# Patient Record
Sex: FEMALE | Race: WHITE | HISPANIC OR LATINO
[De-identification: names, ages, dates, MRNs, and addresses within clinical notes are randomized per-mention and may not be internally consistent; named-entity substitution may affect disease eponyms.]

---

## 2017-01-04 ENCOUNTER — APPOINTMENT (OUTPATIENT)
Dept: INTERNAL MEDICINE | Facility: CLINIC | Age: 52
End: 2017-01-04

## 2017-01-04 VITALS
TEMPERATURE: 99.2 F | WEIGHT: 289 LBS | RESPIRATION RATE: 18 BRPM | BODY MASS INDEX: 53.18 KG/M2 | SYSTOLIC BLOOD PRESSURE: 138 MMHG | HEART RATE: 100 BPM | DIASTOLIC BLOOD PRESSURE: 98 MMHG | HEIGHT: 62 IN

## 2017-01-04 DIAGNOSIS — K76.0 FATTY (CHANGE OF) LIVER, NOT ELSEWHERE CLASSIFIED: ICD-10-CM

## 2017-01-05 LAB
25(OH)D3 SERPL-MCNC: 18.4 NG/ML
ALBUMIN SERPL ELPH-MCNC: 4.1 G/DL
ALP BLD-CCNC: 104 U/L
ALT SERPL-CCNC: 61 U/L
ANION GAP SERPL CALC-SCNC: 15 MMOL/L
APPEARANCE: CLEAR
AST SERPL-CCNC: 59 U/L
BASOPHILS # BLD AUTO: 0.06 K/UL
BASOPHILS NFR BLD AUTO: 0.7 %
BILIRUB SERPL-MCNC: 0.6 MG/DL
BILIRUBIN URINE: NEGATIVE
BLOOD URINE: NEGATIVE
BUN SERPL-MCNC: 11 MG/DL
CALCIUM SERPL-MCNC: 9.7 MG/DL
CHLORIDE SERPL-SCNC: 97 MMOL/L
CHOLEST SERPL-MCNC: 192 MG/DL
CHOLEST/HDLC SERPL: 4.9 RATIO
CO2 SERPL-SCNC: 28 MMOL/L
COLOR: YELLOW
CREAT SERPL-MCNC: 0.76 MG/DL
EOSINOPHIL # BLD AUTO: 0.31 K/UL
EOSINOPHIL NFR BLD AUTO: 3.6 %
GGT SERPL-CCNC: 68 U/L
GLUCOSE QUALITATIVE U: NORMAL MG/DL
GLUCOSE SERPL-MCNC: 169 MG/DL
HBA1C MFR BLD HPLC: 7.7 %
HCT VFR BLD CALC: 42.3 %
HDLC SERPL-MCNC: 39 MG/DL
HGB BLD-MCNC: 12.7 G/DL
IMM GRANULOCYTES NFR BLD AUTO: 1.3 %
KETONES URINE: NEGATIVE
LDLC SERPL CALC-MCNC: 130 MG/DL
LEUKOCYTE ESTERASE URINE: NEGATIVE
LYMPHOCYTES # BLD AUTO: 2.14 K/UL
LYMPHOCYTES NFR BLD AUTO: 24.9 %
MAN DIFF?: NORMAL
MCHC RBC-ENTMCNC: 25.1 PG
MCHC RBC-ENTMCNC: 30 GM/DL
MCV RBC AUTO: 83.8 FL
MONOCYTES # BLD AUTO: 0.63 K/UL
MONOCYTES NFR BLD AUTO: 7.3 %
NEUTROPHILS # BLD AUTO: 5.36 K/UL
NEUTROPHILS NFR BLD AUTO: 62.2 %
NITRITE URINE: NEGATIVE
PH URINE: 6
PLATELET # BLD AUTO: 344 K/UL
POTASSIUM SERPL-SCNC: 4.7 MMOL/L
PROT SERPL-MCNC: 7.6 G/DL
PROTEIN URINE: NEGATIVE MG/DL
RBC # BLD: 5.05 M/UL
RBC # FLD: 15.8 %
SODIUM SERPL-SCNC: 140 MMOL/L
SPECIFIC GRAVITY URINE: 1.02
TRIGL SERPL-MCNC: 113 MG/DL
UROBILINOGEN URINE: NORMAL MG/DL
WBC # FLD AUTO: 8.61 K/UL

## 2017-01-10 LAB — TSH SERPL-ACNC: 2.14 UU/ML

## 2017-01-24 ENCOUNTER — INPATIENT (INPATIENT)
Facility: HOSPITAL | Age: 52
LOS: 0 days | Discharge: HOME | DRG: 313 | End: 2017-01-25
Attending: INTERNAL MEDICINE | Admitting: INTERNAL MEDICINE
Payer: COMMERCIAL

## 2017-01-24 VITALS
HEIGHT: 61 IN | SYSTOLIC BLOOD PRESSURE: 125 MMHG | DIASTOLIC BLOOD PRESSURE: 105 MMHG | WEIGHT: 289.03 LBS | TEMPERATURE: 98 F | OXYGEN SATURATION: 98 % | RESPIRATION RATE: 24 BRPM | HEART RATE: 98 BPM

## 2017-01-24 DIAGNOSIS — E11.9 TYPE 2 DIABETES MELLITUS WITHOUT COMPLICATIONS: ICD-10-CM

## 2017-01-24 DIAGNOSIS — I10 ESSENTIAL (PRIMARY) HYPERTENSION: ICD-10-CM

## 2017-01-24 DIAGNOSIS — R07.9 CHEST PAIN, UNSPECIFIED: ICD-10-CM

## 2017-01-24 DIAGNOSIS — E03.9 HYPOTHYROIDISM, UNSPECIFIED: ICD-10-CM

## 2017-01-24 DIAGNOSIS — Z90.710 ACQUIRED ABSENCE OF BOTH CERVIX AND UTERUS: Chronic | ICD-10-CM

## 2017-01-24 DIAGNOSIS — Z98.89 OTHER SPECIFIED POSTPROCEDURAL STATES: Chronic | ICD-10-CM

## 2017-01-24 DIAGNOSIS — Z41.8 ENCOUNTER FOR OTHER PROCEDURES FOR PURPOSES OTHER THAN REMEDYING HEALTH STATE: ICD-10-CM

## 2017-01-24 LAB
ALBUMIN SERPL ELPH-MCNC: 3.3 G/DL — LOW (ref 3.5–5)
ALP SERPL-CCNC: 109 U/L — SIGNIFICANT CHANGE UP (ref 40–120)
ALT FLD-CCNC: 70 U/L DA — HIGH (ref 10–60)
ANION GAP SERPL CALC-SCNC: 9 MMOL/L — SIGNIFICANT CHANGE UP (ref 5–17)
APPEARANCE UR: CLEAR — SIGNIFICANT CHANGE UP
AST SERPL-CCNC: 57 U/L — HIGH (ref 10–40)
BASOPHILS # BLD AUTO: 0.1 K/UL — SIGNIFICANT CHANGE UP (ref 0–0.2)
BASOPHILS NFR BLD AUTO: 0.8 % — SIGNIFICANT CHANGE UP (ref 0–2)
BILIRUB SERPL-MCNC: 0.3 MG/DL — SIGNIFICANT CHANGE UP (ref 0.2–1.2)
BILIRUB UR-MCNC: NEGATIVE — SIGNIFICANT CHANGE UP
BUN SERPL-MCNC: 17 MG/DL — SIGNIFICANT CHANGE UP (ref 7–18)
CALCIUM SERPL-MCNC: 8.7 MG/DL — SIGNIFICANT CHANGE UP (ref 8.4–10.5)
CHLORIDE SERPL-SCNC: 102 MMOL/L — SIGNIFICANT CHANGE UP (ref 96–108)
CHOLEST SERPL-MCNC: 161 MG/DL — SIGNIFICANT CHANGE UP (ref 10–199)
CO2 SERPL-SCNC: 31 MMOL/L — SIGNIFICANT CHANGE UP (ref 22–31)
COLOR SPEC: YELLOW — SIGNIFICANT CHANGE UP
CREAT SERPL-MCNC: 0.83 MG/DL — SIGNIFICANT CHANGE UP (ref 0.5–1.3)
DIFF PNL FLD: NEGATIVE — SIGNIFICANT CHANGE UP
EOSINOPHIL # BLD AUTO: 0.3 K/UL — SIGNIFICANT CHANGE UP (ref 0–0.5)
EOSINOPHIL NFR BLD AUTO: 3.3 % — SIGNIFICANT CHANGE UP (ref 0–6)
GLUCOSE SERPL-MCNC: 131 MG/DL — HIGH (ref 70–99)
GLUCOSE UR QL: NEGATIVE — SIGNIFICANT CHANGE UP
HBA1C BLD-MCNC: 7.8 % — HIGH (ref 4–5.6)
HCG UR QL: NEGATIVE — SIGNIFICANT CHANGE UP
HCT VFR BLD CALC: 40.1 % — SIGNIFICANT CHANGE UP (ref 34.5–45)
HDLC SERPL-MCNC: 41 MG/DL — SIGNIFICANT CHANGE UP (ref 40–125)
HGB BLD-MCNC: 12.7 G/DL — SIGNIFICANT CHANGE UP (ref 11.5–15.5)
KETONES UR-MCNC: NEGATIVE — SIGNIFICANT CHANGE UP
LEUKOCYTE ESTERASE UR-ACNC: NEGATIVE — SIGNIFICANT CHANGE UP
LIDOCAIN IGE QN: 92 U/L — SIGNIFICANT CHANGE UP (ref 73–393)
LIPID PNL WITH DIRECT LDL SERPL: 91 MG/DL — SIGNIFICANT CHANGE UP
LYMPHOCYTES # BLD AUTO: 3.1 K/UL — SIGNIFICANT CHANGE UP (ref 1–3.3)
LYMPHOCYTES # BLD AUTO: 33.4 % — SIGNIFICANT CHANGE UP (ref 13–44)
MCHC RBC-ENTMCNC: 25.9 PG — LOW (ref 27–34)
MCHC RBC-ENTMCNC: 31.7 GM/DL — LOW (ref 32–36)
MCV RBC AUTO: 81.8 FL — SIGNIFICANT CHANGE UP (ref 80–100)
MONOCYTES # BLD AUTO: 0.4 K/UL — SIGNIFICANT CHANGE UP (ref 0–0.9)
MONOCYTES NFR BLD AUTO: 3.9 % — SIGNIFICANT CHANGE UP (ref 2–14)
NEUTROPHILS # BLD AUTO: 5.4 K/UL — SIGNIFICANT CHANGE UP (ref 1.8–7.4)
NEUTROPHILS NFR BLD AUTO: 58.4 % — SIGNIFICANT CHANGE UP (ref 43–77)
NITRITE UR-MCNC: NEGATIVE — SIGNIFICANT CHANGE UP
NT-PROBNP SERPL-SCNC: 146 PG/ML — HIGH (ref 0–125)
PH UR: 6.5 — SIGNIFICANT CHANGE UP (ref 4.8–8)
PLATELET # BLD AUTO: 329 K/UL — SIGNIFICANT CHANGE UP (ref 150–400)
POTASSIUM SERPL-MCNC: 3.8 MMOL/L — SIGNIFICANT CHANGE UP (ref 3.5–5.3)
POTASSIUM SERPL-SCNC: 3.8 MMOL/L — SIGNIFICANT CHANGE UP (ref 3.5–5.3)
PROT SERPL-MCNC: 7.6 G/DL — SIGNIFICANT CHANGE UP (ref 6–8.3)
PROT UR-MCNC: NEGATIVE — SIGNIFICANT CHANGE UP
RBC # BLD: 4.9 M/UL — SIGNIFICANT CHANGE UP (ref 3.8–5.2)
RBC # FLD: 14.8 % — HIGH (ref 10.3–14.5)
SODIUM SERPL-SCNC: 142 MMOL/L — SIGNIFICANT CHANGE UP (ref 135–145)
SP GR SPEC: 1.01 — SIGNIFICANT CHANGE UP (ref 1.01–1.02)
T3 SERPL-MCNC: 96 NG/DL — SIGNIFICANT CHANGE UP (ref 80–200)
T4 AB SER-ACNC: 10.5 UG/DL — SIGNIFICANT CHANGE UP (ref 4.6–12)
TOTAL CHOLESTEROL/HDL RATIO MEASUREMENT: 3.9 RATIO — SIGNIFICANT CHANGE UP (ref 3.3–7.1)
TRIGL SERPL-MCNC: 144 MG/DL — SIGNIFICANT CHANGE UP (ref 10–149)
TROPONIN I SERPL-MCNC: <0.015 NG/ML — SIGNIFICANT CHANGE UP (ref 0–0.04)
TSH SERPL-MCNC: 1.81 UU/ML — SIGNIFICANT CHANGE UP (ref 0.34–4.82)
UROBILINOGEN FLD QL: NEGATIVE — SIGNIFICANT CHANGE UP
WBC # BLD: 9.3 K/UL — SIGNIFICANT CHANGE UP (ref 3.8–10.5)
WBC # FLD AUTO: 9.3 K/UL — SIGNIFICANT CHANGE UP (ref 3.8–10.5)

## 2017-01-24 PROCEDURE — 99223 1ST HOSP IP/OBS HIGH 75: CPT | Mod: GC

## 2017-01-24 PROCEDURE — 71020: CPT | Mod: 26

## 2017-01-24 PROCEDURE — 99285 EMERGENCY DEPT VISIT HI MDM: CPT | Mod: 25

## 2017-01-24 RX ORDER — DEXTROSE 50 % IN WATER 50 %
25 SYRINGE (ML) INTRAVENOUS ONCE
Qty: 0 | Refills: 0 | Status: DISCONTINUED | OUTPATIENT
Start: 2017-01-24 | End: 2017-01-25

## 2017-01-24 RX ORDER — LOSARTAN POTASSIUM 100 MG/1
50 TABLET, FILM COATED ORAL DAILY
Qty: 0 | Refills: 0 | Status: DISCONTINUED | OUTPATIENT
Start: 2017-01-25 | End: 2017-01-25

## 2017-01-24 RX ORDER — GABAPENTIN 400 MG/1
300 CAPSULE ORAL
Qty: 0 | Refills: 0 | Status: DISCONTINUED | OUTPATIENT
Start: 2017-01-24 | End: 2017-01-25

## 2017-01-24 RX ORDER — LEVOTHYROXINE SODIUM 125 MCG
112 TABLET ORAL DAILY
Qty: 0 | Refills: 0 | Status: DISCONTINUED | OUTPATIENT
Start: 2017-01-24 | End: 2017-01-25

## 2017-01-24 RX ORDER — MONTELUKAST 4 MG/1
10 TABLET, CHEWABLE ORAL AT BEDTIME
Qty: 0 | Refills: 0 | Status: DISCONTINUED | OUTPATIENT
Start: 2017-01-24 | End: 2017-01-25

## 2017-01-24 RX ORDER — GLUCAGON INJECTION, SOLUTION 0.5 MG/.1ML
1 INJECTION, SOLUTION SUBCUTANEOUS ONCE
Qty: 0 | Refills: 0 | Status: DISCONTINUED | OUTPATIENT
Start: 2017-01-24 | End: 2017-01-25

## 2017-01-24 RX ORDER — ALBUTEROL 90 UG/1
2 AEROSOL, METERED ORAL EVERY 6 HOURS
Qty: 0 | Refills: 0 | Status: DISCONTINUED | OUTPATIENT
Start: 2017-01-24 | End: 2017-01-25

## 2017-01-24 RX ORDER — ATORVASTATIN CALCIUM 80 MG/1
40 TABLET, FILM COATED ORAL AT BEDTIME
Qty: 0 | Refills: 0 | Status: DISCONTINUED | OUTPATIENT
Start: 2017-01-24 | End: 2017-01-25

## 2017-01-24 RX ORDER — INSULIN LISPRO 100/ML
VIAL (ML) SUBCUTANEOUS
Qty: 0 | Refills: 0 | Status: DISCONTINUED | OUTPATIENT
Start: 2017-01-24 | End: 2017-01-25

## 2017-01-24 RX ORDER — DEXTROSE 50 % IN WATER 50 %
1 SYRINGE (ML) INTRAVENOUS ONCE
Qty: 0 | Refills: 0 | Status: DISCONTINUED | OUTPATIENT
Start: 2017-01-24 | End: 2017-01-25

## 2017-01-24 RX ORDER — PANTOPRAZOLE SODIUM 20 MG/1
40 TABLET, DELAYED RELEASE ORAL
Qty: 0 | Refills: 0 | Status: DISCONTINUED | OUTPATIENT
Start: 2017-01-24 | End: 2017-01-25

## 2017-01-24 RX ORDER — OXCARBAZEPINE 300 MG/1
600 TABLET, FILM COATED ORAL
Qty: 0 | Refills: 0 | Status: DISCONTINUED | OUTPATIENT
Start: 2017-01-24 | End: 2017-01-25

## 2017-01-24 RX ORDER — DEXTROSE 50 % IN WATER 50 %
12.5 SYRINGE (ML) INTRAVENOUS ONCE
Qty: 0 | Refills: 0 | Status: DISCONTINUED | OUTPATIENT
Start: 2017-01-24 | End: 2017-01-25

## 2017-01-24 RX ORDER — IBUPROFEN 200 MG
400 TABLET ORAL EVERY 6 HOURS
Qty: 0 | Refills: 0 | Status: DISCONTINUED | OUTPATIENT
Start: 2017-01-24 | End: 2017-01-24

## 2017-01-24 RX ORDER — LOSARTAN POTASSIUM 100 MG/1
100 TABLET, FILM COATED ORAL DAILY
Qty: 0 | Refills: 0 | Status: DISCONTINUED | OUTPATIENT
Start: 2017-01-24 | End: 2017-01-24

## 2017-01-24 RX ORDER — ASPIRIN/CALCIUM CARB/MAGNESIUM 324 MG
81 TABLET ORAL DAILY
Qty: 0 | Refills: 0 | Status: DISCONTINUED | OUTPATIENT
Start: 2017-01-24 | End: 2017-01-25

## 2017-01-24 RX ORDER — SODIUM CHLORIDE 9 MG/ML
1000 INJECTION, SOLUTION INTRAVENOUS
Qty: 0 | Refills: 0 | Status: DISCONTINUED | OUTPATIENT
Start: 2017-01-24 | End: 2017-01-25

## 2017-01-24 RX ORDER — ENOXAPARIN SODIUM 100 MG/ML
40 INJECTION SUBCUTANEOUS DAILY
Qty: 0 | Refills: 0 | Status: DISCONTINUED | OUTPATIENT
Start: 2017-01-24 | End: 2017-01-25

## 2017-01-24 RX ADMIN — MONTELUKAST 10 MILLIGRAM(S): 4 TABLET, CHEWABLE ORAL at 22:42

## 2017-01-24 RX ADMIN — ALBUTEROL 2 PUFF(S): 90 AEROSOL, METERED ORAL at 21:15

## 2017-01-24 RX ADMIN — ATORVASTATIN CALCIUM 40 MILLIGRAM(S): 80 TABLET, FILM COATED ORAL at 23:11

## 2017-01-24 RX ADMIN — ALBUTEROL 2 PUFF(S): 90 AEROSOL, METERED ORAL at 11:55

## 2017-01-24 RX ADMIN — Medication 81 MILLIGRAM(S): at 11:56

## 2017-01-24 RX ADMIN — ENOXAPARIN SODIUM 40 MILLIGRAM(S): 100 INJECTION SUBCUTANEOUS at 11:56

## 2017-01-24 RX ADMIN — OXCARBAZEPINE 600 MILLIGRAM(S): 300 TABLET, FILM COATED ORAL at 21:03

## 2017-01-24 RX ADMIN — GABAPENTIN 300 MILLIGRAM(S): 400 CAPSULE ORAL at 19:06

## 2017-01-24 RX ADMIN — Medication 112 MICROGRAM(S): at 16:05

## 2017-01-24 RX ADMIN — Medication: at 11:55

## 2017-01-24 RX ADMIN — ALBUTEROL 2 PUFF(S): 90 AEROSOL, METERED ORAL at 16:05

## 2017-01-24 NOTE — ED PROVIDER NOTE - NS ED MD SCRIBE ATTENDING SCRIBE SECTIONS
DISPOSITION/VITAL SIGNS( Pullset)/HIV/PHYSICAL EXAM/PAST MEDICAL/SURGICAL/SOCIAL HISTORY/HISTORY OF PRESENT ILLNESS/REVIEW OF SYSTEMS

## 2017-01-24 NOTE — ED PROVIDER NOTE - MEDICAL DECISION MAKING DETAILS
52 y/o F presenting with chest pain, palpitations. Will check labs, urine, chest x-ray and likely admit to r/o ACS.

## 2017-01-24 NOTE — H&P ADULT. - FAMILY HISTORY
Father  Still living? Unknown  Family history of diabetes mellitus, Age at diagnosis: Age Unknown  Family history of hypertension, Age at diagnosis: Age Unknown  Family history of obesity, Age at diagnosis: Age Unknown

## 2017-01-24 NOTE — ED PROVIDER NOTE - PMH
Asthma    Fibromyalgia    Hyperlipidemia    Hypertension    Hypothyroid    Obesity    Obstructive sleep apnea    Trigeminal neuralgia

## 2017-01-24 NOTE — H&P ADULT. - HISTORY OF PRESENT ILLNESS
51 year old obese female from home live alone with PMH of HTN, HLD, DM, obesity, hypothyroidism, fibromyalgia who had recent admission in september for chest pain pw non exertional chest pain and palpitation for 4 hour on and off which later accompanied by dizziness, left sided arm numbness. Pt stated chest pain is 9/10 and heavy throbbing sensation but no N/V and sweating. Pt had recent admission with similar chest pain and echo done with 55% EF but no stress test.   In Ed pt vital are stable.  On PE pt is lying comfortably in bed, no current pain. S1 S2 without murmur.

## 2017-01-24 NOTE — ED PROVIDER NOTE - OBJECTIVE STATEMENT
50 y/o F pt with PMHx of fibromyalgia, HLD, HTN, history of thyroid disease, obesity presents to ED c/o palpitations and chest pain x today and lightheadedness and dizziness x 2 days. Pt relates feeling generalized weakness and is unable to move. Denies edema, headache, or any other complaints. Pt is well appearing and in no acute distress at this point in time. NKDA.

## 2017-01-24 NOTE — H&P ADULT. - ATTENDING COMMENTS
patient initially admitted to Dr. Patino ([facility]) asked to take over this afternoon as not an ACP patient; (PCP Dr. Danielle king at [facility])    51 year old obese female from home live alone with PMH of HTN, HLD, DM, obesity, hypothyroidism, fibromyalgia Trigeminal Neuralgia who had recent admission in september for chest pain pw non exertional chest pain and palpitation for 4 hour on and off which later accompanied by dizziness, left sided arm numbness. Pt stated chest pain is 9/10 and heavy throbbing sensation but no N/V and sweating. Pt had recent admission with similar chest pain and echo done with 55% EF but no stress test.     SH: Non smoker; Lives alone; son close by; No drugs  FH: Diabetes/ HTN/ HLD in paternal side; No CAD;     Vitals: /74  HR 71  Temp 98.3F SaO2 93% RA  RR 15    P/E:  Gen: Obese female, comfortable at rest.  Neuro: AAO x3; No focal deficits  CVS: S1S2 present; regular  Resp: BLAE+, No wheeze or Rhonchi  GI: Obese, soft, Epigastric tenderness;   extr: No edema or calf tenderness B/L LE      labs: Reviewed; essentially WNL; Troponin x 2 negative  CXR: No active chest disease    D/D:  Chest pain concerning for possible ACS given risk factors: (Obesity/DM/HTN/HLD)   Possible GERD Hx of possibly H. pylori  Obesity  HTN controlled  Hx Fibromyalgia and Trigeminal Neuralgia/ Osteoarthritis    Plan:  Admit to Telemetry; Serial Cardiac enzymes  Reviewed old Echo; No significant valvular pathology; MIld conc. LVH; EF >55 %.   Asa, low dose beta blocker until ruled out; Statin;  Place Protonix 40 mg daily before breakfast for poosible GERD  Continue Losartan/HCTZ;   Cardiology Consult d/w Dr. Sheets. Given recurrent chest pain and risk factors will proceed with a Nuclear stress test in AM  Resume Metformin if Stress test negative tomorrow.  DVT ppx with Lovenox;     Discussed with patient findings, possible etiology and plan of care; Discussed with RN; patient initially admitted to Dr. Patino ([facility]) asked to take over this afternoon as not an ACP patient; (PCP Dr. Danielle king at [facility])    51 year old obese female from home live alone with PMH of HTN, HLD, DM, obesity, hypothyroidism, fibromyalgia Trigeminal Neuralgia who had recent admission in september for chest pain pw non exertional chest pain and palpitation for 4 hour on and off which later accompanied by dizziness, left sided arm numbness. Pt stated chest pain is 9/10 and heavy throbbing sensation but no N/V and sweating. Pt had recent admission with similar chest pain and echo done with 55% EF but no stress test.     SH: Non smoker; Lives alone; son close by; No drugs  FH: Diabetes/ HTN/ HLD in paternal side; No CAD;     Vitals: /74  HR 71  Temp 98.3F SaO2 93% RA  RR 15    P/E:  Gen: Obese female, comfortable at rest.  Neuro: AAO x3; No focal deficits  CVS: S1S2 present; regular  Resp: BLAE+, No wheeze or Rhonchi  GI: Obese, soft, Epigastric tenderness;   extr: No edema or calf tenderness B/L LE      labs: Reviewed; essentially WNL; Troponin x 2 negative  CXR: No active chest disease    D/D:  Chest pain concerning for possible ACS given risk factors: (Obesity/DM/HTN/HLD)   Possible GERD Hx of possibly H. pylori  Obesity  HTN controlled  Hypothyroidism adequately treated  Hx Fibromyalgia and Trigeminal Neuralgia/ Osteoarthritis    Plan:  Admit to Telemetry; Serial Cardiac enzymes  Reviewed old Echo; No significant valvular pathology; Mild conc. LVH; EF >55 %.   Asa, low dose beta blocker until ruled out; Statin;  Place Protonix 40 mg daily before breakfast for possible GERD  Continue Losartan/HCTZ; Given BP low normal; decrease Losartan to 50mg daily; Continue Synthroid.   Cardiology Consult d/w Dr. Sheets. Given recurrent chest pain and risk factors will proceed with a Nuclear stress test in AM  Resume Metformin if Stress test negative tomorrow.  DVT ppx with Lovenox;     Discussed with patient findings, possible etiology and plan of care; Discussed with RN;

## 2017-01-24 NOTE — H&P ADULT. - PROBLEM SELECTOR PLAN 1
-tele monitor.   -CEx1 negative follow up T2 and T3. CXR neagtive.   -recent echo wnl, Cardio Dr Sheets.   -ASA, statin and metoprolol.  -follow up Lipid profile, TSH, B12 and folate.

## 2017-01-25 VITALS
DIASTOLIC BLOOD PRESSURE: 66 MMHG | HEART RATE: 88 BPM | TEMPERATURE: 98 F | SYSTOLIC BLOOD PRESSURE: 103 MMHG | OXYGEN SATURATION: 94 % | RESPIRATION RATE: 18 BRPM

## 2017-01-25 LAB
ANION GAP SERPL CALC-SCNC: 7 MMOL/L — SIGNIFICANT CHANGE UP (ref 5–17)
BASOPHILS # BLD AUTO: 0.1 K/UL — SIGNIFICANT CHANGE UP (ref 0–0.2)
BASOPHILS NFR BLD AUTO: 1.2 % — SIGNIFICANT CHANGE UP (ref 0–2)
BUN SERPL-MCNC: 13 MG/DL — SIGNIFICANT CHANGE UP (ref 7–18)
CALCIUM SERPL-MCNC: 8.7 MG/DL — SIGNIFICANT CHANGE UP (ref 8.4–10.5)
CHLORIDE SERPL-SCNC: 101 MMOL/L — SIGNIFICANT CHANGE UP (ref 96–108)
CO2 SERPL-SCNC: 32 MMOL/L — HIGH (ref 22–31)
CREAT SERPL-MCNC: 0.78 MG/DL — SIGNIFICANT CHANGE UP (ref 0.5–1.3)
EOSINOPHIL # BLD AUTO: 0.3 K/UL — SIGNIFICANT CHANGE UP (ref 0–0.5)
EOSINOPHIL NFR BLD AUTO: 4.1 % — SIGNIFICANT CHANGE UP (ref 0–6)
GLUCOSE SERPL-MCNC: 165 MG/DL — HIGH (ref 70–99)
HCT VFR BLD CALC: 38 % — SIGNIFICANT CHANGE UP (ref 34.5–45)
HGB BLD-MCNC: 12.5 G/DL — SIGNIFICANT CHANGE UP (ref 11.5–15.5)
LYMPHOCYTES # BLD AUTO: 2.2 K/UL — SIGNIFICANT CHANGE UP (ref 1–3.3)
LYMPHOCYTES # BLD AUTO: 27.4 % — SIGNIFICANT CHANGE UP (ref 13–44)
MCHC RBC-ENTMCNC: 26.7 PG — LOW (ref 27–34)
MCHC RBC-ENTMCNC: 32.8 GM/DL — SIGNIFICANT CHANGE UP (ref 32–36)
MCV RBC AUTO: 81.4 FL — SIGNIFICANT CHANGE UP (ref 80–100)
MONOCYTES # BLD AUTO: 0.4 K/UL — SIGNIFICANT CHANGE UP (ref 0–0.9)
MONOCYTES NFR BLD AUTO: 5.1 % — SIGNIFICANT CHANGE UP (ref 2–14)
NEUTROPHILS # BLD AUTO: 5.1 K/UL — SIGNIFICANT CHANGE UP (ref 1.8–7.4)
NEUTROPHILS NFR BLD AUTO: 62.2 % — SIGNIFICANT CHANGE UP (ref 43–77)
PLATELET # BLD AUTO: 304 K/UL — SIGNIFICANT CHANGE UP (ref 150–400)
POTASSIUM SERPL-MCNC: 4.1 MMOL/L — SIGNIFICANT CHANGE UP (ref 3.5–5.3)
POTASSIUM SERPL-SCNC: 4.1 MMOL/L — SIGNIFICANT CHANGE UP (ref 3.5–5.3)
RBC # BLD: 4.66 M/UL — SIGNIFICANT CHANGE UP (ref 3.8–5.2)
RBC # FLD: 15 % — HIGH (ref 10.3–14.5)
SODIUM SERPL-SCNC: 140 MMOL/L — SIGNIFICANT CHANGE UP (ref 135–145)
WBC # BLD: 8.2 K/UL — SIGNIFICANT CHANGE UP (ref 3.8–10.5)
WBC # FLD AUTO: 8.2 K/UL — SIGNIFICANT CHANGE UP (ref 3.8–10.5)

## 2017-01-25 PROCEDURE — 84443 ASSAY THYROID STIM HORMONE: CPT

## 2017-01-25 PROCEDURE — 93005 ELECTROCARDIOGRAM TRACING: CPT

## 2017-01-25 PROCEDURE — 80048 BASIC METABOLIC PNL TOTAL CA: CPT

## 2017-01-25 PROCEDURE — 84484 ASSAY OF TROPONIN QUANT: CPT

## 2017-01-25 PROCEDURE — 80053 COMPREHEN METABOLIC PANEL: CPT

## 2017-01-25 PROCEDURE — 84480 ASSAY TRIIODOTHYRONINE (T3): CPT

## 2017-01-25 PROCEDURE — 80061 LIPID PANEL: CPT

## 2017-01-25 PROCEDURE — 81003 URINALYSIS AUTO W/O SCOPE: CPT

## 2017-01-25 PROCEDURE — G0378: CPT

## 2017-01-25 PROCEDURE — 83690 ASSAY OF LIPASE: CPT

## 2017-01-25 PROCEDURE — 71046 X-RAY EXAM CHEST 2 VIEWS: CPT

## 2017-01-25 PROCEDURE — 85027 COMPLETE CBC AUTOMATED: CPT

## 2017-01-25 PROCEDURE — 99239 HOSP IP/OBS DSCHRG MGMT >30: CPT

## 2017-01-25 PROCEDURE — 83880 ASSAY OF NATRIURETIC PEPTIDE: CPT

## 2017-01-25 PROCEDURE — 81025 URINE PREGNANCY TEST: CPT

## 2017-01-25 PROCEDURE — 94640 AIRWAY INHALATION TREATMENT: CPT

## 2017-01-25 PROCEDURE — 99285 EMERGENCY DEPT VISIT HI MDM: CPT | Mod: 25

## 2017-01-25 PROCEDURE — 84436 ASSAY OF TOTAL THYROXINE: CPT

## 2017-01-25 PROCEDURE — 83036 HEMOGLOBIN GLYCOSYLATED A1C: CPT

## 2017-01-25 RX ORDER — LACTULOSE 10 G/15ML
20 SOLUTION ORAL ONCE
Qty: 0 | Refills: 0 | Status: DISCONTINUED | OUTPATIENT
Start: 2017-01-25 | End: 2017-01-25

## 2017-01-25 RX ORDER — PANTOPRAZOLE SODIUM 20 MG/1
1 TABLET, DELAYED RELEASE ORAL
Qty: 30 | Refills: 0
Start: 2017-01-25 | End: 2017-02-24

## 2017-01-25 RX ORDER — ACETAMINOPHEN 500 MG
650 TABLET ORAL EVERY 6 HOURS
Qty: 0 | Refills: 0 | Status: DISCONTINUED | OUTPATIENT
Start: 2017-01-25 | End: 2017-01-25

## 2017-01-25 RX ORDER — LOSARTAN/HYDROCHLOROTHIAZIDE 100MG-25MG
1 TABLET ORAL
Qty: 30 | Refills: 0
Start: 2017-01-25 | End: 2017-02-24

## 2017-01-25 RX ORDER — MONTELUKAST 4 MG/1
1 TABLET, CHEWABLE ORAL
Qty: 0 | Refills: 0 | DISCHARGE
Start: 2017-01-25

## 2017-01-25 RX ORDER — PANTOPRAZOLE SODIUM 20 MG/1
1 TABLET, DELAYED RELEASE ORAL
Qty: 30 | Refills: 0 | OUTPATIENT
Start: 2017-01-25 | End: 2017-02-24

## 2017-01-25 RX ADMIN — GABAPENTIN 300 MILLIGRAM(S): 400 CAPSULE ORAL at 05:28

## 2017-01-25 RX ADMIN — PANTOPRAZOLE SODIUM 40 MILLIGRAM(S): 20 TABLET, DELAYED RELEASE ORAL at 09:24

## 2017-01-25 RX ADMIN — Medication 1: at 09:25

## 2017-01-25 RX ADMIN — ALBUTEROL 2 PUFF(S): 90 AEROSOL, METERED ORAL at 09:26

## 2017-01-25 RX ADMIN — ENOXAPARIN SODIUM 40 MILLIGRAM(S): 100 INJECTION SUBCUTANEOUS at 11:06

## 2017-01-25 RX ADMIN — ALBUTEROL 2 PUFF(S): 90 AEROSOL, METERED ORAL at 02:59

## 2017-01-25 RX ADMIN — OXCARBAZEPINE 600 MILLIGRAM(S): 300 TABLET, FILM COATED ORAL at 06:43

## 2017-01-25 RX ADMIN — Medication 112 MICROGRAM(S): at 06:58

## 2017-01-25 RX ADMIN — LOSARTAN POTASSIUM 50 MILLIGRAM(S): 100 TABLET, FILM COATED ORAL at 05:28

## 2017-01-25 RX ADMIN — Medication 81 MILLIGRAM(S): at 11:05

## 2017-01-25 NOTE — DISCHARGE NOTE ADULT - HOSPITAL COURSE
51 year old obese female from home live alone with PMH of HTN, HLD, DM, obesity, hypothyroidism, fibromyalgia Trigeminal Neuralgia who had recent admission in september for chest pain p/w non exertional chest pain and palpitation for 4 hour on and off which later accompanied by dizziness, left sided arm numbness. Pt stated chest pain is 9/10 and heavy throbbing sensation but no N/V and sweating. Pt had recent admission with similar chest pain and echo done with 55% EF but no stress test.   s/p tele monitoring, ACS protocol  s/p trops<0.04x3  s/p cardio eval (Dr. Sheets)  started on protonix  Patient requires nuclear stress test to complete the work- up, however did not fit the hospital nuclear camera.  Will be followed up by Dr. Sheets outpatient 51 year old obese female from home live alone with PMH of HTN, HLD, DM, obesity, hypothyroidism, fibromyalgia Trigeminal Neuralgia who had recent admission in september for chest pain p/w non exertional chest pain and palpitation for 4 hour on and off which later accompanied by dizziness, left sided arm numbness. Pt stated chest pain is 9/10 and heavy throbbing sensation but no N/V and sweating. Pt had recent admission with similar chest pain and echo done with 55% with no significant valvular pathology.  s/p tele monitoring, ACS protocol; No evidence of Arrhythmia on tele; s/p troponin <0.04 x 3;  s/p cardio eval (Dr. Sheets); Stress test attempted;   started on Protonix for possible GERD; She has Hx H. Pylori Rx many years ago.   Patient requires nuclear stress test to complete the work- up, however did not fit the hospital nuclear camera.  Will be followed up by Dr. Sheets outpatient    If stress test negative and trial of PPI fails, still gets chest pain consider EGD to rule out recurrent H. Pylori.  Please followup with Dr. Smith or possible evaluation for possible Bypass versus Banding

## 2017-01-25 NOTE — DISCHARGE NOTE ADULT - PLAN OF CARE
patient will remain symptoms free follow up with cardiologist as soon as possible for outpatient stress test continue inhalers may resume metformin, continue diet continue medications, diet Stable; Prevent exacerbation Target HgbA1c less than 7.0 Adequately treated; TSH therapeutic follow up with cardiologist Dr. Sheets as outpatient for stress test  stress test could not be done in the hospital as you could not fit into the machine at hospital.  Take Baby Aspirin daily. continue inhalers as before may resume metformin, continue diet; Your HgbA1c was 7.9;   Follow up as per PCP/ Endocrine Dr. Walsh Continue gabapentin 400 mg morning and 300 mg at night. Continue Synthroid at current dose; RepeaT tsh IN 3 MONTHS. Keep BP less than 130/80 mm Hg. Prevent reflux which might cause chestpain PPI trial for 6 weeks; If symptoms persist, consider EGD rule out recurrent H.Pylori infection (You received treatment many years ago as you mentioned) Please note that your BP was in the low normal range in hospital. We reduced Losartan/ HCTz to 50/12.5 mg daily. Prescription send. Continue Synthroid at current dose; Repeat TSH IN 3 MONTHS.

## 2017-01-25 NOTE — DISCHARGE NOTE ADULT - CARE PROVIDERS DIRECT ADDRESSES
,DirectAddress_Unknown,DirectAddress_Unknown ,DirectAddress_Unknown,[email or web address],DirectAddress_Unknown

## 2017-01-25 NOTE — DISCHARGE NOTE ADULT - CARE PROVIDER_API CALL
Dale Sheets), Cardiovascular Disease; Internal Medicine  [address]  [place] [address]  Phone: [phone]  Fax: [phone] Dale Sheets), Cardiovascular Disease; Internal Medicine  [address] [facility] [address]  [place] [address]  Phone: [phone]  Fax: [phone]    Ange Walsh), Internal Medicine  [address] [place] [address]  Phone: [phone]  Fax: [phone]

## 2017-01-25 NOTE — DISCHARGE NOTE ADULT - SECONDARY DIAGNOSIS.
Asthma Diabetes Fibromyalgia Hyperlipidemia Hypertension Hypothyroid GERD (gastroesophageal reflux disease)

## 2017-01-25 NOTE — PHYSICAL THERAPY INITIAL EVALUATION ADULT - GENERAL OBSERVATIONS, REHAB EVAL
Consult received, chart reviewed. Patient received supine in bed, NAD, states feels better. Patient agreed to EVALUATION from Physical Therapist.

## 2017-01-25 NOTE — DISCHARGE NOTE ADULT - CARE PLAN
Principal Discharge DX:	Chest pain  Goal:	patient will remain symptoms free  Instructions for follow-up, activity and diet:	follow up with cardiologist as soon as possible for outpatient stress test  Secondary Diagnosis:	Asthma  Instructions for follow-up, activity and diet:	continue inhalers  Secondary Diagnosis:	Diabetes  Instructions for follow-up, activity and diet:	may resume metformin, continue diet  Secondary Diagnosis:	Fibromyalgia  Instructions for follow-up, activity and diet:	continue medications, diet  Secondary Diagnosis:	Hyperlipidemia  Instructions for follow-up, activity and diet:	continue medications, diet  Secondary Diagnosis:	Hypertension  Instructions for follow-up, activity and diet:	continue medications, diet  Secondary Diagnosis:	Hypothyroid  Instructions for follow-up, activity and diet:	continue medications, diet Principal Discharge DX:	Chest pain  Goal:	patient will remain symptoms free  Instructions for follow-up, activity and diet:	follow up with cardiologist Dr. Sheets as outpatient for stress test  stress test could not be done in the hospital as you could not fit into the machine at hospital.  Take Baby Aspirin daily.  Secondary Diagnosis:	Asthma  Goal:	Stable; Prevent exacerbation  Instructions for follow-up, activity and diet:	continue inhalers as before  Secondary Diagnosis:	Diabetes  Goal:	Target HgbA1c less than 7.0  Instructions for follow-up, activity and diet:	may resume metformin, continue diet; Your HgbA1c was 7.9;   Follow up as per PCP/ Endocrine Dr. Walsh  Secondary Diagnosis:	Fibromyalgia  Instructions for follow-up, activity and diet:	Continue gabapentin 400 mg morning and 300 mg at night.  Secondary Diagnosis:	Hyperlipidemia  Instructions for follow-up, activity and diet:	continue medications, diet  Secondary Diagnosis:	Hypertension  Instructions for follow-up, activity and diet:	continue medications, diet  Secondary Diagnosis:	Hypothyroid  Goal:	Adequately treated; TSH therapeutic  Instructions for follow-up, activity and diet:	Continue Synthroid at current dose; RepeaT tsh IN 3 MONTHS. Principal Discharge DX:	Chest pain  Goal:	patient will remain symptoms free  Instructions for follow-up, activity and diet:	follow up with cardiologist Dr. Sheets as outpatient for stress test  stress test could not be done in the hospital as you could not fit into the machine at hospital.  Take Baby Aspirin daily.  Secondary Diagnosis:	Asthma  Goal:	Stable; Prevent exacerbation  Instructions for follow-up, activity and diet:	continue inhalers as before  Secondary Diagnosis:	Diabetes  Goal:	Target HgbA1c less than 7.0  Instructions for follow-up, activity and diet:	may resume metformin, continue diet; Your HgbA1c was 7.9;   Follow up as per PCP/ Endocrine Dr. Walsh  Secondary Diagnosis:	Fibromyalgia  Instructions for follow-up, activity and diet:	Continue gabapentin 400 mg morning and 300 mg at night.  Secondary Diagnosis:	GERD (gastroesophageal reflux disease)  Goal:	Prevent reflux which might cause chestpain  Instructions for follow-up, activity and diet:	PPI trial for 6 weeks; If symptoms persist, consider EGD rule out recurrent H.Pylori infection (You received treatment many years ago as you mentioned)  Secondary Diagnosis:	Hypertension  Goal:	Keep BP less than 130/80 mm Hg.  Instructions for follow-up, activity and diet:	Please note that your BP was in the low normal range in hospital. We reduced Losartan/ HCTz to 50/12.5 mg daily. Prescription send.  Secondary Diagnosis:	Hypothyroid  Goal:	Adequately treated; TSH therapeutic  Instructions for follow-up, activity and diet:	Continue Synthroid at current dose; Repeat TSH IN 3 MONTHS.

## 2017-01-25 NOTE — DISCHARGE NOTE ADULT - ADDITIONAL INSTRUCTIONS
Follow up with primary physician and cardiologist in 2-4 days Follow up with  (dr. Santos) primary physician and cardiologist in 2-4 days Follow up with  (Dr. Walsh) primary physician and cardiologist in 2-4 days

## 2017-01-25 NOTE — DISCHARGE NOTE ADULT - MEDICATION SUMMARY - MEDICATIONS TO TAKE
I will START or STAY ON the medications listed below when I get home from the hospital:    aspirin 81 mg oral delayed release tablet  -- 1 tab(s) by mouth once a day  -- Indication: For Need for prophylactic measure    ibuprofen 400 mg oral tablet  -- 1 tab(s) by mouth every 6 hours, As needed, pain  -- Indication: For Need for prophylactic measure    OXcarbazepine 600 mg oral tablet  -- 1 tab(s) by mouth 2 times a day  -- Indication: For Fibromyalgia    gabapentin 300 mg oral tablet  --  by mouth 2 times a day  -- Indication: For Fibromyalgia    metFORMIN 1000 mg oral tablet  -- 1 tab(s) by mouth 2 times a day  -- Indication: For Diabetes    loratadine 10 mg oral tablet  -- 1 tab(s) by mouth once a day  -- Indication: For Need for prophylactic measure    losartan-hydrochlorothiazide 100 mg-12.5 mg oral tablet  -- 1 tab(s) by mouth once a day  -- Indication: For Hypertension    ipratropium-albuterol 0.5 mg-2.5 mg/3 mLinhalation solution  -- 3 milliliter(s) inhaled 4 times a day  -- Indication: For Asthma    montelukast 10 mg oral tablet  -- 1 tab(s) by mouth once a day (at bedtime)  -- Indication: For Need for prophylactic measure    pantoprazole 40 mg oral delayed release tablet  -- 1 tab(s) by mouth once a day (before a meal)  -- Indication: For Chest pain    levothyroxine 112 mcg (0.112 mg) oral capsule  -- 1 cap(s) by mouth once a day  -- Indication: For Hypothyroid

## 2017-01-25 NOTE — DISCHARGE NOTE ADULT - PATIENT PORTAL LINK FT
“You can access the [facility] Patient Portal, offered by [facility], by registering with the following website: [email or web address]”

## 2017-02-01 DIAGNOSIS — J45.909 UNSPECIFIED ASTHMA, UNCOMPLICATED: ICD-10-CM

## 2017-02-01 DIAGNOSIS — M79.7 FIBROMYALGIA: ICD-10-CM

## 2017-02-01 DIAGNOSIS — Z79.82 LONG TERM (CURRENT) USE OF ASPIRIN: ICD-10-CM

## 2017-02-01 DIAGNOSIS — G47.33 OBSTRUCTIVE SLEEP APNEA (ADULT) (PEDIATRIC): ICD-10-CM

## 2017-02-01 DIAGNOSIS — Z91.018 ALLERGY TO OTHER FOODS: ICD-10-CM

## 2017-02-01 DIAGNOSIS — R07.89 OTHER CHEST PAIN: ICD-10-CM

## 2017-02-01 DIAGNOSIS — E66.01 MORBID (SEVERE) OBESITY DUE TO EXCESS CALORIES: ICD-10-CM

## 2017-02-01 DIAGNOSIS — E03.9 HYPOTHYROIDISM, UNSPECIFIED: ICD-10-CM

## 2017-02-01 DIAGNOSIS — Z90.710 ACQUIRED ABSENCE OF BOTH CERVIX AND UTERUS: ICD-10-CM

## 2017-02-01 DIAGNOSIS — G50.0 TRIGEMINAL NEURALGIA: ICD-10-CM

## 2017-02-01 DIAGNOSIS — K21.9 GASTRO-ESOPHAGEAL REFLUX DISEASE WITHOUT ESOPHAGITIS: ICD-10-CM

## 2017-02-01 DIAGNOSIS — I10 ESSENTIAL (PRIMARY) HYPERTENSION: ICD-10-CM

## 2017-02-01 DIAGNOSIS — E11.9 TYPE 2 DIABETES MELLITUS WITHOUT COMPLICATIONS: ICD-10-CM

## 2017-02-03 ENCOUNTER — APPOINTMENT (OUTPATIENT)
Dept: PULMONOLOGY | Facility: CLINIC | Age: 52
End: 2017-02-03

## 2017-02-03 ENCOUNTER — OUTPATIENT (OUTPATIENT)
Dept: OUTPATIENT SERVICES | Facility: HOSPITAL | Age: 52
LOS: 1 days | End: 2017-02-03
Payer: COMMERCIAL

## 2017-02-03 VITALS
RESPIRATION RATE: 18 BRPM | OXYGEN SATURATION: 93 % | WEIGHT: 285 LBS | TEMPERATURE: 98.7 F | DIASTOLIC BLOOD PRESSURE: 90 MMHG | HEIGHT: 62 IN | HEART RATE: 104 BPM | SYSTOLIC BLOOD PRESSURE: 155 MMHG | BODY MASS INDEX: 52.44 KG/M2

## 2017-02-03 DIAGNOSIS — Z90.710 ACQUIRED ABSENCE OF BOTH CERVIX AND UTERUS: Chronic | ICD-10-CM

## 2017-02-03 DIAGNOSIS — Z82.5 FAMILY HISTORY OF ASTHMA AND OTHER CHRONIC LOWER RESPIRATORY DISEASES: ICD-10-CM

## 2017-02-03 DIAGNOSIS — Z00.8 ENCOUNTER FOR OTHER GENERAL EXAMINATION: ICD-10-CM

## 2017-02-03 DIAGNOSIS — G47.30 SLEEP APNEA, UNSPECIFIED: ICD-10-CM

## 2017-02-03 DIAGNOSIS — M79.7 FIBROMYALGIA: ICD-10-CM

## 2017-02-03 DIAGNOSIS — G47.33 OBSTRUCTIVE SLEEP APNEA (ADULT) (PEDIATRIC): ICD-10-CM

## 2017-02-03 DIAGNOSIS — E11.9 TYPE 2 DIABETES MELLITUS WITHOUT COMPLICATIONS: ICD-10-CM

## 2017-02-03 DIAGNOSIS — J20.9 ACUTE BRONCHITIS, UNSPECIFIED: ICD-10-CM

## 2017-02-03 DIAGNOSIS — Z98.89 OTHER SPECIFIED POSTPROCEDURAL STATES: Chronic | ICD-10-CM

## 2017-02-08 ENCOUNTER — APPOINTMENT (OUTPATIENT)
Dept: CARDIOLOGY | Facility: CLINIC | Age: 52
End: 2017-02-08

## 2017-03-08 ENCOUNTER — APPOINTMENT (OUTPATIENT)
Dept: BARIATRICS | Facility: CLINIC | Age: 52
End: 2017-03-08

## 2017-03-30 ENCOUNTER — MESSAGE (OUTPATIENT)
Age: 52
End: 2017-03-30

## 2017-04-07 ENCOUNTER — APPOINTMENT (OUTPATIENT)
Dept: DERMATOLOGY | Facility: CLINIC | Age: 52
End: 2017-04-07

## 2017-04-07 VITALS
SYSTOLIC BLOOD PRESSURE: 136 MMHG | BODY MASS INDEX: 53.92 KG/M2 | DIASTOLIC BLOOD PRESSURE: 88 MMHG | HEART RATE: 104 BPM | WEIGHT: 293 LBS | TEMPERATURE: 98.6 F | HEIGHT: 62 IN

## 2017-04-13 PROCEDURE — G0463: CPT

## 2017-04-25 ENCOUNTER — APPOINTMENT (OUTPATIENT)
Dept: RHEUMATOLOGY | Facility: CLINIC | Age: 52
End: 2017-04-25

## 2017-04-25 VITALS
DIASTOLIC BLOOD PRESSURE: 86 MMHG | HEART RATE: 104 BPM | SYSTOLIC BLOOD PRESSURE: 116 MMHG | RESPIRATION RATE: 16 BRPM | HEIGHT: 62 IN | WEIGHT: 287 LBS | BODY MASS INDEX: 52.81 KG/M2

## 2017-04-28 ENCOUNTER — APPOINTMENT (OUTPATIENT)
Dept: DERMATOLOGY | Facility: CLINIC | Age: 52
End: 2017-04-28

## 2017-05-10 ENCOUNTER — TRANSCRIPTION ENCOUNTER (OUTPATIENT)
Age: 52
End: 2017-05-10

## 2017-05-30 ENCOUNTER — TRANSCRIPTION ENCOUNTER (OUTPATIENT)
Age: 52
End: 2017-05-30

## 2017-07-06 ENCOUNTER — APPOINTMENT (OUTPATIENT)
Dept: ENDOCRINOLOGY | Facility: CLINIC | Age: 52
End: 2017-07-06

## 2017-07-06 ENCOUNTER — RESULT CHARGE (OUTPATIENT)
Age: 52
End: 2017-07-06

## 2017-07-06 VITALS
HEIGHT: 62 IN | OXYGEN SATURATION: 97 % | DIASTOLIC BLOOD PRESSURE: 100 MMHG | WEIGHT: 290 LBS | BODY MASS INDEX: 53.37 KG/M2 | SYSTOLIC BLOOD PRESSURE: 130 MMHG | TEMPERATURE: 98.8 F | HEART RATE: 89 BPM | RESPIRATION RATE: 17 BRPM

## 2017-07-06 LAB — GLUCOSE BLDC GLUCOMTR-MCNC: 119

## 2017-07-11 ENCOUNTER — APPOINTMENT (OUTPATIENT)
Dept: RHEUMATOLOGY | Facility: CLINIC | Age: 52
End: 2017-07-11

## 2017-07-11 VITALS
BODY MASS INDEX: 52.81 KG/M2 | SYSTOLIC BLOOD PRESSURE: 140 MMHG | HEIGHT: 62 IN | WEIGHT: 287 LBS | DIASTOLIC BLOOD PRESSURE: 100 MMHG | RESPIRATION RATE: 17 BRPM | OXYGEN SATURATION: 96 % | TEMPERATURE: 99.3 F | HEART RATE: 107 BPM

## 2017-07-11 RX ORDER — LIDOCAINE HYDROCHLORIDE 10 MG/ML
1 INJECTION, SOLUTION INFILTRATION; PERINEURAL
Qty: 0 | Refills: 0 | Status: COMPLETED | OUTPATIENT
Start: 2017-07-11

## 2017-07-11 RX ADMIN — Medication 0 %: at 00:00

## 2017-07-15 ENCOUNTER — EMERGENCY (EMERGENCY)
Facility: HOSPITAL | Age: 52
LOS: 1 days | Discharge: HOME | End: 2017-07-15
Attending: EMERGENCY MEDICINE
Payer: COMMERCIAL

## 2017-07-15 VITALS
RESPIRATION RATE: 20 BRPM | WEIGHT: 289.91 LBS | HEART RATE: 95 BPM | DIASTOLIC BLOOD PRESSURE: 90 MMHG | TEMPERATURE: 99 F | SYSTOLIC BLOOD PRESSURE: 141 MMHG | OXYGEN SATURATION: 94 % | HEIGHT: 61.75 IN

## 2017-07-15 DIAGNOSIS — I10 ESSENTIAL (PRIMARY) HYPERTENSION: ICD-10-CM

## 2017-07-15 DIAGNOSIS — E66.9 OBESITY, UNSPECIFIED: ICD-10-CM

## 2017-07-15 DIAGNOSIS — R00.2 PALPITATIONS: ICD-10-CM

## 2017-07-15 DIAGNOSIS — M79.7 FIBROMYALGIA: ICD-10-CM

## 2017-07-15 DIAGNOSIS — R07.89 OTHER CHEST PAIN: ICD-10-CM

## 2017-07-15 DIAGNOSIS — G47.33 OBSTRUCTIVE SLEEP APNEA (ADULT) (PEDIATRIC): ICD-10-CM

## 2017-07-15 DIAGNOSIS — E03.9 HYPOTHYROIDISM, UNSPECIFIED: ICD-10-CM

## 2017-07-15 DIAGNOSIS — Z90.710 ACQUIRED ABSENCE OF BOTH CERVIX AND UTERUS: Chronic | ICD-10-CM

## 2017-07-15 DIAGNOSIS — J45.909 UNSPECIFIED ASTHMA, UNCOMPLICATED: ICD-10-CM

## 2017-07-15 DIAGNOSIS — E78.5 HYPERLIPIDEMIA, UNSPECIFIED: ICD-10-CM

## 2017-07-15 DIAGNOSIS — Z98.89 OTHER SPECIFIED POSTPROCEDURAL STATES: Chronic | ICD-10-CM

## 2017-07-15 DIAGNOSIS — E11.9 TYPE 2 DIABETES MELLITUS WITHOUT COMPLICATIONS: ICD-10-CM

## 2017-07-15 DIAGNOSIS — G50.0 TRIGEMINAL NEURALGIA: ICD-10-CM

## 2017-07-15 LAB
ALBUMIN SERPL ELPH-MCNC: 3 G/DL — LOW (ref 3.5–5)
ALP SERPL-CCNC: 115 U/L — SIGNIFICANT CHANGE UP (ref 40–120)
ALT FLD-CCNC: 77 U/L DA — HIGH (ref 10–60)
ANION GAP SERPL CALC-SCNC: 9 MMOL/L — SIGNIFICANT CHANGE UP (ref 5–17)
AST SERPL-CCNC: 53 U/L — HIGH (ref 10–40)
BASOPHILS # BLD AUTO: 0.1 K/UL — SIGNIFICANT CHANGE UP (ref 0–0.2)
BASOPHILS NFR BLD AUTO: 1 % — SIGNIFICANT CHANGE UP (ref 0–2)
BILIRUB SERPL-MCNC: 0.4 MG/DL — SIGNIFICANT CHANGE UP (ref 0.2–1.2)
BUN SERPL-MCNC: 14 MG/DL — SIGNIFICANT CHANGE UP (ref 7–18)
CALCIUM SERPL-MCNC: 8.7 MG/DL — SIGNIFICANT CHANGE UP (ref 8.4–10.5)
CHLORIDE SERPL-SCNC: 104 MMOL/L — SIGNIFICANT CHANGE UP (ref 96–108)
CO2 SERPL-SCNC: 29 MMOL/L — SIGNIFICANT CHANGE UP (ref 22–31)
CREAT SERPL-MCNC: 0.81 MG/DL — SIGNIFICANT CHANGE UP (ref 0.5–1.3)
EOSINOPHIL # BLD AUTO: 0.3 K/UL — SIGNIFICANT CHANGE UP (ref 0–0.5)
EOSINOPHIL NFR BLD AUTO: 3.3 % — SIGNIFICANT CHANGE UP (ref 0–6)
GLUCOSE SERPL-MCNC: 177 MG/DL — HIGH (ref 70–99)
HCT VFR BLD CALC: 39.6 % — SIGNIFICANT CHANGE UP (ref 34.5–45)
HGB BLD-MCNC: 12.7 G/DL — SIGNIFICANT CHANGE UP (ref 11.5–15.5)
LYMPHOCYTES # BLD AUTO: 2.6 K/UL — SIGNIFICANT CHANGE UP (ref 1–3.3)
LYMPHOCYTES # BLD AUTO: 32.1 % — SIGNIFICANT CHANGE UP (ref 13–44)
MCHC RBC-ENTMCNC: 26 PG — LOW (ref 27–34)
MCHC RBC-ENTMCNC: 32.1 GM/DL — SIGNIFICANT CHANGE UP (ref 32–36)
MCV RBC AUTO: 81 FL — SIGNIFICANT CHANGE UP (ref 80–100)
MONOCYTES # BLD AUTO: 0.3 K/UL — SIGNIFICANT CHANGE UP (ref 0–0.9)
MONOCYTES NFR BLD AUTO: 4 % — SIGNIFICANT CHANGE UP (ref 2–14)
NEUTROPHILS # BLD AUTO: 4.8 K/UL — SIGNIFICANT CHANGE UP (ref 1.8–7.4)
NEUTROPHILS NFR BLD AUTO: 59.6 % — SIGNIFICANT CHANGE UP (ref 43–77)
PLATELET # BLD AUTO: 313 K/UL — SIGNIFICANT CHANGE UP (ref 150–400)
POTASSIUM SERPL-MCNC: 4 MMOL/L — SIGNIFICANT CHANGE UP (ref 3.5–5.3)
POTASSIUM SERPL-SCNC: 4 MMOL/L — SIGNIFICANT CHANGE UP (ref 3.5–5.3)
PROT SERPL-MCNC: 7.8 G/DL — SIGNIFICANT CHANGE UP (ref 6–8.3)
RBC # BLD: 4.89 M/UL — SIGNIFICANT CHANGE UP (ref 3.8–5.2)
RBC # FLD: 14.3 % — SIGNIFICANT CHANGE UP (ref 10.3–14.5)
SODIUM SERPL-SCNC: 142 MMOL/L — SIGNIFICANT CHANGE UP (ref 135–145)
T3 SERPL-MCNC: 117 NG/DL — SIGNIFICANT CHANGE UP (ref 80–200)
T4 AB SER-ACNC: 11.5 UG/DL — SIGNIFICANT CHANGE UP (ref 4.6–12)
TROPONIN I SERPL-MCNC: <0.015 NG/ML — SIGNIFICANT CHANGE UP (ref 0–0.04)
TSH SERPL-MCNC: 7.39 UU/ML — HIGH (ref 0.34–4.82)
WBC # BLD: 8.1 K/UL — SIGNIFICANT CHANGE UP (ref 3.8–10.5)
WBC # FLD AUTO: 8.1 K/UL — SIGNIFICANT CHANGE UP (ref 3.8–10.5)

## 2017-07-15 PROCEDURE — 71046 X-RAY EXAM CHEST 2 VIEWS: CPT

## 2017-07-15 PROCEDURE — 85027 COMPLETE CBC AUTOMATED: CPT

## 2017-07-15 PROCEDURE — 84480 ASSAY TRIIODOTHYRONINE (T3): CPT

## 2017-07-15 PROCEDURE — 99285 EMERGENCY DEPT VISIT HI MDM: CPT

## 2017-07-15 PROCEDURE — 99284 EMERGENCY DEPT VISIT MOD MDM: CPT | Mod: 25

## 2017-07-15 PROCEDURE — 71020: CPT | Mod: 26

## 2017-07-15 PROCEDURE — 84484 ASSAY OF TROPONIN QUANT: CPT

## 2017-07-15 PROCEDURE — 84443 ASSAY THYROID STIM HORMONE: CPT

## 2017-07-15 PROCEDURE — 80053 COMPREHEN METABOLIC PANEL: CPT

## 2017-07-15 PROCEDURE — 36415 COLL VENOUS BLD VENIPUNCTURE: CPT

## 2017-07-15 PROCEDURE — 84436 ASSAY OF TOTAL THYROXINE: CPT

## 2017-07-15 PROCEDURE — 93005 ELECTROCARDIOGRAM TRACING: CPT

## 2017-07-15 RX ORDER — SODIUM CHLORIDE 9 MG/ML
3 INJECTION INTRAMUSCULAR; INTRAVENOUS; SUBCUTANEOUS ONCE
Qty: 0 | Refills: 0 | Status: COMPLETED | OUTPATIENT
Start: 2017-07-15 | End: 2017-07-15

## 2017-07-15 RX ADMIN — SODIUM CHLORIDE 3 MILLILITER(S): 9 INJECTION INTRAMUSCULAR; INTRAVENOUS; SUBCUTANEOUS at 10:24

## 2017-07-15 NOTE — ED PROVIDER NOTE - CONDUCTED A DETAILED DISCUSSION WITH PATIENT AND/OR GUARDIAN REGARDING, MDM
lab results/return to ED if symptoms worsen, persist or questions arise/need for outpatient follow-up radiology results/lab results/return to ED if symptoms worsen, persist or questions arise/need for outpatient follow-up

## 2017-07-15 NOTE — ED PROVIDER NOTE - OBJECTIVE STATEMENT
51 y/o female with a PMHx of DM, fibromyalgia, HLD, hypothyroid, and TN1 presents to ED c/o palpitations x last night. Pt notes she has been under increase stress recently. She notes she has chest pressure. Pt at time of exam, does not have pain. She denies any shortness of breath, cough and any other symptoms at the moment. 51 y/o female with a PMHx of DM, fibromyalgia, HLD, hypothyroid, and TN1 presents to ED c/o palpitations x last night. Pt notes she has been under increase stress recently. Also in process of changing her thyroid meds. Saw endocrinologist 9 days ago. Pt at time of exam, does not have pain. She denies any shortness of breath, cough and any other symptoms at the moment.

## 2017-07-15 NOTE — ED PROVIDER NOTE - MEDICAL DECISION MAKING DETAILS
53 y/o female c/o chest pressure and palpitations x last night. Pt has no symptoms during examination. Will do EKG, labs, CXR, IV fluids and reassess. 53 y/o female c/o chest pressure and palpitations x last night. Pt has no symptoms during examination. Will do EKG, labs, CXR, IV fluids and reassess. labs, cxr and ecg reassuring. discussed anticipatory guidance. outpt follow up this week. hypothyroid, already on meds that are being titrated.

## 2017-07-15 NOTE — ED ADULT NURSE NOTE - OBJECTIVE STATEMENT
Pt c/o palpitations, chest pain and SOB since Tuesday, also c/o panic attacks, took Klonopin this morning

## 2017-07-15 NOTE — ED ADULT NURSE NOTE - CHPI ED SYMPTOMS NEG
no fever/no syncope/no diaphoresis/no shortness of breath/no vomiting/no nausea/no back pain/no chills/no cough

## 2017-07-21 LAB
ALBUMIN SERPL ELPH-MCNC: 3.9 G/DL
ALP BLD-CCNC: 114 U/L
ALT SERPL-CCNC: 73 U/L
ANION GAP SERPL CALC-SCNC: 18 MMOL/L
AST SERPL-CCNC: 79 U/L
BASOPHILS # BLD AUTO: 0.02 K/UL
BASOPHILS NFR BLD AUTO: 0.2 %
BILIRUB SERPL-MCNC: 0.4 MG/DL
BUN SERPL-MCNC: 13 MG/DL
CALCIUM SERPL-MCNC: 10.5 MG/DL
CHLORIDE SERPL-SCNC: 100 MMOL/L
CHOLEST SERPL-MCNC: 189 MG/DL
CHOLEST/HDLC SERPL: 4.7 RATIO
CO2 SERPL-SCNC: 26 MMOL/L
CREAT SERPL-MCNC: 0.78 MG/DL
EOSINOPHIL # BLD AUTO: 0.29 K/UL
EOSINOPHIL NFR BLD AUTO: 3.3 %
GLUCOSE SERPL-MCNC: 103 MG/DL
HBA1C MFR BLD HPLC: 7.6 %
HCT VFR BLD CALC: 42.4 %
HDLC SERPL-MCNC: 40 MG/DL
HGB BLD-MCNC: 12.8 G/DL
IMM GRANULOCYTES NFR BLD AUTO: 0.2 %
LDLC SERPL CALC-MCNC: 110 MG/DL
LYMPHOCYTES # BLD AUTO: 2.42 K/UL
LYMPHOCYTES NFR BLD AUTO: 27.6 %
MAN DIFF?: NORMAL
MCHC RBC-ENTMCNC: 25 PG
MCHC RBC-ENTMCNC: 30.2 GM/DL
MCV RBC AUTO: 82.7 FL
MONOCYTES # BLD AUTO: 0.74 K/UL
MONOCYTES NFR BLD AUTO: 8.4 %
NEUTROPHILS # BLD AUTO: 5.27 K/UL
NEUTROPHILS NFR BLD AUTO: 60.3 %
PLATELET # BLD AUTO: 382 K/UL
POTASSIUM SERPL-SCNC: 4.7 MMOL/L
PROT SERPL-MCNC: 7.7 G/DL
RBC # BLD: 5.13 M/UL
RBC # FLD: 15.5 %
SODIUM SERPL-SCNC: 144 MMOL/L
TRIGL SERPL-MCNC: 193 MG/DL
TSH SERPL-ACNC: 4.71 UIU/ML
VIT B12 SERPL-MCNC: 752 PG/ML
WBC # FLD AUTO: 8.76 K/UL

## 2017-08-10 ENCOUNTER — LABORATORY RESULT (OUTPATIENT)
Age: 52
End: 2017-08-10

## 2017-08-10 ENCOUNTER — APPOINTMENT (OUTPATIENT)
Dept: INTERNAL MEDICINE | Facility: CLINIC | Age: 52
End: 2017-08-10
Payer: COMMERCIAL

## 2017-08-10 VITALS
SYSTOLIC BLOOD PRESSURE: 130 MMHG | OXYGEN SATURATION: 97 % | BODY MASS INDEX: 52.44 KG/M2 | HEART RATE: 113 BPM | DIASTOLIC BLOOD PRESSURE: 80 MMHG | WEIGHT: 285 LBS | RESPIRATION RATE: 16 BRPM | HEIGHT: 62 IN | TEMPERATURE: 98.3 F

## 2017-08-10 PROCEDURE — 99214 OFFICE O/P EST MOD 30 MIN: CPT

## 2017-08-10 RX ORDER — FLUTICASONE PROPIONATE 50 UG/1
50 SPRAY, METERED NASAL DAILY
Qty: 1 | Refills: 5 | Status: DISCONTINUED | COMMUNITY
Start: 2017-02-03 | End: 2017-08-10

## 2017-08-10 RX ORDER — CYCLOBENZAPRINE HYDROCHLORIDE 10 MG/1
10 TABLET, FILM COATED ORAL
Qty: 15 | Refills: 0 | Status: DISCONTINUED | COMMUNITY
Start: 2017-05-10 | End: 2017-08-10

## 2017-08-10 RX ORDER — MELOXICAM 7.5 MG/1
7.5 TABLET ORAL
Qty: 14 | Refills: 0 | Status: DISCONTINUED | COMMUNITY
Start: 2017-05-10 | End: 2017-08-10

## 2017-08-10 RX ORDER — GABAPENTIN 300 MG/1
300 CAPSULE ORAL
Qty: 90 | Refills: 0 | Status: DISCONTINUED | COMMUNITY
Start: 2017-04-28 | End: 2017-08-10

## 2017-08-11 LAB
25(OH)D3 SERPL-MCNC: 28.9 NG/ML
ALBUMIN SERPL ELPH-MCNC: 3.8 G/DL
ALP BLD-CCNC: 110 U/L
ALT SERPL-CCNC: 59 U/L
ANION GAP SERPL CALC-SCNC: 18 MMOL/L
APPEARANCE: CLEAR
AST SERPL-CCNC: 44 U/L
BASOPHILS # BLD AUTO: 0.02 K/UL
BASOPHILS NFR BLD AUTO: 0.3 %
BILIRUB SERPL-MCNC: 0.4 MG/DL
BILIRUBIN URINE: NEGATIVE
BLOOD URINE: NEGATIVE
BUN SERPL-MCNC: 16 MG/DL
CALCIUM SERPL-MCNC: 9.9 MG/DL
CHLORIDE SERPL-SCNC: 99 MMOL/L
CHOLEST SERPL-MCNC: 165 MG/DL
CHOLEST/HDLC SERPL: 3.8 RATIO
CO2 SERPL-SCNC: 26 MMOL/L
COLOR: ABNORMAL
CREAT SERPL-MCNC: 0.77 MG/DL
CREAT SPEC-SCNC: 285 MG/DL
EOSINOPHIL # BLD AUTO: 0.24 K/UL
EOSINOPHIL NFR BLD AUTO: 3.3 %
ERYTHROCYTE [SEDIMENTATION RATE] IN BLOOD BY WESTERGREN METHOD: 50 MM/HR
FOLATE SERPL-MCNC: 6.8 NG/ML
GGT SERPL-CCNC: 55 U/L
GLUCOSE QUALITATIVE U: NORMAL MG/DL
GLUCOSE SERPL-MCNC: 138 MG/DL
HBA1C MFR BLD HPLC: 7.2 %
HCT VFR BLD CALC: 41.5 %
HDLC SERPL-MCNC: 43 MG/DL
HGB BLD-MCNC: 12.6 G/DL
IMM GRANULOCYTES NFR BLD AUTO: 0.3 %
IRON SATN MFR SERPL: 15 %
IRON SERPL-MCNC: 50 UG/DL
KETONES URINE: ABNORMAL
LDLC SERPL CALC-MCNC: 99 MG/DL
LEUKOCYTE ESTERASE URINE: NEGATIVE
LYMPHOCYTES # BLD AUTO: 2.4 K/UL
LYMPHOCYTES NFR BLD AUTO: 33 %
MAN DIFF?: NORMAL
MCHC RBC-ENTMCNC: 25 PG
MCHC RBC-ENTMCNC: 30.4 GM/DL
MCV RBC AUTO: 82.2 FL
MICROALBUMIN 24H UR DL<=1MG/L-MCNC: 3.9 MG/DL
MICROALBUMIN/CREAT 24H UR-RTO: 14 MG/G
MONOCYTES # BLD AUTO: 0.55 K/UL
MONOCYTES NFR BLD AUTO: 7.6 %
NEUTROPHILS # BLD AUTO: 4.05 K/UL
NEUTROPHILS NFR BLD AUTO: 55.5 %
NITRITE URINE: NEGATIVE
PH URINE: 6
PLATELET # BLD AUTO: 328 K/UL
POTASSIUM SERPL-SCNC: 4.4 MMOL/L
PROT SERPL-MCNC: 7.2 G/DL
PROTEIN URINE: ABNORMAL MG/DL
RBC # BLD: 5.05 M/UL
RBC # FLD: 14.8 %
SODIUM SERPL-SCNC: 143 MMOL/L
SPECIFIC GRAVITY URINE: 1.03
T3 SERPL-MCNC: 107 NG/DL
T4 FREE SERPL-MCNC: 1.1 NG/DL
TIBC SERPL-MCNC: 323 UG/DL
TRIGL SERPL-MCNC: 115 MG/DL
TSH SERPL-ACNC: 3.93 UIU/ML
UIBC SERPL-MCNC: 273 UG/DL
UROBILINOGEN URINE: 1 MG/DL
VIT B12 SERPL-MCNC: 639 PG/ML
WBC # FLD AUTO: 7.28 K/UL

## 2017-09-01 ENCOUNTER — APPOINTMENT (OUTPATIENT)
Dept: DERMATOLOGY | Facility: CLINIC | Age: 52
End: 2017-09-01

## 2017-09-03 LAB — HEMOCCULT STL QL IA: POSITIVE

## 2017-09-11 ENCOUNTER — APPOINTMENT (OUTPATIENT)
Dept: ENDOCRINOLOGY | Facility: CLINIC | Age: 52
End: 2017-09-11

## 2017-09-15 ENCOUNTER — CLINICAL ADVICE (OUTPATIENT)
Age: 52
End: 2017-09-15

## 2017-10-03 ENCOUNTER — RX RENEWAL (OUTPATIENT)
Age: 52
End: 2017-10-03

## 2017-10-06 ENCOUNTER — RX RENEWAL (OUTPATIENT)
Age: 52
End: 2017-10-06

## 2017-10-23 ENCOUNTER — APPOINTMENT (OUTPATIENT)
Dept: INTERNAL MEDICINE | Facility: CLINIC | Age: 52
End: 2017-10-23
Payer: COMMERCIAL

## 2017-10-23 VITALS
BODY MASS INDEX: 51.4 KG/M2 | SYSTOLIC BLOOD PRESSURE: 130 MMHG | RESPIRATION RATE: 18 BRPM | HEART RATE: 114 BPM | OXYGEN SATURATION: 96 % | DIASTOLIC BLOOD PRESSURE: 78 MMHG | WEIGHT: 281 LBS | TEMPERATURE: 99.1 F

## 2017-10-23 PROCEDURE — 99214 OFFICE O/P EST MOD 30 MIN: CPT

## 2017-10-31 ENCOUNTER — APPOINTMENT (OUTPATIENT)
Dept: RHEUMATOLOGY | Facility: CLINIC | Age: 52
End: 2017-10-31

## 2017-11-20 ENCOUNTER — APPOINTMENT (OUTPATIENT)
Dept: INTERNAL MEDICINE | Facility: CLINIC | Age: 52
End: 2017-11-20
Payer: COMMERCIAL

## 2017-11-20 PROCEDURE — G0008: CPT

## 2017-11-20 PROCEDURE — 90686 IIV4 VACC NO PRSV 0.5 ML IM: CPT

## 2017-12-04 ENCOUNTER — APPOINTMENT (OUTPATIENT)
Dept: NEUROLOGY | Facility: CLINIC | Age: 52
End: 2017-12-04

## 2017-12-17 RX ORDER — OXCARBAZEPINE 300 MG/1
300 TABLET ORAL
Qty: 120 | Refills: 0 | Status: DISCONTINUED | COMMUNITY
Start: 2017-11-09 | End: 2017-12-17

## 2017-12-19 ENCOUNTER — APPOINTMENT (OUTPATIENT)
Dept: ENDOCRINOLOGY | Facility: CLINIC | Age: 52
End: 2017-12-19

## 2017-12-19 ENCOUNTER — APPOINTMENT (OUTPATIENT)
Dept: INTERNAL MEDICINE | Facility: CLINIC | Age: 52
End: 2017-12-19

## 2017-12-28 ENCOUNTER — MEDICATION RENEWAL (OUTPATIENT)
Age: 52
End: 2017-12-28

## 2018-02-04 ENCOUNTER — RX RENEWAL (OUTPATIENT)
Age: 53
End: 2018-02-04

## 2018-02-05 ENCOUNTER — EMERGENCY (EMERGENCY)
Facility: HOSPITAL | Age: 53
LOS: 1 days | Discharge: HOME | End: 2018-02-05
Attending: EMERGENCY MEDICINE
Payer: COMMERCIAL

## 2018-02-05 VITALS
HEIGHT: 59 IN | WEIGHT: 229.94 LBS | DIASTOLIC BLOOD PRESSURE: 83 MMHG | SYSTOLIC BLOOD PRESSURE: 116 MMHG | TEMPERATURE: 98 F | OXYGEN SATURATION: 94 % | RESPIRATION RATE: 20 BRPM | HEART RATE: 93 BPM

## 2018-02-05 DIAGNOSIS — Z90.710 ACQUIRED ABSENCE OF BOTH CERVIX AND UTERUS: Chronic | ICD-10-CM

## 2018-02-05 DIAGNOSIS — Z98.89 OTHER SPECIFIED POSTPROCEDURAL STATES: Chronic | ICD-10-CM

## 2018-02-05 PROCEDURE — 71046 X-RAY EXAM CHEST 2 VIEWS: CPT | Mod: 26

## 2018-02-05 PROCEDURE — 99285 EMERGENCY DEPT VISIT HI MDM: CPT

## 2018-02-05 RX ORDER — SODIUM CHLORIDE 9 MG/ML
1000 INJECTION INTRAMUSCULAR; INTRAVENOUS; SUBCUTANEOUS ONCE
Qty: 0 | Refills: 0 | Status: COMPLETED | OUTPATIENT
Start: 2018-02-05 | End: 2018-02-05

## 2018-02-05 RX ORDER — METOCLOPRAMIDE HCL 10 MG
10 TABLET ORAL ONCE
Qty: 0 | Refills: 0 | Status: COMPLETED | OUTPATIENT
Start: 2018-02-05 | End: 2018-02-05

## 2018-02-05 RX ORDER — DIPHENHYDRAMINE HCL 50 MG
25 CAPSULE ORAL ONCE
Qty: 0 | Refills: 0 | Status: COMPLETED | OUTPATIENT
Start: 2018-02-05 | End: 2018-02-05

## 2018-02-05 NOTE — ED ADULT TRIAGE NOTE - CHIEF COMPLAINT QUOTE
headache since thursday with neck, jaw and right ear pain.  C/o photosensitivity and c/o room spinning with eyes closed.  No rashes

## 2018-02-05 NOTE — ED PROVIDER NOTE - MUSCULOSKELETAL, MLM
Spine appears normal, range of motion is not limited, scalp tenderness to palpation, neck tenderness to palpation

## 2018-02-05 NOTE — ED PROVIDER NOTE - OBJECTIVE STATEMENT
53 y/o F with PMHx of asthma, fibromyalgia, HLD, HTN, migraine, presents to ED c/o diffuse headache x 4 days associated with neck, and left ear pain. Pt returns she saw her PMD today who referred her to the ED for further evaluation. Pt notes that her headache feels different than her previous migraine headaches. Pt denies fever, chills, shortness of breath, cough, chest pain, palpitations, nausea, vomiting, diarrhea, abd pain, dysuria, urinary frequency, hematuria, numbness, tingling, weakness, or any other complaints. NKDA. 51 y/o F with PMHx of asthma, fibromyalgia, HLD, HTN, migraine, presents to ED c/o diffuse headache x 4 days associated with neck pain. Pt returns she saw her neurologist today who referred her to the ED for further evaluation. Pt notes that her headache feels different than her previous migraine headaches. Pt denies fever, chills, shortness of breath, cough, chest pain, palpitations, nausea, vomiting, diarrhea, abd pain, dysuria, urinary frequency, hematuria, numbness, tingling, weakness, or any other complaints. NKDA. denies dizziness

## 2018-02-05 NOTE — ED PROVIDER NOTE - PROGRESS NOTE DETAILS
reports recent flu like s/s over past week, incl HA, nasal congestion, cough/congestion, R ear pain. well appearing, feeling better, farom neck, supple, Right TM w erythema/ reports that she has been using AFRIN since september, discussed rhinitis medicamentosa, pt will f/u w PMD and neuro

## 2018-02-05 NOTE — ED PROVIDER NOTE - CHPI ED SYMPTOMS NEG
no fever, no chills, no shortness of breath, no cough, no chest pain, no palpitations, no nausea, no vomiting, no diarrhea, no abd pain, no dysuria, no urinary frequency, no hematuria, no numbness, no tingling, no weakness

## 2018-02-06 VITALS
RESPIRATION RATE: 18 BRPM | DIASTOLIC BLOOD PRESSURE: 85 MMHG | TEMPERATURE: 98 F | HEART RATE: 82 BPM | OXYGEN SATURATION: 98 % | SYSTOLIC BLOOD PRESSURE: 127 MMHG

## 2018-02-06 LAB
ALBUMIN SERPL ELPH-MCNC: 2.9 G/DL — LOW (ref 3.5–5)
ALP SERPL-CCNC: 106 U/L — SIGNIFICANT CHANGE UP (ref 40–120)
ALT FLD-CCNC: 50 U/L DA — SIGNIFICANT CHANGE UP (ref 10–60)
ANION GAP SERPL CALC-SCNC: 6 MMOL/L — SIGNIFICANT CHANGE UP (ref 5–17)
AST SERPL-CCNC: 40 U/L — SIGNIFICANT CHANGE UP (ref 10–40)
BASOPHILS # BLD AUTO: 0 K/UL — SIGNIFICANT CHANGE UP (ref 0–0.2)
BASOPHILS NFR BLD AUTO: 0.6 % — SIGNIFICANT CHANGE UP (ref 0–2)
BILIRUB SERPL-MCNC: 0.3 MG/DL — SIGNIFICANT CHANGE UP (ref 0.2–1.2)
BUN SERPL-MCNC: 15 MG/DL — SIGNIFICANT CHANGE UP (ref 7–18)
CALCIUM SERPL-MCNC: 8.5 MG/DL — SIGNIFICANT CHANGE UP (ref 8.4–10.5)
CHLORIDE SERPL-SCNC: 101 MMOL/L — SIGNIFICANT CHANGE UP (ref 96–108)
CK SERPL-CCNC: 138 U/L — SIGNIFICANT CHANGE UP (ref 21–215)
CO2 SERPL-SCNC: 31 MMOL/L — SIGNIFICANT CHANGE UP (ref 22–31)
CREAT SERPL-MCNC: 0.72 MG/DL — SIGNIFICANT CHANGE UP (ref 0.5–1.3)
EOSINOPHIL # BLD AUTO: 0.3 K/UL — SIGNIFICANT CHANGE UP (ref 0–0.5)
EOSINOPHIL NFR BLD AUTO: 3.6 % — SIGNIFICANT CHANGE UP (ref 0–6)
GLUCOSE SERPL-MCNC: 137 MG/DL — HIGH (ref 70–99)
HCG SERPL-ACNC: 1 MIU/ML — SIGNIFICANT CHANGE UP
HCT VFR BLD CALC: 39 % — SIGNIFICANT CHANGE UP (ref 34.5–45)
HGB BLD-MCNC: 11.6 G/DL — SIGNIFICANT CHANGE UP (ref 11.5–15.5)
LYMPHOCYTES # BLD AUTO: 2.4 K/UL — SIGNIFICANT CHANGE UP (ref 1–3.3)
LYMPHOCYTES # BLD AUTO: 30.4 % — SIGNIFICANT CHANGE UP (ref 13–44)
MAGNESIUM SERPL-MCNC: 1.6 MG/DL — SIGNIFICANT CHANGE UP (ref 1.6–2.6)
MCHC RBC-ENTMCNC: 24.8 PG — LOW (ref 27–34)
MCHC RBC-ENTMCNC: 29.8 GM/DL — LOW (ref 32–36)
MCV RBC AUTO: 83.1 FL — SIGNIFICANT CHANGE UP (ref 80–100)
MONOCYTES # BLD AUTO: 0.5 K/UL — SIGNIFICANT CHANGE UP (ref 0–0.9)
MONOCYTES NFR BLD AUTO: 6.8 % — SIGNIFICANT CHANGE UP (ref 2–14)
NEUTROPHILS # BLD AUTO: 4.5 K/UL — SIGNIFICANT CHANGE UP (ref 1.8–7.4)
NEUTROPHILS NFR BLD AUTO: 58.6 % — SIGNIFICANT CHANGE UP (ref 43–77)
PLATELET # BLD AUTO: 257 K/UL — SIGNIFICANT CHANGE UP (ref 150–400)
POTASSIUM SERPL-MCNC: 3.4 MMOL/L — LOW (ref 3.5–5.3)
POTASSIUM SERPL-SCNC: 3.4 MMOL/L — LOW (ref 3.5–5.3)
PROT SERPL-MCNC: 7.1 G/DL — SIGNIFICANT CHANGE UP (ref 6–8.3)
RBC # BLD: 4.7 M/UL — SIGNIFICANT CHANGE UP (ref 3.8–5.2)
RBC # FLD: 14.6 % — HIGH (ref 10.3–14.5)
SODIUM SERPL-SCNC: 138 MMOL/L — SIGNIFICANT CHANGE UP (ref 135–145)
TROPONIN I SERPL-MCNC: <0.015 NG/ML — SIGNIFICANT CHANGE UP (ref 0–0.04)
WBC # BLD: 7.8 K/UL — SIGNIFICANT CHANGE UP (ref 3.8–10.5)
WBC # FLD AUTO: 7.8 K/UL — SIGNIFICANT CHANGE UP (ref 3.8–10.5)

## 2018-02-06 PROCEDURE — 70450 CT HEAD/BRAIN W/O DYE: CPT

## 2018-02-06 PROCEDURE — 70450 CT HEAD/BRAIN W/O DYE: CPT | Mod: 26

## 2018-02-06 PROCEDURE — 72125 CT NECK SPINE W/O DYE: CPT

## 2018-02-06 PROCEDURE — 96375 TX/PRO/DX INJ NEW DRUG ADDON: CPT

## 2018-02-06 PROCEDURE — 72125 CT NECK SPINE W/O DYE: CPT | Mod: 26

## 2018-02-06 PROCEDURE — 71046 X-RAY EXAM CHEST 2 VIEWS: CPT

## 2018-02-06 PROCEDURE — 93005 ELECTROCARDIOGRAM TRACING: CPT

## 2018-02-06 PROCEDURE — 99284 EMERGENCY DEPT VISIT MOD MDM: CPT | Mod: 25

## 2018-02-06 PROCEDURE — 96374 THER/PROPH/DIAG INJ IV PUSH: CPT

## 2018-02-06 RX ORDER — FLUTICASONE PROPIONATE 50 MCG
1 SPRAY, SUSPENSION NASAL
Qty: 1 | Refills: 0
Start: 2018-02-06 | End: 2018-02-19

## 2018-02-06 RX ORDER — ACETAMINOPHEN 500 MG
1000 TABLET ORAL ONCE
Qty: 0 | Refills: 0 | Status: COMPLETED | OUTPATIENT
Start: 2018-02-06 | End: 2018-02-06

## 2018-02-06 RX ADMIN — Medication 104 MILLIGRAM(S): at 01:00

## 2018-02-06 RX ADMIN — Medication 25 MILLIGRAM(S): at 00:59

## 2018-02-06 RX ADMIN — Medication 400 MILLIGRAM(S): at 02:30

## 2018-02-06 RX ADMIN — SODIUM CHLORIDE 1000 MILLILITER(S): 9 INJECTION INTRAMUSCULAR; INTRAVENOUS; SUBCUTANEOUS at 01:00

## 2018-02-15 ENCOUNTER — APPOINTMENT (OUTPATIENT)
Dept: ENDOCRINOLOGY | Facility: CLINIC | Age: 53
End: 2018-02-15
Payer: COMMERCIAL

## 2018-02-15 ENCOUNTER — APPOINTMENT (OUTPATIENT)
Dept: RHEUMATOLOGY | Facility: CLINIC | Age: 53
End: 2018-02-15
Payer: MEDICARE

## 2018-02-15 VITALS
OXYGEN SATURATION: 94 % | SYSTOLIC BLOOD PRESSURE: 112 MMHG | RESPIRATION RATE: 20 BRPM | HEIGHT: 62 IN | DIASTOLIC BLOOD PRESSURE: 76 MMHG | BODY MASS INDEX: 51.34 KG/M2 | WEIGHT: 279 LBS | HEART RATE: 107 BPM | TEMPERATURE: 99.2 F

## 2018-02-15 DIAGNOSIS — M75.101 UNSPECIFIED ROTATOR CUFF TEAR OR RUPTURE OF RIGHT SHOULDER, NOT SPECIFIED AS TRAUMATIC: ICD-10-CM

## 2018-02-15 LAB — GLUCOSE BLDC GLUCOMTR-MCNC: 102

## 2018-02-15 PROCEDURE — 99215 OFFICE O/P EST HI 40 MIN: CPT

## 2018-02-15 PROCEDURE — 99214 OFFICE O/P EST MOD 30 MIN: CPT | Mod: 25

## 2018-02-15 PROCEDURE — 82962 GLUCOSE BLOOD TEST: CPT

## 2018-02-15 RX ORDER — LIDOCAINE 5 G/100G
5 OINTMENT TOPICAL
Qty: 1 | Refills: 0 | Status: DISCONTINUED | COMMUNITY
Start: 2018-02-15 | End: 2018-02-15

## 2018-02-16 LAB
ALBUMIN SERPL ELPH-MCNC: 4.3 G/DL
ALP BLD-CCNC: 118 U/L
ALT SERPL-CCNC: 55 U/L
ANION GAP SERPL CALC-SCNC: 19 MMOL/L
APPEARANCE: CLEAR
APPEARANCE: CLEAR
AST SERPL-CCNC: 49 U/L
BILIRUB SERPL-MCNC: 0.3 MG/DL
BILIRUBIN URINE: NEGATIVE
BILIRUBIN URINE: NEGATIVE
BLOOD URINE: NEGATIVE
BLOOD URINE: NEGATIVE
BUN SERPL-MCNC: 17 MG/DL
CALCIUM SERPL-MCNC: 10.2 MG/DL
CHLORIDE SERPL-SCNC: 98 MMOL/L
CO2 SERPL-SCNC: 24 MMOL/L
COLOR: YELLOW
COLOR: YELLOW
CREAT SERPL-MCNC: 0.76 MG/DL
GLUCOSE QUALITATIVE U: NEGATIVE MG/DL
GLUCOSE QUALITATIVE U: NEGATIVE MG/DL
GLUCOSE SERPL-MCNC: 112 MG/DL
HBA1C MFR BLD HPLC: 6.3 %
KETONES URINE: NEGATIVE
KETONES URINE: NEGATIVE
LEUKOCYTE ESTERASE URINE: NEGATIVE
LEUKOCYTE ESTERASE URINE: NEGATIVE
NITRITE URINE: NEGATIVE
NITRITE URINE: NEGATIVE
PH URINE: 6
PH URINE: 6
POTASSIUM SERPL-SCNC: 4.8 MMOL/L
PROT SERPL-MCNC: 7.9 G/DL
PROTEIN URINE: NEGATIVE MG/DL
PROTEIN URINE: NEGATIVE MG/DL
SODIUM SERPL-SCNC: 141 MMOL/L
SPECIFIC GRAVITY URINE: 1.02
SPECIFIC GRAVITY URINE: 1.02
TSH SERPL-ACNC: 2.16 UIU/ML
UROBILINOGEN URINE: NEGATIVE MG/DL
UROBILINOGEN URINE: NEGATIVE MG/DL
VIT B12 SERPL-MCNC: 620 PG/ML

## 2018-02-25 ENCOUNTER — EMERGENCY (EMERGENCY)
Facility: HOSPITAL | Age: 53
LOS: 1 days | Discharge: HOME | End: 2018-02-25
Attending: EMERGENCY MEDICINE
Payer: COMMERCIAL

## 2018-02-25 VITALS
OXYGEN SATURATION: 95 % | HEART RATE: 90 BPM | SYSTOLIC BLOOD PRESSURE: 104 MMHG | RESPIRATION RATE: 19 BRPM | DIASTOLIC BLOOD PRESSURE: 60 MMHG | TEMPERATURE: 98 F

## 2018-02-25 VITALS
HEART RATE: 90 BPM | DIASTOLIC BLOOD PRESSURE: 80 MMHG | SYSTOLIC BLOOD PRESSURE: 154 MMHG | RESPIRATION RATE: 19 BRPM | WEIGHT: 279.11 LBS | OXYGEN SATURATION: 95 % | TEMPERATURE: 98 F

## 2018-02-25 DIAGNOSIS — Z90.710 ACQUIRED ABSENCE OF BOTH CERVIX AND UTERUS: Chronic | ICD-10-CM

## 2018-02-25 DIAGNOSIS — Z98.89 OTHER SPECIFIED POSTPROCEDURAL STATES: Chronic | ICD-10-CM

## 2018-02-25 LAB
ANION GAP SERPL CALC-SCNC: 6 MMOL/L — SIGNIFICANT CHANGE UP (ref 5–17)
APPEARANCE UR: ABNORMAL
BASOPHILS # BLD AUTO: 0.1 K/UL — SIGNIFICANT CHANGE UP (ref 0–0.2)
BASOPHILS NFR BLD AUTO: 0.9 % — SIGNIFICANT CHANGE UP (ref 0–2)
BILIRUB UR-MCNC: NEGATIVE — SIGNIFICANT CHANGE UP
BUN SERPL-MCNC: 15 MG/DL — SIGNIFICANT CHANGE UP (ref 7–18)
CALCIUM SERPL-MCNC: 8.9 MG/DL — SIGNIFICANT CHANGE UP (ref 8.4–10.5)
CHLORIDE SERPL-SCNC: 102 MMOL/L — SIGNIFICANT CHANGE UP (ref 96–108)
CO2 SERPL-SCNC: 29 MMOL/L — SIGNIFICANT CHANGE UP (ref 22–31)
COLOR SPEC: YELLOW — SIGNIFICANT CHANGE UP
CREAT SERPL-MCNC: 0.82 MG/DL — SIGNIFICANT CHANGE UP (ref 0.5–1.3)
DIFF PNL FLD: ABNORMAL
EOSINOPHIL # BLD AUTO: 0.2 K/UL — SIGNIFICANT CHANGE UP (ref 0–0.5)
EOSINOPHIL NFR BLD AUTO: 2.9 % — SIGNIFICANT CHANGE UP (ref 0–6)
GLUCOSE SERPL-MCNC: 97 MG/DL — SIGNIFICANT CHANGE UP (ref 70–99)
GLUCOSE UR QL: NEGATIVE — SIGNIFICANT CHANGE UP
HCT VFR BLD CALC: 45.4 % — HIGH (ref 34.5–45)
HGB BLD-MCNC: 13.7 G/DL — SIGNIFICANT CHANGE UP (ref 11.5–15.5)
KETONES UR-MCNC: NEGATIVE — SIGNIFICANT CHANGE UP
LEUKOCYTE ESTERASE UR-ACNC: ABNORMAL
LYMPHOCYTES # BLD AUTO: 2.3 K/UL — SIGNIFICANT CHANGE UP (ref 1–3.3)
LYMPHOCYTES # BLD AUTO: 32.6 % — SIGNIFICANT CHANGE UP (ref 13–44)
MCHC RBC-ENTMCNC: 25.2 PG — LOW (ref 27–34)
MCHC RBC-ENTMCNC: 30.2 GM/DL — LOW (ref 32–36)
MCV RBC AUTO: 83.6 FL — SIGNIFICANT CHANGE UP (ref 80–100)
MONOCYTES # BLD AUTO: 0.3 K/UL — SIGNIFICANT CHANGE UP (ref 0–0.9)
MONOCYTES NFR BLD AUTO: 4.9 % — SIGNIFICANT CHANGE UP (ref 2–14)
NEUTROPHILS # BLD AUTO: 4.2 K/UL — SIGNIFICANT CHANGE UP (ref 1.8–7.4)
NEUTROPHILS NFR BLD AUTO: 58.6 % — SIGNIFICANT CHANGE UP (ref 43–77)
NITRITE UR-MCNC: NEGATIVE — SIGNIFICANT CHANGE UP
PH UR: 7 — SIGNIFICANT CHANGE UP (ref 5–8)
PLATELET # BLD AUTO: 379 K/UL — SIGNIFICANT CHANGE UP (ref 150–400)
POTASSIUM SERPL-MCNC: 4.6 MMOL/L — SIGNIFICANT CHANGE UP (ref 3.5–5.3)
POTASSIUM SERPL-SCNC: 4.6 MMOL/L — SIGNIFICANT CHANGE UP (ref 3.5–5.3)
PROT UR-MCNC: NEGATIVE — SIGNIFICANT CHANGE UP
RBC # BLD: 5.43 M/UL — HIGH (ref 3.8–5.2)
RBC # FLD: 14.3 % — SIGNIFICANT CHANGE UP (ref 10.3–14.5)
SODIUM SERPL-SCNC: 137 MMOL/L — SIGNIFICANT CHANGE UP (ref 135–145)
SP GR SPEC: 1.01 — SIGNIFICANT CHANGE UP (ref 1.01–1.02)
UROBILINOGEN FLD QL: NEGATIVE — SIGNIFICANT CHANGE UP
WBC # BLD: 7.1 K/UL — SIGNIFICANT CHANGE UP (ref 3.8–10.5)
WBC # FLD AUTO: 7.1 K/UL — SIGNIFICANT CHANGE UP (ref 3.8–10.5)

## 2018-02-25 PROCEDURE — 99284 EMERGENCY DEPT VISIT MOD MDM: CPT | Mod: 25

## 2018-02-25 PROCEDURE — 96375 TX/PRO/DX INJ NEW DRUG ADDON: CPT

## 2018-02-25 PROCEDURE — 96374 THER/PROPH/DIAG INJ IV PUSH: CPT

## 2018-02-25 PROCEDURE — 85027 COMPLETE CBC AUTOMATED: CPT

## 2018-02-25 PROCEDURE — 81001 URINALYSIS AUTO W/SCOPE: CPT

## 2018-02-25 PROCEDURE — 99285 EMERGENCY DEPT VISIT HI MDM: CPT | Mod: 25

## 2018-02-25 PROCEDURE — 74176 CT ABD & PELVIS W/O CONTRAST: CPT

## 2018-02-25 PROCEDURE — 80048 BASIC METABOLIC PNL TOTAL CA: CPT

## 2018-02-25 RX ORDER — SODIUM CHLORIDE 9 MG/ML
1000 INJECTION INTRAMUSCULAR; INTRAVENOUS; SUBCUTANEOUS ONCE
Qty: 0 | Refills: 0 | Status: COMPLETED | OUTPATIENT
Start: 2018-02-25 | End: 2018-02-25

## 2018-02-25 RX ORDER — KETOROLAC TROMETHAMINE 30 MG/ML
30 SYRINGE (ML) INJECTION ONCE
Qty: 0 | Refills: 0 | Status: DISCONTINUED | OUTPATIENT
Start: 2018-02-25 | End: 2018-02-25

## 2018-02-25 RX ORDER — MORPHINE SULFATE 50 MG/1
2 CAPSULE, EXTENDED RELEASE ORAL ONCE
Qty: 0 | Refills: 0 | Status: DISCONTINUED | OUTPATIENT
Start: 2018-02-25 | End: 2018-02-25

## 2018-02-25 RX ORDER — ONDANSETRON 8 MG/1
4 TABLET, FILM COATED ORAL ONCE
Qty: 0 | Refills: 0 | Status: COMPLETED | OUTPATIENT
Start: 2018-02-25 | End: 2018-02-25

## 2018-02-25 RX ADMIN — SODIUM CHLORIDE 1000 MILLILITER(S): 9 INJECTION INTRAMUSCULAR; INTRAVENOUS; SUBCUTANEOUS at 20:44

## 2018-02-25 RX ADMIN — MORPHINE SULFATE 2 MILLIGRAM(S): 50 CAPSULE, EXTENDED RELEASE ORAL at 23:17

## 2018-02-25 RX ADMIN — Medication 30 MILLIGRAM(S): at 20:44

## 2018-02-25 RX ADMIN — ONDANSETRON 4 MILLIGRAM(S): 8 TABLET, FILM COATED ORAL at 21:45

## 2018-02-25 RX ADMIN — Medication 30 MILLIGRAM(S): at 23:18

## 2018-02-25 NOTE — ED PROVIDER NOTE - OBJECTIVE STATEMENT
53 y/o F pt w/ PMHx of Asthma, HLD, HTN, Migraines, Fibromyalgia, and Herniated disc presents to ED c/o b/l flank pain, L greater than R x 1.5 weeks. Pt denies any fever, chills, urinary sx, or any other complaints. Pt states she first noticed sx after fall; however sx got better but worsened again. Pt is allergic to Fioricet (rash).

## 2018-02-26 PROCEDURE — 74176 CT ABD & PELVIS W/O CONTRAST: CPT | Mod: 26

## 2018-02-26 NOTE — ED ADULT NURSE NOTE - CHPI ED SYMPTOMS NEG
no weakness/no chills/no decreased eating/drinking/no tingling/no dizziness/no vomiting/no numbness/no fever

## 2018-03-26 ENCOUNTER — CLINICAL ADVICE (OUTPATIENT)
Age: 53
End: 2018-03-26

## 2018-03-27 ENCOUNTER — APPOINTMENT (OUTPATIENT)
Dept: DERMATOLOGY | Facility: CLINIC | Age: 53
End: 2018-03-27
Payer: MEDICARE

## 2018-03-27 VITALS
HEIGHT: 62 IN | OXYGEN SATURATION: 100 % | WEIGHT: 281 LBS | DIASTOLIC BLOOD PRESSURE: 86 MMHG | BODY MASS INDEX: 51.71 KG/M2 | HEART RATE: 76 BPM | TEMPERATURE: 98.6 F | SYSTOLIC BLOOD PRESSURE: 151 MMHG

## 2018-03-27 PROCEDURE — 99213 OFFICE O/P EST LOW 20 MIN: CPT

## 2018-04-17 ENCOUNTER — APPOINTMENT (OUTPATIENT)
Dept: INTERNAL MEDICINE | Facility: CLINIC | Age: 53
End: 2018-04-17
Payer: MEDICARE

## 2018-04-17 VITALS
SYSTOLIC BLOOD PRESSURE: 140 MMHG | HEIGHT: 62 IN | OXYGEN SATURATION: 97 % | TEMPERATURE: 98.5 F | WEIGHT: 286 LBS | DIASTOLIC BLOOD PRESSURE: 80 MMHG | BODY MASS INDEX: 52.63 KG/M2 | RESPIRATION RATE: 16 BRPM | HEART RATE: 94 BPM

## 2018-04-17 DIAGNOSIS — J30.9 ALLERGIC RHINITIS, UNSPECIFIED: ICD-10-CM

## 2018-04-17 PROCEDURE — 99214 OFFICE O/P EST MOD 30 MIN: CPT

## 2018-04-17 RX ORDER — LEVOFLOXACIN 500 MG/1
500 TABLET, FILM COATED ORAL DAILY
Qty: 7 | Refills: 0 | Status: DISCONTINUED | COMMUNITY
Start: 2017-12-18 | End: 2018-04-17

## 2018-04-17 RX ORDER — DOXYCYCLINE HYCLATE 100 MG/1
100 CAPSULE ORAL
Qty: 14 | Refills: 0 | Status: DISCONTINUED | COMMUNITY
Start: 2017-10-23 | End: 2018-04-17

## 2018-04-24 ENCOUNTER — TRANSCRIPTION ENCOUNTER (OUTPATIENT)
Age: 53
End: 2018-04-24

## 2018-04-24 ENCOUNTER — RX RENEWAL (OUTPATIENT)
Age: 53
End: 2018-04-24

## 2018-04-26 ENCOUNTER — MEDICATION RENEWAL (OUTPATIENT)
Age: 53
End: 2018-04-26

## 2018-04-26 ENCOUNTER — TRANSCRIPTION ENCOUNTER (OUTPATIENT)
Age: 53
End: 2018-04-26

## 2018-04-27 ENCOUNTER — CLINICAL ADVICE (OUTPATIENT)
Age: 53
End: 2018-04-27

## 2018-04-30 ENCOUNTER — RX RENEWAL (OUTPATIENT)
Age: 53
End: 2018-04-30

## 2018-05-10 ENCOUNTER — APPOINTMENT (OUTPATIENT)
Dept: RHEUMATOLOGY | Facility: CLINIC | Age: 53
End: 2018-05-10
Payer: MEDICARE

## 2018-05-10 VITALS
OXYGEN SATURATION: 95 % | WEIGHT: 286 LBS | DIASTOLIC BLOOD PRESSURE: 88 MMHG | BODY MASS INDEX: 52.63 KG/M2 | TEMPERATURE: 98.9 F | HEART RATE: 100 BPM | HEIGHT: 62 IN | SYSTOLIC BLOOD PRESSURE: 138 MMHG | RESPIRATION RATE: 16 BRPM

## 2018-05-10 PROCEDURE — 99215 OFFICE O/P EST HI 40 MIN: CPT | Mod: 25

## 2018-05-10 PROCEDURE — 20610 DRAIN/INJ JOINT/BURSA W/O US: CPT | Mod: RT

## 2018-05-10 RX ORDER — LIDOCAINE HYDROCHLORIDE 10 MG/ML
1 INJECTION, SOLUTION INFILTRATION; PERINEURAL
Qty: 0 | Refills: 0 | Status: COMPLETED | OUTPATIENT
Start: 2018-05-10

## 2018-05-10 RX ORDER — CYCLOBENZAPRINE HYDROCHLORIDE 5 MG/1
5 TABLET, FILM COATED ORAL
Qty: 14 | Refills: 0 | Status: DISCONTINUED | COMMUNITY
Start: 2017-07-11 | End: 2018-05-10

## 2018-05-10 RX ORDER — METHYLPRED ACET/NACL,ISO-OS/PF 40 MG/ML
40 VIAL (ML) INJECTION
Qty: 1 | Refills: 0 | Status: COMPLETED | OUTPATIENT
Start: 2018-05-10

## 2018-05-10 RX ADMIN — METHYLPREDNISOLONE ACETATE 0 MG/ML: 40 INJECTION, SUSPENSION INTRA-ARTICULAR; INTRALESIONAL; INTRAMUSCULAR; SOFT TISSUE at 00:00

## 2018-05-10 RX ADMIN — Medication 0 %: at 00:00

## 2018-06-25 ENCOUNTER — CLINICAL ADVICE (OUTPATIENT)
Age: 53
End: 2018-06-25

## 2018-07-17 ENCOUNTER — MEDICATION RENEWAL (OUTPATIENT)
Age: 53
End: 2018-07-17

## 2018-07-17 ENCOUNTER — APPOINTMENT (OUTPATIENT)
Dept: UROGYNECOLOGY | Facility: CLINIC | Age: 53
End: 2018-07-17

## 2018-07-20 ENCOUNTER — APPOINTMENT (OUTPATIENT)
Dept: ENDOCRINOLOGY | Facility: CLINIC | Age: 53
End: 2018-07-20
Payer: MEDICARE

## 2018-07-20 VITALS
SYSTOLIC BLOOD PRESSURE: 130 MMHG | HEART RATE: 104 BPM | DIASTOLIC BLOOD PRESSURE: 74 MMHG | RESPIRATION RATE: 17 BRPM | BODY MASS INDEX: 53.73 KG/M2 | HEIGHT: 62 IN | OXYGEN SATURATION: 95 % | TEMPERATURE: 98.2 F | WEIGHT: 292 LBS

## 2018-07-20 LAB — GLUCOSE BLDC GLUCOMTR-MCNC: 122

## 2018-07-20 PROCEDURE — 99214 OFFICE O/P EST MOD 30 MIN: CPT

## 2018-07-20 PROCEDURE — 82962 GLUCOSE BLOOD TEST: CPT

## 2018-07-23 ENCOUNTER — LABORATORY RESULT (OUTPATIENT)
Age: 53
End: 2018-07-23

## 2018-07-23 ENCOUNTER — APPOINTMENT (OUTPATIENT)
Dept: INTERNAL MEDICINE | Facility: CLINIC | Age: 53
End: 2018-07-23
Payer: MEDICARE

## 2018-07-23 VITALS
BODY MASS INDEX: 53.73 KG/M2 | SYSTOLIC BLOOD PRESSURE: 130 MMHG | TEMPERATURE: 98.4 F | DIASTOLIC BLOOD PRESSURE: 84 MMHG | OXYGEN SATURATION: 95 % | HEART RATE: 97 BPM | RESPIRATION RATE: 17 BRPM | WEIGHT: 292 LBS | HEIGHT: 62 IN

## 2018-07-23 PROCEDURE — 99214 OFFICE O/P EST MOD 30 MIN: CPT

## 2018-07-23 RX ORDER — FLUOXETINE HYDROCHLORIDE 10 MG/1
10 CAPSULE ORAL
Qty: 30 | Refills: 0 | Status: DISCONTINUED | COMMUNITY
Start: 2017-08-03 | End: 2018-07-23

## 2018-07-23 RX ORDER — PREDNISONE 10 MG/1
10 TABLET ORAL
Qty: 21 | Refills: 0 | Status: DISCONTINUED | COMMUNITY
Start: 2018-04-30 | End: 2018-07-23

## 2018-07-23 RX ORDER — CHROMIUM 200 MCG
1000 TABLET ORAL DAILY
Qty: 90 | Refills: 3 | Status: DISCONTINUED | COMMUNITY
Start: 2018-07-20 | End: 2018-07-23

## 2018-07-23 RX ORDER — AMOXICILLIN AND CLAVULANATE POTASSIUM 500; 125 MG/1; MG/1
500-125 TABLET, FILM COATED ORAL TWICE DAILY
Qty: 14 | Refills: 0 | Status: DISCONTINUED | COMMUNITY
Start: 2017-09-15 | End: 2018-07-23

## 2018-07-23 RX ORDER — FLUOXETINE HYDROCHLORIDE 20 MG/1
20 CAPSULE ORAL
Qty: 30 | Refills: 0 | Status: DISCONTINUED | COMMUNITY
Start: 2017-10-10 | End: 2018-07-23

## 2018-07-23 RX ORDER — AMITRIPTYLINE HYDROCHLORIDE 10 MG/1
10 TABLET, FILM COATED ORAL
Qty: 60 | Refills: 0 | Status: DISCONTINUED | COMMUNITY
Start: 2018-05-10 | End: 2018-07-23

## 2018-07-23 NOTE — HEALTH RISK ASSESSMENT
[No falls in past year] : Patient reported no falls in the past year [0] : 2) Feeling down, depressed, or hopeless: Not at all (0) [] : No [de-identified] : ENDO/RHEUM [FreeTextEntry1] : Non-suicidal at this time. [[id number]] : 0

## 2018-07-23 NOTE — ASSESSMENT
[FreeTextEntry1] : 53 year old female found to have stable Hypertension, Type 2 Diabetes Mellitus, Hypothyroidism, Reactive Airway Disease, Morbid Obesity,with the current regimen, diet and life style modifications, as counseled. Prior results reviewed and discussed with the patient during today's examination. Plan as ordered.\par

## 2018-07-24 LAB
25(OH)D3 SERPL-MCNC: 28.3 NG/ML
ALBUMIN SERPL ELPH-MCNC: 4 G/DL
ALP BLD-CCNC: 115 U/L
ALT SERPL-CCNC: 38 U/L
ANION GAP SERPL CALC-SCNC: 20 MMOL/L
APPEARANCE: CLEAR
AST SERPL-CCNC: 33 U/L
BASOPHILS # BLD AUTO: 0.04 K/UL
BASOPHILS NFR BLD AUTO: 0.5 %
BILIRUB SERPL-MCNC: 0.4 MG/DL
BILIRUBIN URINE: NEGATIVE
BLOOD URINE: NEGATIVE
BUN SERPL-MCNC: 13 MG/DL
CALCIUM SERPL-MCNC: 9.7 MG/DL
CHLORIDE SERPL-SCNC: 98 MMOL/L
CHOLEST SERPL-MCNC: 167 MG/DL
CHOLEST/HDLC SERPL: 3.9 RATIO
CO2 SERPL-SCNC: 26 MMOL/L
COLOR: ABNORMAL
CREAT SERPL-MCNC: 0.74 MG/DL
CREAT SPEC-SCNC: 251 MG/DL
EOSINOPHIL # BLD AUTO: 0.2 K/UL
EOSINOPHIL NFR BLD AUTO: 2.7 %
ERYTHROCYTE [SEDIMENTATION RATE] IN BLOOD BY WESTERGREN METHOD: 54 MM/HR
FOLATE SERPL-MCNC: 12.1 NG/ML
GGT SERPL-CCNC: 49 U/L
GLUCOSE QUALITATIVE U: NEGATIVE MG/DL
GLUCOSE SERPL-MCNC: 109 MG/DL
HBA1C MFR BLD HPLC: 6.8 %
HCT VFR BLD CALC: 41.3 %
HCV AB SER QL: NONREACTIVE
HCV S/CO RATIO: 0.19 S/CO
HDLC SERPL-MCNC: 43 MG/DL
HGB BLD-MCNC: 12.9 G/DL
IMM GRANULOCYTES NFR BLD AUTO: 0.4 %
IRON SATN MFR SERPL: 12 %
IRON SERPL-MCNC: 40 UG/DL
KETONES URINE: NEGATIVE
LDLC SERPL CALC-MCNC: 107 MG/DL
LEUKOCYTE ESTERASE URINE: NEGATIVE
LYMPHOCYTES # BLD AUTO: 1.94 K/UL
LYMPHOCYTES NFR BLD AUTO: 26.6 %
MAN DIFF?: NORMAL
MCHC RBC-ENTMCNC: 25.6 PG
MCHC RBC-ENTMCNC: 31.2 GM/DL
MCV RBC AUTO: 81.9 FL
MICROALBUMIN 24H UR DL<=1MG/L-MCNC: 3.3 MG/DL
MICROALBUMIN/CREAT 24H UR-RTO: 13 MG/G
MONOCYTES # BLD AUTO: 0.49 K/UL
MONOCYTES NFR BLD AUTO: 6.7 %
NEUTROPHILS # BLD AUTO: 4.59 K/UL
NEUTROPHILS NFR BLD AUTO: 63.1 %
NITRITE URINE: NEGATIVE
PH URINE: 7
PLATELET # BLD AUTO: 350 K/UL
POTASSIUM SERPL-SCNC: 4 MMOL/L
PROT SERPL-MCNC: 8 G/DL
PROTEIN URINE: ABNORMAL MG/DL
RBC # BLD: 5.04 M/UL
RBC # FLD: 15.3 %
SODIUM SERPL-SCNC: 144 MMOL/L
SPECIFIC GRAVITY URINE: 1.02
T3 SERPL-MCNC: 117 NG/DL
T4 FREE SERPL-MCNC: 1.2 NG/DL
TIBC SERPL-MCNC: 339 UG/DL
TRIGL SERPL-MCNC: 85 MG/DL
TSH SERPL-ACNC: 4.65 UIU/ML
UIBC SERPL-MCNC: 299 UG/DL
UROBILINOGEN URINE: NEGATIVE MG/DL
VIT B12 SERPL-MCNC: 595 PG/ML
WBC # FLD AUTO: 7.29 K/UL

## 2018-08-02 LAB — HEMOCCULT STL QL IA: NEGATIVE

## 2018-08-06 ENCOUNTER — RX RENEWAL (OUTPATIENT)
Age: 53
End: 2018-08-06

## 2018-08-09 ENCOUNTER — APPOINTMENT (OUTPATIENT)
Dept: RHEUMATOLOGY | Facility: CLINIC | Age: 53
End: 2018-08-09
Payer: MEDICARE

## 2018-08-09 VITALS
DIASTOLIC BLOOD PRESSURE: 84 MMHG | RESPIRATION RATE: 16 BRPM | BODY MASS INDEX: 53.73 KG/M2 | WEIGHT: 292 LBS | HEIGHT: 62 IN | HEART RATE: 110 BPM | OXYGEN SATURATION: 95 % | SYSTOLIC BLOOD PRESSURE: 127 MMHG

## 2018-08-09 DIAGNOSIS — K58.9 IRRITABLE BOWEL SYNDROME W/OUT DIARRHEA: ICD-10-CM

## 2018-08-09 PROCEDURE — 99215 OFFICE O/P EST HI 40 MIN: CPT

## 2018-08-18 ENCOUNTER — RX RENEWAL (OUTPATIENT)
Age: 53
End: 2018-08-18

## 2018-09-07 ENCOUNTER — APPOINTMENT (OUTPATIENT)
Dept: INTERNAL MEDICINE | Facility: CLINIC | Age: 53
End: 2018-09-07
Payer: MEDICARE

## 2018-09-07 VITALS
HEART RATE: 100 BPM | RESPIRATION RATE: 18 BRPM | BODY MASS INDEX: 53.73 KG/M2 | WEIGHT: 292 LBS | TEMPERATURE: 98.5 F | OXYGEN SATURATION: 95 % | HEIGHT: 62 IN | DIASTOLIC BLOOD PRESSURE: 80 MMHG | SYSTOLIC BLOOD PRESSURE: 120 MMHG

## 2018-09-07 PROCEDURE — 90732 PPSV23 VACC 2 YRS+ SUBQ/IM: CPT

## 2018-09-07 PROCEDURE — G0009: CPT

## 2018-09-07 PROCEDURE — 99396 PREV VISIT EST AGE 40-64: CPT | Mod: 25

## 2018-09-07 NOTE — HEALTH RISK ASSESSMENT
[Good] : ~his/her~  mood as  good [No falls in past year] : Patient reported no falls in the past year [0] : 2) Feeling down, depressed, or hopeless: Not at all (0) [Patient reported mammogram was normal] : Patient reported mammogram was normal [Patient reported bone density results were abnormal] : Patient reported bone density results were abnormal [HIV test declined] : HIV test declined [None] : None [Feels Safe at Home] : Feels safe at home [Fully functional (bathing, dressing, toileting, transferring, walking, feeding)] : Fully functional (bathing, dressing, toileting, transferring, walking, feeding) [Fully functional (using the telephone, shopping, preparing meals, housekeeping, doing laundry, using] : Fully functional and needs no help or supervision to perform IADLs (using the telephone, shopping, preparing meals, housekeeping, doing laundry, using transportation, managing medications and managing finances) [Smoke Detector] : smoke detector [Carbon Monoxide Detector] : carbon monoxide detector [Seat Belt] :  uses seat belt [Sunscreen] : uses sunscreen [Discussed at today's visit] : Advance Directives Discussed at today's visit [FreeTextEntry1] : Check up\par  [] : No [de-identified] : RHRUM/NEURO [[id number]] : 0 [Change in mental status noted] : No change in mental status noted [Reports changes in hearing] : Reports no changes in hearing [Reports changes in vision] : Reports no changes in vision [Reports changes in dental health] : Reports no changes in dental health [MammogramDate] : 08/17 [PapSmearComments] : As per GYN [BoneDensityDate] : 05/17 [BoneDensityComments] : Osteoporosis [ColonoscopyComments] : As ordered for today. [HepatitisCDate] : 07/18 [HepatitisCComments] : Negative

## 2018-09-07 NOTE — ASSESSMENT
[FreeTextEntry1] : 53 year old female found to have stable  Hypertension, Morbid Obesity, Type 2 Diabetes Mellitus, Reactive Airway Disease,with the current regimen, diet and life style modifications, as counseled. Prior results reviewed and discussed with the patient during today's examination. Plan as ordered.\par Patient was recommended to follow up with GYN for routine examination, PAP smear, reports will be provided, when ready.\par

## 2018-09-07 NOTE — HISTORY OF PRESENT ILLNESS
[de-identified] : 53 year old female patient with history of stable Hypertension, Morbid Obesity, Type 2 Diabetes Mellitus, Reactive Airway Disease, history as stated, presented for an annual preventative examination. Patient denies any associated symptoms of shortness of breath, chest pain, abdominal pain at this time.\par

## 2018-10-03 ENCOUNTER — APPOINTMENT (OUTPATIENT)
Dept: UROGYNECOLOGY | Facility: CLINIC | Age: 53
End: 2018-10-03

## 2018-10-03 ENCOUNTER — CHART COPY (OUTPATIENT)
Age: 53
End: 2018-10-03

## 2018-10-03 ENCOUNTER — APPOINTMENT (OUTPATIENT)
Dept: UROGYNECOLOGY | Facility: CLINIC | Age: 53
End: 2018-10-03
Payer: MEDICARE

## 2018-10-03 VITALS
HEART RATE: 76 BPM | HEIGHT: 62 IN | BODY MASS INDEX: 53.92 KG/M2 | DIASTOLIC BLOOD PRESSURE: 91 MMHG | SYSTOLIC BLOOD PRESSURE: 153 MMHG | WEIGHT: 293 LBS

## 2018-10-03 DIAGNOSIS — Z86.39 PERSONAL HISTORY OF OTHER ENDOCRINE, NUTRITIONAL AND METABOLIC DISEASE: ICD-10-CM

## 2018-10-03 DIAGNOSIS — Z87.39 PERSONAL HISTORY OF OTHER DISEASES OF THE MUSCULOSKELETAL SYSTEM AND CONNECTIVE TISSUE: ICD-10-CM

## 2018-10-03 PROCEDURE — 99204 OFFICE O/P NEW MOD 45 MIN: CPT | Mod: 25

## 2018-10-03 PROCEDURE — 51798 US URINE CAPACITY MEASURE: CPT

## 2018-10-04 LAB
APPEARANCE: CLEAR
BACTERIA: NEGATIVE
BILIRUBIN URINE: NEGATIVE
BLOOD URINE: NEGATIVE
COLOR: ABNORMAL
GLUCOSE QUALITATIVE U: NEGATIVE MG/DL
HYALINE CASTS: 3 /LPF
KETONES URINE: ABNORMAL
LEUKOCYTE ESTERASE URINE: NEGATIVE
MICROSCOPIC-UA: NORMAL
NITRITE URINE: NEGATIVE
PH URINE: 6
PROTEIN URINE: ABNORMAL MG/DL
RED BLOOD CELLS URINE: 2 /HPF
SPECIFIC GRAVITY URINE: 1.04
SQUAMOUS EPITHELIAL CELLS: 3 /HPF
UROBILINOGEN URINE: 1 MG/DL
WHITE BLOOD CELLS URINE: 2 /HPF

## 2018-10-05 LAB — BACTERIA UR CULT: NORMAL

## 2018-10-06 ENCOUNTER — TRANSCRIPTION ENCOUNTER (OUTPATIENT)
Age: 53
End: 2018-10-06

## 2018-10-29 ENCOUNTER — EMERGENCY (EMERGENCY)
Facility: HOSPITAL | Age: 53
LOS: 1 days | Discharge: HOME | End: 2018-10-29
Attending: EMERGENCY MEDICINE
Payer: COMMERCIAL

## 2018-10-29 VITALS
DIASTOLIC BLOOD PRESSURE: 103 MMHG | OXYGEN SATURATION: 94 % | RESPIRATION RATE: 15 BRPM | TEMPERATURE: 99 F | HEART RATE: 109 BPM | SYSTOLIC BLOOD PRESSURE: 178 MMHG

## 2018-10-29 VITALS
HEART RATE: 91 BPM | TEMPERATURE: 98 F | SYSTOLIC BLOOD PRESSURE: 148 MMHG | RESPIRATION RATE: 18 BRPM | DIASTOLIC BLOOD PRESSURE: 89 MMHG | OXYGEN SATURATION: 96 %

## 2018-10-29 DIAGNOSIS — Z98.89 OTHER SPECIFIED POSTPROCEDURAL STATES: Chronic | ICD-10-CM

## 2018-10-29 DIAGNOSIS — Z90.710 ACQUIRED ABSENCE OF BOTH CERVIX AND UTERUS: Chronic | ICD-10-CM

## 2018-10-29 LAB
ALBUMIN SERPL ELPH-MCNC: 3.3 G/DL — LOW (ref 3.5–5)
ALP SERPL-CCNC: 120 U/L — SIGNIFICANT CHANGE UP (ref 40–120)
ALT FLD-CCNC: 68 U/L DA — HIGH (ref 10–60)
ANION GAP SERPL CALC-SCNC: 6 MMOL/L — SIGNIFICANT CHANGE UP (ref 5–17)
AST SERPL-CCNC: 57 U/L — HIGH (ref 10–40)
BASOPHILS # BLD AUTO: 0.1 K/UL — SIGNIFICANT CHANGE UP (ref 0–0.2)
BASOPHILS NFR BLD AUTO: 0.7 % — SIGNIFICANT CHANGE UP (ref 0–2)
BILIRUB SERPL-MCNC: 0.4 MG/DL — SIGNIFICANT CHANGE UP (ref 0.2–1.2)
BUN SERPL-MCNC: 17 MG/DL — SIGNIFICANT CHANGE UP (ref 7–18)
CALCIUM SERPL-MCNC: 9.1 MG/DL — SIGNIFICANT CHANGE UP (ref 8.4–10.5)
CHLORIDE SERPL-SCNC: 105 MMOL/L — SIGNIFICANT CHANGE UP (ref 96–108)
CO2 SERPL-SCNC: 31 MMOL/L — SIGNIFICANT CHANGE UP (ref 22–31)
CREAT SERPL-MCNC: 0.8 MG/DL — SIGNIFICANT CHANGE UP (ref 0.5–1.3)
D DIMER BLD IA.RAPID-MCNC: 152 NG/ML DDU — SIGNIFICANT CHANGE UP
EOSINOPHIL # BLD AUTO: 0.3 K/UL — SIGNIFICANT CHANGE UP (ref 0–0.5)
EOSINOPHIL NFR BLD AUTO: 3.2 % — SIGNIFICANT CHANGE UP (ref 0–6)
GLUCOSE SERPL-MCNC: 123 MG/DL — HIGH (ref 70–99)
HCT VFR BLD CALC: 41 % — SIGNIFICANT CHANGE UP (ref 34.5–45)
HGB BLD-MCNC: 12.8 G/DL — SIGNIFICANT CHANGE UP (ref 11.5–15.5)
LYMPHOCYTES # BLD AUTO: 2.4 K/UL — SIGNIFICANT CHANGE UP (ref 1–3.3)
LYMPHOCYTES # BLD AUTO: 27.8 % — SIGNIFICANT CHANGE UP (ref 13–44)
MCHC RBC-ENTMCNC: 24.6 PG — LOW (ref 27–34)
MCHC RBC-ENTMCNC: 31.3 GM/DL — LOW (ref 32–36)
MCV RBC AUTO: 78.4 FL — LOW (ref 80–100)
MONOCYTES # BLD AUTO: 0.5 K/UL — SIGNIFICANT CHANGE UP (ref 0–0.9)
MONOCYTES NFR BLD AUTO: 6.1 % — SIGNIFICANT CHANGE UP (ref 2–14)
NEUTROPHILS # BLD AUTO: 5.4 K/UL — SIGNIFICANT CHANGE UP (ref 1.8–7.4)
NEUTROPHILS NFR BLD AUTO: 62.2 % — SIGNIFICANT CHANGE UP (ref 43–77)
PLATELET # BLD AUTO: 331 K/UL — SIGNIFICANT CHANGE UP (ref 150–400)
POTASSIUM SERPL-MCNC: 3.7 MMOL/L — SIGNIFICANT CHANGE UP (ref 3.5–5.3)
POTASSIUM SERPL-SCNC: 3.7 MMOL/L — SIGNIFICANT CHANGE UP (ref 3.5–5.3)
PROT SERPL-MCNC: 7.8 G/DL — SIGNIFICANT CHANGE UP (ref 6–8.3)
RAPID RVP RESULT: SIGNIFICANT CHANGE UP
RBC # BLD: 5.22 M/UL — HIGH (ref 3.8–5.2)
RBC # FLD: 15.1 % — HIGH (ref 10.3–14.5)
SODIUM SERPL-SCNC: 142 MMOL/L — SIGNIFICANT CHANGE UP (ref 135–145)
WBC # BLD: 8.7 K/UL — SIGNIFICANT CHANGE UP (ref 3.8–10.5)
WBC # FLD AUTO: 8.7 K/UL — SIGNIFICANT CHANGE UP (ref 3.8–10.5)

## 2018-10-29 PROCEDURE — 85027 COMPLETE CBC AUTOMATED: CPT

## 2018-10-29 PROCEDURE — 96361 HYDRATE IV INFUSION ADD-ON: CPT

## 2018-10-29 PROCEDURE — 80053 COMPREHEN METABOLIC PANEL: CPT

## 2018-10-29 PROCEDURE — 85379 FIBRIN DEGRADATION QUANT: CPT

## 2018-10-29 PROCEDURE — 71046 X-RAY EXAM CHEST 2 VIEWS: CPT

## 2018-10-29 PROCEDURE — 96360 HYDRATION IV INFUSION INIT: CPT

## 2018-10-29 PROCEDURE — 99284 EMERGENCY DEPT VISIT MOD MDM: CPT

## 2018-10-29 PROCEDURE — 87798 DETECT AGENT NOS DNA AMP: CPT

## 2018-10-29 PROCEDURE — 87581 M.PNEUMON DNA AMP PROBE: CPT

## 2018-10-29 PROCEDURE — 71046 X-RAY EXAM CHEST 2 VIEWS: CPT | Mod: 26

## 2018-10-29 PROCEDURE — 93005 ELECTROCARDIOGRAM TRACING: CPT

## 2018-10-29 PROCEDURE — 99284 EMERGENCY DEPT VISIT MOD MDM: CPT | Mod: 25

## 2018-10-29 PROCEDURE — 87486 CHLMYD PNEUM DNA AMP PROBE: CPT

## 2018-10-29 PROCEDURE — 87633 RESP VIRUS 12-25 TARGETS: CPT

## 2018-10-29 PROCEDURE — 94640 AIRWAY INHALATION TREATMENT: CPT

## 2018-10-29 RX ORDER — AZITHROMYCIN 500 MG/1
1 TABLET, FILM COATED ORAL
Qty: 4 | Refills: 0
Start: 2018-10-29 | End: 2018-11-01

## 2018-10-29 RX ORDER — IPRATROPIUM/ALBUTEROL SULFATE 18-103MCG
3 AEROSOL WITH ADAPTER (GRAM) INHALATION ONCE
Qty: 0 | Refills: 0 | Status: COMPLETED | OUTPATIENT
Start: 2018-10-29 | End: 2018-10-29

## 2018-10-29 RX ORDER — SODIUM CHLORIDE 9 MG/ML
1000 INJECTION INTRAMUSCULAR; INTRAVENOUS; SUBCUTANEOUS ONCE
Qty: 0 | Refills: 0 | Status: COMPLETED | OUTPATIENT
Start: 2018-10-29 | End: 2018-10-29

## 2018-10-29 RX ORDER — AZITHROMYCIN 500 MG/1
500 TABLET, FILM COATED ORAL ONCE
Qty: 0 | Refills: 0 | Status: COMPLETED | OUTPATIENT
Start: 2018-10-29 | End: 2018-10-29

## 2018-10-29 RX ADMIN — SODIUM CHLORIDE 1000 MILLILITER(S): 9 INJECTION INTRAMUSCULAR; INTRAVENOUS; SUBCUTANEOUS at 20:57

## 2018-10-29 RX ADMIN — SODIUM CHLORIDE 4000 MILLILITER(S): 9 INJECTION INTRAMUSCULAR; INTRAVENOUS; SUBCUTANEOUS at 19:02

## 2018-10-29 RX ADMIN — Medication 3 MILLILITER(S): at 21:14

## 2018-10-29 RX ADMIN — Medication 50 MILLIGRAM(S): at 21:16

## 2018-10-29 RX ADMIN — AZITHROMYCIN 500 MILLIGRAM(S): 500 TABLET, FILM COATED ORAL at 21:16

## 2018-10-29 NOTE — ED PROVIDER NOTE - PHYSICAL EXAMINATION
Gen: well appearing, of stated age, no acute distress; Head: NC, AT; ENT: MMM, no uvular deviation; Neck: supple with full ROM; Chest: CTAB, no retractions, rate normal, appears to breath comfortable, +r lat lower rib w point ttp; Heart: RRR S1S2 No JVD; Abd: Soft non-tender, no rebound or guarding, no masses, no rubio sign, no mcburney tenderness, no CVAT; Back: No spinal deformity; Ext: Moving all 4 limbs without obvious impairment to ROM, no obvious weakness; Neuro: fluid speech, no focal deficits, oriented to person, place, situation; Psych: No anxiety, depression or pressured speech noted; Skin: no utricaria, no diffuse rash. -ncohen

## 2018-10-29 NOTE — ED ADULT TRIAGE NOTE - CHIEF COMPLAINT QUOTE
c/o cough , pain to chest and  difficulty taking deep breaths today . Had Bronchitis few weeks ago completed course of antibiotics , prednisone not feeling better . sent by her doctor

## 2018-10-29 NOTE — ED PROVIDER NOTE - MEDICAL DECISION MAKING DETAILS
perhaps pna, perhaps pe, perhaps rib fx. do not suspect acs or dissection. xr chest, dimer, labs. Symptomatic treatment.

## 2018-10-29 NOTE — ED PROVIDER NOTE - CARE PLAN
Principal Discharge DX:	Cough  Secondary Diagnosis:	Exacerbation of asthma, unspecified asthma severity, unspecified whether persistent Principal Discharge DX:	Cough

## 2018-10-29 NOTE — ED ADULT NURSE NOTE - NSIMPLEMENTINTERV_GEN_ALL_ED
Implemented All Universal Safety Interventions:  [place] to call system. Call bell, personal items and telephone within reach. Instruct patient to call for assistance. Room bathroom lighting operational. Non-slip footwear when patient is off stretcher. Physically safe environment: no spills, clutter or unnecessary equipment. Stretcher in lowest position, wheels locked, appropriate side rails in place.

## 2018-10-29 NOTE — ED PROVIDER NOTE - OBJECTIVE STATEMENT
53 female hx below and dm presenting with cough for 4 wks, completed course of amox /prednisone at start of illness. subjective fevers. occ sob, R lat chest wall pain at the ribs when she coughs at the site she states she has fractured ribs before. no pain except when coughing or laying on that side. no cp or exertional symptoms. also notes that she has uri symptoms.

## 2018-11-05 ENCOUNTER — RX RENEWAL (OUTPATIENT)
Age: 53
End: 2018-11-05

## 2018-11-06 ENCOUNTER — APPOINTMENT (OUTPATIENT)
Dept: INTERNAL MEDICINE | Facility: CLINIC | Age: 53
End: 2018-11-06
Payer: MEDICARE

## 2018-11-06 VITALS
RESPIRATION RATE: 17 BRPM | HEIGHT: 62 IN | HEART RATE: 97 BPM | BODY MASS INDEX: 53.92 KG/M2 | WEIGHT: 293 LBS | OXYGEN SATURATION: 97 % | DIASTOLIC BLOOD PRESSURE: 84 MMHG | TEMPERATURE: 98.7 F | SYSTOLIC BLOOD PRESSURE: 130 MMHG

## 2018-11-06 PROCEDURE — 99214 OFFICE O/P EST MOD 30 MIN: CPT

## 2018-11-06 NOTE — HISTORY OF PRESENT ILLNESS
[de-identified] : 53 year old  female patient with history of stable Hypertension, Type 2 Diabetes Mellitus with hyperglycemia, Reactive Airway Disease, Allergic Rhinosinusitis, Hypothyroidism, Morbid Obesity, history as stated, presented for follow up examination of Elevated BP/Sugar checked . Patient is compliant with all medications. Denies shortness of breath, chest pain or abdominal pains at this time. ROS as stated.\par

## 2018-11-06 NOTE — ASSESSMENT
[FreeTextEntry1] : 53 year old female found to have stable Hypertension, Type 2 Diabetes Mellitus with hyperglycemia, improving Reactive Airway Disease, Allergic Rhinosinusitis, stable Hypothyroidism, Morbid Obesity,with the current regimen, diet and life style modifications, as counseled. Prior results reviewed and discussed with the patient during today's examination. Plan as ordered.\par Patient was recently evaluated by NEURO/GYN URO, findings and recommendations reviewed with the patient during today's examination.\par

## 2018-11-06 NOTE — HEALTH RISK ASSESSMENT
[No falls in past year] : Patient reported no falls in the past year [] : No [0] : 2) Feeling down, depressed, or hopeless: Not at all (0) [de-identified] : GYN URO/NEURO [[id number]] : 0

## 2018-11-09 RX ORDER — AMOXICILLIN AND CLAVULANATE POTASSIUM 875; 125 MG/1; MG/1
875-125 TABLET, COATED ORAL
Qty: 14 | Refills: 0 | Status: DISCONTINUED | COMMUNITY
Start: 2018-10-06

## 2018-11-09 RX ORDER — PREDNISONE 50 MG/1
50 TABLET ORAL
Qty: 5 | Refills: 0 | Status: DISCONTINUED | COMMUNITY
Start: 2018-10-06

## 2018-11-09 RX ORDER — PREDNISONE 20 MG/1
20 TABLET ORAL
Qty: 8 | Refills: 0 | Status: DISCONTINUED | COMMUNITY
Start: 2018-10-30

## 2018-11-15 ENCOUNTER — APPOINTMENT (OUTPATIENT)
Dept: RHEUMATOLOGY | Facility: CLINIC | Age: 53
End: 2018-11-15

## 2018-11-19 ENCOUNTER — TRANSCRIPTION ENCOUNTER (OUTPATIENT)
Age: 53
End: 2018-11-19

## 2018-11-21 ENCOUNTER — APPOINTMENT (OUTPATIENT)
Dept: UROGYNECOLOGY | Facility: CLINIC | Age: 53
End: 2018-11-21

## 2018-11-30 ENCOUNTER — LABORATORY RESULT (OUTPATIENT)
Age: 53
End: 2018-11-30

## 2018-11-30 ENCOUNTER — APPOINTMENT (OUTPATIENT)
Dept: INTERNAL MEDICINE | Facility: CLINIC | Age: 53
End: 2018-11-30
Payer: MEDICARE

## 2018-11-30 VITALS
WEIGHT: 293 LBS | OXYGEN SATURATION: 93 % | HEART RATE: 90 BPM | TEMPERATURE: 98.3 F | RESPIRATION RATE: 18 BRPM | BODY MASS INDEX: 53.92 KG/M2 | HEIGHT: 62 IN | SYSTOLIC BLOOD PRESSURE: 140 MMHG | DIASTOLIC BLOOD PRESSURE: 80 MMHG

## 2018-11-30 PROCEDURE — 99214 OFFICE O/P EST MOD 30 MIN: CPT

## 2018-11-30 RX ORDER — BETAMETHASONE DIPROPIONATE 0.5 MG/G
0.05 OINTMENT TOPICAL
Qty: 1 | Refills: 1 | Status: DISCONTINUED | COMMUNITY
Start: 2018-03-27 | End: 2018-11-30

## 2018-11-30 RX ORDER — FLUOCINONIDE 0.5 MG/ML
0.05 SOLUTION TOPICAL
Qty: 60 | Refills: 3 | Status: DISCONTINUED | COMMUNITY
Start: 2018-03-27 | End: 2018-11-30

## 2018-11-30 NOTE — ASSESSMENT
[FreeTextEntry1] : 53 year old female found to have stable Hypertension, Hypothyroidism, Type 2 Diabetes Mellitus, improving Reactive Airway Disease, stable Morbid Obesity,with the current regimen, diet and life style modifications, as counseled. Prior results reviewed and discussed with the patient during today's examination. Plan as ordered.\par Patient was recently evaluated in ER, findings and recommendations reviewed with the patient during today's examination.\par

## 2018-11-30 NOTE — HISTORY OF PRESENT ILLNESS
[de-identified] : 53 year old  female patient with history of stable Hypertension, Hypothyroidism, Type 2 Diabetes Mellitus, Reactive Airway Disease, Morbid Obesity , history as stated, presented for follow up examination of Elevated BP/Sugar checked. Patient is compliant with all medications. Denies shortness of breath, chest pain or abdominal pains at this time. ROS as stated.\par

## 2018-11-30 NOTE — HISTORY OF PRESENT ILLNESS
[de-identified] : 53 year old  female patient with history of stable Hypertension, Hypothyroidism, Type 2 Diabetes Mellitus, Reactive Airway Disease, Morbid Obesity , history as stated, presented for follow up examination of Elevated BP/Sugar checked. Patient is compliant with all medications. Denies shortness of breath, chest pain or abdominal pains at this time. ROS as stated.\par

## 2018-11-30 NOTE — HEALTH RISK ASSESSMENT
[Intercurrent ED visits] : went to ED [No falls in past year] : Patient reported no falls in the past year [0] : 2) Feeling down, depressed, or hopeless: Not at all (0) [] : No [de-identified] : 11/18 [de-identified] : None [[id number]] : 0

## 2018-11-30 NOTE — HEALTH RISK ASSESSMENT
[Intercurrent ED visits] : went to ED [No falls in past year] : Patient reported no falls in the past year [0] : 2) Feeling down, depressed, or hopeless: Not at all (0) [] : No [de-identified] : 11/18 [de-identified] : None [[id number]] : 0

## 2018-12-01 ENCOUNTER — LABORATORY RESULT (OUTPATIENT)
Age: 53
End: 2018-12-01

## 2018-12-01 DIAGNOSIS — R94.5 ABNORMAL RESULTS OF LIVER FUNCTION STUDIES: ICD-10-CM

## 2018-12-03 LAB
25(OH)D3 SERPL-MCNC: 14 NG/ML
ALBUMIN SERPL ELPH-MCNC: 4.4 G/DL
ALP BLD-CCNC: 134 U/L
ALT SERPL-CCNC: 64 U/L
ANION GAP SERPL CALC-SCNC: 16 MMOL/L
AST SERPL-CCNC: 48 U/L
BASOPHILS # BLD AUTO: 0.02 K/UL
BASOPHILS NFR BLD AUTO: 0.2 %
BILIRUB SERPL-MCNC: 0.3 MG/DL
BUN SERPL-MCNC: 19 MG/DL
CALCIUM SERPL-MCNC: 10 MG/DL
CHLORIDE SERPL-SCNC: 97 MMOL/L
CHOLEST SERPL-MCNC: 219 MG/DL
CHOLEST/HDLC SERPL: 5.5 RATIO
CO2 SERPL-SCNC: 27 MMOL/L
CREAT SERPL-MCNC: 0.81 MG/DL
EOSINOPHIL # BLD AUTO: 0.25 K/UL
EOSINOPHIL NFR BLD AUTO: 2.5 %
ERYTHROCYTE [SEDIMENTATION RATE] IN BLOOD BY WESTERGREN METHOD: 59 MM/HR
FOLATE SERPL-MCNC: 9.3 NG/ML
GGT SERPL-CCNC: 93 U/L
GLUCOSE SERPL-MCNC: 248 MG/DL
HBA1C MFR BLD HPLC: 9.3 %
HBV CORE IGG+IGM SER QL: NONREACTIVE
HBV SURFACE AB SER QL: NONREACTIVE
HBV SURFACE AG SER QL: NONREACTIVE
HCT VFR BLD CALC: 44.1 %
HCV AB SER QL: NONREACTIVE
HCV S/CO RATIO: 0.15 S/CO
HDLC SERPL-MCNC: 40 MG/DL
HEPATITIS A IGG ANTIBODY: REACTIVE
HGB BLD-MCNC: 13.4 G/DL
IMM GRANULOCYTES NFR BLD AUTO: 0.3 %
IRON SATN MFR SERPL: 12 %
IRON SERPL-MCNC: 45 UG/DL
LDLC SERPL CALC-MCNC: 127 MG/DL
LYMPHOCYTES # BLD AUTO: 2.41 K/UL
LYMPHOCYTES NFR BLD AUTO: 24.2 %
MAN DIFF?: NORMAL
MCHC RBC-ENTMCNC: 25.1 PG
MCHC RBC-ENTMCNC: 30.4 GM/DL
MCV RBC AUTO: 82.7 FL
MONOCYTES # BLD AUTO: 0.64 K/UL
MONOCYTES NFR BLD AUTO: 6.4 %
NEUTROPHILS # BLD AUTO: 6.59 K/UL
NEUTROPHILS NFR BLD AUTO: 66.4 %
PLATELET # BLD AUTO: 393 K/UL
POTASSIUM SERPL-SCNC: 4.4 MMOL/L
PROT SERPL-MCNC: 7.4 G/DL
RBC # BLD: 5.33 M/UL
RBC # FLD: 16 %
SODIUM SERPL-SCNC: 140 MMOL/L
T3 SERPL-MCNC: 126 NG/DL
T4 FREE SERPL-MCNC: 1.3 NG/DL
TIBC SERPL-MCNC: 380 UG/DL
TRIGL SERPL-MCNC: 258 MG/DL
TSH SERPL-ACNC: 3.96 UIU/ML
UIBC SERPL-MCNC: 335 UG/DL
VIT B12 SERPL-MCNC: 627 PG/ML
WBC # FLD AUTO: 9.94 K/UL

## 2018-12-11 ENCOUNTER — APPOINTMENT (OUTPATIENT)
Dept: UROGYNECOLOGY | Facility: CLINIC | Age: 53
End: 2018-12-11
Payer: MEDICARE

## 2018-12-11 PROCEDURE — 51784 ANAL/URINARY MUSCLE STUDY: CPT

## 2018-12-11 PROCEDURE — 51729 CYSTOMETROGRAM W/VP&UP: CPT

## 2018-12-11 PROCEDURE — 51797 INTRAABDOMINAL PRESSURE TEST: CPT

## 2018-12-11 PROCEDURE — 51741 ELECTRO-UROFLOWMETRY FIRST: CPT

## 2018-12-11 PROCEDURE — 51798 US URINE CAPACITY MEASURE: CPT

## 2019-01-14 ENCOUNTER — CHART COPY (OUTPATIENT)
Age: 54
End: 2019-01-14

## 2019-01-16 ENCOUNTER — APPOINTMENT (OUTPATIENT)
Dept: UROGYNECOLOGY | Facility: CLINIC | Age: 54
End: 2019-01-16
Payer: MEDICARE

## 2019-01-16 PROCEDURE — 99213 OFFICE O/P EST LOW 20 MIN: CPT

## 2019-01-16 NOTE — ASSESSMENT
[FreeTextEntry1] : UDS findings were discussed with the pt and written pamphlets were provided for treatment of mixed urinary incontinence.  After discussing various options of treatments, she was interested in trying medication for bothersome OAB.  Potential side effects reviewed.  Detrol LA 4 mg prescribed for 2 months.  Since OAB is more bothersome, we will delay tx for SHANICE at this time.\par She was advised to f/u in 1 month.\par

## 2019-01-16 NOTE — HISTORY OF PRESENT ILLNESS
[FreeTextEntry1] : The pt is here for a f/u visit after UUD\par +UDSUI, +DO, +complete emptying\par No change in her bladder symptoms

## 2019-01-28 ENCOUNTER — MEDICATION RENEWAL (OUTPATIENT)
Age: 54
End: 2019-01-28

## 2019-01-29 ENCOUNTER — EMERGENCY (EMERGENCY)
Facility: HOSPITAL | Age: 54
LOS: 1 days | Discharge: HOME | End: 2019-01-29
Attending: STUDENT IN AN ORGANIZED HEALTH CARE EDUCATION/TRAINING PROGRAM
Payer: COMMERCIAL

## 2019-01-29 VITALS
SYSTOLIC BLOOD PRESSURE: 135 MMHG | DIASTOLIC BLOOD PRESSURE: 78 MMHG | OXYGEN SATURATION: 98 % | RESPIRATION RATE: 18 BRPM | HEART RATE: 95 BPM | TEMPERATURE: 98 F

## 2019-01-29 VITALS
OXYGEN SATURATION: 91 % | DIASTOLIC BLOOD PRESSURE: 75 MMHG | TEMPERATURE: 99 F | HEART RATE: 96 BPM | SYSTOLIC BLOOD PRESSURE: 110 MMHG | RESPIRATION RATE: 20 BRPM

## 2019-01-29 DIAGNOSIS — Z90.710 ACQUIRED ABSENCE OF BOTH CERVIX AND UTERUS: Chronic | ICD-10-CM

## 2019-01-29 DIAGNOSIS — Z98.89 OTHER SPECIFIED POSTPROCEDURAL STATES: Chronic | ICD-10-CM

## 2019-01-29 LAB
ALBUMIN SERPL ELPH-MCNC: 3.4 G/DL — LOW (ref 3.5–5)
ALP SERPL-CCNC: 125 U/L — HIGH (ref 40–120)
ALT FLD-CCNC: 103 U/L DA — HIGH (ref 10–60)
ANION GAP SERPL CALC-SCNC: 7 MMOL/L — SIGNIFICANT CHANGE UP (ref 5–17)
APTT BLD: 28.3 SEC — SIGNIFICANT CHANGE UP (ref 27.5–36.3)
AST SERPL-CCNC: 113 U/L — HIGH (ref 10–40)
BILIRUB SERPL-MCNC: 0.3 MG/DL — SIGNIFICANT CHANGE UP (ref 0.2–1.2)
BUN SERPL-MCNC: 13 MG/DL — SIGNIFICANT CHANGE UP (ref 7–18)
CALCIUM SERPL-MCNC: 9.2 MG/DL — SIGNIFICANT CHANGE UP (ref 8.4–10.5)
CHLORIDE SERPL-SCNC: 104 MMOL/L — SIGNIFICANT CHANGE UP (ref 96–108)
CO2 SERPL-SCNC: 29 MMOL/L — SIGNIFICANT CHANGE UP (ref 22–31)
CREAT SERPL-MCNC: 0.81 MG/DL — SIGNIFICANT CHANGE UP (ref 0.5–1.3)
EOSINOPHIL NFR BLD AUTO: 3 % — SIGNIFICANT CHANGE UP (ref 0–6)
GLUCOSE SERPL-MCNC: 170 MG/DL — HIGH (ref 70–99)
HCG SERPL-ACNC: <1 MIU/ML — SIGNIFICANT CHANGE UP
HCT VFR BLD CALC: 40.7 % — SIGNIFICANT CHANGE UP (ref 34.5–45)
HGB BLD-MCNC: 12.4 G/DL — SIGNIFICANT CHANGE UP (ref 11.5–15.5)
INR BLD: 1.13 RATIO — SIGNIFICANT CHANGE UP (ref 0.88–1.16)
LYMPHOCYTES # BLD AUTO: 28 % — SIGNIFICANT CHANGE UP (ref 13–44)
MCHC RBC-ENTMCNC: 24.4 PG — LOW (ref 27–34)
MCHC RBC-ENTMCNC: 30.5 GM/DL — LOW (ref 32–36)
MCV RBC AUTO: 79.8 FL — LOW (ref 80–100)
MONOCYTES NFR BLD AUTO: 9 % — SIGNIFICANT CHANGE UP (ref 2–14)
NEUTROPHILS NFR BLD AUTO: 59 % — SIGNIFICANT CHANGE UP (ref 43–77)
NT-PROBNP SERPL-SCNC: 50 PG/ML — SIGNIFICANT CHANGE UP (ref 0–125)
PLATELET # BLD AUTO: 322 K/UL — SIGNIFICANT CHANGE UP (ref 150–400)
POTASSIUM SERPL-MCNC: 4.4 MMOL/L — SIGNIFICANT CHANGE UP (ref 3.5–5.3)
POTASSIUM SERPL-SCNC: 4.4 MMOL/L — SIGNIFICANT CHANGE UP (ref 3.5–5.3)
PROT SERPL-MCNC: 7.9 G/DL — SIGNIFICANT CHANGE UP (ref 6–8.3)
PROTHROM AB SERPL-ACNC: 12.6 SEC — SIGNIFICANT CHANGE UP (ref 10–12.9)
RBC # BLD: 5.1 M/UL — SIGNIFICANT CHANGE UP (ref 3.8–5.2)
RBC # FLD: 14.6 % — HIGH (ref 10.3–14.5)
SODIUM SERPL-SCNC: 140 MMOL/L — SIGNIFICANT CHANGE UP (ref 135–145)
TROPONIN I SERPL-MCNC: <0.015 NG/ML — SIGNIFICANT CHANGE UP (ref 0–0.04)
WBC # BLD: 7.3 K/UL — SIGNIFICANT CHANGE UP (ref 3.8–10.5)
WBC # FLD AUTO: 7.3 K/UL — SIGNIFICANT CHANGE UP (ref 3.8–10.5)

## 2019-01-29 PROCEDURE — 36415 COLL VENOUS BLD VENIPUNCTURE: CPT

## 2019-01-29 PROCEDURE — 85027 COMPLETE CBC AUTOMATED: CPT

## 2019-01-29 PROCEDURE — 71046 X-RAY EXAM CHEST 2 VIEWS: CPT

## 2019-01-29 PROCEDURE — 84702 CHORIONIC GONADOTROPIN TEST: CPT

## 2019-01-29 PROCEDURE — 85610 PROTHROMBIN TIME: CPT

## 2019-01-29 PROCEDURE — 83880 ASSAY OF NATRIURETIC PEPTIDE: CPT

## 2019-01-29 PROCEDURE — 93005 ELECTROCARDIOGRAM TRACING: CPT

## 2019-01-29 PROCEDURE — 99284 EMERGENCY DEPT VISIT MOD MDM: CPT | Mod: 25

## 2019-01-29 PROCEDURE — 85730 THROMBOPLASTIN TIME PARTIAL: CPT

## 2019-01-29 PROCEDURE — 93010 ELECTROCARDIOGRAM REPORT: CPT

## 2019-01-29 PROCEDURE — 80053 COMPREHEN METABOLIC PANEL: CPT

## 2019-01-29 PROCEDURE — 99284 EMERGENCY DEPT VISIT MOD MDM: CPT

## 2019-01-29 PROCEDURE — 82962 GLUCOSE BLOOD TEST: CPT

## 2019-01-29 PROCEDURE — 84484 ASSAY OF TROPONIN QUANT: CPT

## 2019-01-29 PROCEDURE — 71046 X-RAY EXAM CHEST 2 VIEWS: CPT | Mod: 26

## 2019-01-29 RX ORDER — AZITHROMYCIN 500 MG/1
1 TABLET, FILM COATED ORAL
Qty: 1 | Refills: 0
Start: 2019-01-29 | End: 2019-02-02

## 2019-01-29 RX ORDER — ASPIRIN/CALCIUM CARB/MAGNESIUM 324 MG
325 TABLET ORAL ONCE
Qty: 0 | Refills: 0 | Status: COMPLETED | OUTPATIENT
Start: 2019-01-29 | End: 2019-01-29

## 2019-01-29 RX ADMIN — Medication 325 MILLIGRAM(S): at 16:39

## 2019-01-29 NOTE — ED PROVIDER NOTE - OBJECTIVE STATEMENT
53 y.o pmh of obesity, fibromyalgia, asthma presenting with sob and cp. endorses left sided rib pain, endorses that was present prior to sob. endorses nausea. denies diarrhea, dysuria, recent travel, leg pain, swelling.

## 2019-01-29 NOTE — ED PROVIDER NOTE - MEDICAL DECISION MAKING DETAILS
patient well apearing. no signs of distress. ekg ns. will obtain cxr, lab, trop, bnp, r. acs, chf, pna, ptx. saturation and hr normal. no sign of pe.

## 2019-01-29 NOTE — ED PROVIDER NOTE - PROGRESS NOTE DETAILS
Lidocaine 4% cream that was ordered is not covered by insurance, I ran test claims and found Lidocaine 5% ointment is covered. Deacon Berger's parents are hoping you will change the prescription to the Lidocaine 5% ointment so insurance will pay for it.     Thanks  [facility]   [phone]       patient remains well appearing. lab wnl. cxr clear, patient endorses cough x 3 weeks. likely costochondritis. given abx given duration of coguh. patient instructed to return for new or worsening symptoms.

## 2019-01-29 NOTE — ED ADULT NURSE NOTE - OBJECTIVE STATEMENT
Pt AOx4, ambulatory, c/o SOB and left rib cage pain worsening with deep breathing. Pt endorses cough and nausea. Pt denies CP, hematuria or problems with BM

## 2019-01-31 ENCOUNTER — RX RENEWAL (OUTPATIENT)
Age: 54
End: 2019-01-31

## 2019-02-07 ENCOUNTER — APPOINTMENT (OUTPATIENT)
Dept: INTERNAL MEDICINE | Facility: CLINIC | Age: 54
End: 2019-02-07
Payer: MEDICARE

## 2019-02-07 VITALS
TEMPERATURE: 98.3 F | OXYGEN SATURATION: 95 % | HEART RATE: 90 BPM | HEIGHT: 62 IN | SYSTOLIC BLOOD PRESSURE: 130 MMHG | DIASTOLIC BLOOD PRESSURE: 80 MMHG | WEIGHT: 293 LBS | BODY MASS INDEX: 53.92 KG/M2 | RESPIRATION RATE: 17 BRPM

## 2019-02-07 PROCEDURE — 99214 OFFICE O/P EST MOD 30 MIN: CPT

## 2019-02-07 RX ORDER — BLOOD SUGAR DIAGNOSTIC
STRIP MISCELLANEOUS 3 TIMES DAILY
Qty: 270 | Refills: 0 | Status: DISCONTINUED | COMMUNITY
Start: 2017-07-06 | End: 2019-02-07

## 2019-02-07 RX ORDER — TOLTERODINE TARTRATE 4 MG/1
4 CAPSULE, EXTENDED RELEASE ORAL
Qty: 30 | Refills: 2 | Status: DISCONTINUED | COMMUNITY
Start: 2019-01-16 | End: 2019-02-07

## 2019-02-07 RX ORDER — PREDNISONE 10 MG/1
10 TABLET ORAL
Qty: 21 | Refills: 0 | Status: DISCONTINUED | COMMUNITY
Start: 2018-11-09 | End: 2019-02-07

## 2019-02-07 NOTE — ASSESSMENT
[FreeTextEntry1] : 53 year old female found to have stable Hypertension, Hypothyroidism, Type 2 Diabetes Mellitus, Reactive Airway Disease, Morbid Obesity,with the current regimen, diet and life style modifications, as counseled. Prior results reviewed and discussed with the patient during today's examination. Plan as ordered.\par Patient was recently evaluated in ER, findings and recommendations reviewed with the patient during today's examination.\par

## 2019-02-07 NOTE — HEALTH RISK ASSESSMENT
[Intercurrent ED visits] : went to ED [No falls in past year] : Patient reported no falls in the past year [0] : 2) Feeling down, depressed, or hopeless: Not at all (0) [] : No [de-identified] : 11/18 [de-identified] : ENDO [[id number]] : 0

## 2019-02-07 NOTE — HISTORY OF PRESENT ILLNESS
[de-identified] : 53 year old  female patient with history of stable Hypertension, Hypothyroidism, Type 2 Diabetes Mellitus, Reactive Airway Disease, Morbid Obesity, history as stated, presented for follow up examination. Patient is compliant with all medications. Denies shortness of breath, chest pain or abdominal pains at this time. ROS as stated.\par

## 2019-02-10 ENCOUNTER — RX RENEWAL (OUTPATIENT)
Age: 54
End: 2019-02-10

## 2019-02-13 ENCOUNTER — APPOINTMENT (OUTPATIENT)
Dept: UROGYNECOLOGY | Facility: CLINIC | Age: 54
End: 2019-02-13

## 2019-02-28 ENCOUNTER — APPOINTMENT (OUTPATIENT)
Dept: RHEUMATOLOGY | Facility: CLINIC | Age: 54
End: 2019-02-28
Payer: MEDICARE

## 2019-02-28 ENCOUNTER — MESSAGE (OUTPATIENT)
Age: 54
End: 2019-02-28

## 2019-02-28 VITALS
HEART RATE: 100 BPM | OXYGEN SATURATION: 94 % | SYSTOLIC BLOOD PRESSURE: 145 MMHG | RESPIRATION RATE: 17 BRPM | HEIGHT: 62 IN | TEMPERATURE: 98.3 F | DIASTOLIC BLOOD PRESSURE: 92 MMHG | WEIGHT: 293 LBS | BODY MASS INDEX: 53.92 KG/M2

## 2019-02-28 PROCEDURE — 99213 OFFICE O/P EST LOW 20 MIN: CPT | Mod: 25

## 2019-02-28 PROCEDURE — 20610 DRAIN/INJ JOINT/BURSA W/O US: CPT | Mod: RT

## 2019-02-28 RX ORDER — METHYLPRED ACET/NACL,ISO-OS/PF 40 MG/ML
40 VIAL (ML) INJECTION
Qty: 1 | Refills: 0 | Status: COMPLETED | OUTPATIENT
Start: 2019-02-28

## 2019-02-28 RX ORDER — LIDOCAINE HYDROCHLORIDE 10 MG/ML
1 INJECTION, SOLUTION INFILTRATION; PERINEURAL
Qty: 0 | Refills: 0 | Status: COMPLETED | OUTPATIENT
Start: 2019-02-28

## 2019-02-28 RX ADMIN — LIDOCAINE HYDROCHLORIDE 0 %: 10 INJECTION, SOLUTION EPIDURAL; INFILTRATION; INTRACAUDAL; PERINEURAL at 00:00

## 2019-02-28 RX ADMIN — METHYLPREDNISOLONE ACETATE 0 MG/ML: 40 INJECTION, SUSPENSION INTRA-ARTICULAR; INTRALESIONAL; INTRAMUSCULAR; SOFT TISSUE at 00:00

## 2019-02-28 NOTE — PROCEDURE
[Today's Date:] : Date: [unfilled] [Patient] : the patient [Risks] : risks [Benefits] : benefits [Therapeutic] : therapeutic [#1 Site: ______] : #1 site identified in the [unfilled] [Betadine] : betadine solution [25 gauge 1.5 inch] : A 25 gauge 1.5 inch needle was used [___ml 1% Lidocaine] : [unfilled] ml of 1% lidocaine [Depomedrol ___ mg] : Depomedrol [unfilled] mg [Tolerated Well] : the patient tolerated the procedure well [No Complications] : there were no complications

## 2019-03-03 ENCOUNTER — TRANSCRIPTION ENCOUNTER (OUTPATIENT)
Age: 54
End: 2019-03-03

## 2019-03-03 NOTE — HISTORY OF PRESENT ILLNESS
[FreeTextEntry1] : Patient reports difficulty in standing in stand up MR secondary to body habitus; Patient reports worsening pain of the right shoulder despite physical therapy exercises performed at home. She reports continued difficulty of washing hair and is using contralateral shoulder to help raise the right arm to perform daily activities.  She finds stiffness continues in L knee with residual improvement by 30% after IAC  in early 2018.  She continues to use the assistive device of a cane.  She denies accompanying joint swelling.  \par \par She continues with paresthesias of the lower extremity with accompanied lower back pain, she c/w gabapentin at 700mg daily.   She finds Gabapentin helps alleviate the nerve pain to a certain extent, though c/w burning LE discomfort. She continues to find it difficult to achieve restorative sleep.\par She otherwise denies inflammatory or systemic symptoms.

## 2019-03-03 NOTE — ASSESSMENT
[FreeTextEntry1] : Patient with Fibromyalgia with persistent right subacromial bursitis:\par Patient to have US evaluation to assess for RTC pathology as MR at this time is not an option. IAC given to the bursa for pain relief. Physical therapy to continue with mobility and muscle strengthening of the rotator cuff.  Core strengthening exercises demonstrated for the lower back and Amitriptyline given for continued nerve pain control as well as for sleep. Gabapentin renewed.  \par Quadriceps strengthening exercises encouraged in the office.  Weight loss has been encouraged to reduce load over the medial joint line.  Viscosupplementation has been encouraged to provide additional lubrication and joint support.  In the interim, Diclofenac gel has been encouraged.\par The importance of dietary and lifestyle modifications was reiterated for the treatment of Fibromyalgia along with discussions on relaxation, stress-reducing techniques and importance of good sleep hygiene. She is in agreement with the above plan and will return in three months' time.\par \par \par

## 2019-03-03 NOTE — PHYSICAL EXAM
[General Appearance - Alert] : alert [General Appearance - In No Acute Distress] : in no acute distress [Sclera] : the sclera and conjunctiva were normal [Examination Of The Oral Cavity] : the lips and gums were normal [Oropharynx] : the oropharynx was normal [Neck Appearance] : the appearance of the neck was normal [Exaggerated Use Of Accessory Muscles For Inspiration] : no accessory muscle use [Heart Rate And Rhythm] : heart rate was normal and rhythm regular [Edema] : there was no peripheral edema [Abdomen Soft] : soft [No Spinal Tenderness] : no spinal tenderness [] : no rash [Motor Exam] : the motor exam was normal [Oriented To Time, Place, And Person] : oriented to person, place, and time [Impaired Insight] : insight and judgment were intact [Skin Lesions] : no skin lesions [FreeTextEntry1] : No active synovitis with restriction of shoulder abduction to 90', passive 90' with pain upon internal rotation R>L,  tender upon neck rotation b/l with point tenderness over the right trapezius.  Moderate tenderness over the left medial joint line with minimal swelling.  Right knee without tenderness, patellofemoral squeeze negative b/l with mild pes anserine bursa tenderness, appropriate external rotation of the hips.

## 2019-03-03 NOTE — REVIEW OF SYSTEMS
[Abdominal Pain] : abdominal pain [Arthralgias] : arthralgias [Joint Stiffness] : joint stiffness [Skin Lesions] : skin lesion [Difficulty Walking] : difficulty walking [Muscle Weakness] : muscle weakness [Fever] : no fever [Chills] : no chills [Eye Pain] : no eye pain [Nasal Discharge] : no nasal discharge [Sore Throat] : no sore throat [Hoarseness] : no hoarseness [Chest Pain] : no chest pain [Palpitations] : no palpitations [Shortness Of Breath] : no shortness of breath [SOB on Exertion] : no shortness of breath during exertion [Vomiting] : no vomiting [Diarrhea] : no diarrhea [Melena] : no melena [de-identified] : Intermittent low mood

## 2019-03-13 LAB
ALBUMIN SERPL ELPH-MCNC: 4 G/DL
ALP BLD-CCNC: 124 U/L
ALT SERPL-CCNC: 61 U/L
ANION GAP SERPL CALC-SCNC: 14 MMOL/L
AST SERPL-CCNC: 51 U/L
BASOPHILS # BLD AUTO: 0.04 K/UL
BASOPHILS NFR BLD AUTO: 0.4 %
BILIRUB SERPL-MCNC: 0.2 MG/DL
BUN SERPL-MCNC: 15 MG/DL
CALCIUM SERPL-MCNC: 10.1 MG/DL
CHLORIDE SERPL-SCNC: 98 MMOL/L
CHOLEST SERPL-MCNC: 174 MG/DL
CHOLEST/HDLC SERPL: 3.4 RATIO
CO2 SERPL-SCNC: 29 MMOL/L
CREAT SERPL-MCNC: 0.71 MG/DL
CRP SERPL-MCNC: 1.94 MG/DL
EOSINOPHIL # BLD AUTO: 0.22 K/UL
EOSINOPHIL NFR BLD AUTO: 2.4 %
ERYTHROCYTE [SEDIMENTATION RATE] IN BLOOD BY WESTERGREN METHOD: 93 MM/HR
GGT SERPL-CCNC: 70 U/L
GLUCOSE SERPL-MCNC: 225 MG/DL
HBA1C MFR BLD HPLC: 8.6 %
HCT VFR BLD CALC: 43.7 %
HDLC SERPL-MCNC: 51 MG/DL
HGB BLD-MCNC: 12.6 G/DL
IMM GRANULOCYTES NFR BLD AUTO: 0.5 %
LDLC SERPL CALC-MCNC: 97 MG/DL
LYMPHOCYTES # BLD AUTO: 2.36 K/UL
LYMPHOCYTES NFR BLD AUTO: 25.7 %
MAN DIFF?: NORMAL
MCHC RBC-ENTMCNC: 24 PG
MCHC RBC-ENTMCNC: 28.8 GM/DL
MCV RBC AUTO: 83.1 FL
MONOCYTES # BLD AUTO: 0.57 K/UL
MONOCYTES NFR BLD AUTO: 6.2 %
NEUTROPHILS # BLD AUTO: 5.93 K/UL
NEUTROPHILS NFR BLD AUTO: 64.8 %
PLATELET # BLD AUTO: 353 K/UL
POTASSIUM SERPL-SCNC: 4.5 MMOL/L
PROT SERPL-MCNC: 7.2 G/DL
RBC # BLD: 5.26 M/UL
RBC # FLD: 14.7 %
SODIUM SERPL-SCNC: 141 MMOL/L
TRIGL SERPL-MCNC: 129 MG/DL
WBC # FLD AUTO: 9.17 K/UL

## 2019-03-20 ENCOUNTER — APPOINTMENT (OUTPATIENT)
Dept: UROGYNECOLOGY | Facility: CLINIC | Age: 54
End: 2019-03-20

## 2019-03-25 ENCOUNTER — LABORATORY RESULT (OUTPATIENT)
Age: 54
End: 2019-03-25

## 2019-03-26 ENCOUNTER — APPOINTMENT (OUTPATIENT)
Dept: DERMATOLOGY | Facility: CLINIC | Age: 54
End: 2019-03-26
Payer: MEDICARE

## 2019-03-26 ENCOUNTER — LABORATORY RESULT (OUTPATIENT)
Age: 54
End: 2019-03-26

## 2019-03-26 VITALS
HEART RATE: 105 BPM | BODY MASS INDEX: 53.92 KG/M2 | HEIGHT: 62 IN | DIASTOLIC BLOOD PRESSURE: 75 MMHG | WEIGHT: 293 LBS | SYSTOLIC BLOOD PRESSURE: 116 MMHG

## 2019-03-26 DIAGNOSIS — L30.9 DERMATITIS, UNSPECIFIED: ICD-10-CM

## 2019-03-26 PROCEDURE — 99214 OFFICE O/P EST MOD 30 MIN: CPT | Mod: 25

## 2019-03-26 PROCEDURE — 11104 PUNCH BX SKIN SINGLE LESION: CPT

## 2019-03-26 PROCEDURE — 11105 PUNCH BX SKIN EA SEP/ADDL: CPT | Mod: 59

## 2019-03-27 ENCOUNTER — RX CHANGE (OUTPATIENT)
Age: 54
End: 2019-03-27

## 2019-03-27 LAB
ANA PAT FLD IF-IMP: NORMAL
ANA SER IF-ACNC: ABNORMAL
APPEARANCE: CLEAR
ASO AB SER LA-ACNC: 41 IU/ML
BACTERIA: NEGATIVE
BILIRUBIN URINE: NEGATIVE
BLOOD URINE: NEGATIVE
COLOR: YELLOW
GLUCOSE QUALITATIVE U: NEGATIVE
HIV1+2 AB SPEC QL IA.RAPID: NONREACTIVE
HYALINE CASTS: 0 /LPF
KETONES URINE: NEGATIVE
LEUKOCYTE ESTERASE URINE: NEGATIVE
MICROSCOPIC-UA: NORMAL
MPO AB + PR3 PNL SER: NORMAL
NITRITE URINE: NEGATIVE
PH URINE: 6.5
PROTEIN URINE: ABNORMAL
RED BLOOD CELLS URINE: 6 /HPF
RHEUMATOID FACT SER QL: <10 IU/ML
SPECIFIC GRAVITY URINE: 1.04
SQUAMOUS EPITHELIAL CELLS: 8 /HPF
UROBILINOGEN URINE: NORMAL
WHITE BLOOD CELLS URINE: 2 /HPF

## 2019-03-28 LAB — STREP DNASE B TITR SER: < 95 U/ML

## 2019-04-01 LAB
M TB IFN-G BLD-IMP: NEGATIVE
QUANTIFERON TB PLUS MITOGEN MINUS NIL: 7.18 IU/ML
QUANTIFERON TB PLUS NIL: 0.02 IU/ML
QUANTIFERON TB PLUS TB1 MINUS NIL: 0.01 IU/ML
QUANTIFERON TB PLUS TB2 MINUS NIL: 0 IU/ML

## 2019-04-09 ENCOUNTER — APPOINTMENT (OUTPATIENT)
Dept: DERMATOLOGY | Facility: CLINIC | Age: 54
End: 2019-04-09
Payer: MEDICARE

## 2019-04-09 VITALS
WEIGHT: 293 LBS | DIASTOLIC BLOOD PRESSURE: 70 MMHG | HEIGHT: 62 IN | TEMPERATURE: 98.8 F | SYSTOLIC BLOOD PRESSURE: 104 MMHG | BODY MASS INDEX: 53.92 KG/M2 | HEART RATE: 111 BPM

## 2019-04-09 PROCEDURE — 99214 OFFICE O/P EST MOD 30 MIN: CPT

## 2019-04-11 LAB
ALBUMIN SERPL ELPH-MCNC: 4.2 G/DL
ALP BLD-CCNC: 109 U/L
ALT SERPL-CCNC: 61 U/L
ANCA AB SER-IMP: ABNORMAL
ANION GAP SERPL CALC-SCNC: 13 MMOL/L
APPEARANCE: CLEAR
AST SERPL-CCNC: 58 U/L
BACTERIA: NEGATIVE
BASOPHILS # BLD AUTO: 0.04 K/UL
BASOPHILS NFR BLD AUTO: 0.5 %
BILIRUB SERPL-MCNC: 0.4 MG/DL
BILIRUBIN URINE: NEGATIVE
BLOOD URINE: NEGATIVE
BUN SERPL-MCNC: 16 MG/DL
C-ANCA SER-ACNC: NEGATIVE
CALCIUM SERPL-MCNC: 9.8 MG/DL
CHLORIDE SERPL-SCNC: 99 MMOL/L
CO2 SERPL-SCNC: 29 MMOL/L
COLOR: YELLOW
CREAT SERPL-MCNC: 0.65 MG/DL
EOSINOPHIL # BLD AUTO: 0.23 K/UL
EOSINOPHIL NFR BLD AUTO: 2.6 %
GLUCOSE QUALITATIVE U: NEGATIVE
GLUCOSE SERPL-MCNC: 142 MG/DL
HCT VFR BLD CALC: 44.9 %
HGB BLD-MCNC: 13 G/DL
HYALINE CASTS: 3 /LPF
IMM GRANULOCYTES NFR BLD AUTO: 0.6 %
KETONES URINE: NEGATIVE
LEUKOCYTE ESTERASE URINE: NEGATIVE
LYMPHOCYTES # BLD AUTO: 2.1 K/UL
LYMPHOCYTES NFR BLD AUTO: 23.8 %
MAN DIFF?: NORMAL
MCHC RBC-ENTMCNC: 24.4 PG
MCHC RBC-ENTMCNC: 29 GM/DL
MCV RBC AUTO: 84.4 FL
MICROSCOPIC-UA: NORMAL
MONOCYTES # BLD AUTO: 0.51 K/UL
MONOCYTES NFR BLD AUTO: 5.8 %
NEUTROPHILS # BLD AUTO: 5.91 K/UL
NEUTROPHILS NFR BLD AUTO: 66.7 %
NITRITE URINE: NEGATIVE
P-ANCA TITR SER IF: NEGATIVE
PH URINE: 6
PLATELET # BLD AUTO: 382 K/UL
POTASSIUM SERPL-SCNC: 4.4 MMOL/L
PROT SERPL-MCNC: 7.5 G/DL
PROTEIN URINE: ABNORMAL
RBC # BLD: 5.32 M/UL
RBC # FLD: 16.2 %
RED BLOOD CELLS URINE: 6 /HPF
SODIUM SERPL-SCNC: 141 MMOL/L
SPECIFIC GRAVITY URINE: 1.03
SQUAMOUS EPITHELIAL CELLS: 9 /HPF
UROBILINOGEN URINE: NORMAL
WBC # FLD AUTO: 8.84 K/UL
WHITE BLOOD CELLS URINE: 2 /HPF

## 2019-04-22 ENCOUNTER — TRANSCRIPTION ENCOUNTER (OUTPATIENT)
Age: 54
End: 2019-04-22

## 2019-04-24 ENCOUNTER — MEDICATION RENEWAL (OUTPATIENT)
Age: 54
End: 2019-04-24

## 2019-05-09 ENCOUNTER — APPOINTMENT (OUTPATIENT)
Dept: NEPHROLOGY | Facility: CLINIC | Age: 54
End: 2019-05-09
Payer: MEDICARE

## 2019-05-09 VITALS
WEIGHT: 291.01 LBS | SYSTOLIC BLOOD PRESSURE: 122 MMHG | OXYGEN SATURATION: 95 % | DIASTOLIC BLOOD PRESSURE: 76 MMHG | BODY MASS INDEX: 53.55 KG/M2 | HEIGHT: 62 IN | HEART RATE: 102 BPM

## 2019-05-09 PROCEDURE — 99204 OFFICE O/P NEW MOD 45 MIN: CPT

## 2019-05-10 NOTE — ASSESSMENT
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 less likely to be crescentic/ proliferative. she also has other reasons to have proteinuria including obesity and DM Hematuria work up is negative as above. At this point, she is on an ACEI, her bp is well controlled. there is no pressing need for a kidney biopsy as it will not . I discussed this with her and we agreed to monitor her urinalysis/ bmp every 3-4 months and we could re-evaluate the need for a kidney biopsy if something were to change. \par \par htn- well controlled.

## 2019-05-10 NOTE — PHYSICAL EXAM
[General Appearance - In No Acute Distress] : in no acute distress [FreeTextEntry1] : obese  [General Appearance - Alert] : alert [Outer Ear] : the ears and nose were normal in appearance [Hearing Threshold Finger Rub Not Beadle] : hearing was normal [Sclera] : the sclera and conjunctiva were normal [Oropharynx] : the oropharynx was normal [Examination Of The Oral Cavity] : the lips and gums were normal [Neck Appearance] : the appearance of the neck was normal [Neck Cervical Mass (___cm)] : no neck mass was observed [Jugular Venous Distention Increased] : there was no jugular-venous distention [Thyroid Diffuse Enlargement] : the thyroid was not enlarged [Thyroid Nodule] : there were no palpable thyroid nodules [Exaggerated Use Of Accessory Muscles For Inspiration] : no accessory muscle use [Auscultation Breath Sounds / Voice Sounds] : lungs were clear to auscultation bilaterally [Respiration, Rhythm And Depth] : normal respiratory rhythm and effort [Heart Rate And Rhythm] : heart rate was normal and rhythm regular [Heart Sounds] : normal S1 and S2 [Arterial Pulses Carotid] : carotid pulses were normal with no bruits [Veins - Varicosity Changes] : there were no varicosital changes [Edema] : there was no peripheral edema [Bowel Sounds] : normal bowel sounds [Abdomen Soft] : soft [Abdomen Tenderness] : non-tender [Abnormal Walk] : normal gait [No CVA Tenderness] : no ~M costovertebral angle tenderness [No Spinal Tenderness] : no spinal tenderness [Motor Tone] : muscle strength and tone were normal [Musculoskeletal - Swelling] : no joint swelling seen [Nail Clubbing] : no clubbing  or cyanosis of the fingernails [Skin Color & Pigmentation] : normal skin color and pigmentation [] : no rash [Skin Turgor] : normal skin turgor [Motor Exam] : the motor exam was normal [Sensation] : the sensory exam was normal to light touch and pinprick [Cranial Nerves] : cranial nerves 2-12 were intact [Oriented To Time, Place, And Person] : oriented to person, place, and time [Impaired Insight] : insight and judgment were intact [Affect] : the affect was normal [Mood] : the mood was normal

## 2019-05-10 NOTE — HISTORY OF PRESENT ILLNESS
[FreeTextEntry1] : referred for hematuria and proteinuria \par \par 52 yo lady with DM, Obesity, HTN who initially presented to Dermatology for a skin rash in march 2019, that was diagnosed to be leucocytoclastic vasculitis. Ig A was positive on skin biopsy. a urinalysis showed hematuria and proteinuria and hence she was referred to me. \par \par she states she has never had blood in urine or bubbly urine. no roberto blood in urine/ coca colored urine. \par \par looking back at her previous urinalyses - she has had trace proteinuria since 2016. more recently in feb-march 2019 > the urine dipstick showed protein- 30 and then 100. she has had intermittent microscopic hematuria ( last one prior to this one was in july 2018). \par \par last labs on 4/9- urinalysis - 6 RBCs/hpf, protein- 100, urine protein/creat ratio - 0.4 gm/gm of creat\par creat 0.65 mg/dl, ANCA negative. ASLO negative, RF negative, DIONISIO - 1:320, Anti DNAse b negative. \par \par ros \par gu - no blood in urine, no foamy urine \par cvs- no chest pain, no shortness of breath \par gi- no abdominal pain, no diarrhea \par all other systems reviewed in detail and were negative except as above \par \par family H - none of kidney disease \par \par social H - non smoker, no alcohol, no drug use \par \par \par

## 2019-05-14 ENCOUNTER — APPOINTMENT (OUTPATIENT)
Dept: ENDOCRINOLOGY | Facility: CLINIC | Age: 54
End: 2019-05-14
Payer: MEDICARE

## 2019-05-14 VITALS
RESPIRATION RATE: 17 BRPM | HEIGHT: 62 IN | HEART RATE: 104 BPM | SYSTOLIC BLOOD PRESSURE: 147 MMHG | TEMPERATURE: 98.1 F | BODY MASS INDEX: 53.92 KG/M2 | DIASTOLIC BLOOD PRESSURE: 89 MMHG | WEIGHT: 293 LBS | OXYGEN SATURATION: 92 %

## 2019-05-14 LAB — GLUCOSE BLDC GLUCOMTR-MCNC: 226

## 2019-05-14 PROCEDURE — 99204 OFFICE O/P NEW MOD 45 MIN: CPT | Mod: 25

## 2019-05-14 PROCEDURE — 82962 GLUCOSE BLOOD TEST: CPT

## 2019-05-14 NOTE — HISTORY OF PRESENT ILLNESS
[FreeTextEntry1] : The patient comes to the office with a history of type 2 diabetes for the past 2014 years. Recently her blood sugars have been elevated usually over 120 mg/dl to 140 mg/dl. They are elevated all day long. She has not been following the diet properly or exercising. She has gained weight. Denies low blood glucose during the night. The blood glucose improves when she follows the diet and exercises. She is taking the medications regularly. Patient denies polydipsia, polyuria, chest pain, SOB or leg edema. She denies history of retinopathy, kidney problems. She has history of numbness, tingling sensation on her extremities. She has hyperlipidemia, has history of hypertension, denies history of CHD,  denies history of CVA.  She has not seen the Ophthalmologist recently, she has not seen the Podiatrist recently. She has not seen the Cardiologist recently. She says she has abdominal pain in the left side for several months.\par

## 2019-05-14 NOTE — PHYSICAL EXAM
[Alert] : alert [Normal Sclera/Conjunctiva] : normal sclera/conjunctiva [No Acute Distress] : no acute distress [Normal Hearing] : hearing was normal [PERRL] : pupils equal, round and reactive to light [Normal Outer Ear/Nose] : the ears and nose were normal in appearance [No Neck Mass] : no neck mass was observed [Thyroid Not Enlarged] : the thyroid was not enlarged [No Respiratory Distress] : no respiratory distress [Normal Rate] : heart rate was normal  [Normal PMI] : the apical impulse was normal [Normal Rate and Effort] : normal respiratory rhythm and effort [Carotids Normal] : carotid pulses were normal with no bruits [Pedal Pulses Normal] : the pedal pulses are present [No Rash] : no rash [No Skin Lesions] : no skin lesions [No Sensory Deficits] : the sensory exam was normal to light touch and pinprick [No Motor Deficits] : the motor exam was normal [Normal Sensation on Monofilament Testing] : normal sensation on monofilament testing of lower extremities [de-identified] : Obese with central obesity

## 2019-05-14 NOTE — REASON FOR VISIT
[Initial Evaluation] : an initial evaluation [FreeTextEntry1] : diabetes, hypothyroidism, s/p adrenalectomy

## 2019-05-14 NOTE — ASSESSMENT
[FreeTextEntry1] : Patient has poorly controlled diabetes\par She is obese\par She has history of Cushing's syndrome\par s/p Adrenalectomy\par Will do a work up for diabetes

## 2019-05-16 ENCOUNTER — NON-APPOINTMENT (OUTPATIENT)
Age: 54
End: 2019-05-16

## 2019-05-16 ENCOUNTER — APPOINTMENT (OUTPATIENT)
Dept: INTERNAL MEDICINE | Facility: CLINIC | Age: 54
End: 2019-05-16
Payer: MEDICARE

## 2019-05-16 ENCOUNTER — APPOINTMENT (OUTPATIENT)
Dept: RHEUMATOLOGY | Facility: CLINIC | Age: 54
End: 2019-05-16
Payer: MEDICARE

## 2019-05-16 VITALS
HEART RATE: 123 BPM | SYSTOLIC BLOOD PRESSURE: 147 MMHG | BODY MASS INDEX: 53.92 KG/M2 | OXYGEN SATURATION: 96 % | HEIGHT: 62 IN | WEIGHT: 293 LBS | TEMPERATURE: 98 F | RESPIRATION RATE: 17 BRPM | DIASTOLIC BLOOD PRESSURE: 89 MMHG

## 2019-05-16 VITALS
HEART RATE: 123 BPM | WEIGHT: 293 LBS | SYSTOLIC BLOOD PRESSURE: 140 MMHG | TEMPERATURE: 98 F | OXYGEN SATURATION: 96 % | BODY MASS INDEX: 53.92 KG/M2 | RESPIRATION RATE: 17 BRPM | HEIGHT: 62 IN | DIASTOLIC BLOOD PRESSURE: 80 MMHG

## 2019-05-16 DIAGNOSIS — M51.26 OTHER INTERVERTEBRAL DISC DISPLACEMENT, LUMBAR REGION: ICD-10-CM

## 2019-05-16 PROCEDURE — 93000 ELECTROCARDIOGRAM COMPLETE: CPT

## 2019-05-16 PROCEDURE — 99214 OFFICE O/P EST MOD 30 MIN: CPT

## 2019-05-16 PROCEDURE — 99214 OFFICE O/P EST MOD 30 MIN: CPT | Mod: 25

## 2019-05-16 RX ORDER — LANCETS
EACH MISCELLANEOUS
Qty: 3 | Refills: 3 | Status: DISCONTINUED | COMMUNITY
Start: 2019-04-26 | End: 2019-05-16

## 2019-05-16 NOTE — HISTORY OF PRESENT ILLNESS
[de-identified] : 53 year old  female patient with history of stable Hypertension, Hypothyroidism, Type 2 Diabetes Mellitus, Reactive Airway Disease, Morbid Obesity, history as stated, presented for follow up examination. Patient is compliant with all medications. Denies shortness of breath, chest pain or abdominal pains at this time. ROS as stated.\par

## 2019-05-16 NOTE — ASSESSMENT
[FreeTextEntry1] : 53 year old female found to have stable Hypertension, Hypothyroidism, Type 2 Diabetes Mellitus, Reactive Airway Disease, Morbid Obesity, with the current regimen, diet and life style modifications, as counseled. Prior results reviewed and discussed with the patient during today's examination. Plan as ordered.\par EKG showed Sinus Tachycardia at the rate of 115 BPM, non-sp ST-T changes noted.\par

## 2019-05-16 NOTE — HEALTH RISK ASSESSMENT
[Intercurrent ED visits] : went to ED [No falls in past year] : Patient reported no falls in the past year [0] : 2) Feeling down, depressed, or hopeless: Not at all (0) [] : No [[id number]] : 0 [de-identified] : ENDO/NEPH/DERM [de-identified] : 11/18

## 2019-05-18 LAB
ALBUMIN SERPL ELPH-MCNC: 4.3 G/DL
ALP BLD-CCNC: 116 U/L
ALT SERPL-CCNC: 56 U/L
ANION GAP SERPL CALC-SCNC: 23 MMOL/L
AST SERPL-CCNC: 47 U/L
BILIRUB DIRECT SERPL-MCNC: 0.1 MG/DL
BILIRUB INDIRECT SERPL-MCNC: 0.2 MG/DL
BILIRUB SERPL-MCNC: 0.4 MG/DL
BUN SERPL-MCNC: 13 MG/DL
C PEPTIDE SERPL-MCNC: 3.1 NG/ML
CALCIUM SERPL-MCNC: 10.1 MG/DL
CHLORIDE SERPL-SCNC: 95 MMOL/L
CHOLEST SERPL-MCNC: 212 MG/DL
CHOLEST/HDLC SERPL: 5.2 RATIO
CK SERPL-CCNC: 230 U/L
CO2 SERPL-SCNC: 27 MMOL/L
CORTIS SERPL-MCNC: 18.3 UG/DL
CREAT SERPL-MCNC: 0.64 MG/DL
CREAT SPEC-SCNC: 53 MG/DL
ESTIMATED AVERAGE GLUCOSE: 180 MG/DL
FRUCTOSAMINE SERPL-MCNC: 225 UMOL/L
GLUCOSE BS SERPL-MCNC: 176 MG/DL
GLUCOSE SERPL-MCNC: 121 MG/DL
HBA1C MFR BLD HPLC: 7.9 %
HDLC SERPL-MCNC: 41 MG/DL
IGF-1 INTERP: NORMAL
IGF-I BLD-MCNC: 71 NG/ML
LDLC SERPL CALC-MCNC: 142 MG/DL
MICROALBUMIN 24H UR DL<=1MG/L-MCNC: 4.6 MG/DL
MICROALBUMIN/CREAT 24H UR-RTO: 86 MG/G
POTASSIUM SERPL-SCNC: 4.6 MMOL/L
PROLACTIN SERPL-MCNC: 15.9 NG/ML
PROT SERPL-MCNC: 7.5 G/DL
SODIUM SERPL-SCNC: 145 MMOL/L
TRIGL SERPL-MCNC: 143 MG/DL

## 2019-05-19 NOTE — REVIEW OF SYSTEMS
[Abdominal Pain] : abdominal pain [Joint Stiffness] : joint stiffness [Arthralgias] : arthralgias [Skin Lesions] : skin lesion [Muscle Weakness] : muscle weakness [Difficulty Walking] : difficulty walking [Fever] : no fever [Chills] : no chills [Eye Pain] : no eye pain [Nasal Discharge] : no nasal discharge [Sore Throat] : no sore throat [Hoarseness] : no hoarseness [Chest Pain] : no chest pain [Palpitations] : no palpitations [Shortness Of Breath] : no shortness of breath [SOB on Exertion] : no shortness of breath during exertion [Vomiting] : no vomiting [Diarrhea] : no diarrhea [Melena] : no melena [de-identified] : Intermittent low mood

## 2019-05-19 NOTE — HISTORY OF PRESENT ILLNESS
[FreeTextEntry1] : Patient explains recent IgA vasculitis diagnosis after developing diffuse skin rash in the setting of fever, congestion and diarrhea in April.   Patient with accompanying proteinuria as well.  She reports since applying cortisone Rx, rash has cleared from the UE and other than mild pruritus over LE rash, she denies continued systemic symptoms or new disturbances.  \par Patient reports R shoulder injection providing 60-70% relief in mobility and stiffness during last encounter in February .   Patient finds stiffness continues in L knee with residual improvement by 40% after IAC  in early 2018.  She continues to use the assistive device of a cane.  She denies accompanying joint swelling.  \par She reports paresthesias of the lower extremity with accompanied lower back pain, she c/w gabapentin at 700mg daily.   She finds Gabapentin helps alleviate the nerve pain to a certain extent, though c/w burning LE discomfort. \par She otherwise denies inflammatory or systemic symptoms.

## 2019-05-19 NOTE — PHYSICAL EXAM
[General Appearance - In No Acute Distress] : in no acute distress [General Appearance - Alert] : alert [Examination Of The Oral Cavity] : the lips and gums were normal [Sclera] : the sclera and conjunctiva were normal [Neck Appearance] : the appearance of the neck was normal [Oropharynx] : the oropharynx was normal [Heart Rate And Rhythm] : heart rate was normal and rhythm regular [Exaggerated Use Of Accessory Muscles For Inspiration] : no accessory muscle use [] : no respiratory distress [Edema] : there was no peripheral edema [Abdomen Soft] : soft [No Spinal Tenderness] : no spinal tenderness [Motor Exam] : the motor exam was normal [Oriented To Time, Place, And Person] : oriented to person, place, and time [Impaired Insight] : insight and judgment were intact [FreeTextEntry1] : erythematous papular rash over LE

## 2019-05-19 NOTE — ASSESSMENT
[FreeTextEntry1] : Patient with Fibromyalgia with improved right subacromial bursitis; recent eruption of skin after viral infection: IgA vasculitis:\par Would follow proteinuria in the setting of recent vasculitis insult in patient with metabolic syndrome. Urine and labs ordered today.  If with continued rise, would initiate PO steroid therapy while controlling sugar level judiciously with insulin.  Rash appears to be improving as UE lesions have resolved.  \par Physical therapy to continue with mobility and muscle strengthening of the rotator cuff.  Core strengthening exercises demonstrated for the lower back and Amitriptyline given for continued nerve pain control as well as for sleep. Gabapentin renewed.  \par Quadriceps strengthening exercises encouraged in the office.  Weight loss has been encouraged to reduce load over the medial joint line.  Viscosupplementation has been encouraged to provide additional lubrication and joint support.  In the interim, Diclofenac gel has been encouraged.\par The importance of dietary and lifestyle modifications was reiterated for the treatment of Fibromyalgia along with discussions on relaxation, stress-reducing techniques and importance of good sleep hygiene.\par \par She is in agreement with the above plan and will return in one months' time.\par \par \par

## 2019-06-03 ENCOUNTER — LABORATORY RESULT (OUTPATIENT)
Age: 54
End: 2019-06-03

## 2019-06-03 LAB
BASOPHILS # BLD AUTO: 0.05 K/UL
BASOPHILS NFR BLD AUTO: 0.5 %
EOSINOPHIL # BLD AUTO: 0.26 K/UL
EOSINOPHIL NFR BLD AUTO: 2.7 %
HCT VFR BLD CALC: 41.6 %
HGB BLD-MCNC: 12.3 G/DL
IMM GRANULOCYTES NFR BLD AUTO: 0.6 %
LYMPHOCYTES # BLD AUTO: 1.91 K/UL
LYMPHOCYTES NFR BLD AUTO: 20.1 %
MAN DIFF?: NORMAL
MCHC RBC-ENTMCNC: 24.2 PG
MCHC RBC-ENTMCNC: 29.6 GM/DL
MCV RBC AUTO: 81.9 FL
MONOCYTES # BLD AUTO: 0.59 K/UL
MONOCYTES NFR BLD AUTO: 6.2 %
NEUTROPHILS # BLD AUTO: 6.64 K/UL
NEUTROPHILS NFR BLD AUTO: 69.9 %
PLATELET # BLD AUTO: 345 K/UL
RBC # BLD: 5.08 M/UL
RBC # FLD: 16 %
WBC # FLD AUTO: 9.51 K/UL

## 2019-06-04 LAB
ALBUMIN SERPL ELPH-MCNC: 4 G/DL
ALP BLD-CCNC: 114 U/L
ALT SERPL-CCNC: 47 U/L
ANION GAP SERPL CALC-SCNC: 15 MMOL/L
APPEARANCE: CLEAR
AST SERPL-CCNC: 43 U/L
BILIRUB SERPL-MCNC: 0.3 MG/DL
BILIRUBIN URINE: ABNORMAL
BLOOD URINE: ABNORMAL
BUN SERPL-MCNC: 15 MG/DL
CALCIUM SERPL-MCNC: 9.8 MG/DL
CHLORIDE SERPL-SCNC: 97 MMOL/L
CO2 SERPL-SCNC: 29 MMOL/L
COLOR: YELLOW
CREAT SERPL-MCNC: 0.72 MG/DL
CREAT SPEC-SCNC: 304 MG/DL
CREAT/PROT UR: 0.4 RATIO
CRP SERPL-MCNC: 2.81 MG/DL
ERYTHROCYTE [SEDIMENTATION RATE] IN BLOOD BY WESTERGREN METHOD: 94 MM/HR
GLUCOSE QUALITATIVE U: NEGATIVE
GLUCOSE SERPL-MCNC: 196 MG/DL
KETONES URINE: NEGATIVE
LEUKOCYTE ESTERASE URINE: NEGATIVE
NITRITE URINE: NEGATIVE
PH URINE: 6
POTASSIUM SERPL-SCNC: 4.9 MMOL/L
PROT SERPL-MCNC: 7.1 G/DL
PROT UR-MCNC: 128 MG/DL
PROTEIN URINE: ABNORMAL
SODIUM SERPL-SCNC: 141 MMOL/L
SPECIFIC GRAVITY URINE: 1.03
UROBILINOGEN URINE: ABNORMAL

## 2019-06-11 ENCOUNTER — APPOINTMENT (OUTPATIENT)
Dept: RHEUMATOLOGY | Facility: CLINIC | Age: 54
End: 2019-06-11
Payer: MEDICARE

## 2019-06-11 VITALS
TEMPERATURE: 98.6 F | DIASTOLIC BLOOD PRESSURE: 91 MMHG | OXYGEN SATURATION: 95 % | RESPIRATION RATE: 17 BRPM | SYSTOLIC BLOOD PRESSURE: 134 MMHG | HEIGHT: 62 IN | WEIGHT: 293 LBS | HEART RATE: 94 BPM | BODY MASS INDEX: 53.92 KG/M2

## 2019-06-11 DIAGNOSIS — R80.9 PROTEINURIA, UNSPECIFIED: ICD-10-CM

## 2019-06-11 PROCEDURE — 99214 OFFICE O/P EST MOD 30 MIN: CPT

## 2019-06-11 NOTE — HISTORY OF PRESENT ILLNESS
[FreeTextEntry1] : Patient with recent hematuria and elevated inflammatory markers , recommended to initiate 20mg Pred therapy to address systemic symptoms.  She finds rash improving over lower extremities, however notes sinus heaviness with accompanied discharge and lethargy.  She reports diffuse arthralgias.  She reports subjective fevers.  She notes chest tightness ongoing and takes Albuterol for mild relief. \par Patient with IgA vasculitis diagnosis after developing diffuse skin rash in the setting of fever, congestion and diarrhea in April.   Patient with accompanying proteinuria as well.   She continues to use the assistive device of a cane.  She denies accompanying joint swelling.  \par She reports paresthesias of the lower extremity with accompanied lower back pain.  She c/w gabapentin at 700mg daily and awaits epidural injection. She finds Gabapentin helps alleviate the nerve pain moderately.\par She otherwise denies abd pain, joint swelling or systemic symptoms.

## 2019-06-11 NOTE — PHYSICAL EXAM
[General Appearance - Alert] : alert [General Appearance - In No Acute Distress] : in no acute distress [Sclera] : the sclera and conjunctiva were normal [Oropharynx] : the oropharynx was normal [Examination Of The Oral Cavity] : the lips and gums were normal [Neck Appearance] : the appearance of the neck was normal [] : the neck was supple [Exaggerated Use Of Accessory Muscles For Inspiration] : no accessory muscle use [Heart Rate And Rhythm] : heart rate was normal and rhythm regular [Edema] : there was no peripheral edema [Abdomen Soft] : soft [No Spinal Tenderness] : no spinal tenderness [Motor Exam] : the motor exam was normal [Oriented To Time, Place, And Person] : oriented to person, place, and time [Impaired Insight] : insight and judgment were intact [FreeTextEntry1] : erythematous papular rash over LE

## 2019-06-11 NOTE — REVIEW OF SYSTEMS
[Abdominal Pain] : abdominal pain [Arthralgias] : arthralgias [Joint Stiffness] : joint stiffness [Skin Lesions] : skin lesion [Difficulty Walking] : difficulty walking [Muscle Weakness] : muscle weakness [Fever] : no fever [Chills] : no chills [Eye Pain] : no eye pain [Nasal Discharge] : no nasal discharge [Sore Throat] : no sore throat [Hoarseness] : no hoarseness [Chest Pain] : no chest pain [Palpitations] : no palpitations [Shortness Of Breath] : no shortness of breath [SOB on Exertion] : no shortness of breath during exertion [Vomiting] : no vomiting [Diarrhea] : no diarrhea [Melena] : no melena [de-identified] : Intermittent low mood

## 2019-06-12 ENCOUNTER — RX RENEWAL (OUTPATIENT)
Age: 54
End: 2019-06-12

## 2019-06-29 LAB
APPEARANCE: CLEAR
BACTERIA: NEGATIVE
BILIRUBIN URINE: NEGATIVE
BLOOD URINE: ABNORMAL
COLOR: YELLOW
CRP SERPL-MCNC: 2.04 MG/DL
ERYTHROCYTE [SEDIMENTATION RATE] IN BLOOD BY WESTERGREN METHOD: 41 MM/HR
GLUCOSE QUALITATIVE U: NEGATIVE
HYALINE CASTS: 8 /LPF
KETONES URINE: NEGATIVE
LEUKOCYTE ESTERASE URINE: NEGATIVE
MICROSCOPIC-UA: NORMAL
NITRITE URINE: NEGATIVE
PH URINE: 7
PROTEIN URINE: ABNORMAL
RED BLOOD CELLS URINE: 15 /HPF
SPECIFIC GRAVITY URINE: 1.02
SQUAMOUS EPITHELIAL CELLS: 3 /HPF
UROBILINOGEN URINE: NORMAL
WHITE BLOOD CELLS URINE: 2 /HPF

## 2019-07-10 ENCOUNTER — APPOINTMENT (OUTPATIENT)
Dept: RHEUMATOLOGY | Facility: CLINIC | Age: 54
End: 2019-07-10
Payer: MEDICARE

## 2019-07-10 VITALS
BODY MASS INDEX: 53.92 KG/M2 | OXYGEN SATURATION: 95 % | SYSTOLIC BLOOD PRESSURE: 118 MMHG | HEIGHT: 62 IN | WEIGHT: 293 LBS | TEMPERATURE: 99.3 F | RESPIRATION RATE: 16 BRPM | DIASTOLIC BLOOD PRESSURE: 82 MMHG | HEART RATE: 118 BPM

## 2019-07-10 PROCEDURE — 99214 OFFICE O/P EST MOD 30 MIN: CPT

## 2019-07-12 NOTE — REVIEW OF SYSTEMS
[Abdominal Pain] : abdominal pain [Skin Lesions] : skin lesion [Arthralgias] : arthralgias [Joint Stiffness] : joint stiffness [Difficulty Walking] : difficulty walking [Muscle Weakness] : muscle weakness [Chills] : no chills [Fever] : no fever [Nasal Discharge] : no nasal discharge [Eye Pain] : no eye pain [Hoarseness] : no hoarseness [Sore Throat] : no sore throat [Shortness Of Breath] : no shortness of breath [Chest Pain] : no chest pain [Palpitations] : no palpitations [SOB on Exertion] : no shortness of breath during exertion [Vomiting] : no vomiting [Diarrhea] : no diarrhea [Melena] : no melena [de-identified] : Intermittent low mood

## 2019-07-12 NOTE — PHYSICAL EXAM
[General Appearance - Alert] : alert [General Appearance - In No Acute Distress] : in no acute distress [Sclera] : the sclera and conjunctiva were normal [Examination Of The Oral Cavity] : the lips and gums were normal [Oropharynx] : the oropharynx was normal [Neck Appearance] : the appearance of the neck was normal [Heart Rate And Rhythm] : heart rate was normal and rhythm regular [Exaggerated Use Of Accessory Muscles For Inspiration] : no accessory muscle use [] : no respiratory distress [Edema] : there was no peripheral edema [Abdomen Soft] : soft [No Spinal Tenderness] : no spinal tenderness [Motor Exam] : the motor exam was normal [Oriented To Time, Place, And Person] : oriented to person, place, and time [Impaired Insight] : insight and judgment were intact [FreeTextEntry1] : erythematous papular rash over LE

## 2019-07-12 NOTE — ASSESSMENT
[FreeTextEntry1] : Patient with Fibromyalgia with recent eruption of skin after viral infection: IgA vasculitis, hematuria:\par Will maintain Pred therapy to 10mg daily as inflammatory markers improving and rec Urology to assess for stones and extrarenal evaluation for hematuria; follow UAs. Goal to taper to 7.5 mg in the setting of DM over the next twoweeks.   If with worsening functioning, rec renal bx to assess IgA nephropathy/ HSP.   \par Patient requires optimization of nebs/anti-histamine regimen.  Repeat PFTs rec as well as f/u for NAKUL.  Pulm referral given.  \par Physical therapy to continue with mobility and muscle strengthening of the rotator cuff.  Core strengthening exercises demonstrated for the lower back and Amitriptyline given for continued nerve pain control as well as for sleep. Gabapentin continued.\par Quadriceps strengthening exercises encouraged in the office.  Weight loss has been encouraged to reduce load over the medial joint line.  Viscosupplementation has been encouraged to provide additional lubrication and joint support.  In the interim, Diclofenac gel has been encouraged.\par The importance of dietary and lifestyle modifications was reiterated for the treatment of Fibromyalgia along with discussions on relaxation, stress-reducing techniques and importance of good sleep hygiene.\par \par She is in agreement with the above plan and will return in one months' time.\par \par \par

## 2019-07-12 NOTE — HISTORY OF PRESENT ILLNESS
[FreeTextEntry1] : Patient with recent hematuria and elevated inflammatory markers , recommended to initiate 20mg Pred therapy to address systemic symptoms.  She finds rash had improved however with the tapering to 10 mg, rash has remained steady.   She reports continued arthralgias; namely centered around the back. She continues with chronic cough since initial sickness in April.    Urine with moderate blood; inflammatory markers reduced. \par Patient with IgA vasculitis diagnosis after developing diffuse skin rash in the setting of fever, congestion and diarrhea in April.   Patient with accompanying proteinuria as well.   She continues to use the assistive device of a cane.  She denies accompanying joint swelling.  \par \par She otherwise denies abd pain, joint swelling or systemic symptoms.

## 2019-07-31 ENCOUNTER — APPOINTMENT (OUTPATIENT)
Dept: UROGYNECOLOGY | Facility: CLINIC | Age: 54
End: 2019-07-31
Payer: MEDICARE

## 2019-07-31 DIAGNOSIS — R31.29 OTHER MICROSCOPIC HEMATURIA: ICD-10-CM

## 2019-07-31 DIAGNOSIS — N39.3 STRESS INCONTINENCE (FEMALE) (MALE): ICD-10-CM

## 2019-07-31 DIAGNOSIS — N32.81 OVERACTIVE BLADDER: ICD-10-CM

## 2019-07-31 LAB
APPEARANCE: CLEAR
BACTERIA: NEGATIVE
BILIRUBIN URINE: NEGATIVE
BLOOD URINE: ABNORMAL
COLOR: YELLOW
CREAT SPEC-SCNC: 250 MG/DL
CREAT/PROT UR: 0.4 RATIO
GLUCOSE QUALITATIVE U: NEGATIVE
HYALINE CASTS: 6 /LPF
KETONES URINE: NEGATIVE
LEUKOCYTE ESTERASE URINE: NEGATIVE
MICROSCOPIC-UA: NORMAL
NITRITE URINE: NEGATIVE
PH URINE: 6.5
PROT UR-MCNC: 94 MG/DL
PROTEIN URINE: ABNORMAL
RED BLOOD CELLS URINE: 8 /HPF
SPECIFIC GRAVITY URINE: 1.03
SQUAMOUS EPITHELIAL CELLS: 5 /HPF
UROBILINOGEN URINE: NORMAL
WHITE BLOOD CELLS URINE: 3 /HPF

## 2019-07-31 PROCEDURE — 99214 OFFICE O/P EST MOD 30 MIN: CPT

## 2019-07-31 NOTE — HISTORY OF PRESENT ILLNESS
[FreeTextEntry1] : The pt is here for a f/u of OAB. She took Detrol with some relief but had anaphylactic attack.  She is currently not on tx.  \par She is seeing a rheumatologist for multilple issues and one is microscopic hematuria and proteinuria.\par Of note, she weighs 300 lbs at 5'1"

## 2019-07-31 NOTE — ASSESSMENT
[FreeTextEntry1] : Given her extensive medical probs, I will refer her to urology for hematuria.\par We discussed the critical nature of her morbid obesity and need for her to consider bariatric surgery.\par She will f/u on PRN bases,

## 2019-08-14 ENCOUNTER — APPOINTMENT (OUTPATIENT)
Dept: UROLOGY | Facility: CLINIC | Age: 54
End: 2019-08-14

## 2019-08-15 ENCOUNTER — APPOINTMENT (OUTPATIENT)
Age: 54
End: 2019-08-15
Payer: MEDICARE

## 2019-08-15 VITALS
TEMPERATURE: 97.8 F | HEIGHT: 62 IN | BODY MASS INDEX: 53.92 KG/M2 | DIASTOLIC BLOOD PRESSURE: 91 MMHG | SYSTOLIC BLOOD PRESSURE: 143 MMHG | HEART RATE: 104 BPM | WEIGHT: 293 LBS

## 2019-08-15 PROCEDURE — 99204 OFFICE O/P NEW MOD 45 MIN: CPT

## 2019-08-15 NOTE — HISTORY OF PRESENT ILLNESS
[FreeTextEntry1] : 55 yo F with microhematuria\par Denies any dysuria or gross hematuria\par Does see Dr. Pena for mixed incontinence\par 1 yr of right flank pain, chronic, dull\par CT last Feb showed no stones\par Of note, had bad reaction to gadolinium in the past but has had CT angio with contrast before with no issues\par

## 2019-08-15 NOTE — ASSESSMENT
[FreeTextEntry1] : 55 yo F with microhematuria, history of mixed incontinence\par \par - Reviewed labwork from PCP\par - Discussed possible etiologies for microhematuria including benign (UTI, nephrolithiasis, cyst, BPH) vs malignancy (renal, ureteral and bladder). Discussed hematuria workup which includes CT urogram and cystoscopy. Discussed risk and benefits of cystoscopy.\par - CT urogram ordered. Discussed pros and cons of IV contrast and pt would like to proceed. Will plan for allergy prophylaxis

## 2019-08-15 NOTE — REVIEW OF SYSTEMS
[Fever] : fever [Chills] : chills [Feeling Tired] : feeling tired [Recent Weight Loss (___ Lbs)] : recent [unfilled] ~Ulb weight loss [Eyesight Problems] : eyesight problems [Dry Eyes] : dryness of the eyes [Earache] : earache [Nasal Discharge] : nasal discharge [Sore Throat] : sore throat [Heart Rate Is Fast] : fast heart rate [Palpitations] : palpitations [Chest Pain] : chest pain [Shortness Of Breath] : shortness of breath [Wheezing] : wheezing [Abdominal Pain] : abdominal pain [Cough] : cough [Vomiting] : vomiting [Diarrhea] : diarrhea [Heartburn] : heartburn [see HPI] : see HPI [Seen by urologist before (Name)  ___] : Previously seen by a urologist: [unfilled] [Told you have blood in urine on a urine test] : told blood was present in a urine test [Wake up at night to urinate  How many times?  ___] : wakes up to urinate [unfilled] times during the night [Bladder pressure] : experiences bladder pressure [Strain or push to urinate] : strain or push to urinate [Bladder fullness after urinating] : bladder fullness after urinating [Leakage of urine with urgency] : leakage of urine with urgency [Leakage of urine with straining, coughing, laughing] : leakage of urine with straining, coughing, laughing [Unaware of when urine is leaking] : unaware of when urine is leaking [Joint Pain] : joint pain [Joint Swelling] : joint swelling [Skin Lesions] : skin lesion [Skin Wound] : skin wound [Itching] : itching [Dizziness] : dizziness [Limb Weakness] : limb weakness [Difficulty Walking] : difficulty walking [Anxiety] : anxiety [Muscle Weakness] : muscle weakness [Depression] : depression [Easy Bleeding] : a tendency for easy bleeding [Feelings Of Weakness] : feelings of weakness [Swollen Glands] : swollen glands [FreeTextEntry3] : frequent urination approx 8 times

## 2019-08-15 NOTE — PHYSICAL EXAM
[Normal Appearance] : normal appearance [General Appearance - Well Nourished] : well nourished [General Appearance - Well Developed] : well developed [Well Groomed] : well groomed [General Appearance - In No Acute Distress] : no acute distress [Edema] : no peripheral edema [Respiration, Rhythm And Depth] : normal respiratory rhythm and effort [Exaggerated Use Of Accessory Muscles For Inspiration] : no accessory muscle use [Abdomen Soft] : soft [Costovertebral Angle Tenderness] : no ~M costovertebral angle tenderness [Abdomen Tenderness] : non-tender [Normal Station and Gait] : the gait and station were normal for the patient's age [FreeTextEntry1] : obese [Urinary Bladder Findings] : the bladder was normal on palpation [] : no rash [No Focal Deficits] : no focal deficits [Affect] : the affect was normal [Oriented To Time, Place, And Person] : oriented to person, place, and time [Mood] : the mood was normal [Not Anxious] : not anxious [No Palpable Adenopathy] : no palpable adenopathy

## 2019-08-16 ENCOUNTER — APPOINTMENT (OUTPATIENT)
Dept: NEPHROLOGY | Facility: CLINIC | Age: 54
End: 2019-08-16

## 2019-08-22 ENCOUNTER — FORM ENCOUNTER (OUTPATIENT)
Age: 54
End: 2019-08-22

## 2019-08-23 ENCOUNTER — APPOINTMENT (OUTPATIENT)
Dept: MAMMOGRAPHY | Facility: IMAGING CENTER | Age: 54
End: 2019-08-23
Payer: MEDICARE

## 2019-08-23 ENCOUNTER — APPOINTMENT (OUTPATIENT)
Dept: RADIOLOGY | Facility: IMAGING CENTER | Age: 54
End: 2019-08-23
Payer: MEDICARE

## 2019-08-23 ENCOUNTER — OUTPATIENT (OUTPATIENT)
Dept: OUTPATIENT SERVICES | Facility: HOSPITAL | Age: 54
LOS: 1 days | End: 2019-08-23
Payer: COMMERCIAL

## 2019-08-23 DIAGNOSIS — Z00.8 ENCOUNTER FOR OTHER GENERAL EXAMINATION: ICD-10-CM

## 2019-08-23 DIAGNOSIS — Z90.710 ACQUIRED ABSENCE OF BOTH CERVIX AND UTERUS: Chronic | ICD-10-CM

## 2019-08-23 DIAGNOSIS — Z98.89 OTHER SPECIFIED POSTPROCEDURAL STATES: Chronic | ICD-10-CM

## 2019-08-23 LAB
BUN SERPL-MCNC: 15 MG/DL
CREAT SERPL-MCNC: 0.73 MG/DL

## 2019-08-23 PROCEDURE — 77080 DXA BONE DENSITY AXIAL: CPT | Mod: 26

## 2019-08-23 PROCEDURE — 77063 BREAST TOMOSYNTHESIS BI: CPT | Mod: 26

## 2019-08-23 PROCEDURE — 77067 SCR MAMMO BI INCL CAD: CPT

## 2019-08-23 PROCEDURE — 77067 SCR MAMMO BI INCL CAD: CPT | Mod: 26

## 2019-08-23 PROCEDURE — 77080 DXA BONE DENSITY AXIAL: CPT

## 2019-08-23 PROCEDURE — 77063 BREAST TOMOSYNTHESIS BI: CPT

## 2019-08-26 ENCOUNTER — APPOINTMENT (OUTPATIENT)
Dept: CARDIOLOGY | Facility: CLINIC | Age: 54
End: 2019-08-26
Payer: MEDICARE

## 2019-08-26 VITALS — WEIGHT: 293 LBS | OXYGEN SATURATION: 98 % | TEMPERATURE: 98 F | HEIGHT: 62 IN | BODY MASS INDEX: 53.92 KG/M2

## 2019-08-26 VITALS — DIASTOLIC BLOOD PRESSURE: 66 MMHG | HEART RATE: 102 BPM | SYSTOLIC BLOOD PRESSURE: 92 MMHG

## 2019-08-26 PROCEDURE — 93000 ELECTROCARDIOGRAM COMPLETE: CPT

## 2019-08-26 PROCEDURE — 99215 OFFICE O/P EST HI 40 MIN: CPT

## 2019-08-28 NOTE — REVIEW OF SYSTEMS
[Joint Pain] : joint pain [Muscle Pain] : muscle pain [Back Pain] : back pain [Negative] : Psychiatric

## 2019-08-29 ENCOUNTER — APPOINTMENT (OUTPATIENT)
Dept: INTERNAL MEDICINE | Facility: CLINIC | Age: 54
End: 2019-08-29
Payer: MEDICARE

## 2019-08-29 VITALS
HEIGHT: 62 IN | WEIGHT: 293 LBS | TEMPERATURE: 98.7 F | SYSTOLIC BLOOD PRESSURE: 130 MMHG | BODY MASS INDEX: 53.92 KG/M2 | DIASTOLIC BLOOD PRESSURE: 80 MMHG | OXYGEN SATURATION: 93 % | RESPIRATION RATE: 16 BRPM | HEART RATE: 106 BPM

## 2019-08-29 PROCEDURE — G0008: CPT

## 2019-08-29 PROCEDURE — G0439: CPT

## 2019-08-29 PROCEDURE — 90674 CCIIV4 VAC NO PRSV 0.5 ML IM: CPT

## 2019-08-29 RX ORDER — HYDROCORTISONE 25 MG/G
2.5 CREAM TOPICAL 3 TIMES DAILY
Qty: 1 | Refills: 5 | Status: DISCONTINUED | COMMUNITY
Start: 2018-03-27 | End: 2019-08-29

## 2019-08-29 NOTE — ASSESSMENT
[FreeTextEntry1] : 54 year old female found to have stable Hypertension, Hypothyroidism, Type 2 Diabetes Mellitus, Reactive Airway Disease, Morbid Obesity, with the current regimen, diet and life style modifications, as counseled. Prior results reviewed and discussed with the patient during today's examination. Plan as ordered.\par Patient was recently evaluated by CARD/URO/GYN/ENDO , findings and recommendations reviewed with the patient during today's examination.\par

## 2019-08-29 NOTE — HISTORY OF PRESENT ILLNESS
[de-identified] : 54 year old female patient with history of stable Hypertension, Hypothyroidism, Type 2 Diabetes Mellitus, Reactive Airway Disease, Morbid Obesity, history as stated, presented for an annual preventative examination.\par

## 2019-08-29 NOTE — HEALTH RISK ASSESSMENT
[Fair] :  ~his/her~ mood as fair [No] : In the past 12 months have you used drugs other than those required for medical reasons? No [No falls in past year] : Patient reported no falls in the past year [0] : 2) Feeling down, depressed, or hopeless: Not at all (0) [Patient reported mammogram was normal] : Patient reported mammogram was normal [Patient reported bone density results were normal] : Patient reported bone density results were normal [None] : None [Feels Safe at Home] : Feels safe at home [Independent] : managing medications [Some assistance needed] : using transportation [Smoke Detector] : smoke detector [Seat Belt] :  uses seat belt [Carbon Monoxide Detector] : carbon monoxide detector [Sunscreen] : uses sunscreen [With Patient/Caregiver] : With Patient/Caregiver [FreeTextEntry1] : Check up\par  [] : No [[id number]] : 0 [de-identified] : URO/CARD/GYNURO/RHEUM [Change in mental status noted] : No change in mental status noted [Reports changes in hearing] : Reports no changes in hearing [Reports changes in vision] : Reports no changes in vision [Reports changes in dental health] : Reports no changes in dental health [MammogramDate] : 08/19 [PapSmearComments] : As ordered for today. [BoneDensityDate] : 08/19 [ColonoscopyComments] : As ordered for today. [HIVDate] : 03/19 [HepatitisCDate] : 12/18 [HIVComments] : Negative [HepatitisCComments] : Negative [AdvancecareDate] : 08/19

## 2019-08-30 ENCOUNTER — RX RENEWAL (OUTPATIENT)
Age: 54
End: 2019-08-30

## 2019-08-30 LAB
25(OH)D3 SERPL-MCNC: 17.5 NG/ML
ALBUMIN SERPL ELPH-MCNC: 4.1 G/DL
ALP BLD-CCNC: 105 U/L
ALT SERPL-CCNC: 47 U/L
ANION GAP SERPL CALC-SCNC: 16 MMOL/L
AST SERPL-CCNC: 29 U/L
BASOPHILS # BLD AUTO: 0.05 K/UL
BASOPHILS NFR BLD AUTO: 0.6 %
BILIRUB SERPL-MCNC: 0.4 MG/DL
BUN SERPL-MCNC: 14 MG/DL
CALCIUM SERPL-MCNC: 9.3 MG/DL
CHLORIDE SERPL-SCNC: 97 MMOL/L
CHOLEST SERPL-MCNC: 192 MG/DL
CHOLEST/HDLC SERPL: 4.6 RATIO
CO2 SERPL-SCNC: 30 MMOL/L
CREAT SERPL-MCNC: 0.7 MG/DL
EOSINOPHIL # BLD AUTO: 0.24 K/UL
EOSINOPHIL NFR BLD AUTO: 2.8 %
ERYTHROCYTE [SEDIMENTATION RATE] IN BLOOD BY WESTERGREN METHOD: 50 MM/HR
ESTIMATED AVERAGE GLUCOSE: 200 MG/DL
FOLATE SERPL-MCNC: 14.9 NG/ML
GGT SERPL-CCNC: 50 U/L
GLUCOSE SERPL-MCNC: 137 MG/DL
HBA1C MFR BLD HPLC: 8.6 %
HCT VFR BLD CALC: 43.7 %
HDLC SERPL-MCNC: 42 MG/DL
HGB BLD-MCNC: 12.6 G/DL
HIV1+2 AB SPEC QL IA.RAPID: NONREACTIVE
IMM GRANULOCYTES NFR BLD AUTO: 0.6 %
IRON SATN MFR SERPL: 11 %
IRON SERPL-MCNC: 37 UG/DL
LDLC SERPL CALC-MCNC: 129 MG/DL
LYMPHOCYTES # BLD AUTO: 2.37 K/UL
LYMPHOCYTES NFR BLD AUTO: 27.2 %
MAN DIFF?: NORMAL
MCHC RBC-ENTMCNC: 23.9 PG
MCHC RBC-ENTMCNC: 28.8 GM/DL
MCV RBC AUTO: 82.8 FL
MONOCYTES # BLD AUTO: 0.53 K/UL
MONOCYTES NFR BLD AUTO: 6.1 %
NEUTROPHILS # BLD AUTO: 5.46 K/UL
NEUTROPHILS NFR BLD AUTO: 62.7 %
PLATELET # BLD AUTO: 362 K/UL
POTASSIUM SERPL-SCNC: 4.7 MMOL/L
PROT SERPL-MCNC: 7 G/DL
RBC # BLD: 5.28 M/UL
RBC # FLD: 16.4 %
SODIUM SERPL-SCNC: 142 MMOL/L
T3 SERPL-MCNC: 117 NG/DL
T4 FREE SERPL-MCNC: 1.5 NG/DL
TIBC SERPL-MCNC: 338 UG/DL
TRIGL SERPL-MCNC: 106 MG/DL
TSH SERPL-ACNC: 1.47 UIU/ML
UIBC SERPL-MCNC: 301 UG/DL
VIT B12 SERPL-MCNC: 610 PG/ML
WBC # FLD AUTO: 8.7 K/UL

## 2019-09-11 ENCOUNTER — APPOINTMENT (OUTPATIENT)
Dept: CARDIOLOGY | Facility: CLINIC | Age: 54
End: 2019-09-11

## 2019-09-12 ENCOUNTER — OUTPATIENT (OUTPATIENT)
Dept: OUTPATIENT SERVICES | Facility: HOSPITAL | Age: 54
LOS: 1 days | End: 2019-09-12
Payer: COMMERCIAL

## 2019-09-12 DIAGNOSIS — Z98.89 OTHER SPECIFIED POSTPROCEDURAL STATES: Chronic | ICD-10-CM

## 2019-09-12 DIAGNOSIS — I10 ESSENTIAL (PRIMARY) HYPERTENSION: ICD-10-CM

## 2019-09-12 DIAGNOSIS — Z90.710 ACQUIRED ABSENCE OF BOTH CERVIX AND UTERUS: Chronic | ICD-10-CM

## 2019-09-12 PROCEDURE — 93306 TTE W/DOPPLER COMPLETE: CPT | Mod: 26

## 2019-09-15 PROCEDURE — 93306 TTE W/DOPPLER COMPLETE: CPT

## 2019-09-16 ENCOUNTER — APPOINTMENT (OUTPATIENT)
Dept: CARDIOLOGY | Facility: CLINIC | Age: 54
End: 2019-09-16
Payer: MEDICARE

## 2019-09-16 ENCOUNTER — LABORATORY RESULT (OUTPATIENT)
Age: 54
End: 2019-09-16

## 2019-09-16 VITALS
HEIGHT: 62 IN | OXYGEN SATURATION: 91 % | SYSTOLIC BLOOD PRESSURE: 109 MMHG | DIASTOLIC BLOOD PRESSURE: 62 MMHG | HEART RATE: 105 BPM

## 2019-09-16 PROCEDURE — 93224 XTRNL ECG REC UP TO 48 HRS: CPT

## 2019-09-18 LAB
APPEARANCE: CLEAR
BILIRUBIN URINE: NEGATIVE
BLOOD URINE: ABNORMAL
COLOR: YELLOW
CREAT SPEC-SCNC: 162 MG/DL
GLUCOSE QUALITATIVE U: NEGATIVE
HEMOCCULT STL QL IA: POSITIVE
KETONES URINE: NEGATIVE
LEUKOCYTE ESTERASE URINE: NEGATIVE
MICROALBUMIN 24H UR DL<=1MG/L-MCNC: 1.4 MG/DL
MICROALBUMIN/CREAT 24H UR-RTO: 9 MG/G
NITRITE URINE: NEGATIVE
PH URINE: 6
PROTEIN URINE: NORMAL
SPECIFIC GRAVITY URINE: 1.03
UROBILINOGEN URINE: NORMAL

## 2019-10-09 ENCOUNTER — TRANSCRIPTION ENCOUNTER (OUTPATIENT)
Age: 54
End: 2019-10-09

## 2019-10-09 ENCOUNTER — INPATIENT (INPATIENT)
Facility: HOSPITAL | Age: 54
LOS: 4 days | Discharge: HOME | DRG: 202 | End: 2019-10-14
Attending: HOSPITALIST | Admitting: HOSPITALIST
Payer: COMMERCIAL

## 2019-10-09 VITALS
RESPIRATION RATE: 20 BRPM | OXYGEN SATURATION: 98 % | WEIGHT: 293 LBS | DIASTOLIC BLOOD PRESSURE: 92 MMHG | SYSTOLIC BLOOD PRESSURE: 139 MMHG | HEIGHT: 61 IN | HEART RATE: 96 BPM

## 2019-10-09 DIAGNOSIS — Z29.9 ENCOUNTER FOR PROPHYLACTIC MEASURES, UNSPECIFIED: ICD-10-CM

## 2019-10-09 DIAGNOSIS — I10 ESSENTIAL (PRIMARY) HYPERTENSION: ICD-10-CM

## 2019-10-09 DIAGNOSIS — Z98.89 OTHER SPECIFIED POSTPROCEDURAL STATES: Chronic | ICD-10-CM

## 2019-10-09 DIAGNOSIS — E03.9 HYPOTHYROIDISM, UNSPECIFIED: ICD-10-CM

## 2019-10-09 DIAGNOSIS — J45.901 UNSPECIFIED ASTHMA WITH (ACUTE) EXACERBATION: ICD-10-CM

## 2019-10-09 DIAGNOSIS — Z90.710 ACQUIRED ABSENCE OF BOTH CERVIX AND UTERUS: Chronic | ICD-10-CM

## 2019-10-09 DIAGNOSIS — G47.33 OBSTRUCTIVE SLEEP APNEA (ADULT) (PEDIATRIC): ICD-10-CM

## 2019-10-09 DIAGNOSIS — R74.0 NONSPECIFIC ELEVATION OF LEVELS OF TRANSAMINASE AND LACTIC ACID DEHYDROGENASE [LDH]: ICD-10-CM

## 2019-10-09 DIAGNOSIS — E11.9 TYPE 2 DIABETES MELLITUS WITHOUT COMPLICATIONS: ICD-10-CM

## 2019-10-09 DIAGNOSIS — E78.5 HYPERLIPIDEMIA, UNSPECIFIED: ICD-10-CM

## 2019-10-09 LAB
ALBUMIN SERPL ELPH-MCNC: 3.3 G/DL — LOW (ref 3.5–5)
ALP SERPL-CCNC: 125 U/L — HIGH (ref 40–120)
ALT FLD-CCNC: 61 U/L DA — HIGH (ref 10–60)
ANION GAP SERPL CALC-SCNC: 7 MMOL/L — SIGNIFICANT CHANGE UP (ref 5–17)
APTT BLD: 32.2 SEC — SIGNIFICANT CHANGE UP (ref 27.5–36.3)
AST SERPL-CCNC: 73 U/L — HIGH (ref 10–40)
BASE EXCESS BLDV CALC-SCNC: 4.2 MMOL/L — HIGH (ref -2–2)
BASOPHILS # BLD AUTO: 0.04 K/UL — SIGNIFICANT CHANGE UP (ref 0–0.2)
BASOPHILS NFR BLD AUTO: 0.5 % — SIGNIFICANT CHANGE UP (ref 0–2)
BILIRUB SERPL-MCNC: 0.5 MG/DL — SIGNIFICANT CHANGE UP (ref 0.2–1.2)
BLOOD GAS COMMENTS, VENOUS: SIGNIFICANT CHANGE UP
BUN SERPL-MCNC: 16 MG/DL — SIGNIFICANT CHANGE UP (ref 7–18)
CALCIUM SERPL-MCNC: 9.2 MG/DL — SIGNIFICANT CHANGE UP (ref 8.4–10.5)
CHLORIDE SERPL-SCNC: 103 MMOL/L — SIGNIFICANT CHANGE UP (ref 96–108)
CO2 SERPL-SCNC: 29 MMOL/L — SIGNIFICANT CHANGE UP (ref 22–31)
CREAT SERPL-MCNC: 0.83 MG/DL — SIGNIFICANT CHANGE UP (ref 0.5–1.3)
EOSINOPHIL # BLD AUTO: 0.26 K/UL — SIGNIFICANT CHANGE UP (ref 0–0.5)
EOSINOPHIL NFR BLD AUTO: 3.1 % — SIGNIFICANT CHANGE UP (ref 0–6)
FLU A RESULT: SIGNIFICANT CHANGE UP
FLU A RESULT: SIGNIFICANT CHANGE UP
FLUAV AG NPH QL: SIGNIFICANT CHANGE UP
FLUBV AG NPH QL: SIGNIFICANT CHANGE UP
GLUCOSE SERPL-MCNC: 163 MG/DL — HIGH (ref 70–99)
HCG SERPL-ACNC: <1 MIU/ML — SIGNIFICANT CHANGE UP
HCO3 BLDV-SCNC: 29 MMOL/L — SIGNIFICANT CHANGE UP (ref 21–29)
HCT VFR BLD CALC: 40.4 % — SIGNIFICANT CHANGE UP (ref 34.5–45)
HGB BLD-MCNC: 12.1 G/DL — SIGNIFICANT CHANGE UP (ref 11.5–15.5)
HOROWITZ INDEX BLDV+IHG-RTO: 21 — SIGNIFICANT CHANGE UP
IMM GRANULOCYTES NFR BLD AUTO: 0.7 % — SIGNIFICANT CHANGE UP (ref 0–1.5)
INR BLD: 1.15 RATIO — SIGNIFICANT CHANGE UP (ref 0.88–1.16)
LIDOCAIN IGE QN: 102 U/L — SIGNIFICANT CHANGE UP (ref 73–393)
LYMPHOCYTES # BLD AUTO: 1.78 K/UL — SIGNIFICANT CHANGE UP (ref 1–3.3)
LYMPHOCYTES # BLD AUTO: 21.1 % — SIGNIFICANT CHANGE UP (ref 13–44)
MAGNESIUM SERPL-MCNC: 1.2 MG/DL — LOW (ref 1.6–2.6)
MCHC RBC-ENTMCNC: 24.3 PG — LOW (ref 27–34)
MCHC RBC-ENTMCNC: 30 GM/DL — LOW (ref 32–36)
MCV RBC AUTO: 81.3 FL — SIGNIFICANT CHANGE UP (ref 80–100)
MONOCYTES # BLD AUTO: 0.53 K/UL — SIGNIFICANT CHANGE UP (ref 0–0.9)
MONOCYTES NFR BLD AUTO: 6.3 % — SIGNIFICANT CHANGE UP (ref 2–14)
NEUTROPHILS # BLD AUTO: 5.75 K/UL — SIGNIFICANT CHANGE UP (ref 1.8–7.4)
NEUTROPHILS NFR BLD AUTO: 68.3 % — SIGNIFICANT CHANGE UP (ref 43–77)
NRBC # BLD: 0 /100 WBCS — SIGNIFICANT CHANGE UP (ref 0–0)
NT-PROBNP SERPL-SCNC: 99 PG/ML — SIGNIFICANT CHANGE UP (ref 0–125)
PCO2 BLDV: 48 MMHG — SIGNIFICANT CHANGE UP (ref 35–50)
PH BLDV: 7.4 — SIGNIFICANT CHANGE UP (ref 7.35–7.45)
PLATELET # BLD AUTO: 351 K/UL — SIGNIFICANT CHANGE UP (ref 150–400)
PO2 BLDV: 50 MMHG — HIGH (ref 25–45)
POTASSIUM SERPL-MCNC: 3.8 MMOL/L — SIGNIFICANT CHANGE UP (ref 3.5–5.3)
POTASSIUM SERPL-SCNC: 3.8 MMOL/L — SIGNIFICANT CHANGE UP (ref 3.5–5.3)
PROT SERPL-MCNC: 8.4 G/DL — HIGH (ref 6–8.3)
PROTHROM AB SERPL-ACNC: 12.8 SEC — SIGNIFICANT CHANGE UP (ref 10–12.9)
RBC # BLD: 4.97 M/UL — SIGNIFICANT CHANGE UP (ref 3.8–5.2)
RBC # FLD: 15.9 % — HIGH (ref 10.3–14.5)
RSV RESULT: SIGNIFICANT CHANGE UP
RSV RNA RESP QL NAA+PROBE: SIGNIFICANT CHANGE UP
SAO2 % BLDV: 82 % — SIGNIFICANT CHANGE UP (ref 67–88)
SODIUM SERPL-SCNC: 139 MMOL/L — SIGNIFICANT CHANGE UP (ref 135–145)
TROPONIN I SERPL-MCNC: <0.015 NG/ML — SIGNIFICANT CHANGE UP (ref 0–0.04)
WBC # BLD: 8.42 K/UL — SIGNIFICANT CHANGE UP (ref 3.8–10.5)
WBC # FLD AUTO: 8.42 K/UL — SIGNIFICANT CHANGE UP (ref 3.8–10.5)

## 2019-10-09 PROCEDURE — 73070 X-RAY EXAM OF ELBOW: CPT | Mod: 26,LT

## 2019-10-09 PROCEDURE — 73562 X-RAY EXAM OF KNEE 3: CPT | Mod: 26,LT

## 2019-10-09 PROCEDURE — 99223 1ST HOSP IP/OBS HIGH 75: CPT

## 2019-10-09 PROCEDURE — 73090 X-RAY EXAM OF FOREARM: CPT | Mod: 26,LT

## 2019-10-09 PROCEDURE — 99285 EMERGENCY DEPT VISIT HI MDM: CPT

## 2019-10-09 PROCEDURE — 71046 X-RAY EXAM CHEST 2 VIEWS: CPT | Mod: 26

## 2019-10-09 PROCEDURE — 73030 X-RAY EXAM OF SHOULDER: CPT | Mod: 26,LT

## 2019-10-09 PROCEDURE — 73120 X-RAY EXAM OF HAND: CPT | Mod: 26,LT

## 2019-10-09 PROCEDURE — 73110 X-RAY EXAM OF WRIST: CPT | Mod: 26,LT

## 2019-10-09 PROCEDURE — 73060 X-RAY EXAM OF HUMERUS: CPT | Mod: 26,LT

## 2019-10-09 RX ORDER — IPRATROPIUM/ALBUTEROL SULFATE 18-103MCG
3 AEROSOL WITH ADAPTER (GRAM) INHALATION
Refills: 0 | Status: COMPLETED | OUTPATIENT
Start: 2019-10-09 | End: 2019-10-09

## 2019-10-09 RX ORDER — MONTELUKAST 4 MG/1
10 TABLET, CHEWABLE ORAL AT BEDTIME
Refills: 0 | Status: DISCONTINUED | OUTPATIENT
Start: 2019-10-09 | End: 2019-10-14

## 2019-10-09 RX ORDER — GABAPENTIN 400 MG/1
400 CAPSULE ORAL DAILY
Refills: 0 | Status: DISCONTINUED | OUTPATIENT
Start: 2019-10-09 | End: 2019-10-14

## 2019-10-09 RX ORDER — HEPARIN SODIUM 5000 [USP'U]/ML
5000 INJECTION INTRAVENOUS; SUBCUTANEOUS EVERY 8 HOURS
Refills: 0 | Status: DISCONTINUED | OUTPATIENT
Start: 2019-10-09 | End: 2019-10-14

## 2019-10-09 RX ORDER — ALBUTEROL 90 UG/1
1 AEROSOL, METERED ORAL EVERY 4 HOURS
Refills: 0 | Status: DISCONTINUED | OUTPATIENT
Start: 2019-10-09 | End: 2019-10-14

## 2019-10-09 RX ORDER — OXCARBAZEPINE 300 MG/1
600 TABLET, FILM COATED ORAL
Refills: 0 | Status: DISCONTINUED | OUTPATIENT
Start: 2019-10-09 | End: 2019-10-14

## 2019-10-09 RX ORDER — FLUOXETINE HCL 10 MG
20 CAPSULE ORAL DAILY
Refills: 0 | Status: DISCONTINUED | OUTPATIENT
Start: 2019-10-09 | End: 2019-10-14

## 2019-10-09 RX ORDER — SODIUM CHLORIDE 9 MG/ML
1000 INJECTION INTRAMUSCULAR; INTRAVENOUS; SUBCUTANEOUS ONCE
Refills: 0 | Status: COMPLETED | OUTPATIENT
Start: 2019-10-09 | End: 2019-10-09

## 2019-10-09 RX ORDER — AZITHROMYCIN 500 MG/1
500 TABLET, FILM COATED ORAL EVERY 24 HOURS
Refills: 0 | Status: DISCONTINUED | OUTPATIENT
Start: 2019-10-10 | End: 2019-10-14

## 2019-10-09 RX ORDER — ASPIRIN/CALCIUM CARB/MAGNESIUM 324 MG
81 TABLET ORAL DAILY
Refills: 0 | Status: DISCONTINUED | OUTPATIENT
Start: 2019-10-09 | End: 2019-10-14

## 2019-10-09 RX ORDER — LEVOTHYROXINE SODIUM 125 MCG
112 TABLET ORAL DAILY
Refills: 0 | Status: DISCONTINUED | OUTPATIENT
Start: 2019-10-09 | End: 2019-10-14

## 2019-10-09 RX ORDER — GABAPENTIN 400 MG/1
300 CAPSULE ORAL AT BEDTIME
Refills: 0 | Status: DISCONTINUED | OUTPATIENT
Start: 2019-10-09 | End: 2019-10-14

## 2019-10-09 RX ORDER — MAGNESIUM SULFATE 500 MG/ML
2 VIAL (ML) INJECTION ONCE
Refills: 0 | Status: COMPLETED | OUTPATIENT
Start: 2019-10-09 | End: 2019-10-09

## 2019-10-09 RX ORDER — AMITRIPTYLINE HCL 25 MG
10 TABLET ORAL AT BEDTIME
Refills: 0 | Status: DISCONTINUED | OUTPATIENT
Start: 2019-10-09 | End: 2019-10-14

## 2019-10-09 RX ORDER — LISINOPRIL 2.5 MG/1
40 TABLET ORAL DAILY
Refills: 0 | Status: DISCONTINUED | OUTPATIENT
Start: 2019-10-09 | End: 2019-10-14

## 2019-10-09 RX ORDER — TIOTROPIUM BROMIDE 18 UG/1
1 CAPSULE ORAL; RESPIRATORY (INHALATION) DAILY
Refills: 0 | Status: DISCONTINUED | OUTPATIENT
Start: 2019-10-09 | End: 2019-10-14

## 2019-10-09 RX ORDER — HEPARIN SODIUM 5000 [USP'U]/ML
5000 INJECTION INTRAVENOUS; SUBCUTANEOUS EVERY 12 HOURS
Refills: 0 | Status: DISCONTINUED | OUTPATIENT
Start: 2019-10-09 | End: 2019-10-09

## 2019-10-09 RX ORDER — ATORVASTATIN CALCIUM 80 MG/1
10 TABLET, FILM COATED ORAL AT BEDTIME
Refills: 0 | Status: DISCONTINUED | OUTPATIENT
Start: 2019-10-09 | End: 2019-10-14

## 2019-10-09 RX ORDER — IPRATROPIUM/ALBUTEROL SULFATE 18-103MCG
3 AEROSOL WITH ADAPTER (GRAM) INHALATION EVERY 6 HOURS
Refills: 0 | Status: DISCONTINUED | OUTPATIENT
Start: 2019-10-09 | End: 2019-10-14

## 2019-10-09 RX ORDER — PANTOPRAZOLE SODIUM 20 MG/1
40 TABLET, DELAYED RELEASE ORAL
Refills: 0 | Status: DISCONTINUED | OUTPATIENT
Start: 2019-10-09 | End: 2019-10-14

## 2019-10-09 RX ORDER — AZITHROMYCIN 500 MG/1
500 TABLET, FILM COATED ORAL ONCE
Refills: 0 | Status: COMPLETED | OUTPATIENT
Start: 2019-10-09 | End: 2019-10-09

## 2019-10-09 RX ORDER — HYDROCHLOROTHIAZIDE 25 MG
12.5 TABLET ORAL DAILY
Refills: 0 | Status: DISCONTINUED | OUTPATIENT
Start: 2019-10-09 | End: 2019-10-14

## 2019-10-09 RX ADMIN — Medication 3 MILLILITER(S): at 14:48

## 2019-10-09 RX ADMIN — Medication 3 MILLILITER(S): at 15:11

## 2019-10-09 RX ADMIN — MONTELUKAST 10 MILLIGRAM(S): 4 TABLET, CHEWABLE ORAL at 23:09

## 2019-10-09 RX ADMIN — Medication 3 MILLILITER(S): at 15:00

## 2019-10-09 RX ADMIN — HEPARIN SODIUM 5000 UNIT(S): 5000 INJECTION INTRAVENOUS; SUBCUTANEOUS at 23:09

## 2019-10-09 RX ADMIN — ATORVASTATIN CALCIUM 10 MILLIGRAM(S): 80 TABLET, FILM COATED ORAL at 23:09

## 2019-10-09 RX ADMIN — GABAPENTIN 300 MILLIGRAM(S): 400 CAPSULE ORAL at 23:09

## 2019-10-09 RX ADMIN — Medication 50 GRAM(S): at 14:49

## 2019-10-09 RX ADMIN — SODIUM CHLORIDE 1000 MILLILITER(S): 9 INJECTION INTRAMUSCULAR; INTRAVENOUS; SUBCUTANEOUS at 14:49

## 2019-10-09 RX ADMIN — AZITHROMYCIN 255 MILLIGRAM(S): 500 TABLET, FILM COATED ORAL at 15:35

## 2019-10-09 RX ADMIN — Medication 20 MILLIGRAM(S): at 23:09

## 2019-10-09 RX ADMIN — Medication 60 MILLIGRAM(S): at 14:48

## 2019-10-09 NOTE — ED PROVIDER NOTE - NS ED ROS FT
Review of Systems:  	•	CONSTITUTIONAL: no fever  	•	SKIN: no rash  	•	RESPIRATORY: shortness of breath, cough, dyspnea on exertion  	•	CARDIAC: chest pain, no palpitations  	•	GI:  no abd pain, no nausea, no vomiting, no diarrhea  	•	GENITO-URINARY:  no dysuria; no hematuria    	•	MUSCULOSKELETAL:  pain to left side of body  	•	NEUROLOGIC: no weakness  	•	ALLERGY: no rhinitis  	•	PSYSCHIATRIC: no anxiety

## 2019-10-09 NOTE — ED PROVIDER NOTE - CLINICAL SUMMARY MEDICAL DECISION MAKING FREE TEXT BOX
Dyspnea on exertion with cough due to moderate asthma bronchitis exasperation. Will XR left upper extremity/left knee, given fall but pt is with only soft tissue tenderness to left arm.

## 2019-10-09 NOTE — H&P ADULT - ASSESSMENT
54F from home, PMHx of asthma, chronic bronchitis, NAKUL, HTN, DM, HLD, fibromyalgia, anxiety, trigeminal neuralgia, degenerative joint disease, recently diagnosed IgA vasculitis presented to ED c/o cough and shortness of breath. Admitted for asthma exacerbation.      ED course: Vitals: BP:  HR:  RR:  T:  SaO2:  Labs significant for  Radiological findings  Antimicrobial [] IVF[] 54F from home, PMHx of asthma, chronic bronchitis, NAKUL, HTN, DM, HLD, fibromyalgia, anxiety, trigeminal neuralgia, degenerative joint disease, recently diagnosed IgA vasculitis presented to ED c/o cough and shortness of breath. Admitted for asthma exacerbation.

## 2019-10-09 NOTE — ED PROVIDER NOTE - PMH
Asthma    DJD (degenerative joint disease)  Cervical/Thoracic/Lumbar  DM (diabetes mellitus)    Fibromyalgia    Hyperlipidemia    Hypertension    Hypothyroid    Obesity    Obstructive sleep apnea    Trigeminal neuralgia

## 2019-10-09 NOTE — ED PROVIDER NOTE - PHYSICAL EXAMINATION
*GEN: NAD; well appearing; A+O x3   *HEAD: NC/AT   *EYES/NOSE: PERRL & EOMI b/l  *THROAT: airway patent, moist mucous membranes  *NECK: Neck supple, no masses  *PULMONARY: CTA b/l, Diminished breath sounds with no wheezing.   *CARDIAC: s1s2, regular rhythm, no Murmur  *ABDOMEN:  ND, NT, soft, no guarding, no rebound, no masses   *BACK: no CVA tenderness, Normal  spine   *EXTREMITIES: symmetric pulses, 2+ dp & radial pulses, capillary refill < 2 seconds, no cyanosis, no edema. Soft tissue tenderness to left arm. No snuffbox, no point bony tenderness. Full active/passive ROM. Neurovascularly intact.   *SKIN: no rash or bruising   *NEUROLOGIC: alert, CN 2-12 intact, moves all 4 extremities, full active & passive ROM in all extremities, normal baseline gait  *PSYCH: insight and judgment nl, memory nl, affect nl, thought nl

## 2019-10-09 NOTE — H&P ADULT - NSICDXPASTMEDICALHX_GEN_ALL_CORE_FT
PAST MEDICAL HISTORY:  Asthma     DJD (degenerative joint disease) Cervical/Thoracic/Lumbar    DM (diabetes mellitus)     Fibromyalgia     Hyperlipidemia     Hypertension     Hypothyroid     Obesity     Obstructive sleep apnea     Trigeminal neuralgia

## 2019-10-09 NOTE — H&P ADULT - HISTORY OF PRESENT ILLNESS
54F from home, PMHx of asthma, chronic bronchitis, NAKUL, HTN, DM, HLD, fibromyalgia, anxiety, trigeminal neuralgia, degenerative joint disease, recently diagnosed IgA vasculitis presented to ED c/o cough and shortness of breath. She states she took flu vaccine on aug 29th and since then she has been feeling sick but it got worse these past 10 days. She has productive cough non bloody, no foul smelling, yellowish sputum, ass. with chest tightness, watering of eyes, runny nose and shortness of breath. She denies any fever, chest pain, abdominal pain, urinary or bowel complaint.

## 2019-10-09 NOTE — ED ADULT TRIAGE NOTE - CHIEF COMPLAINT QUOTE
Non productive cough x 2 month, productive cough with yellow sputum x 10 days ,diff breathing since Saturday ,l arm pain s/p fall while on stretcher trying to get into ambulance ,denied hitting head/loc

## 2019-10-09 NOTE — H&P ADULT - PROBLEM SELECTOR PLAN 3
home meds: glipizide 5mg OD, metformin 1000 BID  Hold oral hypoglycemics  c/w HSS   FU A1C  Monitor fingerstick and adjust meds

## 2019-10-09 NOTE — H&P ADULT - PROBLEM SELECTOR PLAN 8
IMPROVE VTE Individual Risk Assessment  RISK                                                                Points  [  ] Previous VTE                                                  3  [  ] Thrombophilia                                               2  [  ] Lower limb paralysis                                      2        (unable to hold up >15 seconds)    [  ] Current Cancer                                              2         (within 6 months)  [x  ] Immobilization > 24 hrs                                1  [  ] ICU/CCU stay > 24 hours                              1  [x  ] Age > 60                                                      1  IMPROVE VTE Score _________2,  heparin for DVT proph

## 2019-10-09 NOTE — H&P ADULT - NSICDXFAMILYHX_GEN_ALL_CORE_FT
FAMILY HISTORY:  Family history of diabetes mellitus  Family history of hypertension  Family history of obesity

## 2019-10-09 NOTE — ED PROVIDER NOTE - PROGRESS NOTE DETAILS
klever: Discussed need to admit with patient & discussed risk and benefits.  Patient agreed to admission.  Discussed case w/ admitting medicine doctor Dr Mckeon & MAR

## 2019-10-09 NOTE — H&P ADULT - ATTENDING COMMENTS
Vital Signs Last 24 Hrs  T(C): 37.2 (09 Oct 2019 21:39), Max: 37.4 (09 Oct 2019 13:55)  T(F): 98.9 (09 Oct 2019 21:39), Max: 99.3 (09 Oct 2019 13:55)  HR: 96 (09 Oct 2019 21:39) (78 - 96)  BP: 152/91 (09 Oct 2019 21:39) (139/92 - 152/91)  BP(mean): --  RR: 20 (09 Oct 2019 21:39) (18 - 20)  SpO2: 96% (09 Oct 2019 21:39) (96% - 98%) Patient seen and examined ; case was discussed with the admitting resident    ROS: as in the HPI; all other ROS negative    SH and family history as above    Vital Signs Last 24 Hrs  T(C): 36.9 (09 Oct 2019 22:53), Max: 37.4 (09 Oct 2019 13:55)  T(F): 98.5 (09 Oct 2019 22:53), Max: 99.3 (09 Oct 2019 13:55)  HR: 101 (09 Oct 2019 22:53) (78 - 101)  BP: 157/86 (09 Oct 2019 22:53) (139/92 - 157/86)  BP(mean): --  RR: 18 (09 Oct 2019 22:53) (18 - 20)  SpO2: 94% (09 Oct 2019 22:53) (94% - 98%)    GEN: NAD, cushingoid appearance   HEENT- normocephalic; mouth moist  CVS- S1S2+  LUNGS- clear to auscultation; +wheezing in lower lobes   ABD: Soft , nontender, obese, nondistended, Bowel sounds are present  EXTREMITY: no calf tenderness, no cyanosis, no edema  NEURO: AAOx3; non focal neurologic exam; cranial nerves grossly intact  PSYCH: normal affect and behavior  BACK: no swelling or mass; scattered skin tags   VASCULAR: + distal peripheral pulses  SKIN: warm and dry, psoriasiform plaque on L shin       Labs Reviewed:                         12.1   8.42  )-----------( 351      ( 09 Oct 2019 14:42 )             40.4     10-09    139  |  103  |  16  ----------------------------<  163<H>  3.8   |  29  |  0.83    Ca    9.2      09 Oct 2019 14:42  Mg     1.2     10-09    TPro  8.4<H>  /  Alb  3.3<L>  /  TBili  0.5  /  DBili  x   /  AST  73<H>  /  ALT  61<H>  /  AlkPhos  125<H>  10-09    CARDIAC MARKERS ( 09 Oct 2019 14:42 )  <0.015 ng/mL / x     / x     / x     / x            PT/INR - ( 09 Oct 2019 14:42 )   PT: 12.8 sec;   INR: 1.15 ratio         PTT - ( 09 Oct 2019 14:42 )  PTT:32.2 sec  BNP: Serum Pro-Brain Natriuretic Peptide: 99 pg/mL (10-09 @ 14:42)    MEDICATIONS  (STANDING):  ALBUTerol    90 MICROgram(s) HFA Inhaler 1 Puff(s) Inhalation every 4 hours  ALBUTerol/ipratropium for Nebulization 3 milliLiter(s) Nebulizer every 6 hours  amitriptyline 10 milliGRAM(s) Oral at bedtime  aspirin enteric coated 81 milliGRAM(s) Oral daily  atorvastatin 10 milliGRAM(s) Oral at bedtime  azithromycin  IVPB 500 milliGRAM(s) IV Intermittent every 24 hours  FLUoxetine 20 milliGRAM(s) Oral daily  gabapentin 300 milliGRAM(s) Oral at bedtime  gabapentin 400 milliGRAM(s) Oral daily  heparin  Injectable 5000 Unit(s) SubCutaneous every 8 hours  hydrochlorothiazide 12.5 milliGRAM(s) Oral daily  levothyroxine 112 MICROGram(s) Oral daily  lisinopril 40 milliGRAM(s) Oral daily  montelukast 10 milliGRAM(s) Oral at bedtime  OXcarbazepine 600 milliGRAM(s) Oral two times a day  pantoprazole    Tablet 40 milliGRAM(s) Oral before breakfast  predniSONE   Tablet 40 milliGRAM(s) Oral daily  tiotropium 18 MICROgram(s) Capsule 1 Capsule(s) Inhalation daily    MEDICATIONS  (PRN):  guaiFENesin   Syrup  (Sugar-Free) 100 milliGRAM(s) Oral every 6 hours PRN Cough  CXR reviewed  EKG Reviewed    55 y/o F with h/o IgA vasc, chronic bronchitis/reactive airway, NAKUL, obesity, NAFLD, admitted with acute on chronic bronchitis. Patient was in transport here from urgent care and fell off stretcher, caught by ems, has some residual soreness over L side.     1. Acute on chronic bronchitis- possible early developing cap, will cover with azithro, duoneb, prednisone. Appreciate pulmonary recommendations, patient has upcoming appt but has not had PFTs. F/u CT chest angio to eval parenchyma and r/o PE.   2. s/p fall- prior to admission- f/u final reads of XRays  3. h/o IgA vasculitis- renal fn stable, received IVF prior to pending CT angio, prev disease manifestations were palpable purpura, hematuria, and arthralgias.   4. Elevated hepatic enzymes- check hepatitis panel, RUQ US   5. Fibromyalgia- continue home medications   6. HTN  7. DM  8. NAKUL- did not tolerate cpap due to calustrophobia, will hold in house cpap       Plan of care discussed with patient ;  all questions and concerns were addressed. Patient seen and examined ; case was discussed with the admitting resident    ROS: as in the HPI; all other ROS negative    SH and family history as above    Vital Signs Last 24 Hrs  T(C): 36.9 (09 Oct 2019 22:53), Max: 37.4 (09 Oct 2019 13:55)  T(F): 98.5 (09 Oct 2019 22:53), Max: 99.3 (09 Oct 2019 13:55)  HR: 101 (09 Oct 2019 22:53) (78 - 101)  BP: 157/86 (09 Oct 2019 22:53) (139/92 - 157/86)  BP(mean): --  RR: 18 (09 Oct 2019 22:53) (18 - 20)  SpO2: 94% (09 Oct 2019 22:53) (94% - 98%)    GEN: NAD, cushingoid appearance   HEENT- normocephalic; mouth moist  CVS- S1S2+  LUNGS- clear to auscultation; +wheezing in lower lobes   ABD: Soft , nontender, obese, nondistended, Bowel sounds are present  EXTREMITY: no calf tenderness, no cyanosis, no edema  NEURO: AAOx3; non focal neurologic exam; cranial nerves grossly intact  PSYCH: normal affect and behavior  BACK: no swelling or mass; scattered skin tags   VASCULAR: + distal peripheral pulses  SKIN: warm and dry, psoriasiform plaque on L shin       Labs Reviewed:                         12.1   8.42  )-----------( 351      ( 09 Oct 2019 14:42 )             40.4     10-09    139  |  103  |  16  ----------------------------<  163<H>  3.8   |  29  |  0.83    Ca    9.2      09 Oct 2019 14:42  Mg     1.2     10-09    TPro  8.4<H>  /  Alb  3.3<L>  /  TBili  0.5  /  DBili  x   /  AST  73<H>  /  ALT  61<H>  /  AlkPhos  125<H>  10-09    CARDIAC MARKERS ( 09 Oct 2019 14:42 )  <0.015 ng/mL / x     / x     / x     / x            PT/INR - ( 09 Oct 2019 14:42 )   PT: 12.8 sec;   INR: 1.15 ratio         PTT - ( 09 Oct 2019 14:42 )  PTT:32.2 sec  BNP: Serum Pro-Brain Natriuretic Peptide: 99 pg/mL (10-09 @ 14:42)    MEDICATIONS  (STANDING):  ALBUTerol    90 MICROgram(s) HFA Inhaler 1 Puff(s) Inhalation every 4 hours  ALBUTerol/ipratropium for Nebulization 3 milliLiter(s) Nebulizer every 6 hours  amitriptyline 10 milliGRAM(s) Oral at bedtime  aspirin enteric coated 81 milliGRAM(s) Oral daily  atorvastatin 10 milliGRAM(s) Oral at bedtime  azithromycin  IVPB 500 milliGRAM(s) IV Intermittent every 24 hours  FLUoxetine 20 milliGRAM(s) Oral daily  gabapentin 300 milliGRAM(s) Oral at bedtime  gabapentin 400 milliGRAM(s) Oral daily  heparin  Injectable 5000 Unit(s) SubCutaneous every 8 hours  hydrochlorothiazide 12.5 milliGRAM(s) Oral daily  levothyroxine 112 MICROGram(s) Oral daily  lisinopril 40 milliGRAM(s) Oral daily  montelukast 10 milliGRAM(s) Oral at bedtime  OXcarbazepine 600 milliGRAM(s) Oral two times a day  pantoprazole    Tablet 40 milliGRAM(s) Oral before breakfast  predniSONE   Tablet 40 milliGRAM(s) Oral daily  tiotropium 18 MICROgram(s) Capsule 1 Capsule(s) Inhalation daily    CXR reviewed  EKG Reviewed    55 y/o F with h/o IgA vasc, chronic bronchitis/reactive airway, NAKUL, obesity, NAFLD, admitted with acute on chronic bronchitis. Patient was in transport here from urgent care and fell off stretcher, caught by ems, has some residual soreness over L side.     1. Acute on chronic bronchitis- possible early developing cap, will cover with azithro, duoneb, prednisone. Appreciate pulmonary recommendations, patient has upcoming appt but has not had PFTs. F/u CT chest angio to eval parenchyma and r/o PE.   2. s/p fall- prior to admission- f/u final reads of XRays  3. IgA vasculitis- renal fn stable, received IVF prior to pending CT angio, prev disease manifestations were palpable purpura, hematuria, and arthralgias.   4. Elevated hepatic enzymes- check hepatitis panel, RUQ US   5. Fibromyalgia- continue home medications   6. HTN  7. DM  8. NAKUL- did not tolerate cpap due to calustrophobia, will hold in house cpap   9. Morbid obesity BMI 56      Plan of care discussed with patient ;  all questions and concerns were addressed.

## 2019-10-09 NOTE — ED ADULT NURSE NOTE - OBJECTIVE STATEMENT
Patient came to the ED a/o x 3 BIBA for shortness of breath sent from urgent care for eval. + productive cough, - respiratory distress

## 2019-10-09 NOTE — ED PROVIDER NOTE - OBJECTIVE STATEMENT
Pertinent HPI/PMH/PSH/FHx/SHx and Review of Systems contained within  HPI: 53 yo F p/w CC . sob x 4d associated with productive cough. Also reports chest pain when she coughs with dyspnea on exertion. Sxs     feel similar to past Bronchitis and Asthma. Pt presented with these complaints to urgent care was given 1 Duoneb*, EMS was called to transfer pt to ED. Pt was belted on a stretcher, was being wheeled to ambulance but sidewalk had crack and was uneven, causing structure to tilt and pt fell on left side of body. EMS tried to help to prevent fall. Pt endorses feeling "banged up on left side". No head trauma, no LOC. Additionally, having pain to left arm region and left knee.   PMH/PSH relevant for: Asthma, Bronchitis, DJD (C/T/L), DM, Fibromyalgia, Migraines, HLD, HTN, Trigeminal Neuroglia  ROS negative for: fever, Nausea, vomiting, diarrhea, abdominal pain, dysuria    FamilyHx and SocialHx not otherwise contributory

## 2019-10-09 NOTE — H&P ADULT - PROBLEM SELECTOR PLAN 1
p/w shortness of breath with cough and chest tightness  CXR normal, lungs clear, afebrile, no WBC  will start with PO prednisone 40mg  c/w duoneb and azithromycin  unable to obtain peak flow as pt. started coughing  Dr. Green consulted

## 2019-10-09 NOTE — H&P ADULT - NSHPPHYSICALEXAM_GEN_ALL_CORE
GENERAL: NAD, morbidly obese  HEENT: Normocephalic;  conjunctivae and sclerae clear; moist mucous membranes;   NECK : supple  CHEST/LUNG: Clear to auscultation bilaterally with good air entry   HEART: S1 S2  regular; no murmurs, gallops or rubs  ABDOMEN: Soft, Nontender, Nondistended; Bowel sounds present  EXTREMITIES: no cyanosis; no edema; no calf tenderness  SKIN: warm and dry; no rash  NERVOUS SYSTEM:  Awake and alert; Oriented  to place, person and time ; no new deficits

## 2019-10-10 LAB
24R-OH-CALCIDIOL SERPL-MCNC: 14.6 NG/ML — LOW (ref 30–80)
ALBUMIN SERPL ELPH-MCNC: 3 G/DL — LOW (ref 3.5–5)
ALP SERPL-CCNC: 103 U/L — SIGNIFICANT CHANGE UP (ref 40–120)
ALT FLD-CCNC: 57 U/L DA — SIGNIFICANT CHANGE UP (ref 10–60)
ANION GAP SERPL CALC-SCNC: 6 MMOL/L — SIGNIFICANT CHANGE UP (ref 5–17)
AST SERPL-CCNC: 55 U/L — HIGH (ref 10–40)
BASOPHILS # BLD AUTO: 0.03 K/UL — SIGNIFICANT CHANGE UP (ref 0–0.2)
BASOPHILS NFR BLD AUTO: 0.3 % — SIGNIFICANT CHANGE UP (ref 0–2)
BILIRUB SERPL-MCNC: 0.6 MG/DL — SIGNIFICANT CHANGE UP (ref 0.2–1.2)
BUN SERPL-MCNC: 12 MG/DL — SIGNIFICANT CHANGE UP (ref 7–18)
CALCIUM SERPL-MCNC: 9 MG/DL — SIGNIFICANT CHANGE UP (ref 8.4–10.5)
CHLORIDE SERPL-SCNC: 101 MMOL/L — SIGNIFICANT CHANGE UP (ref 96–108)
CHOLEST SERPL-MCNC: 190 MG/DL — SIGNIFICANT CHANGE UP (ref 10–199)
CO2 SERPL-SCNC: 30 MMOL/L — SIGNIFICANT CHANGE UP (ref 22–31)
CREAT SERPL-MCNC: 0.72 MG/DL — SIGNIFICANT CHANGE UP (ref 0.5–1.3)
EOSINOPHIL # BLD AUTO: 0.06 K/UL — SIGNIFICANT CHANGE UP (ref 0–0.5)
EOSINOPHIL NFR BLD AUTO: 0.5 % — SIGNIFICANT CHANGE UP (ref 0–6)
GLUCOSE BLDC GLUCOMTR-MCNC: 304 MG/DL — HIGH (ref 70–99)
GLUCOSE BLDC GLUCOMTR-MCNC: 309 MG/DL — HIGH (ref 70–99)
GLUCOSE SERPL-MCNC: 165 MG/DL — HIGH (ref 70–99)
HAV IGM SER-ACNC: SIGNIFICANT CHANGE UP
HBA1C BLD-MCNC: 7.8 % — HIGH (ref 4–5.6)
HBV CORE IGM SER-ACNC: SIGNIFICANT CHANGE UP
HBV SURFACE AG SER-ACNC: SIGNIFICANT CHANGE UP
HCT VFR BLD CALC: 37.3 % — SIGNIFICANT CHANGE UP (ref 34.5–45)
HCV AB S/CO SERPL IA: 0.18 S/CO — SIGNIFICANT CHANGE UP (ref 0–0.99)
HCV AB SERPL-IMP: SIGNIFICANT CHANGE UP
HDLC SERPL-MCNC: 41 MG/DL — LOW
HGB BLD-MCNC: 11.1 G/DL — LOW (ref 11.5–15.5)
IMM GRANULOCYTES NFR BLD AUTO: 0.9 % — SIGNIFICANT CHANGE UP (ref 0–1.5)
LIPID PNL WITH DIRECT LDL SERPL: 125 MG/DL — SIGNIFICANT CHANGE UP
LYMPHOCYTES # BLD AUTO: 1.68 K/UL — SIGNIFICANT CHANGE UP (ref 1–3.3)
LYMPHOCYTES # BLD AUTO: 14.9 % — SIGNIFICANT CHANGE UP (ref 13–44)
MAGNESIUM SERPL-MCNC: 1.8 MG/DL — SIGNIFICANT CHANGE UP (ref 1.6–2.6)
MCHC RBC-ENTMCNC: 24.3 PG — LOW (ref 27–34)
MCHC RBC-ENTMCNC: 29.8 GM/DL — LOW (ref 32–36)
MCV RBC AUTO: 81.8 FL — SIGNIFICANT CHANGE UP (ref 80–100)
MONOCYTES # BLD AUTO: 0.85 K/UL — SIGNIFICANT CHANGE UP (ref 0–0.9)
MONOCYTES NFR BLD AUTO: 7.5 % — SIGNIFICANT CHANGE UP (ref 2–14)
NEUTROPHILS # BLD AUTO: 8.54 K/UL — HIGH (ref 1.8–7.4)
NEUTROPHILS NFR BLD AUTO: 75.9 % — SIGNIFICANT CHANGE UP (ref 43–77)
NRBC # BLD: 0 /100 WBCS — SIGNIFICANT CHANGE UP (ref 0–0)
PHOSPHATE SERPL-MCNC: 3 MG/DL — SIGNIFICANT CHANGE UP (ref 2.5–4.5)
PLATELET # BLD AUTO: 347 K/UL — SIGNIFICANT CHANGE UP (ref 150–400)
POTASSIUM SERPL-MCNC: 3.6 MMOL/L — SIGNIFICANT CHANGE UP (ref 3.5–5.3)
POTASSIUM SERPL-SCNC: 3.6 MMOL/L — SIGNIFICANT CHANGE UP (ref 3.5–5.3)
PROT SERPL-MCNC: 7.6 G/DL — SIGNIFICANT CHANGE UP (ref 6–8.3)
RBC # BLD: 4.56 M/UL — SIGNIFICANT CHANGE UP (ref 3.8–5.2)
RBC # FLD: 16 % — HIGH (ref 10.3–14.5)
SODIUM SERPL-SCNC: 137 MMOL/L — SIGNIFICANT CHANGE UP (ref 135–145)
TOTAL CHOLESTEROL/HDL RATIO MEASUREMENT: 4.6 RATIO — SIGNIFICANT CHANGE UP (ref 3.3–7.1)
TRIGL SERPL-MCNC: 119 MG/DL — SIGNIFICANT CHANGE UP (ref 10–149)
TSH SERPL-MCNC: 1.29 UU/ML — SIGNIFICANT CHANGE UP (ref 0.34–4.82)
VIT B12 SERPL-MCNC: 583 PG/ML — SIGNIFICANT CHANGE UP (ref 232–1245)
WBC # BLD: 11.26 K/UL — HIGH (ref 3.8–10.5)
WBC # FLD AUTO: 11.26 K/UL — HIGH (ref 3.8–10.5)

## 2019-10-10 PROCEDURE — 71275 CT ANGIOGRAPHY CHEST: CPT | Mod: 26

## 2019-10-10 PROCEDURE — 76705 ECHO EXAM OF ABDOMEN: CPT | Mod: 26

## 2019-10-10 PROCEDURE — 99222 1ST HOSP IP/OBS MODERATE 55: CPT

## 2019-10-10 PROCEDURE — 99232 SBSQ HOSP IP/OBS MODERATE 35: CPT | Mod: GC

## 2019-10-10 RX ORDER — SODIUM CHLORIDE 9 MG/ML
1000 INJECTION, SOLUTION INTRAVENOUS
Refills: 0 | Status: DISCONTINUED | OUTPATIENT
Start: 2019-10-10 | End: 2019-10-14

## 2019-10-10 RX ORDER — INSULIN LISPRO 100/ML
VIAL (ML) SUBCUTANEOUS
Refills: 0 | Status: DISCONTINUED | OUTPATIENT
Start: 2019-10-10 | End: 2019-10-11

## 2019-10-10 RX ORDER — INSULIN LISPRO 100/ML
VIAL (ML) SUBCUTANEOUS AT BEDTIME
Refills: 0 | Status: DISCONTINUED | OUTPATIENT
Start: 2019-10-10 | End: 2019-10-14

## 2019-10-10 RX ORDER — DEXTROSE 50 % IN WATER 50 %
25 SYRINGE (ML) INTRAVENOUS ONCE
Refills: 0 | Status: DISCONTINUED | OUTPATIENT
Start: 2019-10-10 | End: 2019-10-14

## 2019-10-10 RX ORDER — DEXTROSE 50 % IN WATER 50 %
12.5 SYRINGE (ML) INTRAVENOUS ONCE
Refills: 0 | Status: DISCONTINUED | OUTPATIENT
Start: 2019-10-10 | End: 2019-10-14

## 2019-10-10 RX ORDER — DEXTROSE 50 % IN WATER 50 %
15 SYRINGE (ML) INTRAVENOUS ONCE
Refills: 0 | Status: DISCONTINUED | OUTPATIENT
Start: 2019-10-10 | End: 2019-10-14

## 2019-10-10 RX ORDER — GLUCAGON INJECTION, SOLUTION 0.5 MG/.1ML
1 INJECTION, SOLUTION SUBCUTANEOUS ONCE
Refills: 0 | Status: DISCONTINUED | OUTPATIENT
Start: 2019-10-10 | End: 2019-10-14

## 2019-10-10 RX ORDER — ERGOCALCIFEROL 1.25 MG/1
50000 CAPSULE ORAL
Refills: 0 | Status: DISCONTINUED | OUTPATIENT
Start: 2019-10-10 | End: 2019-10-14

## 2019-10-10 RX ORDER — INSULIN LISPRO 100/ML
2 VIAL (ML) SUBCUTANEOUS ONCE
Refills: 0 | Status: COMPLETED | OUTPATIENT
Start: 2019-10-10 | End: 2019-10-10

## 2019-10-10 RX ADMIN — HEPARIN SODIUM 5000 UNIT(S): 5000 INJECTION INTRAVENOUS; SUBCUTANEOUS at 21:58

## 2019-10-10 RX ADMIN — Medication 100 MILLIGRAM(S): at 22:05

## 2019-10-10 RX ADMIN — Medication 3 MILLILITER(S): at 08:24

## 2019-10-10 RX ADMIN — Medication 12.5 MILLIGRAM(S): at 06:10

## 2019-10-10 RX ADMIN — Medication 3 MILLILITER(S): at 14:04

## 2019-10-10 RX ADMIN — AZITHROMYCIN 255 MILLIGRAM(S): 500 TABLET, FILM COATED ORAL at 06:10

## 2019-10-10 RX ADMIN — PANTOPRAZOLE SODIUM 40 MILLIGRAM(S): 20 TABLET, DELAYED RELEASE ORAL at 06:10

## 2019-10-10 RX ADMIN — Medication 40 MILLIGRAM(S): at 06:10

## 2019-10-10 RX ADMIN — HEPARIN SODIUM 5000 UNIT(S): 5000 INJECTION INTRAVENOUS; SUBCUTANEOUS at 14:13

## 2019-10-10 RX ADMIN — Medication 112 MICROGRAM(S): at 06:10

## 2019-10-10 RX ADMIN — OXCARBAZEPINE 600 MILLIGRAM(S): 300 TABLET, FILM COATED ORAL at 06:10

## 2019-10-10 RX ADMIN — OXCARBAZEPINE 600 MILLIGRAM(S): 300 TABLET, FILM COATED ORAL at 17:10

## 2019-10-10 RX ADMIN — GABAPENTIN 300 MILLIGRAM(S): 400 CAPSULE ORAL at 21:58

## 2019-10-10 RX ADMIN — Medication 20 MILLIGRAM(S): at 14:12

## 2019-10-10 RX ADMIN — GABAPENTIN 400 MILLIGRAM(S): 400 CAPSULE ORAL at 14:13

## 2019-10-10 RX ADMIN — Medication 3 MILLILITER(S): at 20:41

## 2019-10-10 RX ADMIN — Medication 2: at 21:57

## 2019-10-10 RX ADMIN — HEPARIN SODIUM 5000 UNIT(S): 5000 INJECTION INTRAVENOUS; SUBCUTANEOUS at 06:10

## 2019-10-10 RX ADMIN — ATORVASTATIN CALCIUM 10 MILLIGRAM(S): 80 TABLET, FILM COATED ORAL at 21:58

## 2019-10-10 RX ADMIN — Medication 81 MILLIGRAM(S): at 14:12

## 2019-10-10 RX ADMIN — LISINOPRIL 40 MILLIGRAM(S): 2.5 TABLET ORAL at 06:10

## 2019-10-10 RX ADMIN — MONTELUKAST 10 MILLIGRAM(S): 4 TABLET, CHEWABLE ORAL at 21:58

## 2019-10-10 NOTE — PROGRESS NOTE ADULT - SUBJECTIVE AND OBJECTIVE BOX
PGY1 Note discussed with supervising resident and primary attending.    Patient is a 54y old  Female who presents with a chief complaint of Cough with shortness of breath (10 Oct 2019 08:06)      INTERVAL HPI/OVERNIGHT EVENTS:    MEDICATIONS  (STANDING):  ALBUTerol    90 MICROgram(s) HFA Inhaler 1 Puff(s) Inhalation every 4 hours  ALBUTerol/ipratropium for Nebulization 3 milliLiter(s) Nebulizer every 6 hours  amitriptyline 10 milliGRAM(s) Oral at bedtime  aspirin enteric coated 81 milliGRAM(s) Oral daily  atorvastatin 10 milliGRAM(s) Oral at bedtime  azithromycin  IVPB 500 milliGRAM(s) IV Intermittent every 24 hours  FLUoxetine 20 milliGRAM(s) Oral daily  gabapentin 300 milliGRAM(s) Oral at bedtime  gabapentin 400 milliGRAM(s) Oral daily  heparin  Injectable 5000 Unit(s) SubCutaneous every 8 hours  hydrochlorothiazide 12.5 milliGRAM(s) Oral daily  insulin lispro (HumaLOG) corrective regimen sliding scale   SubCutaneous three times a day before meals  levothyroxine 112 MICROGram(s) Oral daily  lisinopril 40 milliGRAM(s) Oral daily  montelukast 10 milliGRAM(s) Oral at bedtime  OXcarbazepine 600 milliGRAM(s) Oral two times a day  pantoprazole    Tablet 40 milliGRAM(s) Oral before breakfast  predniSONE   Tablet 40 milliGRAM(s) Oral daily  tiotropium 18 MICROgram(s) Capsule 1 Capsule(s) Inhalation daily    MEDICATIONS  (PRN):  guaiFENesin   Syrup  (Sugar-Free) 100 milliGRAM(s) Oral every 6 hours PRN Cough      Allergies    Fioricet (Rash)  mushroom (Unknown)    Intolerances        REVIEW OF SYSTEMS:  CONSTITUTIONAL: No fever, weight loss, or fatigue  RESPIRATORY: No cough, wheezing, chills or hemoptysis; No shortness of breath  CARDIOVASCULAR: No chest pain, palpitations, dizziness, or leg swelling  GASTROINTESTINAL: No abdominal or epigastric pain. No nausea, vomiting, or hematemesis; No diarrhea or constipation. No melena or hematochezia.  NEUROLOGICAL: No headaches, memory loss, loss of strength, numbness, or tremors  SKIN: No itching, burning, rashes, or lesions     Vital Signs Last 24 Hrs  T(C): 36.8 (10 Oct 2019 04:48), Max: 37.2 (09 Oct 2019 21:39)  T(F): 98.2 (10 Oct 2019 04:48), Max: 98.9 (09 Oct 2019 21:39)  HR: 89 (10 Oct 2019 08:56) (78 - 101)  BP: 123/79 (10 Oct 2019 04:48) (123/79 - 157/86)  BP(mean): --  RR: 18 (10 Oct 2019 04:48) (18 - 20)  SpO2: 94% (10 Oct 2019 08:56) (94% - 96%)    PHYSICAL EXAM:  GENERAL: NAD, well-groomed, well-developed  HEAD:  Atraumatic, Normocephalic  EYES: EOMI, PERRLA, conjunctiva and sclera clear  NECK: Supple, No JVD, Normal thyroid  CHEST/LUNG: Clear to percussion bilaterally; No rales, rhonchi, wheezing, or rubs  HEART: Regular rate and rhythm; No murmurs, rubs, or gallops  ABDOMEN: Soft, Nontender, Nondistended; Bowel sounds present  NERVOUS SYSTEM:  Alert & Oriented X3, Good concentration; Motor Strength 5/5 B/L   EXTREMITIES:  2+ Peripheral Pulses, No clubbing, cyanosis, or edema  SKIN;    LABS:                        11.1   11.26 )-----------( 347      ( 10 Oct 2019 07:44 )             37.3     10-10    137  |  101  |  12  ----------------------------<  165<H>  3.6   |  30  |  0.72    Ca    9.0      10 Oct 2019 07:44  Phos  3.0     10-10  Mg     1.8     10-10    TPro  7.6  /  Alb  3.0<L>  /  TBili  0.6  /  DBili  x   /  AST  55<H>  /  ALT  57  /  AlkPhos  103  10-10    PT/INR - ( 09 Oct 2019 14:42 )   PT: 12.8 sec;   INR: 1.15 ratio         PTT - ( 09 Oct 2019 14:42 )  PTT:32.2 sec    CAPILLARY BLOOD GLUCOSE          RADIOLOGY & ADDITIONAL TESTS:    Imaging Personally Reviewed:  [ ] YES  [ ] NO    Consultant(s) Notes Reviewed:  [ ] YES  [ ] NO PGY1 Note discussed with supervising resident and primary attending.    Patient is a 54y old  Female who presents with a chief complaint of Cough with shortness of breath (10 Oct 2019 08:06)      INTERVAL HPI/OVERNIGHT EVENTS:   patient seen and examined at bedside. Sitting on chair.   Shortness of breath has improved per patient.  saturating 94 % room air.  On short course of oral prednisone, Duoneb and azithromycin  Mild leukocytosis likely due to steroids, afebrile.  Vitamin D is low, replaced.  Likely discharge in am if she continues to improve.  There was a concern for PE but CT angio is negative for PE, showed stable pulmonary nodule.    MEDICATIONS  (STANDING):  ALBUTerol    90 MICROgram(s) HFA Inhaler 1 Puff(s) Inhalation every 4 hours  ALBUTerol/ipratropium for Nebulization 3 milliLiter(s) Nebulizer every 6 hours  amitriptyline 10 milliGRAM(s) Oral at bedtime  aspirin enteric coated 81 milliGRAM(s) Oral daily  atorvastatin 10 milliGRAM(s) Oral at bedtime  azithromycin  IVPB 500 milliGRAM(s) IV Intermittent every 24 hours  FLUoxetine 20 milliGRAM(s) Oral daily  gabapentin 300 milliGRAM(s) Oral at bedtime  gabapentin 400 milliGRAM(s) Oral daily  heparin  Injectable 5000 Unit(s) SubCutaneous every 8 hours  hydrochlorothiazide 12.5 milliGRAM(s) Oral daily  insulin lispro (HumaLOG) corrective regimen sliding scale   SubCutaneous three times a day before meals  levothyroxine 112 MICROGram(s) Oral daily  lisinopril 40 milliGRAM(s) Oral daily  montelukast 10 milliGRAM(s) Oral at bedtime  OXcarbazepine 600 milliGRAM(s) Oral two times a day  pantoprazole    Tablet 40 milliGRAM(s) Oral before breakfast  predniSONE   Tablet 40 milliGRAM(s) Oral daily  tiotropium 18 MICROgram(s) Capsule 1 Capsule(s) Inhalation daily    MEDICATIONS  (PRN):  guaiFENesin   Syrup  (Sugar-Free) 100 milliGRAM(s) Oral every 6 hours PRN Cough      Allergies    Fioricet (Rash)  mushroom (Unknown)    Intolerances      Vital Signs Last 24 Hrs  T(C): 36.8 (10 Oct 2019 04:48), Max: 37.2 (09 Oct 2019 21:39)  T(F): 98.2 (10 Oct 2019 04:48), Max: 98.9 (09 Oct 2019 21:39)  HR: 89 (10 Oct 2019 08:56) (78 - 101)  BP: 123/79 (10 Oct 2019 04:48) (123/79 - 157/86)  BP(mean): --  RR: 18 (10 Oct 2019 04:48) (18 - 20)  SpO2: 94% (10 Oct 2019 08:56) (94% - 96%)    PHYSICAL EXAM:  GENERAL: NAD, sitting on chair  HEAD:  Atraumatic, Normocephalic  EYES: EOMI, PERRLA, conjunctiva and sclera clear  NECK: Supple, No JVD, Normal thyroid  CHEST/LUNG: Clear to percussion bilaterally; No rales, rhonchi, wheezing, or rubs  HEART: Regular rate and rhythm; No murmurs, rubs, or gallops  ABDOMEN: Soft, Nontender, Nondistended; Bowel sounds present  NERVOUS SYSTEM:  Alert & Oriented X3, Good concentration; Motor Strength 5/5 B/L   EXTREMITIES:  2+ Peripheral Pulses, No clubbing, cyanosis, or edema      LABS:                        11.1   11.26 )-----------( 347      ( 10 Oct 2019 07:44 )             37.3     10-10    137  |  101  |  12  ----------------------------<  165<H>  3.6   |  30  |  0.72    Ca    9.0      10 Oct 2019 07:44  Phos  3.0     10-10  Mg     1.8     10-10    TPro  7.6  /  Alb  3.0<L>  /  TBili  0.6  /  DBili  x   /  AST  55<H>  /  ALT  57  /  AlkPhos  103  10-10    PT/INR - ( 09 Oct 2019 14:42 )   PT: 12.8 sec;   INR: 1.15 ratio         PTT - ( 09 Oct 2019 14:42 )  PTT:32.2 sec    CAPILLARY BLOOD GLUCOSE          RADIOLOGY & ADDITIONAL TESTS:< from: CT Angio Chest w/ IV Cont (10.10.19 @ 11:03) >    EXAM:  CT ANGIO CHEST (W)AW IC                            PROCEDURE DATE:  10/10/2019          INTERPRETATION:  CLINICAL INDICATION: 54 years  Female with concern for   pe. History of IgA vasculitis, chronic bronchitis, exertional dyspnea.    COMPARISON: 12/23/2013    PROCEDURE:   CT Angiography of the Chest.  60 ml of Omnipaque 350 was injected intravenously. 40 ml were discarded.  Sagittal and coronal reformats were performed as well as 3D (MIP)   reconstructions.      FINDINGS:    LUNGS AND AIRWAYS: Patent central airways.  4 mm right lower lobe lung   nodule (C7: 87), unchanged. The lung fields are otherwise clear.    PLEURA: No pleural effusion.    MEDIASTINUM AND KATARINA: No lymphadenopathy.    VESSELS: No filling defects in the central pulmonary arteries or   segmental branches. Limited evaluation of the subsegmental branches due   to patient motion. The aorta is normal in caliber without evidence of   dissection.    HEART: Heart size is normal. No pericardial effusion.    CHEST WALL AND LOWER NECK: 2.2 cm right posterior subcutaneous nodule to   the right of midline at the level of the T12-L1 disc, new since the prior   study..    VISUALIZED UPPER ABDOMEN: Normal right adrenal gland. Status post left   adrenalectomy.    BONES: Mild thoracic degenerative changes.    IMPRESSION:     No large central pulmonary embolism. Limited evaluation of the   subsegmental branches due to patient motion.    Status post left adrenalectomy.    New 2.2 cm right posterior skin nodule, likely sebaceous cyst, new since   the prior study. Recommend dermatology evaluation.    4 mm right lower lobe lung nodule unchanged since 2013 and therefore   presumably benign.    < end of copied text >      Imaging Personally Reviewed:  [x] YES  [ ] NO    Consultant(s) Notes Reviewed:  [x] YES  [ ] NO

## 2019-10-10 NOTE — PROGRESS NOTE ADULT - PROBLEM SELECTOR PLAN 2
LFTs mildly elevated   likely fatty liver  FU hepatitis panel  FU US liver LFTs mildly elevated   likely fatty liver  hepatitis panel is negative   US liver shows fatty liver.  will trend LFTs in am

## 2019-10-10 NOTE — CONSULT NOTE ADULT - ASSESSMENT
Mild intermittent asthma with acute exacerbation likely secondary to viral bronchitis  Morbid obesity  NAKUL not compliant with CPAP  IGA vasculitis    Clinically stable, no increased work of breathing or accessory muscle use  Continue bronchodilators  Continue IV steroids  CTA of the chest is unremarkable  Minimal role of abx if asthma exacerbations, likely viral  Stable from a pulmonary standpoint  No further workup needed for nodule, low risk

## 2019-10-10 NOTE — PROGRESS NOTE ADULT - PROBLEM SELECTOR PLAN 1
p/w shortness of breath with cough and chest tightness  CXR normal, lungs clear, afebrile, no WBC  admitted for acute asthma exacerbation secondary to acute bronchitis.  on short steroid course  PO prednisone 40mg  c/w duoneb and azithromycin  Dr. Green consulted p/w shortness of breath with cough and chest tightness  CXR normal, lungs clear, afebrile, no WBC  admitted for acute asthma exacerbation secondary to acute bronchitis.  on short steroid course  PO prednisone 40mg  c/w duoneb and azithromycin  Dr. Green consulted. will f/u recommendations

## 2019-10-10 NOTE — CONSULT NOTE ADULT - SUBJECTIVE AND OBJECTIVE BOX
Source: Patient and chart    Reason for Consultation: SOB x 3 days    Chief Complaint: SOB x 3 days    HPI:  54F from home, PMHx of asthma, chronic bronchitis, NAKUL, HTN, DM, HLD, fibromyalgia, anxiety, trigeminal neuralgia, degenerative joint disease, recently diagnosed IgA vasculitis presented to ED c/o cough and shortness of breath. She states she took flu vaccine on aug 29th and since then she has been feeling sick but it got worse these past 10 days. She has productive cough non bloody, no foul smelling, yellowish sputum, ass. with chest tightness, watering of eyes, runny nose and shortness of breath. She denies any fever, chest pain, abdominal pain, urinary or bowel complaint. (09 Oct 2019 22:04)    The patient previously saw Dr Portillo and has an appointment with Dr Monterroso on Monday. She has no prior intubations but has occasional bronchitis infections 3 times per year. At her baseline her asthma is stable and she uses only albuterol nebulizer a few times per month. Lately her son was sick then she got sick after a flu shot and had persistent dyspnea.    MEDICATIONS  (STANDING):  ALBUTerol    90 MICROgram(s) HFA Inhaler 1 Puff(s) Inhalation every 4 hours  ALBUTerol/ipratropium for Nebulization 3 milliLiter(s) Nebulizer every 6 hours  amitriptyline 10 milliGRAM(s) Oral at bedtime  aspirin enteric coated 81 milliGRAM(s) Oral daily  atorvastatin 10 milliGRAM(s) Oral at bedtime  azithromycin  IVPB 500 milliGRAM(s) IV Intermittent every 24 hours  FLUoxetine 20 milliGRAM(s) Oral daily  gabapentin 300 milliGRAM(s) Oral at bedtime  gabapentin 400 milliGRAM(s) Oral daily  heparin  Injectable 5000 Unit(s) SubCutaneous every 8 hours  hydrochlorothiazide 12.5 milliGRAM(s) Oral daily  insulin lispro (HumaLOG) corrective regimen sliding scale   SubCutaneous three times a day before meals  levothyroxine 112 MICROGram(s) Oral daily  lisinopril 40 milliGRAM(s) Oral daily  montelukast 10 milliGRAM(s) Oral at bedtime  OXcarbazepine 600 milliGRAM(s) Oral two times a day  pantoprazole    Tablet 40 milliGRAM(s) Oral before breakfast  predniSONE   Tablet 40 milliGRAM(s) Oral daily  tiotropium 18 MICROgram(s) Capsule 1 Capsule(s) Inhalation daily    MEDICATIONS  (PRN):  guaiFENesin   Syrup  (Sugar-Free) 100 milliGRAM(s) Oral every 6 hours PRN Cough      Allergies    Fioricet (Rash)  mushroom (Unknown)    Intolerances    PAST MEDICAL & SURGICAL HISTORY:  DJD (degenerative joint disease): Cervical/Thoracic/Lumbar  DM (diabetes mellitus)  Fibromyalgia  Trigeminal neuralgia  Obesity  Obstructive sleep apnea  Hypothyroid  Asthma  Hyperlipidemia  Hypertension  S/P  section  S/P abdominal hysterectomy    FAMILY HISTORY:  Family history of obesity  Family history of hypertension  Family history of diabetes mellitus    SOCIAL HISTORY  Smoking History: Nonsmoker  Alcohol: No ETOH  Drugs: No Drugs      T(C): 36.8 (10-10-19 @ 04:48), Max: 37.2 (10-09-19 @ 21:39)  HR: 89 (10-10-19 @ 08:56) (78 - 101)  BP: 123/79 (10-10-19 @ 04:48) (123/79 - 157/86)  RR: 18 (10-10-19 @ 04:48) (18 - 20)  SpO2: 94% (10-10-19 @ 08:56) (94% - 96%)    LABS:                        11.1   11.26 )-----------( 347      ( 10 Oct 2019 07:44 )             37.3     10-10    137  |  101  |  12  ----------------------------<  165<H>  3.6   |  30  |  0.72    Ca    9.0      10 Oct 2019 07:44  Phos  3.0     10-10  Mg     1.8     10-10    TPro  7.6  /  Alb  3.0<L>  /  TBili  0.6  /  DBili  x   /  AST  55<H>  /  ALT  57  /  AlkPhos  103  10-10    PT/INR - ( 09 Oct 2019 14:42 )   PT: 12.8 sec;   INR: 1.15 ratio         PTT - ( 09 Oct 2019 14:42 )  PTT:32.2 sec      CARDIAC MARKERS ( 09 Oct 2019 14:42 )  <0.015 ng/mL / x     / x     / x     / x        Serum Pro-Brain Natriuretic Peptide: 99 pg/mL (10-09-19 @ 14:42)    Microbiology      RADIOLOGY & ADDITIONAL STUDIES: (My Reading)  CXR- No acute infiltrates  CTA chest- No PE, stable 4mm RLL nodule

## 2019-10-10 NOTE — PROGRESS NOTE ADULT - ATTENDING COMMENTS
Patient was seen and examined by myself. Case was discussed with house staff in details. I have reviewed and agree with the plan as outlined above with edits where appropriate.    Vital Signs Last 24 Hrs  T(C): 37.3 (10 Oct 2019 14:22), Max: 37.3 (10 Oct 2019 14:22)  T(F): 99.2 (10 Oct 2019 14:22), Max: 99.2 (10 Oct 2019 14:22)  HR: 93 (10 Oct 2019 15:00) (82 - 101)  BP: 112/73 (10 Oct 2019 14:22) (112/73 - 157/86)  BP(mean): --  RR: 18 (10 Oct 2019 14:22) (18 - 20)  SpO2: 95% (10 Oct 2019 15:00) (94% - 96%)                          11.1   11.26 )-----------( 347      ( 10 Oct 2019 07:44 )             37.3     10-10    137  |  101  |  12  ----------------------------<  165<H>  3.6   |  30  |  0.72    Ca    9.0      10 Oct 2019 07:44  Phos  3.0     10-10  Mg     1.8     10-10    TPro  7.6  /  Alb  3.0<L>  /  TBili  0.6  /  DBili  x   /  AST  55<H>  /  ALT  57  /  AlkPhos  103  10-10    A/P: 55 y/o F with   1. Acute asthma exacerbation  2. Morbid obesity   3. Mild hypoalbuminemia  4. Sleep apnea  5. fatty liver  6. Essential HTN      Plan as above  continue duonebs  monitor  Supportive measures  Plan discussed with patient and family in details at bedside and all their questions / concerns were addressed

## 2019-10-10 NOTE — PROGRESS NOTE ADULT - ASSESSMENT
54F from home, PMHx of asthma, chronic bronchitis, NAKUL, HTN, DM, HLD, fibromyalgia, anxiety, trigeminal neuralgia, degenerative joint disease, recently diagnosed IgA vasculitis presented to ED c/o cough and shortness of breath. Admitted for asthma exacerbation.

## 2019-10-11 LAB
ALBUMIN SERPL ELPH-MCNC: 3 G/DL — LOW (ref 3.5–5)
ALP SERPL-CCNC: 106 U/L — SIGNIFICANT CHANGE UP (ref 40–120)
ALT FLD-CCNC: 58 U/L DA — SIGNIFICANT CHANGE UP (ref 10–60)
ANION GAP SERPL CALC-SCNC: 6 MMOL/L — SIGNIFICANT CHANGE UP (ref 5–17)
AST SERPL-CCNC: 58 U/L — HIGH (ref 10–40)
BILIRUB DIRECT SERPL-MCNC: 0.1 MG/DL — SIGNIFICANT CHANGE UP (ref 0–0.2)
BILIRUB INDIRECT FLD-MCNC: 0.3 MG/DL — SIGNIFICANT CHANGE UP (ref 0.2–1)
BILIRUB SERPL-MCNC: 0.4 MG/DL — SIGNIFICANT CHANGE UP (ref 0.2–1.2)
BUN SERPL-MCNC: 17 MG/DL — SIGNIFICANT CHANGE UP (ref 7–18)
CALCIUM SERPL-MCNC: 8.8 MG/DL — SIGNIFICANT CHANGE UP (ref 8.4–10.5)
CHLORIDE SERPL-SCNC: 101 MMOL/L — SIGNIFICANT CHANGE UP (ref 96–108)
CO2 SERPL-SCNC: 29 MMOL/L — SIGNIFICANT CHANGE UP (ref 22–31)
CREAT SERPL-MCNC: 0.85 MG/DL — SIGNIFICANT CHANGE UP (ref 0.5–1.3)
GLUCOSE BLDC GLUCOMTR-MCNC: 234 MG/DL — HIGH (ref 70–99)
GLUCOSE BLDC GLUCOMTR-MCNC: 308 MG/DL — HIGH (ref 70–99)
GLUCOSE BLDC GLUCOMTR-MCNC: 339 MG/DL — HIGH (ref 70–99)
GLUCOSE BLDC GLUCOMTR-MCNC: 372 MG/DL — HIGH (ref 70–99)
GLUCOSE SERPL-MCNC: 215 MG/DL — HIGH (ref 70–99)
HCT VFR BLD CALC: 38.7 % — SIGNIFICANT CHANGE UP (ref 34.5–45)
HCV AB S/CO SERPL IA: 0.18 S/CO — SIGNIFICANT CHANGE UP (ref 0–0.99)
HCV AB SERPL-IMP: SIGNIFICANT CHANGE UP
HGB BLD-MCNC: 11.3 G/DL — LOW (ref 11.5–15.5)
MCHC RBC-ENTMCNC: 24.2 PG — LOW (ref 27–34)
MCHC RBC-ENTMCNC: 29.2 GM/DL — LOW (ref 32–36)
MCV RBC AUTO: 82.9 FL — SIGNIFICANT CHANGE UP (ref 80–100)
NRBC # BLD: 0 /100 WBCS — SIGNIFICANT CHANGE UP (ref 0–0)
PLATELET # BLD AUTO: 348 K/UL — SIGNIFICANT CHANGE UP (ref 150–400)
POTASSIUM SERPL-MCNC: 4.1 MMOL/L — SIGNIFICANT CHANGE UP (ref 3.5–5.3)
POTASSIUM SERPL-SCNC: 4.1 MMOL/L — SIGNIFICANT CHANGE UP (ref 3.5–5.3)
PROT SERPL-MCNC: 7.7 G/DL — SIGNIFICANT CHANGE UP (ref 6–8.3)
RBC # BLD: 4.67 M/UL — SIGNIFICANT CHANGE UP (ref 3.8–5.2)
RBC # FLD: 16.5 % — HIGH (ref 10.3–14.5)
SODIUM SERPL-SCNC: 136 MMOL/L — SIGNIFICANT CHANGE UP (ref 135–145)
WBC # BLD: 12.97 K/UL — HIGH (ref 3.8–10.5)
WBC # FLD AUTO: 12.97 K/UL — HIGH (ref 3.8–10.5)

## 2019-10-11 PROCEDURE — 99232 SBSQ HOSP IP/OBS MODERATE 35: CPT | Mod: GC

## 2019-10-11 RX ORDER — ONDANSETRON 8 MG/1
4 TABLET, FILM COATED ORAL ONCE
Refills: 0 | Status: DISCONTINUED | OUTPATIENT
Start: 2019-10-11 | End: 2019-10-14

## 2019-10-11 RX ORDER — METFORMIN HYDROCHLORIDE 850 MG/1
1000 TABLET ORAL
Refills: 0 | Status: DISCONTINUED | OUTPATIENT
Start: 2019-10-11 | End: 2019-10-14

## 2019-10-11 RX ORDER — IBUPROFEN 200 MG
600 TABLET ORAL EVERY 8 HOURS
Refills: 0 | Status: DISCONTINUED | OUTPATIENT
Start: 2019-10-11 | End: 2019-10-14

## 2019-10-11 RX ORDER — INSULIN LISPRO 100/ML
VIAL (ML) SUBCUTANEOUS
Refills: 0 | Status: DISCONTINUED | OUTPATIENT
Start: 2019-10-11 | End: 2019-10-14

## 2019-10-11 RX ORDER — INSULIN GLARGINE 100 [IU]/ML
10 INJECTION, SOLUTION SUBCUTANEOUS AT BEDTIME
Refills: 0 | Status: DISCONTINUED | OUTPATIENT
Start: 2019-10-11 | End: 2019-10-12

## 2019-10-11 RX ADMIN — Medication 81 MILLIGRAM(S): at 12:42

## 2019-10-11 RX ADMIN — HEPARIN SODIUM 5000 UNIT(S): 5000 INJECTION INTRAVENOUS; SUBCUTANEOUS at 21:18

## 2019-10-11 RX ADMIN — OXCARBAZEPINE 600 MILLIGRAM(S): 300 TABLET, FILM COATED ORAL at 17:09

## 2019-10-11 RX ADMIN — Medication 3 MILLILITER(S): at 09:24

## 2019-10-11 RX ADMIN — Medication 600 MILLIGRAM(S): at 11:16

## 2019-10-11 RX ADMIN — MONTELUKAST 10 MILLIGRAM(S): 4 TABLET, CHEWABLE ORAL at 21:16

## 2019-10-11 RX ADMIN — INSULIN GLARGINE 10 UNIT(S): 100 INJECTION, SOLUTION SUBCUTANEOUS at 21:19

## 2019-10-11 RX ADMIN — METFORMIN HYDROCHLORIDE 1000 MILLIGRAM(S): 850 TABLET ORAL at 11:16

## 2019-10-11 RX ADMIN — Medication 20 MILLIGRAM(S): at 12:42

## 2019-10-11 RX ADMIN — Medication 100 MILLIGRAM(S): at 05:40

## 2019-10-11 RX ADMIN — Medication 8: at 17:08

## 2019-10-11 RX ADMIN — METFORMIN HYDROCHLORIDE 1000 MILLIGRAM(S): 850 TABLET ORAL at 17:09

## 2019-10-11 RX ADMIN — AZITHROMYCIN 255 MILLIGRAM(S): 500 TABLET, FILM COATED ORAL at 05:40

## 2019-10-11 RX ADMIN — OXCARBAZEPINE 600 MILLIGRAM(S): 300 TABLET, FILM COATED ORAL at 05:40

## 2019-10-11 RX ADMIN — Medication 600 MILLIGRAM(S): at 11:45

## 2019-10-11 RX ADMIN — PANTOPRAZOLE SODIUM 40 MILLIGRAM(S): 20 TABLET, DELAYED RELEASE ORAL at 05:40

## 2019-10-11 RX ADMIN — Medication 40 MILLIGRAM(S): at 05:40

## 2019-10-11 RX ADMIN — HEPARIN SODIUM 5000 UNIT(S): 5000 INJECTION INTRAVENOUS; SUBCUTANEOUS at 17:09

## 2019-10-11 RX ADMIN — Medication 40 MILLIGRAM(S): at 12:43

## 2019-10-11 RX ADMIN — ERGOCALCIFEROL 50000 UNIT(S): 1.25 CAPSULE ORAL at 12:42

## 2019-10-11 RX ADMIN — Medication 10 MILLIGRAM(S): at 21:18

## 2019-10-11 RX ADMIN — GABAPENTIN 300 MILLIGRAM(S): 400 CAPSULE ORAL at 21:18

## 2019-10-11 RX ADMIN — GABAPENTIN 400 MILLIGRAM(S): 400 CAPSULE ORAL at 12:42

## 2019-10-11 RX ADMIN — Medication 3 MILLILITER(S): at 16:12

## 2019-10-11 RX ADMIN — Medication 3 MILLILITER(S): at 20:19

## 2019-10-11 RX ADMIN — Medication 2: at 21:27

## 2019-10-11 RX ADMIN — LISINOPRIL 40 MILLIGRAM(S): 2.5 TABLET ORAL at 05:41

## 2019-10-11 RX ADMIN — ATORVASTATIN CALCIUM 10 MILLIGRAM(S): 80 TABLET, FILM COATED ORAL at 21:18

## 2019-10-11 RX ADMIN — HEPARIN SODIUM 5000 UNIT(S): 5000 INJECTION INTRAVENOUS; SUBCUTANEOUS at 05:40

## 2019-10-11 RX ADMIN — Medication 12.5 MILLIGRAM(S): at 05:40

## 2019-10-11 RX ADMIN — Medication 40 MILLIGRAM(S): at 21:16

## 2019-10-11 RX ADMIN — Medication 112 MICROGRAM(S): at 05:40

## 2019-10-11 NOTE — PROGRESS NOTE ADULT - PROBLEM SELECTOR PLAN 3
home meds: glipizide 5mg OD, metformin 1000 BID  restarted metformin on patient request  c/w HSS   FU A1C  Monitor fingerstick and adjust meds

## 2019-10-11 NOTE — PROGRESS NOTE ADULT - SUBJECTIVE AND OBJECTIVE BOX
PGY1 Note discussed with supervising resident and primary attending.    Patient is a 54y old  Female who presents with a chief complaint of Cough with shortness of breath (10 Oct 2019 14:01)      INTERVAL HPI/OVERNIGHT EVENTS: patient was seen and examined at bedside.  Patient is still not able to complete sentences without shortness of breath  Trial of IV steroids. mild wheeze on exam.  If improved, will dc in am    MEDICATIONS  (STANDING):  ALBUTerol    90 MICROgram(s) HFA Inhaler 1 Puff(s) Inhalation every 4 hours  ALBUTerol/ipratropium for Nebulization 3 milliLiter(s) Nebulizer every 6 hours  amitriptyline 10 milliGRAM(s) Oral at bedtime  aspirin enteric coated 81 milliGRAM(s) Oral daily  atorvastatin 10 milliGRAM(s) Oral at bedtime  azithromycin  IVPB 500 milliGRAM(s) IV Intermittent every 24 hours  dextrose 5%. 1000 milliLiter(s) (50 mL/Hr) IV Continuous <Continuous>  dextrose 50% Injectable 12.5 Gram(s) IV Push once  dextrose 50% Injectable 25 Gram(s) IV Push once  dextrose 50% Injectable 25 Gram(s) IV Push once  ergocalciferol 53188 Unit(s) Oral <User Schedule>  FLUoxetine 20 milliGRAM(s) Oral daily  gabapentin 300 milliGRAM(s) Oral at bedtime  gabapentin 400 milliGRAM(s) Oral daily  heparin  Injectable 5000 Unit(s) SubCutaneous every 8 hours  hydrochlorothiazide 12.5 milliGRAM(s) Oral daily  insulin glargine Injectable (LANTUS) 10 Unit(s) SubCutaneous at bedtime  insulin lispro (HumaLOG) corrective regimen sliding scale   SubCutaneous three times a day before meals  insulin lispro (HumaLOG) corrective regimen sliding scale   SubCutaneous at bedtime  levothyroxine 112 MICROGram(s) Oral daily  lisinopril 40 milliGRAM(s) Oral daily  metFORMIN 1000 milliGRAM(s) Oral two times a day  methylPREDNISolone sodium succinate Injectable 40 milliGRAM(s) IV Push every 8 hours  montelukast 10 milliGRAM(s) Oral at bedtime  OXcarbazepine 600 milliGRAM(s) Oral two times a day  pantoprazole    Tablet 40 milliGRAM(s) Oral before breakfast  tiotropium 18 MICROgram(s) Capsule 1 Capsule(s) Inhalation daily    MEDICATIONS  (PRN):  dextrose 40% Gel 15 Gram(s) Oral once PRN Blood Glucose LESS THAN 70 milliGRAM(s)/deciliter  glucagon  Injectable 1 milliGRAM(s) IntraMuscular once PRN Glucose LESS THAN 70 milligrams/deciliter  guaiFENesin   Syrup  (Sugar-Free) 100 milliGRAM(s) Oral every 6 hours PRN Cough  ibuprofen  Tablet. 600 milliGRAM(s) Oral every 8 hours PRN Temp greater or equal to 38C (100.4F), Mild Pain (1 - 3), Moderate Pain (4 - 6)      Allergies    Fioricet (Rash)  mushroom (Unknown)    Intolerances        REVIEW OF SYSTEMS:  CONSTITUTIONAL: No fever, weight loss, or fatigue  RESPIRATORY: No cough, wheezing, chills or hemoptysis; No shortness of breath  CARDIOVASCULAR: No chest pain, palpitations, dizziness, or leg swelling  GASTROINTESTINAL: No abdominal or epigastric pain. No nausea, vomiting, or hematemesis; No diarrhea or constipation. No melena or hematochezia.  NEUROLOGICAL: No headaches, memory loss, loss of strength, numbness, or tremors  SKIN: No itching, burning, rashes, or lesions     Vital Signs Last 24 Hrs  T(C): 37.3 (11 Oct 2019 13:32), Max: 37.3 (10 Oct 2019 14:22)  T(F): 99.1 (11 Oct 2019 13:32), Max: 99.2 (10 Oct 2019 14:22)  HR: 93 (11 Oct 2019 13:32) (82 - 95)  BP: 113/67 (11 Oct 2019 13:32) (103/65 - 125/72)  BP(mean): --  RR: 18 (11 Oct 2019 13:32) (18 - 18)  SpO2: 97% (11 Oct 2019 13:32) (94% - 97%)    PHYSICAL EXAM:  GENERAL: NAD, morbidly obese  HEAD:  Atraumatic, Normocephalic  EYES: EOMI, PERRLA, conjunctiva and sclera clear  NECK: Supple, No JVD, Normal thyroid  CHEST/LUNG: mild wheez b/l  HEART: Regular rate and rhythm; No murmurs, rubs, or gallops  ABDOMEN: Soft, Nontender, Nondistended; Bowel sounds present  NERVOUS SYSTEM:  Alert & Oriented X3, Good concentration; Motor Strength 5/5 B/L   EXTREMITIES:  2+ Peripheral Pulses, No clubbing, cyanosis, or edema  SKIN;    LABS:                        11.3   12.97 )-----------( 348      ( 11 Oct 2019 07:46 )             38.7     10-11    136  |  101  |  17  ----------------------------<  215<H>  4.1   |  29  |  0.85    Ca    8.8      11 Oct 2019 07:46  Phos  3.0     10-10  Mg     1.8     10-10    TPro  7.7  /  Alb  3.0<L>  /  TBili  0.4  /  DBili  0.1  /  AST  58<H>  /  ALT  58  /  AlkPhos  106  10-11    PT/INR - ( 09 Oct 2019 14:42 )   PT: 12.8 sec;   INR: 1.15 ratio         PTT - ( 09 Oct 2019 14:42 )  PTT:32.2 sec    CAPILLARY BLOOD GLUCOSE      POCT Blood Glucose.: 372 mg/dL (11 Oct 2019 11:45)  POCT Blood Glucose.: 234 mg/dL (11 Oct 2019 08:02)  POCT Blood Glucose.: 304 mg/dL (10 Oct 2019 21:04)  POCT Blood Glucose.: 309 mg/dL (10 Oct 2019 16:46)      Consultant(s) Notes Reviewed:  [x] YES  [ ] NO

## 2019-10-11 NOTE — PROGRESS NOTE ADULT - PROBLEM SELECTOR PLAN 1
p/w shortness of breath with cough and chest tightness  CXR normal, lungs clear, afebrile, no WBC  admitted for acute asthma exacerbation secondary to acute bronchitis.  trial of IV steroids  c/w duoneb and azithromycin p/w shortness of breath with cough and chest tightness  admitted for acute asthma exacerbation secondary to acute bronchitis.  cnitnue steroids- switch to oral today or in am if stable  c/w duoneb and azithromycin

## 2019-10-11 NOTE — PROGRESS NOTE ADULT - ATTENDING COMMENTS
Patient was seen and examined by myself. Case was discussed with house staff in details. I have reviewed and agree with the plan as outlined above with edits where appropriate.    Vital Signs Last 24 Hrs  T(C): 37.3 (11 Oct 2019 13:32), Max: 37.3 (11 Oct 2019 13:32)  T(F): 99.1 (11 Oct 2019 13:32), Max: 99.1 (11 Oct 2019 13:32)  HR: 93 (11 Oct 2019 13:32) (89 - 94)  BP: 113/67 (11 Oct 2019 13:32) (103/65 - 125/72)  BP(mean): --  RR: 18 (11 Oct 2019 13:32) (18 - 18)  SpO2: 97% (11 Oct 2019 13:32) (94% - 97%)                          11.3   12.97 )-----------( 348      ( 11 Oct 2019 07:46 )             38.7     10-11    136  |  101  |  17  ----------------------------<  215<H>  4.1   |  29  |  0.85    Ca    8.8      11 Oct 2019 07:46  Phos  3.0     10-10  Mg     1.8     10-10    TPro  7.7  /  Alb  3.0<L>  /  TBili  0.4  /  DBili  0.1  /  AST  58<H>  /  ALT  58  /  AlkPhos  106  10-11    A/P: Acute asthma exacerbation  - continue steroids and bronchodilators  - OOB to chair  discharge planning- anticipate in am if clinically improves Patient was seen and examined by myself. Case was discussed with house staff in details. I have reviewed and agree with the plan as outlined above with edits where appropriate.    Vital Signs Last 24 Hrs  T(C): 37.3 (11 Oct 2019 13:32), Max: 37.3 (11 Oct 2019 13:32)  T(F): 99.1 (11 Oct 2019 13:32), Max: 99.1 (11 Oct 2019 13:32)  HR: 93 (11 Oct 2019 13:32) (89 - 94)  BP: 113/67 (11 Oct 2019 13:32) (103/65 - 125/72)  BP(mean): --  RR: 18 (11 Oct 2019 13:32) (18 - 18)  SpO2: 97% (11 Oct 2019 13:32) (94% - 97%)                          11.3   12.97 )-----------( 348      ( 11 Oct 2019 07:46 )             38.7     10-11    136  |  101  |  17  ----------------------------<  215<H>  4.1   |  29  |  0.85    Ca    8.8      11 Oct 2019 07:46  Phos  3.0     10-10  Mg     1.8     10-10    TPro  7.7  /  Alb  3.0<L>  /  TBili  0.4  /  DBili  0.1  /  AST  58<H>  /  ALT  58  /  AlkPhos  106  10-11    A/P: Acute asthma exacerbation; morbid obesity, multiple comorbid conditions  - continue steroids and bronchodilators  - OOB to chair  discharge planning- anticipate in am if clinically improves

## 2019-10-11 NOTE — PROGRESS NOTE ADULT - ASSESSMENT
54F from home, PMHx of asthma, chronic bronchitis, NAKUL, HTN, DM, HLD, fibromyalgia, anxiety, trigeminal neuralgia, degenerative joint disease, recently diagnosed IgA vasculitis presented to ED c/o cough and shortness of breath. Admitted for asthma exacerbation. 54F from home, PMHx of asthma, chronic bronchitis, NAKUL, HTN, DM, HLD, fibromyalgia, anxiety, trigeminal neuralgia, degenerative joint disease, recently diagnosed IgA vasculitis presented to ED c/o cough and shortness of breath.   Admitted for acute asthma exacerbation.

## 2019-10-12 LAB
ANION GAP SERPL CALC-SCNC: 4 MMOL/L — LOW (ref 5–17)
BUN SERPL-MCNC: 19 MG/DL — HIGH (ref 7–18)
CALCIUM SERPL-MCNC: 9.5 MG/DL — SIGNIFICANT CHANGE UP (ref 8.4–10.5)
CHLORIDE SERPL-SCNC: 101 MMOL/L — SIGNIFICANT CHANGE UP (ref 96–108)
CO2 SERPL-SCNC: 30 MMOL/L — SIGNIFICANT CHANGE UP (ref 22–31)
CREAT SERPL-MCNC: 0.94 MG/DL — SIGNIFICANT CHANGE UP (ref 0.5–1.3)
GLUCOSE BLDC GLUCOMTR-MCNC: 223 MG/DL — HIGH (ref 70–99)
GLUCOSE BLDC GLUCOMTR-MCNC: 284 MG/DL — HIGH (ref 70–99)
GLUCOSE BLDC GLUCOMTR-MCNC: 293 MG/DL — HIGH (ref 70–99)
GLUCOSE BLDC GLUCOMTR-MCNC: 330 MG/DL — HIGH (ref 70–99)
GLUCOSE SERPL-MCNC: 345 MG/DL — HIGH (ref 70–99)
HCT VFR BLD CALC: 39.9 % — SIGNIFICANT CHANGE UP (ref 34.5–45)
HGB BLD-MCNC: 11.9 G/DL — SIGNIFICANT CHANGE UP (ref 11.5–15.5)
MCHC RBC-ENTMCNC: 24.4 PG — LOW (ref 27–34)
MCHC RBC-ENTMCNC: 29.8 GM/DL — LOW (ref 32–36)
MCV RBC AUTO: 81.8 FL — SIGNIFICANT CHANGE UP (ref 80–100)
NRBC # BLD: 0 /100 WBCS — SIGNIFICANT CHANGE UP (ref 0–0)
PLATELET # BLD AUTO: 430 K/UL — HIGH (ref 150–400)
POTASSIUM SERPL-MCNC: 4.8 MMOL/L — SIGNIFICANT CHANGE UP (ref 3.5–5.3)
POTASSIUM SERPL-SCNC: 4.8 MMOL/L — SIGNIFICANT CHANGE UP (ref 3.5–5.3)
RBC # BLD: 4.88 M/UL — SIGNIFICANT CHANGE UP (ref 3.8–5.2)
RBC # FLD: 16.3 % — HIGH (ref 10.3–14.5)
S PNEUM AG SER QL: SIGNIFICANT CHANGE UP
SODIUM SERPL-SCNC: 135 MMOL/L — SIGNIFICANT CHANGE UP (ref 135–145)
WBC # BLD: 16.88 K/UL — HIGH (ref 3.8–10.5)
WBC # FLD AUTO: 16.88 K/UL — HIGH (ref 3.8–10.5)

## 2019-10-12 PROCEDURE — 99232 SBSQ HOSP IP/OBS MODERATE 35: CPT

## 2019-10-12 PROCEDURE — 99232 SBSQ HOSP IP/OBS MODERATE 35: CPT | Mod: GC

## 2019-10-12 RX ORDER — INSULIN GLARGINE 100 [IU]/ML
20 INJECTION, SOLUTION SUBCUTANEOUS AT BEDTIME
Refills: 0 | Status: DISCONTINUED | OUTPATIENT
Start: 2019-10-12 | End: 2019-10-14

## 2019-10-12 RX ADMIN — Medication 20 MILLIGRAM(S): at 12:42

## 2019-10-12 RX ADMIN — HEPARIN SODIUM 5000 UNIT(S): 5000 INJECTION INTRAVENOUS; SUBCUTANEOUS at 05:51

## 2019-10-12 RX ADMIN — HEPARIN SODIUM 5000 UNIT(S): 5000 INJECTION INTRAVENOUS; SUBCUTANEOUS at 22:02

## 2019-10-12 RX ADMIN — Medication 81 MILLIGRAM(S): at 12:42

## 2019-10-12 RX ADMIN — Medication 112 MICROGRAM(S): at 05:51

## 2019-10-12 RX ADMIN — Medication 3 MILLILITER(S): at 14:05

## 2019-10-12 RX ADMIN — METFORMIN HYDROCHLORIDE 1000 MILLIGRAM(S): 850 TABLET ORAL at 06:37

## 2019-10-12 RX ADMIN — PANTOPRAZOLE SODIUM 40 MILLIGRAM(S): 20 TABLET, DELAYED RELEASE ORAL at 05:58

## 2019-10-12 RX ADMIN — GABAPENTIN 400 MILLIGRAM(S): 400 CAPSULE ORAL at 12:42

## 2019-10-12 RX ADMIN — Medication 8: at 12:42

## 2019-10-12 RX ADMIN — AZITHROMYCIN 255 MILLIGRAM(S): 500 TABLET, FILM COATED ORAL at 05:58

## 2019-10-12 RX ADMIN — Medication 4: at 16:48

## 2019-10-12 RX ADMIN — LISINOPRIL 40 MILLIGRAM(S): 2.5 TABLET ORAL at 05:51

## 2019-10-12 RX ADMIN — OXCARBAZEPINE 600 MILLIGRAM(S): 300 TABLET, FILM COATED ORAL at 18:07

## 2019-10-12 RX ADMIN — Medication 1: at 22:00

## 2019-10-12 RX ADMIN — Medication 40 MILLIGRAM(S): at 18:07

## 2019-10-12 RX ADMIN — Medication 3 MILLILITER(S): at 09:22

## 2019-10-12 RX ADMIN — INSULIN GLARGINE 20 UNIT(S): 100 INJECTION, SOLUTION SUBCUTANEOUS at 22:01

## 2019-10-12 RX ADMIN — ATORVASTATIN CALCIUM 10 MILLIGRAM(S): 80 TABLET, FILM COATED ORAL at 22:02

## 2019-10-12 RX ADMIN — HEPARIN SODIUM 5000 UNIT(S): 5000 INJECTION INTRAVENOUS; SUBCUTANEOUS at 13:50

## 2019-10-12 RX ADMIN — Medication 10 MILLIGRAM(S): at 22:03

## 2019-10-12 RX ADMIN — Medication 3 MILLILITER(S): at 03:36

## 2019-10-12 RX ADMIN — Medication 6: at 08:25

## 2019-10-12 RX ADMIN — MONTELUKAST 10 MILLIGRAM(S): 4 TABLET, CHEWABLE ORAL at 22:02

## 2019-10-12 RX ADMIN — GABAPENTIN 300 MILLIGRAM(S): 400 CAPSULE ORAL at 22:02

## 2019-10-12 RX ADMIN — OXCARBAZEPINE 600 MILLIGRAM(S): 300 TABLET, FILM COATED ORAL at 05:51

## 2019-10-12 RX ADMIN — Medication 40 MILLIGRAM(S): at 05:51

## 2019-10-12 RX ADMIN — Medication 3 MILLILITER(S): at 20:23

## 2019-10-12 RX ADMIN — Medication 12.5 MILLIGRAM(S): at 05:51

## 2019-10-12 RX ADMIN — METFORMIN HYDROCHLORIDE 1000 MILLIGRAM(S): 850 TABLET ORAL at 18:08

## 2019-10-12 NOTE — PROGRESS NOTE ADULT - PROBLEM SELECTOR PLAN 1
p/w shortness of breath with cough and chest tightness  admitted for acute asthma exacerbation secondary to acute bronchitis.  on IV steroids. tapering down.  c/w Duoneb and azithromycin  patient is still having difficulty breathing, unable to complete full sentences.

## 2019-10-12 NOTE — PROGRESS NOTE ADULT - SUBJECTIVE AND OBJECTIVE BOX
Interval Events: Pt feeling better but still sob    OBJECTIVE:  Vital Signs Last 24 Hrs  T(C): 36.6 (12 Oct 2019 05:40), Max: 37.3 (11 Oct 2019 13:32)  T(F): 97.9 (12 Oct 2019 05:40), Max: 99.1 (11 Oct 2019 13:32)  HR: 91 (12 Oct 2019 09:36) (91 - 100)  BP: 124/75 (12 Oct 2019 05:40) (105/78 - 124/75)  RR: 18 (12 Oct 2019 05:40) (18 - 18)  SpO2: 95% (12 Oct 2019 09:36) (94% - 99%)    CAPILLARY BLOOD GLUCOSE    POCT Blood Glucose.: 330 mg/dL (12 Oct 2019 11:53)    PHYSICAL EXAM:  General: Awake, alert, oriented X 3.   HEENT: Atraumatic, PERRL, Anicteric.   Lymph Nodes: No palpable lymphadenopathy  Respiratory: Clear to auscultation bilaterally, no wheezes, rhonchi, or rales  Cardiovascular: S1 S2 normal. No murmurs, rubs or gallops.   Abdomen: Soft, non-tender, non-distended. No organomegaly.  Extremities: Warm to touch. Peripheral pulse palpable. +edema hands and legs  Skin: No rashes or skin lesions  Neurological: No focal deficits.  Psychiatry: Appropriate mood and affect.    HOSPITAL MEDICATIONS:  MEDICATIONS  (STANDING):  ALBUTerol    90 MICROgram(s) HFA Inhaler 1 Puff(s) Inhalation every 4 hours  ALBUTerol/ipratropium for Nebulization 3 milliLiter(s) Nebulizer every 6 hours  amitriptyline 10 milliGRAM(s) Oral at bedtime  aspirin enteric coated 81 milliGRAM(s) Oral daily  atorvastatin 10 milliGRAM(s) Oral at bedtime  azithromycin  IVPB 500 milliGRAM(s) IV Intermittent every 24 hours  dextrose 5%. 1000 milliLiter(s) (50 mL/Hr) IV Continuous <Continuous>  dextrose 50% Injectable 12.5 Gram(s) IV Push once  dextrose 50% Injectable 25 Gram(s) IV Push once  dextrose 50% Injectable 25 Gram(s) IV Push once  ergocalciferol 78003 Unit(s) Oral <User Schedule>  FLUoxetine 20 milliGRAM(s) Oral daily  gabapentin 300 milliGRAM(s) Oral at bedtime  gabapentin 400 milliGRAM(s) Oral daily  heparin  Injectable 5000 Unit(s) SubCutaneous every 8 hours  hydrochlorothiazide 12.5 milliGRAM(s) Oral daily  insulin glargine Injectable (LANTUS) 20 Unit(s) SubCutaneous at bedtime  insulin lispro (HumaLOG) corrective regimen sliding scale   SubCutaneous three times a day before meals  insulin lispro (HumaLOG) corrective regimen sliding scale   SubCutaneous at bedtime  levothyroxine 112 MICROGram(s) Oral daily  lisinopril 40 milliGRAM(s) Oral daily  metFORMIN 1000 milliGRAM(s) Oral two times a day  methylPREDNISolone sodium succinate Injectable 40 milliGRAM(s) IV Push every 12 hours  montelukast 10 milliGRAM(s) Oral at bedtime  ondansetron Injectable 4 milliGRAM(s) IV Push once  OXcarbazepine 600 milliGRAM(s) Oral two times a day  pantoprazole    Tablet 40 milliGRAM(s) Oral before breakfast  tiotropium 18 MICROgram(s) Capsule 1 Capsule(s) Inhalation daily    MEDICATIONS  (PRN):  dextrose 40% Gel 15 Gram(s) Oral once PRN Blood Glucose LESS THAN 70 milliGRAM(s)/deciliter  glucagon  Injectable 1 milliGRAM(s) IntraMuscular once PRN Glucose LESS THAN 70 milligrams/deciliter  guaiFENesin   Syrup  (Sugar-Free) 100 milliGRAM(s) Oral every 6 hours PRN Cough  ibuprofen  Tablet. 600 milliGRAM(s) Oral every 8 hours PRN Temp greater or equal to 38C (100.4F), Mild Pain (1 - 3), Moderate Pain (4 - 6)      LABS:                        11.9   16.88 )-----------( 430      ( 12 Oct 2019 05:55 )             39.9     10-12    135  |  101  |  19<H>  ----------------------------<  345<H>  4.8   |  30  |  0.94    Ca    9.5      12 Oct 2019 05:55    TPro  7.7  /  Alb  3.0<L>  /  TBili  0.4  /  DBili  0.1  /  AST  58<H>  /  ALT  58  /  AlkPhos  106  10-11    MICROBIOLOGY:       RADIOLOGY:  [X] Reviewed and interpreted by me

## 2019-10-12 NOTE — PROGRESS NOTE ADULT - ATTENDING COMMENTS
54F from home, PMHx of asthma, chronic bronchitis, NAKUL, HTN, DM, HLD, fibromyalgia, anxiety, trigeminal neuralgia, degenerative joint disease, recently diagnosed IgA vasculitis presented to ED c/o cough and shortness of breath. Admitted for acute asthma exacerbation. Pt still with difficulty speaking due to shortness of breath. Pt is using o2 via nasal canula. Will cw current dose of iv solumedrol and monitor in am.

## 2019-10-12 NOTE — PROGRESS NOTE ADULT - ASSESSMENT
54F from home, PMHx of asthma, chronic bronchitis, NAKUL, HTN, DM, HLD, fibromyalgia, anxiety, trigeminal neuralgia, degenerative joint disease, recently diagnosed IgA vasculitis presented to ED c/o cough and shortness of breath.   Admitted for acute asthma exacerbation.

## 2019-10-12 NOTE — PROGRESS NOTE ADULT - SUBJECTIVE AND OBJECTIVE BOX
PGY1 Note discussed with supervising resident and primary attending.    Patient is a 54y old  Female who presents with a chief complaint of Cough with shortness of breath (11 Oct 2019 13:54)      INTERVAL HPI/OVERNIGHT EVENTS: patient seen and examined at bedside.  Still feeling shortness of breath, using oxygen intermittently  on IV steroids, tapering down.  minimal wheezing on exam.  Will monitor improvement on daily bases.  continuing duoneb and azithromycin  blood sugar level are running high because of IV steroids use.  increased lantus to 20 units and increased coverage of sliding scale to moderate.          MEDICATIONS  (STANDING):  ALBUTerol    90 MICROgram(s) HFA Inhaler 1 Puff(s) Inhalation every 4 hours  ALBUTerol/ipratropium for Nebulization 3 milliLiter(s) Nebulizer every 6 hours  amitriptyline 10 milliGRAM(s) Oral at bedtime  aspirin enteric coated 81 milliGRAM(s) Oral daily  atorvastatin 10 milliGRAM(s) Oral at bedtime  azithromycin  IVPB 500 milliGRAM(s) IV Intermittent every 24 hours  dextrose 5%. 1000 milliLiter(s) (50 mL/Hr) IV Continuous <Continuous>  dextrose 50% Injectable 12.5 Gram(s) IV Push once  dextrose 50% Injectable 25 Gram(s) IV Push once  dextrose 50% Injectable 25 Gram(s) IV Push once  ergocalciferol 77481 Unit(s) Oral <User Schedule>  FLUoxetine 20 milliGRAM(s) Oral daily  gabapentin 300 milliGRAM(s) Oral at bedtime  gabapentin 400 milliGRAM(s) Oral daily  heparin  Injectable 5000 Unit(s) SubCutaneous every 8 hours  hydrochlorothiazide 12.5 milliGRAM(s) Oral daily  insulin glargine Injectable (LANTUS) 20 Unit(s) SubCutaneous at bedtime  insulin lispro (HumaLOG) corrective regimen sliding scale   SubCutaneous three times a day before meals  insulin lispro (HumaLOG) corrective regimen sliding scale   SubCutaneous at bedtime  levothyroxine 112 MICROGram(s) Oral daily  lisinopril 40 milliGRAM(s) Oral daily  metFORMIN 1000 milliGRAM(s) Oral two times a day  methylPREDNISolone sodium succinate Injectable 40 milliGRAM(s) IV Push every 12 hours  montelukast 10 milliGRAM(s) Oral at bedtime  ondansetron Injectable 4 milliGRAM(s) IV Push once  OXcarbazepine 600 milliGRAM(s) Oral two times a day  pantoprazole    Tablet 40 milliGRAM(s) Oral before breakfast  tiotropium 18 MICROgram(s) Capsule 1 Capsule(s) Inhalation daily    MEDICATIONS  (PRN):  dextrose 40% Gel 15 Gram(s) Oral once PRN Blood Glucose LESS THAN 70 milliGRAM(s)/deciliter  glucagon  Injectable 1 milliGRAM(s) IntraMuscular once PRN Glucose LESS THAN 70 milligrams/deciliter  guaiFENesin   Syrup  (Sugar-Free) 100 milliGRAM(s) Oral every 6 hours PRN Cough  ibuprofen  Tablet. 600 milliGRAM(s) Oral every 8 hours PRN Temp greater or equal to 38C (100.4F), Mild Pain (1 - 3), Moderate Pain (4 - 6)      Allergies    Fioricet (Rash)  mushroom (Unknown)    Intolerances        Vital Signs Last 24 Hrs  T(C): 36.6 (12 Oct 2019 05:40), Max: 37.3 (11 Oct 2019 13:32)  T(F): 97.9 (12 Oct 2019 05:40), Max: 99.1 (11 Oct 2019 13:32)  HR: 91 (12 Oct 2019 09:36) (91 - 100)  BP: 124/75 (12 Oct 2019 05:40) (105/78 - 124/75)  BP(mean): --  RR: 18 (12 Oct 2019 05:40) (18 - 18)  SpO2: 95% (12 Oct 2019 09:36) (94% - 99%)    PHYSICAL EXAM:  GENERAL: NAD, well-groomed, well-developed  HEAD:  Atraumatic, Normocephalic  EYES: EOMI, PERRLA, conjunctiva and sclera clear  NECK: Supple, No JVD, Normal thyroid  CHEST/LUNG: Clear to percussion bilaterally; No rales, rhonchi, wheezing, or rubs  HEART: Regular rate and rhythm; No murmurs, rubs, or gallops  ABDOMEN: Soft, Nontender, Nondistended; Bowel sounds present  NERVOUS SYSTEM:  Alert & Oriented X3, Good concentration; Motor Strength 5/5 B/L   EXTREMITIES:  2+ Peripheral Pulses, No clubbing, cyanosis, or edema  SKIN;    LABS:                        11.9   16.88 )-----------( 430      ( 12 Oct 2019 05:55 )             39.9     10-12    135  |  101  |  19<H>  ----------------------------<  345<H>  4.8   |  30  |  0.94    Ca    9.5      12 Oct 2019 05:55    TPro  7.7  /  Alb  3.0<L>  /  TBili  0.4  /  DBili  0.1  /  AST  58<H>  /  ALT  58  /  AlkPhos  106  10-11        CAPILLARY BLOOD GLUCOSE      POCT Blood Glucose.: 293 mg/dL (12 Oct 2019 08:04)  POCT Blood Glucose.: 339 mg/dL (11 Oct 2019 21:18)  POCT Blood Glucose.: 308 mg/dL (11 Oct 2019 16:23)  POCT Blood Glucose.: 372 mg/dL (11 Oct 2019 11:45)      Consultant(s) Notes Reviewed:  [x] YES  [ ] NO

## 2019-10-12 NOTE — PROGRESS NOTE ADULT - PROBLEM SELECTOR PLAN 3
home meds: glipizide 5mg OD, metformin 1000 BID  restarted metformin on patient request  blood sugars are running high.   added lantus 20 units and started on insulin sliding scale.  Monitor fingerstick and adjust meds

## 2019-10-12 NOTE — PROGRESS NOTE ADULT - ASSESSMENT
Acute bronchitis with asthma exacerbation  NAKUL noncompliant with CPAP    Clinically improved  No current wheezing  Some of her dyspnea may be from her swelling and fluid retention  Can consider 1 or 2 doses of diuresis to help mobilize her fluid  Taper steroids  Stable from a pulmonary standpoint  The patient has an appoitment with Dr Monterroso on Monday at [id number]  Likely will not require home O2

## 2019-10-13 LAB
ANION GAP SERPL CALC-SCNC: 5 MMOL/L — SIGNIFICANT CHANGE UP (ref 5–17)
BUN SERPL-MCNC: 21 MG/DL — HIGH (ref 7–18)
CALCIUM SERPL-MCNC: 9.2 MG/DL — SIGNIFICANT CHANGE UP (ref 8.4–10.5)
CHLORIDE SERPL-SCNC: 97 MMOL/L — SIGNIFICANT CHANGE UP (ref 96–108)
CO2 SERPL-SCNC: 33 MMOL/L — HIGH (ref 22–31)
CREAT SERPL-MCNC: 0.91 MG/DL — SIGNIFICANT CHANGE UP (ref 0.5–1.3)
GLUCOSE BLDC GLUCOMTR-MCNC: 113 MG/DL — HIGH (ref 70–99)
GLUCOSE BLDC GLUCOMTR-MCNC: 204 MG/DL — HIGH (ref 70–99)
GLUCOSE BLDC GLUCOMTR-MCNC: 211 MG/DL — HIGH (ref 70–99)
GLUCOSE BLDC GLUCOMTR-MCNC: 290 MG/DL — HIGH (ref 70–99)
GLUCOSE SERPL-MCNC: 224 MG/DL — HIGH (ref 70–99)
HCT VFR BLD CALC: 40.6 % — SIGNIFICANT CHANGE UP (ref 34.5–45)
HGB BLD-MCNC: 11.8 G/DL — SIGNIFICANT CHANGE UP (ref 11.5–15.5)
MCHC RBC-ENTMCNC: 23.8 PG — LOW (ref 27–34)
MCHC RBC-ENTMCNC: 29.1 GM/DL — LOW (ref 32–36)
MCV RBC AUTO: 81.9 FL — SIGNIFICANT CHANGE UP (ref 80–100)
NRBC # BLD: 0 /100 WBCS — SIGNIFICANT CHANGE UP (ref 0–0)
PLATELET # BLD AUTO: 395 K/UL — SIGNIFICANT CHANGE UP (ref 150–400)
POTASSIUM SERPL-MCNC: 4.4 MMOL/L — SIGNIFICANT CHANGE UP (ref 3.5–5.3)
POTASSIUM SERPL-SCNC: 4.4 MMOL/L — SIGNIFICANT CHANGE UP (ref 3.5–5.3)
RBC # BLD: 4.96 M/UL — SIGNIFICANT CHANGE UP (ref 3.8–5.2)
RBC # FLD: 16.3 % — HIGH (ref 10.3–14.5)
SODIUM SERPL-SCNC: 135 MMOL/L — SIGNIFICANT CHANGE UP (ref 135–145)
WBC # BLD: 14.79 K/UL — HIGH (ref 3.8–10.5)
WBC # FLD AUTO: 14.79 K/UL — HIGH (ref 3.8–10.5)

## 2019-10-13 PROCEDURE — 99232 SBSQ HOSP IP/OBS MODERATE 35: CPT

## 2019-10-13 RX ORDER — FUROSEMIDE 40 MG
40 TABLET ORAL ONCE
Refills: 0 | Status: COMPLETED | OUTPATIENT
Start: 2019-10-13 | End: 2019-10-13

## 2019-10-13 RX ADMIN — Medication 100 MILLIGRAM(S): at 14:59

## 2019-10-13 RX ADMIN — Medication 40 MILLIGRAM(S): at 12:22

## 2019-10-13 RX ADMIN — Medication 600 MILLIGRAM(S): at 15:04

## 2019-10-13 RX ADMIN — OXCARBAZEPINE 600 MILLIGRAM(S): 300 TABLET, FILM COATED ORAL at 05:50

## 2019-10-13 RX ADMIN — HEPARIN SODIUM 5000 UNIT(S): 5000 INJECTION INTRAVENOUS; SUBCUTANEOUS at 14:59

## 2019-10-13 RX ADMIN — Medication 100 MILLIGRAM(S): at 22:19

## 2019-10-13 RX ADMIN — Medication 3 MILLILITER(S): at 14:56

## 2019-10-13 RX ADMIN — Medication 40 MILLIGRAM(S): at 05:51

## 2019-10-13 RX ADMIN — METFORMIN HYDROCHLORIDE 1000 MILLIGRAM(S): 850 TABLET ORAL at 05:51

## 2019-10-13 RX ADMIN — HEPARIN SODIUM 5000 UNIT(S): 5000 INJECTION INTRAVENOUS; SUBCUTANEOUS at 22:20

## 2019-10-13 RX ADMIN — MONTELUKAST 10 MILLIGRAM(S): 4 TABLET, CHEWABLE ORAL at 22:19

## 2019-10-13 RX ADMIN — Medication 40 MILLIGRAM(S): at 12:21

## 2019-10-13 RX ADMIN — PANTOPRAZOLE SODIUM 40 MILLIGRAM(S): 20 TABLET, DELAYED RELEASE ORAL at 06:02

## 2019-10-13 RX ADMIN — Medication 100 MILLIGRAM(S): at 00:45

## 2019-10-13 RX ADMIN — LISINOPRIL 40 MILLIGRAM(S): 2.5 TABLET ORAL at 05:51

## 2019-10-13 RX ADMIN — Medication 4: at 17:05

## 2019-10-13 RX ADMIN — Medication 12.5 MILLIGRAM(S): at 05:51

## 2019-10-13 RX ADMIN — METFORMIN HYDROCHLORIDE 1000 MILLIGRAM(S): 850 TABLET ORAL at 17:05

## 2019-10-13 RX ADMIN — Medication 100 MILLIGRAM(S): at 12:34

## 2019-10-13 RX ADMIN — Medication 4: at 08:13

## 2019-10-13 RX ADMIN — GABAPENTIN 400 MILLIGRAM(S): 400 CAPSULE ORAL at 12:22

## 2019-10-13 RX ADMIN — Medication 3 MILLILITER(S): at 02:18

## 2019-10-13 RX ADMIN — Medication 600 MILLIGRAM(S): at 16:05

## 2019-10-13 RX ADMIN — AZITHROMYCIN 255 MILLIGRAM(S): 500 TABLET, FILM COATED ORAL at 05:50

## 2019-10-13 RX ADMIN — Medication 112 MICROGRAM(S): at 05:51

## 2019-10-13 RX ADMIN — ATORVASTATIN CALCIUM 10 MILLIGRAM(S): 80 TABLET, FILM COATED ORAL at 22:20

## 2019-10-13 RX ADMIN — Medication 81 MILLIGRAM(S): at 12:21

## 2019-10-13 RX ADMIN — HEPARIN SODIUM 5000 UNIT(S): 5000 INJECTION INTRAVENOUS; SUBCUTANEOUS at 05:51

## 2019-10-13 RX ADMIN — GABAPENTIN 300 MILLIGRAM(S): 400 CAPSULE ORAL at 22:19

## 2019-10-13 RX ADMIN — Medication 3 MILLILITER(S): at 20:25

## 2019-10-13 RX ADMIN — INSULIN GLARGINE 20 UNIT(S): 100 INJECTION, SOLUTION SUBCUTANEOUS at 22:19

## 2019-10-13 RX ADMIN — Medication 3 MILLILITER(S): at 09:07

## 2019-10-13 RX ADMIN — Medication 20 MILLIGRAM(S): at 12:21

## 2019-10-13 RX ADMIN — OXCARBAZEPINE 600 MILLIGRAM(S): 300 TABLET, FILM COATED ORAL at 17:05

## 2019-10-13 RX ADMIN — Medication 6: at 12:21

## 2019-10-13 NOTE — PROGRESS NOTE ADULT - REASON FOR ADMISSION
Cough with shortness of breath

## 2019-10-13 NOTE — PROGRESS NOTE ADULT - SUBJECTIVE AND OBJECTIVE BOX
Patient is a 54y old  Female who presents with a chief complaint of Cough with shortness of breath (12 Oct 2019 12:22)      HPI:  54F from home, PMHx of asthma, chronic bronchitis, NAKUL, HTN, DM, HLD, fibromyalgia, anxiety, trigeminal neuralgia, degenerative joint disease, recently diagnosed IgA vasculitis presented to ED c/o cough and shortness of breath. She states she took flu vaccine on aug 29th and since then she has been feeling sick but it got worse these past 10 days. She has productive cough non bloody, no foul smelling, yellowish sputum, ass. with chest tightness, watering of eyes, runny nose and shortness of breath. She denies any fever, chest pain, abdominal pain, urinary or bowel complaint. (09 Oct 2019 22:04)    OPPQQRST    ROS:    PAST MEDICAL & SURGICAL HISTORY:  DJD (degenerative joint disease): Cervical/Thoracic/Lumbar  DM (diabetes mellitus)  Fibromyalgia  Trigeminal neuralgia  Obesity  Obstructive sleep apnea  Hypothyroid  Asthma  Hyperlipidemia  Hypertension  S/P  section  S/P abdominal hysterectomy    MEDICATIONS  (STANDING):  ALBUTerol    90 MICROgram(s) HFA Inhaler 1 Puff(s) Inhalation every 4 hours  ALBUTerol/ipratropium for Nebulization 3 milliLiter(s) Nebulizer every 6 hours  amitriptyline 10 milliGRAM(s) Oral at bedtime  aspirin enteric coated 81 milliGRAM(s) Oral daily  atorvastatin 10 milliGRAM(s) Oral at bedtime  azithromycin  IVPB 500 milliGRAM(s) IV Intermittent every 24 hours  benzonatate 100 milliGRAM(s) Oral three times a day  dextrose 5%. 1000 milliLiter(s) (50 mL/Hr) IV Continuous <Continuous>  dextrose 50% Injectable 12.5 Gram(s) IV Push once  dextrose 50% Injectable 25 Gram(s) IV Push once  dextrose 50% Injectable 25 Gram(s) IV Push once  ergocalciferol 82406 Unit(s) Oral <User Schedule>  FLUoxetine 20 milliGRAM(s) Oral daily  gabapentin 300 milliGRAM(s) Oral at bedtime  gabapentin 400 milliGRAM(s) Oral daily  heparin  Injectable 5000 Unit(s) SubCutaneous every 8 hours  hydrochlorothiazide 12.5 milliGRAM(s) Oral daily  insulin glargine Injectable (LANTUS) 20 Unit(s) SubCutaneous at bedtime  insulin lispro (HumaLOG) corrective regimen sliding scale   SubCutaneous three times a day before meals  insulin lispro (HumaLOG) corrective regimen sliding scale   SubCutaneous at bedtime  levothyroxine 112 MICROGram(s) Oral daily  lisinopril 40 milliGRAM(s) Oral daily  metFORMIN 1000 milliGRAM(s) Oral two times a day  methylPREDNISolone sodium succinate Injectable 40 milliGRAM(s) IV Push daily  montelukast 10 milliGRAM(s) Oral at bedtime  ondansetron Injectable 4 milliGRAM(s) IV Push once  OXcarbazepine 600 milliGRAM(s) Oral two times a day  pantoprazole    Tablet 40 milliGRAM(s) Oral before breakfast  tiotropium 18 MICROgram(s) Capsule 1 Capsule(s) Inhalation daily    MEDICATIONS  (PRN):  dextrose 40% Gel 15 Gram(s) Oral once PRN Blood Glucose LESS THAN 70 milliGRAM(s)/deciliter  glucagon  Injectable 1 milliGRAM(s) IntraMuscular once PRN Glucose LESS THAN 70 milligrams/deciliter  guaiFENesin   Syrup  (Sugar-Free) 100 milliGRAM(s) Oral every 6 hours PRN Cough  ibuprofen  Tablet. 600 milliGRAM(s) Oral every 8 hours PRN Temp greater or equal to 38C (100.4F), Mild Pain (1 - 3), Moderate Pain (4 - 6)      EXAM:  Vital Signs Last 24 Hrs  T(C): 36.6 (13 Oct 2019 20:17), Max: 36.9 (13 Oct 2019 13:54)  T(F): 97.8 (13 Oct 2019 20:17), Max: 98.4 (13 Oct 2019 13:54)  HR: 83 (13 Oct 2019 20:17) (79 - 98)  BP: 104/71 (13 Oct 2019 20:17) (104/71 - 136/85)  BP(mean): --  RR: 18 (13 Oct 2019 20:17) (17 - 18)  SpO2: 98% (13 Oct 2019 20:17) (91% - 98%)      PHYSICAL EXAM:  Constitutional: awake  Neck: supple  Respiratory: bilaterally clear to auscultation, no wheezing, no rhonchi, no crackles, no decreased air entry  Cardiovascular: s1s2, rrr, no murmurs.   Gastrointestinal: soft, non tender, +bowel sounds, no rebound, no guarding.   Extremities: no edema.   Neurological: alert and oriented to person, date and place.   Skin: intact no rash, warm to touch.   Musculoskeletal: moves all 4 extremities  Psychiatric: appropriate affect.     LABS:                              11.8   14.79 )-----------( 395      ( 13 Oct 2019 08:19 )             40.6     10-13    135  |  97  |  21<H>  ----------------------------<  224<H>  4.4   |  33<H>  |  0.91    Ca    9.2      13 Oct 2019 08:19 Patient is a 54y old  Female who presents with a chief complaint of Cough with shortness of breath (12 Oct 2019 12:22)    HPI:  54F from home, PMHx of asthma, chronic bronchitis, NAKUL, HTN, DM, HLD, fibromyalgia, anxiety, trigeminal neuralgia, degenerative joint disease, recently diagnosed IgA vasculitis presented to ED c/o cough and shortness of breath. She states she took flu vaccine on aug 29th and since then she has been feeling sick but it got worse these past 10 days. She has productive cough non bloody, no foul smelling, yellowish sputum, ass. with chest tightness, watering of eyes, runny nose and shortness of breath. She denies any fever, chest pain, abdominal pain, urinary or bowel complaint. (09 Oct 2019 22:04)    Today: Pt states that her pulse ox drops to 88% on room air without the nasal canula. No documentation in chart. Nurse states that this must have occurred earlier.   Pt states that she is swollen in her legs.   No fevers. No chest pain. +sob.    PAST MEDICAL & SURGICAL HISTORY:  DJD (degenerative joint disease): Cervical/Thoracic/Lumbar  DM (diabetes mellitus)  Fibromyalgia  Trigeminal neuralgia  Obesity  Obstructive sleep apnea  Hypothyroid  Asthma  Hyperlipidemia  Hypertension  S/P  section  S/P abdominal hysterectomy    MEDICATIONS  (STANDING):  ALBUTerol    90 MICROgram(s) HFA Inhaler 1 Puff(s) Inhalation every 4 hours  ALBUTerol/ipratropium for Nebulization 3 milliLiter(s) Nebulizer every 6 hours  amitriptyline 10 milliGRAM(s) Oral at bedtime  aspirin enteric coated 81 milliGRAM(s) Oral daily  atorvastatin 10 milliGRAM(s) Oral at bedtime  azithromycin  IVPB 500 milliGRAM(s) IV Intermittent every 24 hours  benzonatate 100 milliGRAM(s) Oral three times a day  dextrose 5%. 1000 milliLiter(s) (50 mL/Hr) IV Continuous <Continuous>  dextrose 50% Injectable 12.5 Gram(s) IV Push once  dextrose 50% Injectable 25 Gram(s) IV Push once  dextrose 50% Injectable 25 Gram(s) IV Push once  ergocalciferol 67907 Unit(s) Oral <User Schedule>  FLUoxetine 20 milliGRAM(s) Oral daily  gabapentin 300 milliGRAM(s) Oral at bedtime  gabapentin 400 milliGRAM(s) Oral daily  heparin  Injectable 5000 Unit(s) SubCutaneous every 8 hours  hydrochlorothiazide 12.5 milliGRAM(s) Oral daily  insulin glargine Injectable (LANTUS) 20 Unit(s) SubCutaneous at bedtime  insulin lispro (HumaLOG) corrective regimen sliding scale   SubCutaneous three times a day before meals  insulin lispro (HumaLOG) corrective regimen sliding scale   SubCutaneous at bedtime  levothyroxine 112 MICROGram(s) Oral daily  lisinopril 40 milliGRAM(s) Oral daily  metFORMIN 1000 milliGRAM(s) Oral two times a day  methylPREDNISolone sodium succinate Injectable 40 milliGRAM(s) IV Push daily  montelukast 10 milliGRAM(s) Oral at bedtime  ondansetron Injectable 4 milliGRAM(s) IV Push once  OXcarbazepine 600 milliGRAM(s) Oral two times a day  pantoprazole    Tablet 40 milliGRAM(s) Oral before breakfast  tiotropium 18 MICROgram(s) Capsule 1 Capsule(s) Inhalation daily    MEDICATIONS  (PRN):  dextrose 40% Gel 15 Gram(s) Oral once PRN Blood Glucose LESS THAN 70 milliGRAM(s)/deciliter  glucagon  Injectable 1 milliGRAM(s) IntraMuscular once PRN Glucose LESS THAN 70 milligrams/deciliter  guaiFENesin   Syrup  (Sugar-Free) 100 milliGRAM(s) Oral every 6 hours PRN Cough  ibuprofen  Tablet. 600 milliGRAM(s) Oral every 8 hours PRN Temp greater or equal to 38C (100.4F), Mild Pain (1 - 3), Moderate Pain (4 - 6)    EXAM:  Vital Signs Last 24 Hrs  T(C): 36.6 (13 Oct 2019 20:17), Max: 36.9 (13 Oct 2019 13:54)  T(F): 97.8 (13 Oct 2019 20:17), Max: 98.4 (13 Oct 2019 13:54)  HR: 83 (13 Oct 2019 20:17) (79 - 98)  BP: 104/71 (13 Oct 2019 20:17) (104/71 - 136/85)  BP(mean): --  RR: 18 (13 Oct 2019 20:17) (17 - 18)  SpO2: 98% (13 Oct 2019 20:17) (91% - 98%)    PHYSICAL EXAM:  Constitutional: awake on nasal canula.   Neck: supple  Respiratory: bilaterally decreased air entry. no audible wheezing.   Extremities: + LE edema.   Neurological: alert and oriented to person, date and place.   Skin: intact no rash, warm to touch.   Musculoskeletal: moves all 4 extremities  Psychiatric: appropriate affect.     LABS:                        11.8   14.79 )-----------( 395      ( 13 Oct 2019 08:19 )             40.6   10-  135  |  97  |  21<H>  ----------------------------<  224<H>  4.4   |  33<H>  |  0.91  Ca    9.2      13 Oct 2019 08:19

## 2019-10-13 NOTE — PROGRESS NOTE ADULT - ASSESSMENT
54F from home, PMHx of asthma, chronic bronchitis, NAKUL, HTN, DM, HLD, fibromyalgia, anxiety, trigeminal neuralgia, degenerative joint disease, recently diagnosed IgA vasculitis presented to ED c/o cough and shortness of breath.   Admitted for acute asthma exacerbation.     Problem/Plan - 1:  ·  Problem: Asthma exacerbation.  Plan: p/w shortness of breath with cough and chest tightness  admitted for acute asthma exacerbation secondary to acute bronchitis.  on IV steroids  c/w Duoneb and azithromycin  To check ambulatory pulse ox.      Problem/Plan - 2:  ·  Problem: Transaminitis.  Plan: resolved.  US hepatic showed fatty liver.      Problem/Plan - 3:  ·  Problem: DM (diabetes mellitus).  Plan: home meds: glipizide 5mg OD, metformin 1000 BID  restarted metformin on patient request  blood sugars are running high.   added lantus 20 units and started on insulin sliding scale.  Monitor fingerstick and adjust meds.      Problem/Plan - 4:  ·  Problem: Hypertension.  Plan: Home meds: HCTZ 12.5mg, ramipril 10mg  c/w home medications     Problem/Plan - 5:  ·  Problem: Obstructive sleep apnea. Outpatient follow up.      Problem/Plan - 6:  Problem: Hypothyroid. Plan: c/w levothyroxine 112mcg  TSH is wnl.     Problem/Plan - 7:  ·  Problem: Hyperlipidemia.  Plan: C/W with Stain therapy     Problem/Plan - 8:  ·  Problem: Prophylactic measure.  Plan: IMPROVE VTE Individual Risk Assessment  RISK                                                                Points  [  ] Previous VTE                                                  3  [  ] Thrombophilia                                               2  [  ] Lower limb paralysis                                      2        (unable to hold up >15 seconds)    [  ] Current Cancer                                              2         (within 6 months)  [x  ] Immobilization > 24 hrs                                1  [  ] ICU/CCU stay > 24 hours                              1  [x  ] Age > 60                                                      1  IMPROVE VTE Score _________2,  heparin for DVT proph.     Dispo: if improved in am, will dc home on medrol dose pack.

## 2019-10-14 ENCOUNTER — TRANSCRIPTION ENCOUNTER (OUTPATIENT)
Age: 54
End: 2019-10-14

## 2019-10-14 VITALS
HEART RATE: 98 BPM | OXYGEN SATURATION: 92 % | TEMPERATURE: 98 F | DIASTOLIC BLOOD PRESSURE: 80 MMHG | SYSTOLIC BLOOD PRESSURE: 145 MMHG | RESPIRATION RATE: 19 BRPM

## 2019-10-14 LAB
GLUCOSE BLDC GLUCOMTR-MCNC: 191 MG/DL — HIGH (ref 70–99)
GLUCOSE BLDC GLUCOMTR-MCNC: 301 MG/DL — HIGH (ref 70–99)

## 2019-10-14 PROCEDURE — 99239 HOSP IP/OBS DSCHRG MGMT >30: CPT | Mod: GC

## 2019-10-14 RX ORDER — IPRATROPIUM/ALBUTEROL SULFATE 18-103MCG
3 AEROSOL WITH ADAPTER (GRAM) INHALATION
Qty: 100 | Refills: 0
Start: 2019-10-14 | End: 2019-10-20

## 2019-10-14 RX ORDER — FUROSEMIDE 40 MG
20 TABLET ORAL DAILY
Refills: 0 | Status: DISCONTINUED | OUTPATIENT
Start: 2019-10-14 | End: 2019-10-14

## 2019-10-14 RX ADMIN — HEPARIN SODIUM 5000 UNIT(S): 5000 INJECTION INTRAVENOUS; SUBCUTANEOUS at 13:11

## 2019-10-14 RX ADMIN — Medication 20 MILLIGRAM(S): at 09:08

## 2019-10-14 RX ADMIN — Medication 100 MILLIGRAM(S): at 13:11

## 2019-10-14 RX ADMIN — GABAPENTIN 400 MILLIGRAM(S): 400 CAPSULE ORAL at 12:12

## 2019-10-14 RX ADMIN — Medication 600 MILLIGRAM(S): at 09:08

## 2019-10-14 RX ADMIN — Medication 40 MILLIGRAM(S): at 06:55

## 2019-10-14 RX ADMIN — METFORMIN HYDROCHLORIDE 1000 MILLIGRAM(S): 850 TABLET ORAL at 06:55

## 2019-10-14 RX ADMIN — Medication 3 MILLILITER(S): at 15:17

## 2019-10-14 RX ADMIN — AZITHROMYCIN 255 MILLIGRAM(S): 500 TABLET, FILM COATED ORAL at 06:54

## 2019-10-14 RX ADMIN — Medication 600 MILLIGRAM(S): at 09:38

## 2019-10-14 RX ADMIN — Medication 2: at 08:06

## 2019-10-14 RX ADMIN — Medication 3 MILLILITER(S): at 02:34

## 2019-10-14 RX ADMIN — PANTOPRAZOLE SODIUM 40 MILLIGRAM(S): 20 TABLET, DELAYED RELEASE ORAL at 06:55

## 2019-10-14 RX ADMIN — HEPARIN SODIUM 5000 UNIT(S): 5000 INJECTION INTRAVENOUS; SUBCUTANEOUS at 06:55

## 2019-10-14 RX ADMIN — Medication 112 MICROGRAM(S): at 06:55

## 2019-10-14 RX ADMIN — Medication 20 MILLIGRAM(S): at 12:12

## 2019-10-14 RX ADMIN — OXCARBAZEPINE 600 MILLIGRAM(S): 300 TABLET, FILM COATED ORAL at 06:55

## 2019-10-14 RX ADMIN — Medication 81 MILLIGRAM(S): at 12:12

## 2019-10-14 RX ADMIN — Medication 8: at 12:13

## 2019-10-14 RX ADMIN — Medication 600 MILLIGRAM(S): at 01:40

## 2019-10-14 RX ADMIN — Medication 100 MILLIGRAM(S): at 06:55

## 2019-10-14 RX ADMIN — Medication 3 MILLILITER(S): at 08:14

## 2019-10-14 RX ADMIN — Medication 600 MILLIGRAM(S): at 02:10

## 2019-10-14 NOTE — DISCHARGE NOTE PROVIDER - NSDCPNSUBOBJ_GEN_ALL_CORE
MEDICAL ATTENDING DISCHARGE NOTE :        Patient is a 54y old  Female who presents with a chief complaint of Cough with shortness of breath (14 Oct 2019 13:43)            INTERVAL HPI / OVERNIGHT EVENTS: patient with uneventful night and offers no new complaints        ----------------------------------------------------------------------------------    REVIEW OF SYSTEMS: no fever; no SOB            Vital Signs Last 24 Hrs    T(C): 36.8 (14 Oct 2019 16:07), Max: 37 (14 Oct 2019 13:50)    T(F): 98.3 (14 Oct 2019 16:07), Max: 98.6 (14 Oct 2019 13:50)    HR: 98 (14 Oct 2019 16:07) (87 - 98)    BP: 145/80 (14 Oct 2019 16:07) (107/68 - 145/80)    BP(mean): --    RR: 19 (14 Oct 2019 16:07) (19 - 20)    SpO2: 92% (14 Oct 2019 16:07) (92% - 98%)        _________________    PHYSICAL EXAM:    ---------------------------     NAD; Normocephalic    LUNGS - no wheezing    HEART: S1 S2+     ABDOMEN: Soft, Nontender, non distended    EXTREMITIES: no cyanosis; no edema            _________________________________________________    LABS:                            11.8     14.79 )-----------( 395      ( 13 Oct 2019 08:19 )               40.6         10-13        135  |  97  |  21<H>    ----------------------------<  224<H>    4.4   |  33<H>  |  0.91        Ca    9.2      13 Oct 2019 08:19                a/P: Acute asthma exacerbation in patient with morbid obesity and NAKUL; h/o fibromyalgia- clinically improved.    Discharge on steroid taper    Outpatient Pulmonary follow up    Continue bronchodilators    Weight loss advised        Plan of care, test results and findings were  discussed with patient ( and HCP &/or primary care giver) ; all questions and concerns were addressed and care was aligned with patient's wishes.    PMD follow up after discharge from the hospital for continued care and outpatient monitoring was advised.    Discharge plans has been  discussed with care team including the housestaff, nursing staff  and discharge planners- ( /  .)

## 2019-10-14 NOTE — DISCHARGE NOTE PROVIDER - NSDCCPCAREPLAN_GEN_ALL_CORE_FT
PRINCIPAL DISCHARGE DIAGNOSIS  Diagnosis: Asthma exacerbation  Assessment and Plan of Treatment: You were admitted with acute asthma exacerbation secondary to acute bronchitis. You were treated with Duoneb nebulizations, IV steroids and azithromycin. You have improved symptomatically and your lungs are clear on auscultation. Pulmonary consult was obtained , agreed with current recommendations and you are clear for discharge from pulmonary stand point. You are saturating 93 percent at room air with any shortness of breath. You are stable for discharge per primary attending.  you will continue duoneb nebulizations and oral steroid taper for 4 days.  you have pulmonologist appointment scheduled on 10/18/2019 at [address] [place] [address].        SECONDARY DISCHARGE DIAGNOSES  Diagnosis: Transaminitis  Assessment and Plan of Treatment: you presented with transaminitis but it resolved and US hepatic showed fatty liver.  please continue to follow up with your PCP    Diagnosis: Hyperlipidemia  Assessment and Plan of Treatment: you will continue taking atorvastatin 10 mg. Your LDl is 120 mg/dl. You will continue same dose of atorvastatin.  please follow up with pcp and have your lipid panel drawn every 3-6 months and mantain dietry precautions.    Diagnosis: DM (diabetes mellitus)  Assessment and Plan of Treatment: you were on oral metformin and glimiperide, you will continue that.  please continue to monitor your blood glucose level and have HBA1C drawn every 3 months with outpatient follow up with pcp    Diagnosis: Hypothyroid  Assessment and Plan of Treatment: you have history of hypothyroidism. your TSH was within normal limits. you will continue home dose.    Diagnosis: Obstructive sleep apnea  Assessment and Plan of Treatment: outpatient sleep study and follow up with pulmonolgist. PRINCIPAL DISCHARGE DIAGNOSIS  Diagnosis: Asthma exacerbation  Assessment and Plan of Treatment: You were admitted with acute asthma exacerbation secondary to acute bronchitis. You were treated with Duoneb nebulizations, IV steroids and azithromycin. You have improved symptomatically and your lungs are clear on auscultation. Pulmonary consult was obtained , agreed with current recommendations and you are clear for discharge from pulmonary stand point. You are saturating 93 percent at room air with any shortness of breath. You are stable for discharge per primary attending.  you will continue duoneb nebulizations and oral steroid taper for 6 days.  you have pulmonologist appointment scheduled on 10/18/2019 at [address] [place] [address].        SECONDARY DISCHARGE DIAGNOSES  Diagnosis: Transaminitis  Assessment and Plan of Treatment: you presented with transaminitis but it resolved and US hepatic showed fatty liver.  please continue to follow up with your PCP    Diagnosis: Hyperlipidemia  Assessment and Plan of Treatment: you will continue taking atorvastatin 10 mg. Your LDl is 120 mg/dl. You will continue same dose of atorvastatin.  please follow up with pcp and have your lipid panel drawn every 3-6 months and mantain dietry precautions.    Diagnosis: DM (diabetes mellitus)  Assessment and Plan of Treatment: you were on oral metformin and glimiperide, you will continue that.  please continue to monitor your blood glucose level and have HBA1C drawn every 3 months with outpatient follow up with pcp    Diagnosis: Hypothyroid  Assessment and Plan of Treatment: you have history of hypothyroidism. your TSH was within normal limits. you will continue home dose.    Diagnosis: Obstructive sleep apnea  Assessment and Plan of Treatment: outpatient sleep study and follow up with pulmonolgist.

## 2019-10-14 NOTE — DISCHARGE NOTE PROVIDER - NSDCFUSCHEDAPPT_GEN_ALL_CORE_FT
NOEMY CONTRERAS ; 10/18/2019 ; NPP PulmMed [address] [facility]  NOEMY CONTRERAS ; 10/25/2019 ; NP Med Nephro [address] NOEMY Poe ; 11/14/2019 ; NPP Med [address]  NOEMY CONTRERAS ; 11/15/2019 ; [facility]  NOEMY CONTRERAS ; 12/12/2019 ; NPP Rheum [address] [facility] NOEMY CONTRERAS ; 10/18/2019 ; NPP PulmMed [address] [facility]  NOEMY CONTRERAS ; 10/25/2019 ; NP Med Nephro [address] NOEMY Poe ; 11/14/2019 ; NPP Med [address]  NOEMY CONTRERAS ; 11/15/2019 ; [facility]  [facility] ; 12/12/2019 ; NPP Rheum [address] [facility] NOEMY CONTRERAS ; 10/18/2019 ; NPP PulmMed [address] [facility]  NOEMY CONTRERAS ; 10/25/2019 ; NP Med Nephro [address] NOEMY Poe ; 11/14/2019 ; NPP Med [address]  NOEMY CONTRERAS ; 11/15/2019 ; [facility]  [facility] ; 12/12/2019 ; NPP Rheum [address] [facility] NOEMY CONTRERAS ; 10/18/2019 ; NPP PulmMed [address] [facility]  NOEMY CONTRERAS ; 10/25/2019 ; NP Med Nephro [address] NOEMY Poe ; 11/14/2019 ; NPP Med [address]  NOEMY CONTRERAS ; 11/15/2019 ; [facility]  NO[facility] ; 12/12/2019 ; NPP Rheum [address] [facility]

## 2019-10-14 NOTE — DISCHARGE NOTE NURSING/CASE MANAGEMENT/SOCIAL WORK - PATIENT PORTAL LINK FT
You can access the FollowMyHealth Patient Portal offered by [facility] by registering at the following website: [email or web address]. By joining [employer]’s FollowMyHealth portal, you will also be able to view your health information using other applications (apps) compatible with our system.

## 2019-10-14 NOTE — DISCHARGE NOTE PROVIDER - HOSPITAL COURSE
54F from home, PMHx of asthma, chronic bronchitis, NAKUL, HTN, DM, HLD, fibromyalgia, anxiety, trigeminal neuralgia, degenerative joint disease, recently diagnosed IgA vasculitis presented to ED c/o cough and shortness of breath. She states she took flu vaccine on aug 29th and since then she has been feeling sick but it got worse these past 10 days. She has productive cough non bloody, no foul smelling, yellowish sputum, ass. with chest tightness, watering of eyes, runny nose and shortness of breath. She denies any fever, chest pain, abdominal pain, urinary or bowel complaint.     You were admitted with acute asthma exacerbation secondary to acute bronchitis. You were treated with Duoneb nebulizations, IV steroids and azithromycin. You have improved symptomatically and your lungs are clear on auscultation. Pulmonary consult was obtained , agreed with current recommendations and you are clear for discharge from pulmonary stand point. You are saturating 93 percent at room air with any shortness of breath. You are stable for discharge per primary attendings.    you have pulmonologist appointment scheduled on 10/18/2019 at [address] [place] [address].

## 2019-10-14 NOTE — DISCHARGE NOTE PROVIDER - NSFOLLOWUPCLINICS_GEN_ALL_ED_FT
[facility]  Pulmonary Medicine  [address] [place] [address]  Phone: [phone]  Fax: [phone]  Follow Up Time: 4-6 Days

## 2019-10-14 NOTE — DISCHARGE NOTE PROVIDER - CARE PROVIDER_API CALL
Guido Monterroso)  Pulmonary Disease  [address] [facility], [address] [place] [id number]  Phone: [phone]  Fax: [phone]  Follow Up Time:

## 2019-10-15 ENCOUNTER — OTHER (OUTPATIENT)
Age: 54
End: 2019-10-15

## 2019-10-16 ENCOUNTER — OTHER (OUTPATIENT)
Age: 54
End: 2019-10-16

## 2019-10-16 PROBLEM — M19.90 UNSPECIFIED OSTEOARTHRITIS, UNSPECIFIED SITE: Chronic | Status: ACTIVE | Noted: 2019-10-09

## 2019-10-18 ENCOUNTER — APPOINTMENT (OUTPATIENT)
Dept: PULMONOLOGY | Facility: CLINIC | Age: 54
End: 2019-10-18
Payer: MEDICARE

## 2019-10-18 VITALS
DIASTOLIC BLOOD PRESSURE: 93 MMHG | OXYGEN SATURATION: 91 % | BODY MASS INDEX: 55.32 KG/M2 | SYSTOLIC BLOOD PRESSURE: 134 MMHG | HEART RATE: 90 BPM | RESPIRATION RATE: 16 BRPM | HEIGHT: 61 IN | TEMPERATURE: 98.2 F | WEIGHT: 293 LBS

## 2019-10-18 PROCEDURE — 94060 EVALUATION OF WHEEZING: CPT

## 2019-10-18 PROCEDURE — 99214 OFFICE O/P EST MOD 30 MIN: CPT | Mod: 25

## 2019-10-18 PROCEDURE — 94729 DIFFUSING CAPACITY: CPT

## 2019-10-18 PROCEDURE — 94727 GAS DIL/WSHOT DETER LNG VOL: CPT

## 2019-10-20 ENCOUNTER — RX RENEWAL (OUTPATIENT)
Age: 54
End: 2019-10-20

## 2019-10-20 RX ORDER — METHYLPREDNISOLONE 32 MG/1
32 TABLET ORAL
Qty: 2 | Refills: 0 | Status: DISCONTINUED | COMMUNITY
Start: 2019-08-15 | End: 2019-10-20

## 2019-10-20 NOTE — HISTORY OF PRESENT ILLNESS
[FreeTextEntry1] : 54 yr old woman recently hospitalized and treated for an asthma exacerbation, with cough and postnasal drip.   She was discharged on a steroid taper and nebulized bronchodilator.  She had CT chest that was negative  for PE. There was a stable chronic 4 mm small nodule.\par \par She has had asthma since age [age].  She has few allergies (dust dust mites, flowers) not severe.\par Asthma is worse when  allergies are worse.  She uses only albuterol.  She has used Advair in the past, with some relief.  She has occasional cough.  She uses fluticasone nasal spray.  She uses Allegra D as needed.  \par She has nasal congestion during allergies\par \par She has sleep apnea diagnosed several years ago, by  Dr Jorge Hikcs  ([facility] in  [phone] [place] [address]).  \par \par She developed URI followed by \par \par Currently, she is improved.  she still reports mild residual symptoms. She is completing steroid taper.\par \par PMH:\par She has hyperlipidemia, elevated liver enzymes,  DM, hypothyroid depression hypertension   sleep apnea not using CPAP.  fibromyalgia and \par \par PSH\par c sect\par right ovary\par \par lysis of adhesion\par ERMELINDA/BSO\par removal of left adrenal gland due to nodule, benign\par \par Temporal artery biopsy\par \par rhizotomy for trig neuralgia\par \par PMH\par \par migraines\par \par

## 2019-10-20 NOTE — PHYSICAL EXAM
[Enlarged Base of the Tongue] : enlargement of the base of the tongue [Heart Rate And Rhythm] : heart rate and rhythm were normal [Arterial Pulses Normal] : the arterial pulses were normal [Murmurs] : no murmurs present [Heart Sounds] : normal S1 and S2 [Auscultation Breath Sounds / Voice Sounds] : lungs were clear to auscultation bilaterally [Bowel Sounds] : normal bowel sounds [Abdomen Tenderness] : non-tender [Abdomen Soft] : soft [Mood] : the mood was normal [General Appearance - Well Developed] : well developed [General Appearance - Well Nourished] : well nourished [Neck Appearance] : the appearance of the neck was normal [] : no respiratory distress [Thyroid Diffuse Enlargement] : the thyroid was not enlarged [Respiration, Rhythm And Depth] : normal respiratory rhythm and effort [Motor Exam] : the motor exam was normal [Affect] : the affect was normal [Low Lying Soft Palate] : no low lying soft palate [FreeTextEntry1] : elevated BMI [Normal Oropharynx] : abnormal oropharynx [Elongated Uvula] : no elongated uvula [FreeTextEntry2] : edema R>L

## 2019-10-20 NOTE — DISCUSSION/SUMMARY
[FreeTextEntry1] : Allergic asthma\par \par Treat with Symbicort 160/4.5 twice per day, montelukast 10 mg each day\par Complete prednisone taper\par \par NAKUL: 'obtain sleep study results if able.  Consider mask change\par \par Allergy testing in future, once off prednisone\par \par \par Guido Monterroso MD [facility] \par

## 2019-10-20 NOTE — REVIEW OF SYSTEMS
[Postnasal Drip] : postnasal drip [Cough] : cough [Nasal Congestion] : nasal congestion [Hypertension] : ~T hypertension [Wheezing] : wheezing [Dyspnea] : dyspnea [Hay Fever] : hay fever [Heartburn] : heartburn [Poor Appetite] : normal appetite  [Palpitations] : no palpitations

## 2019-10-21 ENCOUNTER — APPOINTMENT (OUTPATIENT)
Dept: INTERNAL MEDICINE | Facility: CLINIC | Age: 54
End: 2019-10-21

## 2019-10-22 ENCOUNTER — TRANSCRIPTION ENCOUNTER (OUTPATIENT)
Age: 54
End: 2019-10-22

## 2019-10-25 ENCOUNTER — APPOINTMENT (OUTPATIENT)
Dept: NEPHROLOGY | Facility: CLINIC | Age: 54
End: 2019-10-25

## 2019-10-28 ENCOUNTER — APPOINTMENT (OUTPATIENT)
Dept: INTERNAL MEDICINE | Facility: CLINIC | Age: 54
End: 2019-10-28
Payer: MEDICARE

## 2019-10-28 VITALS
RESPIRATION RATE: 18 BRPM | SYSTOLIC BLOOD PRESSURE: 130 MMHG | DIASTOLIC BLOOD PRESSURE: 80 MMHG | BODY MASS INDEX: 55.32 KG/M2 | OXYGEN SATURATION: 94 % | WEIGHT: 293 LBS | HEART RATE: 102 BPM | TEMPERATURE: 98.5 F | HEIGHT: 61 IN

## 2019-10-28 PROCEDURE — 99214 OFFICE O/P EST MOD 30 MIN: CPT

## 2019-10-28 NOTE — ASSESSMENT
[FreeTextEntry1] : 54 year old female found to have stable Hypertension, Hypothyroidism, Type 2 Diabetes Mellitus, Reactive Airway Disease, Morbid Obesity, with the current regimen, diet and life style modifications, as counseled. Prior results reviewed and discussed with the patient during today's examination. Plan as ordered.\par Patient was recently hospitalized, findings and recommendations reviewed and discussed with the patient during today's examination.\par

## 2019-10-28 NOTE — HISTORY OF PRESENT ILLNESS
[FreeTextEntry2] : As documented in a message section.\par \par \par 54 year old  female patient with history of stable Hypertension, Hypothyroidism, Type 2 Diabetes Mellitus, Reactive Airway Disease, Morbid Obesity, history as stated, presented for follow up examination after hospital discharge. Patient is compliant with all medications. Denies shortness of breath, chest pain or abdominal pains at this time. ROS as stated.\par

## 2019-10-28 NOTE — HEALTH RISK ASSESSMENT
[] : No [de-identified] : 11/18 [de-identified] : ENDO/NEPH/DERM [FreeTextEntry1] : Non-suicidal at this time. [[id number]] : 0

## 2019-11-12 PROCEDURE — 73070 X-RAY EXAM OF ELBOW: CPT

## 2019-11-12 PROCEDURE — 80076 HEPATIC FUNCTION PANEL: CPT

## 2019-11-12 PROCEDURE — 73090 X-RAY EXAM OF FOREARM: CPT

## 2019-11-12 PROCEDURE — 83735 ASSAY OF MAGNESIUM: CPT

## 2019-11-12 PROCEDURE — 82962 GLUCOSE BLOOD TEST: CPT

## 2019-11-12 PROCEDURE — 83880 ASSAY OF NATRIURETIC PEPTIDE: CPT

## 2019-11-12 PROCEDURE — 99285 EMERGENCY DEPT VISIT HI MDM: CPT | Mod: 25

## 2019-11-12 PROCEDURE — 80053 COMPREHEN METABOLIC PANEL: CPT

## 2019-11-12 PROCEDURE — 36415 COLL VENOUS BLD VENIPUNCTURE: CPT

## 2019-11-12 PROCEDURE — 96374 THER/PROPH/DIAG INJ IV PUSH: CPT | Mod: XU

## 2019-11-12 PROCEDURE — 85730 THROMBOPLASTIN TIME PARTIAL: CPT

## 2019-11-12 PROCEDURE — 85027 COMPLETE CBC AUTOMATED: CPT

## 2019-11-12 PROCEDURE — 80048 BASIC METABOLIC PNL TOTAL CA: CPT

## 2019-11-12 PROCEDURE — 73562 X-RAY EXAM OF KNEE 3: CPT

## 2019-11-12 PROCEDURE — 84702 CHORIONIC GONADOTROPIN TEST: CPT

## 2019-11-12 PROCEDURE — 73030 X-RAY EXAM OF SHOULDER: CPT

## 2019-11-12 PROCEDURE — 85610 PROTHROMBIN TIME: CPT

## 2019-11-12 PROCEDURE — 73110 X-RAY EXAM OF WRIST: CPT

## 2019-11-12 PROCEDURE — 87899 AGENT NOS ASSAY W/OPTIC: CPT

## 2019-11-12 PROCEDURE — 80074 ACUTE HEPATITIS PANEL: CPT

## 2019-11-12 PROCEDURE — 73120 X-RAY EXAM OF HAND: CPT

## 2019-11-12 PROCEDURE — 82803 BLOOD GASES ANY COMBINATION: CPT

## 2019-11-12 PROCEDURE — 83036 HEMOGLOBIN GLYCOSYLATED A1C: CPT

## 2019-11-12 PROCEDURE — 80061 LIPID PANEL: CPT

## 2019-11-12 PROCEDURE — 71046 X-RAY EXAM CHEST 2 VIEWS: CPT

## 2019-11-12 PROCEDURE — 76705 ECHO EXAM OF ABDOMEN: CPT

## 2019-11-12 PROCEDURE — 84484 ASSAY OF TROPONIN QUANT: CPT

## 2019-11-12 PROCEDURE — 94640 AIRWAY INHALATION TREATMENT: CPT

## 2019-11-12 PROCEDURE — 84100 ASSAY OF PHOSPHORUS: CPT

## 2019-11-12 PROCEDURE — 73060 X-RAY EXAM OF HUMERUS: CPT

## 2019-11-12 PROCEDURE — 86803 HEPATITIS C AB TEST: CPT

## 2019-11-12 PROCEDURE — 82306 VITAMIN D 25 HYDROXY: CPT

## 2019-11-12 PROCEDURE — 71275 CT ANGIOGRAPHY CHEST: CPT

## 2019-11-12 PROCEDURE — 87631 RESP VIRUS 3-5 TARGETS: CPT

## 2019-11-12 PROCEDURE — 93005 ELECTROCARDIOGRAM TRACING: CPT

## 2019-11-12 PROCEDURE — 82607 VITAMIN B-12: CPT

## 2019-11-12 PROCEDURE — 84443 ASSAY THYROID STIM HORMONE: CPT

## 2019-11-12 PROCEDURE — 83690 ASSAY OF LIPASE: CPT

## 2019-11-13 ENCOUNTER — APPOINTMENT (OUTPATIENT)
Dept: ENDOCRINOLOGY | Facility: CLINIC | Age: 54
End: 2019-11-13
Payer: MEDICARE

## 2019-11-13 VITALS
RESPIRATION RATE: 18 BRPM | SYSTOLIC BLOOD PRESSURE: 143 MMHG | OXYGEN SATURATION: 90 % | BODY MASS INDEX: 55.32 KG/M2 | HEIGHT: 61 IN | WEIGHT: 293 LBS | TEMPERATURE: 98.7 F | DIASTOLIC BLOOD PRESSURE: 93 MMHG | HEART RATE: 105 BPM

## 2019-11-13 LAB — GLUCOSE BLDC GLUCOMTR-MCNC: 201

## 2019-11-13 PROCEDURE — 82962 GLUCOSE BLOOD TEST: CPT

## 2019-11-13 PROCEDURE — 99214 OFFICE O/P EST MOD 30 MIN: CPT | Mod: 25

## 2019-11-13 NOTE — ASSESSMENT
[FreeTextEntry1] : Poor diabetic control\par Will do the blood tests today\par She will call me next week for results\par She recently had a CT scan Urogram of the kidneys (it should include the adrenal area)\par She will bring reports\par Will hold the order for CT scan of adrenals.\par She is not taking Victoza too expensive\par The US thyroid was fine

## 2019-11-13 NOTE — PHYSICAL EXAM
[Alert] : alert [No Acute Distress] : no acute distress [Normal Sclera/Conjunctiva] : normal sclera/conjunctiva [Normal Outer Ear/Nose] : the ears and nose were normal in appearance [PERRL] : pupils equal, round and reactive to light [No Neck Mass] : no neck mass was observed [Normal Hearing] : hearing was normal [No Respiratory Distress] : no respiratory distress [Thyroid Not Enlarged] : the thyroid was not enlarged [Normal Rate and Effort] : normal respiratory rhythm and effort [Normal PMI] : the apical impulse was normal [Carotids Normal] : carotid pulses were normal with no bruits [Normal Rate] : heart rate was normal  [Pedal Pulses Normal] : the pedal pulses are present [No Rash] : no rash [No Skin Lesions] : no skin lesions [No Motor Deficits] : the motor exam was normal [Normal Sensation on Monofilament Testing] : normal sensation on monofilament testing of lower extremities [No Sensory Deficits] : the sensory exam was normal to light touch and pinprick [de-identified] : Obese with central obesity

## 2019-11-13 NOTE — HISTORY OF PRESENT ILLNESS
[FreeTextEntry1] : Patient is not feeling well, feels tires, sleepy. She has sleep apnea.Her weight has not changed. She is not exercising regularly or following the diet. Her blood glucose at home are not well controlled, they are usually around 200 mg/dl.  She denies low blood glucose during the night. She denies chest pain, SOB, she has numbness and tingling sensation and burning sensation on extremities. Taking her medications regularly. She has seen the Ophthalmologist recently. She has not seen Podiatrist recently. She has seen the Cardiologist recently. About 15 years ago she had surgery for an adrenal lesion producing Cushing's Syndrome.  Apparently she has never had a MRI of the abdomen after the surgery.\par

## 2019-11-17 LAB
ALBUMIN SERPL ELPH-MCNC: 4.2 G/DL
ALP BLD-CCNC: 116 U/L
ALT SERPL-CCNC: 43 U/L
ANION GAP SERPL CALC-SCNC: 16 MMOL/L
AST SERPL-CCNC: 34 U/L
BILIRUB DIRECT SERPL-MCNC: 0.1 MG/DL
BILIRUB INDIRECT SERPL-MCNC: 0.3 MG/DL
BILIRUB SERPL-MCNC: 0.4 MG/DL
BUN SERPL-MCNC: 13 MG/DL
CALCIUM SERPL-MCNC: 9.6 MG/DL
CHLORIDE SERPL-SCNC: 97 MMOL/L
CHOLEST SERPL-MCNC: 229 MG/DL
CHOLEST/HDLC SERPL: 5.7 RATIO
CK SERPL-CCNC: 184 U/L
CO2 SERPL-SCNC: 28 MMOL/L
CORTIS SERPL-MCNC: 8.1 UG/DL
CREAT SERPL-MCNC: 0.73 MG/DL
CREAT SPEC-SCNC: 407 MG/DL
ESTIMATED AVERAGE GLUCOSE: 209 MG/DL
FRUCTOSAMINE SERPL-MCNC: 243 UMOL/L
GLUCOSE BS SERPL-MCNC: 184 MG/DL
GLUCOSE SERPL-MCNC: 192 MG/DL
HBA1C MFR BLD HPLC: 8.9 %
HDLC SERPL-MCNC: 40 MG/DL
LDLC SERPL CALC-MCNC: 159 MG/DL
MICROALBUMIN 24H UR DL<=1MG/L-MCNC: 12.6 MG/DL
MICROALBUMIN/CREAT 24H UR-RTO: 31 MG/G
POTASSIUM SERPL-SCNC: 4.5 MMOL/L
PROLACTIN SERPL-MCNC: 15.2 NG/ML
PROT SERPL-MCNC: 7 G/DL
SODIUM SERPL-SCNC: 140 MMOL/L
T4 FREE SERPL-MCNC: 1 NG/DL
THYROPEROXIDASE AB SERPL IA-ACNC: 23.6 IU/ML
TRIGL SERPL-MCNC: 150 MG/DL
TSH SERPL-ACNC: 1.68 UIU/ML

## 2019-11-18 ENCOUNTER — RX RENEWAL (OUTPATIENT)
Age: 54
End: 2019-11-18

## 2019-11-21 ENCOUNTER — APPOINTMENT (OUTPATIENT)
Age: 54
End: 2019-11-21
Payer: MEDICARE

## 2019-11-21 VITALS
DIASTOLIC BLOOD PRESSURE: 88 MMHG | BODY MASS INDEX: 55.32 KG/M2 | HEIGHT: 61 IN | HEART RATE: 95 BPM | WEIGHT: 293 LBS | SYSTOLIC BLOOD PRESSURE: 134 MMHG

## 2019-11-21 PROCEDURE — 99214 OFFICE O/P EST MOD 30 MIN: CPT

## 2019-11-22 ENCOUNTER — APPOINTMENT (OUTPATIENT)
Dept: NEPHROLOGY | Facility: CLINIC | Age: 54
End: 2019-11-22
Payer: MEDICARE

## 2019-11-22 VITALS
OXYGEN SATURATION: 93 % | WEIGHT: 293 LBS | HEIGHT: 61 IN | DIASTOLIC BLOOD PRESSURE: 83 MMHG | SYSTOLIC BLOOD PRESSURE: 128 MMHG | BODY MASS INDEX: 55.32 KG/M2 | HEART RATE: 88 BPM

## 2019-11-22 LAB
APPEARANCE: CLEAR
BACTERIA: NEGATIVE
BILIRUBIN URINE: NEGATIVE
BLOOD URINE: ABNORMAL
COLOR: YELLOW
GLUCOSE QUALITATIVE U: NEGATIVE
HYALINE CASTS: 3 /LPF
KETONES URINE: NEGATIVE
LEUKOCYTE ESTERASE URINE: NEGATIVE
MICROSCOPIC-UA: NORMAL
NITRITE URINE: NEGATIVE
PH URINE: 6
PROTEIN URINE: ABNORMAL
RED BLOOD CELLS URINE: 6 /HPF
SPECIFIC GRAVITY URINE: 1.03
SQUAMOUS EPITHELIAL CELLS: 4 /HPF
UROBILINOGEN URINE: NORMAL
WHITE BLOOD CELLS URINE: 2 /HPF

## 2019-11-22 PROCEDURE — 99215 OFFICE O/P EST HI 40 MIN: CPT

## 2019-11-22 NOTE — PHYSICAL EXAM
[FreeTextEntry1] : obese lady in no apparent distress  [Sclera] : the sclera and conjunctiva were normal [PERRL With Normal Accommodation] : pupils were equal in size, round, and reactive to light [Outer Ear] : the ears and nose were normal in appearance [Examination Of The Oral Cavity] : the lips and gums were normal [Oropharynx] : the oropharynx was normal [Neck Appearance] : the appearance of the neck was normal [Neck Cervical Mass (___cm)] : no neck mass was observed [Jugular Venous Distention Increased] : there was no jugular-venous distention [Respiration, Rhythm And Depth] : normal respiratory rhythm and effort [Exaggerated Use Of Accessory Muscles For Inspiration] : no accessory muscle use [Auscultation Breath Sounds / Voice Sounds] : lungs were clear to auscultation bilaterally [Heart Sounds] : normal S1 and S2 [Heart Sounds Gallop] : no gallops [Heart Sounds Pericardial Friction Rub] : no pericardial rub [Arterial Pulses Carotid] : carotid pulses were normal with no bruits [Edema] : there was no peripheral edema [Veins - Varicosity Changes] : there were no varicosital changes [Bowel Sounds] : normal bowel sounds [Abdomen Soft] : soft [Abdomen Tenderness] : non-tender [No CVA Tenderness] : no ~M costovertebral angle tenderness [No Spinal Tenderness] : no spinal tenderness [Abnormal Walk] : normal gait [Nail Clubbing] : no clubbing  or cyanosis of the fingernails [Musculoskeletal - Swelling] : no joint swelling seen [Skin Color & Pigmentation] : normal skin color and pigmentation [Skin Turgor] : normal skin turgor [] : no rash [Skin Lesions] : no skin lesions [Cranial Nerves] : cranial nerves 2-12 were intact [Deep Tendon Reflexes (DTR)] : deep tendon reflexes were 2+ and symmetric [Sensation] : the sensory exam was normal to light touch and pinprick [Motor Exam] : the motor exam was normal [Oriented To Time, Place, And Person] : oriented to person, place, and time [Impaired Insight] : insight and judgment were intact [Affect] : the affect was normal [Mood] : the mood was normal

## 2019-11-22 NOTE — ASSESSMENT
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 less likely to be crescentic/ proliferative. she also has other reasons to have proteinuria including obesity and DM. In addition, now she has a mass in her left kidney that is likely RCC.  At this point, she is on an ACEI, her bp is well controlled. there is no pressing need for a kidney biopsy as it will not . I discussed this with her and we agreed to monitor her urinalysis/ bmp every 3-4 months \par \par IF she were to have a nephrectomy, we can send the sample to pathology \par \par HTN- well controlled. \par

## 2019-11-22 NOTE — HISTORY OF PRESENT ILLNESS
[FreeTextEntry1] : follow up hematuria \par \par 53 yo lady with DM, Obesity, HTN who initially presented to Dermatology for a skin rash in march 2019, that was diagnosed to be leucocytoclastic vasculitis. Ig A was positive on skin biopsy. a urinalysis showed hematuria and proteinuria and hence she was referred to me.\par \par looking back at her previous urinalyses - she has had trace proteinuria since 2016. more recently in feb-march 2019 > the urine dipstick showed protein- 30 and then 100. she has had intermittent microscopic hematuria ( last one prior to this one was in july 2018). \par \par last labs on 4/9- urinalysis - 6 RBCs/hpf, protein- 100, urine protein/creat ratio - 0.4 gm/gm of creat\par creat 0.65 mg/dl, ANCA negative. ASLO negative, RF negative, DIONISIO - 1:320, Anti DNAse b negative. \par \par since she had uncontrolled DM, Obesity and very negligible proteinuria, it was decided to hold off on the kidney biopsy. \par \par Since the last visit, she saw her urologist Dr. Smith and had a CT Urogram done that showed a 3.6 by 3.5 by 4.1 cm hypodense lesion in the medial cortex of the mid left kidney suggestive of a solid lesion, likely RCC. the treatment options are still being decided. \par \par ROS\par  - No blood in urine, no foamy urine \par CVS- No chest pain, no shortness of breath \par all other systems reviewed in detail and were negative except as above

## 2019-11-23 LAB
CREAT SPEC-SCNC: 245 MG/DL
CREAT/PROT UR: 0.1 RATIO
PROT UR-MCNC: 34 MG/DL

## 2019-12-04 ENCOUNTER — APPOINTMENT (OUTPATIENT)
Dept: DERMATOLOGY | Facility: CLINIC | Age: 54
End: 2019-12-04
Payer: MEDICARE

## 2019-12-04 PROCEDURE — 11901 INJECT SKIN LESIONS >7: CPT

## 2019-12-04 PROCEDURE — 99213 OFFICE O/P EST LOW 20 MIN: CPT | Mod: 25

## 2019-12-07 NOTE — HISTORY OF PRESENT ILLNESS
[FreeTextEntry1] : 53 yo F with microhematuria\par Denies any dysuria or gross hematuria\par Does see Dr. Pena for mixed incontinence\par 1 yr of right flank pain, chronic, dull\par CT last Feb showed no stones\par Of note, had bad reaction to gadolinium in the past but has had CT angio with contrast before with no issues\par \par 11/21/19 Interval history: No issues since last visit\par Here to review CT urogram

## 2019-12-07 NOTE — ASSESSMENT
[FreeTextEntry1] : 55 yo F with microhematuria. CT urogram showed a 4cm left renal mass\par \par - Reviewed CT imaging brought in by pt on CD\par - Spent extensive period of time reviewing options with pt including the pros and cons of active surveillance vs percutnaeous ablation vs surgical extirpation. Pt with history of prior open adrenalectomy and surgical extirpation would most likely need to be done instead of laparoscopically. Indications for partial vs. radical nephrectomy were reviewed. IN addition, discussed the role of percutaneous renal biopsy to help guide the treatment plan.\par - Decision made to obtain a percutaneous biopsy and in addition, consult IR to see if ablation is a viable option

## 2019-12-07 NOTE — PHYSICAL EXAM
[General Appearance - Well Nourished] : well nourished [General Appearance - Well Developed] : well developed [Normal Appearance] : normal appearance [Well Groomed] : well groomed [General Appearance - In No Acute Distress] : no acute distress [Abdomen Soft] : soft [Abdomen Tenderness] : non-tender [Costovertebral Angle Tenderness] : no ~M costovertebral angle tenderness [FreeTextEntry1] : obese, long left flank incision [Urinary Bladder Findings] : the bladder was normal on palpation [Edema] : no peripheral edema [] : no respiratory distress [Exaggerated Use Of Accessory Muscles For Inspiration] : no accessory muscle use [Respiration, Rhythm And Depth] : normal respiratory rhythm and effort [Oriented To Time, Place, And Person] : oriented to person, place, and time [Mood] : the mood was normal [Affect] : the affect was normal [Not Anxious] : not anxious [No Focal Deficits] : no focal deficits [No Palpable Adenopathy] : no palpable adenopathy [Normal Station and Gait] : the gait and station were normal for the patient's age

## 2019-12-17 ENCOUNTER — APPOINTMENT (OUTPATIENT)
Dept: INTERNAL MEDICINE | Facility: CLINIC | Age: 54
End: 2019-12-17
Payer: MEDICARE

## 2019-12-20 ENCOUNTER — OUTPATIENT (OUTPATIENT)
Dept: OUTPATIENT SERVICES | Facility: HOSPITAL | Age: 54
LOS: 1 days | End: 2019-12-20
Payer: COMMERCIAL

## 2019-12-20 ENCOUNTER — APPOINTMENT (OUTPATIENT)
Dept: ULTRASOUND IMAGING | Facility: HOSPITAL | Age: 54
End: 2019-12-20
Payer: MEDICARE

## 2019-12-20 DIAGNOSIS — R60.0 LOCALIZED EDEMA: ICD-10-CM

## 2019-12-20 DIAGNOSIS — Z98.89 OTHER SPECIFIED POSTPROCEDURAL STATES: Chronic | ICD-10-CM

## 2019-12-20 DIAGNOSIS — Z90.710 ACQUIRED ABSENCE OF BOTH CERVIX AND UTERUS: Chronic | ICD-10-CM

## 2019-12-20 PROCEDURE — 93970 EXTREMITY STUDY: CPT | Mod: 26

## 2019-12-20 PROCEDURE — 93970 EXTREMITY STUDY: CPT

## 2020-01-10 ENCOUNTER — APPOINTMENT (OUTPATIENT)
Dept: PULMONOLOGY | Facility: CLINIC | Age: 55
End: 2020-01-10
Payer: MEDICARE

## 2020-01-10 VITALS
BODY MASS INDEX: 55.32 KG/M2 | OXYGEN SATURATION: 92 % | RESPIRATION RATE: 18 BRPM | HEIGHT: 61 IN | HEART RATE: 112 BPM | TEMPERATURE: 98.4 F | WEIGHT: 293 LBS | DIASTOLIC BLOOD PRESSURE: 96 MMHG | SYSTOLIC BLOOD PRESSURE: 150 MMHG

## 2020-01-10 DIAGNOSIS — G47.30 SLEEP APNEA, UNSPECIFIED: ICD-10-CM

## 2020-01-10 PROCEDURE — 99214 OFFICE O/P EST MOD 30 MIN: CPT

## 2020-01-12 NOTE — REVIEW OF SYSTEMS
[Nasal Congestion] : nasal congestion [Postnasal Drip] : postnasal drip [Cough] : cough [Dyspnea] : dyspnea [Wheezing] : wheezing [Hypertension] : ~T hypertension [Hay Fever] : hay fever [Heartburn] : heartburn [Poor Appetite] : normal appetite  [Palpitations] : no palpitations

## 2020-01-12 NOTE — HISTORY OF PRESENT ILLNESS
[FreeTextEntry1] : 54 yr old woman treated for an asthma exacerbation, with cough and postnasal drip.   She was discharged on a steroid taper and nebulized bronchodilator.  She had CT chest that was negative  for PE. There was a stable chronic 4 mm small nodule.\par \par She has had asthma since age [age].  She has few allergies (dust dust mites, flowers) not severe.\par Asthma is worse when  allergies are worse.  She uses only albuterol.  She has used Advair in the past, with some relief.  She has occasional cough.  She uses fluticasone nasal spray.  She uses Allegra D as needed.  \par She has nasal congestion during allergies\par \par She has sleep apnea diagnosed several years ago, by  Dr Jorge Hicks  ([facility] in  [id number] [place] [address]).  \par \par She developed URI, now improved. \par \par Currently, she is improved.  She has used Symbicort and montelukast and feels improved.\par She completed steroid taper.  \par \par She is not using CPAP.  She did not undergo sleep study\par \par PMH:\par She has hyperlipidemia, elevated liver enzymes,  DM, hypothyroid depression hypertension   sleep apnea not using CPAP.  fibromyalgia and \par \par PSH\par c sect\par right ovary\par \par lysis of adhesion\par ERMELINDA/BSO\par removal of left adrenal gland due to nodule, benign\par \par Temporal artery biopsy\par \par rhizotomy for trig neuralgia\par \par PMH\par \par migraines\par \par

## 2020-01-12 NOTE — PHYSICAL EXAM
[General Appearance - Well Developed] : well developed [General Appearance - Well Nourished] : well nourished [Enlarged Base of the Tongue] : enlargement of the base of the tongue [Neck Appearance] : the appearance of the neck was normal [Heart Rate And Rhythm] : heart rate and rhythm were normal [Thyroid Diffuse Enlargement] : the thyroid was not enlarged [Heart Sounds] : normal S1 and S2 [Murmurs] : no murmurs present [Arterial Pulses Normal] : the arterial pulses were normal [Respiration, Rhythm And Depth] : normal respiratory rhythm and effort [Auscultation Breath Sounds / Voice Sounds] : lungs were clear to auscultation bilaterally [Abdomen Soft] : soft [Abdomen Tenderness] : non-tender [Bowel Sounds] : normal bowel sounds [] : no hepato-splenomegaly [Motor Exam] : the motor exam was normal [Affect] : the affect was normal [Mood] : the mood was normal [Normal Oropharynx] : abnormal oropharynx [FreeTextEntry1] : elevated BMI [Elongated Uvula] : no elongated uvula [Low Lying Soft Palate] : no low lying soft palate [FreeTextEntry2] : edema R>L

## 2020-01-12 NOTE — DISCUSSION/SUMMARY
[FreeTextEntry1] : Asthma exacerbation, resolved.  She will continue cont Symbicort\par \par NAKUL: not using CPAP:  reports anxiety when trying to use CPAP\par \par order sleep study and sleep center referral as patient has not been able to use CPAP.  she states she has anxiety\par \par She will have surgery for tumor on left kidney\par \par Guido Monterroso MD [facility]

## 2020-01-13 ENCOUNTER — APPOINTMENT (OUTPATIENT)
Dept: INTERVENTIONAL RADIOLOGY/VASCULAR | Facility: CLINIC | Age: 55
End: 2020-01-13
Payer: MEDICARE

## 2020-01-13 VITALS
SYSTOLIC BLOOD PRESSURE: 109 MMHG | HEART RATE: 89 BPM | DIASTOLIC BLOOD PRESSURE: 75 MMHG | HEIGHT: 61 IN | WEIGHT: 293 LBS | BODY MASS INDEX: 55.32 KG/M2 | RESPIRATION RATE: 16 BRPM | OXYGEN SATURATION: 93 %

## 2020-01-13 PROCEDURE — 99204 OFFICE O/P NEW MOD 45 MIN: CPT

## 2020-01-13 RX ORDER — PREDNISONE 2.5 MG/1
2.5 TABLET ORAL DAILY
Qty: 30 | Refills: 1 | Status: COMPLETED | COMMUNITY
Start: 2019-07-10 | End: 2020-01-13

## 2020-01-13 RX ORDER — PREDNISONE 5 MG/1
5 TABLET ORAL DAILY
Qty: 30 | Refills: 1 | Status: COMPLETED | COMMUNITY
Start: 2019-07-10 | End: 2020-01-13

## 2020-01-13 RX ORDER — PREDNISONE 20 MG/1
20 TABLET ORAL
Qty: 45 | Refills: 0 | Status: COMPLETED | COMMUNITY
Start: 2019-06-04 | End: 2020-01-13

## 2020-01-13 RX ORDER — DOXYCYCLINE 100 MG/1
100 TABLET, FILM COATED ORAL
Qty: 14 | Refills: 0 | Status: COMPLETED | COMMUNITY
Start: 2019-12-04 | End: 2020-01-13

## 2020-01-13 RX ORDER — AMOXICILLIN AND CLAVULANATE POTASSIUM 500; 125 MG/1; MG/1
500-125 TABLET, FILM COATED ORAL
Qty: 20 | Refills: 10 | Status: COMPLETED | COMMUNITY
Start: 2019-06-11 | End: 2020-01-13

## 2020-01-26 NOTE — PHYSICAL EXAM
[Alert] : alert [Normal Hearing] : hearing was normal [No Respiratory Distress] : no respiratory distress [Normal Rate] : heart rate was normal  [Soft] : abdomen soft [Not Distended] : not distended [No CVA Tenderness] : no ~M costovertebral angle tenderness [Normal Gait] : normal gait [Oriented x3] : oriented to person, place, and time [No Tremors] : no tremors [Clear to Auscultation] : lungs were clear to auscultation bilaterally [de-identified] : tender to palpate b/l lower quadrant.

## 2020-01-26 NOTE — REVIEW OF SYSTEMS
[Shortness Of Breath] : shortness of breath [SOB on Exertion] : shortness of breath during exertion [Fever] : no fever [Loss Of Hearing] : no hearing loss [Chills] : no chills [Palpitations] : no palpitations [Chest Pain] : no chest pain [Easy Bleeding] : no tendency for easy bleeding [Diarrhea] : no diarrhea [Easy Bruising] : no tendency for easy bruising

## 2020-01-26 NOTE — ASSESSMENT
[FreeTextEntry1] : CT abd/pelvis reviewed with the patient.  There is a 3.6 x 3.5 x 4.1 cm soft tissue mass in the left kidney.  The mass is technically feasible for CT guided percutaneous bx and possible ablation.  However, explained to the patient that due to the larger size of the mass that partial nephrectomy may provide better long term than percutaneous ablation. Risks of percutaneous biopsy.ablation, including but not limited to bleeding, infection and injury to surrounding structure, discussed with the patient.  All questions were answered.  The patient is also s/p left adenectomy, which will make her surgery more complex.  Patient leaning towards partial nephrectomy over percutaneous ablation but understands that her surgery will be more complicated.  Discussed case with the referring urologist and the plan is to obtain percutaneous biopsy first to establish diagnosis and re-evaluate for ablation vs partial nephrectomy if the mass is malignant.

## 2020-01-26 NOTE — DATA REVIEWED
[FreeTextEntry1] : CT urogram images reviewed and results discussed at length with the patient. \par

## 2020-01-26 NOTE — HISTORY OF PRESENT ILLNESS
[FreeTextEntry1] : 54 years old female with hx of Iga Vasculitis, hypothyroidism, cushing syndrome, fibromyalgia, HTN, HLD, DM2, microscopic hematuria, flank pain. Patient states she has been having flank/abd pain for about two years and was noted to have microscopic hematuria in 2018. She had work up done for microscopic hematuria and was found to have left renal mass. CT urogram done on 11/07/19 demonstrated 4.1 cm left renal mass. \par \par Patient states she still continues to have 7/10 back /abdominal pain. She takes Gabapentin, Amitriptyline for her fibromyalgia and it helps with her abd/back pain as well. \par \par Patient was seen by Dr. Smith ans is being referred for renal mass bx and ablation. \par \par Patient sates she has been feeling well overall. Appetite has been good no unintentional weight loss. \par \par Denies any recent SOB, CP, fever, chills, n/v/d, gross hematuria. \par \par \par She takes Aspirin as a preventative and is aware to hold it five days prior to procedure. \par \par Of note hx of left adrenalectomy in 2006. \par Patient states she had one time reaction to Gadolinium when she had brain MRI done over a year ago. She broke out in hives. \par Of note patient has hx of NAKUL and she stated she does not use the CPAP even  though she has it at home.

## 2020-02-13 ENCOUNTER — APPOINTMENT (OUTPATIENT)
Dept: INTERNAL MEDICINE | Facility: CLINIC | Age: 55
End: 2020-02-13
Payer: MEDICARE

## 2020-02-13 ENCOUNTER — APPOINTMENT (OUTPATIENT)
Dept: UROLOGY | Facility: CLINIC | Age: 55
End: 2020-02-13
Payer: MEDICARE

## 2020-02-13 VITALS
TEMPERATURE: 99.3 F | RESPIRATION RATE: 16 BRPM | SYSTOLIC BLOOD PRESSURE: 146 MMHG | DIASTOLIC BLOOD PRESSURE: 83 MMHG | BODY MASS INDEX: 55.32 KG/M2 | HEART RATE: 102 BPM | HEIGHT: 61 IN | WEIGHT: 293 LBS | OXYGEN SATURATION: 91 %

## 2020-02-13 VITALS
HEIGHT: 61 IN | WEIGHT: 293 LBS | BODY MASS INDEX: 55.32 KG/M2 | HEART RATE: 108 BPM | TEMPERATURE: 99.9 F | DIASTOLIC BLOOD PRESSURE: 83 MMHG | SYSTOLIC BLOOD PRESSURE: 132 MMHG

## 2020-02-13 PROCEDURE — 99214 OFFICE O/P EST MOD 30 MIN: CPT

## 2020-02-13 RX ORDER — IPRATROPIUM BROMIDE AND ALBUTEROL SULFATE 2.5; .5 MG/3ML; MG/3ML
0.5-2.5 (3) SOLUTION RESPIRATORY (INHALATION)
Qty: 180 | Refills: 0 | Status: DISCONTINUED | COMMUNITY
Start: 2019-10-14 | End: 2020-02-13

## 2020-02-13 NOTE — HEALTH RISK ASSESSMENT
[] : No [de-identified] : 11/18 [de-identified] : URO/ENDO/PULM/IR/DERM/NEPH [FreeTextEntry1] : Non-suicidal at this time. [[id number]] : 0

## 2020-02-13 NOTE — ASSESSMENT
[FreeTextEntry1] : 55 yo F with left renal mass\par \par - Discussed IR consult with pt. Given size of tumor, oncologic efficacy is not as good for percutaneous ablation. Pt was pending perc biopsy but today, pt states that she just wants the tumor out. Discussed with patient risks and benefits of partial vs radical nephrectomy as well as open vs. minimally invasive with laparoscopcy or roboti assisted. Given her body habitus and prior adrenalectomy, surgery would be more complicated\par - Will review imaging with Dr. Meredith to see if laparoscopy is feasible. Otherwise, will plan for open partial, possible radical nephrectomy.

## 2020-02-13 NOTE — HISTORY OF PRESENT ILLNESS
[de-identified] : 54 year old  female patient with history of stable  Hypertension, Hypothyroidism, Type 2 Diabetes Mellitus, Reactive Airway Disease, Morbid Obesity, Asthma, history as stated, presented for follow up examination. Patient is compliant with all medications. Denies shortness of breath, chest pain or abdominal pains at this time. ROS as stated.\par

## 2020-02-13 NOTE — PHYSICAL EXAM

## 2020-02-13 NOTE — HISTORY OF PRESENT ILLNESS
[FreeTextEntry1] : 55 yo F with microhematuria\par Denies any dysuria or gross hematuria\par Does see Dr. Pena for mixed incontinence\par 1 yr of right flank pain, chronic, dull\par CT last Feb showed no stones\par Of note, had bad reaction to gadolinium in the past but has had CT angio with contrast before with no issues\par \par 11/21/19 Interval history: No issues since last visit\par Here to review CT urogram\par \par 2/13/20 Interval history: Since last visit, pt saw IR for possible perc ablation for her renal mass\par No flank pain, no urinary issues

## 2020-02-13 NOTE — ASSESSMENT
[FreeTextEntry1] : 54 year old female found to have stable Hypertension, Hypothyroidism, Type 2 Diabetes Mellitus, Reactive Airway Disease, Morbid Obesity, Asthma, with the current regimen, diet and life style modifications, as counseled. Prior results reviewed and discussed with the patient during today's examination. Plan as ordered.\par Patient was recently evaluated by URO/ENDO/PULM/IR/DERM/NEPH , findings and recommendations reviewed with the patient during today's examination.\par

## 2020-02-15 LAB
ALBUMIN SERPL ELPH-MCNC: 4.1 G/DL
ALP BLD-CCNC: 132 U/L
ALT SERPL-CCNC: 39 U/L
ANION GAP SERPL CALC-SCNC: 17 MMOL/L
AST SERPL-CCNC: 36 U/L
BASOPHILS # BLD AUTO: 0.05 K/UL
BASOPHILS NFR BLD AUTO: 0.5 %
BILIRUB SERPL-MCNC: 0.3 MG/DL
BUN SERPL-MCNC: 17 MG/DL
CALCIUM SERPL-MCNC: 9.4 MG/DL
CHLORIDE SERPL-SCNC: 97 MMOL/L
CHOLEST SERPL-MCNC: 206 MG/DL
CHOLEST/HDLC SERPL: 5.5 RATIO
CO2 SERPL-SCNC: 26 MMOL/L
CREAT SERPL-MCNC: 0.65 MG/DL
EOSINOPHIL # BLD AUTO: 0.24 K/UL
EOSINOPHIL NFR BLD AUTO: 2.5 %
ESTIMATED AVERAGE GLUCOSE: 192 MG/DL
GGT SERPL-CCNC: 50 U/L
GLUCOSE SERPL-MCNC: 296 MG/DL
HBA1C MFR BLD HPLC: 8.3 %
HCT VFR BLD CALC: 41.5 %
HDLC SERPL-MCNC: 38 MG/DL
HGB BLD-MCNC: 12 G/DL
IMM GRANULOCYTES NFR BLD AUTO: 0.6 %
LDLC SERPL CALC-MCNC: 125 MG/DL
LYMPHOCYTES # BLD AUTO: 2.19 K/UL
LYMPHOCYTES NFR BLD AUTO: 23.1 %
MAN DIFF?: NORMAL
MCHC RBC-ENTMCNC: 23.4 PG
MCHC RBC-ENTMCNC: 28.9 GM/DL
MCV RBC AUTO: 81.1 FL
MONOCYTES # BLD AUTO: 0.63 K/UL
MONOCYTES NFR BLD AUTO: 6.6 %
NEUTROPHILS # BLD AUTO: 6.32 K/UL
NEUTROPHILS NFR BLD AUTO: 66.7 %
PLATELET # BLD AUTO: 354 K/UL
POTASSIUM SERPL-SCNC: 4.9 MMOL/L
PROT SERPL-MCNC: 7.3 G/DL
RBC # BLD: 5.12 M/UL
RBC # FLD: 16.3 %
SODIUM SERPL-SCNC: 140 MMOL/L
T3 SERPL-MCNC: 98 NG/DL
T4 FREE SERPL-MCNC: 1.1 NG/DL
TRIGL SERPL-MCNC: 218 MG/DL
TSH SERPL-ACNC: 3.87 UIU/ML
WBC # FLD AUTO: 9.49 K/UL

## 2020-03-03 ENCOUNTER — APPOINTMENT (OUTPATIENT)
Dept: UROLOGY | Facility: CLINIC | Age: 55
End: 2020-03-03

## 2020-03-03 ENCOUNTER — APPOINTMENT (OUTPATIENT)
Dept: UROLOGY | Facility: CLINIC | Age: 55
End: 2020-03-03
Payer: MEDICARE

## 2020-03-03 PROCEDURE — 99215 OFFICE O/P EST HI 40 MIN: CPT

## 2020-03-09 NOTE — PHYSICAL EXAM
[Heart Rate And Rhythm] : Heart rate and rhythm were normal [Urinary Bladder Findings] : the bladder was normal on palpation [FreeTextEntry1] : uses cane [] : no rash

## 2020-03-09 NOTE — HISTORY OF PRESENT ILLNESS
[FreeTextEntry1] : 54F morbidly obese F for second opinion of L renal mass.  Found incidentally for microhematuria workup on CTU 19 ([facility]. Report scanned, no images in system).  Saw Dr. Smith who had referred to Dr. James for possible percutaneous ablation. 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 States her cortisol levels have normalized.\par \par Denies weight changes, hematuria, bone pain.\par \par CT scan showing 4cm L midpole renal mass. Imaging in Big PACS under separate chart (same )  Cr 0.73\par \par PMH: NAKUL (poor compliant, not on CPAP), HTN, DM, morbid obesity, HLD, hypothyroidism, fibromyaglia, IgA vasculitis\par \par PSH: L adrenalectomy, , R oophorectomy, ? open lysis of adhesions, uterine fibroidectomy

## 2020-03-09 NOTE — ASSESSMENT
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 \par \par Imaging reviewed. Discussed with Dr. Peck.\par \par Plan for MRI with contrast to better assess. She has history of reaction to gadolinium (hives).\par \par Dr. Peck or Luis will call with results and to plan for surgery.\par \par Plan for open L flank approach given previous surgeries.  \par \par

## 2020-04-27 ENCOUNTER — EMERGENCY (EMERGENCY)
Facility: HOSPITAL | Age: 55
LOS: 1 days | Discharge: HOME | End: 2020-04-27
Attending: EMERGENCY MEDICINE | Admitting: EMERGENCY MEDICINE
Payer: MEDICARE

## 2020-04-27 VITALS
SYSTOLIC BLOOD PRESSURE: 142 MMHG | RESPIRATION RATE: 16 BRPM | OXYGEN SATURATION: 96 % | TEMPERATURE: 98 F | HEART RATE: 89 BPM | DIASTOLIC BLOOD PRESSURE: 72 MMHG

## 2020-04-27 VITALS
HEART RATE: 104 BPM | TEMPERATURE: 99 F | OXYGEN SATURATION: 94 % | HEIGHT: 61 IN | WEIGHT: 293 LBS | DIASTOLIC BLOOD PRESSURE: 82 MMHG | SYSTOLIC BLOOD PRESSURE: 162 MMHG | RESPIRATION RATE: 18 BRPM

## 2020-04-27 DIAGNOSIS — J45.909 UNSPECIFIED ASTHMA, UNCOMPLICATED: ICD-10-CM

## 2020-04-27 DIAGNOSIS — Z91.018 ALLERGY TO OTHER FOODS: ICD-10-CM

## 2020-04-27 DIAGNOSIS — E03.9 HYPOTHYROIDISM, UNSPECIFIED: ICD-10-CM

## 2020-04-27 DIAGNOSIS — E78.5 HYPERLIPIDEMIA, UNSPECIFIED: ICD-10-CM

## 2020-04-27 DIAGNOSIS — E11.9 TYPE 2 DIABETES MELLITUS WITHOUT COMPLICATIONS: ICD-10-CM

## 2020-04-27 DIAGNOSIS — Z90.710 ACQUIRED ABSENCE OF BOTH CERVIX AND UTERUS: Chronic | ICD-10-CM

## 2020-04-27 DIAGNOSIS — R51 HEADACHE: ICD-10-CM

## 2020-04-27 DIAGNOSIS — I10 ESSENTIAL (PRIMARY) HYPERTENSION: ICD-10-CM

## 2020-04-27 DIAGNOSIS — Z88.8 ALLERGY STATUS TO OTHER DRUGS, MEDICAMENTS AND BIOLOGICAL SUBSTANCES: ICD-10-CM

## 2020-04-27 DIAGNOSIS — G47.33 OBSTRUCTIVE SLEEP APNEA (ADULT) (PEDIATRIC): ICD-10-CM

## 2020-04-27 DIAGNOSIS — Z98.89 OTHER SPECIFIED POSTPROCEDURAL STATES: Chronic | ICD-10-CM

## 2020-04-27 LAB
ALBUMIN SERPL ELPH-MCNC: 3.1 G/DL — LOW (ref 3.5–5)
ALP SERPL-CCNC: 136 U/L — HIGH (ref 40–120)
ALT FLD-CCNC: 52 U/L DA — SIGNIFICANT CHANGE UP (ref 10–60)
ANION GAP SERPL CALC-SCNC: 9 MMOL/L — SIGNIFICANT CHANGE UP (ref 5–17)
APTT BLD: 31.8 SEC — SIGNIFICANT CHANGE UP (ref 27.5–36.3)
AST SERPL-CCNC: 42 U/L — HIGH (ref 10–40)
BILIRUB SERPL-MCNC: 0.3 MG/DL — SIGNIFICANT CHANGE UP (ref 0.2–1.2)
BUN SERPL-MCNC: 13 MG/DL — SIGNIFICANT CHANGE UP (ref 7–18)
CALCIUM SERPL-MCNC: 9.3 MG/DL — SIGNIFICANT CHANGE UP (ref 8.4–10.5)
CHLORIDE SERPL-SCNC: 97 MMOL/L — SIGNIFICANT CHANGE UP (ref 96–108)
CO2 SERPL-SCNC: 35 MMOL/L — HIGH (ref 22–31)
CREAT SERPL-MCNC: 0.86 MG/DL — SIGNIFICANT CHANGE UP (ref 0.5–1.3)
ERYTHROCYTE [SEDIMENTATION RATE] IN BLOOD: 48 MM/HR — HIGH (ref 0–20)
GLUCOSE SERPL-MCNC: 169 MG/DL — HIGH (ref 70–99)
HCT VFR BLD CALC: 42.3 % — SIGNIFICANT CHANGE UP (ref 34.5–45)
HGB BLD-MCNC: 12.2 G/DL — SIGNIFICANT CHANGE UP (ref 11.5–15.5)
INR BLD: 1.1 RATIO — SIGNIFICANT CHANGE UP (ref 0.88–1.16)
MCHC RBC-ENTMCNC: 23.1 PG — LOW (ref 27–34)
MCHC RBC-ENTMCNC: 28.8 GM/DL — LOW (ref 32–36)
MCV RBC AUTO: 80 FL — SIGNIFICANT CHANGE UP (ref 80–100)
NRBC # BLD: 0 /100 WBCS — SIGNIFICANT CHANGE UP (ref 0–0)
PLATELET # BLD AUTO: 361 K/UL — SIGNIFICANT CHANGE UP (ref 150–400)
POTASSIUM SERPL-MCNC: 4.1 MMOL/L — SIGNIFICANT CHANGE UP (ref 3.5–5.3)
POTASSIUM SERPL-SCNC: 4.1 MMOL/L — SIGNIFICANT CHANGE UP (ref 3.5–5.3)
PROT SERPL-MCNC: 8.3 G/DL — SIGNIFICANT CHANGE UP (ref 6–8.3)
PROTHROM AB SERPL-ACNC: 12.5 SEC — SIGNIFICANT CHANGE UP (ref 10–12.9)
RBC # BLD: 5.29 M/UL — HIGH (ref 3.8–5.2)
RBC # FLD: 16.9 % — HIGH (ref 10.3–14.5)
SODIUM SERPL-SCNC: 141 MMOL/L — SIGNIFICANT CHANGE UP (ref 135–145)
TROPONIN I SERPL-MCNC: <0.015 NG/ML — SIGNIFICANT CHANGE UP (ref 0–0.04)
WBC # BLD: 10.24 K/UL — SIGNIFICANT CHANGE UP (ref 3.8–10.5)
WBC # FLD AUTO: 10.24 K/UL — SIGNIFICANT CHANGE UP (ref 3.8–10.5)

## 2020-04-27 PROCEDURE — 96374 THER/PROPH/DIAG INJ IV PUSH: CPT

## 2020-04-27 PROCEDURE — 70450 CT HEAD/BRAIN W/O DYE: CPT | Mod: 26

## 2020-04-27 PROCEDURE — 99284 EMERGENCY DEPT VISIT MOD MDM: CPT

## 2020-04-27 PROCEDURE — 85730 THROMBOPLASTIN TIME PARTIAL: CPT

## 2020-04-27 PROCEDURE — 99284 EMERGENCY DEPT VISIT MOD MDM: CPT | Mod: 25

## 2020-04-27 PROCEDURE — 71045 X-RAY EXAM CHEST 1 VIEW: CPT | Mod: 26

## 2020-04-27 PROCEDURE — 84484 ASSAY OF TROPONIN QUANT: CPT

## 2020-04-27 PROCEDURE — 85610 PROTHROMBIN TIME: CPT

## 2020-04-27 PROCEDURE — 93005 ELECTROCARDIOGRAM TRACING: CPT

## 2020-04-27 PROCEDURE — 36415 COLL VENOUS BLD VENIPUNCTURE: CPT

## 2020-04-27 PROCEDURE — 85027 COMPLETE CBC AUTOMATED: CPT

## 2020-04-27 PROCEDURE — 85652 RBC SED RATE AUTOMATED: CPT

## 2020-04-27 PROCEDURE — 80053 COMPREHEN METABOLIC PANEL: CPT

## 2020-04-27 PROCEDURE — 71045 X-RAY EXAM CHEST 1 VIEW: CPT

## 2020-04-27 PROCEDURE — 70450 CT HEAD/BRAIN W/O DYE: CPT

## 2020-04-27 RX ORDER — SODIUM CHLORIDE 9 MG/ML
1000 INJECTION INTRAMUSCULAR; INTRAVENOUS; SUBCUTANEOUS ONCE
Refills: 0 | Status: COMPLETED | OUTPATIENT
Start: 2020-04-27 | End: 2020-04-27

## 2020-04-27 RX ORDER — METOCLOPRAMIDE HCL 10 MG
10 TABLET ORAL ONCE
Refills: 0 | Status: COMPLETED | OUTPATIENT
Start: 2020-04-27 | End: 2020-04-27

## 2020-04-27 RX ADMIN — SODIUM CHLORIDE 1000 MILLILITER(S): 9 INJECTION INTRAMUSCULAR; INTRAVENOUS; SUBCUTANEOUS at 14:51

## 2020-04-27 RX ADMIN — Medication 10 MILLIGRAM(S): at 15:33

## 2020-04-27 NOTE — ED ADULT NURSE NOTE - PRO INTERPRETER NEED 2
Pt d/c home. IV removed. After care and f/u discussed with pt. Pt verbalizes understandings. Pt has all belongings with her. Pt ambulated out of ED independently   
English

## 2020-04-27 NOTE — ED PROVIDER NOTE - OBJECTIVE STATEMENT
Patient reports she has been having a severe, right-sided headache for 3 weeks. Gradual onset, stabbing. Associated with nausea and constant vertigo, feels like "swaying." 2 1/2 weeks ago, patient had slurred speech that lasted 1 day. Also drooling out of left side of mouth. Spoke with her neurologist Dr. Escobar on 4/25, who told her to go to ED. Waited because she is afraid of COVID. Also reports daily cough for 3-4 months, gradually improving. No fever, cp, sob, ap, v/d, focal weakness, new paresthesias. Has tingling of b/l feet that is chronic and unchanged from baseline.

## 2020-04-27 NOTE — ED PROVIDER NOTE - PATIENT PORTAL LINK FT
You can access the FollowMyHealth Patient Portal offered by [facility] by registering at the following website: [email or web address]. By joining [employer]’s FollowMyHealth portal, you will also be able to view your health information using other applications (apps) compatible with our system.

## 2020-04-27 NOTE — ED PROVIDER NOTE - PROGRESS NOTE DETAILS
Patient is resting comfortably, NAD. Spoke with Dr. Escobar. He requested ESR. If normal, may d/c to f/u with his office. My suspicion for temporal arteritis is low. He also requested patient receive aspirin for possible TIA. Signed out to Dr. Bah pending ESR and reeval after Reglan. Abh:  Pt received in signout.  Resting comfortably and eating dinner on my re-eval.  Followed up on ESR and it is 47--discussed with Dr. Escobar and he agrees with D/C home, advised we start her on prednisone 40 mg qd x 1 week, then 20 mg qd x 2nd week.

## 2020-05-01 ENCOUNTER — APPOINTMENT (OUTPATIENT)
Dept: PULMONOLOGY | Facility: CLINIC | Age: 55
End: 2020-05-01
Payer: MEDICARE

## 2020-05-01 PROCEDURE — 99214 OFFICE O/P EST MOD 30 MIN: CPT

## 2020-05-01 RX ORDER — ALBUTEROL SULFATE 2.5 MG/3ML
(2.5 MG/3ML) SOLUTION RESPIRATORY (INHALATION) EVERY 6 HOURS
Qty: 2 | Refills: 2 | Status: ACTIVE | COMMUNITY
Start: 2017-10-23 | End: 1900-01-01

## 2020-05-01 NOTE — HISTORY OF PRESENT ILLNESS
[TextBox_4] : 54 yr old woman treated for an asthma exacerbation.\par \par She had CT chest that was negative for PE. There was a stable chronic 4 mm small nodule.\par \par She has had asthma since age [age]. She has few allergies (dust dust mites, flowers) not severe.\par Asthma is worse when allergies are worse. She uses only albuterol. She has used Advair in the past, with some relief. She has occasional cough. She uses fluticasone nasal spray. She uses Allegra D as needed. \par \par \par She has sleep apnea diagnosed several years ago, by Dr Jorge Hicks ([facility] in [phone] [place] [address]). \par \par \par She returns for follow up.  She had cough and chest congestion March 2020.  She was treated with antibiotic and steroid with improvement.\par She went to ER at  April 27, 2020 with headache and also reported slurring of speech.  She is on a prednisone taper for 2 weeks, prescribed by ER.\par \par She states she was tested for COVID but results not available. \par She notes some heavy breathing and dyspnea.\par \par She is using inhaled medications\par \par \par She is not using CPAP. She did not undergo sleep study.\par \par She has renal cell carcinoma and has not had removal due to COVID crisis.  She requires follow up. \par \par \par PMH:\par She has hyperlipidemia, elevated liver enzymes, DM, hypothyroid depression hypertension sleep apnea not using CPAP. fibromyalgia and \par \par PSH\par c sect\par right ovary\par \par lysis of adhesion\par ERMELINDA/BSO\par removal of left adrenal gland due to nodule, benign\par \par Temporal artery biopsy\par \par rhizotomy for trig neuralgia\par \par PMH\par \par migraines\par \par \par  [FreeTextEntry1] : 54 yr old woman treated for an asthma exacerbation, with cough and postnasal drip.   She was discharged on a steroid taper and nebulized bronchodilator.  She had CT chest that was negative  for PE. There was a stable chronic 4 mm small nodule.\par \par She has had asthma since age [age].  She has few allergies (dust dust mites, flowers) not severe.\par Asthma is worse when  allergies are worse.  She uses only albuterol.  She has used Advair in the past, with some relief.  She has occasional cough.  She uses fluticasone nasal spray.  She uses Allegra D as needed.  \par She has nasal congestion during allergies\par \par She has sleep apnea diagnosed several years ago, by  Dr Jorge Hicks  ([facility] in  [phone] [place] [address]).  \par \par She developed URI, now improved. \par \par Currently, she is improved.  She has used Symbicort and montelukast and feels improved.\par She completed steroid taper.  \par \par She is not using CPAP.  She did not undergo sleep study\par \par PMH:\par She has hyperlipidemia, elevated liver enzymes,  DM, hypothyroid depression hypertension   sleep apnea not using CPAP.  fibromyalgia and \par \par PSH\par c sect\par right ovary\par \par lysis of adhesion\par ERMELINDA/BSO\par removal of left adrenal gland due to nodule, benign\par \par Temporal artery biopsy\par \par rhizotomy for trig neuralgia\par \par PMH\par \par migraines\par \par

## 2020-05-01 NOTE — REVIEW OF SYSTEMS
[Nasal Congestion] : nasal congestion [Cough] : cough [Dyspnea] : dyspnea [Postnasal Drip] : postnasal drip [Wheezing] : wheezing [Hypertension] : ~T hypertension [Heartburn] : heartburn [Hay Fever] : hay fever [Poor Appetite] : normal appetite  [Palpitations] : no palpitations

## 2020-05-01 NOTE — DISCUSSION/SUMMARY
[FreeTextEntry1] : Asthma exacerbation, on oral steroid. She will continue cont Symbicort and albuterol\par \par She has no fever, chills, cough, chest congestion\par \par She needs to follow with Urology for renal lesion\par \par NAKUL: not using CPAP: reports anxiety when trying to use CPAP\par \par She requires sleep study and sleep center referral as patient has not been able to use CPAP.  This is delayed due to COVID crisis\par \par \par [facility] \par

## 2020-05-01 NOTE — PHYSICAL EXAM
[General Appearance - Well Nourished] : well nourished [General Appearance - Well Developed] : well developed [Enlarged Base of the Tongue] : enlargement of the base of the tongue [Neck Appearance] : the appearance of the neck was normal [Heart Rate And Rhythm] : heart rate and rhythm were normal [Thyroid Diffuse Enlargement] : the thyroid was not enlarged [Murmurs] : no murmurs present [Heart Sounds] : normal S1 and S2 [Arterial Pulses Normal] : the arterial pulses were normal [Respiration, Rhythm And Depth] : normal respiratory rhythm and effort [Bowel Sounds] : normal bowel sounds [Abdomen Soft] : soft [Auscultation Breath Sounds / Voice Sounds] : lungs were clear to auscultation bilaterally [Abdomen Tenderness] : non-tender [] : no hepato-splenomegaly [Motor Exam] : the motor exam was normal [Affect] : the affect was normal [Mood] : the mood was normal [FreeTextEntry1] : elevated BMI [Normal Oropharynx] : abnormal oropharynx [Low Lying Soft Palate] : no low lying soft palate [Elongated Uvula] : no elongated uvula [FreeTextEntry2] : edema R>L

## 2020-05-14 ENCOUNTER — APPOINTMENT (OUTPATIENT)
Dept: INTERNAL MEDICINE | Facility: CLINIC | Age: 55
End: 2020-05-14

## 2020-05-19 ENCOUNTER — APPOINTMENT (OUTPATIENT)
Dept: INTERNAL MEDICINE | Facility: CLINIC | Age: 55
End: 2020-05-19
Payer: MEDICARE

## 2020-05-19 VITALS
HEART RATE: 113 BPM | HEIGHT: 61 IN | DIASTOLIC BLOOD PRESSURE: 94 MMHG | WEIGHT: 293 LBS | BODY MASS INDEX: 55.32 KG/M2 | OXYGEN SATURATION: 92 % | RESPIRATION RATE: 16 BRPM | TEMPERATURE: 99 F | SYSTOLIC BLOOD PRESSURE: 138 MMHG

## 2020-05-19 PROCEDURE — 99214 OFFICE O/P EST MOD 30 MIN: CPT

## 2020-05-19 RX ORDER — DICLOFENAC EPOLAMINE 0.01 G/1
1.3 SYSTEM TOPICAL
Qty: 60 | Refills: 0 | Status: DISCONTINUED | COMMUNITY
Start: 2017-07-10 | End: 2020-05-19

## 2020-05-19 RX ORDER — PREDNISONE 10 MG/1
10 TABLET ORAL
Qty: 21 | Refills: 0 | Status: DISCONTINUED | COMMUNITY
Start: 2020-03-23 | End: 2020-05-19

## 2020-05-19 RX ORDER — FLUTICASONE FUROATE AND VILANTEROL TRIFENATATE 100; 25 UG/1; UG/1
100-25 POWDER RESPIRATORY (INHALATION) DAILY
Qty: 1 | Refills: 3 | Status: DISCONTINUED | COMMUNITY
Start: 2020-01-14 | End: 2020-05-19

## 2020-05-19 RX ORDER — PREDNISONE 50 MG/1
50 TABLET ORAL EVERY 6 HOURS
Qty: 3 | Refills: 0 | Status: DISCONTINUED | COMMUNITY
Start: 2020-03-03 | End: 2020-05-19

## 2020-05-19 RX ORDER — CAMPHOR 0.45 %
25 GEL (GRAM) TOPICAL
Qty: 2 | Refills: 0 | Status: DISCONTINUED | COMMUNITY
Start: 2020-03-03 | End: 2020-05-19

## 2020-05-19 NOTE — HEALTH RISK ASSESSMENT
[Intercurrent ED visits] : went to ED [No] : In the past 12 months have you used drugs other than those required for medical reasons? No [No falls in past year] : Patient reported no falls in the past year [0] : 2) Feeling down, depressed, or hopeless: Not at all (0) [de-identified] : 11/18 [] : No [de-identified] : URO/PULM [[id number]] : 0 [FreeTextEntry1] : Non-suicidal at this time.

## 2020-05-19 NOTE — ASSESSMENT
[FreeTextEntry1] : 54 year old female found to have stable Hypertension, Hypothyroidism, Type 2 Diabetes Mellitus, Reactive Airway Disease, Morbid Obesity, Asthma, with the current regimen, diet and life style modifications, as counseled. Prior results reviewed and discussed with the patient during today's examination. Plan as ordered.\par

## 2020-05-19 NOTE — HISTORY OF PRESENT ILLNESS
[de-identified] : 54 year old  female patient with history of stable Hypertension, Hypothyroidism, Type 2 Diabetes Mellitus, Reactive Airway Disease, Morbid Obesity, Asthma, history as stated, presented for follow up examination. Patient is compliant with all medications. Denies shortness of breath, chest pain or abdominal pains at this time. ROS as stated.\par

## 2020-05-20 LAB
ALBUMIN SERPL ELPH-MCNC: 4.3 G/DL
ALP BLD-CCNC: 130 U/L
ALT SERPL-CCNC: 50 U/L
ANION GAP SERPL CALC-SCNC: 14 MMOL/L
AST SERPL-CCNC: 29 U/L
BASOPHILS # BLD AUTO: 0.04 K/UL
BASOPHILS NFR BLD AUTO: 0.4 %
BILIRUB SERPL-MCNC: 0.4 MG/DL
BUN SERPL-MCNC: 16 MG/DL
CALCIUM SERPL-MCNC: 9.7 MG/DL
CHLORIDE SERPL-SCNC: 94 MMOL/L
CHOLEST SERPL-MCNC: 229 MG/DL
CHOLEST/HDLC SERPL: 4.7 RATIO
CO2 SERPL-SCNC: 29 MMOL/L
CREAT SERPL-MCNC: 0.7 MG/DL
EOSINOPHIL # BLD AUTO: 0.25 K/UL
EOSINOPHIL NFR BLD AUTO: 2.6 %
ESTIMATED AVERAGE GLUCOSE: 275 MG/DL
GGT SERPL-CCNC: 89 U/L
GLUCOSE SERPL-MCNC: 376 MG/DL
HBA1C MFR BLD HPLC: 11.2 %
HCT VFR BLD CALC: 44.5 %
HDLC SERPL-MCNC: 48 MG/DL
HGB BLD-MCNC: 12.7 G/DL
IMM GRANULOCYTES NFR BLD AUTO: 0.8 %
LDLC SERPL CALC-MCNC: 155 MG/DL
LYMPHOCYTES # BLD AUTO: 2.15 K/UL
LYMPHOCYTES NFR BLD AUTO: 22 %
MAN DIFF?: NORMAL
MCHC RBC-ENTMCNC: 23 PG
MCHC RBC-ENTMCNC: 28.5 GM/DL
MCV RBC AUTO: 80.8 FL
MONOCYTES # BLD AUTO: 0.6 K/UL
MONOCYTES NFR BLD AUTO: 6.1 %
NEUTROPHILS # BLD AUTO: 6.67 K/UL
NEUTROPHILS NFR BLD AUTO: 68.1 %
PLATELET # BLD AUTO: 339 K/UL
POTASSIUM SERPL-SCNC: 5 MMOL/L
PROT SERPL-MCNC: 7.1 G/DL
RBC # BLD: 5.51 M/UL
RBC # FLD: 17.2 %
SARS-COV-2 IGG SERPL IA-ACNC: 0.1 INDEX
SARS-COV-2 IGG SERPL QL IA: NEGATIVE
SODIUM SERPL-SCNC: 138 MMOL/L
T3 SERPL-MCNC: 100 NG/DL
T4 FREE SERPL-MCNC: 1.2 NG/DL
TRIGL SERPL-MCNC: 125 MG/DL
TSH SERPL-ACNC: 1.74 UIU/ML
WBC # FLD AUTO: 9.79 K/UL

## 2020-05-28 ENCOUNTER — APPOINTMENT (OUTPATIENT)
Dept: NEUROSURGERY | Facility: CLINIC | Age: 55
End: 2020-05-28
Payer: MEDICARE

## 2020-05-28 VITALS — HEIGHT: 61 IN | WEIGHT: 293 LBS | BODY MASS INDEX: 55.32 KG/M2

## 2020-05-28 PROCEDURE — 99213 OFFICE O/P EST LOW 20 MIN: CPT | Mod: 95

## 2020-05-28 RX ORDER — LINAGLIPTIN 5 MG/1
5 TABLET, FILM COATED ORAL
Qty: 90 | Refills: 3 | Status: DISCONTINUED | COMMUNITY
Start: 2019-11-18 | End: 2020-05-28

## 2020-05-28 NOTE — PHYSICAL EXAM
[General Appearance - In No Acute Distress] : in no acute distress [General Appearance - Alert] : alert [General Appearance - Well Nourished] : well nourished [General Appearance - Well Developed] : well developed [General Appearance - Well-Appearing] : healthy appearing [] : normal voice and communication [Oriented To Time, Place, And Person] : oriented to person, place, and time [Impaired Insight] : insight and judgment were intact [Affect] : the affect was normal [Memory Recent] : recent memory was not impaired [Person] : oriented to person [Place] : oriented to place [Time] : oriented to time

## 2020-05-28 NOTE — REASON FOR VISIT
[New Patient Visit] : a new patient visit [Referred By: _________] : Patient was referred by ARMEN [Other: _____] : [unfilled]

## 2020-06-03 NOTE — DATA REVIEWED
[de-identified] : EXAM: CT BRAIN    PROCEDURE DATE: 04/27/2020     INTERPRETATION: INDICATION: Slurred speech. Headache with vertigo.  TECHNIQUE: A non contrast 2.5mm axial CT study of the brain was performed  from skull base to vertex. Coronal and sagittal reformations were generated  from the axial data.  COMPARISON EXAMINATION: CT 2/6/2018   FINDINGS:   HEMISPHERES: No mass or space occupying lesion. No acute ischemic changes  or hemorrhagic foci are suggested.  VENTRICLES: Midline and normal in size.  POSTERIOR FOSSA: The brain stem and cerebellum are unremarkable. No CP  angle lesion noted.  EXTRACEREBRAL SPACES: No subdural or epidural collections are noted.  SKULL BASE AND CALVARIUM: Appears intact. No fracture or destructive  lesion is identified.  SINUSES AND MASTOIDS: Clear.  MISCELLANEOUS: No orbital or suprasellar abnormality noted.   IMPRESSION:  1) unremarkable CT study of the brain  2) clear sinuses and mastoids..          CARLIE KYLE M.D., ATTENDING RADIOLOGIST  This document has been electronically signed. Apr 27 2020 2:30PM

## 2020-06-03 NOTE — ASSESSMENT
[FreeTextEntry1] : IMPRESSION:\par 1. Type 1 Burchiel TN not relieved with medications.\par \par PLAN:\par 1. Discussed treatment options to include medication management, radiosurgery, percutaneous rhizotomy and surgical option of MVD.\par 2. Pt expressed that she has no interest in percutaneous rhizotomy.\par 3. MRI brain w/wo contrast - fiesta and navigation protocol.\par

## 2020-06-23 ENCOUNTER — LABORATORY RESULT (OUTPATIENT)
Age: 55
End: 2020-06-23

## 2020-06-23 LAB
APPEARANCE: ABNORMAL
BILIRUBIN URINE: NEGATIVE
BLOOD URINE: ABNORMAL
COLOR: YELLOW
CREAT SPEC-SCNC: 361 MG/DL
GLUCOSE QUALITATIVE U: NORMAL
KETONES URINE: NORMAL
LEUKOCYTE ESTERASE URINE: NEGATIVE
MICROALBUMIN 24H UR DL<=1MG/L-MCNC: 7.6 MG/DL
MICROALBUMIN/CREAT 24H UR-RTO: 21 MG/G
NITRITE URINE: NEGATIVE
PH URINE: 6.5
PROTEIN URINE: ABNORMAL
SPECIFIC GRAVITY URINE: 1.03
UROBILINOGEN URINE: NORMAL

## 2020-06-24 ENCOUNTER — OUTPATIENT (OUTPATIENT)
Dept: OUTPATIENT SERVICES | Facility: HOSPITAL | Age: 55
LOS: 1 days | End: 2020-06-24
Payer: MEDICARE

## 2020-06-24 ENCOUNTER — APPOINTMENT (OUTPATIENT)
Dept: MRI IMAGING | Facility: HOSPITAL | Age: 55
End: 2020-06-24
Payer: MEDICARE

## 2020-06-24 DIAGNOSIS — Z20.828 CONTACT WITH AND (SUSPECTED) EXPOSURE TO OTHER VIRAL COMMUNICABLE DISEASES: ICD-10-CM

## 2020-06-24 DIAGNOSIS — G50.0 TRIGEMINAL NEURALGIA: ICD-10-CM

## 2020-06-24 DIAGNOSIS — Z90.710 ACQUIRED ABSENCE OF BOTH CERVIX AND UTERUS: Chronic | ICD-10-CM

## 2020-06-24 DIAGNOSIS — Z98.89 OTHER SPECIFIED POSTPROCEDURAL STATES: Chronic | ICD-10-CM

## 2020-06-24 PROCEDURE — 70553 MRI BRAIN STEM W/O & W/DYE: CPT

## 2020-06-24 PROCEDURE — 70553 MRI BRAIN STEM W/O & W/DYE: CPT | Mod: 26

## 2020-06-25 ENCOUNTER — APPOINTMENT (OUTPATIENT)
Dept: RHEUMATOLOGY | Facility: CLINIC | Age: 55
End: 2020-06-25
Payer: MEDICARE

## 2020-06-25 DIAGNOSIS — M54.5 LOW BACK PAIN: ICD-10-CM

## 2020-06-25 PROCEDURE — 99213 OFFICE O/P EST LOW 20 MIN: CPT | Mod: 95

## 2020-06-25 NOTE — PHYSICAL EXAM
[General Appearance - Alert] : alert [] : no respiratory distress [Oriented To Time, Place, And Person] : oriented to person, place, and time [Impaired Insight] : insight and judgment were intact [FreeTextEntry1] : via telehealth platform ;large, confluent silver plaques over L shin

## 2020-06-25 NOTE — ASSESSMENT
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  \par Core strengthening exercises encouraged for the lower back; Gabapentin continued for radiculopathy, trigeminal neuralgia and DM neuropathy.   Trial of muscle relaxant given. Lidocaine topical given. \par Quadriceps strengthening exercises encouraged. Weight loss has been encouraged to reduce load over the medial joint line.  Viscosupplementation has been encouraged to provide additional lubrication and joint support.  In the interim, Diclofenac gel has been encouraged.\par The importance of dietary and lifestyle modifications was reiterated for the treatment of Fibromyalgia along with discussions on relaxation, stress-reducing techniques and importance of good sleep hygiene.\par \par She is in agreement with the above plan and will return in one months' time.\par \par \par

## 2020-06-25 NOTE — HISTORY OF PRESENT ILLNESS
[Home] : at home, [unfilled] , at the time of the visit. 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  Patient to have MR imaging and subsequent surgical intervention in August.  She reports LE rash as flaring a few times over the last year.  She explains use of vaseline over the site has caused a silver, plaque like appearance over lesion.  \par She explains worsening lower back pain with accompanied spasms that lend to non restorative sleep.  She reports arthralgias worsening and trigeminal neuralgia symptoms despite oxcarbazepine use. \par She otherwise denies motor/sensory disturbances or systemic symptoms.  \par \par \par \par

## 2020-06-25 NOTE — REVIEW OF SYSTEMS
[Abdominal Pain] : abdominal pain [Arthralgias] : arthralgias [Skin Lesions] : skin lesion [Joint Stiffness] : joint stiffness [Muscle Weakness] : muscle weakness [Difficulty Walking] : difficulty walking [Fever] : no fever [Chills] : no chills [Eye Pain] : no eye pain [Nasal Discharge] : no nasal discharge [Sore Throat] : no sore throat [Hoarseness] : no hoarseness [Palpitations] : no palpitations [Chest Pain] : no chest pain [Shortness Of Breath] : no shortness of breath [SOB on Exertion] : no shortness of breath during exertion [Diarrhea] : no diarrhea [Vomiting] : no vomiting [Melena] : no melena [de-identified] : Intermittent low mood

## 2020-07-02 ENCOUNTER — APPOINTMENT (OUTPATIENT)
Dept: NEUROSURGERY | Facility: CLINIC | Age: 55
End: 2020-07-02
Payer: MEDICARE

## 2020-07-02 PROCEDURE — 99214 OFFICE O/P EST MOD 30 MIN: CPT | Mod: 95

## 2020-07-06 ENCOUNTER — APPOINTMENT (OUTPATIENT)
Dept: ENDOCRINOLOGY | Facility: CLINIC | Age: 55
End: 2020-07-06
Payer: MEDICARE

## 2020-07-06 PROCEDURE — 99443: CPT

## 2020-07-06 NOTE — PHYSICAL EXAM
[General Appearance - Alert] : alert [General Appearance - In No Acute Distress] : in no acute distress [General Appearance - Well Developed] : well developed [General Appearance - Well Nourished] : well nourished [General Appearance - Well-Appearing] : healthy appearing [Oriented To Time, Place, And Person] : oriented to person, place, and time [Impaired Insight] : insight and judgment were intact [Affect] : the affect was normal [Person] : oriented to person [Memory Recent] : recent memory was not impaired [Time] : oriented to time [Place] : oriented to place [] : no respiratory distress [Respiration, Rhythm And Depth] : normal respiratory rhythm and effort [Exaggerated Use Of Accessory Muscles For Inspiration] : no accessory muscle use

## 2020-07-06 NOTE — ASSESSMENT
[FreeTextEntry1] : The diabetes is poorly controlled\par She has been using steroid for respiratory problems\par The FBS are high at home\par She has been schedule for a facial nerve surgery\par Will add Tradjenta 5 mg po QD\par No history of pancreatitis

## 2020-07-06 NOTE — DATA REVIEWED
[FreeTextEntry1] : The diabetic control is bad, the TSH and Free T4 were normal. The US thyroid last nyear was fine.

## 2020-07-06 NOTE — HISTORY OF PRESENT ILLNESS
[Home] : at home, [unfilled] , at the time of the visit. [Medical Office: ([facility])___] : at the medical office located in  [FreeTextEntry1] : Patient feels well , she is asymptomatic. Her weight has not changed. She is exercising regularly and following the diet. Her blood glucose at home are not well controlled, they are usually around 200 mg/dl.  She denies low blood glucose during the night. She denies chest pain, or SOB. Denies numbness, tingling or burning sensation on her extremities. Taking her medications regularly

## 2020-07-08 ENCOUNTER — APPOINTMENT (OUTPATIENT)
Dept: MRI IMAGING | Facility: HOSPITAL | Age: 55
End: 2020-07-08
Payer: MEDICARE

## 2020-07-08 ENCOUNTER — OUTPATIENT (OUTPATIENT)
Dept: OUTPATIENT SERVICES | Facility: HOSPITAL | Age: 55
LOS: 1 days | End: 2020-07-08
Payer: MEDICARE

## 2020-07-08 ENCOUNTER — EMERGENCY (EMERGENCY)
Facility: HOSPITAL | Age: 55
LOS: 1 days | Discharge: HOME | End: 2020-07-08
Attending: EMERGENCY MEDICINE
Payer: MEDICARE

## 2020-07-08 VITALS
SYSTOLIC BLOOD PRESSURE: 132 MMHG | DIASTOLIC BLOOD PRESSURE: 82 MMHG | HEART RATE: 96 BPM | WEIGHT: 293 LBS | RESPIRATION RATE: 19 BRPM | TEMPERATURE: 98 F | OXYGEN SATURATION: 96 %

## 2020-07-08 DIAGNOSIS — M75.51 BURSITIS OF RIGHT SHOULDER: ICD-10-CM

## 2020-07-08 DIAGNOSIS — Z98.89 OTHER SPECIFIED POSTPROCEDURAL STATES: Chronic | ICD-10-CM

## 2020-07-08 DIAGNOSIS — G50.0 TRIGEMINAL NEURALGIA: ICD-10-CM

## 2020-07-08 DIAGNOSIS — Z90.710 ACQUIRED ABSENCE OF BOTH CERVIX AND UTERUS: Chronic | ICD-10-CM

## 2020-07-08 PROCEDURE — 99284 EMERGENCY DEPT VISIT MOD MDM: CPT

## 2020-07-08 PROCEDURE — 99284 EMERGENCY DEPT VISIT MOD MDM: CPT | Mod: 25

## 2020-07-08 PROCEDURE — 96375 TX/PRO/DX INJ NEW DRUG ADDON: CPT

## 2020-07-08 PROCEDURE — 96374 THER/PROPH/DIAG INJ IV PUSH: CPT

## 2020-07-08 PROCEDURE — 70553 MRI BRAIN STEM W/O & W/DYE: CPT | Mod: 26

## 2020-07-08 PROCEDURE — 70553 MRI BRAIN STEM W/O & W/DYE: CPT

## 2020-07-08 RX ORDER — DIPHENHYDRAMINE HCL 50 MG
1 CAPSULE ORAL
Qty: 20 | Refills: 0
Start: 2020-07-08

## 2020-07-08 RX ORDER — DIPHENHYDRAMINE HCL 50 MG
25 CAPSULE ORAL ONCE
Refills: 0 | Status: COMPLETED | OUTPATIENT
Start: 2020-07-08 | End: 2020-07-08

## 2020-07-08 RX ORDER — FAMOTIDINE 10 MG/ML
20 INJECTION INTRAVENOUS ONCE
Refills: 0 | Status: COMPLETED | OUTPATIENT
Start: 2020-07-08 | End: 2020-07-08

## 2020-07-08 RX ORDER — FAMOTIDINE 10 MG/ML
1 INJECTION INTRAVENOUS
Qty: 8 | Refills: 0
Start: 2020-07-08 | End: 2020-07-11

## 2020-07-08 RX ADMIN — FAMOTIDINE 20 MILLIGRAM(S): 10 INJECTION INTRAVENOUS at 16:30

## 2020-07-08 RX ADMIN — Medication 125 MILLIGRAM(S): at 16:46

## 2020-07-08 RX ADMIN — Medication 25 MILLIGRAM(S): at 16:35

## 2020-07-08 NOTE — ED ADULT NURSE NOTE - NSIMPLEMENTINTERV_GEN_ALL_ED
Implemented All Universal Safety Interventions:  Louann to call system. Call bell, personal items and telephone within reach. Instruct patient to call for assistance. Room bathroom lighting operational. Non-slip footwear when patient is off stretcher. Physically safe environment: no spills, clutter or unnecessary equipment. Stretcher in lowest position, wheels locked, appropriate side rails in place.

## 2020-07-08 NOTE — ED PROVIDER NOTE - PATIENT PORTAL LINK FT
You can access the FollowMyHealth Patient Portal offered by [facility] by registering at the following website: [email or web address]. By joining [employer]’s FollowMyHealth portal, you will also be able to view your health information using other applications (apps) compatible with our system.

## 2020-07-08 NOTE — ED PROVIDER NOTE - EYES, MLM
Clear bilaterally, pupils equal, round and reactive to light. mild swelling to b/l lower and upper eyelids

## 2020-07-08 NOTE — ED PROVIDER NOTE - NSFOLLOWUPINSTRUCTIONS_ED_ALL_ED_FT
Anaphylaxis    WHAT YOU NEED TO KNOW:    Anaphylaxis is a life-threatening allergic reaction that must be treated immediately. Your risk for anaphylaxis increases if you have asthma that is severe or not controlled. Medical conditions such as heart disease can also increase your risk. It is important to be prepared if you are at risk for anaphylaxis. Your symptoms can be worse each time you are exposed to the trigger.     DISCHARGE INSTRUCTIONS:    Steps to take for signs or symptoms of anaphylaxis:     Immediately give 1 shot of epinephrine only into the outer thigh muscle. Even if your allergic reaction seems mild, it can quickly become anaphylaxis. This may happen even if you had a mild reaction to the allergen in the past. Each exposure can cause a different reaction. Watch for signs and symptoms of anaphylaxis every time you are exposed to a trigger. Be ready to give a shot of epinephrine. It is okay to inject epinephrine through clothing. Just be careful to avoid seams, zippers, or other parts that can prevent the needle from entering your skin.How to Give An Epinephrine Shot Adult           Leave the shot in place as directed. Your healthcare provider may recommend you leave it in place for up to 10 seconds before you remove it. This helps make sure all of the epinephrine is delivered.       Call 911 and go to the emergency department, even if the shot improved symptoms. Do not drive yourself. Bring the used epinephrine shot with you.     Call 911 for any of the following:     You have a skin rash, hives, swelling, or itching.       You have trouble breathing, shortness of breath, wheezing, or coughing.      Your throat tightens or your lips or tongue swell.      You have difficulty swallowing or speaking.      You are dizzy, lightheaded, confused, or feel like you are going to faint.      You have nausea, diarrhea, or abdominal cramps, or you are vomiting.    Return to the emergency department if:     Signs or symptoms of anaphylaxis return.         Contact your healthcare provider if:     You have questions or concerns about your condition or care.        Medicines:     Epinephrine is medicine used to treat severe allergic reactions such as anaphylaxis. It is given as a shot into the outer thigh muscle.      Medicines such as antihistamines, steroids, and bronchodilators decrease inflammation, open airways, and make breathing easier.      Take your medicine as directed. Contact your healthcare provider if you think your medicine is not helping or if you have side effects. Tell him or her if you are allergic to any medicine. Keep a list of the medicines, vitamins, and herbs you take. Include the amounts, and when and why you take them. Bring the list or the pill bottles to follow-up visits. Carry your medicine list with you in case of an emergency.    Follow up with your healthcare provider as directed: Allergy testing may reveal allergies that can trigger anaphylaxis. Write down your questions so you remember to ask them during your visits.     Safety precautions:     Keep 2 shots of epinephrine with you at all times. You may need a second shot, because epinephrine only works for about 20 minutes and symptoms may return. Your healthcare provider can show you and family members how to give the shot. Check the expiration date every month and replace it before it expires.      Create an action plan. Your healthcare provider can help you create a written plan that explains the allergy and an emergency plan to treat a reaction. The plan explains when to give a second epinephrine shot if symptoms return or do not improve after the first. Give copies of the action plan and emergency instructions to family members, and work or school staff. Show them how to give a shot of epinephrine in case you are not able to give it to yourself.      Be careful when you exercise. If you have had exercise-induced anaphylaxis, do not exercise right after you eat. Stop exercising right away if you start to develop any signs or symptoms of anaphylaxis. You may first feel tired, warm, or have itchy skin. Hives, swelling, and severe breathing problems may develop if you continue to exercise.      Carry medical alert identification. Wear medical alert jewelry or carry a card that explains the allergy. Ask your healthcare provider where to get these items. Medical Alert Jewelry           Identify and avoid known triggers. Read food labels for ingredients. Look for triggers in your environment.      Ask about treatments to prevent anaphylaxis. You may need allergy shots or other medicines to treat allergies.          © Copyright [employer] 2020       back to top                      © Copyright [employer] 2020

## 2020-07-08 NOTE — ED ADULT NURSE NOTE - OBJECTIVE STATEMENT
pt presents to ED c/o mild sob and itchiness after receiving iv contrast at outpatient scan. pt states this has happened before. pt denies any airway compromise. pt stable.

## 2020-07-08 NOTE — ED PROVIDER NOTE - PROGRESS NOTE DETAILS
Patient fell asleep, desatted to 76%RA. I woke patient up, she rapidly returned to 100%RA. Patient told me she has NAKUL and has a CPAP at home thought doesn't use it. I told patient about her desatting and strongly urged her to use the CPAP to avoid hypoxia, MI, death. Patient stated understanding. Patient is resting comfortably, NAD. Eating meal tray. Feels moderately improved. Patient resting comfortably, feels markedly improved.

## 2020-07-08 NOTE — ED ADULT NURSE NOTE - NS ED NURSE DC INFO COMPLEXITY
INPATIENT NEPHROLOGY CONSULT   [facility] NEPHROLOGY    Marck JERICHO Richard  09/10/2018    Reason for consultation:    hyponatremia    Chief Complaint: No chief complaint on file.         History of Present Illness:     89-year-old gentleman fell in his  yard trying to hold his dog lay down there for  few hours before he was found by his neighbors, called the ambulance and presented to the emergency room.  Patient had a workup done consistent with a left hip intertrochanteric fracture needing a left hip ORIF done on 09/29/2018 by Dr. Kuhn.  Patient's postop course complicated with acute blood loss anemia needing blood transfusion, urinary retention needing cystoscopy with catheter placement, hematuria, encephalopathy, pain control issues,dependency with  ADLs, transfers, mobility is transferred down to us for further rehabilitation.    9/9  Pt feels very weak.  Not eating well.  No fever, chills, nausea, vomiting, chest pain, sob, urinary or bowel complaints, rash, syncope    9/10  Not in his room          Plan of Care:       Assessment:    Hyponatremia   Hypertension  Anorexia  S/p femur fraction    Plan:    Stop iv fluids  Continue lasix  Fluid restrict   No thiazide diurtics  No ssri antidepressants  Push nutrition    Thank you for allowing us to participate in this patient's care. We will continue to follow.    Vital Signs:  Temp Readings from Last 3 Encounters:   09/10/18 97.9 °F (36.6 °C) (Oral)   07/30/18 97.5 °F (36.4 °C)   01/16/18 97.6 °F (36.4 °C)       Pulse Readings from Last 3 Encounters:   09/10/18 66   07/30/18 86   01/16/18 76       BP Readings from Last 3 Encounters:   09/10/18 (!) 162/74   07/30/18 (!) 176/91   01/16/18 (!) 175/86       Weight:  Wt Readings from Last 3 Encounters:   09/10/18 63.8 kg (140 lb 10.5 oz)   07/30/18 63.4 kg (139 lb 11.2 oz)   01/16/18 65.6 kg (144 lb 9.6 oz)       Past Medical & Surgical History:  Past Medical History:   Diagnosis Date    Anemia in stage 2 chronic  kidney disease 7/18/2017    Anemia of other chronic disease 7/18/2017    Anticoagulant long-term use     CHF (congestive heart failure)     Hemorrhage of gastrointestinal tract, unspecified 7/18/2017    Hypertension     Iron deficiency anemia secondary to blood loss (chronic) 7/18/2017    Iron deficiency anemia, unspecified 7/18/2017       Past Surgical History:   Procedure Laterality Date    FRACTURE SURGERY         Past Social History:  Social History     Socioeconomic History    Marital status:      Spouse name: None    Number of children: None    Years of education: None    Highest education level: None   Social Needs    Financial resource strain: None    Food insecurity - worry: None    Food insecurity - inability: None    Transportation needs - medical: None    Transportation needs - non-medical: None   Occupational History    None   Tobacco Use    Smoking status: Former Smoker     Types: Cigarettes    Smokeless tobacco: Never Used   Substance and Sexual Activity    Alcohol use: No    Drug use: No    Sexual activity: No   Other Topics Concern    None   Social History Narrative    None       Medications:  No current facility-administered medications on file prior to encounter.      Current Outpatient Medications on File Prior to Encounter   Medication Sig Dispense Refill    amlodipine (NORVASC) 5 MG tablet Take 5 mg by mouth once daily.      aspirin (ECOTRIN) 81 MG EC tablet Take 81 mg by mouth once daily.        clopidogrel (PLAVIX) 75 mg tablet Take 75 mg by mouth once daily.      ferrous sulfate 325 mg (65 mg iron) Tab tablet Take 325 mg by mouth daily with breakfast.      furosemide (LASIX) 20 MG tablet Take 20 mg by mouth once daily.      isosorbide mononitrate (IMDUR) 60 MG 24 hr tablet       KLOR-CON M20 20 mEq tablet Take 20 mEq by mouth once daily.       losartan (COZAAR) 50 MG tablet Take 50 mg by mouth 2 (two) times daily.      magnesium 250 mg Tab Take 1  "tablet by mouth once daily.      pantoprazole (PROTONIX) 40 MG tablet Take 40 mg by mouth once daily.      simvastatin (ZOCOR) 20 MG tablet Take 20 mg by mouth every evening.       sotalol (BETAPACE) 80 MG tablet Take 80 mg by mouth 2 (two) times daily.       tamsulosin (FLOMAX) 0.4 mg Cp24 Take 0.4 mg by mouth once daily.      isosorbide dinitrate (ISORDIL) 20 MG tablet Take 30 mg by mouth once daily.        Scheduled Meds:   amLODIPine  5 mg Oral Daily    aspirin  81 mg Oral Daily    clopidogrel  75 mg Oral Daily    docusate sodium  100 mg Oral BID    ferrous sulfate  325 mg Oral BID    furosemide  40 mg Oral Daily    isosorbide mononitrate  60 mg Oral Daily    losartan  50 mg Oral BID    magnesium oxide  400 mg Oral Daily    pantoprazole  40 mg Oral Daily    polyethylene glycol  17 g Oral Daily    potassium chloride  20 mEq Oral Daily    simvastatin  20 mg Oral QHS    sotalol  80 mg Oral BID    tamsulosin  0.4 mg Oral Daily     Continuous Infusions:   sodium chloride 0.9% 50 mL/hr at 09/09/18 1508     PRN Meds:.albuterol sulfate, lactulose, ondansetron, traMADol    Allergies:  Sulfa (sulfonamide antibiotics)    Past Family History:  Reviewed; refer to Hospitalist Admission Note    Review of Systems:  Review of Systems - All 14 systems reviewed and negative, except as noted in HPI    Physical Exam:    BP (!) 162/74 (BP Location: Right arm, Patient Position: Lying)   Pulse 66   Temp 97.9 °F (36.6 °C) (Oral)   Resp 18   Ht 5' 6" (1.676 m)   Wt 63.8 kg (140 lb 10.5 oz)   SpO2 98%   BMI 22.70 kg/m²       (from 9/9)  General Appearance:    Alert, cooperative, no distress, appears stated age   Head:    Normocephalic, without obvious abnormality, atraumatic   Eyes:    PER, conjunctiva/corneas clear, EOM's intact in both eyes        Throat:   Lips, mucosa, and tongue normal; teeth and gums normal   Back:     Symmetric, no curvature, ROM normal, no CVA tenderness   Lungs:     Clear to " auscultation bilaterally, respirations unlabored   Chest wall:    No tenderness or deformity   Heart:    Regular rate and rhythm, S1 and S2 normal, no murmur, rub   or gallop   Abdomen:     Soft, non-tender, bowel sounds active all four quadrants,     no masses, no organomegaly   Extremities:   Extremities normal, atraumatic, no cyanosis or edema   Pulses:   2+ and symmetric all extremities   MSK:   No joint or muscle swelling, tenderness or deformity   Skin:   Skin color, texture, turgor normal, no rashes or lesions   Neurologic:   CNII-XII intact, normal strength and sensation       Throughout.  No flap     Results:  Lab Results   Component Value Date     (L) 09/10/2018    K 4.3 09/10/2018    CL 97 09/10/2018    CO2 23 09/10/2018    BUN 16 09/10/2018    CREATININE 0.7 09/10/2018    CALCIUM 8.0 (L) 09/10/2018    ANIONGAP 7 (L) 09/10/2018    ESTGFRAFRICA >60 09/10/2018    EGFRNONAA >60 09/10/2018       Lab Results   Component Value Date    CALCIUM 8.0 (L) 09/10/2018    PHOS 2.8 09/10/2018       No results for input(s): WBC, RBC, HGB, HCT, PLT, MCV, MCH, MCHC in the last 24 hours.    I have personally reviewed pertinent radiological imaging and reports.        Simple: Patient demonstrates quick and easy understanding/Verbalized Understanding

## 2020-07-08 NOTE — ED PROVIDER NOTE - OBJECTIVE STATEMENT
Patient reports she was having an outpatient MRI, injected with gadolinum, shortly afterwards began to have swelling of face, diffuse itchy rash, itching to throat, and mild SOB. No cough, n/v, ap, cp. Patient had same reaction to gadolinum several years ago, but has had it multiple times since, with no reaction, most recently in June 2020.

## 2020-07-08 NOTE — ED PROVIDER NOTE - ENMT, MLM
Airway patent, Nasal mucosa clear. No pharyngeal erythema or edema. Mouth with normal mucosa. Throat has no vesicles, no oropharyngeal exudates and uvula is midline.

## 2020-07-09 ENCOUNTER — APPOINTMENT (OUTPATIENT)
Dept: INTERNAL MEDICINE | Facility: CLINIC | Age: 55
End: 2020-07-09
Payer: MEDICARE

## 2020-07-09 NOTE — ASSESSMENT
[FreeTextEntry1] : IMPRESSION:\par 1. Trigeminal neuralgia - Burchiel type 1 - pain extends along 3 branches of Trigeminal nerve.\par \par \par PLAN:\par 1. MVD week of July 16th.\par 2. Pt was notified that she requires medical clearance prior to surgery.\par 3. PST.\par 4. Repeat MRI brain with fiesta and navigation protocols.\par 5. Risks related to surgery discussed to include but are not limited to infection, hemorrhage, CSF leak, among others.\par \par \par \par

## 2020-07-09 NOTE — HISTORY OF PRESENT ILLNESS
[Home] : at home, [unfilled] , at the time of the visit. [Other:____] : [unfilled] [Medical Office: ([facility])___] : at the medical office located in  [Verbal consent obtained from patient] : the patient, [unfilled] [FreeTextEntry1] : Susie Leger is a pleasant 55 year old lady who presented with TN (right side)for about 8-9 years - extending right forehead, right ear and right cheek. Pain is present once per month for about 5 - 10 days interval. She had a recent ER visit 4/27 with pain 10/10 that was present for about 6 weeks. Pain medications was increased by her neurologist however the pain did not subside. Pain is increased with washing her face, eating, touching. Pain is not relieved with Tegretol 600 mg 3 x day and Tegretol 300 mg 2 x day as well as Gabapentin 400 mg in the morning 300 mg in the evening. Pain has been constant at 10/10 without any relief. Burchiel Type 1 TN described.\par \par Her Neurologist is Dr. Anastasia Escobar. her Endocrinologist Dr. Nehemiah Maxwell. PCP is Dr. Ken John.\par \par She has a medical history of migraines, arthritis, fibromyalgia, NAKUL (she does not use machines because she is claustrophobic), and HLD, DM. Most recent HgbA1C was 11.2 last week after 1 month treatment for bronchitis with Prednisone.\par \par Telehealth visit started at 4:17 pm - 4:38 pm.\par \par BMI is 56.\par

## 2020-07-10 ENCOUNTER — OUTPATIENT (OUTPATIENT)
Dept: OUTPATIENT SERVICES | Facility: HOSPITAL | Age: 55
LOS: 1 days | End: 2020-07-10
Payer: MEDICARE

## 2020-07-10 ENCOUNTER — APPOINTMENT (OUTPATIENT)
Dept: PULMONOLOGY | Facility: CLINIC | Age: 55
End: 2020-07-10
Payer: MEDICARE

## 2020-07-10 VITALS
HEIGHT: 61 IN | DIASTOLIC BLOOD PRESSURE: 87 MMHG | HEART RATE: 92 BPM | TEMPERATURE: 98 F | OXYGEN SATURATION: 97 % | WEIGHT: 293 LBS | RESPIRATION RATE: 16 BRPM | SYSTOLIC BLOOD PRESSURE: 145 MMHG

## 2020-07-10 VITALS
BODY MASS INDEX: 55.32 KG/M2 | HEART RATE: 111 BPM | TEMPERATURE: 98.1 F | OXYGEN SATURATION: 87 % | SYSTOLIC BLOOD PRESSURE: 127 MMHG | DIASTOLIC BLOOD PRESSURE: 78 MMHG | HEIGHT: 61 IN | WEIGHT: 293 LBS | RESPIRATION RATE: 21 BRPM

## 2020-07-10 DIAGNOSIS — Z01.818 ENCOUNTER FOR OTHER PREPROCEDURAL EXAMINATION: ICD-10-CM

## 2020-07-10 DIAGNOSIS — E11.9 TYPE 2 DIABETES MELLITUS WITHOUT COMPLICATIONS: ICD-10-CM

## 2020-07-10 DIAGNOSIS — Z98.89 OTHER SPECIFIED POSTPROCEDURAL STATES: Chronic | ICD-10-CM

## 2020-07-10 DIAGNOSIS — N89.8 OTHER SPECIFIED NONINFLAMMATORY DISORDERS OF VAGINA: Chronic | ICD-10-CM

## 2020-07-10 DIAGNOSIS — G50.0 TRIGEMINAL NEURALGIA: ICD-10-CM

## 2020-07-10 DIAGNOSIS — Z90.710 ACQUIRED ABSENCE OF BOTH CERVIX AND UTERUS: Chronic | ICD-10-CM

## 2020-07-10 DIAGNOSIS — I77.6 ARTERITIS, UNSPECIFIED: Chronic | ICD-10-CM

## 2020-07-10 DIAGNOSIS — G47.33 OBSTRUCTIVE SLEEP APNEA (ADULT) (PEDIATRIC): ICD-10-CM

## 2020-07-10 DIAGNOSIS — Z29.9 ENCOUNTER FOR PROPHYLACTIC MEASURES, UNSPECIFIED: ICD-10-CM

## 2020-07-10 DIAGNOSIS — E27.8 OTHER SPECIFIED DISORDERS OF ADRENAL GLAND: Chronic | ICD-10-CM

## 2020-07-10 LAB
A1C WITH ESTIMATED AVERAGE GLUCOSE RESULT: 10.8 % — HIGH (ref 4–5.6)
ALBUMIN SERPL ELPH-MCNC: 4.2 G/DL — SIGNIFICANT CHANGE UP (ref 3.3–5)
ALP SERPL-CCNC: 117 U/L — SIGNIFICANT CHANGE UP (ref 40–120)
ALT FLD-CCNC: 49 U/L — HIGH (ref 10–45)
ANION GAP SERPL CALC-SCNC: 12 MMOL/L — SIGNIFICANT CHANGE UP (ref 5–17)
AST SERPL-CCNC: 37 U/L — SIGNIFICANT CHANGE UP (ref 10–40)
BILIRUB SERPL-MCNC: 0.3 MG/DL — SIGNIFICANT CHANGE UP (ref 0.2–1.2)
BLD GP AB SCN SERPL QL: NEGATIVE — SIGNIFICANT CHANGE UP
BUN SERPL-MCNC: 20 MG/DL — SIGNIFICANT CHANGE UP (ref 7–23)
CALCIUM SERPL-MCNC: 10 MG/DL — SIGNIFICANT CHANGE UP (ref 8.4–10.5)
CHLORIDE SERPL-SCNC: 95 MMOL/L — LOW (ref 96–108)
CO2 SERPL-SCNC: 30 MMOL/L — SIGNIFICANT CHANGE UP (ref 22–31)
CREAT SERPL-MCNC: 0.66 MG/DL — SIGNIFICANT CHANGE UP (ref 0.5–1.3)
ESTIMATED AVERAGE GLUCOSE: 263 MG/DL — HIGH (ref 68–114)
GLUCOSE SERPL-MCNC: 243 MG/DL — HIGH (ref 70–99)
HCT VFR BLD CALC: 43 % — SIGNIFICANT CHANGE UP (ref 34.5–45)
HGB BLD-MCNC: 12.6 G/DL — SIGNIFICANT CHANGE UP (ref 11.5–15.5)
MCHC RBC-ENTMCNC: 23.2 PG — LOW (ref 27–34)
MCHC RBC-ENTMCNC: 29.3 GM/DL — LOW (ref 32–36)
MCV RBC AUTO: 79.2 FL — LOW (ref 80–100)
MRSA PCR RESULT.: SIGNIFICANT CHANGE UP
NRBC # BLD: 0 /100 WBCS — SIGNIFICANT CHANGE UP (ref 0–0)
PLATELET # BLD AUTO: 383 K/UL — SIGNIFICANT CHANGE UP (ref 150–400)
POTASSIUM SERPL-MCNC: 3.9 MMOL/L — SIGNIFICANT CHANGE UP (ref 3.5–5.3)
POTASSIUM SERPL-SCNC: 3.9 MMOL/L — SIGNIFICANT CHANGE UP (ref 3.5–5.3)
PROT SERPL-MCNC: 7.8 G/DL — SIGNIFICANT CHANGE UP (ref 6–8.3)
RBC # BLD: 5.43 M/UL — HIGH (ref 3.8–5.2)
RBC # FLD: 16.9 % — HIGH (ref 10.3–14.5)
RH IG SCN BLD-IMP: POSITIVE — SIGNIFICANT CHANGE UP
S AUREUS DNA NOSE QL NAA+PROBE: SIGNIFICANT CHANGE UP
SODIUM SERPL-SCNC: 137 MMOL/L — SIGNIFICANT CHANGE UP (ref 135–145)
WBC # BLD: 12.54 K/UL — HIGH (ref 3.8–10.5)
WBC # FLD AUTO: 12.54 K/UL — HIGH (ref 3.8–10.5)

## 2020-07-10 PROCEDURE — 86900 BLOOD TYPING SEROLOGIC ABO: CPT

## 2020-07-10 PROCEDURE — 80053 COMPREHEN METABOLIC PANEL: CPT

## 2020-07-10 PROCEDURE — 83036 HEMOGLOBIN GLYCOSYLATED A1C: CPT

## 2020-07-10 PROCEDURE — 99214 OFFICE O/P EST MOD 30 MIN: CPT

## 2020-07-10 PROCEDURE — 86850 RBC ANTIBODY SCREEN: CPT

## 2020-07-10 PROCEDURE — 87640 STAPH A DNA AMP PROBE: CPT

## 2020-07-10 PROCEDURE — 85027 COMPLETE CBC AUTOMATED: CPT

## 2020-07-10 PROCEDURE — 86901 BLOOD TYPING SEROLOGIC RH(D): CPT

## 2020-07-10 PROCEDURE — G0463: CPT

## 2020-07-10 PROCEDURE — 87641 MR-STAPH DNA AMP PROBE: CPT

## 2020-07-10 RX ORDER — LIDOCAINE HCL 20 MG/ML
0.2 VIAL (ML) INJECTION ONCE
Refills: 0 | Status: DISCONTINUED | OUTPATIENT
Start: 2020-07-23 | End: 2020-07-23

## 2020-07-10 RX ORDER — SODIUM CHLORIDE 9 MG/ML
3 INJECTION INTRAMUSCULAR; INTRAVENOUS; SUBCUTANEOUS EVERY 8 HOURS
Refills: 0 | Status: DISCONTINUED | OUTPATIENT
Start: 2020-07-23 | End: 2020-07-23

## 2020-07-10 RX ORDER — CEFAZOLIN SODIUM 1 G
3000 VIAL (EA) INJECTION ONCE
Refills: 0 | Status: DISCONTINUED | OUTPATIENT
Start: 2020-07-23 | End: 2020-07-26

## 2020-07-10 NOTE — H&P PST ADULT - NSICDXPROBLEM_GEN_ALL_CORE_FT
PROBLEM DIAGNOSES  Problem: Trigeminal neuralgia  Assessment and Plan: Right microvascular decompression  PREOP Mrsa/Mssa nasal swab sent    Problem: NAKUL and COPD overlap syndrome  Assessment and Plan: Informed OR booking for precautions.   Instructed to continue meds   Preop pulm eval on 07/10/20    Problem: DM (diabetes mellitus)  Assessment and Plan: Will follow up with labs/ fingerstick. No insulin in AM of surgery.   HgA1c ordered   Instructed to hold metformin & Glipezide the morning of surgery  STAT FS  on the day of surgery ordered PROBLEM DIAGNOSES  Problem: Trigeminal neuralgia  Assessment and Plan: Right microvascular decompression  PREOP Mrsa/Mssa nasal swab sent    Problem: Prophylactic measure  Assessment and Plan: The Caprini score indicates that this patient is at high risk for a VTE event (score 6 or greater). Surgical patients in this group will benefit from both pharmacologic prophylaxis and intermittent compression devices.  The surgical team will determine the balance between VTE risk and bleeding risk, and other clinical considerations     Problem: NAKUL and COPD overlap syndrome  Assessment and Plan: Informed OR booking for precautions.   Instructed to continue meds   Preop pulm eval on 07/10/20    Problem: DM (diabetes mellitus)  Assessment and Plan: Will follow up with labs/ fingerstick. No insulin in AM of surgery.   HgA1c ordered   Instructed to hold metformin & Glipezide the morning of surgery  STAT FS  on the day of surgery ordered

## 2020-07-10 NOTE — H&P PST ADULT - NSICDXPASTSURGICALHX_GEN_ALL_CORE_FT
PAST SURGICAL HISTORY:  Left adrenal mass excision, benign     S/P abdominal hysterectomy 2003      S/P  section 1    Vaginal cyst removed     Vasculitis on nerve biopsy , had another surgery called microvascular decompression

## 2020-07-10 NOTE — H&P PST ADULT - BREASTS
Pt is a 63 yo woman with hx liver cirrhosis, variceal esophageal bvanding, RA on prednisone, who was admitted recently 2-29 to 3-3 for cirrhosis and vomiting admitted with     1) NSTEMI: s/p PCI and LAD stent; EF 35-40 %  cont asa/plavix as per cardio. appreciate cardio consult    2) Cirrhosis + edema: Alcoholic liver cirrhosis; appreciate GI consult; cont lasix.     3) Hypokalemia of 3.4: replace and recheck    4) Hypomagnesemia of 1.7: replace and recheck    poc discussed with pt, team detailed exam

## 2020-07-10 NOTE — H&P PST ADULT - NSICDXPASTMEDICALHX_GEN_ALL_CORE_FT
PAST MEDICAL HISTORY:  Asthma last inhaler use with in 10 days, on Pulm follow up [id number]    Back pain thoracic & lumbar    Cervical neuralgia difficulty moving my neck    DJD (degenerative joint disease) Cervical/Thoracic/Lumbar    DM (diabetes mellitus) 2    Falls frequently     Fibromyalgia     H/O bursitis right shoulder    Hyperlipidemia     Hypertension vaginal cyst    Hypothyroid     Obesity morbid    Obstructive sleep apnea refuses Cpap    Radicular pain arms, legs    Renal cell carcinoma, left on follow up Dr schulte, HOD renal [facility], waiting for suregry in 09/2020    Trigeminal neuralgia R    Vasculitis Legs, forerhead, arms & hands, flare ups in R leg  Dr susie Ribeiro is my Rheumatologist

## 2020-07-10 NOTE — H&P PST ADULT - ASSESSMENT
trigeminal neuralgia trigeminal neuralgia  CAPRINI VTE 2.0 SCORE [CLOT updated 2019]    AGE RELATED RISK FACTORS                                                       MOBILITY RELATED FACTORS  [x ] Age 41-60 years                                            (1 Point)                    [ ] Bed rest                                                        (1 Point)  [ ] Age: 61-74 years                                           (2 Points)                  [ ] Plaster cast                                                   (2 Points)  [ ] Age= 75 years                                              (3 Points)                    [ ] Bed bound for more than 72 hours                 (2 Points)    DISEASE RELATED RISK FACTORS                                               GENDER SPECIFIC FACTORS  [x ] Edema in the lower extremities                       (1 Point)              [ ] Pregnancy                                                     (1 Point)  [ ] Varicose veins                                               (1 Point)                     [ ] Post-partum < 6 weeks                                   (1 Point)             [x ] BMI > 25 Kg/m2                                            (1 Point)                     [ ] Hormonal therapy  or oral contraception          (1 Point)                 [ ] Sepsis (in the previous month)                        (1 Point)               [ ] History of pregnancy complications                 (1 point)  [ ] Pneumonia or serious lung disease                                               [ ] Unexplained or recurrent                     (1 Point)           (in the previous month)                               (1 Point)  [x ] Abnormal pulmonary function test                     (1 Point)                 SURGERY RELATED RISK FACTORS  [ ] Acute myocardial infarction                              (1 Point)               [ ]  Section                                             (1 Point)  [ ] Congestive heart failure (in the previous month)  (1 Point)      [ ] Minor surgery                                                  (1 Point)   [ ] Inflammatory bowel disease                             (1 Point)               [ ] Arthroscopic surgery                                        (2 Points)  [ ] Central venous access                                      (2 Points)                [2 ] General surgery lasting more than 45 minutes (2 points)  [x ] Malignancy- Present or previous                   (2 Points)                [ ] Elective arthroplasty                                         (5 points)    [ ] Stroke (in the previous month)                          (5 Points)                                                                                                                                                           HEMATOLOGY RELATED FACTORS                                                 TRAUMA RELATED RISK FACTORS  [ ] Prior episodes of VTE                                     (3 Points)                [ ] Fracture of the hip, pelvis, or leg                       (5 Points)  [ ] Positive family history for VTE                         (3 Points)             [ ] Acute spinal cord injury (in the previous month)  (5 Points)  [ ] Prothrombin 17261 A                                     (3 Points)               [ ] Paralysis  (less than 1 month)                             (5 Points)  [ ] Factor V Leiden                                             (3 Points)                  [ ] Multiple Trauma within 1 month                        (5 Points)  [ ] Lupus anticoagulants                                     (3 Points)                                                           [ ] Anticardiolipin antibodies                               (3 Points)                                                       [ ] High homocysteine in the blood                      (3 Points)                                             [ ] Other congenital or acquired thrombophilia      (3 Points)                                                [ ] Heparin induced thrombocytopenia                  (3 Points)                                     Total Score [    8      ]

## 2020-07-10 NOTE — H&P PST ADULT - MUSCULOSKELETAL
Hypertension details… detailed exam no joint erythema/normal strength/no calf tenderness/no joint warmth/no joint swelling

## 2020-07-10 NOTE — H&P PST ADULT - HISTORY OF PRESENT ILLNESS
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       PMHx of asthma( last inhaler used 2 weeks ago, last ER visit 09/2019), R trigeminal neuralgia since 2008, chronic bronchitis, NAKUL( refuses C- pap due to claustrophobia,  HTN, DM2, HLD, fibromyalgia, anxiety, trigeminal neuralgia, degenerative joint disease,  IgA vasculitis since 2018(ast flare up couple of months ago),   She denies any fever, chest pain, abdominal pain, urinary or bowel complaint.     *** patient is s/p ERVisit due MRI contrast gadolinium & currently on oral steroids.      *** Covid testing on 07/14/20 at [facility].

## 2020-07-12 NOTE — DISCUSSION/SUMMARY
[FreeTextEntry1] : Asthma  \par \par NAKUL\par \par obesity\par \par kyphosis and limited extension of neck\par \par difficult intubation in past\par \par possible pulmonary hypertension\par \par Recommend\par \par She is not optimized for planned surgery.  She is advised to return for PFT.  She is also advised to start using nebulized albuterol twice per day at this time until the time of surgery.  She will continue Symbicort twice per day.\par \par If PFT is acceptable then she would be optimized for the planned surgery.  \par \par I expect that the perioperative course will be potentially complicated.  I have informed her of this.  If the surgery is considered necessary, then she may proceed with this understanding that appropriate precautions in the perioperative period are needed. \par \par She will continue to be at significant increased risk for perioperative pulmonary compilations due to presence of sleep apnea and elevated BMI. In addition, I am concerned about difficulty intubating and extubating her.  \par \par She should be evaluated by anesthesiologist prior to surgery to ensure she can be intubated.  She may need fiberoptic or other technique to enable intubation.   In addition, she may have pulmonary hypertension that would affect hemodynamics during and after anesthesia.\par \par She will need non invasive ventilation after extubation.\par \par She will need  cautious monitoring for oxygen desaturation or respiratory depression in the postoperative period. BIPAP should be available if respiratory depression develops. Usual DVT prophylaxis  is necessary.\par \par  Guido Monterroso MD [facility] \par

## 2020-07-12 NOTE — PHYSICAL EXAM
[Not Tender] : not tender [TextBox_44] : kyphosis, short neck limited mobility [TextBox_68] : no wheeze, diminished aeration [TextBox_89] : obese, protuberant [TextBox_105] : 1+ LE edema

## 2020-07-12 NOTE — HISTORY OF PRESENT ILLNESS
[TextBox_4] : 55 yr old woman with asthma and NAKUL.  She is scheduled for surgery in 1 week. \par \par She has had asthma since age [age]. She has few allergies (dust dust mites, flowers) not severe.\par Asthma is worse when allergies are worse. \par She uses Symbicort, albuterol and nebulized albuterol.  She has dyspnea on exertion.\par \par \par She has hypoxia with exertion.\par \par She has had no recent exacerbations.\par \par \par \par She also has a 4 mm lung nodule seen on prior CT chest. \par \par \par She has sleep apnea diagnosed several years ago, by Dr Jorge Hicks ([facility] in [phone] [address] [place] [address]). \par \par She has not been able to use CPAP and gave away the equipment.\par \par She states she was tested for COVID that was negative.  \par \par \par She has renal cell carcinoma and has not had removal due to COVID crisis. She requires follow up. \par \par \par PMH:\par She has hyperlipidemia, elevated liver enzymes, DM, hypothyroid depression hypertension sleep apnea not using CPAP. fibromyalgia and \par \par PSH\par c sect\par right ovary\par \par lysis of adhesion\par ERMELINDA/BSO\par removal of left adrenal gland due to nodule, benign\par \par Temporal artery biopsy\par \par rhizotomy for trig neuralgia\par \par ROS\par \par migraines\par \par \par

## 2020-07-13 ENCOUNTER — APPOINTMENT (OUTPATIENT)
Dept: INTERNAL MEDICINE | Facility: CLINIC | Age: 55
End: 2020-07-13
Payer: MEDICARE

## 2020-07-13 ENCOUNTER — NON-APPOINTMENT (OUTPATIENT)
Age: 55
End: 2020-07-13

## 2020-07-13 VITALS
TEMPERATURE: 98.6 F | HEART RATE: 110 BPM | OXYGEN SATURATION: 94 % | RESPIRATION RATE: 18 BRPM | SYSTOLIC BLOOD PRESSURE: 130 MMHG | WEIGHT: 293 LBS | DIASTOLIC BLOOD PRESSURE: 80 MMHG | HEIGHT: 61 IN | BODY MASS INDEX: 55.32 KG/M2

## 2020-07-13 PROCEDURE — 99214 OFFICE O/P EST MOD 30 MIN: CPT | Mod: 25

## 2020-07-13 PROCEDURE — 93000 ELECTROCARDIOGRAM COMPLETE: CPT

## 2020-07-14 ENCOUNTER — APPOINTMENT (OUTPATIENT)
Dept: DISASTER EMERGENCY | Facility: CLINIC | Age: 55
End: 2020-07-14

## 2020-07-14 NOTE — HISTORY OF PRESENT ILLNESS
[COPD] : no COPD [Smoker] : not a smoker [Chronic Anticoagulation] : no chronic anticoagulation [Chronic Kidney Disease] : no chronic kidney disease [FreeTextEntry1] : Neurosurgery as per surgeon [FreeTextEntry2] : 7/23/2020 [FreeTextEntry3] : Dr. Otoole [FreeTextEntry4] : 55 year old female with history of stable Hypertension, Hypothyroidism, Type 2 Diabetes Mellitus, Reactive Airway Disease, Morbid Obesity, Asthma, Hypothyroidism, history as stated, presented for clearance evaluation prior to possible Neurosurgery as per surgeon, for Trigeminal Neuralgia.

## 2020-07-14 NOTE — ADDENDUM
[FreeTextEntry1] : Addendum:\par Labs from 7/10/2020 and 7/13/2020 noted and discussed with the patient.\par \par PULM Clearance will be provided by Dr. Monterroso, as counseled.\par \par Patient is medically optimized to the best at this time for the stated intervention.\par Patient is medically cleared.\par

## 2020-07-15 LAB — SARS-COV-2 N GENE NPH QL NAA+PROBE: NOT DETECTED

## 2020-07-17 ENCOUNTER — APPOINTMENT (OUTPATIENT)
Dept: PULMONOLOGY | Facility: CLINIC | Age: 55
End: 2020-07-17

## 2020-07-17 ENCOUNTER — APPOINTMENT (OUTPATIENT)
Dept: PULMONOLOGY | Facility: CLINIC | Age: 55
End: 2020-07-17
Payer: MEDICARE

## 2020-07-17 PROCEDURE — 94060 EVALUATION OF WHEEZING: CPT

## 2020-07-17 PROCEDURE — 94729 DIFFUSING CAPACITY: CPT

## 2020-07-17 PROCEDURE — 94727 GAS DIL/WSHOT DETER LNG VOL: CPT

## 2020-07-18 LAB
ALBUMIN SERPL ELPH-MCNC: 4.4 G/DL
ALP BLD-CCNC: 122 U/L
ALT SERPL-CCNC: 63 U/L
ANION GAP SERPL CALC-SCNC: 16 MMOL/L
AST SERPL-CCNC: 45 U/L
BILIRUB DIRECT SERPL-MCNC: 0.1 MG/DL
BILIRUB INDIRECT SERPL-MCNC: 0.2 MG/DL
BILIRUB SERPL-MCNC: 0.3 MG/DL
BUN SERPL-MCNC: 18 MG/DL
CALCIUM SERPL-MCNC: 9.9 MG/DL
CHLORIDE SERPL-SCNC: 95 MMOL/L
CHOLEST SERPL-MCNC: 226 MG/DL
CHOLEST/HDLC SERPL: 4.6 RATIO
CO2 SERPL-SCNC: 28 MMOL/L
CREAT SERPL-MCNC: 0.89 MG/DL
CREAT SPEC-SCNC: 139 MG/DL
ESTIMATED AVERAGE GLUCOSE: 266 MG/DL
FRUCTOSAMINE SERPL-MCNC: 311 UMOL/L
GLUCOSE BS SERPL-MCNC: 352 MG/DL
GLUCOSE SERPL-MCNC: 351 MG/DL
HBA1C MFR BLD HPLC: 10.9 %
HDLC SERPL-MCNC: 49 MG/DL
LDLC SERPL CALC-MCNC: 153 MG/DL
MICROALBUMIN 24H UR DL<=1MG/L-MCNC: 5.1 MG/DL
MICROALBUMIN/CREAT 24H UR-RTO: 37 MG/G
POTASSIUM SERPL-SCNC: 4.6 MMOL/L
PROT SERPL-MCNC: 7.3 G/DL
SODIUM SERPL-SCNC: 139 MMOL/L
T4 FREE SERPL-MCNC: 1.3 NG/DL
TRIGL SERPL-MCNC: 116 MG/DL
TSH SERPL-ACNC: 1.26 UIU/ML

## 2020-07-20 ENCOUNTER — APPOINTMENT (OUTPATIENT)
Dept: DISASTER EMERGENCY | Facility: CLINIC | Age: 55
End: 2020-07-20

## 2020-07-21 LAB — SARS-COV-2 N GENE NPH QL NAA+PROBE: NOT DETECTED

## 2020-07-22 ENCOUNTER — TRANSCRIPTION ENCOUNTER (OUTPATIENT)
Age: 55
End: 2020-07-22

## 2020-07-23 ENCOUNTER — APPOINTMENT (OUTPATIENT)
Dept: NEUROSURGERY | Facility: CLINIC | Age: 55
End: 2020-07-23

## 2020-07-23 ENCOUNTER — INPATIENT (INPATIENT)
Facility: HOSPITAL | Age: 55
LOS: 6 days | Discharge: HOME | DRG: 25 | End: 2020-07-30
Attending: NEUROLOGICAL SURGERY | Admitting: NEUROLOGICAL SURGERY
Payer: MEDICARE

## 2020-07-23 VITALS
HEIGHT: 61 IN | TEMPERATURE: 98 F | DIASTOLIC BLOOD PRESSURE: 75 MMHG | HEART RATE: 102 BPM | SYSTOLIC BLOOD PRESSURE: 141 MMHG | RESPIRATION RATE: 18 BRPM | OXYGEN SATURATION: 96 % | WEIGHT: 293 LBS

## 2020-07-23 DIAGNOSIS — I77.6 ARTERITIS, UNSPECIFIED: Chronic | ICD-10-CM

## 2020-07-23 DIAGNOSIS — Z90.710 ACQUIRED ABSENCE OF BOTH CERVIX AND UTERUS: Chronic | ICD-10-CM

## 2020-07-23 DIAGNOSIS — E27.8 OTHER SPECIFIED DISORDERS OF ADRENAL GLAND: Chronic | ICD-10-CM

## 2020-07-23 DIAGNOSIS — Z98.89 OTHER SPECIFIED POSTPROCEDURAL STATES: Chronic | ICD-10-CM

## 2020-07-23 DIAGNOSIS — N89.8 OTHER SPECIFIED NONINFLAMMATORY DISORDERS OF VAGINA: Chronic | ICD-10-CM

## 2020-07-23 DIAGNOSIS — G50.0 TRIGEMINAL NEURALGIA: ICD-10-CM

## 2020-07-23 LAB
GAS PNL BLDA: SIGNIFICANT CHANGE UP
GAS PNL BLDA: SIGNIFICANT CHANGE UP
GLUCOSE BLDC GLUCOMTR-MCNC: 244 MG/DL — HIGH (ref 70–99)
GLUCOSE BLDC GLUCOMTR-MCNC: 259 MG/DL — HIGH (ref 70–99)
INR BLD: 1.13 RATIO — SIGNIFICANT CHANGE UP (ref 0.88–1.16)
PROTHROM AB SERPL-ACNC: 13.4 SEC — SIGNIFICANT CHANGE UP (ref 10.6–13.6)
RH IG SCN BLD-IMP: POSITIVE — SIGNIFICANT CHANGE UP

## 2020-07-23 PROCEDURE — 61781 SCAN PROC CRANIAL INTRA: CPT

## 2020-07-23 PROCEDURE — 61458 CRNEC SOPL XPL/DCMPR CRL NRV: CPT | Mod: 22

## 2020-07-23 PROCEDURE — 69990 MICROSURGERY ADD-ON: CPT | Mod: 59

## 2020-07-23 PROCEDURE — 99291 CRITICAL CARE FIRST HOUR: CPT

## 2020-07-23 RX ORDER — LISINOPRIL 2.5 MG/1
10 TABLET ORAL DAILY
Refills: 0 | Status: DISCONTINUED | OUTPATIENT
Start: 2020-07-23 | End: 2020-07-29

## 2020-07-23 RX ORDER — HYDROMORPHONE HYDROCHLORIDE 2 MG/ML
1 INJECTION INTRAMUSCULAR; INTRAVENOUS; SUBCUTANEOUS EVERY 6 HOURS
Refills: 0 | Status: DISCONTINUED | OUTPATIENT
Start: 2020-07-23 | End: 2020-07-25

## 2020-07-23 RX ORDER — NICARDIPINE HYDROCHLORIDE 30 MG/1
5 CAPSULE, EXTENDED RELEASE ORAL
Qty: 40 | Refills: 0 | Status: DISCONTINUED | OUTPATIENT
Start: 2020-07-23 | End: 2020-07-24

## 2020-07-23 RX ORDER — LEVOTHYROXINE SODIUM 125 MCG
112 TABLET ORAL DAILY
Refills: 0 | Status: DISCONTINUED | OUTPATIENT
Start: 2020-07-23 | End: 2020-07-30

## 2020-07-23 RX ORDER — CEFAZOLIN SODIUM 1 G
VIAL (EA) INJECTION
Refills: 0 | Status: COMPLETED | OUTPATIENT
Start: 2020-07-24 | End: 2020-07-24

## 2020-07-23 RX ORDER — GABAPENTIN 400 MG/1
300 CAPSULE ORAL AT BEDTIME
Refills: 0 | Status: DISCONTINUED | OUTPATIENT
Start: 2020-07-23 | End: 2020-07-30

## 2020-07-23 RX ORDER — CEFAZOLIN SODIUM 1 G
1000 VIAL (EA) INJECTION EVERY 8 HOURS
Refills: 0 | Status: COMPLETED | OUTPATIENT
Start: 2020-07-24 | End: 2020-07-24

## 2020-07-23 RX ORDER — SENNA PLUS 8.6 MG/1
2 TABLET ORAL AT BEDTIME
Refills: 0 | Status: DISCONTINUED | OUTPATIENT
Start: 2020-07-23 | End: 2020-07-30

## 2020-07-23 RX ORDER — ATORVASTATIN CALCIUM 80 MG/1
10 TABLET, FILM COATED ORAL AT BEDTIME
Refills: 0 | Status: DISCONTINUED | OUTPATIENT
Start: 2020-07-23 | End: 2020-07-30

## 2020-07-23 RX ORDER — FLUOXETINE HCL 10 MG
20 CAPSULE ORAL DAILY
Refills: 0 | Status: DISCONTINUED | OUTPATIENT
Start: 2020-07-23 | End: 2020-07-30

## 2020-07-23 RX ORDER — OXCARBAZEPINE 300 MG/1
300 TABLET, FILM COATED ORAL
Refills: 0 | Status: DISCONTINUED | OUTPATIENT
Start: 2020-07-23 | End: 2020-07-30

## 2020-07-23 RX ORDER — LABETALOL HCL 100 MG
20 TABLET ORAL
Refills: 0 | Status: DISCONTINUED | OUTPATIENT
Start: 2020-07-23 | End: 2020-07-24

## 2020-07-23 RX ORDER — HYDROCHLOROTHIAZIDE 25 MG
12.5 TABLET ORAL DAILY
Refills: 0 | Status: DISCONTINUED | OUTPATIENT
Start: 2020-07-23 | End: 2020-07-29

## 2020-07-23 RX ORDER — DEXTROSE MONOHYDRATE, SODIUM CHLORIDE, AND POTASSIUM CHLORIDE 50; .745; 4.5 G/1000ML; G/1000ML; G/1000ML
1000 INJECTION, SOLUTION INTRAVENOUS
Refills: 0 | Status: DISCONTINUED | OUTPATIENT
Start: 2020-07-23 | End: 2020-07-24

## 2020-07-23 RX ORDER — ONDANSETRON 8 MG/1
4 TABLET, FILM COATED ORAL EVERY 6 HOURS
Refills: 0 | Status: DISCONTINUED | OUTPATIENT
Start: 2020-07-23 | End: 2020-07-30

## 2020-07-23 RX ORDER — PANTOPRAZOLE SODIUM 20 MG/1
40 TABLET, DELAYED RELEASE ORAL
Refills: 0 | Status: DISCONTINUED | OUTPATIENT
Start: 2020-07-23 | End: 2020-07-30

## 2020-07-23 RX ORDER — CEFAZOLIN SODIUM 1 G
1000 VIAL (EA) INJECTION ONCE
Refills: 0 | Status: COMPLETED | OUTPATIENT
Start: 2020-07-23 | End: 2020-07-24

## 2020-07-23 RX ORDER — AMITRIPTYLINE HCL 25 MG
10 TABLET ORAL AT BEDTIME
Refills: 0 | Status: DISCONTINUED | OUTPATIENT
Start: 2020-07-23 | End: 2020-07-30

## 2020-07-23 RX ORDER — MONTELUKAST 4 MG/1
10 TABLET, CHEWABLE ORAL AT BEDTIME
Refills: 0 | Status: DISCONTINUED | OUTPATIENT
Start: 2020-07-23 | End: 2020-07-30

## 2020-07-23 RX ORDER — TIOTROPIUM BROMIDE 18 UG/1
1 CAPSULE ORAL; RESPIRATORY (INHALATION) DAILY
Refills: 0 | Status: DISCONTINUED | OUTPATIENT
Start: 2020-07-23 | End: 2020-07-30

## 2020-07-23 RX ORDER — CHLORHEXIDINE GLUCONATE 213 G/1000ML
1 SOLUTION TOPICAL
Refills: 0 | Status: DISCONTINUED | OUTPATIENT
Start: 2020-07-23 | End: 2020-07-26

## 2020-07-23 RX ORDER — POLYETHYLENE GLYCOL 3350 17 G/17G
17 POWDER, FOR SOLUTION ORAL
Refills: 0 | Status: DISCONTINUED | OUTPATIENT
Start: 2020-07-23 | End: 2020-07-30

## 2020-07-23 RX ORDER — ALBUTEROL 90 UG/1
2 AEROSOL, METERED ORAL EVERY 6 HOURS
Refills: 0 | Status: DISCONTINUED | OUTPATIENT
Start: 2020-07-23 | End: 2020-07-30

## 2020-07-23 RX ORDER — OXYCODONE AND ACETAMINOPHEN 5; 325 MG/1; MG/1
1 TABLET ORAL EVERY 4 HOURS
Refills: 0 | Status: DISCONTINUED | OUTPATIENT
Start: 2020-07-23 | End: 2020-07-26

## 2020-07-23 NOTE — PROVIDER CONTACT NOTE (OTHER) - BACKGROUND
pt hx of DM2, obesity, HTN, HLD, trigeminal neuralgia scheduled for surgery today. npo since 11pm last night 7/22

## 2020-07-23 NOTE — PROGRESS NOTE ADULT - ASSESSMENT
56yo F with:    1. R Trigeminal Neuralgia    s/p R MVD    Plan:  nscu  ct in am  sbp control  pt/ot  dvt ppx  pain control  neuro checks q1

## 2020-07-23 NOTE — PROGRESS NOTE ADULT - SUBJECTIVE AND OBJECTIVE BOX
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       PMHx of asthma( last inhaler used 2 weeks ago, last ER visit 09/2019), R trigeminal neuralgia since 2008, chronic bronchitis, NAKUL( refuses C- pap due to claustrophobia,  HTN, DM2, HLD, fibromyalgia, anxiety, trigeminal neuralgia, degenerative joint disease,  IgA vasculitis since 2018(ast flare up couple of months ago),   She denies any fever, chest pain, abdominal pain, urinary or bowel complaint.     *** patient is s/p ERVisit due MRI contrast gadolinium & currently on oral steroids.      *** Covid testing on 07/14/20 at [facility].      OVERNIGHT EVENTS: s/p MVD, adm NSCU    ADMISSION SCORES:   GCS: 15 HH: MF: NIHSS: ICH Score:    REVIEW OF SYSTEMS: post op pain, positioning     VITALS: [x] Reviewed    IMAGING/DATA: [x] Reviewed    IVF FLUIDS/MEDICATIONS: [x] Reviewed    ALLERGIES: Allergies    Fioricet (Rash)  gadobutrol (Rash; Urticaria; Hives)  IV Contrast (Short breath)  mushroom (Unknown)  venlafaxine (Hives)    Intolerances        DEVICES:   [] Restraints [x] PIVs [] ET tube [] central line [] PICC [x] arterial line [x] castaneda [] NGT/OGT [] EVD [] LD [] BHAVIK/HMV [] LiCOX [] ICP monitor [] Trach [] PEG [] Chest Tube [] other:    EXAMINATION:  General: No acute distress  HEENT: Anicteric sclerae  Cardiac: [id number]  Lungs: Clear  Abdomen: Soft, non-tender, +BS  Extremities: No c/c/e  Skin/Incision Site: Clean, dry and intact  Neurologic: Awake, alert, fully oriented, follows commands, PERRL, EOMI, face symmetric, tongue midline, no drift, full strength

## 2020-07-23 NOTE — PROGRESS NOTE ADULT - ASSESSMENT
s/p MVD    NEURO:  CT Head in AM, Pain control  cont home psych meds  Activity: [] OOB as tolerated [x] Bedrest [] PT [] OT [] PMNR    PULM:  Incentive spirometry, mobilize as tolerated  cont montelukast   per patient, intermittently takes prednisone, will continue for now, consider d/c     CV:  -150mmHg, d/c a-line in AM  cont HCTZ    RENAL:  IVF until good PO intake, d/c castaneda in AM    GI:  Diet: Dysphagia screen and then advance diet as tolerated  GI prophylaxis [x] not indicated [] PPI [] other:  Bowel regimen [] colace [x] senna [] other:    ENDO:   Goal euglycemia (-180)    HEME/ONC:  VTE prophylaxis: [x] SCDs [] chemoprophylaxis [x] hold chemoprophylaxis due to: fresh post op [] high risk of DVT/PE on admission due to:    ID:  Ely-op antibiotics    MISC:    SOCIAL/FAMILY:  [x] awaiting [] updated at bedside [] family meeting    CODE STATUS:  [x] Full Code [] DNR [] DNI [] Palliative/Comfort Care    DISPOSITION:  [x] ICU [] Stroke Unit [] Floor [] EMU [] RCU [] PCU    [x] Patient is at high risk of neurologic deterioration/death due to: post op hemorrhage, cerebral edema

## 2020-07-23 NOTE — PROGRESS NOTE ADULT - SUBJECTIVE AND OBJECTIVE BOX
S:tolerated surgery well, no issues    O:  awake  alert  PERRL  face symmetric  Mckenna equally  incision c/d/i

## 2020-07-24 LAB
ANION GAP SERPL CALC-SCNC: 11 MMOL/L — SIGNIFICANT CHANGE UP (ref 5–17)
ANION GAP SERPL CALC-SCNC: 16 MMOL/L — SIGNIFICANT CHANGE UP (ref 5–17)
BUN SERPL-MCNC: 12 MG/DL — SIGNIFICANT CHANGE UP (ref 7–23)
BUN SERPL-MCNC: 5 MG/DL — LOW (ref 7–23)
CALCIUM SERPL-MCNC: 8.4 MG/DL — SIGNIFICANT CHANGE UP (ref 8.4–10.5)
CALCIUM SERPL-MCNC: 8.5 MG/DL — SIGNIFICANT CHANGE UP (ref 8.4–10.5)
CHLORIDE SERPL-SCNC: 95 MMOL/L — LOW (ref 96–108)
CHLORIDE SERPL-SCNC: 96 MMOL/L — SIGNIFICANT CHANGE UP (ref 96–108)
CO2 SERPL-SCNC: 23 MMOL/L — SIGNIFICANT CHANGE UP (ref 22–31)
CO2 SERPL-SCNC: 28 MMOL/L — SIGNIFICANT CHANGE UP (ref 22–31)
CREAT SERPL-MCNC: 0.48 MG/DL — LOW (ref 0.5–1.3)
CREAT SERPL-MCNC: 0.64 MG/DL — SIGNIFICANT CHANGE UP (ref 0.5–1.3)
GAS PNL BLDA: SIGNIFICANT CHANGE UP
GAS PNL BLDA: SIGNIFICANT CHANGE UP
GLUCOSE BLDC GLUCOMTR-MCNC: 139 MG/DL — HIGH (ref 70–99)
GLUCOSE BLDC GLUCOMTR-MCNC: 189 MG/DL — HIGH (ref 70–99)
GLUCOSE BLDC GLUCOMTR-MCNC: 202 MG/DL — HIGH (ref 70–99)
GLUCOSE BLDC GLUCOMTR-MCNC: 207 MG/DL — HIGH (ref 70–99)
GLUCOSE BLDC GLUCOMTR-MCNC: 221 MG/DL — HIGH (ref 70–99)
GLUCOSE BLDC GLUCOMTR-MCNC: 249 MG/DL — HIGH (ref 70–99)
GLUCOSE BLDC GLUCOMTR-MCNC: 262 MG/DL — HIGH (ref 70–99)
GLUCOSE BLDC GLUCOMTR-MCNC: 281 MG/DL — HIGH (ref 70–99)
GLUCOSE BLDC GLUCOMTR-MCNC: 284 MG/DL — HIGH (ref 70–99)
GLUCOSE BLDC GLUCOMTR-MCNC: 303 MG/DL — HIGH (ref 70–99)
GLUCOSE BLDC GLUCOMTR-MCNC: 311 MG/DL — HIGH (ref 70–99)
GLUCOSE BLDC GLUCOMTR-MCNC: 316 MG/DL — HIGH (ref 70–99)
GLUCOSE BLDC GLUCOMTR-MCNC: 316 MG/DL — HIGH (ref 70–99)
GLUCOSE BLDC GLUCOMTR-MCNC: 329 MG/DL — HIGH (ref 70–99)
GLUCOSE SERPL-MCNC: 231 MG/DL — HIGH (ref 70–99)
GLUCOSE SERPL-MCNC: 349 MG/DL — HIGH (ref 70–99)
HCT VFR BLD CALC: 38.8 % — SIGNIFICANT CHANGE UP (ref 34.5–45)
HCT VFR BLD CALC: 41.4 % — SIGNIFICANT CHANGE UP (ref 34.5–45)
HGB BLD-MCNC: 11.2 G/DL — LOW (ref 11.5–15.5)
HGB BLD-MCNC: 12.1 G/DL — SIGNIFICANT CHANGE UP (ref 11.5–15.5)
MAGNESIUM SERPL-MCNC: 1.4 MG/DL — LOW (ref 1.6–2.6)
MAGNESIUM SERPL-MCNC: 1.7 MG/DL — SIGNIFICANT CHANGE UP (ref 1.6–2.6)
MCHC RBC-ENTMCNC: 23.4 PG — LOW (ref 27–34)
MCHC RBC-ENTMCNC: 23.4 PG — LOW (ref 27–34)
MCHC RBC-ENTMCNC: 28.9 GM/DL — LOW (ref 32–36)
MCHC RBC-ENTMCNC: 29.2 GM/DL — LOW (ref 32–36)
MCV RBC AUTO: 80.2 FL — SIGNIFICANT CHANGE UP (ref 80–100)
MCV RBC AUTO: 81.2 FL — SIGNIFICANT CHANGE UP (ref 80–100)
NRBC # BLD: 0 /100 WBCS — SIGNIFICANT CHANGE UP (ref 0–0)
NRBC # BLD: 0 /100 WBCS — SIGNIFICANT CHANGE UP (ref 0–0)
NT-PROBNP SERPL-SCNC: 451 PG/ML — HIGH (ref 0–300)
PHOSPHATE SERPL-MCNC: 3 MG/DL — SIGNIFICANT CHANGE UP (ref 2.5–4.5)
PHOSPHATE SERPL-MCNC: 4.8 MG/DL — HIGH (ref 2.5–4.5)
PLATELET # BLD AUTO: 327 K/UL — SIGNIFICANT CHANGE UP (ref 150–400)
PLATELET # BLD AUTO: 328 K/UL — SIGNIFICANT CHANGE UP (ref 150–400)
POTASSIUM SERPL-MCNC: 4.5 MMOL/L — SIGNIFICANT CHANGE UP (ref 3.5–5.3)
POTASSIUM SERPL-MCNC: 4.5 MMOL/L — SIGNIFICANT CHANGE UP (ref 3.5–5.3)
POTASSIUM SERPL-SCNC: 4.5 MMOL/L — SIGNIFICANT CHANGE UP (ref 3.5–5.3)
POTASSIUM SERPL-SCNC: 4.5 MMOL/L — SIGNIFICANT CHANGE UP (ref 3.5–5.3)
RBC # BLD: 4.78 M/UL — SIGNIFICANT CHANGE UP (ref 3.8–5.2)
RBC # BLD: 5.16 M/UL — SIGNIFICANT CHANGE UP (ref 3.8–5.2)
RBC # FLD: 16.6 % — HIGH (ref 10.3–14.5)
RBC # FLD: 16.7 % — HIGH (ref 10.3–14.5)
SODIUM SERPL-SCNC: 134 MMOL/L — LOW (ref 135–145)
SODIUM SERPL-SCNC: 135 MMOL/L — SIGNIFICANT CHANGE UP (ref 135–145)
WBC # BLD: 10.46 K/UL — SIGNIFICANT CHANGE UP (ref 3.8–10.5)
WBC # BLD: 14.63 K/UL — HIGH (ref 3.8–10.5)
WBC # FLD AUTO: 10.46 K/UL — SIGNIFICANT CHANGE UP (ref 3.8–10.5)
WBC # FLD AUTO: 14.63 K/UL — HIGH (ref 3.8–10.5)

## 2020-07-24 PROCEDURE — 99233 SBSQ HOSP IP/OBS HIGH 50: CPT

## 2020-07-24 PROCEDURE — 71045 X-RAY EXAM CHEST 1 VIEW: CPT | Mod: 26

## 2020-07-24 PROCEDURE — 93970 EXTREMITY STUDY: CPT | Mod: 26

## 2020-07-24 PROCEDURE — 70450 CT HEAD/BRAIN W/O DYE: CPT | Mod: 26

## 2020-07-24 RX ORDER — MAGNESIUM SULFATE 500 MG/ML
1 VIAL (ML) INJECTION ONCE
Refills: 0 | Status: COMPLETED | OUTPATIENT
Start: 2020-07-24 | End: 2020-07-24

## 2020-07-24 RX ORDER — INSULIN HUMAN 100 [IU]/ML
3 INJECTION, SOLUTION SUBCUTANEOUS
Qty: 100 | Refills: 0 | Status: DISCONTINUED | OUTPATIENT
Start: 2020-07-24 | End: 2020-07-24

## 2020-07-24 RX ORDER — INSULIN LISPRO 100/ML
VIAL (ML) SUBCUTANEOUS EVERY 4 HOURS
Refills: 0 | Status: DISCONTINUED | OUTPATIENT
Start: 2020-07-24 | End: 2020-07-24

## 2020-07-24 RX ORDER — ENOXAPARIN SODIUM 100 MG/ML
30 INJECTION SUBCUTANEOUS EVERY 12 HOURS
Refills: 0 | Status: DISCONTINUED | OUTPATIENT
Start: 2020-07-24 | End: 2020-07-30

## 2020-07-24 RX ORDER — HUMAN INSULIN 100 [IU]/ML
20 INJECTION, SUSPENSION SUBCUTANEOUS EVERY 6 HOURS
Refills: 0 | Status: DISCONTINUED | OUTPATIENT
Start: 2020-07-24 | End: 2020-07-24

## 2020-07-24 RX ORDER — INSULIN LISPRO 100/ML
VIAL (ML) SUBCUTANEOUS EVERY 6 HOURS
Refills: 0 | Status: DISCONTINUED | OUTPATIENT
Start: 2020-07-24 | End: 2020-07-24

## 2020-07-24 RX ORDER — HYDROMORPHONE HYDROCHLORIDE 2 MG/ML
0.5 INJECTION INTRAMUSCULAR; INTRAVENOUS; SUBCUTANEOUS ONCE
Refills: 0 | Status: DISCONTINUED | OUTPATIENT
Start: 2020-07-23 | End: 2020-07-24

## 2020-07-24 RX ORDER — INSULIN LISPRO 100/ML
VIAL (ML) SUBCUTANEOUS EVERY 6 HOURS
Refills: 0 | Status: DISCONTINUED | OUTPATIENT
Start: 2020-07-24 | End: 2020-07-25

## 2020-07-24 RX ORDER — SODIUM CHLORIDE 9 MG/ML
500 INJECTION INTRAMUSCULAR; INTRAVENOUS; SUBCUTANEOUS ONCE
Refills: 0 | Status: COMPLETED | OUTPATIENT
Start: 2020-07-24 | End: 2020-07-24

## 2020-07-24 RX ORDER — HUMAN INSULIN 100 [IU]/ML
24 INJECTION, SUSPENSION SUBCUTANEOUS EVERY 6 HOURS
Refills: 0 | Status: DISCONTINUED | OUTPATIENT
Start: 2020-07-24 | End: 2020-07-25

## 2020-07-24 RX ORDER — FLUTICASONE PROPIONATE 50 MCG
1 SPRAY, SUSPENSION NASAL
Refills: 0 | Status: DISCONTINUED | OUTPATIENT
Start: 2020-07-24 | End: 2020-07-30

## 2020-07-24 RX ORDER — CALCIUM GLUCONATE 100 MG/ML
1 VIAL (ML) INTRAVENOUS ONCE
Refills: 0 | Status: COMPLETED | OUTPATIENT
Start: 2020-07-24 | End: 2020-07-24

## 2020-07-24 RX ORDER — MAGNESIUM SULFATE 500 MG/ML
2 VIAL (ML) INJECTION ONCE
Refills: 0 | Status: COMPLETED | OUTPATIENT
Start: 2020-07-24 | End: 2020-07-24

## 2020-07-24 RX ADMIN — Medication 100 MILLIGRAM(S): at 00:45

## 2020-07-24 RX ADMIN — Medication 100 MILLIGRAM(S): at 14:45

## 2020-07-24 RX ADMIN — Medication 1 SPRAY(S): at 22:01

## 2020-07-24 RX ADMIN — ALBUTEROL 2 PUFF(S): 90 AEROSOL, METERED ORAL at 23:01

## 2020-07-24 RX ADMIN — NICARDIPINE HYDROCHLORIDE 25 MG/HR: 30 CAPSULE, EXTENDED RELEASE ORAL at 00:45

## 2020-07-24 RX ADMIN — ALBUTEROL 2 PUFF(S): 90 AEROSOL, METERED ORAL at 17:01

## 2020-07-24 RX ADMIN — DEXTROSE MONOHYDRATE, SODIUM CHLORIDE, AND POTASSIUM CHLORIDE 75 MILLILITER(S): 50; .745; 4.5 INJECTION, SOLUTION INTRAVENOUS at 00:45

## 2020-07-24 RX ADMIN — HUMAN INSULIN 20 UNIT(S): 100 INJECTION, SUSPENSION SUBCUTANEOUS at 12:26

## 2020-07-24 RX ADMIN — HYDROMORPHONE HYDROCHLORIDE 0.5 MILLIGRAM(S): 2 INJECTION INTRAMUSCULAR; INTRAVENOUS; SUBCUTANEOUS at 00:15

## 2020-07-24 RX ADMIN — HUMAN INSULIN 20 UNIT(S): 100 INJECTION, SUSPENSION SUBCUTANEOUS at 18:05

## 2020-07-24 RX ADMIN — ENOXAPARIN SODIUM 30 MILLIGRAM(S): 100 INJECTION SUBCUTANEOUS at 22:01

## 2020-07-24 RX ADMIN — Medication 100 MILLIGRAM(S): at 22:00

## 2020-07-24 RX ADMIN — ALBUTEROL 2 PUFF(S): 90 AEROSOL, METERED ORAL at 11:48

## 2020-07-24 RX ADMIN — Medication 100 GRAM(S): at 23:55

## 2020-07-24 RX ADMIN — HYDROMORPHONE HYDROCHLORIDE 0.5 MILLIGRAM(S): 2 INJECTION INTRAMUSCULAR; INTRAVENOUS; SUBCUTANEOUS at 00:00

## 2020-07-24 RX ADMIN — Medication 8: at 00:41

## 2020-07-24 RX ADMIN — SODIUM CHLORIDE 2000 MILLILITER(S): 9 INJECTION INTRAMUSCULAR; INTRAVENOUS; SUBCUTANEOUS at 05:34

## 2020-07-24 RX ADMIN — TIOTROPIUM BROMIDE 1 CAPSULE(S): 18 CAPSULE ORAL; RESPIRATORY (INHALATION) at 11:48

## 2020-07-24 RX ADMIN — HUMAN INSULIN 24 UNIT(S): 100 INJECTION, SUSPENSION SUBCUTANEOUS at 23:54

## 2020-07-24 RX ADMIN — Medication 4: at 18:05

## 2020-07-24 RX ADMIN — CHLORHEXIDINE GLUCONATE 1 APPLICATION(S): 213 SOLUTION TOPICAL at 22:01

## 2020-07-24 RX ADMIN — ALBUTEROL 2 PUFF(S): 90 AEROSOL, METERED ORAL at 05:57

## 2020-07-24 RX ADMIN — Medication 100 MILLIGRAM(S): at 05:33

## 2020-07-24 RX ADMIN — Medication 50 GRAM(S): at 03:25

## 2020-07-24 RX ADMIN — Medication 4: at 23:54

## 2020-07-24 NOTE — CHART NOTE - NSCHARTNOTEFT_GEN_A_CORE
CAPRINI SCORE [CLOT] Score on Admission for     AGE RELATED RISK FACTORS                                                       MOBILITY RELATED FACTORS  [ x] Age 41-60 years                                            (1 Point)                  [ ] Bed rest                                                        (1 Point)  [ ] Age: 61-74 years                                           (2 Points)                 [ ] Plaster cast                                                   (2 Points)  [ ] Age= 75 years                                              (3 Points)                 [ ] Bed bound for more than 72 hours                 (2 Points)    DISEASE RELATED RISK FACTORS                                               GENDER SPECIFIC FACTORS  [ ] Edema in the lower extremities                       (1 Point)                  [ ] Pregnancy                                                     (1 Point)  [ ] Varicose veins                                               (1 Point)                  [ ] Post-partum < 6 weeks                                   (1 Point)             [x ] BMI > 25 Kg/m2                                            (1 Point)                  [ ] Hormonal therapy  or oral contraception          (1 Point)                 [ ] Sepsis (in the previous month)                        (1 Point)                  [ ] History of pregnancy complications                 (1 point)  [ x] Pneumonia or serious lung disease                                               [ ] Unexplained or recurrent                     (1 Point)           (in the previous month)                               (1 Point)  [ ] Abnormal pulmonary function test                     (1 Point)                 SURGERY RELATED RISK FACTORS (include planned surgeries)  [ ] Acute myocardial infarction                              (1 Point)                 [ ]  Section                                             (1 Point)  [ ] Congestive heart failure (in the previous month)  (1 Point)         [ ] Minor surgery                                                  (1 Point)   [ ] Inflammatory bowel disease                             (1 Point)                 [ ] Arthroscopic surgery                                        (2 Points)  [ ] Central venous access                                      (2 Points)                [ x] General surgery lasting more than 45 minutes   (2 Points)       [ ] Stroke (in the previous month)                          (5 Points)               [ ] Elective arthroplasty                                         (5 Points)            [x] current or past malignancy                              (2 Points)                                                                                                       HEMATOLOGY RELATED FACTORS                                                 TRAUMA RELATED RISK FACTORS  [ ] Prior episodes of VTE                                     (3 Points)                [ ] Fracture of the hip, pelvis, or leg                       (5 Points)  [ ] Positive family history for VTE                         (3 Points)                 [ ] Acute spinal cord injury (in the previous month)  (5 Points)  [ ] Prothrombin 95094 A                                     (3 Points)                 [ ] Paralysis  (less than 1 month)                             (5 Points)  [ ] Factor V Leiden                                             (3 Points)                  [ ] Multiple Trauma within 1 month                        (5 Points)  [ ] Lupus anticoagulants                                     (3 Points)                                                           [ ] Anticardiolipin antibodies                               (3 Points)                                                       [ ] High homocysteine in the blood                      (3 Points)                                             [ ] Other congenital or acquired thrombophilia      (3 Points)                                                [ ] Heparin induced thrombocytopenia                  (3 Points)                                          Total Score [       7   ]    Risk:  Very low 0   Low 1 to 2   Moderate 3 to 4   High =5       VTE Prophylasix Recommednations:  [ x] mechanical pneumatic compression devices                                      [ ] contraindicated: _____________________  [ ] chemo prophylasix                                                                                   [x ] contraindicated _s/p MVD____________________    **** HIGH LIKELIHOOD DVT PRESENT ON ADMISSION  [ x] (please order LE dopplers within 24 hours of admission)

## 2020-07-24 NOTE — OCCUPATIONAL THERAPY INITIAL EVALUATION ADULT - ANTICIPATED DISCHARGE DISPOSITION, OT EVAL
Home with OT services, assist for ADLs and functional mobility from son as needed. Recommended rolling walker, shower chair, grab bars./home w/ OT

## 2020-07-24 NOTE — OCCUPATIONAL THERAPY INITIAL EVALUATION ADULT - GENERAL OBSERVATIONS, REHAB EVAL
Pt received semisupine in bed NAD, +a-line, +castaneda, +tele, +IV, + b/l venodynes, appeared lethargic while supine in bed, increased arousal once sitting edge of bed.

## 2020-07-24 NOTE — SWALLOW BEDSIDE ASSESSMENT ADULT - SWALLOW EVAL: DIAGNOSIS
Pt being seen for bedside swallow evaluation s/p microvascular decompression for trigeminal neuralgia. Upon arrival, pt noted with desat to 79% O2 and persistent somnolence. Pt with complaints of nasal congestion requesting flonase. Nurse was alerted regarding patient status. This service will re-attempt pending improvement.

## 2020-07-24 NOTE — PHYSICAL THERAPY INITIAL EVALUATION ADULT - DIAGNOSIS, PT EVAL
Pt demonstrated impairments in strength, balance, endurance and activity tolerance affecting ADLs and functional mobility.

## 2020-07-24 NOTE — OCCUPATIONAL THERAPY INITIAL EVALUATION ADULT - PERTINENT HX OF CURRENT PROBLEM, REHAB EVAL
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

## 2020-07-24 NOTE — PHYSICAL THERAPY INITIAL EVALUATION ADULT - PRECAUTIONS/LIMITATIONS, REHAB EVAL
CONTINUED: PMHx of asthma( last inhaler used 2 weeks ago, last ER visit 09/2019), R trigeminal neuralgia since 2008, chronic bronchitis, NAKUL( refuses C- pap due to claustrophobia,  HTN, DM2, HLD, fibromyalgia, anxiety, trigeminal neuralgia, degenerative joint disease,  IgA vasculitis since 2018(ast flare up couple of months ago),   She denies any fever, chest pain, abdominal pain, urinary or bowel complaint. Pt now presents POD#1 s/p R MVD for trigeminal neuralgia on 7/23/20

## 2020-07-24 NOTE — PHYSICAL THERAPY INITIAL EVALUATION ADULT - LIVES WITH, PROFILE
Pt lives with son in private home with 3 steps to enter, (+) ramp access to enter, no stairs to negotiate inside the home. Pt at baseline is independent for ADLs and used straight cane for ambulation. Pt reports having [employer] to transport to grocery store and appointments. Pt also states son works during the day however, she is friendly with her neighbor who can assist as needed./children

## 2020-07-24 NOTE — SWALLOW BEDSIDE ASSESSMENT ADULT - COMMENTS
Per progress note NSICU, "Patient is at high risk of neurologic deterioration/death due to: post op hemorrhage, cerebral edema."    CT Head 7/24 showed "Postop changes compatible with a right suboccipital craniectomy and mesh like cranioplasty is now identified. Grossly, there is no evidence of acute hemorrhage mass or mass effect seen. Small foci of air is seen involving the quadrigeminal cistern region. This is likely related to prior procedure. The visualized paranasal sinuses mastoid and middle ear regions appear clear."    Per GI 7/24, "advance diet at tolerated; GI prophylaxis protonix for GI ppx" Per NSCU 7/24, pt with "PMHx of asthma (last inhaler used 2 weeks ago, last ER visit 09/2019), R trigeminal neuralgia since 2008, chronic bronchitis, NAKUL( refuses C- pap due to claustrophobia,  HTN, DM2, HLD, fibromyalgia, anxiety, trigeminal neuralgia, degenerative joint disease,  IgA vasculitis since 2018(ast flare up couple of months ago),   She denies any fever, chest pain, abdominal pain, urinary or bowel complaint. OVERNIGHT EVENTS: s/p Right SOC for MVD; started on cardene and insulin drips overnight."     CT Head 7/24 showed "Postop changes compatible with a right suboccipital craniectomy and mesh like cranioplasty is now identified. Grossly, there is no evidence of acute hemorrhage mass or mass effect seen. Small foci of air is seen involving the quadrigeminal cistern region. This is likely related to prior procedure. The visualized paranasal sinuses mastoid and middle ear regions appear clear."    Per GI 7/24, "advance diet at tolerated; GI prophylaxis protonix for GI ppx" Per NSCU 7/24, pt with "PMHx of asthma (last inhaler used 2 weeks ago, last ER visit 09/2019), R trigeminal neuralgia since 2008, chronic bronchitis, NAKUL( refuses C- pap due to claustrophobia,  HTN, DM2, HLD, fibromyalgia, anxiety, trigeminal neuralgia, degenerative joint disease,  IgA vasculitis since 2018(ast flare up couple of months ago),   She denies any fever, chest pain, abdominal pain, urinary or bowel complaint. OVERNIGHT EVENTS: s/p Right SOC for MVD; started on cardene and insulin drips overnight."     CT Head 7/24 showed "Postop changes compatible with a right suboccipital craniectomy and mesh like cranioplasty is now identified. Grossly, there is no evidence of acute hemorrhage mass or mass effect seen. Small foci of air is seen involving the quadrigeminal cistern region. This is likely related to prior procedure. The visualized paranasal sinuses mastoid and middle ear regions appear clear."    Per GI 7/24, "advance diet at tolerated; GI prophylaxis protonix for GI ppx"    Discussed with team re: clear liquid diet. Permission to proceed with PO trials was granted.

## 2020-07-24 NOTE — DIETITIAN INITIAL EVALUATION ADULT. - OTHER INFO
Pt is a 56 yo F with PMH: asthma, R trigeminal neuralgia (since 2008), chronic bronchitis, NAKUL (refuses C-pap), HTN, DM2, hyperlipidemia, fibromyalgia, anxiety, degenerative joint disease, IgA vasculitis since 2018. Noted with renal cell carcinoma, waiting for surgery in 9/2020; h/o microvascular decompression for trigeminal neuralgia in 2013. Presented with trigeminal neuralgia with migraines causing pain and difficulty moving her neck. Pt s/p right SOC for MVD; started on Cardene and insulin drips overnight.    Pt observed resting in bed at time of RD visit. Per discussion with pt, reports __________    Pt currently NPO. Noted with allergy to mushroom. Per H&P, home medication list significant for: Metformin,     Dosing wt (7/23): 301.9 lbs. Wt trends (per [facility]): (7/23): 301.9 lbs; (7/10): 301.9 lbs; (5/28): 299 lbs; (4/27): 309 lbs; (2/13): 302 lbs; (1/13): 302 lbs; (11/21/19): 298 lbs; (10/28/19): 300 lbs; (8/29/19): 300 lbs; (7/10/190: 300 lbs. Wt appears to fluctuate ~300 +/- 10 lbs. BMI 57.0. Pt is a 56 yo F with PMH: asthma, R trigeminal neuralgia (since 2008), chronic bronchitis, NAKUL (refuses C-pap), HTN, DM2, hyperlipidemia, fibromyalgia, anxiety, degenerative joint disease, IgA vasculitis since 2018. Noted with renal cell carcinoma, waiting for surgery in 9/2020; h/o microvascular decompression for trigeminal neuralgia in 2013. Presented with trigeminal neuralgia with migraines causing pain and difficulty moving her neck. Pt s/p right SOC for MVD; started on Cardene and insulin drips overnight.    Pt observed resting in bed at time of RD visit. Pt minimally responsive to questions asked by RD, appeared drowsy (RN aware). Able to confirm allergy to mushrooms; endorsed good appetite PTA (however unable to elaborate further). Pt denies intolerance to chewing/swallowing PTA, however inconclusive if accurate. Denies N/V/C/diarrhea.     Pt currently NPO. Per H&P, home medication list significant for: Metformin. Pt able to endorse hx of DM, however unclear if pt adhered to therapeutic diet/lifestyle secondary to same. A1c 10.8%, indicating poor glycemic control PTA. Will monitor. Diet education deferred at this time in context of current status of pt. RD to remain available.     Dosing wt (7/23): 301.9 lbs. Wt trends ([facility]): (7/23): 301.9 lbs; (7/10): 301.9 lbs; (5/28): 299 lbs; (4/27): 309 lbs; (2/13): 302 lbs; (1/13): 302 lbs; (11/21/19): 298 lbs; (10/28/19): 300 lbs; (8/29/19): 300 lbs; (7/10/190: 300 lbs. Wt appears to fluctuate ~300 +/- 10 lbs. BMI 57.0.

## 2020-07-24 NOTE — PROGRESS NOTE ADULT - ASSESSMENT
POD#1 MVD    now off insulin and nicardipine gtt    cont NPH 20 Q6 and CORNELL, consider endo consult in am  continue oral antihypertensives  wean NC to 5L, O2sat >88%, would continue to discuss CPAP nightly, offer smaller nasal mask  lovenox 30 bid for ppx  stable for transfer to floor

## 2020-07-24 NOTE — SWALLOW BEDSIDE ASSESSMENT ADULT - SLP GENERAL OBSERVATIONS
Pt encountered lying in bed, on 4L nasal cannula, oriented x4 and difficult to maintain alertness despite max verbal cues with increased loudness and tactile stimulation. Pt endorses difficulty breathing when eating, especially if congested. She further states history of acid reflux and that she takes medication. PO trials were deferred given momentary desat to 79% with subsequent persistent fluctuations to 80-90%. Nurse was alerted and patient was left in no visible respiratory distress.

## 2020-07-24 NOTE — DIETITIAN INITIAL EVALUATION ADULT. - ETIOLOGY
predicted excessive energy intake PTA predicted limited knowledge on T2DM vs. predicted limited adherence to therapeutic diet/lifestyle

## 2020-07-24 NOTE — DIETITIAN INITIAL EVALUATION ADULT. - PERTINENT MEDS FT
Ancef, Hibiclens, NaCl 0.9% with KCl, Insulin regular infusion, Proventil, Elavil, Lipitor, Prozac, Neurontin, HCTZ, Synthroid, Singulair, Nicardipine, Zofran, Trileptal, Percocet, Protonix, Miralax, Prednisone, Senna, Spiriva,

## 2020-07-24 NOTE — DIETITIAN INITIAL EVALUATION ADULT. - ENTERAL
1) If EN initiated, consider Glucerna 1.2 at 75ml/hr x 24 hrs. Provides (based on dosing wt 136.8kg): 2160kcal (15.8kcal/kg) and (based on upper IBW 52.3kg) 108g protein (2.0g protein/kg).

## 2020-07-24 NOTE — SWALLOW BEDSIDE ASSESSMENT ADULT - SLP PERTINENT HISTORY OF CURRENT PROBLEM
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

## 2020-07-24 NOTE — OCCUPATIONAL THERAPY INITIAL EVALUATION ADULT - LIVES WITH, PROFILE
Pt lives with son in single family home with 3 steps to enter, no stairs to bed/bath. Pt at baseline is ind for ADLs and used straight cane for ambulation. Pt reported she does not leave her house often and uses access-[employer]-ride for appointments./children

## 2020-07-24 NOTE — DIETITIAN INITIAL EVALUATION ADULT. - ADD RECOMMEND
1) Defer initiation of PO diet to medical team, SLP (consistency/texture). IF initiated, consider consistent carbohydrate diet. 2) If EN feeds initiated, monitor GI tolerance. 3) Obtain further subjective wt/diet history from pt/family PRN. 4) RD to remain available for T2DM/wt management nutrition therapy PRN. 5) Monitor wt trends, nutrition related labs, skin integrity, hydration status and bowel regularity.

## 2020-07-24 NOTE — OCCUPATIONAL THERAPY INITIAL EVALUATION ADULT - DIAGNOSIS, OT EVAL
Pt demonstrated decrease in strength, balance, coordination, and activity tolerance affecting ADLs and functional mobility.

## 2020-07-24 NOTE — PHYSICAL THERAPY INITIAL EVALUATION ADULT - MANUAL MUSCLE TESTING RESULTS, REHAB EVAL
B UE shoulder 3-/5, elbow, hand 3/5, B LEs 3+/5 throughout except B hip flexion 3/5/grossly assessed due to

## 2020-07-24 NOTE — OCCUPATIONAL THERAPY INITIAL EVALUATION ADULT - STRENGTHENING, PT EVAL
GOAL: Pt will improve bilateral UE/LE strength to 4/5 to increase independence in ADLs within 2 weeks

## 2020-07-24 NOTE — PHYSICAL THERAPY INITIAL EVALUATION ADULT - PERTINENT HX OF CURRENT PROBLEM, REHAB EVAL
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

## 2020-07-24 NOTE — PHYSICAL THERAPY INITIAL EVALUATION ADULT - GENERAL OBSERVATIONS, REHAB EVAL
Pt received sitting up in chair position in bed in NAD, +IVF, +A-line, +tele, +cont pulse ox, +castaneda, +5L supplemental O2 via NC, VSS, agreeable to participate in therapy at this time

## 2020-07-24 NOTE — OCCUPATIONAL THERAPY INITIAL EVALUATION ADULT - ADDITIONAL COMMENTS
MR Head (7/24): unremarkable pre and postcontrast MRI study of the brain. clear sinuses and mastoids.  MR Head (7/24): Limited exam due to artifacts.  No gross abnormal enhancement within the origin and cisternal portion of the trigeminal nerves.

## 2020-07-24 NOTE — PROGRESS NOTE ADULT - SUBJECTIVE AND OBJECTIVE BOX
off insulin drip and nicardipine drip    refuses to wear BiPAP/CPAP mask    vitals/labs/meds reviewed  unchanged intact exam

## 2020-07-24 NOTE — CHART NOTE - NSCHARTNOTEFT_GEN_A_CORE
Upon Nutritional Assessment by the Registered Dietitian your patient was determined to meet criteria / has evidence of the following diagnosis/diagnoses:          [ ]  Mild Protein Calorie Malnutrition        [ ]  Moderate Protein Calorie Malnutrition        [ ] Severe Protein Calorie Malnutrition        [ ] Unspecified Protein Calorie Malnutrition        [ ] Underweight / BMI <19        [x] Morbid Obesity / BMI > 40      Findings as based on:  [ ] Comprehensive nutrition assessment   [ ] Nutrition Focused Physical Exam  [x] Other: BMI 57.0      Nutrition Plan/Recommendations:    See Dietitian Initial Evaluation      PROVIDER Section:     By signing this assessment you are acknowledging and agree with the diagnosis/diagnoses assigned by the Registered Dietitian    Comments:

## 2020-07-24 NOTE — PROGRESS NOTE ADULT - SUBJECTIVE AND OBJECTIVE BOX
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       PMHx of asthma( last inhaler used 2 weeks ago, last ER visit 09/2019), R trigeminal neuralgia since 2008, chronic bronchitis, NAKUL( refuses C- pap due to claustrophobia,  HTN, DM2, HLD, fibromyalgia, anxiety, trigeminal neuralgia, degenerative joint disease,  IgA vasculitis since 2018(ast flare up couple of months ago),   She denies any fever, chest pain, abdominal pain, urinary or bowel complaint.    OVERNIGHT EVENTS: s/p Right SOC for MVD; started on cardene and insulin drips overnight     Vital Signs Last 24 Hrs  T(C): 37.3 (24 Jul 2020 03:00), Max: 37.3 (24 Jul 2020 03:00)  T(F): 99.1 (24 Jul 2020 03:00), Max: 99.1 (24 Jul 2020 03:00)  HR: 103 (24 Jul 2020 06:30) (95 - 124)  BP: 141/75 (23 Jul 2020 13:17) (141/75 - 141/75)  BP(mean): --  RR: 29 (24 Jul 2020 06:30) (12 - 29)  SpO2: 100% (24 Jul 2020 06:30) (87% - 100%)    PHYSICAL EXAM:    General: No Acute Distress     Neurologic: Awake, alert, fully oriented, follows commands, PERRL, EOMI, face symmetric, tongue midline, no drift, full strength      Pulmonary: Clear to Auscultation, No Rales, No Rhonchi, No Wheezes     Cardiovascular: S1, S2, Regular Rate and Rhythm     Gastrointestinal: Soft, Nontender, Nondistended     LABS:                        12.1   10.46 )-----------( 328      ( 24 Jul 2020 01:31 )             41.4    07-24    135  |  96  |  12  ----------------------------<  349<H>  4.5   |  23  |  0.64    Ca    8.5      24 Jul 2020 01:31  Phos  4.8     07-24  Mg     1.4     07-24    PT/INR - ( 23 Jul 2020 13:15 )   PT: 13.4 sec;   INR: 1.13 ratio               07-23 @ 07:01  -  07-24 @ 07:00  --------------------------------------------------------  IN: 1391 mL / OUT: 825 mL / NET: 566 mL      MEDICATIONS  (STANDING):  ALBUTerol    90 MICROgram(s) HFA Inhaler 2 Puff(s) Inhalation every 6 hours  amitriptyline 10 milliGRAM(s) Oral at bedtime  atorvastatin 10 milliGRAM(s) Oral at bedtime  ceFAZolin   IVPB 1000 milliGRAM(s) IV Intermittent every 8 hours  ceFAZolin   IVPB 3000 milliGRAM(s) IV Intermittent once  ceFAZolin   IVPB      chlorhexidine 4% Liquid 1 Application(s) Topical <User Schedule>  FLUoxetine 20 milliGRAM(s) Oral daily  gabapentin 300 milliGRAM(s) Oral at bedtime  hydrochlorothiazide 12.5 milliGRAM(s) Oral daily  insulin regular Infusion 3 Unit(s)/Hr (3 mL/Hr) IV Continuous <Continuous>  levothyroxine 112 MICROGram(s) Oral daily  lisinopril 10 milliGRAM(s) Oral daily  montelukast 10 milliGRAM(s) Oral at bedtime  niCARdipine Infusion 5 mG/Hr (25 mL/Hr) IV Continuous <Continuous>  OXcarbazepine 300 milliGRAM(s) Oral two times a day  pantoprazole    Tablet 40 milliGRAM(s) Oral before breakfast  polyethylene glycol 3350 17 Gram(s) Oral two times a day  predniSONE   Tablet 20 milliGRAM(s) Oral two times a day  senna 2 Tablet(s) Oral at bedtime  sodium chloride 0.9% with potassium chloride 20 mEq/L 1000 milliLiter(s) (75 mL/Hr) IV Continuous <Continuous>  tiotropium 18 MICROgram(s) Capsule 1 Capsule(s) Inhalation daily    MEDICATIONS  (PRN):  HYDROmorphone  Injectable 1 milliGRAM(s) SubCutaneous every 6 hours PRN Severe Pain (7 - 10)  ondansetron Injectable 4 milliGRAM(s) IV Push every 6 hours PRN Nausea and/or Vomiting  oxycodone    5 mG/acetaminophen 325 mG 1 Tablet(s) Oral every 4 hours PRN Moderate Pain (4 - 6)      DIET: CCD diet     IMAGING: CT head pending 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       PMHx of asthma( last inhaler used 2 weeks ago, last ER visit 09/2019), R trigeminal neuralgia since 2008, chronic bronchitis, NAKUL( refuses C- pap due to claustrophobia,  HTN, DM2, HLD, fibromyalgia, anxiety, trigeminal neuralgia, degenerative joint disease,  IgA vasculitis since 2018(ast flare up couple of months ago),   She denies any fever, chest pain, abdominal pain, urinary or bowel complaint.    OVERNIGHT EVENTS: s/p Right SOC for MVD; started on cardene and insulin drips overnight     Vital Signs Last 24 Hrs  T(C): 37.3 (24 Jul 2020 03:00), Max: 37.3 (24 Jul 2020 03:00)  T(F): 99.1 (24 Jul 2020 03:00), Max: 99.1 (24 Jul 2020 03:00)  HR: 103 (24 Jul 2020 06:30) (95 - 124)  BP: 141/75 (23 Jul 2020 13:17) (141/75 - 141/75)  BP(mean): --  RR: 29 (24 Jul 2020 06:30) (12 - 29)  SpO2: 100% (24 Jul 2020 06:30) (87% - 100%)    PHYSICAL EXAM:    General: No Acute Distress     Neurologic: Awakens to voice, fully oriented, follows commands, PERRL, EOMI, face symmetric, facial sensation equal, tongue midline, no drift, full strength      Pulmonary: Clear to Auscultation, No Rales, No Rhonchi, No Wheezes     Cardiovascular: S1, S2, Regular Rate and Rhythm     Gastrointestinal: Soft, Nontender, Nondistended     LABS:                        12.1   10.46 )-----------( 328      ( 24 Jul 2020 01:31 )             41.4    07-24    135  |  96  |  12  ----------------------------<  349<H>  4.5   |  23  |  0.64    Ca    8.5      24 Jul 2020 01:31  Phos  4.8     07-24  Mg     1.4     07-24    PT/INR - ( 23 Jul 2020 13:15 )   PT: 13.4 sec;   INR: 1.13 ratio               07-23 @ 07:01  -  07-24 @ 07:00  --------------------------------------------------------  IN: 1391 mL / OUT: 825 mL / NET: 566 mL      MEDICATIONS  (STANDING):  ALBUTerol    90 MICROgram(s) HFA Inhaler 2 Puff(s) Inhalation every 6 hours  amitriptyline 10 milliGRAM(s) Oral at bedtime  atorvastatin 10 milliGRAM(s) Oral at bedtime  ceFAZolin   IVPB 1000 milliGRAM(s) IV Intermittent every 8 hours  ceFAZolin   IVPB 3000 milliGRAM(s) IV Intermittent once  ceFAZolin   IVPB      chlorhexidine 4% Liquid 1 Application(s) Topical <User Schedule>  FLUoxetine 20 milliGRAM(s) Oral daily  gabapentin 300 milliGRAM(s) Oral at bedtime  hydrochlorothiazide 12.5 milliGRAM(s) Oral daily  insulin regular Infusion 3 Unit(s)/Hr (3 mL/Hr) IV Continuous <Continuous>  levothyroxine 112 MICROGram(s) Oral daily  lisinopril 10 milliGRAM(s) Oral daily  montelukast 10 milliGRAM(s) Oral at bedtime  niCARdipine Infusion 5 mG/Hr (25 mL/Hr) IV Continuous <Continuous>  OXcarbazepine 300 milliGRAM(s) Oral two times a day  pantoprazole    Tablet 40 milliGRAM(s) Oral before breakfast  polyethylene glycol 3350 17 Gram(s) Oral two times a day  predniSONE   Tablet 20 milliGRAM(s) Oral two times a day  senna 2 Tablet(s) Oral at bedtime  sodium chloride 0.9% with potassium chloride 20 mEq/L 1000 milliLiter(s) (75 mL/Hr) IV Continuous <Continuous>  tiotropium 18 MICROgram(s) Capsule 1 Capsule(s) Inhalation daily    MEDICATIONS  (PRN):  HYDROmorphone  Injectable 1 milliGRAM(s) SubCutaneous every 6 hours PRN Severe Pain (7 - 10)  ondansetron Injectable 4 milliGRAM(s) IV Push every 6 hours PRN Nausea and/or Vomiting  oxycodone    5 mG/acetaminophen 325 mG 1 Tablet(s) Oral every 4 hours PRN Moderate Pain (4 - 6)      DIET: CCD diet     IMAGING: CT head pending

## 2020-07-24 NOTE — DIETITIAN INITIAL EVALUATION ADULT. - ENERGY NEEDS
Ht: 61"  Wt: 301.9 lbs  BMI: 57  kg/m2   IBW: 105 lbs  (+/-10%)    288 % IBW  Edema: 2+ generalized, 3+ right foot;  left foot          Skin: surgical incision, Right occipital region

## 2020-07-24 NOTE — OCCUPATIONAL THERAPY INITIAL EVALUATION ADULT - PRECAUTIONS/LIMITATIONS, REHAB EVAL
CONTINUE: PMHx of asthma( last inhaler used 2 weeks ago, last ER visit 09/2019), R trigeminal neuralgia since 2008, chronic bronchitis, NAKUL( refuses C- pap due to claustrophobia,  HTN, DM2, HLD, fibromyalgia, anxiety, trigeminal neuralgia, degenerative joint disease,  IgA vasculitis since 2018(ast flare up couple of months ago),   She denies any fever, chest pain, abdominal pain, urinary or bowel complaint. CONTINUE: PMHx of asthma( last inhaler used 2 weeks ago, last ER visit 09/2019), R trigeminal neuralgia since 2008, chronic bronchitis, NAKUL( refuses C- pap due to claustrophobia,  HTN, DM2, HLD, fibromyalgia, anxiety, trigeminal neuralgia, degenerative joint disease,  IgA vasculitis since 2018(ast flare up couple of months ago),   She denies any fever, chest pain, abdominal pain, urinary or bowel complaint./fall precautions

## 2020-07-24 NOTE — PROGRESS NOTE ADULT - ASSESSMENT
ASSESSMENT/PLAN: 7/23 Right SOC for MVD     NEURO:  -CT Head in AM, Pain control  -cont home psych meds  -Activity: [] OOB as tolerated [x] Bedrest [] PT [] OT [] PMNR      PULM:  -Maintain O2 stats > 92%  -Titrate off supplemental oxygen  -NAKUL( refuses C- pap due to claustrophobia)  -Continue albuterol and proventil    CV:  SBP goal 100-150  Titrate off cardene     RENAL:  -IVF until PO intake improved; dc castaneda     GI:  Diet: advance diet at tolerated   GI prophylaxis protonix for GI ppx   Bowel regimen [] colace [x] senna [] other:    ENDO:   Goal euglycemia (-180)  -Titrate off insulin drip    HEME/ONC:  VTE prophylaxis: [x] SCDs [x] chemoprophylaxis start sql if ok with neurosurgery (weight based)    ID:  -Afebrile     MISC:    SOCIAL/FAMILY:  [] updated at bedside [] family meeting    CODE STATUS:  [] Full Code [] DNR [] DNI [] Palliative/Comfort Care    DISPOSITION:  [x] ICU [] Stroke Unit [] Floor [] EMU [] RCU [] PCU    [] Patient is at high risk of neurologic deterioration/death due to:     Time seen:  Time spent: ___ [] critical care minutes ASSESSMENT/PLAN: 7/23 Right SOC for MVD     NEURO:  -CT Head in AM, Pain control  -cont home psych meds  -Activity: [] OOB as tolerated [] Bedrest [x] PT [x] OT [] PMNR      PULM:  -Maintain O2 stats > 88-90%  -Titrate off supplemental oxygen  -NAKUL( refuses C- pap due to claustrophobia)  -Continue albuterol and proventil    CV:  SBP goal 100-150  Titrate off cardene     RENAL:  -IVF until PO intake improved; dc castaneda     GI:  Diet: advance diet at tolerated   GI prophylaxis protonix for GI ppx   Bowel regimen [] colace [x] senna [] other:    ENDO:   Goal euglycemia (-180)  -Titrate off insulin drip    HEME/ONC:  VTE prophylaxis: [x] SCDs [x] chemoprophylaxis start sql if ok with neurosurgery (weight based)    ID:  -Afebrile     MISC:    SOCIAL/FAMILY:  [] updated at bedside [] family meeting    CODE STATUS:  [] Full Code [] DNR [] DNI [] Palliative/Comfort Care    DISPOSITION:  [x] ICU [] Stroke Unit [] Floor [] EMU [] RCU [] PCU    [] Patient is at high risk of neurologic deterioration/death due to:     Time seen:  Time spent: ___ [] critical care minutes ASSESSMENT/PLAN: 7/23 Right SOC for MVD     NEURO:  -CT Head in AM, Pain control  -cont home psych meds  -Activity: [] OOB as tolerated [] Bedrest [x] PT [x] OT [] PMNR      PULM:  -Maintain O2 stats > 88-90%  -Titrate off supplemental oxygen  -NAKUL( refuses C- pap due to claustrophobia)  -Continue albuterol and proventil; dc prednisone since not taken it recently     CV:  SBP goal 100-150  Titrate off cardene     RENAL:  -IVF until PO intake improved; dc castaneda     GI:  Diet: advance diet at tolerated   GI prophylaxis protonix for GI ppx   Bowel regimen [] colace [x] senna [] other:    ENDO:   Goal euglycemia (-180)  -Titrate off insulin drip    HEME/ONC:  VTE prophylaxis: [x] SCDs [x] chemoprophylaxis start sql if ok with neurosurgery (weight based)    ID:  -Afebrile     MISC:    SOCIAL/FAMILY:  [] updated at bedside [] family meeting    CODE STATUS:  [] Full Code [] DNR [] DNI [] Palliative/Comfort Care    DISPOSITION:  [x] ICU [] Stroke Unit [] Floor [] EMU [] RCU [] PCU    [] Patient is at high risk of neurologic deterioration/death due to:     Time seen:  Time spent: ___ [] critical care minutes ASSESSMENT/PLAN: 7/23 Right SOC for MVD     NEURO:  -CT Head in AM, Pain control  -cont home psych meds  -Activity: [] OOB as tolerated [] Bedrest [x] PT [x] OT [] PMNR      PULM:  -Maintain O2 stats > 88-90%  -Titrate off supplemental oxygen  -NAKUL( refuses C- pap due to claustrophobia)  -Continue albuterol and proventil; dc prednisone since not taken it recently     CV:  SBP goal 100-150  Titrate off cardene     RENAL:  -IVF until PO intake improved; dc castaneda     GI:  Diet: advance diet at tolerated   GI prophylaxis protonix for GI ppx   Bowel regimen [] colace [x] senna [] other:    ENDO:   Goal euglycemia (-180)  -Titrate off insulin drip, start NPH    HEME/ONC:  VTE prophylaxis: [x] SCDs [x] chemoprophylaxis start sql if ok with neurosurgery (weight based)    ID:  -Afebrile     MISC:    SOCIAL/FAMILY:  [] updated at bedside [] family meeting    CODE STATUS:  [] Full Code [] DNR [] DNI [] Palliative/Comfort Care    DISPOSITION:  [x] ICU until off drips [] Stroke Unit [] Floor [] EMU [] RCU [] PCU

## 2020-07-25 DIAGNOSIS — F19.20 OTHER PSYCHOACTIVE SUBSTANCE DEPENDENCE, UNCOMPLICATED: ICD-10-CM

## 2020-07-25 DIAGNOSIS — E78.2 MIXED HYPERLIPIDEMIA: ICD-10-CM

## 2020-07-25 DIAGNOSIS — I10 ESSENTIAL (PRIMARY) HYPERTENSION: ICD-10-CM

## 2020-07-25 DIAGNOSIS — E03.9 HYPOTHYROIDISM, UNSPECIFIED: ICD-10-CM

## 2020-07-25 DIAGNOSIS — E11.40 TYPE 2 DIABETES MELLITUS WITH DIABETIC NEUROPATHY, UNSPECIFIED: ICD-10-CM

## 2020-07-25 LAB
ANION GAP SERPL CALC-SCNC: 10 MMOL/L — SIGNIFICANT CHANGE UP (ref 5–17)
BUN SERPL-MCNC: 6 MG/DL — LOW (ref 7–23)
CALCIUM SERPL-MCNC: 9.3 MG/DL — SIGNIFICANT CHANGE UP (ref 8.4–10.5)
CHLORIDE SERPL-SCNC: 91 MMOL/L — LOW (ref 96–108)
CO2 SERPL-SCNC: 32 MMOL/L — HIGH (ref 22–31)
CREAT SERPL-MCNC: 0.47 MG/DL — LOW (ref 0.5–1.3)
GAS PNL BLDA: SIGNIFICANT CHANGE UP
GLUCOSE BLDC GLUCOMTR-MCNC: 197 MG/DL — HIGH (ref 70–99)
GLUCOSE BLDC GLUCOMTR-MCNC: 223 MG/DL — HIGH (ref 70–99)
GLUCOSE BLDC GLUCOMTR-MCNC: 235 MG/DL — HIGH (ref 70–99)
GLUCOSE BLDC GLUCOMTR-MCNC: 255 MG/DL — HIGH (ref 70–99)
GLUCOSE SERPL-MCNC: 197 MG/DL — HIGH (ref 70–99)
MAGNESIUM SERPL-MCNC: 1.6 MG/DL — SIGNIFICANT CHANGE UP (ref 1.6–2.6)
PHOSPHATE SERPL-MCNC: 2 MG/DL — LOW (ref 2.5–4.5)
POTASSIUM SERPL-MCNC: 4.1 MMOL/L — SIGNIFICANT CHANGE UP (ref 3.5–5.3)
POTASSIUM SERPL-SCNC: 4.1 MMOL/L — SIGNIFICANT CHANGE UP (ref 3.5–5.3)
SODIUM SERPL-SCNC: 133 MMOL/L — LOW (ref 135–145)

## 2020-07-25 PROCEDURE — 99223 1ST HOSP IP/OBS HIGH 75: CPT

## 2020-07-25 PROCEDURE — 99233 SBSQ HOSP IP/OBS HIGH 50: CPT

## 2020-07-25 RX ORDER — HUMAN INSULIN 100 [IU]/ML
27 INJECTION, SUSPENSION SUBCUTANEOUS EVERY 6 HOURS
Refills: 0 | Status: DISCONTINUED | OUTPATIENT
Start: 2020-07-25 | End: 2020-07-25

## 2020-07-25 RX ORDER — INSULIN LISPRO 100/ML
10 VIAL (ML) SUBCUTANEOUS
Refills: 0 | Status: DISCONTINUED | OUTPATIENT
Start: 2020-07-25 | End: 2020-07-30

## 2020-07-25 RX ORDER — DEXTROSE 50 % IN WATER 50 %
25 SYRINGE (ML) INTRAVENOUS ONCE
Refills: 0 | Status: DISCONTINUED | OUTPATIENT
Start: 2020-07-25 | End: 2020-07-30

## 2020-07-25 RX ORDER — GLUCAGON INJECTION, SOLUTION 0.5 MG/.1ML
1 INJECTION, SOLUTION SUBCUTANEOUS ONCE
Refills: 0 | Status: DISCONTINUED | OUTPATIENT
Start: 2020-07-25 | End: 2020-07-30

## 2020-07-25 RX ORDER — ACETAMINOPHEN 500 MG
1000 TABLET ORAL ONCE
Refills: 0 | Status: DISCONTINUED | OUTPATIENT
Start: 2020-07-25 | End: 2020-07-30

## 2020-07-25 RX ORDER — INSULIN GLARGINE 100 [IU]/ML
35 INJECTION, SOLUTION SUBCUTANEOUS AT BEDTIME
Refills: 0 | Status: DISCONTINUED | OUTPATIENT
Start: 2020-07-25 | End: 2020-07-26

## 2020-07-25 RX ORDER — SODIUM CHLORIDE 9 MG/ML
1000 INJECTION, SOLUTION INTRAVENOUS
Refills: 0 | Status: DISCONTINUED | OUTPATIENT
Start: 2020-07-25 | End: 2020-07-30

## 2020-07-25 RX ORDER — INSULIN LISPRO 100/ML
VIAL (ML) SUBCUTANEOUS
Refills: 0 | Status: DISCONTINUED | OUTPATIENT
Start: 2020-07-25 | End: 2020-07-30

## 2020-07-25 RX ORDER — CALCIUM GLUCONATE 100 MG/ML
1 VIAL (ML) INTRAVENOUS ONCE
Refills: 0 | Status: COMPLETED | OUTPATIENT
Start: 2020-07-25 | End: 2020-07-25

## 2020-07-25 RX ORDER — MAGNESIUM SULFATE 500 MG/ML
1 VIAL (ML) INJECTION ONCE
Refills: 0 | Status: COMPLETED | OUTPATIENT
Start: 2020-07-25 | End: 2020-07-26

## 2020-07-25 RX ORDER — DEXTROSE 50 % IN WATER 50 %
15 SYRINGE (ML) INTRAVENOUS ONCE
Refills: 0 | Status: DISCONTINUED | OUTPATIENT
Start: 2020-07-25 | End: 2020-07-30

## 2020-07-25 RX ORDER — DEXTROSE 50 % IN WATER 50 %
12.5 SYRINGE (ML) INTRAVENOUS ONCE
Refills: 0 | Status: DISCONTINUED | OUTPATIENT
Start: 2020-07-25 | End: 2020-07-30

## 2020-07-25 RX ADMIN — SENNA PLUS 2 TABLET(S): 8.6 TABLET ORAL at 21:30

## 2020-07-25 RX ADMIN — Medication 10 UNIT(S): at 17:16

## 2020-07-25 RX ADMIN — HYDROMORPHONE HYDROCHLORIDE 1 MILLIGRAM(S): 2 INJECTION INTRAMUSCULAR; INTRAVENOUS; SUBCUTANEOUS at 10:55

## 2020-07-25 RX ADMIN — CHLORHEXIDINE GLUCONATE 1 APPLICATION(S): 213 SOLUTION TOPICAL at 21:30

## 2020-07-25 RX ADMIN — Medication 20 MILLIGRAM(S): at 21:30

## 2020-07-25 RX ADMIN — TIOTROPIUM BROMIDE 1 CAPSULE(S): 18 CAPSULE ORAL; RESPIRATORY (INHALATION) at 12:26

## 2020-07-25 RX ADMIN — ENOXAPARIN SODIUM 30 MILLIGRAM(S): 100 INJECTION SUBCUTANEOUS at 05:39

## 2020-07-25 RX ADMIN — HYDROMORPHONE HYDROCHLORIDE 1 MILLIGRAM(S): 2 INJECTION INTRAMUSCULAR; INTRAVENOUS; SUBCUTANEOUS at 10:36

## 2020-07-25 RX ADMIN — Medication 1 SPRAY(S): at 05:39

## 2020-07-25 RX ADMIN — Medication 4: at 17:16

## 2020-07-25 RX ADMIN — POLYETHYLENE GLYCOL 3350 17 GRAM(S): 17 POWDER, FOR SOLUTION ORAL at 17:18

## 2020-07-25 RX ADMIN — MONTELUKAST 10 MILLIGRAM(S): 4 TABLET, CHEWABLE ORAL at 21:30

## 2020-07-25 RX ADMIN — Medication 6: at 12:46

## 2020-07-25 RX ADMIN — Medication 2: at 23:29

## 2020-07-25 RX ADMIN — ALBUTEROL 2 PUFF(S): 90 AEROSOL, METERED ORAL at 05:21

## 2020-07-25 RX ADMIN — HUMAN INSULIN 27 UNIT(S): 100 INJECTION, SUSPENSION SUBCUTANEOUS at 05:39

## 2020-07-25 RX ADMIN — Medication 4: at 05:39

## 2020-07-25 RX ADMIN — ALBUTEROL 2 PUFF(S): 90 AEROSOL, METERED ORAL at 12:25

## 2020-07-25 RX ADMIN — Medication 100 GRAM(S): at 01:11

## 2020-07-25 RX ADMIN — ATORVASTATIN CALCIUM 10 MILLIGRAM(S): 80 TABLET, FILM COATED ORAL at 21:30

## 2020-07-25 RX ADMIN — INSULIN GLARGINE 35 UNIT(S): 100 INJECTION, SOLUTION SUBCUTANEOUS at 23:28

## 2020-07-25 RX ADMIN — OXCARBAZEPINE 300 MILLIGRAM(S): 300 TABLET, FILM COATED ORAL at 17:18

## 2020-07-25 RX ADMIN — Medication 100 GRAM(S): at 02:04

## 2020-07-25 RX ADMIN — ALBUTEROL 2 PUFF(S): 90 AEROSOL, METERED ORAL at 17:35

## 2020-07-25 RX ADMIN — Medication 10 MILLIGRAM(S): at 21:30

## 2020-07-25 RX ADMIN — GABAPENTIN 300 MILLIGRAM(S): 400 CAPSULE ORAL at 21:30

## 2020-07-25 RX ADMIN — Medication 1 SPRAY(S): at 17:17

## 2020-07-25 RX ADMIN — ENOXAPARIN SODIUM 30 MILLIGRAM(S): 100 INJECTION SUBCUTANEOUS at 17:17

## 2020-07-25 RX ADMIN — HUMAN INSULIN 27 UNIT(S): 100 INJECTION, SUSPENSION SUBCUTANEOUS at 12:45

## 2020-07-25 NOTE — SWALLOW BEDSIDE ASSESSMENT ADULT - SWALLOW EVAL: RECOMMENDED FEEDING/EATING TECHNIQUES
no straws/position upright (90 degrees)/maintain upright posture during/after eating for 30 mins/oral hygiene/small sips/bites/Monitor for s/s aspiration/laryngeal penetration. If noted:  D/C p.o. intake, provide non-oral nutrition/hydration/meds, and contact this service @ [phone]

## 2020-07-25 NOTE — SWALLOW BEDSIDE ASSESSMENT ADULT - COMMENTS
Per [facility] 7/24, pt with "PMHx of asthma (last inhaler used 2 weeks ago, last ER visit 09/2019), R trigeminal neuralgia since 2008, chronic bronchitis, NAKUL( refuses C- pap due to claustrophobia,  HTN, DM2, HLD, fibromyalgia, anxiety, trigeminal neuralgia, degenerative joint disease,  IgA vasculitis since 2018(ast flare up couple of months ago),   She denies any fever, chest pain, abdominal pain, urinary or bowel complaint. OVERNIGHT EVENTS: s/p Right SOC for MVD; started on cardene and insulin drips overnight."     CT Head 7/24 showed "Postop changes compatible with a right suboccipital craniectomy and mesh like cranioplasty is now identified. Grossly, there is no evidence of acute hemorrhage mass or mass effect seen. Small foci of air is seen involving the quadrigeminal cistern region. This is likely related to prior procedure. The visualized paranasal sinuses mastoid and middle ear regions appear clear."    Per GI 7/24, "advance diet at tolerated; GI prophylaxis protonix for GI ppx"    Discussed with team re: clear liquid diet. Permission to proceed with PO trials was granted.

## 2020-07-25 NOTE — PROGRESS NOTE ADULT - ASSESSMENT
POD#2 MVD    now off insulin and nicardipine gtt    endo consulted for DM mngt  continue oral antihypertensives  CPAP overnight  lovenox 30 bid for ppx  stable for transfer to floor

## 2020-07-25 NOTE — SWALLOW BEDSIDE ASSESSMENT ADULT - SLP GENERAL OBSERVATIONS
Pt encountered lying in bed, on 10L nasal cannula, awake and alert, oriented x4. Pt reports improved breathing compared to yesterday's evaluation, with somewhat improved congestion post Flonase. She endorses history of acid reflux for which she takes medication. Pt encountered lying in bed, on 10L nasal cannula, awake and alert, oriented x4, verbally responsive, following directions for the purposes of the exam. Pt reports improved breathing compared to yesterday's evaluation, with somewhat improved congestion post Flonase. She endorses history of acid reflux for which she takes medication.

## 2020-07-25 NOTE — CONSULT NOTE ADULT - SUBJECTIVE AND OBJECTIVE BOX
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       PMHx of asthma( last inhaler used 2 weeks ago, last ER visit 2019), R trigeminal neuralgia since , chronic bronchitis, NAKUL( refuses C- pap due to claustrophobia,  HTN, DM2, HLD, fibromyalgia, anxiety, trigeminal neuralgia, degenerative joint disease,  IgA vasculitis since 2018(ast flare up couple of months ago),   She denies any fever, chest pain, abdominal pain, urinary or bowel complaint.     *** patient is s/p ERVisit due MRI contrast gadolinium & currently on oral steroids.      *** Covid testing on 20 at [facility]. (10 Jul 2020 09:51)      PAST MEDICAL & SURGICAL HISTORY:  Falls frequently  Renal cell carcinoma, left: on follow up Dr schulte, H[facility] renal [facility], waiting for suregry in 2020  Vasculitis: Legs, forerhead, arms &amp; hands, flare ups in R leg  Dr susie Ribeiro is my Rheumatologist  Radicular pain: arms, legs  H/O bursitis: right shoulder  Back pain: thoracic &amp; lumbar  Cervical neuralgia: difficulty moving my neck  DJD (degenerative joint disease): Cervical/Thoracic/Lumbar  DM (diabetes mellitus): 2  Fibromyalgia  Trigeminal neuralgia: R  Obesity: morbid  Obstructive sleep apnea: refuses Cpap  Hypothyroid  Asthma: last inhaler use with in 10 days, on Pulm follow up [id number]  Hyperlipidemia  Hypertension: vaginal cyst  Vasculitis on nerve biopsy: , had another surgery called microvascular decompression   Vaginal cyst: removed   Left adrenal mass: excision, benign   S/P  section: 1  S/P abdominal hysterectomy:         FAMILY HISTORY:  Family history of obesity  Family history of hypertension  Family history of diabetes mellitus      Social History:  Tobacco  ETOH  Illicits  Occupation    Outpatient Medications:    MEDICATIONS  (STANDING):  acetaminophen  IVPB .. 1000 milliGRAM(s) IV Intermittent once  ALBUTerol    90 MICROgram(s) HFA Inhaler 2 Puff(s) Inhalation every 6 hours  amitriptyline 10 milliGRAM(s) Oral at bedtime  atorvastatin 10 milliGRAM(s) Oral at bedtime  ceFAZolin   IVPB 3000 milliGRAM(s) IV Intermittent once  chlorhexidine 4% Liquid 1 Application(s) Topical <User Schedule>  dextrose 5%. 1000 milliLiter(s) (50 mL/Hr) IV Continuous <Continuous>  dextrose 50% Injectable 12.5 Gram(s) IV Push once  dextrose 50% Injectable 25 Gram(s) IV Push once  dextrose 50% Injectable 25 Gram(s) IV Push once  enoxaparin Injectable 30 milliGRAM(s) SubCutaneous every 12 hours  FLUoxetine 20 milliGRAM(s) Oral daily  fluticasone propionate 50 MICROgram(s)/spray Nasal Spray 1 Spray(s) Both Nostrils two times a day  gabapentin 300 milliGRAM(s) Oral at bedtime  hydrochlorothiazide 12.5 milliGRAM(s) Oral daily  insulin glargine Injectable (LANTUS) 35 Unit(s) SubCutaneous at bedtime  insulin lispro (HumaLOG) corrective regimen sliding scale   SubCutaneous every 6 hours  insulin lispro Injectable (HumaLOG) 10 Unit(s) SubCutaneous three times a day before meals  levothyroxine 112 MICROGram(s) Oral daily  lisinopril 10 milliGRAM(s) Oral daily  montelukast 10 milliGRAM(s) Oral at bedtime  OXcarbazepine 300 milliGRAM(s) Oral two times a day  pantoprazole    Tablet 40 milliGRAM(s) Oral before breakfast  polyethylene glycol 3350 17 Gram(s) Oral two times a day  senna 2 Tablet(s) Oral at bedtime  tiotropium 18 MICROgram(s) Capsule 1 Capsule(s) Inhalation daily    MEDICATIONS  (PRN):  dextrose 40% Gel 15 Gram(s) Oral once PRN Blood Glucose LESS THAN 70 milliGRAM(s)/deciliter  glucagon  Injectable 1 milliGRAM(s) IntraMuscular once PRN Glucose LESS THAN 70 milligrams/deciliter  ondansetron Injectable 4 milliGRAM(s) IV Push every 6 hours PRN Nausea and/or Vomiting  oxycodone    5 mG/acetaminophen 325 mG 1 Tablet(s) Oral every 4 hours PRN Moderate Pain (4 - 6)      Allergies    Fioricet (Rash)  gadobutrol (Rash; Urticaria; Hives)  IV Contrast (Short breath)  mushroom (Unknown)  venlafaxine (Hives)    Intolerances      Review of Systems:  Constitutional: No fever, good appetite/po intake  Eyes: No blurry vision, diplopia  Neuro: No tremors  HEENT: No pain  Cardiovascular: No chest pain, palpitations  Respiratory: No SOB, no cough  GI: No nausea, vomiting,   : No dysuria, hematuria  Skin: no rash  Psych: no depression  Endocrine: no polyuria, polydipsia  Hem/lymph: no swelling  Osteoporosis: no fractures    ALL OTHER SYSTEMS REVIEWED AND NEGATIVE    UNABLE TO OBTAIN    PHYSICAL EXAM:  VITALS: T(C): 36.8 (20 @ 15:00)  T(F): 98.2 (20 @ 15:00), Max: 100 (20 @ 23:00)  HR: 82 (20 @ 16:00) (76 - 102)  BP: --  RR:  (13 - 27)  SpO2:  (87% - 100%)  Wt(kg): --  GENERAL: NAD, well-groomed, well-developed  EYES: No proptosis, no lid lag, anicteric  HEENT:  Atraumatic, Normocephalic, moist mucous membranes  THYROID: Normal size, no palpable nodules  RESPIRATORY: Clear to auscultation bilaterally; No rales, rhonchi, wheezing, or rubs  CARDIOVASCULAR: Regular rate and rhythm; No murmurs; no peripheral edema  GI: Soft, nontender, non distended, normal bowel sounds  SKIN: Dry, intact, No rashes or lesions  NEURO: sensation intact, extraocular movements intact, no tremor, normal reflexes  PSYCH: reactive affect, euthymic mood  CUSHING'S SIGNS: no striae    POCT Blood Glucose.: 255 mg/dL (20 @ 12:24)  POCT Blood Glucose.: 223 mg/dL (20 @ 05:07)  POCT Blood Glucose.: 221 mg/dL (20 @ 23:13)  POCT Blood Glucose.: 202 mg/dL (20 @ 17:30)  POCT Blood Glucose.: 139 mg/dL (20 @ 14:51)  POCT Blood Glucose.: 189 mg/dL (20 @ 11:07)  POCT Blood Glucose.: 207 mg/dL (20 @ 10:09)  POCT Blood Glucose.: 249 mg/dL (20 @ 08:57)  POCT Blood Glucose.: 262 mg/dL (20 @ 08:01)  POCT Blood Glucose.: 281 mg/dL (20 @ 06:56)  POCT Blood Glucose.: 284 mg/dL (20 @ 06:04)  POCT Blood Glucose.: 311 mg/dL (20 @ 05:18)  POCT Blood Glucose.: 303 mg/dL (20 @ 04:06)  POCT Blood Glucose.: 316 mg/dL (20 @ 03:16)  POCT Blood Glucose.: 329 mg/dL (20 @ 02:02)  POCT Blood Glucose.: 316 mg/dL (20 @ 00:29)  POCT Blood Glucose.: 244 mg/dL (20 @ 16:56)  POCT Blood Glucose.: 259 mg/dL (20 @ 12:46)                            11.2   14.63 )-----------( 327      ( 2020 22:44 )             38.8       07-24    134<L>  |  95<L>  |  5<L>  ----------------------------<  231<H>  4.5   |  28  |  0.48<L>    EGFR if : 128  EGFR if non : 110    Ca    8.4      07-  Mg     1.7     -  Phos  3.0     -24        Thyroid Function Tests:              Radiology:     A/P: yo male/female with hx of ................................... here with .......... HPI: 56 yo female with T2DM uncontrolled exacerbated by 3 months of steroids (HbA1c 10.8%) x 2 years with neuropathy, HTN, untreated NAKUL, HLD, fibromyalgia, anxiety, R trigeminal neuralgia, degenerative joint disease,  IgA vasculitis here for R microvascular decompression for her trigeminal neuralgia. She follows with Dr. Nehemiah Ambrose and last saw him a couple of weeks ago. Her HbA1c was 11% so he added tradjenta to her regimen of metformin 1000mg po bid. She did not take it as her insurance did not cover it. Her blood sugars have been elevated to the 200s for the past several months as she has been treated for bronchitis with steroids for the past 4 months.   She last saw optho a year ago. No retinopathy, cataracts or glaucoma.           PAST MEDICAL & SURGICAL HISTORY:  Falls frequently  Renal cell carcinoma, left: on follow up Dr schulte, [facility] [place] [facility], waiting for surgery in 2020  Vasculitis: Legs, forerhead, arms &amp; hands, flare ups in R leg  Radicular pain: arms, legs  H/O bursitis: right shoulder  Back pain: thoracic &amp; lumbar  Cervical neuralgia: difficulty moving my neck  DJD (degenerative joint disease): Cervical/Thoracic/Lumbar  DM (diabetes mellitus): 2  Fibromyalgia  Trigeminal neuralgia: R  Obesity: morbid  Obstructive sleep apnea: refuses Cpap  Hypothyroid  Asthma: last inhaler use with in 10 days, on Pulm follow up [id number]  Hyperlipidemia  Hypertension: vaginal cyst  Vasculitis on nerve biopsy: , had another surgery called microvascular decompression   Vaginal cyst: removed   Left adrenal mass: excision, benign   S/P  section: 1  S/P abdominal hysterectomy:     FAMILY HISTORY:  Diabetes: mother, sisters    Social History:  Tobacco: never  Lives in [place]  Occupation: on disability    Outpatient Medications: metformin 1000mg po bid    MEDICATIONS  (STANDING):  acetaminophen  IVPB .. 1000 milliGRAM(s) IV Intermittent once  ALBUTerol    90 MICROgram(s) HFA Inhaler 2 Puff(s) Inhalation every 6 hours  amitriptyline 10 milliGRAM(s) Oral at bedtime  atorvastatin 10 milliGRAM(s) Oral at bedtime  ceFAZolin   IVPB 3000 milliGRAM(s) IV Intermittent once  chlorhexidine 4% Liquid 1 Application(s) Topical <User Schedule>  dextrose 5%. 1000 milliLiter(s) (50 mL/Hr) IV Continuous <Continuous>  dextrose 50% Injectable 12.5 Gram(s) IV Push once  dextrose 50% Injectable 25 Gram(s) IV Push once  dextrose 50% Injectable 25 Gram(s) IV Push once  enoxaparin Injectable 30 milliGRAM(s) SubCutaneous every 12 hours  FLUoxetine 20 milliGRAM(s) Oral daily  fluticasone propionate 50 MICROgram(s)/spray Nasal Spray 1 Spray(s) Both Nostrils two times a day  gabapentin 300 milliGRAM(s) Oral at bedtime  hydrochlorothiazide 12.5 milliGRAM(s) Oral daily  insulin glargine Injectable (LANTUS) 35 Unit(s) SubCutaneous at bedtime  insulin lispro (HumaLOG) corrective regimen sliding scale   SubCutaneous every 6 hours  insulin lispro Injectable (HumaLOG) 10 Unit(s) SubCutaneous three times a day before meals  levothyroxine 112 MICROGram(s) Oral daily  lisinopril 10 milliGRAM(s) Oral daily  montelukast 10 milliGRAM(s) Oral at bedtime  OXcarbazepine 300 milliGRAM(s) Oral two times a day  pantoprazole    Tablet 40 milliGRAM(s) Oral before breakfast  polyethylene glycol 3350 17 Gram(s) Oral two times a day  senna 2 Tablet(s) Oral at bedtime  tiotropium 18 MICROgram(s) Capsule 1 Capsule(s) Inhalation daily    MEDICATIONS  (PRN):  dextrose 40% Gel 15 Gram(s) Oral once PRN Blood Glucose LESS THAN 70 milliGRAM(s)/deciliter  glucagon  Injectable 1 milliGRAM(s) IntraMuscular once PRN Glucose LESS THAN 70 milligrams/deciliter  ondansetron Injectable 4 milliGRAM(s) IV Push every 6 hours PRN Nausea and/or Vomiting  oxycodone    5 mG/acetaminophen 325 mG 1 Tablet(s) Oral every 4 hours PRN Moderate Pain (4 - 6)      Allergies  Fioricet (Rash)  gadobutrol (Rash; Urticaria; Hives)  IV Contrast (Short breath)  mushroom (Unknown)  venlafaxine (Hives)      Review of Systems:  Constitutional: No fever, chills  Eyes: +blurry vision, diplopia  Neuro: +HA, neuropathy  Cardiovascular: No chest pain, palpitations  Respiratory: +SOB, no cough  GI: No nausea, vomiting,   : +frequency from water pill, hematuria from R renal mass  Skin: +psoriatic plaques on L anterior shin, pruritis  ALL OTHER SYSTEMS REVIEWED AND NEGATIVE        PHYSICAL EXAM:  VITALS: T(C): 36.8 (20 @ 15:00)  T(F): 98.2 (20 @ 15:00), Max: 100 (20 @ 23:00)  HR: 82 (20 @ 16:00) (76 - 102)  BP: --  RR:  (13 - 27)  SpO2:  (87% - 100%)  Wt(kg): --  GENERAL: NAD, well-groomed, well-developed, +drowsy  EYES: No proptosis, anicteric  HEENT:  Atraumatic, Normocephalic, moist mucous membranes  RESPIRATORY: Clear to auscultation bilaterally; No rales, rhonchi, wheezing, or rubs  CARDIOVASCULAR: Regular rate and rhythm; No murmurs  GI: Soft, nontender, non distended, normal bowel sounds  SKIN: Dry, intact, No rashes or lesions on feet b/l, +numerous psoriatic plaques on L anterior shin  PSYCH: +restricted affect, psychomotor retardation    POCT Blood Glucose.: 255 mg/dL (20 @ 12:24)  POCT Blood Glucose.: 223 mg/dL (20 @ 05:07)  POCT Blood Glucose.: 221 mg/dL (20 @ 23:13)  POCT Blood Glucose.: 202 mg/dL (20 @ 17:30)  POCT Blood Glucose.: 139 mg/dL (20 @ 14:51)  POCT Blood Glucose.: 189 mg/dL (20 @ 11:07)  POCT Blood Glucose.: 207 mg/dL (20 @ 10:09)  POCT Blood Glucose.: 249 mg/dL (20 @ 08:57)  POCT Blood Glucose.: 262 mg/dL (20 @ 08:01)  POCT Blood Glucose.: 281 mg/dL (20 @ 06:56)  POCT Blood Glucose.: 284 mg/dL (20 @ 06:04)  POCT Blood Glucose.: 311 mg/dL (20 @ 05:18)  POCT Blood Glucose.: 303 mg/dL (20 @ 04:06)  POCT Blood Glucose.: 316 mg/dL (20 @ 03:16)  POCT Blood Glucose.: 329 mg/dL (20 @ 02:02)  POCT Blood Glucose.: 316 mg/dL (20 @ 00:29)  POCT Blood Glucose.: 244 mg/dL (20 @ 16:56)  POCT Blood Glucose.: 259 mg/dL (20 @ 12:46)                            11.2   14.63 )-----------( 327      ( 2020 22:44 )             38.8           134<L>  |  95<L>  |  5<L>  ----------------------------<  231<H>  4.5   |  28  |  0.48<L>    EGFR if : 128  EGFR if non : 110    Ca    8.4        Mg     1.7       Phos  3.0             Thyroid Function Tests:              Radiology:     A/P: yo male/female with hx of ................................... here with ..........

## 2020-07-25 NOTE — PROGRESS NOTE ADULT - ASSESSMENT
ASSESSMENT/PLAN: 7/23 Right SOC for MVD POD#2    NEURO:  -Percocet for pain control  -cont home psych meds  -Activity: [] OOB as tolerated [] Bedrest [x] PT [x] OT [] PMNR      PULM:  -Maintain O2 stats > 88-90%  -Titrate off supplemental oxygen  -NAKUL( refuses C- pap due to claustrophobia)  -Continue albuterol and proventil; dc prednisone since not taken it recently     CV:  SBP goal 100-150      RENAL:  -IVL    GI:  Diet: advance diet at tolerated   GI prophylaxis protonix for GI ppx   Bowel regimen [] colace [x] senna [] other:    ENDO:   Goal euglycemia (-180)  -NPH dose increased monitor finger sticks     HEME/ONC:  VTE prophylaxis: [x] SCDs [x] chemoprophylaxis start sql if ok with neurosurgery (weight based)    ID:  -Afebrile     MISC:    SOCIAL/FAMILY:  [] updated at bedside [] family meeting    CODE STATUS:  [] Full Code [] DNR [] DNI [] Palliative/Comfort Care    DISPOSITION:  [x] ICU until off drips [] Stroke Unit [] Floor [] EMU [] RCU [] PCU ASSESSMENT/PLAN: 7/23 Right SOC for MVD POD#2    NEURO:  -Percocet for pain control  -cont home psych meds  -Activity: [] OOB as tolerated [] Bedrest [x] PT [x] OT [] PMNR      PULM:  -Maintain O2 stats > 90-92%  -Titrate off supplemental oxygen  -NAKUL( refuses C- pap due to claustrophobia)  -Continue albuterol and proventil; dc prednisone since not taken it recently     CV:  SBP goal 100-150      RENAL:  -IVL    GI:  Diet: advance diet at tolerated   GI prophylaxis protonix for GI ppx   Bowel regimen [] colace [x] senna [] other:    ENDO:   Goal euglycemia (-180)  -NPH dose increased monitor finger sticks     HEME/ONC:  VTE prophylaxis: [x] SCDs [x] chemoprophylaxis sql bid  (weight based)    ID:  -Afebrile     MISC:    SOCIAL/FAMILY:  [] updated at bedside [] family meeting    CODE STATUS:  [] Full Code [] DNR [] DNI [] Palliative/Comfort Care    DISPOSITION:  [] ICU  [] Stroke Unit [x] Floor [] EMU [] RCU [] PCU ASSESSMENT/PLAN: 7/23 Right SOC for MVD POD#2    NEURO:  -Percocet for pain control  -cont home psych meds  -Activity: [] OOB as tolerated [] Bedrest [x] PT [x] OT [] PMNR      PULM:  -Maintain O2 stats > 90-92%  -Titrate off supplemental oxygen  -NAKUL( refuses C- pap due to claustrophobia)  -Continue albuterol and proventil; dc prednisone since not taken it recently     CV:  SBP goal 100-150      RENAL:  -IVL    GI:  Diet: regular CCD diet    GI prophylaxis protonix for GI ppx   Bowel regimen [] colace [x] senna [] other:    ENDO:   Goal euglycemia (-180)  -NPH dose increased monitor finger sticks     HEME/ONC:  VTE prophylaxis: [x] SCDs [x] chemoprophylaxis sql bid  (weight based)    ID:  -Afebrile     MISC:    SOCIAL/FAMILY:  [] updated at bedside [] family meeting    CODE STATUS:  [] Full Code [] DNR [] DNI [] Palliative/Comfort Care    DISPOSITION:  [] ICU  [] Stroke Unit [x] Floor [] EMU [] RCU [] PCU ASSESSMENT/PLAN: 7/23 Right SOC for MVD POD#2    NEURO:  -Percocet for pain control  -cont home psych meds  -Activity: [] OOB as tolerated [] Bedrest [x] PT [x] OT [] PMNR      PULM:  -Maintain O2 stats > 90-92%  -Titrate off supplemental oxygen  -NAKUL( refuses C- pap due to claustrophobia), then relented  -Continue albuterol and proventil; dc prednisone since not taken it recently     CV:  SBP goal 100-150      RENAL:  -IVL    GI:  Diet: regular CCD diet    GI prophylaxis protonix for GI ppx   Bowel regimen [] colace [x] senna [] other:    ENDO:   Goal euglycemia (-180)  -NPH dose increased monitor finger sticks   -endocrine consult (follows with endo as outpatient)    HEME/ONC:  VTE prophylaxis: [x] SCDs [x] chemoprophylaxis sql bid  (weight based)    ID:  -Afebrile     MISC:    SOCIAL/FAMILY:  [] updated at bedside [] family meeting    CODE STATUS:  [] Full Code [] DNR [] DNI [] Palliative/Comfort Care    DISPOSITION:  [] ICU  [] Stroke Unit [x] Floor [] EMU [] RCU [] PCU    Patient was not critically ill, but was medically complex.  30 minutes spent.

## 2020-07-25 NOTE — PROGRESS NOTE ADULT - SUBJECTIVE AND OBJECTIVE BOX
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       PMHx of asthma( last inhaler used 2 weeks ago, last ER visit 09/2019), R trigeminal neuralgia since 2008, chronic bronchitis, NAKUL( refuses C- pap due to claustrophobia,  HTN, DM2, HLD, fibromyalgia, anxiety, trigeminal neuralgia, degenerative joint disease,  IgA vasculitis since 2018(ast flare up couple of months ago),   She denies any fever, chest pain, abdominal pain, urinary or bowel complaint.    OVERNIGHT EVENTS: nph dose increased    Vital Signs Last 24 Hrs    T(C): 37.3 (25 Jul 2020 03:00), Max: 37.8 (24 Jul 2020 23:00)  T(F): 99.1 (25 Jul 2020 03:00), Max: 100 (24 Jul 2020 23:00)  HR: 83 (25 Jul 2020 06:00) (80 - 115)  BP: --  BP(mean): --  RR: 19 (25 Jul 2020 06:00) (13 - 31)  SpO2: 97% (25 Jul 2020 06:00) (86% - 100%)    PHYSICAL EXAM:    General: No Acute Distress     Neurologic: Awakens to voice, fully oriented, follows commands, PERRL, EOMI, face symmetric, facial sensation equal, tongue midline, no drift, full strength      Pulmonary: Clear to Auscultation, No Rales, No Rhonchi, No Wheezes     Cardiovascular: S1, S2, Regular Rate and Rhythm     Gastrointestinal: Soft, Nontender, Nondistended     LABS:                                    11.2   14.63 )-----------( 327      ( 24 Jul 2020 22:44 )             38.8   07-24    134<L>  |  95<L>  |  5<L>  ----------------------------<  231<H>  4.5   |  28  |  0.48<L>    Ca    8.4      24 Jul 2020 22:44  Phos  3.0     07-24  Mg     1.7     07-24          MEDICATIONS  (STANDING):  ALBUTerol    90 MICROgram(s) HFA Inhaler 2 Puff(s) Inhalation every 6 hours  amitriptyline 10 milliGRAM(s) Oral at bedtime  atorvastatin 10 milliGRAM(s) Oral at bedtime  ceFAZolin   IVPB 3000 milliGRAM(s) IV Intermittent once  chlorhexidine 4% Liquid 1 Application(s) Topical <User Schedule>  enoxaparin Injectable 30 milliGRAM(s) SubCutaneous every 12 hours  FLUoxetine 20 milliGRAM(s) Oral daily  fluticasone propionate 50 MICROgram(s)/spray Nasal Spray 1 Spray(s) Both Nostrils two times a day  gabapentin 300 milliGRAM(s) Oral at bedtime  hydrochlorothiazide 12.5 milliGRAM(s) Oral daily  insulin lispro (HumaLOG) corrective regimen sliding scale   SubCutaneous every 6 hours  insulin NPH human recombinant 27 Unit(s) SubCutaneous every 6 hours  levothyroxine 112 MICROGram(s) Oral daily  lisinopril 10 milliGRAM(s) Oral daily  montelukast 10 milliGRAM(s) Oral at bedtime  OXcarbazepine 300 milliGRAM(s) Oral two times a day  pantoprazole    Tablet 40 milliGRAM(s) Oral before breakfast  polyethylene glycol 3350 17 Gram(s) Oral two times a day  senna 2 Tablet(s) Oral at bedtime  tiotropium 18 MICROgram(s) Capsule 1 Capsule(s) Inhalation daily    MEDICATIONS  (PRN):  HYDROmorphone  Injectable 1 milliGRAM(s) SubCutaneous every 6 hours PRN Severe Pain (7 - 10)  ondansetron Injectable 4 milliGRAM(s) IV Push every 6 hours PRN Nausea and/or Vomiting  oxycodone    5 mG/acetaminophen 325 mG 1 Tablet(s) Oral every 4 hours PRN Moderate Pain (4 - 6)        DIET: CCD diet     IMAGING: < from: CT Head No Cont (07.24.20 @ 08:42) >  INTERPRETATION:  Clinical indication: Status post MVD.    Multiple axial sections were performed from base of skull to vertex without contrast enhancement.    This exam is compared with prior noncontrast head CT performed on April 27, 2020.    This exam is somewhat limited by motion.    Postop changes compatible with a right suboccipital craniectomy and mesh like cranioplasty is now identified. Grossly, there is no evidence of acute hemorrhage mass or mass effect seen.    Small foci of air is seen involving the quadrigeminal cistern region. This is likely related to prior procedure.    The visualized paranasal sinuses mastoid and middle ear regions appear clear.    IMPRESSION: Postop changes as described above.    < end of copied text >

## 2020-07-25 NOTE — PROGRESS NOTE ADULT - SUBJECTIVE AND OBJECTIVE BOX
endo consulted for DM mngt    tolerates CPAP nasal mask overnight    vitals/labs/meds reviewed  unchanged intact exam

## 2020-07-25 NOTE — SWALLOW BEDSIDE ASSESSMENT ADULT - SWALLOW EVAL: DIAGNOSIS
56 yo female s/p microvascular decompression for trigeminal neuralgia seen for swallow evaluation of swallow function post failed swallow screen post extubation. Pt with significant improvement in O2 sat and reported nasal congestion post flonase, since yesterday's attempted evaluation. Pt currently presents with an oropharyngeal swallow sequence that appears WFL to tolerate a regular texture diet with no overt s/s of laryngeal penetration or aspiration.

## 2020-07-25 NOTE — SWALLOW BEDSIDE ASSESSMENT ADULT - ASPIRATION PRECAUTIONS
yes/Strict reflux/aspiration precautions, and if enteral feeds are initiated.
yes/Strict reflux/aspiration precautions, and if enteral feeds are initiated.
no

## 2020-07-25 NOTE — SWALLOW BEDSIDE ASSESSMENT ADULT - ASR SWALLOW ASPIRATION MONITOR
throat clearing/fever/pneumonia/upper respiratory infection/change of breathing pattern/cough/gurgly voice
Monitor for s/s aspiration/laryngeal penetration. If noted:  D/C p.o. intake, provide non-oral nutrition/hydration/meds, and contact this service @ [phone]/cough/change of breathing pattern/pneumonia/upper respiratory infection/fever/throat clearing/gurgly voice

## 2020-07-25 NOTE — PROGRESS NOTE ADULT - ATTENDING COMMENTS
Weaned off nicardipine and insulin gtt. O2 Sats >88.
Weaned off nicardipine and insulin gtt. O2 Sats >88.

## 2020-07-26 DIAGNOSIS — Z02.9 ENCOUNTER FOR ADMINISTRATIVE EXAMINATIONS, UNSPECIFIED: ICD-10-CM

## 2020-07-26 DIAGNOSIS — E66.9 OBESITY, UNSPECIFIED: ICD-10-CM

## 2020-07-26 DIAGNOSIS — C64.2 MALIGNANT NEOPLASM OF LEFT KIDNEY, EXCEPT RENAL PELVIS: ICD-10-CM

## 2020-07-26 DIAGNOSIS — G47.33 OBSTRUCTIVE SLEEP APNEA (ADULT) (PEDIATRIC): ICD-10-CM

## 2020-07-26 LAB
ANION GAP SERPL CALC-SCNC: 12 MMOL/L — SIGNIFICANT CHANGE UP (ref 5–17)
BUN SERPL-MCNC: 6 MG/DL — LOW (ref 7–23)
CALCIUM SERPL-MCNC: 9.2 MG/DL — SIGNIFICANT CHANGE UP (ref 8.4–10.5)
CHLORIDE SERPL-SCNC: 90 MMOL/L — LOW (ref 96–108)
CO2 SERPL-SCNC: 31 MMOL/L — SIGNIFICANT CHANGE UP (ref 22–31)
CREAT SERPL-MCNC: 0.41 MG/DL — LOW (ref 0.5–1.3)
GLUCOSE BLDC GLUCOMTR-MCNC: 129 MG/DL — HIGH (ref 70–99)
GLUCOSE BLDC GLUCOMTR-MCNC: 164 MG/DL — HIGH (ref 70–99)
GLUCOSE BLDC GLUCOMTR-MCNC: 177 MG/DL — HIGH (ref 70–99)
GLUCOSE BLDC GLUCOMTR-MCNC: 209 MG/DL — HIGH (ref 70–99)
GLUCOSE SERPL-MCNC: 199 MG/DL — HIGH (ref 70–99)
MAGNESIUM SERPL-MCNC: 1.6 MG/DL — SIGNIFICANT CHANGE UP (ref 1.6–2.6)
PHOSPHATE SERPL-MCNC: 2.8 MG/DL — SIGNIFICANT CHANGE UP (ref 2.5–4.5)
POTASSIUM SERPL-MCNC: 4.2 MMOL/L — SIGNIFICANT CHANGE UP (ref 3.5–5.3)
POTASSIUM SERPL-SCNC: 4.2 MMOL/L — SIGNIFICANT CHANGE UP (ref 3.5–5.3)
SODIUM SERPL-SCNC: 133 MMOL/L — LOW (ref 135–145)

## 2020-07-26 PROCEDURE — 99233 SBSQ HOSP IP/OBS HIGH 50: CPT

## 2020-07-26 PROCEDURE — 99232 SBSQ HOSP IP/OBS MODERATE 35: CPT

## 2020-07-26 RX ORDER — INSULIN GLARGINE 100 [IU]/ML
40 INJECTION, SOLUTION SUBCUTANEOUS AT BEDTIME
Refills: 0 | Status: DISCONTINUED | OUTPATIENT
Start: 2020-07-26 | End: 2020-07-30

## 2020-07-26 RX ORDER — MAGNESIUM SULFATE 500 MG/ML
2 VIAL (ML) INJECTION ONCE
Refills: 0 | Status: COMPLETED | OUTPATIENT
Start: 2020-07-26 | End: 2020-07-26

## 2020-07-26 RX ORDER — ACETAMINOPHEN 500 MG
650 TABLET ORAL EVERY 6 HOURS
Refills: 0 | Status: DISCONTINUED | OUTPATIENT
Start: 2020-07-26 | End: 2020-07-28

## 2020-07-26 RX ORDER — MULTIVIT WITH MIN/MFOLATE/K2 340-15/3 G
1 POWDER (GRAM) ORAL ONCE
Refills: 0 | Status: DISCONTINUED | OUTPATIENT
Start: 2020-07-26 | End: 2020-07-26

## 2020-07-26 RX ADMIN — POLYETHYLENE GLYCOL 3350 17 GRAM(S): 17 POWDER, FOR SOLUTION ORAL at 05:39

## 2020-07-26 RX ADMIN — Medication 2: at 22:05

## 2020-07-26 RX ADMIN — Medication 4: at 08:39

## 2020-07-26 RX ADMIN — OXCARBAZEPINE 300 MILLIGRAM(S): 300 TABLET, FILM COATED ORAL at 05:39

## 2020-07-26 RX ADMIN — Medication 20 MILLIGRAM(S): at 22:05

## 2020-07-26 RX ADMIN — LISINOPRIL 10 MILLIGRAM(S): 2.5 TABLET ORAL at 05:39

## 2020-07-26 RX ADMIN — ENOXAPARIN SODIUM 30 MILLIGRAM(S): 100 INJECTION SUBCUTANEOUS at 17:43

## 2020-07-26 RX ADMIN — ALBUTEROL 2 PUFF(S): 90 AEROSOL, METERED ORAL at 05:47

## 2020-07-26 RX ADMIN — Medication 102 GRAM(S): at 00:15

## 2020-07-26 RX ADMIN — Medication 112 MICROGRAM(S): at 05:41

## 2020-07-26 RX ADMIN — PANTOPRAZOLE SODIUM 40 MILLIGRAM(S): 20 TABLET, DELAYED RELEASE ORAL at 05:39

## 2020-07-26 RX ADMIN — Medication 10 UNIT(S): at 17:42

## 2020-07-26 RX ADMIN — ATORVASTATIN CALCIUM 10 MILLIGRAM(S): 80 TABLET, FILM COATED ORAL at 22:05

## 2020-07-26 RX ADMIN — Medication 2: at 12:42

## 2020-07-26 RX ADMIN — ALBUTEROL 2 PUFF(S): 90 AEROSOL, METERED ORAL at 12:32

## 2020-07-26 RX ADMIN — Medication 63.75 MILLIMOLE(S): at 00:15

## 2020-07-26 RX ADMIN — Medication 10 UNIT(S): at 12:41

## 2020-07-26 RX ADMIN — OXCARBAZEPINE 300 MILLIGRAM(S): 300 TABLET, FILM COATED ORAL at 17:42

## 2020-07-26 RX ADMIN — Medication 10 MILLIGRAM(S): at 22:05

## 2020-07-26 RX ADMIN — TIOTROPIUM BROMIDE 1 CAPSULE(S): 18 CAPSULE ORAL; RESPIRATORY (INHALATION) at 12:33

## 2020-07-26 RX ADMIN — Medication 650 MILLIGRAM(S): at 14:00

## 2020-07-26 RX ADMIN — Medication 1 SPRAY(S): at 19:18

## 2020-07-26 RX ADMIN — GABAPENTIN 300 MILLIGRAM(S): 400 CAPSULE ORAL at 22:05

## 2020-07-26 RX ADMIN — SENNA PLUS 2 TABLET(S): 8.6 TABLET ORAL at 22:08

## 2020-07-26 RX ADMIN — POLYETHYLENE GLYCOL 3350 17 GRAM(S): 17 POWDER, FOR SOLUTION ORAL at 17:42

## 2020-07-26 RX ADMIN — MONTELUKAST 10 MILLIGRAM(S): 4 TABLET, CHEWABLE ORAL at 22:06

## 2020-07-26 RX ADMIN — ENOXAPARIN SODIUM 30 MILLIGRAM(S): 100 INJECTION SUBCUTANEOUS at 05:40

## 2020-07-26 RX ADMIN — Medication 1 SPRAY(S): at 06:34

## 2020-07-26 RX ADMIN — Medication 10 UNIT(S): at 08:38

## 2020-07-26 RX ADMIN — Medication 50 GRAM(S): at 08:38

## 2020-07-26 RX ADMIN — INSULIN GLARGINE 40 UNIT(S): 100 INJECTION, SOLUTION SUBCUTANEOUS at 22:05

## 2020-07-26 RX ADMIN — Medication 12.5 MILLIGRAM(S): at 05:39

## 2020-07-26 NOTE — CONSULT NOTE ADULT - PROBLEM SELECTOR RECOMMENDATION 5
-patient was drowsy so will need to determine her steroid taper as she was on prednisone 20mg po qdaily 3 weeks ago  -Discussed with NSICU VEL Beltre and NSICU provider team  Laura Rivera MD  Pager: [phone], between 9am-6pm  Nights and Weekends: [phone]
Weight loss once possible

## 2020-07-26 NOTE — CONSULT NOTE ADULT - PROBLEM SELECTOR RECOMMENDATION 9
-patient can take a diet so would discontinue her NPH 27 units sq qhs. Change her to Lantus 35 units sq qhs and humalog 10 units tid-ac with moderate huamlog scale tid-ac/qhs  -RD consult  DISPO: patient would benefit from basal insulin with her metformin. Will need to send a script to the pharmacy to see if its covered.  F/u with Dr. Nehemiah Ambrose, endocrine 8/17 at 1030am
S/P  Right trigeminal nerve compression on 7/23   Cont monitor as per Neurosurgery team

## 2020-07-26 NOTE — CONSULT NOTE ADULT - PROBLEM SELECTOR RECOMMENDATION 6
Pt is on CPAP 10cm H2O at night with FIO2 of 40 % and it is reported that she refuses to uses the CPAP.    As patient is sleeping her saturation drops to the 70's on 3 L NC and nurse helped repositioned the patient and she was able to keep SPO2 >90   Nurse is aware that patient has to use the CPAP at night while sleeping .

## 2020-07-26 NOTE — PROGRESS NOTE ADULT - SUBJECTIVE AND OBJECTIVE BOX
Diabetes Follow up note:    Chief complaint: T2DM     Interval Hx: BG 160s-low 200s over past 24 hours. Pt seen oob to chair. Reports not eating much because "food is nasty". Reports was on Prednisone for 3 months and then was off for over a month followed by a short course of lower dose Prednisone that ended 3 weeks ago. Has never been on insulin and reports difficult controlling glucose while on steroids.     Review of Systems:  General: + facial pain  GI: Tolerating POs. Denies N/V/D/Abd pain  CV: Denies CP/SOB  ENDO: No S&Sx of hypoglycemia  MEDS:  atorvastatin 10 milliGRAM(s) Oral at bedtime    insulin glargine Injectable (LANTUS) 35 Unit(s) SubCutaneous at bedtime  insulin lispro (HumaLOG) corrective regimen sliding scale   SubCutaneous Before meals and at bedtime  insulin lispro Injectable (HumaLOG) 10 Unit(s) SubCutaneous three times a day before meals  levothyroxine 112 MICROGram(s) Oral daily      Allergies    Fioricet (Rash)  gadobutrol (Rash; Urticaria; Hives)  IV Contrast (Short breath)  mushroom (Unknown)  venlafaxine (Hives)        PE:  General: Female sitting in chair. NAD.   Vital Signs Last 24 Hrs  T(C): 37.1 (26 Jul 2020 11:00), Max: 37.9 (25 Jul 2020 19:00)  T(F): 98.8 (26 Jul 2020 11:00), Max: 100.2 (25 Jul 2020 19:00)  HR: 91 (26 Jul 2020 13:00) (59 - 98)  BP: 141/82 (26 Jul 2020 12:00) (137/72 - 158/79)  BP(mean): 100 (26 Jul 2020 12:00) (88 - 113)  RR: 13 (26 Jul 2020 12:00) (11 - 25)  SpO2: 93% (26 Jul 2020 13:00) (90% - 99%)  Abd: Soft, NT,ND, Obese.   Extremities: Warm  Neuro: A&O X3    LABS:  POCT Blood Glucose.: 164 mg/dL (07-26-20 @ 12:16)  POCT Blood Glucose.: 209 mg/dL (07-26-20 @ 08:04)  POCT Blood Glucose.: 197 mg/dL (07-25-20 @ 21:19)  POCT Blood Glucose.: 235 mg/dL (07-25-20 @ 17:09)  POCT Blood Glucose.: 255 mg/dL (07-25-20 @ 12:24)  POCT Blood Glucose.: 223 mg/dL (07-25-20 @ 05:07)  POCT Blood Glucose.: 221 mg/dL (07-24-20 @ 23:13)  POCT Blood Glucose.: 202 mg/dL (07-24-20 @ 17:30)  POCT Blood Glucose.: 139 mg/dL (07-24-20 @ 14:51)  POCT Blood Glucose.: 189 mg/dL (07-24-20 @ 11:07)  POCT Blood Glucose.: 207 mg/dL (07-24-20 @ 10:09)  POCT Blood Glucose.: 249 mg/dL (07-24-20 @ 08:57)  POCT Blood Glucose.: 262 mg/dL (07-24-20 @ 08:01)  POCT Blood Glucose.: 281 mg/dL (07-24-20 @ 06:56)  POCT Blood Glucose.: 284 mg/dL (07-24-20 @ 06:04)  POCT Blood Glucose.: 311 mg/dL (07-24-20 @ 05:18)  POCT Blood Glucose.: 303 mg/dL (07-24-20 @ 04:06)  POCT Blood Glucose.: 316 mg/dL (07-24-20 @ 03:16)  POCT Blood Glucose.: 329 mg/dL (07-24-20 @ 02:02)  POCT Blood Glucose.: 316 mg/dL (07-24-20 @ 00:29)  POCT Blood Glucose.: 244 mg/dL (07-23-20 @ 16:56)                            11.2   14.63 )-----------( 327      ( 24 Jul 2020 22:44 )             38.8       07-26    133<L>  |  90<L>  |  6<L>  ----------------------------<  199<H>  4.2   |  31  |  0.41<L>    Ca    9.2      26 Jul 2020 07:39  Phos  2.8     07-26  Mg     1.6     07-26      A1C with Estimated Average Glucose Result: 10.8 % (07-10-20 @ 16:59)  Hemoglobin A1C, Whole Blood: 7.8 % (10-10-19 @ 10:13)          Contact number: sherly [phone] or [phone]

## 2020-07-26 NOTE — CONSULT NOTE ADULT - SUBJECTIVE AND OBJECTIVE BOX
Patient is a 55y old  Female who presents with a chief complaint of admitted after MVD for trigeminal neuralgia (26 Jul 2020 06:44)      SUBJECTIVE / OVERNIGHT EVENTS:    Tele reviewed:       ADDITIONAL REVIEW OF SYSTEMS: Negative except for above    MEDICATIONS  (STANDING):  acetaminophen  IVPB .. 1000 milliGRAM(s) IV Intermittent once  ALBUTerol    90 MICROgram(s) HFA Inhaler 2 Puff(s) Inhalation every 6 hours  amitriptyline 10 milliGRAM(s) Oral at bedtime  atorvastatin 10 milliGRAM(s) Oral at bedtime  dextrose 5%. 1000 milliLiter(s) (50 mL/Hr) IV Continuous <Continuous>  dextrose 50% Injectable 12.5 Gram(s) IV Push once  dextrose 50% Injectable 25 Gram(s) IV Push once  dextrose 50% Injectable 25 Gram(s) IV Push once  enoxaparin Injectable 30 milliGRAM(s) SubCutaneous every 12 hours  FLUoxetine 20 milliGRAM(s) Oral daily  fluticasone propionate 50 MICROgram(s)/spray Nasal Spray 1 Spray(s) Both Nostrils two times a day  gabapentin 300 milliGRAM(s) Oral at bedtime  hydrochlorothiazide 12.5 milliGRAM(s) Oral daily  insulin glargine Injectable (LANTUS) 40 Unit(s) SubCutaneous at bedtime  insulin lispro (HumaLOG) corrective regimen sliding scale   SubCutaneous Before meals and at bedtime  insulin lispro Injectable (HumaLOG) 10 Unit(s) SubCutaneous three times a day before meals  levothyroxine 112 MICROGram(s) Oral daily  lisinopril 10 milliGRAM(s) Oral daily  montelukast 10 milliGRAM(s) Oral at bedtime  OXcarbazepine 300 milliGRAM(s) Oral two times a day  pantoprazole    Tablet 40 milliGRAM(s) Oral before breakfast  polyethylene glycol 3350 17 Gram(s) Oral two times a day  senna 2 Tablet(s) Oral at bedtime  tiotropium 18 MICROgram(s) Capsule 1 Capsule(s) Inhalation daily    MEDICATIONS  (PRN):  acetaminophen   Tablet .. 650 milliGRAM(s) Oral every 6 hours PRN Moderate Pain (4 - 6)  dextrose 40% Gel 15 Gram(s) Oral once PRN Blood Glucose LESS THAN 70 milliGRAM(s)/deciliter  glucagon  Injectable 1 milliGRAM(s) IntraMuscular once PRN Glucose LESS THAN 70 milligrams/deciliter  ondansetron Injectable 4 milliGRAM(s) IV Push every 6 hours PRN Nausea and/or Vomiting      CAPILLARY BLOOD GLUCOSE      POCT Blood Glucose.: 164 mg/dL (26 Jul 2020 12:16)  POCT Blood Glucose.: 209 mg/dL (26 Jul 2020 08:04)  POCT Blood Glucose.: 197 mg/dL (25 Jul 2020 21:19)  POCT Blood Glucose.: 235 mg/dL (25 Jul 2020 17:09)    I&O's Summary    25 Jul 2020 07:01  -  26 Jul 2020 07:00  --------------------------------------------------------  IN: 370 mL / OUT: 300 mL / NET: 70 mL    26 Jul 2020 07:01  -  26 Jul 2020 15:51  --------------------------------------------------------  IN: 240 mL / OUT: 600 mL / NET: -360 mL        PHYSICAL EXAM:  Vital Signs Last 24 Hrs  T(C): 36.9 (26 Jul 2020 15:45), Max: 37.9 (25 Jul 2020 19:00)  T(F): 98.5 (26 Jul 2020 15:45), Max: 100.2 (25 Jul 2020 19:00)  HR: 62 (26 Jul 2020 15:45) (59 - 98)  BP: 152/87 (26 Jul 2020 15:45) (134/73 - 158/79)  BP(mean): 93 (26 Jul 2020 15:04) (88 - 113)  RR: 18 (26 Jul 2020 15:45) (11 - 25)  SpO2: 95% (26 Jul 2020 15:45) (90% - 99%)    PHYSICAL EXAM:  GENERAL: NAD, well-developed  HEAD:  Atraumatic, Normocephalic  EYES:  conjunctiva and sclera clear  NECK: Supple, No JVD  CHEST/LUNG: Clear to auscultation bilaterally; No wheeze  HEART: Regular rate and rhythm; No murmurs, rubs, or gallops  ABDOMEN: Soft, Nontender, Nondistended; Bowel sounds present  EXTREMITIES:  2+ Peripheral Pulses, No clubbing, cyanosis, or edema  PSYCH: AAOx3  NEUROLOGY: non-focal  SKIN: No rashes or lesions      LABS:                        11.2   14.63 )-----------( 327      ( 24 Jul 2020 22:44 )             38.8     07-26    133<L>  |  90<L>  |  6<L>  ----------------------------<  199<H>  4.2   |  31  |  0.41<L>    Ca    9.2      26 Jul 2020 07:39  Phos  2.8     07-26  Mg     1.6     07-26                  RADIOLOGY & ADDITIONAL TESTS:    Imaging Personally Reviewed:    Electrocardiogram Personally Reviewed:    COORDINATION OF CARE:  Care Discussed with Consultants/Other Providers [Y/N]:  Prior or Outpatient Records Reviewed [Y/N]: Patient is a 55y old  Female who presents with a chief complaint of admitted after MVD for trigeminal neuralgia (26 Jul 2020 06:44)    Dr Ken gonsalez   [facility] [address] [facility]     Dr Anastasia Tee neurologist    SUBJECTIVE / OVERNIGHT EVENTS:      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   On 7/23, she underwent Microvascular decompression, nerve.  While in the intensive care unit , she was started on cardene and insulin drips which was later DC.  Patient was placed on O2 supplement due to desaturation .  Patient was then transferred to Neurosurgery floor.          ADDITIONAL REVIEW OF SYSTEMS: Negative except for above    MEDICATIONS  (STANDING):  acetaminophen  IVPB .. 1000 milliGRAM(s) IV Intermittent once  ALBUTerol    90 MICROgram(s) HFA Inhaler 2 Puff(s) Inhalation every 6 hours  amitriptyline 10 milliGRAM(s) Oral at bedtime  atorvastatin 10 milliGRAM(s) Oral at bedtime  dextrose 5%. 1000 milliLiter(s) (50 mL/Hr) IV Continuous <Continuous>  dextrose 50% Injectable 12.5 Gram(s) IV Push once  dextrose 50% Injectable 25 Gram(s) IV Push once  dextrose 50% Injectable 25 Gram(s) IV Push once  enoxaparin Injectable 30 milliGRAM(s) SubCutaneous every 12 hours  FLUoxetine 20 milliGRAM(s) Oral daily  fluticasone propionate 50 MICROgram(s)/spray Nasal Spray 1 Spray(s) Both Nostrils two times a day  gabapentin 300 milliGRAM(s) Oral at bedtime  hydrochlorothiazide 12.5 milliGRAM(s) Oral daily  insulin glargine Injectable (LANTUS) 40 Unit(s) SubCutaneous at bedtime  insulin lispro (HumaLOG) corrective regimen sliding scale   SubCutaneous Before meals and at bedtime  insulin lispro Injectable (HumaLOG) 10 Unit(s) SubCutaneous three times a day before meals  levothyroxine 112 MICROGram(s) Oral daily  lisinopril 10 milliGRAM(s) Oral daily  montelukast 10 milliGRAM(s) Oral at bedtime  OXcarbazepine 300 milliGRAM(s) Oral two times a day  pantoprazole    Tablet 40 milliGRAM(s) Oral before breakfast  polyethylene glycol 3350 17 Gram(s) Oral two times a day  senna 2 Tablet(s) Oral at bedtime  tiotropium 18 MICROgram(s) Capsule 1 Capsule(s) Inhalation daily    MEDICATIONS  (PRN):  acetaminophen   Tablet .. 650 milliGRAM(s) Oral every 6 hours PRN Moderate Pain (4 - 6)  dextrose 40% Gel 15 Gram(s) Oral once PRN Blood Glucose LESS THAN 70 milliGRAM(s)/deciliter  glucagon  Injectable 1 milliGRAM(s) IntraMuscular once PRN Glucose LESS THAN 70 milligrams/deciliter  ondansetron Injectable 4 milliGRAM(s) IV Push every 6 hours PRN Nausea and/or Vomiting      CAPILLARY BLOOD GLUCOSE      POCT Blood Glucose.: 164 mg/dL (26 Jul 2020 12:16)  POCT Blood Glucose.: 209 mg/dL (26 Jul 2020 08:04)  POCT Blood Glucose.: 197 mg/dL (25 Jul 2020 21:19)  POCT Blood Glucose.: 235 mg/dL (25 Jul 2020 17:09)    I&O's Summary    25 Jul 2020 07:01  -  26 Jul 2020 07:00  --------------------------------------------------------  IN: 370 mL / OUT: 300 mL / NET: 70 mL    26 Jul 2020 07:01  -  26 Jul 2020 15:51  --------------------------------------------------------  IN: 240 mL / OUT: 600 mL / NET: -360 mL        PHYSICAL EXAM:  Vital Signs Last 24 Hrs  T(C): 36.9 (26 Jul 2020 15:45), Max: 37.9 (25 Jul 2020 19:00)  T(F): 98.5 (26 Jul 2020 15:45), Max: 100.2 (25 Jul 2020 19:00)  HR: 62 (26 Jul 2020 15:45) (59 - 98)  BP: 152/87 (26 Jul 2020 15:45) (134/73 - 158/79)  BP(mean): 93 (26 Jul 2020 15:04) (88 - 113)  RR: 18 (26 Jul 2020 15:45) (11 - 25)  SpO2: 95% (26 Jul 2020 15:45) (90% - 99%)    PHYSICAL EXAM:  GENERAL: NAD, well-developed, obese femaly   HEAD:  Atraumatic, Normocephalic  EYES:  conjunctiva and sclera clear  NECK: Supple, No JVD  CHEST/LUNG: Clear to auscultation bilaterally; No wheeze  HEART: Regular rate and rhythm; No murmurs, rubs, or gallops  ABDOMEN: Soft, Nontender, Nondistended; Bowel sounds present  EXTREMITIES:  2+ Peripheral Pulses, No clubbing, cyanosis, or edema  PSYCH:   NEUROLOGY: non-focal, AAOx3  SKIN: No rashes or lesions      LABS:                        11.2   14.63 )-----------( 327      ( 24 Jul 2020 22:44 )             38.8     07-26    133<L>  |  90<L>  |  6<L>  ----------------------------<  199<H>  4.2   |  31  |  0.41<L>    Ca    9.2      26 Jul 2020 07:39  Phos  2.8     07-26  Mg     1.6     07-26                  RADIOLOGY & ADDITIONAL TESTS:    Imaging Personally Reviewed:    Electrocardiogram Personally Reviewed:    COORDINATION OF CARE:  Care Discussed with Consultants/Other Providers [Y/N]:  Prior or Outpatient Records Reviewed [Y/N]: Patient is a 55y old  Female who presents with a chief complaint of admitted after MVD for trigeminal neuralgia (26 Jul 2020 06:44)    Dr Ken gonsalez   [facility] [place]     Dr Anastasia Tee neurologist    SUBJECTIVE / OVERNIGHT EVENTS:      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   On 7/23, she underwent Microvascular nerve decompression.  While in the intensive care unit , she was started on cardene and insulin drips which was later DC.  Patient was placed on O2 supplement due to desaturation .  Patient was then transferred to Neurosurgery floor.          ADDITIONAL REVIEW OF SYSTEMS: Negative except for above    MEDICATIONS  (STANDING):  acetaminophen  IVPB .. 1000 milliGRAM(s) IV Intermittent once  ALBUTerol    90 MICROgram(s) HFA Inhaler 2 Puff(s) Inhalation every 6 hours  amitriptyline 10 milliGRAM(s) Oral at bedtime  atorvastatin 10 milliGRAM(s) Oral at bedtime  dextrose 5%. 1000 milliLiter(s) (50 mL/Hr) IV Continuous <Continuous>  dextrose 50% Injectable 12.5 Gram(s) IV Push once  dextrose 50% Injectable 25 Gram(s) IV Push once  dextrose 50% Injectable 25 Gram(s) IV Push once  enoxaparin Injectable 30 milliGRAM(s) SubCutaneous every 12 hours  FLUoxetine 20 milliGRAM(s) Oral daily  fluticasone propionate 50 MICROgram(s)/spray Nasal Spray 1 Spray(s) Both Nostrils two times a day  gabapentin 300 milliGRAM(s) Oral at bedtime  hydrochlorothiazide 12.5 milliGRAM(s) Oral daily  insulin glargine Injectable (LANTUS) 40 Unit(s) SubCutaneous at bedtime  insulin lispro (HumaLOG) corrective regimen sliding scale   SubCutaneous Before meals and at bedtime  insulin lispro Injectable (HumaLOG) 10 Unit(s) SubCutaneous three times a day before meals  levothyroxine 112 MICROGram(s) Oral daily  lisinopril 10 milliGRAM(s) Oral daily  montelukast 10 milliGRAM(s) Oral at bedtime  OXcarbazepine 300 milliGRAM(s) Oral two times a day  pantoprazole    Tablet 40 milliGRAM(s) Oral before breakfast  polyethylene glycol 3350 17 Gram(s) Oral two times a day  senna 2 Tablet(s) Oral at bedtime  tiotropium 18 MICROgram(s) Capsule 1 Capsule(s) Inhalation daily    MEDICATIONS  (PRN):  acetaminophen   Tablet .. 650 milliGRAM(s) Oral every 6 hours PRN Moderate Pain (4 - 6)  dextrose 40% Gel 15 Gram(s) Oral once PRN Blood Glucose LESS THAN 70 milliGRAM(s)/deciliter  glucagon  Injectable 1 milliGRAM(s) IntraMuscular once PRN Glucose LESS THAN 70 milligrams/deciliter  ondansetron Injectable 4 milliGRAM(s) IV Push every 6 hours PRN Nausea and/or Vomiting      CAPILLARY BLOOD GLUCOSE      POCT Blood Glucose.: 164 mg/dL (26 Jul 2020 12:16)  POCT Blood Glucose.: 209 mg/dL (26 Jul 2020 08:04)  POCT Blood Glucose.: 197 mg/dL (25 Jul 2020 21:19)  POCT Blood Glucose.: 235 mg/dL (25 Jul 2020 17:09)    I&O's Summary    25 Jul 2020 07:01  -  26 Jul 2020 07:00  --------------------------------------------------------  IN: 370 mL / OUT: 300 mL / NET: 70 mL    26 Jul 2020 07:01  -  26 Jul 2020 15:51  --------------------------------------------------------  IN: 240 mL / OUT: 600 mL / NET: -360 mL        PHYSICAL EXAM:  Vital Signs Last 24 Hrs  T(C): 36.9 (26 Jul 2020 15:45), Max: 37.9 (25 Jul 2020 19:00)  T(F): 98.5 (26 Jul 2020 15:45), Max: 100.2 (25 Jul 2020 19:00)  HR: 62 (26 Jul 2020 15:45) (59 - 98)  BP: 152/87 (26 Jul 2020 15:45) (134/73 - 158/79)  BP(mean): 93 (26 Jul 2020 15:04) (88 - 113)  RR: 18 (26 Jul 2020 15:45) (11 - 25)  SpO2: 95% (26 Jul 2020 15:45) (90% - 99%)    PHYSICAL EXAM:  GENERAL: NAD, well-developed, obese female with large neck  HEAD:  Atraumatic, Normocephalic  EYES:  conjunctiva and sclera clear  NECK: Supple, No JVD  CHEST/LUNG: Clear to auscultation bilaterally, poor air entry on the lower lung fields  HEART: Regular rate and rhythm; No murmurs, rubs, or gallops  ABDOMEN: Soft, Nontender, Nondistended/ but very protruberant; Bowel sounds present  EXTREMITIES:  left lateral leg with rash and excoriation  PSYCH: sleepy but responsive  NEUROLOGY: non-focal, Awake and alert , but falls asleep mid sentence       LABS:                        11.2   14.63 )-----------( 327      ( 24 Jul 2020 22:44 )             38.8     07-26    133<L>  |  90<L>  |  6<L>  ----------------------------<  199<H>  4.2   |  31  |  0.41<L>    Ca    9.2      26 Jul 2020 07:39  Phos  2.8     07-26  Mg     1.6     07-26                  RADIOLOGY & ADDITIONAL TESTS:    Imaging Personally Reviewed:    Electrocardiogram Personally Reviewed:    COORDINATION OF CARE:  Care Discussed with Consultants/Other Providers [Y/N]:  Prior or Outpatient Records Reviewed [Y/N]:

## 2020-07-26 NOTE — CONSULT NOTE ADULT - PROBLEM SELECTOR RECOMMENDATION 4
-atorvastatin 100mg po qdaily
Need Onc and Nephrology outpatient follow up   As reported , she is scheduled for surgery

## 2020-07-26 NOTE — CONSULT NOTE ADULT - PROBLEM SELECTOR RECOMMENDATION 2
-continue Synthroid 112mcg po qdaily, give an hour before other meds and food
With poor control   A1C = 10.8 ( 7/2020)   CW Lantus 40 units at night and Humalog 10 units three times a day   Endocrine is on the case   cont Glucose monitoring

## 2020-07-26 NOTE — PROGRESS NOTE ADULT - ASSESSMENT
ASSESSMENT/PLAN: 7/23 Right SOC for MVD POD#3    NEURO:  - neuro check Q4h  - CT head post op on 7/24 stable  - Percocet for pain control  - cont home psych meds  - Activity: [x] OOB as tolerated [] Bedrest [x] PT [x] OT [] PMNR  - PT/OT- home PT/OT with RW    PULM:  - Maintain O2 stats > 90-92%  - currently on room air  - NAKUL( refuses C- pap due to claustrophobia), then relented  - Continue albuterol and proventil; dc prednisone since not taken it recently     CV:  - SBP goal 100-150  - continue lisinopril and HCTZ    RENAL:  - mild hyponatremia, Na 133 stable, f/u repeat BMP this am  - electrolyte supplement as needed    GI:  - Diet: regular CCD diet    - GI prophylaxis protonix for GI ppx   - Bowel regimen [x] miralax [x] senna [x] other: mag citrate today    ENDO:   - Goal euglycemia (-180)  - endocrine consult appreciated yesterday, currently on Lantus 35u QHS, premeal humalog 10 u, moderate HISS, f/u endo rec based on FS    HEME/ONC:  VTE prophylaxis: [x] SCDs [x] chemoprophylaxis sql bid  (weight based)    ID:  -Afebrile     MISC:    CODE STATUS:  [x] Full Code [] DNR [] DNI [] Palliative/Comfort Care    DISPOSITION:  [] ICU  [] Stroke Unit [x] Floor [] EMU [] RCU [] PCU ASSESSMENT/PLAN: 7/23 Right SOC for MVD POD#3    NEURO:  - neuro check Q4h  - CT head post op on 7/24 stable  - Percocet for pain control  - cont home psych meds  - Activity: [x] OOB as tolerated [] Bedrest [x] PT [x] OT [] PMNR  - PT/OT- home PT/OT with RW    PULM:  - Maintain O2 stats > 90-92%  - on nasal cannula 3 lit   - NAKUL( refuses C- pap due to claustrophobia), then relented  - Continue albuterol and proventil; dc prednisone since not taken it recently     CV:  - SBP goal 100-150  - continue lisinopril and HCTZ    RENAL:  - mild hyponatremia, Na 133 stable, f/u repeat BMP this am  - electrolyte supplement as needed    GI:  - Diet: regular CCD diet    - GI prophylaxis protonix for GI ppx   - Bowel regimen [x] miralax [x] senna [x] other: mag citrate today    ENDO:   - Goal euglycemia (-180)  - endocrine consult appreciated yesterday, currently on Lantus 35u QHS, premeal humalog 10 u, moderate HISS, f/u endo rec based on FS    HEME/ONC:  VTE prophylaxis: [x] SCDs [x] chemoprophylaxis sql bid  (weight based)    ID:  -Afebrile     MISC:    CODE STATUS:  [x] Full Code [] DNR [] DNI [] Palliative/Comfort Care    DISPOSITION:  [] ICU  [] Stroke Unit [x] Floor [] EMU [] RCU [] PCU ASSESSMENT/PLAN: 7/23 Right SOC for MVD POD#3    NEURO:  - neuro check Q4h  - CT head post op on 7/24 stable  - Percocet for pain control  - cont home psych meds  - Activity: [x] OOB as tolerated [] Bedrest [x] PT [x] OT [] PMNR  - PT/OT- home PT/OT with RW    PULM:  - Maintain O2 stats > 90-92%  - on nasal cannula 3 lit   - NAKUL( refuses C- pap due to claustrophobia), then relented  - Continue albuterol and proventil; dc prednisone since not taken it recently     CV:  - SBP goal 100-150  - continue lisinopril and HCTZ    RENAL:  - mild hyponatremia, Na 133 stable, f/u repeat BMP this am  - electrolyte supplement as needed    GI:  - Diet: regular CCD diet    - GI prophylaxis protonix for GI ppx   - Bowel regimen [x] miralax [x] senna [x] other: mag citrate today    ENDO:   - Goal euglycemia (-180)  - endocrine consult appreciated yesterday, currently on Lantus 35u QHS, premeal humalog 10 u, moderate HISS, f/u endo rec based on FS    HEME/ONC:  VTE prophylaxis: [x] SCDs [x] chemoprophylaxis sql bid  (weight based)    ID:  -Afebrile     MISC:    CODE STATUS:  [x] Full Code [] DNR [] DNI [] Palliative/Comfort Care    DISPOSITION:  [] ICU  [] Stroke Unit [x] Floor [] EMU [] RCU [] PCU    Patient was not critically ill, but was medically complex.  30 minutes spent.

## 2020-07-26 NOTE — PROGRESS NOTE ADULT - ASSESSMENT
PLAN  endo consulted for DM mngt  continue oral antihypertensives  CPAP overnight  lovenox 30 bid for ppx  stable for transfer to floor

## 2020-07-26 NOTE — PROGRESS NOTE ADULT - SUBJECTIVE AND OBJECTIVE BOX
Vital Signs Last 24 Hrs  T(C): 37.8 (25 Jul 2020 22:00), Max: 37.9 (25 Jul 2020 19:00)  T(F): 100 (25 Jul 2020 22:00), Max: 100.2 (25 Jul 2020 19:00)  HR: 66 (26 Jul 2020 00:30) (66 - 95)  BP: --  BP(mean): --  RR: 22 (26 Jul 2020 00:00) (11 - 25)  SpO2: 97% (26 Jul 2020 00:30) (90% - 100%)    Awakens to voice, fully oriented, follows commands, PERRL, EOMI, face symmetric, facial sensation equal, tongue midline, no drift, full strength

## 2020-07-26 NOTE — PROGRESS NOTE ADULT - PROBLEM SELECTOR PLAN 1
-test BG AC/HS  -Increase Lantus 40 units QHS  -c/w Humalog 10 units AC meals for now  -c/w Humalog moderate correction scale AC and Mod HS scale  DISPO: patient would benefit from basal insulin with her metformin. Will need to send a script to the pharmacy to see if its covered. Likely will require basal/bolus during times on steroid therapy  F/u with Dr. Nehemiah Ambrose, endocrine 8/17 at 1030am. -test BG AC/HS  -Increase Lantus 40 units QHS  -c/w Humalog 10 units AC meals for now  -c/w Humalog moderate correction scale AC and Mod HS scale  -Needs insulin administration education  DISPO: patient would benefit from basal insulin with her metformin. Will need to send a script to the pharmacy to see if its covered. Likely will require basal/bolus during times on steroid therapy  F/u with Dr. Nehemiah Ambrose, endocrine 8/17 at 1030am.

## 2020-07-26 NOTE — PROGRESS NOTE ADULT - SUBJECTIVE AND OBJECTIVE BOX
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       PMHx of asthma( last inhaler used 2 weeks ago, last ER visit 09/2019), R trigeminal neuralgia since 2008, chronic bronchitis, NAKUL( refuses C- pap due to claustrophobia,  HTN, DM2, HLD, fibromyalgia, anxiety, trigeminal neuralgia, degenerative joint disease,  IgA vasculitis since 2018(ast flare up couple of months ago),   She denies any fever, chest pain, abdominal pain, urinary or bowel complaint.     *** patient is s/p ERVisit due MRI contrast gadolinium & currently on oral steroids.      *** Covid testing on 07/14/20 at [facility]. (10 Jul 2020 09:51)    OVERNIGHT EVENTS: No acute event overnight    Vital Signs Last 24 Hrs  T(C): 37.4 (26 Jul 2020 05:00), Max: 37.9 (25 Jul 2020 19:00)  T(F): 99.4 (26 Jul 2020 05:00), Max: 100.2 (25 Jul 2020 19:00)  HR: 70 (26 Jul 2020 06:00) (64 - 95)  BP: 158/79 (26 Jul 2020 06:00) (158/79 - 158/79)  BP(mean): 104 (26 Jul 2020 06:00) (104 - 104)  RR: 20 (26 Jul 2020 06:00) (11 - 25)  SpO2: 94% (26 Jul 2020 06:00) (90% - 99%)    I&O's Detail    24 Jul 2020 07:01  -  25 Jul 2020 07:00  --------------------------------------------------------  IN:    insulin regular Infusion: 98 mL    IV PiggyBack: 200 mL  Total IN: 298 mL    OUT:    Indwelling Catheter - Urethral: 1405 mL  Total OUT: 1405 mL    Total NET: -1107 mL      25 Jul 2020 07:01  -  26 Jul 2020 06:45  --------------------------------------------------------  IN:    Oral Fluid: 370 mL  Total IN: 370 mL    OUT:    Voided: 300 mL  Total OUT: 300 mL    Total NET: 70 mL        I&O's Summary    24 Jul 2020 07:01  -  25 Jul 2020 07:00  --------------------------------------------------------  IN: 298 mL / OUT: 1405 mL / NET: -1107 mL    25 Jul 2020 07:01  -  26 Jul 2020 06:45  --------------------------------------------------------  IN: 370 mL / OUT: 300 mL / NET: 70 mL        PHYSICAL EXAM:  Neurological:  awake, alert, oriented to person, place, and date, PERRL, no facial asymmetry, following commands, no drift, moving all extremities 5/5, sensation intact to light touch  Cardiovascular: +s1, s2  Respiratory: clear to auscultation b/l  Gastrointestinal: soft, non-distended, non-tender  Extremities: warm, dry  Incision/Wound: incision site C/D/I        LABS:                        11.2   14.63 )-----------( 327      ( 24 Jul 2020 22:44 )             38.8     07-25    133<L>  |  91<L>  |  6<L>  ----------------------------<  197<H>  4.1   |  32<H>  |  0.47<L>    Ca    9.3      25 Jul 2020 21:36  Phos  2.0     07-25  Mg     1.6     07-25              CAPILLARY BLOOD GLUCOSE      POCT Blood Glucose.: 197 mg/dL (25 Jul 2020 21:19)  POCT Blood Glucose.: 235 mg/dL (25 Jul 2020 17:09)  POCT Blood Glucose.: 255 mg/dL (25 Jul 2020 12:24)      Drug Levels: [] N/A    CSF Analysis: [] N/A      Allergies    Fioricet (Rash)  gadobutrol (Rash; Urticaria; Hives)  IV Contrast (Short breath)  mushroom (Unknown)  venlafaxine (Hives)    Intolerances      MEDICATIONS:  Antibiotics:  ceFAZolin   IVPB 3000 milliGRAM(s) IV Intermittent once    Neuro:  acetaminophen  IVPB .. 1000 milliGRAM(s) IV Intermittent once  amitriptyline 10 milliGRAM(s) Oral at bedtime  FLUoxetine 20 milliGRAM(s) Oral daily  gabapentin 300 milliGRAM(s) Oral at bedtime  ondansetron Injectable 4 milliGRAM(s) IV Push every 6 hours PRN  OXcarbazepine 300 milliGRAM(s) Oral two times a day  oxycodone    5 mG/acetaminophen 325 mG 1 Tablet(s) Oral every 4 hours PRN    Anticoagulation:  enoxaparin Injectable 30 milliGRAM(s) SubCutaneous every 12 hours    OTHER:  ALBUTerol    90 MICROgram(s) HFA Inhaler 2 Puff(s) Inhalation every 6 hours  atorvastatin 10 milliGRAM(s) Oral at bedtime  chlorhexidine 4% Liquid 1 Application(s) Topical <User Schedule>  dextrose 40% Gel 15 Gram(s) Oral once PRN  dextrose 50% Injectable 12.5 Gram(s) IV Push once  dextrose 50% Injectable 25 Gram(s) IV Push once  dextrose 50% Injectable 25 Gram(s) IV Push once  fluticasone propionate 50 MICROgram(s)/spray Nasal Spray 1 Spray(s) Both Nostrils two times a day  glucagon  Injectable 1 milliGRAM(s) IntraMuscular once PRN  hydrochlorothiazide 12.5 milliGRAM(s) Oral daily  insulin glargine Injectable (LANTUS) 35 Unit(s) SubCutaneous at bedtime  insulin lispro (HumaLOG) corrective regimen sliding scale   SubCutaneous Before meals and at bedtime  insulin lispro Injectable (HumaLOG) 10 Unit(s) SubCutaneous three times a day before meals  levothyroxine 112 MICROGram(s) Oral daily  lisinopril 10 milliGRAM(s) Oral daily  magnesium citrate Oral Solution 1 Bottle Oral once  montelukast 10 milliGRAM(s) Oral at bedtime  pantoprazole    Tablet 40 milliGRAM(s) Oral before breakfast  polyethylene glycol 3350 17 Gram(s) Oral two times a day  senna 2 Tablet(s) Oral at bedtime  tiotropium 18 MICROgram(s) Capsule 1 Capsule(s) Inhalation daily    IVF:  dextrose 5%. 1000 milliLiter(s) IV Continuous <Continuous>    CULTURES:    RADIOLOGY & ADDITIONAL TESTS:

## 2020-07-26 NOTE — PROGRESS NOTE ADULT - ASSESSMENT
54 yo female with T2DM uncontrolled exacerbated by 3 months of steroids (HbA1c 10.8%) x 2 years with neuropathy, HTN, untreated NAKUL, HLD, fibromyalgia, anxiety, R trigeminal neuralgia, degenerative joint disease, IgA vasculitis here for R microvascular decompression for her trigeminal neuralgia. Started on basal/bolus w/improvement in BG values w/fasting above goal. Discussed insulin therapy w/pt to improve glycemic control, especially when on steroids. Pt w/financial limitations so will need to see coverage for insulin pens. BG goal (100-180mg/dl).

## 2020-07-26 NOTE — CONSULT NOTE ADULT - PROBLEM SELECTOR PROBLEM 5
Obesity, unspecified classification, unspecified obesity type, unspecified whether serious comorbidity present
Steroid dependent

## 2020-07-26 NOTE — CONSULT NOTE ADULT - ASSESSMENT
56 yo female with T2DM uncontrolled exacerbated by 3 months of steroids (HbA1c 10.8%) x 2 years with neuropathy, HTN, untreated NAKUL, HLD, fibromyalgia, anxiety, R trigeminal neuralgia, degenerative joint disease, IgA vasculitis here for R microvascular decompression for her trigeminal neuralgia.
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   On 7/23, she underwent Microvascular decompression, nerve.

## 2020-07-27 ENCOUNTER — TRANSCRIPTION ENCOUNTER (OUTPATIENT)
Age: 55
End: 2020-07-27

## 2020-07-27 DIAGNOSIS — R09.02 HYPOXEMIA: ICD-10-CM

## 2020-07-27 LAB
ANION GAP SERPL CALC-SCNC: 14 MMOL/L — SIGNIFICANT CHANGE UP (ref 5–17)
BUN SERPL-MCNC: 8 MG/DL — SIGNIFICANT CHANGE UP (ref 7–23)
CALCIUM SERPL-MCNC: 9.8 MG/DL — SIGNIFICANT CHANGE UP (ref 8.4–10.5)
CHLORIDE SERPL-SCNC: 91 MMOL/L — LOW (ref 96–108)
CO2 SERPL-SCNC: 34 MMOL/L — HIGH (ref 22–31)
CREAT SERPL-MCNC: 0.51 MG/DL — SIGNIFICANT CHANGE UP (ref 0.5–1.3)
GLUCOSE BLDC GLUCOMTR-MCNC: 116 MG/DL — HIGH (ref 70–99)
GLUCOSE BLDC GLUCOMTR-MCNC: 127 MG/DL — HIGH (ref 70–99)
GLUCOSE BLDC GLUCOMTR-MCNC: 152 MG/DL — HIGH (ref 70–99)
GLUCOSE BLDC GLUCOMTR-MCNC: 164 MG/DL — HIGH (ref 70–99)
GLUCOSE SERPL-MCNC: 116 MG/DL — HIGH (ref 70–99)
POTASSIUM SERPL-MCNC: 3.5 MMOL/L — SIGNIFICANT CHANGE UP (ref 3.5–5.3)
POTASSIUM SERPL-SCNC: 3.5 MMOL/L — SIGNIFICANT CHANGE UP (ref 3.5–5.3)
SODIUM SERPL-SCNC: 139 MMOL/L — SIGNIFICANT CHANGE UP (ref 135–145)

## 2020-07-27 PROCEDURE — 99233 SBSQ HOSP IP/OBS HIGH 50: CPT

## 2020-07-27 PROCEDURE — 99223 1ST HOSP IP/OBS HIGH 75: CPT

## 2020-07-27 PROCEDURE — 99232 SBSQ HOSP IP/OBS MODERATE 35: CPT

## 2020-07-27 RX ORDER — OXCARBAZEPINE 300 MG/1
1 TABLET, FILM COATED ORAL
Qty: 0 | Refills: 0 | DISCHARGE
Start: 2020-07-27

## 2020-07-27 RX ORDER — INSULIN DETEMIR 100/ML (3)
35 INSULIN PEN (ML) SUBCUTANEOUS
Qty: 10 | Refills: 0
Start: 2020-07-27 | End: 2020-08-25

## 2020-07-27 RX ORDER — POLYETHYLENE GLYCOL 3350 17 G/17G
17 POWDER, FOR SOLUTION ORAL
Qty: 0 | Refills: 0 | DISCHARGE
Start: 2020-07-27

## 2020-07-27 RX ORDER — POTASSIUM CHLORIDE 20 MEQ
40 PACKET (EA) ORAL ONCE
Refills: 0 | Status: COMPLETED | OUTPATIENT
Start: 2020-07-27 | End: 2020-07-27

## 2020-07-27 RX ORDER — ACETAMINOPHEN 500 MG
2 TABLET ORAL
Qty: 0 | Refills: 0 | DISCHARGE
Start: 2020-07-27

## 2020-07-27 RX ORDER — ISOPROPYL ALCOHOL, BENZOCAINE .7; .06 ML/ML; ML/ML
1 SWAB TOPICAL
Qty: 100 | Refills: 1
Start: 2020-07-27 | End: 2020-09-14

## 2020-07-27 RX ORDER — OXCARBAZEPINE 300 MG/1
1 TABLET, FILM COATED ORAL
Qty: 0 | Refills: 0 | DISCHARGE

## 2020-07-27 RX ORDER — SENNA PLUS 8.6 MG/1
2 TABLET ORAL
Qty: 0 | Refills: 0 | DISCHARGE
Start: 2020-07-27

## 2020-07-27 RX ORDER — INSULIN DETEMIR 100/ML (3)
40 INSULIN PEN (ML) SUBCUTANEOUS
Qty: 0 | Refills: 0 | DISCHARGE
Start: 2020-07-27 | End: 2020-08-25

## 2020-07-27 RX ORDER — GABAPENTIN 400 MG/1
1 CAPSULE ORAL
Qty: 0 | Refills: 0 | DISCHARGE

## 2020-07-27 RX ADMIN — Medication 10 MILLIGRAM(S): at 21:26

## 2020-07-27 RX ADMIN — Medication 10 UNIT(S): at 13:00

## 2020-07-27 RX ADMIN — Medication 650 MILLIGRAM(S): at 18:20

## 2020-07-27 RX ADMIN — ATORVASTATIN CALCIUM 10 MILLIGRAM(S): 80 TABLET, FILM COATED ORAL at 21:26

## 2020-07-27 RX ADMIN — TIOTROPIUM BROMIDE 1 CAPSULE(S): 18 CAPSULE ORAL; RESPIRATORY (INHALATION) at 14:16

## 2020-07-27 RX ADMIN — Medication 40 MILLIEQUIVALENT(S): at 12:59

## 2020-07-27 RX ADMIN — Medication 20 MILLIGRAM(S): at 13:00

## 2020-07-27 RX ADMIN — ALBUTEROL 2 PUFF(S): 90 AEROSOL, METERED ORAL at 00:23

## 2020-07-27 RX ADMIN — ALBUTEROL 2 PUFF(S): 90 AEROSOL, METERED ORAL at 14:16

## 2020-07-27 RX ADMIN — Medication 1 SPRAY(S): at 05:36

## 2020-07-27 RX ADMIN — INSULIN GLARGINE 40 UNIT(S): 100 INJECTION, SOLUTION SUBCUTANEOUS at 21:25

## 2020-07-27 RX ADMIN — Medication 12.5 MILLIGRAM(S): at 05:35

## 2020-07-27 RX ADMIN — Medication 1 SPRAY(S): at 17:51

## 2020-07-27 RX ADMIN — ALBUTEROL 2 PUFF(S): 90 AEROSOL, METERED ORAL at 17:50

## 2020-07-27 RX ADMIN — GABAPENTIN 300 MILLIGRAM(S): 400 CAPSULE ORAL at 21:26

## 2020-07-27 RX ADMIN — ENOXAPARIN SODIUM 30 MILLIGRAM(S): 100 INJECTION SUBCUTANEOUS at 17:51

## 2020-07-27 RX ADMIN — MONTELUKAST 10 MILLIGRAM(S): 4 TABLET, CHEWABLE ORAL at 21:26

## 2020-07-27 RX ADMIN — OXCARBAZEPINE 300 MILLIGRAM(S): 300 TABLET, FILM COATED ORAL at 05:35

## 2020-07-27 RX ADMIN — ENOXAPARIN SODIUM 30 MILLIGRAM(S): 100 INJECTION SUBCUTANEOUS at 05:36

## 2020-07-27 RX ADMIN — Medication 112 MICROGRAM(S): at 05:35

## 2020-07-27 RX ADMIN — Medication 10 UNIT(S): at 17:50

## 2020-07-27 RX ADMIN — Medication 10 UNIT(S): at 08:43

## 2020-07-27 RX ADMIN — OXCARBAZEPINE 300 MILLIGRAM(S): 300 TABLET, FILM COATED ORAL at 17:50

## 2020-07-27 RX ADMIN — ALBUTEROL 2 PUFF(S): 90 AEROSOL, METERED ORAL at 05:36

## 2020-07-27 RX ADMIN — POLYETHYLENE GLYCOL 3350 17 GRAM(S): 17 POWDER, FOR SOLUTION ORAL at 05:36

## 2020-07-27 RX ADMIN — Medication 650 MILLIGRAM(S): at 17:53

## 2020-07-27 RX ADMIN — Medication 2: at 13:00

## 2020-07-27 RX ADMIN — LISINOPRIL 10 MILLIGRAM(S): 2.5 TABLET ORAL at 05:35

## 2020-07-27 RX ADMIN — Medication 2: at 21:25

## 2020-07-27 NOTE — PROGRESS NOTE ADULT - SUBJECTIVE AND OBJECTIVE BOX
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, disabled since 2015 with renal cell carcinoma waiting for surgery in 09/2020, h/o microvascular decompression for trigeminal neuralgia in 2013, presents in PST with trigeminal neuralgia with migraines causing pain & difficulty moving her neck, noticed its getting worse, scheduled for right microvascular decompression on 07/17/20      Overnight event: no acute event       Vital Signs Last 24 Hrs  T(C): 36.9 (27 Jul 2020 07:57), Max: 37.3 (27 Jul 2020 04:56)  T(F): 98.4 (27 Jul 2020 07:57), Max: 99.1 (27 Jul 2020 04:56)  HR: 78 (27 Jul 2020 07:57) (62 - 98)  BP: 124/84 (27 Jul 2020 07:57) (120/80 - 152/87)  BP(mean): 93 (26 Jul 2020 15:04) (93 - 113)  RR: 18 (27 Jul 2020 07:57) (12 - 18)  SpO2: 95% (27 Jul 2020 07:57) (92% - 97%)     07-27    139  |  91<L>  |  8   ----------------------------<  116<H>  3.5   |  34<H>  |  0.51    Ca    9.8      27 Jul 2020 06:30  Phos  2.8     07-26  Mg     1.6     07-26       Stroke Core Measures      DRAIN OUTPUT:     NEUROIMAGING:     PHYSICAL EXAM:    General: No Acute Distress     Neurological: Awake, alert oriented to person, place and time, Following Commands, PERRL, EOMI, Face Symmetrical, Speech Fluent, Moving all extremities, Muscle Strength normal in all four extremities, No Drift, Sensation to Light Touch Intact    Pulmonary: Clear to Auscultation, No Rales, No Rhonchi, No Wheezes     Cardiovascular: S1, S2, Regular Rate and Rhythm     Gastrointestinal: Soft, Nontender, Nondistended     Incision:     MEDICATIONS:   Antibiotics:    Neuro:  acetaminophen   Tablet .. 650 milliGRAM(s) Oral every 6 hours PRN Moderate Pain (4 - 6)  acetaminophen  IVPB .. 1000 milliGRAM(s) IV Intermittent once  amitriptyline 10 milliGRAM(s) Oral at bedtime  FLUoxetine 20 milliGRAM(s) Oral daily  gabapentin 300 milliGRAM(s) Oral at bedtime  ondansetron Injectable 4 milliGRAM(s) IV Push every 6 hours PRN Nausea and/or Vomiting  OXcarbazepine 300 milliGRAM(s) Oral two times a day    Anticoagulation:  enoxaparin Injectable 30 milliGRAM(s) SubCutaneous every 12 hours    Cardiology:  hydrochlorothiazide 12.5 milliGRAM(s) Oral daily  lisinopril 10 milliGRAM(s) Oral daily    Endo:   atorvastatin 10 milliGRAM(s) Oral at bedtime  dextrose 40% Gel 15 Gram(s) Oral once PRN  dextrose 50% Injectable 12.5 Gram(s) IV Push once  dextrose 50% Injectable 25 Gram(s) IV Push once  dextrose 50% Injectable 25 Gram(s) IV Push once  glucagon  Injectable 1 milliGRAM(s) IntraMuscular once PRN  insulin glargine Injectable (LANTUS) 40 Unit(s) SubCutaneous at bedtime  insulin lispro (HumaLOG) corrective regimen sliding scale   SubCutaneous Before meals and at bedtime  insulin lispro Injectable (HumaLOG) 10 Unit(s) SubCutaneous three times a day before meals  levothyroxine 112 MICROGram(s) Oral daily    Pulm:  ALBUTerol    90 MICROgram(s) HFA Inhaler 2 Puff(s) Inhalation every 6 hours  montelukast 10 milliGRAM(s) Oral at bedtime  tiotropium 18 MICROgram(s) Capsule 1 Capsule(s) Inhalation daily    GI/:  pantoprazole    Tablet 40 milliGRAM(s) Oral before breakfast  polyethylene glycol 3350 17 Gram(s) Oral two times a day  senna 2 Tablet(s) Oral at bedtime    Other:  dextrose 5%. 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, disabled since 2015 with renal cell carcinoma waiting for surgery in 09/2020, h/o microvascular decompression for trigeminal neuralgia in 2013, presents in PST with trigeminal neuralgia with migraines causing pain & difficulty moving her neck, noticed its getting worse, scheduled for right microvascular decompression on 07/17/20      Overnight event: no acute event       Vital Signs Last 24 Hrs  T(C): 36.9 (27 Jul 2020 07:57), Max: 37.3 (27 Jul 2020 04:56)  T(F): 98.4 (27 Jul 2020 07:57), Max: 99.1 (27 Jul 2020 04:56)  HR: 78 (27 Jul 2020 07:57) (62 - 98)  BP: 124/84 (27 Jul 2020 07:57) (120/80 - 152/87)  BP(mean): 93 (26 Jul 2020 15:04) (93 - 113)  RR: 18 (27 Jul 2020 07:57) (12 - 18)  SpO2: 95% (27 Jul 2020 07:57) (92% - 97%)     07-27    139  |  91<L>  |  8   ----------------------------<  116<H>  3.5   |  34<H>  |  0.51    Ca    9.8      27 Jul 2020 06:30  Phos  2.8     07-26  Mg     1.6     07-26       Stroke Core Measures      DRAIN OUTPUT:     NEUROIMAGING:     PHYSICAL EXAM:    General: No Acute Distress     Neurological: Awake, alert oriented to person, place and time, Following Commands, PERRL, EOMI, Face Symmetrical, Speech Fluent, Moving all extremities, Muscle Strength normal in all four extremities, No Drift, Sensation to Light Touch Intact    Pulmonary: Clear to Auscultation, No Rales, No Rhonchi, No Wheezes     Cardiovascular: S1, S2, Regular Rate and Rhythm     Gastrointestinal: Soft, Nontender, Nondistended     Incision: Clean and intact    MEDICATIONS:   Antibiotics:    Neuro:  acetaminophen   Tablet .. 650 milliGRAM(s) Oral every 6 hours PRN Moderate Pain (4 - 6)  acetaminophen  IVPB .. 1000 milliGRAM(s) IV Intermittent once  amitriptyline 10 milliGRAM(s) Oral at bedtime  FLUoxetine 20 milliGRAM(s) Oral daily  gabapentin 300 milliGRAM(s) Oral at bedtime  ondansetron Injectable 4 milliGRAM(s) IV Push every 6 hours PRN Nausea and/or Vomiting  OXcarbazepine 300 milliGRAM(s) Oral two times a day    Anticoagulation:  enoxaparin Injectable 30 milliGRAM(s) SubCutaneous every 12 hours    Cardiology:  hydrochlorothiazide 12.5 milliGRAM(s) Oral daily  lisinopril 10 milliGRAM(s) Oral daily    Endo:   atorvastatin 10 milliGRAM(s) Oral at bedtime  dextrose 40% Gel 15 Gram(s) Oral once PRN  dextrose 50% Injectable 12.5 Gram(s) IV Push once  dextrose 50% Injectable 25 Gram(s) IV Push once  dextrose 50% Injectable 25 Gram(s) IV Push once  glucagon  Injectable 1 milliGRAM(s) IntraMuscular once PRN  insulin glargine Injectable (LANTUS) 40 Unit(s) SubCutaneous at bedtime  insulin lispro (HumaLOG) corrective regimen sliding scale   SubCutaneous Before meals and at bedtime  insulin lispro Injectable (HumaLOG) 10 Unit(s) SubCutaneous three times a day before meals  levothyroxine 112 MICROGram(s) Oral daily    Pulm:  ALBUTerol    90 MICROgram(s) HFA Inhaler 2 Puff(s) Inhalation every 6 hours  montelukast 10 milliGRAM(s) Oral at bedtime  tiotropium 18 MICROgram(s) Capsule 1 Capsule(s) Inhalation daily    GI/:  pantoprazole    Tablet 40 milliGRAM(s) Oral before breakfast  polyethylene glycol 3350 17 Gram(s) Oral two times a day  senna 2 Tablet(s) Oral at bedtime    Other:  dextrose 5%. 1000 milliLiter(s) IV Continuous <Continuous>  fluticasone propionate 50 MICROgram(s)/spray Nasal Spray 1 Spray(s) Both Nostrils two times a day

## 2020-07-27 NOTE — PROGRESS NOTE ADULT - SUBJECTIVE AND OBJECTIVE BOX
Chief Complaint/Follow-up on:     Subjective:    MEDICATIONS  (STANDING):  acetaminophen  IVPB .. 1000 milliGRAM(s) IV Intermittent once  ALBUTerol    90 MICROgram(s) HFA Inhaler 2 Puff(s) Inhalation every 6 hours  amitriptyline 10 milliGRAM(s) Oral at bedtime  atorvastatin 10 milliGRAM(s) Oral at bedtime  dextrose 5%. 1000 milliLiter(s) (50 mL/Hr) IV Continuous <Continuous>  dextrose 50% Injectable 12.5 Gram(s) IV Push once  dextrose 50% Injectable 25 Gram(s) IV Push once  dextrose 50% Injectable 25 Gram(s) IV Push once  enoxaparin Injectable 30 milliGRAM(s) SubCutaneous every 12 hours  FLUoxetine 20 milliGRAM(s) Oral daily  fluticasone propionate 50 MICROgram(s)/spray Nasal Spray 1 Spray(s) Both Nostrils two times a day  gabapentin 300 milliGRAM(s) Oral at bedtime  hydrochlorothiazide 12.5 milliGRAM(s) Oral daily  insulin glargine Injectable (LANTUS) 40 Unit(s) SubCutaneous at bedtime  insulin lispro (HumaLOG) corrective regimen sliding scale   SubCutaneous Before meals and at bedtime  insulin lispro Injectable (HumaLOG) 10 Unit(s) SubCutaneous three times a day before meals  levothyroxine 112 MICROGram(s) Oral daily  lisinopril 10 milliGRAM(s) Oral daily  montelukast 10 milliGRAM(s) Oral at bedtime  OXcarbazepine 300 milliGRAM(s) Oral two times a day  pantoprazole    Tablet 40 milliGRAM(s) Oral before breakfast  polyethylene glycol 3350 17 Gram(s) Oral two times a day  senna 2 Tablet(s) Oral at bedtime  tiotropium 18 MICROgram(s) Capsule 1 Capsule(s) Inhalation daily    MEDICATIONS  (PRN):  acetaminophen   Tablet .. 650 milliGRAM(s) Oral every 6 hours PRN Moderate Pain (4 - 6)  dextrose 40% Gel 15 Gram(s) Oral once PRN Blood Glucose LESS THAN 70 milliGRAM(s)/deciliter  glucagon  Injectable 1 milliGRAM(s) IntraMuscular once PRN Glucose LESS THAN 70 milligrams/deciliter  ondansetron Injectable 4 milliGRAM(s) IV Push every 6 hours PRN Nausea and/or Vomiting      PHYSICAL EXAM:  VITALS: T(C): 37.3 (07-27-20 @ 12:12)  T(F): 99.1 (07-27-20 @ 12:12), Max: 99.1 (07-27-20 @ 04:56)  HR: 101 (07-27-20 @ 12:45) (70 - 101)  BP: 110/76 (07-27-20 @ 12:44) (104/62 - 137/79)  RR:  (18 - 20)  SpO2:  (88% - 97%)  Wt(kg): --  GENERAL: NAD, well-groomed, well-developed  EYES: No proptosis, no injection  HEENT:  Atraumatic, Normocephalic, moist mucous membranes  THYROID: Normal size, no palpable nodules  RESPIRATORY: Clear to auscultation bilaterally; No rales, rhonchi, wheezing, or rubs  CARDIOVASCULAR: Regular rate and rhythm; No murmurs; no peripheral edema  GI: Soft, nontender, non distended, normal bowel sounds  CUSHING'S SIGNS: no striae    POCT Blood Glucose.: 152 mg/dL (07-27-20 @ 12:21)  POCT Blood Glucose.: 127 mg/dL (07-27-20 @ 08:42)  POCT Blood Glucose.: 177 mg/dL (07-26-20 @ 21:20)  POCT Blood Glucose.: 129 mg/dL (07-26-20 @ 17:13)  POCT Blood Glucose.: 164 mg/dL (07-26-20 @ 12:16)  POCT Blood Glucose.: 209 mg/dL (07-26-20 @ 08:04)  POCT Blood Glucose.: 197 mg/dL (07-25-20 @ 21:19)  POCT Blood Glucose.: 235 mg/dL (07-25-20 @ 17:09)  POCT Blood Glucose.: 255 mg/dL (07-25-20 @ 12:24)  POCT Blood Glucose.: 223 mg/dL (07-25-20 @ 05:07)  POCT Blood Glucose.: 221 mg/dL (07-24-20 @ 23:13)  POCT Blood Glucose.: 202 mg/dL (07-24-20 @ 17:30)    07-27    139  |  91<L>  |  8   ----------------------------<  116<H>  3.5   |  34<H>  |  0.51    EGFR if : 125  EGFR if non : 108    Ca    9.8      07-27  Mg     1.6     07-26  Phos  2.8     07-26            Thyroid Function Tests: Chief Complaint/Follow-up on: T2D    Subjective: Improved glycemic control today. Pt is eating well. Pain in her face is 8-9/10 and throbbing.     MEDICATIONS  (STANDING):  acetaminophen  IVPB .. 1000 milliGRAM(s) IV Intermittent once  ALBUTerol    90 MICROgram(s) HFA Inhaler 2 Puff(s) Inhalation every 6 hours  amitriptyline 10 milliGRAM(s) Oral at bedtime  atorvastatin 10 milliGRAM(s) Oral at bedtime  dextrose 5%. 1000 milliLiter(s) (50 mL/Hr) IV Continuous <Continuous>  dextrose 50% Injectable 12.5 Gram(s) IV Push once  dextrose 50% Injectable 25 Gram(s) IV Push once  dextrose 50% Injectable 25 Gram(s) IV Push once  enoxaparin Injectable 30 milliGRAM(s) SubCutaneous every 12 hours  FLUoxetine 20 milliGRAM(s) Oral daily  fluticasone propionate 50 MICROgram(s)/spray Nasal Spray 1 Spray(s) Both Nostrils two times a day  gabapentin 300 milliGRAM(s) Oral at bedtime  hydrochlorothiazide 12.5 milliGRAM(s) Oral daily  insulin glargine Injectable (LANTUS) 40 Unit(s) SubCutaneous at bedtime  insulin lispro (HumaLOG) corrective regimen sliding scale   SubCutaneous Before meals and at bedtime  insulin lispro Injectable (HumaLOG) 10 Unit(s) SubCutaneous three times a day before meals  levothyroxine 112 MICROGram(s) Oral daily  lisinopril 10 milliGRAM(s) Oral daily  montelukast 10 milliGRAM(s) Oral at bedtime  OXcarbazepine 300 milliGRAM(s) Oral two times a day  pantoprazole    Tablet 40 milliGRAM(s) Oral before breakfast  polyethylene glycol 3350 17 Gram(s) Oral two times a day  senna 2 Tablet(s) Oral at bedtime  tiotropium 18 MICROgram(s) Capsule 1 Capsule(s) Inhalation daily    MEDICATIONS  (PRN):  acetaminophen   Tablet .. 650 milliGRAM(s) Oral every 6 hours PRN Moderate Pain (4 - 6)  dextrose 40% Gel 15 Gram(s) Oral once PRN Blood Glucose LESS THAN 70 milliGRAM(s)/deciliter  glucagon  Injectable 1 milliGRAM(s) IntraMuscular once PRN Glucose LESS THAN 70 milligrams/deciliter  ondansetron Injectable 4 milliGRAM(s) IV Push every 6 hours PRN Nausea and/or Vomiting      PHYSICAL EXAM:  VITALS: T(C): 37.3 (07-27-20 @ 12:12)  T(F): 99.1 (07-27-20 @ 12:12), Max: 99.1 (07-27-20 @ 04:56)  HR: 101 (07-27-20 @ 12:45) (70 - 101)  BP: 110/76 (07-27-20 @ 12:44) (104/62 - 137/79)  RR:  (18 - 20)  SpO2:  (88% - 97%)  Wt(kg): --  GENERAL: NAD, well-groomed, well-developed  EYES: No proptosis, no injection  RESPIRATORY: Clear to auscultation bilaterally; No rales, rhonchi, wheezing, or rubs  CARDIOVASCULAR: Regular rate and rhythm; No murmurs  GI: Soft, nontender, non distended, normal bowel sounds      POCT Blood Glucose.: 152 mg/dL (07-27-20 @ 12:21)  POCT Blood Glucose.: 127 mg/dL (07-27-20 @ 08:42)  POCT Blood Glucose.: 177 mg/dL (07-26-20 @ 21:20)  POCT Blood Glucose.: 129 mg/dL (07-26-20 @ 17:13)  POCT Blood Glucose.: 164 mg/dL (07-26-20 @ 12:16)  POCT Blood Glucose.: 209 mg/dL (07-26-20 @ 08:04)  POCT Blood Glucose.: 197 mg/dL (07-25-20 @ 21:19)  POCT Blood Glucose.: 235 mg/dL (07-25-20 @ 17:09)  POCT Blood Glucose.: 255 mg/dL (07-25-20 @ 12:24)  POCT Blood Glucose.: 223 mg/dL (07-25-20 @ 05:07)  POCT Blood Glucose.: 221 mg/dL (07-24-20 @ 23:13)  POCT Blood Glucose.: 202 mg/dL (07-24-20 @ 17:30)    07-27    139  |  91<L>  |  8   ----------------------------<  116<H>  3.5   |  34<H>  |  0.51    EGFR if : 125  EGFR if non : 108    Ca    9.8      07-27  Mg     1.6     07-26  Phos  2.8     07-26

## 2020-07-27 NOTE — PROGRESS NOTE ADULT - PROBLEM SELECTOR PLAN 1
-test BG AC/HS  -Continue Lantus 40 units QHS and Humalog 10 units AC meals   -continue Humalog moderate correction scale AC and Mod HS scale  -Needs insulin administration education  DISPO: patient would benefit from basal insulin with her metformin. BRYSON De Leon to send a script to the pharmacy today to see if its covered.   F/u with Dr. Nehemiah Ambrose, endocrine 8/17 at 1030am.

## 2020-07-27 NOTE — DISCHARGE NOTE PROVIDER - NSDCFUADDINST_GEN_ALL_CORE_FT
Please keep incision clean and dry, do not submerge wound in water for prolonged periods of time, pat dry after showering, and do not use any creams or ointments to incision.  please do not engage in strenuous activity, heavy lifting, drive, or return to work or school until cleared by surgeon.  please notify physician if fevers, bleeding, swelling, pain not relieved by medication, increased sluggishness or irritability, increased nausea or vomiting, inability to tolerate foods or liquids.

## 2020-07-27 NOTE — PROGRESS NOTE ADULT - ASSESSMENT
56 yo female with T2DM uncontrolled exacerbated by 3 months of steroids (HbA1c 10.8%) x 2 years with neuropathy, HTN, untreated NAKUL, HLD, fibromyalgia, anxiety, R trigeminal neuralgia, degenerative joint disease, IgA vasculitis here for R microvascular decompression for her trigeminal neuralgia.

## 2020-07-27 NOTE — CONSULT NOTE ADULT - SUBJECTIVE AND OBJECTIVE BOX
Pulmonary Consult Note     55 year old woman  who underwent right microvascular decompression on 07/17/20 for trigeminal neuralgia.    She has elevated BMI, kyphosis of neck, NAKUL, asthma, HTN, HLD, DM, depression.  She also has renal lesion suspicous for malignancy, which will require treatment.    PFT preop  demonstrated restrictive defect.  she was optimized preop.    She tolerated surgery well but has hypoxia, with o2 saturation noted at 88%.  she is on5 lNC.  she is using CPAP at night.    PSH    prior rhizotomy for TN    SH    non smoker    ROS    no CAD, no MI,   renal cell cancer      T(C): 36.8 (07-27-20 @ 15:51), Max: 37.3 (07-27-20 @ 04:56)  HR: 100 (07-27-20 @ 15:51) (70 - 101)  BP: 122/85 (07-27-20 @ 15:51) (104/62 - 137/79)  RR: 18 (07-27-20 @ 15:51) (18 - 20)  SpO2: 95% (07-27-20 @ 15:51) (88% - 97%)    elevated BMI    respirations  unlabored, no dyspnea    heart RRR  abdomen soft NT  extremities no edema  pulses regular  alert conversant     07-27    139  |  91<L>  |  8   ----------------------------<  116<H>  3.5   |  34<H>  |  0.51  < from: Xray Chest 1 View- PORTABLE-Urgent (07.24.20 @ 11:37) >    PROCEDURE DATE:  07/24/2020            INTERPRETATION:  CLINICAL INFORMATION: NAKUL. Cough.    A frontal view of the chest was obtained.     Comparison: 4/27/2020.     IMPRESSION:    The mediastinal cardiac silhouette is unremarkable.    The lungs are clear.     No acute osseous finding.     < end of copied text >        Impression    NAKUL    elevated BMI    hypoxia due to obesity and NAKUL    RECOMMEND     Continue CPAP at night    nasal O2 should be titrated to level to keep saturation 90-92%    home oxygen will be needed    continue bronchodilators      Labs, meds radiographic studies reviewed    Guido Monterroso MD    PULMONARY    MD Hong Gutierrez MD George Haralambou, MD    [phone]   Ca    9.8      27 Jul 2020 06:30  Phos  2.8     07-26  Mg     1.6     07-26    CXR

## 2020-07-27 NOTE — DISCHARGE NOTE PROVIDER - NSDCMRMEDTOKEN_GEN_ALL_CORE_FT
acetaminophen 325 mg oral tablet: 2 tab(s) orally every 6 hours, As needed, Moderate Pain (4 - 6)  amitriptyline 10 mg oral tablet: 1 tab(s) orally once a day (at bedtime)  atorvastatin 10 mg oral tablet: 1 tab(s) orally once a day  Flonase 50 mcg/inh nasal spray: 1 spray(s) intranasally every 12 hours   FLUoxetine 20 mg oral tablet: 1 tab(s) orally once a day  gabapentin 300 mg oral tablet: orally once a day (at bedtime)  glipiZIDE 5 mg oral tablet: 1 tab(s) orally once a day  hydroCHLOROthiazide 12.5 mg oral tablet: 1 tab(s) orally once a day  ipratropium-albuterol 0.5 mg-2.5 mg/3 mLinhalation solution: 3 milliliter(s) inhaled 4 times a day, As Needed - for shortness of breath and/or wheezing  levothyroxine 112 mcg (0.112 mg) oral capsule: 1 cap(s) orally once a day  metFORMIN 1000 mg oral tablet: 1 tab(s) orally 2 times a day, last dose 07/16 am  montelukast 10 mg oral tablet: 1 tab(s) orally once a day (at bedtime)  nebulizer: 1 application nasal 4 times a day, As Needed   OXcarbazepine 300 mg oral tablet: 1 tab(s) orally 2 times a day  pantoprazole 40 mg oral delayed release tablet: 1 tab(s) orally once a day (before a meal)  polyethylene glycol 3350 oral powder for reconstitution: 17 gram(s) orally 2 times a day  predniSONE 20 mg oral tablet: 2 tab(s) orally once a day   ramipril 10 mg oral tablet: 10 milligram(s) orally once a day  senna oral tablet: 2 tab(s) orally once a day (at bedtime)  Vitamin C 100 mg oral tablet:   Vitamin D2 2000 intl units (50 mcg) oral capsule: acetaminophen 325 mg oral tablet: 2 tab(s) orally every 6 hours, As needed, Moderate Pain (4 - 6)  amitriptyline 10 mg oral tablet: 1 tab(s) orally once a day (at bedtime)  atorvastatin 10 mg oral tablet: 1 tab(s) orally once a day  Flonase 50 mcg/inh nasal spray: 1 spray(s) intranasally every 12 hours   FLUoxetine 20 mg oral tablet: 1 tab(s) orally once a day  gabapentin 300 mg oral tablet: orally once a day (at bedtime)  glipiZIDE 5 mg oral tablet: 1 tab(s) orally once a day  hydroCHLOROthiazide 12.5 mg oral tablet: 1 tab(s) orally once a day  ipratropium-albuterol 0.5 mg-2.5 mg/3 mLinhalation solution: 3 milliliter(s) inhaled 4 times a day, As Needed - for shortness of breath and/or wheezing  levothyroxine 112 mcg (0.112 mg) oral capsule: 1 cap(s) orally once a day  metFORMIN 1000 mg oral tablet: 1 tab(s) orally 2 times a day, last dose 07/16 am  montelukast 10 mg oral tablet: 1 tab(s) orally once a day (at bedtime)  nebulizer: 1 application nasal 4 times a day, As Needed   OXcarbazepine 300 mg oral tablet: 1 tab(s) orally 2 times a day  oxycodone-acetaminophen 5 mg-300 mg oral tablet: 1 tab(s) orally every 6 hours MDD:4  pantoprazole 40 mg oral delayed release tablet: 1 tab(s) orally once a day (before a meal)  polyethylene glycol 3350 oral powder for reconstitution: 17 gram(s) orally 2 times a day  ramipril 10 mg oral tablet: 10 milligram(s) orally once a day  senna oral tablet: 2 tab(s) orally once a day (at bedtime)  Vitamin C 100 mg oral tablet:   Vitamin D2 2000 intl units (50 mcg) oral capsule: acetaminophen 325 mg oral tablet: 2 tab(s) orally every 6 hours, As needed, Moderate Pain (4 - 6)  alcohol swabs : Apply topically to affected area 4 times a day   amitriptyline 10 mg oral tablet: 1 tab(s) orally once a day (at bedtime)  atorvastatin 10 mg oral tablet: 1 tab(s) orally once a day  Flonase 50 mcg/inh nasal spray: 1 spray(s) intranasally every 12 hours   FLUoxetine 20 mg oral tablet: 1 tab(s) orally once a day  gabapentin 300 mg oral tablet: orally once a day (at bedtime)  glucometer (per patient&#x27;s insurance): Test blood sugars four times a day. Dispense #1 glucometer.  hydroCHLOROthiazide 12.5 mg oral tablet: 1 tab(s) orally once a day  ipratropium-albuterol 0.5 mg-2.5 mg/3 mLinhalation solution: 3 milliliter(s) inhaled 4 times a day, As Needed - for shortness of breath and/or wheezing  lancets: 1 application subcutaneously 4 times a day   Levemir FlexTouch 100 units/mL subcutaneous solution: 35 unit(s) subcutaneous once a day   levothyroxine 112 mcg (0.112 mg) oral capsule: 1 cap(s) orally once a day  metFORMIN 1000 mg oral tablet: 1 tab(s) orally 2 times a day, last dose 07/16 am  montelukast 10 mg oral tablet: 1 tab(s) orally once a day (at bedtime)  nebulizer: 1 application nasal 4 times a day, As Needed   OXcarbazepine 300 mg oral tablet: 1 tab(s) orally 2 times a day  oxycodone-acetaminophen 5 mg-300 mg oral tablet: 1 tab(s) orally every 6 hours MDD:4  pantoprazole 40 mg oral delayed release tablet: 1 tab(s) orally once a day (before a meal)  Patient is s/p microvascular decompression for trigeminal neuralgia please provide a bariatric rolling walker:   polyethylene glycol 3350 oral powder for reconstitution: 17 gram(s) orally 2 times a day  Pt is s/p Microvascular decompression for trigeminal neuralgia, please provide a shower chair for safety:   ramipril 10 mg oral tablet: 10 milligram(s) orally once a day  senna oral tablet: 2 tab(s) orally once a day (at bedtime)  test strips (per patient&#x27;s insurance): 1 application subcutaneously 4 times a day. ** Compatible with patient&#x27;s glucometer **  Vitamin C 100 mg oral tablet:   Vitamin D2 2000 intl units (50 mcg) oral capsule: acetaminophen 325 mg oral tablet: 2 tab(s) orally every 6 hours, As needed, Moderate Pain (4 - 6)  alcohol swabs : Apply topically to affected area 4 times a day   amitriptyline 10 mg oral tablet: 1 tab(s) orally once a day (at bedtime)  atorvastatin 10 mg oral tablet: 1 tab(s) orally once a day  Flonase 50 mcg/inh nasal spray: 1 spray(s) intranasally every 12 hours   FLUoxetine 20 mg oral tablet: 1 tab(s) orally once a day  gabapentin 300 mg oral tablet: orally once a day (at bedtime)  glucometer (per patient&#x27;s insurance): Test blood sugars four times a day. Dispense #1 glucometer.  hydroCHLOROthiazide 12.5 mg oral tablet: 1 tab(s) orally once a day  ipratropium-albuterol 0.5 mg-2.5 mg/3 mLinhalation solution: 3 milliliter(s) inhaled 4 times a day, As Needed - for shortness of breath and/or wheezing  lancets: 1 application subcutaneously 4 times a day   Levemir FlexTouch 100 units/mL subcutaneous solution: 35 unit(s) subcutaneous once a day   levothyroxine 112 mcg (0.112 mg) oral capsule: 1 cap(s) orally once a day  metFORMIN 1000 mg oral tablet: 1 tab(s) orally 2 times a day, last dose 07/16 am  montelukast 10 mg oral tablet: 1 tab(s) orally once a day (at bedtime)  nebulizer: 1 application nasal 4 times a day, As Needed   O2 4LPM via N/C continuous with homefill system and portable conserviing device after brain surgery for trigeminal neuralgia:   OXcarbazepine 300 mg oral tablet: 1 tab(s) orally 2 times a day  oxycodone-acetaminophen 5 mg-300 mg oral tablet: 1 tab(s) orally every 6 hours MDD:4  pantoprazole 40 mg oral delayed release tablet: 1 tab(s) orally once a day (before a meal)  Patient is s/p microvascular decompression for trigeminal neuralgia please provide a bariatric rolling walker:   polyethylene glycol 3350 oral powder for reconstitution: 17 gram(s) orally 2 times a day  Pt is s/p Microvascular decompression for trigeminal neuralgia, please provide a shower chair for safety:   ramipril 10 mg oral tablet: 10 milligram(s) orally once a day  senna oral tablet: 2 tab(s) orally once a day (at bedtime)  test strips (per patient&#x27;s insurance): 1 application subcutaneously 4 times a day. ** Compatible with patient&#x27;s glucometer **  Vitamin C 100 mg oral tablet:   Vitamin D2 2000 intl units (50 mcg) oral capsule: acetaminophen 325 mg oral tablet: 2 tab(s) orally every 6 hours, As needed, Moderate Pain (4 - 6)  acetaminophen 325 mg oral tablet: 2 tab(s) orally every 6 hours, As needed, Temp greater or equal to 38C (100.4F), Mild Pain (1 - 3)  acetaminophen 325 mg oral tablet: 2 tab(s) orally every 6 hours, As needed, Temp greater or equal to 38C (100.4F), Mild Pain (1 - 3)  alcohol swabs : Apply topically to affected area 4 times a day   amitriptyline 10 mg oral tablet: 1 tab(s) orally once a day (at bedtime)  atorvastatin 10 mg oral tablet: 1 tab(s) orally once a day  Continuous Positive Airway Pressure: use at night time for sleep    dx: chronic respiratory failure  Flonase 50 mcg/inh nasal spray: 1 spray(s) intranasally every 12 hours   FLUoxetine 20 mg oral tablet: 1 tab(s) orally once a day  gabapentin 300 mg oral tablet: orally once a day (at bedtime)  glucometer (per patient&#x27;s insurance): Test blood sugars four times a day. Dispense #1 glucometer.  hydroCHLOROthiazide 12.5 mg oral tablet: 1 tab(s) orally once a day  ipratropium-albuterol 0.5 mg-2.5 mg/3 mLinhalation solution: 3 milliliter(s) inhaled 4 times a day, As Needed - for shortness of breath and/or wheezing  lancets: 1 application subcutaneously 4 times a day   Levemir FlexTouch 100 units/mL subcutaneous solution: 35 unit(s) subcutaneous once a day   levothyroxine 112 mcg (0.112 mg) oral capsule: 1 cap(s) orally once a day  metFORMIN 1000 mg oral tablet: 1 tab(s) orally 2 times a day, last dose 07/16 am  montelukast 10 mg oral tablet: 1 tab(s) orally once a day (at bedtime)  nebulizer: 1 application nasal 4 times a day, As Needed   O2 4LPM via N/C continuous with homefill system and portable conserviing device after brain surgery for trigeminal neuralgia:   OXcarbazepine 300 mg oral tablet: 1 tab(s) orally 2 times a day  pantoprazole 40 mg oral delayed release tablet: 1 tab(s) orally once a day (before a meal)  Patient is s/p microvascular decompression for trigeminal neuralgia please provide a bariatric rolling walker:   polyethylene glycol 3350 oral powder for reconstitution: 17 gram(s) orally 2 times a day  Pt is s/p Microvascular decompression for trigeminal neuralgia, please provide a shower chair for safety:   ramipril 10 mg oral tablet: 10 milligram(s) orally once a day  senna oral tablet: 2 tab(s) orally once a day (at bedtime)  test strips (per patient&#x27;s insurance): 1 application subcutaneously 4 times a day. ** Compatible with patient&#x27;s glucometer **  Vitamin C 100 mg oral tablet:   Vitamin D2 2000 intl units (50 mcg) oral capsule:

## 2020-07-27 NOTE — DISCHARGE NOTE PROVIDER - NSDCFUSCHEDAPPT_GEN_ALL_CORE_FT
NOEMY CONTRERAS ; 08/17/2020 ; [facility] [address]  NOEMY CONTRERAS ; 08/18/2020 ; [facility] [address]

## 2020-07-27 NOTE — PROGRESS NOTE ADULT - ASSESSMENT
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   On 7/23, she underwent Microvascular decompression, nerve.

## 2020-07-27 NOTE — CHART NOTE - NSCHARTNOTEFT_GEN_A_CORE
Nutrition Follow Up Note  Patient seen for: routine follow up    Source: Comprehensive chart review and pt    Chart reviewed, events noted. Pertinent chart information: S/P  Right trigeminal nerve compression on 7/23. Pt now on PO diet    Diet : Diet, Consistent Carbohydrate Renal w/Evening Snack     Subjective: Pt reports having a good appetite. Pt able to tolerate Po diet well. Pt denies any chewing/swallowing difficulty, self-feeding difficulty, nausea/vomiting, constipation, or diarrhea. Last BM today (7/27).     Education: Provided extensive education on Carbohydrate Consistent diet including sources of carbohydrates, portion sizes, pairing protein with carbohydrates, limiting sugar sweetened beverages in diet and the importance of consistent eating pattern to help optimize glycemic control. Pt states that she used to see an outpatient RD, but never applied what she learned. Pt seems very motivated to making lifestyle changes. Education information and RD contact information provided. All questions answered to pt satisfaction.      PO intake : >50%     Source for PO intake: pt     Enteral /Parenteral Nutrition: n/a      Daily Weight in kg:   % Weight Change -  no new weights noted    Pertinent Medications: MEDICATIONS  (STANDING):  acetaminophen  IVPB .. 1000 milliGRAM(s) IV Intermittent once  ALBUTerol    90 MICROgram(s) HFA Inhaler 2 Puff(s) Inhalation every 6 hours  amitriptyline 10 milliGRAM(s) Oral at bedtime  atorvastatin 10 milliGRAM(s) Oral at bedtime  dextrose 5%. 1000 milliLiter(s) (50 mL/Hr) IV Continuous <Continuous>  dextrose 50% Injectable 12.5 Gram(s) IV Push once  dextrose 50% Injectable 25 Gram(s) IV Push once  dextrose 50% Injectable 25 Gram(s) IV Push once  enoxaparin Injectable 30 milliGRAM(s) SubCutaneous every 12 hours  FLUoxetine 20 milliGRAM(s) Oral daily  fluticasone propionate 50 MICROgram(s)/spray Nasal Spray 1 Spray(s) Both Nostrils two times a day  gabapentin 300 milliGRAM(s) Oral at bedtime  hydrochlorothiazide 12.5 milliGRAM(s) Oral daily  insulin glargine Injectable (LANTUS) 40 Unit(s) SubCutaneous at bedtime  insulin lispro (HumaLOG) corrective regimen sliding scale   SubCutaneous Before meals and at bedtime  insulin lispro Injectable (HumaLOG) 10 Unit(s) SubCutaneous three times a day before meals  levothyroxine 112 MICROGram(s) Oral daily  lisinopril 10 milliGRAM(s) Oral daily  montelukast 10 milliGRAM(s) Oral at bedtime  OXcarbazepine 300 milliGRAM(s) Oral two times a day  pantoprazole    Tablet 40 milliGRAM(s) Oral before breakfast  polyethylene glycol 3350 17 Gram(s) Oral two times a day  senna 2 Tablet(s) Oral at bedtime  tiotropium 18 MICROgram(s) Capsule 1 Capsule(s) Inhalation daily    MEDICATIONS  (PRN):  acetaminophen   Tablet .. 650 milliGRAM(s) Oral every 6 hours PRN Moderate Pain (4 - 6)  dextrose 40% Gel 15 Gram(s) Oral once PRN Blood Glucose LESS THAN 70 milliGRAM(s)/deciliter  glucagon  Injectable 1 milliGRAM(s) IntraMuscular once PRN Glucose LESS THAN 70 milligrams/deciliter  ondansetron Injectable 4 milliGRAM(s) IV Push every 6 hours PRN Nausea and/or Vomiting    Pertinent Labs: 07-27 @ 06:30: Na 139, BUN 8, Cr 0.51, <H>, K+ 3.5, Phos --, Mg --, Alk Phos --, ALT/SGPT --, AST/SGOT --, HbA1c --    Finger Sticks:  POCT Blood Glucose.: 152 mg/dL (07-27 @ 12:21)  POCT Blood Glucose.: 127 mg/dL (07-27 @ 08:42)  POCT Blood Glucose.: 177 mg/dL (07-26 @ 21:20)      Skin per nursing documentation: psoriasis; no pressure injury documented   Edema: 2+ generalized    Estimated Needs:   [X] no change since previous assessment  [ ] recalculated:     Previous Nutrition Diagnosis: obesity and nutrition related knowledge deficit  Nutrition Diagnosis is: ongoing; addressed with PO diet and diet reinforcement        Interventions:     Recommend  1) Continue current diet as tolerated.   2) Reinforce diet education as needed; RD remains available.       Monitoring and Evaluation:     Continue to monitor Nutritional intake, Tolerance to diet prescription, weights, labs, skin integrity    RD remains available upon request and will follow up per protocol    Julia Telles RD pager # [phone]

## 2020-07-27 NOTE — DISCHARGE NOTE PROVIDER - PROVIDER TOKENS
PROVIDER:[TOKEN:[[id number]]],PROVIDER:[TOKEN:[[id number]]] PROVIDER:[TOKEN:[[id number]],SCHEDULEDAPPT:[08/04/2020],SCHEDULEDAPPTTIME:[09:00 AM]],PROVIDER:[TOKEN:[[id number]]],PROVIDER:[TOKEN:[[id number]]]

## 2020-07-27 NOTE — DISCHARGE NOTE PROVIDER - CARE PROVIDER_API CALL
Vicki Otoole  NEUROSURGERY - GENERAL  [address] [place] [address], [address] [place] [address]  Phone: [phone]  Fax: [phone]  Follow Up Time:     Nehemiah Ambrose  ENDOCRINOLOGY/METAB/DIABETES  [address] [place] [address]  Phone: [phone]  Fax: [phone]  Follow Up Time: Vicki Otoole  NEUROSURGERY - GENERAL  [facility], [address] [place] [address]  Phone: [phone]  Fax: [phone]  Scheduled Appointment: 08/04/2020 09:00 AM    Nehemiah Ambrose  ENDOCRINOLOGY/METAB/DIABETES  [address] [place] [address]  Phone: [phone]  Fax: [phone]  Follow Up Time:     Guido Monterroso  PULMONARY DISEASE  [address] [place] [address]  Phone: [phone]  Fax: [phone]  Follow Up Time:

## 2020-07-27 NOTE — DISCHARGE NOTE PROVIDER - NSDCFUADDAPPT_GEN_ALL_CORE_FT
Nehemiah Sellers (endocrine) on 8/17 at 1030am. Please see Dr Otoole in about one week for sutures removal and follow up  You have an appointment with Dr Nehemiah Ambrose (endocrine) on 8/17 at 1030am for follow up You have an appointment with Dr Nehemiah Ambrose (endocrine) on 8/17 at 1030am for follow up You have an appointment with Dr Nehemiah Ambrose (endocrine) on 8/17 at 1030am for follow up  Please call to schedule an appointment with Pulmonologist upon discharge  Please call to schedule an appointment with your Primary care Physician upon discharge.

## 2020-07-27 NOTE — DISCHARGE NOTE PROVIDER - CARE PROVIDERS DIRECT ADDRESSES
,[email or web address]

## 2020-07-27 NOTE — DISCHARGE NOTE PROVIDER - HOSPITAL COURSE
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    Patient post-operatively had Interval Hx: BG 160s-low 200s was evaluated by endocrine. 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    Patient post-operatively had Interval Hx: BG 160s-low 200s was evaluated by endocrine. Patient is currently on Lantus/moderate sliding scale insulin coverage and pre-meal insulin. 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    Patient post-operatively had Interval Hx: BG 160s-low 200s was evaluated by endocrine. Patient is currently on Lantus/moderate sliding scale insulin coverage and pre-meal insulin. Patient has been off steroids and will be discharged home on Metformin and Lantus 35units daily . 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    Patient post-operatively had Interval Hx: BG 160s-low 200s was evaluated by endocrine. Patient is currently on Lantus/moderate sliding scale insulin coverage and pre-meal insulin. Patient has been off steroids and will be discharged home on Metformin and Lantus 35units daily . She has a follow up appointment with Dr Nehemiah Ambrose (endocrine) on 8/17.    Post operative course was complicated by hypoxia and patient required nasal cannula during the day and CPAP at night for history of sleep apnea. Pulmonary consult appreciated and recommended home oxygen.     Physical therapy and occupational therapy evaluation were appreciated and recommended home PT/OT with rolling walker. Patient was medically stable on the day of discharge. 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 Patient post-operatively had elevated glucose was evaluated by endocrine. Patient is currently on Lantus/moderate sliding scale insulin coverage and pre-meal insulin. Patient has been off steroids and will be discharged home on Metformin and Levemir 35units daily. Post-operative course was complicated by hypoxia and patient required nasal cannula during the day and CPAP at night for history of sleep apnea. Pulmonary consult appreciated and recommended home oxygen and clear for discharge.    Patient was seen and evaluated by speech pathology and recommended for regular diet.        Patient is currently on Lantus/moderate sliding scale insulin coverage and pre-meal insulin. Patient has been off steroids and will be discharged home on Metformin and Levemir 35units daily.         Physical therapy and occupational therapy evaluation were appreciated and recommended home PT/OT with rolling walker. Patient was medically stable on the day of discharge.

## 2020-07-27 NOTE — DISCHARGE NOTE PROVIDER - NSDCACTIVITY_GEN_ALL_CORE
Procedures   Obstetrical ultrasound completed today.  See report in imaging section of [facility].     Do not make important decisions/Stairs allowed/Bathing allowed/Walking - Outdoors allowed/Do not drive or operate machinery/Showering allowed/Walking - Indoors allowed/No heavy lifting/straining

## 2020-07-27 NOTE — DISCHARGE NOTE PROVIDER - NSDCCPCAREPLAN_GEN_ALL_CORE_FT
PRINCIPAL DISCHARGE DIAGNOSIS  Diagnosis: Trigeminal neuralgia  Assessment and Plan of Treatment: S/p Microvascular decompression  Will need Home PT and OT      SECONDARY DISCHARGE DIAGNOSES  Diagnosis: Asthma  Assessment and Plan of Treatment: Continue current medications and follow up with your primary care physician    Diagnosis: DM (diabetes mellitus)  Assessment and Plan of Treatment:     Diagnosis: HLD (hyperlipidemia)  Assessment and Plan of Treatment: Continue Lipitor and follow up with your primary care physician    Diagnosis: HTN (hypertension)  Assessment and Plan of Treatment: Continue Hydrochlothiazide and Ramipril  Follow up with your primary care phyian    Diagnosis: NAKUL (obstructive sleep apnea)  Assessment and Plan of Treatment: Condition currently stable PRINCIPAL DISCHARGE DIAGNOSIS  Diagnosis: Trigeminal neuralgia  Assessment and Plan of Treatment: S/p Microvascular decompression  Will need Home PT and OT      SECONDARY DISCHARGE DIAGNOSES  Diagnosis: Asthma  Assessment and Plan of Treatment: Continue current medications and follow up with your primary care physician    Diagnosis: DM (diabetes mellitus)  Assessment and Plan of Treatment: Will discharge on current home medications    Diagnosis: HLD (hyperlipidemia)  Assessment and Plan of Treatment: Continue Lipitor and follow up with your primary care physician    Diagnosis: HTN (hypertension)  Assessment and Plan of Treatment: Continue Hydrochlothiazide and Ramipril  Follow up with your primary care brittaniyian    Diagnosis: NAKUL (obstructive sleep apnea)  Assessment and Plan of Treatment: Condition currently stable  Will speak to primary care physician regarding CPAP machine PRINCIPAL DISCHARGE DIAGNOSIS  Diagnosis: Trigeminal neuralgia  Assessment and Plan of Treatment: You underwent microvascular decompression on 7/23.   You will need home physical therapy and occupational therapy after discharge.  Please keep incision site clean and dry. Avoid rubbing or shampoo. Please pat dry if it gets wet.   Please follow up with Dr. Otoole at 9am on 8/4.      SECONDARY DISCHARGE DIAGNOSES  Diagnosis: Hypoxia  Assessment and Plan of Treatment: You need home oxygen and please follow up with pulmnologist within 1 week after discharge.    Diagnosis: Asthma  Assessment and Plan of Treatment: Continue current medications and follow up with your primary care physician    Diagnosis: DM (diabetes mellitus)  Assessment and Plan of Treatment: You will be discharged with new medications for diabetes.  Please follow up with endocrinologist within 1-2 weeks after discharge.    Diagnosis: HLD (hyperlipidemia)  Assessment and Plan of Treatment: Continue Lipitor and follow up with your primary care physician    Diagnosis: HTN (hypertension)  Assessment and Plan of Treatment: Continue Hydrochlothiazide and Ramipril  Follow up with your primary care physician within 1-2 weeks after discharge.    Diagnosis: NAKUL (obstructive sleep apnea)  Assessment and Plan of Treatment: You received CPAP at night during hospitalization. Condition currently stable  Please follow up with pulmnologist within one week after discharge.

## 2020-07-27 NOTE — PROGRESS NOTE ADULT - SUBJECTIVE AND OBJECTIVE BOX
Maximino Escalante   Pager [phone]  Office [phone]      CC: Patient is a 55y old  Female who presents with a chief complaint of Trigeminal Neuralgia (27 Jul 2020 08:52)      SUBJECTIVE / OVERNIGHT EVENTS: Reports that right eye/cheek sharp preop pain has changed to a more dull pain and intensity has decreased from 6-7/10 to 5/10. No N/V. +BM. No CP/sob      MEDICATIONS  (STANDING):  acetaminophen  IVPB .. 1000 milliGRAM(s) IV Intermittent once  ALBUTerol    90 MICROgram(s) HFA Inhaler 2 Puff(s) Inhalation every 6 hours  amitriptyline 10 milliGRAM(s) Oral at bedtime  atorvastatin 10 milliGRAM(s) Oral at bedtime  dextrose 5%. 1000 milliLiter(s) (50 mL/Hr) IV Continuous <Continuous>  dextrose 50% Injectable 12.5 Gram(s) IV Push once  dextrose 50% Injectable 25 Gram(s) IV Push once  dextrose 50% Injectable 25 Gram(s) IV Push once  enoxaparin Injectable 30 milliGRAM(s) SubCutaneous every 12 hours  FLUoxetine 20 milliGRAM(s) Oral daily  fluticasone propionate 50 MICROgram(s)/spray Nasal Spray 1 Spray(s) Both Nostrils two times a day  gabapentin 300 milliGRAM(s) Oral at bedtime  hydrochlorothiazide 12.5 milliGRAM(s) Oral daily  insulin glargine Injectable (LANTUS) 40 Unit(s) SubCutaneous at bedtime  insulin lispro (HumaLOG) corrective regimen sliding scale   SubCutaneous Before meals and at bedtime  insulin lispro Injectable (HumaLOG) 10 Unit(s) SubCutaneous three times a day before meals  levothyroxine 112 MICROGram(s) Oral daily  lisinopril 10 milliGRAM(s) Oral daily  montelukast 10 milliGRAM(s) Oral at bedtime  OXcarbazepine 300 milliGRAM(s) Oral two times a day  pantoprazole    Tablet 40 milliGRAM(s) Oral before breakfast  polyethylene glycol 3350 17 Gram(s) Oral two times a day  senna 2 Tablet(s) Oral at bedtime  tiotropium 18 MICROgram(s) Capsule 1 Capsule(s) Inhalation daily    MEDICATIONS  (PRN):  acetaminophen   Tablet .. 650 milliGRAM(s) Oral every 6 hours PRN Moderate Pain (4 - 6)  dextrose 40% Gel 15 Gram(s) Oral once PRN Blood Glucose LESS THAN 70 milliGRAM(s)/deciliter  glucagon  Injectable 1 milliGRAM(s) IntraMuscular once PRN Glucose LESS THAN 70 milligrams/deciliter  ondansetron Injectable 4 milliGRAM(s) IV Push every 6 hours PRN Nausea and/or Vomiting      Vital Signs Last 24 Hrs  T(C): 37.3 (27 Jul 2020 12:12), Max: 37.3 (27 Jul 2020 04:56)  T(F): 99.1 (27 Jul 2020 12:12), Max: 99.1 (27 Jul 2020 04:56)  HR: 101 (27 Jul 2020 12:44) (62 - 101)  BP: 110/76 (27 Jul 2020 12:44) (104/62 - 152/87)  BP(mean): 93 (26 Jul 2020 15:04) (93 - 105)  RR: 20 (27 Jul 2020 12:12) (15 - 20)  SpO2: 91% (27 Jul 2020 12:44) (88% - 97%)  CAPILLARY BLOOD GLUCOSE      POCT Blood Glucose.: 152 mg/dL (27 Jul 2020 12:21)  POCT Blood Glucose.: 127 mg/dL (27 Jul 2020 08:42)  POCT Blood Glucose.: 177 mg/dL (26 Jul 2020 21:20)  POCT Blood Glucose.: 129 mg/dL (26 Jul 2020 17:13)    I&O's Summary    26 Jul 2020 07:01  -  27 Jul 2020 07:00  --------------------------------------------------------  IN: 480 mL / OUT: 1050 mL / NET: -570 mL    27 Jul 2020 07:01  -  27 Jul 2020 13:20  --------------------------------------------------------  IN: 500 mL / OUT: 800 mL / NET: -300 mL          PHYSICAL EXAM:    GENERAL: NAD   HEENT: EOMI, PERRL  PULM: Clear to auscultation bilaterally  CV: Regular rate and rhythm; nl S1, S2; No murmurs, rubs, or gallops  ABDOMEN: Soft, Nontender, Nondistended; Bowel sounds present  EXTREMITIES/MSK:  No edema, calf tenderness   PSYCH: AAOx3  NEUROLOGY: non-focal          LABS:    07-27    139  |  91<L>  |  8   ----------------------------<  116<H>  3.5   |  34<H>  |  0.51    Ca    9.8      27 Jul 2020 06:30  Phos  2.8     07-26  Mg     1.6     07-26                  RADIOLOGY & ADDITIONAL TESTS:    Imaging Personally Reviewed:    Consultant(s) Notes Reviewed:      Care Discussed with Consultants/Other Providers: neurosurg regard hypoxia

## 2020-07-27 NOTE — PROGRESS NOTE ADULT - ASSESSMENT
ASSESSMENT/PLAN: 7/23 Right SOC for MVD POD#3    NEURO:  - neuro check Q4h  - CT head post op on 7/24 stable  - Percocet for pain control  - cont home psych meds  - Activity: [x] OOB as tolerated [] Bedrest [x] PT [x] OT [] PMNR  - PT/OT- home PT/OT with RW    PULM:  - Maintain O2 stats > 90-92%  - on nasal cannula 3 lit   - NAKUL( refuses C- pap due to claustrophobia), then relented  - Continue albuterol and proventil; dc prednisone since not taken it recently     CV:  - SBP goal 100-150  - continue lisinopril and HCTZ    RENAL:  - mild hyponatremia, Na 133 stable, f/u repeat BMP this am  - electrolyte supplement as needed    GI:  - Diet: regular CCD diet    - GI prophylaxis protonix for GI ppx   - Bowel regimen [x] miralax [x] senna [x] other: mag citrate today    ENDO:   - Goal euglycemia (-180)  - endocrine consult appreciated yesterday, currently on Lantus 35u QHS, premeal humalog 10 u, moderate HISS, f/u endo rec based on FS    HEME/ONC:  VTE prophylaxis: [x] SCDs [x] chemoprophylaxis sql bid  (weight based)    ID:  -Afebrile     MISC:    CODE STATUS:  [x] Full Code [] DNR [] DNI [] Palliative/Comfort Care    DISPOSITION:  [] ICU  [] Stroke Unit [x] Floor [] EMU [] RCU [] PCU    Patient was not critically ill, but was medically complex.  30 minutes spent. 7/23 Right SOC for MVD POD#4    Please Check When Present   []  GCS  E   V  M     Heart Failure: []Acute, [] acute on chronic , []chronic  Heart Failure:  [] Diastolic (HFpEF), [] Systolic (HFrEF), []Combined (HFpEF and HFrEF), [] RHF, [] Pulm HTN, [] Other    [] FERNIE, [] ATN, [] AIN, [] other  [] CKD1, [] CKD2, [] CKD 3, [] CKD 4, [] CKD 5, []ESRD    Encephalopathy: [] Metabolic, [] Hepatic, [] toxic, [] Neurological, [] Other    Abnormal Nurtitional Status: [] malnurtition (see nutrition note), [ ]underweight: BMI < 19, [] morbid obesity: BMI >40, [] Cachexia    [] Sepsis  [] hypovolemic shock,[] cardiogenic shock, [] hemorrhagic shock, [] neuogenic shock  [] Acute Respiratory Failure  []Cerebral edema, [] Brain compression/ herniation,   [] Functional quadriplegia  [] Acute blood loss anemia      NEURO:  - neuro check Q4h  - CT head post op on 7/26 stable  - Percocet for pain control  - cont home psych meds  - Activity: [x] OOB as tolerated [] Bedrest [x] PT [x] OT [] PMNR  - PT/OT- home PT/OT with RW    PULM:  - Maintain O2 stats > 90-92%  - On R/A   - NAKUL on  C- pap here not ar Home  - Continue albuterol and Proventil off steroids    CV:  - SBP goal 100-150  - continue lisinopril and HCTZ    RENAL:  - mild hyponatremia, Na 133 stable, f/u repeat BMP this am  - electrolyte supplement as needed    GI:  - Diet: regular CCD diet    - GI prophylaxis protonix for GI ppx   - Bowel regimen [x] miralax [x] senna [x] other: mag citrate today    ENDO:   - Goal euglycemia (-180)  - endocrine consult appreciated yesterday, currently on Lantus 35u QHS, premeal humalog 10 u, moderate HISS, f/u endo rec based on FS  - Will d/c on home meds ( Metformin and Glipizide)    HEME/ONC:  VTE prophylaxis: [x] SCDs [x] chemoprophylaxis sq lOVENOX bid  (weight based)    ID:  -Afebrile     Will discuss with Dr Otoole    [id number] 7/23 Right SOC for MVD POD#4    Please Check When Present   []  GCS  E   V  M     Heart Failure: []Acute, [] acute on chronic , []chronic  Heart Failure:  [] Diastolic (HFpEF), [] Systolic (HFrEF), []Combined (HFpEF and HFrEF), [] RHF, [] Pulm HTN, [] Other    [] FERNIE, [] ATN, [] AIN, [] other  [] CKD1, [] CKD2, [] CKD 3, [] CKD 4, [] CKD 5, []ESRD    Encephalopathy: [] Metabolic, [] Hepatic, [] toxic, [] Neurological, [] Other    Abnormal Nurtitional Status: [] malnurtition (see nutrition note), [ ]underweight: BMI < 19, [x] morbid obesity: BMI >40, [] Cachexia    [] Sepsis  [] hypovolemic shock,[] cardiogenic shock, [] hemorrhagic shock, [] neuogenic shock  [] Acute Respiratory Failure  []Cerebral edema, [] Brain compression/ herniation,   [] Functional quadriplegia  [] Acute blood loss anemia      NEURO:  - neuro check Q4h  - CT head post op on 7/26 stable  - Percocet for pain control  - cont home psych meds  - Activity: [x] OOB as tolerated [] Bedrest [x] PT [x] OT [] PMNR  - PT/OT- home PT/OT with RW    PULM:  - Maintain O2 stats > 90-92%  - On 5L N/C will wean to R/A   - NAKUL on  C- pap here but not at home due to claustophobia   - Pulmonary consult called  - Continue albuterol and Proventil off steroids    CV:  - SBP goal 100-150  - continue lisinopril and HCTZ    RENAL:  - Na 139  - Hypokalemia, K 3.5 supplemented, will ck f/u in am    GI:  - Diet: regular CCD diet    - GI prophylaxis Protonix  for GI ppx   - Bowel regimen [x] Miralax  [x] senna [x]     ENDO:   - Goal euglycemia (-180)  - endocrine consult appreciated, currently on Lantus 35u QHS, premeal Humalog 10 u, moderate HISS, f/u endo rec based on FS  - Will d/c Home on Metformin and Lantus 35 units daily  HEME/ONC:  VTE prophylaxis: [x] SCDs [x] chemoprophylaxis sq lOVENOX bid  (weight based)    ID:  -Afebrile     Will discuss with Dr Otoole    [id number]

## 2020-07-28 LAB
ANION GAP SERPL CALC-SCNC: 12 MMOL/L — SIGNIFICANT CHANGE UP (ref 5–17)
BASE EXCESS BLDV CALC-SCNC: 10.6 MMOL/L — HIGH (ref -2–2)
BUN SERPL-MCNC: 10 MG/DL — SIGNIFICANT CHANGE UP (ref 7–23)
CALCIUM SERPL-MCNC: 9.5 MG/DL — SIGNIFICANT CHANGE UP (ref 8.4–10.5)
CHLORIDE SERPL-SCNC: 97 MMOL/L — SIGNIFICANT CHANGE UP (ref 96–108)
CO2 BLDV-SCNC: 38 MMOL/L — HIGH (ref 22–30)
CO2 SERPL-SCNC: 34 MMOL/L — HIGH (ref 22–31)
CREAT SERPL-MCNC: 0.58 MG/DL — SIGNIFICANT CHANGE UP (ref 0.5–1.3)
GAS PNL BLDV: SIGNIFICANT CHANGE UP
GLUCOSE BLDC GLUCOMTR-MCNC: 129 MG/DL — HIGH (ref 70–99)
GLUCOSE BLDC GLUCOMTR-MCNC: 161 MG/DL — HIGH (ref 70–99)
GLUCOSE BLDC GLUCOMTR-MCNC: 171 MG/DL — HIGH (ref 70–99)
GLUCOSE BLDC GLUCOMTR-MCNC: 207 MG/DL — HIGH (ref 70–99)
GLUCOSE SERPL-MCNC: 130 MG/DL — HIGH (ref 70–99)
HCO3 BLDV-SCNC: 36 MMOL/L — HIGH (ref 21–29)
PCO2 BLDV: 57 MMHG — HIGH (ref 35–50)
PH BLDV: 7.43 — SIGNIFICANT CHANGE UP (ref 7.35–7.45)
PO2 BLDV: 78 MMHG — HIGH (ref 25–45)
POTASSIUM SERPL-MCNC: 3.5 MMOL/L — SIGNIFICANT CHANGE UP (ref 3.5–5.3)
POTASSIUM SERPL-SCNC: 3.5 MMOL/L — SIGNIFICANT CHANGE UP (ref 3.5–5.3)
SAO2 % BLDV: 94 % — HIGH (ref 67–88)
SODIUM SERPL-SCNC: 143 MMOL/L — SIGNIFICANT CHANGE UP (ref 135–145)

## 2020-07-28 PROCEDURE — 99233 SBSQ HOSP IP/OBS HIGH 50: CPT

## 2020-07-28 RX ORDER — OXYCODONE HYDROCHLORIDE 5 MG/1
10 TABLET ORAL EVERY 6 HOURS
Refills: 0 | Status: DISCONTINUED | OUTPATIENT
Start: 2020-07-28 | End: 2020-07-30

## 2020-07-28 RX ORDER — OXYCODONE HYDROCHLORIDE 5 MG/1
5 TABLET ORAL EVERY 6 HOURS
Refills: 0 | Status: DISCONTINUED | OUTPATIENT
Start: 2020-07-28 | End: 2020-07-30

## 2020-07-28 RX ORDER — ACETAMINOPHEN 500 MG
650 TABLET ORAL EVERY 6 HOURS
Refills: 0 | Status: DISCONTINUED | OUTPATIENT
Start: 2020-07-28 | End: 2020-07-30

## 2020-07-28 RX ORDER — SODIUM CHLORIDE 0.65 %
2 AEROSOL, SPRAY (ML) NASAL EVERY 6 HOURS
Refills: 0 | Status: DISCONTINUED | OUTPATIENT
Start: 2020-07-28 | End: 2020-07-30

## 2020-07-28 RX ORDER — BENZOCAINE AND MENTHOL 5; 1 G/100ML; G/100ML
1 LIQUID ORAL
Refills: 0 | Status: DISCONTINUED | OUTPATIENT
Start: 2020-07-28 | End: 2020-07-30

## 2020-07-28 RX ORDER — POTASSIUM CHLORIDE 20 MEQ
20 PACKET (EA) ORAL
Refills: 0 | Status: COMPLETED | OUTPATIENT
Start: 2020-07-28 | End: 2020-07-28

## 2020-07-28 RX ADMIN — ALBUTEROL 2 PUFF(S): 90 AEROSOL, METERED ORAL at 05:15

## 2020-07-28 RX ADMIN — Medication 112 MICROGRAM(S): at 05:14

## 2020-07-28 RX ADMIN — Medication 12.5 MILLIGRAM(S): at 05:14

## 2020-07-28 RX ADMIN — INSULIN GLARGINE 40 UNIT(S): 100 INJECTION, SOLUTION SUBCUTANEOUS at 22:27

## 2020-07-28 RX ADMIN — Medication 100 MILLIGRAM(S): at 22:27

## 2020-07-28 RX ADMIN — Medication 650 MILLIGRAM(S): at 20:30

## 2020-07-28 RX ADMIN — ALBUTEROL 2 PUFF(S): 90 AEROSOL, METERED ORAL at 00:05

## 2020-07-28 RX ADMIN — Medication 2 SPRAY(S): at 23:39

## 2020-07-28 RX ADMIN — Medication 2: at 17:26

## 2020-07-28 RX ADMIN — Medication 4: at 12:44

## 2020-07-28 RX ADMIN — PANTOPRAZOLE SODIUM 40 MILLIGRAM(S): 20 TABLET, DELAYED RELEASE ORAL at 05:15

## 2020-07-28 RX ADMIN — Medication 650 MILLIGRAM(S): at 02:30

## 2020-07-28 RX ADMIN — ENOXAPARIN SODIUM 30 MILLIGRAM(S): 100 INJECTION SUBCUTANEOUS at 17:29

## 2020-07-28 RX ADMIN — Medication 650 MILLIGRAM(S): at 19:31

## 2020-07-28 RX ADMIN — Medication 100 MILLIGRAM(S): at 08:47

## 2020-07-28 RX ADMIN — LISINOPRIL 10 MILLIGRAM(S): 2.5 TABLET ORAL at 05:14

## 2020-07-28 RX ADMIN — Medication 650 MILLIGRAM(S): at 03:37

## 2020-07-28 RX ADMIN — Medication 2: at 22:27

## 2020-07-28 RX ADMIN — Medication 2 SPRAY(S): at 12:45

## 2020-07-28 RX ADMIN — TIOTROPIUM BROMIDE 1 CAPSULE(S): 18 CAPSULE ORAL; RESPIRATORY (INHALATION) at 11:14

## 2020-07-28 RX ADMIN — Medication 1 SPRAY(S): at 17:20

## 2020-07-28 RX ADMIN — Medication 100 MILLIGRAM(S): at 02:27

## 2020-07-28 RX ADMIN — Medication 20 MILLIGRAM(S): at 11:14

## 2020-07-28 RX ADMIN — ALBUTEROL 2 PUFF(S): 90 AEROSOL, METERED ORAL at 17:19

## 2020-07-28 RX ADMIN — GABAPENTIN 300 MILLIGRAM(S): 400 CAPSULE ORAL at 22:26

## 2020-07-28 RX ADMIN — Medication 650 MILLIGRAM(S): at 09:12

## 2020-07-28 RX ADMIN — ALBUTEROL 2 PUFF(S): 90 AEROSOL, METERED ORAL at 11:15

## 2020-07-28 RX ADMIN — Medication 650 MILLIGRAM(S): at 08:42

## 2020-07-28 RX ADMIN — MONTELUKAST 10 MILLIGRAM(S): 4 TABLET, CHEWABLE ORAL at 22:26

## 2020-07-28 RX ADMIN — ATORVASTATIN CALCIUM 10 MILLIGRAM(S): 80 TABLET, FILM COATED ORAL at 22:26

## 2020-07-28 RX ADMIN — Medication 20 MILLIEQUIVALENT(S): at 08:42

## 2020-07-28 RX ADMIN — Medication 2 SPRAY(S): at 17:21

## 2020-07-28 RX ADMIN — Medication 20 MILLIEQUIVALENT(S): at 14:23

## 2020-07-28 RX ADMIN — Medication 10 UNIT(S): at 08:36

## 2020-07-28 RX ADMIN — Medication 10 MILLIGRAM(S): at 22:26

## 2020-07-28 RX ADMIN — OXYCODONE HYDROCHLORIDE 10 MILLIGRAM(S): 5 TABLET ORAL at 14:22

## 2020-07-28 RX ADMIN — ALBUTEROL 2 PUFF(S): 90 AEROSOL, METERED ORAL at 23:38

## 2020-07-28 RX ADMIN — ENOXAPARIN SODIUM 30 MILLIGRAM(S): 100 INJECTION SUBCUTANEOUS at 05:14

## 2020-07-28 RX ADMIN — Medication 1 SPRAY(S): at 05:15

## 2020-07-28 RX ADMIN — Medication 10 UNIT(S): at 17:26

## 2020-07-28 RX ADMIN — Medication 20 MILLIEQUIVALENT(S): at 11:12

## 2020-07-28 RX ADMIN — OXYCODONE HYDROCHLORIDE 10 MILLIGRAM(S): 5 TABLET ORAL at 14:52

## 2020-07-28 RX ADMIN — OXCARBAZEPINE 300 MILLIGRAM(S): 300 TABLET, FILM COATED ORAL at 05:14

## 2020-07-28 RX ADMIN — Medication 10 UNIT(S): at 12:44

## 2020-07-28 RX ADMIN — OXCARBAZEPINE 300 MILLIGRAM(S): 300 TABLET, FILM COATED ORAL at 17:19

## 2020-07-28 NOTE — PROGRESS NOTE ADULT - ASSESSMENT
Impression :   56 y/o woman with NAKUL  - stable post op    Recommend :   Check pulse oximetry on room air   - may need home oxygen  Continue CPAP  - prefers nasal mask, had full face mask in the past      Hong Elias MD, [facility]  [facility]abrina Elias/[facility]  Pulmonary Medicine

## 2020-07-28 NOTE — PROGRESS NOTE ADULT - SUBJECTIVE AND OBJECTIVE BOX
Pulmonary Medicine     Alert. No complaints. Used CPAP last night with nasal mask.     Vital Signs Last 24 Hrs  T(C): 36.7 (28 Jul 2020 07:59), Max: 37.3 (27 Jul 2020 12:12)  T(F): 98 (28 Jul 2020 07:59), Max: 99.1 (27 Jul 2020 12:12)  HR: 83 (28 Jul 2020 07:59) (80 - 101)  BP: 117/74 (28 Jul 2020 07:59) (104/62 - 122/85)  BP(mean): --  RR: 20 (28 Jul 2020 07:59) (18 - 20)  SpO2: 99% (28 Jul 2020 07:59) (88% - 99%)    Physical examination   Alert and oriented X three   No evident distress   On nasal canula   Heart sounds were regular  Clear breath sounds   The abdomen was soft and not tender   No cyanosis     MEDICATIONS  (STANDING):  acetaminophen  IVPB .. 1000 milliGRAM(s) IV Intermittent once  ALBUTerol    90 MICROgram(s) HFA Inhaler 2 Puff(s) Inhalation every 6 hours  amitriptyline 10 milliGRAM(s) Oral at bedtime  atorvastatin 10 milliGRAM(s) Oral at bedtime  dextrose 5%. 1000 milliLiter(s) (50 mL/Hr) IV Continuous <Continuous>  dextrose 50% Injectable 12.5 Gram(s) IV Push once  dextrose 50% Injectable 25 Gram(s) IV Push once  dextrose 50% Injectable 25 Gram(s) IV Push once  enoxaparin Injectable 30 milliGRAM(s) SubCutaneous every 12 hours  FLUoxetine 20 milliGRAM(s) Oral daily  fluticasone propionate 50 MICROgram(s)/spray Nasal Spray 1 Spray(s) Both Nostrils two times a day  gabapentin 300 milliGRAM(s) Oral at bedtime  hydrochlorothiazide 12.5 milliGRAM(s) Oral daily  insulin glargine Injectable (LANTUS) 40 Unit(s) SubCutaneous at bedtime  insulin lispro (HumaLOG) corrective regimen sliding scale   SubCutaneous Before meals and at bedtime  insulin lispro Injectable (HumaLOG) 10 Unit(s) SubCutaneous three times a day before meals  levothyroxine 112 MICROGram(s) Oral daily  lisinopril 10 milliGRAM(s) Oral daily  montelukast 10 milliGRAM(s) Oral at bedtime  OXcarbazepine 300 milliGRAM(s) Oral two times a day  pantoprazole    Tablet 40 milliGRAM(s) Oral before breakfast  polyethylene glycol 3350 17 Gram(s) Oral two times a day  potassium chloride    Tablet ER 20 milliEquivalent(s) Oral every 2 hours  senna 2 Tablet(s) Oral at bedtime  tiotropium 18 MICROgram(s) Capsule 1 Capsule(s) Inhalation daily

## 2020-07-28 NOTE — PROGRESS NOTE ADULT - ASSESSMENT
7/23 Right SOC for MVD POD#5      NEURO:  - neuro check Q4h  - CT head post op on 7/26 stable  - pain control with tylenol prn and gabapentin  - cont home psych meds  - Activity: [x] OOB as tolerated [] Bedrest [x] PT [x] OT [] PMNR  - PT/OT- home PT/OT with RW    PULM:  - Maintain O2 stats > 90-92%  - NAKUL on  C- pap here but not at home due to claustophobia   - Pulmonary consult appreciated yesterday, will need home oxygen   - Continue albuterol and Proventil off steroids    CV:  - SBP goal 100-150  - continue lisinopril and HCTZ    RENAL:  - Na 143 this am,   - Hypokalemia, K 3.5 supplemented    GI:  - Diet: regular CCD diet    - GI prophylaxis Protonix  for GI ppx   - last BM on 7/27, Bowel regimen [x] Miralax  [x] senna     ENDO:   - Goal euglycemia (-180)  - endocrine consult appreciated, currently on Lantus 40u QHS, premeal Humalog 10 u, moderate HISS  - Will d/c Home on Metformin and Lantus 35 units daily    HEME/ONC:  VTE prophylaxis: [x] SCDs [x] chemoprophylaxis sq lOVENOX bid  (weight based)    ID:  -Afebrile     Dispo: will likely d/c home today     Will discuss with Dr Otoole 7/23 Right SOC for MVD POD#5      NEURO:  - neuro check Q4h  - CT head post op on 7/26 stable  - pain control with tylenol prn and gabapentin  - cont home psych meds  - Activity: [x] OOB as tolerated [] Bedrest [x] PT [x] OT [] PMNR  - PT/OT- home PT/OT with RW    PULM:  - Maintain O2 stats > 90-92%, currently on NC  - mild sob, NAKUL on  C- pap here but not at home due to claustophobia, patient wants CPAP with nasal mask at home, pulmonary consult appreciated yesterday, will need home oxygen, will re-evaluate patient tomorrow regarding discharge per pulm  - bicarb trending up on BMPs, f/u VBG, possible needs of BiPAP  - Continue albuterol and Proventil off steroids    CV:  - SBP goal 100-150  - continue lisinopril and HCTZ    RENAL:  - Na 143 this am,   - Hypokalemia, K 3.5 supplemented    GI:  - Diet: regular CCD diet    - GI prophylaxis Protonix  for GI ppx   - last BM on 7/27, Bowel regimen [x] Miralax  [x] senna     ENDO:   - Goal euglycemia (-180)  - endocrine consult appreciated, currently on Lantus 40u QHS, premeal Humalog 10 u, moderate HISS  - Will d/c Home on Metformin and Lantus    HEME/ONC:  VTE prophylaxis: [x] SCDs [x] chemoprophylaxis sq lOVENOX bid  (weight based)    ID:  - Afebrile   - new cough and running nose, f/u COVID test    Dispo: will likely d/c home tomorrow pending pulmonary clearance    [facility] [id number]23 Right SOC for MVD POD#5      NEURO:  - neuro check Q4h  - CT head post op on 7/26 stable  - pain control with tylenol prn and gabapentin  - cont home psych meds  - Activity: [x] OOB as tolerated [] Bedrest [x] PT [x] OT [] PMNR  - PT/OT- home PT/OT with RW    PULM:  - Maintain O2 stats > 90-92%, currently on NC  - mild sob, NAKUL on  C- pap here but not at home due to claustophobia, patient wants CPAP with nasal mask at home, pulmonary consult appreciated yesterday, will need home oxygen, will re-evaluate patient tomorrow regarding discharge per pulm  - bicarb trending up on BMPs, f/u VBG, possible needs of BiPAP  - Continue albuterol and Proventil off steroids    CV:  - SBP goal 100-150  - continue lisinopril and HCTZ    RENAL:  - Na 143 this am,   - Hypokalemia, K 3.5 supplemented    GI:  - Diet: regular CCD diet    - GI prophylaxis Protonix  for GI ppx   - last BM on 7/27, Bowel regimen [x] Miralax  [x] senna     ENDO:   - Goal euglycemia (-180)  - endocrine consult appreciated, currently on Lantus 40u QHS, premeal Humalog 10 u, moderate HISS  - Will d/c Home on Metformin 1g BID and Lantus 35u QHS per endo     HEME/ONC:  VTE prophylaxis: [x] SCDs [x] chemoprophylaxis sq lOVENOX bid  (weight based)    ID:  - Afebrile   - new cough and running nose, f/u COVID test    Dispo: will likely d/c home tomorrow pending pulmonary clearance    [facility] [id number] [facility] Right SOC for MVD POD#5      NEURO:  - neuro check Q4h  - CT head post op on 7/26 stable  - pain control with tylenol prn and gabapentin  - cont home psych meds  - Activity: [x] OOB as tolerated [] Bedrest [x] PT [x] OT [] PMNR  - PT/OT- home PT/OT with RW    PULM:  - Maintain O2 stats > 90-92%, currently on 4L NC, O2 sat droppd to 85% on room air at rest, patient will need home oxygen upon discharge  - mild sob, NAKUL on  C- pap here but not at home due to claustophobia, patient wants CPAP with nasal mask at home, pulmonary consult appreciated yesterday, will need home oxygen, will re-evaluate patient tomorrow regarding discharge per pulm  - bicarb trending up on BMPs, f/u VBG, possible needs of BiPAP  - Continue albuterol and Proventil off steroids    CV:  - SBP goal 100-150  - continue lisinopril and HCTZ    RENAL:  - Na 143 this am,   - Hypokalemia, K 3.5 supplemented    GI:  - Diet: regular CCD diet    - GI prophylaxis Protonix  for GI ppx   - last BM on 7/27, Bowel regimen [x] Miralax  [x] senna     ENDO:   - Goal euglycemia (-180)  - endocrine consult appreciated, currently on Lantus 40u QHS, premeal Humalog 10 u, moderate HISS  - Will d/c Home on Metformin 1g BID and Lantus 35u QHS per endo     HEME/ONC:  VTE prophylaxis: [x] SCDs [x] chemoprophylaxis sq lOVENOX bid  (weight based)    ID:  - Afebrile   - new cough and running nose, f/u COVID test    Dispo: will likely d/c home tomorrow pending pulmonary clearance    Will discuss with Dr Otoole    [facility] [id number]

## 2020-07-28 NOTE — PROGRESS NOTE ADULT - SUBJECTIVE AND OBJECTIVE BOX
Maximino Escalante   Pager [phone]  Office [phone]      CC: Patient is a 55y old  Female who presents with a chief complaint of admitted after MVD for trigeminal neuralgia (28 Jul 2020 07:46)      SUBJECTIVE / OVERNIGHT EVENTS:    MEDICATIONS  (STANDING):  acetaminophen  IVPB .. 1000 milliGRAM(s) IV Intermittent once  ALBUTerol    90 MICROgram(s) HFA Inhaler 2 Puff(s) Inhalation every 6 hours  amitriptyline 10 milliGRAM(s) Oral at bedtime  atorvastatin 10 milliGRAM(s) Oral at bedtime  dextrose 5%. 1000 milliLiter(s) (50 mL/Hr) IV Continuous <Continuous>  dextrose 50% Injectable 12.5 Gram(s) IV Push once  dextrose 50% Injectable 25 Gram(s) IV Push once  dextrose 50% Injectable 25 Gram(s) IV Push once  enoxaparin Injectable 30 milliGRAM(s) SubCutaneous every 12 hours  FLUoxetine 20 milliGRAM(s) Oral daily  fluticasone propionate 50 MICROgram(s)/spray Nasal Spray 1 Spray(s) Both Nostrils two times a day  gabapentin 300 milliGRAM(s) Oral at bedtime  hydrochlorothiazide 12.5 milliGRAM(s) Oral daily  insulin glargine Injectable (LANTUS) 40 Unit(s) SubCutaneous at bedtime  insulin lispro (HumaLOG) corrective regimen sliding scale   SubCutaneous Before meals and at bedtime  insulin lispro Injectable (HumaLOG) 10 Unit(s) SubCutaneous three times a day before meals  levothyroxine 112 MICROGram(s) Oral daily  lisinopril 10 milliGRAM(s) Oral daily  montelukast 10 milliGRAM(s) Oral at bedtime  OXcarbazepine 300 milliGRAM(s) Oral two times a day  pantoprazole    Tablet 40 milliGRAM(s) Oral before breakfast  polyethylene glycol 3350 17 Gram(s) Oral two times a day  potassium chloride    Tablet ER 20 milliEquivalent(s) Oral every 2 hours  senna 2 Tablet(s) Oral at bedtime  tiotropium 18 MICROgram(s) Capsule 1 Capsule(s) Inhalation daily    MEDICATIONS  (PRN):  acetaminophen   Tablet .. 650 milliGRAM(s) Oral every 6 hours PRN Moderate Pain (4 - 6)  benzocaine 15 mG/menthol 3.6 mG (Sugar-Free) Lozenge 1 Lozenge Oral every 2 hours PRN Sore Throat  dextrose 40% Gel 15 Gram(s) Oral once PRN Blood Glucose LESS THAN 70 milliGRAM(s)/deciliter  glucagon  Injectable 1 milliGRAM(s) IntraMuscular once PRN Glucose LESS THAN 70 milligrams/deciliter  guaiFENesin   Syrup  (Sugar-Free) 100 milliGRAM(s) Oral every 6 hours PRN Cough  ondansetron Injectable 4 milliGRAM(s) IV Push every 6 hours PRN Nausea and/or Vomiting      Vital Signs Last 24 Hrs  T(C): 36.7 (28 Jul 2020 07:59), Max: 37.3 (27 Jul 2020 12:12)  T(F): 98 (28 Jul 2020 07:59), Max: 99.1 (27 Jul 2020 12:12)  HR: 83 (28 Jul 2020 07:59) (80 - 101)  BP: 117/74 (28 Jul 2020 07:59) (104/62 - 122/85)  BP(mean): --  RR: 20 (28 Jul 2020 07:59) (18 - 20)  SpO2: 99% (28 Jul 2020 07:59) (88% - 99%)  CAPILLARY BLOOD GLUCOSE      POCT Blood Glucose.: 129 mg/dL (28 Jul 2020 08:18)  POCT Blood Glucose.: 164 mg/dL (27 Jul 2020 21:22)  POCT Blood Glucose.: 116 mg/dL (27 Jul 2020 17:25)  POCT Blood Glucose.: 152 mg/dL (27 Jul 2020 12:21)    I&O's Summary    27 Jul 2020 07:01  -  28 Jul 2020 07:00  --------------------------------------------------------  IN: 980 mL / OUT: 1350 mL / NET: -370 mL      tele:    PHYSICAL EXAM:    GENERAL: NAD   HEENT: EOMI, PERRL  PULM: Clear to auscultation bilaterally  CV: Regular rate and rhythm; nl S1, S2; No murmurs, rubs, or gallops  ABDOMEN: Soft, Nontender, Nondistended; Bowel sounds present  EXTREMITIES/MSK:  No edema, calf tenderness   PSYCH: AAOx3  NEUROLOGY: non-focal          LABS:    07-28    143  |  97  |  10  ----------------------------<  130<H>  3.5   |  34<H>  |  0.58    Ca    9.5      28 Jul 2020 06:30                  RADIOLOGY & ADDITIONAL TESTS:    Imaging Personally Reviewed:    Consultant(s) Notes Reviewed:      Care Discussed with Consultants/Other Providers: Maximino Escalante   Pager [phone]  Office [phone]      CC: Patient is a 55y old  Female who presents with a chief complaint of admitted after MVD for trigeminal neuralgia (28 Jul 2020 07:46)      SUBJECTIVE / OVERNIGHT EVENTS: Right facial pain mildly improved. Rhinorrhea since yest with post-nasal drip. Has to clear throat but no cough. Denies SOB. No f/c/r. No CP. No n/v/d/abd pain    MEDICATIONS  (STANDING):  acetaminophen  IVPB .. 1000 milliGRAM(s) IV Intermittent once  ALBUTerol    90 MICROgram(s) HFA Inhaler 2 Puff(s) Inhalation every 6 hours  amitriptyline 10 milliGRAM(s) Oral at bedtime  atorvastatin 10 milliGRAM(s) Oral at bedtime  dextrose 5%. 1000 milliLiter(s) (50 mL/Hr) IV Continuous <Continuous>  dextrose 50% Injectable 12.5 Gram(s) IV Push once  dextrose 50% Injectable 25 Gram(s) IV Push once  dextrose 50% Injectable 25 Gram(s) IV Push once  enoxaparin Injectable 30 milliGRAM(s) SubCutaneous every 12 hours  FLUoxetine 20 milliGRAM(s) Oral daily  fluticasone propionate 50 MICROgram(s)/spray Nasal Spray 1 Spray(s) Both Nostrils two times a day  gabapentin 300 milliGRAM(s) Oral at bedtime  hydrochlorothiazide 12.5 milliGRAM(s) Oral daily  insulin glargine Injectable (LANTUS) 40 Unit(s) SubCutaneous at bedtime  insulin lispro (HumaLOG) corrective regimen sliding scale   SubCutaneous Before meals and at bedtime  insulin lispro Injectable (HumaLOG) 10 Unit(s) SubCutaneous three times a day before meals  levothyroxine 112 MICROGram(s) Oral daily  lisinopril 10 milliGRAM(s) Oral daily  montelukast 10 milliGRAM(s) Oral at bedtime  OXcarbazepine 300 milliGRAM(s) Oral two times a day  pantoprazole    Tablet 40 milliGRAM(s) Oral before breakfast  polyethylene glycol 3350 17 Gram(s) Oral two times a day  potassium chloride    Tablet ER 20 milliEquivalent(s) Oral every 2 hours  senna 2 Tablet(s) Oral at bedtime  tiotropium 18 MICROgram(s) Capsule 1 Capsule(s) Inhalation daily    MEDICATIONS  (PRN):  acetaminophen   Tablet .. 650 milliGRAM(s) Oral every 6 hours PRN Moderate Pain (4 - 6)  benzocaine 15 mG/menthol 3.6 mG (Sugar-Free) Lozenge 1 Lozenge Oral every 2 hours PRN Sore Throat  dextrose 40% Gel 15 Gram(s) Oral once PRN Blood Glucose LESS THAN 70 milliGRAM(s)/deciliter  glucagon  Injectable 1 milliGRAM(s) IntraMuscular once PRN Glucose LESS THAN 70 milligrams/deciliter  guaiFENesin   Syrup  (Sugar-Free) 100 milliGRAM(s) Oral every 6 hours PRN Cough  ondansetron Injectable 4 milliGRAM(s) IV Push every 6 hours PRN Nausea and/or Vomiting      Vital Signs Last 24 Hrs  T(C): 36.7 (28 Jul 2020 07:59), Max: 37.3 (27 Jul 2020 12:12)  T(F): 98 (28 Jul 2020 07:59), Max: 99.1 (27 Jul 2020 12:12)  HR: 83 (28 Jul 2020 07:59) (80 - 101)  BP: 117/74 (28 Jul 2020 07:59) (104/62 - 122/85)  BP(mean): --  RR: 20 (28 Jul 2020 07:59) (18 - 20)  SpO2: 99% (28 Jul 2020 07:59) (88% - 99%)  CAPILLARY BLOOD GLUCOSE      POCT Blood Glucose.: 129 mg/dL (28 Jul 2020 08:18)  POCT Blood Glucose.: 164 mg/dL (27 Jul 2020 21:22)  POCT Blood Glucose.: 116 mg/dL (27 Jul 2020 17:25)  POCT Blood Glucose.: 152 mg/dL (27 Jul 2020 12:21)    I&O's Summary    27 Jul 2020 07:01  -  28 Jul 2020 07:00  --------------------------------------------------------  IN: 980 mL / OUT: 1350 mL / NET: -370 mL          PHYSICAL EXAM:    GENERAL: NAD   HEENT: EOMI, PERRL  PULM: Clear to auscultation bilaterally  CV: Regular rate and rhythm; nl S1, S2; No murmurs, rubs, or gallops  ABDOMEN: Soft, Nontender, Nondistended; Bowel sounds present  EXTREMITIES/MSK:  No edema, calf tenderness   PSYCH: AAOx3  NEUROLOGY: non-focal          LABS:    07-28    143  |  97  |  10  ----------------------------<  130<H>  3.5   |  34<H>  |  0.58    Ca    9.5      28 Jul 2020 06:30                  RADIOLOGY & ADDITIONAL TESTS:    Imaging Personally Reviewed:    Consultant(s) Notes Reviewed:      Care Discussed with Consultants/Other Providers: neurosurg regard VBG

## 2020-07-28 NOTE — PROGRESS NOTE ADULT - SUBJECTIVE AND OBJECTIVE BOX
Patient was seen at bedside this am. Patient was feeling well. Denied any headache, cp, sob, n/v or abd pain.    OVERNIGHT EVENTS: No acute event overnight    Vital Signs Last 24 Hrs  T(C): 36.8 (2020 04:50), Max: 37.3 (2020 12:12)  T(F): 98.2 (2020 04:50), Max: 99.1 (2020 12:12)  HR: 90 (2020 05:22) (78 - 101)  BP: 119/76 (2020 04:50) (104/62 - 124/84)  BP(mean): --  RR: 18 (2020 04:50) (18 - 20)  SpO2: 94% (2020 05:22) (88% - 98%)    I&O's Detail    2020 07:01  -  2020 07:00  --------------------------------------------------------  IN:    Oral Fluid: 980 mL  Total IN: 980 mL    OUT:    Incontinent per Collection Ba mL  Total OUT: 1350 mL    Total NET: -370 mL        I&O's Summary    2020 07:01  -  2020 07:00  --------------------------------------------------------  IN: 980 mL / OUT: 1350 mL / NET: -370 mL        PHYSICAL EXAM:  Neurological:  Awake, alert, oriented to person, place, and date, PERRL, speech fluent and clear, no facial asymmetry, following commands, no drift, moving all extremities 5/5, sensation intact to light touch  Cardiovascular: +s1, s2  Respiratory: clear to auscultation b/l  Gastrointestinal: soft, non-distended, non-tender  Extremities: Warm and dry  Incision/Wound: incision site C/D/I    LABS:        143  |  97  |  10  ----------------------------<  130<H>  3.5   |  34<H>  |  0.58    Ca    9.5      2020 06:30              CAPILLARY BLOOD GLUCOSE      POCT Blood Glucose.: 164 mg/dL (2020 21:22)  POCT Blood Glucose.: 116 mg/dL (2020 17:25)  POCT Blood Glucose.: 152 mg/dL (2020 12:21)  POCT Blood Glucose.: 127 mg/dL (2020 08:42)      Drug Levels: [] N/A    CSF Analysis: [] N/A      Allergies    Fioricet (Rash)  gadobutrol (Rash; Urticaria; Hives)  IV Contrast (Short breath)  mushroom (Unknown)  venlafaxine (Hives)    Intolerances      MEDICATIONS:  Antibiotics:    Neuro:  acetaminophen   Tablet .. 650 milliGRAM(s) Oral every 6 hours PRN  acetaminophen  IVPB .. 1000 milliGRAM(s) IV Intermittent once  amitriptyline 10 milliGRAM(s) Oral at bedtime  FLUoxetine 20 milliGRAM(s) Oral daily  gabapentin 300 milliGRAM(s) Oral at bedtime  ondansetron Injectable 4 milliGRAM(s) IV Push every 6 hours PRN  OXcarbazepine 300 milliGRAM(s) Oral two times a day    Anticoagulation:  enoxaparin Injectable 30 milliGRAM(s) SubCutaneous every 12 hours    OTHER:  ALBUTerol    90 MICROgram(s) HFA Inhaler 2 Puff(s) Inhalation every 6 hours  atorvastatin 10 milliGRAM(s) Oral at bedtime  benzocaine 15 mG/menthol 3.6 mG (Sugar-Free) Lozenge 1 Lozenge Oral every 2 hours PRN  dextrose 40% Gel 15 Gram(s) Oral once PRN  dextrose 50% Injectable 12.5 Gram(s) IV Push once  dextrose 50% Injectable 25 Gram(s) IV Push once  dextrose 50% Injectable 25 Gram(s) IV Push once  fluticasone propionate 50 MICROgram(s)/spray Nasal Spray 1 Spray(s) Both Nostrils two times a day  glucagon  Injectable 1 milliGRAM(s) IntraMuscular once PRN  guaiFENesin   Syrup  (Sugar-Free) 100 milliGRAM(s) Oral every 6 hours PRN  hydrochlorothiazide 12.5 milliGRAM(s) Oral daily  insulin glargine Injectable (LANTUS) 40 Unit(s) SubCutaneous at bedtime  insulin lispro (HumaLOG) corrective regimen sliding scale   SubCutaneous Before meals and at bedtime  insulin lispro Injectable (HumaLOG) 10 Unit(s) SubCutaneous three times a day before meals  levothyroxine 112 MICROGram(s) Oral daily  lisinopril 10 milliGRAM(s) Oral daily  montelukast 10 milliGRAM(s) Oral at bedtime  pantoprazole    Tablet 40 milliGRAM(s) Oral before breakfast  polyethylene glycol 3350 17 Gram(s) Oral two times a day  senna 2 Tablet(s) Oral at bedtime  tiotropium 18 MICROgram(s) Capsule 1 Capsule(s) Inhalation daily    IVF:  dextrose 5%. 1000 milliLiter(s) IV Continuous <Continuous>    CULTURES:    RADIOLOGY & ADDITIONAL TESTS: Patient was seen at bedside this am. Patient had new onset running nose and cough. Patient still had mild sob. Denied any headache, cp, n/v or abd pain.    OVERNIGHT EVENTS: No acute event overnight    Vital Signs Last 24 Hrs  T(C): 36.8 (2020 04:50), Max: 37.3 (2020 12:12)  T(F): 98.2 (2020 04:50), Max: 99.1 (2020 12:12)  HR: 90 (2020 05:22) (78 - 101)  BP: 119/76 (2020 04:50) (104/62 - 124/84)  BP(mean): --  RR: 18 (2020 04:50) (18 - 20)  SpO2: 94% (2020 05:22) (88% - 98%)    I&O's Detail    2020 07:01  -  2020 07:00  --------------------------------------------------------  IN:    Oral Fluid: 980 mL  Total IN: 980 mL    OUT:    Incontinent per Collection Ba mL  Total OUT: 1350 mL    Total NET: -370 mL        I&O's Summary    2020 07:01  -  2020 07:00  --------------------------------------------------------  IN: 980 mL / OUT: 1350 mL / NET: -370 mL        PHYSICAL EXAM:  Neurological:  Awake, alert, oriented to person, place, and date, PERRL, speech fluent and clear, no facial asymmetry, following commands, no drift, moving all extremities 5/5, sensation intact to light touch  Cardiovascular: +s1, s2  Respiratory: clear to auscultation b/l  Gastrointestinal: soft, non-distended, non-tender  Extremities: Warm and dry  Incision/Wound: incision site C/D/I    LABS:        143  |  97  |  10  ----------------------------<  130<H>  3.5   |  34<H>  |  0.58    Ca    9.5      2020 06:30              CAPILLARY BLOOD GLUCOSE      POCT Blood Glucose.: 164 mg/dL (2020 21:22)  POCT Blood Glucose.: 116 mg/dL (2020 17:25)  POCT Blood Glucose.: 152 mg/dL (2020 12:21)  POCT Blood Glucose.: 127 mg/dL (2020 08:42)      Drug Levels: [] N/A    CSF Analysis: [] N/A      Allergies    Fioricet (Rash)  gadobutrol (Rash; Urticaria; Hives)  IV Contrast (Short breath)  mushroom (Unknown)  venlafaxine (Hives)    Intolerances      MEDICATIONS:  Antibiotics:    Neuro:  acetaminophen   Tablet .. 650 milliGRAM(s) Oral every 6 hours PRN  acetaminophen  IVPB .. 1000 milliGRAM(s) IV Intermittent once  amitriptyline 10 milliGRAM(s) Oral at bedtime  FLUoxetine 20 milliGRAM(s) Oral daily  gabapentin 300 milliGRAM(s) Oral at bedtime  ondansetron Injectable 4 milliGRAM(s) IV Push every 6 hours PRN  OXcarbazepine 300 milliGRAM(s) Oral two times a day    Anticoagulation:  enoxaparin Injectable 30 milliGRAM(s) SubCutaneous every 12 hours    OTHER:  ALBUTerol    90 MICROgram(s) HFA Inhaler 2 Puff(s) Inhalation every 6 hours  atorvastatin 10 milliGRAM(s) Oral at bedtime  benzocaine 15 mG/menthol 3.6 mG (Sugar-Free) Lozenge 1 Lozenge Oral every 2 hours PRN  dextrose 40% Gel 15 Gram(s) Oral once PRN  dextrose 50% Injectable 12.5 Gram(s) IV Push once  dextrose 50% Injectable 25 Gram(s) IV Push once  dextrose 50% Injectable 25 Gram(s) IV Push once  fluticasone propionate 50 MICROgram(s)/spray Nasal Spray 1 Spray(s) Both Nostrils two times a day  glucagon  Injectable 1 milliGRAM(s) IntraMuscular once PRN  guaiFENesin   Syrup  (Sugar-Free) 100 milliGRAM(s) Oral every 6 hours PRN  hydrochlorothiazide 12.5 milliGRAM(s) Oral daily  insulin glargine Injectable (LANTUS) 40 Unit(s) SubCutaneous at bedtime  insulin lispro (HumaLOG) corrective regimen sliding scale   SubCutaneous Before meals and at bedtime  insulin lispro Injectable (HumaLOG) 10 Unit(s) SubCutaneous three times a day before meals  levothyroxine 112 MICROGram(s) Oral daily  lisinopril 10 milliGRAM(s) Oral daily  montelukast 10 milliGRAM(s) Oral at bedtime  pantoprazole    Tablet 40 milliGRAM(s) Oral before breakfast  polyethylene glycol 3350 17 Gram(s) Oral two times a day  senna 2 Tablet(s) Oral at bedtime  tiotropium 18 MICROgram(s) Capsule 1 Capsule(s) Inhalation daily    IVF:  dextrose 5%. 1000 milliLiter(s) IV Continuous <Continuous>    CULTURES:    RADIOLOGY & ADDITIONAL TESTS:

## 2020-07-29 LAB
ANION GAP SERPL CALC-SCNC: 9 MMOL/L — SIGNIFICANT CHANGE UP (ref 5–17)
BUN SERPL-MCNC: 11 MG/DL — SIGNIFICANT CHANGE UP (ref 7–23)
CALCIUM SERPL-MCNC: 9.3 MG/DL — SIGNIFICANT CHANGE UP (ref 8.4–10.5)
CHLORIDE SERPL-SCNC: 95 MMOL/L — LOW (ref 96–108)
CO2 SERPL-SCNC: 34 MMOL/L — HIGH (ref 22–31)
CREAT SERPL-MCNC: 0.67 MG/DL — SIGNIFICANT CHANGE UP (ref 0.5–1.3)
GLUCOSE BLDC GLUCOMTR-MCNC: 141 MG/DL — HIGH (ref 70–99)
GLUCOSE BLDC GLUCOMTR-MCNC: 148 MG/DL — HIGH (ref 70–99)
GLUCOSE BLDC GLUCOMTR-MCNC: 173 MG/DL — HIGH (ref 70–99)
GLUCOSE BLDC GLUCOMTR-MCNC: 194 MG/DL — HIGH (ref 70–99)
GLUCOSE SERPL-MCNC: 127 MG/DL — HIGH (ref 70–99)
POTASSIUM SERPL-MCNC: 4 MMOL/L — SIGNIFICANT CHANGE UP (ref 3.5–5.3)
POTASSIUM SERPL-SCNC: 4 MMOL/L — SIGNIFICANT CHANGE UP (ref 3.5–5.3)
SARS-COV-2 RNA SPEC QL NAA+PROBE: SIGNIFICANT CHANGE UP
SODIUM SERPL-SCNC: 138 MMOL/L — SIGNIFICANT CHANGE UP (ref 135–145)

## 2020-07-29 PROCEDURE — 99232 SBSQ HOSP IP/OBS MODERATE 35: CPT

## 2020-07-29 PROCEDURE — 99233 SBSQ HOSP IP/OBS HIGH 50: CPT

## 2020-07-29 RX ORDER — LISINOPRIL 2.5 MG/1
7.5 TABLET ORAL DAILY
Refills: 0 | Status: DISCONTINUED | OUTPATIENT
Start: 2020-07-30 | End: 2020-07-30

## 2020-07-29 RX ADMIN — ALBUTEROL 2 PUFF(S): 90 AEROSOL, METERED ORAL at 17:08

## 2020-07-29 RX ADMIN — OXCARBAZEPINE 300 MILLIGRAM(S): 300 TABLET, FILM COATED ORAL at 17:07

## 2020-07-29 RX ADMIN — Medication 2 SPRAY(S): at 11:47

## 2020-07-29 RX ADMIN — Medication 10 UNIT(S): at 08:38

## 2020-07-29 RX ADMIN — Medication 2 SPRAY(S): at 06:22

## 2020-07-29 RX ADMIN — PANTOPRAZOLE SODIUM 40 MILLIGRAM(S): 20 TABLET, DELAYED RELEASE ORAL at 06:22

## 2020-07-29 RX ADMIN — Medication 12.5 MILLIGRAM(S): at 06:22

## 2020-07-29 RX ADMIN — Medication 20 MILLIGRAM(S): at 11:47

## 2020-07-29 RX ADMIN — Medication 650 MILLIGRAM(S): at 21:25

## 2020-07-29 RX ADMIN — MONTELUKAST 10 MILLIGRAM(S): 4 TABLET, CHEWABLE ORAL at 21:25

## 2020-07-29 RX ADMIN — Medication 2 SPRAY(S): at 23:41

## 2020-07-29 RX ADMIN — ALBUTEROL 2 PUFF(S): 90 AEROSOL, METERED ORAL at 06:22

## 2020-07-29 RX ADMIN — ENOXAPARIN SODIUM 30 MILLIGRAM(S): 100 INJECTION SUBCUTANEOUS at 17:07

## 2020-07-29 RX ADMIN — ALBUTEROL 2 PUFF(S): 90 AEROSOL, METERED ORAL at 23:40

## 2020-07-29 RX ADMIN — Medication 1 SPRAY(S): at 06:23

## 2020-07-29 RX ADMIN — Medication 2 SPRAY(S): at 17:08

## 2020-07-29 RX ADMIN — Medication 2: at 17:33

## 2020-07-29 RX ADMIN — GABAPENTIN 300 MILLIGRAM(S): 400 CAPSULE ORAL at 21:25

## 2020-07-29 RX ADMIN — Medication 2: at 12:38

## 2020-07-29 RX ADMIN — Medication 1 SPRAY(S): at 17:07

## 2020-07-29 RX ADMIN — Medication 10 UNIT(S): at 17:32

## 2020-07-29 RX ADMIN — LISINOPRIL 10 MILLIGRAM(S): 2.5 TABLET ORAL at 06:22

## 2020-07-29 RX ADMIN — TIOTROPIUM BROMIDE 1 CAPSULE(S): 18 CAPSULE ORAL; RESPIRATORY (INHALATION) at 11:49

## 2020-07-29 RX ADMIN — Medication 10 UNIT(S): at 12:38

## 2020-07-29 RX ADMIN — Medication 650 MILLIGRAM(S): at 11:55

## 2020-07-29 RX ADMIN — Medication 10 MILLIGRAM(S): at 21:25

## 2020-07-29 RX ADMIN — ATORVASTATIN CALCIUM 10 MILLIGRAM(S): 80 TABLET, FILM COATED ORAL at 21:25

## 2020-07-29 RX ADMIN — Medication 112 MICROGRAM(S): at 06:22

## 2020-07-29 RX ADMIN — Medication 650 MILLIGRAM(S): at 13:27

## 2020-07-29 RX ADMIN — OXCARBAZEPINE 300 MILLIGRAM(S): 300 TABLET, FILM COATED ORAL at 06:22

## 2020-07-29 RX ADMIN — INSULIN GLARGINE 40 UNIT(S): 100 INJECTION, SOLUTION SUBCUTANEOUS at 21:50

## 2020-07-29 RX ADMIN — ENOXAPARIN SODIUM 30 MILLIGRAM(S): 100 INJECTION SUBCUTANEOUS at 06:23

## 2020-07-29 RX ADMIN — ALBUTEROL 2 PUFF(S): 90 AEROSOL, METERED ORAL at 11:49

## 2020-07-29 NOTE — PROGRESS NOTE ADULT - ASSESSMENT
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   On 7/23, she underwent Microvascular decompression.

## 2020-07-29 NOTE — PROGRESS NOTE ADULT - SUBJECTIVE AND OBJECTIVE BOX
Maximino Escalante   Pager [phone]  Office [phone]      CC: Patient is a 55y old  Female who presents with a chief complaint of admitted after MVD for trigeminal neuralgia (28 Jul 2020 07:46)      SUBJECTIVE / OVERNIGHT EVENTS:    MEDICATIONS  (STANDING):  acetaminophen  IVPB .. 1000 milliGRAM(s) IV Intermittent once  ALBUTerol    90 MICROgram(s) HFA Inhaler 2 Puff(s) Inhalation every 6 hours  amitriptyline 10 milliGRAM(s) Oral at bedtime  atorvastatin 10 milliGRAM(s) Oral at bedtime  dextrose 5%. 1000 milliLiter(s) (50 mL/Hr) IV Continuous <Continuous>  dextrose 50% Injectable 12.5 Gram(s) IV Push once  dextrose 50% Injectable 25 Gram(s) IV Push once  dextrose 50% Injectable 25 Gram(s) IV Push once  enoxaparin Injectable 30 milliGRAM(s) SubCutaneous every 12 hours  FLUoxetine 20 milliGRAM(s) Oral daily  fluticasone propionate 50 MICROgram(s)/spray Nasal Spray 1 Spray(s) Both Nostrils two times a day  gabapentin 300 milliGRAM(s) Oral at bedtime  hydrochlorothiazide 12.5 milliGRAM(s) Oral daily  insulin glargine Injectable (LANTUS) 40 Unit(s) SubCutaneous at bedtime  insulin lispro (HumaLOG) corrective regimen sliding scale   SubCutaneous Before meals and at bedtime  insulin lispro Injectable (HumaLOG) 10 Unit(s) SubCutaneous three times a day before meals  levothyroxine 112 MICROGram(s) Oral daily  lisinopril 10 milliGRAM(s) Oral daily  montelukast 10 milliGRAM(s) Oral at bedtime  OXcarbazepine 300 milliGRAM(s) Oral two times a day  pantoprazole    Tablet 40 milliGRAM(s) Oral before breakfast  polyethylene glycol 3350 17 Gram(s) Oral two times a day  senna 2 Tablet(s) Oral at bedtime  sodium chloride 0.65% Nasal 2 Spray(s) Both Nostrils every 6 hours  tiotropium 18 MICROgram(s) Capsule 1 Capsule(s) Inhalation daily    MEDICATIONS  (PRN):  acetaminophen   Tablet .. 650 milliGRAM(s) Oral every 6 hours PRN Temp greater or equal to 38C (100.4F), Mild Pain (1 - 3)  benzocaine 15 mG/menthol 3.6 mG (Sugar-Free) Lozenge 1 Lozenge Oral every 2 hours PRN Sore Throat  dextrose 40% Gel 15 Gram(s) Oral once PRN Blood Glucose LESS THAN 70 milliGRAM(s)/deciliter  glucagon  Injectable 1 milliGRAM(s) IntraMuscular once PRN Glucose LESS THAN 70 milligrams/deciliter  ondansetron Injectable 4 milliGRAM(s) IV Push every 6 hours PRN Nausea and/or Vomiting  oxyCODONE    IR 5 milliGRAM(s) Oral every 6 hours PRN Moderate Pain (4 - 6)  oxyCODONE    IR 10 milliGRAM(s) Oral every 6 hours PRN Severe Pain (7 - 10)      Vital Signs Last 24 Hrs  T(C): 36.9 (29 Jul 2020 07:59), Max: 36.9 (29 Jul 2020 07:59)  T(F): 98.5 (29 Jul 2020 07:59), Max: 98.5 (29 Jul 2020 07:59)  HR: 79 (29 Jul 2020 09:29) (74 - 119)  BP: 107/74 (29 Jul 2020 07:59) (105/69 - 131/87)  BP(mean): --  RR: 20 (29 Jul 2020 07:59) (18 - 28)  SpO2: 98% (29 Jul 2020 09:29) (83% - 100%)  CAPILLARY BLOOD GLUCOSE      POCT Blood Glucose.: 141 mg/dL (29 Jul 2020 08:29)  POCT Blood Glucose.: 161 mg/dL (28 Jul 2020 21:59)  POCT Blood Glucose.: 171 mg/dL (28 Jul 2020 17:22)  POCT Blood Glucose.: 207 mg/dL (28 Jul 2020 12:41)    I&O's Summary    28 Jul 2020 07:01  -  29 Jul 2020 07:00  --------------------------------------------------------  IN: 500 mL / OUT: 800 mL / NET: -300 mL      tele:    PHYSICAL EXAM:    GENERAL: NAD   HEENT: EOMI, PERRL  PULM: Clear to auscultation bilaterally  CV: Regular rate and rhythm; nl S1, S2; No murmurs, rubs, or gallops  ABDOMEN: Soft, Nontender, Nondistended; Bowel sounds present  EXTREMITIES/MSK:  No edema, calf tenderness   PSYCH: AAOx3  NEUROLOGY: non-focal          LABS:    07-29    138  |  95<L>  |  11  ----------------------------<  127<H>  4.0   |  34<H>  |  0.67    Ca    9.3      29 Jul 2020 05:38                  RADIOLOGY & ADDITIONAL TESTS:    Imaging Personally Reviewed:    Consultant(s) Notes Reviewed:      Care Discussed with Consultants/Other Providers: Maximino Escalante   Pager [phone]  Office [phone]      CC: Patient is a 55y old  Female who presents with a chief complaint of admitted after MVD for trigeminal neuralgia (28 Jul 2020 07:46)      SUBJECTIVE / OVERNIGHT EVENTS: postnasal drip resolved. no cough or SOB. no cp. no f/c/r. No n/v/d/abd pain    MEDICATIONS  (STANDING):  acetaminophen  IVPB .. 1000 milliGRAM(s) IV Intermittent once  ALBUTerol    90 MICROgram(s) HFA Inhaler 2 Puff(s) Inhalation every 6 hours  amitriptyline 10 milliGRAM(s) Oral at bedtime  atorvastatin 10 milliGRAM(s) Oral at bedtime  dextrose 5%. 1000 milliLiter(s) (50 mL/Hr) IV Continuous <Continuous>  dextrose 50% Injectable 12.5 Gram(s) IV Push once  dextrose 50% Injectable 25 Gram(s) IV Push once  dextrose 50% Injectable 25 Gram(s) IV Push once  enoxaparin Injectable 30 milliGRAM(s) SubCutaneous every 12 hours  FLUoxetine 20 milliGRAM(s) Oral daily  fluticasone propionate 50 MICROgram(s)/spray Nasal Spray 1 Spray(s) Both Nostrils two times a day  gabapentin 300 milliGRAM(s) Oral at bedtime  hydrochlorothiazide 12.5 milliGRAM(s) Oral daily  insulin glargine Injectable (LANTUS) 40 Unit(s) SubCutaneous at bedtime  insulin lispro (HumaLOG) corrective regimen sliding scale   SubCutaneous Before meals and at bedtime  insulin lispro Injectable (HumaLOG) 10 Unit(s) SubCutaneous three times a day before meals  levothyroxine 112 MICROGram(s) Oral daily  lisinopril 10 milliGRAM(s) Oral daily  montelukast 10 milliGRAM(s) Oral at bedtime  OXcarbazepine 300 milliGRAM(s) Oral two times a day  pantoprazole    Tablet 40 milliGRAM(s) Oral before breakfast  polyethylene glycol 3350 17 Gram(s) Oral two times a day  senna 2 Tablet(s) Oral at bedtime  sodium chloride 0.65% Nasal 2 Spray(s) Both Nostrils every 6 hours  tiotropium 18 MICROgram(s) Capsule 1 Capsule(s) Inhalation daily    MEDICATIONS  (PRN):  acetaminophen   Tablet .. 650 milliGRAM(s) Oral every 6 hours PRN Temp greater or equal to 38C (100.4F), Mild Pain (1 - 3)  benzocaine 15 mG/menthol 3.6 mG (Sugar-Free) Lozenge 1 Lozenge Oral every 2 hours PRN Sore Throat  dextrose 40% Gel 15 Gram(s) Oral once PRN Blood Glucose LESS THAN 70 milliGRAM(s)/deciliter  glucagon  Injectable 1 milliGRAM(s) IntraMuscular once PRN Glucose LESS THAN 70 milligrams/deciliter  ondansetron Injectable 4 milliGRAM(s) IV Push every 6 hours PRN Nausea and/or Vomiting  oxyCODONE    IR 5 milliGRAM(s) Oral every 6 hours PRN Moderate Pain (4 - 6)  oxyCODONE    IR 10 milliGRAM(s) Oral every 6 hours PRN Severe Pain (7 - 10)      Vital Signs Last 24 Hrs  T(C): 36.9 (29 Jul 2020 07:59), Max: 36.9 (29 Jul 2020 07:59)  T(F): 98.5 (29 Jul 2020 07:59), Max: 98.5 (29 Jul 2020 07:59)  HR: 79 (29 Jul 2020 09:29) (74 - 119)  BP: 107/74 (29 Jul 2020 07:59) (105/69 - 131/87)  BP(mean): --  RR: 20 (29 Jul 2020 07:59) (18 - 28)  SpO2: 98% (29 Jul 2020 09:29) (83% - 100%)  CAPILLARY BLOOD GLUCOSE      POCT Blood Glucose.: 141 mg/dL (29 Jul 2020 08:29)  POCT Blood Glucose.: 161 mg/dL (28 Jul 2020 21:59)  POCT Blood Glucose.: 171 mg/dL (28 Jul 2020 17:22)  POCT Blood Glucose.: 207 mg/dL (28 Jul 2020 12:41)    I&O's Summary    28 Jul 2020 07:01  -  29 Jul 2020 07:00  --------------------------------------------------------  IN: 500 mL / OUT: 800 mL / NET: -300 mL          PHYSICAL EXAM:    GENERAL: NAD   HEENT: EOMI, PERRL  PULM: Clear to auscultation bilaterally  CV: Regular rate and rhythm; nl S1, S2; No murmurs, rubs, or gallops  ABDOMEN: Soft, Nontender, Nondistended; Bowel sounds present  EXTREMITIES/MSK:  No edema, calf tenderness   PSYCH: AAOx3  NEUROLOGY: non-focal          LABS:    07-29    138  |  95<L>  |  11  ----------------------------<  127<H>  4.0   |  34<H>  |  0.67    Ca    9.3      29 Jul 2020 05:38                  RADIOLOGY & ADDITIONAL TESTS:    Imaging Personally Reviewed:    Consultant(s) Notes Reviewed:      Care Discussed with Consultants/Other Providers: neurosurg regard home o2

## 2020-07-29 NOTE — PROGRESS NOTE ADULT - ASSESSMENT
HPI:  Patient is a 55 year old female with prior microvascular decompression for trigeminal neuralgia in 2013.  Now with trigeminal neuralgia with migraines.  Presented for surgery.    PROCEDURE: s/p right microvascular decompression on 7/23/2020  POD#6    PLAN:  Neuro:   -q4 hour neuro checks  -oxycodone and tylenol for pain control  -gabapentin for neuropathic pain  -trileptal for trigeminal neuralgia  -continue elavil and prozac  -out of bed with assistance  -pt - home pt upon discharge, needs bariatric rolling walker  -ot - home ot upon discharge upon discharge, needs rolling walker, shower chair, and grab bar    Respiratory:   -keep O2 sat > 94%  -cpap for sleep  -continue singular, spiriva, albuterol, and flonase  -pulm following    CV:  -keep sbp 100-140  -hctz and lisinopril for bp control  -atorvastatin for lipid control    Endocrine:   -lantus, lispro, and HISS for glucose control  -keep fingersticks 100-180  -levothyroxine for hypothyroidism    Heme/Onc:    -lovenox and SCDs for DVT prophylaxis    Renal:   -voiding    ID:   -afebrile    GI:  -consistent carbohydrate diet  -protonix for gi prophylaxis  -senna and miralax for bowel regimen      Spectra #[id number]

## 2020-07-29 NOTE — PROGRESS NOTE ADULT - SUBJECTIVE AND OBJECTIVE BOX
HPI:  Patient is a 55 year old female with prior microvascular decompression for trigeminal neuralgia in 2013.  Now with trigeminal neuralgia with migraines.  Presented for surgery.    OVERNIGHT EVENTS:  No acute events overnight.  Has been sleeping with cpap, awaiting home cpap.  Otherwise no new complaints.  Incisional pain well controlled on current regimen.      Vital Signs Last 24 Hrs  T(C): 36.9 (29 Jul 2020 07:59), Max: 36.9 (29 Jul 2020 07:59)  T(F): 98.5 (29 Jul 2020 07:59), Max: 98.5 (29 Jul 2020 07:59)  HR: 79 (29 Jul 2020 09:29) (74 - 119)  BP: 107/74 (29 Jul 2020 07:59) (105/69 - 131/87)  BP(mean): --  RR: 20 (29 Jul 2020 07:59) (18 - 28)  SpO2: 98% (29 Jul 2020 09:29) (83% - 100%)    I&O's Detail    28 Jul 2020 07:01  -  29 Jul 2020 07:00  --------------------------------------------------------  IN:    Oral Fluid: 500 mL  Total IN: 500 mL    OUT:    Voided: 800 mL  Total OUT: 800 mL    Total NET: -300 mL        I&O's Summary    28 Jul 2020 07:01  -  29 Jul 2020 07:00  --------------------------------------------------------  IN: 500 mL / OUT: 800 mL / NET: -300 mL        PHYSICAL EXAM:  Neurological: awake, alert, oriented x3, follows commands, speech clear and fluent, perrl, eomi, face symmetric, tongue midline, no drift b/l, moves all extremities x4 w/ 5/5 strength throughout, sensation present, intact, equal throughout    Cardiovascular: +s1, s2  Respiratory: clear to auscultation b/l  Gastrointestinal: soft, non-distended, non-tender  Genitourinary: +voiding  Incision/Wound: right retrosig crani incision c/d/i w/ suture    TUBES/LINES:  [x] none    DIET:  [x] consistent carbohydrate    LABS:    07-29    138  |  95<L>  |  11  ----------------------------<  127<H>  4.0   |  34<H>  |  0.67    Ca    9.3      29 Jul 2020 05:38            CAPILLARY BLOOD GLUCOSE      POCT Blood Glucose.: 141 mg/dL (29 Jul 2020 08:29)  POCT Blood Glucose.: 161 mg/dL (28 Jul 2020 21:59)  POCT Blood Glucose.: 171 mg/dL (28 Jul 2020 17:22)  POCT Blood Glucose.: 207 mg/dL (28 Jul 2020 12:41)            Allergies    Fioricet (Rash)  gadobutrol (Rash; Urticaria; Hives)  IV Contrast (Short breath)  mushroom (Unknown)  venlafaxine (Hives)          MEDICATIONS:  Antibiotics:  none    Neuro:  acetaminophen   Tablet .. 650 milliGRAM(s) Oral every 6 hours PRN  acetaminophen  IVPB .. 1000 milliGRAM(s) IV Intermittent once  amitriptyline 10 milliGRAM(s) Oral at bedtime  FLUoxetine 20 milliGRAM(s) Oral daily  gabapentin 300 milliGRAM(s) Oral at bedtime  ondansetron Injectable 4 milliGRAM(s) IV Push every 6 hours PRN  OXcarbazepine 300 milliGRAM(s) Oral two times a day  oxyCODONE    IR 5 milliGRAM(s) Oral every 6 hours PRN  oxyCODONE    IR 10 milliGRAM(s) Oral every 6 hours PRN    Anticoagulation:  enoxaparin Injectable 30 milliGRAM(s) SubCutaneous every 12 hours    OTHER:  ALBUTerol    90 MICROgram(s) HFA Inhaler 2 Puff(s) Inhalation every 6 hours  atorvastatin 10 milliGRAM(s) Oral at bedtime  benzocaine 15 mG/menthol 3.6 mG (Sugar-Free) Lozenge 1 Lozenge Oral every 2 hours PRN  dextrose 40% Gel 15 Gram(s) Oral once PRN  dextrose 50% Injectable 12.5 Gram(s) IV Push once  dextrose 50% Injectable 25 Gram(s) IV Push once  dextrose 50% Injectable 25 Gram(s) IV Push once  fluticasone propionate 50 MICROgram(s)/spray Nasal Spray 1 Spray(s) Both Nostrils two times a day  glucagon  Injectable 1 milliGRAM(s) IntraMuscular once PRN  hydrochlorothiazide 12.5 milliGRAM(s) Oral daily  insulin glargine Injectable (LANTUS) 40 Unit(s) SubCutaneous at bedtime  insulin lispro (HumaLOG) corrective regimen sliding scale   SubCutaneous Before meals and at bedtime  insulin lispro Injectable (HumaLOG) 10 Unit(s) SubCutaneous three times a day before meals  levothyroxine 112 MICROGram(s) Oral daily  lisinopril 10 milliGRAM(s) Oral daily  montelukast 10 milliGRAM(s) Oral at bedtime  pantoprazole    Tablet 40 milliGRAM(s) Oral before breakfast  polyethylene glycol 3350 17 Gram(s) Oral two times a day  senna 2 Tablet(s) Oral at bedtime  sodium chloride 0.65% Nasal 2 Spray(s) Both Nostrils every 6 hours  tiotropium 18 MICROgram(s) Capsule 1 Capsule(s) Inhalation daily    IVF:  dextrose 5%. 1000 milliLiter(s) IV Continuous <Continuous>    CULTURES:  none    RADIOLOGY & ADDITIONAL TESTS:  no new imaging

## 2020-07-29 NOTE — PROGRESS NOTE ADULT - SUBJECTIVE AND OBJECTIVE BOX
Pulmonary Follow up Note     55 year old woman  who underwent right microvascular decompression on 07/17/20 for trigeminal neuralgia.    She has elevated BMI, kyphosis of neck, NAKUL, asthma, HTN, HLD, DM, depression.  She also has renal lesion suspicous for malignancy, which will require treatment.    PFT preop  demonstrated restrictive defect.  she was optimized preop.    She tolerated surgery well but has hypoxia, with o2 saturation noted at 88%.  she is on5 lNC.  she is using CPAP 10 cm H2O at night.    She is breathing well without distress.      She previously declined use of CPAP but now states she is tolerating it.     PSH    prior rhizotomy for TN    SH    non smoker    ROS    no CAD, no MI,   renal cell cancer    Vital Signs Last 24 Hrs  T(C): 36.9 (29 Jul 2020 07:59), Max: 36.9 (29 Jul 2020 07:59)  T(F): 98.5 (29 Jul 2020 07:59), Max: 98.5 (29 Jul 2020 07:59)  HR: 79 (29 Jul 2020 09:29) (74 - 119)  BP: 107/74 (29 Jul 2020 07:59) (105/69 - 131/87)  BP(mean): --  RR: 20 (29 Jul 2020 07:59) (18 - 28)  SpO2: 98% (29 Jul 2020 09:29) (83% - 100%)    elevated BMI    respirations  unlabored, no dyspnea  diminished at bases, at baseline exam  heart RRR  abdomen soft NT  extremities no edema  pulses regular  alert conversant     07-29    138  |  95<L>  |  11  ----------------------------<  127<H>  4.0   |  34<H>  |  0.67    Ca    9.3      29 Jul 2020 05:38            07-27    Chest 1 View- PORTABLE-Urgent (07.24.20 @ 11:37) >    PROCEDURE DATE:  07/24/2020            INTERPRETATION:  CLINICAL INFORMATION: NAKUL. Cough.    A frontal view of the chest was obtained.     Comparison: 4/27/2020.     IMPRESSION:    The mediastinal cardiac silhouette is unremarkable.    The lungs are clear.     No acute osseous finding.     < end of copied text >        Impression    NAKUL:  she has not used CPAP in past and has not decompensated, despite history of sleep apnea.  Currently, she tolerates CPAP and would like to use it.      elevated BMI    hypoxia due to obesity and NAKUL, improved.        RECOMMEND     Continue CPAP 10 cm H2O at night.  If allowed by insurance for NAKUL or chronic respiratory failure, would discharge with PAP therapy from hospital.    Alternatively, I may arrange through my office though this may take some time.  She can be discharged without CPAP if insurance becomes an obstacle, since she was not using it preop, and respiratory status has returned to baseline    nasal O2 should be continued    home oxygen will be needed    continue bronchodilators      Labs, meds radiographic studies reviewed    Guido Monterroso MD    PULMONARY    MD Hong Gutierrez MD George Haralambou, MD    [phone]

## 2020-07-29 NOTE — PROGRESS NOTE ADULT - PROBLEM SELECTOR PLAN 1
-test BG AC/HS  -Continue Lantus 40 units QHS and Humalog 10 units AC meals   -continue Humalog moderate correction scale AC and Mod HS scale  -Needs insulin administration education  DISPO: patient would benefit from basal insulin with her metformin.   F/u with Dr. Nehemiah Ambrose, endocrine 8/17 at 1030am.

## 2020-07-29 NOTE — PROGRESS NOTE ADULT - SUBJECTIVE AND OBJECTIVE BOX
Chief Complaint: Uncontrolled DM    S: Pt. reports improvement in R facial pain and swelling today. Appetite has improved as well.    MEDICATIONS  (STANDING):  acetaminophen  IVPB .. 1000 milliGRAM(s) IV Intermittent once  ALBUTerol    90 MICROgram(s) HFA Inhaler 2 Puff(s) Inhalation every 6 hours  amitriptyline 10 milliGRAM(s) Oral at bedtime  atorvastatin 10 milliGRAM(s) Oral at bedtime  dextrose 5%. 1000 milliLiter(s) (50 mL/Hr) IV Continuous <Continuous>  dextrose 50% Injectable 12.5 Gram(s) IV Push once  dextrose 50% Injectable 25 Gram(s) IV Push once  dextrose 50% Injectable 25 Gram(s) IV Push once  enoxaparin Injectable 30 milliGRAM(s) SubCutaneous every 12 hours  FLUoxetine 20 milliGRAM(s) Oral daily  fluticasone propionate 50 MICROgram(s)/spray Nasal Spray 1 Spray(s) Both Nostrils two times a day  gabapentin 300 milliGRAM(s) Oral at bedtime  hydrochlorothiazide 12.5 milliGRAM(s) Oral daily  insulin glargine Injectable (LANTUS) 40 Unit(s) SubCutaneous at bedtime  insulin lispro (HumaLOG) corrective regimen sliding scale   SubCutaneous Before meals and at bedtime  insulin lispro Injectable (HumaLOG) 10 Unit(s) SubCutaneous three times a day before meals  levothyroxine 112 MICROGram(s) Oral daily  lisinopril 10 milliGRAM(s) Oral daily  montelukast 10 milliGRAM(s) Oral at bedtime  OXcarbazepine 300 milliGRAM(s) Oral two times a day  pantoprazole    Tablet 40 milliGRAM(s) Oral before breakfast  polyethylene glycol 3350 17 Gram(s) Oral two times a day  senna 2 Tablet(s) Oral at bedtime  sodium chloride 0.65% Nasal 2 Spray(s) Both Nostrils every 6 hours  tiotropium 18 MICROgram(s) Capsule 1 Capsule(s) Inhalation daily    MEDICATIONS  (PRN):  acetaminophen   Tablet .. 650 milliGRAM(s) Oral every 6 hours PRN Temp greater or equal to 38C (100.4F), Mild Pain (1 - 3)  benzocaine 15 mG/menthol 3.6 mG (Sugar-Free) Lozenge 1 Lozenge Oral every 2 hours PRN Sore Throat  dextrose 40% Gel 15 Gram(s) Oral once PRN Blood Glucose LESS THAN 70 milliGRAM(s)/deciliter  glucagon  Injectable 1 milliGRAM(s) IntraMuscular once PRN Glucose LESS THAN 70 milligrams/deciliter  ondansetron Injectable 4 milliGRAM(s) IV Push every 6 hours PRN Nausea and/or Vomiting  oxyCODONE    IR 5 milliGRAM(s) Oral every 6 hours PRN Moderate Pain (4 - 6)  oxyCODONE    IR 10 milliGRAM(s) Oral every 6 hours PRN Severe Pain (7 - 10)      Allergies    Fioricet (Rash)  gadobutrol (Rash; Urticaria; Hives)  IV Contrast (Short breath)  mushroom (Unknown)  venlafaxine (Hives)    Intolerances      Review of Systems:  Constitutional: No fever  Eyes: No blurry vision  Neuro: No tremors  HEENT: No pain  Cardiovascular: No chest pain, palpitations  Respiratory: No SOB, no cough  GI: No nausea, vomiting, abdominal pain  : No dysuria  Skin: no rash  Psych: no depression  Endocrine: no polyuria, polydipsia  Hem/lymph: no swelling  Osteoporosis: no fractures    ALL OTHER SYSTEMS REVIEWED AND NEGATIVE      PHYSICAL EXAM:  VITALS: T(C): 36.9 (07-29-20 @ 07:59)  T(F): 98.5 (07-29-20 @ 07:59), Max: 98.5 (07-29-20 @ 07:59)  HR: 79 (07-29-20 @ 09:29) (74 - 119)  BP: 107/74 (07-29-20 @ 07:59) (105/69 - 131/87)  RR:  (18 - 28)  SpO2:  (83% - 100%)  Wt(kg): --  GENERAL: NAD, well-groomed, +obese  EYES: No proptosis, anicteric  HEENT:  Atraumatic, Normocephalic, moist mucous membranes  SKIN: Dry, intact  MUSCULOSKELETAL: Full range of motion  PSYCH: Alert and oriented x 3, normal affect, normal mood      POCT Blood Glucose.: 141 mg/dL (07-29-20 @ 08:29)  POCT Blood Glucose.: 161 mg/dL (07-28-20 @ 21:59)  POCT Blood Glucose.: 171 mg/dL (07-28-20 @ 17:22)  POCT Blood Glucose.: 207 mg/dL (07-28-20 @ 12:41)  POCT Blood Glucose.: 129 mg/dL (07-28-20 @ 08:18)  POCT Blood Glucose.: 164 mg/dL (07-27-20 @ 21:22)  POCT Blood Glucose.: 116 mg/dL (07-27-20 @ 17:25)  POCT Blood Glucose.: 152 mg/dL (07-27-20 @ 12:21)  POCT Blood Glucose.: 127 mg/dL (07-27-20 @ 08:42)  POCT Blood Glucose.: 177 mg/dL (07-26-20 @ 21:20)  POCT Blood Glucose.: 129 mg/dL (07-26-20 @ 17:13)  POCT Blood Glucose.: 164 mg/dL (07-26-20 @ 12:16)      07-29    138  |  95<L>  |  11  ----------------------------<  127<H>  4.0   |  34<H>  |  0.67    EGFR if : 115  EGFR if non : 99    Ca    9.3      07-29            Thyroid Function Tests:

## 2020-07-30 ENCOUNTER — TRANSCRIPTION ENCOUNTER (OUTPATIENT)
Age: 55
End: 2020-07-30

## 2020-07-30 VITALS — OXYGEN SATURATION: 95 % | HEART RATE: 72 BPM

## 2020-07-30 LAB
ANION GAP SERPL CALC-SCNC: 12 MMOL/L — SIGNIFICANT CHANGE UP (ref 5–17)
BUN SERPL-MCNC: 10 MG/DL — SIGNIFICANT CHANGE UP (ref 7–23)
CALCIUM SERPL-MCNC: 9.5 MG/DL — SIGNIFICANT CHANGE UP (ref 8.4–10.5)
CHLORIDE SERPL-SCNC: 96 MMOL/L — SIGNIFICANT CHANGE UP (ref 96–108)
CO2 SERPL-SCNC: 32 MMOL/L — HIGH (ref 22–31)
CREAT SERPL-MCNC: 0.64 MG/DL — SIGNIFICANT CHANGE UP (ref 0.5–1.3)
GLUCOSE BLDC GLUCOMTR-MCNC: 174 MG/DL — HIGH (ref 70–99)
GLUCOSE BLDC GLUCOMTR-MCNC: 180 MG/DL — HIGH (ref 70–99)
GLUCOSE BLDC GLUCOMTR-MCNC: 206 MG/DL — HIGH (ref 70–99)
GLUCOSE SERPL-MCNC: 159 MG/DL — HIGH (ref 70–99)
HCT VFR BLD CALC: 38.9 % — SIGNIFICANT CHANGE UP (ref 34.5–45)
HGB BLD-MCNC: 11.3 G/DL — LOW (ref 11.5–15.5)
MCHC RBC-ENTMCNC: 23.7 PG — LOW (ref 27–34)
MCHC RBC-ENTMCNC: 29 GM/DL — LOW (ref 32–36)
MCV RBC AUTO: 81.7 FL — SIGNIFICANT CHANGE UP (ref 80–100)
NRBC # BLD: 0 /100 WBCS — SIGNIFICANT CHANGE UP (ref 0–0)
PLATELET # BLD AUTO: 338 K/UL — SIGNIFICANT CHANGE UP (ref 150–400)
POTASSIUM SERPL-MCNC: 4.1 MMOL/L — SIGNIFICANT CHANGE UP (ref 3.5–5.3)
POTASSIUM SERPL-SCNC: 4.1 MMOL/L — SIGNIFICANT CHANGE UP (ref 3.5–5.3)
RBC # BLD: 4.76 M/UL — SIGNIFICANT CHANGE UP (ref 3.8–5.2)
RBC # FLD: 16 % — HIGH (ref 10.3–14.5)
SODIUM SERPL-SCNC: 140 MMOL/L — SIGNIFICANT CHANGE UP (ref 135–145)
WBC # BLD: 8.61 K/UL — SIGNIFICANT CHANGE UP (ref 3.8–10.5)
WBC # FLD AUTO: 8.61 K/UL — SIGNIFICANT CHANGE UP (ref 3.8–10.5)

## 2020-07-30 PROCEDURE — 86901 BLOOD TYPING SEROLOGIC RH(D): CPT

## 2020-07-30 PROCEDURE — 82947 ASSAY GLUCOSE BLOOD QUANT: CPT

## 2020-07-30 PROCEDURE — 93970 EXTREMITY STUDY: CPT

## 2020-07-30 PROCEDURE — 84132 ASSAY OF SERUM POTASSIUM: CPT

## 2020-07-30 PROCEDURE — C1713: CPT

## 2020-07-30 PROCEDURE — C1889: CPT

## 2020-07-30 PROCEDURE — 99232 SBSQ HOSP IP/OBS MODERATE 35: CPT

## 2020-07-30 PROCEDURE — 82435 ASSAY OF BLOOD CHLORIDE: CPT

## 2020-07-30 PROCEDURE — 97162 PT EVAL MOD COMPLEX 30 MIN: CPT

## 2020-07-30 PROCEDURE — 83605 ASSAY OF LACTIC ACID: CPT

## 2020-07-30 PROCEDURE — 94640 AIRWAY INHALATION TREATMENT: CPT

## 2020-07-30 PROCEDURE — 85610 PROTHROMBIN TIME: CPT

## 2020-07-30 PROCEDURE — 97530 THERAPEUTIC ACTIVITIES: CPT

## 2020-07-30 PROCEDURE — 92610 EVALUATE SWALLOWING FUNCTION: CPT

## 2020-07-30 PROCEDURE — C1769: CPT

## 2020-07-30 PROCEDURE — 84100 ASSAY OF PHOSPHORUS: CPT

## 2020-07-30 PROCEDURE — 85027 COMPLETE CBC AUTOMATED: CPT

## 2020-07-30 PROCEDURE — 97165 OT EVAL LOW COMPLEX 30 MIN: CPT

## 2020-07-30 PROCEDURE — 86900 BLOOD TYPING SEROLOGIC ABO: CPT

## 2020-07-30 PROCEDURE — 71045 X-RAY EXAM CHEST 1 VIEW: CPT

## 2020-07-30 PROCEDURE — 70450 CT HEAD/BRAIN W/O DYE: CPT

## 2020-07-30 PROCEDURE — 83735 ASSAY OF MAGNESIUM: CPT

## 2020-07-30 PROCEDURE — 85014 HEMATOCRIT: CPT

## 2020-07-30 PROCEDURE — 84295 ASSAY OF SERUM SODIUM: CPT

## 2020-07-30 PROCEDURE — 82565 ASSAY OF CREATININE: CPT

## 2020-07-30 PROCEDURE — 82962 GLUCOSE BLOOD TEST: CPT

## 2020-07-30 PROCEDURE — 97116 GAIT TRAINING THERAPY: CPT

## 2020-07-30 PROCEDURE — 82330 ASSAY OF CALCIUM: CPT

## 2020-07-30 PROCEDURE — 99233 SBSQ HOSP IP/OBS HIGH 50: CPT

## 2020-07-30 PROCEDURE — 80048 BASIC METABOLIC PNL TOTAL CA: CPT

## 2020-07-30 PROCEDURE — 97112 NEUROMUSCULAR REEDUCATION: CPT

## 2020-07-30 PROCEDURE — 97535 SELF CARE MNGMENT TRAINING: CPT

## 2020-07-30 PROCEDURE — 82803 BLOOD GASES ANY COMBINATION: CPT

## 2020-07-30 PROCEDURE — U0003: CPT

## 2020-07-30 PROCEDURE — 94660 CPAP INITIATION&MGMT: CPT

## 2020-07-30 PROCEDURE — 83880 ASSAY OF NATRIURETIC PEPTIDE: CPT

## 2020-07-30 RX ORDER — ACETAMINOPHEN 500 MG
2 TABLET ORAL
Qty: 0 | Refills: 0 | DISCHARGE
Start: 2020-07-30

## 2020-07-30 RX ORDER — ACETAMINOPHEN 500 MG
2 TABLET ORAL
Qty: 160 | Refills: 0
Start: 2020-07-30 | End: 2020-08-18

## 2020-07-30 RX ADMIN — Medication 4: at 17:41

## 2020-07-30 RX ADMIN — OXCARBAZEPINE 300 MILLIGRAM(S): 300 TABLET, FILM COATED ORAL at 05:05

## 2020-07-30 RX ADMIN — Medication 20 MILLIGRAM(S): at 11:21

## 2020-07-30 RX ADMIN — TIOTROPIUM BROMIDE 1 CAPSULE(S): 18 CAPSULE ORAL; RESPIRATORY (INHALATION) at 11:20

## 2020-07-30 RX ADMIN — Medication 2: at 12:45

## 2020-07-30 RX ADMIN — OXCARBAZEPINE 300 MILLIGRAM(S): 300 TABLET, FILM COATED ORAL at 17:40

## 2020-07-30 RX ADMIN — ENOXAPARIN SODIUM 30 MILLIGRAM(S): 100 INJECTION SUBCUTANEOUS at 17:40

## 2020-07-30 RX ADMIN — Medication 2 SPRAY(S): at 11:20

## 2020-07-30 RX ADMIN — Medication 10 UNIT(S): at 17:41

## 2020-07-30 RX ADMIN — LISINOPRIL 7.5 MILLIGRAM(S): 2.5 TABLET ORAL at 05:04

## 2020-07-30 RX ADMIN — Medication 2 SPRAY(S): at 17:41

## 2020-07-30 RX ADMIN — Medication 1 SPRAY(S): at 05:06

## 2020-07-30 RX ADMIN — Medication 112 MICROGRAM(S): at 05:05

## 2020-07-30 RX ADMIN — Medication 2: at 08:42

## 2020-07-30 RX ADMIN — ALBUTEROL 2 PUFF(S): 90 AEROSOL, METERED ORAL at 17:42

## 2020-07-30 RX ADMIN — Medication 1 SPRAY(S): at 17:40

## 2020-07-30 RX ADMIN — ENOXAPARIN SODIUM 30 MILLIGRAM(S): 100 INJECTION SUBCUTANEOUS at 05:05

## 2020-07-30 RX ADMIN — PANTOPRAZOLE SODIUM 40 MILLIGRAM(S): 20 TABLET, DELAYED RELEASE ORAL at 05:05

## 2020-07-30 RX ADMIN — Medication 10 UNIT(S): at 08:42

## 2020-07-30 RX ADMIN — Medication 650 MILLIGRAM(S): at 08:45

## 2020-07-30 RX ADMIN — Medication 10 UNIT(S): at 12:45

## 2020-07-30 RX ADMIN — ALBUTEROL 2 PUFF(S): 90 AEROSOL, METERED ORAL at 11:21

## 2020-07-30 RX ADMIN — ALBUTEROL 2 PUFF(S): 90 AEROSOL, METERED ORAL at 05:05

## 2020-07-30 RX ADMIN — Medication 2 SPRAY(S): at 05:05

## 2020-07-30 RX ADMIN — Medication 650 MILLIGRAM(S): at 09:10

## 2020-07-30 NOTE — PROGRESS NOTE ADULT - PROBLEM SELECTOR PLAN 7
Pt now agreeable to CPAP. close outpt f/u c pulm
Pt now agreeable to CPAP. close outpt f/u c pulm
cont NIV. close outpt f/u c pulm
cont NIV. close outpt f/u c pulm

## 2020-07-30 NOTE — PROGRESS NOTE ADULT - PROBLEM SELECTOR PROBLEM 5
Steroid dependent
Renal cell carcinoma, left
Steroid dependent
Steroid dependent
Renal cell carcinoma, left
Steroid dependent

## 2020-07-30 NOTE — PROGRESS NOTE ADULT - PROBLEM SELECTOR PLAN 3
-atorvastatin 100mg po qdaily.
apprec Endo input. cont current regimen
-atorvastatin 100mg po qdaily.
apprec Endo input. cont current regimen

## 2020-07-30 NOTE — PROGRESS NOTE ADULT - SUBJECTIVE AND OBJECTIVE BOX
Chief Complaint: Uncontrolled DM2    S: Pt. reports improvement in face pain and swelling. Has good appetite w/o c/o N/V.    MEDICATIONS  (STANDING):  acetaminophen  IVPB .. 1000 milliGRAM(s) IV Intermittent once  ALBUTerol    90 MICROgram(s) HFA Inhaler 2 Puff(s) Inhalation every 6 hours  amitriptyline 10 milliGRAM(s) Oral at bedtime  atorvastatin 10 milliGRAM(s) Oral at bedtime  dextrose 5%. 1000 milliLiter(s) (50 mL/Hr) IV Continuous <Continuous>  dextrose 50% Injectable 12.5 Gram(s) IV Push once  dextrose 50% Injectable 25 Gram(s) IV Push once  dextrose 50% Injectable 25 Gram(s) IV Push once  enoxaparin Injectable 30 milliGRAM(s) SubCutaneous every 12 hours  FLUoxetine 20 milliGRAM(s) Oral daily  fluticasone propionate 50 MICROgram(s)/spray Nasal Spray 1 Spray(s) Both Nostrils two times a day  gabapentin 300 milliGRAM(s) Oral at bedtime  insulin glargine Injectable (LANTUS) 40 Unit(s) SubCutaneous at bedtime  insulin lispro (HumaLOG) corrective regimen sliding scale   SubCutaneous Before meals and at bedtime  insulin lispro Injectable (HumaLOG) 10 Unit(s) SubCutaneous three times a day before meals  levothyroxine 112 MICROGram(s) Oral daily  lisinopril 7.5 milliGRAM(s) Oral daily  montelukast 10 milliGRAM(s) Oral at bedtime  OXcarbazepine 300 milliGRAM(s) Oral two times a day  pantoprazole    Tablet 40 milliGRAM(s) Oral before breakfast  polyethylene glycol 3350 17 Gram(s) Oral two times a day  senna 2 Tablet(s) Oral at bedtime  sodium chloride 0.65% Nasal 2 Spray(s) Both Nostrils every 6 hours  tiotropium 18 MICROgram(s) Capsule 1 Capsule(s) Inhalation daily    MEDICATIONS  (PRN):  acetaminophen   Tablet .. 650 milliGRAM(s) Oral every 6 hours PRN Temp greater or equal to 38C (100.4F), Mild Pain (1 - 3)  benzocaine 15 mG/menthol 3.6 mG (Sugar-Free) Lozenge 1 Lozenge Oral every 2 hours PRN Sore Throat  dextrose 40% Gel 15 Gram(s) Oral once PRN Blood Glucose LESS THAN 70 milliGRAM(s)/deciliter  glucagon  Injectable 1 milliGRAM(s) IntraMuscular once PRN Glucose LESS THAN 70 milligrams/deciliter  ondansetron Injectable 4 milliGRAM(s) IV Push every 6 hours PRN Nausea and/or Vomiting  oxyCODONE    IR 5 milliGRAM(s) Oral every 6 hours PRN Moderate Pain (4 - 6)  oxyCODONE    IR 10 milliGRAM(s) Oral every 6 hours PRN Severe Pain (7 - 10)      Allergies    Fioricet (Rash)  gadobutrol (Rash; Urticaria; Hives)  IV Contrast (Short breath)  mushroom (Unknown)  venlafaxine (Hives)      Review of Systems:  Constitutional: No fever  Eyes: No blurry vision  Neuro: No tremors  HEENT: + FACE pain  Cardiovascular: No chest pain, palpitations  Respiratory: No SOB, no cough  GI: No nausea, vomiting, abdominal pain  : No dysuria  Skin: no rash  Psych: no depression  Endocrine: no polyuria, polydipsia  Hem/lymph: no swelling  Osteoporosis: no fractures        PHYSICAL EXAM:  VITALS: T(C): 37.1 (07-30-20 @ 08:22)  T(F): 98.7 (07-30-20 @ 08:22), Max: 98.7 (07-30-20 @ 08:22)  HR: 76 (07-30-20 @ 09:35) (75 - 99)  BP: 113/68 (07-30-20 @ 08:22) (97/61 - 128/80)  RR:  (19 - 20)  SpO2:  (92% - 99%)  Wt(kg): --  GENERAL: NAD, well-groomed, well-developed  EYES: No proptosis,  anicteric  HEENT:  Atraumatic, Normocephalic, moist mucous membranes  SKIN: Dry, intact  MUSCULOSKELETAL: Full range of motion  PSYCH: Alert and oriented x 3, normal affect, normal mood      POCT Blood Glucose.: 180 mg/dL (07-30-20 @ 08:38)  POCT Blood Glucose.: 148 mg/dL (07-29-20 @ 21:37)  POCT Blood Glucose.: 194 mg/dL (07-29-20 @ 17:17)  POCT Blood Glucose.: 173 mg/dL (07-29-20 @ 12:33)  POCT Blood Glucose.: 141 mg/dL (07-29-20 @ 08:29)  POCT Blood Glucose.: 161 mg/dL (07-28-20 @ 21:59)  POCT Blood Glucose.: 171 mg/dL (07-28-20 @ 17:22)  POCT Blood Glucose.: 207 mg/dL (07-28-20 @ 12:41)  POCT Blood Glucose.: 129 mg/dL (07-28-20 @ 08:18)  POCT Blood Glucose.: 164 mg/dL (07-27-20 @ 21:22)  POCT Blood Glucose.: 116 mg/dL (07-27-20 @ 17:25)  POCT Blood Glucose.: 152 mg/dL (07-27-20 @ 12:21)      07-30    140  |  96  |  10  ----------------------------<  159<H>  4.1   |  32<H>  |  0.64    EGFR if : 116  EGFR if non : 100    Ca    9.5      07-30            Thyroid Function Tests:

## 2020-07-30 NOTE — DISCHARGE NOTE NURSING/CASE MANAGEMENT/SOCIAL WORK - PATIENT PORTAL LINK FT
You can access the FollowMyHealth Patient Portal offered by [facility] by registering at the following website: [email or web address]. By joining [facility]’s FollowMyHealth portal, you will also be able to view your health information using other applications (apps) compatible with our system.

## 2020-07-30 NOTE — DISCHARGE NOTE NURSING/CASE MANAGEMENT/SOCIAL WORK - NSDCPEPTSTRK_GEN_ALL_CORE
Prescribed medications/Risk factors for stroke/Call 911 for stroke/Signs and symptoms of stroke/Need for follow up after discharge/Stroke education booklet/Stroke support groups for patients, families, and friends/Stroke warning signs and symptoms

## 2020-07-30 NOTE — PROGRESS NOTE ADULT - PROBLEM SELECTOR PLAN 5
-Pt reports intermittent steroids for COPD flares. Used from March-May with a taper  -Most recent ended 3 weeks ago and was only on for 8 days
-Pt reports intermittent steroids for COPD flares  -Most recent ended 3 weeks ago and was only on for less than 2 weeks at that time.   -Asking to be evaluated by her Pulmonologist while inpatient    discussed plan w/pt and RN  pager: [phone]   cell: [phone] (8a-6pm)  office: [phone]
-Pt reports intermittent steroids for COPD flares. Used from March-May with a taper  -Most recent ended 3 weeks ago and was only on for 8 days
pt aware and is scheduled for surgery in 9/20.
-Pt reports intermittent steroids for COPD flares. Used from March-May with a taper  -Most recent ended 3 weeks ago and was only on for 8 days   Discussed with BRYSON De Leon and RN.  Laura Rivera MD  [phone]
pt aware and is scheduled for surgery in 9/20.

## 2020-07-30 NOTE — PROGRESS NOTE ADULT - PROVIDER SPECIALTY LIST ADULT
Endocrinology
Hospitalist
Hospitalist
NSICU
Neurosurgery
Pulmonology
Hospitalist
Hospitalist
Endocrinology

## 2020-07-30 NOTE — PROGRESS NOTE ADULT - ASSESSMENT
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, PMHx of asthma( last inhaler used 2 weeks ago, last ER visit 09/2019), R trigeminal neuralgia since 2008, chronic bronchitis, NAKUL( refuses C- pap due to claustrophobia,  HTN, DM2, HLD, fibromyalgia, anxiety, trigeminal neuralgia, degenerative joint disease,  IgA vasculitis since 2018(ast flare up couple of months ago),  disabled since 2015 with renal cell carcinoma waiting for surgery in 09/2020, h/o microvascular decompression for trigeminal neuralgia in 2013, presents in [facility] with trigeminal neuralgia with migraines causing pain & difficulty moving her neck, noticed its getting worse, scheduled for right microvascular decompression on 07/17/2020.       PROCEDURE: s/p right microvascular decompression on 7/23/2020    PLAN:  - Con't acetaminophen, gabapentin, oxyCODONE  for pain control  - Con't amitriptyline and FLUoxetine   - Con't OXcarbazepine 300 milliGRAM(s) Oral two times a day  - Pulm following con't singular, spiriva, albuterol, and flonase. CPAP for sleep.   - Con't hctz and lisinopril for bp control  - Con't atorvastatin for lipid control  - Endocrine following con't  Lantus 40u qhs + METFORMIN 1G PO BID.  F/u with Dr. Nehemiah Ambrose, endocrine 8/17 at 1030am. Monitor for glucose control.   - Con't levothyroxine for hypothyroidism  - Con't DVT ppx SQL and  SCDs for DVT prophylaxis  - Con't GI ppx senna, miralax and protonix    - PT/OT - home pt.  involved with dispo planning. bariatric rolling walker shower chair, and grab bar      Will discuss with attending Dr. Otoole  [phone]      Assessment:  Please Check When Present   [] Acute blood loss anemia  []Cerebral edema, [] Brain compression/ herniation,     []  GCS  E   V  M     Heart Failure: []Acute, [] acute on chronic , []chronic  Heart Failure:  [] Diastolic (HFpEF), [] Systolic (HFrEF), []Combined (HFpEF and HFrEF), [] RHF, [] Pulm HTN, [] Other    [] FERNIE, [] ATN, [] AIN, [] other  [] CKD1, [] CKD2, [] CKD 3, [] CKD 4, [] CKD 5, []ESRD    Encephalopathy: [] Metabolic, [] Hepatic, [] toxic, [] Neurological, [] Other    Abnormal Nurtitional Status: [] malnurtition (see nutrition note), [ ]underweight: BMI < 19, [] morbid obesity: BMI >40, [] Cachexia  [] Functional quadriplegia  [] Sepsis  [] hypovolemic shock,[] cardiogenic shock, [] hemorrhagic shock, [] neuogenic shock  [] Acute Respiratory Failure

## 2020-07-30 NOTE — PROGRESS NOTE ADULT - PROBLEM SELECTOR PLAN 1
-test BG AC/HS  -Continue Lantus 40 units QHS and Humalog 10 units AC meals   -continue Humalog moderate correction scale AC and Mod HS scale  -Needs insulin administration education  DISPO: patient would benefit from basal insulin with her metformin- Lantus 40u qhs + METFORMIN 1G PO BID.   F/u with Dr. Nehemiah Ambrose, endocrine 8/17 at 1030am.

## 2020-07-30 NOTE — PROGRESS NOTE ADULT - SUBJECTIVE AND OBJECTIVE BOX
SUBJECTIVE: Seen and examined at bedside.  On supplemental oxygen overnight.  No complaints this morning. Spoke to pulmonary/ medicine attending clear for discharge. Endocrine rec's appreciated. PT rec's home PT.  help appreciated with dispo planning.     Vital Signs Last 24 Hrs  T(C): 36.9 (07-30-20 @ 11:54), Max: 37.1 (07-30-20 @ 08:22)  T(F): 98.4 (07-30-20 @ 11:54), Max: 98.7 (07-30-20 @ 08:22)  HR: 82 (07-30-20 @ 11:54) (75 - 99)  BP: 102/78 (07-30-20 @ 11:54) (97/61 - 128/80)  BP(mean): --  RR: 20 (07-30-20 @ 11:54) (19 - 20)  SpO2: 95% (07-30-20 @ 11:54) (92% - 99%)    PHYSICAL EXAM:    Constitutional: No Acute Distress     Neurological: Awake, alert, oriented to person, place and time, speech clear and fluent, face equal, tongue midline, PERRL, eomi briskly following commands, no drift, moves all extremities with 5/5 strength, sensation intact to light touch throughout,     Incision/Wound:  c/d/i w/ suture    Pulmonary: Clear to Auscultation, No rales, No rhonchi, No wheezes     Cardiovascular: S1, S2, Regular rate and rhythm     Gastrointestinal: Soft, Non-tender, Non-distended,       LABS:                          11.3   8.61  )-----------( 338      ( 30 Jul 2020 05:45 )             38.9    07-30    140  |  96  |  10  ----------------------------<  159<H>  4.1   |  32<H>  |  0.64    Ca    9.5      30 Jul 2020 05:45        07-29 @ 07:01  -  07-30 @ 07:00  --------------------------------------------------------  IN: 620 mL / OUT: 600 mL / NET: 20 mL    07-30 @ 07:01 - 07-30 @ 12:19  --------------------------------------------------------  IN: 260 mL / OUT: 0 mL / NET: 260 mL        IMAGING:       MEDICATIONS:  Antibiotics:    Neuro:  acetaminophen   Tablet .. 650 milliGRAM(s) Oral every 6 hours PRN Temp greater or equal to 38C (100.4F), Mild Pain (1 - 3)  acetaminophen  IVPB .. 1000 milliGRAM(s) IV Intermittent once  amitriptyline 10 milliGRAM(s) Oral at bedtime  FLUoxetine 20 milliGRAM(s) Oral daily  gabapentin 300 milliGRAM(s) Oral at bedtime  ondansetron Injectable 4 milliGRAM(s) IV Push every 6 hours PRN Nausea and/or Vomiting  OXcarbazepine 300 milliGRAM(s) Oral two times a day  oxyCODONE    IR 5 milliGRAM(s) Oral every 6 hours PRN Moderate Pain (4 - 6)  oxyCODONE    IR 10 milliGRAM(s) Oral every 6 hours PRN Severe Pain (7 - 10)    Cardiac:  lisinopril 7.5 milliGRAM(s) Oral daily    Pulm:  ALBUTerol    90 MICROgram(s) HFA Inhaler 2 Puff(s) Inhalation every 6 hours  montelukast 10 milliGRAM(s) Oral at bedtime  tiotropium 18 MICROgram(s) Capsule 1 Capsule(s) Inhalation daily    GI/:  pantoprazole    Tablet 40 milliGRAM(s) Oral before breakfast  polyethylene glycol 3350 17 Gram(s) Oral two times a day  senna 2 Tablet(s) Oral at bedtime    Other:   atorvastatin 10 milliGRAM(s) Oral at bedtime  benzocaine 15 mG/menthol 3.6 mG (Sugar-Free) Lozenge 1 Lozenge Oral every 2 hours PRN Sore Throat  dextrose 40% Gel 15 Gram(s) Oral once PRN Blood Glucose LESS THAN 70 milliGRAM(s)/deciliter  dextrose 5%. 1000 milliLiter(s) IV Continuous <Continuous>  dextrose 50% Injectable 12.5 Gram(s) IV Push once  dextrose 50% Injectable 25 Gram(s) IV Push once  dextrose 50% Injectable 25 Gram(s) IV Push once  enoxaparin Injectable 30 milliGRAM(s) SubCutaneous every 12 hours  fluticasone propionate 50 MICROgram(s)/spray Nasal Spray 1 Spray(s) Both Nostrils two times a day  glucagon  Injectable 1 milliGRAM(s) IntraMuscular once PRN Glucose LESS THAN 70 milligrams/deciliter  insulin glargine Injectable (LANTUS) 40 Unit(s) SubCutaneous at bedtime  insulin lispro (HumaLOG) corrective regimen sliding scale   SubCutaneous Before meals and at bedtime  insulin lispro Injectable (HumaLOG) 10 Unit(s) SubCutaneous three times a day before meals  levothyroxine 112 MICROGram(s) Oral daily  sodium chloride 0.65% Nasal 2 Spray(s) Both Nostrils every 6 hours        DIET: Carb control diet

## 2020-07-30 NOTE — PROGRESS NOTE ADULT - SUBJECTIVE AND OBJECTIVE BOX
Pulmonary Follow up Note     tolerated craniotomy    respiratory status is stable    she has history of trigeminal neuralgia, migraine  obesity , obstructive airways disease, NAKUL    On NC    has used CPAp in hospital  though had not been using athome    dc planning in progress    Vital Signs Last 24 Hrs  T(C): 36.9 (30 Jul 2020 11:54), Max: 37.1 (30 Jul 2020 08:22)  T(F): 98.4 (30 Jul 2020 11:54), Max: 98.7 (30 Jul 2020 08:22)  HR: 72 (30 Jul 2020 15:14) (72 - 99)  BP: 102/78 (30 Jul 2020 11:54) (102/78 - 128/80)  BP(mean): --  RR: 20 (30 Jul 2020 11:54) (19 - 20)  SpO2: 95% (30 Jul 2020 15:14) (92% - 98%)    respirations  unlabored, no dyspnea  elevated BMI  lungs equal   heart RRR no murmurs  abdomen soft NT  extremities no edema  pulses regular  alert conversant       Impression    OAD, continue on inhaled bronchodilator     NAKUL:    will plan for arranging for CPAp at home.  No need to dc on cPAp as sleep study not available    DVT prophylaxis      Labs, meds radiographic studies reviewed    Guido Monterroso MD    PULMONARY    MD Hong Gutierrez MD George Haralambou, MD    [phone]

## 2020-07-30 NOTE — PROGRESS NOTE ADULT - PROBLEM SELECTOR PROBLEM 3
Mixed hyperlipidemia
Type 2 diabetes, uncontrolled, with neuropathy
Mixed hyperlipidemia
Type 2 diabetes, uncontrolled, with neuropathy

## 2020-07-30 NOTE — PROGRESS NOTE ADULT - REASON FOR ADMISSION
surgery
surgery
Trigeminal Neuralgia
admitted after MVD for trigeminal neuralgia
admitted after MVD for trigeminal neuralgia
trigeminal neuralgia
Trigeminal Neuralgia Surgery

## 2020-07-30 NOTE — PROGRESS NOTE ADULT - PROBLEM SELECTOR PLAN 6
morbid. weight loss and diet discussed. close outpt f/u d/w pt

## 2020-07-30 NOTE — DISCHARGE NOTE NURSING/CASE MANAGEMENT/SOCIAL WORK - NSDCFUADDAPPT_GEN_ALL_CORE_FT
You have an appointment with Dr Nehemiah Ambrose (endocrine) on 8/17 at 1030am for follow up  Please call to schedule an appointment with Pulmonologist upon discharge  Please call to schedule an appointment with your Primary care Physician upon discharge.

## 2020-07-30 NOTE — PROGRESS NOTE ADULT - ASSESSMENT
54 yo female with T2DM uncontrolled exacerbated by 3 months of steroids (HbA1c 10.8%) x 2 years with neuropathy, HTN, untreated NAKUL, HLD, fibromyalgia, anxiety, R trigeminal neuralgia, degenerative joint disease, IgA vasculitis here for R microvascular decompression for her trigeminal neuralgia.

## 2020-07-30 NOTE — PROGRESS NOTE ADULT - PROBLEM SELECTOR PLAN 2
-continue Synthroid 112mcg po qdaily, give an hour before other meds and food.
pt was hypoxic c ambulation as outpt per pulm note. She agrees to use CPAP at night understanding her life-threatening levels of hypoxia. She will require home oxygen. agree with pulbrandyn torres
-continue Synthroid 112mcg po qdaily, give an hour before other meds and food.
pt was hypoxic c ambulation as outpt per pulm note. She agrees to use CPAP at night understanding her life-threatening levels of hypoxia. She will require home oxygen. apprec pulm brian. check VBG for CO2 given slight elevation in bicarb
pt was hypoxic c ambulation as outpt per pulm note. apprec pulm eval. home O2. positive pressure ventilation per pulm
pt was hypoxic c ambulation as outpt per pulm note. apprec pulm eval. home O2. positive pressure ventilation per pulm

## 2020-07-30 NOTE — PROGRESS NOTE ADULT - PROBLEM SELECTOR PROBLEM 6
Obesity, unspecified classification, unspecified obesity type, unspecified whether serious comorbidity present

## 2020-07-30 NOTE — PROGRESS NOTE ADULT - SUBJECTIVE AND OBJECTIVE BOX
Maximino Mariejosieluis enrique   Pager [phone]  Office [phone]      CC: Patient is a 55y old  Female who presents with a chief complaint of surgery (29 Jul 2020 11:26)      SUBJECTIVE / OVERNIGHT EVENTS:    MEDICATIONS  (STANDING):  acetaminophen  IVPB .. 1000 milliGRAM(s) IV Intermittent once  ALBUTerol    90 MICROgram(s) HFA Inhaler 2 Puff(s) Inhalation every 6 hours  amitriptyline 10 milliGRAM(s) Oral at bedtime  atorvastatin 10 milliGRAM(s) Oral at bedtime  dextrose 5%. 1000 milliLiter(s) (50 mL/Hr) IV Continuous <Continuous>  dextrose 50% Injectable 12.5 Gram(s) IV Push once  dextrose 50% Injectable 25 Gram(s) IV Push once  dextrose 50% Injectable 25 Gram(s) IV Push once  enoxaparin Injectable 30 milliGRAM(s) SubCutaneous every 12 hours  FLUoxetine 20 milliGRAM(s) Oral daily  fluticasone propionate 50 MICROgram(s)/spray Nasal Spray 1 Spray(s) Both Nostrils two times a day  gabapentin 300 milliGRAM(s) Oral at bedtime  insulin glargine Injectable (LANTUS) 40 Unit(s) SubCutaneous at bedtime  insulin lispro (HumaLOG) corrective regimen sliding scale   SubCutaneous Before meals and at bedtime  insulin lispro Injectable (HumaLOG) 10 Unit(s) SubCutaneous three times a day before meals  levothyroxine 112 MICROGram(s) Oral daily  lisinopril 7.5 milliGRAM(s) Oral daily  montelukast 10 milliGRAM(s) Oral at bedtime  OXcarbazepine 300 milliGRAM(s) Oral two times a day  pantoprazole    Tablet 40 milliGRAM(s) Oral before breakfast  polyethylene glycol 3350 17 Gram(s) Oral two times a day  senna 2 Tablet(s) Oral at bedtime  sodium chloride 0.65% Nasal 2 Spray(s) Both Nostrils every 6 hours  tiotropium 18 MICROgram(s) Capsule 1 Capsule(s) Inhalation daily    MEDICATIONS  (PRN):  acetaminophen   Tablet .. 650 milliGRAM(s) Oral every 6 hours PRN Temp greater or equal to 38C (100.4F), Mild Pain (1 - 3)  benzocaine 15 mG/menthol 3.6 mG (Sugar-Free) Lozenge 1 Lozenge Oral every 2 hours PRN Sore Throat  dextrose 40% Gel 15 Gram(s) Oral once PRN Blood Glucose LESS THAN 70 milliGRAM(s)/deciliter  glucagon  Injectable 1 milliGRAM(s) IntraMuscular once PRN Glucose LESS THAN 70 milligrams/deciliter  ondansetron Injectable 4 milliGRAM(s) IV Push every 6 hours PRN Nausea and/or Vomiting  oxyCODONE    IR 5 milliGRAM(s) Oral every 6 hours PRN Moderate Pain (4 - 6)  oxyCODONE    IR 10 milliGRAM(s) Oral every 6 hours PRN Severe Pain (7 - 10)      Vital Signs Last 24 Hrs  T(C): 37.1 (30 Jul 2020 08:22), Max: 37.1 (30 Jul 2020 08:22)  T(F): 98.7 (30 Jul 2020 08:22), Max: 98.7 (30 Jul 2020 08:22)  HR: 76 (30 Jul 2020 09:35) (75 - 99)  BP: 113/68 (30 Jul 2020 08:22) (97/61 - 128/80)  BP(mean): --  RR: 20 (30 Jul 2020 08:22) (19 - 20)  SpO2: 95% (30 Jul 2020 09:35) (92% - 99%)  CAPILLARY BLOOD GLUCOSE      POCT Blood Glucose.: 180 mg/dL (30 Jul 2020 08:38)  POCT Blood Glucose.: 148 mg/dL (29 Jul 2020 21:37)  POCT Blood Glucose.: 194 mg/dL (29 Jul 2020 17:17)  POCT Blood Glucose.: 173 mg/dL (29 Jul 2020 12:33)    I&O's Summary    29 Jul 2020 07:01  -  30 Jul 2020 07:00  --------------------------------------------------------  IN: 620 mL / OUT: 600 mL / NET: 20 mL      tele:    PHYSICAL EXAM:    GENERAL: NAD   HEENT: EOMI, PERRL  PULM: Clear to auscultation bilaterally  CV: Regular rate and rhythm; nl S1, S2; No murmurs, rubs, or gallops  ABDOMEN: Soft, Nontender, Nondistended; Bowel sounds present  EXTREMITIES/MSK:  No edema, calf tenderness   PSYCH: AAOx3  NEUROLOGY: non-focal          LABS:                        11.3   8.61  )-----------( 338      ( 30 Jul 2020 05:45 )             38.9     07-30    140  |  96  |  10  ----------------------------<  159<H>  4.1   |  32<H>  |  0.64    Ca    9.5      30 Jul 2020 05:45                  RADIOLOGY & ADDITIONAL TESTS:    Imaging Personally Reviewed:    Consultant(s) Notes Reviewed:      Care Discussed with Consultants/Other Providers: Maximino Escalante   Pager [phone]  Office [phone]      CC: Patient is a 55y old  Female who presents with a chief complaint of surgery (29 Jul 2020 11:26)      SUBJECTIVE / OVERNIGHT EVENTS: Feels good. No cough/sob/cp. No f/c/r. No n/v/d.     MEDICATIONS  (STANDING):  acetaminophen  IVPB .. 1000 milliGRAM(s) IV Intermittent once  ALBUTerol    90 MICROgram(s) HFA Inhaler 2 Puff(s) Inhalation every 6 hours  amitriptyline 10 milliGRAM(s) Oral at bedtime  atorvastatin 10 milliGRAM(s) Oral at bedtime  dextrose 5%. 1000 milliLiter(s) (50 mL/Hr) IV Continuous <Continuous>  dextrose 50% Injectable 12.5 Gram(s) IV Push once  dextrose 50% Injectable 25 Gram(s) IV Push once  dextrose 50% Injectable 25 Gram(s) IV Push once  enoxaparin Injectable 30 milliGRAM(s) SubCutaneous every 12 hours  FLUoxetine 20 milliGRAM(s) Oral daily  fluticasone propionate 50 MICROgram(s)/spray Nasal Spray 1 Spray(s) Both Nostrils two times a day  gabapentin 300 milliGRAM(s) Oral at bedtime  insulin glargine Injectable (LANTUS) 40 Unit(s) SubCutaneous at bedtime  insulin lispro (HumaLOG) corrective regimen sliding scale   SubCutaneous Before meals and at bedtime  insulin lispro Injectable (HumaLOG) 10 Unit(s) SubCutaneous three times a day before meals  levothyroxine 112 MICROGram(s) Oral daily  lisinopril 7.5 milliGRAM(s) Oral daily  montelukast 10 milliGRAM(s) Oral at bedtime  OXcarbazepine 300 milliGRAM(s) Oral two times a day  pantoprazole    Tablet 40 milliGRAM(s) Oral before breakfast  polyethylene glycol 3350 17 Gram(s) Oral two times a day  senna 2 Tablet(s) Oral at bedtime  sodium chloride 0.65% Nasal 2 Spray(s) Both Nostrils every 6 hours  tiotropium 18 MICROgram(s) Capsule 1 Capsule(s) Inhalation daily    MEDICATIONS  (PRN):  acetaminophen   Tablet .. 650 milliGRAM(s) Oral every 6 hours PRN Temp greater or equal to 38C (100.4F), Mild Pain (1 - 3)  benzocaine 15 mG/menthol 3.6 mG (Sugar-Free) Lozenge 1 Lozenge Oral every 2 hours PRN Sore Throat  dextrose 40% Gel 15 Gram(s) Oral once PRN Blood Glucose LESS THAN 70 milliGRAM(s)/deciliter  glucagon  Injectable 1 milliGRAM(s) IntraMuscular once PRN Glucose LESS THAN 70 milligrams/deciliter  ondansetron Injectable 4 milliGRAM(s) IV Push every 6 hours PRN Nausea and/or Vomiting  oxyCODONE    IR 5 milliGRAM(s) Oral every 6 hours PRN Moderate Pain (4 - 6)  oxyCODONE    IR 10 milliGRAM(s) Oral every 6 hours PRN Severe Pain (7 - 10)      Vital Signs Last 24 Hrs  T(C): 37.1 (30 Jul 2020 08:22), Max: 37.1 (30 Jul 2020 08:22)  T(F): 98.7 (30 Jul 2020 08:22), Max: 98.7 (30 Jul 2020 08:22)  HR: 76 (30 Jul 2020 09:35) (75 - 99)  BP: 113/68 (30 Jul 2020 08:22) (97/61 - 128/80)  BP(mean): --  RR: 20 (30 Jul 2020 08:22) (19 - 20)  SpO2: 95% (30 Jul 2020 09:35) (92% - 99%)  CAPILLARY BLOOD GLUCOSE      POCT Blood Glucose.: 180 mg/dL (30 Jul 2020 08:38)  POCT Blood Glucose.: 148 mg/dL (29 Jul 2020 21:37)  POCT Blood Glucose.: 194 mg/dL (29 Jul 2020 17:17)  POCT Blood Glucose.: 173 mg/dL (29 Jul 2020 12:33)    I&O's Summary    29 Jul 2020 07:01  -  30 Jul 2020 07:00  --------------------------------------------------------  IN: 620 mL / OUT: 600 mL / NET: 20 mL          PHYSICAL EXAM:    GENERAL: NAD   HEENT: EOMI, PERRL  PULM: Clear to auscultation bilaterally  CV: Regular rate and rhythm; nl S1, S2; No murmurs, rubs, or gallops  ABDOMEN: Soft, Nontender, Nondistended; Bowel sounds present  EXTREMITIES/MSK:  No edema, calf tenderness   PSYCH: AAOx3  NEUROLOGY: non-focal          LABS:                        11.3   8.61  )-----------( 338      ( 30 Jul 2020 05:45 )             38.9     07-30    140  |  96  |  10  ----------------------------<  159<H>  4.1   |  32<H>  |  0.64    Ca    9.5      30 Jul 2020 05:45                  RADIOLOGY & ADDITIONAL TESTS:    Imaging Personally Reviewed:    Consultant(s) Notes Reviewed:      Care Discussed with Consultants/Other Providers: neurosurg regard Insulin

## 2020-07-30 NOTE — PROGRESS NOTE ADULT - PROBLEM SELECTOR PROBLEM 1
Type 2 diabetes, uncontrolled, with neuropathy
Trigeminal neuralgia
Type 2 diabetes, uncontrolled, with neuropathy
Type 2 diabetes, uncontrolled, with neuropathy
Trigeminal neuralgia
Type 2 diabetes, uncontrolled, with neuropathy

## 2020-08-04 ENCOUNTER — APPOINTMENT (OUTPATIENT)
Dept: NEUROSURGERY | Facility: CLINIC | Age: 55
End: 2020-08-04
Payer: MEDICARE

## 2020-08-04 VITALS
BODY MASS INDEX: 53.05 KG/M2 | TEMPERATURE: 96.4 F | DIASTOLIC BLOOD PRESSURE: 97 MMHG | OXYGEN SATURATION: 99 % | HEIGHT: 61 IN | RESPIRATION RATE: 16 BRPM | WEIGHT: 281 LBS | SYSTOLIC BLOOD PRESSURE: 156 MMHG | HEART RATE: 100 BPM

## 2020-08-04 PROBLEM — M54.9 DORSALGIA, UNSPECIFIED: Chronic | Status: ACTIVE | Noted: 2020-07-10

## 2020-08-04 PROBLEM — M54.10 RADICULOPATHY, SITE UNSPECIFIED: Chronic | Status: ACTIVE | Noted: 2020-07-10

## 2020-08-04 PROBLEM — C64.2 MALIGNANT NEOPLASM OF LEFT KIDNEY, EXCEPT RENAL PELVIS: Chronic | Status: ACTIVE | Noted: 2020-07-10

## 2020-08-04 PROBLEM — R29.6 REPEATED FALLS: Chronic | Status: ACTIVE | Noted: 2020-07-10

## 2020-08-04 PROBLEM — I77.6 ARTERITIS, UNSPECIFIED: Chronic | Status: ACTIVE | Noted: 2020-07-10

## 2020-08-04 PROBLEM — E11.9 TYPE 2 DIABETES MELLITUS WITHOUT COMPLICATIONS: Chronic | Status: ACTIVE | Noted: 2019-10-09

## 2020-08-04 PROBLEM — Z87.39 PERSONAL HISTORY OF OTHER DISEASES OF THE MUSCULOSKELETAL SYSTEM AND CONNECTIVE TISSUE: Chronic | Status: ACTIVE | Noted: 2020-07-10

## 2020-08-04 PROBLEM — M54.12 RADICULOPATHY, CERVICAL REGION: Chronic | Status: ACTIVE | Noted: 2020-07-10

## 2020-08-04 PROCEDURE — 99024 POSTOP FOLLOW-UP VISIT: CPT

## 2020-08-04 NOTE — ASSESSMENT
[FreeTextEntry1] : IMPRESSION:\par 1. Right retrosigmoid incision is healing well.\par 2. Pt reports that facial pain related to TN has ceased since surgery.\par \par \par PLAN:\par 1. F/U 9/4/20 with Dr. Otoole and NP.\par 2. Okay to use baby shampoo and pat dry the incision thoroughly.\par 3. Pt to report any fevers, incisional redness or drainage to us immediately,\par 4. Sutures removed without difficulty.\par 5. Pt to f/u with PCP.\par

## 2020-08-04 NOTE — PHYSICAL EXAM
[General Appearance - Alert] : alert [General Appearance - In No Acute Distress] : in no acute distress [General Appearance - Well Nourished] : well nourished [General Appearance - Well Developed] : well developed [General Appearance - Well-Appearing] : healthy appearing [Clean] : clean [Dry] : dry [Healing Well] : healing well [No Drainage] : without drainage [Normal Skin] : normal [Oriented To Time, Place, And Person] : oriented to person, place, and time [Impaired Insight] : insight and judgment were intact [Affect] : the affect was normal [Memory Recent] : recent memory was not impaired [Person] : oriented to person [Place] : oriented to place [Time] : oriented to time [Cranial Nerves Optic (II)] : visual acuity intact bilaterally,  pupils equal round and reactive to light [Cranial Nerves Oculomotor (III)] : extraocular motion intact [Cranial Nerves Trigeminal (V)] : facial sensation intact symmetrically [Cranial Nerves Facial (VII)] : face symmetrical [Cranial Nerves Vestibulocochlear (VIII)] : hearing was intact bilaterally [Cranial Nerves Glossopharyngeal (IX)] : tongue and palate midline [Cranial Nerves Accessory (XI - Cranial And Spinal)] : head turning and shoulder shrug symmetric [Cranial Nerves Hypoglossal (XII)] : there was no tongue deviation with protrusion [] : no respiratory distress [Respiration, Rhythm And Depth] : normal respiratory rhythm and effort [Exaggerated Use Of Accessory Muscles For Inspiration] : no accessory muscle use [Heart Rate And Rhythm] : heart rate was normal and rhythm regular [Erythema] : not erythematous [Tender] : not tender [Warm] : not warm [Indurated] : not indurated [Fluctuant] : not fluctuant [FreeTextEntry1] : right retrosigmoid [FreeTextEntry5] : wears glasses

## 2020-08-04 NOTE — HISTORY OF PRESENT ILLNESS
[FreeTextEntry1] : This 55-year-old woman suffers from a type 1 trigeminal neuralgia for over 10 years with a prior treatment in 2013 by  percutaneous treatment with temporary effect.  She is on two medication with high-dose and  refractory to pain.  Her pain is in the distribution of the three branches of the trigeminal nerve, but mainly V2 and V3.  She would like to proceed with microvascular decompression and does not want any other options including any other percutaneous  procedure or gamma knife radiosurgery.  We discussed that this option is the best option considering her young age.  She is morbidly obese with BMI of 58 and that could potentially be a complication for perioperative period. \par \par Today she presents for her first post op visit.  She reports that all facial pain is gone since surgery.  She reports occasional headaches 4-5/10 not associated with other symptoms and relieved with Tylenol prn. She reports occasional dizziness.  She ambulates with cane assist and denies falls.  Right retrosigmoid incision is well approximated without any evidence of redness, drainage.  She denies fever.\par \par She continues taking Oxcarbazepine.

## 2020-08-16 NOTE — COUNSELING
[Good understanding] : Patient has a good understanding of disease, goals and obesity follow-up plan [Benefits of weight loss discussed] : Benefits of weight loss discussed

## 2020-08-17 ENCOUNTER — APPOINTMENT (OUTPATIENT)
Dept: ENDOCRINOLOGY | Facility: CLINIC | Age: 55
End: 2020-08-17
Payer: MEDICARE

## 2020-08-17 PROCEDURE — 99443: CPT

## 2020-08-17 RX ORDER — GLIPIZIDE 5 MG/1
5 TABLET, EXTENDED RELEASE ORAL DAILY
Qty: 90 | Refills: 3 | Status: COMPLETED | COMMUNITY
Start: 2019-01-31 | End: 2020-08-17

## 2020-08-17 NOTE — HISTORY OF PRESENT ILLNESS
[Home] : at home, [unfilled] , at the time of the visit. [Medical Office: ([facility])___] : at the medical office located in  [Verbal consent obtained from patient] : the patient, [unfilled] [FreeTextEntry1] : Patient feels well , she is asymptomatic. /Patient feels tired, sleepy, she has no energy. Her weight has not changed. She is exercising regularly and following the diet. Her blood glucose at home are not well controlled. The FBS in the laboratory was 351 mg/dl and the HbA1c was 10.9 %. The renal function is within normal limits. The lipid panel was abnormal. She denies low blood glucose during the night. She denies chest pain, or SOB. Taking her medications regularly. \par

## 2020-08-17 NOTE — ASSESSMENT
[FreeTextEntry1] : Poor diabetic control\par Will start Trulicity weekly\par The Urologist will order an MRI abdomen for the kidneys and adrenals\par She has only one kidney\par Patient is clinically euthyroid\par Will continue the same Levothyroxine dose

## 2020-08-17 NOTE — DATA REVIEWED
[FreeTextEntry1] : The FBS and hbA1c indicates poor control. No recent MRI of the adrenal glands. The thyroid tests were normal. On 6/19 the US thyroid was fine.

## 2020-08-18 ENCOUNTER — APPOINTMENT (OUTPATIENT)
Dept: INTERNAL MEDICINE | Facility: CLINIC | Age: 55
End: 2020-08-18
Payer: MEDICARE

## 2020-08-18 PROCEDURE — 99442: CPT

## 2020-08-18 NOTE — ASSESSMENT
[FreeTextEntry1] : Time spent for this encounter :  11 minutes.\par More than 50 % of the time spent for - Disease Management and Medication Adherence.\par \par 55 year F patient found to have stable Hypertension, Hypothyroidism, Type 2 Diabetes Mellitus, Reactive Airway Disease, Morbid Obesity, Asthma,with the current regimen, diet and life style modifications, as counseled. Prior results reviewed and discussed with the patient during today's Telephonic encounter. Plan as ordered. \par \par Patient was recently hospitalized, findings of S/P Decompression Sx for symptomatic Trigeminal Neuralgia, recommendations reviewed and discussed with the patient.\par \par Patient granted verbal permission to provide Telephonic/Tele Health service in reference to today's encounter, as a substitute form of a visit, for a standard office visit encounter in the phase of an ongoing Pandemic / Covid -19, with the awareness and acceptance of a possible limited nature of such an encounter, with a possibility of an inadvertent omission of possible findings and misleading treatment options, due to possible erroneous and/or incomplete diagnostic impressions.\par

## 2020-08-18 NOTE — HEALTH RISK ASSESSMENT
[Intercurrent ED visits] : went to ED [No] : No [No falls in past year] : Patient reported no falls in the past year [0] : 2) Feeling down, depressed, or hopeless: Not at all (0) [] : No [de-identified] : 11/18 [de-identified] : [facility] [FreeTextEntry1] : Non-suicidal at this time. [[id number]] : 0

## 2020-08-18 NOTE — HISTORY OF PRESENT ILLNESS
[Post-hospitalization from ___ Hospital] : Post-hospitalization from [unfilled] Hospital [Admitted on: ___] : The patient was admitted on [unfilled] [Discharged on ___] : discharged on [unfilled] [Patient Contacted By: ____] : and contacted by [unfilled] [Home] : at home, [unfilled] , at the time of the visit. [Medical Office: ([facility])___] : at the medical office located in  [Verbal consent obtained from patient] : the patient, [unfilled] [FreeTextEntry4] : FLETCHER Otto [FreeTextEntry2] : As documented in a message section.\par \par \par 55 year female  patient with history of stable Hypertension, Hypothyroidism, Type 2 Diabetes Mellitus, Reactive Airway Disease, Morbid Obesity, Asthma, history as stated, initiated telephonic visit  after hospital discharge and for Disease Management and Medication Adherence.\par

## 2020-08-24 RX ORDER — LINAGLIPTIN 5 MG/1
5 TABLET, FILM COATED ORAL
Qty: 90 | Refills: 3 | Status: COMPLETED | COMMUNITY
Start: 2020-07-06 | End: 2020-08-24

## 2020-08-25 RX ORDER — DULAGLUTIDE 0.75 MG/.5ML
0.75 INJECTION, SOLUTION SUBCUTANEOUS
Qty: 12 | Refills: 3 | Status: COMPLETED | COMMUNITY
Start: 2020-08-17 | End: 2020-08-25

## 2020-09-04 ENCOUNTER — APPOINTMENT (OUTPATIENT)
Dept: NEUROSURGERY | Facility: CLINIC | Age: 55
End: 2020-09-04
Payer: MEDICARE

## 2020-09-04 VITALS
SYSTOLIC BLOOD PRESSURE: 158 MMHG | WEIGHT: 287 LBS | BODY MASS INDEX: 54.19 KG/M2 | HEART RATE: 96 BPM | TEMPERATURE: 97.5 F | RESPIRATION RATE: 16 BRPM | HEIGHT: 61 IN | DIASTOLIC BLOOD PRESSURE: 91 MMHG | OXYGEN SATURATION: 92 %

## 2020-09-04 PROCEDURE — 99024 POSTOP FOLLOW-UP VISIT: CPT

## 2020-09-09 NOTE — HISTORY OF PRESENT ILLNESS
[FreeTextEntry1] : This 55-year-old woman suffers from a type 1 trigeminal neuralgia for over 10 years with a prior treatment in 2013 by percutaneous treatment with temporary effect. She is on two medication with high-dose and refractory to pain. Her pain is in the distribution of the three branches of the trigeminal nerve, but mainly V2 and V3. She would like to proceed with microvascular decompression and does not want any other options including any other percutaneous procedure or gamma knife radiosurgery. We discussed that this option is the best option considering her young age. She is morbidly obese with BMI of 58 and that could potentially be a complication for perioperative period. \par \par On 7/23/20 she underwent a right retrosigmoid craniotomy and microvascular decompression of the right trigeminal nerve..\par \par Today she presents for her second post op visit. She reports that all facial pain is gone since surgery. She reports occasional headaches and itching at surgical incision.   Headaches are  4-5/10 not associated with other symptoms and relieved with Tylenol prn. She reports occasional dizziness. She ambulates with cane assist and denies falls. Right retrosigmoid incision is wellhealed\par \par She continues taking Oxcarbazepine 1200 mg daily.  She has a follow up appointment with her PCP. \par \par Patient should gradually decrease oxcarbazepine over 6-9 months under the guidance of her neurologist.\par

## 2020-09-09 NOTE — ASSESSMENT
[FreeTextEntry1] : IMPRESSION:\par 1. Pt has recovered well s/p 7/23/20 right retrosigmoid craniotomy and microvascular decompression of the right trigeminal nerve.\par \par \par PLAN:\par 1. F/U prn\par

## 2020-09-09 NOTE — PHYSICAL EXAM
[General Appearance - In No Acute Distress] : in no acute distress [General Appearance - Alert] : alert [General Appearance - Well-Appearing] : healthy appearing [General Appearance - Well Nourished] : well nourished [General Appearance - Well Developed] : well developed [Well-Healed] : well-healed [Impaired Insight] : insight and judgment were intact [Affect] : the affect was normal [Oriented To Time, Place, And Person] : oriented to person, place, and time [Memory Recent] : recent memory was not impaired [Person] : oriented to person [Time] : oriented to time [Cranial Nerves Optic (II)] : visual acuity intact bilaterally,  pupils equal round and reactive to light [Cranial Nerves Oculomotor (III)] : extraocular motion intact [Place] : oriented to place [Cranial Nerves Glossopharyngeal (IX)] : tongue and palate midline [Cranial Nerves Vestibulocochlear (VIII)] : hearing was intact bilaterally [Cranial Nerves Trigeminal (V)] : facial sensation intact symmetrically [Cranial Nerves Facial (VII)] : face symmetrical [Cranial Nerves Accessory (XI - Cranial And Spinal)] : head turning and shoulder shrug symmetric [Cranial Nerves Hypoglossal (XII)] : there was no tongue deviation with protrusion [Respiration, Rhythm And Depth] : normal respiratory rhythm and effort [] : no respiratory distress [Heart Rate And Rhythm] : heart rate was normal and rhythm regular [Exaggerated Use Of Accessory Muscles For Inspiration] : no accessory muscle use [FreeTextEntry1] : right retrosigmoid

## 2020-09-25 ENCOUNTER — APPOINTMENT (OUTPATIENT)
Dept: PULMONOLOGY | Facility: CLINIC | Age: 55
End: 2020-09-25
Payer: MEDICARE

## 2020-09-25 VITALS
HEART RATE: 93 BPM | HEIGHT: 61 IN | RESPIRATION RATE: 16 BRPM | WEIGHT: 293 LBS | DIASTOLIC BLOOD PRESSURE: 66 MMHG | SYSTOLIC BLOOD PRESSURE: 143 MMHG | BODY MASS INDEX: 55.32 KG/M2 | OXYGEN SATURATION: 93 % | TEMPERATURE: 97.7 F

## 2020-09-25 PROCEDURE — 99214 OFFICE O/P EST MOD 30 MIN: CPT

## 2020-09-26 LAB
25(OH)D3 SERPL-MCNC: 26 NG/ML
ALBUMIN SERPL ELPH-MCNC: 4.2 G/DL
ALP BLD-CCNC: 135 U/L
ALT SERPL-CCNC: 65 U/L
ANION GAP SERPL CALC-SCNC: 19 MMOL/L
APPEARANCE: CLEAR
AST SERPL-CCNC: 69 U/L
BASOPHILS # BLD AUTO: 0.05 K/UL
BASOPHILS NFR BLD AUTO: 0.5 %
BILIRUB SERPL-MCNC: 0.4 MG/DL
BILIRUBIN URINE: NEGATIVE
BLOOD URINE: NEGATIVE
BUN SERPL-MCNC: 15 MG/DL
CALCIUM SERPL-MCNC: 9.3 MG/DL
CHLORIDE SERPL-SCNC: 93 MMOL/L
CHOLEST SERPL-MCNC: 191 MG/DL
CHOLEST/HDLC SERPL: 5 RATIO
CO2 SERPL-SCNC: 26 MMOL/L
COLOR: YELLOW
CREAT SERPL-MCNC: 0.68 MG/DL
CREAT SPEC-SCNC: 150 MG/DL
EOSINOPHIL # BLD AUTO: 0.29 K/UL
EOSINOPHIL NFR BLD AUTO: 3 %
ERYTHROCYTE [SEDIMENTATION RATE] IN BLOOD BY WESTERGREN METHOD: 105 MM/HR
ESTIMATED AVERAGE GLUCOSE: 240 MG/DL
FOLATE SERPL-MCNC: 13 NG/ML
GGT SERPL-CCNC: 84 U/L
GLUCOSE QUALITATIVE U: NEGATIVE
GLUCOSE SERPL-MCNC: 174 MG/DL
HBA1C MFR BLD HPLC: 10 %
HCT VFR BLD CALC: 43.3 %
HDLC SERPL-MCNC: 38 MG/DL
HGB BLD-MCNC: 12.4 G/DL
IMM GRANULOCYTES NFR BLD AUTO: 0.5 %
IRON SATN MFR SERPL: 9 %
IRON SERPL-MCNC: 35 UG/DL
KETONES URINE: NEGATIVE
LDLC SERPL CALC-MCNC: 120 MG/DL
LEUKOCYTE ESTERASE URINE: NEGATIVE
LYMPHOCYTES # BLD AUTO: 2.23 K/UL
LYMPHOCYTES NFR BLD AUTO: 23 %
MAN DIFF?: NORMAL
MCHC RBC-ENTMCNC: 23.6 PG
MCHC RBC-ENTMCNC: 28.6 GM/DL
MCV RBC AUTO: 82.3 FL
MICROALBUMIN 24H UR DL<=1MG/L-MCNC: 1.5 MG/DL
MICROALBUMIN/CREAT 24H UR-RTO: 10 MG/G
MONOCYTES # BLD AUTO: 0.75 K/UL
MONOCYTES NFR BLD AUTO: 7.7 %
NEUTROPHILS # BLD AUTO: 6.34 K/UL
NEUTROPHILS NFR BLD AUTO: 65.3 %
NITRITE URINE: NEGATIVE
PH URINE: 6
PLATELET # BLD AUTO: 365 K/UL
POTASSIUM SERPL-SCNC: 4.9 MMOL/L
PROT SERPL-MCNC: 7.6 G/DL
PROTEIN URINE: NORMAL
RBC # BLD: 5.26 M/UL
RBC # FLD: 16.2 %
SODIUM SERPL-SCNC: 138 MMOL/L
SPECIFIC GRAVITY URINE: 1.02
T3 SERPL-MCNC: 101 NG/DL
T4 FREE SERPL-MCNC: 1.1 NG/DL
TIBC SERPL-MCNC: 372 UG/DL
TRIGL SERPL-MCNC: 164 MG/DL
TSH SERPL-ACNC: 2.24 UIU/ML
UIBC SERPL-MCNC: 337 UG/DL
UROBILINOGEN URINE: NORMAL
VIT B12 SERPL-MCNC: 736 PG/ML
WBC # FLD AUTO: 9.71 K/UL

## 2020-09-28 ENCOUNTER — APPOINTMENT (OUTPATIENT)
Dept: INTERNAL MEDICINE | Facility: CLINIC | Age: 55
End: 2020-09-28
Payer: MEDICARE

## 2020-09-28 VITALS
HEART RATE: 115 BPM | TEMPERATURE: 97.7 F | WEIGHT: 293 LBS | OXYGEN SATURATION: 91 % | HEIGHT: 61 IN | DIASTOLIC BLOOD PRESSURE: 94 MMHG | SYSTOLIC BLOOD PRESSURE: 141 MMHG | RESPIRATION RATE: 16 BRPM | BODY MASS INDEX: 55.32 KG/M2

## 2020-09-28 PROCEDURE — 99214 OFFICE O/P EST MOD 30 MIN: CPT | Mod: 25

## 2020-09-28 PROCEDURE — G0008: CPT

## 2020-09-28 PROCEDURE — 90662 IIV NO PRSV INCREASED AG IM: CPT

## 2020-09-28 PROCEDURE — 90682 RIV4 VACC RECOMBINANT DNA IM: CPT

## 2020-09-28 RX ORDER — DIPHENHYDRAMINE HCL 50 MG/1
50 CAPSULE ORAL
Qty: 1 | Refills: 0 | Status: DISCONTINUED | COMMUNITY
Start: 2019-08-15 | End: 2020-09-28

## 2020-09-28 RX ORDER — BENZOYL PEROXIDE 100 MG/ML
10 LIQUID TOPICAL
Qty: 1 | Refills: 4 | Status: DISCONTINUED | COMMUNITY
Start: 2017-04-07 | End: 2020-09-28

## 2020-09-28 RX ORDER — AMMONIUM LACTATE 12 %
12 CREAM (GRAM) TOPICAL
Qty: 1 | Refills: 5 | Status: DISCONTINUED | COMMUNITY
Start: 2019-12-04 | End: 2020-09-28

## 2020-09-28 RX ORDER — BETAMETHASONE DIPROPIONATE 0.5 MG/G
0.05 OINTMENT, AUGMENTED TOPICAL
Qty: 1 | Refills: 1 | Status: DISCONTINUED | COMMUNITY
Start: 2019-12-04 | End: 2020-09-28

## 2020-09-28 RX ORDER — FAMOTIDINE 20 MG/1
20 TABLET, FILM COATED ORAL
Qty: 8 | Refills: 0 | Status: DISCONTINUED | COMMUNITY
Start: 2020-07-08 | End: 2020-09-28

## 2020-09-28 RX ORDER — HALOBETASOL PROPIONATE 0.5 MG/G
0.05 CREAM TOPICAL TWICE DAILY
Qty: 1 | Refills: 2 | Status: DISCONTINUED | COMMUNITY
Start: 2019-03-26 | End: 2020-09-28

## 2020-09-28 RX ORDER — BLOOD-GLUCOSE METER
W/DEVICE EACH MISCELLANEOUS
Qty: 1 | Refills: 0 | Status: DISCONTINUED | COMMUNITY
Start: 2020-07-27 | End: 2020-09-28

## 2020-09-28 RX ORDER — ISOPROPYL ALCOHOL 0.75 G/1
SWAB TOPICAL
Qty: 100 | Refills: 0 | Status: DISCONTINUED | COMMUNITY
Start: 2020-07-27 | End: 2020-09-28

## 2020-09-28 RX ORDER — LIDOCAINE 5 G/100G
5 OINTMENT TOPICAL
Qty: 1 | Refills: 0 | Status: DISCONTINUED | COMMUNITY
Start: 2020-06-25 | End: 2020-09-28

## 2020-09-28 RX ORDER — ACETAMINOPHEN 325 MG/1
325 TABLET ORAL EVERY 6 HOURS
Refills: 0 | Status: DISCONTINUED | COMMUNITY
End: 2020-09-28

## 2020-09-28 RX ORDER — CLINDAMYCIN PHOSPHATE 10 MG/ML
1 SOLUTION TOPICAL TWICE DAILY
Qty: 1 | Refills: 3 | Status: DISCONTINUED | COMMUNITY
Start: 2017-04-07 | End: 2020-09-28

## 2020-09-28 NOTE — ASSESSMENT
[FreeTextEntry1] : 55 year old female found to have stable Hypertension, Hypothyroidism, Type 2 Diabetes Mellitus, Reactive Airway Disease, Morbid Obesity, Asthma, with the current regimen, diet and life style modifications, as counseled. Prior results reviewed and discussed with the patient during today's examination. Plan as ordered.\par Patient was recently evaluated by NEURO SX/PULM, findings and recommendations reviewed with the patient during today's examination.\par

## 2020-09-28 NOTE — HEALTH RISK ASSESSMENT
[Good] : ~his/her~  mood as  good [Intercurrent ED visits] : went to ED [No] : In the past 12 months have you used drugs other than those required for medical reasons? No [No falls in past year] : Patient reported no falls in the past year [0] : 2) Feeling down, depressed, or hopeless: Not at all (0) [Patient reported mammogram was normal] : Patient reported mammogram was normal [Patient reported bone density results were normal] : Patient reported bone density results were normal [HIV test declined] : HIV test declined [None] : None [Feels Safe at Home] : Feels safe at home [Independent] : managing finances [Some assistance needed] : using transportation [Smoke Detector] : smoke detector [Carbon Monoxide Detector] : carbon monoxide detector [Seat Belt] :  uses seat belt [Sunscreen] : uses sunscreen [With Patient/Caregiver] : With Patient/Caregiver [] : No [de-identified] : 11/18 [de-identified] : NEURO SX/PULM [FreeTextEntry1] : Non-suicidal at this time. [[id number]] : 0 [Change in mental status noted] : No change in mental status noted [Reports changes in hearing] : Reports no changes in hearing [Reports changes in vision] : Reports no changes in vision [Reports changes in dental health] : Reports no changes in dental health [MammogramDate] : 08/19 [PapSmearComments] : As ordered for today. [BoneDensityDate] : 08/19 [ColonoscopyComments] : As ordered for today. [HepatitisCDate] : 12/18 [HepatitisCComments] : Negative [AdvancecareDate] : 09/20

## 2020-09-28 NOTE — HISTORY OF PRESENT ILLNESS
[de-identified] : 55 year old female patient with history of stable Hypertension, Hypothyroidism, Type 2 Diabetes Mellitus, Reactive Airway Disease, Morbid Obesity, Asthma, history as stated, presented for an annual preventative examination.\par Patient denies any associated symptoms of shortness of breath, chest pain, abdominal pain at this time.\par

## 2020-09-29 LAB — HEMOCCULT STL QL IA: POSITIVE

## 2020-10-08 ENCOUNTER — APPOINTMENT (OUTPATIENT)
Dept: RHEUMATOLOGY | Facility: CLINIC | Age: 55
End: 2020-10-08
Payer: MEDICARE

## 2020-10-08 VITALS
DIASTOLIC BLOOD PRESSURE: 79 MMHG | TEMPERATURE: 97.7 F | WEIGHT: 292 LBS | SYSTOLIC BLOOD PRESSURE: 114 MMHG | BODY MASS INDEX: 55.13 KG/M2 | RESPIRATION RATE: 16 BRPM | OXYGEN SATURATION: 95 % | HEART RATE: 111 BPM | HEIGHT: 61 IN

## 2020-10-08 PROCEDURE — 20610 DRAIN/INJ JOINT/BURSA W/O US: CPT | Mod: LT

## 2020-10-08 PROCEDURE — 99214 OFFICE O/P EST MOD 30 MIN: CPT | Mod: 25

## 2020-10-08 RX ORDER — LIDOCAINE HYDROCHLORIDE 10 MG/ML
1 INJECTION, SOLUTION INFILTRATION; PERINEURAL
Qty: 0 | Refills: 0 | Status: COMPLETED | OUTPATIENT
Start: 2020-10-08

## 2020-10-08 RX ORDER — METHYLPRED ACET/NACL,ISO-OS/PF 40 MG/ML
40 VIAL (ML) INJECTION
Qty: 1 | Refills: 0 | Status: COMPLETED | OUTPATIENT
Start: 2020-10-08

## 2020-10-08 RX ADMIN — METHYLPREDNISOLONE ACETATE 0 MG/ML: 40 INJECTION, SUSPENSION INTRA-ARTICULAR; INTRALESIONAL; INTRAMUSCULAR; SOFT TISSUE at 00:00

## 2020-10-08 RX ADMIN — LIDOCAINE HYDROCHLORIDE 0 %: 10 INJECTION, SOLUTION INFILTRATION; PERINEURAL at 00:00

## 2020-10-09 NOTE — PROCEDURE
[Other Date:___] : Date: [unfilled] [Patient] : the patient [Risks] : risks [Benefits] : benefits [Consent Obtained] : written consent was obtained prior to the procedure and is detailed in the patient's record [Therapeutic] : therapeutic [#1 Site: ______] : #1 site identified in the [unfilled] [Betadine] : betadine solution [___ml 1% Lidocaine] : [unfilled] ml of 1% lidocaine [Depomedrol ___ mg] : Depomedrol [unfilled] mg [Tolerated Well] : the patient tolerated the procedure well [No Complications] : there were no complications [Patient Instructed to Call] : patient was instructed to call if redness at site, a decrease in range of motion or an increase in pain is noted after procedure.

## 2020-10-09 NOTE — HISTORY OF PRESENT ILLNESS
[FreeTextEntry1] : Patient returns after long hiatus explains increased difficulty with raising  left arm overhead and reaching for mid back . She denies accompanied trauma or paresthesias. She explains diffuse arthralgias and increased stressors despite recent procedure for trigeminal neuralgia which has led to marked improvement in facial pain. She discusses options for renal lesion discovered on imaging when undergoing workup for proteinuria/hematuria and IgA vasculitis.\par Patient developed skin rash spring of  2019,   diagnosed as leukocytoclastic vasculitis. Ig A, biopsy proven, having been preceded by GI infection with subsequent urinalysis showing hematuria / proteinuria. Renal protection with ACE-I and regular f/u rec by nephrology colleagues in the setting of 4.1cm left kidney lesion / RCC.  Patient also reports ongoing cough , dry for the last year.   She otherwise denies motor/sensory disturbances or systemic symptoms.

## 2020-10-09 NOTE — ASSESSMENT
[FreeTextEntry1] : Patient with fibromyalgia with eruption of skin after viral infection: IgA vasculitis, investigation for RCC:\par Rash appears worsening ,may require immunosuppressive tx.  Discussed importance of sooner f/u with IR and Derm for kidney and skin assessment, respectively. Patient to contact teams over the next few weeks.  \par Physical therapy to continue with mobility and muscle strengthening of the rotator cuff; cortisone injection given to the bursa.    Core strengthening exercises demonstrated for the lower back and Amitriptyline given for continued nerve pain control as well as for sleep. Gabapentin continued. Quadriceps strengthening exercises encouraged in the office.  Weight loss has been encouraged to reduce load over the medial joint line.  Viscosupplementation has been encouraged to provide additional lubrication and joint support. \par The importance of dietary and lifestyle modifications was reiterated for the treatment of Fibromyalgia along with discussions on relaxation, stress-reducing techniques and importance of good sleep hygiene.\par \par She is in agreement with the above plan and will return in three months' time.\par \par \par

## 2020-10-12 NOTE — ED PROVIDER NOTE - PSYCHIATRIC, MLM
(3) slightly limited
Alert and oriented to person, place, time/situation. normal mood and affect. no apparent risk to self or others.

## 2020-10-13 NOTE — REVIEW OF SYSTEMS
[Cough] : cough [Dyspnea] : dyspnea [Fever] : no fever [Nasal Congestion] : no nasal congestion [GERD] : no gerd

## 2020-10-13 NOTE — HISTORY OF PRESENT ILLNESS
[TextBox_4] : 55 yr old woman with asthma and NAKUL. She underwent surgery for rhizotomy for trig neuralgia.  She was started on CPAP and used it in the hospital. She is now using oxygen at home.\par \par In the hospital she was found to have chronic hypercapnic respiratory failure with ABG 7.34 PCO2 63, PO2 99, due to obesity and restrictive lung disease and kyphosis.\par \par She is also using Symbicort and albuterol via nebulizer.\par \par She has had asthma since age [age]. She has few allergies (dust dust mites, flowers) not severe.\par Asthma is worse when allergies are worse. \par She has hypoxia with exertion.\par \par \par She also has a 4 mm lung nodule seen on prior CT chest. \par \par \par She has sleep apnea diagnosed several years ago, by Dr Jorge Hicks ([facility] in [phone] [address] [place] [address]). \par \par She has not been able to use CPAP and gave away the equipment.\par \par She states she was tested for COVID that was negative. \par \par \par She has renal cell carcinoma and has not had removal due to COVID crisis. She requires follow up. \par \par \par PMH:\par She has hyperlipidemia, elevated liver enzymes, DM, hypothyroid depression hypertension sleep apnea not using CPAP. fibromyalgia and \par \par PSH\par c sect\par right ovary\par \par lysis of adhesion\par ERMELINDA/BSO\par removal of left adrenal gland due to nodule, benign\par \par Temporal artery biopsy\par \par \par

## 2020-10-13 NOTE — DISCUSSION/SUMMARY
[FreeTextEntry1] : PFT was performed, July 2020, preoperatively. It demonstrated  restrictive pattern consistent with her body habitus\par \par NAKUL by history\par \par hypoxia, hypercapnia  with hypercapnic respiratory failure due to body habitus, kyphosis restriction noted .  She experiences respiratory distress and is at risk for worsening respiratory failure.  She will require non invasive home ventilation which is necessary to manage PCO2 levels and prevent decompensation and readmission and life threatening condition. \par \par \par PLAN\par \par \par Order NIV home ventilation\par Continue oxygen in meantime\par Continue Symbicort and albuterol\par \par She has had had pneumonia vaccine \par She will obtain influenza vaccine\par \par [facility]so [facility]

## 2020-10-21 ENCOUNTER — NON-APPOINTMENT (OUTPATIENT)
Age: 55
End: 2020-10-21

## 2020-10-22 ENCOUNTER — APPOINTMENT (OUTPATIENT)
Dept: NEUROLOGY | Facility: CLINIC | Age: 55
End: 2020-10-22

## 2020-10-30 ENCOUNTER — APPOINTMENT (OUTPATIENT)
Dept: MAMMOGRAPHY | Facility: HOSPITAL | Age: 55
End: 2020-10-30

## 2020-10-30 ENCOUNTER — APPOINTMENT (OUTPATIENT)
Dept: MRI IMAGING | Facility: HOSPITAL | Age: 55
End: 2020-10-30

## 2020-11-17 ENCOUNTER — NON-APPOINTMENT (OUTPATIENT)
Age: 55
End: 2020-11-17

## 2020-11-19 ENCOUNTER — TRANSCRIPTION ENCOUNTER (OUTPATIENT)
Age: 55
End: 2020-11-19

## 2020-12-01 ENCOUNTER — APPOINTMENT (OUTPATIENT)
Dept: ENDOCRINOLOGY | Facility: CLINIC | Age: 55
End: 2020-12-01

## 2020-12-07 ENCOUNTER — APPOINTMENT (OUTPATIENT)
Dept: MAMMOGRAPHY | Facility: HOSPITAL | Age: 55
End: 2020-12-07

## 2020-12-07 ENCOUNTER — RESULT REVIEW (OUTPATIENT)
Age: 55
End: 2020-12-07

## 2020-12-07 ENCOUNTER — OUTPATIENT (OUTPATIENT)
Dept: OUTPATIENT SERVICES | Facility: HOSPITAL | Age: 55
LOS: 1 days | End: 2020-12-07
Payer: MEDICARE

## 2020-12-07 DIAGNOSIS — Z90.710 ACQUIRED ABSENCE OF BOTH CERVIX AND UTERUS: Chronic | ICD-10-CM

## 2020-12-07 DIAGNOSIS — N89.8 OTHER SPECIFIED NONINFLAMMATORY DISORDERS OF VAGINA: Chronic | ICD-10-CM

## 2020-12-07 DIAGNOSIS — E27.8 OTHER SPECIFIED DISORDERS OF ADRENAL GLAND: Chronic | ICD-10-CM

## 2020-12-07 DIAGNOSIS — Z12.31 ENCOUNTER FOR SCREENING MAMMOGRAM FOR MALIGNANT NEOPLASM OF BREAST: ICD-10-CM

## 2020-12-07 DIAGNOSIS — Z98.89 OTHER SPECIFIED POSTPROCEDURAL STATES: Chronic | ICD-10-CM

## 2020-12-07 DIAGNOSIS — I77.6 ARTERITIS, UNSPECIFIED: Chronic | ICD-10-CM

## 2020-12-07 PROCEDURE — 77067 SCR MAMMO BI INCL CAD: CPT

## 2020-12-07 PROCEDURE — 77063 BREAST TOMOSYNTHESIS BI: CPT

## 2020-12-07 PROCEDURE — 77063 BREAST TOMOSYNTHESIS BI: CPT | Mod: 26

## 2020-12-07 PROCEDURE — 77067 SCR MAMMO BI INCL CAD: CPT | Mod: 26

## 2020-12-28 ENCOUNTER — OUTPATIENT (OUTPATIENT)
Dept: OUTPATIENT SERVICES | Facility: HOSPITAL | Age: 55
LOS: 1 days | End: 2020-12-28
Payer: MEDICARE

## 2020-12-28 ENCOUNTER — APPOINTMENT (OUTPATIENT)
Dept: MRI IMAGING | Facility: HOSPITAL | Age: 55
End: 2020-12-28
Payer: MEDICARE

## 2020-12-28 ENCOUNTER — EMERGENCY (EMERGENCY)
Facility: HOSPITAL | Age: 55
LOS: 1 days | Discharge: HOME | End: 2020-12-28
Attending: EMERGENCY MEDICINE
Payer: MEDICARE

## 2020-12-28 ENCOUNTER — RESULT REVIEW (OUTPATIENT)
Age: 55
End: 2020-12-28

## 2020-12-28 VITALS
SYSTOLIC BLOOD PRESSURE: 120 MMHG | HEIGHT: 61 IN | DIASTOLIC BLOOD PRESSURE: 86 MMHG | HEART RATE: 103 BPM | RESPIRATION RATE: 22 BRPM | OXYGEN SATURATION: 89 % | WEIGHT: 291.89 LBS

## 2020-12-28 VITALS — OXYGEN SATURATION: 92 % | RESPIRATION RATE: 18 BRPM

## 2020-12-28 DIAGNOSIS — Z90.710 ACQUIRED ABSENCE OF BOTH CERVIX AND UTERUS: Chronic | ICD-10-CM

## 2020-12-28 DIAGNOSIS — E27.8 OTHER SPECIFIED DISORDERS OF ADRENAL GLAND: Chronic | ICD-10-CM

## 2020-12-28 DIAGNOSIS — Z98.89 OTHER SPECIFIED POSTPROCEDURAL STATES: Chronic | ICD-10-CM

## 2020-12-28 DIAGNOSIS — I77.6 ARTERITIS, UNSPECIFIED: Chronic | ICD-10-CM

## 2020-12-28 DIAGNOSIS — N89.8 OTHER SPECIFIED NONINFLAMMATORY DISORDERS OF VAGINA: Chronic | ICD-10-CM

## 2020-12-28 DIAGNOSIS — N28.89 OTHER SPECIFIED DISORDERS OF KIDNEY AND URETER: ICD-10-CM

## 2020-12-28 PROCEDURE — 74183 MRI ABD W/O CNTR FLWD CNTR: CPT

## 2020-12-28 PROCEDURE — 99283 EMERGENCY DEPT VISIT LOW MDM: CPT

## 2020-12-28 PROCEDURE — 74183 MRI ABD W/O CNTR FLWD CNTR: CPT | Mod: 26

## 2020-12-28 RX ORDER — FAMOTIDINE 10 MG/ML
20 INJECTION INTRAVENOUS ONCE
Refills: 0 | Status: COMPLETED | OUTPATIENT
Start: 2020-12-28 | End: 2020-12-28

## 2020-12-28 RX ADMIN — FAMOTIDINE 20 MILLIGRAM(S): 10 INJECTION INTRAVENOUS at 10:56

## 2020-12-28 NOTE — ED ADULT NURSE NOTE - DRUG PRE-SCREENING (DAST -1)
Statement Selected Island Pedicle Flap Text: The defect edges were debeveled with a #15 scalpel blade.  Given the location of the defect, shape of the defect and the proximity to free margins an island pedicle advancement flap was deemed most appropriate.  Using a sterile surgical marker, an appropriate advancement flap was drawn incorporating the defect, outlining the appropriate donor tissue and placing the expected incisions within the relaxed skin tension lines where possible.    The area thus outlined was incised deep to adipose tissue with a #15 scalpel blade.  The skin margins were undermined to an appropriate distance in all directions around the primary defect and laterally outward around the island pedicle utilizing iris scissors.  There was minimal undermining beneath the pedicle flap.

## 2020-12-28 NOTE — ED PROVIDER NOTE - OBJECTIVE STATEMENT
Patient reports she was premedicated for gadolinum today, 13 hour prep. Took prednisone as prescribed, including 50mg at 8:30am, and took benadryl 50mg 20 minutes before injection. Immediately after injection, felt hot, itchy, had lip swelling. No sob, cp, ap, n/v. Uses home oxygen 2LNC.

## 2020-12-28 NOTE — ED ADULT NURSE REASSESSMENT NOTE - NS ED NURSE REASSESS COMMENT FT1
Pt. verbalized that she is feeling better, oxygen saturation is WDL. Pt. has oxygen at home and verbalized she uses it as needed. Dr Fairbanks is aware and verbalized pt. is safe for discharge.

## 2020-12-28 NOTE — ED PROVIDER NOTE - ENMT, MLM
Airway patent, Nasal mucosa clear. Mouth with normal mucosa. Throat has no vesicles, no oropharyngeal exudates and uvula is midline. mild lower lip swelling. No pharyngeal erythema or swelling.

## 2020-12-28 NOTE — ED ADULT NURSE NOTE - OBJECTIVE STATEMENT
Patient sent to ED after allergic reaction at MRI. Patient provided 2L O2 via nasal cannula, O2 96%. Noted to have redness and swelling to face. Patient breathing without difficulty, denies difficulty swallowing, denies pain, is in no acute distress.

## 2020-12-28 NOTE — ED PROVIDER NOTE - PATIENT PORTAL LINK FT
You can access the FollowMyHealth Patient Portal offered by [facility] by registering at the following website: [email or web address]. By joining [facility]’s FollowMyHealth portal, you will also be able to view your health information using other applications (apps) compatible with our system.

## 2020-12-28 NOTE — ED PROVIDER NOTE - CLINICAL SUMMARY MEDICAL DECISION MAKING FREE TEXT BOX
Patient with allergic reaction to gadolinium, despite premedication. Maxed out on steroids and benadryl. will add pepcid and reassess.

## 2020-12-28 NOTE — ED ADULT TRIAGE NOTE - CHIEF COMPLAINT QUOTE
Known allergic reaction to MRI contrast, patient compliant with pre-medication.  C/o sob with hives to face

## 2020-12-28 NOTE — ED PROVIDER NOTE - PMH
Asthma  last inhaler use with in 10 days, on Pulm follow up [id number]  Back pain  thoracic & lumbar  Cervical neuralgia  difficulty moving my neck  DJD (degenerative joint disease)  Cervical/Thoracic/Lumbar  DM (diabetes mellitus)  2  Falls frequently    Fibromyalgia    H/O bursitis  right shoulder  Hyperlipidemia    Hypertension  vaginal cyst  Hypothyroid    Obesity  morbid  Obstructive sleep apnea  refuses Cpap  Radicular pain  arms, legs  Renal cell carcinoma, left  on follow up Dr schulte, HOD renal [facility], waiting for suregry in 09/2020  Trigeminal neuralgia  R  Vasculitis  Legs, forerhead, arms & hands, flare ups in R leg  Dr susie Ribeiro is my Rheumatologist

## 2020-12-28 NOTE — ED PROVIDER NOTE - NSFOLLOWUPINSTRUCTIONS_ED_ALL_ED_FT
Anaphylaxis    WHAT YOU NEED TO KNOW:    Anaphylaxis is a life-threatening allergic reaction that must be treated immediately. Your risk for anaphylaxis increases if you have asthma that is severe or not controlled. Medical conditions such as heart disease can also increase your risk. It is important to be prepared if you are at risk for anaphylaxis. Your symptoms can be worse each time you are exposed to the trigger.     DISCHARGE INSTRUCTIONS:    Steps to take for signs or symptoms of anaphylaxis:   •Immediately give 1 shot of epinephrine only into the outer thigh muscle. Even if your allergic reaction seems mild, it can quickly become anaphylaxis. This may happen even if you had a mild reaction to the allergen in the past. Each exposure can cause a different reaction. Watch for signs and symptoms of anaphylaxis every time you are exposed to a trigger. Be ready to give a shot of epinephrine. It is okay to inject epinephrine through clothing. Just be careful to avoid seams, zippers, or other parts that can prevent the needle from entering your skin.  How to Give An Epinephrine Shot Adult           •Leave the shot in place as directed. Your healthcare provider may recommend you leave it in place for up to 10 seconds before you remove it. This helps make sure all of the epinephrine is delivered.       •Call 911 and go to the emergency department, even if the shot improved symptoms. Do not drive yourself. Bring the used epinephrine shot with you.       Call 911 for any of the following:   •You have a skin rash, hives, swelling, or itching.       •You have trouble breathing, shortness of breath, wheezing, or coughing.      •Your throat tightens or your lips or tongue swell.      •You have difficulty swallowing or speaking.      •You are dizzy, lightheaded, confused, or feel like you are going to faint.      •You have nausea, diarrhea, or abdominal cramps, or you are vomiting.      Return to the emergency department if:   •Signs or symptoms of anaphylaxis return.           Contact your healthcare provider if:   •You have questions or concerns about your condition or care.          Medicines:   •Epinephrine is medicine used to treat severe allergic reactions such as anaphylaxis. It is given as a shot into the outer thigh muscle.      •Medicines such as antihistamines, steroids, and bronchodilators decrease inflammation, open airways, and make breathing easier.      •Take your medicine as directed. Contact your healthcare provider if you think your medicine is not helping or if you have side effects. Tell him or her if you are allergic to any medicine. Keep a list of the medicines, vitamins, and herbs you take. Include the amounts, and when and why you take them. Bring the list or the pill bottles to follow-up visits. Carry your medicine list with you in case of an emergency.      Follow up with your healthcare provider as directed: Allergy testing may reveal allergies that can trigger anaphylaxis. Write down your questions so you remember to ask them during your visits.     Safety precautions:   •Keep 2 shots of epinephrine with you at all times. You may need a second shot, because epinephrine only works for about 20 minutes and symptoms may return. Your healthcare provider can show you and family members how to give the shot. Check the expiration date every month and replace it before it expires.      •Create an action plan. Your healthcare provider can help you create a written plan that explains the allergy and an emergency plan to treat a reaction. The plan explains when to give a second epinephrine shot if symptoms return or do not improve after the first. Give copies of the action plan and emergency instructions to family members, and work or school staff. Show them how to give a shot of epinephrine in case you are not able to give it to yourself.      •Be careful when you exercise. If you have had exercise-induced anaphylaxis, do not exercise right after you eat. Stop exercising right away if you start to develop any signs or symptoms of anaphylaxis. You may first feel tired, warm, or have itchy skin. Hives, swelling, and severe breathing problems may develop if you continue to exercise.      •Carry medical alert identification. Wear medical alert jewelry or carry a card that explains the allergy. Ask your healthcare provider where to get these items.   Medical Alert Jewelry           •Identify and avoid known triggers. Read food labels for ingredients. Look for triggers in your environment.      •Ask about treatments to prevent anaphylaxis. You may need allergy shots or other medicines to treat allergies.          © Copyright [employer] 2020           back to top                          © Copyright [employer] 2020

## 2020-12-29 ENCOUNTER — NON-APPOINTMENT (OUTPATIENT)
Age: 55
End: 2020-12-29

## 2021-01-01 ENCOUNTER — RX RENEWAL (OUTPATIENT)
Age: 56
End: 2021-01-01

## 2021-01-14 ENCOUNTER — APPOINTMENT (OUTPATIENT)
Dept: INTERNAL MEDICINE | Facility: CLINIC | Age: 56
End: 2021-01-14
Payer: MEDICARE

## 2021-01-14 ENCOUNTER — APPOINTMENT (OUTPATIENT)
Age: 56
End: 2021-01-14
Payer: MEDICARE

## 2021-01-14 PROCEDURE — 99213 OFFICE O/P EST LOW 20 MIN: CPT | Mod: 95

## 2021-01-14 PROCEDURE — 99442: CPT

## 2021-01-14 RX ORDER — INSULIN DETEMIR 100 [IU]/ML
100 INJECTION, SOLUTION SUBCUTANEOUS DAILY
Refills: 0 | Status: DISCONTINUED | COMMUNITY
End: 2021-01-14

## 2021-01-14 NOTE — ASSESSMENT
Patient's spouse returned call. Call transferred to clinical staff.      [FreeTextEntry1] : Time spent for this encounter :  15 minutes.\par More than 50 % of the time spent for - Disease Management and Medication Adherence.\par \par 55 year F patient found to have stable Hypertension, Hypothyroidism, Type 2 Diabetes Mellitus, Reactive Airway Disease, Morbid Obesity, Asthma, MRI is consistent with likely Renal Cell Carcinoma, URO F/U and Rx ASAP, may metastasize in the future, without intervention, as counseled, with the current regimen, diet and life style modifications, as counseled. Prior results reviewed and discussed with the patient during today's encounter. Plan as ordered.\par \par Current Sx, to R/O COVID-19, as directed.\par \par Recommend URO/GI/PULM/ENDO  F/Us, ASAP, as directed..\par \par Patient granted verbal permission to provide Telephonic/Tele Health service in reference to today's encounter, as a substitute form of a visit, for a standard office visit encounter in the phase of an ongoing Pandemic / Covid -19, with the awareness and acceptance of a possible limited nature of such an encounter, with a possibility of an inadvertent omission of possible findings and misleading treatment options, due to possible erroneous and/or incomplete diagnostic impressions.\par

## 2021-01-14 NOTE — HISTORY OF PRESENT ILLNESS
[Home] : at home, [unfilled] , at the time of the visit. [Medical Office: ([facility])___] : at the medical office located in  [Verbal consent obtained from patient] : the patient, [unfilled] [FreeTextEntry4] : FLETCHER Erwin [de-identified] : 55 year female  patient with history of stable Hypertension, Hypothyroidism, Type 2 Diabetes Mellitus, Reactive Airway Disease, Morbid Obesity, Asthma, history as stated, initiated telephonic visit with multiple complaints, as per ROS and for Disease Management and Medication Adherence.\par

## 2021-01-14 NOTE — HEALTH RISK ASSESSMENT
[Intercurrent ED visits] : went to ED [No] : In the past 12 months have you used drugs other than those required for medical reasons? No [No falls in past year] : Patient reported no falls in the past year [0] : 2) Feeling down, depressed, or hopeless: Not at all (0) [] : No [de-identified] : 12/20 [de-identified] : RHEUM [FreeTextEntry1] : Non-suicidal at this time. [[id number]] : 0

## 2021-01-15 ENCOUNTER — APPOINTMENT (OUTPATIENT)
Dept: PULMONOLOGY | Facility: CLINIC | Age: 56
End: 2021-01-15

## 2021-01-16 ENCOUNTER — APPOINTMENT (OUTPATIENT)
Dept: MRI IMAGING | Facility: IMAGING CENTER | Age: 56
End: 2021-01-16

## 2021-01-18 NOTE — ASSESSMENT
[FreeTextEntry1] : 54 yo F with left renal mass\par \par - Reviewed recent MRI. Shows that the left renal mass has been completely stable compared to CT in Nov 2019\par - Reviewed MRI findings with pt\par - Discussed options including active surveillance vs perc ablation vs surgical extirpation. Given stability of lesion and pt's comorbidities, active surveillance is definitely an option. Discussed risks and benefits of surgical extirpation, which for her would be an open partial vs radical nephrectomy given her prior surgeries.\par - Will notify Dr. Peck regarding her recent imaging and possibly discuss her case at tumor board.

## 2021-01-18 NOTE — REVIEW OF SYSTEMS
[Feeling Tired] : feeling tired [Feeling Poorly] : feeling poorly [Chest Pain] : chest pain [Shortness Of Breath] : shortness of breath [SOB on Exertion] : shortness of breath during exertion [see HPI] : see HPI [Negative] : Heme/Lymph

## 2021-01-18 NOTE — HISTORY OF PRESENT ILLNESS
[Other Location: e.g. School (Enter Location, City,State)___] : at [unfilled], at the time of the visit. [Medical Office: ([facility])___] : at the medical office located in  [Verbal consent obtained from patient] : the patient, [unfilled] [FreeTextEntry1] : \par 53 yo F with microhematuria\par Denies any dysuria or gross hematuria\par Does see Dr. Pena for mixed incontinence\par 1 yr of right flank pain, chronic, dull\par CT last Feb showed no stones\par Of note, had bad reaction to gadolinium in the past but has had CT angio with contrast before with no issues\par \par 11/21/19 Interval history: No issues since last visit\par Here to review CT urogram\par \par 2/13/20 Interval history: Since last visit, pt saw IR for possible perc ablation for her renal mass\par No flank pain, no urinary issues\par \par 1/14/21 Interval history: Pt was pending open partial nephrectomy but has been delayed due to Covid pandemic and then lost to follow-up\par Pt is s/p microvascular decompression for trigeminal neuralgia in July, 2020\par Postop complicated by hypoxia, hypercapnia with resp failure\par Since then pt states she has been sick due to lung issues - currently on supplemental O2 at home\par Always feels like there is an elephant on her chest\par Recently has been having chills, sore throat\par \par All pertinent parts of the patient PFSH (past medical, family and social histories), laboratory, radiological studies and physician notes were reviewed prior to starting the face to face portion of the telemedicine visit.  Questionnaire results were discussed with patient. \par \par

## 2021-01-22 LAB
ALBUMIN SERPL ELPH-MCNC: 4.1 G/DL
ALP BLD-CCNC: 140 U/L
ALT SERPL-CCNC: 63 U/L
ANION GAP SERPL CALC-SCNC: 18 MMOL/L
AST SERPL-CCNC: 45 U/L
BASOPHILS # BLD AUTO: 0.05 K/UL
BASOPHILS NFR BLD AUTO: 0.5 %
BILIRUB SERPL-MCNC: 0.4 MG/DL
BUN SERPL-MCNC: 14 MG/DL
CALCIUM SERPL-MCNC: 10 MG/DL
CHLORIDE SERPL-SCNC: 93 MMOL/L
CHOLEST SERPL-MCNC: 220 MG/DL
CO2 SERPL-SCNC: 29 MMOL/L
CREAT SERPL-MCNC: 0.85 MG/DL
EOSINOPHIL # BLD AUTO: 0.28 K/UL
EOSINOPHIL NFR BLD AUTO: 3.1 %
ESTIMATED AVERAGE GLUCOSE: 303 MG/DL
GGT SERPL-CCNC: 67 U/L
GLUCOSE SERPL-MCNC: 264 MG/DL
HBA1C MFR BLD HPLC: 12.2 %
HCT VFR BLD CALC: 45.2 %
HDLC SERPL-MCNC: 41 MG/DL
HGB BLD-MCNC: 12.9 G/DL
IMM GRANULOCYTES NFR BLD AUTO: 0.8 %
LDLC SERPL CALC-MCNC: 152 MG/DL
LYMPHOCYTES # BLD AUTO: 2.48 K/UL
LYMPHOCYTES NFR BLD AUTO: 27.2 %
MAN DIFF?: NORMAL
MCHC RBC-ENTMCNC: 23.2 PG
MCHC RBC-ENTMCNC: 28.5 GM/DL
MCV RBC AUTO: 81.4 FL
MONOCYTES # BLD AUTO: 0.67 K/UL
MONOCYTES NFR BLD AUTO: 7.3 %
NEUTROPHILS # BLD AUTO: 5.58 K/UL
NEUTROPHILS NFR BLD AUTO: 61.1 %
NONHDLC SERPL-MCNC: 179 MG/DL
PLATELET # BLD AUTO: 343 K/UL
POTASSIUM SERPL-SCNC: 4.9 MMOL/L
PROT SERPL-MCNC: 7.2 G/DL
RBC # BLD: 5.55 M/UL
RBC # FLD: 16.8 %
SARS-COV-2 N GENE NPH QL NAA+PROBE: NOT DETECTED
SODIUM SERPL-SCNC: 140 MMOL/L
T3 SERPL-MCNC: 136 NG/DL
T4 FREE SERPL-MCNC: 1.4 NG/DL
TRIGL SERPL-MCNC: 135 MG/DL
TSH SERPL-ACNC: 4.1 UIU/ML
WBC # FLD AUTO: 9.13 K/UL

## 2021-01-30 ENCOUNTER — INPATIENT (INPATIENT)
Facility: HOSPITAL | Age: 56
LOS: 1 days | Discharge: HOME | DRG: 556 | End: 2021-02-01
Attending: STUDENT IN AN ORGANIZED HEALTH CARE EDUCATION/TRAINING PROGRAM | Admitting: STUDENT IN AN ORGANIZED HEALTH CARE EDUCATION/TRAINING PROGRAM
Payer: MEDICARE

## 2021-01-30 VITALS
WEIGHT: 293 LBS | SYSTOLIC BLOOD PRESSURE: 153 MMHG | HEART RATE: 110 BPM | RESPIRATION RATE: 20 BRPM | OXYGEN SATURATION: 95 % | DIASTOLIC BLOOD PRESSURE: 90 MMHG | TEMPERATURE: 99 F | HEIGHT: 61 IN

## 2021-01-30 DIAGNOSIS — I10 ESSENTIAL (PRIMARY) HYPERTENSION: ICD-10-CM

## 2021-01-30 DIAGNOSIS — R07.9 CHEST PAIN, UNSPECIFIED: ICD-10-CM

## 2021-01-30 DIAGNOSIS — E27.8 OTHER SPECIFIED DISORDERS OF ADRENAL GLAND: Chronic | ICD-10-CM

## 2021-01-30 DIAGNOSIS — Z98.89 OTHER SPECIFIED POSTPROCEDURAL STATES: Chronic | ICD-10-CM

## 2021-01-30 DIAGNOSIS — Z29.9 ENCOUNTER FOR PROPHYLACTIC MEASURES, UNSPECIFIED: ICD-10-CM

## 2021-01-30 DIAGNOSIS — M54.12 RADICULOPATHY, CERVICAL REGION: ICD-10-CM

## 2021-01-30 DIAGNOSIS — I77.6 ARTERITIS, UNSPECIFIED: ICD-10-CM

## 2021-01-30 DIAGNOSIS — R09.89 OTHER SPECIFIED SYMPTOMS AND SIGNS INVOLVING THE CIRCULATORY AND RESPIRATORY SYSTEMS: ICD-10-CM

## 2021-01-30 DIAGNOSIS — E11.65 TYPE 2 DIABETES MELLITUS WITH HYPERGLYCEMIA: ICD-10-CM

## 2021-01-30 DIAGNOSIS — M79.89 OTHER SPECIFIED SOFT TISSUE DISORDERS: ICD-10-CM

## 2021-01-30 DIAGNOSIS — I77.6 ARTERITIS, UNSPECIFIED: Chronic | ICD-10-CM

## 2021-01-30 DIAGNOSIS — R06.02 SHORTNESS OF BREATH: ICD-10-CM

## 2021-01-30 DIAGNOSIS — Z90.710 ACQUIRED ABSENCE OF BOTH CERVIX AND UTERUS: Chronic | ICD-10-CM

## 2021-01-30 DIAGNOSIS — N89.8 OTHER SPECIFIED NONINFLAMMATORY DISORDERS OF VAGINA: Chronic | ICD-10-CM

## 2021-01-30 DIAGNOSIS — J45.909 UNSPECIFIED ASTHMA, UNCOMPLICATED: ICD-10-CM

## 2021-01-30 LAB
A1C WITH ESTIMATED AVERAGE GLUCOSE RESULT: 12.3 % — HIGH (ref 4–5.6)
ALBUMIN SERPL ELPH-MCNC: 3.1 G/DL — LOW (ref 3.5–5)
ALBUMIN SERPL ELPH-MCNC: 3.2 G/DL — LOW (ref 3.5–5)
ALP SERPL-CCNC: 136 U/L — HIGH (ref 40–120)
ALP SERPL-CCNC: 141 U/L — HIGH (ref 40–120)
ALT FLD-CCNC: 56 U/L DA — SIGNIFICANT CHANGE UP (ref 10–60)
ALT FLD-CCNC: 56 U/L DA — SIGNIFICANT CHANGE UP (ref 10–60)
ANION GAP SERPL CALC-SCNC: 5 MMOL/L — SIGNIFICANT CHANGE UP (ref 5–17)
ANION GAP SERPL CALC-SCNC: 6 MMOL/L — SIGNIFICANT CHANGE UP (ref 5–17)
APTT BLD: 29.9 SEC — SIGNIFICANT CHANGE UP (ref 27.5–35.5)
AST SERPL-CCNC: 38 U/L — SIGNIFICANT CHANGE UP (ref 10–40)
AST SERPL-CCNC: 42 U/L — HIGH (ref 10–40)
BASE EXCESS BLDA CALC-SCNC: 4.2 MMOL/L — HIGH (ref -2–2)
BASOPHILS # BLD AUTO: 0.04 K/UL — SIGNIFICANT CHANGE UP (ref 0–0.2)
BASOPHILS NFR BLD AUTO: 0.4 % — SIGNIFICANT CHANGE UP (ref 0–2)
BILIRUB SERPL-MCNC: 0.5 MG/DL — SIGNIFICANT CHANGE UP (ref 0.2–1.2)
BILIRUB SERPL-MCNC: 0.5 MG/DL — SIGNIFICANT CHANGE UP (ref 0.2–1.2)
BLOOD GAS COMMENTS ARTERIAL: SIGNIFICANT CHANGE UP
BLOOD GAS COMMENTS ARTERIAL: SIGNIFICANT CHANGE UP
BUN SERPL-MCNC: 15 MG/DL — SIGNIFICANT CHANGE UP (ref 7–18)
BUN SERPL-MCNC: 16 MG/DL — SIGNIFICANT CHANGE UP (ref 7–18)
C3 SERPL-MCNC: 193 MG/DL — HIGH (ref 81–157)
C3 SERPL-MCNC: 199 MG/DL — HIGH (ref 81–157)
CALCIUM SERPL-MCNC: 8.7 MG/DL — SIGNIFICANT CHANGE UP (ref 8.4–10.5)
CALCIUM SERPL-MCNC: 9 MG/DL — SIGNIFICANT CHANGE UP (ref 8.4–10.5)
CHLORIDE SERPL-SCNC: 97 MMOL/L — SIGNIFICANT CHANGE UP (ref 96–108)
CHLORIDE SERPL-SCNC: 97 MMOL/L — SIGNIFICANT CHANGE UP (ref 96–108)
CHOLEST SERPL-MCNC: 193 MG/DL — SIGNIFICANT CHANGE UP
CK SERPL-CCNC: 163 U/L — SIGNIFICANT CHANGE UP (ref 21–215)
CK SERPL-CCNC: 169 U/L — SIGNIFICANT CHANGE UP (ref 21–215)
CO2 SERPL-SCNC: 32 MMOL/L — HIGH (ref 22–31)
CO2 SERPL-SCNC: 33 MMOL/L — HIGH (ref 22–31)
CREAT SERPL-MCNC: 0.83 MG/DL — SIGNIFICANT CHANGE UP (ref 0.5–1.3)
CREAT SERPL-MCNC: 0.96 MG/DL — SIGNIFICANT CHANGE UP (ref 0.5–1.3)
CRP SERPL-MCNC: 2.3 MG/DL — HIGH (ref 0–0.4)
EOSINOPHIL # BLD AUTO: 0.36 K/UL — SIGNIFICANT CHANGE UP (ref 0–0.5)
EOSINOPHIL NFR BLD AUTO: 3.9 % — SIGNIFICANT CHANGE UP (ref 0–6)
ERYTHROCYTE [SEDIMENTATION RATE] IN BLOOD: 54 MM/HR — HIGH (ref 0–20)
ESTIMATED AVERAGE GLUCOSE: 306 MG/DL — HIGH (ref 68–114)
FERRITIN SERPL-MCNC: 58 NG/ML — SIGNIFICANT CHANGE UP (ref 15–150)
FOLATE SERPL-MCNC: 14.5 NG/ML — SIGNIFICANT CHANGE UP
GGT SERPL-CCNC: 57 U/L — HIGH (ref 8–40)
GLUCOSE BLDC GLUCOMTR-MCNC: 245 MG/DL — HIGH (ref 70–99)
GLUCOSE BLDC GLUCOMTR-MCNC: 289 MG/DL — HIGH (ref 70–99)
GLUCOSE BLDC GLUCOMTR-MCNC: 305 MG/DL — HIGH (ref 70–99)
GLUCOSE BLDC GLUCOMTR-MCNC: 328 MG/DL — HIGH (ref 70–99)
GLUCOSE BLDC GLUCOMTR-MCNC: 371 MG/DL — HIGH (ref 70–99)
GLUCOSE SERPL-MCNC: 331 MG/DL — HIGH (ref 70–99)
GLUCOSE SERPL-MCNC: 334 MG/DL — HIGH (ref 70–99)
HCO3 BLDA-SCNC: 30 MMOL/L — HIGH (ref 23–27)
HCT VFR BLD CALC: 42.2 % — SIGNIFICANT CHANGE UP (ref 34.5–45)
HCT VFR BLD CALC: 42.8 % — SIGNIFICANT CHANGE UP (ref 34.5–45)
HDLC SERPL-MCNC: 36 MG/DL — LOW
HGB BLD-MCNC: 12.2 G/DL — SIGNIFICANT CHANGE UP (ref 11.5–15.5)
HGB BLD-MCNC: 12.6 G/DL — SIGNIFICANT CHANGE UP (ref 11.5–15.5)
HOROWITZ INDEX BLDA+IHG-RTO: 21 — SIGNIFICANT CHANGE UP
IMM GRANULOCYTES NFR BLD AUTO: 0.8 % — SIGNIFICANT CHANGE UP (ref 0–1.5)
INR BLD: 1.17 RATIO — HIGH (ref 0.88–1.16)
IRON SATN MFR SERPL: 10 % — LOW (ref 15–50)
IRON SATN MFR SERPL: 35 UG/DL — LOW (ref 40–150)
LACTATE SERPL-SCNC: 1.8 MMOL/L — SIGNIFICANT CHANGE UP (ref 0.7–2)
LIPID PNL WITH DIRECT LDL SERPL: 112 MG/DL — HIGH
LYMPHOCYTES # BLD AUTO: 1.87 K/UL — SIGNIFICANT CHANGE UP (ref 1–3.3)
LYMPHOCYTES # BLD AUTO: 20.1 % — SIGNIFICANT CHANGE UP (ref 13–44)
MAGNESIUM SERPL-MCNC: 1.1 MG/DL — LOW (ref 1.6–2.6)
MCHC RBC-ENTMCNC: 23 PG — LOW (ref 27–34)
MCHC RBC-ENTMCNC: 23.2 PG — LOW (ref 27–34)
MCHC RBC-ENTMCNC: 28.9 GM/DL — LOW (ref 32–36)
MCHC RBC-ENTMCNC: 29.4 GM/DL — LOW (ref 32–36)
MCV RBC AUTO: 79 FL — LOW (ref 80–100)
MCV RBC AUTO: 79.5 FL — LOW (ref 80–100)
MONOCYTES # BLD AUTO: 0.54 K/UL — SIGNIFICANT CHANGE UP (ref 0–0.9)
MONOCYTES NFR BLD AUTO: 5.8 % — SIGNIFICANT CHANGE UP (ref 2–14)
NEUTROPHILS # BLD AUTO: 6.44 K/UL — SIGNIFICANT CHANGE UP (ref 1.8–7.4)
NEUTROPHILS NFR BLD AUTO: 69 % — SIGNIFICANT CHANGE UP (ref 43–77)
NON HDL CHOLESTEROL: 157 MG/DL — HIGH
NRBC # BLD: 0 /100 WBCS — SIGNIFICANT CHANGE UP (ref 0–0)
NRBC # BLD: 0 /100 WBCS — SIGNIFICANT CHANGE UP (ref 0–0)
NT-PROBNP SERPL-SCNC: 61 PG/ML — SIGNIFICANT CHANGE UP (ref 0–125)
PCO2 BLDA: 55 MMHG — HIGH (ref 32–46)
PH BLDA: 7.36 — SIGNIFICANT CHANGE UP (ref 7.35–7.45)
PHOSPHATE SERPL-MCNC: 4.3 MG/DL — SIGNIFICANT CHANGE UP (ref 2.5–4.5)
PLATELET # BLD AUTO: 316 K/UL — SIGNIFICANT CHANGE UP (ref 150–400)
PLATELET # BLD AUTO: 323 K/UL — SIGNIFICANT CHANGE UP (ref 150–400)
PO2 BLDA: 61 MMHG — LOW (ref 74–108)
POTASSIUM SERPL-MCNC: 3.9 MMOL/L — SIGNIFICANT CHANGE UP (ref 3.5–5.3)
POTASSIUM SERPL-MCNC: 4 MMOL/L — SIGNIFICANT CHANGE UP (ref 3.5–5.3)
POTASSIUM SERPL-SCNC: 3.9 MMOL/L — SIGNIFICANT CHANGE UP (ref 3.5–5.3)
POTASSIUM SERPL-SCNC: 4 MMOL/L — SIGNIFICANT CHANGE UP (ref 3.5–5.3)
PROCALCITONIN SERPL-MCNC: 0.07 NG/ML — SIGNIFICANT CHANGE UP (ref 0.02–0.1)
PROT SERPL-MCNC: 7.5 G/DL — SIGNIFICANT CHANGE UP (ref 6–8.3)
PROT SERPL-MCNC: 7.7 G/DL — SIGNIFICANT CHANGE UP (ref 6–8.3)
PROTHROM AB SERPL-ACNC: 13.8 SEC — HIGH (ref 10.6–13.6)
RBC # BLD: 5.31 M/UL — HIGH (ref 3.8–5.2)
RBC # BLD: 5.42 M/UL — HIGH (ref 3.8–5.2)
RBC # BLD: 5.42 M/UL — HIGH (ref 3.8–5.2)
RBC # FLD: 16.2 % — HIGH (ref 10.3–14.5)
RBC # FLD: 16.4 % — HIGH (ref 10.3–14.5)
RETICS #: 119.8 K/UL — SIGNIFICANT CHANGE UP (ref 25–125)
RETICS/RBC NFR: 2.2 % — SIGNIFICANT CHANGE UP (ref 0.5–2.5)
SAO2 % BLDA: 88 % — LOW (ref 92–96)
SARS-COV-2 IGG SERPL QL IA: NEGATIVE — SIGNIFICANT CHANGE UP
SARS-COV-2 IGM SERPL IA-ACNC: 0.07 INDEX — SIGNIFICANT CHANGE UP
SARS-COV-2 RNA SPEC QL NAA+PROBE: SIGNIFICANT CHANGE UP
SODIUM SERPL-SCNC: 135 MMOL/L — SIGNIFICANT CHANGE UP (ref 135–145)
SODIUM SERPL-SCNC: 135 MMOL/L — SIGNIFICANT CHANGE UP (ref 135–145)
TIBC SERPL-MCNC: 348 UG/DL — SIGNIFICANT CHANGE UP (ref 250–450)
TRIGL SERPL-MCNC: 224 MG/DL — HIGH
TROPONIN I SERPL-MCNC: <0.015 NG/ML — SIGNIFICANT CHANGE UP (ref 0–0.04)
TROPONIN I SERPL-MCNC: <0.015 NG/ML — SIGNIFICANT CHANGE UP (ref 0–0.04)
TSH SERPL-MCNC: 1.91 UU/ML — SIGNIFICANT CHANGE UP (ref 0.34–4.82)
UIBC SERPL-MCNC: 313 UG/DL — SIGNIFICANT CHANGE UP (ref 110–370)
VIT B12 SERPL-MCNC: 705 PG/ML — SIGNIFICANT CHANGE UP (ref 232–1245)
WBC # BLD: 9.32 K/UL — SIGNIFICANT CHANGE UP (ref 3.8–10.5)
WBC # BLD: 9.94 K/UL — SIGNIFICANT CHANGE UP (ref 3.8–10.5)
WBC # FLD AUTO: 9.32 K/UL — SIGNIFICANT CHANGE UP (ref 3.8–10.5)
WBC # FLD AUTO: 9.94 K/UL — SIGNIFICANT CHANGE UP (ref 3.8–10.5)

## 2021-01-30 PROCEDURE — 93970 EXTREMITY STUDY: CPT | Mod: 26

## 2021-01-30 PROCEDURE — 71045 X-RAY EXAM CHEST 1 VIEW: CPT | Mod: 26

## 2021-01-30 PROCEDURE — 99053 MED SERV 10PM-8AM 24 HR FAC: CPT

## 2021-01-30 PROCEDURE — 99285 EMERGENCY DEPT VISIT HI MDM: CPT

## 2021-01-30 RX ORDER — ENOXAPARIN SODIUM 100 MG/ML
130 INJECTION SUBCUTANEOUS EVERY 12 HOURS
Refills: 0 | Status: DISCONTINUED | OUTPATIENT
Start: 2021-01-30 | End: 2021-01-30

## 2021-01-30 RX ORDER — BUDESONIDE AND FORMOTEROL FUMARATE DIHYDRATE 160; 4.5 UG/1; UG/1
2 AEROSOL RESPIRATORY (INHALATION)
Refills: 0 | Status: DISCONTINUED | OUTPATIENT
Start: 2021-01-30 | End: 2021-02-01

## 2021-01-30 RX ORDER — MONTELUKAST 4 MG/1
10 TABLET, CHEWABLE ORAL AT BEDTIME
Refills: 0 | Status: DISCONTINUED | OUTPATIENT
Start: 2021-01-30 | End: 2021-02-01

## 2021-01-30 RX ORDER — RAMIPRIL 5 MG
10 CAPSULE ORAL
Qty: 0 | Refills: 0 | DISCHARGE

## 2021-01-30 RX ORDER — PANTOPRAZOLE SODIUM 20 MG/1
40 TABLET, DELAYED RELEASE ORAL
Refills: 0 | Status: DISCONTINUED | OUTPATIENT
Start: 2021-01-30 | End: 2021-02-01

## 2021-01-30 RX ORDER — ACETAMINOPHEN 500 MG
650 TABLET ORAL EVERY 6 HOURS
Refills: 0 | Status: DISCONTINUED | OUTPATIENT
Start: 2021-01-30 | End: 2021-02-01

## 2021-01-30 RX ORDER — FLUOXETINE HCL 10 MG
20 CAPSULE ORAL DAILY
Refills: 0 | Status: DISCONTINUED | OUTPATIENT
Start: 2021-01-30 | End: 2021-02-01

## 2021-01-30 RX ORDER — INSULIN GLARGINE 100 [IU]/ML
15 INJECTION, SOLUTION SUBCUTANEOUS EVERY MORNING
Refills: 0 | Status: DISCONTINUED | OUTPATIENT
Start: 2021-01-30 | End: 2021-01-31

## 2021-01-30 RX ORDER — INSULIN LISPRO 100/ML
6 VIAL (ML) SUBCUTANEOUS
Refills: 0 | Status: DISCONTINUED | OUTPATIENT
Start: 2021-01-30 | End: 2021-02-01

## 2021-01-30 RX ORDER — ATORVASTATIN CALCIUM 80 MG/1
40 TABLET, FILM COATED ORAL AT BEDTIME
Refills: 0 | Status: DISCONTINUED | OUTPATIENT
Start: 2021-01-30 | End: 2021-02-01

## 2021-01-30 RX ORDER — GABAPENTIN 400 MG/1
0 CAPSULE ORAL
Qty: 0 | Refills: 0 | DISCHARGE

## 2021-01-30 RX ORDER — MAGNESIUM SULFATE 500 MG/ML
2 VIAL (ML) INJECTION ONCE
Refills: 0 | Status: COMPLETED | OUTPATIENT
Start: 2021-01-30 | End: 2021-01-30

## 2021-01-30 RX ORDER — SODIUM CHLORIDE 9 MG/ML
3 INJECTION INTRAMUSCULAR; INTRAVENOUS; SUBCUTANEOUS EVERY 8 HOURS
Refills: 0 | Status: DISCONTINUED | OUTPATIENT
Start: 2021-01-30 | End: 2021-02-01

## 2021-01-30 RX ORDER — SODIUM CHLORIDE 9 MG/ML
1000 INJECTION INTRAMUSCULAR; INTRAVENOUS; SUBCUTANEOUS ONCE
Refills: 0 | Status: COMPLETED | OUTPATIENT
Start: 2021-01-30 | End: 2021-01-30

## 2021-01-30 RX ORDER — ALBUTEROL 90 UG/1
2 AEROSOL, METERED ORAL EVERY 6 HOURS
Refills: 0 | Status: DISCONTINUED | OUTPATIENT
Start: 2021-01-30 | End: 2021-02-01

## 2021-01-30 RX ORDER — OXCARBAZEPINE 300 MG/1
300 TABLET, FILM COATED ORAL
Refills: 0 | Status: DISCONTINUED | OUTPATIENT
Start: 2021-01-30 | End: 2021-02-01

## 2021-01-30 RX ORDER — INSULIN LISPRO 100/ML
VIAL (ML) SUBCUTANEOUS
Refills: 0 | Status: DISCONTINUED | OUTPATIENT
Start: 2021-01-30 | End: 2021-01-30

## 2021-01-30 RX ORDER — ASPIRIN/CALCIUM CARB/MAGNESIUM 324 MG
81 TABLET ORAL DAILY
Refills: 0 | Status: DISCONTINUED | OUTPATIENT
Start: 2021-01-30 | End: 2021-02-01

## 2021-01-30 RX ORDER — GABAPENTIN 400 MG/1
400 CAPSULE ORAL DAILY
Refills: 0 | Status: DISCONTINUED | OUTPATIENT
Start: 2021-01-30 | End: 2021-02-01

## 2021-01-30 RX ORDER — INSULIN GLARGINE 100 [IU]/ML
20 INJECTION, SOLUTION SUBCUTANEOUS ONCE
Refills: 0 | Status: COMPLETED | OUTPATIENT
Start: 2021-01-30 | End: 2021-01-30

## 2021-01-30 RX ORDER — LEVOTHYROXINE SODIUM 125 MCG
112 TABLET ORAL DAILY
Refills: 0 | Status: DISCONTINUED | OUTPATIENT
Start: 2021-01-30 | End: 2021-02-01

## 2021-01-30 RX ORDER — SENNA PLUS 8.6 MG/1
2 TABLET ORAL AT BEDTIME
Refills: 0 | Status: DISCONTINUED | OUTPATIENT
Start: 2021-01-30 | End: 2021-02-01

## 2021-01-30 RX ORDER — HYDROCHLOROTHIAZIDE 25 MG
12.5 TABLET ORAL DAILY
Refills: 0 | Status: DISCONTINUED | OUTPATIENT
Start: 2021-01-30 | End: 2021-02-01

## 2021-01-30 RX ORDER — ENOXAPARIN SODIUM 100 MG/ML
40 INJECTION SUBCUTANEOUS DAILY
Refills: 0 | Status: DISCONTINUED | OUTPATIENT
Start: 2021-01-30 | End: 2021-02-01

## 2021-01-30 RX ORDER — INSULIN LISPRO 100/ML
VIAL (ML) SUBCUTANEOUS
Refills: 0 | Status: DISCONTINUED | OUTPATIENT
Start: 2021-01-30 | End: 2021-02-01

## 2021-01-30 RX ORDER — ENOXAPARIN SODIUM 100 MG/ML
120 INJECTION SUBCUTANEOUS ONCE
Refills: 0 | Status: COMPLETED | OUTPATIENT
Start: 2021-01-30 | End: 2021-01-30

## 2021-01-30 RX ADMIN — MONTELUKAST 10 MILLIGRAM(S): 4 TABLET, CHEWABLE ORAL at 20:35

## 2021-01-30 RX ADMIN — SODIUM CHLORIDE 3 MILLILITER(S): 9 INJECTION INTRAMUSCULAR; INTRAVENOUS; SUBCUTANEOUS at 20:36

## 2021-01-30 RX ADMIN — ATORVASTATIN CALCIUM 40 MILLIGRAM(S): 80 TABLET, FILM COATED ORAL at 20:35

## 2021-01-30 RX ADMIN — SODIUM CHLORIDE 1000 MILLILITER(S): 9 INJECTION INTRAMUSCULAR; INTRAVENOUS; SUBCUTANEOUS at 03:28

## 2021-01-30 RX ADMIN — Medication 6 UNIT(S): at 12:18

## 2021-01-30 RX ADMIN — Medication 4: at 17:31

## 2021-01-30 RX ADMIN — OXCARBAZEPINE 300 MILLIGRAM(S): 300 TABLET, FILM COATED ORAL at 17:33

## 2021-01-30 RX ADMIN — SODIUM CHLORIDE 3 MILLILITER(S): 9 INJECTION INTRAMUSCULAR; INTRAVENOUS; SUBCUTANEOUS at 12:24

## 2021-01-30 RX ADMIN — Medication 50 GRAM(S): at 12:19

## 2021-01-30 RX ADMIN — Medication 650 MILLIGRAM(S): at 23:04

## 2021-01-30 RX ADMIN — SENNA PLUS 2 TABLET(S): 8.6 TABLET ORAL at 20:35

## 2021-01-30 RX ADMIN — Medication 8: at 12:18

## 2021-01-30 RX ADMIN — ENOXAPARIN SODIUM 130 MILLIGRAM(S): 100 INJECTION SUBCUTANEOUS at 17:32

## 2021-01-30 RX ADMIN — Medication 81 MILLIGRAM(S): at 12:17

## 2021-01-30 RX ADMIN — INSULIN GLARGINE 20 UNIT(S): 100 INJECTION, SOLUTION SUBCUTANEOUS at 22:40

## 2021-01-30 RX ADMIN — Medication 650 MILLIGRAM(S): at 08:48

## 2021-01-30 RX ADMIN — GABAPENTIN 400 MILLIGRAM(S): 400 CAPSULE ORAL at 12:24

## 2021-01-30 RX ADMIN — Medication 20 MILLIGRAM(S): at 12:17

## 2021-01-30 RX ADMIN — SODIUM CHLORIDE 3 MILLILITER(S): 9 INJECTION INTRAMUSCULAR; INTRAVENOUS; SUBCUTANEOUS at 06:51

## 2021-01-30 RX ADMIN — Medication 12.5 MILLIGRAM(S): at 12:17

## 2021-01-30 RX ADMIN — Medication 6 UNIT(S): at 17:31

## 2021-01-30 RX ADMIN — BUDESONIDE AND FORMOTEROL FUMARATE DIHYDRATE 2 PUFF(S): 160; 4.5 AEROSOL RESPIRATORY (INHALATION) at 20:35

## 2021-01-30 RX ADMIN — INSULIN GLARGINE 15 UNIT(S): 100 INJECTION, SOLUTION SUBCUTANEOUS at 08:48

## 2021-01-30 RX ADMIN — Medication 10: at 20:57

## 2021-01-30 RX ADMIN — ENOXAPARIN SODIUM 120 MILLIGRAM(S): 100 INJECTION SUBCUTANEOUS at 06:51

## 2021-01-30 NOTE — H&P ADULT - PROBLEM SELECTOR PLAN 5
pt has swelling of RLE > LLE  pt has chronic LE swelling due to vasculitis  f/u Doppler to rule out DVT

## 2021-01-30 NOTE — H&P ADULT - HISTORY OF PRESENT ILLNESS
Patient, a 55 year old male from home, PMH of asthma, chronic bronchitis, NAKUL, HTN, DM, HLD, fibromyalgia, anxiety, trigeminal neuralgia, degenerative joint disease, IgA vasculitis presents to the ED with chest pressure and shortness of breath for 4 days. Patient reports left sided chest pressure for 3-4 days, radiating to left arm, back and right hip, constant in nature, not related to exertion and occurring  while sitting, lying and standing, associated with left arm numbness. Patient states this is the second time she has had pain, first time it occurred was 2 years ago. Patient also reports severe MUÑOZ on even slight activity or mild exertion. Denies orthopnea. Patient also has right lower extremity swelling, reports intermittent chronic LE swelling. Patient also has chronic cough that is sometimes dry and sometimes associated with phlegm that is white, yellow or green. Patient also reports low grade fevers. Patient is on 2L home oxygen.     GOC: Patient says she wants full resuscitation unless she is brain dead, does not want to be in a vegetative state.     In ED,   Vital Signs Last 24 Hrs  T(C): 36.3 (30 Jan 2021 07:08), Max: 37.2 (30 Jan 2021 02:29)  T(F): 97.3 (30 Jan 2021 07:08), Max: 98.9 (30 Jan 2021 02:29)  HR: 89 (30 Jan 2021 07:08) (89 - 110)  BP: 144/88 (30 Jan 2021 07:08) (144/88 - 153/90)  BP(mean): --  RR: 18 (30 Jan 2021 07:08) (18 - 20)  SpO2: 93% (30 Jan 2021 07:08) (93% - 95%) Patient, a 55 year old female from home, PMH of asthma, chronic bronchitis, NAKUL, HTN, DM, HLD, fibromyalgia, anxiety, trigeminal neuralgia, degenerative joint disease, IgA vasculitis presents to the ED with chest pressure and shortness of breath for 4 days. Patient reports left sided chest pressure for 3-4 days, radiating to left arm, back and right hip, constant in nature, not related to exertion and occurring  while sitting, lying and standing, associated with left arm numbness. Patient states this is the second time she has had pain, first time it occurred was 2 years ago. Patient also reports severe MUÑOZ on even slight activity or mild exertion. Denies orthopnea. Patient also has right lower extremity swelling, reports intermittent chronic LE swelling. Patient also has chronic cough that is sometimes dry and sometimes associated with phlegm that is white, yellow or green. Patient also reports low grade fevers. Patient is on 2L home oxygen.     GOC: Patient says she wants full resuscitation unless she is brain dead, does not want to be in a vegetative state.     In ED,   Vital Signs Last 24 Hrs  T(C): 36.3 (30 Jan 2021 07:08), Max: 37.2 (30 Jan 2021 02:29)  T(F): 97.3 (30 Jan 2021 07:08), Max: 98.9 (30 Jan 2021 02:29)  HR: 89 (30 Jan 2021 07:08) (89 - 110)  BP: 144/88 (30 Jan 2021 07:08) (144/88 - 153/90)  BP(mean): --  RR: 18 (30 Jan 2021 07:08) (18 - 20)  SpO2: 93% (30 Jan 2021 07:08) (93% - 95%)

## 2021-01-30 NOTE — PROGRESS NOTE ADULT - ATTENDING COMMENTS
Patient, a 55 year old female from home, PMH of asthma, chronic bronchitis, NAKUL, HTN, DM, HLD, fibromyalgia, anxiety, trigeminal neuralgia, degenerative joint disease, IgA vasculitis presents to the ED with chest pressure and shortness of breath for 4 days.   EKG showed sinus rhythm. Trop 2 negative. D dimers negative. CXR showed no consolidation or congestion.       1. Chest pain: ACS ruled out  Continue aspirin 81mg PO daily  Full dose Lovenox started in the ER  Cardiology consult   Pulmonary consult for evaluation of PE(clinically she has RLE swelling, history of malignancy, obesity but D-Dimer is negative, will obtain LE doppler, patient has anaphylaxis to MRI contrast but also states she can not have iodine contrast, might need V/Q scan depending on cards/pulm recs)  See HnP for full details.

## 2021-01-30 NOTE — PROGRESS NOTE ADULT - ASSESSMENT
Medicine Progress Note    Patient is a 55y old  Female who presents with a chief complaint of chest pain and SOB (30 Jan 2021 06:30)      SUBJECTIVE / OVERNIGHT EVENTS:    ADDITIONAL REVIEW OF SYSTEMS:    MEDICATIONS  (STANDING):  aspirin enteric coated 81 milliGRAM(s) Oral daily  atorvastatin 40 milliGRAM(s) Oral at bedtime  budesonide 160 MICROgram(s)/formoterol 4.5 MICROgram(s) Inhaler 2 Puff(s) Inhalation two times a day  enoxaparin Injectable 130 milliGRAM(s) SubCutaneous every 12 hours  FLUoxetine 20 milliGRAM(s) Oral daily  gabapentin 400 milliGRAM(s) Oral daily  hydrochlorothiazide 12.5 milliGRAM(s) Oral daily  insulin glargine Injectable (LANTUS) 15 Unit(s) SubCutaneous every morning  insulin lispro (ADMELOG) corrective regimen sliding scale   SubCutaneous Before meals and at bedtime  insulin lispro Injectable (ADMELOG) 6 Unit(s) SubCutaneous three times a day before meals  levothyroxine 112 MICROGram(s) Oral daily  montelukast 10 milliGRAM(s) Oral at bedtime  OXcarbazepine 300 milliGRAM(s) Oral two times a day  pantoprazole    Tablet 40 milliGRAM(s) Oral before breakfast  senna 2 Tablet(s) Oral at bedtime  sodium chloride 0.9% lock flush 3 milliLiter(s) IV Push every 8 hours    MEDICATIONS  (PRN):  acetaminophen   Tablet .. 650 milliGRAM(s) Oral every 6 hours PRN Temp greater or equal to 38C (100.4F), Moderate Pain (4 - 6)  ALBUTerol    90 MICROgram(s) HFA Inhaler 2 Puff(s) Inhalation every 6 hours PRN Shortness of Breath and/or Wheezing    CAPILLARY BLOOD GLUCOSE      POCT Blood Glucose.: 328 mg/dL (30 Jan 2021 12:11)  POCT Blood Glucose.: 305 mg/dL (30 Jan 2021 07:49)    I&O's Summary      PHYSICAL EXAM:  Vital Signs Last 24 Hrs  T(C): 36.8 (30 Jan 2021 10:00), Max: 37.2 (30 Jan 2021 02:29)  T(F): 98.3 (30 Jan 2021 10:00), Max: 98.9 (30 Jan 2021 02:29)  HR: 82 (30 Jan 2021 10:00) (82 - 110)  BP: 125/84 (30 Jan 2021 10:00) (125/84 - 153/90)  BP(mean): --  RR: 19 (30 Jan 2021 10:00) (18 - 20)  SpO2: 93% (30 Jan 2021 10:00) (93% - 95%)  CONSTITUTIONAL: Awake and alert  ENMT: Moist oral mucosa, no pharyngeal injection or exudates; normal dentition  RESPIRATORY: BLAE+, clear  CARDIOVASCULAR: Regular rate and rhythm, normal S1 and S2, no murmur/rub/gallop; No lower extremity edema; Peripheral pulses are 2+ bilaterally  ABDOMEN: Nontender to palpation, normoactive bowel sounds, no rebound/guarding; No hepatosplenomegaly  PSYCH: A+O to person, place, and time; affect appropriate  NEUROLOGY: CN 2-12 are intact and symmetric; no gross sensory deficits   SKIN: RLE?LLE    LABS:                        12.6   9.94  )-----------( 323      ( 30 Jan 2021 07:15 )             42.8     01-30    135  |  97  |  15  ----------------------------<  331<H>  3.9   |  33<H>  |  0.83    Ca    8.7      30 Jan 2021 11:01  Phos  4.3     01-30  Mg     1.1     01-30    TPro  7.5  /  Alb  3.2<L>  /  TBili  0.5  /  DBili  x   /  AST  42<H>  /  ALT  56  /  AlkPhos  136<H>  01-30    PT/INR - ( 30 Jan 2021 03:24 )   PT: 13.8 sec;   INR: 1.17 ratio         PTT - ( 30 Jan 2021 03:24 )  PTT:29.9 sec  CARDIAC MARKERS ( 30 Jan 2021 11:01 )  <0.015 ng/mL / x     / 163 U/L / x     / x      CARDIAC MARKERS ( 30 Jan 2021 07:14 )  x     / x     / 169 U/L / x     / x      CARDIAC MARKERS ( 30 Jan 2021 03:24 )  <0.015 ng/mL / x     / x     / x     / x              COVID-19 PCR: NotDetec (30 Jan 2021 03:24)      RADIOLOGY & ADDITIONAL TESTS:  Imaging from Last 24 Hours:    Electrocardiogram/QTc Interval:    COORDINATION OF CARE:  Care Discussed with Consultants/Other Providers:
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
## 2021-01-30 NOTE — H&P ADULT - NSICDXPASTMEDICALHX_GEN_ALL_CORE_FT
PAST MEDICAL HISTORY:  Asthma last inhaler use with in 10 days, on Pulm follow up [id number]    Back pain thoracic & lumbar    Cervical neuralgia difficulty moving my neck    DJD (degenerative joint disease) Cervical/Thoracic/Lumbar    DM (diabetes mellitus) 2    Falls frequently     Fibromyalgia     H/O bursitis right shoulder    Hyperlipidemia     Hypertension vaginal cyst    Hypothyroid     Obesity morbid    Obstructive sleep apnea refuses Cpap    Radicular pain arms, legs    Renal cell carcinoma, left on follow up Dr schulte, HOD renal [facility], waiting for suregry in 09/2020    Trigeminal neuralgia R    Vasculitis Legs, forerhead, arms & hands, flare ups in R leg  Dr susie Ribeiro is my Rheumatologist

## 2021-01-30 NOTE — H&P ADULT - NSHPPHYSICALEXAM_GEN_ALL_CORE
PHYSICAL EXAM:  GENERAL: morbidly obese, sitting in chair, on NC   HEAD:  Atraumatic, Normocephalic  EYES: EOMI, PERRLA, conjunctiva and sclera clear  ENT: Moist mucous membranes  NECK: Supple, No JVD  CHEST/LUNG: Decreased breath sounds due to body habitus  HEART: Regular rate and rhythm; No murmurs, rubs, or gallops  ABDOMEN: Bowel sounds present; Soft, Nontender, Nondistended.   EXTREMITIES:  right foot swollen more than left   NERVOUS SYSTEM:  Alert & Oriented X3, speech clear. No deficits   MSK: FROM all 4 extremities, full and equal strength  SKIN: lichenified plaques with silverish scaling on left knee and elbow. reddish macules on right leg and arms due to vasculitis

## 2021-01-30 NOTE — ED CLERICAL - CLERICAL COMMENTS
as per Winsome pt is neg assigned at 8:30am to [id number] as per Winsome pt is neg assigned at 9:07am to [id number]

## 2021-01-30 NOTE — H&P ADULT - PROBLEM SELECTOR PLAN 1
patient presents with chest pressure  EKG showed sinus rhythm   Trop x 1 negative  HEART Score= 4 (Non-specific repolarization disturbance, Age 45-64, 3 risk factors or history of atherosclerotic disease), Moderate Score, Risk of MACE of 12-16.6%.  DOLLY Score= 2 points (>=3 CAD risk factors, Severe angina [>=2 episodes in 24 hrs]), 8% risk at 14 days of: all-cause mortality, new or recurrent MI, or severe recurrent ischemia requiring urgent revascularization.  admit to tele  f/u echocardiogram  Cardio consult?

## 2021-01-30 NOTE — H&P ADULT - ASSESSMENT
Patient, a 55 year old male from home, PMH of asthma, chronic bronchitis, NAKUL, HTN, DM, HLD, fibromyalgia, anxiety, trigeminal neuralgia, degenerative joint disease, IgA vasculitis presents to the ED with chest pressure and shortness of breath for 4 days.   EKG showed sinus rhythm. Trop 1 negative. D dimers negative. CXR showed no consolidation or congestion.      Patient, a 55 year old female from home, PMH of asthma, chronic bronchitis, NAKUL, HTN, DM, HLD, fibromyalgia, anxiety, trigeminal neuralgia, degenerative joint disease, IgA vasculitis presents to the ED with chest pressure and shortness of breath for 4 days.   EKG showed sinus rhythm. Trop 1 negative. D dimers negative. CXR showed no consolidation or congestion.

## 2021-01-30 NOTE — PROGRESS NOTE ADULT - SUBJECTIVE AND OBJECTIVE BOX
Patient, a 55 year old female from home, PMH of asthma, chronic bronchitis, NAKUL, HTN, DM, HLD, fibromyalgia, anxiety, trigeminal neuralgia, degenerative joint disease, IgA vasculitis presents to the ED with chest pressure and shortness of breath for 4 days.   EKG showed sinus rhythm. Trop 2 negative. D dimers negative. CXR showed no consolidation or congestion.

## 2021-01-30 NOTE — ED PROVIDER NOTE - PMH
Asthma  last inhaler use with in 10 days, on Pulm follow up [id number]  Back pain  thoracic & lumbar  Cervical neuralgia  difficulty moving my neck  DJD (degenerative joint disease)  Cervical/Thoracic/Lumbar  DM (diabetes mellitus)  2  Falls frequently    Fibromyalgia    H/O bursitis  right shoulder  Hyperlipidemia    Hypertension  vaginal cyst  Hypothyroid    Obesity  morbid  Obstructive sleep apnea  refuses Cpap  Radicular pain  arms, legs  Renal cell carcinoma, left  on follow up Dr schulte, HOD renal [facility], waiting for suregry in 09/2020  Trigeminal neuralgia  R  Vasculitis  Legs, forerhead, arms & hands, flare ups in R leg  Dr susie Ribeiro is my Rheumatologist

## 2021-01-30 NOTE — ED ADULT NURSE NOTE - NSIMPLEMENTINTERV_GEN_ALL_ED
Implemented All Universal Safety Interventions:  [place] to call system. Call bell, personal items and telephone within reach. Instruct patient to call for assistance. Room bathroom lighting operational. Non-slip footwear when patient is off stretcher. Physically safe environment: no spills, clutter or unnecessary equipment. Stretcher in lowest position, wheels locked, appropriate side rails in place.

## 2021-01-30 NOTE — ED PROVIDER NOTE - OBJECTIVE STATEMENT
55 year old female with PMHx of renal cell carcinoma (has not initiated treatment yet), IGA vasculitis, asthma, migraines, fibromyalgia and trigeminal neuralgia presents to the ED with complaints of left-sided pleuritic chest pain. Patient additionally complains of a cough, shortness of breath, and right-sided leg swelling. Patient reports onset of all of her symptoms within the past four days. Patient states that she noted a measured temperature up to 100.2 F, but not higher. Patient denies having any history of heart problems or blood clots. Patient otherwise denies any syncope, focal numbness or weakness, nausea, vomiting, diarrhea, urinary symptoms, and all other acute complaints. Patient denies having any other recent illness or hospitalizations  Allergies: IV contrast (SOB), gadobutrol (rash; urticaria; hives), venlafaxine (hives), Fioricet (rash)

## 2021-01-30 NOTE — ED PROVIDER NOTE - PHYSICAL EXAMINATION
Vital Signs Reviewed  GEN: Obese  HEENT: NCAT, EOMI, MMM, Neck Supple  RESP: CTAB, No rales/rhonchi/wheezing  CV: RRR, S1S2, No murmurs  ABD: No TTP, ND, No masses, No CVA Tenderness  Extrem/Skin: 2+ pitting edema to the right leg, Trace edema to the left leg   Neuro: No focal deficits

## 2021-01-30 NOTE — ED PROVIDER NOTE - CLINICAL SUMMARY MEDICAL DECISION MAKING FREE TEXT BOX
Patient presenting with signs and symptoms concerning for PE and ACS among other diagnoses. Labs and imagining pending. Patient states that she is highly allergic to contrast, and that when she was previously premedicated with steroids and Benadryl she continued to have anaphylactic reaction to IV contrast. For this reason, will abstain from CTA. Awaiting labs, but will likely treat empirically and admit to the hospital. Patient stable. Will reassess. Patient presenting with signs and symptoms concerning for PE and ACS among other diagnoses. Labs and imagining pending. Patient states that she is highly allergic to contrast, and that when she was previously premedicated with steroids and Benadryl she continued to have anaphylactic reaction to IV contrast. For this reason, will abstain from CTA. Awaiting labs, but will likely treat empirically and admit to the hospital. Patient stable. Will reassess.    Labs, EKG. and CXR unremarkable. Bedside U/S showing grossly nml EF, nml RV, LV>RV, and no effusion. Given story high suspicion for PE despite nml dimer. Pt has no h/o abnormal bleeding so will treat empirically with lovenox and admit for further w/u. Pt stable and endorsed to inpatient team.

## 2021-01-30 NOTE — H&P ADULT - PROBLEM SELECTOR PLAN 3
patient has uncontrolled blood sugars  pt takes lantus 40u at home and metformin 1000mg bid  started on moderate dose HSS  start lantus 15 u and humalog 6 units  monitor fingerstick closely  f/u A1c

## 2021-01-30 NOTE — ED PROVIDER NOTE - PSH
Left adrenal mass  excision, benign   S/P abdominal hysterectomy  2003    S/P  section  1  Vaginal cyst  removed 2010  Vasculitis on nerve biopsy  , had another surgery called microvascular decompression

## 2021-01-30 NOTE — H&P ADULT - PROBLEM SELECTOR PLAN 2
patient p/w SOB more on exertion  Wells Criteria= 8.5 points keeping malignancy in mind  POCUS in Ed showed no RH strain   s/p lovenox in ED  patient is allergic to MRI contrast so CTA not done  D dimer<150  CXR shows no consolidation  start on full dose lovenox  consider V/Q scan if symptoms worsen  Pulm consult?

## 2021-01-30 NOTE — ED ADULT TRIAGE NOTE - CHIEF COMPLAINT QUOTE
Mid sternal chest pain radiating to l arm and upper back x 3 days ,cough with yellow/greenish phlegm x 5-6 days ,fever x few days too

## 2021-01-30 NOTE — H&P ADULT - ATTENDING COMMENTS
Patient is a 55 year old female with a PMH of HTN, HLD, DM, Hypothyroidism, Morbid Obesity, NAKUL (not on CPAP), Moderate Persistent Asthma, Migraines, Fibromyalgia, Trigeminal Neuralgia s/p Right microvascular decompression on 7/23/2020, h/o Left Renal Cell Carcinoma dx 1.5 years ago (scheduled for Left Nephrectomy in 6/2021, not currently on chemo or RT), IgA Vasculitis dx 2013, s/p Left Adrenal mass excision in 2006, s/p Hysterectomy in 2003 who presented with a chief complaint of chest pain.  Patient states that beginning 4 days prior to current presentation, she began to experience a "pressure"-like chest pain located on the anterior and left-side of her chest, radiating down her left arm, and down her back "all the way down to my SI joints".  Patient endorses significant tenderness with direct palpation of the chest wall.    In addition, patient also endorses pitting edema of the right lower extremity.  However, patient endorses that her right leg frequently "gets big and it's really painful" frequently as a result of her IgA Vasculitis.    At time of examination in the ED, patient denies any headache, dizziness, palpitations, shortness of breath, abdominal pain, nausea/vomiting/diarrhea.    T(C): 37.2 (01-30-21 @ 02:29), Max: 37.2 (01-30-21 @ 02:29)  T(F): 98.9 (01-30-21 @ 02:29), Max: 98.9 (01-30-21 @ 02:29)  HR: 110 (01-30-21 @ 02:29) (110 - 110)  BP: 153/90 (01-30-21 @ 02:29) (153/90 - 153/90)  RR: 20 (01-30-21 @ 02:29) (20 - 20)  SpO2: 95% (01-30-21 @ 02:29) (95% - 95%)  Wt(kg): --    P/E: As above MAR    A/P:    Atypical Chest Pain possibly due to Costochondritis vs less likely PE vs unlikely MI:  -Wells Criteria= 8.5 points (Clinical signs and symptoms of DVT, PE is #1 diagnosis OR equally likely, Heart rate > 100, Malignancy with treatment within 6 months or palliative), High risk group: 40.6% chance of PE in an ED population.  -HEART Score= 4 (Non-specific repolarization disturbance, Age 45-64, 3 risk factors or history of atherosclerotic disease), Moderate Score, Risk of MACE of 12-16.6%.  -DOLLY Score= 2 points (>=3 CAD risk factors, Severe angina [>=2 episodes in 24 hrs]), 8% risk at 14 days of: all-cause mortality, new or recurrent MI, or severe recurrent ischemia requiring urgent revascularization.  -EKG identified NSR, though with prolonged QTc  -Troponin= <0.015  -Pro-BNP= 61  -D-dimer= <150  -Unable to obtain CTA in ED due to reported anaphylaxis to contrast  -Therefore, bedside ultrasound performed in ED along with ED Attending did not appear to demonstrate any obvious enlargement of the right heart as viewed from the parasternal long window  -Awaiting ABG in ED in order to calculate A-a Gradient  -Will obtain repeat TTE (with attention to mPAP and LVEF)  -Doppler of Bilateral Lower Extremities  -If concern for PE continues to remain high, can also consider following-up with a Nuclear Medicine V/Q Scan  -Patient was empirically treated with Lovenox 120mg SUBQ Once in the ED  -Will continue treating with Lovenox, for now, until results of Doppler available for review  -Will start patient on Tylenol 325mg PO Q4H PRN (as needed for musculoskeletal pain)  -Will also order Aspirin and resume patient's statin  -Will ask Pulm to evaluate patient for further recommendations    IgA Vasculitis:  -Will send ESR, CRP and C3    DM:  -Hemoglobin A1c with AM labs  -Blood Glucose Monitoring ACHS  -Regular Insulin Sliding Scale ACHS  -Carb Controlled, Heart Healthy, Low Sodium diet as tolerated    Elevated Alk Phos:  -Alk Phos= 147 (Baseline= 117 on 7/10/2020)  -Gamma Gap= 4.6  -Likely due to patient's diagnosis of Left Renal Cell Carcinoma  -Nevertheless, will send GGT    Microcytosis:  -MCV= 79.5, RBC= 5.31, RDW= 16.2  -Will send Retic Count and Iron Panel (Iron, TIBC, Ferritin)    Moderate Persistent Asthma:  -Albuterol MDI Q2H PRN    HTN:  -Will resume patient's home medications  -Vital Signs Q2H    HLD:  -Lipid Panel with AM labs  -Will resume patient's home medication    Hypothyroidism:  -Thyroid Panel with AM labs  -Will resume patient's home medication    Hypoalbuminemia:  -Nutrition Consult    GI/DVT PPx:  -Lovenox  -PPI Patient is a 55 year old female with a PMH of HTN, HLD, DM, Hypothyroidism, Morbid Obesity, NAKUL (not on CPAP), Moderate Persistent Asthma, Migraines, Fibromyalgia, Trigeminal Neuralgia s/p Right microvascular decompression on 7/23/2020, h/o Left Renal Cell Carcinoma dx 1.5 years ago (scheduled for Left Nephrectomy in 6/2021, not currently on chemo or RT), IgA Vasculitis dx 2013, s/p Left Adrenal mass excision in 2006, s/p Hysterectomy in 2003 who presented with a chief complaint of chest pain.  Patient states that beginning 4 days prior to current presentation, she began to experience a "pressure"-like chest pain located on the anterior and left-side of her chest, radiating down her left arm, and down her back "all the way down to my SI joints".  Patient endorses significant tenderness with coughing as well as with direct palpation of the chest wall.    In addition, patient also endorses pitting edema of the right lower extremity.  However, patient endorses that her right leg frequently "gets big and it's really painful" frequently as a result of her IgA Vasculitis.    At time of examination in the ED, patient denies any headache, dizziness, palpitations, shortness of breath, abdominal pain, nausea/vomiting/diarrhea.    T(C): 37.2 (01-30-21 @ 02:29), Max: 37.2 (01-30-21 @ 02:29)  T(F): 98.9 (01-30-21 @ 02:29), Max: 98.9 (01-30-21 @ 02:29)  HR: 110 (01-30-21 @ 02:29) (110 - 110)  BP: 153/90 (01-30-21 @ 02:29) (153/90 - 153/90)  RR: 20 (01-30-21 @ 02:29) (20 - 20)  SpO2: 95% (01-30-21 @ 02:29) (95% - 95%)  Wt(kg): --    P/E: As above MAR    A/P:    Atypical Chest Pain possibly due to Costochondritis vs less likely PE vs unlikely MI:  -Wells Criteria= 8.5 points (Clinical signs and symptoms of DVT, PE is #1 diagnosis OR equally likely, Heart rate > 100, Malignancy with treatment within 6 months or palliative), High risk group: 40.6% chance of PE in an ED population.  -HEART Score= 4 (Non-specific repolarization disturbance, Age 45-64, 3 risk factors or history of atherosclerotic disease), Moderate Score, Risk of MACE of 12-16.6%.  -DOLLY Score= 2 points (>=3 CAD risk factors, Severe angina [>=2 episodes in 24 hrs]), 8% risk at 14 days of: all-cause mortality, new or recurrent MI, or severe recurrent ischemia requiring urgent revascularization.  -EKG identified NSR, though with prolonged QTc  -Troponin= <0.015  -Pro-BNP= 61  -D-dimer= <150  -Unable to obtain CTA in ED due to reported anaphylaxis to contrast  -Therefore, bedside ultrasound performed in ED along with ED Attending did not appear to demonstrate any obvious enlargement of the right heart as viewed from the parasternal long window  -Awaiting ABG in ED in order to calculate A-a Gradient  -Will obtain repeat TTE (with attention to mPAP and LVEF)  -Doppler of Bilateral Lower Extremities  -If concern for PE continues to remain high, can also consider following-up with a Nuclear Medicine V/Q Scan  -Patient was empirically treated with Lovenox 120mg SUBQ Once in the ED  -Will continue treating with Lovenox, for now, until results of Doppler available for review  -Will start patient on Tylenol 325mg PO Q4H PRN (as needed for musculoskeletal pain)  -Will also order Aspirin and resume patient's statin  -Will ask Pulm to evaluate patient for further recommendations    IgA Vasculitis:  -Will send ESR, CRP and C3  -Patient will need to follow-up with Rheumatology as an outpatient after discharge    DM:  -Hemoglobin A1c with AM labs, Blood Glucose Monitoring ACHS, Regular Insulin Sliding Scale ACHS, Carb Controlled, Heart Healthy, Low Sodium diet as tolerated  -Patient will need to follow-up with Endo as an outpatient after discharge    Elevated Alk Phos:  -Alk Phos= 147 (Baseline= 117 on 7/10/2020)  -Gamma Gap= 4.6  -Likely due to patient's diagnosis of Left Renal Cell Carcinoma  -Nevertheless, will send GGT  -Patient will need to follow-up with Oncology as an outpatient after discharge    Microcytosis:  -MCV= 79.5, RBC= 5.31, RDW= 16.2  -Will send Retic Count and Iron Panel (Iron, TIBC, Ferritin)    Moderate Persistent Asthma:  -Albuterol MDI Q2H PRN  -Patient will need to follow-up with Pulm as an outpatient after discharge    HTN:  -Will resume patient's home medications  -Vital Signs Q2H    HLD:  -Lipid Panel with AM labs  -Will resume patient's home medication    Hypothyroidism:  -Thyroid Panel with AM labs  -Will resume patient's home medication    Hypoalbuminemia:  -Nutrition Consult    GI/DVT PPx:  -Lovenox  -PPI Patient is a 55 year old female with a PMH of HTN, HLD, DM, Hypothyroidism, Morbid Obesity, NAKUL (not on CPAP), Moderate Persistent Asthma, Migraines, Fibromyalgia, Trigeminal Neuralgia s/p Right microvascular decompression on 7/23/2020, h/o Left Renal Cell Carcinoma dx 1.5 years ago (scheduled for Left Nephrectomy in 6/2021, not currently on chemo or RT), IgA Vasculitis dx 2013, s/p Left Adrenal mass excision in 2006, s/p Hysterectomy in 2003 who presented with a chief complaint of chest pain.  Patient states that beginning 4 days prior to current presentation, she began to experience a "pressure"-like chest pain located on the anterior and left-side of her chest, radiating down her left arm, and down her back "all the way down to my SI joints".  Patient endorses significant tenderness with coughing as well as with direct palpation of the chest wall.    In addition, patient also endorses pitting edema of the right lower extremity.  However, patient endorses that her right leg frequently "gets big and it's really painful" frequently as a result of her IgA Vasculitis.    At time of examination in the ED, patient denies any headache, dizziness, palpitations, shortness of breath, abdominal pain, nausea/vomiting/diarrhea.    T(C): 37.2 (01-30-21 @ 02:29), Max: 37.2 (01-30-21 @ 02:29)  T(F): 98.9 (01-30-21 @ 02:29), Max: 98.9 (01-30-21 @ 02:29)  HR: 110 (01-30-21 @ 02:29) (110 - 110)  BP: 153/90 (01-30-21 @ 02:29) (153/90 - 153/90)  RR: 20 (01-30-21 @ 02:29) (20 - 20)  SpO2: 95% (01-30-21 @ 02:29) (95% - 95%)  Wt(kg): --    P/E: As above MAR    A/P:    Atypical Chest Pain possibly due to Costochondritis vs less likely PE vs unlikely MI:  -Wells Criteria= 8.5 points (Clinical signs and symptoms of DVT, PE is #1 diagnosis OR equally likely, Heart rate > 100, Malignancy with treatment within 6 months or palliative), High risk group: 40.6% chance of PE in an ED population.  -HEART Score= 4 (Non-specific repolarization disturbance, Age 45-64, 3 risk factors or history of atherosclerotic disease), Moderate Score, Risk of MACE of 12-16.6%.  -DOLLY Score= 2 points (>=3 CAD risk factors, Severe angina [>=2 episodes in 24 hrs]), 8% risk at 14 days of: all-cause mortality, new or recurrent MI, or severe recurrent ischemia requiring urgent revascularization.  -EKG identified NSR, though with prolonged QTc  -Troponin= <0.015  -Pro-BNP= 61  -D-dimer= <150  -Unable to obtain CTA in ED due to reported anaphylaxis to contrast  -Therefore, bedside ultrasound performed in ED along with ED Attending did not appear to demonstrate any obvious enlargement of the right heart as viewed from the parasternal long window  -Awaiting ABG in ED in order to calculate A-a Gradient  -Will obtain repeat TTE (with attention to mPAP and LVEF)  -Doppler of Bilateral Lower Extremities  -If concern for PE continues to remain high, can also consider following-up with a Nuclear Medicine V/Q Scan  -Patient was empirically treated with Lovenox 120mg SUBQ Once in the ED  -Will continue treating with Lovenox, for now, until results of Doppler available for review  -Will send CK level  -Will start patient on Tylenol 325mg PO Q4H PRN (as needed for musculoskeletal pain)  -Will also order Aspirin and resume patient's statin  -Will ask Pulm to evaluate patient for further recommendations    IgA Vasculitis:  -Will send ESR, CRP and C3  -Patient will need to follow-up with Rheumatology as an outpatient after discharge    DM:  -Hemoglobin A1c with AM labs, Blood Glucose Monitoring ACHS, Regular Insulin Sliding Scale ACHS, Carb Controlled, Heart Healthy, Low Sodium diet as tolerated  -Patient will need to follow-up with Endo as an outpatient after discharge    Elevated Alk Phos:  -Alk Phos= 147 (Baseline= 117 on 7/10/2020)  -Gamma Gap= 4.6  -Likely due to patient's diagnosis of Left Renal Cell Carcinoma  -Nevertheless, will send GGT  -Patient will need to follow-up with Oncology as an outpatient after discharge    Microcytosis:  -MCV= 79.5, RBC= 5.31, RDW= 16.2  -Will send Retic Count and Iron Panel (Iron, TIBC, Ferritin)    Moderate Persistent Asthma:  -Albuterol MDI Q2H PRN  -Patient will need to follow-up with Pulm as an outpatient after discharge    HTN:  -Will resume patient's home medications  -Vital Signs Q2H    HLD:  -Lipid Panel with AM labs  -Will resume patient's home medication    Hypothyroidism:  -Thyroid Panel with AM labs  -Will resume patient's home medication    Hypoalbuminemia:  -Nutrition Consult    GI/DVT PPx:  -Lovenox  -PPI Patient is a 55 year old female with a PMH of HTN, HLD, DM, Hypothyroidism, Morbid Obesity, NAKUL (not on CPAP), Moderate Persistent Asthma, Migraines, Fibromyalgia, Trigeminal Neuralgia s/p Right microvascular decompression on 7/23/2020, h/o Left Renal Cell Carcinoma dx 1.5 years ago (scheduled for Left Nephrectomy in 6/2021, not currently on chemo or RT), IgA Vasculitis dx 2013, s/p Left Adrenal mass excision in 2006, s/p Hysterectomy in 2003 who presented with a chief complaint of chest pain.  Patient states that beginning 4 days prior to current presentation, she began to experience a "pressure"-like chest pain located on the anterior and left-side of her chest, radiating down her left arm, and down her back "all the way down to my SI joints".  Patient endorses significant tenderness with coughing as well as with direct palpation of the chest wall.    In addition, patient also endorses pitting edema of the right lower extremity.  However, patient endorses that her right leg frequently "gets big and it's really painful" frequently as a result of her IgA Vasculitis.    At time of examination in the ED, patient denies any headache, dizziness, palpitations, shortness of breath, abdominal pain, nausea/vomiting/diarrhea.    T(C): 37.2 (01-30-21 @ 02:29), Max: 37.2 (01-30-21 @ 02:29)  T(F): 98.9 (01-30-21 @ 02:29), Max: 98.9 (01-30-21 @ 02:29)  HR: 110 (01-30-21 @ 02:29) (110 - 110)  BP: 153/90 (01-30-21 @ 02:29) (153/90 - 153/90)  RR: 20 (01-30-21 @ 02:29) (20 - 20)  SpO2: 95% (01-30-21 @ 02:29) (95% - 95%)  Wt(kg): --    P/E: As above MAR    A/P:    Atypical Chest Pain possibly due to Costochondritis vs less likely PE vs unlikely MI:  -Wells Criteria= 8.5 points (Clinical signs and symptoms of DVT, PE is #1 diagnosis OR equally likely, Heart rate > 100, Malignancy with treatment within 6 months or palliative), High risk group: 40.6% chance of PE in an ED population.  -HEART Score= 4 (Non-specific repolarization disturbance, Age 45-64, 3 risk factors or history of atherosclerotic disease), Moderate Score, Risk of MACE of 12-16.6%.  -DOLLY Score= 2 points (>=3 CAD risk factors, Severe angina [>=2 episodes in 24 hrs]), 8% risk at 14 days of: all-cause mortality, new or recurrent MI, or severe recurrent ischemia requiring urgent revascularization.  -MR Abdomen with and without IV contrast (12/28/2020) identified patent left renal vein and IVC  -EKG identified NSR, though with prolonged QTc  -Troponin= <0.015  -Pro-BNP= 61  -D-dimer= <150  -Unable to obtain CTA in ED due to reported anaphylaxis to contrast  -Therefore, bedside ultrasound performed in ED along with ED Attending did not appear to demonstrate any obvious enlargement of the right heart as viewed from the parasternal long window  -Awaiting ABG in ED in order to calculate A-a Gradient  -Will obtain bilateral BP measurements   -Will obtain repeat TTE (with attention to mPAP and LVEF)  -Doppler of Bilateral Lower Extremities  -If concern for PE continues to remain high, can also consider following-up with a Nuclear Medicine V/Q Scan  -Patient was empirically treated with Lovenox 120mg SUBQ Once in the ED  -Will continue treating with Lovenox, for now, until results of Doppler available for review  -Will send CK level  -Will start patient on Tylenol 325mg PO Q4H PRN (as needed for musculoskeletal pain)  -Will also order Aspirin and resume patient's statin  -Will ask Pulm to evaluate patient for further recommendations    IgA Vasculitis:  -Will send ESR, CRP and C3  -Patient will need to follow-up with Rheumatology as an outpatient after discharge    DM:  -Hemoglobin A1c with AM labs, Blood Glucose Monitoring ACHS, Regular Insulin Sliding Scale ACHS, Carb Controlled, Heart Healthy, Low Sodium diet as tolerated  -Patient will need to follow-up with Endo as an outpatient after discharge    Elevated Alk Phos:  -Alk Phos= 147 (Baseline= 117 on 7/10/2020), Gamma Gap= 4.6, Likely due to patient's diagnosis of Left Renal Cell Carcinoma  -Nevertheless, will send GGT  -Patient will need to follow-up with Oncology as an outpatient after discharge    Microcytosis:  -MCV= 79.5, RBC= 5.31, RDW= 16.2, Will send Retic Count and Iron Panel (Iron, TIBC, Ferritin)    Moderate Persistent Asthma:  -Albuterol MDI Q2H PRN  -Patient will need to follow-up with Pulm as an outpatient after discharge    HTN:  -Will resume patient's home medications  -Vital Signs Q2H    HLD:  -Lipid Panel with AM labs  -Will resume patient's home medication    Hypothyroidism:  -Thyroid Panel with AM labs  -Will resume patient's home medication    Hypoalbuminemia:  -Nutrition Consult    GI/DVT PPx:  -Lovenox  -PPI

## 2021-01-30 NOTE — CONSULT NOTE ADULT - SUBJECTIVE AND OBJECTIVE BOX
Time of visit:    CHIEF COMPLAINT: Patient is a 55y old  Female who presents with a chief complaint of chest pain and SOB (2021 13:24)      HPI:  Patient, a 55 year old female from home, PMH of asthma, chronic bronchitis, NAKUL, HTN, DM, HLD, fibromyalgia, anxiety, trigeminal neuralgia, degenerative joint disease, IgA vasculitis presents to the ED with chest pressure and shortness of breath for 4 days. Patient reports left sided chest pressure for 3-4 days, radiating to left arm, back and right hip, constant in nature, not related to exertion and occurring  while sitting, lying and standing, associated with left arm numbness. Patient states this is the second time she has had pain, first time it occurred was 2 years ago. Patient also reports severe MUÑOZ on even slight activity or mild exertion. Denies orthopnea. Patient also has right lower extremity swelling, reports intermittent chronic LE swelling. Patient also has chronic cough that is sometimes dry and sometimes associated with phlegm that is white, yellow or green. Patient also reports low grade fevers. Patient is on 2L home oxygen.     GOC: Patient says she wants full resuscitation unless she is brain dead, does not want to be in a vegetative state.     In ED,   Vital Signs Last 24 Hrs  T(C): 36.3 (2021 07:08), Max: 37.2 (2021 02:29)  T(F): 97.3 (2021 07:08), Max: 98.9 (2021 02:29)  HR: 89 (2021 07:08) (89 - 110)  BP: 144/88 (2021 07:08) (144/88 - 153/90)  BP(mean): --  RR: 18 (2021 07:08) (18 - 20)  SpO2: 93% (2021 07:08) (93% - 95%) (2021 06:30)   Patient seen and examined.     PAST MEDICAL & SURGICAL HISTORY:  Falls frequently    Renal cell carcinoma, left  on follow up Dr schulte, [facility] renal [facility], waiting for suregry in 2020    Vasculitis  Legs, forerhead, arms &amp; hands, flare ups in R leg  Dr susie Ribeiro is my Rheumatologist    Radicular pain  arms, legs    H/O bursitis  right shoulder    Back pain  thoracic &amp; lumbar    Cervical neuralgia  difficulty moving my neck    DJD (degenerative joint disease)  Cervical/Thoracic/Lumbar    DM (diabetes mellitus)  2    Fibromyalgia    Trigeminal neuralgia  R    Obesity  morbid    Obstructive sleep apnea  refuses Cpap    Hypothyroid    Asthma  last inhaler use with in 10 days, on Pulm follow up [id number]    Hyperlipidemia    Hypertension  vaginal cyst    Vasculitis on nerve biopsy  , had another surgery called microvascular decompression     Vaginal cyst  removed     Left adrenal mass  excision, benign 2006    S/P  section  1    S/P abdominal hysterectomy            Allergies    Fioricet (Rash)  gadobutrol (Rash; Urticaria; Hives)  IV Contrast (Short breath)  mushroom (Unknown)  venlafaxine (Hives)    Intolerances        MEDICATIONS  (STANDING):  aspirin enteric coated 81 milliGRAM(s) Oral daily  atorvastatin 40 milliGRAM(s) Oral at bedtime  budesonide 160 MICROgram(s)/formoterol 4.5 MICROgram(s) Inhaler 2 Puff(s) Inhalation two times a day  enoxaparin Injectable 40 milliGRAM(s) SubCutaneous daily  FLUoxetine 20 milliGRAM(s) Oral daily  gabapentin 400 milliGRAM(s) Oral daily  hydrochlorothiazide 12.5 milliGRAM(s) Oral daily  insulin glargine Injectable (LANTUS) 15 Unit(s) SubCutaneous every morning  insulin lispro (ADMELOG) corrective regimen sliding scale   SubCutaneous Before meals and at bedtime  insulin lispro Injectable (ADMELOG) 6 Unit(s) SubCutaneous three times a day before meals  levothyroxine 112 MICROGram(s) Oral daily  montelukast 10 milliGRAM(s) Oral at bedtime  OXcarbazepine 300 milliGRAM(s) Oral two times a day  pantoprazole    Tablet 40 milliGRAM(s) Oral before breakfast  senna 2 Tablet(s) Oral at bedtime  sodium chloride 0.9% lock flush 3 milliLiter(s) IV Push every 8 hours      MEDICATIONS  (PRN):  acetaminophen   Tablet .. 650 milliGRAM(s) Oral every 6 hours PRN Temp greater or equal to 38C (100.4F), Moderate Pain (4 - 6)  ALBUTerol    90 MICROgram(s) HFA Inhaler 2 Puff(s) Inhalation every 6 hours PRN Shortness of Breath and/or Wheezing   Medications up to date at time of exam.    Medications up to date at time of exam.    FAMILY HISTORY:  Family history of obesity    Family history of hypertension    Family history of diabetes mellitus        SOCIAL HISTORY  Smoking History: [ x  ] none  smoking/smoke exposure, [   ] former smoker  Living Condition: [   ] apartment, [   ] private house  Work History:   Travel History: denies recent travel  Illicit Substance Use: denies  Alcohol Use: denies    REVIEW OF SYSTEMS:    CONSTITUTIONAL:  denies fevers, chills, sweats, weight loss    HEENT:  denies diplopia or blurred vision, sore throat or runny nose.    CARDIOVASCULAR:  + pressure, squeezing, tightness, or heaviness about the chest; no palpitations.    RESPIRATORY:  + SOB, cough, MUÑOZ, wheezing.    GASTROINTESTINAL:  denies abdominal pain, nausea, vomiting or diarrhea.    GENITOURINARY: denies dysuria, frequency or urgency.    NEUROLOGIC:  denies numbness, tingling, seizures or weakness.    PSYCHIATRIC:  denies disorder of thought or mood.    MSK: denies swelling, redness      PHYSICAL EXAMINATION:    GENERAL: The patient is a well-developed, well-nourished, in no apparent distress.     Vital Signs Last 24 Hrs  T(C): 36.8 (2021 16:23), Max: 37.2 (2021 02:29)  T(F): 98.2 (2021 16:23), Max: 98.9 (2021 02:29)  HR: 83 (2021 16:23) (82 - 110)  BP: 151/81 (2021 16:23) (125/84 - 153/90)  BP(mean): --  RR: 17 (2021 16:23) (17 - 20)  SpO2: 91% (2021 16:23) (91% - 95%)   (if applicable)    Chest Tube (if applicable)    HEENT: Head is normocephalic and atraumatic. Extraocular muscles are intact. Mucous membranes are moist.     NECK: Supple, no palpable adenopathy.    LUNGS: Clear to auscultation, no wheezing, rales, or rhonchi.    HEART: Regular rate and rhythm without murmur.    ABDOMEN: Soft, nontender, and nondistended.  No hepatosplenomegaly is noted.    RENAL: No difficulty voiding, no pelvic pain    EXTREMITIES: Without any cyanosis, clubbing, rash, lesions or edema.    NEUROLOGIC: Awake, alert, oriented, grossly intact    SKIN: Warm, dry, good turgor.      LABS:                        12.6   9.94  )-----------( 323      ( 2021 07:15 )             42.8         135  |  97  |  15  ----------------------------<  331<H>  3.9   |  33<H>  |  0.83    Ca    8.7      2021 11:01  Phos  4.3       Mg     1.1         TPro  7.5  /  Alb  3.2<L>  /  TBili  0.5  /  DBili  x   /  AST  42<H>  /  ALT  56  /  AlkPhos  136<H>      PT/INR - ( 2021 03:24 )   PT: 13.8 sec;   INR: 1.17 ratio         PTT - ( 2021 03:24 )  PTT:29.9 sec    ABG - ( 2021 17:35 )  pH, Arterial: 7.36  pH, Blood: x     /  pCO2: 55    /  pO2: 61    / HCO3: 30    / Base Excess: 4.2   /  SaO2: 88   ( A-a  21)            CARDIAC MARKERS ( 2021 11:01 )  <0.015 ng/mL / x     / 163 U/L / x     / x      CARDIAC MARKERS ( 2021 07:14 )  x     / x     / 169 U/L / x     / x      CARDIAC MARKERS ( 2021 03:24 )  <0.015 ng/mL / x     / x     / x     / x          D-Dimer Assay, Quantitative: <150 ng/mL DDU (21 @ 03:24)    Serum Pro-Brain Natriuretic Peptide: 61 pg/mL (21 @ 03:24)    Lactate, Blood: 1.8 mmol/L (21 @ 03:24)    Procalcitonin, Serum: 0.07 ng/mL (21 @ 11:24)      MICROBIOLOGY: (if applicable)    RADIOLOGY & ADDITIONAL STUDIES:  EKG:   CXR:  < from: Xray Chest 1 View-PORTABLE IMMEDIATE (Xray Chest 1 View-PORTABLE IMMEDIATE .) (21 @ 03:34) >    EXAM:  XR CHEST PORTABLE IMMED 1V                            PROCEDURE DATE:  2021          INTERPRETATION:  CLINICAL HISTORY: Chest pain.    TECHNIQUE: Single upright AP chest radiograph.    COMPARISON: Multiple prior studies, most recent chest radiograph from 2020.    FINDINGS/  IMPRESSION:    Tubes/lines: n/a    Lungs: No focal consolidation or pulmonary edema.    Pleura: No pneumothorax or pleural effusions.    Cardiomediastinal silhouette: Grossly unchanged from the prior study.    Visualized osseus structures: Within normal limits.            MARIIA TYLER MD; Attending Radiologist  This document has been electronically signed. 2021  7:52AM    < end of copied text >  ECHO:< from: US Duplex Venous Lower Ext Complete, Bilateral (21 @ 14:46) >    EXAM:  US DPLX LWR EXT VEINS COMPL BI                            PROCEDURE DATE:  2021          INTERPRETATION:  CLINICAL INFORMATION: leg pain    COMPARISON: 2020    TECHNIQUE: Duplex sonography of the BILATERAL LOWER extremity veins with color and spectral Doppler, with and without compression.    FINDINGS:    There is normal compressibility of the bilateral common femoral, femoral and popliteal veins.  Doppler examination shows normal spontaneous and phasic flow.    No calf vein thrombosis is detected.    IMPRESSION:  No evidence of deep venous thrombosis in either lower extremity.                NETO NOLASCO MD; Attending Radiologist  This document has been electronically signed. 2021  2:49PM    < end of copied text >      IMPRESSION: 55y Female PAST MEDICAL & SURGICAL HISTORY:  Falls frequently    Renal cell carcinoma, left  on follow up Dr schulte, [facility] renal [facility], waiting for suregry in 2020    Vasculitis  Legs, forerhead, arms &amp; hands, flare ups in R leg  Dr susie Ribeiro is my Rheumatologist    Radicular pain  arms, legs    H/O bursitis  right shoulder    Back pain  thoracic &amp; lumbar    Cervical neuralgia  difficulty moving my neck    DJD (degenerative joint disease)  Cervical/Thoracic/Lumbar    DM (diabetes mellitus)  2    Fibromyalgia    Trigeminal neuralgia  R    Obesity  morbid    Obstructive sleep apnea  refuses Cpap    Hypothyroid    Asthma  last inhaler use with in 10 days, on Pulm follow up [id number]    Hyperlipidemia    Hypertension  vaginal cyst    Vasculitis on nerve biopsy  , had another surgery called microvascular decompression     Vaginal cyst  removed     Left adrenal mass  excision, benign     S/P  section  1    S/P abdominal hysterectomy         p/w       IMP: This is a   55 year old morbid obese woman   from home, with  asthma, chronic bronchitis, NAKUL, HTN, DM, HLD, fibromyalgia, anxiety, trigeminal neuralgia, degenerative joint disease, IgA vasculitis presents to the ED with chest pressure and shortness of breath for 4 days..  Pat was complaining of back pain and chest pain upon me touching her.  Pat has allergies to CT contrast and to MRI too.  I doubt PE in setting of neg D Dimer, neg b/l lower venous doppler  and A-a gradient 21 ,correct for her age  16.  ABG more consistent with NAKUL     Sugg;  -doubt PE  -consider V/Q scan   -prophy lovenox   -monitor pulse Oximetry on room air  -out pat sleep study  -consider referral for wt reduction surgery  -out pat pulmonary f/u Time of visit:    CHIEF COMPLAINT: Patient is a 55y old  Female who presents with a chief complaint of chest pain and SOB (2021 13:24)      HPI:  Patient, a 55 year old female from home, PMH of asthma, chronic bronchitis, NAKUL, HTN, DM, HLD, fibromyalgia, anxiety, trigeminal neuralgia, degenerative joint disease, IgA vasculitis presents to the ED with chest pressure and shortness of breath for 4 days. Patient reports left sided chest pressure for 3-4 days, radiating to left arm, back and right hip, constant in nature, not related to exertion and occurring  while sitting, lying and standing, associated with left arm numbness. Patient states this is the second time she has had pain, first time it occurred was 2 years ago. Patient also reports severe MUÑOZ on even slight activity or mild exertion. Denies orthopnea. Patient also has right lower extremity swelling, reports intermittent chronic LE swelling. Patient also has chronic cough that is sometimes dry and sometimes associated with phlegm that is white, yellow or green. Patient also reports low grade fevers. Patient is on 2L home oxygen.     GOC: Patient says she wants full resuscitation unless she is brain dead, does not want to be in a vegetative state.     In ED,   Vital Signs Last 24 Hrs  T(C): 36.3 (2021 07:08), Max: 37.2 (2021 02:29)  T(F): 97.3 (2021 07:08), Max: 98.9 (2021 02:29)  HR: 89 (2021 07:08) (89 - 110)  BP: 144/88 (2021 07:08) (144/88 - 153/90)  BP(mean): --  RR: 18 (2021 07:08) (18 - 20)  SpO2: 93% (2021 07:08) (93% - 95%) (2021 06:30)   Patient seen and examined.     PAST MEDICAL & SURGICAL HISTORY:  Falls frequently    Renal cell carcinoma, left  on follow up Dr schulte, [facility], waiting for suregry in 2020    Vasculitis  Legs, forerhead, arms &amp; hands, flare ups in R leg  Dr susie Ribeiro is my Rheumatologist    Radicular pain  arms, legs    H/O bursitis  right shoulder    Back pain  thoracic &amp; lumbar    Cervical neuralgia  difficulty moving my neck    DJD (degenerative joint disease)  Cervical/Thoracic/Lumbar    DM (diabetes mellitus)  2    Fibromyalgia    Trigeminal neuralgia  R    Obesity  morbid    Obstructive sleep apnea  refuses Cpap    Hypothyroid    Asthma  last inhaler use with in 10 days, on Pulm follow up [id number]    Hyperlipidemia    Hypertension  vaginal cyst    Vasculitis on nerve biopsy  , had another surgery called microvascular decompression     Vaginal cyst  removed     Left adrenal mass  excision, benign 2006    S/P  section  1    S/P abdominal hysterectomy            Allergies    Fioricet (Rash)  gadobutrol (Rash; Urticaria; Hives)  IV Contrast (Short breath)  mushroom (Unknown)  venlafaxine (Hives)    Intolerances        MEDICATIONS  (STANDING):  aspirin enteric coated 81 milliGRAM(s) Oral daily  atorvastatin 40 milliGRAM(s) Oral at bedtime  budesonide 160 MICROgram(s)/formoterol 4.5 MICROgram(s) Inhaler 2 Puff(s) Inhalation two times a day  enoxaparin Injectable 40 milliGRAM(s) SubCutaneous daily  FLUoxetine 20 milliGRAM(s) Oral daily  gabapentin 400 milliGRAM(s) Oral daily  hydrochlorothiazide 12.5 milliGRAM(s) Oral daily  insulin glargine Injectable (LANTUS) 15 Unit(s) SubCutaneous every morning  insulin lispro (ADMELOG) corrective regimen sliding scale   SubCutaneous Before meals and at bedtime  insulin lispro Injectable (ADMELOG) 6 Unit(s) SubCutaneous three times a day before meals  levothyroxine 112 MICROGram(s) Oral daily  montelukast 10 milliGRAM(s) Oral at bedtime  OXcarbazepine 300 milliGRAM(s) Oral two times a day  pantoprazole    Tablet 40 milliGRAM(s) Oral before breakfast  senna 2 Tablet(s) Oral at bedtime  sodium chloride 0.9% lock flush 3 milliLiter(s) IV Push every 8 hours      MEDICATIONS  (PRN):  acetaminophen   Tablet .. 650 milliGRAM(s) Oral every 6 hours PRN Temp greater or equal to 38C (100.4F), Moderate Pain (4 - 6)  ALBUTerol    90 MICROgram(s) HFA Inhaler 2 Puff(s) Inhalation every 6 hours PRN Shortness of Breath and/or Wheezing   Medications up to date at time of exam.    Medications up to date at time of exam.    FAMILY HISTORY:  Family history of obesity    Family history of hypertension    Family history of diabetes mellitus        SOCIAL HISTORY  Smoking History: [ x  ] none  smoking/smoke exposure, [   ] former smoker  Living Condition: [   ] apartment, [   ] private house  Work History:   Travel History: denies recent travel  Illicit Substance Use: denies  Alcohol Use: denies    REVIEW OF SYSTEMS:    CONSTITUTIONAL:  denies fevers, chills, sweats, weight loss    HEENT:  denies diplopia or blurred vision, sore throat or runny nose.    CARDIOVASCULAR:  + pressure, squeezing, tightness, or heaviness about the chest; no palpitations.    RESPIRATORY:  + SOB, cough, MUÑOZ, wheezing.    GASTROINTESTINAL:  denies abdominal pain, nausea, vomiting or diarrhea.    GENITOURINARY: denies dysuria, frequency or urgency.    NEUROLOGIC:  denies numbness, tingling, seizures or weakness.    PSYCHIATRIC:  denies disorder of thought or mood.    MSK: denies swelling, redness      PHYSICAL EXAMINATION:    GENERAL: The patient is a well-developed, well-nourished, in no apparent distress.     Vital Signs Last 24 Hrs  T(C): 36.8 (2021 16:23), Max: 37.2 (2021 02:29)  T(F): 98.2 (2021 16:23), Max: 98.9 (2021 02:29)  HR: 83 (2021 16:23) (82 - 110)  BP: 151/81 (2021 16:23) (125/84 - 153/90)  BP(mean): --  RR: 17 (2021 16:23) (17 - 20)  SpO2: 91% (2021 16:23) (91% - 95%)   (if applicable)    Chest Tube (if applicable)    HEENT: Head is normocephalic and atraumatic. Extraocular muscles are intact. Mucous membranes are moist.     NECK: Supple, no palpable adenopathy.    LUNGS: Clear to auscultation, no wheezing, rales, or rhonchi.    HEART: Regular rate and rhythm without murmur.    ABDOMEN: Soft, nontender, and nondistended.  No hepatosplenomegaly is noted.    RENAL: No difficulty voiding, no pelvic pain    EXTREMITIES: + 2 b/l lower ext  edema.    NEUROLOGIC: Awake, alert, oriented, grossly intact    SKIN: Warm, dry, good turgor.      LABS:                        12.6   9.94  )-----------( 323      ( 2021 07:15 )             42.8         135  |  97  |  15  ----------------------------<  331<H>  3.9   |  33<H>  |  0.83    Ca    8.7      2021 11:01  Phos  4.3       Mg     1.1         TPro  7.5  /  Alb  3.2<L>  /  TBili  0.5  /  DBili  x   /  AST  42<H>  /  ALT  56  /  AlkPhos  136<H>      PT/INR - ( 2021 03:24 )   PT: 13.8 sec;   INR: 1.17 ratio         PTT - ( 2021 03:24 )  PTT:29.9 sec    ABG - ( 2021 17:35 )  pH, Arterial: 7.36  pH, Blood: x     /  pCO2: 55    /  pO2: 61    / HCO3: 30    / Base Excess: 4.2   /  SaO2: 88   ( A-a  21)            CARDIAC MARKERS ( 2021 11:01 )  <0.015 ng/mL / x     / 163 U/L / x     / x      CARDIAC MARKERS ( 2021 07:14 )  x     / x     / 169 U/L / x     / x      CARDIAC MARKERS ( 2021 03:24 )  <0.015 ng/mL / x     / x     / x     / x          D-Dimer Assay, Quantitative: <150 ng/mL DDU (21 @ 03:24)    Serum Pro-Brain Natriuretic Peptide: 61 pg/mL (21 @ 03:24)    Lactate, Blood: 1.8 mmol/L (21 @ 03:24)    Procalcitonin, Serum: 0.07 ng/mL (21 @ 11:24)      MICROBIOLOGY: (if applicable)    RADIOLOGY & ADDITIONAL STUDIES:  EKG:   CXR:  < from: Xray Chest 1 View-PORTABLE IMMEDIATE (Xray Chest 1 View-PORTABLE IMMEDIATE .) (21 @ 03:34) >    EXAM:  XR CHEST PORTABLE IMMED 1V                            PROCEDURE DATE:  2021          INTERPRETATION:  CLINICAL HISTORY: Chest pain.    TECHNIQUE: Single upright AP chest radiograph.    COMPARISON: Multiple prior studies, most recent chest radiograph from 2020.    FINDINGS/  IMPRESSION:    Tubes/lines: n/a    Lungs: No focal consolidation or pulmonary edema.    Pleura: No pneumothorax or pleural effusions.    Cardiomediastinal silhouette: Grossly unchanged from the prior study.    Visualized osseus structures: Within normal limits.            MARIIA TYLER MD; Attending Radiologist  This document has been electronically signed. 2021  7:52AM    < end of copied text >  ECHO:< from: US Duplex Venous Lower Ext Complete, Bilateral (21 @ 14:46) >    EXAM:  US DPLX LWR EXT VEINS COMPL BI                            PROCEDURE DATE:  2021          INTERPRETATION:  CLINICAL INFORMATION: leg pain    COMPARISON: 2020    TECHNIQUE: Duplex sonography of the BILATERAL LOWER extremity veins with color and spectral Doppler, with and without compression.    FINDINGS:    There is normal compressibility of the bilateral common femoral, femoral and popliteal veins.  Doppler examination shows normal spontaneous and phasic flow.    No calf vein thrombosis is detected.    IMPRESSION:  No evidence of deep venous thrombosis in either lower extremity.                NETO NOLASCO MD; Attending Radiologist  This document has been electronically signed. 2021  2:49PM    < end of copied text >      IMPRESSION: 55y Female PAST MEDICAL & SURGICAL HISTORY:  Falls frequently    Renal cell carcinoma, left  on follow up Dr schulte, HOD renal [facility], waiting for suregry in 2020    Vasculitis  Legs, forerhead, arms &amp; hands, flare ups in R leg  Dr susie Ribeiro is my Rheumatologist    Radicular pain  arms, legs    H/O bursitis  right shoulder    Back pain  thoracic &amp; lumbar    Cervical neuralgia  difficulty moving my neck    DJD (degenerative joint disease)  Cervical/Thoracic/Lumbar    DM (diabetes mellitus)  2    Fibromyalgia    Trigeminal neuralgia  R    Obesity  morbid    Obstructive sleep apnea  refuses Cpap    Hypothyroid    Asthma  last inhaler use with in 10 days, on Pulm follow up [id number]    Hyperlipidemia    Hypertension  vaginal cyst    Vasculitis on nerve biopsy  , had another surgery called microvascular decompression     Vaginal cyst  removed     Left adrenal mass  excision, benign     S/P  section  1    S/P abdominal hysterectomy         p/w       IMP: This is a   55 year old morbid obese woman   from home, with  asthma, chronic bronchitis, NAKUL, HTN, DM, HLD, fibromyalgia, anxiety, trigeminal neuralgia, degenerative joint disease, IgA vasculitis presents to the ED with chest pressure and shortness of breath for 4 days..  Pat was complaining of back pain and chest pain upon me touching her.  Pat has allergies to CT contrast and to MRI too.  I doubt PE in setting of neg D Dimer, neg b/l lower venous doppler  and A-a gradient 21 ,correct for her age  16.  ABG more consistent with NAKUL     Sugg;  -doubt PE  -consider V/Q scan   -rashid lovenox   -monitor pulse Oximetry on room air  -out pat sleep study  -consider referral for wt reduction surgery  -out pat pulmonary f/u

## 2021-01-30 NOTE — PATIENT PROFILE ADULT - ...
Pt hourly rounding competed. Safety   Pt (x) resting on stretcher with side rails up and call bell in reach. () in chair    () in parents arms. Toileting   Pt offered ()Bedpan     (x)Assistance to Restroom     ()Urinal  Ongoing Updates  Updated on plan of care and status of test results.   Pain Management  Inquired as to comfort and offered comfort measures:    (x) warm blankets   (x) dimmed lights 30-Jan-2021 09:25:38

## 2021-01-31 ENCOUNTER — TRANSCRIPTION ENCOUNTER (OUTPATIENT)
Age: 56
End: 2021-01-31

## 2021-01-31 LAB
ALBUMIN SERPL ELPH-MCNC: 3.5 G/DL — SIGNIFICANT CHANGE UP (ref 3.5–5)
ALP SERPL-CCNC: 146 U/L — HIGH (ref 40–120)
ALT FLD-CCNC: 66 U/L DA — HIGH (ref 10–60)
ANION GAP SERPL CALC-SCNC: 7 MMOL/L — SIGNIFICANT CHANGE UP (ref 5–17)
AST SERPL-CCNC: 57 U/L — HIGH (ref 10–40)
BASOPHILS # BLD AUTO: 0.03 K/UL — SIGNIFICANT CHANGE UP (ref 0–0.2)
BASOPHILS NFR BLD AUTO: 0.4 % — SIGNIFICANT CHANGE UP (ref 0–2)
BILIRUB SERPL-MCNC: 0.6 MG/DL — SIGNIFICANT CHANGE UP (ref 0.2–1.2)
BUN SERPL-MCNC: 15 MG/DL — SIGNIFICANT CHANGE UP (ref 7–18)
CALCIUM SERPL-MCNC: 9.1 MG/DL — SIGNIFICANT CHANGE UP (ref 8.4–10.5)
CHLORIDE SERPL-SCNC: 95 MMOL/L — LOW (ref 96–108)
CO2 SERPL-SCNC: 32 MMOL/L — HIGH (ref 22–31)
CREAT SERPL-MCNC: 0.76 MG/DL — SIGNIFICANT CHANGE UP (ref 0.5–1.3)
EOSINOPHIL # BLD AUTO: 0.29 K/UL — SIGNIFICANT CHANGE UP (ref 0–0.5)
EOSINOPHIL NFR BLD AUTO: 3.8 % — SIGNIFICANT CHANGE UP (ref 0–6)
GLUCOSE BLDC GLUCOMTR-MCNC: 211 MG/DL — HIGH (ref 70–99)
GLUCOSE BLDC GLUCOMTR-MCNC: 231 MG/DL — HIGH (ref 70–99)
GLUCOSE BLDC GLUCOMTR-MCNC: 232 MG/DL — HIGH (ref 70–99)
GLUCOSE BLDC GLUCOMTR-MCNC: 271 MG/DL — HIGH (ref 70–99)
GLUCOSE BLDC GLUCOMTR-MCNC: 280 MG/DL — HIGH (ref 70–99)
GLUCOSE SERPL-MCNC: 253 MG/DL — HIGH (ref 70–99)
HCT VFR BLD CALC: 43.6 % — SIGNIFICANT CHANGE UP (ref 34.5–45)
HGB BLD-MCNC: 12.8 G/DL — SIGNIFICANT CHANGE UP (ref 11.5–15.5)
IMM GRANULOCYTES NFR BLD AUTO: 0.5 % — SIGNIFICANT CHANGE UP (ref 0–1.5)
LYMPHOCYTES # BLD AUTO: 1.64 K/UL — SIGNIFICANT CHANGE UP (ref 1–3.3)
LYMPHOCYTES # BLD AUTO: 21.2 % — SIGNIFICANT CHANGE UP (ref 13–44)
MAGNESIUM SERPL-MCNC: 1.5 MG/DL — LOW (ref 1.6–2.6)
MCHC RBC-ENTMCNC: 23.1 PG — LOW (ref 27–34)
MCHC RBC-ENTMCNC: 29.4 GM/DL — LOW (ref 32–36)
MCV RBC AUTO: 78.7 FL — LOW (ref 80–100)
MONOCYTES # BLD AUTO: 0.47 K/UL — SIGNIFICANT CHANGE UP (ref 0–0.9)
MONOCYTES NFR BLD AUTO: 6.1 % — SIGNIFICANT CHANGE UP (ref 2–14)
NEUTROPHILS # BLD AUTO: 5.25 K/UL — SIGNIFICANT CHANGE UP (ref 1.8–7.4)
NEUTROPHILS NFR BLD AUTO: 68 % — SIGNIFICANT CHANGE UP (ref 43–77)
NRBC # BLD: 0 /100 WBCS — SIGNIFICANT CHANGE UP (ref 0–0)
PHOSPHATE SERPL-MCNC: 3.6 MG/DL — SIGNIFICANT CHANGE UP (ref 2.5–4.5)
PLATELET # BLD AUTO: 343 K/UL — SIGNIFICANT CHANGE UP (ref 150–400)
POTASSIUM SERPL-MCNC: 4 MMOL/L — SIGNIFICANT CHANGE UP (ref 3.5–5.3)
POTASSIUM SERPL-SCNC: 4 MMOL/L — SIGNIFICANT CHANGE UP (ref 3.5–5.3)
PROT SERPL-MCNC: 8.3 G/DL — SIGNIFICANT CHANGE UP (ref 6–8.3)
RBC # BLD: 5.54 M/UL — HIGH (ref 3.8–5.2)
RBC # FLD: 16 % — HIGH (ref 10.3–14.5)
SODIUM SERPL-SCNC: 134 MMOL/L — LOW (ref 135–145)
WBC # BLD: 7.72 K/UL — SIGNIFICANT CHANGE UP (ref 3.8–10.5)
WBC # FLD AUTO: 7.72 K/UL — SIGNIFICANT CHANGE UP (ref 3.8–10.5)

## 2021-01-31 PROCEDURE — 99232 SBSQ HOSP IP/OBS MODERATE 35: CPT | Mod: GC

## 2021-01-31 RX ORDER — MONTELUKAST 4 MG/1
1 TABLET, CHEWABLE ORAL
Qty: 0 | Refills: 0 | DISCHARGE

## 2021-01-31 RX ORDER — IBUPROFEN 200 MG
3 TABLET ORAL
Qty: 60 | Refills: 0
Start: 2021-01-31 | End: 2021-02-04

## 2021-01-31 RX ORDER — METFORMIN HYDROCHLORIDE 850 MG/1
2000 TABLET ORAL
Qty: 0 | Refills: 0 | DISCHARGE

## 2021-01-31 RX ORDER — IBUPROFEN 200 MG
3 TABLET ORAL
Qty: 84 | Refills: 0
Start: 2021-01-31 | End: 2021-02-06

## 2021-01-31 RX ORDER — PANTOPRAZOLE SODIUM 20 MG/1
1 TABLET, DELAYED RELEASE ORAL
Qty: 30 | Refills: 0
Start: 2021-01-31 | End: 2021-03-01

## 2021-01-31 RX ORDER — ENOXAPARIN SODIUM 100 MG/ML
40 INJECTION SUBCUTANEOUS
Qty: 1200 | Refills: 0
Start: 2021-01-31 | End: 2021-03-01

## 2021-01-31 RX ORDER — PANTOPRAZOLE SODIUM 20 MG/1
1 TABLET, DELAYED RELEASE ORAL
Qty: 0 | Refills: 0 | DISCHARGE

## 2021-01-31 RX ORDER — INSULIN GLARGINE 100 [IU]/ML
25 INJECTION, SOLUTION SUBCUTANEOUS EVERY MORNING
Refills: 0 | Status: DISCONTINUED | OUTPATIENT
Start: 2021-01-31 | End: 2021-02-01

## 2021-01-31 RX ADMIN — Medication 112 MICROGRAM(S): at 06:20

## 2021-01-31 RX ADMIN — INSULIN GLARGINE 15 UNIT(S): 100 INJECTION, SOLUTION SUBCUTANEOUS at 08:07

## 2021-01-31 RX ADMIN — Medication 6 UNIT(S): at 08:06

## 2021-01-31 RX ADMIN — GABAPENTIN 400 MILLIGRAM(S): 400 CAPSULE ORAL at 12:12

## 2021-01-31 RX ADMIN — Medication 4: at 12:12

## 2021-01-31 RX ADMIN — SODIUM CHLORIDE 3 MILLILITER(S): 9 INJECTION INTRAMUSCULAR; INTRAVENOUS; SUBCUTANEOUS at 14:49

## 2021-01-31 RX ADMIN — PANTOPRAZOLE SODIUM 40 MILLIGRAM(S): 20 TABLET, DELAYED RELEASE ORAL at 06:20

## 2021-01-31 RX ADMIN — Medication 20 MILLIGRAM(S): at 12:13

## 2021-01-31 RX ADMIN — BUDESONIDE AND FORMOTEROL FUMARATE DIHYDRATE 2 PUFF(S): 160; 4.5 AEROSOL RESPIRATORY (INHALATION) at 22:29

## 2021-01-31 RX ADMIN — Medication 6 UNIT(S): at 17:26

## 2021-01-31 RX ADMIN — Medication 81 MILLIGRAM(S): at 12:13

## 2021-01-31 RX ADMIN — SODIUM CHLORIDE 3 MILLILITER(S): 9 INJECTION INTRAMUSCULAR; INTRAVENOUS; SUBCUTANEOUS at 22:45

## 2021-01-31 RX ADMIN — Medication 4: at 17:26

## 2021-01-31 RX ADMIN — ENOXAPARIN SODIUM 40 MILLIGRAM(S): 100 INJECTION SUBCUTANEOUS at 12:13

## 2021-01-31 RX ADMIN — BUDESONIDE AND FORMOTEROL FUMARATE DIHYDRATE 2 PUFF(S): 160; 4.5 AEROSOL RESPIRATORY (INHALATION) at 08:07

## 2021-01-31 RX ADMIN — Medication 650 MILLIGRAM(S): at 06:19

## 2021-01-31 RX ADMIN — Medication 12.5 MILLIGRAM(S): at 06:20

## 2021-01-31 RX ADMIN — ATORVASTATIN CALCIUM 40 MILLIGRAM(S): 80 TABLET, FILM COATED ORAL at 22:33

## 2021-01-31 RX ADMIN — OXCARBAZEPINE 300 MILLIGRAM(S): 300 TABLET, FILM COATED ORAL at 06:20

## 2021-01-31 RX ADMIN — SODIUM CHLORIDE 3 MILLILITER(S): 9 INJECTION INTRAMUSCULAR; INTRAVENOUS; SUBCUTANEOUS at 06:16

## 2021-01-31 RX ADMIN — Medication 6: at 22:42

## 2021-01-31 RX ADMIN — Medication 6 UNIT(S): at 12:12

## 2021-01-31 RX ADMIN — Medication 650 MILLIGRAM(S): at 15:43

## 2021-01-31 RX ADMIN — MONTELUKAST 10 MILLIGRAM(S): 4 TABLET, CHEWABLE ORAL at 22:34

## 2021-01-31 RX ADMIN — Medication 650 MILLIGRAM(S): at 22:33

## 2021-01-31 RX ADMIN — OXCARBAZEPINE 300 MILLIGRAM(S): 300 TABLET, FILM COATED ORAL at 17:26

## 2021-01-31 RX ADMIN — SENNA PLUS 2 TABLET(S): 8.6 TABLET ORAL at 22:45

## 2021-01-31 RX ADMIN — Medication 6: at 08:07

## 2021-01-31 NOTE — DISCHARGE NOTE PROVIDER - NSDCFUADDAPPT_GEN_ALL_CORE_FT
Please make appointment with Dr Herzog (Obesity clinic and bariatrics) at [phone]  Please make appointment with Dr Christiansen (pulmonology) at [phone]

## 2021-01-31 NOTE — PROGRESS NOTE ADULT - PROBLEM SELECTOR PLAN 3
patient has uncontrolled blood sugars  pt takes lantus 40u at home and metformin 1000mg bid  started on moderate dose HSS  start lantus 15 u and humalog 6 units  monitor fingerstick closely  f/u A1c patient has uncontrolled blood sugars  On Lantus 40u at home and metformin 1000mg bid  C/w HSS  Lantus increased to 25 units and  Lispro 6 units before meals   A1c 12  Monitor BS before meals and at bedtime

## 2021-01-31 NOTE — PROGRESS NOTE ADULT - PROBLEM SELECTOR PLAN 2
patient p/w SOB more on exertion  Wells Criteria= 8.5 points keeping malignancy in mind  POCUS in Ed showed no RH strain   s/p lovenox in ED  patient is allergic to MRI contrast so CTA not done  D dimer<150  CXR shows no consolidation  start on full dose lovenox  consider V/Q scan if symptoms worsen  Pulm consult? Patient w SOB more on exertion  Wells Criteria= 8.5 points keeping malignancy in mind  S/p Lovenox in ED  Patient is allergic to MRI contrast so CTA not done  D dimer<150  CXR shows no consolidation  consider V/Q scan if symptoms worsen  Pulm on board

## 2021-01-31 NOTE — PROGRESS NOTE ADULT - ASSESSMENT
Patient, a 55 year old female from home, PMH of asthma, chronic bronchitis, NAKUL, HTN, DM, HLD, fibromyalgia, anxiety, trigeminal neuralgia, degenerative joint disease, IgA vasculitis presents to the ED with chest pressure and shortness of breath for 4 days.   EKG showed sinus rhythm. Trop 1 negative. D dimers negative. CXR showed no consolidation or congestion.

## 2021-01-31 NOTE — DISCHARGE NOTE PROVIDER - HOSPITAL COURSE
55 year old female from home, PMH of asthma, chronic bronchitis, NAKUL, HTN, DM, HLD, fibromyalgia, anxiety, trigeminal neuralgia, degenerative joint disease, IgA vasculitis presented to the ED with chest pressure and shortness of breath x4 days.   EKG showed sinus rhythm. Trop x2 negative, D dimers negative, CXR showed no consolidation or congestion, doppler of LE negative for DVT, Echo showed       1. Chest pain: ACS ruled out  Continue aspirin 81mg PO daily  Full dose Lovenox started in the ER  Cardiology consult   Pulmonary consult for evaluation of PE(clinically she has RLE swelling, history of malignancy, obesity but D-Dimer is negative, will obtain LE doppler, patient has anaphylaxis to MRI contrast but also states she can not have iodine contrast, might need V/Q scan depending on cards/pulm recs)  See HnP for full details.   55 year old female from home, PMH of asthma, chronic bronchitis, NAKUL, HTN, DM, HLD, fibromyalgia, anxiety, trigeminal neuralgia, degenerative joint disease, IgA vasculitis presented to the ED with chest pressure and shortness of breath x4 days.   EKG showed sinus rhythm. Trop x2 negative, D dimers negative, no concern pro PE, CXR showed no consolidation or congestion, doppler of LE negative for DVT, Echo showed normal LV function and normal diastolic function   patient seen by Cardiologist, no further interventions at this time     Patient is stable for discharge and is advised to follow up with PCP as outpatient.  Please refer to patient's complete medical chart with documents for a full hospital course, for this is only a brief summary.         55 year old female from home, PMH of asthma, chronic bronchitis, NAKUL, HTN, DM, HLD, fibromyalgia, anxiety, trigeminal neuralgia, degenerative joint disease, IgA vasculitis presented to the ED with chest pressure and shortness of breath x4 days.   EKG showed sinus rhythm. Trop x2 negative, D dimers negative, no concern pro PE, CXR showed no consolidation or congestion, doppler of LE negative for DVT, Echo showed normal LV function and normal diastolic function, stress test negative. Patient seen by Cardiologist, no further interventions at this time     Patient is stable for discharge and is advised to follow up with PCP as outpatient.  Please refer to patient's complete medical chart with documents for a full hospital course, for this is only a brief summary.         55 year old female from home, PMH of asthma, chronic bronchitis, NAKUL, HTN, DM, HLD, fibromyalgia, anxiety, trigeminal neuralgia, degenerative joint disease, IgA vasculitis presented to the ED with chest pressure and shortness of breath x4 days.   EKG showed sinus rhythm. Trop x2 negative, D dimers negative, no concern pro PE, CXR showed no consolidation or congestion, doppler of LE negative for DVT, Echo showed normal LV function and normal diastolic function, stress test negative. Patient seen by Cardiologist, no further interventions at this time. She also had mild elevation of LFTs, recommended to follow up with PCP with Liver/abdominal US.   Patient continued on home meds.     Patient is stable for discharge and is advised to follow up with PCP as outpatient.  Please refer to patient's complete medical chart with documents for a full hospital course, for this is only a brief summary.

## 2021-01-31 NOTE — PROGRESS NOTE ADULT - SUBJECTIVE AND OBJECTIVE BOX
PGY-1 Progress Note discussed with attending    PAGER #: [[phone]] TILL 5:00 PM  PLEASE CONTACT ON CALL TEAM:  - On Call Team (Please refer to Karly) FROM 5:00 PM - 8:30PM  - Nightfloat Team FROM 8:30 -7:30 AM    CHIEF COMPLAINT & BRIEF HOSPITAL COURSE:      INTERVAL HPI/OVERNIGHT EVENTS:       REVIEW OF SYSTEMS:  CONSTITUTIONAL: No fever, weight loss, or fatigue  RESPIRATORY: No cough, wheezing, chills or hemoptysis; No shortness of breath  CARDIOVASCULAR: No chest pain, palpitations, dizziness, or leg swelling  GASTROINTESTINAL: No abdominal pain. No nausea, vomiting, or hematemesis; No diarrhea or constipation. No melena or hematochezia.  GENITOURINARY: No dysuria or hematuria, urinary frequency  NEUROLOGICAL: No headaches, memory loss, loss of strength, numbness, or tremors  SKIN: No itching, burning, rashes, or lesions     MEDICATIONS  (STANDING):  aspirin enteric coated 81 milliGRAM(s) Oral daily  atorvastatin 40 milliGRAM(s) Oral at bedtime  budesonide 160 MICROgram(s)/formoterol 4.5 MICROgram(s) Inhaler 2 Puff(s) Inhalation two times a day  enoxaparin Injectable 40 milliGRAM(s) SubCutaneous daily  FLUoxetine 20 milliGRAM(s) Oral daily  gabapentin 400 milliGRAM(s) Oral daily  hydrochlorothiazide 12.5 milliGRAM(s) Oral daily  insulin glargine Injectable (LANTUS) 15 Unit(s) SubCutaneous every morning  insulin lispro (ADMELOG) corrective regimen sliding scale   SubCutaneous Before meals and at bedtime  insulin lispro Injectable (ADMELOG) 6 Unit(s) SubCutaneous three times a day before meals  levothyroxine 112 MICROGram(s) Oral daily  montelukast 10 milliGRAM(s) Oral at bedtime  OXcarbazepine 300 milliGRAM(s) Oral two times a day  pantoprazole    Tablet 40 milliGRAM(s) Oral before breakfast  senna 2 Tablet(s) Oral at bedtime  sodium chloride 0.9% lock flush 3 milliLiter(s) IV Push every 8 hours    MEDICATIONS  (PRN):  acetaminophen   Tablet .. 650 milliGRAM(s) Oral every 6 hours PRN Temp greater or equal to 38C (100.4F), Moderate Pain (4 - 6)  ALBUTerol    90 MICROgram(s) HFA Inhaler 2 Puff(s) Inhalation every 6 hours PRN Shortness of Breath and/or Wheezing      Vital Signs Last 24 Hrs  T(C): 36.9 (31 Jan 2021 08:01), Max: 36.9 (30 Jan 2021 20:45)  T(F): 98.5 (31 Jan 2021 08:01), Max: 98.5 (31 Jan 2021 08:01)  HR: 89 (31 Jan 2021 08:01) (81 - 91)  BP: 117/86 (31 Jan 2021 08:01) (117/86 - 151/81)  BP(mean): --  RR: 18 (31 Jan 2021 08:01) (17 - 21)  SpO2: 95% (31 Jan 2021 08:01) (90% - 96%)    PHYSICAL EXAMINATION:  GENERAL: NAD, well built  HEAD:  Atraumatic, Normocephalic  EYES:  conjunctiva and sclera clear  NECK: Supple, No JVD, Normal thyroid  CHEST/LUNG: Clear to auscultation. Clear to percussion bilaterally; No rales, rhonchi, wheezing, or rubs  HEART: Regular rate and rhythm; No murmurs, rubs, or gallops  ABDOMEN: Soft, Nontender, Nondistended; Bowel sounds present, no pain or masses on palpation  NERVOUS SYSTEM:  Alert & Oriented X3  : voiding well  EXTREMITIES:  2+ Peripheral Pulses, No clubbing, cyanosis, or edema  SKIN: warm dry                          12.6   9.94  )-----------( 323      ( 30 Jan 2021 07:15 )             42.8     01-30    135  |  97  |  15  ----------------------------<  331<H>  3.9   |  33<H>  |  0.83    Ca    8.7      30 Jan 2021 11:01  Phos  4.3     01-30  Mg     1.1     01-30    TPro  7.5  /  Alb  3.2<L>  /  TBili  0.5  /  DBili  x   /  AST  42<H>  /  ALT  56  /  AlkPhos  136<H>  01-30    LIVER FUNCTIONS - ( 30 Jan 2021 15:48 )  Alb: x     / Pro: x     / ALK PHOS: x     / ALT: x     / AST: x     / GGT: 57 U/L       CARDIAC MARKERS ( 30 Jan 2021 11:01 )  <0.015 ng/mL / x     / 163 U/L / x     / x      CARDIAC MARKERS ( 30 Jan 2021 07:14 )  x     / x     / 169 U/L / x     / x      CARDIAC MARKERS ( 30 Jan 2021 03:24 )  <0.015 ng/mL / x     / x     / x     / x          PT/INR - ( 30 Jan 2021 03:24 )   PT: 13.8 sec;   INR: 1.17 ratio         PTT - ( 30 Jan 2021 03:24 )  PTT:29.9 sec    I&O's Summary    30 Jan 2021 07:01  -  31 Jan 2021 07:00  --------------------------------------------------------  IN: 325 mL / OUT: 0 mL / NET: 325 mL                   PGY-1 Progress Note discussed with attending    PAGER #: [[phone]] TILL 5:00 PM  PLEASE CONTACT ON CALL TEAM:  - On Call Team (Please refer to Karly) FROM 5:00 PM - 8:30PM  - Nightfloat Team FROM 8:30 -7:30 AM    CHIEF COMPLAINT & BRIEF HOSPITAL COURSE:  55 year old female from home, PMH of asthma, chronic bronchitis, NAKUL, HTN, DM, HLD, fibromyalgia, anxiety, trigeminal neuralgia, degenerative joint disease, IgA vasculitis presented to the ED with chest pressure and shortness of breath x4 days.   EKG showed sinus rhythm. Trop x2 negative, D dimers negative, no concern pro PE, CXR showed no consolidation or congestion, doppler of LE negative for DVT, Echo showed normal LV function and normal diastolic function   patient seen by Cardiologist, no further interventions at this time       INTERVAL HPI/OVERNIGHT EVENTS: patient refers she is feeling better, denies any pain or discomfort at this time       MEDICATIONS  (STANDING):  aspirin enteric coated 81 milliGRAM(s) Oral daily  atorvastatin 40 milliGRAM(s) Oral at bedtime  budesonide 160 MICROgram(s)/formoterol 4.5 MICROgram(s) Inhaler 2 Puff(s) Inhalation two times a day  enoxaparin Injectable 40 milliGRAM(s) SubCutaneous daily  FLUoxetine 20 milliGRAM(s) Oral daily  gabapentin 400 milliGRAM(s) Oral daily  hydrochlorothiazide 12.5 milliGRAM(s) Oral daily  insulin glargine Injectable (LANTUS) 15 Unit(s) SubCutaneous every morning  insulin lispro (ADMELOG) corrective regimen sliding scale   SubCutaneous Before meals and at bedtime  insulin lispro Injectable (ADMELOG) 6 Unit(s) SubCutaneous three times a day before meals  levothyroxine 112 MICROGram(s) Oral daily  montelukast 10 milliGRAM(s) Oral at bedtime  OXcarbazepine 300 milliGRAM(s) Oral two times a day  pantoprazole    Tablet 40 milliGRAM(s) Oral before breakfast  senna 2 Tablet(s) Oral at bedtime  sodium chloride 0.9% lock flush 3 milliLiter(s) IV Push every 8 hours    MEDICATIONS  (PRN):  acetaminophen   Tablet .. 650 milliGRAM(s) Oral every 6 hours PRN Temp greater or equal to 38C (100.4F), Moderate Pain (4 - 6)  ALBUTerol    90 MICROgram(s) HFA Inhaler 2 Puff(s) Inhalation every 6 hours PRN Shortness of Breath and/or Wheezing      Vital Signs Last 24 Hrs  T(C): 36.9 (31 Jan 2021 08:01), Max: 36.9 (30 Jan 2021 20:45)  T(F): 98.5 (31 Jan 2021 08:01), Max: 98.5 (31 Jan 2021 08:01)  HR: 89 (31 Jan 2021 08:01) (81 - 91)  BP: 117/86 (31 Jan 2021 08:01) (117/86 - 151/81)  BP(mean): --  RR: 18 (31 Jan 2021 08:01) (17 - 21)  SpO2: 95% (31 Jan 2021 08:01) (90% - 96%)    PHYSICAL EXAMINATION:  GENERAL: NAD, obese, on nasal cannula 2L   HEAD:  Atraumatic, Normocephalic  EYES:  conjunctiva and sclera clear  NECK: Supple, No JVD, Normal thyroid  CHEST/LUNG: Clear to auscultation. Clear to percussion bilaterally; No rales, rhonchi, wheezing, or rubs  HEART: Regular rate and rhythm; No murmurs, rubs, or gallops  ABDOMEN: Soft, Nontender, Nondistended; Bowel sounds present, no pain or masses on palpation  NERVOUS SYSTEM:  Alert & Oriented X3  : voiding well  EXTREMITIES:  2+ Peripheral Pulses, No clubbing, cyanosis, or edema  SKIN: warm dry LLE with scaly patch compatible with psoriasis, multiple lesions compatible with vasculitis                           12.6   9.94  )-----------( 323      ( 30 Jan 2021 07:15 )             42.8     01-30    135  |  97  |  15  ----------------------------<  331<H>  3.9   |  33<H>  |  0.83    Ca    8.7      30 Jan 2021 11:01  Phos  4.3     01-30  Mg     1.1     01-30    TPro  7.5  /  Alb  3.2<L>  /  TBili  0.5  /  DBili  x   /  AST  42<H>  /  ALT  56  /  AlkPhos  136<H>  01-30    LIVER FUNCTIONS - ( 30 Jan 2021 15:48 )  Alb: x     / Pro: x     / ALK PHOS: x     / ALT: x     / AST: x     / GGT: 57 U/L       CARDIAC MARKERS ( 30 Jan 2021 11:01 )  <0.015 ng/mL / x     / 163 U/L / x     / x      CARDIAC MARKERS ( 30 Jan 2021 07:14 )  x     / x     / 169 U/L / x     / x      CARDIAC MARKERS ( 30 Jan 2021 03:24 )  <0.015 ng/mL / x     / x     / x     / x          PT/INR - ( 30 Jan 2021 03:24 )   PT: 13.8 sec;   INR: 1.17 ratio         PTT - ( 30 Jan 2021 03:24 )  PTT:29.9 sec    I&O's Summary    30 Jan 2021 07:01  -  31 Jan 2021 07:00  --------------------------------------------------------  IN: 325 mL / OUT: 0 mL / NET: 325 mL

## 2021-01-31 NOTE — CONSULT NOTE ADULT - ATTENDING COMMENTS
CARDIOLOGY ATTENDING    Agree with above. Admitted with atypical chest pain - cardiac markers negative - EKG normal, echo normal. Recommend NST, and if NST unremarkable then no further inpatient cardiac workup needed.

## 2021-01-31 NOTE — PROGRESS NOTE ADULT - ATTENDING COMMENTS
Patient, a 55 year old female from home, PMH of asthma, chronic bronchitis, NAKUL, HTN, DM, HLD, fibromyalgia, anxiety, trigeminal neuralgia, degenerative joint disease, IgA vasculitis presents to the ED with chest pressure and shortness of breath for 4 days.   EKG showed sinus rhythm. Trop 2 negative. D dimers negative. CXR showed no consolidation or congestion.     O/E: Awake and aler  Chest: BLAE+, clear  CVS: S1, S2  Abd: Soft, NT    1. Chest pain: ACS ruled out, musculoskeletal chest pain  Continue aspirin 81mg PO daily  Cardiology consult appreciated  Discussed with pulmonary about PE, low A-a gap, negative d-dimer, negative EKG, reproducible chest pain, negative echo very low probability for PE.   ibuprofen 600mg PO q 6 hours PRN for pain  Protonix 40mg PO daily        Problem/Plan - 2:   Problem: Shortness of breath.  Plan: obesity hypoventilation syndrome and anxiety  F/U pulmonary after discharge     Problem/Plan - 3:  ·  Problem: Hyperglycemia due to diabetes mellitus.  Plan: patient has uncontrolled blood sugars  Patient states her insurance does not cover levemir  will try to find out if lantus is covered, discussed with /  resume home dose insulins after discharge     Problem/Plan - 4:  ·  Problem: Hypertension.  Plan: pt is on HCTZ at home  c/w home med       Problem/Plan - 5:  ·  Problem: Swelling of lower extremity.  Plan: pt has swelling of RLE > LLE  doppler negative     Problem/Plan - 6:  Problem: Asthma. Plan: c/w albuterol  c/w symbicort  c/w singulair.     Problem/Plan - 7:  ·  Problem: Cervical neuralgia.  Plan: c.w home meds gabapentin and oxcarbazepine.      Problem/Plan - 8:  ·  Problem: Need for prophylactic measure.  Plan: lovenox Patient, a 55 year old female from home, PMH of asthma, chronic bronchitis, NAKUL, HTN, DM, HLD, fibromyalgia, anxiety, trigeminal neuralgia, degenerative joint disease, IgA vasculitis presents to the ED with chest pressure and shortness of breath for 4 days.   EKG showed sinus rhythm. Trop 2 negative. D dimers negative. CXR showed no consolidation or congestion.     O/E: Awake and aler  Chest: BLAE+, clear  CVS: S1, S2  Abd: Soft, NT    1. Chest pain: ACS ruled out, musculoskeletal chest pain  Continue aspirin 81mg PO daily  Cardiology consult appreciated  Discussed with pulmonary about PE, low A-a gap, negative d-dimer, negative EKG, reproducible chest pain, negative echo very low probability for PE.   ibuprofen 600mg PO q 6 hours PRN for pain  Protonix 40mg PO daily        Problem/Plan - 2:   Problem: Shortness of breath.  Plan: obesity hypoventilation syndrome and anxiety  F/U pulmonary after discharge     Problem/Plan - 3:  ·  Problem: Hyperglycemia due to diabetes mellitus.  Plan: patient has uncontrolled blood sugars  Patient states her insurance does not cover levemir  will try to find out if lantus is covered, discussed with /  resume home dose insulins after discharge     Problem/Plan - 4:  ·  Problem: Hypertension.  Plan: pt is on HCTZ at home  c/w home med       Problem/Plan - 5:  ·  Problem: Swelling of lower extremity.  Plan: pt has swelling of RLE > LLE  doppler negative     Problem/Plan - 6:  Problem: Asthma. Plan: c/w albuterol  c/w symbicort  c/w singulair.     Problem/Plan - 7:  ·  Problem: Cervical neuralgia.  Plan: c.w home meds gabapentin and oxcarbazepine.      Problem/Plan - 8:  ·  Problem: Need for prophylactic measure.  Plan: lovenox    Patient was for discharge but she states she can not get insulin at home. Discussed with pharmacy, she has high co-payments. Will try to obtain insulin for home tomorrow, patient will be given 24 hour's discharge notice as she is not keen on discharge today. Will need 's help again tomorrow.

## 2021-01-31 NOTE — PROGRESS NOTE ADULT - PROBLEM SELECTOR PLAN 1
patient presents with chest pressure  EKG showed sinus rhythm   Trop x 1 negative  HEART Score= 4 (Non-specific repolarization disturbance, Age 45-64, 3 risk factors or history of atherosclerotic disease), Moderate Score, Risk of MACE of 12-16.6%.  DOLLY Score= 2 points (>=3 CAD risk factors, Severe angina [>=2 episodes in 24 hrs]), 8% risk at 14 days of: all-cause mortality, new or recurrent MI, or severe recurrent ischemia requiring urgent revascularization.  admit to tele  f/u echocardiogram  Cardio consult? patient presented with chest pressure  EKG showed sinus rhythm   Trop x 2 negative  HEART Score= 4 (Non-specific repolarization disturbance, Age 45-64, 3 risk factors or history of atherosclerotic disease), Moderate Score, Risk of MACE of 12-16.6%.  DOLLY Score= 2 points (>=3 CAD risk factors, Severe angina [>=2 episodes in 24 hrs]), 8% risk at 14 days of: all-cause mortality, new or recurrent MI, or severe recurrent ischemia requiring urgent revascularization.  On tele   Echocardiogram normal LV fx   Cardio on board

## 2021-01-31 NOTE — DISCHARGE NOTE PROVIDER - NSDCCPCAREPLAN_GEN_ALL_CORE_FT
PRINCIPAL DISCHARGE DIAGNOSIS  Diagnosis: Chest pain  Assessment and Plan of Treatment: You came complaining of chest pain and shortness of breath. Your chest x ray was unremarkable, doppler of lower extremities was negative for clots. Your EKG and Echo were found to be normal, seen by cardiologist who does not consider any cardiac causes. Your chest pain is most likely musculoskeletal secondary to your Fibromyalgia. You are being discharge on IBUPROFEN 600 MG EVERY 6 HRS AS NEEDED FOR 7 DAYS and continue your home medications.   Please follow up with your PCP in a week from discharge.         SECONDARY DISCHARGE DIAGNOSES  Diagnosis: Fibromyalgia  Assessment and Plan of Treatment: You have history of fibromyalgia. Please continue to take your home medications and follow up with your PCP in a week from discharge.    Diagnosis: Diabetes mellitus  Assessment and Plan of Treatment:     Diagnosis: Cervical neuralgia  Assessment and Plan of Treatment: Cervical neuralgia    Diagnosis: Asthma  Assessment and Plan of Treatment: Asthma    Diagnosis: Hypertension  Assessment and Plan of Treatment:     Diagnosis: Vasculitis  Assessment and Plan of Treatment: Vasculitis     PRINCIPAL DISCHARGE DIAGNOSIS  Diagnosis: Chest pain  Assessment and Plan of Treatment: You came complaining of chest pain and shortness of breath. Your chest x ray was unremarkable, doppler of lower extremities was negative for clots. Your EKG and Echo were found to be normal, seen by cardiologist who does not consider any cardiac causes. Your chest pain is most likely musculoskeletal secondary to your Fibromyalgia. You are being discharge on IBUPROFEN 600 MG EVERY 6 HRS AS NEEDED FOR 7 DAYS and continue your home medications. You are also recommended to follow up with   Please follow up with your PCP in a week from discharge.         SECONDARY DISCHARGE DIAGNOSES  Diagnosis: Fibromyalgia  Assessment and Plan of Treatment: You have history of fibromyalgia. Please continue to take your home medications and follow up with your PCP in a week from discharge.    Diagnosis: Diabetes mellitus  Assessment and Plan of Treatment: You have a history of diabetes.   Your HbA1c was 12 during this admission. Your diabetes is uncontrolled.   You need to continue monitoring your blood sugar levels closely and maintain healthy lifestyle by eating healthy diabetic regimen, weight loss and stay activeas tolerated.  Please continue to take your home medications and follow up with your PCP/Endocrinologist within a week of discharge for adjustment as needed.      Diagnosis: Cervical neuralgia  Assessment and Plan of Treatment: You have history of Cervical neuralgia. Please continue to take your home medications and follow up with your PCP within a week of discharge.    Diagnosis: Asthma  Assessment and Plan of Treatment: You have history of Asthma. Please continue to take your home medications and follow up with your PCP within a week of discharge.    Diagnosis: Hypertension  Assessment and Plan of Treatment: You have a history of Hypertension.   Your blood pressure target is 120-140/80-90, maintain healthy lifestyle, low salt diet, avoid fatty food, weight loss, stay active as tolerated 30 mins X 3 times per week.  Notify your doctor if you have any of the following symptoms:   (Dizziness, Lightheadedness, Blurry vision, Headache, Chest pain, Shortness of breath.)  Please continue to take your home medications and follow-up with your PCP in 1 week from discharge.      Diagnosis: Vasculitis  Assessment and Plan of Treatment: You have history of Vasculitis. Please follow up with your PCP within a week of discharge.     PRINCIPAL DISCHARGE DIAGNOSIS  Diagnosis: Chest pain  Assessment and Plan of Treatment: You came complaining of chest pain and shortness of breath. Your chest x ray was unremarkable, doppler of lower extremities was negative for clots. Your EKG and Echo were found to be normal, seen by cardiologist who does not consider any cardiac causes. Your chest pain is most likely musculoskeletal secondary to your Fibromyalgia. You are being discharge on IBUPROFEN 600 MG EVERY 6 HRS AS NEEDED FOR 7 DAYS and continue your home medications. You are also recommended to follow up with Obesity clinic (printed referral).  Please follow up with your PCP in a week from discharge.         SECONDARY DISCHARGE DIAGNOSES  Diagnosis: Fibromyalgia  Assessment and Plan of Treatment: You have history of fibromyalgia. Please continue to take your home medications and follow up with your PCP in a week from discharge.    Diagnosis: Diabetes mellitus  Assessment and Plan of Treatment: You have a history of diabetes.   Your HbA1c was 12 during this admission. Your diabetes is uncontrolled.   You need to continue monitoring your blood sugar levels closely and maintain healthy lifestyle by eating healthy diabetic regimen, weight loss and stay activeas tolerated.  Please continue to take your home medications and follow up with your PCP/Endocrinologist within a week of discharge for adjustment as needed.      Diagnosis: Cervical neuralgia  Assessment and Plan of Treatment: You have history of Cervical neuralgia. Please continue to take your home medications and follow up with your PCP within a week of discharge.    Diagnosis: Asthma  Assessment and Plan of Treatment: You have history of Asthma. Please continue to take your home medications and follow up with your PCP within a week of discharge.    Diagnosis: Hypertension  Assessment and Plan of Treatment: You have a history of Hypertension.   Your blood pressure target is 120-140/80-90, maintain healthy lifestyle, low salt diet, avoid fatty food, weight loss, stay active as tolerated 30 mins X 3 times per week.  Notify your doctor if you have any of the following symptoms:   (Dizziness, Lightheadedness, Blurry vision, Headache, Chest pain, Shortness of breath.)  Please continue to take your home medications and follow-up with your PCP in 1 week from discharge.      Diagnosis: Vasculitis  Assessment and Plan of Treatment: You have history of Vasculitis. Please follow up with your PCP within a week of discharge.     PRINCIPAL DISCHARGE DIAGNOSIS  Diagnosis: Chest pain  Assessment and Plan of Treatment: You came complaining of chest pain and shortness of breath. Your chest x ray was unremarkable, doppler of lower extremities was negative for clots. Your EKG and Echo were found to be normal, seen by cardiologist who does not consider any cardiac causes. Your chest pain is most likely musculoskeletal secondary to your Fibromyalgia. You are being discharge on IBUPROFEN 600 MG EVERY 6 HRS AS NEEDED FOR 7 DAYS and continue your home medications. You are also recommended to follow up with Obesity clinic (printed referral).  Please follow up with your PCP in a week from discharge.         SECONDARY DISCHARGE DIAGNOSES  Diagnosis: Fibromyalgia  Assessment and Plan of Treatment: You have history of fibromyalgia. Please continue to take your home medications and follow up with your PCP in a week from discharge.    Diagnosis: Diabetes mellitus  Assessment and Plan of Treatment: You have a history of diabetes.   Your HbA1c was 12 during this admission. Your diabetes is uncontrolled.   You need to continue monitoring your blood sugar levels closely and maintain healthy lifestyle by eating healthy diabetic regimen, weight loss and stay activeas tolerated. You are also recommended to follow up with Obesity clinic (printed referral).  Please continue to take your home medications and follow up with your PCP/Endocrinologist within a week of discharge for adjustment as needed.      Diagnosis: Cervical neuralgia  Assessment and Plan of Treatment: You have history of Cervical neuralgia. Please continue to take your home medications and follow up with your PCP within a week of discharge.    Diagnosis: Asthma  Assessment and Plan of Treatment: You have history of Asthma. Please continue to take your home medications and follow up with your PCP within a week of discharge.  You are recommended to follow up with Pulmonologist for sleep study.    Diagnosis: Hypertension  Assessment and Plan of Treatment: You have a history of Hypertension.   Your blood pressure target is 120-140/80-90, maintain healthy lifestyle, low salt diet, avoid fatty food, weight loss, stay active as tolerated 30 mins X 3 times per week.  Notify your doctor if you have any of the following symptoms:   (Dizziness, Lightheadedness, Blurry vision, Headache, Chest pain, Shortness of breath.)  Please continue to take your home medications and follow-up with your PCP in 1 week from discharge.      Diagnosis: Vasculitis  Assessment and Plan of Treatment: You have history of Vasculitis. Please follow up with your PCP within a week of discharge.     PRINCIPAL DISCHARGE DIAGNOSIS  Diagnosis: Chest pain  Assessment and Plan of Treatment: You came complaining of chest pain and shortness of breath. Your chest x ray was unremarkable, doppler of lower extremities was negative for clots. Your EKG and Echo were found to be normal, seen by cardiologist who does not consider any cardiac causes. Your chest pain is most likely musculoskeletal secondary to your Fibromyalgia. You are being discharge on IBUPROFEN 600 MG EVERY 6 HRS AS NEEDED FOR 5 DAYS and continue your home medications. You are also recommended to follow up with Obesity clinic (printed referral).  Please follow up with your PCP in a week from discharge.         SECONDARY DISCHARGE DIAGNOSES  Diagnosis: Fibromyalgia  Assessment and Plan of Treatment: You have history of fibromyalgia. Please continue to take your home medications and follow up with your PCP in a week from discharge.    Diagnosis: Diabetes mellitus  Assessment and Plan of Treatment: You have a history of diabetes.   Your HbA1c was 12 during this admission. Your diabetes is uncontrolled.   You need to continue monitoring your blood sugar levels closely and maintain healthy lifestyle by eating healthy diabetic regimen, weight loss and stay activeas tolerated. You are also recommended to follow up with Obesity clinic (printed referral).  Please continue to take your home medications and follow up with your PCP/Endocrinologist within a week of discharge for adjustment as needed.      Diagnosis: Cervical neuralgia  Assessment and Plan of Treatment: You have history of Cervical neuralgia. Please continue to take your home medications and follow up with your PCP within a week of discharge.    Diagnosis: Asthma  Assessment and Plan of Treatment: You have history of Asthma. Please continue to take your home medications and follow up with your PCP within a week of discharge.  You are recommended to follow up with Pulmonologist for sleep study.    Diagnosis: Hypertension  Assessment and Plan of Treatment: You have a history of Hypertension.   Your blood pressure target is 120-140/80-90, maintain healthy lifestyle, low salt diet, avoid fatty food, weight loss, stay active as tolerated 30 mins X 3 times per week.  Notify your doctor if you have any of the following symptoms:   (Dizziness, Lightheadedness, Blurry vision, Headache, Chest pain, Shortness of breath.)  Please continue to take your home medications and follow-up with your PCP in 1 week from discharge.      Diagnosis: Vasculitis  Assessment and Plan of Treatment: You have history of Vasculitis. Please follow up with your PCP within a week of discharge.     PRINCIPAL DISCHARGE DIAGNOSIS  Diagnosis: Chest pain  Assessment and Plan of Treatment: You came complaining of chest pain and shortness of breath. Your chest x ray was unremarkable, doppler of lower extremities was negative for clots. Your EKG and Echo were found to be normal, seen by cardiologist who does not consider any cardiac causes. Your chest pain is most likely musculoskeletal secondary to your Fibromyalgia. You are being discharge on IBUPROFEN 600 MG EVERY 6 HRS AS NEEDED FOR 7 DAYS and continue your home medications. You are also recommended to follow up with Obesity clinic (printed referral).  Please follow up with your PCP in a week from discharge.         SECONDARY DISCHARGE DIAGNOSES  Diagnosis: Fibromyalgia  Assessment and Plan of Treatment: You have history of fibromyalgia. Please continue to take your home medications and follow up with your PCP in a week from discharge.    Diagnosis: Diabetes mellitus  Assessment and Plan of Treatment: You have a history of diabetes.   Your HbA1c was 12 during this admission. Your diabetes is uncontrolled.   You need to continue monitoring your blood sugar levels closely and maintain healthy lifestyle by eating healthy diabetic regimen, weight loss and stay activeas tolerated. You are also recommended to follow up with Obesity clinic (printed referral).  Please take LANTUS 40 UNITS AT BEDTIME SUBCUTANEOUS and follow up with your PCP/Endocrinologist within a week of discharge for adjustment as needed.      Diagnosis: Cervical neuralgia  Assessment and Plan of Treatment: You have history of Cervical neuralgia. Please continue to take your home medications and follow up with your PCP within a week of discharge.    Diagnosis: Asthma  Assessment and Plan of Treatment: You have history of Asthma. Please continue to take your home medications and follow up with your PCP within a week of discharge.  You are recommended to follow up with Pulmonologist for sleep study.    Diagnosis: Hypertension  Assessment and Plan of Treatment: You have a history of Hypertension.   Your blood pressure target is 120-140/80-90, maintain healthy lifestyle, low salt diet, avoid fatty food, weight loss, stay active as tolerated 30 mins X 3 times per week.  Notify your doctor if you have any of the following symptoms:   (Dizziness, Lightheadedness, Blurry vision, Headache, Chest pain, Shortness of breath.)  Please continue to take your home medications and follow-up with your PCP in 1 week from discharge.      Diagnosis: Vasculitis  Assessment and Plan of Treatment: You have history of Vasculitis. Please follow up with your PCP within a week of discharge.     PRINCIPAL DISCHARGE DIAGNOSIS  Diagnosis: Chest pain  Assessment and Plan of Treatment: You came complaining of chest pain and shortness of breath. Your chest x ray was unremarkable, doppler of lower extremities was negative for clots. Your EKG and Echo were found to be normal, seen by cardiologist who does not consider any cardiac causes. Your chest pain is most likely musculoskeletal secondary to your Fibromyalgia. You are being discharge on IBUPROFEN 600 MG EVERY 6 HRS AS NEEDED FOR 7 DAYS and continue your home medications. You are also recommended to follow up with Obesity clinic (printed referral).  Please follow up with your PCP in a week from discharge.         SECONDARY DISCHARGE DIAGNOSES  Diagnosis: Diabetes mellitus  Assessment and Plan of Treatment: You have a history of diabetes.   Your HbA1c was 12 during this admission. Your diabetes is uncontrolled.   You need to continue monitoring your blood sugar levels closely and maintain healthy lifestyle by eating healthy diabetic regimen, weight loss and stay activeas tolerated. You are also recommended to follow up with Obesity clinic (printed referral).  Please take LANTUS 40 UNITS AT BEDTIME SUBCUTANEOUS and follow up with your PCP/Endocrinologist within a week of discharge for adjustment as needed.      Diagnosis: Fibromyalgia  Assessment and Plan of Treatment: You have history of fibromyalgia. Please continue to take your home medications and follow up with your PCP in a week from discharge.    Diagnosis: Asthma  Assessment and Plan of Treatment: You have history of Asthma. Please continue to take your home medications and follow up with your PCP within a week of discharge.  You are recommended to follow up with Pulmonologist for sleep study.    Diagnosis: Vasculitis  Assessment and Plan of Treatment: You have history of Vasculitis. Please follow up with your PCP within a week of discharge.    Diagnosis: Transaminitis  Assessment and Plan of Treatment: Your liver enzymes were mildly  elevated. You need to follow up with  primary care provider.    Diagnosis: Cervical neuralgia  Assessment and Plan of Treatment: You have history of Cervical neuralgia. Please continue to take your home medications and follow up with your PCP within a week of discharge.    Diagnosis: Hypertension  Assessment and Plan of Treatment: You have a history of Hypertension.   Your blood pressure target is 120-140/80-90, maintain healthy lifestyle, low salt diet, avoid fatty food, weight loss, stay active as tolerated 30 mins X 3 times per week.  Notify your doctor if you have any of the following symptoms:   (Dizziness, Lightheadedness, Blurry vision, Headache, Chest pain, Shortness of breath.)  Please continue to take your home medications and follow-up with your PCP in 1 week from discharge.       PRINCIPAL DISCHARGE DIAGNOSIS  Diagnosis: Chest pain  Assessment and Plan of Treatment: You came complaining of chest pain and shortness of breath. Your chest x ray was unremarkable, doppler of lower extremities was negative for clots. Your EKG and Echo were found to be normal, seen by cardiologist who does not consider any cardiac causes. Your chest pain is most likely musculoskeletal secondary to your Fibromyalgia. You are being discharge on IBUPROFEN 600 MG EVERY 6 HRS AS NEEDED FOR 7 DAYS and continue your home medications. You are also recommended to follow up with Obesity clinic (printed referral).  Please follow up with your PCP in a week from discharge.         SECONDARY DISCHARGE DIAGNOSES  Diagnosis: Diabetes mellitus  Assessment and Plan of Treatment: You have a history of diabetes.   Your HbA1c was 12 during this admission. Your diabetes is uncontrolled.   You need to continue monitoring your blood sugar levels closely and maintain healthy lifestyle by eating healthy diabetic regimen, weight loss and stay activeas tolerated. You are also recommended to follow up with Obesity clinic (printed referral).  Please take LANTUS 40 UNITS AT BEDTIME SUBCUTANEOUS and follow up with your PCP/Endocrinologist within a week of discharge for adjustment as needed.      Diagnosis: Transaminitis  Assessment and Plan of Treatment: Your liver enzymes were mildly elevated. You need to follow up with  your PCP to repeat Liver function test and abdominal US.    Diagnosis: Fibromyalgia  Assessment and Plan of Treatment: You have history of fibromyalgia. Please continue to take your home medications and follow up with your PCP in a week from discharge.    Diagnosis: Cervical neuralgia  Assessment and Plan of Treatment: You have history of Cervical neuralgia. Please continue to take your home medications and follow up with your PCP within a week of discharge.    Diagnosis: Asthma  Assessment and Plan of Treatment: You have history of Asthma. Please continue to take your home medications and follow up with your PCP within a week of discharge.  You are recommended to follow up with Pulmonologist for sleep study.    Diagnosis: Hypertension  Assessment and Plan of Treatment: You have a history of Hypertension.   Your blood pressure target is 120-140/80-90, maintain healthy lifestyle, low salt diet, avoid fatty food, weight loss, stay active as tolerated 30 mins X 3 times per week.  Notify your doctor if you have any of the following symptoms:   (Dizziness, Lightheadedness, Blurry vision, Headache, Chest pain, Shortness of breath.)  Please continue to take your home medications and follow-up with your PCP in 1 week from discharge.      Diagnosis: Vasculitis  Assessment and Plan of Treatment: You have history of Vasculitis. Please follow up with your PCP within a week of discharge.

## 2021-01-31 NOTE — PROGRESS NOTE ADULT - SUBJECTIVE AND OBJECTIVE BOX
Time of Visit:  Patient seen and examined.     MEDICATIONS  (STANDING):  aspirin enteric coated 81 milliGRAM(s) Oral daily  atorvastatin 40 milliGRAM(s) Oral at bedtime  budesonide 160 MICROgram(s)/formoterol 4.5 MICROgram(s) Inhaler 2 Puff(s) Inhalation two times a day  enoxaparin Injectable 40 milliGRAM(s) SubCutaneous daily  FLUoxetine 20 milliGRAM(s) Oral daily  gabapentin 400 milliGRAM(s) Oral daily  hydrochlorothiazide 12.5 milliGRAM(s) Oral daily  insulin glargine Injectable (LANTUS) 15 Unit(s) SubCutaneous every morning  insulin lispro (ADMELOG) corrective regimen sliding scale   SubCutaneous Before meals and at bedtime  insulin lispro Injectable (ADMELOG) 6 Unit(s) SubCutaneous three times a day before meals  levothyroxine 112 MICROGram(s) Oral daily  montelukast 10 milliGRAM(s) Oral at bedtime  OXcarbazepine 300 milliGRAM(s) Oral two times a day  pantoprazole    Tablet 40 milliGRAM(s) Oral before breakfast  senna 2 Tablet(s) Oral at bedtime  sodium chloride 0.9% lock flush 3 milliLiter(s) IV Push every 8 hours      MEDICATIONS  (PRN):  acetaminophen   Tablet .. 650 milliGRAM(s) Oral every 6 hours PRN Temp greater or equal to 38C (100.4F), Moderate Pain (4 - 6)  ALBUTerol    90 MICROgram(s) HFA Inhaler 2 Puff(s) Inhalation every 6 hours PRN Shortness of Breath and/or Wheezing       Medications up to date at time of exam.      PHYSICAL EXAMINATION:  Patient has no new complaints.  GENERAL: The patient is a well-developed, well-nourished, in no apparent distress.     Vital Signs Last 24 Hrs  T(C): 36.8 (2021 11:29), Max: 36.9 (2021 20:45)  T(F): 98.3 (2021 11:29), Max: 98.5 (2021 08:01)  HR: 86 (2021 11:29) (81 - 91)  BP: 139/81 (2021 11:29) (117/86 - 151/81)  BP(mean): --  RR: 18 (2021 11:29) (17 - 21)  SpO2: 93% (2021 11:29) (90% - 96%)   (if applicable)    Chest Tube (if applicable)    HEENT: Head is normocephalic and atraumatic. Extraocular muscles are intact. Mucous membranes are moist.     NECK: Supple, no palpable adenopathy.    LUNGS: Clear to auscultation, no wheezing, rales, or rhonchi.    HEART: Regular rate and rhythm without murmur.    ABDOMEN: Soft, nontender, and nondistended.  No hepatosplenomegaly is noted.    : No painful voiding, no pelvic pain    EXTREMITIES: +2 b/l lower ext edema.    NEUROLOGIC: Awake, alert, oriented, grossly intact    SKIN: Warm, dry, good turgor.      LABS:                        12.8   7.72  )-----------( 343      ( 2021 09:32 )             43.6         134<L>  |  95<L>  |  15  ----------------------------<  253<H>  4.0   |  32<H>  |  0.76    Ca    9.1      2021 09:32  Phos  3.6       Mg     1.5         TPro  8.3  /  Alb  3.5  /  TBili  0.6  /  DBili  x   /  AST  57<H>  /  ALT  66<H>  /  AlkPhos  146<H>      PT/INR - ( 2021 03:24 )   PT: 13.8 sec;   INR: 1.17 ratio         PTT - ( 2021 03:24 )  PTT:29.9 sec    ABG - ( 2021 17:35 )  pH, Arterial: 7.36  pH, Blood: x     /  pCO2: 55    /  pO2: 61    / HCO3: 30    / Base Excess: 4.2   /  SaO2: 88                CARDIAC MARKERS ( 2021 11:01 )  <0.015 ng/mL / x     / 163 U/L / x     / x      CARDIAC MARKERS ( 2021 07:14 )  x     / x     / 169 U/L / x     / x      CARDIAC MARKERS ( 2021 03:24 )  <0.015 ng/mL / x     / x     / x     / x            Serum Pro-Brain Natriuretic Peptide: 61 pg/mL (21 @ 03:24)      Procalcitonin, Serum: 0.07 ng/mL (21 @ 11:24)      MICROBIOLOGY: (if applicable)    RADIOLOGY & ADDITIONAL STUDIES:  EKG:   CXR:  ECHO:    IMPRESSION: 55y Female PAST MEDICAL & SURGICAL HISTORY:  Falls frequently    Renal cell carcinoma, left  on follow up Dr schulte, HOD renal [facility], waiting for suregry in 2020    Vasculitis  Legs, forerhead, arms &amp; hands, flare ups in R leg  Dr susie Ribeiro is my Rheumatologist    Radicular pain  arms, legs    H/O bursitis  right shoulder    Back pain  thoracic &amp; lumbar    Cervical neuralgia  difficulty moving my neck    DJD (degenerative joint disease)  Cervical/Thoracic/Lumbar    DM (diabetes mellitus)  2    Fibromyalgia    Trigeminal neuralgia  R    Obesity  morbid    Obstructive sleep apnea  refuses Cpap    Hypothyroid    Asthma  last inhaler use with in 10 days, on Pulm follow up [id number]    Hyperlipidemia    Hypertension  vaginal cyst    Vasculitis on nerve biopsy  , had another surgery called microvascular decompression     Vaginal cyst  removed     Left adrenal mass  excision, benign     S/P  section  1    S/P abdominal hysterectomy         p/w         IMP: This is a   55 year old morbid obese woman   from home, with  asthma, chronic bronchitis, NAKUL, HTN, DM, HLD, fibromyalgia, anxiety, trigeminal neuralgia, degenerative joint disease, IgA vasculitis presents to the ED with chest pressure and shortness of breath for 4 days..  Pat was complaining of back pain and chest pain upon me touching her.  Pat has allergies to CT contrast and to MRI too.  I doubt PE in setting of neg D Dimer, neg b/l lower venous doppler  and A-a gradient 21 ,correct for her age  16.  ABG more consistent with NAKUL     Sugg;  -doubt PE  -consider V/Q scan   -prophy lovenox   -monitor pulse Oximetry on room air  -pat now states that she was dx with sleep apnea and has devise at home ... non compliant   -consider referral for wt reduction surgery  -out pat pulmonary f/u

## 2021-01-31 NOTE — DISCHARGE NOTE PROVIDER - CARE PROVIDER_API CALL
Ken John (MD)  Internal Medicine  [address], [address] [place] [address]  Phone: [phone]  Fax: [phone]  Follow Up Time: 1 week

## 2021-01-31 NOTE — DISCHARGE NOTE PROVIDER - NSDCFUSCHEDAPPT_GEN_ALL_CORE_FT
NOEMY CONTRERAS ; 02/09/2021 ; NPP Med [address]  NOEMY CONTRERAS ; 02/19/2021 ; NPP PulmMed [address]  NOEMY CONTRERAS ; 04/02/2021 ; NPP Med [address]  NOEMY CONTRERAS ; 04/20/2021 ; NPP Rheum [address]

## 2021-01-31 NOTE — DISCHARGE NOTE PROVIDER - NSDCMRMEDTOKEN_GEN_ALL_CORE_FT
acetaminophen 325 mg oral tablet: 2 tab(s) orally every 6 hours, As needed, Temp greater or equal to 38C (100.4F), Mild Pain (1 - 3)  amitriptyline 10 mg oral tablet: 1 tab(s) orally once a day (at bedtime), As Needed  aspirin 81 mg oral tablet:   atorvastatin 10 mg oral tablet: 1 tab(s) orally once a day  diclofenac sodium 1% topical cream:   Flector Patch 1.3% topical film, extended release: Apply topically to affected area 2 times a day  FLUoxetine 20 mg oral tablet: 1 tab(s) orally once a day  gabapentin 400 mg oral capsule: orally once a day  hydroCHLOROthiazide 12.5 mg oral tablet: 1 tab(s) orally once a day  ipratropium-albuterol 0.5 mg-2.5 mg/3 mLinhalation solution: 3 milliliter(s) inhaled 4 times a day, As Needed - for shortness of breath and/or wheezing  Levemir FlexTouch 100 units/mL subcutaneous solution: 40 unit(s) subcutaneous once a day  levothyroxine 112 mcg (0.112 mg) oral capsule: 1 cap(s) orally once a day  metFORMIN: 2000 milligram(s) orally 2 times a day  metFORMIN 1000 mg oral tablet: 1 tab(s) orally 2 times a day, last dose 07/16 am  montelukast 10 mg oral tablet: 1 tab(s) orally once a day  montelukast 10 mg oral tablet: 1 tab(s) orally once a day (at bedtime)  OXcarbazepine 300 mg oral tablet: 1 tab(s) orally 2 times a day  pantoprazole 40 mg oral delayed release tablet: 1 tab(s) orally once a day (before a meal)  pantoprazole 40 mg oral delayed release tablet: 1 tab(s) orally once a day  ramipril 10 mg oral capsule: 1 cap(s) orally once a day  senna oral tablet: 2 tab(s) orally once a day (at bedtime)  Symbicort 160 mcg-4.5 mcg/inh inhalation aerosol: 2 puff(s) inhaled 2 times a day  Ventolin HFA 90 mcg/inh inhalation aerosol: 2 puff(s) inhaled every 6 hours  Vitamin C 100 mg oral tablet:   Vitamin D2 2000 intl units (50 mcg) oral capsule:    acetaminophen 325 mg oral tablet: 2 tab(s) orally every 6 hours, As needed, Temp greater or equal to 38C (100.4F), Mild Pain (1 - 3)  amitriptyline 10 mg oral tablet: 1 tab(s) orally once a day (at bedtime), As Needed  aspirin 81 mg oral tablet:   atorvastatin 10 mg oral tablet: 1 tab(s) orally once a day  diclofenac sodium 1% topical cream:   Flector Patch 1.3% topical film, extended release: Apply topically to affected area 2 times a day  FLUoxetine 20 mg oral tablet: 1 tab(s) orally once a day  gabapentin 400 mg oral capsule: orally once a day  hydroCHLOROthiazide 12.5 mg oral tablet: 1 tab(s) orally once a day  ibuprofen 200 mg oral tablet: 3 tab(s) orally every 6 hours   ipratropium-albuterol 0.5 mg-2.5 mg/3 mLinhalation solution: 3 milliliter(s) inhaled 4 times a day, As Needed - for shortness of breath and/or wheezing  Lantus Solostar Pen 100 units/mL subcutaneous solution: 40 unit(s) subcutaneous once a day (at bedtime)   levothyroxine 112 mcg (0.112 mg) oral capsule: 1 cap(s) orally once a day  metFORMIN 1000 mg oral tablet: 1 tab(s) orally 2 times a day, last dose 07/16 am  montelukast 10 mg oral tablet: 1 tab(s) orally once a day (at bedtime)  OXcarbazepine 300 mg oral tablet: 1 tab(s) orally 2 times a day  pantoprazole 40 mg oral delayed release tablet: 1 tab(s) orally once a day (before a meal)  ramipril 10 mg oral capsule: 1 cap(s) orally once a day  senna oral tablet: 2 tab(s) orally once a day (at bedtime)  Symbicort 160 mcg-4.5 mcg/inh inhalation aerosol: 2 puff(s) inhaled 2 times a day  Ventolin HFA 90 mcg/inh inhalation aerosol: 2 puff(s) inhaled every 6 hours  Vitamin C 100 mg oral tablet:   Vitamin D2 2000 intl units (50 mcg) oral capsule:

## 2021-01-31 NOTE — PROGRESS NOTE ADULT - PROBLEM SELECTOR PLAN 5
pt has swelling of RLE > LLE  pt has chronic LE swelling due to vasculitis  f/u Doppler to rule out DVT B/l LE edema   Pt has chronic LE swelling due to vasculitis  Doppler LE no DVT

## 2021-02-01 ENCOUNTER — TRANSCRIPTION ENCOUNTER (OUTPATIENT)
Age: 56
End: 2021-02-01

## 2021-02-01 ENCOUNTER — FORM ENCOUNTER (OUTPATIENT)
Age: 56
End: 2021-02-01

## 2021-02-01 VITALS
RESPIRATION RATE: 18 BRPM | OXYGEN SATURATION: 93 % | TEMPERATURE: 98 F | HEART RATE: 87 BPM | SYSTOLIC BLOOD PRESSURE: 135 MMHG | WEIGHT: 293 LBS | DIASTOLIC BLOOD PRESSURE: 87 MMHG

## 2021-02-01 LAB
ALBUMIN SERPL ELPH-MCNC: 3.2 G/DL — LOW (ref 3.5–5)
ALP SERPL-CCNC: 138 U/L — HIGH (ref 40–120)
ALT FLD-CCNC: 71 U/L DA — HIGH (ref 10–60)
ANION GAP SERPL CALC-SCNC: 6 MMOL/L — SIGNIFICANT CHANGE UP (ref 5–17)
AST SERPL-CCNC: 67 U/L — HIGH (ref 10–40)
BASOPHILS # BLD AUTO: 0.03 K/UL — SIGNIFICANT CHANGE UP (ref 0–0.2)
BASOPHILS NFR BLD AUTO: 0.4 % — SIGNIFICANT CHANGE UP (ref 0–2)
BILIRUB SERPL-MCNC: 0.6 MG/DL — SIGNIFICANT CHANGE UP (ref 0.2–1.2)
BUN SERPL-MCNC: 12 MG/DL — SIGNIFICANT CHANGE UP (ref 7–18)
CALCIUM SERPL-MCNC: 9.2 MG/DL — SIGNIFICANT CHANGE UP (ref 8.4–10.5)
CHLORIDE SERPL-SCNC: 98 MMOL/L — SIGNIFICANT CHANGE UP (ref 96–108)
CO2 SERPL-SCNC: 31 MMOL/L — SIGNIFICANT CHANGE UP (ref 22–31)
CREAT SERPL-MCNC: 0.75 MG/DL — SIGNIFICANT CHANGE UP (ref 0.5–1.3)
EOSINOPHIL # BLD AUTO: 0.17 K/UL — SIGNIFICANT CHANGE UP (ref 0–0.5)
EOSINOPHIL NFR BLD AUTO: 2.4 % — SIGNIFICANT CHANGE UP (ref 0–6)
GLUCOSE BLDC GLUCOMTR-MCNC: 209 MG/DL — HIGH (ref 70–99)
GLUCOSE BLDC GLUCOMTR-MCNC: 239 MG/DL — HIGH (ref 70–99)
GLUCOSE BLDC GLUCOMTR-MCNC: 243 MG/DL — HIGH (ref 70–99)
GLUCOSE SERPL-MCNC: 259 MG/DL — HIGH (ref 70–99)
HAV IGM SER-ACNC: SIGNIFICANT CHANGE UP
HBV CORE IGM SER-ACNC: SIGNIFICANT CHANGE UP
HBV SURFACE AG SER-ACNC: SIGNIFICANT CHANGE UP
HCT VFR BLD CALC: 42.4 % — SIGNIFICANT CHANGE UP (ref 34.5–45)
HCV AB S/CO SERPL IA: 0.06 S/CO — SIGNIFICANT CHANGE UP (ref 0–0.99)
HCV AB SERPL-IMP: SIGNIFICANT CHANGE UP
HGB BLD-MCNC: 12.7 G/DL — SIGNIFICANT CHANGE UP (ref 11.5–15.5)
IMM GRANULOCYTES NFR BLD AUTO: 0.8 % — SIGNIFICANT CHANGE UP (ref 0–1.5)
LYMPHOCYTES # BLD AUTO: 1.59 K/UL — SIGNIFICANT CHANGE UP (ref 1–3.3)
LYMPHOCYTES # BLD AUTO: 22.4 % — SIGNIFICANT CHANGE UP (ref 13–44)
MAGNESIUM SERPL-MCNC: 1.7 MG/DL — SIGNIFICANT CHANGE UP (ref 1.6–2.6)
MCHC RBC-ENTMCNC: 23.5 PG — LOW (ref 27–34)
MCHC RBC-ENTMCNC: 30 GM/DL — LOW (ref 32–36)
MCV RBC AUTO: 78.4 FL — LOW (ref 80–100)
MONOCYTES # BLD AUTO: 0.56 K/UL — SIGNIFICANT CHANGE UP (ref 0–0.9)
MONOCYTES NFR BLD AUTO: 7.9 % — SIGNIFICANT CHANGE UP (ref 2–14)
NEUTROPHILS # BLD AUTO: 4.7 K/UL — SIGNIFICANT CHANGE UP (ref 1.8–7.4)
NEUTROPHILS NFR BLD AUTO: 66.1 % — SIGNIFICANT CHANGE UP (ref 43–77)
NRBC # BLD: 0 /100 WBCS — SIGNIFICANT CHANGE UP (ref 0–0)
PHOSPHATE SERPL-MCNC: 3.5 MG/DL — SIGNIFICANT CHANGE UP (ref 2.5–4.5)
PLATELET # BLD AUTO: 346 K/UL — SIGNIFICANT CHANGE UP (ref 150–400)
POTASSIUM SERPL-MCNC: 4.2 MMOL/L — SIGNIFICANT CHANGE UP (ref 3.5–5.3)
POTASSIUM SERPL-SCNC: 4.2 MMOL/L — SIGNIFICANT CHANGE UP (ref 3.5–5.3)
PROT SERPL-MCNC: 7.9 G/DL — SIGNIFICANT CHANGE UP (ref 6–8.3)
RBC # BLD: 5.41 M/UL — HIGH (ref 3.8–5.2)
RBC # FLD: 16 % — HIGH (ref 10.3–14.5)
SODIUM SERPL-SCNC: 135 MMOL/L — SIGNIFICANT CHANGE UP (ref 135–145)
WBC # BLD: 7.11 K/UL — SIGNIFICANT CHANGE UP (ref 3.8–10.5)
WBC # FLD AUTO: 7.11 K/UL — SIGNIFICANT CHANGE UP (ref 3.8–10.5)

## 2021-02-01 PROCEDURE — 82962 GLUCOSE BLOOD TEST: CPT

## 2021-02-01 PROCEDURE — 82728 ASSAY OF FERRITIN: CPT

## 2021-02-01 PROCEDURE — 94640 AIRWAY INHALATION TREATMENT: CPT

## 2021-02-01 PROCEDURE — U0005: CPT

## 2021-02-01 PROCEDURE — 99238 HOSP IP/OBS DSCHRG MGMT 30/<: CPT | Mod: GC

## 2021-02-01 PROCEDURE — 84443 ASSAY THYROID STIM HORMONE: CPT

## 2021-02-01 PROCEDURE — 85652 RBC SED RATE AUTOMATED: CPT

## 2021-02-01 PROCEDURE — 84484 ASSAY OF TROPONIN QUANT: CPT

## 2021-02-01 PROCEDURE — 86769 SARS-COV-2 COVID-19 ANTIBODY: CPT

## 2021-02-01 PROCEDURE — 84145 PROCALCITONIN (PCT): CPT

## 2021-02-01 PROCEDURE — 93005 ELECTROCARDIOGRAM TRACING: CPT

## 2021-02-01 PROCEDURE — 83550 IRON BINDING TEST: CPT

## 2021-02-01 PROCEDURE — 86160 COMPLEMENT ANTIGEN: CPT

## 2021-02-01 PROCEDURE — 83605 ASSAY OF LACTIC ACID: CPT

## 2021-02-01 PROCEDURE — A9502: CPT

## 2021-02-01 PROCEDURE — 36415 COLL VENOUS BLD VENIPUNCTURE: CPT

## 2021-02-01 PROCEDURE — 87040 BLOOD CULTURE FOR BACTERIA: CPT

## 2021-02-01 PROCEDURE — 80074 ACUTE HEPATITIS PANEL: CPT

## 2021-02-01 PROCEDURE — 83880 ASSAY OF NATRIURETIC PEPTIDE: CPT

## 2021-02-01 PROCEDURE — 82803 BLOOD GASES ANY COMBINATION: CPT

## 2021-02-01 PROCEDURE — 78452 HT MUSCLE IMAGE SPECT MULT: CPT

## 2021-02-01 PROCEDURE — 85730 THROMBOPLASTIN TIME PARTIAL: CPT

## 2021-02-01 PROCEDURE — 87635 SARS-COV-2 COVID-19 AMP PRB: CPT

## 2021-02-01 PROCEDURE — 82746 ASSAY OF FOLIC ACID SERUM: CPT

## 2021-02-01 PROCEDURE — 82977 ASSAY OF GGT: CPT

## 2021-02-01 PROCEDURE — 84100 ASSAY OF PHOSPHORUS: CPT

## 2021-02-01 PROCEDURE — 93970 EXTREMITY STUDY: CPT

## 2021-02-01 PROCEDURE — 85610 PROTHROMBIN TIME: CPT

## 2021-02-01 PROCEDURE — 80053 COMPREHEN METABOLIC PANEL: CPT

## 2021-02-01 PROCEDURE — 85379 FIBRIN DEGRADATION QUANT: CPT

## 2021-02-01 PROCEDURE — 99285 EMERGENCY DEPT VISIT HI MDM: CPT | Mod: 25

## 2021-02-01 PROCEDURE — 82550 ASSAY OF CK (CPK): CPT

## 2021-02-01 PROCEDURE — 85025 COMPLETE CBC W/AUTO DIFF WBC: CPT

## 2021-02-01 PROCEDURE — 83540 ASSAY OF IRON: CPT

## 2021-02-01 PROCEDURE — 71045 X-RAY EXAM CHEST 1 VIEW: CPT

## 2021-02-01 PROCEDURE — 80061 LIPID PANEL: CPT

## 2021-02-01 PROCEDURE — 85027 COMPLETE CBC AUTOMATED: CPT

## 2021-02-01 PROCEDURE — 86140 C-REACTIVE PROTEIN: CPT

## 2021-02-01 PROCEDURE — 93306 TTE W/DOPPLER COMPLETE: CPT

## 2021-02-01 PROCEDURE — 83036 HEMOGLOBIN GLYCOSYLATED A1C: CPT

## 2021-02-01 PROCEDURE — 85045 AUTOMATED RETICULOCYTE COUNT: CPT

## 2021-02-01 PROCEDURE — 83735 ASSAY OF MAGNESIUM: CPT

## 2021-02-01 PROCEDURE — 93017 CV STRESS TEST TRACING ONLY: CPT

## 2021-02-01 PROCEDURE — 82607 VITAMIN B-12: CPT

## 2021-02-01 RX ORDER — INSULIN GLARGINE 100 [IU]/ML
28 INJECTION, SOLUTION SUBCUTANEOUS AT BEDTIME
Refills: 0 | Status: DISCONTINUED | OUTPATIENT
Start: 2021-02-01 | End: 2021-02-01

## 2021-02-01 RX ORDER — INSULIN GLARGINE 100 [IU]/ML
40 INJECTION, SOLUTION SUBCUTANEOUS
Qty: 1200 | Refills: 0
Start: 2021-02-01 | End: 2021-03-02

## 2021-02-01 RX ORDER — INSULIN LISPRO 100/ML
8 VIAL (ML) SUBCUTANEOUS
Refills: 0 | Status: DISCONTINUED | OUTPATIENT
Start: 2021-02-01 | End: 2021-02-01

## 2021-02-01 RX ORDER — ATORVASTATIN CALCIUM 80 MG/1
10 TABLET, FILM COATED ORAL AT BEDTIME
Refills: 0 | Status: DISCONTINUED | OUTPATIENT
Start: 2021-02-01 | End: 2021-02-01

## 2021-02-01 RX ORDER — IBUPROFEN 200 MG
3 TABLET ORAL
Qty: 84 | Refills: 0
Start: 2021-02-01 | End: 2021-02-07

## 2021-02-01 RX ADMIN — Medication 20 MILLIGRAM(S): at 11:09

## 2021-02-01 RX ADMIN — OXCARBAZEPINE 300 MILLIGRAM(S): 300 TABLET, FILM COATED ORAL at 06:07

## 2021-02-01 RX ADMIN — Medication 8 UNIT(S): at 17:31

## 2021-02-01 RX ADMIN — Medication 8 UNIT(S): at 11:08

## 2021-02-01 RX ADMIN — ENOXAPARIN SODIUM 40 MILLIGRAM(S): 100 INJECTION SUBCUTANEOUS at 11:09

## 2021-02-01 RX ADMIN — SODIUM CHLORIDE 3 MILLILITER(S): 9 INJECTION INTRAMUSCULAR; INTRAVENOUS; SUBCUTANEOUS at 06:34

## 2021-02-01 RX ADMIN — OXCARBAZEPINE 300 MILLIGRAM(S): 300 TABLET, FILM COATED ORAL at 17:32

## 2021-02-01 RX ADMIN — Medication 112 MICROGRAM(S): at 06:07

## 2021-02-01 RX ADMIN — Medication 4: at 11:08

## 2021-02-01 RX ADMIN — Medication 12.5 MILLIGRAM(S): at 06:07

## 2021-02-01 RX ADMIN — Medication 4: at 17:31

## 2021-02-01 RX ADMIN — PANTOPRAZOLE SODIUM 40 MILLIGRAM(S): 20 TABLET, DELAYED RELEASE ORAL at 06:07

## 2021-02-01 RX ADMIN — Medication 81 MILLIGRAM(S): at 11:08

## 2021-02-01 RX ADMIN — GABAPENTIN 400 MILLIGRAM(S): 400 CAPSULE ORAL at 11:11

## 2021-02-01 NOTE — DISCHARGE NOTE NURSING/CASE MANAGEMENT/SOCIAL WORK - NSDCFUADDAPPT_GEN_ALL_CORE_FT
Please make appointment with Dr Herzog (Obesity clinic and bariatrics) at [phone]  Please make appointment with Dr Christiansen (pulmonology) at [phone]

## 2021-02-01 NOTE — PROGRESS NOTE ADULT - SUBJECTIVE AND OBJECTIVE BOX
Patient denies chest pain or shortness of breath.   Review of systems otherwise (-)  	  MEDICATIONS:  MEDICATIONS  (STANDING):  aspirin enteric coated 81 milliGRAM(s) Oral daily  atorvastatin 40 milliGRAM(s) Oral at bedtime  budesonide 160 MICROgram(s)/formoterol 4.5 MICROgram(s) Inhaler 2 Puff(s) Inhalation two times a day  enoxaparin Injectable 40 milliGRAM(s) SubCutaneous daily  FLUoxetine 20 milliGRAM(s) Oral daily  gabapentin 400 milliGRAM(s) Oral daily  hydrochlorothiazide 12.5 milliGRAM(s) Oral daily  insulin glargine Injectable (LANTUS) 28 Unit(s) SubCutaneous at bedtime  insulin lispro (ADMELOG) corrective regimen sliding scale   SubCutaneous Before meals and at bedtime  insulin lispro Injectable (ADMELOG) 8 Unit(s) SubCutaneous three times a day before meals  levothyroxine 112 MICROGram(s) Oral daily  montelukast 10 milliGRAM(s) Oral at bedtime  OXcarbazepine 300 milliGRAM(s) Oral two times a day  pantoprazole    Tablet 40 milliGRAM(s) Oral before breakfast  senna 2 Tablet(s) Oral at bedtime  sodium chloride 0.9% lock flush 3 milliLiter(s) IV Push every 8 hours      LABS:	 	    CARDIAC MARKERS:  CARDIAC MARKERS ( 30 Jan 2021 11:01 )  <0.015 ng/mL / x     / 163 U/L / x     / x      CARDIAC MARKERS ( 30 Jan 2021 07:14 )  x     / x     / 169 U/L / x     / x      CARDIAC MARKERS ( 30 Jan 2021 03:24 )  <0.015 ng/mL / x     / x     / x     / x                                    12.7   7.11  )-----------( 346      ( 01 Feb 2021 06:40 )             42.4     Hemoglobin: 12.7 g/dL (02-01 @ 06:40)  Hemoglobin: 12.8 g/dL (01-31 @ 09:32)  Hemoglobin: 12.6 g/dL (01-30 @ 07:15)  Hemoglobin: 12.2 g/dL (01-30 @ 03:24)      02-01    135  |  98  |  12  ----------------------------<  259<H>  4.2   |  31  |  0.75    Ca    9.2      01 Feb 2021 06:40  Phos  3.5     02-01  Mg     1.7     02-01    TPro  7.9  /  Alb  3.2<L>  /  TBili  0.6  /  DBili  x   /  AST  67<H>  /  ALT  71<H>  /  AlkPhos  138<H>  02-01    Creatinine Trend: 0.75<--, 0.76<--, 0.83<--, 0.96<--    COAGS:       proBNP:   Lipid Profile:   HgA1c:   TSH:       PHYSICAL EXAM:  T(C): 36.7 (02-01-21 @ 07:15), Max: 36.8 (01-31-21 @ 11:29)  HR: 83 (02-01-21 @ 07:15) (83 - 101)  BP: 141/88 (02-01-21 @ 07:15) (120/83 - 158/82)  RR: 18 (02-01-21 @ 07:15) (18 - 19)  SpO2: 93% (02-01-21 @ 07:15) (93% - 96%)  Wt(kg): --  I&O's Summary    31 Jan 2021 07:01  -  01 Feb 2021 07:00  --------------------------------------------------------  IN: 375 mL / OUT: 0 mL / NET: 375 mL          Gen: Appears well in NAD  HEENT:  (-)icterus (-)pallor  CV: N S1 S2 1/6 LAURA (+)2 Pulses B/l  Resp:  Clear to ausculatation B/L, normal effort  GI: (+) BS Soft, NT, ND  Lymph:  (-)Edema, (-)obvious lymphadenopathy  Skin: Warm to touch, Normal turgor  Psych: Appropriate mood and affect      TELEMETRY: 	  sinus        ASSESSMENT/PLAN: 	55y  Female Renal CA, Vasculitis , chronic  back pain , obesity , home O2 use, contrast allg , asthma, HTN now with sob , chest pressure r/o for MI, normal LV function.    - stress with no ischemia or infarct  - No need for further inpatient cardiac work up.  - Can D/C tele    Dale Sheets MD, [facility]  BEEPER [phone]

## 2021-02-01 NOTE — PROGRESS NOTE ADULT - SUBJECTIVE AND OBJECTIVE BOX
Hospitalist Progress Note    HPI/ROS/Interval events:  Denies F/C, headaches, CP/SOB, N/V, ab pain, dysuria    MEDICATIONS  (STANDING):  aspirin enteric coated 81 milliGRAM(s) Oral daily  atorvastatin 10 milliGRAM(s) Oral at bedtime  budesonide 160 MICROgram(s)/formoterol 4.5 MICROgram(s) Inhaler 2 Puff(s) Inhalation two times a day  enoxaparin Injectable 40 milliGRAM(s) SubCutaneous daily  FLUoxetine 20 milliGRAM(s) Oral daily  gabapentin 400 milliGRAM(s) Oral daily  hydrochlorothiazide 12.5 milliGRAM(s) Oral daily  insulin glargine Injectable (LANTUS) 28 Unit(s) SubCutaneous at bedtime  insulin lispro (ADMELOG) corrective regimen sliding scale   SubCutaneous Before meals and at bedtime  insulin lispro Injectable (ADMELOG) 8 Unit(s) SubCutaneous three times a day before meals  levothyroxine 112 MICROGram(s) Oral daily  montelukast 10 milliGRAM(s) Oral at bedtime  OXcarbazepine 300 milliGRAM(s) Oral two times a day  pantoprazole    Tablet 40 milliGRAM(s) Oral before breakfast  senna 2 Tablet(s) Oral at bedtime  sodium chloride 0.9% lock flush 3 milliLiter(s) IV Push every 8 hours    MEDICATIONS  (PRN):  acetaminophen   Tablet .. 650 milliGRAM(s) Oral every 6 hours PRN Temp greater or equal to 38C (100.4F), Moderate Pain (4 - 6)  ALBUTerol    90 MICROgram(s) HFA Inhaler 2 Puff(s) Inhalation every 6 hours PRN Shortness of Breath and/or Wheezing  guaiFENesin   Syrup  (Sugar-Free) 200 milliGRAM(s) Oral every 6 hours PRN mucous/cough      Reviewed vitals, labs, imaging personally: FSBG controlled, albumin low    Vital Signs Last 24 Hrs  T(C): 37 (01 Feb 2021 11:11), Max: 37 (01 Feb 2021 11:11)  T(F): 98.6 (01 Feb 2021 11:11), Max: 98.6 (01 Feb 2021 11:11)  HR: 85 (01 Feb 2021 11:11) (83 - 101)  BP: 126/82 (01 Feb 2021 11:11) (120/83 - 158/82)  BP(mean): --  RR: 19 (01 Feb 2021 11:11) (18 - 19)  SpO2: 92% (01 Feb 2021 11:11) (92% - 96%)    Gen: NAD elderly male resting in bed  HEENT: normocephalic, EOMI, PERRLA, neck supple, no thyromegaly  Chest/CV: RRR, +2 peripheral pulses  Pulm: CTAB/L  Abd/GI: soft, NT, ND +BS  MSK: no peripheral edema/cyanosis  Heme/lymph: no cervical lymphadenopathy  Skin: no rashes  Neuro: CN 2-12 grossly intact  Psych: alert and oriented, normal mood, affect    Hospitalist Progress Note    HPI/Interval Events/ROS: Denies headaches, F/C, dizziness, syncope, N/V. Still has CP, SOB.    MEDICATIONS  (STANDING):  aspirin enteric coated 81 milliGRAM(s) Oral daily  atorvastatin 10 milliGRAM(s) Oral at bedtime  budesonide 160 MICROgram(s)/formoterol 4.5 MICROgram(s) Inhaler 2 Puff(s) Inhalation two times a day  enoxaparin Injectable 40 milliGRAM(s) SubCutaneous daily  FLUoxetine 20 milliGRAM(s) Oral daily  gabapentin 400 milliGRAM(s) Oral daily  hydrochlorothiazide 12.5 milliGRAM(s) Oral daily  insulin glargine Injectable (LANTUS) 28 Unit(s) SubCutaneous at bedtime  insulin lispro (ADMELOG) corrective regimen sliding scale   SubCutaneous Before meals and at bedtime  insulin lispro Injectable (ADMELOG) 8 Unit(s) SubCutaneous three times a day before meals  levothyroxine 112 MICROGram(s) Oral daily  montelukast 10 milliGRAM(s) Oral at bedtime  OXcarbazepine 300 milliGRAM(s) Oral two times a day  pantoprazole    Tablet 40 milliGRAM(s) Oral before breakfast  senna 2 Tablet(s) Oral at bedtime  sodium chloride 0.9% lock flush 3 milliLiter(s) IV Push every 8 hours    MEDICATIONS  (PRN):  acetaminophen   Tablet .. 650 milliGRAM(s) Oral every 6 hours PRN Temp greater or equal to 38C (100.4F), Moderate Pain (4 - 6)  ALBUTerol    90 MICROgram(s) HFA Inhaler 2 Puff(s) Inhalation every 6 hours PRN Shortness of Breath and/or Wheezing  guaiFENesin   Syrup  (Sugar-Free) 200 milliGRAM(s) Oral every 6 hours PRN mucous/cough      Vital Signs Last 24 Hrs  T(C): 37 (01 Feb 2021 11:11), Max: 37 (01 Feb 2021 11:11)  T(F): 98.6 (01 Feb 2021 11:11), Max: 98.6 (01 Feb 2021 11:11)  HR: 85 (01 Feb 2021 11:11) (83 - 101)  BP: 126/82 (01 Feb 2021 11:11) (120/83 - 158/82)  BP(mean): --  RR: 19 (01 Feb 2021 11:11) (18 - 19)  SpO2: 92% (01 Feb 2021 11:11) (92% - 96%)    Gen: NAD obese female sitting up in chair  HEENT: normocephalic, EOMI, PERRLA, neck supple, no thyromegaly  Chest/CV: RRR, +2 peripheral pulses  Pulm: reduced breath sounds B/L  Abd/GI: soft, protuberant, NT  MSK: no peripheral edema/cyanosis  Heme/lymph: no cervical lymphadenopathy  Skin: no rashes  Neuro: CN 2-12 grossly intact  Psych: alert and oriented, normal mood, affect       Reviewed vitals, labs, imaging personally: FSBG elevated, transaminitis  NM stress test: Review of raw data shows: The study is of good technical  quality.  The left ventricle was normal in size. Normal myocardial  perfusion scan, with no evidence of infarction or  inducible ischemia.  Reviewed pulm, cards notes

## 2021-02-01 NOTE — DISCHARGE NOTE NURSING/CASE MANAGEMENT/SOCIAL WORK - PATIENT PORTAL LINK FT
You can access the FollowMyHealth Patient Portal offered by [facility] by registering at the following website: [email or web address]. By joining [facility]’s FollowMyHealth portal, you will also be able to view your health information using other applications (apps) compatible with our system.

## 2021-02-01 NOTE — PROGRESS NOTE ADULT - ASSESSMENT
A/P: 68 year old Male from home with h/o HTN, HLD, CAD s/p CABG (Aug 2020), HIV presented with fever and outpt diagnosis of UTI. Admitted for sepsis, started on abx     1. Sepsis secondary to UTI.  Failed outpt treatment with PO abx   -CT with mild bilateral hydronephrosis, repeating USG of kidneys  -Cefepime 2gm IV q 12 hours as per ID   -cultures negative so far and afebrile  -ID Dr. Jacobsen.     2. Problem: Hydronephrosis, bilateral.  Plan: -cont castaneda for 1 week and then DC  -Urology following   -rest plan as above.      Problem/Plan - 3:  ·  Problem: FERNIE (acute kidney injury).  Plan: -likely in setting of poor PO intake and b/l hydro, renal function improved  -cont castaneda  -cont IVF.      Problem/Plan - 4:  ·  Problem: Diabetes.  Plan: -on Janumet at home   -cont HSSC - FSBG controlled here  -a1c 7.7.      Problem/Plan - 5:  ·  Problem: HIV infection, unspecified symptom status.  Plan: -cont home meds   ID following  -CD4 263     Problem/Plan - 6:  Problem: Hypertension. Plan: -episodes of borderline SBP likely in setting of sepsis  -Metoprolol succinate 50mg PO daily starting 1/31(was lopressor 12.5mg, home dose is succinate 100mg)     Problem/Plan - 7:  ·  Problem: Elevated troponin.  Plan: -likely due to demand ischemia, s/p recent cath, no further trending required   -cont ASA, Plavix, statin and BB   -Cardio Dr. Sheets.      Problem/Plan - 8:  ·  Problem: Prophylactic measure.  Plan: -dvt ppx- cont Lovenox.     problem 9: Pral thrush: on diflucan  start soft diet, tolerated liquids    Dispo: on IV antibiotics, will discuss with ID, trial diet today    D/w BRYSON Gamble   A/P: 55 year old morbid obese woman w/PMHX asthma, chronic bronchitis, NAKUL, HTN, DM, HLD, fibromyalgia, anxiety, trigeminal neuralgia, degerenative joint disease, IgA vasculitis p/w chest pressure, SOB. May be 2/2  MSK, GERD, fibromyalgia.    #Chest pain: EKG no acute findings, less likely ACS. TTE showing normal EF, no diastolic dysfunction. May be MSK vs. fibromyalgia. No concern for dissection, tamponade, esophageal rupture on CXR. D-dimer low.  -c/w ASA, statin  -nuclear stress test today: neg  -c/w PPI    #NKAUL  -per pulm outpt less likely PE; f/u outpt (will likely need CPAP eval)  -referral for wt reduction surgery  -pulse ox monitor room air    #DM  -will need insulin outpt - f/u SW/CM  -lantus 25, admelog 6 for now - may need to uptrate as FSBG in high 200s - will return to home dose  -FSBG, HISS  -f/u outpt endocrine    #HTN  -c/w HCTZ    #Asthma  -stable; c/w albuterol, symbicort, singulair    #Cervical neuralgia  -c/w gabapentin, oxcarbazepine    #Transaminitis  -recently increased statin - may be correlated with this vs. fatty liver  -will lower statin back to 10 given no findings on nuclear stress test  -if continued transaminitis, will obtain RUQ sono to r/o NAFLD    #DVT PPX  -lovenox QD

## 2021-02-01 NOTE — DISCHARGE NOTE NURSING/CASE MANAGEMENT/SOCIAL WORK - NSSCNAMETXT_GEN_ALL_CORE
[facility] (formerly [facility]) [phone]

## 2021-02-03 ENCOUNTER — NON-APPOINTMENT (OUTPATIENT)
Age: 56
End: 2021-02-03

## 2021-02-04 ENCOUNTER — NON-APPOINTMENT (OUTPATIENT)
Age: 56
End: 2021-02-04

## 2021-02-04 LAB
CULTURE RESULTS: SIGNIFICANT CHANGE UP
SPECIMEN SOURCE: SIGNIFICANT CHANGE UP

## 2021-02-08 ENCOUNTER — NON-APPOINTMENT (OUTPATIENT)
Age: 56
End: 2021-02-08

## 2021-02-09 ENCOUNTER — APPOINTMENT (OUTPATIENT)
Dept: INTERNAL MEDICINE | Facility: CLINIC | Age: 56
End: 2021-02-09

## 2021-02-12 ENCOUNTER — NON-APPOINTMENT (OUTPATIENT)
Age: 56
End: 2021-02-12

## 2021-02-15 ENCOUNTER — APPOINTMENT (OUTPATIENT)
Dept: INTERNAL MEDICINE | Facility: CLINIC | Age: 56
End: 2021-02-15

## 2021-02-18 ENCOUNTER — NON-APPOINTMENT (OUTPATIENT)
Age: 56
End: 2021-02-18

## 2021-02-24 ENCOUNTER — NON-APPOINTMENT (OUTPATIENT)
Age: 56
End: 2021-02-24

## 2021-02-26 ENCOUNTER — TRANSCRIPTION ENCOUNTER (OUTPATIENT)
Age: 56
End: 2021-02-26

## 2021-02-26 ENCOUNTER — NON-APPOINTMENT (OUTPATIENT)
Age: 56
End: 2021-02-26

## 2021-02-27 ENCOUNTER — RX RENEWAL (OUTPATIENT)
Age: 56
End: 2021-02-27

## 2021-03-03 NOTE — PROGRESS NOTE ADULT - PROBLEM SELECTOR PLAN 1
Dennis Garcia Please fill 30 day supply, 90 day supply sent to mail order. Thanks    S/P  Right trigeminal nerve decompression on 7/23   Cont monitor as per Neurosurgery team.

## 2021-03-04 ENCOUNTER — LABORATORY RESULT (OUTPATIENT)
Age: 56
End: 2021-03-04

## 2021-03-04 ENCOUNTER — APPOINTMENT (OUTPATIENT)
Dept: INTERNAL MEDICINE | Facility: CLINIC | Age: 56
End: 2021-03-04
Payer: MEDICARE

## 2021-03-04 PROCEDURE — 99442: CPT

## 2021-03-04 RX ORDER — AMOXICILLIN AND CLAVULANATE POTASSIUM 500; 125 MG/1; MG/1
500-125 TABLET, FILM COATED ORAL TWICE DAILY
Qty: 14 | Refills: 0 | Status: DISCONTINUED | COMMUNITY
Start: 2021-02-17 | End: 2021-03-04

## 2021-03-04 RX ORDER — PREDNISONE 50 MG/1
50 TABLET ORAL
Qty: 3 | Refills: 0 | Status: DISCONTINUED | COMMUNITY
Start: 2020-10-29 | End: 2021-03-04

## 2021-03-04 RX ORDER — PREDNISONE 10 MG/1
10 TABLET ORAL
Qty: 21 | Refills: 0 | Status: DISCONTINUED | COMMUNITY
Start: 2020-11-15 | End: 2021-03-04

## 2021-03-04 NOTE — HISTORY OF PRESENT ILLNESS
[Home] : at home, [unfilled] , at the time of the visit. [Medical Office: ([facility])___] : at the medical office located in  [Verbal consent obtained from patient] : the patient, [unfilled] [FreeTextEntry2] : As documented in a message section.\par  [FreeTextEntry4] : FLETCHER Erwin [de-identified] : 55 year female  patient with history of stable Hypertension, Hypothyroidism, Type 2 Diabetes Mellitus, Reactive Airway Disease, Morbid Obesity, Asthma, MRI is consistent with likely Renal Cell Carcinoma, history as stated, initiated telephonic visit for Disease Management and Medication Adherence.\par \par Patient was [facility], 02/26/2021, findings and recommendations reviewed with the patient during today's examination.\par

## 2021-03-07 LAB
ALBUMIN SERPL ELPH-MCNC: 3.9 G/DL
ALP BLD-CCNC: 136 U/L
ALT SERPL-CCNC: 58 U/L
ANION GAP SERPL CALC-SCNC: 18 MMOL/L
AST SERPL-CCNC: 60 U/L
BASOPHILS # BLD AUTO: 0.05 K/UL
BASOPHILS NFR BLD AUTO: 0.6 %
BILIRUB SERPL-MCNC: 0.5 MG/DL
BUN SERPL-MCNC: 11 MG/DL
CALCIUM SERPL-MCNC: 9.3 MG/DL
CHLORIDE SERPL-SCNC: 92 MMOL/L
CHOLEST SERPL-MCNC: 188 MG/DL
CO2 SERPL-SCNC: 26 MMOL/L
CREAT SERPL-MCNC: 0.53 MG/DL
EOSINOPHIL # BLD AUTO: 0.25 K/UL
EOSINOPHIL NFR BLD AUTO: 2.9 %
ESTIMATED AVERAGE GLUCOSE: 298 MG/DL
GGT SERPL-CCNC: 56 U/L
GLUCOSE SERPL-MCNC: 232 MG/DL
HBA1C MFR BLD HPLC: 12 %
HCT VFR BLD CALC: 44.7 %
HDLC SERPL-MCNC: 40 MG/DL
HGB BLD-MCNC: 12.7 G/DL
IMM GRANULOCYTES NFR BLD AUTO: 0.5 %
LDLC SERPL CALC-MCNC: 123 MG/DL
LYMPHOCYTES # BLD AUTO: 2.05 K/UL
LYMPHOCYTES NFR BLD AUTO: 23.8 %
MAN DIFF?: NORMAL
MCHC RBC-ENTMCNC: 22.9 PG
MCHC RBC-ENTMCNC: 28.4 GM/DL
MCV RBC AUTO: 80.7 FL
MONOCYTES # BLD AUTO: 0.57 K/UL
MONOCYTES NFR BLD AUTO: 6.6 %
NEUTROPHILS # BLD AUTO: 5.64 K/UL
NEUTROPHILS NFR BLD AUTO: 65.6 %
NONHDLC SERPL-MCNC: 148 MG/DL
PLATELET # BLD AUTO: 284 K/UL
POTASSIUM SERPL-SCNC: 5 MMOL/L
PROT SERPL-MCNC: 7.9 G/DL
RBC # BLD: 5.54 M/UL
RBC # FLD: 15.9 %
SODIUM SERPL-SCNC: 137 MMOL/L
T3 SERPL-MCNC: 109 NG/DL
T4 FREE SERPL-MCNC: 1.4 NG/DL
TRIGL SERPL-MCNC: 124 MG/DL
TSH SERPL-ACNC: 2.34 UIU/ML
WBC # FLD AUTO: 8.6 K/UL

## 2021-03-09 ENCOUNTER — RX RENEWAL (OUTPATIENT)
Age: 56
End: 2021-03-09

## 2021-03-12 ENCOUNTER — APPOINTMENT (OUTPATIENT)
Dept: PULMONOLOGY | Facility: CLINIC | Age: 56
End: 2021-03-12
Payer: MEDICARE

## 2021-03-12 ENCOUNTER — APPOINTMENT (OUTPATIENT)
Dept: VASCULAR SURGERY | Facility: CLINIC | Age: 56
End: 2021-03-12
Payer: MEDICARE

## 2021-03-12 VITALS
OXYGEN SATURATION: 93 % | WEIGHT: 292 LBS | SYSTOLIC BLOOD PRESSURE: 146 MMHG | DIASTOLIC BLOOD PRESSURE: 89 MMHG | BODY MASS INDEX: 55.13 KG/M2 | TEMPERATURE: 97.7 F | HEART RATE: 98 BPM | HEIGHT: 61 IN

## 2021-03-12 VITALS
BODY MASS INDEX: 55.13 KG/M2 | HEIGHT: 61 IN | HEART RATE: 107 BPM | OXYGEN SATURATION: 96 % | DIASTOLIC BLOOD PRESSURE: 87 MMHG | WEIGHT: 292 LBS | SYSTOLIC BLOOD PRESSURE: 149 MMHG

## 2021-03-12 PROCEDURE — 99072 ADDL SUPL MATRL&STAF TM PHE: CPT

## 2021-03-12 PROCEDURE — 99213 OFFICE O/P EST LOW 20 MIN: CPT

## 2021-03-12 PROCEDURE — 99204 OFFICE O/P NEW MOD 45 MIN: CPT

## 2021-03-12 PROCEDURE — 93970 EXTREMITY STUDY: CPT

## 2021-03-12 NOTE — HISTORY OF PRESENT ILLNESS
[FreeTextEntry1] : Patient is at 55-year-old female with extensive medical history including diabetes, vasculitis, peripheral neuropathy, history of adrenal mass status post resection of adrenal mass, left renal carcinoma scheduled to have left nephrectomy with chronic swelling of bilateral lower extremity right worse than left.  Patient has had extensive evaluation including Dopplers of both lower extremities with no evidence of DVT.  Patient also had an MRI of the abdomen done during the work-up of left renal carcinoma which was done in December 2020 and showed no evidence of thrombus within the inferior vena cava or left renal vein.  Patient does not have any complaint of significant chest pain or shortness of breath.  No fevers or chills.

## 2021-03-12 NOTE — CONSULT LETTER
[Dear  ___] : Dear  [unfilled], [Consult Letter:] : I had the pleasure of evaluating your patient, [unfilled]. [Please see my note below.] : Please see my note below. [Consult Closing:] : Thank you very much for allowing me to participate in the care of this patient.  If you have any questions, please do not hesitate to contact me. [Sincerely,] : Sincerely, [FreeTextEntry3] : Alex Ritter M.D., F.LIZZETTES., R.P.IGOR.I.\par  of Vascular Surgery\par Assistant Professor of Radiology\par Director of Endovascular Program/ V[facility] Center\par [facility]

## 2021-03-12 NOTE — PHYSICAL EXAM
[JVD] : no jugular venous distention  [Normal Breath Sounds] : Normal breath sounds [Normal Heart Sounds] : normal heart sounds [2+] : left 2+ [Ankle Swelling (On Exam)] : present [Ankle Swelling On The Right] : of the right ankle [Ankle Swelling Bilaterally] : severe [Varicose Veins Of Lower Extremities] : not present [] : not present [Abdomen Masses] : No abdominal masses [Skin Ulcer] : no ulcer [Alert] : alert [Oriented to Person] : oriented to person [Oriented to Place] : oriented to place

## 2021-03-12 NOTE — ASSESSMENT
[FreeTextEntry1] : Patient with severe swelling of right lower extremity.  No evidence of right lower extremity DVT.  No evidence of IVC thrombus based on MRI which was done in December and the patient has had similar swelling at the time of the MRI.  Suspect vasculitis versus lymphedema.  Recommend compression stockings and elevation.  No surgical intervention for the right lower extremity swelling is indicated.

## 2021-03-14 NOTE — DISCUSSION/SUMMARY
[FreeTextEntry1] : RLE  edema:  have referred for LE venous doppler and vascular evaluation today (see note:  no DVT)\par \par Chronic hypercapnic respiratory failure:  remains on BIPAP, encourage us\par \par asthma\par \par Remains on bronchodilator regimen\par and montelukast\par \par PLAN\par \par will add cough suppressant/ expectorant\par \par encouraged to follow with Urology regarding renal lesion\par \par \par \par \par

## 2021-03-14 NOTE — HISTORY OF PRESENT ILLNESS
[TextBox_4] : 55 yr old woman with asthma and NAKUL. She underwent surgery for rhizotomy for trig neuralgia.  \par \par In the hospital she was found to have chronic hypercapnic respiratory failure with ABG 7.34 PCO2 63, PO2 99, due to obesity and restrictive lung disease and kyphosis.  She is on NIV  BIPAP 20/16.\par \par She is using it less due to cough. \par \par She is also using Symbicort and albuterol via nebulizer.\par \par She has had asthma since age [age]. She has few allergies (dust dust mites, flowers) not severe.\par Asthma is worse when allergies are worse. \par She has hypoxia with exertion.\par \par \par She also has a 4 mm lung nodule seen on prior CT chest. \par \par \par She has renal cell carcinoma and has not had removal due to COVID crisis. She requires follow up. \par \par She has had LE edema R>L.\par \par PMH:\par She has hyperlipidemia, elevated liver enzymes, DM, hypothyroid depression hypertension sleep apnea not using CPAP. fibromyalgia\par \par PSH\par c sect\par right ovary\par \par lysis of adhesion\par ERMELINDA/BSO\par removal of left adrenal gland due to nodule, benign\par \par Temporal artery biopsy\par \par \par

## 2021-03-25 ENCOUNTER — RX RENEWAL (OUTPATIENT)
Age: 56
End: 2021-03-25

## 2021-04-02 ENCOUNTER — APPOINTMENT (OUTPATIENT)
Dept: DERMATOLOGY | Facility: CLINIC | Age: 56
End: 2021-04-02
Payer: MEDICARE

## 2021-04-02 PROCEDURE — 99214 OFFICE O/P EST MOD 30 MIN: CPT

## 2021-04-02 PROCEDURE — 99072 ADDL SUPL MATRL&STAF TM PHE: CPT

## 2021-04-02 RX ORDER — CLOBETASOL PROPIONATE 0.5 MG/G
0.05 CREAM TOPICAL 3 TIMES DAILY
Qty: 2 | Refills: 5 | Status: DISCONTINUED | COMMUNITY
Start: 2019-03-27 | End: 2021-04-02

## 2021-04-02 RX ORDER — DOXYCYCLINE HYCLATE 100 MG/1
100 CAPSULE ORAL
Qty: 28 | Refills: 0 | Status: DISCONTINUED | COMMUNITY
Start: 2021-03-07 | End: 2021-04-02

## 2021-04-09 ENCOUNTER — NON-APPOINTMENT (OUTPATIENT)
Age: 56
End: 2021-04-09

## 2021-04-14 ENCOUNTER — APPOINTMENT (OUTPATIENT)
Dept: CHRONIC DISEASE MANAGEMENT | Facility: CLINIC | Age: 56
End: 2021-04-14

## 2021-04-14 ENCOUNTER — NON-APPOINTMENT (OUTPATIENT)
Age: 56
End: 2021-04-14

## 2021-04-14 VITALS — WEIGHT: 293 LBS | HEIGHT: 61 IN | BODY MASS INDEX: 55.32 KG/M2

## 2021-04-20 ENCOUNTER — APPOINTMENT (OUTPATIENT)
Dept: RHEUMATOLOGY | Facility: CLINIC | Age: 56
End: 2021-04-20
Payer: MEDICARE

## 2021-04-20 ENCOUNTER — APPOINTMENT (OUTPATIENT)
Dept: ENDOCRINOLOGY | Facility: CLINIC | Age: 56
End: 2021-04-20
Payer: MEDICARE

## 2021-04-20 ENCOUNTER — RESULT CHARGE (OUTPATIENT)
Age: 56
End: 2021-04-20

## 2021-04-20 VITALS
DIASTOLIC BLOOD PRESSURE: 89 MMHG | HEART RATE: 106 BPM | SYSTOLIC BLOOD PRESSURE: 143 MMHG | RESPIRATION RATE: 16 BRPM | OXYGEN SATURATION: 94 % | WEIGHT: 283 LBS | TEMPERATURE: 97.3 F | HEIGHT: 61 IN | BODY MASS INDEX: 53.43 KG/M2

## 2021-04-20 VITALS
BODY MASS INDEX: 53.43 KG/M2 | OXYGEN SATURATION: 94 % | TEMPERATURE: 97.3 F | WEIGHT: 283 LBS | HEART RATE: 106 BPM | SYSTOLIC BLOOD PRESSURE: 143 MMHG | HEIGHT: 61 IN | RESPIRATION RATE: 16 BRPM | DIASTOLIC BLOOD PRESSURE: 89 MMHG

## 2021-04-20 DIAGNOSIS — Z98.890 OTHER SPECIFIED POSTPROCEDURAL STATES: ICD-10-CM

## 2021-04-20 LAB
GLUCOSE BLDC GLUCOMTR-MCNC: 297
HBA1C MFR BLD HPLC: 11.4

## 2021-04-20 PROCEDURE — 99072 ADDL SUPL MATRL&STAF TM PHE: CPT

## 2021-04-20 PROCEDURE — 99214 OFFICE O/P EST MOD 30 MIN: CPT | Mod: 25

## 2021-04-20 PROCEDURE — 20610 DRAIN/INJ JOINT/BURSA W/O US: CPT | Mod: LT

## 2021-04-20 PROCEDURE — 99213 OFFICE O/P EST LOW 20 MIN: CPT | Mod: 25

## 2021-04-20 PROCEDURE — 82962 GLUCOSE BLOOD TEST: CPT

## 2021-04-20 PROCEDURE — 83036 HEMOGLOBIN GLYCOSYLATED A1C: CPT | Mod: QW

## 2021-04-20 RX ORDER — METHYLPRED ACET/NACL,ISO-OS/PF 40 MG/ML
40 VIAL (ML) INJECTION
Qty: 1 | Refills: 0 | Status: COMPLETED | OUTPATIENT
Start: 2021-04-20

## 2021-04-20 RX ORDER — LIDOCAINE HYDROCHLORIDE 10 MG/ML
1 INJECTION, SOLUTION INFILTRATION; PERINEURAL
Qty: 0 | Refills: 0 | Status: COMPLETED | OUTPATIENT
Start: 2021-04-20

## 2021-04-20 RX ADMIN — LIDOCAINE HYDROCHLORIDE 0 %: 10 INJECTION, SOLUTION INFILTRATION; PERINEURAL at 00:00

## 2021-04-20 RX ADMIN — METHYLPREDNISOLONE ACETATE 0 MG/ML: 40 INJECTION, SUSPENSION INTRA-ARTICULAR; INTRALESIONAL; INTRAMUSCULAR; SOFT TISSUE at 00:00

## 2021-04-20 NOTE — HISTORY OF PRESENT ILLNESS
[FreeTextEntry1] : The patient is s/p  Adrenalectomy for Cushing's Syndrome. Patient feels well , she is asymptomatic. Her weight has decreased. She is exercising regularly and following the diet. Her blood glucose at home are not well controlled, they are usually around 160 to 200 mg/dl. The FBS in the laboratory was 232 mg/dl and the HbA1c was 12 %. The renal function is within normal limits. The lipid panel was abnormal. She denies low blood glucose during the night. She has on and off chest pains, or SOB. Denies numbness, tingling or burning sensation on her extremities. Taking her medications regularly. She has not seen the Ophthalmologist recently. She has not seen Podiatrist recently. She has not seen the Cardiologist recently. She needs surgery for renal cell carcinoma. She had an adrenal adenoma producing Cushing's Disease removed in 2006.\par

## 2021-04-20 NOTE — PHYSICAL EXAM
[Alert] : alert [No Acute Distress] : no acute distress [No Neck Mass] : no neck mass was observed [Thyroid Not Enlarged] : the thyroid was not enlarged [No Respiratory Distress] : no respiratory distress [Clear to Auscultation] : lungs were clear to auscultation bilaterally [Normal PMI] : the apical impulse was normal [Normal Rate] : heart rate was normal [Obese] : obese

## 2021-04-20 NOTE — ASSESSMENT
[FreeTextEntry1] : The diabetes is poorly controlled\par She uses steroid on and off for bronchial asthma\par Will increase the Ozempic dose\par Will add Humalog TID\par She needs surgery on 6/21 for renal cell carcinoma\par Given glucose tablets\par She is clinically euthyroid\par The last US thyroid was unremarkable 6/19\par Patient will come back in 4 weeks

## 2021-04-21 LAB
BASOPHILS # BLD AUTO: 0.06 K/UL
BASOPHILS NFR BLD AUTO: 0.7 %
CRP SERPL-MCNC: 31 MG/L
EOSINOPHIL # BLD AUTO: 0.29 K/UL
EOSINOPHIL NFR BLD AUTO: 3.1 %
ERYTHROCYTE [SEDIMENTATION RATE] IN BLOOD BY WESTERGREN METHOD: 84 MM/HR
GGT SERPL-CCNC: 60 U/L
HAV IGM SER QL: NONREACTIVE
HBV CORE IGM SER QL: NONREACTIVE
HBV SURFACE AG SER QL: NONREACTIVE
HCT VFR BLD CALC: 47.1 %
HCV AB SER QL: NONREACTIVE
HCV S/CO RATIO: 0.1 S/CO
HGB BLD-MCNC: 13.4 G/DL
IMM GRANULOCYTES NFR BLD AUTO: 0.4 %
LYMPHOCYTES # BLD AUTO: 1.98 K/UL
LYMPHOCYTES NFR BLD AUTO: 21.5 %
MAN DIFF?: NORMAL
MCHC RBC-ENTMCNC: 23 PG
MCHC RBC-ENTMCNC: 28.5 GM/DL
MCV RBC AUTO: 80.9 FL
MONOCYTES # BLD AUTO: 0.54 K/UL
MONOCYTES NFR BLD AUTO: 5.9 %
NEUTROPHILS # BLD AUTO: 6.3 K/UL
NEUTROPHILS NFR BLD AUTO: 68.4 %
PLATELET # BLD AUTO: 367 K/UL
RBC # BLD: 5.82 M/UL
RBC # FLD: 16.7 %
WBC # FLD AUTO: 9.21 K/UL

## 2021-04-21 NOTE — ASSESSMENT
[FreeTextEntry1] : Patient with fibromyalgia, IgA vasculitis resolved, psoriasis,LT  RCC:\par \par Psoriasis development may be multifactorial , namely stemming from renal malignancy as well as metabolic syndromes including uncontrolled DM.  Discussed the importance of dietary modification and medication compliance.  Patient should avoid biologics in the setting of RCC, rec Otezla , an immunomodulator , for psoriasis presentation, samples given today.  \par Physical therapy to continue with mobility and muscle strengthening of the rotator cuff; cortisone injection given to the L bursa.    Core strengthening exercises demonstrated for the lower back.  Quadriceps strengthening exercises encouraged in the office.  Weight loss has been encouraged to reduce load over the medial joint line.  Viscosupplementation has been encouraged to provide additional lubrication and joint support. \par The importance of dietary and lifestyle modifications was reiterated for the treatment of Fibromyalgia along with discussions on relaxation, stress-reducing techniques and importance of good sleep hygiene.\par \par She is in agreement with the above plan and will return in three months' time.\par \par \par

## 2021-04-21 NOTE — PROCEDURE
[Other Date:___] : Date: [unfilled] [Patient] : the patient [Risks] : risks [Benefits] : benefits [Therapeutic] : therapeutic [#1 Site: ______] : #1 site identified in the [unfilled] [Betadine] : betadine solution [25 gauge 1.5 inch] : A 25 gauge 1.5 inch needle was used [___ml 1% Lidocaine] : [unfilled] ml of 1% lidocaine [Depomedrol ___ mg] : Depomedrol [unfilled] mg [Tolerated Well] : the patient tolerated the procedure well [No Complications] : there were no complications [Patient Instructed to Call] : patient was instructed to call if redness at site, a decrease in range of motion or an increase in pain is noted after procedure.

## 2021-04-21 NOTE — PHYSICAL EXAM
[General Appearance - Alert] : alert [General Appearance - In No Acute Distress] : in no acute distress [Sclera] : the sclera and conjunctiva were normal [Examination Of The Oral Cavity] : the lips and gums were normal [Oropharynx] : the oropharynx was normal [Neck Appearance] : the appearance of the neck was normal [] : the neck was supple [Exaggerated Use Of Accessory Muscles For Inspiration] : no accessory muscle use [Heart Rate And Rhythm] : heart rate was normal and rhythm regular [Edema] : there was no peripheral edema [Abdomen Soft] : soft [No Spinal Tenderness] : no spinal tenderness [Motor Exam] : the motor exam was normal [Oriented To Time, Place, And Person] : oriented to person, place, and time [Impaired Insight] : insight and judgment were intact [FreeTextEntry1] : No active synovitis with restriction of L shoulder abduction to 120', passive 120' with pain upon internal rotation ,  tender upon neck rotation b/l with point tenderness over the right trapezius.  Moderate tenderness over the left medial joint line with minimal swelling.  Right knee without tenderness,  b/l with mild pes anserine bursa tenderness, appropriate external rotation of the hips.

## 2021-04-21 NOTE — HISTORY OF PRESENT ILLNESS
[FreeTextEntry1] : Patient returns after long hiatus explains increased difficulty with raising  left arm overhead and reaching for mid back . She denies accompanied trauma or paresthesias. She explains diffuse arthralgias and eruption of psoriasiform rash over LE, UE and back.  LCV improved with topicals.  Hba1c: 12.0%.  Patient awaits surgical intervention for LT RCC.  Patient denies systemic symptoms.   \par Patient developed skin rash spring of  2019,   diagnosed as leukocytoclastic vasculitis. Ig A, biopsy proven, having been preceded by GI infection with subsequent urinalysis showing hematuria / proteinuria. Renal protection with ACE-I and regular f/u rec by nephrology colleagues in the setting of 4.1cm left kidney lesion / RCC.  Patient also reports ongoing cough , dry for the last year.   She otherwise denies motor/sensory disturbances or systemic symptoms.

## 2021-04-22 LAB
M TB IFN-G BLD-IMP: NEGATIVE
QUANTIFERON TB PLUS MITOGEN MINUS NIL: 7.59 IU/ML
QUANTIFERON TB PLUS NIL: 0.01 IU/ML
QUANTIFERON TB PLUS TB1 MINUS NIL: 0.01 IU/ML
QUANTIFERON TB PLUS TB2 MINUS NIL: 0.02 IU/ML

## 2021-04-24 ENCOUNTER — APPOINTMENT (OUTPATIENT)
Dept: INTERNAL MEDICINE | Facility: CLINIC | Age: 56
End: 2021-04-24
Payer: MEDICARE

## 2021-04-24 VITALS
RESPIRATION RATE: 19 BRPM | BODY MASS INDEX: 53.43 KG/M2 | HEART RATE: 119 BPM | SYSTOLIC BLOOD PRESSURE: 134 MMHG | DIASTOLIC BLOOD PRESSURE: 87 MMHG | WEIGHT: 283 LBS | TEMPERATURE: 97.1 F | HEIGHT: 61 IN | OXYGEN SATURATION: 90 %

## 2021-04-24 DIAGNOSIS — F32.9 MAJOR DEPRESSIVE DISORDER, SINGLE EPISODE, UNSPECIFIED: ICD-10-CM

## 2021-04-24 PROCEDURE — 99214 OFFICE O/P EST MOD 30 MIN: CPT

## 2021-04-24 PROCEDURE — 99072 ADDL SUPL MATRL&STAF TM PHE: CPT

## 2021-04-24 NOTE — ASSESSMENT
[FreeTextEntry1] : 55 year old female found to have stable Hypertension, Hypothyroidism, Type 2 Diabetes Mellitus, Morbid Obesity, Asthma, Renal Cell Carcinoma, Fibromyalgia, Diabetic Neuropathy, Depression, Hyperlipidemia, Hypothyroidism, with the current prescription regimen as recommended, diet and life style modifications, as counseled. Prior results reviewed, interpreted and discussed with the patient during today's examination, as appropriate. Follow up, treatment plan and tests, as ordered.\par \par Total time spent : 30 minutes\par Including:\par Preparation prior to visit - Reviewing prior record, results of tests and Consultation Reports as applicable\par Conducting an appropriate H & P during today's encounter\par Appropriate orders for tests, medications and procedures, as applicable\par Counseling patient \par Note completion\par

## 2021-04-24 NOTE — HISTORY OF PRESENT ILLNESS
[de-identified] : 55 year old  female patient with history of stable Hypertension, Hypothyroidism, Type 2 Diabetes Mellitus, Reactive Airway Disease, Morbid Obesity, Asthma, Renal Cell Carcinoma, Diabetic Neuropathy, history as stated, presented for follow up examination. Patient is compliant with all medications. Denies shortness of breath, chest pain or abdominal pains at this time. ROS as stated.\par

## 2021-04-24 NOTE — HEALTH RISK ASSESSMENT
[Intercurrent hospitalizations] : was admitted to the hospital  [No] : In the past 12 months have you used drugs other than those required for medical reasons? No [No falls in past year] : Patient reported no falls in the past year [0] : 2) Feeling down, depressed, or hopeless: Not at all (0) [] : No [de-identified] : As documented in a message section. [de-identified] : ENDO/RHEUM/DERM [[id number]] : 0 [FreeTextEntry1] : Non-suicidal at this time.

## 2021-04-30 ENCOUNTER — APPOINTMENT (OUTPATIENT)
Dept: PULMONOLOGY | Facility: CLINIC | Age: 56
End: 2021-04-30

## 2021-05-03 ENCOUNTER — NON-APPOINTMENT (OUTPATIENT)
Age: 56
End: 2021-05-03

## 2021-05-03 DIAGNOSIS — R91.1 SOLITARY PULMONARY NODULE: ICD-10-CM

## 2021-05-04 ENCOUNTER — APPOINTMENT (OUTPATIENT)
Dept: GASTROENTEROLOGY | Facility: CLINIC | Age: 56
End: 2021-05-04
Payer: MEDICARE

## 2021-05-04 VITALS
SYSTOLIC BLOOD PRESSURE: 153 MMHG | BODY MASS INDEX: 53.43 KG/M2 | HEART RATE: 108 BPM | WEIGHT: 283 LBS | TEMPERATURE: 97.2 F | OXYGEN SATURATION: 93 % | DIASTOLIC BLOOD PRESSURE: 81 MMHG | HEIGHT: 61 IN

## 2021-05-04 PROCEDURE — 99203 OFFICE O/P NEW LOW 30 MIN: CPT

## 2021-05-04 PROCEDURE — 99072 ADDL SUPL MATRL&STAF TM PHE: CPT

## 2021-05-04 RX ORDER — POLYETHYLENE GLYCOL 3350 AND ELECTROLYTES WITH LEMON FLAVOR 236; 22.74; 6.74; 5.86; 2.97 G/4L; G/4L; G/4L; G/4L; G/4L
236 POWDER, FOR SOLUTION ORAL
Qty: 1 | Refills: 0 | Status: ACTIVE | COMMUNITY
Start: 2021-05-04 | End: 1900-01-01

## 2021-05-04 NOTE — ASSESSMENT
[FreeTextEntry1] : Diarrhea , renal cell Ca and psoriasis\par \par Colon with Biopsies\par GI PCR , c diff\par See Dr Smith ASAP

## 2021-05-04 NOTE — PHYSICAL EXAM
[General Appearance - Alert] : alert [General Appearance - In No Acute Distress] : in no acute distress [Sclera] : the sclera and conjunctiva were normal [PERRL With Normal Accommodation] : pupils were equal in size, round, and reactive to light [Extraocular Movements] : extraocular movements were intact [Outer Ear] : the ears and nose were normal in appearance [Oropharynx] : the oropharynx was normal [Neck Appearance] : the appearance of the neck was normal [Neck Cervical Mass (___cm)] : no neck mass was observed [Thyroid Diffuse Enlargement] : the thyroid was not enlarged [Jugular Venous Distention Increased] : there was no jugular-venous distention [Thyroid Nodule] : there were no palpable thyroid nodules [] : no respiratory distress [Auscultation Breath Sounds / Voice Sounds] : lungs were clear to auscultation bilaterally [FreeTextEntry1] : A female chaperone was present during my exam. Obese

## 2021-05-04 NOTE — HISTORY OF PRESENT ILLNESS
[de-identified] : 54 yo with occ diarrhea after eating. No prior colonoscopy. No family h/o colon cancer. Has DM,HTN, HLD, and psoriasis..On Otezla, metformin and ozempic. Has renal cell Ca and seeing Dr Smith and has known about this for quite sometime.Has 3.8 cm lesion on Kidney. Seems delayed in txting due to other med issues. BMI > 50. Has trig neuralgia, fibromyalgiaand Renal cell Ca. Has anaphlaxis to contrast.

## 2021-05-07 ENCOUNTER — APPOINTMENT (OUTPATIENT)
Dept: NEUROSURGERY | Facility: CLINIC | Age: 56
End: 2021-05-07
Payer: MEDICARE

## 2021-05-07 VITALS
BODY MASS INDEX: 53.43 KG/M2 | DIASTOLIC BLOOD PRESSURE: 79 MMHG | HEART RATE: 108 BPM | HEIGHT: 61 IN | TEMPERATURE: 98.1 F | RESPIRATION RATE: 17 BRPM | SYSTOLIC BLOOD PRESSURE: 136 MMHG | WEIGHT: 283 LBS | OXYGEN SATURATION: 97 %

## 2021-05-07 PROCEDURE — 99215 OFFICE O/P EST HI 40 MIN: CPT

## 2021-05-07 PROCEDURE — 99072 ADDL SUPL MATRL&STAF TM PHE: CPT

## 2021-05-10 NOTE — PHYSICAL EXAM
[General Appearance - Alert] : alert [General Appearance - In No Acute Distress] : in no acute distress [Person] : oriented to person [Place] : oriented to place [Time] : oriented to time [Motor Strength] : muscle strength was normal in all four extremities [Involuntary Movements] : no involuntary movements were seen

## 2021-05-11 ENCOUNTER — NON-APPOINTMENT (OUTPATIENT)
Age: 56
End: 2021-05-11

## 2021-05-12 ENCOUNTER — NON-APPOINTMENT (OUTPATIENT)
Age: 56
End: 2021-05-12

## 2021-05-12 VITALS — HEIGHT: 61 IN | BODY MASS INDEX: 53.43 KG/M2 | WEIGHT: 283 LBS

## 2021-05-13 NOTE — REASON FOR VISIT
[FreeTextEntry1] : This 55-year-old woman suffers from a type 1 trigeminal neuralgia for over 10 years with a prior treatment in 2013 by percutaneous treatment with temporary effect. She is on two medication with high-dose and refractory to pain. Her pain is in the distribution of the three branches of the trigeminal nerve, but mainly V2 and V3. She would like to proceed with microvascular decompression and does not want any other options including any other percutaneous procedure or gamma knife radiosurgery. We discussed that this option is the best option considering her young age. She is morbidly obese with BMI of 58 and that could potentially be a complication for perioperative period. \par \par On 7/23/20 she underwent a right retrosigmoid craniotomy and microvascular decompression of the right trigeminal nerve..\par \par Today she presents for her second post op visit. She reports that all facial pain is gone since surgery. She reports occasional headaches and itching at surgical incision. Headaches are 4-5/10 not associated with other symptoms and relieved with Tylenol prn. She reports occasional dizziness. She ambulates with cane assist and denies falls. Right retrosigmoid incision is well healed\par \par She presents today for follow up. States has had one recent episode flare up 4/7/2021 which lasted a few days and then went away.  She remains on oxcarbazpeine at a lower dose compared to pre op. Discussed gradually decrease oxcarbazepine  under the guidance of her neurologist.\par \par

## 2021-05-14 ENCOUNTER — APPOINTMENT (OUTPATIENT)
Dept: DISASTER EMERGENCY | Facility: CLINIC | Age: 56
End: 2021-05-14

## 2021-05-14 ENCOUNTER — APPOINTMENT (OUTPATIENT)
Dept: DERMATOLOGY | Facility: CLINIC | Age: 56
End: 2021-05-14

## 2021-05-21 ENCOUNTER — OUTPATIENT (OUTPATIENT)
Dept: OUTPATIENT SERVICES | Facility: HOSPITAL | Age: 56
LOS: 1 days | End: 2021-05-21
Payer: MEDICARE

## 2021-05-21 ENCOUNTER — APPOINTMENT (OUTPATIENT)
Dept: CT IMAGING | Facility: HOSPITAL | Age: 56
End: 2021-05-21
Payer: MEDICARE

## 2021-05-21 DIAGNOSIS — N89.8 OTHER SPECIFIED NONINFLAMMATORY DISORDERS OF VAGINA: Chronic | ICD-10-CM

## 2021-05-21 DIAGNOSIS — Z98.89 OTHER SPECIFIED POSTPROCEDURAL STATES: Chronic | ICD-10-CM

## 2021-05-21 DIAGNOSIS — R91.1 SOLITARY PULMONARY NODULE: ICD-10-CM

## 2021-05-21 DIAGNOSIS — Z90.710 ACQUIRED ABSENCE OF BOTH CERVIX AND UTERUS: Chronic | ICD-10-CM

## 2021-05-21 DIAGNOSIS — E27.8 OTHER SPECIFIED DISORDERS OF ADRENAL GLAND: Chronic | ICD-10-CM

## 2021-05-21 DIAGNOSIS — I77.6 ARTERITIS, UNSPECIFIED: Chronic | ICD-10-CM

## 2021-05-21 PROCEDURE — 71250 CT THORAX DX C-: CPT | Mod: 26

## 2021-05-21 PROCEDURE — 71250 CT THORAX DX C-: CPT

## 2021-05-24 ENCOUNTER — NON-APPOINTMENT (OUTPATIENT)
Age: 56
End: 2021-05-24

## 2021-06-01 ENCOUNTER — APPOINTMENT (OUTPATIENT)
Dept: ENDOCRINOLOGY | Facility: CLINIC | Age: 56
End: 2021-06-01
Payer: MEDICARE

## 2021-06-01 LAB
ALBUMIN SERPL ELPH-MCNC: 4 G/DL
ALP BLD-CCNC: 127 U/L
ALT SERPL-CCNC: 65 U/L
ANION GAP SERPL CALC-SCNC: 16 MMOL/L
AST SERPL-CCNC: 52 U/L
BASOPHILS # BLD AUTO: 0.04 K/UL
BASOPHILS NFR BLD AUTO: 0.5 %
BILIRUB SERPL-MCNC: 0.3 MG/DL
BUN SERPL-MCNC: 9 MG/DL
CALCIUM SERPL-MCNC: 9.5 MG/DL
CHLORIDE SERPL-SCNC: 97 MMOL/L
CHOLEST SERPL-MCNC: 245 MG/DL
CO2 SERPL-SCNC: 29 MMOL/L
CREAT SERPL-MCNC: 0.65 MG/DL
EOSINOPHIL # BLD AUTO: 0.25 K/UL
EOSINOPHIL NFR BLD AUTO: 2.9 %
ESTIMATED AVERAGE GLUCOSE: 275 MG/DL
GGT SERPL-CCNC: 63 U/L
GLUCOSE SERPL-MCNC: 166 MG/DL
HBA1C MFR BLD HPLC: 11.2 %
HCT VFR BLD CALC: 43.3 %
HDLC SERPL-MCNC: 41 MG/DL
HGB BLD-MCNC: 12.8 G/DL
IMM GRANULOCYTES NFR BLD AUTO: 1.2 %
LDLC SERPL CALC-MCNC: 175 MG/DL
LYMPHOCYTES # BLD AUTO: 1.88 K/UL
LYMPHOCYTES NFR BLD AUTO: 21.8 %
MAN DIFF?: NORMAL
MCHC RBC-ENTMCNC: 23.3 PG
MCHC RBC-ENTMCNC: 29.6 GM/DL
MCV RBC AUTO: 78.9 FL
MONOCYTES # BLD AUTO: 0.53 K/UL
MONOCYTES NFR BLD AUTO: 6.2 %
NEUTROPHILS # BLD AUTO: 5.81 K/UL
NEUTROPHILS NFR BLD AUTO: 67.4 %
NONHDLC SERPL-MCNC: 204 MG/DL
PLATELET # BLD AUTO: 345 K/UL
POTASSIUM SERPL-SCNC: 4.6 MMOL/L
PROT SERPL-MCNC: 7.1 G/DL
RBC # BLD: 5.49 M/UL
RBC # FLD: 16.7 %
SODIUM SERPL-SCNC: 141 MMOL/L
T3 SERPL-MCNC: 115 NG/DL
T4 FREE SERPL-MCNC: 1.4 NG/DL
TRIGL SERPL-MCNC: 147 MG/DL
TSH SERPL-ACNC: 2.14 UIU/ML
WBC # FLD AUTO: 8.61 K/UL

## 2021-06-01 PROCEDURE — 99443: CPT

## 2021-06-01 NOTE — PHYSICAL EXAM
[Alert] : alert [No Acute Distress] : no acute distress [No Neck Mass] : no neck mass was observed [No Respiratory Distress] : no respiratory distress [Clear to Auscultation] : lungs were clear to auscultation bilaterally [Normal PMI] : the apical impulse was normal [Normal Rate] : heart rate was normal

## 2021-06-01 NOTE — HISTORY OF PRESENT ILLNESS
[Home] : at home, [unfilled] , at the time of the visit. [Medical Office: ([facility])___] : at the medical office located in  [Verbal consent obtained from patient] : the patient, [unfilled] [FreeTextEntry1] : Patient is still having respiratory problems. She says that her FBS have improved around to 150 mg/dl. The Ozempic and Trulicity injections are too expensive to use them. She does not exercise regularly or follows the diet

## 2021-06-01 NOTE — ASSESSMENT
[FreeTextEntry1] : The diabetes still poorly controlled\par Will increase the Novolog to 5 to 10 units according to scale\par Will add Januvia 50 mg po QD if the insurance pays for it\par Patient will call me next week for follow up\par She has been scheduled for surgery at the end of the month\par The HbA1c is still elevated

## 2021-06-01 NOTE — DATA REVIEWED
[FreeTextEntry1] : The FBS was elevated and the HbA1c was also elevated. The thyroid tests were normal. She has not have a chance to do the US thyroid.

## 2021-06-02 RX ORDER — SEMAGLUTIDE 1.34 MG/ML
2 INJECTION, SOLUTION SUBCUTANEOUS
Qty: 12 | Refills: 3 | Status: COMPLETED | COMMUNITY
Start: 2020-08-25 | End: 2021-06-02

## 2021-06-03 RX ORDER — PANTOPRAZOLE 40 MG/1
40 TABLET, DELAYED RELEASE ORAL DAILY
Qty: 90 | Refills: 3 | Status: DISCONTINUED | COMMUNITY
Start: 2019-06-12 | End: 2021-06-03

## 2021-06-07 ENCOUNTER — OUTPATIENT (OUTPATIENT)
Dept: OUTPATIENT SERVICES | Facility: HOSPITAL | Age: 56
LOS: 1 days | End: 2021-06-07
Payer: MEDICARE

## 2021-06-07 ENCOUNTER — APPOINTMENT (OUTPATIENT)
Dept: ULTRASOUND IMAGING | Facility: HOSPITAL | Age: 56
End: 2021-06-07
Payer: MEDICARE

## 2021-06-07 DIAGNOSIS — N28.89 OTHER SPECIFIED DISORDERS OF KIDNEY AND URETER: ICD-10-CM

## 2021-06-07 DIAGNOSIS — Z90.710 ACQUIRED ABSENCE OF BOTH CERVIX AND UTERUS: Chronic | ICD-10-CM

## 2021-06-07 DIAGNOSIS — E27.8 OTHER SPECIFIED DISORDERS OF ADRENAL GLAND: Chronic | ICD-10-CM

## 2021-06-07 DIAGNOSIS — Z98.89 OTHER SPECIFIED POSTPROCEDURAL STATES: Chronic | ICD-10-CM

## 2021-06-07 DIAGNOSIS — I77.6 ARTERITIS, UNSPECIFIED: Chronic | ICD-10-CM

## 2021-06-07 DIAGNOSIS — N89.8 OTHER SPECIFIED NONINFLAMMATORY DISORDERS OF VAGINA: Chronic | ICD-10-CM

## 2021-06-07 PROCEDURE — 76775 US EXAM ABDO BACK WALL LIM: CPT | Mod: 26

## 2021-06-07 PROCEDURE — 76775 US EXAM ABDO BACK WALL LIM: CPT

## 2021-06-10 RX ORDER — SITAGLIPTIN 50 MG/1
50 TABLET, FILM COATED ORAL DAILY
Qty: 90 | Refills: 3 | Status: COMPLETED | COMMUNITY
Start: 2021-06-01 | End: 2021-06-10

## 2021-06-11 ENCOUNTER — TRANSCRIPTION ENCOUNTER (OUTPATIENT)
Age: 56
End: 2021-06-11

## 2021-06-11 ENCOUNTER — APPOINTMENT (OUTPATIENT)
Dept: DISASTER EMERGENCY | Facility: CLINIC | Age: 56
End: 2021-06-11

## 2021-06-11 ENCOUNTER — APPOINTMENT (OUTPATIENT)
Dept: ENDOCRINOLOGY | Facility: CLINIC | Age: 56
End: 2021-06-11
Payer: MEDICARE

## 2021-06-11 PROCEDURE — 99443: CPT

## 2021-06-11 RX ORDER — INSULIN LISPRO 100 [IU]/ML
100 INJECTION, SOLUTION INTRAVENOUS; SUBCUTANEOUS
Qty: 8 | Refills: 3 | Status: COMPLETED | COMMUNITY
Start: 2021-04-20 | End: 2021-06-11

## 2021-06-11 NOTE — HISTORY OF PRESENT ILLNESS
[Home] : at home, [unfilled] , at the time of the visit. [Verbal consent obtained from patient] : the patient, [unfilled] [Medical Office: ([facility])___] : at the medical office located in  [FreeTextEntry1] : The patient feels well, she is not gaining weight, her FBS are now around 140 to 160 mg/dl. In the laboratory the FBS has improved and the HbA1c is only slightly better. She is still scheduled for renal cell carcinoma surgery. She is doing the best she can in order to lose weight, she is more active and trying to follow the diet.

## 2021-06-11 NOTE — DATA REVIEWED
[FreeTextEntry1] : The FBS is improved, the HbA1c is elevated. The renal function is fine. The TSH and Free t4 are normal.

## 2021-06-11 NOTE — ASSESSMENT
[FreeTextEntry1] : The diabetes still uncontrolled but improved\par The FBS are improved\par Advised to raise the dose of the Novolog before meals\par The oral medications and the GLP-1 agonist are to expensive**\par She will try harder with the diet and exercise\par Will give her an appointment in 4 weeks

## 2021-06-11 NOTE — REASON FOR VISIT
[Follow - Up] : a follow-up visit [Adrenal Evaluation/Adrenal Disorder] : adrenal evaluation/adrenal disorder [DM Type 2] : DM Type 2 [Hypothyroidism] : hypothyroidism

## 2021-06-14 ENCOUNTER — NON-APPOINTMENT (OUTPATIENT)
Age: 56
End: 2021-06-14

## 2021-06-14 ENCOUNTER — APPOINTMENT (OUTPATIENT)
Dept: UROLOGY | Facility: CLINIC | Age: 56
End: 2021-06-14
Payer: MEDICARE

## 2021-06-14 PROCEDURE — 99214 OFFICE O/P EST MOD 30 MIN: CPT

## 2021-06-18 ENCOUNTER — APPOINTMENT (OUTPATIENT)
Dept: PULMONOLOGY | Facility: CLINIC | Age: 56
End: 2021-06-18

## 2021-06-19 NOTE — PHYSICAL EXAM

## 2021-06-19 NOTE — HISTORY OF PRESENT ILLNESS
[FreeTextEntry1] : 55 yo F with microhematuria\par Denies any dysuria or gross hematuria\par Does see Dr. Pena for mixed incontinence\par 1 yr of right flank pain, chronic, dull\par CT last Feb showed no stones\par Of note, had bad reaction to gadolinium in the past but has had CT angio with contrast before with no issues\par \par 11/21/19 Interval history: No issues since last visit\par Here to review CT urogram\par \par 2/13/20 Interval history: Since last visit, pt saw IR for possible perc ablation for her renal mass\par No flank pain, no urinary issues\par \par 1/14/21 Interval history: Pt was pending open partial nephrectomy but has been delayed due to Covid pandemic and then lost to follow-up\par Pt is s/p microvascular decompression for trigeminal neuralgia in July, 2020\par Postop complicated by hypoxia, hypercapnia with resp failure\par Since then pt states she has been sick due to lung issues - currently on supplemental O2 at home\par Always feels like there is an elephant on her chest\par Recently has been having chills, sore throat\par \par 6/14/21 Interval history: Still on supplemental O2 at home\par Pending cardiology workup\par No changes in symptoms - denies any flank pain or urinary issues

## 2021-06-19 NOTE — ASSESSMENT
[FreeTextEntry1] : 57 yo F with left renal mass\par \par - Reviewed recent renal US imaging. Noted to be a difficult study due to her body habitus but no obvious changes compared to her previous imaging\par - Had previously discussed pt with Dr. Peck and given stability of her imaging over the last 2 years as well as her significant comorbidities, the risks of an open partial or radical nephrectomy likely does not outweigh the benefit of surgery at this time. Would recommend active surveillance of her tumor at this time.\par - Discussed with pt the what active surveillance would entail, mainly routine imaging. Discussed importance of taking steps to improve her comorbidities so that in the future should the risks of active surveillance outweigh the benefits and pt will be optimized for surgical intervention. Pt states she has been considering bariatric surgery. Encouraged her that should she be a candidate for bariatric surgery, should proceed. \par - Repeat MRI in 3 months

## 2021-06-19 NOTE — REVIEW OF SYSTEMS
[Feeling Poorly] : feeling poorly [Feeling Tired] : feeling tired [Chest Pain] : chest pain [Shortness Of Breath] : shortness of breath [SOB on Exertion] : shortness of breath during exertion [see HPI] : see HPI [Difficulty Walking] : difficulty walking [Negative] : Heme/Lymph [Arthralgias] : arthralgias [Joint Pain] : joint pain [Feelings Of Weakness] : feelings of weakness

## 2021-06-25 ENCOUNTER — APPOINTMENT (OUTPATIENT)
Dept: ENDOCRINOLOGY | Facility: CLINIC | Age: 56
End: 2021-06-25
Payer: MEDICARE

## 2021-06-25 PROCEDURE — 99443: CPT

## 2021-06-25 NOTE — ASSESSMENT
[FreeTextEntry1] : The diabetic control has improved slightly\par The HbA1c has to improved significantly before the surgery is done\par Will try to get approval for Ozempic\par Will continue the same treatment in the meantime

## 2021-06-25 NOTE — HISTORY OF PRESENT ILLNESS
[Home] : at home, [unfilled] , at the time of the visit. [Medical Office: ([facility])___] : at the medical office located in  [Verbal consent obtained from patient] : the patient, [unfilled] [FreeTextEntry1] : Patient is still under stress, she needs surgery for a renal mass. Apparently the kidney doctor do not want to do surgery at present time because of the possible complications. She has also severe Psoriasis taking different medications. The FBS and HbA1c have improved slightly. Her insurance does not pay for Ozempic, her diabetes improved significantly with this medication. The nurse will call [employer] to see if the patient can be helped with the medication

## 2021-07-01 LAB
ANION GAP SERPL CALC-SCNC: 18 MMOL/L
BUN SERPL-MCNC: 12 MG/DL
CALCIUM SERPL-MCNC: 9.5 MG/DL
CHLORIDE SERPL-SCNC: 96 MMOL/L
CHOLEST SERPL-MCNC: 171 MG/DL
CO2 SERPL-SCNC: 27 MMOL/L
CREAT SERPL-MCNC: 0.65 MG/DL
CREAT SPEC-SCNC: 241 MG/DL
ESTIMATED AVERAGE GLUCOSE: 255 MG/DL
FRUCTOSAMINE SERPL-MCNC: 251 UMOL/L
GLUCOSE SERPL-MCNC: 155 MG/DL
HBA1C MFR BLD HPLC: 10.5 %
HDLC SERPL-MCNC: 34 MG/DL
LDLC SERPL CALC-MCNC: 107 MG/DL
MICROALBUMIN 24H UR DL<=1MG/L-MCNC: 1.4 MG/DL
MICROALBUMIN/CREAT 24H UR-RTO: 6 MG/G
NONHDLC SERPL-MCNC: 136 MG/DL
POTASSIUM SERPL-SCNC: 4.1 MMOL/L
SODIUM SERPL-SCNC: 141 MMOL/L
T4 FREE SERPL-MCNC: 1.2 NG/DL
TRIGL SERPL-MCNC: 144 MG/DL
TSH SERPL-ACNC: 2.98 UIU/ML

## 2021-07-01 RX ORDER — SITAGLIPTIN 50 MG/1
50 TABLET, FILM COATED ORAL DAILY
Qty: 90 | Refills: 3 | Status: COMPLETED | COMMUNITY
Start: 2021-06-02 | End: 2021-07-01

## 2021-07-16 ENCOUNTER — NON-APPOINTMENT (OUTPATIENT)
Age: 56
End: 2021-07-16

## 2021-07-16 ENCOUNTER — TRANSCRIPTION ENCOUNTER (OUTPATIENT)
Age: 56
End: 2021-07-16

## 2021-07-17 ENCOUNTER — APPOINTMENT (OUTPATIENT)
Dept: PULMONOLOGY | Facility: CLINIC | Age: 56
End: 2021-07-17
Payer: MEDICARE

## 2021-07-17 VITALS
OXYGEN SATURATION: 92 % | SYSTOLIC BLOOD PRESSURE: 132 MMHG | HEART RATE: 105 BPM | WEIGHT: 289 LBS | HEIGHT: 61 IN | RESPIRATION RATE: 17 BRPM | DIASTOLIC BLOOD PRESSURE: 72 MMHG | TEMPERATURE: 97.9 F | BODY MASS INDEX: 54.56 KG/M2

## 2021-07-17 PROCEDURE — 99213 OFFICE O/P EST LOW 20 MIN: CPT

## 2021-07-18 ENCOUNTER — APPOINTMENT (OUTPATIENT)
Dept: DISASTER EMERGENCY | Facility: CLINIC | Age: 56
End: 2021-07-18

## 2021-07-18 LAB — SARS-COV-2 N GENE NPH QL NAA+PROBE: NOT DETECTED

## 2021-07-18 NOTE — PHYSICAL EXAM
[III] : Mallampati Class: III [Not Tender] : not tender [2+ Pitting] : 2+ pitting [TextBox_44] : kyphosis, short neck limited mobility [TextBox_68] : no wheeze, diminished aeration, unlabored [TextBox_89] : obese, protuberant

## 2021-07-18 NOTE — DISCUSSION/SUMMARY
[FreeTextEntry1] : Dyspnea\par \par Obesity Hypoventilation/ OSAS, not using BIPAP\par \par LE edema\par \par PLAN\par Start on lasix 20 mg per day for 7 days and reassess\par \par may benefit from spironolactone\par \par she will call Felipe\par regarding CPAP recall, telephne number given\par continue inhaled bronchodilator and ICS LABA\par \par Guido Monterroso MD \par

## 2021-07-18 NOTE — REVIEW OF SYSTEMS
[Fever] : no fever [Nasal Congestion] : no nasal congestion [Cough] : cough [Dyspnea] : dyspnea [GERD] : no gerd

## 2021-07-18 NOTE — HISTORY OF PRESENT ILLNESS
[TextBox_4] : 55 yr old woman with asthma and NAKUL, chronic hypercapnic respiratory failure with ABG 7.34 PCO2 63, PO2 99, due to obesity and restrictive lung disease and kyphosis.  She is on NIV  BIPAP 20/16.\par \par She is also using Symbicort and albuterol via nebulizer, latter once per day.\par \par She has obtained AirPhysio PEP device and uses it with increased sputum  clearance.  Sputum is mostly clear.\par \par She uses saline nasal wash. She sometimes uses fluticasone nasal spray.  She uses Zyrtec as needed and montelukast every night.\par She uses benzonatate.\par \par \par She has not uses CPAP, especially since recall. \par \par \par She has had asthma since age [age]. She has few allergies (dust dust mites, flowers) not severe.\par Asthma is worse when allergies are worse. \par She has hypoxia with exertion.\par \par \par She also has a 4 mm lung nodule seen on prior CT chest. CT chest 5/2021 did not demonstrated any suspicious nodule. \par \par \par She has renal cell carcinoma and has not had removal due to COVID crisis. Lesion is being closely monitored per team\par \par She has had LE edema R>L.  She is on HCTZ\par \par She tripped and fell  recently injuring her ankle.\par \par PMH:\par She has hyperlipidemia, elevated liver enzymes, DM, hypothyroid depression hypertension sleep apnea not using CPAP. fibromyalgia\par \par PSH\par c sect\par right ovary\par \par lysis of adhesion\par ERMELINDA/BSO\par removal of left adrenal gland due to nodule, benign\par \par Temporal artery biopsy\par \par \par

## 2021-07-20 ENCOUNTER — APPOINTMENT (OUTPATIENT)
Dept: RHEUMATOLOGY | Facility: CLINIC | Age: 56
End: 2021-07-20
Payer: MEDICARE

## 2021-07-20 VITALS
WEIGHT: 289 LBS | TEMPERATURE: 97.7 F | DIASTOLIC BLOOD PRESSURE: 88 MMHG | OXYGEN SATURATION: 93 % | RESPIRATION RATE: 16 BRPM | HEART RATE: 101 BPM | BODY MASS INDEX: 54.56 KG/M2 | HEIGHT: 61 IN | SYSTOLIC BLOOD PRESSURE: 133 MMHG

## 2021-07-20 DIAGNOSIS — M17.0 BILATERAL PRIMARY OSTEOARTHRITIS OF KNEE: ICD-10-CM

## 2021-07-20 PROCEDURE — 99214 OFFICE O/P EST MOD 30 MIN: CPT | Mod: 25

## 2021-07-20 PROCEDURE — 20610 DRAIN/INJ JOINT/BURSA W/O US: CPT | Mod: RT

## 2021-07-20 RX ORDER — METHYLPRED ACET/NACL,ISO-OS/PF 40 MG/ML
40 VIAL (ML) INJECTION
Qty: 1 | Refills: 0 | Status: COMPLETED | OUTPATIENT
Start: 2021-07-20

## 2021-07-20 RX ORDER — LIDOCAINE HYDROCHLORIDE 10 MG/ML
1 INJECTION, SOLUTION INFILTRATION; PERINEURAL
Qty: 0 | Refills: 0 | Status: COMPLETED | OUTPATIENT
Start: 2021-07-20

## 2021-07-20 RX ADMIN — METHYLPREDNISOLONE ACETATE 0 MG/ML: 40 INJECTION, SUSPENSION INTRA-ARTICULAR; INTRALESIONAL; INTRAMUSCULAR; SOFT TISSUE at 00:00

## 2021-07-20 RX ADMIN — LIDOCAINE HYDROCHLORIDE 0 %: 10 INJECTION, SOLUTION INFILTRATION; PERINEURAL at 00:00

## 2021-07-21 ENCOUNTER — OUTPATIENT (OUTPATIENT)
Dept: OUTPATIENT SERVICES | Facility: HOSPITAL | Age: 56
LOS: 1 days | End: 2021-07-21
Payer: MEDICARE

## 2021-07-21 ENCOUNTER — RESULT REVIEW (OUTPATIENT)
Age: 56
End: 2021-07-21

## 2021-07-21 ENCOUNTER — APPOINTMENT (OUTPATIENT)
Dept: GASTROENTEROLOGY | Facility: HOSPITAL | Age: 56
End: 2021-07-21

## 2021-07-21 DIAGNOSIS — Z12.11 ENCOUNTER FOR SCREENING FOR MALIGNANT NEOPLASM OF COLON: ICD-10-CM

## 2021-07-21 DIAGNOSIS — Z98.89 OTHER SPECIFIED POSTPROCEDURAL STATES: Chronic | ICD-10-CM

## 2021-07-21 DIAGNOSIS — E27.8 OTHER SPECIFIED DISORDERS OF ADRENAL GLAND: Chronic | ICD-10-CM

## 2021-07-21 DIAGNOSIS — N89.8 OTHER SPECIFIED NONINFLAMMATORY DISORDERS OF VAGINA: Chronic | ICD-10-CM

## 2021-07-21 DIAGNOSIS — I77.6 ARTERITIS, UNSPECIFIED: Chronic | ICD-10-CM

## 2021-07-21 DIAGNOSIS — Z90.710 ACQUIRED ABSENCE OF BOTH CERVIX AND UTERUS: Chronic | ICD-10-CM

## 2021-07-21 LAB — GLUCOSE BLDC GLUCOMTR-MCNC: 187 MG/DL — HIGH (ref 70–99)

## 2021-07-21 PROCEDURE — 88305 TISSUE EXAM BY PATHOLOGIST: CPT

## 2021-07-21 PROCEDURE — 45385 COLONOSCOPY W/LESION REMOVAL: CPT | Mod: PT

## 2021-07-21 PROCEDURE — C1889: CPT

## 2021-07-21 PROCEDURE — 88305 TISSUE EXAM BY PATHOLOGIST: CPT | Mod: 26

## 2021-07-21 PROCEDURE — 45380 COLONOSCOPY AND BIOPSY: CPT

## 2021-07-21 PROCEDURE — 82962 GLUCOSE BLOOD TEST: CPT

## 2021-07-21 PROCEDURE — 45381 COLONOSCOPY SUBMUCOUS NJX: CPT | Mod: PT

## 2021-07-23 LAB — SURGICAL PATHOLOGY STUDY: SIGNIFICANT CHANGE UP

## 2021-08-04 NOTE — OCCUPATIONAL THERAPY INITIAL EVALUATION ADULT - BALANCE TRAINING, PT EVAL
GOAL: Pt will demonstrate improved static/dynamic balance to good in order to increase safety and independence in ADLs within 4 weeks Weight Units: pounds

## 2021-08-16 ENCOUNTER — APPOINTMENT (OUTPATIENT)
Dept: ENDOCRINOLOGY | Facility: CLINIC | Age: 56
End: 2021-08-16
Payer: MEDICARE

## 2021-08-16 PROCEDURE — 99443: CPT

## 2021-08-16 NOTE — ASSESSMENT
[FreeTextEntry1] : The diabetes at home seems to be better controlled\par No apparent weight gain\par Will order new blood test to be done in the next few days\par The order was placed in the computer\par Advised to see the Ophthalmologist and Podiatrist\par Patient will call me back for the results

## 2021-08-16 NOTE — HISTORY OF PRESENT ILLNESS
[Home] : at home, [unfilled] , at the time of the visit. [Medical Office: ([facility])___] : at the medical office located in  [Verbal consent obtained from patient] : the patient, [unfilled] [FreeTextEntry1] : Patient is doing better, she says she has not gained any weight. Her FBS have improved they are around 130 to 160 mg/dl. She is now trying to follow the diet and being more active. She says the CT scan of the abdomen will be repeated on September for the Renal carcinoma. No recent blood tests.

## 2021-08-18 ENCOUNTER — RX RENEWAL (OUTPATIENT)
Age: 56
End: 2021-08-18

## 2021-08-25 LAB
25(OH)D3 SERPL-MCNC: 36.8 NG/ML
ALBUMIN SERPL ELPH-MCNC: 4.1 G/DL
ALBUMIN SERPL ELPH-MCNC: 4.1 G/DL
ALP BLD-CCNC: 128 U/L
ALP BLD-CCNC: 130 U/L
ALT SERPL-CCNC: 39 U/L
ALT SERPL-CCNC: 43 U/L
ANION GAP SERPL CALC-SCNC: 13 MMOL/L
ANION GAP SERPL CALC-SCNC: 13 MMOL/L
AST SERPL-CCNC: 35 U/L
AST SERPL-CCNC: 36 U/L
BASOPHILS # BLD AUTO: 0.04 K/UL
BASOPHILS NFR BLD AUTO: 0.5 %
BILIRUB DIRECT SERPL-MCNC: 0.2 MG/DL
BILIRUB INDIRECT SERPL-MCNC: 0.3 MG/DL
BILIRUB SERPL-MCNC: 0.4 MG/DL
BILIRUB SERPL-MCNC: 0.5 MG/DL
BUN SERPL-MCNC: 12 MG/DL
BUN SERPL-MCNC: 12 MG/DL
CALCIUM SERPL-MCNC: 10 MG/DL
CALCIUM SERPL-MCNC: 9.9 MG/DL
CHLORIDE SERPL-SCNC: 96 MMOL/L
CHLORIDE SERPL-SCNC: 97 MMOL/L
CHOLEST SERPL-MCNC: 194 MG/DL
CHOLEST SERPL-MCNC: 196 MG/DL
CO2 SERPL-SCNC: 29 MMOL/L
CO2 SERPL-SCNC: 30 MMOL/L
CREAT SERPL-MCNC: 0.68 MG/DL
CREAT SERPL-MCNC: 0.7 MG/DL
EOSINOPHIL # BLD AUTO: 0.31 K/UL
EOSINOPHIL NFR BLD AUTO: 3.6 %
ERYTHROCYTE [SEDIMENTATION RATE] IN BLOOD BY WESTERGREN METHOD: 110 MM/HR
ESTIMATED AVERAGE GLUCOSE: 220 MG/DL
ESTIMATED AVERAGE GLUCOSE: 226 MG/DL
FOLATE SERPL-MCNC: 10.5 NG/ML
FRUCTOSAMINE SERPL-MCNC: 269 UMOL/L
GGT SERPL-CCNC: 55 U/L
GLUCOSE BS SERPL-MCNC: 184 MG/DL
GLUCOSE SERPL-MCNC: 186 MG/DL
GLUCOSE SERPL-MCNC: 186 MG/DL
HBA1C MFR BLD HPLC: 9.3 %
HBA1C MFR BLD HPLC: 9.5 %
HCT VFR BLD CALC: 41.3 %
HDLC SERPL-MCNC: 40 MG/DL
HDLC SERPL-MCNC: 41 MG/DL
HGB BLD-MCNC: 12.2 G/DL
IMM GRANULOCYTES NFR BLD AUTO: 0.8 %
IRON SATN MFR SERPL: 10 %
IRON SERPL-MCNC: 39 UG/DL
LDLC SERPL CALC-MCNC: 131 MG/DL
LDLC SERPL CALC-MCNC: 134 MG/DL
LYMPHOCYTES # BLD AUTO: 2.22 K/UL
LYMPHOCYTES NFR BLD AUTO: 25.7 %
MAN DIFF?: NORMAL
MCHC RBC-ENTMCNC: 23.6 PG
MCHC RBC-ENTMCNC: 29.5 GM/DL
MCV RBC AUTO: 79.7 FL
MONOCYTES # BLD AUTO: 0.61 K/UL
MONOCYTES NFR BLD AUTO: 7.1 %
NEUTROPHILS # BLD AUTO: 5.38 K/UL
NEUTROPHILS NFR BLD AUTO: 62.3 %
NONHDLC SERPL-MCNC: 154 MG/DL
NONHDLC SERPL-MCNC: 157 MG/DL
PLATELET # BLD AUTO: 337 K/UL
POTASSIUM SERPL-SCNC: 4.6 MMOL/L
POTASSIUM SERPL-SCNC: 4.7 MMOL/L
PROT SERPL-MCNC: 7.4 G/DL
PROT SERPL-MCNC: 7.4 G/DL
RBC # BLD: 5.18 M/UL
RBC # FLD: 15.9 %
SODIUM SERPL-SCNC: 138 MMOL/L
SODIUM SERPL-SCNC: 140 MMOL/L
T4 FREE SERPL-MCNC: 1.4 NG/DL
TIBC SERPL-MCNC: 382 UG/DL
TRIGL SERPL-MCNC: 112 MG/DL
TRIGL SERPL-MCNC: 113 MG/DL
TSH SERPL-ACNC: 3.77 UIU/ML
TSH SERPL-ACNC: 3.77 UIU/ML
UIBC SERPL-MCNC: 343 UG/DL
VIT B12 SERPL-MCNC: 907 PG/ML
WBC # FLD AUTO: 8.63 K/UL

## 2021-08-26 ENCOUNTER — RX RENEWAL (OUTPATIENT)
Age: 56
End: 2021-08-26

## 2021-08-26 ENCOUNTER — APPOINTMENT (OUTPATIENT)
Dept: MRI IMAGING | Facility: HOSPITAL | Age: 56
End: 2021-08-26

## 2021-08-30 ENCOUNTER — APPOINTMENT (OUTPATIENT)
Dept: INTERNAL MEDICINE | Facility: CLINIC | Age: 56
End: 2021-08-30
Payer: MEDICARE

## 2021-08-30 PROCEDURE — 99442: CPT

## 2021-08-30 NOTE — HISTORY OF PRESENT ILLNESS
[Home] : at home, [unfilled] , at the time of the visit. [Medical Office: ([facility])___] : at the medical office located in  [Verbal consent obtained from patient] : the patient, [unfilled] [FreeTextEntry4] : FLETCHER Erwin [de-identified] : 56 year female  patient with history of stable Hypertension, Hypothyroidism, Type 2 Diabetes Mellitus, Reactive Airway Disease, Morbid Obesity, Asthma, Renal Cell Carcinoma, Diabetic Neuropathy, history as stated, initiated telephonic visit for Disease Management and Medication Adherence.\par

## 2021-08-30 NOTE — ASSESSMENT
[FreeTextEntry1] : Time spent for this encounter : 15  minutes.\par More than 50 % of the time spent for - Disease Management and Medication Adherence.\par \par 56 year F patient found to have stable Hypertension, Hypothyroidism, Type 2 Diabetes Mellitus, symptomatic Reactive Airway Disease, Rx as ordered, If symptoms get worse, consider adding Prednisone, as counseled, stable Morbid Obesity, Renal Cell Carcinoma, Diabetic Neuropathy,with the current regimen, diet and life style modifications, as counseled. Prior results reviewed and discussed with the patient during today's encounter. Plan as ordered.\par \par Patient granted verbal permission to provide Telephonic/Tele Health service in reference to today's encounter, as a substitute form of a visit, for a standard office visit encounter in the phase of an ongoing Pandemic / Covid -19, with the awareness and acceptance of a possible limited nature of such an encounter, with a possibility of an inadvertent omission of possible findings and misleading treatment options, due to possible erroneous and/or incomplete diagnostic impressions.\par

## 2021-08-30 NOTE — HEALTH RISK ASSESSMENT
[No] : In the past 12 months have you used drugs other than those required for medical reasons? No [No falls in past year] : Patient reported no falls in the past year [0] : 2) Feeling down, depressed, or hopeless: Not at all (0) [] : No [de-identified] : ENDO/RHEUM/PULM [[id number]] : 0

## 2021-08-31 ENCOUNTER — TRANSCRIPTION ENCOUNTER (OUTPATIENT)
Age: 56
End: 2021-08-31

## 2021-09-01 ENCOUNTER — APPOINTMENT (OUTPATIENT)
Dept: ORTHOPEDIC SURGERY | Facility: CLINIC | Age: 56
End: 2021-09-01

## 2021-09-09 ENCOUNTER — APPOINTMENT (OUTPATIENT)
Dept: GASTROENTEROLOGY | Facility: CLINIC | Age: 56
End: 2021-09-09

## 2021-09-11 ENCOUNTER — EMERGENCY (EMERGENCY)
Facility: HOSPITAL | Age: 56
LOS: 1 days | Discharge: HOME | End: 2021-09-11
Attending: STUDENT IN AN ORGANIZED HEALTH CARE EDUCATION/TRAINING PROGRAM
Payer: MEDICARE

## 2021-09-11 VITALS
OXYGEN SATURATION: 94 % | TEMPERATURE: 98 F | HEART RATE: 74 BPM | DIASTOLIC BLOOD PRESSURE: 74 MMHG | RESPIRATION RATE: 16 BRPM | SYSTOLIC BLOOD PRESSURE: 156 MMHG

## 2021-09-11 VITALS
TEMPERATURE: 98 F | SYSTOLIC BLOOD PRESSURE: 157 MMHG | HEIGHT: 61 IN | HEART RATE: 99 BPM | RESPIRATION RATE: 18 BRPM | OXYGEN SATURATION: 91 % | WEIGHT: 285.06 LBS | DIASTOLIC BLOOD PRESSURE: 94 MMHG

## 2021-09-11 DIAGNOSIS — E27.8 OTHER SPECIFIED DISORDERS OF ADRENAL GLAND: Chronic | ICD-10-CM

## 2021-09-11 DIAGNOSIS — Z90.710 ACQUIRED ABSENCE OF BOTH CERVIX AND UTERUS: Chronic | ICD-10-CM

## 2021-09-11 DIAGNOSIS — N89.8 OTHER SPECIFIED NONINFLAMMATORY DISORDERS OF VAGINA: Chronic | ICD-10-CM

## 2021-09-11 DIAGNOSIS — I77.6 ARTERITIS, UNSPECIFIED: Chronic | ICD-10-CM

## 2021-09-11 DIAGNOSIS — Z98.89 OTHER SPECIFIED POSTPROCEDURAL STATES: Chronic | ICD-10-CM

## 2021-09-11 LAB
ALBUMIN SERPL ELPH-MCNC: 3.2 G/DL — LOW (ref 3.5–5)
ALP SERPL-CCNC: 105 U/L — SIGNIFICANT CHANGE UP (ref 40–120)
ALT FLD-CCNC: 55 U/L DA — SIGNIFICANT CHANGE UP (ref 10–60)
ANION GAP SERPL CALC-SCNC: 9 MMOL/L — SIGNIFICANT CHANGE UP (ref 5–17)
APTT BLD: 32.6 SEC — SIGNIFICANT CHANGE UP (ref 27.5–35.5)
AST SERPL-CCNC: 54 U/L — HIGH (ref 10–40)
BASOPHILS # BLD AUTO: 0.03 K/UL — SIGNIFICANT CHANGE UP (ref 0–0.2)
BASOPHILS NFR BLD AUTO: 0.3 % — SIGNIFICANT CHANGE UP (ref 0–2)
BILIRUB SERPL-MCNC: 0.6 MG/DL — SIGNIFICANT CHANGE UP (ref 0.2–1.2)
BUN SERPL-MCNC: 13 MG/DL — SIGNIFICANT CHANGE UP (ref 7–18)
CALCIUM SERPL-MCNC: 9.2 MG/DL — SIGNIFICANT CHANGE UP (ref 8.4–10.5)
CHLORIDE SERPL-SCNC: 97 MMOL/L — SIGNIFICANT CHANGE UP (ref 96–108)
CO2 SERPL-SCNC: 29 MMOL/L — SIGNIFICANT CHANGE UP (ref 22–31)
CREAT SERPL-MCNC: 0.84 MG/DL — SIGNIFICANT CHANGE UP (ref 0.5–1.3)
EOSINOPHIL # BLD AUTO: 0.21 K/UL — SIGNIFICANT CHANGE UP (ref 0–0.5)
EOSINOPHIL NFR BLD AUTO: 2.2 % — SIGNIFICANT CHANGE UP (ref 0–6)
ETHANOL SERPL-MCNC: 6 MG/DL — SIGNIFICANT CHANGE UP (ref 0–10)
GLUCOSE SERPL-MCNC: 247 MG/DL — HIGH (ref 70–99)
HCT VFR BLD CALC: 41.8 % — SIGNIFICANT CHANGE UP (ref 34.5–45)
HGB BLD-MCNC: 12.4 G/DL — SIGNIFICANT CHANGE UP (ref 11.5–15.5)
IMM GRANULOCYTES NFR BLD AUTO: 0.7 % — SIGNIFICANT CHANGE UP (ref 0–1.5)
INR BLD: 1.12 RATIO — SIGNIFICANT CHANGE UP (ref 0.88–1.16)
LYMPHOCYTES # BLD AUTO: 1.45 K/UL — SIGNIFICANT CHANGE UP (ref 1–3.3)
LYMPHOCYTES # BLD AUTO: 15.3 % — SIGNIFICANT CHANGE UP (ref 13–44)
MCHC RBC-ENTMCNC: 23.5 PG — LOW (ref 27–34)
MCHC RBC-ENTMCNC: 29.7 GM/DL — LOW (ref 32–36)
MCV RBC AUTO: 79.3 FL — LOW (ref 80–100)
MONOCYTES # BLD AUTO: 0.67 K/UL — SIGNIFICANT CHANGE UP (ref 0–0.9)
MONOCYTES NFR BLD AUTO: 7.1 % — SIGNIFICANT CHANGE UP (ref 2–14)
NEUTROPHILS # BLD AUTO: 7.02 K/UL — SIGNIFICANT CHANGE UP (ref 1.8–7.4)
NEUTROPHILS NFR BLD AUTO: 74.4 % — SIGNIFICANT CHANGE UP (ref 43–77)
NRBC # BLD: 0 /100 WBCS — SIGNIFICANT CHANGE UP (ref 0–0)
NT-PROBNP SERPL-SCNC: 77 PG/ML — SIGNIFICANT CHANGE UP (ref 0–125)
PLATELET # BLD AUTO: 346 K/UL — SIGNIFICANT CHANGE UP (ref 150–400)
POTASSIUM SERPL-MCNC: 4.5 MMOL/L — SIGNIFICANT CHANGE UP (ref 3.5–5.3)
POTASSIUM SERPL-SCNC: 4.5 MMOL/L — SIGNIFICANT CHANGE UP (ref 3.5–5.3)
PROT SERPL-MCNC: 8.2 G/DL — SIGNIFICANT CHANGE UP (ref 6–8.3)
PROTHROM AB SERPL-ACNC: 13.2 SEC — SIGNIFICANT CHANGE UP (ref 10.6–13.6)
RBC # BLD: 5.27 M/UL — HIGH (ref 3.8–5.2)
RBC # FLD: 15.9 % — HIGH (ref 10.3–14.5)
SARS-COV-2 RNA SPEC QL NAA+PROBE: SIGNIFICANT CHANGE UP
SODIUM SERPL-SCNC: 135 MMOL/L — SIGNIFICANT CHANGE UP (ref 135–145)
TROPONIN I SERPL-MCNC: <0.015 NG/ML — SIGNIFICANT CHANGE UP (ref 0–0.04)
WBC # BLD: 9.45 K/UL — SIGNIFICANT CHANGE UP (ref 3.8–10.5)
WBC # FLD AUTO: 9.45 K/UL — SIGNIFICANT CHANGE UP (ref 3.8–10.5)

## 2021-09-11 PROCEDURE — 87635 SARS-COV-2 COVID-19 AMP PRB: CPT

## 2021-09-11 PROCEDURE — 70450 CT HEAD/BRAIN W/O DYE: CPT | Mod: 26,MA

## 2021-09-11 PROCEDURE — 72125 CT NECK SPINE W/O DYE: CPT | Mod: 26,MA

## 2021-09-11 PROCEDURE — 80307 DRUG TEST PRSMV CHEM ANLYZR: CPT

## 2021-09-11 PROCEDURE — 96361 HYDRATE IV INFUSION ADD-ON: CPT

## 2021-09-11 PROCEDURE — 84484 ASSAY OF TROPONIN QUANT: CPT

## 2021-09-11 PROCEDURE — 83880 ASSAY OF NATRIURETIC PEPTIDE: CPT

## 2021-09-11 PROCEDURE — 85025 COMPLETE CBC W/AUTO DIFF WBC: CPT

## 2021-09-11 PROCEDURE — 96360 HYDRATION IV INFUSION INIT: CPT

## 2021-09-11 PROCEDURE — 99284 EMERGENCY DEPT VISIT MOD MDM: CPT

## 2021-09-11 PROCEDURE — 80053 COMPREHEN METABOLIC PANEL: CPT

## 2021-09-11 PROCEDURE — 85730 THROMBOPLASTIN TIME PARTIAL: CPT

## 2021-09-11 PROCEDURE — 72125 CT NECK SPINE W/O DYE: CPT | Mod: MA

## 2021-09-11 PROCEDURE — 93005 ELECTROCARDIOGRAM TRACING: CPT

## 2021-09-11 PROCEDURE — 99053 MED SERV 10PM-8AM 24 HR FAC: CPT

## 2021-09-11 PROCEDURE — 36415 COLL VENOUS BLD VENIPUNCTURE: CPT

## 2021-09-11 PROCEDURE — 85610 PROTHROMBIN TIME: CPT

## 2021-09-11 PROCEDURE — 93010 ELECTROCARDIOGRAM REPORT: CPT

## 2021-09-11 PROCEDURE — 70450 CT HEAD/BRAIN W/O DYE: CPT | Mod: MA

## 2021-09-11 PROCEDURE — 99284 EMERGENCY DEPT VISIT MOD MDM: CPT | Mod: 25

## 2021-09-11 RX ORDER — SODIUM CHLORIDE 9 MG/ML
1000 INJECTION INTRAMUSCULAR; INTRAVENOUS; SUBCUTANEOUS ONCE
Refills: 0 | Status: COMPLETED | OUTPATIENT
Start: 2021-09-11 | End: 2021-09-11

## 2021-09-11 RX ORDER — DIPHENHYDRAMINE HCL 50 MG
25 CAPSULE ORAL ONCE
Refills: 0 | Status: DISCONTINUED | OUTPATIENT
Start: 2021-09-11 | End: 2021-09-11

## 2021-09-11 RX ORDER — OXYCODONE AND ACETAMINOPHEN 5; 325 MG/1; MG/1
1 TABLET ORAL ONCE
Refills: 0 | Status: DISCONTINUED | OUTPATIENT
Start: 2021-09-11 | End: 2021-09-11

## 2021-09-11 RX ORDER — ACETAMINOPHEN 500 MG
650 TABLET ORAL ONCE
Refills: 0 | Status: COMPLETED | OUTPATIENT
Start: 2021-09-11 | End: 2021-09-11

## 2021-09-11 RX ORDER — SODIUM CHLORIDE 9 MG/ML
3 INJECTION INTRAMUSCULAR; INTRAVENOUS; SUBCUTANEOUS EVERY 8 HOURS
Refills: 0 | Status: DISCONTINUED | OUTPATIENT
Start: 2021-09-11 | End: 2021-09-14

## 2021-09-11 RX ORDER — MECLIZINE HCL 12.5 MG
25 TABLET ORAL ONCE
Refills: 0 | Status: COMPLETED | OUTPATIENT
Start: 2021-09-11 | End: 2021-09-11

## 2021-09-11 RX ADMIN — SODIUM CHLORIDE 3 MILLILITER(S): 9 INJECTION INTRAMUSCULAR; INTRAVENOUS; SUBCUTANEOUS at 06:18

## 2021-09-11 RX ADMIN — OXYCODONE AND ACETAMINOPHEN 1 TABLET(S): 5; 325 TABLET ORAL at 10:09

## 2021-09-11 RX ADMIN — OXYCODONE AND ACETAMINOPHEN 1 TABLET(S): 5; 325 TABLET ORAL at 09:09

## 2021-09-11 RX ADMIN — Medication 650 MILLIGRAM(S): at 10:09

## 2021-09-11 RX ADMIN — Medication 25 MILLIGRAM(S): at 09:09

## 2021-09-11 RX ADMIN — SODIUM CHLORIDE 1000 MILLILITER(S): 9 INJECTION INTRAMUSCULAR; INTRAVENOUS; SUBCUTANEOUS at 09:00

## 2021-09-11 RX ADMIN — Medication 650 MILLIGRAM(S): at 09:09

## 2021-09-11 RX ADMIN — SODIUM CHLORIDE 1000 MILLILITER(S): 9 INJECTION INTRAMUSCULAR; INTRAVENOUS; SUBCUTANEOUS at 07:23

## 2021-09-11 NOTE — ED ADULT NURSE NOTE - PRIMARY CARE PROVIDER
unk Consent (Near Eyelid Margin)/Introductory Paragraph: The rationale for Mohs was explained to the patient and consent was obtained. The risks, benefits and alternatives to therapy were discussed in detail. Specifically, the risks of ectropion or eyelid deformity, infection, scarring, bleeding, prolonged wound healing, incomplete removal, allergy to anesthesia, nerve injury and recurrence were addressed. Prior to the procedure, the treatment site was clearly identified and confirmed by the patient. All components of Universal Protocol/PAUSE Rule completed.

## 2021-09-11 NOTE — ED PROVIDER NOTE - PROGRESS NOTE DETAILS
pt feeling much better, denies any symptoms. ortho spine f/u for neck pain and strict return precautions given.

## 2021-09-11 NOTE — ED PROVIDER NOTE - OBJECTIVE STATEMENT
55 yo F h/o herniated discs in cerv and thoracic spine, peripheral vertigo with previous meclizine rx, DM, HTN, HLD, chronic bronchitis with intermittent home O2 PRN and baseline low 90s SpO2, anxiety/depression p/w 2 days of intermittent episodes of vertigo only with head movments that have caused her to vomit twice. No vertigo currently when laying still on bed. Pt also c/o L sided neck pain rad down L arm with tingling sensation and no weakness. Pt denies recent fever or infectious symptoms/ trauma, diarrhea, dysuria or frequency/hesitancy, chest pain, SOB, focal numbness/weakness, syncope, other recent illness or hospitalizations.

## 2021-09-11 NOTE — ED PROVIDER NOTE - PATIENT PORTAL LINK FT
You can access the FollowMyHealth Patient Portal offered by [facility] by registering at the following website: [email or web address]. By joining [facility]’s FollowMyHealth portal, you will also be able to view your health information using other applications (apps) compatible with our system.

## 2021-09-11 NOTE — ED PROVIDER NOTE - PHYSICAL EXAMINATION
Vital Signs Reviewed  GEN: Comfortable, NAD  HEENT: Mid cspine TTP, NCAT, MMM, Neck Supple  RESP: CTAB, No rales/rhonchi/wheezing  CV: RRR, S1S2, No murmurs  ABD: No TTP, ND, No masses  Extrem/Skin: Equal pulses bilat, No cyanosis/edema/rashes  Neuro: AAOx3, CNs Grossly Intact, Equal strength/sensation in all extremities bilat, No Pronator Drift, Ambulating w/o assistance

## 2021-09-11 NOTE — ED PROVIDER NOTE - CLINICAL SUMMARY MEDICAL DECISION MAKING FREE TEXT BOX
Pt p/w vertigo with nausea and L neck pain rad down the arm. Exam only significant for reproduction of vertigo with head movments and cspine TTP. S/o to incoming doc pending Labs results, CT, and clinical reassessment.

## 2021-09-11 NOTE — ED PROVIDER NOTE - NSFOLLOWUPINSTRUCTIONS_ED_ALL_ED_FT
Follow up with your PCP in 24-48 hours.   Call ortho/spine to make a follow up appointment.   May take Tylenol as directed on the bottle for pain control.   Return to the ER if you develop any new or worsening symptoms such as chest pain, shortness of breath, numbness, weakness, abdominal pain, nausea, vomiting, or visual changes.

## 2021-09-11 NOTE — ED PROVIDER NOTE - NSFOLLOWUPCLINICS_GEN_ALL_ED_FT
[facility] [place]  [facility]  .  [place]   Phone: [phone]  Fax:

## 2021-09-11 NOTE — ED ADULT TRIAGE NOTE - CHIEF COMPLAINT QUOTE
Pt BIBA in for compliant of neck pain that is causing her to fell dizzy like the room is spinning nausea and vomiting that goes down left arm to left hand. was seen by Neurologist for same compliant was advised to have MRI

## 2021-09-13 ENCOUNTER — NON-APPOINTMENT (OUTPATIENT)
Age: 56
End: 2021-09-13

## 2021-09-13 ENCOUNTER — APPOINTMENT (OUTPATIENT)
Dept: DERMATOLOGY | Facility: CLINIC | Age: 56
End: 2021-09-13
Payer: MEDICARE

## 2021-09-13 VITALS — WEIGHT: 282 LBS | HEIGHT: 61 IN | BODY MASS INDEX: 53.24 KG/M2

## 2021-09-13 DIAGNOSIS — L21.9 SEBORRHEIC DERMATITIS, UNSPECIFIED: ICD-10-CM

## 2021-09-13 DIAGNOSIS — L82.1 OTHER SEBORRHEIC KERATOSIS: ICD-10-CM

## 2021-09-13 DIAGNOSIS — D69.0 ALLERGIC PURPURA: ICD-10-CM

## 2021-09-13 PROCEDURE — 99215 OFFICE O/P EST HI 40 MIN: CPT

## 2021-09-14 ENCOUNTER — TRANSCRIPTION ENCOUNTER (OUTPATIENT)
Age: 56
End: 2021-09-14

## 2021-09-14 ENCOUNTER — APPOINTMENT (OUTPATIENT)
Dept: ENDOCRINOLOGY | Facility: CLINIC | Age: 56
End: 2021-09-14
Payer: MEDICARE

## 2021-09-14 ENCOUNTER — APPOINTMENT (OUTPATIENT)
Dept: GASTROENTEROLOGY | Facility: CLINIC | Age: 56
End: 2021-09-14
Payer: MEDICARE

## 2021-09-14 VITALS
OXYGEN SATURATION: 93 % | DIASTOLIC BLOOD PRESSURE: 83 MMHG | WEIGHT: 279 LBS | SYSTOLIC BLOOD PRESSURE: 133 MMHG | BODY MASS INDEX: 52.72 KG/M2 | TEMPERATURE: 97.3 F | HEART RATE: 95 BPM

## 2021-09-14 VITALS
DIASTOLIC BLOOD PRESSURE: 84 MMHG | RESPIRATION RATE: 16 BRPM | WEIGHT: 279 LBS | BODY MASS INDEX: 52.67 KG/M2 | OXYGEN SATURATION: 97 % | HEART RATE: 87 BPM | SYSTOLIC BLOOD PRESSURE: 131 MMHG | TEMPERATURE: 97.2 F | HEIGHT: 61 IN

## 2021-09-14 DIAGNOSIS — Z98.890 OTHER SPECIFIED POSTPROCEDURAL STATES: ICD-10-CM

## 2021-09-14 DIAGNOSIS — K63.5 POLYP OF COLON: ICD-10-CM

## 2021-09-14 PROBLEM — D69.0 IGA MEDIATED LEUKOCYTOCLASTIC VASCULITIS: Status: ACTIVE | Noted: 2019-04-09

## 2021-09-14 PROBLEM — L82.1 SEBORRHEIC KERATOSES: Status: ACTIVE | Noted: 2021-09-14

## 2021-09-14 PROBLEM — L21.9 SEBORRHEIC DERMATITIS: Status: ACTIVE | Noted: 2021-04-02

## 2021-09-14 LAB — GLUCOSE BLDC GLUCOMTR-MCNC: 183

## 2021-09-14 PROCEDURE — 99214 OFFICE O/P EST MOD 30 MIN: CPT | Mod: 25

## 2021-09-14 PROCEDURE — 99213 OFFICE O/P EST LOW 20 MIN: CPT

## 2021-09-14 PROCEDURE — 82962 GLUCOSE BLOOD TEST: CPT

## 2021-09-14 RX ORDER — CLOBETASOL PROPIONATE 0.5 MG/G
0.05 OINTMENT TOPICAL
Qty: 2 | Refills: 0 | Status: DISCONTINUED | COMMUNITY
Start: 2021-07-20 | End: 2021-09-14

## 2021-09-14 RX ORDER — BETAMETHASONE DIPROPIONATE 0.5 MG/G
0.05 OINTMENT, AUGMENTED TOPICAL
Qty: 3 | Refills: 2 | Status: DISCONTINUED | COMMUNITY
Start: 2021-04-02 | End: 2021-09-14

## 2021-09-14 RX ORDER — SODIUM SULFATE, POTASSIUM SULFATE, MAGNESIUM SULFATE 17.5; 3.13; 1.6 G/ML; G/ML; G/ML
17.5-3.13-1.6 SOLUTION, CONCENTRATE ORAL
Qty: 1 | Refills: 0 | Status: ACTIVE | COMMUNITY
Start: 2021-09-14 | End: 1900-01-01

## 2021-09-14 NOTE — DATA REVIEWED
[FreeTextEntry1] : The FBS and HbA1c are better. The renal function is fine. The TSH and Free t4 were normal.

## 2021-09-14 NOTE — PHYSICAL EXAM
[General Appearance - Alert] : alert [General Appearance - In No Acute Distress] : in no acute distress [Sclera] : the sclera and conjunctiva were normal [PERRL With Normal Accommodation] : pupils were equal in size, round, and reactive to light [Extraocular Movements] : extraocular movements were intact [Outer Ear] : the ears and nose were normal in appearance [Oropharynx] : the oropharynx was normal [Neck Appearance] : the appearance of the neck was normal [Neck Cervical Mass (___cm)] : no neck mass was observed [Jugular Venous Distention Increased] : there was no jugular-venous distention [Thyroid Diffuse Enlargement] : the thyroid was not enlarged [Thyroid Nodule] : there were no palpable thyroid nodules [] : no respiratory distress [Auscultation Breath Sounds / Voice Sounds] : lungs were clear to auscultation bilaterally [FreeTextEntry1] : A female chaperone was present during my exam. Obese

## 2021-09-14 NOTE — ASSESSMENT
[FreeTextEntry1] : This patient had multiple polyps and a very large sessile ascending colon polyp.  We would like to view the site of this polyp 3 months post resection.  My plan will be\par \par Colonoscopy in 1 month or so\par The next colonoscopy after this one should be in 3 years

## 2021-09-14 NOTE — REASON FOR VISIT
[Follow - Up] : a follow-up visit [Adrenal Evaluation/Adrenal Disorder] : adrenal evaluation/adrenal disorder [DM Type 2] : DM Type 2 [Hypothyroidism] : hypothyroidism [Thyroid nodule/ MNG] : thyroid nodule/ MNG [Other___] : [unfilled]

## 2021-09-14 NOTE — HISTORY OF PRESENT ILLNESS
[FreeTextEntry1] : Doing better, the diabetes is improving slowly. She has lost weight. She is following the die and walking regularly. The nurse will call the maker of Ozempic to see if it can be given free or at a low price. She has an appointment with the Ophthalmologist. At home the FBS are around 130 to 140 mg/dl.

## 2021-09-14 NOTE — HISTORY OF PRESENT ILLNESS
[de-identified] : This patient had a colonoscopy with 2 large polyps 1 being 1.5 cm and pedunculated and another being 2.5 cm and sessile.  She also had a trivial 4 mm polyp removed.  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  We wanted to repeat the colonoscopy in about 3 months from that time in order to relook at the ascending colon polyp site where she had the sessile polyp on July 21, 2021.

## 2021-09-20 ENCOUNTER — NON-APPOINTMENT (OUTPATIENT)
Age: 56
End: 2021-09-20

## 2021-09-20 ENCOUNTER — APPOINTMENT (OUTPATIENT)
Dept: CARDIOLOGY | Facility: CLINIC | Age: 56
End: 2021-09-20
Payer: MEDICARE

## 2021-09-20 VITALS
BODY MASS INDEX: 52.67 KG/M2 | HEART RATE: 99 BPM | WEIGHT: 279 LBS | HEIGHT: 61 IN | SYSTOLIC BLOOD PRESSURE: 144 MMHG | OXYGEN SATURATION: 95 % | RESPIRATION RATE: 16 BRPM | TEMPERATURE: 97.8 F | DIASTOLIC BLOOD PRESSURE: 82 MMHG

## 2021-09-20 PROCEDURE — 99214 OFFICE O/P EST MOD 30 MIN: CPT

## 2021-09-20 PROCEDURE — 93000 ELECTROCARDIOGRAM COMPLETE: CPT

## 2021-09-21 ENCOUNTER — OUTPATIENT (OUTPATIENT)
Dept: OUTPATIENT SERVICES | Facility: HOSPITAL | Age: 56
LOS: 1 days | End: 2021-09-21
Payer: MEDICARE

## 2021-09-21 ENCOUNTER — APPOINTMENT (OUTPATIENT)
Dept: MRI IMAGING | Facility: HOSPITAL | Age: 56
End: 2021-09-21
Payer: MEDICARE

## 2021-09-21 ENCOUNTER — TRANSCRIPTION ENCOUNTER (OUTPATIENT)
Age: 56
End: 2021-09-21

## 2021-09-21 ENCOUNTER — APPOINTMENT (OUTPATIENT)
Dept: ULTRASOUND IMAGING | Facility: HOSPITAL | Age: 56
End: 2021-09-21
Payer: MEDICARE

## 2021-09-21 ENCOUNTER — RESULT REVIEW (OUTPATIENT)
Age: 56
End: 2021-09-21

## 2021-09-21 DIAGNOSIS — Z90.710 ACQUIRED ABSENCE OF BOTH CERVIX AND UTERUS: Chronic | ICD-10-CM

## 2021-09-21 DIAGNOSIS — Z98.89 OTHER SPECIFIED POSTPROCEDURAL STATES: Chronic | ICD-10-CM

## 2021-09-21 DIAGNOSIS — N28.89 OTHER SPECIFIED DISORDERS OF KIDNEY AND URETER: ICD-10-CM

## 2021-09-21 DIAGNOSIS — N89.8 OTHER SPECIFIED NONINFLAMMATORY DISORDERS OF VAGINA: Chronic | ICD-10-CM

## 2021-09-21 DIAGNOSIS — E27.8 OTHER SPECIFIED DISORDERS OF ADRENAL GLAND: Chronic | ICD-10-CM

## 2021-09-21 DIAGNOSIS — M51.26 OTHER INTERVERTEBRAL DISC DISPLACEMENT, LUMBAR REGION: ICD-10-CM

## 2021-09-21 DIAGNOSIS — I77.6 ARTERITIS, UNSPECIFIED: Chronic | ICD-10-CM

## 2021-09-21 DIAGNOSIS — E11.9 TYPE 2 DIABETES MELLITUS WITHOUT COMPLICATIONS: ICD-10-CM

## 2021-09-21 PROCEDURE — 74181 MRI ABDOMEN W/O CONTRAST: CPT | Mod: 26

## 2021-09-21 PROCEDURE — 72148 MRI LUMBAR SPINE W/O DYE: CPT | Mod: 26

## 2021-09-21 PROCEDURE — 76536 US EXAM OF HEAD AND NECK: CPT | Mod: 26

## 2021-09-21 PROCEDURE — 74181 MRI ABDOMEN W/O CONTRAST: CPT

## 2021-09-21 PROCEDURE — 72148 MRI LUMBAR SPINE W/O DYE: CPT

## 2021-09-21 PROCEDURE — 76536 US EXAM OF HEAD AND NECK: CPT

## 2021-09-28 ENCOUNTER — APPOINTMENT (OUTPATIENT)
Dept: RHEUMATOLOGY | Facility: CLINIC | Age: 56
End: 2021-09-28

## 2021-09-29 DIAGNOSIS — M25.559 PAIN IN UNSPECIFIED HIP: ICD-10-CM

## 2021-10-05 ENCOUNTER — NON-APPOINTMENT (OUTPATIENT)
Age: 56
End: 2021-10-05

## 2021-10-05 ENCOUNTER — APPOINTMENT (OUTPATIENT)
Dept: ORTHOPEDIC SURGERY | Facility: CLINIC | Age: 56
End: 2021-10-05
Payer: MEDICARE

## 2021-10-05 VITALS — SYSTOLIC BLOOD PRESSURE: 159 MMHG | OXYGEN SATURATION: 98 % | HEART RATE: 82 BPM | DIASTOLIC BLOOD PRESSURE: 93 MMHG

## 2021-10-05 DIAGNOSIS — M76.891 OTHER SPECIFIED ENTHESOPATHIES OF RIGHT LOWER LIMB, EXCLUDING FOOT: ICD-10-CM

## 2021-10-05 DIAGNOSIS — M70.61 TROCHANTERIC BURSITIS, RIGHT HIP: ICD-10-CM

## 2021-10-05 PROCEDURE — 73502 X-RAY EXAM HIP UNI 2-3 VIEWS: CPT

## 2021-10-05 PROCEDURE — 99204 OFFICE O/P NEW MOD 45 MIN: CPT

## 2021-10-05 NOTE — HISTORY OF PRESENT ILLNESS
[de-identified] : CC Right Knee and Right Hip Pain\par \par HPI 57 yo Female reports slipping in the Tub, patient denies fall, patient presents with Acute onset of Constant pain in the Lateral and Anterior Right knee and Right hip. The pain is better, and rated a 5 out of 10, described as Throbbing, [with radiation to the right lower back. Nothing makes the pain better and activity makes the pain worse. The patient reports associated symptoms of pulling and instability. The patient Denies pain at night affecting sleep, and Reports similar pain related to arthritis in the left knee.\par \par The patient has tried the following treatments:\par Activity modification	+\par Ice/Compression  	-\par Braces    	-\par Nsaids    		-\par Physical Therapy 	-\par Cortisone Injection	-\par Visco Injection		-\par Zilretta			-\par Arthroscopy		-\par \par Review of Systems is positive for the above musculoskeletal symptoms and is otherwise non-contributory for general, constitutional, psychiatric, neurologic, HEENT, cardiac, respiratory, gastrointestinal, reproductive, lymphatic, and dermatologic complaints.\par \par

## 2021-10-05 NOTE — DISCUSSION/SUMMARY
[de-identified] : right hip greater trochanteric bursitis/abductor strain\par \par \par PT\par \par nsaids\par \par hold csi given dm\par \par fu prn

## 2021-10-05 NOTE — PHYSICAL EXAM
[de-identified] : Physical Examination\par General: well nourished, in no acute distress, alert and oriented to person, place and time\par Psychiatric: normal mood and affect, no abnormal movements or speech patterns\par Eyes: vision intact - glasses\par \par \par Musculoskeletal Examination\par Ambulation	+ antalgic gait, + assistive devices\par \par Hip			Right			Left\par General\par      Swelling/Deformity	normal			normal	\par      Skin			normal			normal\par      Erythema		-			-\par      Standing Alignment	neutral			neutral\par Range of Motion\par      Flexion		90			90\par      Abduction		30			30\par      Flex ER		45			45\par      Flex IR		15			15\par      Knee        		0 - 130			0 - 130\par Provocative Exam\par      Log Roll		-			-\par      Heel Strike		-			-\par      Fig 4			-			-\par      SLR			-			-\par Palpation\par      Public Symphysis	-			-\par      Groin   	 	-			-\par      Greater Trochanter	+			-\par      Piriformis		-			-\par      SI Joint		-			-\par Strength Examination/Atrophy\par      Hip Flexor		5+			5+\par      Quadriceps		5+			5+\par      Hamstring		5+			5+\par Sensation\par      Deep Peroneal        	normal			normal\par      Superficial Peroneal 	normal			normal\par      Sural  	 	normal			normal\par      Posterior Tibial        	normal			normal\par      Saphenous		normal			normal\par Pulse\par      DP			2+			2+\par  [de-identified] : 2 views of the affected RIGHT hip (AP and Frog Lateral)\par were ordered, taken and evaluated by myself today and \par demonstrate:\par limited eval due to body habitus\par [normal] acetabular morphology\par [normal] femoral head neck morphology\par no acetabular osteophytes\par no asymmetric joint space loss\par [Spherical] femoral head

## 2021-10-09 DIAGNOSIS — Z01.818 ENCOUNTER FOR OTHER PREPROCEDURAL EXAMINATION: ICD-10-CM

## 2021-10-10 ENCOUNTER — APPOINTMENT (OUTPATIENT)
Dept: DISASTER EMERGENCY | Facility: CLINIC | Age: 56
End: 2021-10-10

## 2021-10-19 ENCOUNTER — APPOINTMENT (OUTPATIENT)
Dept: INTERNAL MEDICINE | Facility: CLINIC | Age: 56
End: 2021-10-19
Payer: MEDICARE

## 2021-10-19 PROCEDURE — 99442: CPT

## 2021-10-19 NOTE — ASSESSMENT
[FreeTextEntry1] : Time spent for this encounter : 20 minutes.\par More than 50 % of the time spent for - Disease Management and Medication Adherence.\par \par 56 year F patient found to have stable Hypertension, Hypothyroidism, Type 2 Diabetes Mellitus, symptomatic Reactive Airway Disease, Morbid Obesity, Renal Cell Carcinoma, Diabetic Neuropathy,with the current regimen, diet and life style modifications, as counseled. Prior results reviewed and discussed with the patient during today's encounter. Plan as ordered.\par \par Patient granted verbal permission to provide Telephonic/Tele Health service in reference to today's encounter, as a substitute form of a visit, for a standard office visit encounter in the phase of an ongoing Pandemic / Covid -19, with the awareness and acceptance of a possible limited nature of such an encounter, with a possibility of an inadvertent omission of possible findings and misleading treatment options, due to possible erroneous and/or incomplete diagnostic impressions.\par \par

## 2021-10-19 NOTE — HEALTH RISK ASSESSMENT
[No] : In the past 12 months have you used drugs other than those required for medical reasons? No [No falls in past year] : Patient reported no falls in the past year [0] : 2) Feeling down, depressed, or hopeless: Not at all (0) [] : No [de-identified] : ENDO/DERM/CARD/ORTHO [[id number]] : 0

## 2021-10-19 NOTE — HISTORY OF PRESENT ILLNESS
[Home] : at home, [unfilled] , at the time of the visit. [Medical Office: ([facility])___] : at the medical office located in  [Verbal consent obtained from patient] : the patient, [unfilled] [FreeTextEntry4] : Emily BYNUM [de-identified] : 56 year female  patient with history of stable  Hypertension, Hypothyroidism, Type 2 Diabetes Mellitus, symptomatic Reactive Airway Disease, Morbid Obesity, Renal Cell Carcinoma, Diabetic Neuropathy, history as stated, initiated telephonic visit for Disease Management and Medication Adherence.\par

## 2021-10-21 RX ORDER — AMOXICILLIN AND CLAVULANATE POTASSIUM 500; 125 MG/1; MG/1
500-125 TABLET, FILM COATED ORAL TWICE DAILY
Qty: 14 | Refills: 0 | Status: DISCONTINUED | COMMUNITY
Start: 2021-08-30 | End: 2021-10-21

## 2021-10-22 ENCOUNTER — APPOINTMENT (OUTPATIENT)
Dept: PULMONOLOGY | Facility: CLINIC | Age: 56
End: 2021-10-22

## 2021-10-25 ENCOUNTER — NON-APPOINTMENT (OUTPATIENT)
Age: 56
End: 2021-10-25

## 2021-11-10 ENCOUNTER — APPOINTMENT (OUTPATIENT)
Dept: RHEUMATOLOGY | Facility: CLINIC | Age: 56
End: 2021-11-10
Payer: MEDICARE

## 2021-11-10 VITALS
HEART RATE: 98 BPM | DIASTOLIC BLOOD PRESSURE: 89 MMHG | TEMPERATURE: 97.3 F | WEIGHT: 280 LBS | BODY MASS INDEX: 52.87 KG/M2 | HEIGHT: 61 IN | OXYGEN SATURATION: 94 % | SYSTOLIC BLOOD PRESSURE: 132 MMHG | RESPIRATION RATE: 16 BRPM

## 2021-11-10 PROCEDURE — 99213 OFFICE O/P EST LOW 20 MIN: CPT | Mod: 25

## 2021-11-10 PROCEDURE — 20610 DRAIN/INJ JOINT/BURSA W/O US: CPT | Mod: LT

## 2021-11-10 RX ORDER — METHYLPRED ACET/NACL,ISO-OS/PF 80 MG/ML
80 VIAL (ML) INJECTION
Qty: 1 | Refills: 0 | Status: COMPLETED | OUTPATIENT
Start: 2021-11-10

## 2021-11-10 RX ADMIN — METHYLPREDNISOLONE ACETATE 0 MG/ML: 80 INJECTION, SUSPENSION INTRA-ARTICULAR; INTRALESIONAL; INTRAMUSCULAR; SOFT TISSUE at 00:00

## 2021-11-12 NOTE — PHYSICAL EXAM
[General Appearance - Alert] : alert [General Appearance - In No Acute Distress] : in no acute distress [Sclera] : the sclera and conjunctiva were normal [Examination Of The Oral Cavity] : the lips and gums were normal [Oropharynx] : the oropharynx was normal [Neck Appearance] : the appearance of the neck was normal [] : no respiratory distress [Exaggerated Use Of Accessory Muscles For Inspiration] : no accessory muscle use [Heart Rate And Rhythm] : heart rate was normal and rhythm regular [Edema] : there was no peripheral edema [Abdomen Soft] : soft [No Spinal Tenderness] : no spinal tenderness [Motor Exam] : the motor exam was normal [Oriented To Time, Place, And Person] : oriented to person, place, and time [Impaired Insight] : insight and judgment were intact [FreeTextEntry1] : thickened erythematous plaques over LE, b/l forearm, olecranon , R lower back

## 2021-11-12 NOTE — ASSESSMENT
[FreeTextEntry1] : Patient with fibromyalgia, IgA vasculitis resolved, psoriasis, LT  RCC:\par \par Psoriasis development may be multifactorial , namely stemming from renal malignancy as well as metabolic syndromes including uncontrolled DM.  Discussed the importance of dietary modification and medication compliance.  Patient should avoid biologics in the setting of RCC, rec Derm intervention for light therapy and additional topical immunosuppressants, Otezla continued.\par Physical therapy to continue with mobility and muscle strengthening of the rotator cuff; cortisone injection given to the LT subacromial bursa.    Core strengthening exercises demonstrated for the lower back.  Quadriceps strengthening exercises encouraged in the office.  Weight loss has been encouraged to reduce load over the medial joint line.  Viscosupplementation has been encouraged to provide additional lubrication and joint support. \par The importance of dietary and lifestyle modifications was reiterated for the treatment of Fibromyalgia along with discussions on relaxation, stress-reducing techniques and importance of good sleep hygiene.\par \par She is in agreement with the above plan and will return in three months' time.\par \par \par

## 2021-11-12 NOTE — ASSESSMENT
[FreeTextEntry1] : Patient with fibromyalgia, IgA vasculitis resolved, psoriasis,LT  RCC:\par \par Psoriasis development may be multifactorial , namely stemming from renal malignancy as well as metabolic syndromes including uncontrolled DM.  Discussed the importance of dietary modification and medication compliance.  Patient should avoid biologics in the setting of RCC, rec Derm intervention for light therapy and additional topical immunosuppressants. \par Physical therapy to continue with mobility and muscle strengthening of the rotator cuff; cortisone injection given to the RT subacromial bursa.    Core strengthening exercises demonstrated for the lower back.  Quadriceps strengthening exercises encouraged in the office.  Weight loss has been encouraged to reduce load over the medial joint line.  Viscosupplementation has been encouraged to provide additional lubrication and joint support. \par The importance of dietary and lifestyle modifications was reiterated for the treatment of Fibromyalgia along with discussions on relaxation, stress-reducing techniques and importance of good sleep hygiene.\par \par She is in agreement with the above plan and will return in three months' time.\par \par \par

## 2021-11-12 NOTE — HISTORY OF PRESENT ILLNESS
[FreeTextEntry1] : Patient returns and explains increased difficulty with raising right arm overhead and reaching for mid back . She denies accompanied trauma or paresthesias. She explains diffuse arthralgias and continued eruption of psoriasiform rash over LE, UE and back. She finds minimal relief from Otezla therapy.  LCV improved with topicals.  Hba1c: 10.5%.  Patient awaits surgical intervention for LT RCC with serial MR imaging pending for September.  Patient denies systemic symptoms.   \par Patient developed skin rash spring of  2019,   diagnosed as leukocytoclastic vasculitis. Ig A, biopsy proven, having been preceded by GI infection with subsequent urinalysis showing hematuria / proteinuria. Renal protection with ACE-I and regular f/u rec by nephrology colleagues in the setting of 4.1cm left kidney lesion / RCC.  Patient also reports ongoing cough , dry for the last year, with sleep apnea; on ICS LABA.   She otherwise denies motor/sensory disturbances or systemic symptoms.

## 2021-11-12 NOTE — HISTORY OF PRESENT ILLNESS
[FreeTextEntry1] : Patient returns and explains increased difficulty with raising left arm overhead and reaching for mid back . She denies accompanied trauma or paresthesias. She explains diffuse arthralgias and continued eruption of psoriasiform rash over LE, UE and back despite Otezla tx.  She finds the arthralgias have improved on Otezla.    LCV improved with topicals.  Hba1c: 9.3%.  Patient awaits surgical intervention for LT RCC with serial MR imaging in September reflecting stability of mass size. Patient denies systemic symptoms.   \par \par Patient developed skin rash spring of  2019,   diagnosed as leukocytoclastic vasculitis. Ig A, biopsy proven, having been preceded by GI infection with subsequent urinalysis showing hematuria / proteinuria. Renal protection with ACE-I and regular f/u rec by nephrology colleagues in the setting of 4.1cm left kidney lesion / RCC.   She otherwise denies motor/sensory disturbances or systemic symptoms.

## 2021-11-12 NOTE — PROCEDURE
[Other Date:___] : Date: [unfilled] [Patient] : the patient [Risks] : risks [Benefits] : benefits [Consent Obtained] : written consent was obtained prior to the procedure and is detailed in the patient's record [Therapeutic] : therapeutic [#1 Site: ______] : #1 site identified in the [unfilled] [Betadine] : betadine solution [___ml 1% Lidocaine] : [unfilled] ml of 1% lidocaine [Depomedrol ___ mg] : Depomedrol [unfilled] mg [Tolerated Well] : the patient tolerated the procedure well [No Complications] : there were no complications [Patient Instructed to Call] : patient was instructed to call if redness at site, a decrease in range of motion or an increase in pain is noted after procedure. [25 gauge 1.5 inch] : A 25 gauge 1.5 inch needle was used

## 2021-11-14 ENCOUNTER — APPOINTMENT (OUTPATIENT)
Dept: DISASTER EMERGENCY | Facility: CLINIC | Age: 56
End: 2021-11-14

## 2021-11-17 ENCOUNTER — APPOINTMENT (OUTPATIENT)
Dept: GASTROENTEROLOGY | Facility: HOSPITAL | Age: 56
End: 2021-11-17

## 2021-12-06 ENCOUNTER — NON-APPOINTMENT (OUTPATIENT)
Age: 56
End: 2021-12-06

## 2021-12-07 ENCOUNTER — LABORATORY RESULT (OUTPATIENT)
Age: 56
End: 2021-12-07

## 2021-12-08 ENCOUNTER — APPOINTMENT (OUTPATIENT)
Dept: MAMMOGRAPHY | Facility: HOSPITAL | Age: 56
End: 2021-12-08

## 2021-12-08 ENCOUNTER — OUTPATIENT (OUTPATIENT)
Dept: OUTPATIENT SERVICES | Facility: HOSPITAL | Age: 56
LOS: 1 days | End: 2021-12-08
Payer: MEDICARE

## 2021-12-08 ENCOUNTER — RESULT REVIEW (OUTPATIENT)
Age: 56
End: 2021-12-08

## 2021-12-08 ENCOUNTER — APPOINTMENT (OUTPATIENT)
Dept: ORTHOPEDIC SURGERY | Facility: CLINIC | Age: 56
End: 2021-12-08

## 2021-12-08 DIAGNOSIS — Z12.39 ENCOUNTER FOR OTHER SCREENING FOR MALIGNANT NEOPLASM OF BREAST: ICD-10-CM

## 2021-12-08 DIAGNOSIS — Z98.89 OTHER SPECIFIED POSTPROCEDURAL STATES: Chronic | ICD-10-CM

## 2021-12-08 DIAGNOSIS — E27.8 OTHER SPECIFIED DISORDERS OF ADRENAL GLAND: Chronic | ICD-10-CM

## 2021-12-08 DIAGNOSIS — Z90.710 ACQUIRED ABSENCE OF BOTH CERVIX AND UTERUS: Chronic | ICD-10-CM

## 2021-12-08 DIAGNOSIS — I77.6 ARTERITIS, UNSPECIFIED: Chronic | ICD-10-CM

## 2021-12-08 DIAGNOSIS — N89.8 OTHER SPECIFIED NONINFLAMMATORY DISORDERS OF VAGINA: Chronic | ICD-10-CM

## 2021-12-08 PROCEDURE — 77067 SCR MAMMO BI INCL CAD: CPT

## 2021-12-08 PROCEDURE — 77063 BREAST TOMOSYNTHESIS BI: CPT

## 2021-12-08 PROCEDURE — 77067 SCR MAMMO BI INCL CAD: CPT | Mod: 26

## 2021-12-08 PROCEDURE — 77063 BREAST TOMOSYNTHESIS BI: CPT | Mod: 26

## 2021-12-13 ENCOUNTER — APPOINTMENT (OUTPATIENT)
Dept: INTERNAL MEDICINE | Facility: CLINIC | Age: 56
End: 2021-12-13
Payer: MEDICARE

## 2021-12-14 ENCOUNTER — APPOINTMENT (OUTPATIENT)
Dept: ENDOCRINOLOGY | Facility: CLINIC | Age: 56
End: 2021-12-14
Payer: MEDICARE

## 2021-12-14 PROCEDURE — 99443: CPT

## 2021-12-14 NOTE — ASSESSMENT
[FreeTextEntry1] : The diabetic control has deteriorated\par She is trying to obtain the Ozempic authorization ASAP\par She will bring me the papers to be signed.\par The Jardiance and the Farxiga are too expensive\par Advised to walk regularly and follow the diet\par Will continue the same treatment in the meantime

## 2021-12-14 NOTE — HISTORY OF PRESENT ILLNESS
[Home] : at home, [unfilled] , at the time of the visit. [Medical Office: ([facility])___] : at the medical office located in  [Verbal consent obtained from patient] : the patient, [unfilled] [FreeTextEntry1] : Patient is not feeling well, she been having chest congestion for several months, she has seen the Pulmonary doctor. She has received cortisone injection for her shoulder pain which has increased her blood glucose. She is also under stress because of her kidney tumor. Her blood glucose are elevated.\par

## 2021-12-15 ENCOUNTER — APPOINTMENT (OUTPATIENT)
Dept: DERMATOLOGY | Facility: CLINIC | Age: 56
End: 2021-12-15

## 2021-12-23 ENCOUNTER — APPOINTMENT (OUTPATIENT)
Dept: INTERNAL MEDICINE | Facility: CLINIC | Age: 56
End: 2021-12-23
Payer: MEDICARE

## 2021-12-23 VITALS
BODY MASS INDEX: 52.11 KG/M2 | HEIGHT: 61 IN | DIASTOLIC BLOOD PRESSURE: 85 MMHG | TEMPERATURE: 97.1 F | SYSTOLIC BLOOD PRESSURE: 135 MMHG | WEIGHT: 276 LBS | RESPIRATION RATE: 16 BRPM | HEART RATE: 97 BPM | OXYGEN SATURATION: 94 %

## 2021-12-23 PROCEDURE — 99396 PREV VISIT EST AGE 40-64: CPT

## 2021-12-23 NOTE — HEALTH RISK ASSESSMENT
[Fair] :  ~his/her~ mood as fair [Never] : Never [No] : In the past 12 months have you used drugs other than those required for medical reasons? No [No falls in past year] : Patient reported no falls in the past year [0] : 2) Feeling down, depressed, or hopeless: Not at all (0) [Patient reported mammogram was normal] : Patient reported mammogram was normal [Patient reported bone density results were normal] : Patient reported bone density results were normal [Patient reported colonoscopy was abnormal] : Patient reported colonoscopy was abnormal [HIV test declined] : HIV test declined [None] : None [Feels Safe at Home] : Feels safe at home [Independent] : managing finances [Some assistance needed] : using transportation [Smoke Detector] : smoke detector [Carbon Monoxide Detector] : carbon monoxide detector [Seat Belt] :  uses seat belt [Sunscreen] : uses sunscreen [With Patient/Caregiver] : , with patient/caregiver [FreeTextEntry1] : Check up\par  [de-identified] : ENDO/RHEUM [[id number]] : 0 [Change in mental status noted] : No change in mental status noted [Reports changes in hearing] : Reports no changes in hearing [Reports changes in vision] : Reports no changes in vision [Reports changes in dental health] : Reports no changes in dental health [MammogramDate] : 12/21 [PapSmearComments] : As per GYN [BoneDensityDate] : 08/19 [ColonoscopyDate] : 07/21 [HepatitisCDate] : 04/21 [HepatitisCComments] : Negative [AdvancecareDate] : 12/21

## 2021-12-23 NOTE — HISTORY OF PRESENT ILLNESS
[de-identified] : 56 year old female patient with history of stable Hypertension, Hypothyroidism, Type 2 Diabetes Mellitus, Allergic Asthma, Allergic Rhinosinusitis, Fibromyalgia, history as stated, presented for an annual preventative examination.\par

## 2021-12-23 NOTE — ASSESSMENT
[FreeTextEntry1] : 56 year old female found to have stable Hypertension, Hypothyroidism, Type 2 Diabetes Mellitus, Allergic Asthma, Allergic Rhinosinusitis, Fibromyalgia,with the current prescription regimen as recommended, diet and life style modifications, as counseled. Prior results reviewed, interpreted and discussed with the patient during today's examination, as appropriate. Follow up, treatment plan and tests, as ordered.\par \par

## 2022-01-10 ENCOUNTER — APPOINTMENT (OUTPATIENT)
Dept: UROLOGY | Facility: CLINIC | Age: 57
End: 2022-01-10
Payer: COMMERCIAL

## 2022-01-10 DIAGNOSIS — N32.81 OVERACTIVE BLADDER: ICD-10-CM

## 2022-01-10 PROCEDURE — 99214 OFFICE O/P EST MOD 30 MIN: CPT | Mod: 95

## 2022-01-11 PROBLEM — N32.81 OAB (OVERACTIVE BLADDER): Status: ACTIVE | Noted: 2018-10-03

## 2022-01-11 NOTE — HISTORY OF PRESENT ILLNESS
[Home] : at home, [unfilled] , at the time of the visit. [Medical Office: ([facility])___] : at the medical office located in  [Verbal consent obtained from patient] : the patient, [unfilled] [FreeTextEntry1] : 55 yo F with microhematuria\par Denies any dysuria or gross hematuria\par Does see Dr. Pena for mixed incontinence\par 1 yr of right flank pain, chronic, dull\par CT last Feb showed no stones\par Of note, had bad reaction to gadolinium in the past but has had CT angio with contrast before with no issues\par \par 11/21/19 Interval history: No issues since last visit\par Here to review CT urogram\par \par 2/13/20 Interval history: Since last visit, pt saw IR for possible perc ablation for her renal mass\par No flank pain, no urinary issues\par \par 1/14/21 Interval history: Pt was pending open partial nephrectomy but has been delayed due to Covid pandemic and then lost to follow-up\par Pt is s/p microvascular decompression for trigeminal neuralgia in July, 2020\par Postop complicated by hypoxia, hypercapnia with resp failure\par Since then pt states she has been sick due to lung issues - currently on supplemental O2 at home\par Always feels like there is an elephant on her chest\par Recently has been having chills, sore throat\par \par 6/14/21 Interval history: Still on supplemental O2 at home\par Pending cardiology workup\par No changes in symptoms - denies any flank pain or urinary issues\par \par 1/10/22 Interval history: No changes in symptoms from urologic standpoint\par No flank pain, no gross hematuria, no dysuria\par But having her baseline urinary urgency and incontinence\par Seen by urogyn (Dr. Stephanie Pena) in the past - anaphylactic reaction to tolterodine and not a surgical candidate\par Recent labwork done by PCP showed hgba1c of 11, pending UA\par Still on supplemental o2\par Recent MRI from September showed stable renal mass\par \par All pertinent parts of the patient PFSH (past medical, family and social histories), laboratory, radiological studies and physician notes were reviewed prior to starting the face to face portion of the telemedicine visit.  Questionnaire results were discussed with patient. \par \par

## 2022-01-11 NOTE — ASSESSMENT
[FreeTextEntry1] : 55 yo F with left renal mass, OAB \par \par - Reviewed MRI imaging from 9/21/21 through PACS and confirmed stability of renal mass. Would recommend continuing active surveillance\par - Repeat MRI in March, 2022\par - Discussed possible etiologies for LUTS. Discussed ways to manage them including behavioral modifications such as adequate hydration, controlling constipation, restricting fluids in the evening. Reviewed other options including medication and surgical intervention. Decision made to continue behavioral modifications including DM2 control and weight loss\par - FU after repeat MRI

## 2022-01-12 ENCOUNTER — LABORATORY RESULT (OUTPATIENT)
Age: 57
End: 2022-01-12

## 2022-01-13 LAB
ALBUMIN SERPL ELPH-MCNC: 4.1 G/DL
ALP BLD-CCNC: 127 U/L
ALT SERPL-CCNC: 37 U/L
ANION GAP SERPL CALC-SCNC: 14 MMOL/L
AST SERPL-CCNC: 38 U/L
BASOPHILS # BLD AUTO: 0.06 K/UL
BASOPHILS NFR BLD AUTO: 0.7 %
BILIRUB SERPL-MCNC: 0.5 MG/DL
BUN SERPL-MCNC: 12 MG/DL
CALCIUM SERPL-MCNC: 9.6 MG/DL
CHLORIDE SERPL-SCNC: 93 MMOL/L
CHOLEST SERPL-MCNC: 189 MG/DL
CO2 SERPL-SCNC: 30 MMOL/L
CREAT SERPL-MCNC: 0.71 MG/DL
EOSINOPHIL # BLD AUTO: 0.25 K/UL
EOSINOPHIL NFR BLD AUTO: 3 %
ERYTHROCYTE [SEDIMENTATION RATE] IN BLOOD BY WESTERGREN METHOD: 120 MM/HR
ESTIMATED AVERAGE GLUCOSE: 280 MG/DL
FOLATE SERPL-MCNC: 10.2 NG/ML
GGT SERPL-CCNC: 47 U/L
GLUCOSE SERPL-MCNC: 255 MG/DL
HBA1C MFR BLD HPLC: 11.4 %
HCT VFR BLD CALC: 44.8 %
HDLC SERPL-MCNC: 44 MG/DL
HGB BLD-MCNC: 12.8 G/DL
IMM GRANULOCYTES NFR BLD AUTO: 0.6 %
IRON SATN MFR SERPL: 12 %
IRON SERPL-MCNC: 45 UG/DL
LDLC SERPL CALC-MCNC: 119 MG/DL
LYMPHOCYTES # BLD AUTO: 1.86 K/UL
LYMPHOCYTES NFR BLD AUTO: 22.2 %
MAN DIFF?: NORMAL
MCHC RBC-ENTMCNC: 23.4 PG
MCHC RBC-ENTMCNC: 28.6 GM/DL
MCV RBC AUTO: 81.8 FL
MONOCYTES # BLD AUTO: 0.49 K/UL
MONOCYTES NFR BLD AUTO: 5.9 %
NEUTROPHILS # BLD AUTO: 5.66 K/UL
NEUTROPHILS NFR BLD AUTO: 67.6 %
NONHDLC SERPL-MCNC: 145 MG/DL
PLATELET # BLD AUTO: 335 K/UL
POTASSIUM SERPL-SCNC: 5.2 MMOL/L
PROT SERPL-MCNC: 7.5 G/DL
RBC # BLD: 5.48 M/UL
RBC # FLD: 15.7 %
SODIUM SERPL-SCNC: 137 MMOL/L
T3 SERPL-MCNC: 121 NG/DL
T4 FREE SERPL-MCNC: 1.3 NG/DL
TIBC SERPL-MCNC: 372 UG/DL
TRIGL SERPL-MCNC: 134 MG/DL
TSH SERPL-ACNC: 3.1 UIU/ML
UIBC SERPL-MCNC: 328 UG/DL
VIT B12 SERPL-MCNC: 755 PG/ML
WBC # FLD AUTO: 8.37 K/UL

## 2022-02-04 ENCOUNTER — INPATIENT (INPATIENT)
Facility: HOSPITAL | Age: 57
LOS: 17 days | Discharge: NURSING, REHAB OR LONG-TERM CARE | DRG: 602 | End: 2022-02-22
Attending: INTERNAL MEDICINE | Admitting: INTERNAL MEDICINE
Payer: MEDICARE

## 2022-02-04 VITALS
RESPIRATION RATE: 20 BRPM | HEIGHT: 61.02 IN | OXYGEN SATURATION: 94 % | SYSTOLIC BLOOD PRESSURE: 128 MMHG | DIASTOLIC BLOOD PRESSURE: 94 MMHG | WEIGHT: 279.99 LBS | HEART RATE: 104 BPM | TEMPERATURE: 98 F

## 2022-02-04 DIAGNOSIS — R07.9 CHEST PAIN, UNSPECIFIED: ICD-10-CM

## 2022-02-04 DIAGNOSIS — N89.8 OTHER SPECIFIED NONINFLAMMATORY DISORDERS OF VAGINA: Chronic | ICD-10-CM

## 2022-02-04 DIAGNOSIS — B37.3 CANDIDIASIS OF VULVA AND VAGINA: ICD-10-CM

## 2022-02-04 DIAGNOSIS — I77.6 ARTERITIS, UNSPECIFIED: Chronic | ICD-10-CM

## 2022-02-04 DIAGNOSIS — Z90.710 ACQUIRED ABSENCE OF BOTH CERVIX AND UTERUS: Chronic | ICD-10-CM

## 2022-02-04 DIAGNOSIS — E27.8 OTHER SPECIFIED DISORDERS OF ADRENAL GLAND: Chronic | ICD-10-CM

## 2022-02-04 DIAGNOSIS — Z98.89 OTHER SPECIFIED POSTPROCEDURAL STATES: Chronic | ICD-10-CM

## 2022-02-04 LAB
ALBUMIN SERPL ELPH-MCNC: 2.9 G/DL — LOW (ref 3.5–5)
ALP SERPL-CCNC: 127 U/L — HIGH (ref 40–120)
ALT FLD-CCNC: 68 U/L DA — HIGH (ref 10–60)
ANION GAP SERPL CALC-SCNC: 6 MMOL/L — SIGNIFICANT CHANGE UP (ref 5–17)
APPEARANCE UR: CLEAR — SIGNIFICANT CHANGE UP
AST SERPL-CCNC: 66 U/L — HIGH (ref 10–40)
BACTERIA # UR AUTO: ABNORMAL /HPF
BASOPHILS # BLD AUTO: 0.04 K/UL — SIGNIFICANT CHANGE UP (ref 0–0.2)
BASOPHILS NFR BLD AUTO: 0.5 % — SIGNIFICANT CHANGE UP (ref 0–2)
BILIRUB SERPL-MCNC: 0.4 MG/DL — SIGNIFICANT CHANGE UP (ref 0.2–1.2)
BILIRUB UR-MCNC: NEGATIVE — SIGNIFICANT CHANGE UP
BUN SERPL-MCNC: 17 MG/DL — SIGNIFICANT CHANGE UP (ref 7–18)
CALCIUM SERPL-MCNC: 9.2 MG/DL — SIGNIFICANT CHANGE UP (ref 8.4–10.5)
CHLORIDE SERPL-SCNC: 102 MMOL/L — SIGNIFICANT CHANGE UP (ref 96–108)
CO2 SERPL-SCNC: 30 MMOL/L — SIGNIFICANT CHANGE UP (ref 22–31)
COLOR SPEC: YELLOW — SIGNIFICANT CHANGE UP
CREAT SERPL-MCNC: 0.9 MG/DL — SIGNIFICANT CHANGE UP (ref 0.5–1.3)
DIFF PNL FLD: NEGATIVE — SIGNIFICANT CHANGE UP
EOSINOPHIL # BLD AUTO: 0.29 K/UL — SIGNIFICANT CHANGE UP (ref 0–0.5)
EOSINOPHIL NFR BLD AUTO: 3.3 % — SIGNIFICANT CHANGE UP (ref 0–6)
EPI CELLS # UR: ABNORMAL /HPF
GLUCOSE SERPL-MCNC: 269 MG/DL — HIGH (ref 70–99)
GLUCOSE UR QL: 100 MG/DL
HCG UR QL: NEGATIVE — SIGNIFICANT CHANGE UP
HCT VFR BLD CALC: 43 % — SIGNIFICANT CHANGE UP (ref 34.5–45)
HGB BLD-MCNC: 12.9 G/DL — SIGNIFICANT CHANGE UP (ref 11.5–15.5)
IMM GRANULOCYTES NFR BLD AUTO: 0.6 % — SIGNIFICANT CHANGE UP (ref 0–1.5)
KETONES UR-MCNC: NEGATIVE — SIGNIFICANT CHANGE UP
LEUKOCYTE ESTERASE UR-ACNC: ABNORMAL
LIDOCAIN IGE QN: 69 U/L — LOW (ref 73–393)
LYMPHOCYTES # BLD AUTO: 1.93 K/UL — SIGNIFICANT CHANGE UP (ref 1–3.3)
LYMPHOCYTES # BLD AUTO: 22.1 % — SIGNIFICANT CHANGE UP (ref 13–44)
MCHC RBC-ENTMCNC: 23.3 PG — LOW (ref 27–34)
MCHC RBC-ENTMCNC: 30 GM/DL — LOW (ref 32–36)
MCV RBC AUTO: 77.6 FL — LOW (ref 80–100)
MONOCYTES # BLD AUTO: 0.67 K/UL — SIGNIFICANT CHANGE UP (ref 0–0.9)
MONOCYTES NFR BLD AUTO: 7.7 % — SIGNIFICANT CHANGE UP (ref 2–14)
NEUTROPHILS # BLD AUTO: 5.75 K/UL — SIGNIFICANT CHANGE UP (ref 1.8–7.4)
NEUTROPHILS NFR BLD AUTO: 65.8 % — SIGNIFICANT CHANGE UP (ref 43–77)
NITRITE UR-MCNC: NEGATIVE — SIGNIFICANT CHANGE UP
NRBC # BLD: 0 /100 WBCS — SIGNIFICANT CHANGE UP (ref 0–0)
NT-PROBNP SERPL-SCNC: 115 PG/ML — SIGNIFICANT CHANGE UP (ref 0–125)
PH UR: 6 — SIGNIFICANT CHANGE UP (ref 5–8)
PLATELET # BLD AUTO: 358 K/UL — SIGNIFICANT CHANGE UP (ref 150–400)
POTASSIUM SERPL-MCNC: 4.3 MMOL/L — SIGNIFICANT CHANGE UP (ref 3.5–5.3)
POTASSIUM SERPL-SCNC: 4.3 MMOL/L — SIGNIFICANT CHANGE UP (ref 3.5–5.3)
PROT SERPL-MCNC: 8.2 G/DL — SIGNIFICANT CHANGE UP (ref 6–8.3)
PROT UR-MCNC: 30 MG/DL
RBC # BLD: 5.54 M/UL — HIGH (ref 3.8–5.2)
RBC # FLD: 15.7 % — HIGH (ref 10.3–14.5)
RBC CASTS # UR COMP ASSIST: SIGNIFICANT CHANGE UP /HPF (ref 0–2)
SARS-COV-2 RNA SPEC QL NAA+PROBE: SIGNIFICANT CHANGE UP
SODIUM SERPL-SCNC: 138 MMOL/L — SIGNIFICANT CHANGE UP (ref 135–145)
SP GR SPEC: 1.02 — SIGNIFICANT CHANGE UP (ref 1.01–1.02)
TROPONIN I, HIGH SENSITIVITY RESULT: 16.9 NG/L — SIGNIFICANT CHANGE UP
UROBILINOGEN FLD QL: 1
WBC # BLD: 8.73 K/UL — SIGNIFICANT CHANGE UP (ref 3.8–10.5)
WBC # FLD AUTO: 8.73 K/UL — SIGNIFICANT CHANGE UP (ref 3.8–10.5)
WBC UR QL: SIGNIFICANT CHANGE UP /HPF (ref 0–5)

## 2022-02-04 PROCEDURE — 71046 X-RAY EXAM CHEST 2 VIEWS: CPT | Mod: 26

## 2022-02-04 PROCEDURE — 74176 CT ABD & PELVIS W/O CONTRAST: CPT | Mod: 26,MA

## 2022-02-04 PROCEDURE — 99222 1ST HOSP IP/OBS MODERATE 55: CPT

## 2022-02-04 PROCEDURE — 99285 EMERGENCY DEPT VISIT HI MDM: CPT

## 2022-02-04 RX ORDER — CEFTRIAXONE 500 MG/1
1000 INJECTION, POWDER, FOR SOLUTION INTRAMUSCULAR; INTRAVENOUS ONCE
Refills: 0 | Status: COMPLETED | OUTPATIENT
Start: 2022-02-04 | End: 2022-02-04

## 2022-02-04 RX ORDER — SODIUM CHLORIDE 9 MG/ML
1000 INJECTION INTRAMUSCULAR; INTRAVENOUS; SUBCUTANEOUS ONCE
Refills: 0 | Status: COMPLETED | OUTPATIENT
Start: 2022-02-04 | End: 2022-02-04

## 2022-02-04 RX ADMIN — CEFTRIAXONE 100 MILLIGRAM(S): 500 INJECTION, POWDER, FOR SOLUTION INTRAMUSCULAR; INTRAVENOUS at 23:15

## 2022-02-04 RX ADMIN — SODIUM CHLORIDE 1000 MILLILITER(S): 9 INJECTION INTRAMUSCULAR; INTRAVENOUS; SUBCUTANEOUS at 20:42

## 2022-02-04 NOTE — H&P ADULT - PROBLEM SELECTOR PLAN 1
no c/w chest pain which reproducible on palpation   Hx of fibromyalgia   Stress test and ECHO last year in Feb were normal   Tropx1 neg   f/u tropx2   Admit to tele   Heart Score 3 low score (Risk of Mace 0.9-1.7%)  Less likely cardiac more likely related to fibromyalgia and morbid obesity   Can consider Cardio Consult but hold off for now c/w chest pain which reproducible on palpation   Hx of fibromyalgia   Stress test and ECHO last year in Feb were normal   Tropx1 neg   f/u tropx2   Admit to tele   Heart Score 3 low score (Risk of Mace 0.9-1.7%)  Less likely cardiac more likely related to fibromyalgia and morbid obesity   Can consider Cardio Consult but hold off for now  Pain management consulted

## 2022-02-04 NOTE — H&P ADULT - HISTORY OF PRESENT ILLNESS
55 yo F, from home, ambulated with cane , PMHx of obesity, asthma, chronic bronchitis, NAKUL, HTN, DM, HLD, fibromyalgia, anxiety, trigeminal neuralgia, degenerative joint disease, ?IgA vasculitis, psoriasis, OA came with chief complain of chest pain and dysuria. Pt stated that for few weeks she is having burning pain in her urine, foul smell, itchy, cloudy. She further stated that she is having pain in her lower abdomen as well as in chest whenever she pressed it. She denied any vaginal discharge, headaches, visual changes, N/V/D.     Of note pt admitted at [facility] in Jan-Feb 2021 and had extensive cardiac work up stress test and echo which were negative. Pt has left renal mass  diagnosed on imaging chances of malignancy 85% , followed outpatient urologist and heme/onc for monitoring . decreasing weight and controlled blood sugars to get the surgery at some point     In ED: UA +ve no wbc, few bacteria , trop x 1 neg

## 2022-02-04 NOTE — H&P ADULT - ASSESSMENT
55 yo F, from home, ambulated with cane , PMHx of obesity, asthma, chronic bronchitis, NAKUL, HTN, DM, HLD, fibromyalgia, anxiety, trigeminal neuralgia, degenerative joint disease, ?IgA vasculitis, psoriasis, OA came with chief complain of chest pain and dysuria.

## 2022-02-04 NOTE — ED PROVIDER NOTE - PHYSICAL EXAMINATION
General: nad  HEENT: EOMI, PERRLA, normal mucosa, normal oropharynx, no lesions on the lips or on oral mucosa, normal external ear  Neck: supple, no lymphadenopathy, full range of motion, no nuchal rigidity  CV: RRR, normal S1 and S2 with no murmur, capillary refill less than two seconds  Resp: lungs CTA b/l, good aeration bilaterally, symmetric chest wall   Abd: non-distended, soft, mild suprapubic ttp  : no CVA tenderness  MSK: full range of motion, no cyanosis, no edema, no clubbing, no immobility  Neuro: CN II-XII grossly intact, muscle strength 5/5 in all extremities, normal gait  Skin: no rashes, skin intact

## 2022-02-04 NOTE — ED PROVIDER NOTE - PROGRESS NOTE DETAILS
Asad Triana, DO: Patient reassessed, NAD, non-toxic appearing. results dw pt, questions answered. noted ct findings, do NOT see evidence of soft tissue infection upon repeat abd skin exam. endorsed to hospitalist. treating w/ abx due to sx of uti despite negative UA.

## 2022-02-04 NOTE — H&P ADULT - PROBLEM SELECTOR PLAN 2
few bacteria , no wbc in UA  +ve dysuria on hx   Will start patient on abx ceftriaxone   f/u ucx few bacteria , no wbc in UA  +ve dysuria and itching on hx   Will start patient on abx ceftriaxone if doesn't get improve need to rule out any vaginal infection   f/u ucx

## 2022-02-04 NOTE — H&P ADULT - NSICDXPASTMEDICALHX_GEN_ALL_CORE_FT
PAST MEDICAL HISTORY:  Asthma last inhaler use with in 10 days, on Pulm follow up [id number]    Back pain thoracic & lumbar    Cervical neuralgia difficulty moving my neck    DJD (degenerative joint disease) Cervical/Thoracic/Lumbar    DM (diabetes mellitus) 2    Falls frequently     Fibromyalgia     H/O bursitis right shoulder    Hyperlipidemia     Hypertension vaginal cyst    Hypothyroid     Obesity morbid    Obstructive sleep apnea refuses Cpap    Radicular pain arms, legs    Renal cell carcinoma, left on follow up Dr schulte, HOD renal [facility], waiting for suregry in 09/2020    Trigeminal neuralgia R    Vasculitis Legs, forerhead, arms & hands, flare ups in R leg  Dr susie Ribeiro is my Rheumatologist

## 2022-02-04 NOTE — H&P ADULT - NSHPPHYSICALEXAM_GEN_ALL_CORE
Vital Signs Last 24 Hrs  T(C): 36.7 (04 Feb 2022 23:36), Max: 36.7 (04 Feb 2022 18:53)  T(F): 98.1 (04 Feb 2022 23:36), Max: 98.1 (04 Feb 2022 18:53)  HR: 84 (04 Feb 2022 23:36) (84 - 104)  BP: 149/87 (04 Feb 2022 23:36) (128/94 - 149/87)  BP(mean): --  RR: 18 (04 Feb 2022 23:36) (18 - 20)  SpO2: 95% (04 Feb 2022 23:36) (94% - 95%)    GENERAL: morbidly obese female sitting on chair   EYES: EOMI, PERRLA,   NECK: Supple, No JVD  CHEST/LUNG: Clear to auscultation b/l  No rales, rhonchi, wheezing   HEART: Regular rate and rhythm; No murmurs, +ve S1 S2   ABDOMEN: Soft, Nontender, Nondistended; Bowel sounds present  NERVOUS SYSTEM:  Alert & Oriented X3, normal sensations and normal strength     EXTREMITIES:   No clubbing, cyanosis, or edema  Skin: +ve psoriasis on arms, legs and back

## 2022-02-04 NOTE — ED PROVIDER NOTE - CLINICAL SUMMARY MEDICAL DECISION MAKING FREE TEXT BOX
Asad Triana, DO: 57 yo f pmh asthma, chronic bronchitis, NAKUL, HTN, DM, HLD, fibromyalgia, anxiety, trigeminal neuralgia, degenerative joint disease, IgA vasculitis, pw dysuria. reports several weeks of sx, including foul smelling odor, no vaginal dc. reports did not get eval prior due to insurance changing. also having mild chest pressure, mid sternal, associated w/ sob. denies n/v, cough, congestion, ha. reports lightheadedness. arrives hds, pe as noted. w/ multiple sx w/o clear primary source. will do broad w/u, infectious w/u, acs w/u. eval for possible superimposed stone. labs, imaging, reassess.

## 2022-02-04 NOTE — ED PROVIDER NOTE - NSICDXPASTMEDICALHX_GEN_ALL_CORE_FT
PAST MEDICAL HISTORY:  Asthma last inhaler use with in 10 days, on Pulm follow up [id number]    Back pain thoracic & lumbar    Cervical neuralgia difficulty moving my neck    DJD (degenerative joint disease) Cervical/Thoracic/Lumbar    DM (diabetes mellitus) 2    Falls frequently     Fibromyalgia     H/O bursitis right shoulder    Hyperlipidemia     Hypertension vaginal cyst    Hypothyroid     Obesity morbid    Obstructive sleep apnea refuses Cpap    Radicular pain arms, legs    Renal cell carcinoma, left on follow up Dr schulte, HOD renal [facility], waiting for suregry in 09/2020    Trigeminal neuralgia R    Vasculitis Legs, forerhead, arms & hands, flare ups in R leg  Dr susie Ribeiro is my Rheumatologist

## 2022-02-04 NOTE — ED ADULT NURSE NOTE - OBJECTIVE STATEMENT
55 yo female lying on bed c/o chest tightness and lower abdominal pain associated with intermittent fever and vomiting x 3 weeks.

## 2022-02-04 NOTE — H&P ADULT - ATTENDING COMMENTS
Pt seen at bedside  Case discussed with MAR.   56 year old woman with multiple medical problems including Asthma, NAKUL, DM2, HTN, HLD, fibromyalgia, DJD here with multiple non-associated symptoms including chest pain, ? SOB, dysuria for a few weeks.     Vital Signs Last 24 Hrs  T(C): 36.7 (2022 18:53), Max: 36.7 (2022 18:53)  T(F): 98.1 (2022 18:53), Max: 98.1 (2022 18:53)  HR: 104 (2022 18:53) (104 - 104)  BP: 128/94 (2022 18:53) (128/94 - 128/94)  RR: 20 (2022 18:53) (20 - 20)  SpO2: 94% (2022 18:53) (94% - 94%)                            12.9   8.73  )-----------( 358      ( 2022 19:54 )             43.0   MCV 78  02-04    138  |  102  |  17  --------------------<  269<H>  4.3   |  30  |  0.90    Ca    9.2      2022 19:54    TPro  8.2  /  Alb  2.9<L>  /  TBili  0.4  /  DBili  x   /  AST  66<H>  /  ALT  68<H>  /  AlkPhos  127<H>  -04    - Urinalysis Basic - ( 2022 20:54 )  Color: Yellow / Appearance: Clear / S.020 / pH: x  Gluc: x / Ketone: Negative  / Bili: Negative / Urobili: 1   Blood: x / Protein: 30 mg/dL / Nitrite: Negative   Leuk Esterase: Trace / RBC: 0-2 /HPF / WBC 0-2 /HPF   Sq Epi: x / Non Sq Epi: Moderate /HPF / Bacteria: Trace /HPF    CT abdomen   Skin thickening with subcutaneous stranding in the right lower quadrant anterior abdominal wall may represent cellulitis.   No evidence for a urinary calculus. No hydronephrosis. Apparent 4.1 cm slightly hypodense lesion in the upper pole left kidney.   Nonspecific mildly enlarged 1.1 cm right external iliac chain lymph node.    CXR   unremarkable Pt seen at bedside  Case discussed with MAR.   56 year old woman with multiple medical problems including Asthma, NAKUL, DM2, HTN, HLD, fibromyalgia, DJD here with multiple non-associated symptoms including chest pain, ? SOB, dysuria for a few weeks.     Vital Signs Last 24 Hrs  T(C): 36.7 (2022 18:53), Max: 36.7 (2022 18:53)  T(F): 98.1 (2022 18:53), Max: 98.1 (2022 18:53)  HR: 104 (2022 18:53) (104 - 104)  BP: 128/94 (2022 18:53) (128/94 - 128/94)  RR: 20 (2022 18:53) (20 - 20)  SpO2: 94% (2022 18:53) (94% - 94%)                            12.9   8.73  )-----------( 358      ( 2022 19:54 )             43.0   MCV 78  02-04    138  |  102  |  17  --------------------<  269<H>  4.3   |  30  |  0.90    Ca    9.2      2022 19:54    TPro  8.2  /  Alb  2.9<L>  /  TBili  0.4  /  DBili  x   /  AST  66<H>  /  ALT  68<H>  /  AlkPhos  127<H>  -04    - Urinalysis Basic - ( 2022 20:54 )  Color: Yellow / Appearance: Clear / S.020 / pH: x  Gluc: x / Ketone: Negative  / Bili: Negative / Urobili: 1   Blood: x / Protein: 30 mg/dL / Nitrite: Negative   Leuk Esterase: Trace / RBC: 0-2 /HPF / WBC 0-2 /HPF   Sq Epi: x / Non Sq Epi: Moderate /HPF / Bacteria: Trace /HPF    CT abdomen   Skin thickening with subcutaneous stranding in the right lower quadrant anterior abdominal wall may represent cellulitis.   No evidence for a urinary calculus. No hydronephrosis. Apparent 4.1 cm slightly hypodense lesion in the upper pole left kidney.   Nonspecific mildly enlarged 1.1 cm right external iliac chain lymph node.    CXR   unremarkable    Impression   56 year old woman with multiple medical problems some of which are detailed above presenting with chest pain among other symptoms. Work up so far unremarkable. Similar presentation about 5-6 months ago for which she had a unremarkable stress test. Pt with urinary symptoms but work up so far unremarkable.   Pt with hx of fibromyalgia and concerned this might be related to this underlying medical problem.    A/P   Chest pain   low suspicion for ACS    Plan   - Admit to Medicine  - Pain control   - repeat 2nd troponin assay  - resume home meds  - Stop antibiotics for UTI and observe   - Trial of pyridium for possible sterile pyuria

## 2022-02-04 NOTE — H&P ADULT - PROBLEM SELECTOR PLAN 8
On Lantus 40 units, Novolog 16 units in breakfast  13-18 units in lunch and dinner basis on blood sugars   Metformin 1000mg BID     Resume 20 units of Lantus and 8 units of Admelog with ISS  fs achs   f/u lipid profile

## 2022-02-04 NOTE — ED PROVIDER NOTE - OBJECTIVE STATEMENT
55 yo f pmh asthma, chronic bronchitis, NAKUL, HTN, DM, HLD, fibromyalgia, anxiety, trigeminal neuralgia, degenerative joint disease, IgA vasculitis, pw dysuria. reports several weeks of sx, including foul smelling odor, no vaginal dc. reports did not get eval prior due to insurance changing. also having mild chest pressure, mid sternal, associated w/ sob. denies n/v, cough, congestion, ha. reports lightheadedness.

## 2022-02-04 NOTE — ED PROVIDER NOTE - NS ED ROS FT
GENERAL: No fever or chills, //             EYES: no change in vision, //             HEENT: no trouble swallowing or speaking, //             CARDIAC:  chest pain, //              PULMONARY: no cough or SOB, //             GI: no abdominal pain, no nausea or no vomiting, no diarrhea or constipation, //             : dysuria  //            SKIN: no rashes,  //            NEURO: no headache,  //             MSK: No joint pain otherwise as HPI or negative. ~Asad Triana, DO

## 2022-02-04 NOTE — ED ADULT NURSE NOTE - DRUG PRE-SCREENING (DAST -1)
Alert, oriented x 2, v.s.s. forgetul at times but calm and cooperative, no c/o pain or signs of distress, seizure and safety precautions maintained, continues on IVF, comfortable this shift, report endorsed to oncoming RN. Statement Selected

## 2022-02-05 DIAGNOSIS — R07.9 CHEST PAIN, UNSPECIFIED: ICD-10-CM

## 2022-02-05 DIAGNOSIS — E11.9 TYPE 2 DIABETES MELLITUS WITHOUT COMPLICATIONS: ICD-10-CM

## 2022-02-05 DIAGNOSIS — M54.9 DORSALGIA, UNSPECIFIED: ICD-10-CM

## 2022-02-05 DIAGNOSIS — I10 ESSENTIAL (PRIMARY) HYPERTENSION: ICD-10-CM

## 2022-02-05 DIAGNOSIS — L03.311 CELLULITIS OF ABDOMINAL WALL: ICD-10-CM

## 2022-02-05 DIAGNOSIS — E11.40 TYPE 2 DIABETES MELLITUS WITH DIABETIC NEUROPATHY, UNSPECIFIED: ICD-10-CM

## 2022-02-05 DIAGNOSIS — M79.7 FIBROMYALGIA: ICD-10-CM

## 2022-02-05 DIAGNOSIS — G62.9 POLYNEUROPATHY, UNSPECIFIED: ICD-10-CM

## 2022-02-05 DIAGNOSIS — Z29.9 ENCOUNTER FOR PROPHYLACTIC MEASURES, UNSPECIFIED: ICD-10-CM

## 2022-02-05 DIAGNOSIS — E78.5 HYPERLIPIDEMIA, UNSPECIFIED: ICD-10-CM

## 2022-02-05 DIAGNOSIS — J45.909 UNSPECIFIED ASTHMA, UNCOMPLICATED: ICD-10-CM

## 2022-02-05 DIAGNOSIS — R30.0 DYSURIA: ICD-10-CM

## 2022-02-05 DIAGNOSIS — E03.9 HYPOTHYROIDISM, UNSPECIFIED: ICD-10-CM

## 2022-02-05 LAB
A1C WITH ESTIMATED AVERAGE GLUCOSE RESULT: 11.4 % — HIGH (ref 4–5.6)
ALBUMIN SERPL ELPH-MCNC: 3.1 G/DL — LOW (ref 3.5–5)
ALP SERPL-CCNC: 117 U/L — SIGNIFICANT CHANGE UP (ref 40–120)
ALT FLD-CCNC: 65 U/L DA — HIGH (ref 10–60)
ANION GAP SERPL CALC-SCNC: 6 MMOL/L — SIGNIFICANT CHANGE UP (ref 5–17)
AST SERPL-CCNC: 51 U/L — HIGH (ref 10–40)
BILIRUB SERPL-MCNC: 0.5 MG/DL — SIGNIFICANT CHANGE UP (ref 0.2–1.2)
BUN SERPL-MCNC: 16 MG/DL — SIGNIFICANT CHANGE UP (ref 7–18)
CALCIUM SERPL-MCNC: 9.7 MG/DL — SIGNIFICANT CHANGE UP (ref 8.4–10.5)
CHLORIDE SERPL-SCNC: 102 MMOL/L — SIGNIFICANT CHANGE UP (ref 96–108)
CHOLEST SERPL-MCNC: 195 MG/DL — SIGNIFICANT CHANGE UP
CO2 SERPL-SCNC: 31 MMOL/L — SIGNIFICANT CHANGE UP (ref 22–31)
CREAT SERPL-MCNC: 0.83 MG/DL — SIGNIFICANT CHANGE UP (ref 0.5–1.3)
ESTIMATED AVERAGE GLUCOSE: 280 MG/DL — HIGH (ref 68–114)
GLUCOSE BLDC GLUCOMTR-MCNC: 210 MG/DL — HIGH (ref 70–99)
GLUCOSE BLDC GLUCOMTR-MCNC: 264 MG/DL — HIGH (ref 70–99)
GLUCOSE BLDC GLUCOMTR-MCNC: 277 MG/DL — HIGH (ref 70–99)
GLUCOSE BLDC GLUCOMTR-MCNC: 310 MG/DL — HIGH (ref 70–99)
GLUCOSE SERPL-MCNC: 237 MG/DL — HIGH (ref 70–99)
HCT VFR BLD CALC: 42.8 % — SIGNIFICANT CHANGE UP (ref 34.5–45)
HDLC SERPL-MCNC: 40 MG/DL — LOW
HGB BLD-MCNC: 12.6 G/DL — SIGNIFICANT CHANGE UP (ref 11.5–15.5)
LIPID PNL WITH DIRECT LDL SERPL: 121 MG/DL — HIGH
MAGNESIUM SERPL-MCNC: 2.3 MG/DL — SIGNIFICANT CHANGE UP (ref 1.6–2.6)
MCHC RBC-ENTMCNC: 23.2 PG — LOW (ref 27–34)
MCHC RBC-ENTMCNC: 29.4 GM/DL — LOW (ref 32–36)
MCV RBC AUTO: 78.8 FL — LOW (ref 80–100)
NON HDL CHOLESTEROL: 155 MG/DL — HIGH
NRBC # BLD: 0 /100 WBCS — SIGNIFICANT CHANGE UP (ref 0–0)
PHOSPHATE SERPL-MCNC: 4.7 MG/DL — HIGH (ref 2.5–4.5)
PLATELET # BLD AUTO: 364 K/UL — SIGNIFICANT CHANGE UP (ref 150–400)
POTASSIUM SERPL-MCNC: 4.1 MMOL/L — SIGNIFICANT CHANGE UP (ref 3.5–5.3)
POTASSIUM SERPL-SCNC: 4.1 MMOL/L — SIGNIFICANT CHANGE UP (ref 3.5–5.3)
PROT SERPL-MCNC: 8 G/DL — SIGNIFICANT CHANGE UP (ref 6–8.3)
RBC # BLD: 5.43 M/UL — HIGH (ref 3.8–5.2)
RBC # FLD: 15.8 % — HIGH (ref 10.3–14.5)
SODIUM SERPL-SCNC: 139 MMOL/L — SIGNIFICANT CHANGE UP (ref 135–145)
TRIGL SERPL-MCNC: 171 MG/DL — HIGH
TROPONIN I, HIGH SENSITIVITY RESULT: 18.6 NG/L — SIGNIFICANT CHANGE UP
WBC # BLD: 8.91 K/UL — SIGNIFICANT CHANGE UP (ref 3.8–10.5)
WBC # FLD AUTO: 8.91 K/UL — SIGNIFICANT CHANGE UP (ref 3.8–10.5)

## 2022-02-05 PROCEDURE — 99222 1ST HOSP IP/OBS MODERATE 55: CPT

## 2022-02-05 PROCEDURE — 99233 SBSQ HOSP IP/OBS HIGH 50: CPT | Mod: GC

## 2022-02-05 RX ORDER — ERGOCALCIFEROL 1.25 MG/1
0 CAPSULE ORAL
Qty: 0 | Refills: 0 | DISCHARGE

## 2022-02-05 RX ORDER — SODIUM CHLORIDE 9 MG/ML
1000 INJECTION INTRAMUSCULAR; INTRAVENOUS; SUBCUTANEOUS ONCE
Refills: 0 | Status: COMPLETED | OUTPATIENT
Start: 2022-02-05 | End: 2022-02-05

## 2022-02-05 RX ORDER — INFLUENZA VIRUS VACCINE 15; 15; 15; 15 UG/.5ML; UG/.5ML; UG/.5ML; UG/.5ML
0.5 SUSPENSION INTRAMUSCULAR ONCE
Refills: 0 | Status: DISCONTINUED | OUTPATIENT
Start: 2022-02-05 | End: 2022-02-22

## 2022-02-05 RX ORDER — CYCLOBENZAPRINE HYDROCHLORIDE 10 MG/1
5 TABLET, FILM COATED ORAL THREE TIMES A DAY
Refills: 0 | Status: DISCONTINUED | OUTPATIENT
Start: 2022-02-05 | End: 2022-02-06

## 2022-02-05 RX ORDER — GABAPENTIN 400 MG/1
400 CAPSULE ORAL EVERY 12 HOURS
Refills: 0 | Status: DISCONTINUED | OUTPATIENT
Start: 2022-02-05 | End: 2022-02-05

## 2022-02-05 RX ORDER — ALBUTEROL 90 UG/1
2 AEROSOL, METERED ORAL
Qty: 0 | Refills: 0 | DISCHARGE

## 2022-02-05 RX ORDER — MONTELUKAST 4 MG/1
10 TABLET, CHEWABLE ORAL AT BEDTIME
Refills: 0 | Status: DISCONTINUED | OUTPATIENT
Start: 2022-02-05 | End: 2022-02-22

## 2022-02-05 RX ORDER — ONDANSETRON 8 MG/1
4 TABLET, FILM COATED ORAL EVERY 6 HOURS
Refills: 0 | Status: DISCONTINUED | OUTPATIENT
Start: 2022-02-05 | End: 2022-02-22

## 2022-02-05 RX ORDER — DICLOFENAC SODIUM 30 MG/G
1 GEL TOPICAL
Qty: 0 | Refills: 0 | DISCHARGE

## 2022-02-05 RX ORDER — DICLOFENAC SODIUM 30 MG/G
0 GEL TOPICAL
Qty: 0 | Refills: 0 | DISCHARGE

## 2022-02-05 RX ORDER — ASPIRIN/CALCIUM CARB/MAGNESIUM 324 MG
81 TABLET ORAL DAILY
Refills: 0 | Status: DISCONTINUED | OUTPATIENT
Start: 2022-02-05 | End: 2022-02-22

## 2022-02-05 RX ORDER — INSULIN LISPRO 100/ML
VIAL (ML) SUBCUTANEOUS
Refills: 0 | Status: DISCONTINUED | OUTPATIENT
Start: 2022-02-05 | End: 2022-02-05

## 2022-02-05 RX ORDER — INSULIN LISPRO 100/ML
VIAL (ML) SUBCUTANEOUS AT BEDTIME
Refills: 0 | Status: DISCONTINUED | OUTPATIENT
Start: 2022-02-05 | End: 2022-02-05

## 2022-02-05 RX ORDER — KETOROLAC TROMETHAMINE 30 MG/ML
15 SYRINGE (ML) INJECTION ONCE
Refills: 0 | Status: DISCONTINUED | OUTPATIENT
Start: 2022-02-05 | End: 2022-02-05

## 2022-02-05 RX ORDER — FLUOXETINE HCL 10 MG
1 CAPSULE ORAL
Qty: 0 | Refills: 0 | DISCHARGE

## 2022-02-05 RX ORDER — OXCARBAZEPINE 300 MG/1
300 TABLET, FILM COATED ORAL EVERY 12 HOURS
Refills: 0 | Status: DISCONTINUED | OUTPATIENT
Start: 2022-02-05 | End: 2022-02-06

## 2022-02-05 RX ORDER — GABAPENTIN 400 MG/1
400 CAPSULE ORAL THREE TIMES A DAY
Refills: 0 | Status: DISCONTINUED | OUTPATIENT
Start: 2022-02-05 | End: 2022-02-06

## 2022-02-05 RX ORDER — ENOXAPARIN SODIUM 100 MG/ML
40 INJECTION SUBCUTANEOUS DAILY
Refills: 0 | Status: DISCONTINUED | OUTPATIENT
Start: 2022-02-05 | End: 2022-02-07

## 2022-02-05 RX ORDER — GABAPENTIN 400 MG/1
0 CAPSULE ORAL
Qty: 0 | Refills: 0 | DISCHARGE

## 2022-02-05 RX ORDER — HYDROCHLOROTHIAZIDE 25 MG
25 TABLET ORAL DAILY
Refills: 0 | Status: DISCONTINUED | OUTPATIENT
Start: 2022-02-05 | End: 2022-02-06

## 2022-02-05 RX ORDER — ACETAMINOPHEN 500 MG
1000 TABLET ORAL EVERY 8 HOURS
Refills: 0 | Status: DISCONTINUED | OUTPATIENT
Start: 2022-02-05 | End: 2022-02-06

## 2022-02-05 RX ORDER — ASCORBIC ACID 60 MG
0 TABLET,CHEWABLE ORAL
Qty: 0 | Refills: 0 | DISCHARGE

## 2022-02-05 RX ORDER — LEVOTHYROXINE SODIUM 125 MCG
112 TABLET ORAL DAILY
Refills: 0 | Status: DISCONTINUED | OUTPATIENT
Start: 2022-02-05 | End: 2022-02-22

## 2022-02-05 RX ORDER — CEFTRIAXONE 500 MG/1
1000 INJECTION, POWDER, FOR SOLUTION INTRAMUSCULAR; INTRAVENOUS EVERY 24 HOURS
Refills: 0 | Status: DISCONTINUED | OUTPATIENT
Start: 2022-02-05 | End: 2022-02-05

## 2022-02-05 RX ORDER — ATORVASTATIN CALCIUM 80 MG/1
10 TABLET, FILM COATED ORAL AT BEDTIME
Refills: 0 | Status: DISCONTINUED | OUTPATIENT
Start: 2022-02-05 | End: 2022-02-22

## 2022-02-05 RX ORDER — RAMIPRIL 5 MG
1 CAPSULE ORAL
Qty: 0 | Refills: 0 | DISCHARGE

## 2022-02-05 RX ORDER — BUDESONIDE AND FORMOTEROL FUMARATE DIHYDRATE 160; 4.5 UG/1; UG/1
2 AEROSOL RESPIRATORY (INHALATION)
Refills: 0 | Status: DISCONTINUED | OUTPATIENT
Start: 2022-02-05 | End: 2022-02-07

## 2022-02-05 RX ORDER — MORPHINE SULFATE 50 MG/1
1 CAPSULE, EXTENDED RELEASE ORAL ONCE
Refills: 0 | Status: DISCONTINUED | OUTPATIENT
Start: 2022-02-05 | End: 2022-02-05

## 2022-02-05 RX ORDER — LISINOPRIL 2.5 MG/1
40 TABLET ORAL DAILY
Refills: 0 | Status: DISCONTINUED | OUTPATIENT
Start: 2022-02-05 | End: 2022-02-06

## 2022-02-05 RX ORDER — INSULIN GLARGINE 100 [IU]/ML
20 INJECTION, SOLUTION SUBCUTANEOUS AT BEDTIME
Refills: 0 | Status: DISCONTINUED | OUTPATIENT
Start: 2022-02-05 | End: 2022-02-09

## 2022-02-05 RX ORDER — INSULIN LISPRO 100/ML
8 VIAL (ML) SUBCUTANEOUS
Refills: 0 | Status: DISCONTINUED | OUTPATIENT
Start: 2022-02-05 | End: 2022-02-07

## 2022-02-05 RX ORDER — INSULIN LISPRO 100/ML
VIAL (ML) SUBCUTANEOUS
Refills: 0 | Status: DISCONTINUED | OUTPATIENT
Start: 2022-02-05 | End: 2022-02-07

## 2022-02-05 RX ORDER — CEPHALEXIN 500 MG
500 CAPSULE ORAL
Refills: 0 | Status: DISCONTINUED | OUTPATIENT
Start: 2022-02-05 | End: 2022-02-06

## 2022-02-05 RX ORDER — LIDOCAINE 4 G/100G
2 CREAM TOPICAL DAILY
Refills: 0 | Status: DISCONTINUED | OUTPATIENT
Start: 2022-02-05 | End: 2022-02-22

## 2022-02-05 RX ADMIN — Medication 1000 MILLIGRAM(S): at 21:54

## 2022-02-05 RX ADMIN — GABAPENTIN 400 MILLIGRAM(S): 400 CAPSULE ORAL at 06:00

## 2022-02-05 RX ADMIN — Medication 3: at 07:36

## 2022-02-05 RX ADMIN — Medication 8 UNIT(S): at 16:59

## 2022-02-05 RX ADMIN — Medication 4: at 11:21

## 2022-02-05 RX ADMIN — Medication 25 MILLIGRAM(S): at 06:02

## 2022-02-05 RX ADMIN — Medication 112 MICROGRAM(S): at 06:02

## 2022-02-05 RX ADMIN — ONDANSETRON 4 MILLIGRAM(S): 8 TABLET, FILM COATED ORAL at 08:03

## 2022-02-05 RX ADMIN — Medication 8 UNIT(S): at 11:21

## 2022-02-05 RX ADMIN — Medication 15 MILLIGRAM(S): at 04:09

## 2022-02-05 RX ADMIN — MONTELUKAST 10 MILLIGRAM(S): 4 TABLET, CHEWABLE ORAL at 21:55

## 2022-02-05 RX ADMIN — GABAPENTIN 400 MILLIGRAM(S): 400 CAPSULE ORAL at 13:44

## 2022-02-05 RX ADMIN — Medication 81 MILLIGRAM(S): at 11:04

## 2022-02-05 RX ADMIN — LIDOCAINE 2 PATCH: 4 CREAM TOPICAL at 19:42

## 2022-02-05 RX ADMIN — ENOXAPARIN SODIUM 40 MILLIGRAM(S): 100 INJECTION SUBCUTANEOUS at 11:04

## 2022-02-05 RX ADMIN — LIDOCAINE 2 PATCH: 4 CREAM TOPICAL at 13:45

## 2022-02-05 RX ADMIN — SODIUM CHLORIDE 1000 MILLILITER(S): 9 INJECTION INTRAMUSCULAR; INTRAVENOUS; SUBCUTANEOUS at 22:47

## 2022-02-05 RX ADMIN — Medication 1000 MILLIGRAM(S): at 13:44

## 2022-02-05 RX ADMIN — OXCARBAZEPINE 300 MILLIGRAM(S): 300 TABLET, FILM COATED ORAL at 06:01

## 2022-02-05 RX ADMIN — Medication 100 MILLIGRAM(S): at 17:00

## 2022-02-05 RX ADMIN — LISINOPRIL 40 MILLIGRAM(S): 2.5 TABLET ORAL at 06:01

## 2022-02-05 RX ADMIN — Medication 6: at 16:59

## 2022-02-05 RX ADMIN — BUDESONIDE AND FORMOTEROL FUMARATE DIHYDRATE 2 PUFF(S): 160; 4.5 AEROSOL RESPIRATORY (INHALATION) at 21:54

## 2022-02-05 RX ADMIN — Medication 500 MILLIGRAM(S): at 17:00

## 2022-02-05 RX ADMIN — ONDANSETRON 4 MILLIGRAM(S): 8 TABLET, FILM COATED ORAL at 16:28

## 2022-02-05 RX ADMIN — INSULIN GLARGINE 20 UNIT(S): 100 INJECTION, SOLUTION SUBCUTANEOUS at 21:56

## 2022-02-05 RX ADMIN — CYCLOBENZAPRINE HYDROCHLORIDE 5 MILLIGRAM(S): 10 TABLET, FILM COATED ORAL at 13:44

## 2022-02-05 RX ADMIN — Medication 8 UNIT(S): at 07:36

## 2022-02-05 RX ADMIN — Medication 15 MILLIGRAM(S): at 04:40

## 2022-02-05 RX ADMIN — ATORVASTATIN CALCIUM 10 MILLIGRAM(S): 80 TABLET, FILM COATED ORAL at 21:54

## 2022-02-05 RX ADMIN — OXCARBAZEPINE 300 MILLIGRAM(S): 300 TABLET, FILM COATED ORAL at 17:00

## 2022-02-05 RX ADMIN — BUDESONIDE AND FORMOTEROL FUMARATE DIHYDRATE 2 PUFF(S): 160; 4.5 AEROSOL RESPIRATORY (INHALATION) at 09:03

## 2022-02-05 RX ADMIN — Medication 1000 MILLIGRAM(S): at 14:26

## 2022-02-05 NOTE — PROGRESS NOTE ADULT - PROBLEM SELECTOR PLAN 1
Pt complains of N/V  RLQ pain   CT Abdomen revealed normal Appendix, and adnexa  CT abdomen revealed Skin thickening with subcutaneous stranding in the right lower quadrant anterior abdominal wall may represent cellulitis.   No evidence for a urinary calculus. No hydronephrosis. Apparent 4.1 cm slightly hypodense lesion in the upper pole left kidney.   Nonspecific mildly enlarged 1.1 cm right external iliac chain lymph node.  F/u pain management

## 2022-02-05 NOTE — PATIENT PROFILE ADULT - NSPRESCRALCSIXMORE_GEN_A_NUR
Never
“You can access the FollowHealth Patient Portal, offered by [facility], by registering with the following website: [email or web address]”

## 2022-02-05 NOTE — CONSULT NOTE ADULT - PROBLEM SELECTOR RECOMMENDATION 9
Pt with chronic back pain, b/l leg pain which is somatic and neuropathic in nature due to DJD, fibromyalgia, diabetic neuropathy. Pt also with chest and abdominal pain Troponin x 2 negative. Cardiac monitor in place.   Maximize non-opioid pain recommendations   - Flexeril 5mg PO TID.  - Acetaminophen 1 gram PO q 8 hours for 72 hours only. Monitor LFTs  - NSAIDs per primary team   - Increase Gabapentin 400mg po q 8 hours. Monitor renal function.   - Lidoderm 4% patch 2 patches daily.   Bowel Regimen  Per primary team   Mild pain   - Non-pharmacological pain treatment recommendations  - Warm/ Cool packs PRN   - Repositioning, imagery, relaxation, distraction.  - Physical therapy OOB if no contraindications   Recommendations discussed with primary team and RN   Upon discharge may follow up with Telepain Dr. Brennan [phone]

## 2022-02-05 NOTE — CHART NOTE - NSCHARTNOTEFT_GEN_A_CORE
Was told that patient takes Otezla(apremilast) 30mg bid for psoriasis.  Couldn't find it on patient's med rec or surescript.    Also, our pharmacy does not carry this.    Informed the nurse to have patient bring it so we can verify it and have it dispensed from the pharmacy.

## 2022-02-05 NOTE — PATIENT PROFILE ADULT - NSPROPASSIVESMOKEEXPOSURE_GEN_A_NUR
You can access the FollowMyHealth Patient Portal offered by [facility] by registering at the following website: [email or web address]. By joining [employer]’s FollowMyHealth portal, you will also be able to view your health information using other applications (apps) compatible with our system.
No

## 2022-02-05 NOTE — CONSULT NOTE ADULT - SUBJECTIVE AND OBJECTIVE BOX
Source of information: NOEMY CONTRERAS, Chart review  Patient language: English  : n/a    HPI:  55 yo F, from home, ambulated with cane , PMHx of obesity, asthma, chronic bronchitis, NAKUL, HTN, DM, HLD, fibromyalgia, anxiety, trigeminal neuralgia, degenerative joint disease, ?IgA vasculitis, psoriasis, OA came with chief complain of chest pain and dysuria. Pt stated that for few weeks she is having burning pain in her urine, foul smell, itchy, cloudy. She further stated that she is having pain in her lower abdomen as well as in chest whenever she pressed it. She denied any vaginal discharge, headaches, visual changes, N/V/D.     Of note pt admitted at [facility] in -2021 and had extensive cardiac work up stress test and echo which were negative. Pt has left renal mass  diagnosed on imaging chances of malignancy 85% , followed outpatient urologist and heme/onc for monitoring . decreasing weight and controlled blood sugars to get the surgery at some point     In ED: UA +ve no wbc, few bacteria , trop x 1 neg  (2022 23:29)    Of note pt admitted at [facility] in -2021 and had extensive cardiac work up stress test and echo which were negative. Pt has left renal mass  diagnosed on imaging chances of malignancy 85% , followed outpatient urologist and heme/onc for monitoring . decreasing weight and controlled blood sugars to get the surgery at some point       Pt with hx DJD, fibromyalgia, diabetic neuropathy. Pt also with chest and abdominal pain Troponin x 2 negative. Cardiac monitor in place. CTAP- 2/4- Skin thickening with subcutaneous stranding in the right lower quadrant anterior abdominal wall may represent cellulitis. No evidence for a urinary calculus. No hydronephrosis. Apparent 4.1 cm slightly hypodense lesion in the upper pole left kidney. Nonspecific mildly enlarged 1.1 cm right external iliac chain lymph node. Pain consulted for hx fibromyalgia. Pt seen and examined at bedside. Pt sitting in chair. Reports chest and abdominal pain score 8/10  SCALE USED: (1-10 VNRS). Pt describes pain as pressure, radiating throughout chest to abdomen, alleviated by rest, intermittent use of O2, exacerbated by movement. Pt also with chronic low back pain and b/l leg pain with neuropathy. Currently rating back pain 9/10, describes as pressure, shooting radiating from lumbar back to bl hips. Pt tolerating PO diet. Denies headache, dizziness, weakness, lethargy, nausea, vomiting, constipation, itchiness. Reports dysuria on ceftriaxone IV. Reports last BM . Patient stated goal for pain control: to be able to take deep breaths, get out of bed to chair and ambulate with tolerable pain control. Pt able to ambulate with tolerable pain. Pt reports taking gabapentin, flexeril, Tylenol, aleve, oxcarbazepine for pain at home.     PAST MEDICAL & SURGICAL HISTORY:  Hypertension  vaginal cyst    Hyperlipidemia    Asthma  last inhaler use with in 10 days, on Pulm follow up [id number]    Hypothyroid    Obstructive sleep apnea  refuses Cpap    Obesity  morbid    Trigeminal neuralgia  R    Fibromyalgia    DM (diabetes mellitus)  2    DJD (degenerative joint disease)  Cervical/Thoracic/Lumbar    Cervical neuralgia  difficulty moving my neck    Back pain  thoracic &amp; lumbar    H/O bursitis  right shoulder    Radicular pain  arms, legs    Vasculitis  Legs, forerhead, arms &amp; hands, flare ups in R leg  Dr susie Ribeiro is my Rheumatologist    Renal cell carcinoma, left  on follow up Dr schulte, HOD renal [facility], waiting for suregry in 2020    Falls frequently    S/P abdominal hysterectomy        S/P  section  1    Left adrenal mass  excision, benign     Vaginal cyst  removed     Vasculitis on nerve biopsy  , had another surgery called microvascular decompression         FAMILY HISTORY:  Family history of diabetes mellitus    Family history of hypertension    Family history of obesity        Social History:  denies smoking, drug abuse and etoh abuse (2022 23:29)    Allergies    Fioricet (Rash)  gadobutrol (Rash; Urticaria; Hives)  IV Contrast (Short breath)  mushroom (Unknown)  venlafaxine (Hives)    MEDICATIONS  (STANDING):  acetaminophen     Tablet .. 1000 milliGRAM(s) Oral every 8 hours  aspirin  chewable 81 milliGRAM(s) Oral daily  atorvastatin 10 milliGRAM(s) Oral at bedtime  budesonide 160 MICROgram(s)/formoterol 4.5 MICROgram(s) Inhaler 2 Puff(s) Inhalation two times a day  cefTRIAXone   IVPB 1000 milliGRAM(s) IV Intermittent every 24 hours  cyclobenzaprine 5 milliGRAM(s) Oral three times a day  enoxaparin Injectable 40 milliGRAM(s) SubCutaneous daily  gabapentin 400 milliGRAM(s) Oral three times a day  hydrochlorothiazide 25 milliGRAM(s) Oral daily  influenza   Vaccine 0.5 milliLiter(s) IntraMuscular once  insulin glargine Injectable (LANTUS) 20 Unit(s) SubCutaneous at bedtime  insulin lispro (ADMELOG) corrective regimen sliding scale   SubCutaneous three times a day before meals  insulin lispro Injectable (ADMELOG) 8 Unit(s) SubCutaneous three times a day before meals  levothyroxine 112 MICROGram(s) Oral daily  lidocaine   4% Patch 2 Patch Transdermal daily  lisinopril 40 milliGRAM(s) Oral daily  montelukast 10 milliGRAM(s) Oral at bedtime  OXcarbazepine 300 milliGRAM(s) Oral every 12 hours    MEDICATIONS  (PRN):  ondansetron Injectable 4 milliGRAM(s) IV Push every 6 hours PRN Nausea and/or Vomiting      Vital Signs Last 24 Hrs  T(C): 36.6 (2022 11:01), Max: 37.2 (2022 04:46)  T(F): 97.9 (2022 11:01), Max: 99 (2022 04:46)  HR: 85 (2022 11:01) (81 - 104)  BP: 102/71 (2022 11:01) (102/71 - 149/87)  BP(mean): --  RR: 18 (2022 11:01) (18 - 20)  SpO2: 95% (2022 11:01) (93% - 95%)    LABS: Reviewed.                          12.6   8.91  )-----------( 364      ( 2022 06:41 )             42.8     02-05    139  |  102  |  16  ----------------------------<  237<H>  4.1   |  31  |  0.83    Ca    9.7      2022 06:41  Phos  4.7     02-05  Mg     2.3     02-05    TPro  8.0  /  Alb  3.1<L>  /  TBili  0.5  /  DBili  x   /  AST  51<H>  /  ALT  65<H>  /  AlkPhos  117  02-05      LIVER FUNCTIONS - ( 2022 06:41 )  Alb: 3.1 g/dL / Pro: 8.0 g/dL / ALK PHOS: 117 U/L / ALT: 65 U/L DA / AST: 51 U/L / GGT: x           Urinalysis Basic - ( 2022 20:54 )    Color: Yellow / Appearance: Clear / S.020 / pH: x  Gluc: x / Ketone: Negative  / Bili: Negative / Urobili: 1   Blood: x / Protein: 30 mg/dL / Nitrite: Negative   Leuk Esterase: Trace / RBC: 0-2 /HPF / WBC 0-2 /HPF   Sq Epi: x / Non Sq Epi: Moderate /HPF / Bacteria: Trace /HPF      CAPILLARY BLOOD GLUCOSE      POCT Blood Glucose.: 310 mg/dL (2022 11:17)  POCT Blood Glucose.: 277 mg/dL (2022 07:30)    COVID-19 PCR: NotDetec (2022 19:54)  COVID-19 PCR: NotDetec (11 Sep 2021 11:05)      Radiology: Reviewed.   < from: CT Abdomen and Pelvis No Cont (22 @ 20:12) >    ACC: [id number] EXAM:  CT ABDOMEN AND PELVIS                          PROCEDURE DATE:  2022          INTERPRETATION:  CLINICAL INFORMATION: Lower abdominal pain    COMPARISON: Abdominal/pelvic CT dated 2018    CONTRAST/COMPLICATIONS:  IV Contrast: NONE  Oral Contrast: NONE  Complications: None reported at time of study completion    PROCEDURE:  CT of the Abdomen and Pelvis was performed.  Sagittal and coronal reformats were performed.    FINDINGS: The examination is limited by lack of IV/oral contrast.  LOWER CHEST: Small right atelectasis.    LIVER: Within normal limits.  BILE DUCTS: Normal caliber.  GALLBLADDER: Within normal limits.  SPLEEN: Within normal limits.  PANCREAS: Within normal limits.  ADRENALS: Nonspecific 1.2 cm right adrenal nodule with a Hounsfield units   of 13. The left adrenal is not visualized. Stable surgical clips in the   region of the left adrenal.  KIDNEYS/URETERS: No evidence for a calculus in the kidneys or ureters. No   hydronephrosis. The right kidney appears unremarkable. Apparent 4.1 cm   slightly hypodense lesion in the upper pole left kidney. Please see   abdominal MR dated 2020 for further detail.    BLADDER: Within normal limits.  REPRODUCTIVE ORGANS: The uterus is not visualized, likely surgically   absent. The adnexa appear grossly unremarkable.    BOWEL: No bowel obstruction or grossly thickened bowel wall. Colonic   diverticulosis without evidence for diverticulitis.. Appendix within   normal limits.  PERITONEUM: No free air or ascites.  VESSELS: Calcified atherosclerotic disease.  RETROPERITONEUM/LYMPH NODES: Nonspecific mildly enlarged 1.1 cm right   external iliac chain lymph node.  ABDOMINAL WALL: Skin thickening with subcutaneous stranding in the right   lower quadrant anterior abdominal wall may represent cellulitis. Clinical   correlation is recommended.  BONES: Mild degenerative spondylosis.    IMPRESSION:  Skin thickening with subcutaneous stranding in the right lower quadrant   anterior abdominal wall may represent cellulitis. Clinical correlation is   recommended.    No evidence for a urinary calculus. No hydronephrosis. Apparent 4.1 cm   slightly hypodense lesion in the upper pole left kidney. Please see   abdominal MR dated 2020 for further detail.    Nonspecific mildly enlarged 1.1 cm right external iliac chain lymph node.    --- End of Report ---            ANNA SAHNI MD; Attending Radiologist  This document has been electronically signed. 2022  8:38PM    < end of copied text >    < from: Xray Chest 2 Views PA/Lat (22 @ 19:36) >    ACC: [id number] EXAM:  XR CHEST PA LAT 2V                          PROCEDURE DATE:  2022          INTERPRETATION:  PA and lateral chest radiographs    COMPARISON: 2020 chest.    CLINICAL INFORMATION: Chest Pain.    FINDINGS:    PULMONARY: The airway is midline.  There are no airspace consolidations.  No pleural effusion or pneumothorax.    HEART/VASCULAR: The heart size and mediastinum configuration are within   the limits of normal.    BONES: The visualized osseus structures are intact.    IMPRESSION:    No radiographic evidence of active chest disease..    --- End of Report ---            MELBA LE MD; Attending Radiologist  This document has been electronically signed. 2022  9:07PM    < end of copied text >        ORT Score -   Family Hx of substance abuse	Female	      Male  Alcohol 	                                           1                     3  Illegal drugs	                                   2                     3  Rx drugs                                           4 	                  4  Personal Hx of substance abuse		  Alcohol 	                                          3	                  3  Illegal drugs                                     4	                  4  Rx drugs                                            5 	                  5  Age between 16- 45 years	           1                     1  hx preadolescent sexual abuse	   3 	                  0  Psychological disease		  ADD, OCD, bipolar, schizophrenia   2	          2  Depression                                           1 	          1  Total: 0    a score of 3 or lower indicates low risk for opioid abuse		  a score of 4-7 indicates moderate risk for opioid abuse		  a score of 8 or higher indicates high risk for opioid abuse    REVIEW OF SYSTEMS:  CONSTITUTIONAL: No fever or fatigue  RESPIRATORY: No cough, wheezing, chills or hemoptysis; + intermittent shortness of breath + intermittent O2 2L (at home as well)  CARDIOVASCULAR: + mid sternal chest pain, No palpitations, dizziness, + chronic leg swelling  GASTROINTESTINAL: No loss of appetite, decreased PO intake. + lower abdominal pain. No nausea, vomiting; No diarrhea or constipation. + BM 2/4   GENITOURINARY: + dysuria, frequency, occasional incontinence, No hematuria, retention  MUSCULOSKELETAL: + chronic low back pain and leg pain. + b/l LE swelling; + lower motor strength weakness, + occasional bowel/bladder incontinence, no falls   NEURO: No headaches, + numbness/tingling b/l LE    PHYSICAL EXAM:  GENERAL:  Alert & Oriented X4, cooperative, NAD, Good concentration. Speech is clear.   RESPIRATORY: Respirations even and unlabored. + diminished to auscultation bilaterally; No rales, rhonchi, wheezing, or rubs + intermittent O2 2L (at home as well)  CARDIOVASCULAR: Normal S1/S2, regular rate and rhythm; No murmurs, rubs, or gallops. No JVD. + cardiac monitor in place   GASTROINTESTINAL: + morbid obesity; Soft, Nontender, Nondistended; Bowel sounds present  PERIPHERAL VASCULAR:  Extremities warm with + b/l LE severe edema. 2+ Peripheral Pulses, No cyanosis, No calf tenderness  MUSCULOSKELETAL: Motor Strength 5/5 B/L upper and lower extremities; moves all extremities equally against gravity; + decreased ROM b/l LE ; + lumbar back tenderness on palpation   SKIN: + psoriasis Warm, dry, intact. No rashes, lesions, scars or wounds.     Risk factors associated with adverse outcomes related to opioid treatment  [ ]  Concurrent benzodiazepine use  [ ]  History/ Active substance use or alcohol use disorder  [ ] Psychiatric co-morbidity  [ ] Sleep apnea  [ ] COPD  [X ] BMI> 35  [ ] Liver dysfunction  [ ] Renal dysfunction  [ ] CHF  [ ] Smoker  [ ]  Age > 60 years    [X ]  [place]  Reviewed and Copied to Chart. See below.    Plan of care and goal oriented pain management treatment options were discussed with patient and /or primary care giver; all questions and concerns were addressed and care was aligned with patient's wishes.    Educated patient on goal oriented pain management treatment options

## 2022-02-05 NOTE — PATIENT PROFILE ADULT - FALL HARM RISK - HARM RISK INTERVENTIONS

## 2022-02-05 NOTE — PROGRESS NOTE ADULT - PROBLEM SELECTOR PLAN 3
few bacteria , no wbc in UA  +ve dysuria and itching on hx   Will start patient on abx ceftriaxone if doesn't get improve need to rule out any vaginal infection   f/u ucx

## 2022-02-05 NOTE — CONSULT NOTE ADULT - ASSESSMENT
Confidential Drug Utilization Report  Search Terms: Susie Leger, 1965Search Date: 02/05/2022 08:56:21 AM  The Drug Utilization Report below displays all of the controlled substance prescriptions, if any, that your patient has filled in the last twelve months. The information displayed on this report is compiled from pharmacy submissions to the Department, and accurately reflects the information as submitted by the pharmacies.    This report was requested by: Britt Jean | Reference #: [id number]    There are no results for the search terms that you entered.

## 2022-02-05 NOTE — PROGRESS NOTE ADULT - ASSESSMENT
57 yo F, from home, ambulated with cane , PMHx of obesity, asthma, chronic bronchitis, NAKUL, HTN, DM, HLD, fibromyalgia, anxiety, trigeminal neuralgia, degenerative joint disease, ?IgA vasculitis, psoriasis, OA came with chief complain of chest pain and dysuria.

## 2022-02-05 NOTE — PROGRESS NOTE ADULT - SUBJECTIVE AND OBJECTIVE BOX
PGY-1 Progress Note discussed with attending    PAGER #: [[phone]] TILL 5:00 PM  PLEASE CONTACT ON CALL TEAM:  - On Call Team (Please refer to Karly) FROM 5:00 PM - 8:30PM  - Nightfloat Team FROM 8:30 -7:30 AM    CHIEF COMPLAINT & BRIEF HOSPITAL COURSE:  55 yo F, from home, ambulated with cane , PMHx of obesity, asthma, chronic bronchitis, NAKUL, HTN, DM, HLD, fibromyalgia, anxiety, trigeminal neuralgia, degenerative joint disease, ?IgA vasculitis, psoriasis, OA came with chief complain of chest pain and dysuria. Pt stated that for few weeks she is having burning pain in her urine, foul smell, itchy, cloudy. She further stated that she is having pain in her lower abdomen as well as in chest whenever she pressed it. She denied any vaginal discharge, headaches, visual changes, N/V/D.     Of note pt admitted at [facility] in Jan-Feb 2021 and had extensive cardiac work up stress test and echo which were negative. Pt has left renal mass  diagnosed on imaging chances of malignancy 85% , followed outpatient urologist and heme/onc for monitoring . decreasing weight and controlled blood sugars to get the surgery at some point     In ED: UA +ve no wbc, few bacteria , trop x 1 neg       INTERVAL HPI/OVERNIGHT EVENTS:   Pt was seen and assessed at bedside. Pt complained of RLQ abdominal pain that radiated to chest, causing her chest tightness, back pain. She rates the pain as a 9/10, says it feels like giving birth. Lying down makes pain better while sitting up makes it worse.     REVIEW OF SYSTEMS:  CONSTITUTIONAL: No fever, weight loss, or fatigue  RESPIRATORY: +Dyspnea on exertion, No cough, wheezing, chills or hemoptysis  CARDIOVASCULAR: +ve chest pain, No palpitations, dizziness, or leg swelling  GASTROINTESTINAL: +ve RLQ abdominal pain, + nausea, No vomiting, or hematemesis; No diarrhea or constipation. No melena or hematochezia.  GENITOURINARY: +ve dysuria, No hematuria or urinary frequency  NEUROLOGICAL: No headaches, memory loss, loss of strength, numbness, or tremors  SKIN: No itching, burning, rashes, or lesions     Vital Signs Last 24 Hrs  T(C): 36.8 (05 Feb 2022 07:23), Max: 37.2 (05 Feb 2022 04:46)  T(F): 98.2 (05 Feb 2022 07:23), Max: 99 (05 Feb 2022 04:46)  HR: 81 (05 Feb 2022 07:23) (81 - 104)  BP: 139/86 (05 Feb 2022 07:23) (126/82 - 149/87)  BP(mean): --  RR: 18 (05 Feb 2022 07:23) (18 - 20)  SpO2: 95% (05 Feb 2022 07:23) (93% - 95%)    PHYSICAL EXAMINATION:  GENERAL: Anxious, obese  HEAD:  Atraumatic, Normocephalic  EYES:  conjunctiva and sclera clear  NECK: Supple, No JVD, Normal thyroid  CHEST/LUNG: Clear to auscultation. Clear to percussion bilaterally; No rales, rhonchi, wheezing, or rubs  HEART: Regular rate and rhythm; No murmurs, rubs, or gallops  ABDOMEN: RLQ tenderness, Nondistended  NERVOUS SYSTEM:  Alert & Oriented X3,    EXTREMITIES: Non pitting LE edema, 2+ Peripheral Pulses, No clubbing, cyanosis  SKIN: warm dry                          12.6   8.91  )-----------( 364      ( 05 Feb 2022 06:41 )             42.8     02-05    139  |  102  |  16  ----------------------------<  237<H>  4.1   |  31  |  0.83    Ca    9.7      05 Feb 2022 06:41  Phos  4.7     02-05  Mg     2.3     02-05    TPro  8.0  /  Alb  3.1<L>  /  TBili  0.5  /  DBili  x   /  AST  51<H>  /  ALT  65<H>  /  AlkPhos  117  02-05    LIVER FUNCTIONS - ( 05 Feb 2022 06:41 )  Alb: 3.1 g/dL / Pro: 8.0 g/dL / ALK PHOS: 117 U/L / ALT: 65 U/L DA / AST: 51 U/L / GGT: x                   CAPILLARY BLOOD GLUCOSE      RADIOLOGY & ADDITIONAL TESTS:

## 2022-02-06 LAB
ALBUMIN SERPL ELPH-MCNC: 2.9 G/DL — LOW (ref 3.5–5)
ALBUMIN SERPL ELPH-MCNC: 3 G/DL — LOW (ref 3.5–5)
ALP SERPL-CCNC: 105 U/L — SIGNIFICANT CHANGE UP (ref 40–120)
ALP SERPL-CCNC: 109 U/L — SIGNIFICANT CHANGE UP (ref 40–120)
ALT FLD-CCNC: 56 U/L DA — SIGNIFICANT CHANGE UP (ref 10–60)
ALT FLD-CCNC: 57 U/L DA — SIGNIFICANT CHANGE UP (ref 10–60)
ANION GAP SERPL CALC-SCNC: 6 MMOL/L — SIGNIFICANT CHANGE UP (ref 5–17)
ANION GAP SERPL CALC-SCNC: 7 MMOL/L — SIGNIFICANT CHANGE UP (ref 5–17)
APPEARANCE UR: CLEAR — SIGNIFICANT CHANGE UP
AST SERPL-CCNC: 38 U/L — SIGNIFICANT CHANGE UP (ref 10–40)
AST SERPL-CCNC: 42 U/L — HIGH (ref 10–40)
BASE EXCESS BLDV CALC-SCNC: 0.3 MMOL/L — SIGNIFICANT CHANGE UP
BASOPHILS # BLD AUTO: 0.03 K/UL — SIGNIFICANT CHANGE UP (ref 0–0.2)
BASOPHILS NFR BLD AUTO: 0.3 % — SIGNIFICANT CHANGE UP (ref 0–2)
BILIRUB DIRECT SERPL-MCNC: 0.2 MG/DL — SIGNIFICANT CHANGE UP (ref 0–0.3)
BILIRUB INDIRECT FLD-MCNC: 0.3 MG/DL — SIGNIFICANT CHANGE UP (ref 0.2–1)
BILIRUB SERPL-MCNC: 0.5 MG/DL — SIGNIFICANT CHANGE UP (ref 0.2–1.2)
BILIRUB SERPL-MCNC: 0.5 MG/DL — SIGNIFICANT CHANGE UP (ref 0.2–1.2)
BILIRUB UR-MCNC: ABNORMAL
BUN SERPL-MCNC: 41 MG/DL — HIGH (ref 7–18)
BUN SERPL-MCNC: 43 MG/DL — HIGH (ref 7–18)
CALCIUM SERPL-MCNC: 8.4 MG/DL — SIGNIFICANT CHANGE UP (ref 8.4–10.5)
CALCIUM SERPL-MCNC: 8.4 MG/DL — SIGNIFICANT CHANGE UP (ref 8.4–10.5)
CHLORIDE SERPL-SCNC: 97 MMOL/L — SIGNIFICANT CHANGE UP (ref 96–108)
CHLORIDE SERPL-SCNC: 99 MMOL/L — SIGNIFICANT CHANGE UP (ref 96–108)
CO2 SERPL-SCNC: 28 MMOL/L — SIGNIFICANT CHANGE UP (ref 22–31)
CO2 SERPL-SCNC: 30 MMOL/L — SIGNIFICANT CHANGE UP (ref 22–31)
COLOR SPEC: YELLOW — SIGNIFICANT CHANGE UP
CREAT ?TM UR-MCNC: 265 MG/DL — SIGNIFICANT CHANGE UP
CREAT SERPL-MCNC: 3.11 MG/DL — HIGH (ref 0.5–1.3)
CREAT SERPL-MCNC: 3.6 MG/DL — HIGH (ref 0.5–1.3)
CRP SERPL-MCNC: 37 MG/L — HIGH
DIFF PNL FLD: NEGATIVE — SIGNIFICANT CHANGE UP
EOSINOPHIL # BLD AUTO: 0.09 K/UL — SIGNIFICANT CHANGE UP (ref 0–0.5)
EOSINOPHIL NFR BLD AUTO: 0.8 % — SIGNIFICANT CHANGE UP (ref 0–6)
ERYTHROCYTE [SEDIMENTATION RATE] IN BLOOD: 62 MM/HR — HIGH (ref 0–20)
GAS PNL BLDV: 132 MMOL/L — LOW (ref 136–145)
GAS PNL BLDV: SIGNIFICANT CHANGE UP
GLUCOSE BLDC GLUCOMTR-MCNC: 190 MG/DL — HIGH (ref 70–99)
GLUCOSE BLDC GLUCOMTR-MCNC: 244 MG/DL — HIGH (ref 70–99)
GLUCOSE BLDC GLUCOMTR-MCNC: 248 MG/DL — HIGH (ref 70–99)
GLUCOSE BLDC GLUCOMTR-MCNC: 249 MG/DL — HIGH (ref 70–99)
GLUCOSE SERPL-MCNC: 231 MG/DL — HIGH (ref 70–99)
GLUCOSE SERPL-MCNC: 262 MG/DL — HIGH (ref 70–99)
GLUCOSE UR QL: NEGATIVE — SIGNIFICANT CHANGE UP
HCO3 BLDV-SCNC: 31 MMOL/L — HIGH (ref 22–29)
HCT VFR BLD CALC: 40.1 % — SIGNIFICANT CHANGE UP (ref 34.5–45)
HCT VFR BLD CALC: 41.2 % — SIGNIFICANT CHANGE UP (ref 34.5–45)
HGB BLD-MCNC: 11.8 G/DL — SIGNIFICANT CHANGE UP (ref 11.5–15.5)
HGB BLD-MCNC: 11.8 G/DL — SIGNIFICANT CHANGE UP (ref 11.5–15.5)
IMM GRANULOCYTES NFR BLD AUTO: 0.8 % — SIGNIFICANT CHANGE UP (ref 0–1.5)
KETONES UR-MCNC: NEGATIVE — SIGNIFICANT CHANGE UP
LACTATE BLDV-MCNC: 1.7 MMOL/L — SIGNIFICANT CHANGE UP (ref 0.5–2)
LACTATE SERPL-SCNC: 1.8 MMOL/L — SIGNIFICANT CHANGE UP (ref 0.7–2)
LEUKOCYTE ESTERASE UR-ACNC: NEGATIVE — SIGNIFICANT CHANGE UP
LYMPHOCYTES # BLD AUTO: 1.4 K/UL — SIGNIFICANT CHANGE UP (ref 1–3.3)
LYMPHOCYTES # BLD AUTO: 12.4 % — LOW (ref 13–44)
MAGNESIUM SERPL-MCNC: 1.6 MG/DL — SIGNIFICANT CHANGE UP (ref 1.6–2.6)
MCHC RBC-ENTMCNC: 23.3 PG — LOW (ref 27–34)
MCHC RBC-ENTMCNC: 23.7 PG — LOW (ref 27–34)
MCHC RBC-ENTMCNC: 28.6 GM/DL — LOW (ref 32–36)
MCHC RBC-ENTMCNC: 29.4 GM/DL — LOW (ref 32–36)
MCV RBC AUTO: 80.5 FL — SIGNIFICANT CHANGE UP (ref 80–100)
MCV RBC AUTO: 81.3 FL — SIGNIFICANT CHANGE UP (ref 80–100)
MONOCYTES # BLD AUTO: 0.71 K/UL — SIGNIFICANT CHANGE UP (ref 0–0.9)
MONOCYTES NFR BLD AUTO: 6.3 % — SIGNIFICANT CHANGE UP (ref 2–14)
NEUTROPHILS # BLD AUTO: 8.93 K/UL — HIGH (ref 1.8–7.4)
NEUTROPHILS NFR BLD AUTO: 79.4 % — HIGH (ref 43–77)
NITRITE UR-MCNC: NEGATIVE — SIGNIFICANT CHANGE UP
NRBC # BLD: 0 /100 WBCS — SIGNIFICANT CHANGE UP (ref 0–0)
NRBC # BLD: 0 /100 WBCS — SIGNIFICANT CHANGE UP (ref 0–0)
OSMOLALITY SERPL: 303 MOSMOL/KG — HIGH (ref 275–300)
OSMOLALITY UR: 327 MOS/KG — SIGNIFICANT CHANGE UP (ref 50–1200)
PCO2 BLDV: 82 MMHG — HIGH (ref 39–42)
PH BLDV: 7.18 — LOW (ref 7.32–7.43)
PH UR: 5 — SIGNIFICANT CHANGE UP (ref 5–8)
PHOSPHATE SERPL-MCNC: 8.7 MG/DL — HIGH (ref 2.5–4.5)
PLATELET # BLD AUTO: 361 K/UL — SIGNIFICANT CHANGE UP (ref 150–400)
PLATELET # BLD AUTO: 363 K/UL — SIGNIFICANT CHANGE UP (ref 150–400)
PO2 BLDV: 30 MMHG — SIGNIFICANT CHANGE UP
POTASSIUM BLDV-SCNC: 5.2 MMOL/L — HIGH (ref 3.5–5.1)
POTASSIUM SERPL-MCNC: 5.2 MMOL/L — SIGNIFICANT CHANGE UP (ref 3.5–5.3)
POTASSIUM SERPL-MCNC: 5.8 MMOL/L — HIGH (ref 3.5–5.3)
POTASSIUM SERPL-SCNC: 5.2 MMOL/L — SIGNIFICANT CHANGE UP (ref 3.5–5.3)
POTASSIUM SERPL-SCNC: 5.8 MMOL/L — HIGH (ref 3.5–5.3)
PROT ?TM UR-MCNC: 86 MG/DL — HIGH (ref 0–12)
PROT SERPL-MCNC: 7.5 G/DL — SIGNIFICANT CHANGE UP (ref 6–8.3)
PROT SERPL-MCNC: 7.6 G/DL — SIGNIFICANT CHANGE UP (ref 6–8.3)
PROT UR-MCNC: 30 MG/DL
RBC # BLD: 4.98 M/UL — SIGNIFICANT CHANGE UP (ref 3.8–5.2)
RBC # BLD: 5.07 M/UL — SIGNIFICANT CHANGE UP (ref 3.8–5.2)
RBC # FLD: 15.9 % — HIGH (ref 10.3–14.5)
RBC # FLD: 16.3 % — HIGH (ref 10.3–14.5)
SAO2 % BLDV: 43.3 % — SIGNIFICANT CHANGE UP
SODIUM SERPL-SCNC: 133 MMOL/L — LOW (ref 135–145)
SODIUM SERPL-SCNC: 134 MMOL/L — LOW (ref 135–145)
SODIUM UR-SCNC: 14 MMOL/L — SIGNIFICANT CHANGE UP
SP GR SPEC: 1.02 — SIGNIFICANT CHANGE UP (ref 1.01–1.02)
UROBILINOGEN FLD QL: 1
UUN UR-MCNC: 106 MG/DL — SIGNIFICANT CHANGE UP
WBC # BLD: 11.25 K/UL — HIGH (ref 3.8–10.5)
WBC # BLD: 11.43 K/UL — HIGH (ref 3.8–10.5)
WBC # FLD AUTO: 11.25 K/UL — HIGH (ref 3.8–10.5)
WBC # FLD AUTO: 11.43 K/UL — HIGH (ref 3.8–10.5)

## 2022-02-06 PROCEDURE — 71045 X-RAY EXAM CHEST 1 VIEW: CPT | Mod: 26

## 2022-02-06 PROCEDURE — 99233 SBSQ HOSP IP/OBS HIGH 50: CPT

## 2022-02-06 RX ORDER — CEFAZOLIN SODIUM 1 G
1000 VIAL (EA) INJECTION EVERY 12 HOURS
Refills: 0 | Status: DISCONTINUED | OUTPATIENT
Start: 2022-02-07 | End: 2022-02-07

## 2022-02-06 RX ORDER — CEFAZOLIN SODIUM 1 G
1000 VIAL (EA) INJECTION ONCE
Refills: 0 | Status: COMPLETED | OUTPATIENT
Start: 2022-02-06 | End: 2022-02-06

## 2022-02-06 RX ORDER — CEFAZOLIN SODIUM 1 G
VIAL (EA) INJECTION
Refills: 0 | Status: DISCONTINUED | OUTPATIENT
Start: 2022-02-06 | End: 2022-02-07

## 2022-02-06 RX ORDER — SODIUM ZIRCONIUM CYCLOSILICATE 10 G/10G
5 POWDER, FOR SUSPENSION ORAL ONCE
Refills: 0 | Status: COMPLETED | OUTPATIENT
Start: 2022-02-06 | End: 2022-02-06

## 2022-02-06 RX ADMIN — ENOXAPARIN SODIUM 40 MILLIGRAM(S): 100 INJECTION SUBCUTANEOUS at 12:33

## 2022-02-06 RX ADMIN — SODIUM ZIRCONIUM CYCLOSILICATE 5 GRAM(S): 10 POWDER, FOR SUSPENSION ORAL at 12:30

## 2022-02-06 RX ADMIN — Medication 4: at 12:29

## 2022-02-06 RX ADMIN — Medication 1000 MILLIGRAM(S): at 06:37

## 2022-02-06 RX ADMIN — LISINOPRIL 40 MILLIGRAM(S): 2.5 TABLET ORAL at 06:37

## 2022-02-06 RX ADMIN — BUDESONIDE AND FORMOTEROL FUMARATE DIHYDRATE 2 PUFF(S): 160; 4.5 AEROSOL RESPIRATORY (INHALATION) at 21:51

## 2022-02-06 RX ADMIN — Medication 4: at 08:16

## 2022-02-06 RX ADMIN — ATORVASTATIN CALCIUM 10 MILLIGRAM(S): 80 TABLET, FILM COATED ORAL at 21:51

## 2022-02-06 RX ADMIN — Medication 500 MILLIGRAM(S): at 12:30

## 2022-02-06 RX ADMIN — Medication 500 MILLIGRAM(S): at 06:37

## 2022-02-06 RX ADMIN — LIDOCAINE 2 PATCH: 4 CREAM TOPICAL at 19:00

## 2022-02-06 RX ADMIN — Medication 2: at 17:35

## 2022-02-06 RX ADMIN — Medication 8 UNIT(S): at 17:35

## 2022-02-06 RX ADMIN — Medication 100 MILLIGRAM(S): at 17:36

## 2022-02-06 RX ADMIN — Medication 112 MICROGRAM(S): at 06:38

## 2022-02-06 RX ADMIN — Medication 8 UNIT(S): at 08:15

## 2022-02-06 RX ADMIN — Medication 25 MILLIGRAM(S): at 06:37

## 2022-02-06 RX ADMIN — Medication 81 MILLIGRAM(S): at 12:30

## 2022-02-06 RX ADMIN — Medication 100 MILLIGRAM(S): at 06:37

## 2022-02-06 RX ADMIN — BUDESONIDE AND FORMOTEROL FUMARATE DIHYDRATE 2 PUFF(S): 160; 4.5 AEROSOL RESPIRATORY (INHALATION) at 12:29

## 2022-02-06 RX ADMIN — Medication 8 UNIT(S): at 12:28

## 2022-02-06 RX ADMIN — LIDOCAINE 2 PATCH: 4 CREAM TOPICAL at 12:31

## 2022-02-06 RX ADMIN — MONTELUKAST 10 MILLIGRAM(S): 4 TABLET, CHEWABLE ORAL at 21:52

## 2022-02-06 RX ADMIN — Medication 100 MILLIGRAM(S): at 14:19

## 2022-02-06 RX ADMIN — INSULIN GLARGINE 20 UNIT(S): 100 INJECTION, SOLUTION SUBCUTANEOUS at 21:52

## 2022-02-06 NOTE — CONSULT NOTE ADULT - ASSESSMENT
Right sided abdominal wall cellulitis  ?? UTI Right sided abdominal wall cellulitis  ?? UTI  Leukocytosis - mild    Plan - Cont Kefzol 1 gm iv q12hrs  await urine culture results.

## 2022-02-06 NOTE — PROGRESS NOTE ADULT - ASSESSMENT
55 yo F, from home, ambulated with cane , PMHx of obesity, asthma, chronic bronchitis, NAKUL, HTN, DM, HLD, fibromyalgia, anxiety, trigeminal neuralgia, degenerative joint disease, ?IgA vasculitis, psoriasis, OA came with chief complain of chest pain and dysuria. Admit for possible R abd wall cellulitis.    #Acute renal failure - Possible ATN  #Possible R abd wall cellulitis  #Chest pain  #Fibromyalgia  #Degenerative joint disease  #Psoriasis  #OA  CT abdomen revealed Skin thickening with subcutaneous stranding in the right lower quadrant anterior abdominal wall. Patient with TTP to abdominal wall but no skin changes/ erythema.  WBC 11.25, ESR elevated, Lactate 1.8, Tachypneic - Concerning for sepsis 2/2 abdominal wall cellulitis  Creatine increased to 3. Nonoliguric  - Switch to Cefazolin 1g q12h (Crcl 22)  - Hold home oxcarbazepine, gabapentin, flexeril  - Hold lisinopril, HCTZ  - Trend fever curve.   - Symbicort  - Levothyroxine  - Continue anti HTN  - lantus, SSI  - f/u pain management  - f/u ID  - f/u Nephrology 55 yo F, from home, ambulated with cane , PMHx of obesity, asthma, chronic bronchitis, NAKUL, HTN, DM, HLD, fibromyalgia, anxiety, trigeminal neuralgia, degenerative joint disease, ?IgA vasculitis, psoriasis, OA came with chief complain of chest pain and dysuria. Admit for possible R abd wall cellulitis.    #FERNIE - Possible ATN  #Possible R abd wall cellulitis  #Chest pain  #Fibromyalgia  #Degenerative joint disease  #Psoriasis  #OA  CT abdomen revealed Skin thickening with subcutaneous stranding in the right lower quadrant anterior abdominal wall. Patient with TTP to abdominal wall but no skin changes/ erythema.  WBC 11.25, ESR elevated, Lactate 1.8, Tachypneic - Concerning for sepsis 2/2 abdominal wall cellulitis  Creatine increased to 3. Nonoliguric  - Switch to Cefazolin 1g q12h (Crcl 22)  - Hold home oxcarbazepine, gabapentin, flexeril  - Hold lisinopril, HCTZ  - Trend fever curve  - f/u bladder scan, US kidney bladder, UA, urine lytes  - castaneda catheter  - Symbicort  - Levothyroxine  - Continue anti HTN  - lantus, SSI  - f/u pain management  - f/u ID  - f/u Nephrology

## 2022-02-06 NOTE — PROGRESS NOTE ADULT - SUBJECTIVE AND OBJECTIVE BOX
CHIEF COMPLAINT & BRIEF HOSPITAL COURSE:  57 yo F, from home, ambulated with cane , PMHx of obesity, asthma, chronic bronchitis, NAKUL, HTN, DM, HLD, fibromyalgia, anxiety, trigeminal neuralgia, degenerative joint disease, ?IgA vasculitis, psoriasis, OA came with chief complain of chest pain and dysuria. Pt stated that for few weeks she is having burning pain in her urine, foul smell, itchy, cloudy. She further stated that she is having pain in her lower abdomen as well as in chest whenever she pressed it. She denied any vaginal discharge, headaches, visual changes, N/V/D.     Of note pt admitted at [facility] in Jan-Feb 2021 and had extensive cardiac work up stress test and echo which were negative. Pt has left renal mass  diagnosed on imaging chances of malignancy 85% , followed outpatient urologist and heme/onc for monitoring . decreasing weight and controlled blood sugars to get the surgery at some point     In ED: UA +ve no wbc, few bacteria , trop x 1 neg       INTERVAL HPI/OVERNIGHT EVENTS:   Pt was seen and assessed at bedside. Pt complained of RLQ abdominal pain that radiated to chest, causing her chest tightness, back pain. Patient with increasing lethargy this AM and reports feeling weak/tired.    REVIEW OF SYSTEMS:  CONSTITUTIONAL: No fever, weight loss, or fatigue  RESPIRATORY: +Dyspnea on exertion, No cough, wheezing, chills or hemoptysis  CARDIOVASCULAR: +ve chest pain, No palpitations, dizziness, or leg swelling  GASTROINTESTINAL: +ve RLQ abdominal pain, + nausea, No vomiting, or hematemesis; No diarrhea or constipation. No melena or hematochezia.  GENITOURINARY: +ve dysuria, No hematuria or urinary frequency  NEUROLOGICAL: No headaches, memory loss, loss of strength, numbness, or tremors  SKIN: No itching, burning, rashes, or lesions     Vital Signs Last 24 Hrs  T(C): 36.8 (05 Feb 2022 07:23), Max: 37.2 (05 Feb 2022 04:46)  T(F): 98.2 (05 Feb 2022 07:23), Max: 99 (05 Feb 2022 04:46)  HR: 81 (05 Feb 2022 07:23) (81 - 104)  BP: 139/86 (05 Feb 2022 07:23) (126/82 - 149/87)  BP(mean): --  RR: 18 (05 Feb 2022 07:23) (18 - 20)  SpO2: 95% (05 Feb 2022 07:23) (93% - 95%)    PHYSICAL EXAMINATION:  GENERAL: Anxious, obese  HEAD:  Atraumatic, Normocephalic  EYES:  conjunctiva and sclera clear  NECK: Supple, No JVD, Normal thyroid  CHEST/LUNG: Clear to auscultation. Clear to percussion bilaterally; No rales, rhonchi, wheezing, or rubs  HEART: Regular rate and rhythm; No murmurs, rubs, or gallops  ABDOMEN: RLQ tenderness, Nondistended  NERVOUS SYSTEM:  Alert & Oriented X3,    EXTREMITIES: Non pitting LE edema, 2+ Peripheral Pulses, No clubbing, cyanosis  SKIN: warm dry                          12.6   8.91  )-----------( 364      ( 05 Feb 2022 06:41 )             42.8     02-05    139  |  102  |  16  ----------------------------<  237<H>  4.1   |  31  |  0.83    Ca    9.7      05 Feb 2022 06:41  Phos  4.7     02-05  Mg     2.3     02-05    TPro  8.0  /  Alb  3.1<L>  /  TBili  0.5  /  DBili  x   /  AST  51<H>  /  ALT  65<H>  /  AlkPhos  117  02-05    LIVER FUNCTIONS - ( 05 Feb 2022 06:41 )  Alb: 3.1 g/dL / Pro: 8.0 g/dL / ALK PHOS: 117 U/L / ALT: 65 U/L DA / AST: 51 U/L / GGT: x                   CAPILLARY BLOOD GLUCOSE      RADIOLOGY & ADDITIONAL TESTS:

## 2022-02-06 NOTE — CONSULT NOTE ADULT - SUBJECTIVE AND OBJECTIVE BOX
Romeo Ribeiro MD (Nephrology)  [address], [facility],  [id number],  [facility]  TEl: [phone]  Cell: [phone]    Patient is a 56y old  Female who presents with a chief complaint of Chest pain and dysuria (2022 09:23)      HPI:  55 yo F, from home, ambulated with cane , PMHx of obesity, asthma, chronic bronchitis, NAKUL, HTN, DM, HLD, fibromyalgia, anxiety, trigeminal neuralgia, degenerative joint disease, ?IgA vasculitis, psoriasis, OA came with chief complain of chest pain and dysuria. Pt stated that for few weeks she is having burning pain in her urine, foul smell, itchy, cloudy. She further stated that she is having pain in her lower abdomen as well as in chest whenever she pressed it. She denied any vaginal discharge, headaches, visual changes, N/V/D.     Of note pt admitted at [facility] in -2021 and had extensive cardiac work up stress test and echo which were negative. Pt has left renal mass  diagnosed on imaging chances of malignancy 85% , followed outpatient urologist and heme/onc for monitoring . decreasing weight and controlled blood sugars to get the surgery at some point     In ED: UA +ve no wbc, few bacteria , trop x 1 neg  (2022 23:29)      PAST MEDICAL & SURGICAL HISTORY:  Hypertension  vaginal cyst    Hyperlipidemia    Asthma  last inhaler use with in 10 days, on Pulm follow up [id number]    Hypothyroid    Obstructive sleep apnea  refuses Cpap    Obesity  morbid    Trigeminal neuralgia  R    Fibromyalgia    DM (diabetes mellitus)  2    DJD (degenerative joint disease)  Cervical/Thoracic/Lumbar    Cervical neuralgia  difficulty moving my neck    Back pain  thoracic &amp; lumbar    H/O bursitis  right shoulder    Radicular pain  arms, legs    Vasculitis  Legs, forerhead, arms &amp; hands, flare ups in R leg  Dr susie Ribeiro is my Rheumatologist    Renal cell carcinoma, left  on follow up Dr schulte, HOD renal [facility], waiting for suregry in 2020    Falls frequently    Morbid obesity with body mass index (BMI) of 50.0 to 59.9 in adult    Psoriasis    Diverticulosis    S/P abdominal hysterectomy        S/P  section  1    Left adrenal mass  excision, benign     Vaginal cyst  removed     Vasculitis on nerve biopsy  , had another surgery called microvascular decompression           Allergies:  Fioricet (Rash)  gadobutrol (Rash; Urticaria; Hives)  IV Contrast (Short breath)  mushroom (Unknown)  venlafaxine (Hives)      Home Medications:   aspirin  chewable 81 milliGRAM(s) Oral daily  atorvastatin 10 milliGRAM(s) Oral at bedtime  benzonatate 100 milliGRAM(s) Oral <User Schedule>  budesonide 160 MICROgram(s)/formoterol 4.5 MICROgram(s) Inhaler 2 Puff(s) Inhalation two times a day  ceFAZolin   IVPB      ceFAZolin   IVPB 1000 milliGRAM(s) IV Intermittent once  enoxaparin Injectable 40 milliGRAM(s) SubCutaneous daily  influenza   Vaccine 0.5 milliLiter(s) IntraMuscular once  insulin glargine Injectable (LANTUS) 20 Unit(s) SubCutaneous at bedtime  insulin lispro (ADMELOG) corrective regimen sliding scale   SubCutaneous three times a day before meals  insulin lispro Injectable (ADMELOG) 8 Unit(s) SubCutaneous three times a day before meals  levothyroxine 112 MICROGram(s) Oral daily  lidocaine   4% Patch 2 Patch Transdermal daily  montelukast 10 milliGRAM(s) Oral at bedtime  ondansetron Injectable 4 milliGRAM(s) IV Push every 6 hours PRN      Hospital Medications:   MEDICATIONS  (STANDING):  aspirin  chewable 81 milliGRAM(s) Oral daily  atorvastatin 10 milliGRAM(s) Oral at bedtime  benzonatate 100 milliGRAM(s) Oral <User Schedule>  budesonide 160 MICROgram(s)/formoterol 4.5 MICROgram(s) Inhaler 2 Puff(s) Inhalation two times a day  ceFAZolin   IVPB      ceFAZolin   IVPB 1000 milliGRAM(s) IV Intermittent once  enoxaparin Injectable 40 milliGRAM(s) SubCutaneous daily  influenza   Vaccine 0.5 milliLiter(s) IntraMuscular once  insulin glargine Injectable (LANTUS) 20 Unit(s) SubCutaneous at bedtime  insulin lispro (ADMELOG) corrective regimen sliding scale   SubCutaneous three times a day before meals  insulin lispro Injectable (ADMELOG) 8 Unit(s) SubCutaneous three times a day before meals  levothyroxine 112 MICROGram(s) Oral daily  lidocaine   4% Patch 2 Patch Transdermal daily  montelukast 10 milliGRAM(s) Oral at bedtime      SOCIAL HISTORY:  Denies ETOh, Smoking,     Family History:  FAMILY HISTORY:  Family history of diabetes mellitus    Family history of hypertension    Family history of obesity        ROS:  Limited  Patient poor historian.   Drowsy and lethargic appearing sitting in chair    VITALS:  T(F): 97.8 (22 @ 10:17), Max: 98.6 (22 @ 23:34)  HR: 84 (22 @ 10:17)  BP: 143/81 (22 @ 10:17)  RR: 22 (22 @ 10:17)  SpO2: 97% (22 @ 10:17)  Wt(kg): --     @ 07:01  -   @ 07:00  --------------------------------------------------------  IN: 430 mL / OUT: 0 mL / NET: 430 mL        CAPILLARY BLOOD GLUCOSE      POCT Blood Glucose.: 248 mg/dL (2022 11:46)  POCT Blood Glucose.: 249 mg/dL (2022 08:02)  POCT Blood Glucose.: 210 mg/dL (2022 21:22)  POCT Blood Glucose.: 264 mg/dL (2022 16:49)        PHYSICAL EXAM:  GENERAL: Drowsy, lethargic appearing lying in chair falls asleep while talking   HEENT: RITA, EOMI, neck supple, no JVP  CHEST/LUNG: Bilateral clear breath sounds, no rales, no crepitations, no wheezing  HEART: Regular rate and rhythm, LAURA II/VI at LPSB, no gallops, no rub   ABDOMEN: Soft, nontender,distended, bowel sounds present, no palpable organomegaly  : No flank or supra pubic tenderness.  EXTREMITIES: Peripheral pulses are palpable, Bilateral pitting pedal edema noted  Musculoskeletal: No joint or spinal tenderness  Neurology: AAOx3, no focal neurological deficit  SKIN: No rash or skin lesion    LABS:      133<L>  |  99  |  43<H>  ----------------------------<  262<H>  5.2   |  28  |  3.60<H>    Ca    8.4      2022 11:53  Phos  8.7     02-  Mg     1.6     -    TPro  7.6  /  Alb  2.9<L>  /  TBili  0.5  /  DBili  0.2  /  AST  38  /  ALT  57  /  AlkPhos  109  -    Creatinine Trend: 3.60 <--, 3.11 <--, 0.83 <--, 0.90 <--                        11.8   11.25 )-----------( 363      ( 2022 11:53 )             41.2     Urine Studies:  Urinalysis Basic - ( 2022 20:54 )    Color: Yellow / Appearance: Clear / S.020 / pH:   Gluc:  / Ketone: Negative  / Bili: Negative / Urobili: 1   Blood:  / Protein: 30 mg/dL / Nitrite: Negative   Leuk Esterase: Trace / RBC: 0-2 /HPF / WBC 0-2 /HPF   Sq Epi:  / Non Sq Epi: Moderate /HPF / Bacteria: Trace /HPF          RADIOLOGY & ADDITIONAL STUDIES:  rad< from: Xray Chest 1 View- PORTABLE-Routine (Xray Chest 1 View- PORTABLE-Routine .) (22 @ 11:33) >    ACC: [id number] EXAM:  XR CHEST PORTABLE ROUTINE 1V                          PROCEDURE DATE:  2022          INTERPRETATION:  Hypoxia.    AP chest. Prior dated 2022.    Low lung volumes.  no change heart mediastinum.  grossly clear lungs. No  consolidation or effusion.  left costophrenic angle obscured by overlying   soft tissue.    IMPRESSION: Grossly clear lungs.  rd< from: CT Abdomen and Pelvis No Cont (22 @ 20:12) >    ACC: [id number] EXAM:  CT ABDOMEN AND PELVIS                          PROCEDURE DATE:  2022          INTERPRETATION:  CLINICAL INFORMATION: Lower abdominal pain    COMPARISON: Abdominal/pelvic CT dated 2018    CONTRAST/COMPLICATIONS:  IV Contrast: NONE  Oral Contrast: NONE  Complications: None reported at time of study completion    PROCEDURE:  CT of the Abdomen and Pelvis was performed.  Sagittal and coronal reformats were performed.    FINDINGS: The examination is limited by lack of IV/oral contrast.  LOWER CHEST: Small right atelectasis.    LIVER: Within normal limits.  BILE DUCTS: Normal caliber.  GALLBLADDER: Within normal limits.  SPLEEN: Within normal limits.  PANCREAS: Within normal limits.  ADRENALS: Nonspecific 1.2 cm right adrenal nodule with a Hounsfield units   of 13. The left adrenal is not visualized. Stable surgical clips in the   region of the left adrenal.  KIDNEYS/URETERS: No evidence for a calculus in the kidneys or ureters. No   hydronephrosis. The right kidney appears unremarkable. Apparent 4.1 cm   slightly hypodense lesion in the upper pole left kidney. Please see   abdominal MR dated 2020 for further detail.    BLADDER: Within normal limits.  REPRODUCTIVE ORGANS: The uterus is not visualized, likely surgically   absent. The adnexa appear grossly unremarkable.    BOWEL: No bowel obstruction or grossly thickened bowel wall. Colonic   diverticulosis without evidence for diverticulitis.. Appendix within   normal limits.  PERITONEUM: No free air or ascites.  VESSELS: Calcified atherosclerotic disease.  RETROPERITONEUM/LYMPH NODES: Nonspecific mildly enlarged 1.1 cm right   external iliac chain lymph node.  ABDOMINAL WALL: Skin thickening with subcutaneous stranding in the right   lower quadrant anterior abdominal wall may represent cellulitis. Clinical   correlation is recommended.  BONES: Mild degenerative spondylosis.    IMPRESSION:  Skin thickening with subcutaneous stranding in the right lower quadrant   anterior abdominal wall may represent cellulitis. Clinical correlation is   recommended.    No evidence for a urinary calculus. No hydronephrosis. Apparent 4.1 cm   slightly hypodense lesion in the upper pole left kidney. Please see   abdominal MR dated 2020 for further detail.    Nonspecific mildly enlarged 1.1 cm right external iliac chain lymph node.    --- End of Report ---            ANNA SAHNI MD; Attending Radiologist  This document has been electronically signed. 2022  8:38PM    < end of copied text >        MILLER PEÑA MD; Attending Radiologist  This document has been electronically signed. 2022  1:11PM    < end of copied text >      EKG findings reviewed.    Imaging Personally Reviewed:  [x] YES  [ ] NO    Consultant(s) and primary physician Notes Reviewed:  [x] YES  [ ] NO    Care Discussed with Primary team/ Consultants/Other Providers [x] YES  [ ] NO

## 2022-02-06 NOTE — CONSULT NOTE ADULT - SKIN COMMENTS
extensive Psoriasis over lower exts and scattered Psoriasis over the rest of her body, she has multiple scars and macular rashes over her abdomen but no roberto areas of cellulitis over the abdominal or pelvic wall.

## 2022-02-06 NOTE — CONSULT NOTE ADULT - SUBJECTIVE AND OBJECTIVE BOX
HPI:  55 yo F, from home, ambulated with cane , PMHx of obesity, asthma, chronic bronchitis, NAKUL, HTN, DM, HLD, fibromyalgia, anxiety, trigeminal neuralgia, degenerative joint disease, ?IgA vasculitis, psoriasis, OA came with chief complain of chest pain and dysuria. Pt stated that for few weeks she is having burning pain in her urine, foul smell, itchy, cloudy. She further stated that she is having pain in her lower abdomen as well as in chest whenever she pressed it. She denied any vaginal discharge, headaches, visual changes, N/V/D.     Of note pt admitted at [facility] in -2021 and had extensive cardiac work up stress test and echo which were negative. Pt has left renal mass  diagnosed on imaging chances of malignancy 85% , followed outpatient urologist and heme/onc for monitoring . decreasing weight and controlled blood sugars to get the surgery at some point     In ED: UA +ve no wbc, few bacteria , trop x 1 neg  (2022 23:29)      PAST MEDICAL & SURGICAL HISTORY:  Hypertension  vaginal cyst    Hyperlipidemia    Asthma  last inhaler use with in 10 days, on Pulm follow up [id number]    Hypothyroid    Obstructive sleep apnea  refuses Cpap    Obesity  morbid    Trigeminal neuralgia  R    Fibromyalgia    DM (diabetes mellitus)  2    DJD (degenerative joint disease)  Cervical/Thoracic/Lumbar    Cervical neuralgia  difficulty moving my neck    Back pain  thoracic &amp; lumbar    H/O bursitis  right shoulder    Radicular pain  arms, legs    Vasculitis  Legs, forerhead, arms &amp; hands, flare ups in R leg  Dr susie Ribeiro is my Rheumatologist    Renal cell carcinoma, left  on follow up Dr schulte, HOD renal [facility], waiting for suregry in 2020    Falls frequently    Morbid obesity with body mass index (BMI) of 50.0 to 59.9 in adult    Psoriasis    Diverticulosis    S/P abdominal hysterectomy        S/P  section  1    Left adrenal mass  excision, benign 2006    Vaginal cyst  removed     Vasculitis on nerve biopsy  , had another surgery called microvascular decompression         Fioricet (Rash)  gadobutrol (Rash; Urticaria; Hives)  IV Contrast (Short breath)  mushroom (Unknown)  venlafaxine (Hives)      Meds:  aspirin  chewable 81 milliGRAM(s) Oral daily  atorvastatin 10 milliGRAM(s) Oral at bedtime  benzonatate 100 milliGRAM(s) Oral <User Schedule>  budesonide 160 MICROgram(s)/formoterol 4.5 MICROgram(s) Inhaler 2 Puff(s) Inhalation two times a day  ceFAZolin   IVPB      enoxaparin Injectable 40 milliGRAM(s) SubCutaneous daily  influenza   Vaccine 0.5 milliLiter(s) IntraMuscular once  insulin glargine Injectable (LANTUS) 20 Unit(s) SubCutaneous at bedtime  insulin lispro (ADMELOG) corrective regimen sliding scale   SubCutaneous three times a day before meals  insulin lispro Injectable (ADMELOG) 8 Unit(s) SubCutaneous three times a day before meals  levothyroxine 112 MICROGram(s) Oral daily  lidocaine   4% Patch 2 Patch Transdermal daily  montelukast 10 milliGRAM(s) Oral at bedtime  ondansetron Injectable 4 milliGRAM(s) IV Push every 6 hours PRN      SOCIAL HISTORY:  Smoker:  YES / NO        PACK YEARS:                         WHEN QUIT?  ETOH use:  YES / NO               FREQUENCY / QUANTITY:  Ilicit Drug use:  YES / NO  Occupation:  Assisted device use (Cane / Walker):  Live with:    FAMILY HISTORY:  Family history of diabetes mellitus    Family history of hypertension    Family history of obesity        VITALS:  Vital Signs Last 24 Hrs  T(C): 36.6 (2022 16:38), Max: 37 (2022 23:34)  T(F): 97.8 (2022 16:38), Max: 98.6 (2022 23:34)  HR: 55 (2022 16:38) (55 - 89)  BP: 142/108 (2022 16:38) (92/56 - 143/81)  BP(mean): --  RR: 23 (2022 16:38) (18 - 24)  SpO2: 96% (2022 16:38) (95% - 100%)    LABS/DIAGNOSTIC TESTS:                          11.8   11.25 )-----------( 363      ( 2022 11:53 )             41.2     WBC Count: 11.25 K/uL ( @ 11:53)  WBC Count: 11.43 K/uL ( @ 09:07)  WBC Count: 8.91 K/uL ( @ 06:41)  WBC Count: 8.73 K/uL ( @ 19:54)          133<L>  |  99  |  43<H>  ----------------------------<  262<H>  5.2   |  28  |  3.60<H>    Ca    8.4      2022 11:53  Phos  8.7       Mg     1.6         TPro  7.6  /  Alb  2.9<L>  /  TBili  0.5  /  DBili  0.2  /  AST  38  /  ALT  57  /  AlkPhos  109        Urinalysis Basic - ( 2022 17:21 )    Color: Yellow / Appearance: Clear / S.025 / pH: x  Gluc: x / Ketone: Negative  / Bili: Moderate / Urobili: 1   Blood: x / Protein: 30 mg/dL / Nitrite: Negative   Leuk Esterase: Negative / RBC: 25-50 /HPF / WBC 3-5 /HPF   Sq Epi: x / Non Sq Epi: Many /HPF / Bacteria: Many /HPF        LIVER FUNCTIONS - ( 2022 11:53 )  Alb: 2.9 g/dL / Pro: 7.6 g/dL / ALK PHOS: 109 U/L / ALT: 57 U/L DA / AST: 38 U/L / GGT: x                 LACTATE:Lactate, Blood: 1.8 mmol/L ( @ 11:53)      ABG -     CULTURES:       RADIOLOGY:< from: CT Abdomen and Pelvis No Cont (22 @ 20:12) >  ACC: [id number] EXAM:  CT ABDOMEN AND PELVIS                          PROCEDURE DATE:  2022          INTERPRETATION:  CLINICAL INFORMATION: Lower abdominal pain    COMPARISON: Abdominal/pelvic CT dated 2018    CONTRAST/COMPLICATIONS:  IV Contrast: NONE  Oral Contrast: NONE  Complications: None reported at time of study completion    PROCEDURE:  CT of the Abdomen and Pelvis was performed.  Sagittal and coronal reformats were performed.    FINDINGS: The examination is limited by lack of IV/oral contrast.  LOWER CHEST: Small right atelectasis.    LIVER: Within normal limits.  BILE DUCTS: Normal caliber.  GALLBLADDER: Within normal limits.  SPLEEN: Within normal limits.  PANCREAS: Within normal limits.  ADRENALS: Nonspecific 1.2 cm right adrenal nodule with a Hounsfield units   of 13. The left adrenal is not visualized. Stable surgical clips in the   region of the left adrenal.  KIDNEYS/URETERS: No evidence for a calculus in the kidneys or ureters. No   hydronephrosis. The right kidney appears unremarkable. Apparent 4.1 cm   slightly hypodense lesion in the upper pole left kidney. Please see   abdominal MR dated 2020 for further detail.    BLADDER: Within normal limits.  REPRODUCTIVE ORGANS: The uterus is not visualized, likely surgically   absent. The adnexa appear grossly unremarkable.    BOWEL: No bowel obstruction or grossly thickened bowel wall. Colonic   diverticulosis without evidence for diverticulitis.. Appendix within   normal limits.  PERITONEUM: No free air or ascites.  VESSELS: Calcified atherosclerotic disease.  RETROPERITONEUM/LYMPH NODES: Nonspecific mildly enlarged 1.1 cm right   external iliac chain lymph node.  ABDOMINAL WALL: Skin thickening with subcutaneous stranding in the right   lower quadrant anterior abdominal wall may represent cellulitis. Clinical   correlation is recommended.  BONES: Mild degenerative spondylosis.    IMPRESSION:  Skin thickening with subcutaneous stranding in the right lower quadrant   anterior abdominal wall may represent cellulitis. Clinical correlation is   recommended.    No evidence for a urinary calculus. No hydronephrosis. Apparent 4.1 cm   slightly hypodense lesion in the upper pole left kidney. Please see   abdominal MR dated 2020 for further detail.    Nonspecific mildly enlarged 1.1 cm right external iliac chain lymph node.    --- End of Report ---            ANNA SAHNI MD; Attending Radiologist  This document has been electronically signed. 2022  8:38PM    < end of copied text >  -----------------------------------------------------------------------------------------------------------------------------------------------------------------------------------------------------------------------------------------------------  ACC: [id number] EXAM:  XR CHEST PORTABLE ROUTINE 1V                          PROCEDURE DATE:  2022          INTERPRETATION:  Hypoxia.    AP chest. Prior dated 2022.    Low lung volumes.  no change heart mediastinum.  grossly clear lungs. No  consolidation or effusion.  left costophrenic angle obscured by overlying   soft tissue.    IMPRESSION: Grossly clear lungs.    --- End of Report ---            MILLER PEÑA MD; Attending Radiologist  This document has been electronically signed. 2022  1:11PM    < end of copied text >        ROS  [  ] UNABLE TO ELICIT               HPI:  57 yo F, from home, ambulated with cane , PMHx of obesity, asthma, chronic bronchitis, NAKUL, HTN, DM, HLD, fibromyalgia, anxiety, trigeminal neuralgia, degenerative joint disease, ?IgA vasculitis, psoriasis, OA came with chief complain of chest pain and dysuria. Pt stated that for few weeks she is having burning pain in her urine, foul smell, itchy, cloudy. She further stated that she is having pain in her lower abdomen as well as in chest whenever she pressed it. She denied any vaginal discharge, headaches, visual changes, N/V/D.     Of note pt admitted at [facility] in -2021 and had extensive cardiac work up stress test and echo which were negative. Pt has left renal mass  diagnosed on imaging chances of malignancy 85% , followed outpatient urologist and heme/onc for monitoring . decreasing weight and controlled blood sugars to get the surgery at some point     In ED: UA +ve no wbc, few bacteria , trop x 1 neg  (2022 23:29)      History as above, pt who was admitted with lower abdominal pain and some dysuria and vaginal irritation and had a CT Abdomen and was found to have some right sided abdominal wall cellulitis. She has no fevers or chills, she is an insulin dependent diabetic and injects herself with insulin and so may have cellulitis secondary to that. She also has severe Psoriasis and does tend to scratch herself all over body. She was also found to have FERNIE. She was initially started on Keflex po but I recommended to switch to Kefzol given her weight.      PAST MEDICAL & SURGICAL HISTORY:  Hypertension  vaginal cyst    Hyperlipidemia    Asthma  last inhaler use with in 10 days, on Pulm follow up [id number]    Hypothyroid    Obstructive sleep apnea  refuses Cpap    Obesity  morbid    Trigeminal neuralgia  R    Fibromyalgia    DM (diabetes mellitus)  2    DJD (degenerative joint disease)  Cervical/Thoracic/Lumbar    Cervical neuralgia  difficulty moving my neck    Back pain  thoracic &amp; lumbar    H/O bursitis  right shoulder    Radicular pain  arms, legs    Vasculitis  Legs, forerhead, arms &amp; hands, flare ups in R leg  Dr susie Ribeiro is my Rheumatologist    Renal cell carcinoma, left  on follow up Dr schulte, HOD renal [facility], waiting for suregry in 2020    Falls frequently    Morbid obesity with body mass index (BMI) of 50.0 to 59.9 in adult    Psoriasis    Diverticulosis    S/P abdominal hysterectomy  2003      S/P  section  1    Left adrenal mass  excision, benign 2006    Vaginal cyst  removed     Vasculitis on nerve biopsy  , had another surgery called microvascular decompression         Fioricet (Rash)  gadobutrol (Rash; Urticaria; Hives)  IV Contrast (Short breath)  mushroom (Unknown)  venlafaxine (Hives)      Meds:  aspirin  chewable 81 milliGRAM(s) Oral daily  atorvastatin 10 milliGRAM(s) Oral at bedtime  benzonatate 100 milliGRAM(s) Oral <User Schedule>  budesonide 160 MICROgram(s)/formoterol 4.5 MICROgram(s) Inhaler 2 Puff(s) Inhalation two times a day  ceFAZolin   IVPB      enoxaparin Injectable 40 milliGRAM(s) SubCutaneous daily  influenza   Vaccine 0.5 milliLiter(s) IntraMuscular once  insulin glargine Injectable (LANTUS) 20 Unit(s) SubCutaneous at bedtime  insulin lispro (ADMELOG) corrective regimen sliding scale   SubCutaneous three times a day before meals  insulin lispro Injectable (ADMELOG) 8 Unit(s) SubCutaneous three times a day before meals  levothyroxine 112 MICROGram(s) Oral daily  lidocaine   4% Patch 2 Patch Transdermal daily  montelukast 10 milliGRAM(s) Oral at bedtime  ondansetron Injectable 4 milliGRAM(s) IV Push every 6 hours PRN      SOCIAL HISTORY:  Smoker: no  ETOH use:  no  Ilicit Drug use:  NO      FAMILY HISTORY:  Family history of diabetes mellitus    Family history of hypertension    Family history of obesity        VITALS:  Vital Signs Last 24 Hrs  T(C): 36.6 (2022 16:38), Max: 37 (2022 23:34)  T(F): 97.8 (2022 16:38), Max: 98.6 (2022 23:34)  HR: 55 (2022 16:38) (55 - 89)  BP: 142/108 (2022 16:38) (92/56 - 143/81)  BP(mean): --  RR: 23 (2022 16:38) (18 - 24)  SpO2: 96% (2022 16:38) (95% - 100%)    LABS/DIAGNOSTIC TESTS:                          11.8   11.25 )-----------( 363      ( 2022 11:53 )             41.2     WBC Count: 11.25 K/uL ( @ 11:53)  WBC Count: 11.43 K/uL ( @ 09:07)  WBC Count: 8.91 K/uL ( @ 06:41)  WBC Count: 8.73 K/uL ( @ 19:54)          133<L>  |  99  |  43<H>  ----------------------------<  262<H>  5.2   |  28  |  3.60<H>    Ca    8.4      2022 11:53  Phos  8.7       Mg     1.6         TPro  7.6  /  Alb  2.9<L>  /  TBili  0.5  /  DBili  0.2  /  AST  38  /  ALT  57  /  AlkPhos  109        Urinalysis Basic - ( 2022 17:21 )    Color: Yellow / Appearance: Clear / S.025 / pH: x  Gluc: x / Ketone: Negative  / Bili: Moderate / Urobili: 1   Blood: x / Protein: 30 mg/dL / Nitrite: Negative   Leuk Esterase: Negative / RBC: 25-50 /HPF / WBC 3-5 /HPF   Sq Epi: x / Non Sq Epi: Many /HPF / Bacteria: Many /HPF        LIVER FUNCTIONS - ( 2022 11:53 )  Alb: 2.9 g/dL / Pro: 7.6 g/dL / ALK PHOS: 109 U/L / ALT: 57 U/L DA / AST: 38 U/L / GGT: x                 LACTATE:Lactate, Blood: 1.8 mmol/L ( @ 11:53)      ABG -     CULTURES:       RADIOLOGY:< from: CT Abdomen and Pelvis No Cont (22 @ 20:12) >  ACC: [id number] EXAM:  CT ABDOMEN AND PELVIS                          PROCEDURE DATE:  2022          INTERPRETATION:  CLINICAL INFORMATION: Lower abdominal pain    COMPARISON: Abdominal/pelvic CT dated 2018    CONTRAST/COMPLICATIONS:  IV Contrast: NONE  Oral Contrast: NONE  Complications: None reported at time of study completion    PROCEDURE:  CT of the Abdomen and Pelvis was performed.  Sagittal and coronal reformats were performed.    FINDINGS: The examination is limited by lack of IV/oral contrast.  LOWER CHEST: Small right atelectasis.    LIVER: Within normal limits.  BILE DUCTS: Normal caliber.  GALLBLADDER: Within normal limits.  SPLEEN: Within normal limits.  PANCREAS: Within normal limits.  ADRENALS: Nonspecific 1.2 cm right adrenal nodule with a Hounsfield units   of 13. The left adrenal is not visualized. Stable surgical clips in the   region of the left adrenal.  KIDNEYS/URETERS: No evidence for a calculus in the kidneys or ureters. No   hydronephrosis. The right kidney appears unremarkable. Apparent 4.1 cm   slightly hypodense lesion in the upper pole left kidney. Please see   abdominal MR dated 2020 for further detail.    BLADDER: Within normal limits.  REPRODUCTIVE ORGANS: The uterus is not visualized, likely surgically   absent. The adnexa appear grossly unremarkable.    BOWEL: No bowel obstruction or grossly thickened bowel wall. Colonic   diverticulosis without evidence for diverticulitis.. Appendix within   normal limits.  PERITONEUM: No free air or ascites.  VESSELS: Calcified atherosclerotic disease.  RETROPERITONEUM/LYMPH NODES: Nonspecific mildly enlarged 1.1 cm right   external iliac chain lymph node.  ABDOMINAL WALL: Skin thickening with subcutaneous stranding in the right   lower quadrant anterior abdominal wall may represent cellulitis. Clinical   correlation is recommended.  BONES: Mild degenerative spondylosis.    IMPRESSION:  Skin thickening with subcutaneous stranding in the right lower quadrant   anterior abdominal wall may represent cellulitis. Clinical correlation is   recommended.    No evidence for a urinary calculus. No hydronephrosis. Apparent 4.1 cm   slightly hypodense lesion in the upper pole left kidney. Please see   abdominal MR dated 2020 for further detail.    Nonspecific mildly enlarged 1.1 cm right external iliac chain lymph node.    --- End of Report ---            ANNA SAHNI MD; Attending Radiologist  This document has been electronically signed. 2022  8:38PM    < end of copied text >  -----------------------------------------------------------------------------------------------------------------------------------------------------------------------------------------------------------------------------------------------------  ACC: [id number] EXAM:  XR CHEST PORTABLE ROUTINE 1V                          PROCEDURE DATE:  2022          INTERPRETATION:  Hypoxia.    AP chest. Prior dated 2022.    Low lung volumes.  no change heart mediastinum.  grossly clear lungs. No  consolidation or effusion.  left costophrenic angle obscured by overlying   soft tissue.    IMPRESSION: Grossly clear lungs.    --- End of Report ---            MILLER PEÑA MD; Attending Radiologist  This document has been electronically signed. 2022  1:11PM    < end of copied text >        LIBRADO  [  ] UNABLE TO ELICIT

## 2022-02-06 NOTE — CONSULT NOTE ADULT - ASSESSMENT
57 yo F, from home, ambulated with cane , PMHx of obesity, asthma, chronic bronchitis, NAKUL, HTN, DM, HLD, fibromyalgia, anxiety, trigeminal neuralgia, degenerative joint disease, ?IgA vasculitis, psoriasis, OA came with chief complain of chest pain and dysuria. Nephrology consult called for significant FERNIE    1) Non oliguric/Oliguric FERNIE likely dehydration vs  ATN from renal hypoperfusion/sepsis on superimposed hypertensive/diabetic nephropathy/diuretic/ACEI/use  2) Hyperkalemia   3) Hypertension  4) Diabetic Nephropathy with protenuria/neuropathy  5) Morbid Obesity  6) Abdominal wall cellulitis  7) AMS likely hypercarbia vs metabolic encephalopathy vs sepsis  8) Anemia of chronic illness  9) Left renal lesion chronic    Recommend:  Strict I/o  Avoid Nephrotoxic agents  Hold ACEI/ARB, diuretic therapy  Maintain MAP>65-70 mm Hg  Adjust antibiotics as per renal dose adjustment  Check urinalysis, urine electrolytes as per ordered  Bladder scan to assess urinary retention  Bustamante's catheter for strict I/o  Monitor BMP and electrolytes every 12 hrly  Treatment of sepsis/cellulitis per primary/ID team with renal dose adjustment of Abx  No urgent need for RRT/HD  Monitor respiratory/mental status   Consider Urology eval for left renal lesion  Check urine cytology  Will follow

## 2022-02-06 NOTE — CHART NOTE - NSCHARTNOTEFT_GEN_A_CORE
Noted to have CO2 of 84 with pH of 7.18 on VBG drawn earlier today. Patient with history of NAKUL. Has been on CPAP in the past. As per past documents, patient has been noncompliant with CPAP. Patient seen at bedside. Patient is sleepy but able to wake up from time to time and answer questions. States she has not used the machine in a long time.     Will place patient on BiPAP for now and repeat blood gas later on. Patient made aware machine will be placed.

## 2022-02-07 LAB
ALBUMIN SERPL ELPH-MCNC: 2.5 G/DL — LOW (ref 3.5–5)
ALBUMIN SERPL ELPH-MCNC: 2.9 G/DL — LOW (ref 3.5–5)
ALP SERPL-CCNC: 110 U/L — SIGNIFICANT CHANGE UP (ref 40–120)
ALP SERPL-CCNC: 112 U/L — SIGNIFICANT CHANGE UP (ref 40–120)
ALT FLD-CCNC: 40 U/L DA — SIGNIFICANT CHANGE UP (ref 10–60)
ALT FLD-CCNC: 45 U/L DA — SIGNIFICANT CHANGE UP (ref 10–60)
ANION GAP SERPL CALC-SCNC: 8 MMOL/L — SIGNIFICANT CHANGE UP (ref 5–17)
ANION GAP SERPL CALC-SCNC: 9 MMOL/L — SIGNIFICANT CHANGE UP (ref 5–17)
APPEARANCE UR: CLEAR — SIGNIFICANT CHANGE UP
APTT BLD: 27.1 SEC — LOW (ref 27.5–35.5)
AST SERPL-CCNC: 31 U/L — SIGNIFICANT CHANGE UP (ref 10–40)
AST SERPL-CCNC: 34 U/L — SIGNIFICANT CHANGE UP (ref 10–40)
BASE EXCESS BLDA CALC-SCNC: -0.4 MMOL/L — SIGNIFICANT CHANGE UP (ref -2–3)
BASE EXCESS BLDA CALC-SCNC: -0.7 MMOL/L — SIGNIFICANT CHANGE UP (ref -2–3)
BASE EXCESS BLDV CALC-SCNC: 0.4 MMOL/L — SIGNIFICANT CHANGE UP
BILIRUB SERPL-MCNC: 0.5 MG/DL — SIGNIFICANT CHANGE UP (ref 0.2–1.2)
BILIRUB SERPL-MCNC: 0.6 MG/DL — SIGNIFICANT CHANGE UP (ref 0.2–1.2)
BILIRUB UR-MCNC: NEGATIVE — SIGNIFICANT CHANGE UP
BLOOD GAS COMMENTS ARTERIAL: SIGNIFICANT CHANGE UP
BUN SERPL-MCNC: 49 MG/DL — HIGH (ref 7–18)
BUN SERPL-MCNC: 55 MG/DL — HIGH (ref 7–18)
CALCIUM SERPL-MCNC: 8 MG/DL — LOW (ref 8.4–10.5)
CALCIUM SERPL-MCNC: 8.4 MG/DL — SIGNIFICANT CHANGE UP (ref 8.4–10.5)
CHLORIDE SERPL-SCNC: 101 MMOL/L — SIGNIFICANT CHANGE UP (ref 96–108)
CHLORIDE SERPL-SCNC: 96 MMOL/L — SIGNIFICANT CHANGE UP (ref 96–108)
CK SERPL-CCNC: 842 U/L — HIGH (ref 21–215)
CO2 SERPL-SCNC: 27 MMOL/L — SIGNIFICANT CHANGE UP (ref 22–31)
CO2 SERPL-SCNC: 27 MMOL/L — SIGNIFICANT CHANGE UP (ref 22–31)
COLOR SPEC: YELLOW — SIGNIFICANT CHANGE UP
CREAT ?TM UR-MCNC: 39 MG/DL — SIGNIFICANT CHANGE UP
CREAT SERPL-MCNC: 2.52 MG/DL — HIGH (ref 0.5–1.3)
CREAT SERPL-MCNC: 3.3 MG/DL — HIGH (ref 0.5–1.3)
CULTURE RESULTS: SIGNIFICANT CHANGE UP
D DIMER BLD IA.RAPID-MCNC: 227 NG/ML DDU — SIGNIFICANT CHANGE UP
DIFF PNL FLD: ABNORMAL
GLUCOSE BLDC GLUCOMTR-MCNC: 189 MG/DL — HIGH (ref 70–99)
GLUCOSE BLDC GLUCOMTR-MCNC: 223 MG/DL — HIGH (ref 70–99)
GLUCOSE BLDC GLUCOMTR-MCNC: 269 MG/DL — HIGH (ref 70–99)
GLUCOSE BLDC GLUCOMTR-MCNC: 281 MG/DL — HIGH (ref 70–99)
GLUCOSE SERPL-MCNC: 192 MG/DL — HIGH (ref 70–99)
GLUCOSE SERPL-MCNC: 271 MG/DL — HIGH (ref 70–99)
GLUCOSE UR QL: NEGATIVE — SIGNIFICANT CHANGE UP
HCO3 BLDA-SCNC: 27 MMOL/L — SIGNIFICANT CHANGE UP (ref 21–28)
HCO3 BLDA-SCNC: 31 MMOL/L — HIGH (ref 21–28)
HCO3 BLDV-SCNC: 28 MMOL/L — SIGNIFICANT CHANGE UP (ref 22–29)
HCT VFR BLD CALC: 37.7 % — SIGNIFICANT CHANGE UP (ref 34.5–45)
HCT VFR BLD CALC: 37.9 % — SIGNIFICANT CHANGE UP (ref 34.5–45)
HGB BLD-MCNC: 11.4 G/DL — LOW (ref 11.5–15.5)
HGB BLD-MCNC: 11.5 G/DL — SIGNIFICANT CHANGE UP (ref 11.5–15.5)
HOROWITZ INDEX BLDA+IHG-RTO: 100 — SIGNIFICANT CHANGE UP
HOROWITZ INDEX BLDA+IHG-RTO: 37 — SIGNIFICANT CHANGE UP
INR BLD: 1.13 RATIO — SIGNIFICANT CHANGE UP (ref 0.88–1.16)
KETONES UR-MCNC: NEGATIVE — SIGNIFICANT CHANGE UP
LACTATE SERPL-SCNC: 1.7 MMOL/L — SIGNIFICANT CHANGE UP (ref 0.7–2)
LEUKOCYTE ESTERASE UR-ACNC: NEGATIVE — SIGNIFICANT CHANGE UP
MAGNESIUM SERPL-MCNC: 1.8 MG/DL — SIGNIFICANT CHANGE UP (ref 1.6–2.6)
MCHC RBC-ENTMCNC: 23.6 PG — LOW (ref 27–34)
MCHC RBC-ENTMCNC: 23.7 PG — LOW (ref 27–34)
MCHC RBC-ENTMCNC: 30.2 GM/DL — LOW (ref 32–36)
MCHC RBC-ENTMCNC: 30.3 GM/DL — LOW (ref 32–36)
MCV RBC AUTO: 77.7 FL — LOW (ref 80–100)
MCV RBC AUTO: 78.2 FL — LOW (ref 80–100)
NITRITE UR-MCNC: NEGATIVE — SIGNIFICANT CHANGE UP
NRBC # BLD: 0 /100 WBCS — SIGNIFICANT CHANGE UP (ref 0–0)
NRBC # BLD: 0 /100 WBCS — SIGNIFICANT CHANGE UP (ref 0–0)
OSMOLALITY UR: 218 MOS/KG — SIGNIFICANT CHANGE UP (ref 50–1200)
PCO2 BLDA: 58 MMHG — HIGH (ref 32–35)
PCO2 BLDA: 91 MMHG — CRITICAL HIGH (ref 32–35)
PCO2 BLDV: 55 MMHG — HIGH (ref 39–42)
PH BLDA: 7.14 — CRITICAL LOW (ref 7.35–7.45)
PH BLDA: 7.28 — LOW (ref 7.35–7.45)
PH BLDV: 7.31 — LOW (ref 7.32–7.43)
PH UR: 6 — SIGNIFICANT CHANGE UP (ref 5–8)
PHOSPHATE SERPL-MCNC: 7 MG/DL — HIGH (ref 2.5–4.5)
PLATELET # BLD AUTO: 327 K/UL — SIGNIFICANT CHANGE UP (ref 150–400)
PLATELET # BLD AUTO: 376 K/UL — SIGNIFICANT CHANGE UP (ref 150–400)
PO2 BLDA: 113 MMHG — HIGH (ref 83–108)
PO2 BLDA: 66 MMHG — LOW (ref 83–108)
PO2 BLDV: 39 MMHG — SIGNIFICANT CHANGE UP
POTASSIUM SERPL-MCNC: 4.5 MMOL/L — SIGNIFICANT CHANGE UP (ref 3.5–5.3)
POTASSIUM SERPL-MCNC: 5.3 MMOL/L — SIGNIFICANT CHANGE UP (ref 3.5–5.3)
POTASSIUM SERPL-SCNC: 4.5 MMOL/L — SIGNIFICANT CHANGE UP (ref 3.5–5.3)
POTASSIUM SERPL-SCNC: 5.3 MMOL/L — SIGNIFICANT CHANGE UP (ref 3.5–5.3)
PROT SERPL-MCNC: 7.4 G/DL — SIGNIFICANT CHANGE UP (ref 6–8.3)
PROT SERPL-MCNC: 7.5 G/DL — SIGNIFICANT CHANGE UP (ref 6–8.3)
PROT UR-MCNC: 15
PROTHROM AB SERPL-ACNC: 13.4 SEC — SIGNIFICANT CHANGE UP (ref 10.6–13.6)
RBC # BLD: 4.82 M/UL — SIGNIFICANT CHANGE UP (ref 3.8–5.2)
RBC # BLD: 4.88 M/UL — SIGNIFICANT CHANGE UP (ref 3.8–5.2)
RBC # FLD: 16.3 % — HIGH (ref 10.3–14.5)
RBC # FLD: 16.3 % — HIGH (ref 10.3–14.5)
SAO2 % BLDA: 92 % — SIGNIFICANT CHANGE UP
SAO2 % BLDA: 98 % — SIGNIFICANT CHANGE UP
SAO2 % BLDV: 71.5 % — SIGNIFICANT CHANGE UP
SODIUM SERPL-SCNC: 132 MMOL/L — LOW (ref 135–145)
SODIUM SERPL-SCNC: 136 MMOL/L — SIGNIFICANT CHANGE UP (ref 135–145)
SODIUM UR-SCNC: 38 MMOL/L — SIGNIFICANT CHANGE UP
SP GR SPEC: 1.01 — SIGNIFICANT CHANGE UP (ref 1.01–1.02)
SPECIMEN SOURCE: SIGNIFICANT CHANGE UP
TROPONIN I, HIGH SENSITIVITY RESULT: 435.4 NG/L — HIGH
TROPONIN I, HIGH SENSITIVITY RESULT: 448.3 NG/L — HIGH
TSH SERPL-MCNC: 0.78 UU/ML — SIGNIFICANT CHANGE UP (ref 0.34–4.82)
UROBILINOGEN FLD QL: NEGATIVE — SIGNIFICANT CHANGE UP
WBC # BLD: 11.88 K/UL — HIGH (ref 3.8–10.5)
WBC # BLD: 12.44 K/UL — HIGH (ref 3.8–10.5)
WBC # FLD AUTO: 11.88 K/UL — HIGH (ref 3.8–10.5)
WBC # FLD AUTO: 12.44 K/UL — HIGH (ref 3.8–10.5)

## 2022-02-07 PROCEDURE — 93010 ELECTROCARDIOGRAM REPORT: CPT

## 2022-02-07 PROCEDURE — 71045 X-RAY EXAM CHEST 1 VIEW: CPT | Mod: 26

## 2022-02-07 PROCEDURE — 71045 X-RAY EXAM CHEST 1 VIEW: CPT | Mod: 26,77

## 2022-02-07 PROCEDURE — 99232 SBSQ HOSP IP/OBS MODERATE 35: CPT

## 2022-02-07 RX ORDER — TIOTROPIUM BROMIDE 18 UG/1
1 CAPSULE ORAL; RESPIRATORY (INHALATION) DAILY
Refills: 0 | Status: DISCONTINUED | OUTPATIENT
Start: 2022-02-07 | End: 2022-02-15

## 2022-02-07 RX ORDER — CHLORHEXIDINE GLUCONATE 213 G/1000ML
1 SOLUTION TOPICAL
Refills: 0 | Status: DISCONTINUED | OUTPATIENT
Start: 2022-02-07 | End: 2022-02-17

## 2022-02-07 RX ORDER — HEPARIN SODIUM 5000 [USP'U]/ML
5000 INJECTION INTRAVENOUS; SUBCUTANEOUS EVERY 8 HOURS
Refills: 0 | Status: DISCONTINUED | OUTPATIENT
Start: 2022-02-07 | End: 2022-02-22

## 2022-02-07 RX ORDER — CEFTRIAXONE 500 MG/1
INJECTION, POWDER, FOR SOLUTION INTRAMUSCULAR; INTRAVENOUS
Refills: 0 | Status: DISCONTINUED | OUTPATIENT
Start: 2022-02-07 | End: 2022-02-07

## 2022-02-07 RX ORDER — SODIUM CHLORIDE 9 MG/ML
10 INJECTION INTRAMUSCULAR; INTRAVENOUS; SUBCUTANEOUS
Refills: 0 | Status: DISCONTINUED | OUTPATIENT
Start: 2022-02-07 | End: 2022-02-22

## 2022-02-07 RX ORDER — CHLORHEXIDINE GLUCONATE 213 G/1000ML
1 SOLUTION TOPICAL
Refills: 0 | Status: DISCONTINUED | OUTPATIENT
Start: 2022-02-07 | End: 2022-02-09

## 2022-02-07 RX ORDER — PROPOFOL 10 MG/ML
35 INJECTION, EMULSION INTRAVENOUS
Qty: 500 | Refills: 0 | Status: DISCONTINUED | OUTPATIENT
Start: 2022-02-07 | End: 2022-02-08

## 2022-02-07 RX ORDER — FENTANYL CITRATE 50 UG/ML
50 INJECTION INTRAVENOUS ONCE
Refills: 0 | Status: DISCONTINUED | OUTPATIENT
Start: 2022-02-07 | End: 2022-02-07

## 2022-02-07 RX ORDER — PANTOPRAZOLE SODIUM 20 MG/1
40 TABLET, DELAYED RELEASE ORAL DAILY
Refills: 0 | Status: DISCONTINUED | OUTPATIENT
Start: 2022-02-07 | End: 2022-02-15

## 2022-02-07 RX ORDER — CHLORHEXIDINE GLUCONATE 213 G/1000ML
15 SOLUTION TOPICAL
Refills: 0 | Status: DISCONTINUED | OUTPATIENT
Start: 2022-02-07 | End: 2022-02-09

## 2022-02-07 RX ORDER — ALBUTEROL 90 UG/1
2 AEROSOL, METERED ORAL EVERY 6 HOURS
Refills: 0 | Status: DISCONTINUED | OUTPATIENT
Start: 2022-02-07 | End: 2022-02-22

## 2022-02-07 RX ORDER — CEFAZOLIN SODIUM 1 G
1000 VIAL (EA) INJECTION EVERY 12 HOURS
Refills: 0 | Status: DISCONTINUED | OUTPATIENT
Start: 2022-02-07 | End: 2022-02-09

## 2022-02-07 RX ORDER — SODIUM CHLORIDE 9 MG/ML
1000 INJECTION INTRAMUSCULAR; INTRAVENOUS; SUBCUTANEOUS
Refills: 0 | Status: DISCONTINUED | OUTPATIENT
Start: 2022-02-07 | End: 2022-02-07

## 2022-02-07 RX ORDER — CEFTRIAXONE 500 MG/1
1000 INJECTION, POWDER, FOR SOLUTION INTRAMUSCULAR; INTRAVENOUS ONCE
Refills: 0 | Status: DISCONTINUED | OUTPATIENT
Start: 2022-02-07 | End: 2022-02-07

## 2022-02-07 RX ORDER — AZITHROMYCIN 500 MG/1
TABLET, FILM COATED ORAL
Refills: 0 | Status: DISCONTINUED | OUTPATIENT
Start: 2022-02-07 | End: 2022-02-07

## 2022-02-07 RX ORDER — AZITHROMYCIN 500 MG/1
500 TABLET, FILM COATED ORAL ONCE
Refills: 0 | Status: DISCONTINUED | OUTPATIENT
Start: 2022-02-07 | End: 2022-02-07

## 2022-02-07 RX ORDER — PROPOFOL 10 MG/ML
10 INJECTION, EMULSION INTRAVENOUS
Qty: 1000 | Refills: 0 | Status: DISCONTINUED | OUTPATIENT
Start: 2022-02-07 | End: 2022-02-09

## 2022-02-07 RX ORDER — ACETAMINOPHEN 500 MG
1000 TABLET ORAL ONCE
Refills: 0 | Status: COMPLETED | OUTPATIENT
Start: 2022-02-07 | End: 2022-02-07

## 2022-02-07 RX ORDER — INSULIN LISPRO 100/ML
VIAL (ML) SUBCUTANEOUS EVERY 6 HOURS
Refills: 0 | Status: DISCONTINUED | OUTPATIENT
Start: 2022-02-07 | End: 2022-02-09

## 2022-02-07 RX ORDER — PHENYLEPHRINE HYDROCHLORIDE 10 MG/ML
0.3 INJECTION INTRAVENOUS
Qty: 40 | Refills: 0 | Status: DISCONTINUED | OUTPATIENT
Start: 2022-02-07 | End: 2022-02-07

## 2022-02-07 RX ORDER — PHENYLEPHRINE HYDROCHLORIDE 10 MG/ML
0.3 INJECTION INTRAVENOUS
Qty: 160 | Refills: 0 | Status: DISCONTINUED | OUTPATIENT
Start: 2022-02-07 | End: 2022-02-09

## 2022-02-07 RX ORDER — ENOXAPARIN SODIUM 100 MG/ML
40 INJECTION SUBCUTANEOUS EVERY 12 HOURS
Refills: 0 | Status: DISCONTINUED | OUTPATIENT
Start: 2022-02-07 | End: 2022-02-07

## 2022-02-07 RX ADMIN — Medication 400 MILLIGRAM(S): at 22:56

## 2022-02-07 RX ADMIN — Medication 40 MILLIGRAM(S): at 23:04

## 2022-02-07 RX ADMIN — ATORVASTATIN CALCIUM 10 MILLIGRAM(S): 80 TABLET, FILM COATED ORAL at 22:30

## 2022-02-07 RX ADMIN — HEPARIN SODIUM 5000 UNIT(S): 5000 INJECTION INTRAVENOUS; SUBCUTANEOUS at 18:11

## 2022-02-07 RX ADMIN — FENTANYL CITRATE 50 MICROGRAM(S): 50 INJECTION INTRAVENOUS at 18:45

## 2022-02-07 RX ADMIN — LIDOCAINE 2 PATCH: 4 CREAM TOPICAL at 15:58

## 2022-02-07 RX ADMIN — MONTELUKAST 10 MILLIGRAM(S): 4 TABLET, CHEWABLE ORAL at 22:30

## 2022-02-07 RX ADMIN — ENOXAPARIN SODIUM 40 MILLIGRAM(S): 100 INJECTION SUBCUTANEOUS at 12:33

## 2022-02-07 RX ADMIN — Medication 100 MILLIGRAM(S): at 05:33

## 2022-02-07 RX ADMIN — Medication 6: at 08:18

## 2022-02-07 RX ADMIN — Medication 8 UNIT(S): at 08:18

## 2022-02-07 RX ADMIN — LIDOCAINE 2 PATCH: 4 CREAM TOPICAL at 00:00

## 2022-02-07 RX ADMIN — Medication 112 MICROGRAM(S): at 05:33

## 2022-02-07 RX ADMIN — PROPOFOL 26.7 MICROGRAM(S)/KG/MIN: 10 INJECTION, EMULSION INTRAVENOUS at 18:14

## 2022-02-07 RX ADMIN — Medication 6: at 23:15

## 2022-02-07 RX ADMIN — CHLORHEXIDINE GLUCONATE 1 APPLICATION(S): 213 SOLUTION TOPICAL at 18:13

## 2022-02-07 RX ADMIN — BUDESONIDE AND FORMOTEROL FUMARATE DIHYDRATE 2 PUFF(S): 160; 4.5 AEROSOL RESPIRATORY (INHALATION) at 10:27

## 2022-02-07 RX ADMIN — INSULIN GLARGINE 20 UNIT(S): 100 INJECTION, SOLUTION SUBCUTANEOUS at 23:13

## 2022-02-07 RX ADMIN — CHLORHEXIDINE GLUCONATE 15 MILLILITER(S): 213 SOLUTION TOPICAL at 18:12

## 2022-02-07 RX ADMIN — Medication 8 UNIT(S): at 11:57

## 2022-02-07 RX ADMIN — PROPOFOL 7.62 MICROGRAM(S)/KG/MIN: 10 INJECTION, EMULSION INTRAVENOUS at 22:29

## 2022-02-07 RX ADMIN — Medication 100 MILLIGRAM(S): at 18:12

## 2022-02-07 RX ADMIN — Medication 40 MILLIGRAM(S): at 18:14

## 2022-02-07 RX ADMIN — Medication 2: at 18:12

## 2022-02-07 RX ADMIN — PHENYLEPHRINE HYDROCHLORIDE 14.3 MICROGRAM(S)/KG/MIN: 10 INJECTION INTRAVENOUS at 18:14

## 2022-02-07 RX ADMIN — FENTANYL CITRATE 50 MICROGRAM(S): 50 INJECTION INTRAVENOUS at 18:13

## 2022-02-07 RX ADMIN — HEPARIN SODIUM 5000 UNIT(S): 5000 INJECTION INTRAVENOUS; SUBCUTANEOUS at 22:31

## 2022-02-07 RX ADMIN — SODIUM CHLORIDE 100 MILLILITER(S): 9 INJECTION INTRAMUSCULAR; INTRAVENOUS; SUBCUTANEOUS at 11:09

## 2022-02-07 RX ADMIN — Medication 4: at 11:57

## 2022-02-07 NOTE — PROGRESS NOTE ADULT - ASSESSMENT
Confidential Drug Utilization Report  Search Terms: Susie Leger, 1965Search Date: 02/05/2022 08:56:21 AM  The Drug Utilization Report below displays all of the controlled substance prescriptions, if any, that your patient has filled in the last twelve months. The information displayed on this report is compiled from pharmacy submissions to the Department, and accurately reflects the information as submitted by the pharmacies.    This report was requested by: Britt Jean | Reference #: [id number]    There are no results for the search terms that you entered.

## 2022-02-07 NOTE — PROGRESS NOTE ADULT - PROBLEM SELECTOR PLAN 1
Pt with chronic back pain, b/l leg pain which is somatic and neuropathic in nature due to DJD, fibromyalgia, diabetic neuropathy. Pt also with chest and abdominal pain Troponin x 2 negative. Cardiac monitor in place. Pt with increased lethargy and signficant FERNIE- nephrology following.   Maximize non-opioid pain recommendations   - Continue to hold all pain meds at this time including flexeril, gabapentin, acetaminophen  - Avoid NSAIDs  - Lidoderm 4% patch 2 patches daily.   Bowel Regimen  Per primary team   Mild pain   - Non-pharmacological pain treatment recommendations  - Warm/ Cool packs PRN   - Repositioning, imagery, relaxation, distraction.  Pain will sign off at this time. Please reconsult if needed.   Recommendations discussed with primary team and RN

## 2022-02-07 NOTE — PROGRESS NOTE ADULT - ASSESSMENT
Right sided abdominal wall cellulitis  UTI ?  Leukocytosis - mild    Plan - Cont Kefzol 1 gm iv q12hrs  await urine culture results.  Time Spent - 31 mins.

## 2022-02-07 NOTE — PROGRESS NOTE ADULT - SUBJECTIVE AND OBJECTIVE BOX
Source of information: NOEMY CONTRERAS, Chart review  Patient language: English  : n/a    HPI:  57 yo F, from home, ambulated with cane , PMHx of obesity, asthma, chronic bronchitis, NAKUL, HTN, DM, HLD, fibromyalgia, anxiety, trigeminal neuralgia, degenerative joint disease, ?IgA vasculitis, psoriasis, OA came with chief complain of chest pain and dysuria. Pt stated that for few weeks she is having burning pain in her urine, foul smell, itchy, cloudy. She further stated that she is having pain in her lower abdomen as well as in chest whenever she pressed it. She denied any vaginal discharge, headaches, visual changes, N/V/D.     Of note pt admitted at [facility] in -2021 and had extensive cardiac work up stress test and echo which were negative. Pt has left renal mass  diagnosed on imaging chances of malignancy 85% , followed outpatient urologist and heme/onc for monitoring . decreasing weight and controlled blood sugars to get the surgery at some point     In ED: UA +ve no wbc, few bacteria , trop x 1 neg  (2022 23:29)    Of note pt admitted at [facility] in -2021 and had extensive cardiac work up stress test and echo which were negative. Pt has left renal mass  diagnosed on imaging chances of malignancy 85% , followed outpatient urologist and heme/onc for monitoring . decreasing weight and controlled blood sugars to get the surgery at some point       Pt with hx DJD, fibromyalgia, diabetic neuropathy. Pt also with chest and abdominal pain Troponin x 2 negative. Cardiac monitor in place. CTAP- /- Skin thickening with subcutaneous stranding in the right lower quadrant anterior abdominal wall may represent cellulitis. No evidence for a urinary calculus. No hydronephrosis. Apparent 4.1 cm slightly hypodense lesion in the upper pole left kidney. Nonspecific mildly enlarged 1.1 cm right external iliac chain lymph node. Pain consulted for hx fibromyalgia. On initial encounter  Pt reports taking gabapentin, flexeril, Tylenol, aleve, oxcarbazepine for pain at home.   Seen and examined pt at bedside- Pt with increased lethargy. After repeated stimulation pt verbalized "Stop that hurts". Unable to perform ROS. Pt with significant FERNIE- Cr increased from 0.83 on  to 3.11 on 2/6. On - Cr 3.3. Nephrology following.- Non oliguric/Oliguric FERNIE likely dehydration vs  ATN from renal hypoperfusion/sepsis on superimposed hypertensive/diabetic nephropathy/diuretic/ACEI/use.       PAST MEDICAL & SURGICAL HISTORY:  Hypertension  vaginal cyst    Hyperlipidemia    Asthma  last inhaler use with in 10 days, on Pulm follow up [id number]    Hypothyroid    Obstructive sleep apnea  refuses Cpap    Obesity  morbid    Trigeminal neuralgia  R    Fibromyalgia    DM (diabetes mellitus)  2    DJD (degenerative joint disease)  Cervical/Thoracic/Lumbar    Cervical neuralgia  difficulty moving my neck    Back pain  thoracic &amp; lumbar    H/O bursitis  right shoulder    Radicular pain  arms, legs    Vasculitis  Legs, forerhead, arms &amp; hands, flare ups in R leg  Dr susie Ribeiro is my Rheumatologist    Renal cell carcinoma, left  on follow up Dr schulte, HOD renal [facility], waiting for suregry in 2020    Falls frequently    S/P abdominal hysterectomy        S/P  section  1    Left adrenal mass  excision, benign     Vaginal cyst  removed     Vasculitis on nerve biopsy  , had another surgery called microvascular decompression         FAMILY HISTORY:  Family history of diabetes mellitus    Family history of hypertension    Family history of obesity        Social History:  denies smoking, drug abuse and etoh abuse (2022 23:29)    Allergies    Fioricet (Rash)  gadobutrol (Rash; Urticaria; Hives)  IV Contrast (Short breath)  mushroom (Unknown)  venlafaxine (Hives)    MEDICATIONS  (STANDING):  acetaminophen     Tablet .. 1000 milliGRAM(s) Oral every 8 hours  aspirin  chewable 81 milliGRAM(s) Oral daily  atorvastatin 10 milliGRAM(s) Oral at bedtime  budesonide 160 MICROgram(s)/formoterol 4.5 MICROgram(s) Inhaler 2 Puff(s) Inhalation two times a day  cefTRIAXone   IVPB 1000 milliGRAM(s) IV Intermittent every 24 hours  cyclobenzaprine 5 milliGRAM(s) Oral three times a day  enoxaparin Injectable 40 milliGRAM(s) SubCutaneous daily  gabapentin 400 milliGRAM(s) Oral three times a day  hydrochlorothiazide 25 milliGRAM(s) Oral daily  influenza   Vaccine 0.5 milliLiter(s) IntraMuscular once  insulin glargine Injectable (LANTUS) 20 Unit(s) SubCutaneous at bedtime  insulin lispro (ADMELOG) corrective regimen sliding scale   SubCutaneous three times a day before meals  insulin lispro Injectable (ADMELOG) 8 Unit(s) SubCutaneous three times a day before meals  levothyroxine 112 MICROGram(s) Oral daily  lidocaine   4% Patch 2 Patch Transdermal daily  lisinopril 40 milliGRAM(s) Oral daily  montelukast 10 milliGRAM(s) Oral at bedtime  OXcarbazepine 300 milliGRAM(s) Oral every 12 hours    MEDICATIONS  (PRN):  ondansetron Injectable 4 milliGRAM(s) IV Push every 6 hours PRN Nausea and/or Vomiting      Vital Signs Last 24 Hrs  T(C): 36.6 (2022 11:01), Max: 37.2 (2022 04:46)  T(F): 97.9 (2022 11:), Max: 99 (2022 04:46)  HR: 85 (2022 11:01) (81 - 104)  BP: 102/71 (2022 11:01) (102/71 - 149/87)  BP(mean): --  RR: 18 (2022 11:01) (18 - 20)  SpO2: 95% (2022 11:01) (93% - 95%)    LABS: Reviewed.                          12.6   8.91  )-----------( 364      ( 2022 06:41 )             42.8     02-05    139  |  102  |  16  ----------------------------<  237<H>  4.1   |  31  |  0.83    Ca    9.7      2022 06:41  Phos  4.7     02-05  Mg     2.3     02-05    TPro  8.0  /  Alb  3.1<L>  /  TBili  0.5  /  DBili  x   /  AST  51<H>  /  ALT  65<H>  /  AlkPhos  117  02-05      LIVER FUNCTIONS - ( 2022 06:41 )  Alb: 3.1 g/dL / Pro: 8.0 g/dL / ALK PHOS: 117 U/L / ALT: 65 U/L DA / AST: 51 U/L / GGT: x           Urinalysis Basic - ( 2022 20:54 )    Color: Yellow / Appearance: Clear / S.020 / pH: x  Gluc: x / Ketone: Negative  / Bili: Negative / Urobili: 1   Blood: x / Protein: 30 mg/dL / Nitrite: Negative   Leuk Esterase: Trace / RBC: 0-2 /HPF / WBC 0-2 /HPF   Sq Epi: x / Non Sq Epi: Moderate /HPF / Bacteria: Trace /HPF      CAPILLARY BLOOD GLUCOSE      POCT Blood Glucose.: 310 mg/dL (2022 11:17)  POCT Blood Glucose.: 277 mg/dL (2022 07:30)    COVID-19 PCR: NotDetec (2022 19:54)  COVID-19 PCR: NotDetec (11 Sep 2021 11:05)      Radiology: Reviewed.   < from: CT Abdomen and Pelvis No Cont (22 @ 20:12) >    ACC: [id number] EXAM:  CT ABDOMEN AND PELVIS                          PROCEDURE DATE:  2022          INTERPRETATION:  CLINICAL INFORMATION: Lower abdominal pain    COMPARISON: Abdominal/pelvic CT dated 2018    CONTRAST/COMPLICATIONS:  IV Contrast: NONE  Oral Contrast: NONE  Complications: None reported at time of study completion    PROCEDURE:  CT of the Abdomen and Pelvis was performed.  Sagittal and coronal reformats were performed.    FINDINGS: The examination is limited by lack of IV/oral contrast.  LOWER CHEST: Small right atelectasis.    LIVER: Within normal limits.  BILE DUCTS: Normal caliber.  GALLBLADDER: Within normal limits.  SPLEEN: Within normal limits.  PANCREAS: Within normal limits.  ADRENALS: Nonspecific 1.2 cm right adrenal nodule with a Hounsfield units   of 13. The left adrenal is not visualized. Stable surgical clips in the   region of the left adrenal.  KIDNEYS/URETERS: No evidence for a calculus in the kidneys or ureters. No   hydronephrosis. The right kidney appears unremarkable. Apparent 4.1 cm   slightly hypodense lesion in the upper pole left kidney. Please see   abdominal MR dated 2020 for further detail.    BLADDER: Within normal limits.  REPRODUCTIVE ORGANS: The uterus is not visualized, likely surgically   absent. The adnexa appear grossly unremarkable.    BOWEL: No bowel obstruction or grossly thickened bowel wall. Colonic   diverticulosis without evidence for diverticulitis.. Appendix within   normal limits.  PERITONEUM: No free air or ascites.  VESSELS: Calcified atherosclerotic disease.  RETROPERITONEUM/LYMPH NODES: Nonspecific mildly enlarged 1.1 cm right   external iliac chain lymph node.  ABDOMINAL WALL: Skin thickening with subcutaneous stranding in the right   lower quadrant anterior abdominal wall may represent cellulitis. Clinical   correlation is recommended.  BONES: Mild degenerative spondylosis.    IMPRESSION:  Skin thickening with subcutaneous stranding in the right lower quadrant   anterior abdominal wall may represent cellulitis. Clinical correlation is   recommended.    No evidence for a urinary calculus. No hydronephrosis. Apparent 4.1 cm   slightly hypodense lesion in the upper pole left kidney. Please see   abdominal MR dated 2020 for further detail.    Nonspecific mildly enlarged 1.1 cm right external iliac chain lymph node.    --- End of Report ---            ANNA SAHNI MD; Attending Radiologist  This document has been electronically signed. 2022  8:38PM    < end of copied text >    < from: Xray Chest 2 Views PA/Lat (22 @ 19:36) >    ACC: [id number] EXAM:  XR CHEST PA LAT 2V                          PROCEDURE DATE:  2022          INTERPRETATION:  PA and lateral chest radiographs    COMPARISON: 2020 chest.    CLINICAL INFORMATION: Chest Pain.    FINDINGS:    PULMONARY: The airway is midline.  There are no airspace consolidations.  No pleural effusion or pneumothorax.    HEART/VASCULAR: The heart size and mediastinum configuration are within   the limits of normal.    BONES: The visualized osseus structures are intact.    IMPRESSION:    No radiographic evidence of active chest disease..    --- End of Report ---            MELBA LE MD; Attending Radiologist  This document has been electronically signed. 2022  9:07PM    < end of copied text >        ORT Score -   Family Hx of substance abuse	Female	      Male  Alcohol 	                                           1                     3  Illegal drugs	                                   2                     3  Rx drugs                                           4 	                  4  Personal Hx of substance abuse		  Alcohol 	                                          3	                  3  Illegal drugs                                     4	                  4  Rx drugs                                            5 	                  5  Age between 16- 45 years	           1                     1  hx preadolescent sexual abuse	   3 	                  0  Psychological disease		  ADD, OCD, bipolar, schizophrenia   2	          2  Depression                                           1 	          1  Total: 0    a score of 3 or lower indicates low risk for opioid abuse		  a score of 4-7 indicates moderate risk for opioid abuse		  a score of 8 or higher indicates high risk for opioid abuse    REVIEW OF SYSTEMS:  unable to obtain. Pt with increased lethargy. After repeated stimulation pt verbalized "Stop that hurts"     PHYSICAL EXAM:  GENERAL:  Increased lethargy. After repeated stimulation pt verbalized "Stop that hurts" Speech is clear.   RESPIRATORY: Respirations even and unlabored. + diminished to auscultation bilaterally; No rales, rhonchi, wheezing, or rubs + intermittent O2 2L (at home as well)  CARDIOVASCULAR: Normal S1/S2, regular rate and rhythm; No murmurs, rubs, or gallops. No JVD. + cardiac monitor in place   GASTROINTESTINAL: + morbid obesity; Soft, Nontender, Nondistended; Bowel sounds present  PERIPHERAL VASCULAR:  Extremities warm with + b/l LE severe edema. 2+ Peripheral Pulses, No cyanosis, No calf tenderness  MUSCULOSKELETAL: Unable to follow commands due to lethargy  SKIN: + psoriasis Warm, dry, intact. No rashes, lesions, scars or wounds.     Risk factors associated with adverse outcomes related to opioid treatment  [ ]  Concurrent benzodiazepine use  [ ]  History/ Active substance use or alcohol use disorder  [ ] Psychiatric co-morbidity  [X ] Sleep apnea  [ ] COPD  [X ] BMI> 35  [ ] Liver dysfunction  [ ] Renal dysfunction  [ ] CHF  [ ] Smoker  [ ]  Age > 60 years    [X ]  [place]  Reviewed and Copied to Chart. See below.    Plan of care and goal oriented pain management treatment options were discussed with patient and /or primary care giver; all questions and concerns were addressed and care was aligned with patient's wishes.    Educated patient on goal oriented pain management treatment options     22 @ 10:40

## 2022-02-07 NOTE — CONSULT NOTE ADULT - ATTENDING COMMENTS
57 yo F, from home, ambulated with cane , PMHx of obesity, asthma, chronic bronchitis, NAKUL non compliant with CPAP over night, HTN, DM, HLD, fibromyalgia, anxiety, trigeminal neuralgia, degenerative joint disease, ?IgA vasculitis, psoriasis, OA came with chief complain of chest pain and dysuria. patient admitted to [facility], Troponin x 2 negative,  CTAP- 2/4- showed Skin thickening with subcutaneous stranding in the right lower quadrant anterior abdominal wall c/w cellulitis. pt received Zithromax and Rocephin in ED x1 , Abx dc,d due to negative UA, pt started on keflex 500mg qid for cellulitis , hospital course complicated with encephalopathy and ATN   on 2/5, Cr increased from 0.83 to 3.1,  IV cefazolin started in setting of possible sepsis 2/2 cellulitis. pt noted to progressively getting altered over 2/6, VBG remarkable for PH :7.14, pco2 82, pt was placed on Bi-pap reportedly over night.  Patient found to be more lethargic in the morning.  Unable to perform ROS. ABG showed acute on chronic respiratory acidosis , Hypercapnia 91, respiratory placed pt on AVAPS . ICU consulted for further evaluation. Patient will require  intubation , will transfer to ICU for further work-up.     Assessment:    - Acute hypercapnic hypoxic respiratory failure   - NAKUL requiring CPAP at night (non -compliant)  - Chronic bronchitis   - Asthma   - sepsis  - Psoriasis   - Fibromyalgia   - DM uncontrol   - Hypothyroidism    Plan   - Transferred to icu 2/7  - Emergent intubated   - Adjust vent as per ABG  - Sedated for vent support   - Hemodynamic monitoring   - Antibx   - Cultures   - Albuterol inhaler   - NGT for feed   - Monitor blood sugar with coverage

## 2022-02-07 NOTE — CONSULT NOTE ADULT - ASSESSMENT
55 yo F, from home, ambulated with cane , PMHx of obesity, asthma, chronic bronchitis, NAKUL non compliant with CPAP over night, HTN, DM, HLD, fibromyalgia, anxiety, trigeminal neuralgia, degenerative joint disease, ?IgA vasculitis, psoriasis, OA came with chief complain of chest pain and dysuria. patient admitted to tele, Troponin x 2 negative,  CTAP- 2/4- showed Skin thickening with subcutaneous stranding in the right lower quadrant anterior abdominal wall c/w cellulitis. pt received Zithromax and Rocephin in ED x1 , Abx dc,d due to negative UA, pt started on keflex 500mg qid for cellulitis , hospital course complicated with encephalopathy and ATN   on 2/5, Cr increased from 0.83 to 3.1,  IV cefazolin started in setting of possible sepsis 2/2 cellulitis. pt noted to progressively getting altered over 2/6, VBG remarkable for PH :7.14, pco2 82, pt was placed on Bi-pap reportedly over night.  patient found to be more lethargic in the morning.  Unable to perform ROS. ABG showed acute on chronic respiratory acidosis , Hypercapnia 91, respiratory placed pt on AVAPS . ICU consulted for further evaluation. Patient will require  intubation , will transfer to ICU for further work-up.     Assessment:  NAKUL requiring CPAP at night         Plan:    =====================[CNS ]==============================  # encephalopathy   - Likely due to hypercapnia , PCO2 91  -  currently on AVAPS    =====================[CVS ]===============================  # HTN :    - BP currently acceptable off medication   - monitor BP     =====================[RESP ]==============================  # No issues   - currently on RA     =====================[ GI ]================================  # Caecal Volvulus:   - CT a/p : Caecal volvulus   - Pt passing flatus and stools   - resolving, tolerating regular diet   - cont with Diet   - Replenish electrolytes as needed  - Surgery and GI Dr Raymond following     ====================[ RENAL ]=============================   # Acute Renal failure:       =====================[ ID ]================================  # No issues     ===================[ HEME/ONC ]===========================  #     =====================[ ENDO ]=============================  # DM :   - target CBG < 180  - currently well controlled on HSS     ================= Skin/Catheters============================  #     ==================[ PROPHYLAXIS ]==========================   # Dvt : holding due to low Hb and plt , c/w SCD   # Gi : Protonix     ====================[ DISPO ]==============================   - Monitor in ICU     ===================[ PROGNOSIS ]===========================  - Guarded       55 yo F, from home, ambulated with cane , PMHx of obesity, asthma, chronic bronchitis, NAKUL non compliant with CPAP over night, HTN, DM, HLD, fibromyalgia, anxiety, trigeminal neuralgia, degenerative joint disease, ?IgA vasculitis, psoriasis, OA came with chief complain of chest pain and dysuria. patient admitted to [facility], Troponin x 2 negative,  CTAP- 2/4- showed Skin thickening with subcutaneous stranding in the right lower quadrant anterior abdominal wall c/w cellulitis. pt received Zithromax and Rocephin in ED x1 , Abx dc,d due to negative UA, pt started on keflex 500mg qid for cellulitis , hospital course complicated with encephalopathy and ATN   on 2/5, Cr increased from 0.83 to 3.1,  IV cefazolin started in setting of possible sepsis 2/2 cellulitis. pt noted to progressively getting altered over 2/6, VBG remarkable for PH :7.14, pco2 82, pt was placed on Bi-pap reportedly over night.  Patient found to be more lethargic in the morning.  Unable to perform ROS. ABG showed acute on chronic respiratory acidosis , Hypercapnia 91, respiratory placed pt on AVAPS . ICU consulted for further evaluation. Patient will require  intubation , will transfer to ICU for further work-up.     Assessment:    Acute hypercapnic hypoxic respiratory failure   NAKUL requiring CPAP at night (non -compliant)  Cellulitis  Chronic bronchitis   Asthma   sepsis  Psoriasis   Fibromyalgia   DM  Hypothyroidism       Plan:    =====================[CNS ]==============================  # encephalopathy   - Likely due to hypercapnia , PCO2:91  - will consider infectious etiologies as well  -  Currently on AVAPs  - will require intubation  - f/u ammonia level , Cpk, TSH     # History of Fibromyalgia on f gabapentin, flexeril, Tylenol, aleve, oxcarbazepine for pain at home   - Lidoderm 4% patch 2 patches daily  - continue to hold all home meds  =====================[CVS ]===============================  # HTN :    - BP currently acceptable off medication   - monitor BP in setting of ATN / sepsis   - keep MAP >65    =====================[RESP ]==============================  # Acute hypoxic hypercapnic respiratory failure  - likely in setting of NAKUL / cheonic bronchitis / asthma   - currently on AVAPs , will require intubation to protect airway given being lethargic   - will start on steroids  -f/u CXR   - f/u D dimer   - Monitor ABG  - f/u CXR s/p intubation     =====================[ GI ]================================  # No acute issues  - will keep NPO until NGt placement  - c/w PPi for GI prophylaxis     ====================[ RENAL ]=============================   # Ernestina:   -ERNESTINA likely dehydration vs  ATN from renal hypoperfusion/sepsis  -Strict I/o, Avoid Nephrotoxic agents  -Maintain MAP>65-70 mm Hg  - Monitor BMP and electrolytes every 12 hrly  - f/u Bladder scan     # abnormal electrolytes:   - Hyperkalemia resolved   - Hyperphosphatemia     # Left renal lesion chronic  - outpatient f/u with Urology         =====================[ ID ]================================  # Cellulitis   -  on Cefazolin 1gr q12 hours  - UA negative for WBC , f/u UC   - ID Dr Jacobsen following       ===================[ HEME/ONC ]===========================  # anemia of chronic illness  -monitor cbc      =====================[ ENDO ]=============================  # DM :   - target CBG < 180  - currently on  Lantus and HSS , adjust as needed    # Hypothyroidism   - f/u TSH in setting of encephalopathy   - c/w synthroid      ================= Skin/Catheters============================  # cellulitis likley 9i setting of Psoriasis  - c/w IV ABx   - pt has  Bustamante    ==================[ PROPHYLAXIS ]==========================   # Dvt : c/u SQH , f/u D dimer   # Gi : Protonix     ====================[ DISPO ]==============================   - Monitor in ICU     ===================[ PROGNOSIS ]===========================  - Guarded

## 2022-02-07 NOTE — PROGRESS NOTE ADULT - SUBJECTIVE AND OBJECTIVE BOX
ICU VISIT  56y Female    Meds:  ceFAZolin   IVPB 1000 milliGRAM(s) IV Intermittent every 12 hours    Allergies    Fioricet (Rash)  gadobutrol (Rash; Urticaria; Hives)  IV Contrast (Short breath)  mushroom (Unknown)  venlafaxine (Hives)    Intolerances        VITALS:  Vital Signs Last 24 Hrs  T(C): 37.2 (07 Feb 2022 15:30), Max: 37.6 (07 Feb 2022 05:38)  T(F): 98.9 (07 Feb 2022 15:30), Max: 99.6 (07 Feb 2022 05:38)  HR: 82 (07 Feb 2022 15:30) (55 - 91)  BP: 86/62 (07 Feb 2022 15:30) (86/62 - 142/108)  BP(mean): 68 (07 Feb 2022 15:30) (68 - 68)  RR: 26 (07 Feb 2022 15:30) (20 - 26)  SpO2: 98% (07 Feb 2022 15:30) (92% - 100%)    LABS/DIAGNOSTIC TESTS:                          11.4   11.88 )-----------( 327      ( 07 Feb 2022 07:16 )             37.7         02-07    132<L>  |  96  |  55<H>  ----------------------------<  271<H>  5.3   |  27  |  3.30<H>    Ca    8.0<L>      07 Feb 2022 07:16  Phos  7.0     02-07  Mg     1.8     02-07    TPro  7.4  /  Alb  2.9<L>  /  TBili  0.5  /  DBili  x   /  AST  34  /  ALT  45  /  AlkPhos  110  02-07      LIVER FUNCTIONS - ( 07 Feb 2022 07:16 )  Alb: 2.9 g/dL / Pro: 7.4 g/dL / ALK PHOS: 110 U/L / ALT: 45 U/L DA / AST: 34 U/L / GGT: x             CULTURES: Clean Catch Clean Catch (Midstream)  02-05 @ 04:22   Normal Urogenital brody present  --  --            RADIOLOGY:< from: Xray Chest 1 View- PORTABLE-Routine (Xray Chest 1 View- PORTABLE-Routine .) (02.06.22 @ 11:33) >  ACC: [id number] EXAM:  XR CHEST PORTABLE ROUTINE 1V                          PROCEDURE DATE:  02/06/2022          INTERPRETATION:  Hypoxia.    AP chest. Prior dated 2/4/2022.    Low lung volumes.  no change heart mediastinum.  grossly clear lungs. No  consolidation or effusion.  left costophrenic angle obscured by overlying   soft tissue.    IMPRESSION: Grossly clear lungs.    --- End of Report ---            MILLER PEÑA MD; Attending Radiologist  This document has been electronically signed. Feb 6 2022  1:11PM    < end of copied text >        ROS:  [  ] UNABLE TO ELICIT ICU VISIT  56y Female who was transferred to the ICU as she became hypercapnic and had to be intubated, she was initially on BIPAP the night before but was not improving, she is afebrile and her WBC count is only marginally elevated. She is currently in the ICU sedated , intubated and vent dependent on an FIO2 of 80% and PEEP of 5 , she is on 1 pressor.     Meds:  ceFAZolin   IVPB 1000 milliGRAM(s) IV Intermittent every 12 hours    Allergies    Fioricet (Rash)  gadobutrol (Rash; Urticaria; Hives)  IV Contrast (Short breath)  mushroom (Unknown)  venlafaxine (Hives)    Intolerances        VITALS:  Vital Signs Last 24 Hrs  T(C): 37.2 (07 Feb 2022 15:30), Max: 37.6 (07 Feb 2022 05:38)  T(F): 98.9 (07 Feb 2022 15:30), Max: 99.6 (07 Feb 2022 05:38)  HR: 82 (07 Feb 2022 15:30) (55 - 91)  BP: 86/62 (07 Feb 2022 15:30) (86/62 - 142/108)  BP(mean): 68 (07 Feb 2022 15:30) (68 - 68)  RR: 26 (07 Feb 2022 15:30) (20 - 26)  SpO2: 98% (07 Feb 2022 15:30) (92% - 100%)    LABS/DIAGNOSTIC TESTS:                          11.4   11.88 )-----------( 327      ( 07 Feb 2022 07:16 )             37.7         02-07    132<L>  |  96  |  55<H>  ----------------------------<  271<H>  5.3   |  27  |  3.30<H>    Ca    8.0<L>      07 Feb 2022 07:16  Phos  7.0     02-07  Mg     1.8     02-07    TPro  7.4  /  Alb  2.9<L>  /  TBili  0.5  /  DBili  x   /  AST  34  /  ALT  45  /  AlkPhos  110  02-07      LIVER FUNCTIONS - ( 07 Feb 2022 07:16 )  Alb: 2.9 g/dL / Pro: 7.4 g/dL / ALK PHOS: 110 U/L / ALT: 45 U/L DA / AST: 34 U/L / GGT: x             CULTURES: Clean Catch Clean Catch (Midstream)  02-05 @ 04:22   Normal Urogenital brody present  --  --            RADIOLOGY:< from: Xray Chest 1 View- PORTABLE-Routine (Xray Chest 1 View- PORTABLE-Routine .) (02.06.22 @ 11:33) >  ACC: [id number] EXAM:  XR CHEST PORTABLE ROUTINE 1V                          PROCEDURE DATE:  02/06/2022          INTERPRETATION:  Hypoxia.    AP chest. Prior dated 2/4/2022.    Low lung volumes.  no change heart mediastinum.  grossly clear lungs. No  consolidation or effusion.  left costophrenic angle obscured by overlying   soft tissue.    IMPRESSION: Grossly clear lungs.    --- End of Report ---            MILLER PEÑA MD; Attending Radiologist  This document has been electronically signed. Feb 6 2022  1:11PM    < end of copied text >        ROS:  [ x ] UNABLE TO ELICIT

## 2022-02-07 NOTE — CONSULT NOTE ADULT - CONVERSATION DETAILS
Contacted patient's primary surogate son Son Marquis Leger, to provide updates regarding patient's condition. Informed that patient's condition declined and currently requiring to be intubated and admitted I ICU.  Son verbalizes understanding and informs me that he and is  family are in agreement about pt remaining FULL CODE. Consent for central line and A-line obtained. . All questions have been answered at this time.

## 2022-02-07 NOTE — PROGRESS NOTE ADULT - SUBJECTIVE AND OBJECTIVE BOX
Romeo Ribeiro MD (Nephrology progress note)  [id number], [facility],  [id number],  [facility]  TEl: [phone]  Cell: [phone]    Patient is a 56y Female seen and evaluated at bedside. Vital signs, laboratory data, imaging studies, consult notes reviewed done within past 24 hours. Overnight events noted. Patient was seen earlier drowsy, lethargic and fall asleep while talking. ICU consulted now on on ventilatory support for severe respiratory acidosis. Interval S cr 3.3 with no clear I/o charted. K level 5.3    Allergies    Fioricet (Rash)  gadobutrol (Rash; Urticaria; Hives)  IV Contrast (Short breath)  mushroom (Unknown)  venlafaxine (Hives)    Intolerances        ROS:  Limited  Patient lying in bed confused and disoriented, lethargic    VITALS:    T(C): 37.2 (22 @ 15:30), Max: 37.6 (22 @ 05:38)  HR: 82 (22 @ 15:30) (55 - 91)  BP: 86/62 (22 @ 15:30) (86/62 - 142/108)  RR: 26 (22 @ 15:30) (20 - 26)  SpO2: 98% (22 @ 15:30) (92% - 100%)  CAPILLARY BLOOD GLUCOSE      POCT Blood Glucose.: 223 mg/dL (2022 11:33)  POCT Blood Glucose.: 269 mg/dL (2022 07:53)  POCT Blood Glucose.: 244 mg/dL (2022 21:35)  POCT Blood Glucose.: 190 mg/dL (2022 17:00)      MEDICATIONS  (STANDING):  ALBUTerol    90 MICROgram(s) HFA Inhaler 2 Puff(s) Inhalation every 6 hours  aspirin  chewable 81 milliGRAM(s) Oral daily  atorvastatin 10 milliGRAM(s) Oral at bedtime  benzonatate 100 milliGRAM(s) Oral <User Schedule>  ceFAZolin   IVPB 1000 milliGRAM(s) IV Intermittent every 12 hours  chlorhexidine 2% Cloths 1 Application(s) Topical <User Schedule>  heparin   Injectable 5000 Unit(s) SubCutaneous every 8 hours  influenza   Vaccine 0.5 milliLiter(s) IntraMuscular once  insulin glargine Injectable (LANTUS) 20 Unit(s) SubCutaneous at bedtime  insulin lispro (ADMELOG) corrective regimen sliding scale   SubCutaneous every 6 hours  levothyroxine 112 MICROGram(s) Oral daily  lidocaine   4% Patch 2 Patch Transdermal daily  methylPREDNISolone sodium succinate Injectable 40 milliGRAM(s) IV Push every 6 hours  montelukast 10 milliGRAM(s) Oral at bedtime  pantoprazole  Injectable 40 milliGRAM(s) IV Push daily  sodium chloride 0.9%. 1000 milliLiter(s) (100 mL/Hr) IV Continuous <Continuous>  tiotropium 18 MICROgram(s) Capsule 1 Capsule(s) Inhalation daily    MEDICATIONS  (PRN):  ondansetron Injectable 4 milliGRAM(s) IV Push every 6 hours PRN Nausea and/or Vomiting      PHYSICAL EXAM:  GENERAL:  Drowsy, lethagic and fatigue appearing sleeping while talking  HEENT: RITA, EOMI, neck supple, no JVP, no carotid bruit, oral mucosa moist and pale  CHEST/LUNG: Bilateral decreased breath sounds, bibasilar rhonchi, no wheezing  HEART: Regular rate and rhythm, LAURA II/VI at LPSB, no gallops, no rub   ABDOMEN: Soft, nontender, non distended, bowel sounds present  : No flank or supra pubic tenderness,  EXTREMITIES: Peripheral pulses are palpable, no pedal edema  Neurology: AAOx1, confused, drowsy, lethargic and fatigue  SKIN: No rash or skin lesion  Musculoskeletal: No joint deformity     Vascular ACCESS:     LABS:                        11.4   11.88 )-----------( 327      ( 2022 07:16 )             37.7     02-    132<L>  |  96  |  55<H>  ----------------------------<  271<H>  5.3   |  27  |  3.30<H>    Ca    8.0<L>      2022 07:16  Phos  7.0     02-  Mg     1.8         TPro  7.4  /  Alb  2.9<L>  /  TBili  0.5  /  DBili  x   /  AST  34  /  ALT  45  /  AlkPhos  110          Urinalysis Basic - ( 2022 17:21 )    Color: Yellow / Appearance: Clear / S.025 / pH: x  Gluc: x / Ketone: Negative  / Bili: Moderate / Urobili: 1   Blood: x / Protein: 30 mg/dL / Nitrite: Negative   Leuk Esterase: Negative / RBC: 25-50 /HPF / WBC 3-5 /HPF   Sq Epi: x / Non Sq Epi: Many /HPF / Bacteria: Many /HPF      Sodium, Random Urine: 14 mmol/L ( @ 17:21)  Creatinine, Random Urine: 265 mg/dL ( @ 17:21)  Osmolality, Random Urine: 327 mos/kg ( @ 17:21)        RADIOLOGY & ADDITIONAL STUDIES:  ra< from: Xray Chest 1 View- PORTABLE-Routine (Xray Chest 1 View- PORTABLE-Routine .) (22 @ 11:33) >    ACC: [id number] EXAM:  XR CHEST PORTABLE ROUTINE 1V                          PROCEDURE DATE:  2022          INTERPRETATION:  Hypoxia.    AP chest. Prior dated 2022.    Low lung volumes.  no change heart mediastinum.  grossly clear lungs. No  consolidation or effusion.  left costophrenic angle obscured by overlying   soft tissue.    IMPRESSION: Grossly clear lungs.    --- End of Report ---            MILLER PEÑA MD; Attending Radiologist  This document has been electronically signed. 2022  1:11PM    < end of copied text >    Imaging Personally Reviewed:  [x] YES  [ ] NO    Consultant(s) Notes Reviewed:  [x] YES  [ ] NO    Care Discussed with Primary team/ Other Providers [x] YES  [ ] NO

## 2022-02-07 NOTE — CONSULT NOTE ADULT - SUBJECTIVE AND OBJECTIVE BOX
Patient is a 56y old  Female who presents with a chief complaint of Chest pain and dysuria (2022 10:39)      HPI:  55 yo F, from home, ambulated with cane , PMHx of obesity, asthma, chronic bronchitis, NAKUL, HTN, DM, HLD, fibromyalgia, anxiety, trigeminal neuralgia, degenerative joint disease, ?IgA vasculitis, psoriasis, OA came with chief complain of chest pain and dysuria. Pt stated that for few weeks she is having burning pain in her urine, foul smell, itchy, cloudy. She further stated that she is having pain in her lower abdomen as well as in chest whenever she pressed it. She denied any vaginal discharge, headaches, visual changes, N/V/D.     Of note pt admitted at [facility] in -2021 and had extensive cardiac work up stress test and echo which were negative. Pt has left renal mass  diagnosed on imaging chances of malignancy 85% , followed outpatient urologist and heme/onc for monitoring . decreasing weight and controlled blood sugars to get the surgery at some point     In ED: UA +ve no wbc, few bacteria , trop x 1 neg  (2022 23:29)      Allergies    Fioricet (Rash)  gadobutrol (Rash; Urticaria; Hives)  IV Contrast (Short breath)  mushroom (Unknown)  venlafaxine (Hives)    Intolerances        MEDICATIONS  (STANDING):  ALBUTerol    90 MICROgram(s) HFA Inhaler 2 Puff(s) Inhalation every 6 hours  aspirin  chewable 81 milliGRAM(s) Oral daily  atorvastatin 10 milliGRAM(s) Oral at bedtime  azithromycin  IVPB 500 milliGRAM(s) IV Intermittent once  azithromycin  IVPB      benzonatate 100 milliGRAM(s) Oral <User Schedule>  ceFAZolin   IVPB      ceFAZolin   IVPB 1000 milliGRAM(s) IV Intermittent every 12 hours  cefTRIAXone   IVPB      cefTRIAXone   IVPB 1000 milliGRAM(s) IV Intermittent once  enoxaparin Injectable 40 milliGRAM(s) SubCutaneous every 12 hours  influenza   Vaccine 0.5 milliLiter(s) IntraMuscular once  insulin glargine Injectable (LANTUS) 20 Unit(s) SubCutaneous at bedtime  insulin lispro (ADMELOG) corrective regimen sliding scale   SubCutaneous three times a day before meals  insulin lispro Injectable (ADMELOG) 8 Unit(s) SubCutaneous three times a day before meals  levothyroxine 112 MICROGram(s) Oral daily  lidocaine   4% Patch 2 Patch Transdermal daily  montelukast 10 milliGRAM(s) Oral at bedtime  sodium chloride 0.9%. 1000 milliLiter(s) (100 mL/Hr) IV Continuous <Continuous>  tiotropium 18 MICROgram(s) Capsule 1 Capsule(s) Inhalation daily    MEDICATIONS  (PRN):  ondansetron Injectable 4 milliGRAM(s) IV Push every 6 hours PRN Nausea and/or Vomiting      Daily     Daily     Drug Dosing Weight  Height (cm): 155 (2022 18:53)  Weight (kg): 127 (2022 18:53)  BMI (kg/m2): 52.9 (2022 18:53)  BSA (m2): 2.18 (2022 18:53)    PAST MEDICAL & SURGICAL HISTORY:  Hypertension  vaginal cyst    Hyperlipidemia    Asthma  last inhaler use with in 10 days, on Pulm follow up [id number]    Hypothyroid    Obstructive sleep apnea  refuses Cpap    Obesity  morbid    Trigeminal neuralgia  R    Fibromyalgia    DM (diabetes mellitus)  2    DJD (degenerative joint disease)  Cervical/Thoracic/Lumbar    Cervical neuralgia  difficulty moving my neck    Back pain  thoracic &amp; lumbar    H/O bursitis  right shoulder    Radicular pain  arms, legs    Vasculitis  Legs, forerhead, arms &amp; hands, flare ups in R leg  Dr susie Ribeiro is my Rheumatologist    Renal cell carcinoma, left  on follow up Dr schulte, [facility] renal [facility], waiting for suregry in 2020    Falls frequently    Morbid obesity with body mass index (BMI) of 50.0 to 59.9 in adult    Psoriasis    Diverticulosis    S/P abdominal hysterectomy        S/P  section  1    Left adrenal mass  excision, benign     Vaginal cyst  removed     Vasculitis on nerve biopsy  , had another surgery called microvascular decompression         FAMILY HISTORY:  Family history of diabetes mellitus    Family history of hypertension    Family history of obesity        SOCIAL HISTORY:    ADVANCE DIRECTIVES:    REVIEW OF SYSTEMS:    CONSTITUTIONAL: No fever, weight loss, or fatigue  EYES: No eye pain, visual disturbances, or discharge  ENMT:  No difficulty hearing, tinnitus, vertigo; No sinus or throat pain  NECK: No pain or stiffness  BREASTS: No pain, masses, or nipple discharge  RESPIRATORY: No cough, wheezing, chills or hemoptysis; No shortness of breath  CARDIOVASCULAR: No chest pain, palpitations, dizziness, or leg swelling  GASTROINTESTINAL: No abdominal or epigastric pain. No nausea, vomiting, or hematemesis; No diarrhea or constipation. No melena or hematochezia.  GENITOURINARY: No dysuria, frequency, hematuria, or incontinence  NEUROLOGICAL: No headaches, memory loss, loss of strength, numbness, or tremors  SKIN: No itching, burning, rashes, or lesions   LYMPH NODES: No enlarged glands  ENDOCRINE: No heat or cold intolerance; No hair loss  MUSCULOSKELETAL: No joint pain or swelling; No muscle, back, or extremity pain  PSYCHIATRIC: No depression, anxiety, mood swings, or difficulty sleeping  HEME/LYMPH: No easy bruising, or bleeding gums  ALLERGY AND IMMUNOLOGIC: No hives or eczema          ICU Vital Signs Last 24 Hrs  T(C): 36.7 (2022 11:14), Max: 37.6 (2022 05:38)  T(F): 98 (2022 11:14), Max: 99.6 (2022 05:38)  HR: 79 (2022 11:14) (55 - 91)  BP: 127/90 (2022 11:14) (104/84 - 142/108)  BP(mean): --  ABP: --  ABP(mean): --  RR: 22 (2022 11:14) (20 - 23)  SpO2: 92% (2022 11:14) (92% - 99%)      ABG - ( 2022 11:49 )  pH, Arterial: 7.14  pH, Blood: x     /  pCO2: 91    /  pO2: 66    / HCO3: 31    / Base Excess: -0.7  /  SaO2: 92                  I&O's Detail      PHYSICAL EXAM:    GENERAL: NAD, well-groomed, well-developed  HEAD:  Atraumatic, Normocephalic  EYES: EOMI, PERRLA, conjunctiva and sclera clear  ENMT: No tonsillar erythema, exudates, or enlargement; Moist mucous membranes, Good dentition, No lesions  NECK: Supple, No JVD, Normal thyroid  NERVOUS SYSTEM:  Alert & Oriented X3, Good concentration; Motor Strength 5/5 B/L upper and lower extremities; DTRs 2+ intact and symmetric  CHEST/LUNG: Clear to percussion bilaterally; No rales, rhonchi, wheezing, or rubs  HEART: Regular rate and rhythm; No murmurs, rubs, or gallops  ABDOMEN: Soft, Nontender, Nondistended; Bowel sounds present  EXTREMITIES:  2+ Peripheral Pulses, No clubbing, cyanosis, or edema  LYMPH: No lymphadenopathy noted  SKIN: No rashes or lesions    LABS:  CBC Full  -  ( 2022 07:16 )  WBC Count : 11.88 K/uL  RBC Count : 4.82 M/uL  Hemoglobin : 11.4 g/dL  Hematocrit : 37.7 %  Platelet Count - Automated : 327 K/uL  Mean Cell Volume : 78.2 fl  Mean Cell Hemoglobin : 23.7 pg  Mean Cell Hemoglobin Concentration : 30.2 gm/dL  Auto Neutrophil # : x  Auto Lymphocyte # : x  Auto Monocyte # : x  Auto Eosinophil # : x  Auto Basophil # : x  Auto Neutrophil % : x  Auto Lymphocyte % : x  Auto Monocyte % : x  Auto Eosinophil % : x  Auto Basophil % : x        132<L>  |  96  |  55<H>  ----------------------------<  271<H>  5.3   |  27  |  3.30<H>    Ca    8.0<L>      2022 07:16  Phos  7.0     -  Mg     1.8     -    TPro  7.4  /  Alb  2.9<L>  /  TBili  0.5  /  DBili  x   /  AST  34  /  ALT  45  /  AlkPhos  110  -    CAPILLARY BLOOD GLUCOSE      POCT Blood Glucose.: 223 mg/dL (2022 11:33)      Urinalysis Basic - ( 2022 17:21 )    Color: Yellow / Appearance: Clear / S.025 / pH: x  Gluc: x / Ketone: Negative  / Bili: Moderate / Urobili: 1   Blood: x / Protein: 30 mg/dL / Nitrite: Negative   Leuk Esterase: Negative / RBC: 25-50 /HPF / WBC 3-5 /HPF   Sq Epi: x / Non Sq Epi: Many /HPF / Bacteria: Many /HPF          Culture Results:   Culture in progress ( @ 04:22)      EKG:    ECHO, US:    RADIOLOGY:    CRITICAL CARE TIME SPENT:   Patient is a 56y old  Female who presents with a chief complaint of Chest pain and dysuria (2022 10:39)      HPI:  57 yo F, from home, ambulated with cane , PMHx of obesity, asthma, chronic bronchitis, NAKUL, HTN, DM, HLD, fibromyalgia, anxiety, trigeminal neuralgia, degenerative joint disease, ?IgA vasculitis, psoriasis, OA came with chief complain of chest pain and dysuria. Pt stated that for few weeks she is having burning pain in her urine, foul smell, itchy, cloudy. She further stated that she is having pain in her lower abdomen as well as in chest whenever she pressed it. She denied any vaginal discharge, headaches, visual changes, N/V/D.     Of note pt admitted at [facility] in -2021 and had extensive cardiac work up stress test and echo which were negative. Pt has left renal mass  diagnosed on imaging chances of malignancy 85% , followed outpatient urologist and heme/onc for monitoring . decreasing weight and controlled blood sugars to get the surgery at some point     In ED: UA +ve no wbc, few bacteria , trop x 1 neg  (2022 23:29)    Hospital course:   patient admitted to [facility], Troponin x 2 negative,  CTAP- - showed Skin thickening with subcutaneous stranding in the right lower quadrant anterior abdominal wall c/w cellulitis. patient started onm IV cefazoline, Seen and examined pt at bedside- Pt with increased lethargy. After repeated stimulation pt verbalized "Stop that hurts". Unable to perform ROS. Pt with significant FERNIE- Cr increased from 0.83 on  to 3.11 on . On - Cr 3.3. Nephrology following.- Non oliguric/Oliguric FERNIE likely dehydration vs  ATN from renal hypoperfusion/sepsis on superimposed hypertensive/diabetic nephropathy/diuretic/ACEI/use.     Allergies    Fioricet (Rash)  gadobutrol (Rash; Urticaria; Hives)  IV Contrast (Short breath)  mushroom (Unknown)  venlafaxine (Hives)    Intolerances        MEDICATIONS  (STANDING):  ALBUTerol    90 MICROgram(s) HFA Inhaler 2 Puff(s) Inhalation every 6 hours  aspirin  chewable 81 milliGRAM(s) Oral daily  atorvastatin 10 milliGRAM(s) Oral at bedtime  azithromycin  IVPB 500 milliGRAM(s) IV Intermittent once  azithromycin  IVPB      benzonatate 100 milliGRAM(s) Oral <User Schedule>  ceFAZolin   IVPB      ceFAZolin   IVPB 1000 milliGRAM(s) IV Intermittent every 12 hours  cefTRIAXone   IVPB      cefTRIAXone   IVPB 1000 milliGRAM(s) IV Intermittent once  enoxaparin Injectable 40 milliGRAM(s) SubCutaneous every 12 hours  influenza   Vaccine 0.5 milliLiter(s) IntraMuscular once  insulin glargine Injectable (LANTUS) 20 Unit(s) SubCutaneous at bedtime  insulin lispro (ADMELOG) corrective regimen sliding scale   SubCutaneous three times a day before meals  insulin lispro Injectable (ADMELOG) 8 Unit(s) SubCutaneous three times a day before meals  levothyroxine 112 MICROGram(s) Oral daily  lidocaine   4% Patch 2 Patch Transdermal daily  montelukast 10 milliGRAM(s) Oral at bedtime  sodium chloride 0.9%. 1000 milliLiter(s) (100 mL/Hr) IV Continuous <Continuous>  tiotropium 18 MICROgram(s) Capsule 1 Capsule(s) Inhalation daily    MEDICATIONS  (PRN):  ondansetron Injectable 4 milliGRAM(s) IV Push every 6 hours PRN Nausea and/or Vomiting      Daily     Daily     Drug Dosing Weight  Height (cm): 155 (2022 18:53)  Weight (kg): 127 (2022 18:53)  BMI (kg/m2): 52.9 (2022 18:53)  BSA (m2): 2.18 (2022 18:53)    PAST MEDICAL & SURGICAL HISTORY:  Hypertension  vaginal cyst    Hyperlipidemia    Asthma  last inhaler use with in 10 days, on Pulm follow up [id number]    Hypothyroid    Obstructive sleep apnea  refuses Cpap    Obesity  morbid    Trigeminal neuralgia  R    Fibromyalgia    DM (diabetes mellitus)  2    DJD (degenerative joint disease)  Cervical/Thoracic/Lumbar    Cervical neuralgia  difficulty moving my neck    Back pain  thoracic &amp; lumbar    H/O bursitis  right shoulder    Radicular pain  arms, legs    Vasculitis  Legs, forerhead, arms &amp; hands, flare ups in R leg  Dr susie Ribeiro is my Rheumatologist    Renal cell carcinoma, left  on follow up Dr schulte, HOD renal [facility], waiting for suregry in 2020    Falls frequently    Morbid obesity with body mass index (BMI) of 50.0 to 59.9 in adult    Psoriasis    Diverticulosis    S/P abdominal hysterectomy        S/P  section  1    Left adrenal mass  excision, benign 2006    Vaginal cyst  removed     Vasculitis on nerve biopsy  , had another surgery called microvascular decompression         FAMILY HISTORY:  Family history of diabetes mellitus    Family history of hypertension    Family history of obesity        SOCIAL HISTORY:    ADVANCE DIRECTIVES:    REVIEW OF SYSTEMS:    CONSTITUTIONAL: No fever, weight loss, or fatigue  EYES: No eye pain, visual disturbances, or discharge  ENMT:  No difficulty hearing, tinnitus, vertigo; No sinus or throat pain  NECK: No pain or stiffness  BREASTS: No pain, masses, or nipple discharge  RESPIRATORY: No cough, wheezing, chills or hemoptysis; No shortness of breath  CARDIOVASCULAR: No chest pain, palpitations, dizziness, or leg swelling  GASTROINTESTINAL: No abdominal or epigastric pain. No nausea, vomiting, or hematemesis; No diarrhea or constipation. No melena or hematochezia.  GENITOURINARY: No dysuria, frequency, hematuria, or incontinence  NEUROLOGICAL: No headaches, memory loss, loss of strength, numbness, or tremors  SKIN: No itching, burning, rashes, or lesions   LYMPH NODES: No enlarged glands  ENDOCRINE: No heat or cold intolerance; No hair loss  MUSCULOSKELETAL: No joint pain or swelling; No muscle, back, or extremity pain  PSYCHIATRIC: No depression, anxiety, mood swings, or difficulty sleeping  HEME/LYMPH: No easy bruising, or bleeding gums  ALLERGY AND IMMUNOLOGIC: No hives or eczema          ICU Vital Signs Last 24 Hrs  T(C): 36.7 (2022 11:14), Max: 37.6 (2022 05:38)  T(F): 98 (2022 11:14), Max: 99.6 (2022 05:38)  HR: 79 (2022 11:14) (55 - 91)  BP: 127/90 (2022 11:14) (104/84 - 142/108)  BP(mean): --  ABP: --  ABP(mean): --  RR: 22 (2022 11:14) (20 - 23)  SpO2: 92% (2022 11:14) (92% - 99%)      ABG - ( 2022 11:49 )  pH, Arterial: 7.14  pH, Blood: x     /  pCO2: 91    /  pO2: 66    / HCO3: 31    / Base Excess: -0.7  /  SaO2: 92                  I&O's Detail      PHYSICAL EXAM:    GENERAL: NAD, well-groomed, well-developed  HEAD:  Atraumatic, Normocephalic  EYES: EOMI, PERRLA, conjunctiva and sclera clear  ENMT: No tonsillar erythema, exudates, or enlargement; Moist mucous membranes, Good dentition, No lesions  NECK: Supple, No JVD, Normal thyroid  NERVOUS SYSTEM:  Alert & Oriented X3, Good concentration; Motor Strength 5/5 B/L upper and lower extremities; DTRs 2+ intact and symmetric  CHEST/LUNG: Clear to percussion bilaterally; No rales, rhonchi, wheezing, or rubs  HEART: Regular rate and rhythm; No murmurs, rubs, or gallops  ABDOMEN: Soft, Nontender, Nondistended; Bowel sounds present  EXTREMITIES:  2+ Peripheral Pulses, No clubbing, cyanosis, or edema  LYMPH: No lymphadenopathy noted  SKIN: No rashes or lesions    LABS:  CBC Full  -  ( 2022 07:16 )  WBC Count : 11.88 K/uL  RBC Count : 4.82 M/uL  Hemoglobin : 11.4 g/dL  Hematocrit : 37.7 %  Platelet Count - Automated : 327 K/uL  Mean Cell Volume : 78.2 fl  Mean Cell Hemoglobin : 23.7 pg  Mean Cell Hemoglobin Concentration : 30.2 gm/dL  Auto Neutrophil # : x  Auto Lymphocyte # : x  Auto Monocyte # : x  Auto Eosinophil # : x  Auto Basophil # : x  Auto Neutrophil % : x  Auto Lymphocyte % : x  Auto Monocyte % : x  Auto Eosinophil % : x  Auto Basophil % : x        132<L>  |  96  |  55<H>  ----------------------------<  271<H>  5.3   |  27  |  3.30<H>    Ca    8.0<L>      2022 07:16  Phos  7.0     -  Mg     1.8         TPro  7.4  /  Alb  2.9<L>  /  TBili  0.5  /  DBili  x   /  AST  34  /  ALT  45  /  AlkPhos  110      CAPILLARY BLOOD GLUCOSE      POCT Blood Glucose.: 223 mg/dL (2022 11:33)      Urinalysis Basic - ( 2022 17:21 )    Color: Yellow / Appearance: Clear / S.025 / pH: x  Gluc: x / Ketone: Negative  / Bili: Moderate / Urobili: 1   Blood: x / Protein: 30 mg/dL / Nitrite: Negative   Leuk Esterase: Negative / RBC: 25-50 /HPF / WBC 3-5 /HPF   Sq Epi: x / Non Sq Epi: Many /HPF / Bacteria: Many /HPF          Culture Results:   Culture in progress ( @ 04:22)      EKG:    ECHO, US:    RADIOLOGY:    CRITICAL CARE TIME SPENT:   Patient is a 56y old  Female who presents with a chief complaint of Chest pain and dysuria (2022 10:39)      HPI:  57 yo F, from home, ambulated with cane , PMHx of obesity, asthma, chronic bronchitis, NAKUL, HTN, DM, HLD, fibromyalgia, anxiety, trigeminal neuralgia, degenerative joint disease, ?IgA vasculitis, psoriasis, OA came with chief complain of chest pain and dysuria. Pt stated that for few weeks she is having burning pain in her urine, foul smell, itchy, cloudy. She further stated that she is having pain in her lower abdomen as well as in chest whenever she pressed it. She denied any vaginal discharge, headaches, visual changes, N/V/D.     Of note pt admitted at [facility] in -2021 and had extensive cardiac work up stress test and echo which were negative. Pt has left renal mass  diagnosed on imaging chances of malignancy 85% , followed outpatient urologist and heme/onc for monitoring . decreasing weight and controlled blood sugars to get the surgery at some point     In ED: UA +ve for microscopic hematuria,  no wbc, few bacteria , trop x 1 neg  (2022 23:29)    Hospital course:   patient admitted to [facility], Troponin x 2 negative,  CTAP- - showed Skin thickening with subcutaneous stranding in the right lower quadrant anterior abdominal wall c/w cellulitis. pt received Zithromax and Rocephin in ED x1 , Abx dc,d due to negative UA, pt started on keflex 500mg qid for cellulitis , hospital course complicated with encephalopathy and ATN   on , Cr increased from 0.83 to 3.1,  IV cefazolin started in setting of possible sepsis 2/2 cellulitis. pt noted to progressively getting altered over , VBG remarkable for PH :7.14, pco2 82, pt was placed on Bi-pap reportedly over night.  patient found to be more lethargic in the morning.  Unable to perform ROS. ABG showed acute on chronic respiratory acidosis , Hypercapnia 91, respiratory placed pt on AVAPS . ICU consulted for further evaluation. Patient will require  intubation , will transfer to ICU for further work-up.     OF note : pt never been intubated in setting of asthma or chronic bronchitis      Allergies    Fioricet (Rash)  gadobutrol (Rash; Urticaria; Hives)  IV Contrast (Short breath)  mushroom (Unknown)  venlafaxine (Hives)    Intolerances        MEDICATIONS  (STANDING):  ALBUTerol    90 MICROgram(s) HFA Inhaler 2 Puff(s) Inhalation every 6 hours  aspirin  chewable 81 milliGRAM(s) Oral daily  atorvastatin 10 milliGRAM(s) Oral at bedtime  azithromycin  IVPB 500 milliGRAM(s) IV Intermittent once  azithromycin  IVPB      benzonatate 100 milliGRAM(s) Oral <User Schedule>  ceFAZolin   IVPB      ceFAZolin   IVPB 1000 milliGRAM(s) IV Intermittent every 12 hours  cefTRIAXone   IVPB      cefTRIAXone   IVPB 1000 milliGRAM(s) IV Intermittent once  enoxaparin Injectable 40 milliGRAM(s) SubCutaneous every 12 hours  influenza   Vaccine 0.5 milliLiter(s) IntraMuscular once  insulin glargine Injectable (LANTUS) 20 Unit(s) SubCutaneous at bedtime  insulin lispro (ADMELOG) corrective regimen sliding scale   SubCutaneous three times a day before meals  insulin lispro Injectable (ADMELOG) 8 Unit(s) SubCutaneous three times a day before meals  levothyroxine 112 MICROGram(s) Oral daily  lidocaine   4% Patch 2 Patch Transdermal daily  montelukast 10 milliGRAM(s) Oral at bedtime  sodium chloride 0.9%. 1000 milliLiter(s) (100 mL/Hr) IV Continuous <Continuous>  tiotropium 18 MICROgram(s) Capsule 1 Capsule(s) Inhalation daily    MEDICATIONS  (PRN):  ondansetron Injectable 4 milliGRAM(s) IV Push every 6 hours PRN Nausea and/or Vomiting      Daily     Daily     Drug Dosing Weight  Height (cm): 155 (2022 18:53)  Weight (kg): 127 (2022 18:53)  BMI (kg/m2): 52.9 (2022 18:53)  BSA (m2): 2.18 (2022 18:53)    PAST MEDICAL & SURGICAL HISTORY:  Hypertension  vaginal cyst    Hyperlipidemia    Asthma  last inhaler use with in 10 days, on Pulm follow up [id number]    Hypothyroid    Obstructive sleep apnea  refuses Cpap    Obesity  morbid    Trigeminal neuralgia  R    Fibromyalgia    DM (diabetes mellitus)  2    DJD (degenerative joint disease)  Cervical/Thoracic/Lumbar    Cervical neuralgia  difficulty moving my neck    Back pain  thoracic &amp; lumbar    H/O bursitis  right shoulder    Radicular pain  arms, legs    Vasculitis  Legs, forerhead, arms &amp; hands, flare ups in R leg  Dr susie Ribeiro is my Rheumatologist    Renal cell carcinoma, left  on follow up Dr schulte, HOD renal [facility], waiting for suregry in 2020    Falls frequently    Morbid obesity with body mass index (BMI) of 50.0 to 59.9 in adult    Psoriasis    Diverticulosis    S/P abdominal hysterectomy        S/P  section  1    Left adrenal mass  excision, benign     Vaginal cyst  removed     Vasculitis on nerve biopsy  , had another surgery called microvascular decompression         FAMILY HISTORY:  Family history of diabetes mellitus    Family history of hypertension    Family history of obesity        SOCIAL HISTORY:    ADVANCE DIRECTIVES:            ICU Vital Signs Last 24 Hrs  T(C): 36.7 (2022 11:14), Max: 37.6 (2022 05:38)  T(F): 98 (2022 11:14), Max: 99.6 (2022 05:38)  HR: 79 (2022 11:14) (55 - 91)  BP: 127/90 (2022 11:14) (104/84 - 142/108)  BP(mean): --  ABP: --  ABP(mean): --  RR: 22 (2022 11:14) (20 - 23)  SpO2: 92% (2022 11:14) (92% - 99%)      ABG - ( 2022 11:49 )  pH, Arterial: 7.14  pH, Blood: x     /  pCO2: 91    /  pO2: 66    / HCO3: 31    / Base Excess: -0.7  /  SaO2: 92                  I&O's Detail      PHYSICAL EXAM:    GENERAL: lethargic , on AVAPs  HEAD:  Atraumatic, Normocephalic  EYES: pupils symmetric  reactive to light   ENMT----  NECK: -----  NERVOUS SYSTEM:   lethargic, dos not follow commands , opens eyes with painful stimulus   CHEST/LUNG:  rhonchi all lung fields   HEART: tachycardic  ABDOMEN: fatty soft   EXTREMITIES:  2+ Peripheral Pulses, No clubbing, cyanosis, or edema  LYMPH: No lymphadenopathy noted  SKIN: Dermatitis LLQ abd wall  , scaling  psoriatic lesion     LABS:  CBC Full  -  ( 2022 07:16 )  WBC Count : 11.88 K/uL  RBC Count : 4.82 M/uL  Hemoglobin : 11.4 g/dL  Hematocrit : 37.7 %  Platelet Count - Automated : 327 K/uL  Mean Cell Volume : 78.2 fl  Mean Cell Hemoglobin : 23.7 pg  Mean Cell Hemoglobin Concentration : 30.2 gm/dL  Auto Neutrophil # : x  Auto Lymphocyte # : x  Auto Monocyte # : x  Auto Eosinophil # : x  Auto Basophil # : x  Auto Neutrophil % : x  Auto Lymphocyte % : x  Auto Monocyte % : x  Auto Eosinophil % : x  Auto Basophil % : x        132<L>  |  96  |  55<H>  ----------------------------<  271<H>  5.3   |  27  |  3.30<H>    Ca    8.0<L>      2022 07:16  Phos  7.0       Mg     1.8         TPro  7.4  /  Alb  2.9<L>  /  TBili  0.5  /  DBili  x   /  AST  34  /  ALT  45  /  AlkPhos  110      CAPILLARY BLOOD GLUCOSE      POCT Blood Glucose.: 223 mg/dL (2022 11:33)      Urinalysis Basic - ( 2022 17:21 )    Color: Yellow / Appearance: Clear / S.025 / pH: x  Gluc: x / Ketone: Negative  / Bili: Moderate / Urobili: 1   Blood: x / Protein: 30 mg/dL / Nitrite: Negative   Leuk Esterase: Negative / RBC: 25-50 /HPF / WBC 3-5 /HPF   Sq Epi: x / Non Sq Epi: Many /HPF / Bacteria: Many /HPF          Culture Results:   Culture in progress ( @ 04:22)      EKG:    ECHO, US:    RADIOLOGY:    CRITICAL CARE TIME SPENT:

## 2022-02-07 NOTE — CHART NOTE - NSCHARTNOTEFT_GEN_A_CORE
Called son and emergency contact Angel Arsen [phone].  Angel claimed he is a cousin of the pat and not the son.  He will contact son to call icu . ICU info provided

## 2022-02-07 NOTE — PROCEDURE NOTE - NSINDICATIONS_GEN_A_CORE
emergency venous access/hemodynamic monitoring critical illness/emergency venous access/hemodynamic monitoring/venous access

## 2022-02-08 ENCOUNTER — NON-APPOINTMENT (OUTPATIENT)
Age: 57
End: 2022-02-08

## 2022-02-08 ENCOUNTER — APPOINTMENT (OUTPATIENT)
Dept: GASTROENTEROLOGY | Facility: CLINIC | Age: 57
End: 2022-02-08

## 2022-02-08 LAB
ALBUMIN SERPL ELPH-MCNC: 2.7 G/DL — LOW (ref 3.5–5)
ALP SERPL-CCNC: 124 U/L — HIGH (ref 40–120)
ALT FLD-CCNC: 38 U/L DA — SIGNIFICANT CHANGE UP (ref 10–60)
ANION GAP SERPL CALC-SCNC: 6 MMOL/L — SIGNIFICANT CHANGE UP (ref 5–17)
AST SERPL-CCNC: 32 U/L — SIGNIFICANT CHANGE UP (ref 10–40)
BASE EXCESS BLDA CALC-SCNC: 3.3 MMOL/L — HIGH (ref -2–3)
BASE EXCESS BLDA CALC-SCNC: 5.8 MMOL/L — HIGH (ref -2–3)
BASOPHILS # BLD AUTO: 0.02 K/UL — SIGNIFICANT CHANGE UP (ref 0–0.2)
BASOPHILS NFR BLD AUTO: 0.2 % — SIGNIFICANT CHANGE UP (ref 0–2)
BILIRUB SERPL-MCNC: 0.5 MG/DL — SIGNIFICANT CHANGE UP (ref 0.2–1.2)
BLD GP AB SCN SERPL QL: SIGNIFICANT CHANGE UP
BLOOD GAS COMMENTS ARTERIAL: 450 — SIGNIFICANT CHANGE UP
BLOOD GAS COMMENTS ARTERIAL: 5 — SIGNIFICANT CHANGE UP
BLOOD GAS COMMENTS ARTERIAL: 5 — SIGNIFICANT CHANGE UP
BLOOD GAS COMMENTS ARTERIAL: SIGNIFICANT CHANGE UP
BUN SERPL-MCNC: 33 MG/DL — HIGH (ref 7–18)
CALCIUM SERPL-MCNC: 9.2 MG/DL — SIGNIFICANT CHANGE UP (ref 8.4–10.5)
CHLORIDE SERPL-SCNC: 106 MMOL/L — SIGNIFICANT CHANGE UP (ref 96–108)
CO2 SERPL-SCNC: 27 MMOL/L — SIGNIFICANT CHANGE UP (ref 22–31)
CREAT SERPL-MCNC: 1.17 MG/DL — SIGNIFICANT CHANGE UP (ref 0.5–1.3)
EOSINOPHIL # BLD AUTO: 0 K/UL — SIGNIFICANT CHANGE UP (ref 0–0.5)
EOSINOPHIL NFR BLD AUTO: 0 % — SIGNIFICANT CHANGE UP (ref 0–6)
GLUCOSE BLDC GLUCOMTR-MCNC: 248 MG/DL — HIGH (ref 70–99)
GLUCOSE BLDC GLUCOMTR-MCNC: 257 MG/DL — HIGH (ref 70–99)
GLUCOSE BLDC GLUCOMTR-MCNC: 265 MG/DL — HIGH (ref 70–99)
GLUCOSE BLDC GLUCOMTR-MCNC: 282 MG/DL — HIGH (ref 70–99)
GLUCOSE SERPL-MCNC: 317 MG/DL — HIGH (ref 70–99)
HCO3 BLDA-SCNC: 28 MMOL/L — SIGNIFICANT CHANGE UP (ref 21–28)
HCO3 BLDA-SCNC: 31 MMOL/L — HIGH (ref 21–28)
HCT VFR BLD CALC: 39.6 % — SIGNIFICANT CHANGE UP (ref 34.5–45)
HGB BLD-MCNC: 12.1 G/DL — SIGNIFICANT CHANGE UP (ref 11.5–15.5)
HOROWITZ INDEX BLDA+IHG-RTO: 40 — SIGNIFICANT CHANGE UP
HOROWITZ INDEX BLDA+IHG-RTO: 80 — SIGNIFICANT CHANGE UP
IMM GRANULOCYTES NFR BLD AUTO: 1.9 % — HIGH (ref 0–1.5)
LYMPHOCYTES # BLD AUTO: 0.61 K/UL — LOW (ref 1–3.3)
LYMPHOCYTES # BLD AUTO: 5.7 % — LOW (ref 13–44)
MAGNESIUM SERPL-MCNC: 2.1 MG/DL — SIGNIFICANT CHANGE UP (ref 1.6–2.6)
MCHC RBC-ENTMCNC: 23.5 PG — LOW (ref 27–34)
MCHC RBC-ENTMCNC: 30.6 GM/DL — LOW (ref 32–36)
MCV RBC AUTO: 76.9 FL — LOW (ref 80–100)
MONOCYTES # BLD AUTO: 0.15 K/UL — SIGNIFICANT CHANGE UP (ref 0–0.9)
MONOCYTES NFR BLD AUTO: 1.4 % — LOW (ref 2–14)
NEUTROPHILS # BLD AUTO: 9.65 K/UL — HIGH (ref 1.8–7.4)
NEUTROPHILS NFR BLD AUTO: 90.8 % — HIGH (ref 43–77)
NRBC # BLD: 0 /100 WBCS — SIGNIFICANT CHANGE UP (ref 0–0)
PCO2 BLDA: 41 MMHG — HIGH (ref 32–35)
PCO2 BLDA: 44 MMHG — HIGH (ref 32–35)
PH BLDA: 7.44 — SIGNIFICANT CHANGE UP (ref 7.35–7.45)
PH BLDA: 7.45 — SIGNIFICANT CHANGE UP (ref 7.35–7.45)
PHOSPHATE SERPL-MCNC: 1.6 MG/DL — LOW (ref 2.5–4.5)
PLATELET # BLD AUTO: 405 K/UL — HIGH (ref 150–400)
PO2 BLDA: 101 MMHG — SIGNIFICANT CHANGE UP (ref 83–108)
PO2 BLDA: 70 MMHG — LOW (ref 83–108)
POTASSIUM SERPL-MCNC: 4.6 MMOL/L — SIGNIFICANT CHANGE UP (ref 3.5–5.3)
POTASSIUM SERPL-SCNC: 4.6 MMOL/L — SIGNIFICANT CHANGE UP (ref 3.5–5.3)
PROCALCITONIN SERPL-MCNC: 0.27 NG/ML — HIGH (ref 0.02–0.1)
PROT SERPL-MCNC: 7.9 G/DL — SIGNIFICANT CHANGE UP (ref 6–8.3)
RBC # BLD: 5.15 M/UL — SIGNIFICANT CHANGE UP (ref 3.8–5.2)
RBC # FLD: 16 % — HIGH (ref 10.3–14.5)
SAO2 % BLDA: 94 % — SIGNIFICANT CHANGE UP
SAO2 % BLDA: 97 % — SIGNIFICANT CHANGE UP
SODIUM SERPL-SCNC: 139 MMOL/L — SIGNIFICANT CHANGE UP (ref 135–145)
TROPONIN I, HIGH SENSITIVITY RESULT: 344.8 NG/L — HIGH
WBC # BLD: 10.63 K/UL — HIGH (ref 3.8–10.5)
WBC # FLD AUTO: 10.63 K/UL — HIGH (ref 3.8–10.5)

## 2022-02-08 RX ORDER — FENTANYL CITRATE 50 UG/ML
0.5 INJECTION INTRAVENOUS
Qty: 2500 | Refills: 0 | Status: DISCONTINUED | OUTPATIENT
Start: 2022-02-08 | End: 2022-02-09

## 2022-02-08 RX ORDER — ACETAMINOPHEN 500 MG
1000 TABLET ORAL ONCE
Refills: 0 | Status: COMPLETED | OUTPATIENT
Start: 2022-02-08 | End: 2022-02-08

## 2022-02-08 RX ORDER — POTASSIUM PHOSPHATE, MONOBASIC POTASSIUM PHOSPHATE, DIBASIC 236; 224 MG/ML; MG/ML
30 INJECTION, SOLUTION INTRAVENOUS ONCE
Refills: 0 | Status: COMPLETED | OUTPATIENT
Start: 2022-02-08 | End: 2022-02-08

## 2022-02-08 RX ORDER — NOREPINEPHRINE BITARTRATE/D5W 8 MG/250ML
0.05 PLASTIC BAG, INJECTION (ML) INTRAVENOUS
Qty: 16 | Refills: 0 | Status: DISCONTINUED | OUTPATIENT
Start: 2022-02-08 | End: 2022-02-08

## 2022-02-08 RX ORDER — DEXMEDETOMIDINE HYDROCHLORIDE IN 0.9% SODIUM CHLORIDE 4 UG/ML
0.2 INJECTION INTRAVENOUS
Qty: 400 | Refills: 0 | Status: DISCONTINUED | OUTPATIENT
Start: 2022-02-08 | End: 2022-02-08

## 2022-02-08 RX ADMIN — Medication 40 MILLIGRAM(S): at 22:07

## 2022-02-08 RX ADMIN — PROPOFOL 7.62 MICROGRAM(S)/KG/MIN: 10 INJECTION, EMULSION INTRAVENOUS at 18:02

## 2022-02-08 RX ADMIN — PROPOFOL 7.62 MICROGRAM(S)/KG/MIN: 10 INJECTION, EMULSION INTRAVENOUS at 03:45

## 2022-02-08 RX ADMIN — Medication 4: at 23:27

## 2022-02-08 RX ADMIN — HEPARIN SODIUM 5000 UNIT(S): 5000 INJECTION INTRAVENOUS; SUBCUTANEOUS at 22:07

## 2022-02-08 RX ADMIN — LIDOCAINE 2 PATCH: 4 CREAM TOPICAL at 23:58

## 2022-02-08 RX ADMIN — Medication 100 MILLIGRAM(S): at 05:17

## 2022-02-08 RX ADMIN — Medication 81 MILLIGRAM(S): at 11:45

## 2022-02-08 RX ADMIN — FENTANYL CITRATE 6.35 MICROGRAM(S)/KG/HR: 50 INJECTION INTRAVENOUS at 05:16

## 2022-02-08 RX ADMIN — Medication 6: at 06:27

## 2022-02-08 RX ADMIN — CHLORHEXIDINE GLUCONATE 15 MILLILITER(S): 213 SOLUTION TOPICAL at 05:18

## 2022-02-08 RX ADMIN — Medication 100 MILLIGRAM(S): at 18:26

## 2022-02-08 RX ADMIN — DEXMEDETOMIDINE HYDROCHLORIDE IN 0.9% SODIUM CHLORIDE 6.35 MICROGRAM(S)/KG/HR: 4 INJECTION INTRAVENOUS at 18:02

## 2022-02-08 RX ADMIN — ATORVASTATIN CALCIUM 10 MILLIGRAM(S): 80 TABLET, FILM COATED ORAL at 22:07

## 2022-02-08 RX ADMIN — Medication 112 MICROGRAM(S): at 05:17

## 2022-02-08 RX ADMIN — LIDOCAINE 2 PATCH: 4 CREAM TOPICAL at 19:00

## 2022-02-08 RX ADMIN — PROPOFOL 7.62 MICROGRAM(S)/KG/MIN: 10 INJECTION, EMULSION INTRAVENOUS at 10:47

## 2022-02-08 RX ADMIN — PHENYLEPHRINE HYDROCHLORIDE 7.14 MICROGRAM(S)/KG/MIN: 10 INJECTION INTRAVENOUS at 00:32

## 2022-02-08 RX ADMIN — PANTOPRAZOLE SODIUM 40 MILLIGRAM(S): 20 TABLET, DELAYED RELEASE ORAL at 11:44

## 2022-02-08 RX ADMIN — DEXMEDETOMIDINE HYDROCHLORIDE IN 0.9% SODIUM CHLORIDE 6.35 MICROGRAM(S)/KG/HR: 4 INJECTION INTRAVENOUS at 12:34

## 2022-02-08 RX ADMIN — CHLORHEXIDINE GLUCONATE 1 APPLICATION(S): 213 SOLUTION TOPICAL at 05:18

## 2022-02-08 RX ADMIN — HEPARIN SODIUM 5000 UNIT(S): 5000 INJECTION INTRAVENOUS; SUBCUTANEOUS at 13:48

## 2022-02-08 RX ADMIN — Medication 40 MILLIGRAM(S): at 05:17

## 2022-02-08 RX ADMIN — PROPOFOL 7.62 MICROGRAM(S)/KG/MIN: 10 INJECTION, EMULSION INTRAVENOUS at 05:16

## 2022-02-08 RX ADMIN — FENTANYL CITRATE 6.35 MICROGRAM(S)/KG/HR: 50 INJECTION INTRAVENOUS at 14:27

## 2022-02-08 RX ADMIN — Medication 400 MILLIGRAM(S): at 18:02

## 2022-02-08 RX ADMIN — CHLORHEXIDINE GLUCONATE 15 MILLILITER(S): 213 SOLUTION TOPICAL at 18:03

## 2022-02-08 RX ADMIN — HEPARIN SODIUM 5000 UNIT(S): 5000 INJECTION INTRAVENOUS; SUBCUTANEOUS at 05:17

## 2022-02-08 RX ADMIN — LIDOCAINE 2 PATCH: 4 CREAM TOPICAL at 11:47

## 2022-02-08 RX ADMIN — Medication 1000 MILLIGRAM(S): at 01:11

## 2022-02-08 RX ADMIN — Medication 6: at 11:45

## 2022-02-08 RX ADMIN — PROPOFOL 7.62 MICROGRAM(S)/KG/MIN: 10 INJECTION, EMULSION INTRAVENOUS at 01:11

## 2022-02-08 RX ADMIN — Medication 1000 MILLIGRAM(S): at 19:30

## 2022-02-08 RX ADMIN — MONTELUKAST 10 MILLIGRAM(S): 4 TABLET, CHEWABLE ORAL at 22:07

## 2022-02-08 RX ADMIN — PROPOFOL 7.62 MICROGRAM(S)/KG/MIN: 10 INJECTION, EMULSION INTRAVENOUS at 22:07

## 2022-02-08 RX ADMIN — POTASSIUM PHOSPHATE, MONOBASIC POTASSIUM PHOSPHATE, DIBASIC 83.33 MILLIMOLE(S): 236; 224 INJECTION, SOLUTION INTRAVENOUS at 10:11

## 2022-02-08 RX ADMIN — INSULIN GLARGINE 20 UNIT(S): 100 INJECTION, SOLUTION SUBCUTANEOUS at 22:48

## 2022-02-08 RX ADMIN — Medication 6: at 18:04

## 2022-02-08 RX ADMIN — Medication 40 MILLIGRAM(S): at 11:44

## 2022-02-08 NOTE — CHART NOTE - NSCHARTNOTEFT_GEN_A_CORE
Spoke to patient's son Marquis, about current condition and prognosis. all questions where answered, he expressed understanding of the situation.

## 2022-02-08 NOTE — PROGRESS NOTE ADULT - ASSESSMENT
Right sided abdominal wall cellulitis  Fever  UTI ?  Leukocytosis - almost normal.    Plan - Cont Kefzol 1 gm iv q12hrs  Time Spent - 31 mins.

## 2022-02-08 NOTE — PROGRESS NOTE ADULT - ASSESSMENT
57 yo F, from home, ambulated with cane , PMHx of obesity, asthma, chronic bronchitis, NAKUL non compliant with CPAP over night, HTN, DM, HLD, fibromyalgia, anxiety, trigeminal neuralgia, degenerative joint disease, ?IgA vasculitis, psoriasis, OA came with chief complain of chest pain and dysuria. patient admitted to tele, Troponin x 2 negative,  CTAP- 2/4- showed Skin thickening with subcutaneous stranding in the right lower quadrant anterior abdominal wall c/w cellulitis. pt received Zithromax and Rocephin in ED x1 , Abx dc,d due to negative UA, pt started on keflex 500mg qid for cellulitis , hospital course complicated with encephalopathy and ATN   on 2/5, Cr increased from 0.83 to 3.1,  IV cefazolin started in setting of possible sepsis 2/2 cellulitis. pt noted to progressively getting altered over 2/6, VBG remarkable for PH :7.14, pco2 82, pt was placed on Bi-pap reportedly over night.  Patient found to be more lethargic in the morning.  Unable to perform ROS. ABG showed acute on chronic respiratory acidosis , Hypercapnia 91, respiratory placed pt on AVAPS . ICU consulted for further evaluation. Patient will require  intubation , will transfer to ICU for further work-up.     Assessment:    Acute hypercapnic hypoxic respiratory failure   NAKUL requiring CPAP at night (non -compliant)  Cellulitis  Chronic bronchitis   Asthma   sepsis  Psoriasis   Fibromyalgia   DM  Hypothyroidism       Plan:    =====================[CNS ]==============================  # encephalopathy   - Likely due to hypercapnia , PCO2:91  - will consider infectious etiologies as well  -  Currently on AVAPs  - will require intubation  - f/u ammonia level , Cpk, TSH     # History of Fibromyalgia on f gabapentin, flexeril, Tylenol, aleve, oxcarbazepine for pain at home   - Lidoderm 4% patch 2 patches daily  - continue to hold all home meds  =====================[CVS ]==========================  # HTN :    - BP currently acceptable off medication   - monitor BP in setting of ATN / sepsis   - keep MAP >65    =====================[RESP ]==============================  # Acute hypoxic hypercapnic respiratory failure  - likely in setting of NAKUL / cheonic bronchitis / asthma   - currently on AVAPs , will require intubation to protect airway given being lethargic   - will start on steroids  -f/u CXR   - f/u D dimer   - Monitor ABG  - f/u CXR s/p intubation     =====================[ GI ]============================  # No acute issues  - will keep NPO until NGt placement  - c/w PPi for GI prophylaxis     ====================[ RENAL ]=============================   # Ernestina:   -ERNESTINA likely dehydration vs  ATN from renal hypoperfusion/sepsis  -Strict I/o, Avoid Nephrotoxic agents  -Maintain MAP>65-70 mm Hg  - Monitor BMP and electrolytes every 12 hrly  - f/u Bladder scan     # abnormal electrolytes:   - Hyperkalemia resolved   - Hyperphosphatemia     # Left renal lesion chronic  - outpatient f/u with Urology     =====================[ ID ]============================  # Cellulitis   -  on Cefazolin 1gr q12 hours  - UA negative for WBC , f/u UC   - ID Dr Jacobsen following       ===================[ HEME/ONC ]===========================  # anemia of chronic illness  -monitor cbc      =====================[ ENDO ]======================  # DM :   - target CBG < 180  - currently on  Lantus and HSS , adjust as needed    # Hypothyroidism   - f/u TSH in setting of encephalopathy   - c/w synthroid      ================= Skin/Catheters============================  # cellulitis likley 9i setting of Psoriasis  - c/w IV ABx   - pt has  Bustamante    ==================[ PROPHYLAXIS ]==========================   # Dvt : c/u SQH , f/u D dimer   # Gi : Protonix     ====================[ DISPO ]======================   - Monitor in ICU     ===================[ PROGNOSIS ]====================  - Guarded 55 yo F, from home, ambulated with cane , PMHx of obesity, asthma, chronic bronchitis, NAKUL non compliant with CPAP over night, HTN, DM, HLD, fibromyalgia, anxiety, trigeminal neuralgia, degenerative joint disease, ?IgA vasculitis, psoriasis, OA came with chief complain of chest pain and dysuria. patient admitted to [facility], Troponin x 2 negative,  CTAP- 2/4- showed Skin thickening with subcutaneous stranding in the right lower quadrant anterior abdominal wall c/w cellulitis. pt received Zithromax and Rocephin in ED x1 , Abx dc,d due to negative UA, pt started on keflex 500mg qid for cellulitis , hospital course complicated with encephalopathy and ATN   on 2/5, Cr increased from 0.83 to 3.1,  IV cefazolin started in setting of possible sepsis 2/2 cellulitis. pt noted to progressively getting altered over 2/6, VBG remarkable for PH :7.14, pco2 82, pt was placed on Bi-pap reportedly over night.  Patient found to be more lethargic in the morning.  Unable to perform ROS. ABG showed acute on chronic respiratory acidosis , Hypercapnia 91, respiratory placed pt on AVAPS . ICU consulted for further evaluation. Patient will require  intubation , will transfer to ICU for further work-up.     Assessment:    Acute hypercapnic hypoxic respiratory failure   NAKUL requiring CPAP at night (non -compliant)  Cellulitis  Chronic bronchitis   Asthma   sepsis  Psoriasis   Fibromyalgia   DM  Hypothyroidism       Plan:    =====================[CNS ]==============================  # encephalopathy   - Likely due to hypercapnia , PCO2:91  - will consider infectious etiologies as well  - Intubated  - tapering off sedation in preparation for possible extubation in upcoming days.   - propofol, cutting back fentanyl, precedex added    # History of Fibromyalgia on f gabapentin, flexeril, Tylenol, aleve, oxcarbazepine for pain at home   - Lidoderm 4% patch 2 patches daily  - continue to hold all home meds  - on fentanyl   =====================[CVS ]==========================  # HTN :    - BP currently acceptable off medication   - monitor BP in setting of ATN / sepsis   - keep MAP >65    =====================[RESP ]==============================  # Acute hypoxic hypercapnic respiratory failure  - likely in setting of NAKUL / cheonic bronchitis / asthma   - mechanical ventilation   - solumedrol 40  - d-dimmer wnl  - x-ray with venous congestion  - Monitor ABG    =====================[ GI ]============================  # No acute issues  - will keep NPO until NGt placement  - c/w PPi for GI prophylaxis     ====================[ RENAL ]=============================   # Ernestina:   - improving  - pt with good urine output, net neg in 24 hrs  -Strict I/o, Avoid Nephrotoxic agents  -Maintain MAP>65-70 mm Hg  - Monitor BMP and electrolytes    # abnormal electrolytes:   - Hyperkalemia resolved   - Hyperphosphatemia     # Left renal lesion chronic  - outpatient f/u with Urology     =====================[ ID ]============================  # Cellulitis   -  on Cefazolin 1gr q12 hours  - UA negative for WBC , f/u UC   - ID Dr Jacobsen following       ===================[ HEME/ONC ]===========================  # anemia of chronic illness  -monitor cbc      =====================[ ENDO ]======================  # DM :   - target CBG < 180  - currently on  Lantus and HSS , adjust as needed    # Hypothyroidism   - f/u TSH in setting of encephalopathy   - c/w synthroid      ================= Skin/Catheters============================  # cellulitis likley 9i setting of Psoriasis  - c/w IV ABx   - pt has  Bustamante    ==================[ PROPHYLAXIS ]==========================   # Dvt : c/u SQH , f/u D dimer   # Gi : Protonix     ====================[ DISPO ]======================   - Monitor in ICU     ===================[ PROGNOSIS ]====================  - Guarded

## 2022-02-08 NOTE — PROGRESS NOTE ADULT - ASSESSMENT
55 yo F, from home, ambulated with cane , PMHx of obesity, asthma, chronic bronchitis, NAKUL, HTN, DM, HLD, fibromyalgia, anxiety, trigeminal neuralgia, degenerative joint disease, ?IgA vasculitis, psoriasis, OA came with chief complain of chest pain and dysuria. Nephrology consult called for significant FERNIE    1) Non oliguric/Oliguric FERNIE likely dehydration/poor oral intake vs ATN from renal hypoperfusion/sepsis on superimposed hypertensive/diabetic nephropathy/diuretic/ACEI use  2) Hyperkalemia with K level 4.6  3) Hypertension  4) Diabetic Nephropathy with protenuria/neuropathy  5) Morbid Obesity  6) Abdominal wall cellulitis  7) AMS with hypercapnic respiratory failure on ventilatory support  8) Anemia of chronic illness 11.4  9) Left renal lesion chronic    Recommend:  Strict I/o  Avoid Nephrotoxic agents  Continue hold ACEI/ARB, diuretic therapy  Maintain MAP>65-70 mm Hg  Adjust antibiotics as per renal dose adjustment  Bustamante's catheter for strict I/o  Monitor BMP and electrolytes daily  Pulmonary/ICU consult discussed with primary team due to encephalopathy and hypercarbia for BIPAP/Ventilatory support  Treatment of sepsis/cellulitis per primary/ID team with renal dose adjustment of Abx  No urgent need for RRT/HD  Will follow

## 2022-02-08 NOTE — PROGRESS NOTE ADULT - SUBJECTIVE AND OBJECTIVE BOX
INTERVAL HPI/OVERNIGHT EVENTS: Patient seen and examined at bedside. No events overnight. Patient sedated, intubated.     PRESSORS: [ ] YES [x ] NO  WHICH:    Antimicrobial:  ceFAZolin   IVPB 1000 milliGRAM(s) IV Intermittent every 12 hours    Cardiovascular:  phenylephrine    Infusion 0.3 MICROgram(s)/kG/Min IV Continuous <Continuous>    Pulmonary:  ALBUTerol    90 MICROgram(s) HFA Inhaler 2 Puff(s) Inhalation every 6 hours  benzonatate 100 milliGRAM(s) Oral <User Schedule>  montelukast 10 milliGRAM(s) Oral at bedtime  tiotropium 18 MICROgram(s) Capsule 1 Capsule(s) Inhalation daily    Hematalogic:  aspirin  chewable 81 milliGRAM(s) Oral daily  heparin   Injectable 5000 Unit(s) SubCutaneous every 8 hours    Other:  atorvastatin 10 milliGRAM(s) Oral at bedtime  chlorhexidine 0.12% Liquid 15 milliLiter(s) Oral Mucosa two times a day  chlorhexidine 2% Cloths 1 Application(s) Topical <User Schedule>  chlorhexidine 4% Liquid 1 Application(s) Topical <User Schedule>  fentaNYL   Infusion. 0.5 MICROgram(s)/kG/Hr IV Continuous <Continuous>  influenza   Vaccine 0.5 milliLiter(s) IntraMuscular once  insulin glargine Injectable (LANTUS) 20 Unit(s) SubCutaneous at bedtime  insulin lispro (ADMELOG) corrective regimen sliding scale   SubCutaneous every 6 hours  levothyroxine 112 MICROGram(s) Oral daily  lidocaine   4% Patch 2 Patch Transdermal daily  methylPREDNISolone sodium succinate Injectable 40 milliGRAM(s) IV Push every 6 hours  ondansetron Injectable 4 milliGRAM(s) IV Push every 6 hours PRN  pantoprazole  Injectable 40 milliGRAM(s) IV Push daily  potassium phosphate IVPB 30 milliMole(s) IV Intermittent once  propofol Infusion 10 MICROgram(s)/kG/Min IV Continuous <Continuous>  propofol Infusion 35 MICROgram(s)/kG/Min IV Continuous <Continuous>  sodium chloride 0.9% lock flush 10 milliLiter(s) IV Push every 1 hour PRN    ALBUTerol    90 MICROgram(s) HFA Inhaler 2 Puff(s) Inhalation every 6 hours  aspirin  chewable 81 milliGRAM(s) Oral daily  atorvastatin 10 milliGRAM(s) Oral at bedtime  benzonatate 100 milliGRAM(s) Oral <User Schedule>  ceFAZolin   IVPB 1000 milliGRAM(s) IV Intermittent every 12 hours  chlorhexidine 0.12% Liquid 15 milliLiter(s) Oral Mucosa two times a day  chlorhexidine 2% Cloths 1 Application(s) Topical <User Schedule>  chlorhexidine 4% Liquid 1 Application(s) Topical <User Schedule>  fentaNYL   Infusion. 0.5 MICROgram(s)/kG/Hr IV Continuous <Continuous>  heparin   Injectable 5000 Unit(s) SubCutaneous every 8 hours  influenza   Vaccine 0.5 milliLiter(s) IntraMuscular once  insulin glargine Injectable (LANTUS) 20 Unit(s) SubCutaneous at bedtime  insulin lispro (ADMELOG) corrective regimen sliding scale   SubCutaneous every 6 hours  levothyroxine 112 MICROGram(s) Oral daily  lidocaine   4% Patch 2 Patch Transdermal daily  methylPREDNISolone sodium succinate Injectable 40 milliGRAM(s) IV Push every 6 hours  montelukast 10 milliGRAM(s) Oral at bedtime  ondansetron Injectable 4 milliGRAM(s) IV Push every 6 hours PRN  pantoprazole  Injectable 40 milliGRAM(s) IV Push daily  phenylephrine    Infusion 0.3 MICROgram(s)/kG/Min IV Continuous <Continuous>  potassium phosphate IVPB 30 milliMole(s) IV Intermittent once  propofol Infusion 10 MICROgram(s)/kG/Min IV Continuous <Continuous>  propofol Infusion 35 MICROgram(s)/kG/Min IV Continuous <Continuous>  sodium chloride 0.9% lock flush 10 milliLiter(s) IV Push every 1 hour PRN  tiotropium 18 MICROgram(s) Capsule 1 Capsule(s) Inhalation daily    Drug Dosing Weight  Height (cm): 155 (2022 18:53)  Weight (kg): 127 (2022 18:53)  BMI (kg/m2): 52.9 (2022 18:53)  BSA (m2): 2.18 (2022 18:53)    CENTRAL LINE: [x ] YES [ ] NO  LOCATION:  [facility]   DATE INSERTED:   REMOVE: [ ] YES [ ] NO  EXPLAIN:    WILDER: [ x] YES [ ] NO    DATE INSERTED:   REMOVE:  [ ] YES [ ] NO  EXPLAIN:    A-LINE:  [ ] YES [ ] NO  LOCATION:   DATE INSERTED:  REMOVE:  [ ] YES [ ] NO  EXPLAIN:    PMH -reviewed admission note, no change since admission  PAST MEDICAL & SURGICAL HISTORY:  Hypertension  vaginal cyst    Hyperlipidemia    Asthma  last inhaler use with in 10 days, on Pulm follow up [id number]    Hypothyroid    Obstructive sleep apnea  refuses Cpap    Obesity  morbid    Trigeminal neuralgia  R    Fibromyalgia    DM (diabetes mellitus)  2    DJD (degenerative joint disease)  Cervical/Thoracic/Lumbar    Cervical neuralgia  difficulty moving my neck    Back pain  thoracic &amp; lumbar    H/O bursitis  right shoulder    Radicular pain  arms, legs    Vasculitis  Legs, forerhead, arms &amp; hands, flare ups in R leg  Dr susie Ribeiro is my Rheumatologist    Renal cell carcinoma, left  on follow up Dr schulte, HOD renal [facility], waiting for suregry in 2020    Falls frequently    Morbid obesity with body mass index (BMI) of 50.0 to 59.9 in adult    Psoriasis    Diverticulosis    S/P abdominal hysterectomy        S/P  section  1    Left adrenal mass  excision, benign     Vaginal cyst  removed     Vasculitis on nerve biopsy  , had another surgery called microvascular decompression         ICU Vital Signs Last 24 Hrs  T(C): 37.7 (2022 07:45), Max: 38.3 (2022 23:00)  T(F): 99.9 (2022 07:45), Max: 100.9 (2022 23:00)  HR: 61 (2022 07:45) (61 - 85)  BP: 122/57 (2022 07:45) (86/62 - 165/59)  BP(mean): 74 (2022 07:45) (58 - 139)  ABP: --  ABP(mean): --  RR: 25 (2022 07:45) (17 - 27)  SpO2: 99% (2022 07:45) (92% - 100%)      ABG - ( 2022 08:22 )  pH, Arterial: 7.44  pH, Blood: x     /  pCO2: 41    /  pO2: 101   / HCO3: 28    / Base Excess: 3.3   /  SaO2: 97                    07 @ 07:01  -  -08 @ 07:00  --------------------------------------------------------  IN: 1649 mL / OUT: 3875 mL / NET: -2226 mL        Mode: AC/ CMV (Assist Control/ Continuous Mandatory Ventilation)  RR (machine): 20  TV (machine): 450  FiO2: 100  PEEP: 5  ITime: 1  MAP: 11  PIP: 30      PHYSICAL EXAM:    GENERAL: NAD, sedated, morbid obese  HEAD:  Atraumatic, Normocephalic  EYES: EOMI, PERRLA, conjunctiva and sclera clear  ENMT: ETT in place, no lesions apprecaited  NECK: large neck, + RIJ,  No JVD Trachea midline  NERVOUS SYSTEM:  Sedated and intubated,   CHEST/LUNG: CTA bl,  No rales, rhonchi, wheezing   HEART: Regular rate and rhythm; No murmurs, rubs, or gallops  ABDOMEN: Soft, Nontender, Nondistended; Bowel sounds present  EXTREMITIES:  2+ Peripheral Pulses, No clubbing, cyanosis, or edema  LYMPH: No lymphadenopathy noted  SKIN: + psoriatic lesions on legs bl      LABS:  CBC Full  -  ( 2022 04:52 )  WBC Count : 10.63 K/uL  RBC Count : 5.15 M/uL  Hemoglobin : 12.1 g/dL  Hematocrit : 39.6 %  Platelet Count - Automated : 405 K/uL  Mean Cell Volume : 76.9 fl  Mean Cell Hemoglobin : 23.5 pg  Mean Cell Hemoglobin Concentration : 30.6 gm/dL  Auto Neutrophil # : 9.65 K/uL  Auto Lymphocyte # : 0.61 K/uL  Auto Monocyte # : 0.15 K/uL  Auto Eosinophil # : 0.00 K/uL  Auto Basophil # : 0.02 K/uL  Auto Neutrophil % : 90.8 %  Auto Lymphocyte % : 5.7 %  Auto Monocyte % : 1.4 %  Auto Eosinophil % : 0.0 %  Auto Basophil % : 0.2 %    02-08    139  |  106  |  33<H>  ----------------------------<  317<H>  4.6   |  27  |  1.17    Ca    9.2      2022 04:52  Phos  1.6     02-08  Mg     2.1     02-08    TPro  7.9  /  Alb  2.7<L>  /  TBili  0.5  /  DBili  x   /  AST  32  /  ALT  38  /  AlkPhos  124<H>  02-08    PT/INR - ( 2022 18:16 )   PT: 13.4 sec;   INR: 1.13 ratio         PTT - ( 2022 18:16 )  PTT:27.1 sec  Urinalysis Basic - ( 2022 18:16 )    Color: Yellow / Appearance: Clear / S.010 / pH: x  Gluc: x / Ketone: Negative  / Bili: Negative / Urobili: Negative   Blood: x / Protein: 15 / Nitrite: Negative   Leuk Esterase: Negative / RBC: 2-5 /HPF / WBC 0-2 /HPF   Sq Epi: x / Non Sq Epi: Few /HPF / Bacteria: Few /HPF          RADIOLOGY & ADDITIONAL STUDIES REVIEWED:  ***    [ ]GOALS OF CARE DISCUSSION WITH PATIENT/FAMILY/PROXY:    CRITICAL CARE TIME SPENT: 35 minutes

## 2022-02-08 NOTE — PROGRESS NOTE ADULT - SUBJECTIVE AND OBJECTIVE BOX
56y Female    Meds:  ceFAZolin   IVPB 1000 milliGRAM(s) IV Intermittent every 12 hours    Allergies    Fioricet (Rash)  gadobutrol (Rash; Urticaria; Hives)  IV Contrast (Short breath)  mushroom (Unknown)  venlafaxine (Hives)    Intolerances        VITALS:  Vital Signs Last 24 Hrs  T(C): 38.3 (08 Feb 2022 19:45), Max: 38.3 (07 Feb 2022 23:00)  T(F): 100.9 (08 Feb 2022 19:45), Max: 100.9 (07 Feb 2022 23:00)  HR: 62 (08 Feb 2022 19:45) (61 - 88)  BP: 109/57 (08 Feb 2022 19:45) (59/21 - 165/63)  BP(mean): 70 (08 Feb 2022 19:45) (30 - 139)  RR: 21 (08 Feb 2022 19:45) (14 - 27)  SpO2: 97% (08 Feb 2022 19:45) (92% - 100%)    LABS/DIAGNOSTIC TESTS:                          12.1   10.63 )-----------( 405      ( 08 Feb 2022 04:52 )             39.6         02-08    139  |  106  |  33<H>  ----------------------------<  317<H>  4.6   |  27  |  1.17    Ca    9.2      08 Feb 2022 04:52  Phos  1.6     02-08  Mg     2.1     02-08    TPro  7.9  /  Alb  2.7<L>  /  TBili  0.5  /  DBili  x   /  AST  32  /  ALT  38  /  AlkPhos  124<H>  02-08      LIVER FUNCTIONS - ( 08 Feb 2022 04:52 )  Alb: 2.7 g/dL / Pro: 7.9 g/dL / ALK PHOS: 124 U/L / ALT: 38 U/L DA / AST: 32 U/L / GGT: x             CULTURES: Clean Catch Clean Catch (Midstream)  02-05 @ 04:22   Normal Urogenital brody present  --  --            RADIOLOGY:      ROS:  [  ] UNABLE TO ELICIT ICU VISIT  56y Female who remains in the ICU , she is intubated , sedated and vent dependent, she is on an FIO2 of 40% and PEEP of 5 , she had a fever of 100.9 today but  no diarrhea.     Meds:  ceFAZolin   IVPB 1000 milliGRAM(s) IV Intermittent every 12 hours    Allergies    Fioricet (Rash)  gadobutrol (Rash; Urticaria; Hives)  IV Contrast (Short breath)  mushroom (Unknown)  venlafaxine (Hives)    Intolerances        VITALS:  Vital Signs Last 24 Hrs  T(C): 38.3 (08 Feb 2022 19:45), Max: 38.3 (07 Feb 2022 23:00)  T(F): 100.9 (08 Feb 2022 19:45), Max: 100.9 (07 Feb 2022 23:00)  HR: 62 (08 Feb 2022 19:45) (61 - 88)  BP: 109/57 (08 Feb 2022 19:45) (59/21 - 165/63)  BP(mean): 70 (08 Feb 2022 19:45) (30 - 139)  RR: 21 (08 Feb 2022 19:45) (14 - 27)  SpO2: 97% (08 Feb 2022 19:45) (92% - 100%)    LABS/DIAGNOSTIC TESTS:                          12.1   10.63 )-----------( 405      ( 08 Feb 2022 04:52 )             39.6         02-08    139  |  106  |  33<H>  ----------------------------<  317<H>  4.6   |  27  |  1.17    Ca    9.2      08 Feb 2022 04:52  Phos  1.6     02-08  Mg     2.1     02-08    TPro  7.9  /  Alb  2.7<L>  /  TBili  0.5  /  DBili  x   /  AST  32  /  ALT  38  /  AlkPhos  124<H>  02-08      LIVER FUNCTIONS - ( 08 Feb 2022 04:52 )  Alb: 2.7 g/dL / Pro: 7.9 g/dL / ALK PHOS: 124 U/L / ALT: 38 U/L DA / AST: 32 U/L / GGT: x             CULTURES: Clean Catch Clean Catch (Midstream)  02-05 @ 04:22   Normal Urogenital brody present  --  --            RADIOLOGY:      ROS:  [  ] UNABLE TO ELICIT ICU VISIT  56y Female who remains in the ICU , she is intubated , sedated and vent dependent, she is on an FIO2 of 40% and PEEP of 5 , she had a fever of 100.9 today but  no diarrhea. She is on 1 pressor and is on Solumedrol also.    Meds:  ceFAZolin   IVPB 1000 milliGRAM(s) IV Intermittent every 12 hours    Allergies    Fioricet (Rash)  gadobutrol (Rash; Urticaria; Hives)  IV Contrast (Short breath)  mushroom (Unknown)  venlafaxine (Hives)    Intolerances        VITALS:  Vital Signs Last 24 Hrs  T(C): 38.3 (08 Feb 2022 19:45), Max: 38.3 (07 Feb 2022 23:00)  T(F): 100.9 (08 Feb 2022 19:45), Max: 100.9 (07 Feb 2022 23:00)  HR: 62 (08 Feb 2022 19:45) (61 - 88)  BP: 109/57 (08 Feb 2022 19:45) (59/21 - 165/63)  BP(mean): 70 (08 Feb 2022 19:45) (30 - 139)  RR: 21 (08 Feb 2022 19:45) (14 - 27)  SpO2: 97% (08 Feb 2022 19:45) (92% - 100%)    LABS/DIAGNOSTIC TESTS:                          12.1   10.63 )-----------( 405      ( 08 Feb 2022 04:52 )             39.6         02-08    139  |  106  |  33<H>  ----------------------------<  317<H>  4.6   |  27  |  1.17    Ca    9.2      08 Feb 2022 04:52  Phos  1.6     02-08  Mg     2.1     02-08    TPro  7.9  /  Alb  2.7<L>  /  TBili  0.5  /  DBili  x   /  AST  32  /  ALT  38  /  AlkPhos  124<H>  02-08      LIVER FUNCTIONS - ( 08 Feb 2022 04:52 )  Alb: 2.7 g/dL / Pro: 7.9 g/dL / ALK PHOS: 124 U/L / ALT: 38 U/L DA / AST: 32 U/L / GGT: x             CULTURES: Clean Catch Clean Catch (Midstream)  02-05 @ 04:22   Normal Urogenital brody present  --  --            RADIOLOGY:      ROS:  [ x ] UNABLE TO ELICIT

## 2022-02-08 NOTE — PROGRESS NOTE ADULT - SUBJECTIVE AND OBJECTIVE BOX
Romeo Ribeiro MD (Nephrology progress note)  [address], [facility],  [address],  [facility]  TEl: [phone]  Cell: [phone]    Patient is a 56y Female seen and evaluated at bedside. Vital signs, laboratory data, imaging studies, consult notes reviewed done within past 24 hours. Overnight events noted. Patient remains critically ill on ventilatory support today morning on IV phynylephrine. Interval improving renal function with S cr 1.17 with non oliguria.     Allergies    Fioricet (Rash)  gadobutrol (Rash; Urticaria; Hives)  IV Contrast (Short breath)  mushroom (Unknown)  venlafaxine (Hives)    Intolerances        ROS:  Limited  Sedated and intubated on ventilatory support    VITALS:    T(C): 37.7 (22 @ 12:45), Max: 38.3 (22 @ 23:00)  HR: 68 (22 @ 12:45) (61 - 88)  BP: 132/73 (22 @ 12:45) (59/21 - 165/59)  RR: 21 (22 @ 12:45) (14 - 27)  SpO2: 96% (22 @ 12:45) (92% - 100%)  CAPILLARY BLOOD GLUCOSE      POCT Blood Glucose.: 257 mg/dL (2022 11:10)  POCT Blood Glucose.: 282 mg/dL (2022 06:23)  POCT Blood Glucose.: 281 mg/dL (2022 23:08)  POCT Blood Glucose.: 189 mg/dL (2022 18:01)      22 @ 07:01  -  22 @ 07:00  --------------------------------------------------------  IN: 1649 mL / OUT: 4055 mL / NET: -2406 mL    22 @ 07:01  -  22 @ 14:02  --------------------------------------------------------  IN: 794.2 mL / OUT: 900 mL / NET: -105.8 mL      MEDICATIONS  (STANDING):  ALBUTerol    90 MICROgram(s) HFA Inhaler 2 Puff(s) Inhalation every 6 hours  aspirin  chewable 81 milliGRAM(s) Oral daily  atorvastatin 10 milliGRAM(s) Oral at bedtime  benzonatate 100 milliGRAM(s) Oral <User Schedule>  ceFAZolin   IVPB 1000 milliGRAM(s) IV Intermittent every 12 hours  chlorhexidine 0.12% Liquid 15 milliLiter(s) Oral Mucosa two times a day  chlorhexidine 2% Cloths 1 Application(s) Topical <User Schedule>  chlorhexidine 4% Liquid 1 Application(s) Topical <User Schedule>  dexMEDEtomidine Infusion 0.2 MICROgram(s)/kG/Hr (6.35 mL/Hr) IV Continuous <Continuous>  fentaNYL   Infusion. 0.5 MICROgram(s)/kG/Hr (6.35 mL/Hr) IV Continuous <Continuous>  heparin   Injectable 5000 Unit(s) SubCutaneous every 8 hours  influenza   Vaccine 0.5 milliLiter(s) IntraMuscular once  insulin glargine Injectable (LANTUS) 20 Unit(s) SubCutaneous at bedtime  insulin lispro (ADMELOG) corrective regimen sliding scale   SubCutaneous every 6 hours  levothyroxine 112 MICROGram(s) Oral daily  lidocaine   4% Patch 2 Patch Transdermal daily  methylPREDNISolone sodium succinate Injectable 40 milliGRAM(s) IV Push daily  montelukast 10 milliGRAM(s) Oral at bedtime  norepinephrine Infusion 0.05 MICROgram(s)/kG/Min (5.95 mL/Hr) IV Continuous <Continuous>  pantoprazole  Injectable 40 milliGRAM(s) IV Push daily  phenylephrine    Infusion 0.3 MICROgram(s)/kG/Min (7.14 mL/Hr) IV Continuous <Continuous>  propofol Infusion 10 MICROgram(s)/kG/Min (7.62 mL/Hr) IV Continuous <Continuous>  tiotropium 18 MICROgram(s) Capsule 1 Capsule(s) Inhalation daily    MEDICATIONS  (PRN):  ondansetron Injectable 4 milliGRAM(s) IV Push every 6 hours PRN Nausea and/or Vomiting  sodium chloride 0.9% lock flush 10 milliLiter(s) IV Push every 1 hour PRN Pre/post blood products, medications, blood draw, and to maintain line patency      PHYSICAL EXAM:  GENERAL: Sedated and intubated on ventilatory support  HEENT: RITA, EOMI, neck supple, no JVP, no carotid bruit, oral mucosa moist and pale, ETT in place  CHEST/LUNG: Bilateral clear breath sounds, no rales, no crepitations, no rhonchi, no wheezing  HEART: Regular rate and rhythm, LAURA II/VI at LPSB, no gallops, no rub   ABDOMEN: Soft, nontender, non distended, bowel sounds present, no palpable organomegaly  : No flank or supra pubic tenderness.  EXTREMITIES: Peripheral pulses are palpable, no pedal edema  Neurology: Sedated and intubated on ventilatory support  SKIN: No rash or skin lesion  Musculoskeletal: No joint deformity    Vascular ACCESS: None    LABS:                        12.1   10.63 )-----------( 405      ( 2022 04:52 )             39.6     02-    139  |  106  |  33<H>  ----------------------------<  317<H>  4.6   |  27  |  1.17    Ca    9.2      2022 04:52  Phos  1.6     -  Mg     2.1     -08    TPro  7.9  /  Alb  2.7<L>  /  TBili  0.5  /  DBili  x   /  AST  32  /  ALT  38  /  AlkPhos  124<H>  02-08      PT/INR - ( 2022 18:16 )   PT: 13.4 sec;   INR: 1.13 ratio         PTT - ( 2022 18:16 )  PTT:27.1 sec  Urinalysis Basic - ( 2022 18:16 )    Color: Yellow / Appearance: Clear / S.010 / pH: x  Gluc: x / Ketone: Negative  / Bili: Negative / Urobili: Negative   Blood: x / Protein: 15 / Nitrite: Negative   Leuk Esterase: Negative / RBC: 2-5 /HPF / WBC 0-2 /HPF   Sq Epi: x / Non Sq Epi: Few /HPF / Bacteria: Few /HPF      Sodium, Random Urine: 38 mmol/L ( @ 18:16)  Osmolality, Random Urine: 218 mos/kg ( @ 18:16)  Creatinine, Random Urine: 39 mg/dL ( @ 18:16)  Sodium, Random Urine: 14 mmol/L ( @ 17:21)  Creatinine, Random Urine: 265 mg/dL ( @ 17:21)  Osmolality, Random Urine: 327 mos/kg ( @ 17:21)        RADIOLOGY & ADDITIONAL STUDIES:  rad< from: Xray Chest 1 View- PORTABLE-Urgent (Xray Chest 1 View- PORTABLE-Urgent .) (22 @ 18:17) >    ACC: [id number] EXAM:  XR CHEST PORTABLE URGENT 1V                          PROCEDURE DATE:  2022          INTERPRETATION:  Clinical history: 56-year-old female, line placement.    Portable/expiratory view of the chest is compared to 6 hours prior and   demonstrate an ET tube with the tip 1.5 cm above the mic, right IJ   line with the tip at the junction of the SVC and right atrium and NG tube   with the tip in the stomach, new.    Mild cardiomegaly with no pneumothorax or acute osseous finding,   unchanged.    Moderate pulmonary venous congestion, new.    Consolidation/atelectasis/effusion at the left base, new.    IMPRESSION:  Tip of the ET tube 1.5 cm above the mic, new.    Moderate pulmonary venous congestion, new.    Effusion/atelectasis/possible consolidation at the left base, new    --- End of Report ---            HUMERA HIGUERA DO; Attending Radiologist  This document has been electronically signed. 2022  9:06AM    < end of copied text >    Imaging Personally Reviewed:  [x] YES  [ ] NO    Consultant(s) Notes Reviewed:  [x] YES  [ ] NO    Care Discussed with Primary team/ Other Providers [x] YES  [ ] NO

## 2022-02-09 LAB
ALBUMIN SERPL ELPH-MCNC: 2.5 G/DL — LOW (ref 3.5–5)
ALP SERPL-CCNC: 114 U/L — SIGNIFICANT CHANGE UP (ref 40–120)
ALT FLD-CCNC: 31 U/L DA — SIGNIFICANT CHANGE UP (ref 10–60)
ANION GAP SERPL CALC-SCNC: 4 MMOL/L — LOW (ref 5–17)
AST SERPL-CCNC: 22 U/L — SIGNIFICANT CHANGE UP (ref 10–40)
BASE EXCESS BLDA CALC-SCNC: 4.7 MMOL/L — HIGH (ref -2–3)
BASOPHILS # BLD AUTO: 0.03 K/UL — SIGNIFICANT CHANGE UP (ref 0–0.2)
BASOPHILS NFR BLD AUTO: 0.3 % — SIGNIFICANT CHANGE UP (ref 0–2)
BILIRUB SERPL-MCNC: 0.4 MG/DL — SIGNIFICANT CHANGE UP (ref 0.2–1.2)
BLOOD GAS COMMENTS ARTERIAL: SIGNIFICANT CHANGE UP
BUN SERPL-MCNC: 21 MG/DL — HIGH (ref 7–18)
CALCIUM SERPL-MCNC: 9.4 MG/DL — SIGNIFICANT CHANGE UP (ref 8.4–10.5)
CHLORIDE SERPL-SCNC: 107 MMOL/L — SIGNIFICANT CHANGE UP (ref 96–108)
CO2 SERPL-SCNC: 28 MMOL/L — SIGNIFICANT CHANGE UP (ref 22–31)
CREAT SERPL-MCNC: 0.78 MG/DL — SIGNIFICANT CHANGE UP (ref 0.5–1.3)
EOSINOPHIL # BLD AUTO: 0.01 K/UL — SIGNIFICANT CHANGE UP (ref 0–0.5)
EOSINOPHIL NFR BLD AUTO: 0.1 % — SIGNIFICANT CHANGE UP (ref 0–6)
GLUCOSE BLDC GLUCOMTR-MCNC: 295 MG/DL — HIGH (ref 70–99)
GLUCOSE BLDC GLUCOMTR-MCNC: 316 MG/DL — HIGH (ref 70–99)
GLUCOSE BLDC GLUCOMTR-MCNC: 321 MG/DL — HIGH (ref 70–99)
GLUCOSE SERPL-MCNC: 316 MG/DL — HIGH (ref 70–99)
HCO3 BLDA-SCNC: 28 MMOL/L — SIGNIFICANT CHANGE UP (ref 21–28)
HCT VFR BLD CALC: 38 % — SIGNIFICANT CHANGE UP (ref 34.5–45)
HGB BLD-MCNC: 11.6 G/DL — SIGNIFICANT CHANGE UP (ref 11.5–15.5)
HOROWITZ INDEX BLDA+IHG-RTO: 40 — SIGNIFICANT CHANGE UP
IMM GRANULOCYTES NFR BLD AUTO: 1.4 % — SIGNIFICANT CHANGE UP (ref 0–1.5)
LYMPHOCYTES # BLD AUTO: 1.24 K/UL — SIGNIFICANT CHANGE UP (ref 1–3.3)
LYMPHOCYTES # BLD AUTO: 12.6 % — LOW (ref 13–44)
MAGNESIUM SERPL-MCNC: 1.8 MG/DL — SIGNIFICANT CHANGE UP (ref 1.6–2.6)
MCHC RBC-ENTMCNC: 23.7 PG — LOW (ref 27–34)
MCHC RBC-ENTMCNC: 30.5 GM/DL — LOW (ref 32–36)
MCV RBC AUTO: 77.6 FL — LOW (ref 80–100)
MONOCYTES # BLD AUTO: 0.56 K/UL — SIGNIFICANT CHANGE UP (ref 0–0.9)
MONOCYTES NFR BLD AUTO: 5.7 % — SIGNIFICANT CHANGE UP (ref 2–14)
MRSA PCR RESULT.: SIGNIFICANT CHANGE UP
NEUTROPHILS # BLD AUTO: 7.87 K/UL — HIGH (ref 1.8–7.4)
NEUTROPHILS NFR BLD AUTO: 79.9 % — HIGH (ref 43–77)
NRBC # BLD: 0 /100 WBCS — SIGNIFICANT CHANGE UP (ref 0–0)
PCO2 BLDA: 37 MMHG — HIGH (ref 32–35)
PH BLDA: 7.49 — HIGH (ref 7.35–7.45)
PHOSPHATE SERPL-MCNC: 2.7 MG/DL — SIGNIFICANT CHANGE UP (ref 2.5–4.5)
PLATELET # BLD AUTO: 340 K/UL — SIGNIFICANT CHANGE UP (ref 150–400)
PO2 BLDA: 71 MMHG — LOW (ref 83–108)
POTASSIUM SERPL-MCNC: 5.1 MMOL/L — SIGNIFICANT CHANGE UP (ref 3.5–5.3)
POTASSIUM SERPL-SCNC: 5.1 MMOL/L — SIGNIFICANT CHANGE UP (ref 3.5–5.3)
PROT SERPL-MCNC: 7.2 G/DL — SIGNIFICANT CHANGE UP (ref 6–8.3)
RBC # BLD: 4.9 M/UL — SIGNIFICANT CHANGE UP (ref 3.8–5.2)
RBC # FLD: 16.8 % — HIGH (ref 10.3–14.5)
S AUREUS DNA NOSE QL NAA+PROBE: SIGNIFICANT CHANGE UP
SAO2 % BLDA: 95 % — SIGNIFICANT CHANGE UP
SODIUM SERPL-SCNC: 139 MMOL/L — SIGNIFICANT CHANGE UP (ref 135–145)
WBC # BLD: 9.85 K/UL — SIGNIFICANT CHANGE UP (ref 3.8–10.5)
WBC # FLD AUTO: 9.85 K/UL — SIGNIFICANT CHANGE UP (ref 3.8–10.5)

## 2022-02-09 PROCEDURE — 71045 X-RAY EXAM CHEST 1 VIEW: CPT | Mod: 26,76

## 2022-02-09 RX ORDER — DEXMEDETOMIDINE HYDROCHLORIDE IN 0.9% SODIUM CHLORIDE 4 UG/ML
1.5 INJECTION INTRAVENOUS
Qty: 400 | Refills: 0 | Status: DISCONTINUED | OUTPATIENT
Start: 2022-02-09 | End: 2022-02-14

## 2022-02-09 RX ORDER — FENTANYL CITRATE 50 UG/ML
1 INJECTION INTRAVENOUS
Qty: 2500 | Refills: 0 | Status: DISCONTINUED | OUTPATIENT
Start: 2022-02-09 | End: 2022-02-09

## 2022-02-09 RX ORDER — LEVOTHYROXINE SODIUM 125 MCG
78 TABLET ORAL ONCE
Refills: 0 | Status: COMPLETED | OUTPATIENT
Start: 2022-02-09 | End: 2022-02-09

## 2022-02-09 RX ORDER — INSULIN GLARGINE 100 [IU]/ML
25 INJECTION, SOLUTION SUBCUTANEOUS AT BEDTIME
Refills: 0 | Status: DISCONTINUED | OUTPATIENT
Start: 2022-02-09 | End: 2022-02-10

## 2022-02-09 RX ORDER — PIPERACILLIN AND TAZOBACTAM 4; .5 G/20ML; G/20ML
3.38 INJECTION, POWDER, LYOPHILIZED, FOR SOLUTION INTRAVENOUS ONCE
Refills: 0 | Status: COMPLETED | OUTPATIENT
Start: 2022-02-09 | End: 2022-02-09

## 2022-02-09 RX ORDER — PIPERACILLIN AND TAZOBACTAM 4; .5 G/20ML; G/20ML
3.38 INJECTION, POWDER, LYOPHILIZED, FOR SOLUTION INTRAVENOUS EVERY 8 HOURS
Refills: 0 | Status: DISCONTINUED | OUTPATIENT
Start: 2022-02-09 | End: 2022-02-15

## 2022-02-09 RX ORDER — INSULIN LISPRO 100/ML
VIAL (ML) SUBCUTANEOUS EVERY 6 HOURS
Refills: 0 | Status: ACTIVE | OUTPATIENT
Start: 2022-02-09 | End: 2023-01-08

## 2022-02-09 RX ORDER — CHLORHEXIDINE GLUCONATE 213 G/1000ML
15 SOLUTION TOPICAL
Refills: 0 | Status: DISCONTINUED | OUTPATIENT
Start: 2022-02-09 | End: 2022-02-16

## 2022-02-09 RX ORDER — CEFAZOLIN SODIUM 1 G
1000 VIAL (EA) INJECTION EVERY 8 HOURS
Refills: 0 | Status: DISCONTINUED | OUTPATIENT
Start: 2022-02-09 | End: 2022-02-09

## 2022-02-09 RX ORDER — DEXAMETHASONE 0.5 MG/5ML
6 ELIXIR ORAL ONCE
Refills: 0 | Status: COMPLETED | OUTPATIENT
Start: 2022-02-09 | End: 2022-02-09

## 2022-02-09 RX ORDER — DEXAMETHASONE 0.5 MG/5ML
4 ELIXIR ORAL DAILY
Refills: 0 | Status: DISCONTINUED | OUTPATIENT
Start: 2022-02-10 | End: 2022-02-10

## 2022-02-09 RX ORDER — ACETAMINOPHEN 500 MG
1000 TABLET ORAL ONCE
Refills: 0 | Status: COMPLETED | OUTPATIENT
Start: 2022-02-09 | End: 2022-02-09

## 2022-02-09 RX ORDER — PROPOFOL 10 MG/ML
40 INJECTION, EMULSION INTRAVENOUS
Qty: 1000 | Refills: 0 | Status: DISCONTINUED | OUTPATIENT
Start: 2022-02-09 | End: 2022-02-14

## 2022-02-09 RX ORDER — FENTANYL CITRATE 50 UG/ML
1 INJECTION INTRAVENOUS
Qty: 2500 | Refills: 0 | Status: DISCONTINUED | OUTPATIENT
Start: 2022-02-09 | End: 2022-02-14

## 2022-02-09 RX ADMIN — CHLORHEXIDINE GLUCONATE 15 MILLILITER(S): 213 SOLUTION TOPICAL at 05:12

## 2022-02-09 RX ADMIN — FENTANYL CITRATE 12.7 MICROGRAM(S)/KG/HR: 50 INJECTION INTRAVENOUS at 07:29

## 2022-02-09 RX ADMIN — DEXMEDETOMIDINE HYDROCHLORIDE IN 0.9% SODIUM CHLORIDE 47.6 MICROGRAM(S)/KG/HR: 4 INJECTION INTRAVENOUS at 16:04

## 2022-02-09 RX ADMIN — PROPOFOL 7.62 MICROGRAM(S)/KG/MIN: 10 INJECTION, EMULSION INTRAVENOUS at 04:31

## 2022-02-09 RX ADMIN — HEPARIN SODIUM 5000 UNIT(S): 5000 INJECTION INTRAVENOUS; SUBCUTANEOUS at 21:26

## 2022-02-09 RX ADMIN — PROPOFOL 7.62 MICROGRAM(S)/KG/MIN: 10 INJECTION, EMULSION INTRAVENOUS at 01:35

## 2022-02-09 RX ADMIN — INSULIN GLARGINE 25 UNIT(S): 100 INJECTION, SOLUTION SUBCUTANEOUS at 22:35

## 2022-02-09 RX ADMIN — DEXMEDETOMIDINE HYDROCHLORIDE IN 0.9% SODIUM CHLORIDE 47.6 MICROGRAM(S)/KG/HR: 4 INJECTION INTRAVENOUS at 21:43

## 2022-02-09 RX ADMIN — LIDOCAINE 2 PATCH: 4 CREAM TOPICAL at 11:44

## 2022-02-09 RX ADMIN — PROPOFOL 30.5 MICROGRAM(S)/KG/MIN: 10 INJECTION, EMULSION INTRAVENOUS at 16:57

## 2022-02-09 RX ADMIN — ALBUTEROL 2 PUFF(S): 90 AEROSOL, METERED ORAL at 15:48

## 2022-02-09 RX ADMIN — LIDOCAINE 2 PATCH: 4 CREAM TOPICAL at 20:38

## 2022-02-09 RX ADMIN — PANTOPRAZOLE SODIUM 40 MILLIGRAM(S): 20 TABLET, DELAYED RELEASE ORAL at 11:43

## 2022-02-09 RX ADMIN — ALBUTEROL 2 PUFF(S): 90 AEROSOL, METERED ORAL at 20:57

## 2022-02-09 RX ADMIN — Medication 100 MILLIGRAM(S): at 05:11

## 2022-02-09 RX ADMIN — Medication 8: at 23:00

## 2022-02-09 RX ADMIN — DEXMEDETOMIDINE HYDROCHLORIDE IN 0.9% SODIUM CHLORIDE 47.6 MICROGRAM(S)/KG/HR: 4 INJECTION INTRAVENOUS at 11:12

## 2022-02-09 RX ADMIN — PROPOFOL 30.5 MICROGRAM(S)/KG/MIN: 10 INJECTION, EMULSION INTRAVENOUS at 07:29

## 2022-02-09 RX ADMIN — ATORVASTATIN CALCIUM 10 MILLIGRAM(S): 80 TABLET, FILM COATED ORAL at 22:26

## 2022-02-09 RX ADMIN — Medication 400 MILLIGRAM(S): at 17:25

## 2022-02-09 RX ADMIN — FENTANYL CITRATE 12.7 MICROGRAM(S)/KG/HR: 50 INJECTION INTRAVENOUS at 16:05

## 2022-02-09 RX ADMIN — DEXMEDETOMIDINE HYDROCHLORIDE IN 0.9% SODIUM CHLORIDE 47.6 MICROGRAM(S)/KG/HR: 4 INJECTION INTRAVENOUS at 16:57

## 2022-02-09 RX ADMIN — DEXMEDETOMIDINE HYDROCHLORIDE IN 0.9% SODIUM CHLORIDE 47.6 MICROGRAM(S)/KG/HR: 4 INJECTION INTRAVENOUS at 18:59

## 2022-02-09 RX ADMIN — Medication 6 MILLIGRAM(S): at 15:56

## 2022-02-09 RX ADMIN — ALBUTEROL 2 PUFF(S): 90 AEROSOL, METERED ORAL at 09:40

## 2022-02-09 RX ADMIN — PIPERACILLIN AND TAZOBACTAM 25 GRAM(S): 4; .5 INJECTION, POWDER, LYOPHILIZED, FOR SOLUTION INTRAVENOUS at 21:26

## 2022-02-09 RX ADMIN — Medication 40 MILLIGRAM(S): at 05:12

## 2022-02-09 RX ADMIN — HEPARIN SODIUM 5000 UNIT(S): 5000 INJECTION INTRAVENOUS; SUBCUTANEOUS at 05:12

## 2022-02-09 RX ADMIN — ALBUTEROL 2 PUFF(S): 90 AEROSOL, METERED ORAL at 03:09

## 2022-02-09 RX ADMIN — Medication 100 MILLIGRAM(S): at 13:34

## 2022-02-09 RX ADMIN — MONTELUKAST 10 MILLIGRAM(S): 4 TABLET, CHEWABLE ORAL at 22:26

## 2022-02-09 RX ADMIN — PROPOFOL 30.5 MICROGRAM(S)/KG/MIN: 10 INJECTION, EMULSION INTRAVENOUS at 18:59

## 2022-02-09 RX ADMIN — Medication 6: at 17:05

## 2022-02-09 RX ADMIN — HEPARIN SODIUM 5000 UNIT(S): 5000 INJECTION INTRAVENOUS; SUBCUTANEOUS at 13:34

## 2022-02-09 RX ADMIN — PIPERACILLIN AND TAZOBACTAM 200 GRAM(S): 4; .5 INJECTION, POWDER, LYOPHILIZED, FOR SOLUTION INTRAVENOUS at 17:25

## 2022-02-09 RX ADMIN — Medication 8: at 05:26

## 2022-02-09 RX ADMIN — PROPOFOL 30.5 MICROGRAM(S)/KG/MIN: 10 INJECTION, EMULSION INTRAVENOUS at 16:04

## 2022-02-09 RX ADMIN — Medication 8: at 11:43

## 2022-02-09 RX ADMIN — Medication 1000 MILLIGRAM(S): at 20:00

## 2022-02-09 RX ADMIN — Medication 78 MICROGRAM(S): at 05:30

## 2022-02-09 RX ADMIN — CHLORHEXIDINE GLUCONATE 1 APPLICATION(S): 213 SOLUTION TOPICAL at 05:12

## 2022-02-09 RX ADMIN — PROPOFOL 30.5 MICROGRAM(S)/KG/MIN: 10 INJECTION, EMULSION INTRAVENOUS at 10:25

## 2022-02-09 NOTE — PROGRESS NOTE ADULT - ASSESSMENT
55 yo F, from home, ambulated with cane , PMHx of obesity, asthma, chronic bronchitis, NAKUL, HTN, DM, HLD, fibromyalgia, anxiety, trigeminal neuralgia, degenerative joint disease, ?IgA vasculitis, psoriasis, OA came with chief complain of chest pain and dysuria. Nephrology consult called for significant FERNIE    1) Nonoliguric FERNIE likely dehydration/poor oral intake vs ATN from renal hypoperfusion/sepsis on superimposed hypertensive/diabetic nephropathy/diuretic/ACEI use  2) Hyperkalemia with K level 5.1  3) Hypertension  4) Diabetic Nephropathy with protenuria/neuropathy  5) Morbid Obesity  6) Abdominal wall cellulitis  7) AMS with hypercapnic respiratory failure on ventilatory support  8) Anemia of chronic illness 11.6  9) Left renal lesion chronic    Recommend:  Strict I/o  Avoid Nephrotoxic agents  Continue hold ACEI/ARB, diuretic therapy  Maintain MAP>65-70 mm Hg  Adjust antibiotics as per renal dose adjustment  Bustamante's catheter for strict I/o  Monitor BMP and electrolytes daily  Ventilatory management per ICU team  Treatment of sepsis/cellulitis per primary/ID team with renal dose adjustment of Abx  No urgent need for RRT/HD  Will follow

## 2022-02-09 NOTE — PROGRESS NOTE ADULT - ASSESSMENT
57 yo F, from home, ambulated with cane , PMHx of obesity, asthma, chronic bronchitis, NAKUL non compliant with CPAP over night, HTN, DM, HLD, fibromyalgia, anxiety, trigeminal neuralgia, degenerative joint disease, ?IgA vasculitis, psoriasis, OA came with chief complain of chest pain and dysuria. patient admitted to [facility], Troponin x 2 negative,  CTAP- 2/4- showed Skin thickening with subcutaneous stranding in the right lower quadrant anterior abdominal wall c/w cellulitis. pt received Zithromax and Rocephin in ED x1 , Abx dc,d due to negative UA, pt started on keflex 500mg qid for cellulitis , hospital course complicated with encephalopathy and ATN   on 2/5, Cr increased from 0.83 to 3.1,  IV cefazolin started in setting of possible sepsis 2/2 cellulitis. pt noted to progressively getting altered over 2/6, VBG remarkable for PH :7.14, pco2 82, pt was placed on Bi-pap reportedly over night.  Patient found to be more lethargic in the morning.  Unable to perform ROS. ABG showed acute on chronic respiratory acidosis , Hypercapnia 91, respiratory placed pt on AVAPS . ICU consulted for further evaluation. Patient will require  intubation , will transfer to ICU for further work-up.     Assessment:    Acute hypercapnic hypoxic respiratory failure   NAKUL requiring CPAP at night (non -compliant)  Cellulitis  Chronic bronchitis   Asthma   sepsis  Psoriasis   Fibromyalgia   DM  Hypothyroidism       Plan:    =====================[CNS ]==============================  # encephalopathy   - Likely due to hypercapnia , PCO2:91  - will consider infectious etiologies as well  - Intubated  - tapering off sedation in preparation for possible extubation in upcoming days.   - propofol, cutting back fentanyl, precedex added  - SBT    # History of Fibromyalgia on f gabapentin, flexeril, Tylenol, aleve, oxcarbazepine for pain at home   - Lidoderm 4% patch 2 patches daily  - continue to hold all home meds  - on fentanyl   =====================[CVS ]==========================  # HTN :    - BP currently acceptable off medication   - monitor BP in setting of ATN / sepsis   - keep MAP >65    =====================[RESP ]==============================  # Acute hypoxic hypercapnic respiratory failure  - likely in setting of NAKUL / cheonic bronchitis / asthma   - mechanical ventilation   - solumedrol 40  - d-dimmer wnl  - x-ray with venous congestion?  - Monitor ABG  - Hypercapnia resolved  - Following simple commands, SBT today    =====================[ GI ]============================  # No acute issues  - will keep NPO until NGt placement  - c/w PPi for GI prophylaxis     ====================[ RENAL ]=============================   # Ernestina:   - improving  - pt with good urine output, net neg in 24 hrs  -Strict I/o, Avoid Nephrotoxic agents  -Maintain MAP>65-70 mm Hg  - Monitor BMP and electrolytes    # abnormal electrolytes:   - Hyperkalemia resolved   - Hyperphosphatemia     # Left renal lesion chronic  - outpatient f/u with Urology     =====================[ ID ]============================  # Cellulitis   -  on Cefazolin 1gr q12 hours  - UA negative for WBC , f/u UC   - ID Dr Jacobsen following       ===================[ HEME/ONC ]===========================  # anemia of chronic illness  -monitor cbc      =====================[ ENDO ]======================  # DM :   - target CBG < 180  - currently on  Lantus and HSS , adjust as needed    # Hypothyroidism   - f/u TSH in setting of encephalopathy   - c/w synthroid      ================= Skin/Catheters============================  # cellulitis likley 9i setting of Psoriasis  - c/w IV ABx   - pt has  Bustamante    ==================[ PROPHYLAXIS ]==========================   # Dvt : c/u SQH , f/u D dimer   # Gi : Protonix     ====================[ DISPO ]======================   - Monitor in ICU     ===================[ PROGNOSIS ]====================  - Guarded

## 2022-02-09 NOTE — PROGRESS NOTE ADULT - SUBJECTIVE AND OBJECTIVE BOX
Romeo Ribeiro MD (Nephrology progress note)  , [facility], [place],  [address],  [facility]  TEl: [phone]  Cell: [phone]    Patient is a 56y Female seen and evaluated at bedside. Vital signs, laboratory data, imaging studies, consult notes reviewed done within past 24 hours. Overnight events noted. Patient remains critically ill on ventilatory support with IV sedation. Interval stable renal function with non oliguria with S cr 0.7.    Allergies    Fioricet (Rash)  gadobutrol (Rash; Urticaria; Hives)  IV Contrast (Short breath)  mushroom (Unknown)  venlafaxine (Hives)    Intolerances        ROS:  Limited  Sedated and intubated on ventilatory support    VITALS:    T(C): 37.3 (22 @ 08:15), Max: 38.5 (22 @ 22:30)  HR: 67 (22 @ 08:15) (44 - 81)  BP: 147/75 (22 @ 08:15) (72/40 - 165/63)  RR: 16 (22 @ 08:15) (14 - 25)  SpO2: 96% (22 @ 08:15) (89% - 99%)  CAPILLARY BLOOD GLUCOSE      POCT Blood Glucose.: 248 mg/dL (2022 22:46)  POCT Blood Glucose.: 265 mg/dL (2022 17:16)  POCT Blood Glucose.: 257 mg/dL (2022 11:10)      22 @ 07:01  -  22 @ 07:00  --------------------------------------------------------  IN: 2090.8 mL / OUT: 2800 mL / NET: -709.2 mL    22 @ 07:01  -  22 @ 09:49  --------------------------------------------------------  IN: 39.2 mL / OUT: 0 mL / NET: 39.2 mL      MEDICATIONS  (STANDING):  ALBUTerol    90 MICROgram(s) HFA Inhaler 2 Puff(s) Inhalation every 6 hours  aspirin  chewable 81 milliGRAM(s) Oral daily  atorvastatin 10 milliGRAM(s) Oral at bedtime  benzonatate 100 milliGRAM(s) Oral <User Schedule>  ceFAZolin   IVPB 1000 milliGRAM(s) IV Intermittent every 12 hours  chlorhexidine 2% Cloths 1 Application(s) Topical <User Schedule>  fentaNYL   Infusion. 1 MICROgram(s)/kG/Hr (12.7 mL/Hr) IV Continuous <Continuous>  heparin   Injectable 5000 Unit(s) SubCutaneous every 8 hours  influenza   Vaccine 0.5 milliLiter(s) IntraMuscular once  insulin glargine Injectable (LANTUS) 20 Unit(s) SubCutaneous at bedtime  insulin lispro (ADMELOG) corrective regimen sliding scale   SubCutaneous every 6 hours  levothyroxine 112 MICROGram(s) Oral daily  lidocaine   4% Patch 2 Patch Transdermal daily  methylPREDNISolone sodium succinate Injectable 40 milliGRAM(s) IV Push daily  montelukast 10 milliGRAM(s) Oral at bedtime  pantoprazole  Injectable 40 milliGRAM(s) IV Push daily  phenylephrine    Infusion 0.3 MICROgram(s)/kG/Min (7.14 mL/Hr) IV Continuous <Continuous>  propofol Infusion 40 MICROgram(s)/kG/Min (30.5 mL/Hr) IV Continuous <Continuous>  tiotropium 18 MICROgram(s) Capsule 1 Capsule(s) Inhalation daily    MEDICATIONS  (PRN):  ondansetron Injectable 4 milliGRAM(s) IV Push every 6 hours PRN Nausea and/or Vomiting  sodium chloride 0.9% lock flush 10 milliLiter(s) IV Push every 1 hour PRN Pre/post blood products, medications, blood draw, and to maintain line patency      PHYSICAL EXAM:  GENERAL: Sedated and intubated on ventilatory support  HEENT: RITA, EOMI, neck supple, no JVP  CHEST/LUNG: Bilateral decreased breath sounds, bibasilar rhonchi  HEART: Regular rate and rhythm, LAURA II/VI at LPSB, no gallops, no rub   ABDOMEN: Soft, nontender, non distended, bowel sounds present  : No flank or supra pubic tenderness.  EXTREMITIES: Peripheral pulses are palpable, no pedal edema  Neurology: AAOx3, no focal neurological deficit  SKIN: No rash or skin lesion  Musculoskeletal: No joint deformity      Vascular ACCESS:     LABS:                        11.6   9.85  )-----------( 340      ( 2022 04:55 )             38.0         139  |  107  |  21<H>  ----------------------------<  316<H>  5.1   |  28  |  0.78    Ca    9.4      2022 04:55  Phos  2.7       Mg     1.8         TPro  7.2  /  Alb  2.5<L>  /  TBili  0.4  /  DBili  x   /  AST  22  /  ALT  31  /  AlkPhos  114        PT/INR - ( 2022 18:16 )   PT: 13.4 sec;   INR: 1.13 ratio         PTT - ( 2022 18:16 )  PTT:27.1 sec  Urinalysis Basic - ( 2022 18:16 )    Color: Yellow / Appearance: Clear / S.010 / pH: x  Gluc: x / Ketone: Negative  / Bili: Negative / Urobili: Negative   Blood: x / Protein: 15 / Nitrite: Negative   Leuk Esterase: Negative / RBC: 2-5 /HPF / WBC 0-2 /HPF   Sq Epi: x / Non Sq Epi: Few /HPF / Bacteria: Few /HPF      Sodium, Random Urine: 38 mmol/L ( @ 18:16)  Osmolality, Random Urine: 218 mos/kg ( @ 18:16)  Creatinine, Random Urine: 39 mg/dL ( @ 18:16)        RADIOLOGY & ADDITIONAL STUDIES:  rad< from: Xray Chest 1 View- PORTABLE-Urgent (Xray Chest 1 View- PORTABLE-Urgent .) (22 @ 18:17) >    ACC: [id number] EXAM:  XR CHEST PORTABLE URGENT 1V                          PROCEDURE DATE:  2022          INTERPRETATION:  Clinical history: 56-year-old female, line placement.    Portable/expiratory view of the chest is compared to 6 hours prior and   demonstrate an ET tube with the tip 1.5 cm above the mic, right IJ   line with the tip at the junction of the SVC and right atrium and NG tube   with the tip in the stomach, new.    Mild cardiomegaly with no pneumothorax or acute osseous finding,   unchanged.    Moderate pulmonary venous congestion, new.    Consolidation/atelectasis/effusion at the left base, new.    IMPRESSION:  Tip of the ET tube 1.5 cm above the mic, new.    Moderate pulmonary venous congestion, new.    Effusion/atelectasis/possible consolidation at the left base, new    --- End of Report ---            HUMERA HIGUERA DO; Attending Radiologist  This document has been electronically signed. 2022  9:06AM    < end of copied text >    Imaging Personally Reviewed:  [x] YES  [ ] NO    Consultant(s) Notes Reviewed:  [x] YES  [ ] NO    Care Discussed with Primary team/ Other Providers [x] YES  [ ] NO

## 2022-02-09 NOTE — DIETITIAN INITIAL EVALUATION ADULT. - ADD RECOMMEND
Diet advancement per MD. If TF, consider Glucerna 1.5 (with Propofol @ 22.9 mlhr): 25x24 (600 ml, 900 kcals, 50 gm protein). Add 1 Pkt Prosource TID (+45 gm protein, 180 kcals). MD to monitor. RD available.

## 2022-02-09 NOTE — PROGRESS NOTE ADULT - SUBJECTIVE AND OBJECTIVE BOX
INTERVAL HPI/OVERNIGHT EVENTS: Patient seen and examined at bedside. Patient agitated overnight, pulled NG tube, sedation was adjusted .In am patient had SBT which failed due to low tidal volumes. Sedation was adjusted, with precedex and propofol, and SBT was tried again in the PM at the time of writing. Pending progress.      PRESSORS: [ ] YES [ ] NO  WHICH:    Antimicrobial:  ceFAZolin   IVPB 1000 milliGRAM(s) IV Intermittent every 8 hours    Cardiovascular:    Pulmonary:  ALBUTerol    90 MICROgram(s) HFA Inhaler 2 Puff(s) Inhalation every 6 hours  benzonatate 100 milliGRAM(s) Oral <User Schedule>  montelukast 10 milliGRAM(s) Oral at bedtime  tiotropium 18 MICROgram(s) Capsule 1 Capsule(s) Inhalation daily    Hematalogic:  aspirin  chewable 81 milliGRAM(s) Oral daily  heparin   Injectable 5000 Unit(s) SubCutaneous every 8 hours    Other:  atorvastatin 10 milliGRAM(s) Oral at bedtime  chlorhexidine 2% Cloths 1 Application(s) Topical <User Schedule>  dexMEDEtomidine Infusion 1.5 MICROgram(s)/kG/Hr IV Continuous <Continuous>  influenza   Vaccine 0.5 milliLiter(s) IntraMuscular once  insulin glargine Injectable (LANTUS) 25 Unit(s) SubCutaneous at bedtime  insulin lispro (ADMELOG) corrective regimen sliding scale   SubCutaneous every 6 hours  insulin lispro (ADMELOG) corrective regimen sliding scale   SubCutaneous every 6 hours  levothyroxine 112 MICROGram(s) Oral daily  lidocaine   4% Patch 2 Patch Transdermal daily  methylPREDNISolone sodium succinate Injectable 40 milliGRAM(s) IV Push daily  ondansetron Injectable 4 milliGRAM(s) IV Push every 6 hours PRN  pantoprazole  Injectable 40 milliGRAM(s) IV Push daily  propofol Infusion 40 MICROgram(s)/kG/Min IV Continuous <Continuous>  sodium chloride 0.9% lock flush 10 milliLiter(s) IV Push every 1 hour PRN    ALBUTerol    90 MICROgram(s) HFA Inhaler 2 Puff(s) Inhalation every 6 hours  aspirin  chewable 81 milliGRAM(s) Oral daily  atorvastatin 10 milliGRAM(s) Oral at bedtime  benzonatate 100 milliGRAM(s) Oral <User Schedule>  ceFAZolin   IVPB 1000 milliGRAM(s) IV Intermittent every 8 hours  chlorhexidine 2% Cloths 1 Application(s) Topical <User Schedule>  dexMEDEtomidine Infusion 1.5 MICROgram(s)/kG/Hr IV Continuous <Continuous>  heparin   Injectable 5000 Unit(s) SubCutaneous every 8 hours  influenza   Vaccine 0.5 milliLiter(s) IntraMuscular once  insulin glargine Injectable (LANTUS) 25 Unit(s) SubCutaneous at bedtime  insulin lispro (ADMELOG) corrective regimen sliding scale   SubCutaneous every 6 hours  insulin lispro (ADMELOG) corrective regimen sliding scale   SubCutaneous every 6 hours  levothyroxine 112 MICROGram(s) Oral daily  lidocaine   4% Patch 2 Patch Transdermal daily  methylPREDNISolone sodium succinate Injectable 40 milliGRAM(s) IV Push daily  montelukast 10 milliGRAM(s) Oral at bedtime  ondansetron Injectable 4 milliGRAM(s) IV Push every 6 hours PRN  pantoprazole  Injectable 40 milliGRAM(s) IV Push daily  propofol Infusion 40 MICROgram(s)/kG/Min IV Continuous <Continuous>  sodium chloride 0.9% lock flush 10 milliLiter(s) IV Push every 1 hour PRN  tiotropium 18 MICROgram(s) Capsule 1 Capsule(s) Inhalation daily    Drug Dosing Weight  Height (cm): 155 (2022 18:53)  Weight (kg): 127 (2022 18:53)  BMI (kg/m2): 52.9 (2022 18:53)  BSA (m2): 2.18 (2022 18:53)    CENTRAL LINE: [x ] YES [ ] NO  LOCATION:  [facility]   DATE INSERTED:   REMOVE: [ ] YES [ ] NO  EXPLAIN:    WILDER: [ x] YES [ ] NO    DATE INSERTED:   REMOVE:  [ ] YES [ ] NO  EXPLAIN:    A-LINE:  [ ] YES [ ] NO  LOCATION:   DATE INSERTED:  REMOVE:  [ ] YES [ ] NO  EXPLAIN:    PMH -reviewed admission note, no change since admission  PAST MEDICAL & SURGICAL HISTORY:  Hypertension  vaginal cyst    Hyperlipidemia    Asthma  last inhaler use with in 10 days, on Pulm follow up [id number]    Hypothyroid    Obstructive sleep apnea  refuses Cpap    Obesity  morbid    Trigeminal neuralgia  R    Fibromyalgia    DM (diabetes mellitus)  2    DJD (degenerative joint disease)  Cervical/Thoracic/Lumbar    Cervical neuralgia  difficulty moving my neck    Back pain  thoracic &amp; lumbar    H/O bursitis  right shoulder    Radicular pain  arms, legs    Vasculitis  Legs, forerhead, arms &amp; hands, flare ups in R leg  Dr susie Ribeiro is my Rheumatologist    Renal cell carcinoma, left  on follow up Dr schulte, HOD renal [facility], waiting for suregry in 2020    Falls frequently    Morbid obesity with body mass index (BMI) of 50.0 to 59.9 in adult    Psoriasis    Diverticulosis    S/P abdominal hysterectomy        S/P  section  1    Left adrenal mass  excision, benign 2006    Vaginal cyst  removed     Vasculitis on nerve biopsy  , had another surgery called microvascular decompression         ICU Vital Signs Last 24 Hrs  T(C): 37.4 (2022 12:00), Max: 38.5 (2022 22:30)  T(F): 99.3 (2022 12:00), Max: 101.3 (2022 22:30)  HR: 55 (2022 13:00) (44 - 99)  BP: 150/64 (2022 13:00) (72/40 - 165/63)  BP(mean): 84 (2022 13:00) (47 - 112)  ABP: --  ABP(mean): --  RR: 21 (2022 13:00) (14 - 22)  SpO2: 94% (2022 13:00) (89% - 99%)      ABG - ( 2022 03:40 )  pH, Arterial: 7.49  pH, Blood: x     /  pCO2: 37    /  pO2: 71    / HCO3: 28    / Base Excess: 4.7   /  SaO2: 95                    -08 @ 07:01  -  - @ 07:00  --------------------------------------------------------  IN: 2090.8 mL / OUT: 2800 mL / NET: -709.2 mL        Mode: AC/ CMV (Assist Control/ Continuous Mandatory Ventilation)  RR (machine): 21  TV (machine): 450  FiO2: 40  PEEP: 5  ITime: 1  MAP: 11  PIP: 28      PHYSICAL EXAM:    GENERAL: NAD, sedated, morbid obese  HEAD:  Atraumatic, Normocephalic  EYES: EOMI, PERRLA, conjunctiva and sclera clear  ENMT: ETT in place, no lesions apprecaited  NECK: large neck, + RIJ,  No JVD Trachea midline  NERVOUS SYSTEM:  Sedated and intubated,   CHEST/LUNG: CTA bl,  No rales, rhonchi, wheezing   HEART: Regular rate and rhythm; No murmurs, rubs, or gallops  ABDOMEN: Soft, Nontender, Nondistended; Bowel sounds present  EXTREMITIES:  2+ Peripheral Pulses, No clubbing, cyanosis, or edema  LYMPH: No lymphadenopathy noted  SKIN: + psoriatic lesions on legs bl    LABS:  CBC Full  -  ( 2022 04:55 )  WBC Count : 9.85 K/uL  RBC Count : 4.90 M/uL  Hemoglobin : 11.6 g/dL  Hematocrit : 38.0 %  Platelet Count - Automated : 340 K/uL  Mean Cell Volume : 77.6 fl  Mean Cell Hemoglobin : 23.7 pg  Mean Cell Hemoglobin Concentration : 30.5 gm/dL  Auto Neutrophil # : 7.87 K/uL  Auto Lymphocyte # : 1.24 K/uL  Auto Monocyte # : 0.56 K/uL  Auto Eosinophil # : 0.01 K/uL  Auto Basophil # : 0.03 K/uL  Auto Neutrophil % : 79.9 %  Auto Lymphocyte % : 12.6 %  Auto Monocyte % : 5.7 %  Auto Eosinophil % : 0.1 %  Auto Basophil % : 0.3 %        139  |  107  |  21<H>  ----------------------------<  316<H>  5.1   |  28  |  0.78    Ca    9.4      2022 04:55  Phos  2.7     -  Mg     1.8         TPro  7.2  /  Alb  2.5<L>  /  TBili  0.4  /  DBili  x   /  AST  22  /  ALT  31  /  AlkPhos  114  02-09    PT/INR - ( 2022 18:16 )   PT: 13.4 sec;   INR: 1.13 ratio         PTT - ( 2022 18:16 )  PTT:27.1 sec  Urinalysis Basic - ( 2022 18:16 )    Color: Yellow / Appearance: Clear / S.010 / pH: x  Gluc: x / Ketone: Negative  / Bili: Negative / Urobili: Negative   Blood: x / Protein: 15 / Nitrite: Negative   Leuk Esterase: Negative / RBC: 2-5 /HPF / WBC 0-2 /HPF   Sq Epi: x / Non Sq Epi: Few /HPF / Bacteria: Few /HPF          RADIOLOGY & ADDITIONAL STUDIES REVIEWED:  ***    [ ]GOALS OF CARE DISCUSSION WITH PATIENT/FAMILY/PROXY:    CRITICAL CARE TIME SPENT: 35 minutes

## 2022-02-09 NOTE — DIETITIAN INITIAL EVALUATION ADULT. - PERTINENT MEDS FT
MEDICATIONS  (STANDING):  ALBUTerol    90 MICROgram(s) HFA Inhaler 2 Puff(s) Inhalation every 6 hours  aspirin  chewable 81 milliGRAM(s) Oral daily  atorvastatin 10 milliGRAM(s) Oral at bedtime  benzonatate 100 milliGRAM(s) Oral <User Schedule>  chlorhexidine 2% Cloths 1 Application(s) Topical <User Schedule>  dexMEDEtomidine Infusion 1.5 MICROgram(s)/kG/Hr (47.6 mL/Hr) IV Continuous <Continuous>  fentaNYL   Infusion. 1 MICROgram(s)/kG/Hr (12.7 mL/Hr) IV Continuous <Continuous>  heparin   Injectable 5000 Unit(s) SubCutaneous every 8 hours  influenza   Vaccine 0.5 milliLiter(s) IntraMuscular once  insulin glargine Injectable (LANTUS) 25 Unit(s) SubCutaneous at bedtime  insulin lispro (ADMELOG) corrective regimen sliding scale   SubCutaneous every 6 hours  levothyroxine 112 MICROGram(s) Oral daily  lidocaine   4% Patch 2 Patch Transdermal daily  montelukast 10 milliGRAM(s) Oral at bedtime  pantoprazole  Injectable 40 milliGRAM(s) IV Push daily  piperacillin/tazobactam IVPB. 3.375 Gram(s) IV Intermittent once  piperacillin/tazobactam IVPB.. 3.375 Gram(s) IV Intermittent every 8 hours  propofol Infusion 40 MICROgram(s)/kG/Min (30.5 mL/Hr) IV Continuous <Continuous>  tiotropium 18 MICROgram(s) Capsule 1 Capsule(s) Inhalation daily    MEDICATIONS  (PRN):  ondansetron Injectable 4 milliGRAM(s) IV Push every 6 hours PRN Nausea and/or Vomiting  sodium chloride 0.9% lock flush 10 milliLiter(s) IV Push every 1 hour PRN Pre/post blood products, medications, blood draw, and to maintain line patency

## 2022-02-09 NOTE — DIETITIAN INITIAL EVALUATION ADULT. - PERTINENT LABORATORY DATA
02-09 Na139 mmol/L Glu 316 mg/dL<H> K+ 5.1 mmol/L Cr  0.78 mg/dL BUN 21 mg/dL<H>   02-09 Phos 2.7 mg/dL   02-09 Alb 2.5 g/dL<L>     02-05 Chol 195 mg/dL LDL --    HDL 40 mg/dL<L> Trig 171 mg/dL<H>    02-05-22 @ 10:03 HgbA1C 11.4

## 2022-02-09 NOTE — PROGRESS NOTE ADULT - ASSESSMENT
Pneumonia - vent associated   Fevers  Abdominal  wall cellulitis    Plan - Cont Zosyn 3.375 gms iv q8hrs  Time spent - 32 mins.

## 2022-02-09 NOTE — DIETITIAN INITIAL EVALUATION ADULT. - OTHER INFO
Pt seen, this PM. Intubated. Propofol @ 22.9 ml/hr (if x24 hrs= 605 kcals fat). RN reports No feeding tube (dislodges/ ?pulled out, then for SBT trial (not passed for extubation).

## 2022-02-09 NOTE — DIETITIAN INITIAL EVALUATION ADULT. - PROBLEM SELECTOR PLAN 1
c/w chest pain which reproducible on palpation   Hx of fibromyalgia   Stress test and ECHO last year in Feb were normal   Tropx1 neg   f/u tropx2   Admit to tele   Heart Score 3 low score (Risk of Mace 0.9-1.7%)  Less likely cardiac more likely related to fibromyalgia and morbid obesity   Can consider Cardio Consult but hold off for now  Pain management consulted

## 2022-02-09 NOTE — PROGRESS NOTE ADULT - SUBJECTIVE AND OBJECTIVE BOX
ICU VISIT  56y Female    Meds:  piperacillin/tazobactam IVPB.. 3.375 Gram(s) IV Intermittent every 8 hours    Allergies    Fioricet (Rash)  gadobutrol (Rash; Urticaria; Hives)  IV Contrast (Short breath)  mushroom (Unknown)  venlafaxine (Hives)    Intolerances        VITALS:  Vital Signs Last 24 Hrs  T(C): 38.3 (09 Feb 2022 19:00), Max: 38.5 (08 Feb 2022 22:30)  T(F): 100.9 (09 Feb 2022 19:00), Max: 101.3 (08 Feb 2022 22:30)  HR: 57 (09 Feb 2022 19:00) (44 - 99)  BP: 136/57 (09 Feb 2022 19:00) (90/47 - 163/96)  BP(mean): 77 (09 Feb 2022 19:00) (55 - 112)  RR: 21 (09 Feb 2022 19:00) (14 - 23)  SpO2: 91% (09 Feb 2022 19:00) (89% - 99%)    LABS/DIAGNOSTIC TESTS:                          11.6   9.85  )-----------( 340      ( 09 Feb 2022 04:55 )             38.0         02-09    139  |  107  |  21<H>  ----------------------------<  316<H>  5.1   |  28  |  0.78    Ca    9.4      09 Feb 2022 04:55  Phos  2.7     02-09  Mg     1.8     02-09    TPro  7.2  /  Alb  2.5<L>  /  TBili  0.4  /  DBili  x   /  AST  22  /  ALT  31  /  AlkPhos  114  02-09      LIVER FUNCTIONS - ( 09 Feb 2022 04:55 )  Alb: 2.5 g/dL / Pro: 7.2 g/dL / ALK PHOS: 114 U/L / ALT: 31 U/L DA / AST: 22 U/L / GGT: x             CULTURES: Clean Catch Clean Catch (Midstream)  02-05 @ 04:22   Normal Urogenital brody present  --  --            RADIOLOGY:< from: Xray Chest 1 View- PORTABLE-Urgent (Xray Chest 1 View- PORTABLE-Urgent .) (02.07.22 @ 18:17) >  ACC: [id number] EXAM:  XR CHEST PORTABLE URGENT 1V                          PROCEDURE DATE:  02/07/2022          INTERPRETATION:  Clinical history: 56-year-old female, line placement.    Portable/expiratory view of the chest is compared to 6 hours prior and   demonstrate an ET tube with the tip 1.5 cm above the mic, right IJ   line with the tip at the junction of the SVC and right atrium and NG tube   with the tip in the stomach, new.    Mild cardiomegaly with no pneumothorax or acute osseous finding,   unchanged.    Moderate pulmonary venous congestion, new.    Consolidation/atelectasis/effusion at the left base, new.    IMPRESSION:  Tip of the ET tube 1.5 cm above the mic, new.    Moderate pulmonary venous congestion, new.    Effusion/atelectasis/possible consolidation at the left base, new    --- End of Report ---            HUMERA HIGUERA DO; Attending Radiologist  This document has been electronically signed. Feb 8 2022  9:06AM    < end of copied text >        ROS:  [  ] UNABLE TO ELICIT ICU VISIT  56y Female who remains in the ICU , sedated and intubated and vent dependent, she has been having fevers over the last 1-2 days almost constantly though mostly low grade. She appears to have a new pneumonia on her CXR and so was her Kefzol was switched to Zosyn. She remains on an FIO2 of 40% and PEEP of 5.    Meds:  piperacillin/tazobactam IVPB.. 3.375 Gram(s) IV Intermittent every 8 hours    Allergies    Fioricet (Rash)  gadobutrol (Rash; Urticaria; Hives)  IV Contrast (Short breath)  mushroom (Unknown)  venlafaxine (Hives)    Intolerances        VITALS:  Vital Signs Last 24 Hrs  T(C): 38.3 (09 Feb 2022 19:00), Max: 38.5 (08 Feb 2022 22:30)  T(F): 100.9 (09 Feb 2022 19:00), Max: 101.3 (08 Feb 2022 22:30)  HR: 57 (09 Feb 2022 19:00) (44 - 99)  BP: 136/57 (09 Feb 2022 19:00) (90/47 - 163/96)  BP(mean): 77 (09 Feb 2022 19:00) (55 - 112)  RR: 21 (09 Feb 2022 19:00) (14 - 23)  SpO2: 91% (09 Feb 2022 19:00) (89% - 99%)    LABS/DIAGNOSTIC TESTS:                          11.6   9.85  )-----------( 340      ( 09 Feb 2022 04:55 )             38.0         02-09    139  |  107  |  21<H>  ----------------------------<  316<H>  5.1   |  28  |  0.78    Ca    9.4      09 Feb 2022 04:55  Phos  2.7     02-09  Mg     1.8     02-09    TPro  7.2  /  Alb  2.5<L>  /  TBili  0.4  /  DBili  x   /  AST  22  /  ALT  31  /  AlkPhos  114  02-09      LIVER FUNCTIONS - ( 09 Feb 2022 04:55 )  Alb: 2.5 g/dL / Pro: 7.2 g/dL / ALK PHOS: 114 U/L / ALT: 31 U/L DA / AST: 22 U/L / GGT: x             CULTURES: Clean Catch Clean Catch (Midstream)  02-05 @ 04:22   Normal Urogenital brody present  --  --            RADIOLOGY:< from: Xray Chest 1 View- PORTABLE-Urgent (Xray Chest 1 View- PORTABLE-Urgent .) (02.07.22 @ 18:17) >  ACC: [id number] EXAM:  XR CHEST PORTABLE URGENT 1V                          PROCEDURE DATE:  02/07/2022          INTERPRETATION:  Clinical history: 56-year-old female, line placement.    Portable/expiratory view of the chest is compared to 6 hours prior and   demonstrate an ET tube with the tip 1.5 cm above the mic, right IJ   line with the tip at the junction of the SVC and right atrium and NG tube   with the tip in the stomach, new.    Mild cardiomegaly with no pneumothorax or acute osseous finding,   unchanged.    Moderate pulmonary venous congestion, new.    Consolidation/atelectasis/effusion at the left base, new.    IMPRESSION:  Tip of the ET tube 1.5 cm above the mic, new.    Moderate pulmonary venous congestion, new.    Effusion/atelectasis/possible consolidation at the left base, new    --- End of Report ---            HUMERA HIGUERA DO; Attending Radiologist  This document has been electronically signed. Feb 8 2022  9:06AM    < end of copied text >        ROS:  [ x ] UNABLE TO ELICIT

## 2022-02-10 LAB
ALBUMIN SERPL ELPH-MCNC: 2.7 G/DL — LOW (ref 3.5–5)
ALP SERPL-CCNC: 97 U/L — SIGNIFICANT CHANGE UP (ref 40–120)
ALT FLD-CCNC: 23 U/L DA — SIGNIFICANT CHANGE UP (ref 10–60)
ANION GAP SERPL CALC-SCNC: 7 MMOL/L — SIGNIFICANT CHANGE UP (ref 5–17)
AST SERPL-CCNC: 16 U/L — SIGNIFICANT CHANGE UP (ref 10–40)
BASOPHILS # BLD AUTO: 0.02 K/UL — SIGNIFICANT CHANGE UP (ref 0–0.2)
BASOPHILS NFR BLD AUTO: 0.2 % — SIGNIFICANT CHANGE UP (ref 0–2)
BILIRUB SERPL-MCNC: 0.5 MG/DL — SIGNIFICANT CHANGE UP (ref 0.2–1.2)
BUN SERPL-MCNC: 26 MG/DL — HIGH (ref 7–18)
CALCIUM SERPL-MCNC: 9.4 MG/DL — SIGNIFICANT CHANGE UP (ref 8.4–10.5)
CHLORIDE SERPL-SCNC: 102 MMOL/L — SIGNIFICANT CHANGE UP (ref 96–108)
CO2 SERPL-SCNC: 29 MMOL/L — SIGNIFICANT CHANGE UP (ref 22–31)
CREAT SERPL-MCNC: 0.87 MG/DL — SIGNIFICANT CHANGE UP (ref 0.5–1.3)
EOSINOPHIL # BLD AUTO: 0 K/UL — SIGNIFICANT CHANGE UP (ref 0–0.5)
EOSINOPHIL NFR BLD AUTO: 0 % — SIGNIFICANT CHANGE UP (ref 0–6)
GLUCOSE BLDC GLUCOMTR-MCNC: 269 MG/DL — HIGH (ref 70–99)
GLUCOSE BLDC GLUCOMTR-MCNC: 282 MG/DL — HIGH (ref 70–99)
GLUCOSE BLDC GLUCOMTR-MCNC: 285 MG/DL — HIGH (ref 70–99)
GLUCOSE BLDC GLUCOMTR-MCNC: 297 MG/DL — HIGH (ref 70–99)
GLUCOSE SERPL-MCNC: 336 MG/DL — HIGH (ref 70–99)
HCT VFR BLD CALC: 37.4 % — SIGNIFICANT CHANGE UP (ref 34.5–45)
HGB BLD-MCNC: 11.7 G/DL — SIGNIFICANT CHANGE UP (ref 11.5–15.5)
IMM GRANULOCYTES NFR BLD AUTO: 1 % — SIGNIFICANT CHANGE UP (ref 0–1.5)
LYMPHOCYTES # BLD AUTO: 1.76 K/UL — SIGNIFICANT CHANGE UP (ref 1–3.3)
LYMPHOCYTES # BLD AUTO: 19.3 % — SIGNIFICANT CHANGE UP (ref 13–44)
MAGNESIUM SERPL-MCNC: 1.6 MG/DL — SIGNIFICANT CHANGE UP (ref 1.6–2.6)
MCHC RBC-ENTMCNC: 23.8 PG — LOW (ref 27–34)
MCHC RBC-ENTMCNC: 31.3 GM/DL — LOW (ref 32–36)
MCV RBC AUTO: 76.2 FL — LOW (ref 80–100)
MONOCYTES # BLD AUTO: 0.9 K/UL — SIGNIFICANT CHANGE UP (ref 0–0.9)
MONOCYTES NFR BLD AUTO: 9.9 % — SIGNIFICANT CHANGE UP (ref 2–14)
NEUTROPHILS # BLD AUTO: 6.33 K/UL — SIGNIFICANT CHANGE UP (ref 1.8–7.4)
NEUTROPHILS NFR BLD AUTO: 69.6 % — SIGNIFICANT CHANGE UP (ref 43–77)
NRBC # BLD: 0 /100 WBCS — SIGNIFICANT CHANGE UP (ref 0–0)
PHOSPHATE SERPL-MCNC: 3.7 MG/DL — SIGNIFICANT CHANGE UP (ref 2.5–4.5)
PLATELET # BLD AUTO: 294 K/UL — SIGNIFICANT CHANGE UP (ref 150–400)
POTASSIUM SERPL-MCNC: 4.8 MMOL/L — SIGNIFICANT CHANGE UP (ref 3.5–5.3)
POTASSIUM SERPL-SCNC: 4.8 MMOL/L — SIGNIFICANT CHANGE UP (ref 3.5–5.3)
PROT SERPL-MCNC: 7.4 G/DL — SIGNIFICANT CHANGE UP (ref 6–8.3)
RBC # BLD: 4.91 M/UL — SIGNIFICANT CHANGE UP (ref 3.8–5.2)
RBC # FLD: 16.5 % — HIGH (ref 10.3–14.5)
SODIUM SERPL-SCNC: 138 MMOL/L — SIGNIFICANT CHANGE UP (ref 135–145)
TRIGL SERPL-MCNC: 358 MG/DL — HIGH
WBC # BLD: 9.1 K/UL — SIGNIFICANT CHANGE UP (ref 3.8–10.5)
WBC # FLD AUTO: 9.1 K/UL — SIGNIFICANT CHANGE UP (ref 3.8–10.5)

## 2022-02-10 PROCEDURE — 71045 X-RAY EXAM CHEST 1 VIEW: CPT | Mod: 26

## 2022-02-10 RX ORDER — INSULIN LISPRO 100/ML
8 VIAL (ML) SUBCUTANEOUS EVERY 6 HOURS
Refills: 0 | Status: DISCONTINUED | OUTPATIENT
Start: 2022-02-10 | End: 2022-02-13

## 2022-02-10 RX ORDER — INSULIN GLARGINE 100 [IU]/ML
35 INJECTION, SOLUTION SUBCUTANEOUS AT BEDTIME
Refills: 0 | Status: DISCONTINUED | OUTPATIENT
Start: 2022-02-10 | End: 2022-02-13

## 2022-02-10 RX ORDER — ACETAMINOPHEN 500 MG
650 TABLET ORAL EVERY 6 HOURS
Refills: 0 | Status: DISCONTINUED | OUTPATIENT
Start: 2022-02-10 | End: 2022-02-22

## 2022-02-10 RX ORDER — DEXAMETHASONE 0.5 MG/5ML
4 ELIXIR ORAL EVERY 6 HOURS
Refills: 0 | Status: DISCONTINUED | OUTPATIENT
Start: 2022-02-10 | End: 2022-02-14

## 2022-02-10 RX ADMIN — INSULIN GLARGINE 35 UNIT(S): 100 INJECTION, SOLUTION SUBCUTANEOUS at 21:19

## 2022-02-10 RX ADMIN — DEXMEDETOMIDINE HYDROCHLORIDE IN 0.9% SODIUM CHLORIDE 47.6 MICROGRAM(S)/KG/HR: 4 INJECTION INTRAVENOUS at 06:36

## 2022-02-10 RX ADMIN — PROPOFOL 30.5 MICROGRAM(S)/KG/MIN: 10 INJECTION, EMULSION INTRAVENOUS at 21:17

## 2022-02-10 RX ADMIN — Medication 6: at 17:12

## 2022-02-10 RX ADMIN — ALBUTEROL 2 PUFF(S): 90 AEROSOL, METERED ORAL at 03:52

## 2022-02-10 RX ADMIN — CHLORHEXIDINE GLUCONATE 15 MILLILITER(S): 213 SOLUTION TOPICAL at 17:12

## 2022-02-10 RX ADMIN — CHLORHEXIDINE GLUCONATE 1 APPLICATION(S): 213 SOLUTION TOPICAL at 05:06

## 2022-02-10 RX ADMIN — Medication 4 MILLIGRAM(S): at 23:16

## 2022-02-10 RX ADMIN — Medication 6: at 11:10

## 2022-02-10 RX ADMIN — Medication 4 MILLIGRAM(S): at 12:08

## 2022-02-10 RX ADMIN — DEXMEDETOMIDINE HYDROCHLORIDE IN 0.9% SODIUM CHLORIDE 47.6 MICROGRAM(S)/KG/HR: 4 INJECTION INTRAVENOUS at 13:23

## 2022-02-10 RX ADMIN — Medication 8 UNIT(S): at 23:15

## 2022-02-10 RX ADMIN — PIPERACILLIN AND TAZOBACTAM 25 GRAM(S): 4; .5 INJECTION, POWDER, LYOPHILIZED, FOR SOLUTION INTRAVENOUS at 13:00

## 2022-02-10 RX ADMIN — Medication 81 MILLIGRAM(S): at 11:15

## 2022-02-10 RX ADMIN — ATORVASTATIN CALCIUM 10 MILLIGRAM(S): 80 TABLET, FILM COATED ORAL at 21:19

## 2022-02-10 RX ADMIN — MONTELUKAST 10 MILLIGRAM(S): 4 TABLET, CHEWABLE ORAL at 21:19

## 2022-02-10 RX ADMIN — ALBUTEROL 2 PUFF(S): 90 AEROSOL, METERED ORAL at 15:44

## 2022-02-10 RX ADMIN — ALBUTEROL 2 PUFF(S): 90 AEROSOL, METERED ORAL at 09:32

## 2022-02-10 RX ADMIN — LIDOCAINE 2 PATCH: 4 CREAM TOPICAL at 11:15

## 2022-02-10 RX ADMIN — DEXMEDETOMIDINE HYDROCHLORIDE IN 0.9% SODIUM CHLORIDE 47.6 MICROGRAM(S)/KG/HR: 4 INJECTION INTRAVENOUS at 18:26

## 2022-02-10 RX ADMIN — DEXMEDETOMIDINE HYDROCHLORIDE IN 0.9% SODIUM CHLORIDE 47.6 MICROGRAM(S)/KG/HR: 4 INJECTION INTRAVENOUS at 21:18

## 2022-02-10 RX ADMIN — PROPOFOL 30.5 MICROGRAM(S)/KG/MIN: 10 INJECTION, EMULSION INTRAVENOUS at 05:45

## 2022-02-10 RX ADMIN — PROPOFOL 30.5 MICROGRAM(S)/KG/MIN: 10 INJECTION, EMULSION INTRAVENOUS at 18:26

## 2022-02-10 RX ADMIN — Medication 6: at 05:11

## 2022-02-10 RX ADMIN — PROPOFOL 30.5 MICROGRAM(S)/KG/MIN: 10 INJECTION, EMULSION INTRAVENOUS at 13:00

## 2022-02-10 RX ADMIN — PANTOPRAZOLE SODIUM 40 MILLIGRAM(S): 20 TABLET, DELAYED RELEASE ORAL at 11:15

## 2022-02-10 RX ADMIN — Medication 4 MILLIGRAM(S): at 17:12

## 2022-02-10 RX ADMIN — HEPARIN SODIUM 5000 UNIT(S): 5000 INJECTION INTRAVENOUS; SUBCUTANEOUS at 05:05

## 2022-02-10 RX ADMIN — Medication 650 MILLIGRAM(S): at 17:30

## 2022-02-10 RX ADMIN — Medication 6: at 23:15

## 2022-02-10 RX ADMIN — Medication 650 MILLIGRAM(S): at 16:44

## 2022-02-10 RX ADMIN — HEPARIN SODIUM 5000 UNIT(S): 5000 INJECTION INTRAVENOUS; SUBCUTANEOUS at 21:19

## 2022-02-10 RX ADMIN — ALBUTEROL 2 PUFF(S): 90 AEROSOL, METERED ORAL at 20:45

## 2022-02-10 RX ADMIN — LIDOCAINE 2 PATCH: 4 CREAM TOPICAL at 19:01

## 2022-02-10 RX ADMIN — CHLORHEXIDINE GLUCONATE 15 MILLILITER(S): 213 SOLUTION TOPICAL at 05:06

## 2022-02-10 RX ADMIN — FENTANYL CITRATE 12.7 MICROGRAM(S)/KG/HR: 50 INJECTION INTRAVENOUS at 21:18

## 2022-02-10 RX ADMIN — LIDOCAINE 2 PATCH: 4 CREAM TOPICAL at 00:00

## 2022-02-10 RX ADMIN — LIDOCAINE 2 PATCH: 4 CREAM TOPICAL at 23:14

## 2022-02-10 RX ADMIN — PIPERACILLIN AND TAZOBACTAM 25 GRAM(S): 4; .5 INJECTION, POWDER, LYOPHILIZED, FOR SOLUTION INTRAVENOUS at 05:05

## 2022-02-10 RX ADMIN — Medication 8 UNIT(S): at 17:13

## 2022-02-10 RX ADMIN — DEXMEDETOMIDINE HYDROCHLORIDE IN 0.9% SODIUM CHLORIDE 47.6 MICROGRAM(S)/KG/HR: 4 INJECTION INTRAVENOUS at 03:22

## 2022-02-10 RX ADMIN — DEXMEDETOMIDINE HYDROCHLORIDE IN 0.9% SODIUM CHLORIDE 47.6 MICROGRAM(S)/KG/HR: 4 INJECTION INTRAVENOUS at 11:15

## 2022-02-10 RX ADMIN — Medication 4 MILLIGRAM(S): at 05:05

## 2022-02-10 RX ADMIN — PIPERACILLIN AND TAZOBACTAM 25 GRAM(S): 4; .5 INJECTION, POWDER, LYOPHILIZED, FOR SOLUTION INTRAVENOUS at 21:19

## 2022-02-10 RX ADMIN — HEPARIN SODIUM 5000 UNIT(S): 5000 INJECTION INTRAVENOUS; SUBCUTANEOUS at 13:00

## 2022-02-10 RX ADMIN — Medication 112 MICROGRAM(S): at 05:06

## 2022-02-10 NOTE — CHART NOTE - NSCHARTNOTEFT_GEN_A_CORE
Spoke to patient's Son, Marquis, over the phone, updated on current medical status. all questions answered.

## 2022-02-10 NOTE — PROGRESS NOTE ADULT - SUBJECTIVE AND OBJECTIVE BOX
Romeo Ribeiro MD (Nephrology progress note)  [address] [facility],  [id number],  [facility]  TEl: [phone]  Cell: [phone]    Patient is a 56y Female seen and evaluated at bedside. Vital signs, laboratory data, imaging studies, consult notes reviewed done within past 24 hours. Overnight events noted. Patient remains critically ill on ventilatory support alert and awake, drowsy at present. Interval stable renal function with non oliguria.    Allergies    Fioricet (Rash)  gadobutrol (Rash; Urticaria; Hives)  IV Contrast (Short breath)  mushroom (Unknown)  venlafaxine (Hives)    Intolerances        ROS:  Limited  Alert and awake on ventilatory support    VITALS:    T(C): 37.9 (02-10-22 @ 15:00), Max: 38.3 (02-09-22 @ 19:00)  HR: 44 (02-10-22 @ 15:00) (44 - 72)  BP: 161/79 (02-10-22 @ 15:00) (114/51 - 161/79)  RR: 21 (02-10-22 @ 15:00) (14 - 25)  SpO2: 96% (02-10-22 @ 15:00) (91% - 99%)  CAPILLARY BLOOD GLUCOSE      POCT Blood Glucose.: 297 mg/dL (10 Feb 2022 10:59)  POCT Blood Glucose.: 285 mg/dL (10 Feb 2022 05:10)  POCT Blood Glucose.: 316 mg/dL (09 Feb 2022 22:28)  POCT Blood Glucose.: 295 mg/dL (09 Feb 2022 17:04)      02-09-22 @ 07:01  -  02-10-22 @ 07:00  --------------------------------------------------------  IN: 1267.6 mL / OUT: 1560 mL / NET: -292.4 mL    02-10-22 @ 07:01  -  02-10-22 @ 15:34  --------------------------------------------------------  IN: 334.8 mL / OUT: 360 mL / NET: -25.2 mL      MEDICATIONS  (STANDING):  ALBUTerol    90 MICROgram(s) HFA Inhaler 2 Puff(s) Inhalation every 6 hours  aspirin  chewable 81 milliGRAM(s) Oral daily  atorvastatin 10 milliGRAM(s) Oral at bedtime  benzonatate 100 milliGRAM(s) Oral <User Schedule>  chlorhexidine 0.12% Liquid 15 milliLiter(s) Oral Mucosa two times a day  chlorhexidine 2% Cloths 1 Application(s) Topical <User Schedule>  dexAMETHasone  Injectable 4 milliGRAM(s) IV Push every 6 hours  dexMEDEtomidine Infusion 1.5 MICROgram(s)/kG/Hr (47.6 mL/Hr) IV Continuous <Continuous>  fentaNYL   Infusion. 1 MICROgram(s)/kG/Hr (12.7 mL/Hr) IV Continuous <Continuous>  heparin   Injectable 5000 Unit(s) SubCutaneous every 8 hours  influenza   Vaccine 0.5 milliLiter(s) IntraMuscular once  insulin glargine Injectable (LANTUS) 35 Unit(s) SubCutaneous at bedtime  insulin lispro (ADMELOG) corrective regimen sliding scale   SubCutaneous every 6 hours  insulin lispro Injectable (ADMELOG) 8 Unit(s) SubCutaneous every 6 hours  levothyroxine 112 MICROGram(s) Oral daily  lidocaine   4% Patch 2 Patch Transdermal daily  montelukast 10 milliGRAM(s) Oral at bedtime  pantoprazole  Injectable 40 milliGRAM(s) IV Push daily  piperacillin/tazobactam IVPB.. 3.375 Gram(s) IV Intermittent every 8 hours  propofol Infusion 40 MICROgram(s)/kG/Min (30.5 mL/Hr) IV Continuous <Continuous>  tiotropium 18 MICROgram(s) Capsule 1 Capsule(s) Inhalation daily    MEDICATIONS  (PRN):  ondansetron Injectable 4 milliGRAM(s) IV Push every 6 hours PRN Nausea and/or Vomiting  sodium chloride 0.9% lock flush 10 milliLiter(s) IV Push every 1 hour PRN Pre/post blood products, medications, blood draw, and to maintain line patency      PHYSICAL EXAM:  GENERAL: Alert, awake and oriented x3 in no distress  HEENT: RITA, EOMI, neck supple, no JVP, ETT in place  CHEST/LUNG: Bilateral clear breath sounds, no rales, no crepitations, no rhonchi, no wheezing  HEART: Regular rate and rhythm, LAURA II/VI at LPSB, no gallops, no rub   ABDOMEN: Soft, nontender, non distended, bowel sounds present  : No flank or supra pubic tenderness.  EXTREMITIES: Peripheral pulses are palpable, Bilateral ankle edema noted  Neurology: Alert and awake in no distress on ventilatory support  SKIN: No rash or skin lesion  Musculoskeletal: Bilateral lower extremity edema noted      Vascular ACCESS:     LABS:                        11.7   9.10  )-----------( 294      ( 10 Feb 2022 03:58 )             37.4     02-10    138  |  102  |  26<H>  ----------------------------<  336<H>  4.8   |  29  |  0.87    Ca    9.4      10 Feb 2022 03:58  Phos  3.7     02-10  Mg     1.6     02-10    TPro  7.4  /  Alb  2.7<L>  /  TBili  0.5  /  DBili  x   /  AST  16  /  ALT  23  /  AlkPhos  97  02-10                RADIOLOGY & ADDITIONAL STUDIES:  < from: Xray Chest 1 View- PORTABLE-Routine (Xray Chest 1 View- PORTABLE-Routine in AM.) (02.10.22 @ 08:55) >    ACC: [id number] EXAM:  XR CHEST PORTABLE ROUTINE 1V                        ACC: [id number] EXAM:  XR CHEST PORTABLE IMMED 1V                        ACC: [id number] EXAM:  XR CHEST PORTABLE ROUTINE 1V                          PROCEDURE DATE:  02/09/2022          INTERPRETATION:  Portable chest radiograph    CLINICAL INFORMATION: ET tube placement    TECHNIQUE:  Portable  AP chest radiograph.    COMPARISON: 2/7/2022 chest .    FINDINGS:  CATHETERS AND TUBES: ET tube tip above tracheal bifurcation.  RIGHT IJ catheter tip in SVC.    PULMONARY: Early LEFT retrocardiac airspace disease. Remaining lung   parenchyma clear..   No pneumothorax.    HEART/VASCULAR: The heart and mediastinum size and configuration are   within normal limits.    BONES: Visualized osseous structures are intact.    IMPRESSION:   ET tube tip above tracheal bifurcation. RIGHT IJ catheter   tip in SVC.  Early LEFT basilar airspace disease.  .    FOLLOW-UP AP PORTABLE CHEST RADIOGRAPH 2/9/2022 AT 10:12 PM:  ET tube tip above tracheal bifurcation.  NG tube tip beyond GE junction.  RIGHT IJ catheter tip in SVC.  Increased LEFT basilar diffuse airspace disease/consolidation obscuring   diaphragm contour. RIGHT lung parenchyma remains clear.  Cardiomegaly.    FOLLOW-UP AP PORTABLE CHEST RADIOGRAPH 2/10/2022 AT 8:25 AM:  Residual LEFT medial basilar airspace disease, otherwise no interval   change. Catheters and tubes in place.    --- End of Report ---            MELBA LE MD; Attending Radiologist  This document hasbeen electronically signed. Feb 10 2022  9:01AM    < end of copied text >    Imaging Personally Reviewed:  [x] YES  [ ] NO    Consultant(s) Notes Reviewed:  [x] YES  [ ] NO    Care Discussed with Primary team/ Other Providers [x] YES  [ ] NO

## 2022-02-10 NOTE — PROGRESS NOTE ADULT - ASSESSMENT
55 yo F, from home, ambulated with cane , PMHx of obesity, asthma, chronic bronchitis, NAKUL, HTN, DM, HLD, fibromyalgia, anxiety, trigeminal neuralgia, degenerative joint disease, ?IgA vasculitis, psoriasis, OA came with chief complain of chest pain and dysuria. Nephrology consult called for significant FERNIE    1) Nonoliguric FERNIE now resolved likely dehydration/poor oral intake vs ATN from renal hypoperfusion/sepsis on superimposed hypertensive/diabetic nephropathy/diuretic/ACEI use  2) Hyperkalemia now resolved  3) Hypertension  4) Diabetic Nephropathy with protenuria/neuropathy  5) Morbid Obesity  6) Abdominal wall cellulitis  7) AMS with hypercapnic respiratory failure on ventilatory support  8) Anemia of chronic illness 11.6  9) Left renal lesion chronic    Recommend:  Strict I/o  S cr 0.7 with non oliguria  Avoid Nephrotoxic agents  Continue hold ACEI/ARB, diuretic therapy  Maintain MAP>65-70 mm Hg  Adjust antibiotics as per renal dose adjustment  Bustamante's catheter for strict I/o  Monitor BMP and electrolytes daily  Ventilatory management per ICU team  Treatment of sepsis/cellulitis per primary/ID team with renal dose adjustment of Abx  No urgent need for RRT/HD  Will follow

## 2022-02-10 NOTE — PROGRESS NOTE ADULT - ASSESSMENT
57 yo F, from home, ambulated with cane , PMHx of obesity, asthma, chronic bronchitis, NAKUL non compliant with CPAP over night, HTN, DM, HLD, fibromyalgia, anxiety, trigeminal neuralgia, degenerative joint disease, ?IgA vasculitis, psoriasis, OA came with chief complain of chest pain and dysuria. patient admitted to tele, Troponin x 2 negative,  CTAP- 2/4- showed Skin thickening with subcutaneous stranding in the right lower quadrant anterior abdominal wall c/w cellulitis. pt received Zithromax and Rocephin in ED x1 , Abx dc,d due to negative UA, pt started on keflex 500mg qid for cellulitis , hospital course complicated with encephalopathy and ATN   on 2/5, Cr increased from 0.83 to 3.1,  IV cefazolin started in setting of possible sepsis 2/2 cellulitis. pt noted to progressively getting altered over 2/6, VBG remarkable for PH :7.14, pco2 82, pt was placed on Bi-pap reportedly over night.  Patient found to be more lethargic in the morning.  Unable to perform ROS. ABG showed acute on chronic respiratory acidosis , Hypercapnia 91, respiratory placed pt on AVAPS . ICU consulted for further evaluation. Patient will require  intubation , will transfer to ICU for further work-up.     Assessment:    Acute hypercapnic hypoxic respiratory failure   NAKUL requiring CPAP at night (non -compliant)  Cellulitis  Chronic bronchitis   Asthma   sepsis  Psoriasis   Fibromyalgia   DM  Hypothyroidism       Plan:    =====================[CNS ]==============================  # encephalopathy  - resolved  - Likely due to hypercapnia , PCO2:91  - Intubated  - propofol, precedex  - now awake and following commands  - SBT    # History of Fibromyalgia on  gabapentin, flexeril, Tylenol, aleve, oxcarbazepine for pain at home   - Lidoderm 4% patch 2 patches daily  - continue to hold all home meds  - fentanyl  =====================[CVS ]==========================  # HTN :    - BP currently acceptable off medication   - monitor BP in setting of ATN / sepsis   - keep MAP >65    =====================[RESP ]==============================  # Acute hypoxic hypercapnic respiratory failure - resolved  - likely in setting of NAKUL / cheonic bronchitis / asthma   - mechanical ventilation   - d-dimmer wnl  - Monitor ABG  - Hypercapnia resolved  - Following simple commands, SBT today    #? tracheal/laryngeal edema vs body habitus obstruction  - failed leak test prior to extubation  - started on decadron 4mg q 6  - re attempt at extubation on friday.     =====================[ GI ]============================  # No acute issues  - tube feeds  - c/w PPi for GI prophylaxis     ====================[ RENAL ]=============================   # Ernestina:   - improving  - pt with good urine output, net neg in 24 hrs  -Strict I/o, Avoid Nephrotoxic agents  -Maintain MAP>65-70 mm Hg  - Monitor BMP and electrolytes    # abnormal electrolytes:   - Hyperkalemia resolved   - Hyperphosphatemia     # Left renal lesion chronic  - outpatient f/u with Urology     =====================[ ID ]============================  # Cellulitis   -  was on Cefazolin  - spiked fevers, escalated to zosyn  - normal wbc  - UA negative for WBC , f/u UC   - ID Dr Jacobsen following       ===================[ HEME/ONC ]===========================  # anemia of chronic illness  -monitor cbc      =====================[ ENDO ]======================  # DM :   - target CBG < 180  - currently on  Lantus and HSS , adjust as needed    # Hypothyroidism   - f/u TSH in setting of encephalopathy   - c/w synthroid      ================= Skin/Catheters============================  # cellulitis likley in setting of Psoriasis  - c/w IV ABx   - pt has  Bustamante    ==================[ PROPHYLAXIS ]==========================   # Dvt : c/u SQH , f/u D dimer   # Gi : Protonix     ====================[ DISPO ]======================   - Monitor in ICU     ===================[ PROGNOSIS ]====================  - Guarded

## 2022-02-10 NOTE — PROGRESS NOTE ADULT - SUBJECTIVE AND OBJECTIVE BOX
INTERVAL HPI/OVERNIGHT EVENTS: Patient seen and examined at bedside. yesterday afternoon extubation was cancelled given failed ett leak test and concern for edema. Started on steroids. She is awake, following commands, feeling hot, denies any other complains.     PRESSORS: [ ] YES [ ] NO  WHICH:    Antimicrobial:  piperacillin/tazobactam IVPB.. 3.375 Gram(s) IV Intermittent every 8 hours    Cardiovascular:    Pulmonary:  ALBUTerol    90 MICROgram(s) HFA Inhaler 2 Puff(s) Inhalation every 6 hours  benzonatate 100 milliGRAM(s) Oral <User Schedule>  montelukast 10 milliGRAM(s) Oral at bedtime  tiotropium 18 MICROgram(s) Capsule 1 Capsule(s) Inhalation daily    Hematalogic:  aspirin  chewable 81 milliGRAM(s) Oral daily  heparin   Injectable 5000 Unit(s) SubCutaneous every 8 hours    Other:  atorvastatin 10 milliGRAM(s) Oral at bedtime  chlorhexidine 0.12% Liquid 15 milliLiter(s) Oral Mucosa two times a day  chlorhexidine 2% Cloths 1 Application(s) Topical <User Schedule>  dexAMETHasone  Injectable 4 milliGRAM(s) IV Push every 6 hours  dexMEDEtomidine Infusion 1.5 MICROgram(s)/kG/Hr IV Continuous <Continuous>  fentaNYL   Infusion. 1 MICROgram(s)/kG/Hr IV Continuous <Continuous>  influenza   Vaccine 0.5 milliLiter(s) IntraMuscular once  insulin glargine Injectable (LANTUS) 35 Unit(s) SubCutaneous at bedtime  insulin lispro (ADMELOG) corrective regimen sliding scale   SubCutaneous every 6 hours  insulin lispro Injectable (ADMELOG) 8 Unit(s) SubCutaneous every 6 hours  levothyroxine 112 MICROGram(s) Oral daily  lidocaine   4% Patch 2 Patch Transdermal daily  ondansetron Injectable 4 milliGRAM(s) IV Push every 6 hours PRN  pantoprazole  Injectable 40 milliGRAM(s) IV Push daily  propofol Infusion 40 MICROgram(s)/kG/Min IV Continuous <Continuous>  sodium chloride 0.9% lock flush 10 milliLiter(s) IV Push every 1 hour PRN    ALBUTerol    90 MICROgram(s) HFA Inhaler 2 Puff(s) Inhalation every 6 hours  aspirin  chewable 81 milliGRAM(s) Oral daily  atorvastatin 10 milliGRAM(s) Oral at bedtime  benzonatate 100 milliGRAM(s) Oral <User Schedule>  chlorhexidine 0.12% Liquid 15 milliLiter(s) Oral Mucosa two times a day  chlorhexidine 2% Cloths 1 Application(s) Topical <User Schedule>  dexAMETHasone  Injectable 4 milliGRAM(s) IV Push every 6 hours  dexMEDEtomidine Infusion 1.5 MICROgram(s)/kG/Hr IV Continuous <Continuous>  fentaNYL   Infusion. 1 MICROgram(s)/kG/Hr IV Continuous <Continuous>  heparin   Injectable 5000 Unit(s) SubCutaneous every 8 hours  influenza   Vaccine 0.5 milliLiter(s) IntraMuscular once  insulin glargine Injectable (LANTUS) 35 Unit(s) SubCutaneous at bedtime  insulin lispro (ADMELOG) corrective regimen sliding scale   SubCutaneous every 6 hours  insulin lispro Injectable (ADMELOG) 8 Unit(s) SubCutaneous every 6 hours  levothyroxine 112 MICROGram(s) Oral daily  lidocaine   4% Patch 2 Patch Transdermal daily  montelukast 10 milliGRAM(s) Oral at bedtime  ondansetron Injectable 4 milliGRAM(s) IV Push every 6 hours PRN  pantoprazole  Injectable 40 milliGRAM(s) IV Push daily  piperacillin/tazobactam IVPB.. 3.375 Gram(s) IV Intermittent every 8 hours  propofol Infusion 40 MICROgram(s)/kG/Min IV Continuous <Continuous>  sodium chloride 0.9% lock flush 10 milliLiter(s) IV Push every 1 hour PRN  tiotropium 18 MICROgram(s) Capsule 1 Capsule(s) Inhalation daily    Drug Dosing Weight  Height (cm): 155 (2022 18:53)  Weight (kg): 127 (2022 18:53)  BMI (kg/m2): 52.9 (2022 18:53)  BSA (m2): 2.18 (2022 18:53)    CENTRAL LINE: [ ] YES [ ] NO  LOCATION:   DATE INSERTED:  REMOVE: [ ] YES [ ] NO  EXPLAIN:    WILDER: [ ] YES [ ] NO    DATE INSERTED:  REMOVE:  [ ] YES [ ] NO  EXPLAIN:    A-LINE:  [ ] YES [ ] NO  LOCATION:   DATE INSERTED:  REMOVE:  [ ] YES [ ] NO  EXPLAIN:    PMH -reviewed admission note, no change since admission  PAST MEDICAL & SURGICAL HISTORY:  Hypertension  vaginal cyst    Hyperlipidemia    Asthma  last inhaler use with in 10 days, on Pulm follow up [id number]    Hypothyroid    Obstructive sleep apnea  refuses Cpap    Obesity  morbid    Trigeminal neuralgia  R    Fibromyalgia    DM (diabetes mellitus)  2    DJD (degenerative joint disease)  Cervical/Thoracic/Lumbar    Cervical neuralgia  difficulty moving my neck    Back pain  thoracic &amp; lumbar    H/O bursitis  right shoulder    Radicular pain  arms, legs    Vasculitis  Legs, forerhead, arms &amp; hands, flare ups in R leg  Dr susie Ribeiro is my Rheumatologist    Renal cell carcinoma, left  on follow up Dr schulte, HOD renal [facility], waiting for suregry in 2020    Falls frequently    Morbid obesity with body mass index (BMI) of 50.0 to 59.9 in adult    Psoriasis    Diverticulosis    S/P abdominal hysterectomy        S/P  section  1    Left adrenal mass  excision, benign 2006    Vaginal cyst  removed     Vasculitis on nerve biopsy  , had another surgery called microvascular decompression         ICU Vital Signs Last 24 Hrs  T(C): 38 (10 Feb 2022 13:00), Max: 38.3 (2022 19:00)  T(F): 100.4 (10 Feb 2022 13:00), Max: 100.9 (2022 19:00)  HR: 45 (10 Feb 2022 13:00) (44 - 72)  BP: 150/79 (10 Feb 2022 13:00) (114/51 - 160/80)  BP(mean): 96 (10 Feb 2022 13:00) (67 - 98)  ABP: --  ABP(mean): --  RR: 21 (10 Feb 2022 13:00) (14 - 24)  SpO2: 97% (10 Feb 2022 13:00) (91% - 99%)      ABG - ( 2022 03:40 )  pH, Arterial: 7.49  pH, Blood: x     /  pCO2: 37    /  pO2: 71    / HCO3: 28    / Base Excess: 4.7   /  SaO2: 95                     @ 07:01  -  -10 @ 07:00  --------------------------------------------------------  IN: 1267.6 mL / OUT: 1560 mL / NET: -292.4 mL        Mode: AC/ CMV (Assist Control/ Continuous Mandatory Ventilation)  RR (machine): 21  TV (machine): 450  FiO2: 50  PEEP: 5  ITime: 1  MAP: 10  PIP: 23      PHYSICAL EXAM:    GENERAL: NAD, intubated, obese  HEAD:  Atraumatic, Normocephalic  EYES: EOMI, PERRLA, conjunctiva and sclera clear  ENMT: ETT in place, no lesions seen.   NECK: Supple, normal appearance, No JVD; Normal thyroid; Trachea midline  NERVOUS SYSTEM:  Alert & Oriented, moving all extremities, sensation intact.  writing to communicate while intubated.   CHEST/LUNG: No chest deformity. No rales, rhonchi, wheezing   HEART: Regular rate and rhythm; No murmurs, rubs, or gallops  ABDOMEN: Soft, Nontender, Nondistended; Bowel sounds present  EXTREMITIES:  2+ Peripheral Pulses, No clubbing, cyanosis, or edema  LYMPH: No lymphadenopathy noted  SKIN: Psoriatic lesions       LABS:  CBC Full  -  ( 10 Feb 2022 03:58 )  WBC Count : 9.10 K/uL  RBC Count : 4.91 M/uL  Hemoglobin : 11.7 g/dL  Hematocrit : 37.4 %  Platelet Count - Automated : 294 K/uL  Mean Cell Volume : 76.2 fl  Mean Cell Hemoglobin : 23.8 pg  Mean Cell Hemoglobin Concentration : 31.3 gm/dL  Auto Neutrophil # : 6.33 K/uL  Auto Lymphocyte # : 1.76 K/uL  Auto Monocyte # : 0.90 K/uL  Auto Eosinophil # : 0.00 K/uL  Auto Basophil # : 0.02 K/uL  Auto Neutrophil % : 69.6 %  Auto Lymphocyte % : 19.3 %  Auto Monocyte % : 9.9 %  Auto Eosinophil % : 0.0 %  Auto Basophil % : 0.2 %    02-10    138  |  102  |  26<H>  ----------------------------<  336<H>  4.8   |  29  |  0.87    Ca    9.4      10 Feb 2022 03:58  Phos  3.7     -10  Mg     1.6     02-10    TPro  7.4  /  Alb  2.7<L>  /  TBili  0.5  /  DBili  x   /  AST  16  /  ALT  23  /  AlkPhos  97  02-10            RADIOLOGY & ADDITIONAL STUDIES REVIEWED:  ***    [ ]GOALS OF CARE DISCUSSION WITH PATIENT/FAMILY/PROXY:    CRITICAL CARE TIME SPENT: 35 minutes

## 2022-02-11 ENCOUNTER — APPOINTMENT (OUTPATIENT)
Dept: PULMONOLOGY | Facility: CLINIC | Age: 57
End: 2022-02-11

## 2022-02-11 LAB
ALBUMIN SERPL ELPH-MCNC: 2.5 G/DL — LOW (ref 3.5–5)
ALP SERPL-CCNC: 89 U/L — SIGNIFICANT CHANGE UP (ref 40–120)
ALT FLD-CCNC: 22 U/L DA — SIGNIFICANT CHANGE UP (ref 10–60)
ANION GAP SERPL CALC-SCNC: 6 MMOL/L — SIGNIFICANT CHANGE UP (ref 5–17)
AST SERPL-CCNC: 19 U/L — SIGNIFICANT CHANGE UP (ref 10–40)
BASE EXCESS BLDA CALC-SCNC: 2.7 MMOL/L — SIGNIFICANT CHANGE UP (ref -2–3)
BASOPHILS # BLD AUTO: 0.01 K/UL — SIGNIFICANT CHANGE UP (ref 0–0.2)
BASOPHILS NFR BLD AUTO: 0.1 % — SIGNIFICANT CHANGE UP (ref 0–2)
BILIRUB SERPL-MCNC: 0.5 MG/DL — SIGNIFICANT CHANGE UP (ref 0.2–1.2)
BLOOD GAS COMMENTS ARTERIAL: SIGNIFICANT CHANGE UP
BUN SERPL-MCNC: 26 MG/DL — HIGH (ref 7–18)
CALCIUM SERPL-MCNC: 9.2 MG/DL — SIGNIFICANT CHANGE UP (ref 8.4–10.5)
CHLORIDE SERPL-SCNC: 103 MMOL/L — SIGNIFICANT CHANGE UP (ref 96–108)
CO2 SERPL-SCNC: 28 MMOL/L — SIGNIFICANT CHANGE UP (ref 22–31)
CREAT SERPL-MCNC: 0.81 MG/DL — SIGNIFICANT CHANGE UP (ref 0.5–1.3)
EOSINOPHIL # BLD AUTO: 0 K/UL — SIGNIFICANT CHANGE UP (ref 0–0.5)
EOSINOPHIL NFR BLD AUTO: 0 % — SIGNIFICANT CHANGE UP (ref 0–6)
GLUCOSE BLDC GLUCOMTR-MCNC: 163 MG/DL — HIGH (ref 70–99)
GLUCOSE BLDC GLUCOMTR-MCNC: 214 MG/DL — HIGH (ref 70–99)
GLUCOSE BLDC GLUCOMTR-MCNC: 250 MG/DL — HIGH (ref 70–99)
GLUCOSE SERPL-MCNC: 230 MG/DL — HIGH (ref 70–99)
HCO3 BLDA-SCNC: 26 MMOL/L — SIGNIFICANT CHANGE UP (ref 21–28)
HCT VFR BLD CALC: 39.3 % — SIGNIFICANT CHANGE UP (ref 34.5–45)
HGB BLD-MCNC: 12 G/DL — SIGNIFICANT CHANGE UP (ref 11.5–15.5)
HOROWITZ INDEX BLDA+IHG-RTO: 50 — SIGNIFICANT CHANGE UP
IMM GRANULOCYTES NFR BLD AUTO: 0.6 % — SIGNIFICANT CHANGE UP (ref 0–1.5)
LYMPHOCYTES # BLD AUTO: 1.05 K/UL — SIGNIFICANT CHANGE UP (ref 1–3.3)
LYMPHOCYTES # BLD AUTO: 10.4 % — LOW (ref 13–44)
MAGNESIUM SERPL-MCNC: 1.6 MG/DL — SIGNIFICANT CHANGE UP (ref 1.6–2.6)
MCHC RBC-ENTMCNC: 23.3 PG — LOW (ref 27–34)
MCHC RBC-ENTMCNC: 30.5 GM/DL — LOW (ref 32–36)
MCV RBC AUTO: 76.5 FL — LOW (ref 80–100)
MONOCYTES # BLD AUTO: 0.77 K/UL — SIGNIFICANT CHANGE UP (ref 0–0.9)
MONOCYTES NFR BLD AUTO: 7.6 % — SIGNIFICANT CHANGE UP (ref 2–14)
NEUTROPHILS # BLD AUTO: 8.18 K/UL — HIGH (ref 1.8–7.4)
NEUTROPHILS NFR BLD AUTO: 81.3 % — HIGH (ref 43–77)
NRBC # BLD: 0 /100 WBCS — SIGNIFICANT CHANGE UP (ref 0–0)
PCO2 BLDA: 36 MMHG — HIGH (ref 32–35)
PH BLDA: 7.47 — HIGH (ref 7.35–7.45)
PHOSPHATE SERPL-MCNC: 3.9 MG/DL — SIGNIFICANT CHANGE UP (ref 2.5–4.5)
PLATELET # BLD AUTO: 293 K/UL — SIGNIFICANT CHANGE UP (ref 150–400)
PO2 BLDA: 90 MMHG — SIGNIFICANT CHANGE UP (ref 83–108)
POTASSIUM SERPL-MCNC: 4.4 MMOL/L — SIGNIFICANT CHANGE UP (ref 3.5–5.3)
POTASSIUM SERPL-SCNC: 4.4 MMOL/L — SIGNIFICANT CHANGE UP (ref 3.5–5.3)
PROT SERPL-MCNC: 7.2 G/DL — SIGNIFICANT CHANGE UP (ref 6–8.3)
RBC # BLD: 5.14 M/UL — SIGNIFICANT CHANGE UP (ref 3.8–5.2)
RBC # FLD: 15.9 % — HIGH (ref 10.3–14.5)
SAO2 % BLDA: 97 % — SIGNIFICANT CHANGE UP
SARS-COV-2 RNA SPEC QL NAA+PROBE: SIGNIFICANT CHANGE UP
SODIUM SERPL-SCNC: 137 MMOL/L — SIGNIFICANT CHANGE UP (ref 135–145)
WBC # BLD: 10.07 K/UL — SIGNIFICANT CHANGE UP (ref 3.8–10.5)
WBC # FLD AUTO: 10.07 K/UL — SIGNIFICANT CHANGE UP (ref 3.8–10.5)

## 2022-02-11 RX ADMIN — Medication 4 MILLIGRAM(S): at 05:16

## 2022-02-11 RX ADMIN — CHLORHEXIDINE GLUCONATE 15 MILLILITER(S): 213 SOLUTION TOPICAL at 17:18

## 2022-02-11 RX ADMIN — FENTANYL CITRATE 12.7 MICROGRAM(S)/KG/HR: 50 INJECTION INTRAVENOUS at 15:50

## 2022-02-11 RX ADMIN — Medication 4 MILLIGRAM(S): at 17:18

## 2022-02-11 RX ADMIN — PROPOFOL 30.5 MICROGRAM(S)/KG/MIN: 10 INJECTION, EMULSION INTRAVENOUS at 20:01

## 2022-02-11 RX ADMIN — HEPARIN SODIUM 5000 UNIT(S): 5000 INJECTION INTRAVENOUS; SUBCUTANEOUS at 05:16

## 2022-02-11 RX ADMIN — Medication 4 MILLIGRAM(S): at 12:10

## 2022-02-11 RX ADMIN — PIPERACILLIN AND TAZOBACTAM 25 GRAM(S): 4; .5 INJECTION, POWDER, LYOPHILIZED, FOR SOLUTION INTRAVENOUS at 21:58

## 2022-02-11 RX ADMIN — ALBUTEROL 2 PUFF(S): 90 AEROSOL, METERED ORAL at 08:13

## 2022-02-11 RX ADMIN — Medication 4: at 23:14

## 2022-02-11 RX ADMIN — Medication 8 UNIT(S): at 23:15

## 2022-02-11 RX ADMIN — INSULIN GLARGINE 35 UNIT(S): 100 INJECTION, SOLUTION SUBCUTANEOUS at 23:14

## 2022-02-11 RX ADMIN — Medication 81 MILLIGRAM(S): at 12:10

## 2022-02-11 RX ADMIN — LIDOCAINE 2 PATCH: 4 CREAM TOPICAL at 12:14

## 2022-02-11 RX ADMIN — ALBUTEROL 2 PUFF(S): 90 AEROSOL, METERED ORAL at 03:57

## 2022-02-11 RX ADMIN — HEPARIN SODIUM 5000 UNIT(S): 5000 INJECTION INTRAVENOUS; SUBCUTANEOUS at 13:45

## 2022-02-11 RX ADMIN — CHLORHEXIDINE GLUCONATE 15 MILLILITER(S): 213 SOLUTION TOPICAL at 05:15

## 2022-02-11 RX ADMIN — PIPERACILLIN AND TAZOBACTAM 25 GRAM(S): 4; .5 INJECTION, POWDER, LYOPHILIZED, FOR SOLUTION INTRAVENOUS at 13:45

## 2022-02-11 RX ADMIN — DEXMEDETOMIDINE HYDROCHLORIDE IN 0.9% SODIUM CHLORIDE 47.6 MICROGRAM(S)/KG/HR: 4 INJECTION INTRAVENOUS at 12:10

## 2022-02-11 RX ADMIN — LIDOCAINE 2 PATCH: 4 CREAM TOPICAL at 23:18

## 2022-02-11 RX ADMIN — Medication 112 MICROGRAM(S): at 05:17

## 2022-02-11 RX ADMIN — PANTOPRAZOLE SODIUM 40 MILLIGRAM(S): 20 TABLET, DELAYED RELEASE ORAL at 12:14

## 2022-02-11 RX ADMIN — PROPOFOL 30.5 MICROGRAM(S)/KG/MIN: 10 INJECTION, EMULSION INTRAVENOUS at 23:29

## 2022-02-11 RX ADMIN — ATORVASTATIN CALCIUM 10 MILLIGRAM(S): 80 TABLET, FILM COATED ORAL at 21:58

## 2022-02-11 RX ADMIN — Medication 4: at 12:12

## 2022-02-11 RX ADMIN — HEPARIN SODIUM 5000 UNIT(S): 5000 INJECTION INTRAVENOUS; SUBCUTANEOUS at 21:58

## 2022-02-11 RX ADMIN — Medication 8 UNIT(S): at 12:11

## 2022-02-11 RX ADMIN — PROPOFOL 30.5 MICROGRAM(S)/KG/MIN: 10 INJECTION, EMULSION INTRAVENOUS at 05:14

## 2022-02-11 RX ADMIN — PIPERACILLIN AND TAZOBACTAM 25 GRAM(S): 4; .5 INJECTION, POWDER, LYOPHILIZED, FOR SOLUTION INTRAVENOUS at 05:14

## 2022-02-11 RX ADMIN — CHLORHEXIDINE GLUCONATE 1 APPLICATION(S): 213 SOLUTION TOPICAL at 05:15

## 2022-02-11 RX ADMIN — Medication 650 MILLIGRAM(S): at 23:13

## 2022-02-11 RX ADMIN — Medication 8 UNIT(S): at 17:17

## 2022-02-11 RX ADMIN — Medication 4: at 05:16

## 2022-02-11 RX ADMIN — DEXMEDETOMIDINE HYDROCHLORIDE IN 0.9% SODIUM CHLORIDE 47.6 MICROGRAM(S)/KG/HR: 4 INJECTION INTRAVENOUS at 20:00

## 2022-02-11 RX ADMIN — Medication 4 MILLIGRAM(S): at 23:14

## 2022-02-11 RX ADMIN — ALBUTEROL 2 PUFF(S): 90 AEROSOL, METERED ORAL at 16:16

## 2022-02-11 RX ADMIN — DEXMEDETOMIDINE HYDROCHLORIDE IN 0.9% SODIUM CHLORIDE 47.6 MICROGRAM(S)/KG/HR: 4 INJECTION INTRAVENOUS at 23:17

## 2022-02-11 RX ADMIN — FENTANYL CITRATE 12.7 MICROGRAM(S)/KG/HR: 50 INJECTION INTRAVENOUS at 05:14

## 2022-02-11 RX ADMIN — Medication 100 MILLIGRAM(S): at 17:18

## 2022-02-11 RX ADMIN — MONTELUKAST 10 MILLIGRAM(S): 4 TABLET, CHEWABLE ORAL at 21:58

## 2022-02-11 RX ADMIN — DEXMEDETOMIDINE HYDROCHLORIDE IN 0.9% SODIUM CHLORIDE 47.6 MICROGRAM(S)/KG/HR: 4 INJECTION INTRAVENOUS at 15:50

## 2022-02-11 RX ADMIN — ALBUTEROL 2 PUFF(S): 90 AEROSOL, METERED ORAL at 21:43

## 2022-02-11 RX ADMIN — Medication 8 UNIT(S): at 05:16

## 2022-02-11 NOTE — PROGRESS NOTE ADULT - SUBJECTIVE AND OBJECTIVE BOX
INTERVAL HPI/OVERNIGHT EVENTS: Patient seen and examined at bedside. No events overnight. patient sedated in am, doing awakening trial in prep for extuabtion.     PRESSORS: [ ] YES [ ] NO  WHICH:    Antimicrobial:  piperacillin/tazobactam IVPB.. 3.375 Gram(s) IV Intermittent every 8 hours    Cardiovascular:    Pulmonary:  ALBUTerol    90 MICROgram(s) HFA Inhaler 2 Puff(s) Inhalation every 6 hours  benzonatate 100 milliGRAM(s) Oral <User Schedule>  montelukast 10 milliGRAM(s) Oral at bedtime  tiotropium 18 MICROgram(s) Capsule 1 Capsule(s) Inhalation daily    Hematalogic:  aspirin  chewable 81 milliGRAM(s) Oral daily  heparin   Injectable 5000 Unit(s) SubCutaneous every 8 hours    Other:  acetaminophen    Suspension .. 650 milliGRAM(s) Oral every 6 hours PRN  atorvastatin 10 milliGRAM(s) Oral at bedtime  chlorhexidine 0.12% Liquid 15 milliLiter(s) Oral Mucosa two times a day  chlorhexidine 2% Cloths 1 Application(s) Topical <User Schedule>  dexAMETHasone  Injectable 4 milliGRAM(s) IV Push every 6 hours  dexMEDEtomidine Infusion 1.5 MICROgram(s)/kG/Hr IV Continuous <Continuous>  fentaNYL   Infusion. 1 MICROgram(s)/kG/Hr IV Continuous <Continuous>  influenza   Vaccine 0.5 milliLiter(s) IntraMuscular once  insulin glargine Injectable (LANTUS) 35 Unit(s) SubCutaneous at bedtime  insulin lispro (ADMELOG) corrective regimen sliding scale   SubCutaneous every 6 hours  insulin lispro Injectable (ADMELOG) 8 Unit(s) SubCutaneous every 6 hours  levothyroxine 112 MICROGram(s) Oral daily  lidocaine   4% Patch 2 Patch Transdermal daily  ondansetron Injectable 4 milliGRAM(s) IV Push every 6 hours PRN  pantoprazole  Injectable 40 milliGRAM(s) IV Push daily  propofol Infusion 40 MICROgram(s)/kG/Min IV Continuous <Continuous>  sodium chloride 0.9% lock flush 10 milliLiter(s) IV Push every 1 hour PRN    acetaminophen    Suspension .. 650 milliGRAM(s) Oral every 6 hours PRN  ALBUTerol    90 MICROgram(s) HFA Inhaler 2 Puff(s) Inhalation every 6 hours  aspirin  chewable 81 milliGRAM(s) Oral daily  atorvastatin 10 milliGRAM(s) Oral at bedtime  benzonatate 100 milliGRAM(s) Oral <User Schedule>  chlorhexidine 0.12% Liquid 15 milliLiter(s) Oral Mucosa two times a day  chlorhexidine 2% Cloths 1 Application(s) Topical <User Schedule>  dexAMETHasone  Injectable 4 milliGRAM(s) IV Push every 6 hours  dexMEDEtomidine Infusion 1.5 MICROgram(s)/kG/Hr IV Continuous <Continuous>  fentaNYL   Infusion. 1 MICROgram(s)/kG/Hr IV Continuous <Continuous>  heparin   Injectable 5000 Unit(s) SubCutaneous every 8 hours  influenza   Vaccine 0.5 milliLiter(s) IntraMuscular once  insulin glargine Injectable (LANTUS) 35 Unit(s) SubCutaneous at bedtime  insulin lispro (ADMELOG) corrective regimen sliding scale   SubCutaneous every 6 hours  insulin lispro Injectable (ADMELOG) 8 Unit(s) SubCutaneous every 6 hours  levothyroxine 112 MICROGram(s) Oral daily  lidocaine   4% Patch 2 Patch Transdermal daily  montelukast 10 milliGRAM(s) Oral at bedtime  ondansetron Injectable 4 milliGRAM(s) IV Push every 6 hours PRN  pantoprazole  Injectable 40 milliGRAM(s) IV Push daily  piperacillin/tazobactam IVPB.. 3.375 Gram(s) IV Intermittent every 8 hours  propofol Infusion 40 MICROgram(s)/kG/Min IV Continuous <Continuous>  sodium chloride 0.9% lock flush 10 milliLiter(s) IV Push every 1 hour PRN  tiotropium 18 MICROgram(s) Capsule 1 Capsule(s) Inhalation daily    Drug Dosing Weight  Height (cm): 155 (2022 18:53)  Weight (kg): 127 (2022 18:53)  BMI (kg/m2): 52.9 (2022 18:53)  BSA (m2): 2.18 (2022 18:53)    CENTRAL LINE: [ ] YES [ ] NO  LOCATION:   DATE INSERTED:  REMOVE: [ ] YES [ ] NO  EXPLAIN:    WILDER: [ ] YES [ ] NO    DATE INSERTED:  REMOVE:  [ ] YES [ ] NO  EXPLAIN:    A-LINE:  [ ] YES [ ] NO  LOCATION:   DATE INSERTED:  REMOVE:  [ ] YES [ ] NO  EXPLAIN:    PMH -reviewed admission note, no change since admission  PAST MEDICAL & SURGICAL HISTORY:  Hypertension  vaginal cyst    Hyperlipidemia    Asthma  last inhaler use with in 10 days, on Pulm follow up [id number]    Hypothyroid    Obstructive sleep apnea  refuses Cpap    Obesity  morbid    Trigeminal neuralgia  R    Fibromyalgia    DM (diabetes mellitus)  2    DJD (degenerative joint disease)  Cervical/Thoracic/Lumbar    Cervical neuralgia  difficulty moving my neck    Back pain  thoracic &amp; lumbar    H/O bursitis  right shoulder    Radicular pain  arms, legs    Vasculitis  Legs, forerhead, arms &amp; hands, flare ups in R leg  Dr susie Ribeiro is my Rheumatologist    Renal cell carcinoma, left  on follow up Dr schulte, HOD renal [facility], waiting for suregry in 2020    Falls frequently    Morbid obesity with body mass index (BMI) of 50.0 to 59.9 in adult    Psoriasis    Diverticulosis    S/P abdominal hysterectomy        S/P  section  1    Left adrenal mass  excision, benign     Vaginal cyst  removed     Vasculitis on nerve biopsy  , had another surgery called microvascular decompression         ICU Vital Signs Last 24 Hrs  T(C): 37.8 (2022 08:00), Max: 38.3 (2022 07:00)  T(F): 100 (2022 08:00), Max: 100.9 (2022 07:00)  HR: 52 (2022 08:14) (43 - 73)  BP: 122/65 (2022 08:00) (92/56 - 170/79)  BP(mean): 79 (2022 08:00) (64 - 110)  ABP: --  ABP(mean): --  RR: 21 (2022 08:00) (16 - 25)  SpO2: 94% (2022 08:14) (91% - 100%)      ABG - ( 2022 03:43 )  pH, Arterial: 7.47  pH, Blood: x     /  pCO2: 36    /  pO2: 90    / HCO3: 26    / Base Excess: 2.7   /  SaO2: 97                    02-10 @ 07:01  -  - @ 07:00  --------------------------------------------------------  IN: 1362.5 mL / OUT: 1205 mL / NET: 157.5 mL        Mode: AC/ CMV (Assist Control/ Continuous Mandatory Ventilation)  RR (machine): 21  TV (machine): 450  FiO2: 50  PEEP: 5  ITime: 1  MAP: 11  PIP: 26      PHYSICAL EXAM:  GENERAL: NAD, intubated, obese  HEAD:  Atraumatic, Normocephalic  EYES: EOMI, PERRLA, conjunctiva and sclera clear  ENMT: ETT in place, no lesions seen.   NECK: Supple, normal appearance, No JVD; Normal thyroid; Trachea midline  NERVOUS SYSTEM:  Alert & Oriented, moving all extremities, sensation intact.  writing to communicate while intubated.   CHEST/LUNG: No chest deformity. No rales, rhonchi, wheezing   HEART: Regular rate and rhythm; No murmurs, rubs, or gallops  ABDOMEN: Soft, Nontender, Nondistended; Bowel sounds present  EXTREMITIES:  2+ Peripheral Pulses, No clubbing, cyanosis, or edema  LYMPH: No lymphadenopathy noted  SKIN: Psoriatic lesions       LABS:  CBC Full  -  ( 2022 04:27 )  WBC Count : 10.07 K/uL  RBC Count : 5.14 M/uL  Hemoglobin : 12.0 g/dL  Hematocrit : 39.3 %  Platelet Count - Automated : 293 K/uL  Mean Cell Volume : 76.5 fl  Mean Cell Hemoglobin : 23.3 pg  Mean Cell Hemoglobin Concentration : 30.5 gm/dL  Auto Neutrophil # : 8.18 K/uL  Auto Lymphocyte # : 1.05 K/uL  Auto Monocyte # : 0.77 K/uL  Auto Eosinophil # : 0.00 K/uL  Auto Basophil # : 0.01 K/uL  Auto Neutrophil % : 81.3 %  Auto Lymphocyte % : 10.4 %  Auto Monocyte % : 7.6 %  Auto Eosinophil % : 0.0 %  Auto Basophil % : 0.1 %        137  |  103  |  26<H>  ----------------------------<  230<H>  4.4   |  28  |  0.81    Ca    9.2      2022 04:27  Phos  3.9       Mg     1.6         TPro  7.2  /  Alb  2.5<L>  /  TBili  0.5  /  DBili  x   /  AST  19  /  ALT  22  /  AlkPhos  89  02-11            RADIOLOGY & ADDITIONAL STUDIES REVIEWED:  ***    [ ]GOALS OF CARE DISCUSSION WITH PATIENT/FAMILY/PROXY:    CRITICAL CARE TIME SPENT: 35 minutes INTERVAL HPI/OVERNIGHT EVENTS: Patient seen and examined at bedside. No events overnight. patient sedated in am, doing awakening trial in prep for extubation Pt with - leak test again, anesthesia examined at bedside,  Pt awake, requesting to wait until tomorrow for leak test.     PRESSORS: [ ] YES [ ] NO  WHICH:    Antimicrobial:  piperacillin/tazobactam IVPB.. 3.375 Gram(s) IV Intermittent every 8 hours    Cardiovascular:    Pulmonary:  ALBUTerol    90 MICROgram(s) HFA Inhaler 2 Puff(s) Inhalation every 6 hours  benzonatate 100 milliGRAM(s) Oral <User Schedule>  montelukast 10 milliGRAM(s) Oral at bedtime  tiotropium 18 MICROgram(s) Capsule 1 Capsule(s) Inhalation daily    Hematalogic:  aspirin  chewable 81 milliGRAM(s) Oral daily  heparin   Injectable 5000 Unit(s) SubCutaneous every 8 hours    Other:  acetaminophen    Suspension .. 650 milliGRAM(s) Oral every 6 hours PRN  atorvastatin 10 milliGRAM(s) Oral at bedtime  chlorhexidine 0.12% Liquid 15 milliLiter(s) Oral Mucosa two times a day  chlorhexidine 2% Cloths 1 Application(s) Topical <User Schedule>  dexAMETHasone  Injectable 4 milliGRAM(s) IV Push every 6 hours  dexMEDEtomidine Infusion 1.5 MICROgram(s)/kG/Hr IV Continuous <Continuous>  fentaNYL   Infusion. 1 MICROgram(s)/kG/Hr IV Continuous <Continuous>  influenza   Vaccine 0.5 milliLiter(s) IntraMuscular once  insulin glargine Injectable (LANTUS) 35 Unit(s) SubCutaneous at bedtime  insulin lispro (ADMELOG) corrective regimen sliding scale   SubCutaneous every 6 hours  insulin lispro Injectable (ADMELOG) 8 Unit(s) SubCutaneous every 6 hours  levothyroxine 112 MICROGram(s) Oral daily  lidocaine   4% Patch 2 Patch Transdermal daily  ondansetron Injectable 4 milliGRAM(s) IV Push every 6 hours PRN  pantoprazole  Injectable 40 milliGRAM(s) IV Push daily  propofol Infusion 40 MICROgram(s)/kG/Min IV Continuous <Continuous>  sodium chloride 0.9% lock flush 10 milliLiter(s) IV Push every 1 hour PRN    acetaminophen    Suspension .. 650 milliGRAM(s) Oral every 6 hours PRN  ALBUTerol    90 MICROgram(s) HFA Inhaler 2 Puff(s) Inhalation every 6 hours  aspirin  chewable 81 milliGRAM(s) Oral daily  atorvastatin 10 milliGRAM(s) Oral at bedtime  benzonatate 100 milliGRAM(s) Oral <User Schedule>  chlorhexidine 0.12% Liquid 15 milliLiter(s) Oral Mucosa two times a day  chlorhexidine 2% Cloths 1 Application(s) Topical <User Schedule>  dexAMETHasone  Injectable 4 milliGRAM(s) IV Push every 6 hours  dexMEDEtomidine Infusion 1.5 MICROgram(s)/kG/Hr IV Continuous <Continuous>  fentaNYL   Infusion. 1 MICROgram(s)/kG/Hr IV Continuous <Continuous>  heparin   Injectable 5000 Unit(s) SubCutaneous every 8 hours  influenza   Vaccine 0.5 milliLiter(s) IntraMuscular once  insulin glargine Injectable (LANTUS) 35 Unit(s) SubCutaneous at bedtime  insulin lispro (ADMELOG) corrective regimen sliding scale   SubCutaneous every 6 hours  insulin lispro Injectable (ADMELOG) 8 Unit(s) SubCutaneous every 6 hours  levothyroxine 112 MICROGram(s) Oral daily  lidocaine   4% Patch 2 Patch Transdermal daily  montelukast 10 milliGRAM(s) Oral at bedtime  ondansetron Injectable 4 milliGRAM(s) IV Push every 6 hours PRN  pantoprazole  Injectable 40 milliGRAM(s) IV Push daily  piperacillin/tazobactam IVPB.. 3.375 Gram(s) IV Intermittent every 8 hours  propofol Infusion 40 MICROgram(s)/kG/Min IV Continuous <Continuous>  sodium chloride 0.9% lock flush 10 milliLiter(s) IV Push every 1 hour PRN  tiotropium 18 MICROgram(s) Capsule 1 Capsule(s) Inhalation daily    Drug Dosing Weight  Height (cm): 155 (2022 18:53)  Weight (kg): 127 (2022 18:53)  BMI (kg/m2): 52.9 (2022 18:53)  BSA (m2): 2.18 (2022 18:53)    CENTRAL LINE: [ ] YES [ ] NO  LOCATION:   DATE INSERTED:  REMOVE: [ ] YES [ ] NO  EXPLAIN:    WILDER: [ ] YES [ ] NO    DATE INSERTED:  REMOVE:  [ ] YES [ ] NO  EXPLAIN:    A-LINE:  [ ] YES [ ] NO  LOCATION:   DATE INSERTED:  REMOVE:  [ ] YES [ ] NO  EXPLAIN:    PMH -reviewed admission note, no change since admission  PAST MEDICAL & SURGICAL HISTORY:  Hypertension  vaginal cyst    Hyperlipidemia    Asthma  last inhaler use with in 10 days, on Pulm follow up [id number]    Hypothyroid    Obstructive sleep apnea  refuses Cpap    Obesity  morbid    Trigeminal neuralgia  R    Fibromyalgia    DM (diabetes mellitus)  2    DJD (degenerative joint disease)  Cervical/Thoracic/Lumbar    Cervical neuralgia  difficulty moving my neck    Back pain  thoracic &amp; lumbar    H/O bursitis  right shoulder    Radicular pain  arms, legs    Vasculitis  Legs, forerhead, arms &amp; hands, flare ups in R leg  Dr susie Ribeiro is my Rheumatologist    Renal cell carcinoma, left  on follow up Dr schulte, HOD renal [facility], waiting for suregry in 2020    Falls frequently    Morbid obesity with body mass index (BMI) of 50.0 to 59.9 in adult    Psoriasis    Diverticulosis    S/P abdominal hysterectomy        S/P  section  1    Left adrenal mass  excision, benign     Vaginal cyst  removed     Vasculitis on nerve biopsy  , had another surgery called microvascular decompression         ICU Vital Signs Last 24 Hrs  T(C): 37.8 (2022 08:00), Max: 38.3 (2022 07:00)  T(F): 100 (2022 08:00), Max: 100.9 (2022 07:00)  HR: 52 (2022 08:14) (43 - 73)  BP: 122/65 (2022 08:00) (92/56 - 170/79)  BP(mean): 79 (2022 08:00) (64 - 110)  ABP: --  ABP(mean): --  RR: 21 (2022 08:00) (16 - 25)  SpO2: 94% (2022 08:14) (91% - 100%)      ABG - ( 2022 03:43 )  pH, Arterial: 7.47  pH, Blood: x     /  pCO2: 36    /  pO2: 90    / HCO3: 26    / Base Excess: 2.7   /  SaO2: 97                    02-10 @ 07:01  -  -11 @ 07:00  --------------------------------------------------------  IN: 1362.5 mL / OUT: 1205 mL / NET: 157.5 mL        Mode: AC/ CMV (Assist Control/ Continuous Mandatory Ventilation)  RR (machine): 21  TV (machine): 450  FiO2: 50  PEEP: 5  ITime: 1  MAP: 11  PIP: 26      PHYSICAL EXAM:  GENERAL: NAD, intubated, obese  HEAD:  Atraumatic, Normocephalic  EYES: EOMI, PERRLA, conjunctiva and sclera clear  ENMT: ETT in place, no lesions seen.   NECK: Supple, normal appearance, No JVD; Normal thyroid; Trachea midline  NERVOUS SYSTEM:  Alert & Oriented, moving all extremities, sensation intact.  writing to communicate while intubated.   CHEST/LUNG: No chest deformity. No rales, rhonchi, wheezing   HEART: Regular rate and rhythm; No murmurs, rubs, or gallops  ABDOMEN: Soft, Nontender, Nondistended; Bowel sounds present  EXTREMITIES:  2+ Peripheral Pulses, No clubbing, cyanosis, or edema  LYMPH: No lymphadenopathy noted  SKIN: Psoriatic lesions       LABS:  CBC Full  -  ( 2022 04:27 )  WBC Count : 10.07 K/uL  RBC Count : 5.14 M/uL  Hemoglobin : 12.0 g/dL  Hematocrit : 39.3 %  Platelet Count - Automated : 293 K/uL  Mean Cell Volume : 76.5 fl  Mean Cell Hemoglobin : 23.3 pg  Mean Cell Hemoglobin Concentration : 30.5 gm/dL  Auto Neutrophil # : 8.18 K/uL  Auto Lymphocyte # : 1.05 K/uL  Auto Monocyte # : 0.77 K/uL  Auto Eosinophil # : 0.00 K/uL  Auto Basophil # : 0.01 K/uL  Auto Neutrophil % : 81.3 %  Auto Lymphocyte % : 10.4 %  Auto Monocyte % : 7.6 %  Auto Eosinophil % : 0.0 %  Auto Basophil % : 0.1 %    -11    137  |  103  |  26<H>  ----------------------------<  230<H>  4.4   |  28  |  0.81    Ca    9.2      2022 04:27  Phos  3.9     02-11  Mg     1.6     -11    TPro  7.2  /  Alb  2.5<L>  /  TBili  0.5  /  DBili  x   /  AST  19  /  ALT  22  /  AlkPhos  89  02-11            RADIOLOGY & ADDITIONAL STUDIES REVIEWED:  ***    [ ]GOALS OF CARE DISCUSSION WITH PATIENT/FAMILY/PROXY:    CRITICAL CARE TIME SPENT: 35 minutes INTERVAL HPI/OVERNIGHT EVENTS: Patient seen and examined at bedside. No events overnight. patient sedated in am, doing awakening trial in prep for extubation Pt with - leak test again, anesthesia examined at bedside,  Pt awake, requesting to wait until tomorrow for leak test.     PRESSORS: [ ] YES [ ] NO  WHICH:    Antimicrobial:  piperacillin/tazobactam IVPB.. 3.375 Gram(s) IV Intermittent every 8 hours    Cardiovascular:    Pulmonary:  ALBUTerol    90 MICROgram(s) HFA Inhaler 2 Puff(s) Inhalation every 6 hours  benzonatate 100 milliGRAM(s) Oral <User Schedule>  montelukast 10 milliGRAM(s) Oral at bedtime  tiotropium 18 MICROgram(s) Capsule 1 Capsule(s) Inhalation daily    Hematalogic:  aspirin  chewable 81 milliGRAM(s) Oral daily  heparin   Injectable 5000 Unit(s) SubCutaneous every 8 hours    Other:  acetaminophen    Suspension .. 650 milliGRAM(s) Oral every 6 hours PRN  atorvastatin 10 milliGRAM(s) Oral at bedtime  chlorhexidine 0.12% Liquid 15 milliLiter(s) Oral Mucosa two times a day  chlorhexidine 2% Cloths 1 Application(s) Topical <User Schedule>  dexAMETHasone  Injectable 4 milliGRAM(s) IV Push every 6 hours  dexMEDEtomidine Infusion 1.5 MICROgram(s)/kG/Hr IV Continuous <Continuous>  fentaNYL   Infusion. 1 MICROgram(s)/kG/Hr IV Continuous <Continuous>  influenza   Vaccine 0.5 milliLiter(s) IntraMuscular once  insulin glargine Injectable (LANTUS) 35 Unit(s) SubCutaneous at bedtime  insulin lispro (ADMELOG) corrective regimen sliding scale   SubCutaneous every 6 hours  insulin lispro Injectable (ADMELOG) 8 Unit(s) SubCutaneous every 6 hours  levothyroxine 112 MICROGram(s) Oral daily  lidocaine   4% Patch 2 Patch Transdermal daily  ondansetron Injectable 4 milliGRAM(s) IV Push every 6 hours PRN  pantoprazole  Injectable 40 milliGRAM(s) IV Push daily  propofol Infusion 40 MICROgram(s)/kG/Min IV Continuous <Continuous>  sodium chloride 0.9% lock flush 10 milliLiter(s) IV Push every 1 hour PRN    acetaminophen    Suspension .. 650 milliGRAM(s) Oral every 6 hours PRN  ALBUTerol    90 MICROgram(s) HFA Inhaler 2 Puff(s) Inhalation every 6 hours  aspirin  chewable 81 milliGRAM(s) Oral daily  atorvastatin 10 milliGRAM(s) Oral at bedtime  benzonatate 100 milliGRAM(s) Oral <User Schedule>  chlorhexidine 0.12% Liquid 15 milliLiter(s) Oral Mucosa two times a day  chlorhexidine 2% Cloths 1 Application(s) Topical <User Schedule>  dexAMETHasone  Injectable 4 milliGRAM(s) IV Push every 6 hours  dexMEDEtomidine Infusion 1.5 MICROgram(s)/kG/Hr IV Continuous <Continuous>  fentaNYL   Infusion. 1 MICROgram(s)/kG/Hr IV Continuous <Continuous>  heparin   Injectable 5000 Unit(s) SubCutaneous every 8 hours  influenza   Vaccine 0.5 milliLiter(s) IntraMuscular once  insulin glargine Injectable (LANTUS) 35 Unit(s) SubCutaneous at bedtime  insulin lispro (ADMELOG) corrective regimen sliding scale   SubCutaneous every 6 hours  insulin lispro Injectable (ADMELOG) 8 Unit(s) SubCutaneous every 6 hours  levothyroxine 112 MICROGram(s) Oral daily  lidocaine   4% Patch 2 Patch Transdermal daily  montelukast 10 milliGRAM(s) Oral at bedtime  ondansetron Injectable 4 milliGRAM(s) IV Push every 6 hours PRN  pantoprazole  Injectable 40 milliGRAM(s) IV Push daily  piperacillin/tazobactam IVPB.. 3.375 Gram(s) IV Intermittent every 8 hours  propofol Infusion 40 MICROgram(s)/kG/Min IV Continuous <Continuous>  sodium chloride 0.9% lock flush 10 milliLiter(s) IV Push every 1 hour PRN  tiotropium 18 MICROgram(s) Capsule 1 Capsule(s) Inhalation daily    Drug Dosing Weight  Height (cm): 155 (2022 18:53)  Weight (kg): 127 (2022 18:53)  BMI (kg/m2): 52.9 (2022 18:53)  BSA (m2): 2.18 (2022 18:53)    CENTRAL LINE: [ ] YES [ ] NO  LOCATION:   DATE INSERTED:  REMOVE: [ ] YES [ ] NO  EXPLAIN:    WILDER: [ ] YES [ ] NO    DATE INSERTED:  REMOVE:  [ ] YES [ ] NO  EXPLAIN:    A-LINE:  [ ] YES [ ] NO  LOCATION:   DATE INSERTED:  REMOVE:  [ ] YES [ ] NO  EXPLAIN:    PMH -reviewed admission note, no change since admission  PAST MEDICAL & SURGICAL HISTORY:  Hypertension  vaginal cyst    Hyperlipidemia    Asthma  last inhaler use with in 10 days, on Pulm follow up [id number]    Hypothyroid    Obstructive sleep apnea  refuses Cpap    Obesity  morbid    Trigeminal neuralgia  R    Fibromyalgia    DM (diabetes mellitus)  2    DJD (degenerative joint disease)  Cervical/Thoracic/Lumbar    Cervical neuralgia  difficulty moving my neck    Back pain  thoracic &amp; lumbar    H/O bursitis  right shoulder    Radicular pain  arms, legs    Vasculitis  Legs, forerhead, arms &amp; hands, flare ups in R leg  Dr susie Ribeiro is my Rheumatologist    Renal cell carcinoma, left  on follow up Dr schulte, HOD renal [facility], waiting for suregry in 2020    Falls frequently    Morbid obesity with body mass index (BMI) of 50.0 to 59.9 in adult    Psoriasis    Diverticulosis    S/P abdominal hysterectomy        S/P  section  1    Left adrenal mass  excision, benign     Vaginal cyst  removed     Vasculitis on nerve biopsy  , had another surgery called microvascular decompression         ICU Vital Signs Last 24 Hrs  T(C): 37.8 (2022 08:00), Max: 38.3 (2022 07:00)  T(F): 100 (2022 08:00), Max: 100.9 (2022 07:00)  HR: 52 (2022 08:14) (43 - 73)  BP: 122/65 (2022 08:00) (92/56 - 170/79)  BP(mean): 79 (2022 08:00) (64 - 110)  ABP: --  ABP(mean): --  RR: 21 (2022 08:00) (16 - 25)  SpO2: 94% (2022 08:14) (91% - 100%)      ABG - ( 2022 03:43 )  pH, Arterial: 7.47  pH, Blood: x     /  pCO2: 36    /  pO2: 90    / HCO3: 26    / Base Excess: 2.7   /  SaO2: 97                    02-10 @ 07:01  -  -11 @ 07:00  --------------------------------------------------------  IN: 1362.5 mL / OUT: 1205 mL / NET: 157.5 mL        Mode: AC/ CMV (Assist Control/ Continuous Mandatory Ventilation)  RR (machine): 21  TV (machine): 450  FiO2: 50  PEEP: 5  ITime: 1  MAP: 11  PIP: 26      PHYSICAL EXAM:  GENERAL: NAD, intubated, obese  HEAD:  Atraumatic, Normocephalic  EYES: EOMI, PERRLA, conjunctiva and sclera clear  ENMT: ETT in place, no lesions seen.   NECK: Supple, normal appearance, No JVD; Normal thyroid; Trachea midline  NERVOUS SYSTEM:  Alert & Oriented, moving all extremities, sensation intact.  writing to communicate while intubated.   CHEST/LUNG: No chest deformity. No rales, rhonchi, wheezing   HEART: Regular rate and rhythm; No murmurs, rubs, or gallops  ABDOMEN: Soft, Nontender, Nondistended; Bowel sounds present  EXTREMITIES:  2+ Peripheral Pulses, No clubbing, cyanosis, or edema  LYMPH: No lymphadenopathy noted  SKIN: Psoriatic lesions       LABS:  CBC Full  -  ( 2022 04:27 )  WBC Count : 10.07 K/uL  RBC Count : 5.14 M/uL  Hemoglobin : 12.0 g/dL  Hematocrit : 39.3 %  Platelet Count - Automated : 293 K/uL  Mean Cell Volume : 76.5 fl  Mean Cell Hemoglobin : 23.3 pg  Mean Cell Hemoglobin Concentration : 30.5 gm/dL  Auto Neutrophil # : 8.18 K/uL  Auto Lymphocyte # : 1.05 K/uL  Auto Monocyte # : 0.77 K/uL  Auto Eosinophil # : 0.00 K/uL  Auto Basophil # : 0.01 K/uL  Auto Neutrophil % : 81.3 %  Auto Lymphocyte % : 10.4 %  Auto Monocyte % : 7.6 %  Auto Eosinophil % : 0.0 %  Auto Basophil % : 0.1 %    -11    137  |  103  |  26<H>  ----------------------------<  230<H>  4.4   |  28  |  0.81    Ca    9.2      2022 04:27  Phos  3.9     02-11  Mg     1.6     -11    TPro  7.2  /  Alb  2.5<L>  /  TBili  0.5  /  DBili  x   /  AST  19  /  ALT  22  /  AlkPhos  89  02-11            RADIOLOGY & ADDITIONAL STUDIES REVIEWED:  ***    CXR:< from: Xray Chest 1 View- PORTABLE-Routine (Xray Chest 1 View- PORTABLE-Routine in AM.) (02.10.22 @ 08:55) >    ACC: [id number] EXAM:  XR CHEST PORTABLE ROUTINE 1V                        ACC: [id number] EXAM:  XR CHEST PORTABLE IMMED 1V                        ACC: [id number] EXAM:  XR CHEST PORTABLE ROUTINE 1V                          PROCEDURE DATE:  2022          INTERPRETATION:  Portable chest radiograph    CLINICAL INFORMATION: ET tube placement    TECHNIQUE:  Portable  AP chest radiograph.    COMPARISON: 2022 chest .    FINDINGS:  CATHETERS AND TUBES: ET tube tip above tracheal bifurcation.  RIGHT IJ catheter tip in SVC.    PULMONARY: Early LEFT retrocardiac airspace disease. Remaining lung   parenchyma clear..   No pneumothorax.    HEART/VASCULAR: The heart and mediastinum size and configuration are   within normal limits.    BONES: Visualized osseous structures are intact.    IMPRESSION:   ET tube tip above tracheal bifurcation. RIGHT IJ catheter   tip in SVC.  Early LEFT basilar airspace disease.  .    FOLLOW-UP AP PORTABLE CHEST RADIOGRAPH 2022 AT 10:12 PM:  ET tube tip above tracheal bifurcation.  NG tube tip beyond GE junction.  RIGHT IJ catheter tip in SVC.  Increased LEFT basilar diffuse airspace disease/consolidation obscuring   diaphragm contour. RIGHT lung parenchyma remains clear.  Cardiomegaly.    FOLLOW-UP AP PORTABLE CHEST RADIOGRAPH 2/10/2022 AT 8:25 AM:  Residual LEFT medial basilar airspace disease, otherwise no interval   change. Catheters and tubes in place.    --- End of Report ---            MELBA LE MD; Attending Radiologist  This document hasbeen electronically signed. Feb 10 2022  9:01AM    < end of copied text >      [ ]GOALS OF CARE DISCUSSION WITH PATIENT/FAMILY/PROXY:    CRITICAL CARE TIME SPENT: 35 minutes

## 2022-02-11 NOTE — CHART NOTE - NSCHARTNOTEFT_GEN_A_CORE
Assessment:   Patient is a 56y old  Female who presents with a chief complaint of Chest pain and dysuria (2022 09:54). Intubated, Seen in AM Tf off. seen a few minutes ago, TF infusing @ 40 ml/hr (Glucerna 1.2), Propofol @ 15 ml/ hr. PGY 1 reports extubation for today postponed to tomorrow, as per pt request. Triglycerides 358.        Diet Prescription: Diet, NPO with Tube Feed:   Tube Feeding Modality: Nasogastric  Glucerna 1.2 Giovanni  Total Volume for 24 Hours (mL): 960  Continuous  Starting Tube Feed Rate {mL per Hour}: 10  Increase Tube Feed Rate by (mL): 10     Every 6 hours  Until Goal Tube Feed Rate (mL per Hour): 40  Tube Feed Duration (in Hours): 24  Tube Feed Start Time: 12:00 (02-10-22 @ 11:36)        Daily     Daily Weight in k.1 (10 Feb 2022 07:00)  Weight in k.9 (2022 07:00)  Weight in k.8 (2022 07:30)  Weight in k.4 (2022 04:40)    % Weight Change: 1+ generalized, 2+ B/L leg edema noted    Pertinent Medications: MEDICATIONS  (STANDING):  ALBUTerol    90 MICROgram(s) HFA Inhaler 2 Puff(s) Inhalation every 6 hours  aspirin  chewable 81 milliGRAM(s) Oral daily  atorvastatin 10 milliGRAM(s) Oral at bedtime  benzonatate 100 milliGRAM(s) Oral <User Schedule>  chlorhexidine 0.12% Liquid 15 milliLiter(s) Oral Mucosa two times a day  chlorhexidine 2% Cloths 1 Application(s) Topical <User Schedule>  dexAMETHasone  Injectable 4 milliGRAM(s) IV Push every 6 hours  dexMEDEtomidine Infusion 1.5 MICROgram(s)/kG/Hr (47.6 mL/Hr) IV Continuous <Continuous>  fentaNYL   Infusion. 1 MICROgram(s)/kG/Hr (12.7 mL/Hr) IV Continuous <Continuous>  heparin   Injectable 5000 Unit(s) SubCutaneous every 8 hours  influenza   Vaccine 0.5 milliLiter(s) IntraMuscular once  insulin glargine Injectable (LANTUS) 35 Unit(s) SubCutaneous at bedtime  insulin lispro (ADMELOG) corrective regimen sliding scale   SubCutaneous every 6 hours  insulin lispro Injectable (ADMELOG) 8 Unit(s) SubCutaneous every 6 hours  levothyroxine 112 MICROGram(s) Oral daily  lidocaine   4% Patch 2 Patch Transdermal daily  montelukast 10 milliGRAM(s) Oral at bedtime  pantoprazole  Injectable 40 milliGRAM(s) IV Push daily  piperacillin/tazobactam IVPB.. 3.375 Gram(s) IV Intermittent every 8 hours  propofol Infusion 40 MICROgram(s)/kG/Min (30.5 mL/Hr) IV Continuous <Continuous>  tiotropium 18 MICROgram(s) Capsule 1 Capsule(s) Inhalation daily    MEDICATIONS  (PRN):  acetaminophen    Suspension .. 650 milliGRAM(s) Oral every 6 hours PRN Temp greater or equal to 38C (100.4F), Mild Pain (1 - 3)  ondansetron Injectable 4 milliGRAM(s) IV Push every 6 hours PRN Nausea and/or Vomiting  sodium chloride 0.9% lock flush 10 milliLiter(s) IV Push every 1 hour PRN Pre/post blood products, medications, blood draw, and to maintain line patency    Pertinent Labs:  Na137 mmol/L Glu 230 mg/dL<H> K+ 4.4 mmol/L Cr  0.81 mg/dL BUN 26 mg/dL<H>  Phos 3.9 mg/dL  Alb 2.5 g/dL<L> 02-10 Chol --    LDL --    HDL --    Trig 358 mg/dL<H>     CAPILLARY BLOOD GLUCOSE      POCT Blood Glucose.: 214 mg/dL (2022 11:09)  POCT Blood Glucose.: 269 mg/dL (10 Feb 2022 22:59)  POCT Blood Glucose.: 282 mg/dL (10 Feb 2022 17:10)        Estimated Needs:   [x ] no change since previous assessment  [ ] recalculated:       Interventions:   Recommend  [ ] Change Diet To:  [ ] Nutrition Supplement  [x ] Nutrition Support: May maintain current TF order pending plan for extubation tomorrow. If continuing TF may add 1 Pkt Prosource BID. Monitor TG level if continuing Propofol  [x ] Other: MD to monitor. RD available.     Monitoring and Evaluation:    [ x ] Tolerance to diet prescription [ x ] weights [ x ] labs[ x ] follow up per protocol  [ ] other:

## 2022-02-11 NOTE — CHART NOTE - NSCHARTNOTEFT_GEN_A_CORE
pat is oral intubated , alert and communicating by writing . Met criteria for extubation, neg leak test.  Anesthesia attending at bedside to assist with extubation. Manda became very upset when she was informed of extubation . Writing " I will mariah you if you extubate me today". She indicated she want to be extubate tomorr.  In view of neg leak test , extubation delayed for tomorr

## 2022-02-11 NOTE — PROGRESS NOTE ADULT - SUBJECTIVE AND OBJECTIVE BOX
Romeo Ribeiro MD (Nephrology progress note)  [address] [facility],  [address],  [facility]  TEl: [id number]  Cell: [phone]    Patient is a 56y Female seen and evaluated at bedside. Vital signs, laboratory data, imaging studies, consult notes reviewed done within past 24 hours. Overnight events noted. Patient alert and awake on ventilatory support at present. Interval stable renal function with nonoliguria.     Allergies    Fioricet (Rash)  gadobutrol (Rash; Urticaria; Hives)  IV Contrast (Short breath)  mushroom (Unknown)  venlafaxine (Hives)    Intolerances        ROS:  Limited  Alert and awake on ventilatory support    VITALS:    T(C): 37.6 (02-11-22 @ 09:00), Max: 38.3 (02-11-22 @ 07:00)  HR: 50 (02-11-22 @ 09:00) (43 - 73)  BP: 112/67 (02-11-22 @ 09:00) (92/56 - 170/79)  RR: 21 (02-11-22 @ 09:00) (16 - 25)  SpO2: 94% (02-11-22 @ 09:00) (91% - 100%)  CAPILLARY BLOOD GLUCOSE      POCT Blood Glucose.: 269 mg/dL (10 Feb 2022 22:59)  POCT Blood Glucose.: 282 mg/dL (10 Feb 2022 17:10)  POCT Blood Glucose.: 297 mg/dL (10 Feb 2022 10:59)      02-10-22 @ 07:01  -  02-11-22 @ 07:00  --------------------------------------------------------  IN: 1362.5 mL / OUT: 1205 mL / NET: 157.5 mL      MEDICATIONS  (STANDING):  ALBUTerol    90 MICROgram(s) HFA Inhaler 2 Puff(s) Inhalation every 6 hours  aspirin  chewable 81 milliGRAM(s) Oral daily  atorvastatin 10 milliGRAM(s) Oral at bedtime  benzonatate 100 milliGRAM(s) Oral <User Schedule>  chlorhexidine 0.12% Liquid 15 milliLiter(s) Oral Mucosa two times a day  chlorhexidine 2% Cloths 1 Application(s) Topical <User Schedule>  dexAMETHasone  Injectable 4 milliGRAM(s) IV Push every 6 hours  dexMEDEtomidine Infusion 1.5 MICROgram(s)/kG/Hr (47.6 mL/Hr) IV Continuous <Continuous>  fentaNYL   Infusion. 1 MICROgram(s)/kG/Hr (12.7 mL/Hr) IV Continuous <Continuous>  heparin   Injectable 5000 Unit(s) SubCutaneous every 8 hours  influenza   Vaccine 0.5 milliLiter(s) IntraMuscular once  insulin glargine Injectable (LANTUS) 35 Unit(s) SubCutaneous at bedtime  insulin lispro (ADMELOG) corrective regimen sliding scale   SubCutaneous every 6 hours  insulin lispro Injectable (ADMELOG) 8 Unit(s) SubCutaneous every 6 hours  levothyroxine 112 MICROGram(s) Oral daily  lidocaine   4% Patch 2 Patch Transdermal daily  montelukast 10 milliGRAM(s) Oral at bedtime  pantoprazole  Injectable 40 milliGRAM(s) IV Push daily  piperacillin/tazobactam IVPB.. 3.375 Gram(s) IV Intermittent every 8 hours  propofol Infusion 40 MICROgram(s)/kG/Min (30.5 mL/Hr) IV Continuous <Continuous>  tiotropium 18 MICROgram(s) Capsule 1 Capsule(s) Inhalation daily    MEDICATIONS  (PRN):  acetaminophen    Suspension .. 650 milliGRAM(s) Oral every 6 hours PRN Temp greater or equal to 38C (100.4F), Mild Pain (1 - 3)  ondansetron Injectable 4 milliGRAM(s) IV Push every 6 hours PRN Nausea and/or Vomiting  sodium chloride 0.9% lock flush 10 milliLiter(s) IV Push every 1 hour PRN Pre/post blood products, medications, blood draw, and to maintain line patency      PHYSICAL EXAM:  GENERAL: Alert, awake and oriented on ventilatory support  HEENT: RITA, EOMI, neck supple, no JVP, ETT in place  CHEST/LUNG: Bilateral clear breath sounds, no rales, no crepitations, no rhonchi, no wheezing  HEART: Regular rate and rhythm, LAURA II/VI at LPSB, no gallops, no rub   ABDOMEN: Soft, nontender, non distended, bowel sounds present, no palpable organomegaly  : No flank or supra pubic tenderness.  EXTREMITIES: Peripheral pulses are palpable, Bilateral trace edema  Neurology: AAOx3, no focal neurological deficit  SKIN: No rash or skin lesion  Musculoskeletal: No joint deformity      Vascular ACCESS:     LABS:                        12.0   10.07 )-----------( 293      ( 11 Feb 2022 04:27 )             39.3     02-11    137  |  103  |  26<H>  ----------------------------<  230<H>  4.4   |  28  |  0.81    Ca    9.2      11 Feb 2022 04:27  Phos  3.9     02-11  Mg     1.6     02-11    TPro  7.2  /  Alb  2.5<L>  /  TBili  0.5  /  DBili  x   /  AST  19  /  ALT  22  /  AlkPhos  89  02-11                RADIOLOGY & ADDITIONAL STUDIES:  rad< from: Xray Chest 1 View- PORTABLE-Routine (Xray Chest 1 View- PORTABLE-Routine in AM.) (02.10.22 @ 08:55) >    ACC: [id number] EXAM:  XR CHEST PORTABLE ROUTINE 1V                        ACC: [id number] EXAM:  XR CHEST PORTABLE IMMED 1V                        ACC: [id number] EXAM:  XR CHEST PORTABLE ROUTINE 1V                          PROCEDURE DATE:  02/09/2022          INTERPRETATION:  Portable chest radiograph    CLINICAL INFORMATION: ET tube placement    TECHNIQUE:  Portable  AP chest radiograph.    COMPARISON: 2/7/2022 chest .    FINDINGS:  CATHETERS AND TUBES: ET tube tip above tracheal bifurcation.  RIGHT IJ catheter tip in SVC.    PULMONARY: Early LEFT retrocardiac airspace disease. Remaining lung   parenchyma clear..   No pneumothorax.    HEART/VASCULAR: The heart and mediastinum size and configuration are   within normal limits.    BONES: Visualized osseous structures are intact.    IMPRESSION:   ET tube tip above tracheal bifurcation. RIGHT IJ catheter   tip in SVC.  Early LEFT basilar airspace disease.  .    FOLLOW-UP AP PORTABLE CHEST RADIOGRAPH 2/9/2022 AT 10:12 PM:  ET tube tip above tracheal bifurcation.  NG tube tip beyond GE junction.  RIGHT IJ catheter tip in SVC.  Increased LEFT basilar diffuse airspace disease/consolidation obscuring   diaphragm contour. RIGHT lung parenchyma remains clear.  Cardiomegaly.    FOLLOW-UP AP PORTABLE CHEST RADIOGRAPH 2/10/2022 AT 8:25 AM:  Residual LEFT medial basilar airspace disease, otherwise no interval   change. Catheters and tubes in place.    --- End of Report ---            MELBA LE MD; Attending Radiologist  This document hasbeen electronically signed. Feb 10 2022  9:01AM    < end of copied text >    Imaging Personally Reviewed:  [x] YES  [ ] NO    Consultant(s) Notes Reviewed:  [x] YES  [ ] NO    Care Discussed with Primary team/ Other Providers [x] YES  [ ] NO

## 2022-02-12 LAB
BASE EXCESS BLDA CALC-SCNC: 1.4 MMOL/L — SIGNIFICANT CHANGE UP (ref -2–3)
BLOOD GAS COMMENTS ARTERIAL: SIGNIFICANT CHANGE UP
GLUCOSE BLDC GLUCOMTR-MCNC: 252 MG/DL — HIGH (ref 70–99)
GLUCOSE BLDC GLUCOMTR-MCNC: 268 MG/DL — HIGH (ref 70–99)
GLUCOSE BLDC GLUCOMTR-MCNC: 290 MG/DL — HIGH (ref 70–99)
GLUCOSE BLDC GLUCOMTR-MCNC: 323 MG/DL — HIGH (ref 70–99)
HCO3 BLDA-SCNC: 25 MMOL/L — SIGNIFICANT CHANGE UP (ref 21–28)
HOROWITZ INDEX BLDA+IHG-RTO: 50 — SIGNIFICANT CHANGE UP
PCO2 BLDA: 35 MMHG — SIGNIFICANT CHANGE UP (ref 32–35)
PH BLDA: 7.46 — HIGH (ref 7.35–7.45)
PO2 BLDA: 70 MMHG — LOW (ref 83–108)
SAO2 % BLDA: 94 % — SIGNIFICANT CHANGE UP

## 2022-02-12 RX ADMIN — ATORVASTATIN CALCIUM 10 MILLIGRAM(S): 80 TABLET, FILM COATED ORAL at 23:10

## 2022-02-12 RX ADMIN — ALBUTEROL 2 PUFF(S): 90 AEROSOL, METERED ORAL at 09:06

## 2022-02-12 RX ADMIN — HEPARIN SODIUM 5000 UNIT(S): 5000 INJECTION INTRAVENOUS; SUBCUTANEOUS at 23:09

## 2022-02-12 RX ADMIN — Medication 4 MILLIGRAM(S): at 05:24

## 2022-02-12 RX ADMIN — PROPOFOL 30.5 MICROGRAM(S)/KG/MIN: 10 INJECTION, EMULSION INTRAVENOUS at 23:25

## 2022-02-12 RX ADMIN — LIDOCAINE 2 PATCH: 4 CREAM TOPICAL at 23:28

## 2022-02-12 RX ADMIN — FENTANYL CITRATE 12.7 MICROGRAM(S)/KG/HR: 50 INJECTION INTRAVENOUS at 21:24

## 2022-02-12 RX ADMIN — Medication 650 MILLIGRAM(S): at 06:47

## 2022-02-12 RX ADMIN — CHLORHEXIDINE GLUCONATE 15 MILLILITER(S): 213 SOLUTION TOPICAL at 17:37

## 2022-02-12 RX ADMIN — Medication 650 MILLIGRAM(S): at 15:16

## 2022-02-12 RX ADMIN — Medication 4 MILLIGRAM(S): at 23:13

## 2022-02-12 RX ADMIN — DEXMEDETOMIDINE HYDROCHLORIDE IN 0.9% SODIUM CHLORIDE 47.6 MICROGRAM(S)/KG/HR: 4 INJECTION INTRAVENOUS at 03:11

## 2022-02-12 RX ADMIN — DEXMEDETOMIDINE HYDROCHLORIDE IN 0.9% SODIUM CHLORIDE 47.6 MICROGRAM(S)/KG/HR: 4 INJECTION INTRAVENOUS at 23:08

## 2022-02-12 RX ADMIN — LIDOCAINE 2 PATCH: 4 CREAM TOPICAL at 11:30

## 2022-02-12 RX ADMIN — Medication 8 UNIT(S): at 12:54

## 2022-02-12 RX ADMIN — PROPOFOL 30.5 MICROGRAM(S)/KG/MIN: 10 INJECTION, EMULSION INTRAVENOUS at 20:09

## 2022-02-12 RX ADMIN — DEXMEDETOMIDINE HYDROCHLORIDE IN 0.9% SODIUM CHLORIDE 47.6 MICROGRAM(S)/KG/HR: 4 INJECTION INTRAVENOUS at 07:18

## 2022-02-12 RX ADMIN — Medication 650 MILLIGRAM(S): at 03:12

## 2022-02-12 RX ADMIN — LIDOCAINE 2 PATCH: 4 CREAM TOPICAL at 17:37

## 2022-02-12 RX ADMIN — MONTELUKAST 10 MILLIGRAM(S): 4 TABLET, CHEWABLE ORAL at 23:11

## 2022-02-12 RX ADMIN — Medication 8 UNIT(S): at 23:15

## 2022-02-12 RX ADMIN — Medication 112 MICROGRAM(S): at 05:00

## 2022-02-12 RX ADMIN — TIOTROPIUM BROMIDE 1 CAPSULE(S): 18 CAPSULE ORAL; RESPIRATORY (INHALATION) at 11:29

## 2022-02-12 RX ADMIN — PIPERACILLIN AND TAZOBACTAM 25 GRAM(S): 4; .5 INJECTION, POWDER, LYOPHILIZED, FOR SOLUTION INTRAVENOUS at 05:00

## 2022-02-12 RX ADMIN — Medication 8 UNIT(S): at 05:23

## 2022-02-12 RX ADMIN — Medication 8: at 17:50

## 2022-02-12 RX ADMIN — HEPARIN SODIUM 5000 UNIT(S): 5000 INJECTION INTRAVENOUS; SUBCUTANEOUS at 05:00

## 2022-02-12 RX ADMIN — Medication 6: at 05:23

## 2022-02-12 RX ADMIN — Medication 6: at 23:14

## 2022-02-12 RX ADMIN — Medication 4 MILLIGRAM(S): at 17:50

## 2022-02-12 RX ADMIN — Medication 650 MILLIGRAM(S): at 17:37

## 2022-02-12 RX ADMIN — Medication 4 MILLIGRAM(S): at 11:24

## 2022-02-12 RX ADMIN — ALBUTEROL 2 PUFF(S): 90 AEROSOL, METERED ORAL at 15:09

## 2022-02-12 RX ADMIN — ALBUTEROL 2 PUFF(S): 90 AEROSOL, METERED ORAL at 21:06

## 2022-02-12 RX ADMIN — CHLORHEXIDINE GLUCONATE 1 APPLICATION(S): 213 SOLUTION TOPICAL at 05:03

## 2022-02-12 RX ADMIN — CHLORHEXIDINE GLUCONATE 15 MILLILITER(S): 213 SOLUTION TOPICAL at 05:03

## 2022-02-12 RX ADMIN — Medication 650 MILLIGRAM(S): at 05:22

## 2022-02-12 RX ADMIN — ALBUTEROL 2 PUFF(S): 90 AEROSOL, METERED ORAL at 15:30

## 2022-02-12 RX ADMIN — PROPOFOL 30.5 MICROGRAM(S)/KG/MIN: 10 INJECTION, EMULSION INTRAVENOUS at 09:00

## 2022-02-12 RX ADMIN — Medication 100 MILLIGRAM(S): at 05:00

## 2022-02-12 RX ADMIN — HEPARIN SODIUM 5000 UNIT(S): 5000 INJECTION INTRAVENOUS; SUBCUTANEOUS at 13:44

## 2022-02-12 RX ADMIN — PROPOFOL 30.5 MICROGRAM(S)/KG/MIN: 10 INJECTION, EMULSION INTRAVENOUS at 04:26

## 2022-02-12 RX ADMIN — Medication 6: at 12:54

## 2022-02-12 RX ADMIN — PANTOPRAZOLE SODIUM 40 MILLIGRAM(S): 20 TABLET, DELAYED RELEASE ORAL at 11:25

## 2022-02-12 RX ADMIN — PIPERACILLIN AND TAZOBACTAM 25 GRAM(S): 4; .5 INJECTION, POWDER, LYOPHILIZED, FOR SOLUTION INTRAVENOUS at 13:42

## 2022-02-12 RX ADMIN — PIPERACILLIN AND TAZOBACTAM 25 GRAM(S): 4; .5 INJECTION, POWDER, LYOPHILIZED, FOR SOLUTION INTRAVENOUS at 23:10

## 2022-02-12 RX ADMIN — Medication 81 MILLIGRAM(S): at 11:24

## 2022-02-12 RX ADMIN — Medication 8 UNIT(S): at 17:51

## 2022-02-12 RX ADMIN — INSULIN GLARGINE 35 UNIT(S): 100 INJECTION, SOLUTION SUBCUTANEOUS at 23:09

## 2022-02-12 NOTE — PROGRESS NOTE ADULT - SUBJECTIVE AND OBJECTIVE BOX
Romeo Ribeiro MD (Nephrology progress note)  [address], [facility],  [address],  [facility]  TEl: [id number]  Cell: [phone]    Patient is a 56y Female seen and evaluated at bedside. Vital signs, laboratory data, imaging studies, consult notes reviewed done within past 24 hours. Overnight events noted. Patient remains on ventilatory support alert and awake. Interval stable renal function with non oliguria.     Allergies    Fioricet (Rash)  gadobutrol (Rash; Urticaria; Hives)  IV Contrast (Short breath)  mushroom (Unknown)  venlafaxine (Hives)    Intolerances        ROS:  Limited  Alert and awake in no distress on ventilatory support    VITALS:    T(C): 38.2 (02-12-22 @ 15:00), Max: 38.2 (02-12-22 @ 10:00)  HR: 57 (02-12-22 @ 15:00) (47 - 70)  BP: 117/69 (02-12-22 @ 15:00) (74/38 - 137/87)  RR: 21 (02-12-22 @ 15:00) (13 - 23)  SpO2: 94% (02-12-22 @ 15:00) (93% - 100%)  CAPILLARY BLOOD GLUCOSE      POCT Blood Glucose.: 268 mg/dL (12 Feb 2022 12:48)  POCT Blood Glucose.: 290 mg/dL (12 Feb 2022 05:06)  POCT Blood Glucose.: 250 mg/dL (11 Feb 2022 23:11)  POCT Blood Glucose.: 163 mg/dL (11 Feb 2022 16:39)      02-11-22 @ 07:01  -  02-12-22 @ 07:00  --------------------------------------------------------  IN: 2186.5 mL / OUT: 940 mL / NET: 1246.5 mL    02-12-22 @ 07:01  -  02-12-22 @ 15:58  --------------------------------------------------------  IN: 699.3 mL / OUT: 0 mL / NET: 699.3 mL      MEDICATIONS  (STANDING):  ALBUTerol    90 MICROgram(s) HFA Inhaler 2 Puff(s) Inhalation every 6 hours  aspirin  chewable 81 milliGRAM(s) Oral daily  atorvastatin 10 milliGRAM(s) Oral at bedtime  benzonatate 100 milliGRAM(s) Oral <User Schedule>  chlorhexidine 0.12% Liquid 15 milliLiter(s) Oral Mucosa two times a day  chlorhexidine 2% Cloths 1 Application(s) Topical <User Schedule>  dexAMETHasone  Injectable 4 milliGRAM(s) IV Push every 6 hours  dexMEDEtomidine Infusion 1.5 MICROgram(s)/kG/Hr (47.6 mL/Hr) IV Continuous <Continuous>  fentaNYL   Infusion. 1 MICROgram(s)/kG/Hr (12.7 mL/Hr) IV Continuous <Continuous>  heparin   Injectable 5000 Unit(s) SubCutaneous every 8 hours  influenza   Vaccine 0.5 milliLiter(s) IntraMuscular once  insulin glargine Injectable (LANTUS) 35 Unit(s) SubCutaneous at bedtime  insulin lispro (ADMELOG) corrective regimen sliding scale   SubCutaneous every 6 hours  insulin lispro Injectable (ADMELOG) 8 Unit(s) SubCutaneous every 6 hours  levothyroxine 112 MICROGram(s) Oral daily  lidocaine   4% Patch 2 Patch Transdermal daily  montelukast 10 milliGRAM(s) Oral at bedtime  pantoprazole  Injectable 40 milliGRAM(s) IV Push daily  piperacillin/tazobactam IVPB.. 3.375 Gram(s) IV Intermittent every 8 hours  propofol Infusion 40 MICROgram(s)/kG/Min (30.5 mL/Hr) IV Continuous <Continuous>  tiotropium 18 MICROgram(s) Capsule 1 Capsule(s) Inhalation daily    MEDICATIONS  (PRN):  acetaminophen    Suspension .. 650 milliGRAM(s) Oral every 6 hours PRN Temp greater or equal to 38C (100.4F), Mild Pain (1 - 3)  ondansetron Injectable 4 milliGRAM(s) IV Push every 6 hours PRN Nausea and/or Vomiting  sodium chloride 0.9% lock flush 10 milliLiter(s) IV Push every 1 hour PRN Pre/post blood products, medications, blood draw, and to maintain line patency      PHYSICAL EXAM:  GENERAL: Alert, awake and oriented on ventilatory support  HEENT: RITA, EOMI, neck supple, no JVP, ETT in place  CHEST/LUNG: Bilateral decreased breath sounds at bases, no rales, no rhonchi, no wheezing  HEART: Regular rate and rhythm, LAURA II/VI at LPSB, no gallops, no rub   ABDOMEN: Soft, nontender, non distended, bowel sounds present  : No flank or supra pubic tenderness.  EXTREMITIES: Peripheral pulses are palpable, bilateral trace ankle pedal edema  Neurology: Alert and awake in no distress   SKIN: No rash or skin lesion  Musculoskeletal: No joint deformity or spinal tenderness.      Vascular ACCESS:     LABS:                        12.0   10.07 )-----------( 293      ( 11 Feb 2022 04:27 )             39.3     02-11    137  |  103  |  26<H>  ----------------------------<  230<H>  4.4   |  28  |  0.81    Ca    9.2      11 Feb 2022 04:27  Phos  3.9     02-11  Mg     1.6     02-11    TPro  7.2  /  Alb  2.5<L>  /  TBili  0.5  /  DBili  x   /  AST  19  /  ALT  22  /  AlkPhos  89  02-11                RADIOLOGY & ADDITIONAL STUDIES:  rad< from: Xray Chest 1 View- PORTABLE-Routine (Xray Chest 1 View- PORTABLE-Routine in AM.) (02.10.22 @ 08:55) >    ACC: [id number] EXAM:  XR CHEST PORTABLE ROUTINE 1V                        ACC: [id number] EXAM:  XR CHEST PORTABLE IMMED 1V                        ACC: [id number] EXAM:  XR CHEST PORTABLE ROUTINE 1V                          PROCEDURE DATE:  02/09/2022          INTERPRETATION:  Portable chest radiograph    CLINICAL INFORMATION: ET tube placement    TECHNIQUE:  Portable  AP chest radiograph.    COMPARISON: 2/7/2022 chest .    FINDINGS:  CATHETERS AND TUBES: ET tube tip above tracheal bifurcation.  RIGHT IJ catheter tip in SVC.    PULMONARY: Early LEFT retrocardiac airspace disease. Remaining lung   parenchyma clear..   No pneumothorax.    HEART/VASCULAR: The heart and mediastinum size and configuration are   within normal limits.    BONES: Visualized osseous structures are intact.    IMPRESSION:   ET tube tip above tracheal bifurcation. RIGHT IJ catheter   tip in SVC.  Early LEFT basilar airspace disease.  .    FOLLOW-UP AP PORTABLE CHEST RADIOGRAPH 2/9/2022 AT 10:12 PM:  ET tube tip above tracheal bifurcation.  NG tube tip beyond GE junction.  RIGHT IJ catheter tip in SVC.  Increased LEFT basilar diffuse airspace disease/consolidation obscuring   diaphragm contour. RIGHT lung parenchyma remains clear.  Cardiomegaly.    FOLLOW-UP AP PORTABLE CHEST RADIOGRAPH 2/10/2022 AT 8:25 AM:  Residual LEFT medial basilar airspace disease, otherwise no interval   change. Catheters and tubes in place.    --- End of Report ---            MELBA LE MD; Attending Radiologist  This document hasbeen electronically signed. Feb 10 2022  9:01AM    < end of copied text >    Imaging Personally Reviewed:  [x] YES  [ ] NO    Consultant(s) Notes Reviewed:  [x] YES  [ ] NO    Care Discussed with Primary team/ Other Providers [x] YES  [ ] NO

## 2022-02-12 NOTE — PROGRESS NOTE ADULT - ASSESSMENT
57 yo F, from home, ambulated with cane , PMHx of obesity, asthma, chronic bronchitis, NAKUL, HTN, DM, HLD, fibromyalgia, anxiety, trigeminal neuralgia, degenerative joint disease, ?IgA vasculitis, psoriasis, OA came with chief complain of chest pain and dysuria. Nephrology consult called for significant FERNIE    1) Nonoliguric FERNIE now resolved likely dehydration/poor oral intake on superimposed hypertensive/diabetic nephropathy/diuretic/ACEI use  3) Hypertension  4) Diabetic Nephropathy with protenuria/neuropathy  5) Morbid Obesity  6) Abdominal wall cellulitis  7) AMS with hypercapnic respiratory failure on ventilatory support with PNA  9) Left renal lesion chronic    Recommend:  Strict I/o  S cr 0.8 with non oliguria  Avoid Nephrotoxic agents  Can resume ACEi/ARB  Maintain MAP>65-70 mm Hg  Adjust antibiotics as per renal dose adjustment  Bustamante's catheter for strict I/o  Monitor BMP and electrolytes daily  Ventilatory management per ICU team  Treatment of sepsis/cellulitis/PNA/Ventilatory support  per primary/ID team with renal dose adjustment of Abx  No urgent need for RRT/HD  Will follow

## 2022-02-12 NOTE — CHART NOTE - NSCHARTNOTEFT_GEN_A_CORE
We called the patient's cousin and emergency contact, Mr. Angel Loja ([phone]). We provided the latest update, status and plan for the patient. He is aware that the plan is to extubate her however, she was refusing strongly and deferred. She requested to have a  present at bedside and/or Ayde Marquis Garcia, her brother. Nurse Manager was informed of the request. We will continue to monitor and reassess her.

## 2022-02-12 NOTE — PROGRESS NOTE ADULT - ASSESSMENT
55 yo F, from home, ambulated with cane , PMHx of obesity, asthma, chronic bronchitis, NAKUL non compliant with CPAP over night, HTN, DM, HLD, fibromyalgia, anxiety, trigeminal neuralgia, degenerative joint disease, ?IgA vasculitis, psoriasis, OA came with chief complain of chest pain and dysuria. patient admitted to tele, Troponin x 2 negative,  CTAP- 2/4- showed Skin thickening with subcutaneous stranding in the right lower quadrant anterior abdominal wall c/w cellulitis. pt received Zithromax and Rocephin in ED x1 , Abx dc,d due to negative UA, pt started on keflex 500mg qid for cellulitis , hospital course complicated with encephalopathy and ATN   on 2/5, Cr increased from 0.83 to 3.1,  IV cefazolin started in setting of possible sepsis 2/2 cellulitis. pt noted to progressively getting altered over 2/6, VBG remarkable for PH :7.14, pco2 82, pt was placed on Bi-pap reportedly over night.  Patient found to be more lethargic in the morning.  Unable to perform ROS. ABG showed acute on chronic respiratory acidosis , Hypercapnia 91, respiratory placed pt on AVAPS . ICU consulted for further evaluation. Patient will require  intubation , will transfer to ICU for further work-up.     Assessment:    Acute hypercapnic hypoxic respiratory failure   NAKUL requiring CPAP at night (non -compliant)  Cellulitis  Chronic bronchitis   Asthma   sepsis  Psoriasis   Fibromyalgia   DM  Hypothyroidism       Plan:    =====================[CNS ]==============================  # encephalopathy  - resolved  - Likely due to hypercapnia , PCO2:91  - Intubated  - propofol, precedex  - now awake and following commands  - SBT    # History of Fibromyalgia on  gabapentin, flexeril, Tylenol, aleve, oxcarbazepine for pain at home   - Lidoderm 4% patch 2 patches daily  - continue to hold all home meds  - fentanyl  =====================[CVS ]==========================  # HTN :    - BP currently acceptable off medication   - monitor BP in setting of ATN / sepsis   - keep MAP >65    =====================[RESP ]==============================  # Acute hypoxic hypercapnic respiratory failure - resolved  - likely in setting of NAKUL / cheonic bronchitis / asthma   - mechanical ventilation   - d-dimmer wnl  - Monitor ABG  - Hypercapnia resolved  - Following simple commands, SBT today    #? tracheal/laryngeal edema vs body habitus obstruction  - failed leak test prior to extubation  - continue on decadron 4mg q 6  - pt with still - leak test, plan for extubation tomorrow with anesthesia    =====================[ GI ]============================  # No acute issues  - tube feeds  - c/w PPi for GI prophylaxis     ====================[ RENAL ]=============================   # Ernestina:   - improving  - pt with good urine output, net neg in 24 hrs  -Strict I/o, Avoid Nephrotoxic agents  -Maintain MAP>65-70 mm Hg  - Monitor BMP and electrolytes    # abnormal electrolytes:   - Hyperkalemia resolved   - Hyperphosphatemia     # Left renal lesion chronic  - outpatient f/u with Urology     =====================[ ID ]============================  # Cellulitis   -  was on Cefazolin  - spiked fevers, escalated to zosyn  - normal wbc  - UA negative for WBC , f/u UC   - ID Dr Jacobsen following       ===================[ HEME/ONC ]===========================  # anemia of chronic illness  -monitor cbc      =====================[ ENDO ]======================  # DM :   - target CBG < 180  - currently on  Lantus and HSS , adjust as needed    # Hypothyroidism   - f/u TSH in setting of encephalopathy   - c/w synthroid      ================= Skin/Catheters============================  # cellulitis likley in setting of Psoriasis  - c/w IV ABx   - pt has  Bustamante    ==================[ PROPHYLAXIS ]==========================   # Dvt : c/u SQH , f/u D dimer   # Gi : Protonix     ====================[ DISPO ]======================   - Monitor in ICU     ===================[ PROGNOSIS ]====================  - Guarded 55 yo F, from home, ambulated with cane , PMHx of obesity, asthma, chronic bronchitis, NAKUL non compliant with CPAP over night, HTN, DM, HLD, fibromyalgia, anxiety, trigeminal neuralgia, degenerative joint disease, ?IgA vasculitis, psoriasis, OA came with chief complain of chest pain and dysuria. patient admitted to tele, Troponin x 2 negative,  CTAP- 2/4- showed Skin thickening with subcutaneous stranding in the right lower quadrant anterior abdominal wall c/w cellulitis. pt received Zithromax and Rocephin in ED x1 , Abx dc,d due to negative UA, pt started on keflex 500mg qid for cellulitis , hospital course complicated with encephalopathy and ATN   on 2/5, Cr increased from 0.83 to 3.1,  IV cefazolin started in setting of possible sepsis 2/2 cellulitis. pt noted to progressively getting altered over 2/6, VBG remarkable for PH :7.14, pco2 82, pt was placed on Bi-pap reportedly over night.  Patient found to be more lethargic in the morning.  Unable to perform ROS. ABG showed acute on chronic respiratory acidosis , Hypercapnia 91, respiratory placed pt on AVAPS . ICU consulted for further evaluation. Patient will require  intubation , will transfer to ICU for further work-up.     Assessment:    Acute hypercapnic hypoxic respiratory failure   NAKUL requiring CPAP at night (non -compliant)  Cellulitis  Chronic bronchitis   Asthma   sepsis  Psoriasis   Fibromyalgia   DM  Hypothyroidism       Plan:    =====================[CNS ]==============================  # encephalopathy  - resolved  - Likely due to hypercapnia , PCO2:91  - Intubated  - propofol, precedex  - now awake and following commands  - SBT    # History of Fibromyalgia on  gabapentin, flexeril, Tylenol, aleve, oxcarbazepine for pain at home   - Lidoderm 4% patch 2 patches daily  - continue to hold all home meds  - fentanyl  =====================[CVS ]==========================  # HTN :    - BP currently acceptable off medication   - monitor BP in setting of ATN / sepsis   - keep MAP >65    =====================[RESP ]==============================  # Acute hypoxic hypercapnic respiratory failure - resolved  - likely in setting of NAKUL / cheonic bronchitis / asthma   - mechanical ventilation   - d-dimer wnl  - Monitor ABG  - Hypercapnia resolved  - Following simple commands  - possible extubation today    #? tracheal/laryngeal edema vs body habitus obstruction  - failed leak test prior to extubation  - continue on decadron 4mg q 6  - pt with still - leak test, plan for extubation tomorrow with anesthesia    =====================[ GI ]============================  # No acute issues  - tube feeds  - c/w PPI for GI prophylaxis     ====================[ RENAL ]=============================   # FERNIE:   - improving  - pt with good urine output, net neg in 24 hrs  -Strict I/o, Avoid Nephrotoxic agents  -Maintain MAP>65-70 mm Hg  - Monitor BMP and electrolytes    # abnormal electrolytes:   - Hyperkalemia resolved   - Hyperphosphatemia     # Left renal lesion chronic  - outpatient f/u with Urology     =====================[ ID ]============================  # Cellulitis   -  was on Cefazolin  - spiked fevers, escalated to zosyn  - normal wbc  - UA negative for WBC , f/u UC   - ID Dr Jacobsen following       ===================[ HEME/ONC ]===========================  # anemia of chronic illness  -monitor cbc      =====================[ ENDO ]======================  # DM :   - target CBG < 180  - currently on  Lantus and HSS , adjust as needed    # Hypothyroidism   - f/u TSH in setting of encephalopathy   - c/w synthroid      ================= Skin/Catheters============================  # cellulitis likley in setting of Psoriasis  - c/w IV ABx   - pt has  Bustamante    ==================[ PROPHYLAXIS ]==========================   # Dvt : c/u SQH , f/u D dimer   # Gi : Protonix     ====================[ DISPO ]======================   - Monitor in ICU     ===================[ PROGNOSIS ]====================  - Guarded

## 2022-02-12 NOTE — PROGRESS NOTE ADULT - SUBJECTIVE AND OBJECTIVE BOX
PGY-1 Progress Note discussed with attending    PAGER #: [[phone]] TILL 5:00 PM  PLEASE CONTACT ON CALL TEAM:  - On Call Team (Please refer to Karly) FROM 5:00 PM - 8:30PM  - Nightfloat Team FROM 8:30 -7:30 AM        INTERVAL HPI/OVERNIGHT EVENTS:   patient changed from pressure support to volume controlled ventilation overnight.     MEDICATIONS  (STANDING):  ALBUTerol    90 MICROgram(s) HFA Inhaler 2 Puff(s) Inhalation every 6 hours  aspirin  chewable 81 milliGRAM(s) Oral daily  atorvastatin 10 milliGRAM(s) Oral at bedtime  benzonatate 100 milliGRAM(s) Oral <User Schedule>  chlorhexidine 0.12% Liquid 15 milliLiter(s) Oral Mucosa two times a day  chlorhexidine 2% Cloths 1 Application(s) Topical <User Schedule>  dexAMETHasone  Injectable 4 milliGRAM(s) IV Push every 6 hours  dexMEDEtomidine Infusion 1.5 MICROgram(s)/kG/Hr (47.6 mL/Hr) IV Continuous <Continuous>  fentaNYL   Infusion. 1 MICROgram(s)/kG/Hr (12.7 mL/Hr) IV Continuous <Continuous>  heparin   Injectable 5000 Unit(s) SubCutaneous every 8 hours  influenza   Vaccine 0.5 milliLiter(s) IntraMuscular once  insulin glargine Injectable (LANTUS) 35 Unit(s) SubCutaneous at bedtime  insulin lispro (ADMELOG) corrective regimen sliding scale   SubCutaneous every 6 hours  insulin lispro Injectable (ADMELOG) 8 Unit(s) SubCutaneous every 6 hours  levothyroxine 112 MICROGram(s) Oral daily  lidocaine   4% Patch 2 Patch Transdermal daily  montelukast 10 milliGRAM(s) Oral at bedtime  pantoprazole  Injectable 40 milliGRAM(s) IV Push daily  piperacillin/tazobactam IVPB.. 3.375 Gram(s) IV Intermittent every 8 hours  propofol Infusion 40 MICROgram(s)/kG/Min (30.5 mL/Hr) IV Continuous <Continuous>  tiotropium 18 MICROgram(s) Capsule 1 Capsule(s) Inhalation daily    MEDICATIONS  (PRN):  acetaminophen    Suspension .. 650 milliGRAM(s) Oral every 6 hours PRN Temp greater or equal to 38C (100.4F), Mild Pain (1 - 3)  ondansetron Injectable 4 milliGRAM(s) IV Push every 6 hours PRN Nausea and/or Vomiting  sodium chloride 0.9% lock flush 10 milliLiter(s) IV Push every 1 hour PRN Pre/post blood products, medications, blood draw, and to maintain line patency      Vital Signs Last 24 Hrs  T(C): 37.5 (11 Feb 2022 22:00), Max: 38.3 (11 Feb 2022 07:00)  T(F): 99.5 (11 Feb 2022 22:00), Max: 100.9 (11 Feb 2022 07:00)  HR: 48 (11 Feb 2022 22:00) (48 - 73)  BP: 120/65 (11 Feb 2022 22:00) (63/28 - 125/80)  BP(mean): 79 (11 Feb 2022 22:00) (34 - 92)  RR: 21 (11 Feb 2022 22:00) (11 - 23)  SpO2: 95% (11 Feb 2022 22:00) (91% - 100%)    PHYSICAL EXAMINATION:  GENERAL: NAD, intubated, obese  HEAD:  Atraumatic, Normocephalic  EYES: EOMI, PERRLA, conjunctiva and sclera clear  ENMT: ETT in place, no lesions seen.   NECK: Supple, normal appearance, No JVD; Normal thyroid; Trachea midline  NERVOUS SYSTEM:  Alert & Oriented, moving all extremities, sensation intact.  writing to communicate while intubated.   CHEST/LUNG: No chest deformity. No rales, rhonchi, wheezing   HEART: Regular rate and rhythm; No murmurs, rubs, or gallops  ABDOMEN: Soft, Nontender, Nondistended; Bowel sounds present  EXTREMITIES:  2+ Peripheral Pulses, No clubbing, cyanosis, or edema  LYMPH: No lymphadenopathy noted  SKIN: Psoriatic lesions                         12.0   10.07 )-----------( 293      ( 11 Feb 2022 04:27 )             39.3     02-11    137  |  103  |  26<H>  ----------------------------<  230<H>  4.4   |  28  |  0.81    Ca    9.2      11 Feb 2022 04:27  Phos  3.9     02-11  Mg     1.6     02-11    TPro  7.2  /  Alb  2.5<L>  /  TBili  0.5  /  DBili  x   /  AST  19  /  ALT  22  /  AlkPhos  89  02-11    LIVER FUNCTIONS - ( 11 Feb 2022 04:27 )  Alb: 2.5 g/dL / Pro: 7.2 g/dL / ALK PHOS: 89 U/L / ALT: 22 U/L DA / AST: 19 U/L / GGT: x                       CAPILLARY BLOOD GLUCOSE  CAPILLARY BLOOD GLUCOSE      POCT Blood Glucose.: 250 mg/dL (11 Feb 2022 23:11)    CAPILLARY BLOOD GLUCOSE      POCT Blood Glucose.: 250 mg/dL (11 Feb 2022 23:11)  POCT Blood Glucose.: 163 mg/dL (11 Feb 2022 16:39)  POCT Blood Glucose.: 214 mg/dL (11 Feb 2022 11:09)      RADIOLOGY & ADDITIONAL TESTS:                   PGY-1 Progress Note discussed with attending    PAGER #: [[phone]] TILL 5:00 PM  PLEASE CONTACT ON CALL TEAM:  - On Call Team (Please refer to Karly) FROM 5:00 PM - 8:30PM  - Nightfloat Team FROM 8:30 -7:30 AM        INTERVAL HPI/OVERNIGHT EVENTS:   Patient changed from pressure support to volume controlled ventilation overnight. She was examined at bedside, awake, alert but intubated. She communicates by hand gestures and writing on paper. She has good leak test this morning. She is for possible extubation today. She requested for a  and/or Mr. Padmaja Garcia, her relative to be present. Attending was informed. No other significant events overnight.    MEDICATIONS  (STANDING):  ALBUTerol    90 MICROgram(s) HFA Inhaler 2 Puff(s) Inhalation every 6 hours  aspirin  chewable 81 milliGRAM(s) Oral daily  atorvastatin 10 milliGRAM(s) Oral at bedtime  benzonatate 100 milliGRAM(s) Oral <User Schedule>  chlorhexidine 0.12% Liquid 15 milliLiter(s) Oral Mucosa two times a day  chlorhexidine 2% Cloths 1 Application(s) Topical <User Schedule>  dexAMETHasone  Injectable 4 milliGRAM(s) IV Push every 6 hours  dexMEDEtomidine Infusion 1.5 MICROgram(s)/kG/Hr (47.6 mL/Hr) IV Continuous <Continuous>  fentaNYL   Infusion. 1 MICROgram(s)/kG/Hr (12.7 mL/Hr) IV Continuous <Continuous>  heparin   Injectable 5000 Unit(s) SubCutaneous every 8 hours  influenza   Vaccine 0.5 milliLiter(s) IntraMuscular once  insulin glargine Injectable (LANTUS) 35 Unit(s) SubCutaneous at bedtime  insulin lispro (ADMELOG) corrective regimen sliding scale   SubCutaneous every 6 hours  insulin lispro Injectable (ADMELOG) 8 Unit(s) SubCutaneous every 6 hours  levothyroxine 112 MICROGram(s) Oral daily  lidocaine   4% Patch 2 Patch Transdermal daily  montelukast 10 milliGRAM(s) Oral at bedtime  pantoprazole  Injectable 40 milliGRAM(s) IV Push daily  piperacillin/tazobactam IVPB.. 3.375 Gram(s) IV Intermittent every 8 hours  propofol Infusion 40 MICROgram(s)/kG/Min (30.5 mL/Hr) IV Continuous <Continuous>  tiotropium 18 MICROgram(s) Capsule 1 Capsule(s) Inhalation daily    MEDICATIONS  (PRN):  acetaminophen    Suspension .. 650 milliGRAM(s) Oral every 6 hours PRN Temp greater or equal to 38C (100.4F), Mild Pain (1 - 3)  ondansetron Injectable 4 milliGRAM(s) IV Push every 6 hours PRN Nausea and/or Vomiting  sodium chloride 0.9% lock flush 10 milliLiter(s) IV Push every 1 hour PRN Pre/post blood products, medications, blood draw, and to maintain line patency      Vital Signs Last 24 Hrs  T(C): 37.5 (11 Feb 2022 22:00), Max: 38.3 (11 Feb 2022 07:00)  T(F): 99.5 (11 Feb 2022 22:00), Max: 100.9 (11 Feb 2022 07:00)  HR: 48 (11 Feb 2022 22:00) (48 - 73)  BP: 120/65 (11 Feb 2022 22:00) (63/28 - 125/80)  BP(mean): 79 (11 Feb 2022 22:00) (34 - 92)  RR: 21 (11 Feb 2022 22:00) (11 - 23)  SpO2: 95% (11 Feb 2022 22:00) (91% - 100%)    PHYSICAL EXAMINATION:  GENERAL: NAD, intubated, obese  HEAD:  Atraumatic, Normocephalic  EYES: EOMI, PERRLA, conjunctiva and sclera clear  ENMT: ETT in place, no lesions seen.   NECK: Supple, normal appearance, No JVD; Normal thyroid; Trachea midline  NERVOUS SYSTEM:  Alert & Oriented, moving all extremities, sensation intact.  writing to communicate while intubated.   CHEST/LUNG: No chest deformity. No rales, rhonchi, wheezing   HEART: Regular rate and rhythm; No murmurs, rubs, or gallops  ABDOMEN: Soft, Nontender, Nondistended; Bowel sounds present  EXTREMITIES:  2+ Peripheral Pulses, No clubbing, cyanosis, or edema  LYMPH: No lymphadenopathy noted  SKIN: Psoriatic lesions                         12.0   10.07 )-----------( 293      ( 11 Feb 2022 04:27 )             39.3     02-11    137  |  103  |  26<H>  ----------------------------<  230<H>  4.4   |  28  |  0.81    Ca    9.2      11 Feb 2022 04:27  Phos  3.9     02-11  Mg     1.6     02-11    TPro  7.2  /  Alb  2.5<L>  /  TBili  0.5  /  DBili  x   /  AST  19  /  ALT  22  /  AlkPhos  89  02-11    LIVER FUNCTIONS - ( 11 Feb 2022 04:27 )  Alb: 2.5 g/dL / Pro: 7.2 g/dL / ALK PHOS: 89 U/L / ALT: 22 U/L DA / AST: 19 U/L / GGT: x                       CAPILLARY BLOOD GLUCOSE  CAPILLARY BLOOD GLUCOSE      POCT Blood Glucose.: 250 mg/dL (11 Feb 2022 23:11)    CAPILLARY BLOOD GLUCOSE      POCT Blood Glucose.: 250 mg/dL (11 Feb 2022 23:11)  POCT Blood Glucose.: 163 mg/dL (11 Feb 2022 16:39)  POCT Blood Glucose.: 214 mg/dL (11 Feb 2022 11:09)      RADIOLOGY & ADDITIONAL TESTS:

## 2022-02-13 LAB
ALBUMIN SERPL ELPH-MCNC: 2.7 G/DL — LOW (ref 3.5–5)
ALP SERPL-CCNC: 90 U/L — SIGNIFICANT CHANGE UP (ref 40–120)
ALT FLD-CCNC: 24 U/L DA — SIGNIFICANT CHANGE UP (ref 10–60)
ANION GAP SERPL CALC-SCNC: -3 MMOL/L — LOW (ref 5–17)
APPEARANCE UR: CLEAR — SIGNIFICANT CHANGE UP
AST SERPL-CCNC: 19 U/L — SIGNIFICANT CHANGE UP (ref 10–40)
BASE EXCESS BLDA CALC-SCNC: -0.7 MMOL/L — SIGNIFICANT CHANGE UP (ref -2–3)
BASOPHILS # BLD AUTO: 0.03 K/UL — SIGNIFICANT CHANGE UP (ref 0–0.2)
BASOPHILS NFR BLD AUTO: 0.2 % — SIGNIFICANT CHANGE UP (ref 0–2)
BILIRUB SERPL-MCNC: 0.4 MG/DL — SIGNIFICANT CHANGE UP (ref 0.2–1.2)
BILIRUB UR-MCNC: NEGATIVE — SIGNIFICANT CHANGE UP
BLOOD GAS COMMENTS ARTERIAL: SIGNIFICANT CHANGE UP
BUN SERPL-MCNC: 34 MG/DL — HIGH (ref 7–18)
CALCIUM SERPL-MCNC: 9.2 MG/DL — SIGNIFICANT CHANGE UP (ref 8.4–10.5)
CHLORIDE SERPL-SCNC: 107 MMOL/L — SIGNIFICANT CHANGE UP (ref 96–108)
CO2 SERPL-SCNC: 27 MMOL/L — SIGNIFICANT CHANGE UP (ref 22–31)
COLOR SPEC: YELLOW — SIGNIFICANT CHANGE UP
CREAT SERPL-MCNC: 1.12 MG/DL — SIGNIFICANT CHANGE UP (ref 0.5–1.3)
DIFF PNL FLD: ABNORMAL
EOSINOPHIL # BLD AUTO: 0 K/UL — SIGNIFICANT CHANGE UP (ref 0–0.5)
EOSINOPHIL NFR BLD AUTO: 0 % — SIGNIFICANT CHANGE UP (ref 0–6)
GLUCOSE BLDC GLUCOMTR-MCNC: 281 MG/DL — HIGH (ref 70–99)
GLUCOSE BLDC GLUCOMTR-MCNC: 295 MG/DL — HIGH (ref 70–99)
GLUCOSE BLDC GLUCOMTR-MCNC: 306 MG/DL — HIGH (ref 70–99)
GLUCOSE BLDC GLUCOMTR-MCNC: 308 MG/DL — HIGH (ref 70–99)
GLUCOSE SERPL-MCNC: 315 MG/DL — HIGH (ref 70–99)
GLUCOSE UR QL: NEGATIVE — SIGNIFICANT CHANGE UP
HCO3 BLDA-SCNC: 24 MMOL/L — SIGNIFICANT CHANGE UP (ref 21–28)
HCT VFR BLD CALC: 41.4 % — SIGNIFICANT CHANGE UP (ref 34.5–45)
HGB BLD-MCNC: 12.8 G/DL — SIGNIFICANT CHANGE UP (ref 11.5–15.5)
HOROWITZ INDEX BLDA+IHG-RTO: 40 — SIGNIFICANT CHANGE UP
IMM GRANULOCYTES NFR BLD AUTO: 0.8 % — SIGNIFICANT CHANGE UP (ref 0–1.5)
KETONES UR-MCNC: NEGATIVE — SIGNIFICANT CHANGE UP
LEUKOCYTE ESTERASE UR-ACNC: NEGATIVE — SIGNIFICANT CHANGE UP
LYMPHOCYTES # BLD AUTO: 0.84 K/UL — LOW (ref 1–3.3)
LYMPHOCYTES # BLD AUTO: 4.9 % — LOW (ref 13–44)
MAGNESIUM SERPL-MCNC: 1.9 MG/DL — SIGNIFICANT CHANGE UP (ref 1.6–2.6)
MCHC RBC-ENTMCNC: 23.7 PG — LOW (ref 27–34)
MCHC RBC-ENTMCNC: 30.9 GM/DL — LOW (ref 32–36)
MCV RBC AUTO: 76.8 FL — LOW (ref 80–100)
MONOCYTES # BLD AUTO: 1 K/UL — HIGH (ref 0–0.9)
MONOCYTES NFR BLD AUTO: 5.8 % — SIGNIFICANT CHANGE UP (ref 2–14)
NEUTROPHILS # BLD AUTO: 15.1 K/UL — HIGH (ref 1.8–7.4)
NEUTROPHILS NFR BLD AUTO: 88.3 % — HIGH (ref 43–77)
NITRITE UR-MCNC: NEGATIVE — SIGNIFICANT CHANGE UP
NRBC # BLD: 0 /100 WBCS — SIGNIFICANT CHANGE UP (ref 0–0)
OSMOLALITY UR: 962 MOS/KG — SIGNIFICANT CHANGE UP (ref 50–1200)
PCO2 BLDA: 40 MMHG — HIGH (ref 32–35)
PH BLDA: 7.39 — SIGNIFICANT CHANGE UP (ref 7.35–7.45)
PH UR: 5 — SIGNIFICANT CHANGE UP (ref 5–8)
PHOSPHATE SERPL-MCNC: 3.2 MG/DL — SIGNIFICANT CHANGE UP (ref 2.5–4.5)
PLATELET # BLD AUTO: 297 K/UL — SIGNIFICANT CHANGE UP (ref 150–400)
PO2 BLDA: 73 MMHG — LOW (ref 83–108)
POTASSIUM SERPL-MCNC: 4.3 MMOL/L — SIGNIFICANT CHANGE UP (ref 3.5–5.3)
POTASSIUM SERPL-SCNC: 4.3 MMOL/L — SIGNIFICANT CHANGE UP (ref 3.5–5.3)
PROT SERPL-MCNC: 7.2 G/DL — SIGNIFICANT CHANGE UP (ref 6–8.3)
PROT UR-MCNC: 30 MG/DL
RBC # BLD: 5.39 M/UL — HIGH (ref 3.8–5.2)
RBC # FLD: 16.5 % — HIGH (ref 10.3–14.5)
SAO2 % BLDA: 94 % — SIGNIFICANT CHANGE UP
SODIUM SERPL-SCNC: 131 MMOL/L — LOW (ref 135–145)
SODIUM UR-SCNC: 43 MMOL/L — SIGNIFICANT CHANGE UP
SP GR SPEC: 1.02 — SIGNIFICANT CHANGE UP (ref 1.01–1.02)
UROBILINOGEN FLD QL: NEGATIVE — SIGNIFICANT CHANGE UP
WBC # BLD: 17.1 K/UL — HIGH (ref 3.8–10.5)
WBC # FLD AUTO: 17.1 K/UL — HIGH (ref 3.8–10.5)

## 2022-02-13 RX ORDER — INSULIN GLARGINE 100 [IU]/ML
40 INJECTION, SOLUTION SUBCUTANEOUS AT BEDTIME
Refills: 0 | Status: DISCONTINUED | OUTPATIENT
Start: 2022-02-13 | End: 2022-02-14

## 2022-02-13 RX ORDER — POLYETHYLENE GLYCOL 3350 17 G/17G
17 POWDER, FOR SOLUTION ORAL EVERY 12 HOURS
Refills: 0 | Status: DISCONTINUED | OUTPATIENT
Start: 2022-02-13 | End: 2022-02-15

## 2022-02-13 RX ORDER — INSULIN LISPRO 100/ML
10 VIAL (ML) SUBCUTANEOUS EVERY 6 HOURS
Refills: 0 | Status: DISCONTINUED | OUTPATIENT
Start: 2022-02-13 | End: 2022-02-14

## 2022-02-13 RX ORDER — SENNA PLUS 8.6 MG/1
10 TABLET ORAL AT BEDTIME
Refills: 0 | Status: DISCONTINUED | OUTPATIENT
Start: 2022-02-13 | End: 2022-02-15

## 2022-02-13 RX ORDER — NALOXEGOL OXALATE 12.5 MG/1
12.5 TABLET, FILM COATED ORAL DAILY
Refills: 0 | Status: DISCONTINUED | OUTPATIENT
Start: 2022-02-13 | End: 2022-02-15

## 2022-02-13 RX ADMIN — MONTELUKAST 10 MILLIGRAM(S): 4 TABLET, CHEWABLE ORAL at 23:14

## 2022-02-13 RX ADMIN — Medication 10 UNIT(S): at 11:45

## 2022-02-13 RX ADMIN — HEPARIN SODIUM 5000 UNIT(S): 5000 INJECTION INTRAVENOUS; SUBCUTANEOUS at 05:24

## 2022-02-13 RX ADMIN — CHLORHEXIDINE GLUCONATE 15 MILLILITER(S): 213 SOLUTION TOPICAL at 17:34

## 2022-02-13 RX ADMIN — PIPERACILLIN AND TAZOBACTAM 25 GRAM(S): 4; .5 INJECTION, POWDER, LYOPHILIZED, FOR SOLUTION INTRAVENOUS at 05:31

## 2022-02-13 RX ADMIN — Medication 4 MILLIGRAM(S): at 05:24

## 2022-02-13 RX ADMIN — ALBUTEROL 2 PUFF(S): 90 AEROSOL, METERED ORAL at 11:35

## 2022-02-13 RX ADMIN — INSULIN GLARGINE 40 UNIT(S): 100 INJECTION, SOLUTION SUBCUTANEOUS at 23:14

## 2022-02-13 RX ADMIN — DEXMEDETOMIDINE HYDROCHLORIDE IN 0.9% SODIUM CHLORIDE 47.6 MICROGRAM(S)/KG/HR: 4 INJECTION INTRAVENOUS at 09:22

## 2022-02-13 RX ADMIN — ALBUTEROL 2 PUFF(S): 90 AEROSOL, METERED ORAL at 16:33

## 2022-02-13 RX ADMIN — DEXMEDETOMIDINE HYDROCHLORIDE IN 0.9% SODIUM CHLORIDE 47.6 MICROGRAM(S)/KG/HR: 4 INJECTION INTRAVENOUS at 02:33

## 2022-02-13 RX ADMIN — Medication 10 UNIT(S): at 18:07

## 2022-02-13 RX ADMIN — Medication 6: at 11:45

## 2022-02-13 RX ADMIN — PROPOFOL 30.5 MICROGRAM(S)/KG/MIN: 10 INJECTION, EMULSION INTRAVENOUS at 17:59

## 2022-02-13 RX ADMIN — HEPARIN SODIUM 5000 UNIT(S): 5000 INJECTION INTRAVENOUS; SUBCUTANEOUS at 23:14

## 2022-02-13 RX ADMIN — Medication 81 MILLIGRAM(S): at 11:44

## 2022-02-13 RX ADMIN — Medication 8: at 18:07

## 2022-02-13 RX ADMIN — CHLORHEXIDINE GLUCONATE 15 MILLILITER(S): 213 SOLUTION TOPICAL at 05:22

## 2022-02-13 RX ADMIN — DEXMEDETOMIDINE HYDROCHLORIDE IN 0.9% SODIUM CHLORIDE 47.6 MICROGRAM(S)/KG/HR: 4 INJECTION INTRAVENOUS at 20:18

## 2022-02-13 RX ADMIN — PIPERACILLIN AND TAZOBACTAM 25 GRAM(S): 4; .5 INJECTION, POWDER, LYOPHILIZED, FOR SOLUTION INTRAVENOUS at 14:33

## 2022-02-13 RX ADMIN — Medication 4 MILLIGRAM(S): at 11:45

## 2022-02-13 RX ADMIN — Medication 10 UNIT(S): at 23:19

## 2022-02-13 RX ADMIN — ALBUTEROL 2 PUFF(S): 90 AEROSOL, METERED ORAL at 20:30

## 2022-02-13 RX ADMIN — Medication 4 MILLIGRAM(S): at 17:34

## 2022-02-13 RX ADMIN — ALBUTEROL 2 PUFF(S): 90 AEROSOL, METERED ORAL at 03:36

## 2022-02-13 RX ADMIN — FENTANYL CITRATE 12.7 MICROGRAM(S)/KG/HR: 50 INJECTION INTRAVENOUS at 18:00

## 2022-02-13 RX ADMIN — HEPARIN SODIUM 5000 UNIT(S): 5000 INJECTION INTRAVENOUS; SUBCUTANEOUS at 14:33

## 2022-02-13 RX ADMIN — Medication 8: at 05:30

## 2022-02-13 RX ADMIN — PROPOFOL 30.5 MICROGRAM(S)/KG/MIN: 10 INJECTION, EMULSION INTRAVENOUS at 07:05

## 2022-02-13 RX ADMIN — POLYETHYLENE GLYCOL 3350 17 GRAM(S): 17 POWDER, FOR SOLUTION ORAL at 17:34

## 2022-02-13 RX ADMIN — DEXMEDETOMIDINE HYDROCHLORIDE IN 0.9% SODIUM CHLORIDE 47.6 MICROGRAM(S)/KG/HR: 4 INJECTION INTRAVENOUS at 23:20

## 2022-02-13 RX ADMIN — CHLORHEXIDINE GLUCONATE 1 APPLICATION(S): 213 SOLUTION TOPICAL at 05:22

## 2022-02-13 RX ADMIN — PROPOFOL 30.5 MICROGRAM(S)/KG/MIN: 10 INJECTION, EMULSION INTRAVENOUS at 21:56

## 2022-02-13 RX ADMIN — Medication 100 MILLIGRAM(S): at 05:23

## 2022-02-13 RX ADMIN — Medication 112 MICROGRAM(S): at 05:23

## 2022-02-13 RX ADMIN — PANTOPRAZOLE SODIUM 40 MILLIGRAM(S): 20 TABLET, DELAYED RELEASE ORAL at 11:44

## 2022-02-13 RX ADMIN — Medication 6: at 23:19

## 2022-02-13 RX ADMIN — Medication 10 UNIT(S): at 05:30

## 2022-02-13 RX ADMIN — ATORVASTATIN CALCIUM 10 MILLIGRAM(S): 80 TABLET, FILM COATED ORAL at 23:13

## 2022-02-13 RX ADMIN — DEXMEDETOMIDINE HYDROCHLORIDE IN 0.9% SODIUM CHLORIDE 47.6 MICROGRAM(S)/KG/HR: 4 INJECTION INTRAVENOUS at 05:35

## 2022-02-13 RX ADMIN — PIPERACILLIN AND TAZOBACTAM 25 GRAM(S): 4; .5 INJECTION, POWDER, LYOPHILIZED, FOR SOLUTION INTRAVENOUS at 23:15

## 2022-02-13 RX ADMIN — SENNA PLUS 10 MILLILITER(S): 8.6 TABLET ORAL at 23:15

## 2022-02-13 RX ADMIN — PROPOFOL 30.5 MICROGRAM(S)/KG/MIN: 10 INJECTION, EMULSION INTRAVENOUS at 02:32

## 2022-02-13 RX ADMIN — NALOXEGOL OXALATE 12.5 MILLIGRAM(S): 12.5 TABLET, FILM COATED ORAL at 17:34

## 2022-02-13 RX ADMIN — Medication 4 MILLIGRAM(S): at 23:16

## 2022-02-13 NOTE — PROGRESS NOTE ADULT - ENMT COMMENTS
orally intubated, NGT in place
orally intubated
orally intubated, NGT in place
orally intubated, NGT in nostril

## 2022-02-13 NOTE — PROGRESS NOTE ADULT - GENITOURINARY COMMENTS
castaneda in place with clear urine in it

## 2022-02-13 NOTE — PROGRESS NOTE ADULT - ASSESSMENT
Pneumonia - vent associated   Fevers  Abdominal  wall cellulitis - resolved  Leukocytosis  Resp failure    Plan - Cont Zosyn 3.375 gms iv q8hrs  Time spent - 32 mins.

## 2022-02-13 NOTE — PROGRESS NOTE ADULT - ASSESSMENT
57 yo F, from home, ambulated with cane , PMHx of obesity, asthma, chronic bronchitis, NAKUL, HTN, DM, HLD, fibromyalgia, anxiety, trigeminal neuralgia, degenerative joint disease, ?IgA vasculitis, psoriasis, OA came with chief complain of chest pain and dysuria. Nephrology consult called for significant FERNIE    1) Nonoliguric FERNIE likely renal hypoperfusion with superimposed hypertensive/diabetic nephropathy  2) AMS with hypercapnic respiratory failure on ventilatory support with PNA  3) Hypertension  4) Diabetic Nephropathy with protenuria/neuropathy  5) Morbid Obesity  6) Abdominal wall cellulitis  7) Left renal lesion chronic  8) Hyponatremia with Na level 131    Recommend:  Strict I/o  S cr 1.1 with non oliguria  Avoid Nephrotoxic agents  Check urinalysis, urine electrolytes  Maintain MAP>65-70 mm Hg  Adjust antibiotics as per renal dose adjustment  Bustamante's catheter for strict I/o  Monitor BMP and electrolytes every 12 hrly  Ventilatory management per ICU team  Treatment of sepsis/cellulitis/PNA/Ventilatory support  per primary/ID team with renal dose adjustment of Abx  No urgent need for RRT/HD  Will follow

## 2022-02-13 NOTE — PROGRESS NOTE ADULT - ASSESSMENT
57 yo F, from home, ambulated with cane , PMHx of obesity, asthma, chronic bronchitis, NAKUL non compliant with CPAP over night, HTN, DM, HLD, fibromyalgia, anxiety, trigeminal neuralgia, degenerative joint disease, ?IgA vasculitis, psoriasis, OA came with chief complain of chest pain and dysuria. patient admitted to tele, Troponin x 2 negative,  CTAP- 2/4- showed Skin thickening with subcutaneous stranding in the right lower quadrant anterior abdominal wall c/w cellulitis. pt received Zithromax and Rocephin in ED x1 , Abx dc,d due to negative UA, pt started on keflex 500mg qid for cellulitis , hospital course complicated with encephalopathy and ATN   on 2/5, Cr increased from 0.83 to 3.1,  IV cefazolin started in setting of possible sepsis 2/2 cellulitis. pt noted to progressively getting altered over 2/6, VBG remarkable for PH :7.14, pco2 82, pt was placed on Bi-pap reportedly over night.  Patient found to be more lethargic in the morning.  Unable to perform ROS. ABG showed acute on chronic respiratory acidosis , Hypercapnia 91, respiratory placed pt on AVAPS . ICU consulted for further evaluation. Patient will require  intubation , will transfer to ICU for further work-up.     Assessment:    Acute hypercapnic hypoxic respiratory failure   NAKUL requiring CPAP at night (non -compliant)  Cellulitis  Chronic bronchitis   Asthma   sepsis  Psoriasis   Fibromyalgia   DM  Hypothyroidism       Plan:    =====================[CNS ]==============================  # encephalopathy  - resolved  - Likely due to hypercapnia , PCO2:91  - Intubated  - propofol, precedex  - now awake and following commands  - SBT    # History of Fibromyalgia on  gabapentin, flexeril, Tylenol, aleve, oxcarbazepine for pain at home   - Lidoderm 4% patch 2 patches daily  - continue to hold all home meds  - fentanyl  =====================[CVS ]==========================  # HTN :    - BP currently acceptable off medication   - monitor BP in setting of ATN / sepsis   - keep MAP >65    =====================[RESP ]==============================  # Acute hypoxic hypercapnic respiratory failure - resolved  - likely in setting of NAKUL / cheonic bronchitis / asthma   - mechanical ventilation   - d-dimer wnl  - Monitor ABG  - Hypercapnia resolved  - Following simple commands  - possible extubation today    #? tracheal/laryngeal edema vs body habitus obstruction  - failed leak test prior to extubation  - continue on decadron 4mg q 6  - pt with still - leak test, plan for extubation tomorrow with anesthesia    =====================[ GI ]============================  # No acute issues  - tube feeds  - c/w PPI for GI prophylaxis     ====================[ RENAL ]=============================   # FERNIE:   - improving  - pt with good urine output, net neg in 24 hrs  -Strict I/o, Avoid Nephrotoxic agents  -Maintain MAP>65-70 mm Hg  - Monitor BMP and electrolytes    # abnormal electrolytes:   - Hyperkalemia resolved   - Hyperphosphatemia     # Left renal lesion chronic  - outpatient f/u with Urology     =====================[ ID ]============================  # Cellulitis   -  was on Cefazolin  - spiked fevers, escalated to zosyn  - normal wbc  - UA negative for WBC , f/u UC   - ID Dr Jacobsen following       ===================[ HEME/ONC ]===========================  # anemia of chronic illness  -monitor cbc      =====================[ ENDO ]======================  # DM :   - target CBG < 180  - currently on  Lantus and HSS , adjust as needed    # Hypothyroidism   - f/u TSH in setting of encephalopathy   - c/w synthroid      ================= Skin/Catheters============================  # cellulitis likley in setting of Psoriasis  - c/w IV ABx   - pt has  Bustamante    ==================[ PROPHYLAXIS ]==========================   # Dvt : c/u SQH , f/u D dimer   # Gi : Protonix     ====================[ DISPO ]======================   - Monitor in ICU     ===================[ PROGNOSIS ]====================  - Guarded 55 yo F, from home, ambulated with cane , PMHx of obesity, asthma, chronic bronchitis, NAKUL non compliant with CPAP over night, HTN, DM, HLD, fibromyalgia, anxiety, trigeminal neuralgia, degenerative joint disease, ?IgA vasculitis, psoriasis, OA came with chief complain of chest pain and dysuria. patient admitted to tele, Troponin x 2 negative,  CTAP- 2/4- showed Skin thickening with subcutaneous stranding in the right lower quadrant anterior abdominal wall c/w cellulitis. pt received Zithromax and Rocephin in ED x1 , Abx dc,d due to negative UA, pt started on keflex 500mg qid for cellulitis , hospital course complicated with encephalopathy and ATN   on 2/5, Cr increased from 0.83 to 3.1,  IV cefazolin started in setting of possible sepsis 2/2 cellulitis. pt noted to progressively getting altered over 2/6, VBG remarkable for PH :7.14, pco2 82, pt was placed on Bi-pap reportedly over night.  Patient found to be more lethargic in the morning.  Unable to perform ROS. ABG showed acute on chronic respiratory acidosis , Hypercapnia 91, respiratory placed pt on AVAPS . ICU consulted for further evaluation. Patient will require  intubation , will transfer to ICU for further work-up.     Assessment:    Acute hypercapnic hypoxic respiratory failure   NAKUL requiring CPAP at night (non -compliant)  Cellulitis  Chronic bronchitis   Asthma   sepsis  Psoriasis   Fibromyalgia   DM  Hypothyroidism       Plan:    =====================[CNS ]==============================  # encephalopathy  - resolved  - Likely due to hypercapnia , PCO2:91  - Intubated  - propofol, precedex  - now awake and following commands, responds via writing on paper   - Failed leak test, will attempt extubation again tomorrow     # History of Fibromyalgia on  gabapentin, flexeril, Tylenol, aleve, oxcarbazepine for pain at home   - Lidoderm 4% patch 2 patches daily  - continue to hold all home meds  - fentanyl  =====================[CVS ]==========================  # HTN :    - BP currently acceptable off medication   - monitor BP in setting of ATN / sepsis   - keep MAP >65    =====================[RESP ]==============================  # Acute hypoxic hypercapnic respiratory failure - resolved  - likely in setting of NAKUL / cheonic bronchitis / asthma   - mechanical ventilation   - d-dimer wnl  - Monitor ABG  - Hypercapnia resolved  - Following simple commands  - possible extubation today    #? tracheal/laryngeal edema vs body habitus obstruction  - failed leak test prior to extubation x2  - continue on decadron 4mg q 6  - unable to extubate, failed leak test, will attempt to extubate tomorrow     =====================[ GI ]============================  # No acute issues  - tube feeds  - c/w PPI for GI prophylaxis   - started bowel regiment   - monitor BM  ====================[ RENAL ]=============================   # FERNIE:   - improving  - pt with good urine output, net neg in 24 hrs  -Strict I/o, Avoid Nephrotoxic agents  -Maintain MAP>65-70 mm Hg  - Monitor BMP and electrolytes    # abnormal electrolytes:   - Hyperkalemia resolved   - Hyperphosphatemia     # Left renal lesion chronic  - outpatient f/u with Urology     =====================[ ID ]============================  # Cellulitis   -  was on Cefazolin  - spiked fevers, escalated to zosyn  - normal wbc  - UA negative for WBC , f/u UC   - ID Dr Jacobsen following       ===================[ HEME/ONC ]===========================  # anemia of chronic illness  -monitor cbc      =====================[ ENDO ]======================  # DM :   - target CBG < 180  - currently on  Lantus and HSS , adjust as needed    # Hypothyroidism   - f/u TSH in setting of encephalopathy   - c/w synthroid      ================= Skin/Catheters============================  # cellulitis likley in setting of Psoriasis  - c/w IV ABx   - pt has  Bustamante    ==================[ PROPHYLAXIS ]==========================   # Dvt : c/u SQH , f/u D dimer   # Gi : Protonix     ====================[ DISPO ]======================   - Monitor in ICU     ===================[ PROGNOSIS ]====================  - Guarded

## 2022-02-13 NOTE — PROGRESS NOTE ADULT - SUBJECTIVE AND OBJECTIVE BOX
Romeo Ribeiro MD (Nephrology progress note)  [address], [facility],  [id number],  [facility]  TEl: [id number]  Cell: [phone]    Patient is a 56y Female seen and evaluated at bedside. Vital signs, laboratory data, imaging studies, consult notes reviewed done within past 24 hours. Overnight events noted. Patient lying in bed in no distress remains on ventilatory support and unable to wean off. Interval elevated S cr to 1.1 with non oliguria with Na level 131.    Allergies    Fioricet (Rash)  gadobutrol (Rash; Urticaria; Hives)  IV Contrast (Short breath)  mushroom (Unknown)  venlafaxine (Hives)    Intolerances        ROS:  Limited  Patient lying in bed in distress remain on ventilatory support    VITALS:    T(C): 37.3 (02-13-22 @ 11:20), Max: 38.2 (02-12-22 @ 15:00)  HR: 63 (02-13-22 @ 11:20) (53 - 71)  BP: 112/64 (02-13-22 @ 11:00) (84/47 - 137/87)  RR: 22 (02-13-22 @ 11:20) (15 - 24)  SpO2: 94% (02-13-22 @ 11:20) (93% - 97%)  CAPILLARY BLOOD GLUCOSE      POCT Blood Glucose.: 295 mg/dL (13 Feb 2022 11:38)  POCT Blood Glucose.: 306 mg/dL (13 Feb 2022 05:13)  POCT Blood Glucose.: 252 mg/dL (12 Feb 2022 23:01)  POCT Blood Glucose.: 323 mg/dL (12 Feb 2022 17:48)  POCT Blood Glucose.: 268 mg/dL (12 Feb 2022 12:48)      02-12-22 @ 07:01  -  02-13-22 @ 07:00  --------------------------------------------------------  IN: 1913.8 mL / OUT: 410 mL / NET: 1503.8 mL    02-13-22 @ 07:01  -  02-13-22 @ 11:49  --------------------------------------------------------  IN: 323.9 mL / OUT: 120 mL / NET: 203.9 mL      MEDICATIONS  (STANDING):  ALBUTerol    90 MICROgram(s) HFA Inhaler 2 Puff(s) Inhalation every 6 hours  aspirin  chewable 81 milliGRAM(s) Oral daily  atorvastatin 10 milliGRAM(s) Oral at bedtime  benzonatate 100 milliGRAM(s) Oral <User Schedule>  chlorhexidine 0.12% Liquid 15 milliLiter(s) Oral Mucosa two times a day  chlorhexidine 2% Cloths 1 Application(s) Topical <User Schedule>  dexAMETHasone  Injectable 4 milliGRAM(s) IV Push every 6 hours  dexMEDEtomidine Infusion 1.5 MICROgram(s)/kG/Hr (47.6 mL/Hr) IV Continuous <Continuous>  fentaNYL   Infusion. 1 MICROgram(s)/kG/Hr (12.7 mL/Hr) IV Continuous <Continuous>  heparin   Injectable 5000 Unit(s) SubCutaneous every 8 hours  influenza   Vaccine 0.5 milliLiter(s) IntraMuscular once  insulin glargine Injectable (LANTUS) 40 Unit(s) SubCutaneous at bedtime  insulin lispro (ADMELOG) corrective regimen sliding scale   SubCutaneous every 6 hours  insulin lispro Injectable (ADMELOG) 10 Unit(s) SubCutaneous every 6 hours  levothyroxine 112 MICROGram(s) Oral daily  lidocaine   4% Patch 2 Patch Transdermal daily  montelukast 10 milliGRAM(s) Oral at bedtime  pantoprazole  Injectable 40 milliGRAM(s) IV Push daily  piperacillin/tazobactam IVPB.. 3.375 Gram(s) IV Intermittent every 8 hours  propofol Infusion 40 MICROgram(s)/kG/Min (30.5 mL/Hr) IV Continuous <Continuous>  tiotropium 18 MICROgram(s) Capsule 1 Capsule(s) Inhalation daily    MEDICATIONS  (PRN):  acetaminophen    Suspension .. 650 milliGRAM(s) Oral every 6 hours PRN Temp greater or equal to 38C (100.4F), Mild Pain (1 - 3)  ondansetron Injectable 4 milliGRAM(s) IV Push every 6 hours PRN Nausea and/or Vomiting  sodium chloride 0.9% lock flush 10 milliLiter(s) IV Push every 1 hour PRN Pre/post blood products, medications, blood draw, and to maintain line patency      PHYSICAL EXAM:  GENERAL: Alert, awake and oriented on ventilatory support  HEENT: RITA, EOMI, neck supple, no JVP  RS: Bilateral decreased breath sounds at bases, bibasilar rhonchi  HEART: Regular rate and rhythm, LAURA II/VI at LPSB, no gallops, no rub   ABDOMEN: Soft, nontender, non distended, bowel sounds present  : No flank or supra pubic tenderness.  EXTREMITIES: Peripheral pulses are palpable, no pedal edema  Neurology: AAOx3, no focal neurological deficit  SKIN: No rash or skin lesion  Musculoskeletal: No joint deformity or spinal tenderness.      Vascular ACCESS:     LABS:                        12.8   17.10 )-----------( 297      ( 13 Feb 2022 03:27 )             41.4     02-13    131<L>  |  107  |  34<H>  ----------------------------<  315<H>  4.3   |  27  |  1.12    Ca    9.2      13 Feb 2022 03:27  Phos  3.2     02-13  Mg     1.9     02-13    TPro  7.2  /  Alb  2.7<L>  /  TBili  0.4  /  DBili  x   /  AST  19  /  ALT  24  /  AlkPhos  90  02-13                RADIOLOGY & ADDITIONAL STUDIES:  < from: Xray Chest 1 View- PORTABLE-Routine (Xray Chest 1 View- PORTABLE-Routine in AM.) (02.10.22 @ 08:55) >    ACC: [id number] EXAM:  XR CHEST PORTABLE ROUTINE 1V                        ACC: [id number] EXAM:  XR CHEST PORTABLE IMMED 1V                        ACC: [id number] EXAM:  XR CHEST PORTABLE ROUTINE 1V                          PROCEDURE DATE:  02/09/2022          INTERPRETATION:  Portable chest radiograph    CLINICAL INFORMATION: ET tube placement    TECHNIQUE:  Portable  AP chest radiograph.    COMPARISON: 2/7/2022 chest .    FINDINGS:  CATHETERS AND TUBES: ET tube tip above tracheal bifurcation.  RIGHT IJ catheter tip in SVC.    PULMONARY: Early LEFT retrocardiac airspace disease. Remaining lung   parenchyma clear..   No pneumothorax.    HEART/VASCULAR: The heart and mediastinum size and configuration are   within normal limits.    BONES: Visualized osseous structures are intact.    IMPRESSION:   ET tube tip above tracheal bifurcation. RIGHT IJ catheter   tip in SVC.  Early LEFT basilar airspace disease.  .    FOLLOW-UP AP PORTABLE CHEST RADIOGRAPH 2/9/2022 AT 10:12 PM:  ET tube tip above tracheal bifurcation.  NG tube tip beyond GE junction.  RIGHT IJ catheter tip in SVC.  Increased LEFT basilar diffuse airspace disease/consolidation obscuring   diaphragm contour. RIGHT lung parenchyma remains clear.  Cardiomegaly.    FOLLOW-UP AP PORTABLE CHEST RADIOGRAPH 2/10/2022 AT 8:25 AM:  Residual LEFT medial basilar airspace disease, otherwise no interval   change. Catheters and tubes in place.    --- End of Report ---            MELBA LE MD; Attending Radiologist  This document hasbeen electronically signed. Feb 10 2022  9:01AM    < end of copied text >    Imaging Personally Reviewed:  [x] YES  [ ] NO    Consultant(s) Notes Reviewed:  [x] YES  [ ] NO    Care Discussed with Primary team/ Other Providers [x] YES  [ ] NO

## 2022-02-13 NOTE — PROGRESS NOTE ADULT - SUBJECTIVE AND OBJECTIVE BOX
56y Female    Meds:  piperacillin/tazobactam IVPB.. 3.375 Gram(s) IV Intermittent every 8 hours    Allergies    Fioricet (Rash)  gadobutrol (Rash; Urticaria; Hives)  IV Contrast (Short breath)  mushroom (Unknown)  venlafaxine (Hives)    Intolerances        VITALS:  Vital Signs Last 24 Hrs  T(C): 37.6 (13 Feb 2022 15:00), Max: 38.2 (12 Feb 2022 20:00)  T(F): 99.7 (13 Feb 2022 15:00), Max: 100.8 (12 Feb 2022 20:00)  HR: 60 (13 Feb 2022 15:00) (56 - 71)  BP: 105/53 (13 Feb 2022 15:00) (84/47 - 118/70)  BP(mean): 65 (13 Feb 2022 15:00) (56 - 81)  RR: 21 (13 Feb 2022 15:00) (15 - 24)  SpO2: 95% (13 Feb 2022 15:00) (93% - 97%)    LABS/DIAGNOSTIC TESTS:                          12.8   17.10 )-----------( 297      ( 13 Feb 2022 03:27 )             41.4         02-13    131<L>  |  107  |  34<H>  ----------------------------<  315<H>  4.3   |  27  |  1.12    Ca    9.2      13 Feb 2022 03:27  Phos  3.2     02-13  Mg     1.9     02-13    TPro  7.2  /  Alb  2.7<L>  /  TBili  0.4  /  DBili  x   /  AST  19  /  ALT  24  /  AlkPhos  90  02-13      LIVER FUNCTIONS - ( 13 Feb 2022 03:27 )  Alb: 2.7 g/dL / Pro: 7.2 g/dL / ALK PHOS: 90 U/L / ALT: 24 U/L DA / AST: 19 U/L / GGT: x             CULTURES: Clean Catch Clean Catch (Midstream)  02-05 @ 04:22   Normal Urogenital brody present  --  --            RADIOLOGY:< from: Xray Chest 1 View- PORTABLE-Routine (Xray Chest 1 View- PORTABLE-Routine in AM.) (02.10.22 @ 08:55) >  ACC: [id number] EXAM:  XR CHEST PORTABLE ROUTINE 1V                        ACC: [id number] EXAM:  XR CHEST PORTABLE IMMED 1V                        ACC: [id number] EXAM:  XR CHEST PORTABLE ROUTINE 1V                          PROCEDURE DATE:  02/09/2022          INTERPRETATION:  Portable chest radiograph    CLINICAL INFORMATION: ET tube placement    TECHNIQUE:  Portable  AP chest radiograph.    COMPARISON: 2/7/2022 chest .    FINDINGS:  CATHETERS AND TUBES: ET tube tip above tracheal bifurcation.  RIGHT IJ catheter tip in SVC.    PULMONARY: Early LEFT retrocardiac airspace disease. Remaining lung   parenchyma clear..   No pneumothorax.    HEART/VASCULAR: The heart and mediastinum size and configuration are   within normal limits.    BONES: Visualized osseous structures are intact.    IMPRESSION:   ET tube tip above tracheal bifurcation. RIGHT IJ catheter   tip in SVC.  Early LEFT basilar airspace disease.  .    FOLLOW-UP AP PORTABLE CHEST RADIOGRAPH 2/9/2022 AT 10:12 PM:  ET tube tip above tracheal bifurcation.  NG tube tip beyond GE junction.  RIGHT IJ catheter tip in SVC.  Increased LEFT basilar diffuse airspace disease/consolidation obscuring   diaphragm contour. RIGHT lung parenchyma remains clear.  Cardiomegaly.    FOLLOW-UP AP PORTABLE CHEST RADIOGRAPH 2/10/2022 AT 8:25 AM:  Residual LEFT medial basilar airspace disease, otherwise no interval   change. Catheters and tubes in place.    --- End of Report ---            MELBA LE MD; Attending Radiologist  This document hasbeen electronically signed. Feb 10 2022  9:01AM    < end of copied text >        ROS:  [  ] UNABLE TO ELICIT ICU VISIT   56y Female who remains in the ICU , intubated , sedated and vent dependent on a FIO2 of 40% and PEEP of 5, she is not on any pressors. She had a fever last night but overall her fevers have been improving. Her WBC count is still elevated. She has a little worsening of her left lower lobe infiltrate.    Meds:  piperacillin/tazobactam IVPB.. 3.375 Gram(s) IV Intermittent every 8 hours    Allergies    Fioricet (Rash)  gadobutrol (Rash; Urticaria; Hives)  IV Contrast (Short breath)  mushroom (Unknown)  venlafaxine (Hives)    Intolerances        VITALS:  Vital Signs Last 24 Hrs  T(C): 37.6 (13 Feb 2022 15:00), Max: 38.2 (12 Feb 2022 20:00)  T(F): 99.7 (13 Feb 2022 15:00), Max: 100.8 (12 Feb 2022 20:00)  HR: 60 (13 Feb 2022 15:00) (56 - 71)  BP: 105/53 (13 Feb 2022 15:00) (84/47 - 118/70)  BP(mean): 65 (13 Feb 2022 15:00) (56 - 81)  RR: 21 (13 Feb 2022 15:00) (15 - 24)  SpO2: 95% (13 Feb 2022 15:00) (93% - 97%)    LABS/DIAGNOSTIC TESTS:                          12.8   17.10 )-----------( 297      ( 13 Feb 2022 03:27 )             41.4         02-13    131<L>  |  107  |  34<H>  ----------------------------<  315<H>  4.3   |  27  |  1.12    Ca    9.2      13 Feb 2022 03:27  Phos  3.2     02-13  Mg     1.9     02-13    TPro  7.2  /  Alb  2.7<L>  /  TBili  0.4  /  DBili  x   /  AST  19  /  ALT  24  /  AlkPhos  90  02-13      LIVER FUNCTIONS - ( 13 Feb 2022 03:27 )  Alb: 2.7 g/dL / Pro: 7.2 g/dL / ALK PHOS: 90 U/L / ALT: 24 U/L DA / AST: 19 U/L / GGT: x             CULTURES: Clean Catch Clean Catch (Midstream)  02-05 @ 04:22   Normal Urogenital brody present  --  --            RADIOLOGY:< from: Xray Chest 1 View- PORTABLE-Routine (Xray Chest 1 View- PORTABLE-Routine in AM.) (02.10.22 @ 08:55) >  ACC: [id number] EXAM:  XR CHEST PORTABLE ROUTINE 1V                        ACC: [id number] EXAM:  XR CHEST PORTABLE IMMED 1V                        ACC: [id number] EXAM:  XR CHEST PORTABLE ROUTINE 1V                          PROCEDURE DATE:  02/09/2022          INTERPRETATION:  Portable chest radiograph    CLINICAL INFORMATION: ET tube placement    TECHNIQUE:  Portable  AP chest radiograph.    COMPARISON: 2/7/2022 chest .    FINDINGS:  CATHETERS AND TUBES: ET tube tip above tracheal bifurcation.  RIGHT IJ catheter tip in SVC.    PULMONARY: Early LEFT retrocardiac airspace disease. Remaining lung   parenchyma clear..   No pneumothorax.    HEART/VASCULAR: The heart and mediastinum size and configuration are   within normal limits.    BONES: Visualized osseous structures are intact.    IMPRESSION:   ET tube tip above tracheal bifurcation. RIGHT IJ catheter   tip in SVC.  Early LEFT basilar airspace disease.  .    FOLLOW-UP AP PORTABLE CHEST RADIOGRAPH 2/9/2022 AT 10:12 PM:  ET tube tip above tracheal bifurcation.  NG tube tip beyond GE junction.  RIGHT IJ catheter tip in SVC.  Increased LEFT basilar diffuse airspace disease/consolidation obscuring   diaphragm contour. RIGHT lung parenchyma remains clear.  Cardiomegaly.    FOLLOW-UP AP PORTABLE CHEST RADIOGRAPH 2/10/2022 AT 8:25 AM:  Residual LEFT medial basilar airspace disease, otherwise no interval   change. Catheters and tubes in place.    --- End of Report ---            MELBA LE MD; Attending Radiologist  This document hasbeen electronically signed. Feb 10 2022  9:01AM    < end of copied text >        ROS:  [ x ] UNABLE TO ELICIT

## 2022-02-13 NOTE — PROGRESS NOTE ADULT - SUBJECTIVE AND OBJECTIVE BOX
INTERVAL HPI/OVERNIGHT EVENTS: Patient seen and examined at bedside. No events overnight.     PRESSORS: [ ] YES [ ] NO  WHICH:    Antimicrobial:  piperacillin/tazobactam IVPB.. 3.375 Gram(s) IV Intermittent every 8 hours    Cardiovascular:    Pulmonary:  ALBUTerol    90 MICROgram(s) HFA Inhaler 2 Puff(s) Inhalation every 6 hours  benzonatate 100 milliGRAM(s) Oral <User Schedule>  montelukast 10 milliGRAM(s) Oral at bedtime  tiotropium 18 MICROgram(s) Capsule 1 Capsule(s) Inhalation daily    Hematalogic:  aspirin  chewable 81 milliGRAM(s) Oral daily  heparin   Injectable 5000 Unit(s) SubCutaneous every 8 hours    Other:  acetaminophen    Suspension .. 650 milliGRAM(s) Oral every 6 hours PRN  atorvastatin 10 milliGRAM(s) Oral at bedtime  chlorhexidine 0.12% Liquid 15 milliLiter(s) Oral Mucosa two times a day  chlorhexidine 2% Cloths 1 Application(s) Topical <User Schedule>  dexAMETHasone  Injectable 4 milliGRAM(s) IV Push every 6 hours  dexMEDEtomidine Infusion 1.5 MICROgram(s)/kG/Hr IV Continuous <Continuous>  fentaNYL   Infusion. 1 MICROgram(s)/kG/Hr IV Continuous <Continuous>  influenza   Vaccine 0.5 milliLiter(s) IntraMuscular once  insulin glargine Injectable (LANTUS) 35 Unit(s) SubCutaneous at bedtime  insulin lispro (ADMELOG) corrective regimen sliding scale   SubCutaneous every 6 hours  insulin lispro Injectable (ADMELOG) 8 Unit(s) SubCutaneous every 6 hours  levothyroxine 112 MICROGram(s) Oral daily  lidocaine   4% Patch 2 Patch Transdermal daily  ondansetron Injectable 4 milliGRAM(s) IV Push every 6 hours PRN  pantoprazole  Injectable 40 milliGRAM(s) IV Push daily  propofol Infusion 40 MICROgram(s)/kG/Min IV Continuous <Continuous>  sodium chloride 0.9% lock flush 10 milliLiter(s) IV Push every 1 hour PRN    acetaminophen    Suspension .. 650 milliGRAM(s) Oral every 6 hours PRN  ALBUTerol    90 MICROgram(s) HFA Inhaler 2 Puff(s) Inhalation every 6 hours  aspirin  chewable 81 milliGRAM(s) Oral daily  atorvastatin 10 milliGRAM(s) Oral at bedtime  benzonatate 100 milliGRAM(s) Oral <User Schedule>  chlorhexidine 0.12% Liquid 15 milliLiter(s) Oral Mucosa two times a day  chlorhexidine 2% Cloths 1 Application(s) Topical <User Schedule>  dexAMETHasone  Injectable 4 milliGRAM(s) IV Push every 6 hours  dexMEDEtomidine Infusion 1.5 MICROgram(s)/kG/Hr IV Continuous <Continuous>  fentaNYL   Infusion. 1 MICROgram(s)/kG/Hr IV Continuous <Continuous>  heparin   Injectable 5000 Unit(s) SubCutaneous every 8 hours  influenza   Vaccine 0.5 milliLiter(s) IntraMuscular once  insulin glargine Injectable (LANTUS) 35 Unit(s) SubCutaneous at bedtime  insulin lispro (ADMELOG) corrective regimen sliding scale   SubCutaneous every 6 hours  insulin lispro Injectable (ADMELOG) 8 Unit(s) SubCutaneous every 6 hours  levothyroxine 112 MICROGram(s) Oral daily  lidocaine   4% Patch 2 Patch Transdermal daily  montelukast 10 milliGRAM(s) Oral at bedtime  ondansetron Injectable 4 milliGRAM(s) IV Push every 6 hours PRN  pantoprazole  Injectable 40 milliGRAM(s) IV Push daily  piperacillin/tazobactam IVPB.. 3.375 Gram(s) IV Intermittent every 8 hours  propofol Infusion 40 MICROgram(s)/kG/Min IV Continuous <Continuous>  sodium chloride 0.9% lock flush 10 milliLiter(s) IV Push every 1 hour PRN  tiotropium 18 MICROgram(s) Capsule 1 Capsule(s) Inhalation daily    Drug Dosing Weight  Height (cm): 155 (2022 18:53)  Weight (kg): 127 (2022 18:53)  BMI (kg/m2): 52.9 (2022 18:53)  BSA (m2): 2.18 (2022 18:53)    CENTRAL LINE: [ ] YES [ ] NO  LOCATION:   DATE INSERTED:  REMOVE: [ ] YES [ ] NO  EXPLAIN:    WILDER: [ ] YES [ ] NO    DATE INSERTED:  REMOVE:  [ ] YES [ ] NO  EXPLAIN:    A-LINE:  [ ] YES [ ] NO  LOCATION:   DATE INSERTED:  REMOVE:  [ ] YES [ ] NO  EXPLAIN:    PMH -reviewed admission note, no change since admission  PAST MEDICAL & SURGICAL HISTORY:  Hypertension  vaginal cyst    Hyperlipidemia    Asthma  last inhaler use with in 10 days, on Pulm follow up [id number]    Hypothyroid    Obstructive sleep apnea  refuses Cpap    Obesity  morbid    Trigeminal neuralgia  R    Fibromyalgia    DM (diabetes mellitus)  2    DJD (degenerative joint disease)  Cervical/Thoracic/Lumbar    Cervical neuralgia  difficulty moving my neck    Back pain  thoracic &amp; lumbar    H/O bursitis  right shoulder    Radicular pain  arms, legs    Vasculitis  Legs, forerhead, arms &amp; hands, flare ups in R leg  Dr susie Ribeiro is my Rheumatologist    Renal cell carcinoma, left  on follow up Dr schulte, HOD renal [facility], waiting for suregry in 2020    Falls frequently    Morbid obesity with body mass index (BMI) of 50.0 to 59.9 in adult    Psoriasis    Diverticulosis    S/P abdominal hysterectomy        S/P  section  1    Left adrenal mass  excision, benign     Vaginal cyst  removed     Vasculitis on nerve biopsy  , had another surgery called microvascular decompression         ICU Vital Signs Last 24 Hrs  T(C): 37.9 (2022 00:00), Max: 38.2 (2022 10:00)  T(F): 100.2 (2022 00:00), Max: 100.8 (2022 10:00)  HR: 59 (2022 00:08) (47 - 70)  BP: 107/60 (2022 00:00) (105/69 - 137/87)  BP(mean): 72 (2022 00:00) (72 - 99)  ABP: --  ABP(mean): --  RR: 17 (2022 00:00) (16 - 23)  SpO2: 97% (2022 00:08) (93% - 100%)      ABG - ( 2022 03:35 )  pH, Arterial: 7.46  pH, Blood: x     /  pCO2: 35    /  pO2: 70    / HCO3: 25    / Base Excess: 1.4   /  SaO2: 94                    -11 @ 07:01  -  02-12 @ 07:00  --------------------------------------------------------  IN: 2186.5 mL / OUT: 940 mL / NET: 1246.5 mL        Mode: AC/ CMV (Assist Control/ Continuous Mandatory Ventilation)  RR (machine): 21  TV (machine): 450  FiO2: 40  PEEP: 5  ITime: 0.8  MAP: 11  PIP: 27      PHYSICAL EXAM:    GENERAL: NAD, intubated, obese  HEAD:  Atraumatic, Normocephalic  EYES: EOMI, PERRLA, conjunctiva and sclera clear  ENMT: ETT in place, no lesions seen.   NECK: Supple, normal appearance, No JVD; Normal thyroid; Trachea midline  NERVOUS SYSTEM:  Alert & Oriented, moving all extremities, sensation intact.  writing to communicate while intubated.   CHEST/LUNG: No chest deformity. No rales, rhonchi, wheezing   HEART: Regular rate and rhythm; No murmurs, rubs, or gallops  ABDOMEN: Soft, Nontender, Nondistended; Bowel sounds present  EXTREMITIES:  2+ Peripheral Pulses, No clubbing, cyanosis, or edema  LYMPH: No lymphadenopathy noted  SKIN: Psoriatic lesions       LABS:  CBC Full  -  ( 2022 04:27 )  WBC Count : 10.07 K/uL  RBC Count : 5.14 M/uL  Hemoglobin : 12.0 g/dL  Hematocrit : 39.3 %  Platelet Count - Automated : 293 K/uL  Mean Cell Volume : 76.5 fl  Mean Cell Hemoglobin : 23.3 pg  Mean Cell Hemoglobin Concentration : 30.5 gm/dL  Auto Neutrophil # : 8.18 K/uL  Auto Lymphocyte # : 1.05 K/uL  Auto Monocyte # : 0.77 K/uL  Auto Eosinophil # : 0.00 K/uL  Auto Basophil # : 0.01 K/uL  Auto Neutrophil % : 81.3 %  Auto Lymphocyte % : 10.4 %  Auto Monocyte % : 7.6 %  Auto Eosinophil % : 0.0 %  Auto Basophil % : 0.1 %        137  |  103  |  26<H>  ----------------------------<  230<H>  4.4   |  28  |  0.81    Ca    9.2      2022 04:27  Phos  3.9       Mg     1.6         TPro  7.2  /  Alb  2.5<L>  /  TBili  0.5  /  DBili  x   /  AST  19  /  ALT  22  /  AlkPhos  89              RADIOLOGY & ADDITIONAL STUDIES REVIEWED:  ***    [ ]GOALS OF CARE DISCUSSION WITH PATIENT/FAMILY/PROXY:    CRITICAL CARE TIME SPENT: 35 minutes INTERVAL HPI/OVERNIGHT EVENTS: Patient seen and examined at bedside. No events overnight.     PRESSORS: [ ] YES [ ] NO  WHICH:    Antimicrobial:  piperacillin/tazobactam IVPB.. 3.375 Gram(s) IV Intermittent every 8 hours    Cardiovascular:    Pulmonary:  ALBUTerol    90 MICROgram(s) HFA Inhaler 2 Puff(s) Inhalation every 6 hours  benzonatate 100 milliGRAM(s) Oral <User Schedule>  montelukast 10 milliGRAM(s) Oral at bedtime  tiotropium 18 MICROgram(s) Capsule 1 Capsule(s) Inhalation daily    Hematalogic:  aspirin  chewable 81 milliGRAM(s) Oral daily  heparin   Injectable 5000 Unit(s) SubCutaneous every 8 hours    Other:  acetaminophen    Suspension .. 650 milliGRAM(s) Oral every 6 hours PRN  atorvastatin 10 milliGRAM(s) Oral at bedtime  chlorhexidine 0.12% Liquid 15 milliLiter(s) Oral Mucosa two times a day  chlorhexidine 2% Cloths 1 Application(s) Topical <User Schedule>  dexAMETHasone  Injectable 4 milliGRAM(s) IV Push every 6 hours  dexMEDEtomidine Infusion 1.5 MICROgram(s)/kG/Hr IV Continuous <Continuous>  fentaNYL   Infusion. 1 MICROgram(s)/kG/Hr IV Continuous <Continuous>  influenza   Vaccine 0.5 milliLiter(s) IntraMuscular once  insulin glargine Injectable (LANTUS) 35 Unit(s) SubCutaneous at bedtime  insulin lispro (ADMELOG) corrective regimen sliding scale   SubCutaneous every 6 hours  insulin lispro Injectable (ADMELOG) 8 Unit(s) SubCutaneous every 6 hours  levothyroxine 112 MICROGram(s) Oral daily  lidocaine   4% Patch 2 Patch Transdermal daily  ondansetron Injectable 4 milliGRAM(s) IV Push every 6 hours PRN  pantoprazole  Injectable 40 milliGRAM(s) IV Push daily  propofol Infusion 40 MICROgram(s)/kG/Min IV Continuous <Continuous>  sodium chloride 0.9% lock flush 10 milliLiter(s) IV Push every 1 hour PRN    acetaminophen    Suspension .. 650 milliGRAM(s) Oral every 6 hours PRN  ALBUTerol    90 MICROgram(s) HFA Inhaler 2 Puff(s) Inhalation every 6 hours  aspirin  chewable 81 milliGRAM(s) Oral daily  atorvastatin 10 milliGRAM(s) Oral at bedtime  benzonatate 100 milliGRAM(s) Oral <User Schedule>  chlorhexidine 0.12% Liquid 15 milliLiter(s) Oral Mucosa two times a day  chlorhexidine 2% Cloths 1 Application(s) Topical <User Schedule>  dexAMETHasone  Injectable 4 milliGRAM(s) IV Push every 6 hours  dexMEDEtomidine Infusion 1.5 MICROgram(s)/kG/Hr IV Continuous <Continuous>  fentaNYL   Infusion. 1 MICROgram(s)/kG/Hr IV Continuous <Continuous>  heparin   Injectable 5000 Unit(s) SubCutaneous every 8 hours  influenza   Vaccine 0.5 milliLiter(s) IntraMuscular once  insulin glargine Injectable (LANTUS) 35 Unit(s) SubCutaneous at bedtime  insulin lispro (ADMELOG) corrective regimen sliding scale   SubCutaneous every 6 hours  insulin lispro Injectable (ADMELOG) 8 Unit(s) SubCutaneous every 6 hours  levothyroxine 112 MICROGram(s) Oral daily  lidocaine   4% Patch 2 Patch Transdermal daily  montelukast 10 milliGRAM(s) Oral at bedtime  ondansetron Injectable 4 milliGRAM(s) IV Push every 6 hours PRN  pantoprazole  Injectable 40 milliGRAM(s) IV Push daily  piperacillin/tazobactam IVPB.. 3.375 Gram(s) IV Intermittent every 8 hours  propofol Infusion 40 MICROgram(s)/kG/Min IV Continuous <Continuous>  sodium chloride 0.9% lock flush 10 milliLiter(s) IV Push every 1 hour PRN  tiotropium 18 MICROgram(s) Capsule 1 Capsule(s) Inhalation daily    Drug Dosing Weight  Height (cm): 155 (2022 18:53)  Weight (kg): 127 (2022 18:53)  BMI (kg/m2): 52.9 (2022 18:53)  BSA (m2): 2.18 (2022 18:53)    CENTRAL LINE: [ ] YES [ ] NO  LOCATION:   DATE INSERTED:  REMOVE: [ ] YES [ ] NO  EXPLAIN:    WILDER: [ x] YES [ ] NO    DATE INSERTED:  REMOVE:  [ ] YES [ ] NO  EXPLAIN:    A-LINE:  [ ] YES [ ] NO  LOCATION:   DATE INSERTED:  REMOVE:  [ ] YES [ ] NO  EXPLAIN:    PMH -reviewed admission note, no change since admission  PAST MEDICAL & SURGICAL HISTORY:  Hypertension  vaginal cyst    Hyperlipidemia    Asthma  last inhaler use with in 10 days, on Pulm follow up [id number]    Hypothyroid    Obstructive sleep apnea  refuses Cpap    Obesity  morbid    Trigeminal neuralgia  R    Fibromyalgia    DM (diabetes mellitus)  2    DJD (degenerative joint disease)  Cervical/Thoracic/Lumbar    Cervical neuralgia  difficulty moving my neck    Back pain  thoracic &amp; lumbar    H/O bursitis  right shoulder    Radicular pain  arms, legs    Vasculitis  Legs, forerhead, arms &amp; hands, flare ups in R leg  Dr susie Ribeiro is my Rheumatologist    Renal cell carcinoma, left  on follow up Dr schulte, HOD renal [facility], waiting for suregry in 2020    Falls frequently    Morbid obesity with body mass index (BMI) of 50.0 to 59.9 in adult    Psoriasis    Diverticulosis    S/P abdominal hysterectomy        S/P  section  1    Left adrenal mass  excision, benign 2006    Vaginal cyst  removed     Vasculitis on nerve biopsy  , had another surgery called microvascular decompression         ICU Vital Signs Last 24 Hrs  T(C): 37.9 (2022 00:00), Max: 38.2 (2022 10:00)  T(F): 100.2 (2022 00:00), Max: 100.8 (2022 10:00)  HR: 59 (2022 00:08) (47 - 70)  BP: 107/60 (2022 00:00) (105/69 - 137/87)  BP(mean): 72 (2022 00:00) (72 - 99)  ABP: --  ABP(mean): --  RR: 17 (2022 00:00) (16 - 23)  SpO2: 97% (2022 00:08) (93% - 100%)      ABG - ( 2022 03:35 )  pH, Arterial: 7.46  pH, Blood: x     /  pCO2: 35    /  pO2: 70    / HCO3: 25    / Base Excess: 1.4   /  SaO2: 94                    -11 @ 07:01  -  -12 @ 07:00  --------------------------------------------------------  IN: 2186.5 mL / OUT: 940 mL / NET: 1246.5 mL        Mode: AC/ CMV (Assist Control/ Continuous Mandatory Ventilation)  RR (machine): 21  TV (machine): 450  FiO2: 40  PEEP: 5  ITime: 0.8  MAP: 11  PIP: 27      PHYSICAL EXAM:    GENERAL: NAD, intubated, obese  HEAD:  Atraumatic, Normocephalic  EYES: EOMI, PERRLA, conjunctiva and sclera clear  ENMT: ETT in place, no lesions seen.   NECK: Supple, normal appearance, No JVD; Normal thyroid; Trachea midline  NERVOUS SYSTEM:  sedated and open eyes to commands   CHEST/LUNG: No chest deformity. No rales, rhonchi, wheezing   HEART: Regular rate and rhythm; No murmurs, rubs, or gallops  ABDOMEN: Soft, Nontender, Nondistended; Bowel sounds present  EXTREMITIES:  2+ Peripheral Pulses, No clubbing, cyanosis, or edema  LYMPH: No lymphadenopathy noted  SKIN: Psoriatic lesions       LABS:  CBC Full  -  ( 2022 04:27 )  WBC Count : 10.07 K/uL  RBC Count : 5.14 M/uL  Hemoglobin : 12.0 g/dL  Hematocrit : 39.3 %  Platelet Count - Automated : 293 K/uL  Mean Cell Volume : 76.5 fl  Mean Cell Hemoglobin : 23.3 pg  Mean Cell Hemoglobin Concentration : 30.5 gm/dL  Auto Neutrophil # : 8.18 K/uL  Auto Lymphocyte # : 1.05 K/uL  Auto Monocyte # : 0.77 K/uL  Auto Eosinophil # : 0.00 K/uL  Auto Basophil # : 0.01 K/uL  Auto Neutrophil % : 81.3 %  Auto Lymphocyte % : 10.4 %  Auto Monocyte % : 7.6 %  Auto Eosinophil % : 0.0 %  Auto Basophil % : 0.1 %        137  |  103  |  26<H>  ----------------------------<  230<H>  4.4   |  28  |  0.81    Ca    9.2      2022 04:27  Phos  3.9       Mg     1.6         TPro  7.2  /  Alb  2.5<L>  /  TBili  0.5  /  DBili  x   /  AST  19  /  ALT  22  /  AlkPhos  89              RADIOLOGY & ADDITIONAL STUDIES REVIEWED:  ***    CXR:   [ ]GOALS OF CARE DISCUSSION WITH PATIENT/FAMILY/PROXY:    CRITICAL CARE TIME SPENT: 35 minutes INTERVAL HPI/OVERNIGHT EVENTS: Patient seen and examined at bedside. No events overnight. Patient was seen by  today. Failed leak test today, will attempt extubation tomorrow     PRESSORS: [ ] YES [X ] NO      Antimicrobial:  piperacillin/tazobactam IVPB.. 3.375 Gram(s) IV Intermittent every 8 hours    Cardiovascular:    Pulmonary:  ALBUTerol    90 MICROgram(s) HFA Inhaler 2 Puff(s) Inhalation every 6 hours  benzonatate 100 milliGRAM(s) Oral <User Schedule>  montelukast 10 milliGRAM(s) Oral at bedtime  tiotropium 18 MICROgram(s) Capsule 1 Capsule(s) Inhalation daily    Hematalogic:  aspirin  chewable 81 milliGRAM(s) Oral daily  heparin   Injectable 5000 Unit(s) SubCutaneous every 8 hours    Other:  acetaminophen    Suspension .. 650 milliGRAM(s) Oral every 6 hours PRN  atorvastatin 10 milliGRAM(s) Oral at bedtime  chlorhexidine 0.12% Liquid 15 milliLiter(s) Oral Mucosa two times a day  chlorhexidine 2% Cloths 1 Application(s) Topical <User Schedule>  dexAMETHasone  Injectable 4 milliGRAM(s) IV Push every 6 hours  dexMEDEtomidine Infusion 1.5 MICROgram(s)/kG/Hr IV Continuous <Continuous>  fentaNYL   Infusion. 1 MICROgram(s)/kG/Hr IV Continuous <Continuous>  influenza   Vaccine 0.5 milliLiter(s) IntraMuscular once  insulin glargine Injectable (LANTUS) 35 Unit(s) SubCutaneous at bedtime  insulin lispro (ADMELOG) corrective regimen sliding scale   SubCutaneous every 6 hours  insulin lispro Injectable (ADMELOG) 8 Unit(s) SubCutaneous every 6 hours  levothyroxine 112 MICROGram(s) Oral daily  lidocaine   4% Patch 2 Patch Transdermal daily  ondansetron Injectable 4 milliGRAM(s) IV Push every 6 hours PRN  pantoprazole  Injectable 40 milliGRAM(s) IV Push daily  propofol Infusion 40 MICROgram(s)/kG/Min IV Continuous <Continuous>  sodium chloride 0.9% lock flush 10 milliLiter(s) IV Push every 1 hour PRN    acetaminophen    Suspension .. 650 milliGRAM(s) Oral every 6 hours PRN  ALBUTerol    90 MICROgram(s) HFA Inhaler 2 Puff(s) Inhalation every 6 hours  aspirin  chewable 81 milliGRAM(s) Oral daily  atorvastatin 10 milliGRAM(s) Oral at bedtime  benzonatate 100 milliGRAM(s) Oral <User Schedule>  chlorhexidine 0.12% Liquid 15 milliLiter(s) Oral Mucosa two times a day  chlorhexidine 2% Cloths 1 Application(s) Topical <User Schedule>  dexAMETHasone  Injectable 4 milliGRAM(s) IV Push every 6 hours  dexMEDEtomidine Infusion 1.5 MICROgram(s)/kG/Hr IV Continuous <Continuous>  fentaNYL   Infusion. 1 MICROgram(s)/kG/Hr IV Continuous <Continuous>  heparin   Injectable 5000 Unit(s) SubCutaneous every 8 hours  influenza   Vaccine 0.5 milliLiter(s) IntraMuscular once  insulin glargine Injectable (LANTUS) 35 Unit(s) SubCutaneous at bedtime  insulin lispro (ADMELOG) corrective regimen sliding scale   SubCutaneous every 6 hours  insulin lispro Injectable (ADMELOG) 8 Unit(s) SubCutaneous every 6 hours  levothyroxine 112 MICROGram(s) Oral daily  lidocaine   4% Patch 2 Patch Transdermal daily  montelukast 10 milliGRAM(s) Oral at bedtime  ondansetron Injectable 4 milliGRAM(s) IV Push every 6 hours PRN  pantoprazole  Injectable 40 milliGRAM(s) IV Push daily  piperacillin/tazobactam IVPB.. 3.375 Gram(s) IV Intermittent every 8 hours  propofol Infusion 40 MICROgram(s)/kG/Min IV Continuous <Continuous>  sodium chloride 0.9% lock flush 10 milliLiter(s) IV Push every 1 hour PRN  tiotropium 18 MICROgram(s) Capsule 1 Capsule(s) Inhalation daily    Drug Dosing Weight  Height (cm): 155 (2022 18:53)  Weight (kg): 127 (2022 18:53)  BMI (kg/m2): 52.9 (2022 18:53)  BSA (m2): 2.18 (2022 18:53)    CENTRAL LINE: [ ] YES [ ] NO  LOCATION:   DATE INSERTED:  REMOVE: [ ] YES [ ] NO  EXPLAIN:    WILDER: [ x] YES [ ] NO    DATE INSERTED:  REMOVE:  [ ] YES [ ] NO  EXPLAIN:    A-LINE:  [ ] YES [ ] NO  LOCATION:   DATE INSERTED:  REMOVE:  [ ] YES [ ] NO  EXPLAIN:    PMH -reviewed admission note, no change since admission  PAST MEDICAL & SURGICAL HISTORY:  Hypertension  vaginal cyst    Hyperlipidemia    Asthma  last inhaler use with in 10 days, on Pulm follow up [id number]    Hypothyroid    Obstructive sleep apnea  refuses Cpap    Obesity  morbid    Trigeminal neuralgia  R    Fibromyalgia    DM (diabetes mellitus)  2    DJD (degenerative joint disease)  Cervical/Thoracic/Lumbar    Cervical neuralgia  difficulty moving my neck    Back pain  thoracic &amp; lumbar    H/O bursitis  right shoulder    Radicular pain  arms, legs    Vasculitis  Legs, forerhead, arms &amp; hands, flare ups in R leg  Dr susie Ribeiro is my Rheumatologist    Renal cell carcinoma, left  on follow up Dr schulte, HOD renal [facility], waiting for suregry in 2020    Falls frequently    Morbid obesity with body mass index (BMI) of 50.0 to 59.9 in adult    Psoriasis    Diverticulosis    S/P abdominal hysterectomy        S/P  section  1    Left adrenal mass  excision, benign     Vaginal cyst  removed     Vasculitis on nerve biopsy  , had another surgery called microvascular decompression         ICU Vital Signs Last 24 Hrs  T(C): 37.9 (2022 00:00), Max: 38.2 (2022 10:00)  T(F): 100.2 (2022 00:00), Max: 100.8 (2022 10:00)  HR: 59 (2022 00:08) (47 - 70)  BP: 107/60 (2022 00:00) (105/69 - 137/87)  BP(mean): 72 (2022 00:00) (72 - 99)  ABP: --  ABP(mean): --  RR: 17 (2022 00:00) (16 - 23)  SpO2: 97% (2022 00:08) (93% - 100%)      ABG - ( 2022 03:35 )  pH, Arterial: 7.46  pH, Blood: x     /  pCO2: 35    /  pO2: 70    / HCO3: 25    / Base Excess: 1.4   /  SaO2: 94                    - @ 07:01  -  -12 @ 07:00  --------------------------------------------------------  IN: 2186.5 mL / OUT: 940 mL / NET: 1246.5 mL        Mode: AC/ CMV (Assist Control/ Continuous Mandatory Ventilation)  RR (machine): 21  TV (machine): 450  FiO2: 40  PEEP: 5  ITime: 0.8  MAP: 11  PIP: 27      PHYSICAL EXAM:  GENERAL: NAD, intubated, obese  HEAD:  Atraumatic, Normocephalic  EYES: EOMI, PERRLA, conjunctiva and sclera clear  ENMT: ETT in place, no lesions seen.   NECK: Supple, normal appearance, No JVD; Normal thyroid; Trachea midline  NERVOUS SYSTEM:  sedated and open eyes to commands, responds when awake with writing  CHEST/LUNG: No chest deformity. No rales, rhonchi, wheezing   HEART: Regular rate and rhythm; No murmurs, rubs, or gallops  ABDOMEN: Soft, Nontender, Nondistended; Bowel sounds present, distended   EXTREMITIES:  2+ Peripheral Pulses, No clubbing, cyanosis, or edema  LYMPH: No lymphadenopathy noted  SKIN: Psoriatic lesions       LABS:  CBC Full  -  ( 2022 04:27 )  WBC Count : 10.07 K/uL  RBC Count : 5.14 M/uL  Hemoglobin : 12.0 g/dL  Hematocrit : 39.3 %  Platelet Count - Automated : 293 K/uL  Mean Cell Volume : 76.5 fl  Mean Cell Hemoglobin : 23.3 pg  Mean Cell Hemoglobin Concentration : 30.5 gm/dL  Auto Neutrophil # : 8.18 K/uL  Auto Lymphocyte # : 1.05 K/uL  Auto Monocyte # : 0.77 K/uL  Auto Eosinophil # : 0.00 K/uL  Auto Basophil # : 0.01 K/uL  Auto Neutrophil % : 81.3 %  Auto Lymphocyte % : 10.4 %  Auto Monocyte % : 7.6 %  Auto Eosinophil % : 0.0 %  Auto Basophil % : 0.1 %        137  |  103  |  26<H>  ----------------------------<  230<H>  4.4   |  28  |  0.81    Ca    9.2      2022 04:27  Phos  3.9       Mg     1.6         TPro  7.2  /  Alb  2.5<L>  /  TBili  0.5  /  DBili  x   /  AST  19  /  ALT  22  /  AlkPhos  89              RADIOLOGY & ADDITIONAL STUDIES REVIEWED:  ***    CXR:   [ ]GOALS OF CARE DISCUSSION WITH PATIENT/FAMILY/PROXY:    CRITICAL CARE TIME SPENT: 35 minutes

## 2022-02-14 LAB
ALBUMIN SERPL ELPH-MCNC: 2.5 G/DL — LOW (ref 3.5–5)
ALP SERPL-CCNC: 85 U/L — SIGNIFICANT CHANGE UP (ref 40–120)
ALT FLD-CCNC: 20 U/L DA — SIGNIFICANT CHANGE UP (ref 10–60)
ANION GAP SERPL CALC-SCNC: 6 MMOL/L — SIGNIFICANT CHANGE UP (ref 5–17)
AST SERPL-CCNC: 12 U/L — SIGNIFICANT CHANGE UP (ref 10–40)
BASE EXCESS BLDA CALC-SCNC: -1.5 MMOL/L — SIGNIFICANT CHANGE UP (ref -2–3)
BASOPHILS # BLD AUTO: 0.02 K/UL — SIGNIFICANT CHANGE UP (ref 0–0.2)
BASOPHILS NFR BLD AUTO: 0.1 % — SIGNIFICANT CHANGE UP (ref 0–2)
BILIRUB SERPL-MCNC: 0.4 MG/DL — SIGNIFICANT CHANGE UP (ref 0.2–1.2)
BLOOD GAS COMMENTS ARTERIAL: SIGNIFICANT CHANGE UP
BUN SERPL-MCNC: 33 MG/DL — HIGH (ref 7–18)
CALCIUM SERPL-MCNC: 8.6 MG/DL — SIGNIFICANT CHANGE UP (ref 8.4–10.5)
CHLORIDE SERPL-SCNC: 105 MMOL/L — SIGNIFICANT CHANGE UP (ref 96–108)
CO2 SERPL-SCNC: 25 MMOL/L — SIGNIFICANT CHANGE UP (ref 22–31)
CREAT SERPL-MCNC: 1.1 MG/DL — SIGNIFICANT CHANGE UP (ref 0.5–1.3)
EOSINOPHIL # BLD AUTO: 0 K/UL — SIGNIFICANT CHANGE UP (ref 0–0.5)
EOSINOPHIL NFR BLD AUTO: 0 % — SIGNIFICANT CHANGE UP (ref 0–6)
GLUCOSE BLDC GLUCOMTR-MCNC: 215 MG/DL — HIGH (ref 70–99)
GLUCOSE BLDC GLUCOMTR-MCNC: 221 MG/DL — HIGH (ref 70–99)
GLUCOSE BLDC GLUCOMTR-MCNC: 244 MG/DL — HIGH (ref 70–99)
GLUCOSE BLDC GLUCOMTR-MCNC: 310 MG/DL — HIGH (ref 70–99)
GLUCOSE BLDC GLUCOMTR-MCNC: 328 MG/DL — HIGH (ref 70–99)
GLUCOSE SERPL-MCNC: 334 MG/DL — HIGH (ref 70–99)
HCO3 BLDA-SCNC: 24 MMOL/L — SIGNIFICANT CHANGE UP (ref 21–28)
HCT VFR BLD CALC: 41.7 % — SIGNIFICANT CHANGE UP (ref 34.5–45)
HGB BLD-MCNC: 12.3 G/DL — SIGNIFICANT CHANGE UP (ref 11.5–15.5)
HOROWITZ INDEX BLDA+IHG-RTO: 40 — SIGNIFICANT CHANGE UP
IMM GRANULOCYTES NFR BLD AUTO: 0.8 % — SIGNIFICANT CHANGE UP (ref 0–1.5)
LYMPHOCYTES # BLD AUTO: 0.88 K/UL — LOW (ref 1–3.3)
LYMPHOCYTES # BLD AUTO: 5.1 % — LOW (ref 13–44)
MAGNESIUM SERPL-MCNC: 1.8 MG/DL — SIGNIFICANT CHANGE UP (ref 1.6–2.6)
MCHC RBC-ENTMCNC: 23.5 PG — LOW (ref 27–34)
MCHC RBC-ENTMCNC: 29.5 GM/DL — LOW (ref 32–36)
MCV RBC AUTO: 79.7 FL — LOW (ref 80–100)
MONOCYTES # BLD AUTO: 1.22 K/UL — HIGH (ref 0–0.9)
MONOCYTES NFR BLD AUTO: 7.1 % — SIGNIFICANT CHANGE UP (ref 2–14)
NEUTROPHILS # BLD AUTO: 14.88 K/UL — HIGH (ref 1.8–7.4)
NEUTROPHILS NFR BLD AUTO: 86.9 % — HIGH (ref 43–77)
NRBC # BLD: 0 /100 WBCS — SIGNIFICANT CHANGE UP (ref 0–0)
PCO2 BLDA: 41 MMHG — HIGH (ref 32–35)
PH BLDA: 7.37 — SIGNIFICANT CHANGE UP (ref 7.35–7.45)
PHOSPHATE SERPL-MCNC: 3.4 MG/DL — SIGNIFICANT CHANGE UP (ref 2.5–4.5)
PLATELET # BLD AUTO: 282 K/UL — SIGNIFICANT CHANGE UP (ref 150–400)
PO2 BLDA: 69 MMHG — LOW (ref 83–108)
POTASSIUM SERPL-MCNC: 4.6 MMOL/L — SIGNIFICANT CHANGE UP (ref 3.5–5.3)
POTASSIUM SERPL-SCNC: 4.6 MMOL/L — SIGNIFICANT CHANGE UP (ref 3.5–5.3)
PROT SERPL-MCNC: 6.8 G/DL — SIGNIFICANT CHANGE UP (ref 6–8.3)
RBC # BLD: 5.23 M/UL — HIGH (ref 3.8–5.2)
RBC # FLD: 16.5 % — HIGH (ref 10.3–14.5)
SAO2 % BLDA: 93 % — SIGNIFICANT CHANGE UP
SODIUM SERPL-SCNC: 136 MMOL/L — SIGNIFICANT CHANGE UP (ref 135–145)
WBC # BLD: 17.13 K/UL — HIGH (ref 3.8–10.5)
WBC # FLD AUTO: 17.13 K/UL — HIGH (ref 3.8–10.5)

## 2022-02-14 RX ORDER — METOCLOPRAMIDE HCL 10 MG
5 TABLET ORAL ONCE
Refills: 0 | Status: COMPLETED | OUTPATIENT
Start: 2022-02-14 | End: 2022-02-14

## 2022-02-14 RX ORDER — DEXMEDETOMIDINE HYDROCHLORIDE IN 0.9% SODIUM CHLORIDE 4 UG/ML
0.2 INJECTION INTRAVENOUS
Qty: 400 | Refills: 0 | Status: DISCONTINUED | OUTPATIENT
Start: 2022-02-14 | End: 2022-02-14

## 2022-02-14 RX ORDER — LACTULOSE 10 G/15ML
15 SOLUTION ORAL
Refills: 0 | Status: DISCONTINUED | OUTPATIENT
Start: 2022-02-14 | End: 2022-02-15

## 2022-02-14 RX ORDER — INSULIN LISPRO 100/ML
14 VIAL (ML) SUBCUTANEOUS EVERY 6 HOURS
Refills: 0 | Status: DISCONTINUED | OUTPATIENT
Start: 2022-02-14 | End: 2022-02-21

## 2022-02-14 RX ORDER — INSULIN GLARGINE 100 [IU]/ML
45 INJECTION, SOLUTION SUBCUTANEOUS AT BEDTIME
Refills: 0 | Status: DISCONTINUED | OUTPATIENT
Start: 2022-02-14 | End: 2022-02-22

## 2022-02-14 RX ADMIN — Medication 4 MILLIGRAM(S): at 05:10

## 2022-02-14 RX ADMIN — HEPARIN SODIUM 5000 UNIT(S): 5000 INJECTION INTRAVENOUS; SUBCUTANEOUS at 05:09

## 2022-02-14 RX ADMIN — POLYETHYLENE GLYCOL 3350 17 GRAM(S): 17 POWDER, FOR SOLUTION ORAL at 17:17

## 2022-02-14 RX ADMIN — ATORVASTATIN CALCIUM 10 MILLIGRAM(S): 80 TABLET, FILM COATED ORAL at 21:32

## 2022-02-14 RX ADMIN — CHLORHEXIDINE GLUCONATE 15 MILLILITER(S): 213 SOLUTION TOPICAL at 05:17

## 2022-02-14 RX ADMIN — ALBUTEROL 2 PUFF(S): 90 AEROSOL, METERED ORAL at 22:00

## 2022-02-14 RX ADMIN — MONTELUKAST 10 MILLIGRAM(S): 4 TABLET, CHEWABLE ORAL at 21:32

## 2022-02-14 RX ADMIN — LACTULOSE 15 GRAM(S): 10 SOLUTION ORAL at 17:17

## 2022-02-14 RX ADMIN — Medication 650 MILLIGRAM(S): at 04:33

## 2022-02-14 RX ADMIN — PANTOPRAZOLE SODIUM 40 MILLIGRAM(S): 20 TABLET, DELAYED RELEASE ORAL at 12:29

## 2022-02-14 RX ADMIN — Medication 4: at 12:11

## 2022-02-14 RX ADMIN — PROPOFOL 30.5 MICROGRAM(S)/KG/MIN: 10 INJECTION, EMULSION INTRAVENOUS at 00:25

## 2022-02-14 RX ADMIN — HEPARIN SODIUM 5000 UNIT(S): 5000 INJECTION INTRAVENOUS; SUBCUTANEOUS at 14:52

## 2022-02-14 RX ADMIN — Medication 100 MILLIGRAM(S): at 17:16

## 2022-02-14 RX ADMIN — Medication 4: at 23:46

## 2022-02-14 RX ADMIN — Medication 8: at 05:31

## 2022-02-14 RX ADMIN — CHLORHEXIDINE GLUCONATE 1 APPLICATION(S): 213 SOLUTION TOPICAL at 05:18

## 2022-02-14 RX ADMIN — Medication 4: at 18:16

## 2022-02-14 RX ADMIN — Medication 5 MILLIGRAM(S): at 21:32

## 2022-02-14 RX ADMIN — Medication 10 UNIT(S): at 05:32

## 2022-02-14 RX ADMIN — PROPOFOL 30.5 MICROGRAM(S)/KG/MIN: 10 INJECTION, EMULSION INTRAVENOUS at 03:56

## 2022-02-14 RX ADMIN — HEPARIN SODIUM 5000 UNIT(S): 5000 INJECTION INTRAVENOUS; SUBCUTANEOUS at 21:32

## 2022-02-14 RX ADMIN — ALBUTEROL 2 PUFF(S): 90 AEROSOL, METERED ORAL at 15:42

## 2022-02-14 RX ADMIN — PIPERACILLIN AND TAZOBACTAM 25 GRAM(S): 4; .5 INJECTION, POWDER, LYOPHILIZED, FOR SOLUTION INTRAVENOUS at 21:32

## 2022-02-14 RX ADMIN — PROPOFOL 30.5 MICROGRAM(S)/KG/MIN: 10 INJECTION, EMULSION INTRAVENOUS at 07:16

## 2022-02-14 RX ADMIN — INSULIN GLARGINE 45 UNIT(S): 100 INJECTION, SOLUTION SUBCUTANEOUS at 23:46

## 2022-02-14 RX ADMIN — Medication 100 MILLIGRAM(S): at 05:07

## 2022-02-14 RX ADMIN — Medication 112 MICROGRAM(S): at 05:07

## 2022-02-14 RX ADMIN — POLYETHYLENE GLYCOL 3350 17 GRAM(S): 17 POWDER, FOR SOLUTION ORAL at 05:07

## 2022-02-14 RX ADMIN — SENNA PLUS 10 MILLILITER(S): 8.6 TABLET ORAL at 21:32

## 2022-02-14 RX ADMIN — PIPERACILLIN AND TAZOBACTAM 25 GRAM(S): 4; .5 INJECTION, POWDER, LYOPHILIZED, FOR SOLUTION INTRAVENOUS at 05:07

## 2022-02-14 RX ADMIN — Medication 650 MILLIGRAM(S): at 02:23

## 2022-02-14 RX ADMIN — ONDANSETRON 4 MILLIGRAM(S): 8 TABLET, FILM COATED ORAL at 17:27

## 2022-02-14 RX ADMIN — ALBUTEROL 2 PUFF(S): 90 AEROSOL, METERED ORAL at 08:17

## 2022-02-14 RX ADMIN — PIPERACILLIN AND TAZOBACTAM 25 GRAM(S): 4; .5 INJECTION, POWDER, LYOPHILIZED, FOR SOLUTION INTRAVENOUS at 14:52

## 2022-02-14 RX ADMIN — DEXMEDETOMIDINE HYDROCHLORIDE IN 0.9% SODIUM CHLORIDE 47.6 MICROGRAM(S)/KG/HR: 4 INJECTION INTRAVENOUS at 06:41

## 2022-02-14 RX ADMIN — DEXMEDETOMIDINE HYDROCHLORIDE IN 0.9% SODIUM CHLORIDE 47.6 MICROGRAM(S)/KG/HR: 4 INJECTION INTRAVENOUS at 03:07

## 2022-02-14 RX ADMIN — ALBUTEROL 2 PUFF(S): 90 AEROSOL, METERED ORAL at 04:10

## 2022-02-14 NOTE — PROGRESS NOTE ADULT - ASSESSMENT
55 yo F, from home, ambulated with cane , PMHx of obesity, asthma, chronic bronchitis, NAKUL non compliant with CPAP over night, HTN, DM, HLD, fibromyalgia, anxiety, trigeminal neuralgia, degenerative joint disease, ?IgA vasculitis, psoriasis, OA came with chief complain of chest pain and dysuria. patient admitted to [facility], Troponin x 2 negative,  CTAP- 2/4- showed Skin thickening with subcutaneous stranding in the right lower quadrant anterior abdominal wall c/w cellulitis. pt received Zithromax and Rocephin in ED x1 , Abx dc,d due to negative UA, pt started on keflex 500mg qid for cellulitis , hospital course complicated with encephalopathy and ATN   on 2/5, Cr increased from 0.83 to 3.1,  IV cefazolin started in setting of possible sepsis 2/2 cellulitis. pt noted to progressively getting altered over 2/6, VBG remarkable for PH :7.14, pco2 82, pt was placed on Bi-pap reportedly over night.  Patient found to be more lethargic in the morning.  Unable to perform ROS. ABG showed acute on chronic respiratory acidosis , Hypercapnia 91, respiratory placed pt on AVAPS . ICU consulted for further evaluation. Patient will require  intubation , will transfer to ICU for further work-up.     Assessment:    Acute hypercapnic hypoxic respiratory failure   NAKUL requiring CPAP at night (non -compliant)  Cellulitis  Chronic bronchitis   Asthma   sepsis  Psoriasis   Fibromyalgia   DM  Hypothyroidism       Plan:    =====================[CNS ]==============================  # encephalopathy  - resolved  - Likely due to hypercapnia , PCO2:91  - Intubated  - propofol, precedex  - now awake and following commands, responds via writing on paper   - Failed leak test, will attempt extubation again today with anesthesia at baseline     # History of Fibromyalgia on gabapentin, flexeril, Tylenol, aleve, oxcarbazepine for pain at home  - Lidoderm 4% patch 2 patches daily  - continue to hold all home meds  - fentanyl  =====================[CVS ]==========================  # HTN :    - BP currently acceptable off medication   - monitor BP in setting of ATN / sepsis   - keep MAP >65    =====================[RESP ]==============================  # Acute hypoxic/ hypercapnic respiratory failure - resolved  - likely in setting of NAKUL / chronic bronchitis / asthma   - mechanical ventilation   - d-dimer wnl   - Monitor ABG   - Hypercapnia resolved   - Following simple commands   - possible extubation today     #? tracheal/laryngeal edema vs body habitus obstruction  - failed leak test prior to extubation x2  - continue on decadron 4mg q 6  - will extubate today    =====================[ GI ]============================  # Constipation  - tube feeds  - c/w PPI for GI prophylaxis   - started bowel regiment   - monitor BM  ====================[ RENAL ]=============================   # FERNIE:   - improving  - pt with good urine output, net neg in 24 hrs  -Strict I/o, Avoid Nephrotoxic agents  -Maintain MAP>65-70 mm Hg  - Monitor BMP and electrolytes    # abnormal electrolytes:   - Hyperkalemia resolved   - Hyperphosphatemia     # Left renal lesion chronic  - outpatient f/u with Urology     =====================[ ID ]============================  # Cellulitis   -  was on Cefazolin  - spiked fevers, escalated to zosyn  - normal wbc  - UA negative for WBC , f/u UC   - ID Dr Jacobsen following       ===================[ HEME/ONC ]===========================  # anemia of chronic illness  -monitor cbc      =====================[ ENDO ]======================  # DM :   - target CBG < 180  - currently on  Lantus and HSS , adjust as needed  - lantus 45, admelog 14, ss  - uncontrolled due to steroids    # Hypothyroidism   - f/u TSH in setting of encephalopathy   - c/w synthroid      ================= Skin/Catheters============================  # cellulitis likley in setting of Psoriasis  - c/w IV ABx   - pt has  Bustamante    ==================[ PROPHYLAXIS ]==========================   # Dvt : c/u SQH , f/u D dimer   # Gi : Protonix     ====================[ DISPO ]======================   - Monitor in ICU     ===================[ PROGNOSIS ]====================  - Guarded

## 2022-02-14 NOTE — PROGRESS NOTE ADULT - SUBJECTIVE AND OBJECTIVE BOX
Romeo Ribeiro MD (Nephrology progress note)  [address], [facility],  [id number],  [id number]  TEl: [phone]  Cell: [phone]    Patient is a 56y Female seen and evaluated at bedside. Vital signs, laboratory data, imaging studies, consult notes reviewed done within past 24 hours. Overnight events noted. Patient remains critically ill on ventilatory support alert and awake in no distress. Interval stable renal function with S cr 1.1 with non oliguria.    Allergies    Fioricet (Rash)  gadobutrol (Rash; Urticaria; Hives)  IV Contrast (Short breath)  mushroom (Unknown)  venlafaxine (Hives)    Intolerances        ROS:  Limited  Alert and awake on ventilatory suppport    VITALS:    T(C): 38.2 (02-14-22 @ 10:04), Max: 38.2 (02-14-22 @ 02:15)  HR: 61 (02-14-22 @ 10:04) (58 - 71)  BP: 98/58 (02-14-22 @ 10:04) (86/44 - 122/68)  RR: 19 (02-14-22 @ 10:04) (15 - 24)  SpO2: 98% (02-14-22 @ 10:04) (91% - 98%)  CAPILLARY BLOOD GLUCOSE      POCT Blood Glucose.: 310 mg/dL (14 Feb 2022 07:08)  POCT Blood Glucose.: 328 mg/dL (14 Feb 2022 05:15)  POCT Blood Glucose.: 281 mg/dL (13 Feb 2022 23:04)  POCT Blood Glucose.: 308 mg/dL (13 Feb 2022 18:05)  POCT Blood Glucose.: 295 mg/dL (13 Feb 2022 11:38)      02-13-22 @ 07:01  -  02-14-22 @ 07:00  --------------------------------------------------------  IN: 2676.6 mL / OUT: 1025 mL / NET: 1651.6 mL    02-14-22 @ 07:01  -  02-14-22 @ 10:50  --------------------------------------------------------  IN: 133.8 mL / OUT: 80 mL / NET: 53.8 mL      MEDICATIONS  (STANDING):  ALBUTerol    90 MICROgram(s) HFA Inhaler 2 Puff(s) Inhalation every 6 hours  aspirin  chewable 81 milliGRAM(s) Oral daily  atorvastatin 10 milliGRAM(s) Oral at bedtime  benzonatate 100 milliGRAM(s) Oral <User Schedule>  chlorhexidine 0.12% Liquid 15 milliLiter(s) Oral Mucosa two times a day  chlorhexidine 2% Cloths 1 Application(s) Topical <User Schedule>  dexAMETHasone  Injectable 4 milliGRAM(s) IV Push every 6 hours  dexMEDEtomidine Infusion 0.2 MICROgram(s)/kG/Hr (6.35 mL/Hr) IV Continuous <Continuous>  heparin   Injectable 5000 Unit(s) SubCutaneous every 8 hours  influenza   Vaccine 0.5 milliLiter(s) IntraMuscular once  insulin glargine Injectable (LANTUS) 40 Unit(s) SubCutaneous at bedtime  insulin lispro (ADMELOG) corrective regimen sliding scale   SubCutaneous every 6 hours  insulin lispro Injectable (ADMELOG) 10 Unit(s) SubCutaneous every 6 hours  lactulose Syrup 15 Gram(s) Oral two times a day  levothyroxine 112 MICROGram(s) Oral daily  lidocaine   4% Patch 2 Patch Transdermal daily  montelukast 10 milliGRAM(s) Oral at bedtime  naloxegol 12.5 milliGRAM(s) Oral daily  pantoprazole  Injectable 40 milliGRAM(s) IV Push daily  piperacillin/tazobactam IVPB.. 3.375 Gram(s) IV Intermittent every 8 hours  polyethylene glycol 3350 17 Gram(s) Oral every 12 hours  senna Syrup 10 milliLiter(s) Oral at bedtime  tiotropium 18 MICROgram(s) Capsule 1 Capsule(s) Inhalation daily    MEDICATIONS  (PRN):  acetaminophen    Suspension .. 650 milliGRAM(s) Oral every 6 hours PRN Temp greater or equal to 38C (100.4F), Mild Pain (1 - 3)  ondansetron Injectable 4 milliGRAM(s) IV Push every 6 hours PRN Nausea and/or Vomiting  sodium chloride 0.9% lock flush 10 milliLiter(s) IV Push every 1 hour PRN Pre/post blood products, medications, blood draw, and to maintain line patency      PHYSICAL EXAM:  GENERAL: Alert and oriented in no distress   HEENT: RITA, EOMI, neck supple, no JVP, no carotid bruit, oral mucosa moist and pale, ETT in place.  CHEST/LUNG: Bilateral decreased breath sounds at bases, bibasilar rhonchi, no wheezing  HEART: Regular rate and rhythm, LAURA II/VI at LPSB, no gallops, no rub   ABDOMEN: Soft, distende bowel sounds present, no palpable organomegaly  : No flank or supra pubic tenderness.  EXTREMITIES: Peripheral pulses are palpable, no pedal edema  Neurology: Alert and awake in no distress  SKIN: No rash or skin lesion  Musculoskeletal: No joint deformity or spinal tenderness.      Vascular ACCESS:     LABS:                        12.3   17.13 )-----------( 282      ( 14 Feb 2022 04:32 )             41.7     02-14    136  |  105  |  33<H>  ----------------------------<  334<H>  4.6   |  25  |  1.10    Ca    8.6      14 Feb 2022 04:32  Phos  3.4     02-14  Mg     1.8     02-14    TPro  6.8  /  Alb  2.5<L>  /  TBili  0.4  /  DBili  x   /  AST  12  /  ALT  20  /  AlkPhos  85  02-14        Urinalysis Basic - ( 13 Feb 2022 15:08 )    Color: x / Appearance: x / SG: x / pH: x  Gluc: x / Ketone: x  / Bili: x / Urobili: x   Blood: x / Protein: x / Nitrite: x   Leuk Esterase: x / RBC: 5-10 /HPF / WBC 0-2 /HPF   Sq Epi: x / Non Sq Epi: Occasional /HPF / Bacteria: Trace /HPF      Sodium, Random Urine: 43 mmol/L (02-13 @ 15:07)  Osmolality, Random Urine: 962 mos/kg (02-13 @ 15:07)        RADIOLOGY & ADDITIONAL STUDIES:    Imaging Personally Reviewed:  [x] YES  [ ] NO    Consultant(s) Notes Reviewed:  [x] YES  [ ] NO    Care Discussed with Primary team/ Other Providers [x] YES  [ ] NO

## 2022-02-14 NOTE — PROGRESS NOTE ADULT - SUBJECTIVE AND OBJECTIVE BOX
INTERVAL HPI/OVERNIGHT EVENTS: Patient seen and examined at bedside. No events overnight. Pt with low grade 10, wbc stable at 17, on abx. Following commands, writing to communicate. No BMs started on bowel regimen.    PRESSORS: [ ] YES [ x] NO  WHICH:    Antimicrobial:  piperacillin/tazobactam IVPB.. 3.375 Gram(s) IV Intermittent every 8 hours    Cardiovascular:    Pulmonary:  ALBUTerol    90 MICROgram(s) HFA Inhaler 2 Puff(s) Inhalation every 6 hours  benzonatate 100 milliGRAM(s) Oral <User Schedule>  montelukast 10 milliGRAM(s) Oral at bedtime  tiotropium 18 MICROgram(s) Capsule 1 Capsule(s) Inhalation daily    Hematalogic:  aspirin  chewable 81 milliGRAM(s) Oral daily  heparin   Injectable 5000 Unit(s) SubCutaneous every 8 hours    Other:  acetaminophen    Suspension .. 650 milliGRAM(s) Oral every 6 hours PRN  atorvastatin 10 milliGRAM(s) Oral at bedtime  chlorhexidine 0.12% Liquid 15 milliLiter(s) Oral Mucosa two times a day  chlorhexidine 2% Cloths 1 Application(s) Topical <User Schedule>  dexAMETHasone  Injectable 4 milliGRAM(s) IV Push every 6 hours  dexMEDEtomidine Infusion 0.2 MICROgram(s)/kG/Hr IV Continuous <Continuous>  influenza   Vaccine 0.5 milliLiter(s) IntraMuscular once  insulin glargine Injectable (LANTUS) 40 Unit(s) SubCutaneous at bedtime  insulin lispro (ADMELOG) corrective regimen sliding scale   SubCutaneous every 6 hours  insulin lispro Injectable (ADMELOG) 10 Unit(s) SubCutaneous every 6 hours  lactulose Syrup 15 Gram(s) Oral two times a day  levothyroxine 112 MICROGram(s) Oral daily  lidocaine   4% Patch 2 Patch Transdermal daily  naloxegol 12.5 milliGRAM(s) Oral daily  ondansetron Injectable 4 milliGRAM(s) IV Push every 6 hours PRN  pantoprazole  Injectable 40 milliGRAM(s) IV Push daily  polyethylene glycol 3350 17 Gram(s) Oral every 12 hours  senna Syrup 10 milliLiter(s) Oral at bedtime  sodium chloride 0.9% lock flush 10 milliLiter(s) IV Push every 1 hour PRN    acetaminophen    Suspension .. 650 milliGRAM(s) Oral every 6 hours PRN  ALBUTerol    90 MICROgram(s) HFA Inhaler 2 Puff(s) Inhalation every 6 hours  aspirin  chewable 81 milliGRAM(s) Oral daily  atorvastatin 10 milliGRAM(s) Oral at bedtime  benzonatate 100 milliGRAM(s) Oral <User Schedule>  chlorhexidine 0.12% Liquid 15 milliLiter(s) Oral Mucosa two times a day  chlorhexidine 2% Cloths 1 Application(s) Topical <User Schedule>  dexAMETHasone  Injectable 4 milliGRAM(s) IV Push every 6 hours  dexMEDEtomidine Infusion 0.2 MICROgram(s)/kG/Hr IV Continuous <Continuous>  heparin   Injectable 5000 Unit(s) SubCutaneous every 8 hours  influenza   Vaccine 0.5 milliLiter(s) IntraMuscular once  insulin glargine Injectable (LANTUS) 40 Unit(s) SubCutaneous at bedtime  insulin lispro (ADMELOG) corrective regimen sliding scale   SubCutaneous every 6 hours  insulin lispro Injectable (ADMELOG) 10 Unit(s) SubCutaneous every 6 hours  lactulose Syrup 15 Gram(s) Oral two times a day  levothyroxine 112 MICROGram(s) Oral daily  lidocaine   4% Patch 2 Patch Transdermal daily  montelukast 10 milliGRAM(s) Oral at bedtime  naloxegol 12.5 milliGRAM(s) Oral daily  ondansetron Injectable 4 milliGRAM(s) IV Push every 6 hours PRN  pantoprazole  Injectable 40 milliGRAM(s) IV Push daily  piperacillin/tazobactam IVPB.. 3.375 Gram(s) IV Intermittent every 8 hours  polyethylene glycol 3350 17 Gram(s) Oral every 12 hours  senna Syrup 10 milliLiter(s) Oral at bedtime  sodium chloride 0.9% lock flush 10 milliLiter(s) IV Push every 1 hour PRN  tiotropium 18 MICROgram(s) Capsule 1 Capsule(s) Inhalation daily    Drug Dosing Weight  Height (cm): 155 (2022 18:53)  Weight (kg): 127 (2022 18:53)  BMI (kg/m2): 52.9 (2022 18:53)  BSA (m2): 2.18 (2022 18:53)    CENTRAL LINE: [x ] YES [ ] NO  LOCATION:   DATE INSERTED:  REMOVE: [ ] YES [ ] NO  EXPLAIN:    WILDER: [x ] YES [ ] NO    DATE INSERTED:  REMOVE:  [ ] YES [ ] NO  EXPLAIN:    A-LINE:  [ ] YES [ ] NO  LOCATION:   DATE INSERTED:  REMOVE:  [ ] YES [ ] NO  EXPLAIN:    PMH -reviewed admission note, no change since admission  PAST MEDICAL & SURGICAL HISTORY:  Hypertension  vaginal cyst    Hyperlipidemia    Asthma  last inhaler use with in 10 days, on Pulm follow up [id number]    Hypothyroid    Obstructive sleep apnea  refuses Cpap    Obesity  morbid    Trigeminal neuralgia  R    Fibromyalgia    DM (diabetes mellitus)  2    DJD (degenerative joint disease)  Cervical/Thoracic/Lumbar    Cervical neuralgia  difficulty moving my neck    Back pain  thoracic &amp; lumbar    H/O bursitis  right shoulder    Radicular pain  arms, legs    Vasculitis  Legs, forerhead, arms &amp; hands, flare ups in R leg  Dr susie Ribeiro is my Rheumatologist    Renal cell carcinoma, left  on follow up Dr schulte, [facility] renal [facility], waiting for suregry in 2020    Falls frequently    Morbid obesity with body mass index (BMI) of 50.0 to 59.9 in adult    Psoriasis    Diverticulosis    S/P abdominal hysterectomy        S/P  section  1    Left adrenal mass  excision, benign     Vaginal cyst  removed     Vasculitis on nerve biopsy  , had another surgery called microvascular decompression         ICU Vital Signs Last 24 Hrs  T(C): 38.1 (2022 11:00), Max: 38.2 (2022 02:15)  T(F): 100.6 (2022 11:00), Max: 100.8 (2022 02:15)  HR: 104 (2022 11:00) (58 - 104)  BP: 132/85 (2022 11:00) (86/44 - 132/85)  BP(mean): 97 (2022 11:00) (47 - 97)  ABP: --  ABP(mean): --  RR: 26 (2022 11:00) (19 - 26)  SpO2: 96% (2022 11:00) (91% - 98%)      ABG - ( 2022 04:27 )  pH, Arterial: 7.37  pH, Blood: x     /  pCO2: 41    /  pO2: 69    / HCO3: 24    / Base Excess: -1.5  /  SaO2: 93                    -13 @ 07:01  -  -14 @ 07:00  --------------------------------------------------------  IN: 2676.6 mL / OUT: 1025 mL / NET: 1651.6 mL        Mode: AC/ CMV (Assist Control/ Continuous Mandatory Ventilation)  RR (machine): 21  TV (machine): 450  FiO2: 40  PEEP: 5  ITime: 0.8  MAP: 8  PIP: 20      PHYSICAL EXAM:    GENERAL: NAD, intubated, obese  HEAD:  Atraumatic, Normocephalic  EYES: EOMI, PERRLA, conjunctiva and sclera clear  ENMT: ETT in place, no lesions seen.   NECK: Supple, normal appearance, No JVD; Normal thyroid; Trachea midline  NERVOUS SYSTEM:  awake, following commands, moving extremities, writing to communicate.   CHEST/LUNG: CTA bl,  No rales, rhonchi, wheezing   HEART: Regular rate and rhythm; No murmurs, rubs, or gallops  ABDOMEN: Tense, distended, diminished bs  EXTREMITIES:  2+ Peripheral Pulses, No clubbing, cyanosis, or edema  LYMPH: No lymphadenopathy noted  SKIN: Psoriatic lesions       LABS:  CBC Full  -  ( 2022 04:32 )  WBC Count : 17.13 K/uL  RBC Count : 5.23 M/uL  Hemoglobin : 12.3 g/dL  Hematocrit : 41.7 %  Platelet Count - Automated : 282 K/uL  Mean Cell Volume : 79.7 fl  Mean Cell Hemoglobin : 23.5 pg  Mean Cell Hemoglobin Concentration : 29.5 gm/dL  Auto Neutrophil # : 14.88 K/uL  Auto Lymphocyte # : 0.88 K/uL  Auto Monocyte # : 1.22 K/uL  Auto Eosinophil # : 0.00 K/uL  Auto Basophil # : 0.02 K/uL  Auto Neutrophil % : 86.9 %  Auto Lymphocyte % : 5.1 %  Auto Monocyte % : 7.1 %  Auto Eosinophil % : 0.0 %  Auto Basophil % : 0.1 %        136  |  105  |  33<H>  ----------------------------<  334<H>  4.6   |  25  |  1.10    Ca    8.6      2022 04:32  Phos  3.4     -  Mg     1.8     -    TPro  6.8  /  Alb  2.5<L>  /  TBili  0.4  /  DBili  x   /  AST  12  /  ALT  20  /  AlkPhos  85  -      Urinalysis Basic - ( 2022 15:08 )    Color: x / Appearance: x / SG: x / pH: x  Gluc: x / Ketone: x  / Bili: x / Urobili: x   Blood: x / Protein: x / Nitrite: x   Leuk Esterase: x / RBC: 5-10 /HPF / WBC 0-2 /HPF   Sq Epi: x / Non Sq Epi: Occasional /HPF / Bacteria: Trace /HPF          RADIOLOGY & ADDITIONAL STUDIES REVIEWED:  ***    [ ]GOALS OF CARE DISCUSSION WITH PATIENT/FAMILY/PROXY:    CRITICAL CARE TIME SPENT: 35 minutes

## 2022-02-14 NOTE — PROGRESS NOTE ADULT - ASSESSMENT
55 yo F, from home, ambulated with cane , PMHx of obesity, asthma, chronic bronchitis, NAKUL, HTN, DM, HLD, fibromyalgia, anxiety, trigeminal neuralgia, degenerative joint disease, ?IgA vasculitis, psoriasis, OA came with chief complain of chest pain and dysuria. Nephrology consult called for significant FERNIE    1) Nonoliguric FERNIE now resolved likely renal hypoperfusion with superimposed hypertensive/diabetic nephropathy  2) AMS with hypercapnic respiratory failure on ventilatory support with PNA  3) Hypertension  4) Diabetic Nephropathy with protenuria/neuropathy  5) Morbid Obesity  6) Abdominal wall cellulitis  7) Left renal lesion chronic  8) Hyponatremia with Na level 131    Recommend:  Strict I/o  S cr 1.1 with non oliguria  Avoid Nephrotoxic agents  Maintain MAP>65-70 mm Hg  Adjust antibiotics as per renal dose adjustment  Bustamante's catheter for strict I/o  Monitor BMP and electrolytes daily  Ventilatory management per ICU team  Treatment of sepsis/cellulitis/PNA/Ventilatory support  per primary/ID team with renal dose adjustment of Abx  No urgent need for RRT/HD  Will follow

## 2022-02-14 NOTE — PHYSICAL THERAPY INITIAL EVALUATION ADULT - DIAGNOSIS, PT EVAL
impaired cardiopulmonary status; generalized weakness; difficulty performing bed mobility, transfers, and ambulation

## 2022-02-14 NOTE — PHYSICAL THERAPY INITIAL EVALUATION ADULT - PATIENT/FAMILY/SIGNIFICANT OTHER GOALS STATEMENT, PT EVAL
return home; be able to perform usual mobility and functional tasks independently using a single-tip cane

## 2022-02-14 NOTE — PHYSICAL THERAPY INITIAL EVALUATION ADULT - GENERAL OBSERVATIONS, REHAB EVAL
awake, alert, currently on O2@6L/min via NC; + triple lumen central venous catheter on right IJ; + peripheral IV access on both forearms

## 2022-02-15 LAB
ALBUMIN SERPL ELPH-MCNC: 2.5 G/DL — LOW (ref 3.5–5)
ALP SERPL-CCNC: 97 U/L — SIGNIFICANT CHANGE UP (ref 40–120)
ALT FLD-CCNC: 27 U/L DA — SIGNIFICANT CHANGE UP (ref 10–60)
ANION GAP SERPL CALC-SCNC: 4 MMOL/L — LOW (ref 5–17)
ANION GAP SERPL CALC-SCNC: 8 MMOL/L — SIGNIFICANT CHANGE UP (ref 5–17)
AST SERPL-CCNC: 28 U/L — SIGNIFICANT CHANGE UP (ref 10–40)
BASOPHILS # BLD AUTO: 0.04 K/UL — SIGNIFICANT CHANGE UP (ref 0–0.2)
BASOPHILS NFR BLD AUTO: 0.2 % — SIGNIFICANT CHANGE UP (ref 0–2)
BILIRUB SERPL-MCNC: 0.5 MG/DL — SIGNIFICANT CHANGE UP (ref 0.2–1.2)
BUN SERPL-MCNC: 22 MG/DL — HIGH (ref 7–18)
BUN SERPL-MCNC: 38 MG/DL — HIGH (ref 7–18)
CALCIUM SERPL-MCNC: 8.5 MG/DL — SIGNIFICANT CHANGE UP (ref 8.4–10.5)
CALCIUM SERPL-MCNC: 9 MG/DL — SIGNIFICANT CHANGE UP (ref 8.4–10.5)
CHLORIDE SERPL-SCNC: 107 MMOL/L — SIGNIFICANT CHANGE UP (ref 96–108)
CHLORIDE SERPL-SCNC: 109 MMOL/L — HIGH (ref 96–108)
CO2 SERPL-SCNC: 23 MMOL/L — SIGNIFICANT CHANGE UP (ref 22–31)
CO2 SERPL-SCNC: 27 MMOL/L — SIGNIFICANT CHANGE UP (ref 22–31)
CREAT SERPL-MCNC: 0.67 MG/DL — SIGNIFICANT CHANGE UP (ref 0.5–1.3)
CREAT SERPL-MCNC: 1.17 MG/DL — SIGNIFICANT CHANGE UP (ref 0.5–1.3)
EOSINOPHIL # BLD AUTO: 0.11 K/UL — SIGNIFICANT CHANGE UP (ref 0–0.5)
EOSINOPHIL NFR BLD AUTO: 0.5 % — SIGNIFICANT CHANGE UP (ref 0–6)
GLUCOSE BLDC GLUCOMTR-MCNC: 156 MG/DL — HIGH (ref 70–99)
GLUCOSE BLDC GLUCOMTR-MCNC: 167 MG/DL — HIGH (ref 70–99)
GLUCOSE BLDC GLUCOMTR-MCNC: 168 MG/DL — HIGH (ref 70–99)
GLUCOSE SERPL-MCNC: 158 MG/DL — HIGH (ref 70–99)
GLUCOSE SERPL-MCNC: 301 MG/DL — HIGH (ref 70–99)
HCT VFR BLD CALC: 40.5 % — SIGNIFICANT CHANGE UP (ref 34.5–45)
HGB BLD-MCNC: 12.3 G/DL — SIGNIFICANT CHANGE UP (ref 11.5–15.5)
IMM GRANULOCYTES NFR BLD AUTO: 1.5 % — SIGNIFICANT CHANGE UP (ref 0–1.5)
LYMPHOCYTES # BLD AUTO: 1.97 K/UL — SIGNIFICANT CHANGE UP (ref 1–3.3)
LYMPHOCYTES # BLD AUTO: 8.9 % — LOW (ref 13–44)
MAGNESIUM SERPL-MCNC: 1.7 MG/DL — SIGNIFICANT CHANGE UP (ref 1.6–2.6)
MAGNESIUM SERPL-MCNC: 1.7 MG/DL — SIGNIFICANT CHANGE UP (ref 1.6–2.6)
MCHC RBC-ENTMCNC: 23.9 PG — LOW (ref 27–34)
MCHC RBC-ENTMCNC: 30.4 GM/DL — LOW (ref 32–36)
MCV RBC AUTO: 78.6 FL — LOW (ref 80–100)
MONOCYTES # BLD AUTO: 2.19 K/UL — HIGH (ref 0–0.9)
MONOCYTES NFR BLD AUTO: 9.9 % — SIGNIFICANT CHANGE UP (ref 2–14)
NEUTROPHILS # BLD AUTO: 17.39 K/UL — HIGH (ref 1.8–7.4)
NEUTROPHILS NFR BLD AUTO: 79 % — HIGH (ref 43–77)
NRBC # BLD: 0 /100 WBCS — SIGNIFICANT CHANGE UP (ref 0–0)
PHOSPHATE SERPL-MCNC: 1.8 MG/DL — LOW (ref 2.5–4.5)
PHOSPHATE SERPL-MCNC: 3.2 MG/DL — SIGNIFICANT CHANGE UP (ref 2.5–4.5)
PLATELET # BLD AUTO: 324 K/UL — SIGNIFICANT CHANGE UP (ref 150–400)
POTASSIUM SERPL-MCNC: 3.3 MMOL/L — LOW (ref 3.5–5.3)
POTASSIUM SERPL-MCNC: 3.9 MMOL/L — SIGNIFICANT CHANGE UP (ref 3.5–5.3)
POTASSIUM SERPL-SCNC: 3.3 MMOL/L — LOW (ref 3.5–5.3)
POTASSIUM SERPL-SCNC: 3.9 MMOL/L — SIGNIFICANT CHANGE UP (ref 3.5–5.3)
PROT SERPL-MCNC: 6.9 G/DL — SIGNIFICANT CHANGE UP (ref 6–8.3)
RBC # BLD: 5.15 M/UL — SIGNIFICANT CHANGE UP (ref 3.8–5.2)
RBC # FLD: 16.5 % — HIGH (ref 10.3–14.5)
SODIUM SERPL-SCNC: 138 MMOL/L — SIGNIFICANT CHANGE UP (ref 135–145)
SODIUM SERPL-SCNC: 140 MMOL/L — SIGNIFICANT CHANGE UP (ref 135–145)
WBC # BLD: 22.04 K/UL — HIGH (ref 3.8–10.5)
WBC # FLD AUTO: 22.04 K/UL — HIGH (ref 3.8–10.5)

## 2022-02-15 PROCEDURE — 74018 RADEX ABDOMEN 1 VIEW: CPT | Mod: 26

## 2022-02-15 RX ORDER — PIPERACILLIN AND TAZOBACTAM 4; .5 G/20ML; G/20ML
3.38 INJECTION, POWDER, LYOPHILIZED, FOR SOLUTION INTRAVENOUS EVERY 8 HOURS
Refills: 0 | Status: DISCONTINUED | OUTPATIENT
Start: 2022-02-15 | End: 2022-02-16

## 2022-02-15 RX ORDER — SODIUM CHLORIDE 9 MG/ML
1000 INJECTION INTRAMUSCULAR; INTRAVENOUS; SUBCUTANEOUS ONCE
Refills: 0 | Status: COMPLETED | OUTPATIENT
Start: 2022-02-15 | End: 2022-02-15

## 2022-02-15 RX ORDER — PHENYLEPHRINE HYDROCHLORIDE 10 MG/ML
0.3 INJECTION INTRAVENOUS
Qty: 160 | Refills: 0 | Status: DISCONTINUED | OUTPATIENT
Start: 2022-02-15 | End: 2022-02-15

## 2022-02-15 RX ORDER — POTASSIUM CHLORIDE 20 MEQ
20 PACKET (EA) ORAL
Refills: 0 | Status: COMPLETED | OUTPATIENT
Start: 2022-02-15 | End: 2022-02-15

## 2022-02-15 RX ORDER — POTASSIUM PHOSPHATE, MONOBASIC POTASSIUM PHOSPHATE, DIBASIC 236; 224 MG/ML; MG/ML
30 INJECTION, SOLUTION INTRAVENOUS ONCE
Refills: 0 | Status: COMPLETED | OUTPATIENT
Start: 2022-02-15 | End: 2022-02-15

## 2022-02-15 RX ORDER — PANTOPRAZOLE SODIUM 20 MG/1
40 TABLET, DELAYED RELEASE ORAL DAILY
Refills: 0 | Status: DISCONTINUED | OUTPATIENT
Start: 2022-02-15 | End: 2022-02-16

## 2022-02-15 RX ORDER — MAGNESIUM SULFATE 500 MG/ML
1 VIAL (ML) INJECTION ONCE
Refills: 0 | Status: COMPLETED | OUTPATIENT
Start: 2022-02-15 | End: 2022-02-15

## 2022-02-15 RX ORDER — METOCLOPRAMIDE HCL 10 MG
10 TABLET ORAL EVERY 8 HOURS
Refills: 0 | Status: DISCONTINUED | OUTPATIENT
Start: 2022-02-15 | End: 2022-02-22

## 2022-02-15 RX ADMIN — LIDOCAINE 1 PATCH: 4 CREAM TOPICAL at 13:19

## 2022-02-15 RX ADMIN — POTASSIUM PHOSPHATE, MONOBASIC POTASSIUM PHOSPHATE, DIBASIC 83.33 MILLIMOLE(S): 236; 224 INJECTION, SOLUTION INTRAVENOUS at 22:50

## 2022-02-15 RX ADMIN — Medication 10 MILLIGRAM(S): at 07:40

## 2022-02-15 RX ADMIN — Medication 50 MILLIEQUIVALENT(S): at 07:13

## 2022-02-15 RX ADMIN — ONDANSETRON 4 MILLIGRAM(S): 8 TABLET, FILM COATED ORAL at 03:08

## 2022-02-15 RX ADMIN — ALBUTEROL 2 PUFF(S): 90 AEROSOL, METERED ORAL at 14:19

## 2022-02-15 RX ADMIN — ALBUTEROL 2 PUFF(S): 90 AEROSOL, METERED ORAL at 09:52

## 2022-02-15 RX ADMIN — Medication 50 MILLIEQUIVALENT(S): at 07:41

## 2022-02-15 RX ADMIN — Medication 8: at 05:02

## 2022-02-15 RX ADMIN — CHLORHEXIDINE GLUCONATE 15 MILLILITER(S): 213 SOLUTION TOPICAL at 17:43

## 2022-02-15 RX ADMIN — Medication 14 UNIT(S): at 12:27

## 2022-02-15 RX ADMIN — CHLORHEXIDINE GLUCONATE 1 APPLICATION(S): 213 SOLUTION TOPICAL at 05:01

## 2022-02-15 RX ADMIN — PIPERACILLIN AND TAZOBACTAM 25 GRAM(S): 4; .5 INJECTION, POWDER, LYOPHILIZED, FOR SOLUTION INTRAVENOUS at 21:26

## 2022-02-15 RX ADMIN — Medication 100 GRAM(S): at 10:00

## 2022-02-15 RX ADMIN — Medication 50 MILLIEQUIVALENT(S): at 09:51

## 2022-02-15 RX ADMIN — Medication 81 MILLIGRAM(S): at 13:19

## 2022-02-15 RX ADMIN — INSULIN GLARGINE 45 UNIT(S): 100 INJECTION, SOLUTION SUBCUTANEOUS at 23:16

## 2022-02-15 RX ADMIN — HEPARIN SODIUM 5000 UNIT(S): 5000 INJECTION INTRAVENOUS; SUBCUTANEOUS at 05:01

## 2022-02-15 RX ADMIN — HEPARIN SODIUM 5000 UNIT(S): 5000 INJECTION INTRAVENOUS; SUBCUTANEOUS at 21:26

## 2022-02-15 RX ADMIN — SODIUM CHLORIDE 1000 MILLILITER(S): 9 INJECTION INTRAMUSCULAR; INTRAVENOUS; SUBCUTANEOUS at 07:58

## 2022-02-15 RX ADMIN — LIDOCAINE 2 PATCH: 4 CREAM TOPICAL at 18:02

## 2022-02-15 RX ADMIN — Medication 2: at 12:14

## 2022-02-15 RX ADMIN — Medication 112 MICROGRAM(S): at 05:01

## 2022-02-15 RX ADMIN — Medication 100 MILLIGRAM(S): at 17:41

## 2022-02-15 RX ADMIN — Medication 2: at 17:56

## 2022-02-15 RX ADMIN — ALBUTEROL 2 PUFF(S): 90 AEROSOL, METERED ORAL at 11:57

## 2022-02-15 RX ADMIN — Medication 100 MILLIGRAM(S): at 05:00

## 2022-02-15 RX ADMIN — ALBUTEROL 2 PUFF(S): 90 AEROSOL, METERED ORAL at 21:27

## 2022-02-15 RX ADMIN — Medication 2: at 23:18

## 2022-02-15 RX ADMIN — Medication 14 UNIT(S): at 17:57

## 2022-02-15 RX ADMIN — PIPERACILLIN AND TAZOBACTAM 25 GRAM(S): 4; .5 INJECTION, POWDER, LYOPHILIZED, FOR SOLUTION INTRAVENOUS at 05:00

## 2022-02-15 RX ADMIN — ONDANSETRON 4 MILLIGRAM(S): 8 TABLET, FILM COATED ORAL at 10:19

## 2022-02-15 RX ADMIN — HEPARIN SODIUM 5000 UNIT(S): 5000 INJECTION INTRAVENOUS; SUBCUTANEOUS at 13:27

## 2022-02-15 RX ADMIN — PANTOPRAZOLE SODIUM 40 MILLIGRAM(S): 20 TABLET, DELAYED RELEASE ORAL at 07:41

## 2022-02-15 RX ADMIN — SODIUM CHLORIDE 1000 MILLILITER(S): 9 INJECTION INTRAMUSCULAR; INTRAVENOUS; SUBCUTANEOUS at 08:37

## 2022-02-15 NOTE — PROGRESS NOTE ADULT - ASSESSMENT
Pneumonia - vent associated   Fevers - improved  Abdominal  wall cellulitis - resolved  Leukocytosis    Plan - Cont Zosyn 3.375 gms iv q8hrs x 3 days more  Time spent - 30 mins.

## 2022-02-15 NOTE — PROGRESS NOTE ADULT - SUBJECTIVE AND OBJECTIVE BOX
ICU VISIT  56y Female    Meds:    Allergies    Fioricet (Rash)  gadobutrol (Rash; Urticaria; Hives)  IV Contrast (Short breath)  mushroom (Unknown)  venlafaxine (Hives)    Intolerances        VITALS:  Vital Signs Last 24 Hrs  T(C): 36.2 (15 Feb 2022 05:29), Max: 37.4 (15 Feb 2022 00:00)  T(F): 97.1 (15 Feb 2022 05:29), Max: 99.3 (15 Feb 2022 00:00)  HR: 107 (15 Feb 2022 18:00) (86 - 137)  BP: 114/66 (15 Feb 2022 18:00) (41/29 - 151/126)  BP(mean): 76 (15 Feb 2022 18:00) (24 - 133)  RR: 25 (15 Feb 2022 18:00) (9 - 31)  SpO2: 97% (15 Feb 2022 18:00) (88% - 100%)    LABS/DIAGNOSTIC TESTS:                          12.3   22.04 )-----------( 324      ( 15 Feb 2022 04:28 )             40.5         02-15    138  |  107  |  38<H>  ----------------------------<  301<H>  3.3<L>   |  23  |  1.17    Ca    9.0      15 Feb 2022 04:29  Phos  3.2     02-15  Mg     1.7     02-15    TPro  6.9  /  Alb  2.5<L>  /  TBili  0.5  /  DBili  x   /  AST  28  /  ALT  27  /  AlkPhos  97  02-15      LIVER FUNCTIONS - ( 15 Feb 2022 04:29 )  Alb: 2.5 g/dL / Pro: 6.9 g/dL / ALK PHOS: 97 U/L / ALT: 27 U/L DA / AST: 28 U/L / GGT: x             CULTURES: Clean Catch Clean Catch (Midstream)  02-05 @ 04:22   Normal Urogenital brody present  --  --            RADIOLOGY:      ROS:  [  ] UNABLE TO ELICIT ICU VISIT  56y Female who is looking great, she has been extubated and is currently on a nasal cannula and not sob though she still has a cough with sputum production. She denies any chest pain, no abdominal pain either and has not had any fevers in 24hrs. Her abxs auto  today and so will have ICU team restart then them for 2-3 days more to complete a total of 10days. Her WBC count is still high.    Meds:    Allergies    Fioricet (Rash)  gadobutrol (Rash; Urticaria; Hives)  IV Contrast (Short breath)  mushroom (Unknown)  venlafaxine (Hives)    Intolerances        VITALS:  Vital Signs Last 24 Hrs  T(C): 36.2 (15 Feb 2022 05:29), Max: 37.4 (15 Feb 2022 00:00)  T(F): 97.1 (15 Feb 2022 05:29), Max: 99.3 (15 Feb 2022 00:00)  HR: 107 (15 Feb 2022 18:00) (86 - 137)  BP: 114/66 (15 Feb 2022 18:00) (41/29 - 151/126)  BP(mean): 76 (15 Feb 2022 18:00) (24 - 133)  RR: 25 (15 Feb 2022 18:00) (9 - 31)  SpO2: 97% (15 Feb 2022 18:00) (88% - 100%)    LABS/DIAGNOSTIC TESTS:                          12.3   22.04 )-----------( 324      ( 15 Feb 2022 04:28 )             40.5         -15    138  |  107  |  38<H>  ----------------------------<  301<H>  3.3<L>   |  23  |  1.17    Ca    9.0      15 Feb 2022 04:29  Phos  3.2     02-15  Mg     1.7     -15    TPro  6.9  /  Alb  2.5<L>  /  TBili  0.5  /  DBili  x   /  AST  28  /  ALT  27  /  AlkPhos  97  02-15      LIVER FUNCTIONS - ( 15 Feb 2022 04:29 )  Alb: 2.5 g/dL / Pro: 6.9 g/dL / ALK PHOS: 97 U/L / ALT: 27 U/L DA / AST: 28 U/L / GGT: x             CULTURES: Clean Catch Clean Catch (Midstream)  - @ 04:22   Normal Urogenital brody present  --  --            RADIOLOGY:      ROS:  [  ] UNABLE TO ELICIT

## 2022-02-15 NOTE — PROGRESS NOTE ADULT - ASSESSMENT
57 yo F, from home, ambulated with cane , PMHx of obesity, asthma, chronic bronchitis, NAKUL, HTN, DM, HLD, fibromyalgia, anxiety, trigeminal neuralgia, degenerative joint disease, ?IgA vasculitis, psoriasis, OA came with chief complain of chest pain and dysuria. Nephrology consult called for significant FERNIE    1) Nonoliguric FERNIE now resolved likely renal hypoperfusion with superimposed hypertensive/diabetic nephropathy  2) AMS with hypercapnic respiratory failure now off ventilatory support with PNA  3) Hypertension  4) Diabetic Nephropathy with protenuria/neuropathy  5) Morbid Obesity  6) Abdominal wall cellulitis  7) Left renal lesion chronic    Recommend:  Strict I/o  S cr 1.1 with non oliguria  Avoid Nephrotoxic agents  Maintain MAP>65-70 mm Hg  Adjust antibiotics as per renal dose adjustment  Bustamante's catheter for strict I/o  Monitor BMP and electrolytes daily  Ventilatory management per ICU team  Treatment of sepsis/cellulitis/PNA/Ventilatory support  per primary/ID team with renal dose adjustment of Abx  No urgent need for RRT/HD  Will follow

## 2022-02-15 NOTE — PROGRESS NOTE ADULT - SUBJECTIVE AND OBJECTIVE BOX
Romeo Ribeiro MD (Nephrology progress note)  [address], [facility],  [id number],  [facility]  TEl: [phone]  Cell: [phone]    Patient is a 56y Female seen and evaluated at bedside. Vital signs, laboratory data, imaging studies, consult notes reviewed done within past 24 hours. Overnight events noted. Patient with interval extubation now remains with coughing. Interval stable renal function with non oliguria.    Allergies    Fioricet (Rash)  gadobutrol (Rash; Urticaria; Hives)  IV Contrast (Short breath)  mushroom (Unknown)  venlafaxine (Hives)    Intolerances        ROS:  Limited  Patient lying in bed with     VITALS:    T(C): 36.2 (02-15-22 @ 05:29), Max: 37.4 (02-15-22 @ 00:00)  HR: 107 (02-15-22 @ 13:02) (86 - 137)  BP: 114/69 (02-15-22 @ 13:02) (41/29 - 151/126)  RR: 18 (02-15-22 @ 13:02) (9 - 31)  SpO2: 96% (02-15-22 @ 13:02) (88% - 100%)  CAPILLARY BLOOD GLUCOSE      POCT Blood Glucose.: 168 mg/dL (15 Feb 2022 12:08)  POCT Blood Glucose.: 244 mg/dL (14 Feb 2022 23:41)  POCT Blood Glucose.: 215 mg/dL (14 Feb 2022 18:15)      02-14-22 @ 07:01  -  02-15-22 @ 07:00  --------------------------------------------------------  IN: 233.8 mL / OUT: 403 mL / NET: -169.2 mL      MEDICATIONS  (STANDING):  ALBUTerol    90 MICROgram(s) HFA Inhaler 2 Puff(s) Inhalation every 6 hours  aspirin  chewable 81 milliGRAM(s) Oral daily  atorvastatin 10 milliGRAM(s) Oral at bedtime  benzonatate 100 milliGRAM(s) Oral <User Schedule>  chlorhexidine 0.12% Liquid 15 milliLiter(s) Oral Mucosa two times a day  chlorhexidine 2% Cloths 1 Application(s) Topical <User Schedule>  heparin   Injectable 5000 Unit(s) SubCutaneous every 8 hours  influenza   Vaccine 0.5 milliLiter(s) IntraMuscular once  insulin glargine Injectable (LANTUS) 45 Unit(s) SubCutaneous at bedtime  insulin lispro (ADMELOG) corrective regimen sliding scale   SubCutaneous every 6 hours  insulin lispro Injectable (ADMELOG) 14 Unit(s) SubCutaneous every 6 hours  levothyroxine 112 MICROGram(s) Oral daily  lidocaine   4% Patch 2 Patch Transdermal daily  montelukast 10 milliGRAM(s) Oral at bedtime  pantoprazole  Injectable 40 milliGRAM(s) IV Push daily    MEDICATIONS  (PRN):  acetaminophen    Suspension .. 650 milliGRAM(s) Oral every 6 hours PRN Temp greater or equal to 38C (100.4F), Mild Pain (1 - 3)  metoclopramide Injectable 10 milliGRAM(s) IV Push every 8 hours PRN nausea/vomiting  ondansetron Injectable 4 milliGRAM(s) IV Push every 6 hours PRN Nausea and/or Vomiting  sodium chloride 0.9% lock flush 10 milliLiter(s) IV Push every 1 hour PRN Pre/post blood products, medications, blood draw, and to maintain line patency      PHYSICAL EXAM:  GENERAL: Alert, awake and oriented x2-3 in no distress  HEENT: RITA, EOMI, neck supple, no JVP  CHEST/LUNG: Bilateral decreased breath sounds, bibasilar rhonchi, no wheezing  HEART: Regular rate and rhythm, LAURA II/VI at LPSB, no gallops, no rub   ABDOMEN: Soft, nontender, non distended, bowel sounds present  : No flank or supra pubic tenderness.  EXTREMITIES: Peripheral pulses are palpable, no pedal edema  Neurology: AAOx2-3, no focal neurological deficit  SKIN: No rash or skin lesion  Musculoskeletal: No joint deformity or spinal tenderness.      Vascular ACCESS:     LABS:                        12.3   22.04 )-----------( 324      ( 15 Feb 2022 04:28 )             40.5     02-15    138  |  107  |  38<H>  ----------------------------<  301<H>  3.3<L>   |  23  |  1.17    Ca    9.0      15 Feb 2022 04:29  Phos  3.2     02-15  Mg     1.7     02-15    TPro  6.9  /  Alb  2.5<L>  /  TBili  0.5  /  DBili  x   /  AST  28  /  ALT  27  /  AlkPhos  97  02-15        Urinalysis Basic - ( 13 Feb 2022 15:08 )    Color: x / Appearance: x / SG: x / pH: x  Gluc: x / Ketone: x  / Bili: x / Urobili: x   Blood: x / Protein: x / Nitrite: x   Leuk Esterase: x / RBC: 5-10 /HPF / WBC 0-2 /HPF   Sq Epi: x / Non Sq Epi: Occasional /HPF / Bacteria: Trace /HPF      Sodium, Random Urine: 43 mmol/L (02-13 @ 15:07)  Osmolality, Random Urine: 962 mos/kg (02-13 @ 15:07)        RADIOLOGY & ADDITIONAL STUDIES:  < from: Xray Abdomen 1 View PORTABLE -Urgent (Xray Abdomen 1 View PORTABLE -Urgent .) (02.15.22 @ 14:03) >    ACC: [id number] EXAM:  XR ABDOMEN PORTABLE URGENT 1V                          PROCEDURE DATE:  02/15/2022          INTERPRETATION:  KUB: Upright abdominal radiograph    COMPARISON: CT abdomen 2/4/2022.    CLINICAL INFORMATION: Abdominal pain. Nausea and vomiting    FINDINGS:  Air distended hepatic flexure. Remaining visualized upper abdominal bowel   pattern unremarkable.  No free intra-abdominal air.  No abnormal calcifications.  The osseous structures are intact.    IMPRESSION:  Upper abdominalupright radiograph  No acute radiographic intra-abdominal findings.    --- End of Report ---            MELBA LE MD; Attending Radiologist  This document has been electronically signed. Feb 15 2022  2:53PM    < end of copied text >    Imaging Personally Reviewed:  [x] YES  [ ] NO    Consultant(s) Notes Reviewed:  [x] YES  [ ] NO    Care Discussed with Primary team/ Other Providers [x] YES  [ ] NO

## 2022-02-15 NOTE — CHART NOTE - NSCHARTNOTEFT_GEN_A_CORE
Spoke to patients SonMarquis, over the phone, updated on current status, including extubation, nausea, vomiting and diarrhea.

## 2022-02-15 NOTE — PROGRESS NOTE ADULT - ASSESSMENT
57 yo F, from home, ambulated with cane , PMHx of obesity, asthma, chronic bronchitis, NAKUL non compliant with CPAP over night, HTN, DM, HLD, fibromyalgia, anxiety, trigeminal neuralgia, degenerative joint disease, ?IgA vasculitis, psoriasis, OA came with chief complain of chest pain and dysuria. patient admitted to [facility], Troponin x 2 negative,  CTAP- 2/4- showed Skin thickening with subcutaneous stranding in the right lower quadrant anterior abdominal wall c/w cellulitis. pt received Zithromax and Rocephin in ED x1 , Abx dc,d due to negative UA, pt started on keflex 500mg qid for cellulitis , hospital course complicated with encephalopathy and ATN   on 2/5, Cr increased from 0.83 to 3.1,  IV cefazolin started in setting of possible sepsis 2/2 cellulitis. pt noted to progressively getting altered over 2/6, VBG remarkable for PH :7.14, pco2 82, pt was placed on Bi-pap reportedly over night.  Patient found to be more lethargic in the morning.  Unable to perform ROS. ABG showed acute on chronic respiratory acidosis , Hypercapnia 91, respiratory placed pt on AVAPS . ICU consulted for further evaluation. Patient will require  intubation , will transfer to ICU for further work-up.     Assessment:    Acute hypercapnic hypoxic respiratory failure   NAKUL requiring CPAP at night (non -compliant)  Cellulitis  Chronic bronchitis   Asthma   sepsis  Psoriasis   Fibromyalgia   DM  Hypothyroidism       Plan:    =====================[CNS ]==============================  # encephalopathy  - resolved  - Likely due to hypercapnia , PCO2:91 - resolved  - extubated, off sedation  - AO x 3  - Bipap at night    # History of Fibromyalgia on gabapentin, flexeril, Tylenol, aleve, oxcarbazepine for pain at home  - Lidoderm 4% patch 2 patches daily  - continue to hold all home meds  - fentanyl  =====================[CVS ]==========================  # HTN :    - BP currently acceptable off medication   - monitor BP in setting of ATN / sepsis   - keep MAP >65    - hypotensive in am due to gi loss  - sp fluid resuscitation with improvement    =====================[RESP ]==========================  # Acute hypoxic/ hypercapnic respiratory failure - resolved  - likely in setting of NAKUL / chronic bronchitis / asthma   - extubated on 2/14  - continue bipap at night  - nasal canula day time  - monitor mentation    =====================[ GI ]============================  # Diarrhea  - patient started on multiple stool softeners, now with diarrhea and vomiting  - + abdominal pain  - c/w PPI for GI prophylaxis   - holding bowel regimen  - iv fluids  - f/u abdominal xray, r/o perf  - monitor BM  - zofran, reglan prn    ====================[ RENAL ]=============================   # FERNIE:   - improving  - pt with good urine output, net neg in 24 hrs  - Strict I/o, Avoid Nephrotoxic agents  - Maintain MAP>65-70 mm Hg  - Monitor BMP and electrolytes    # abnormal electrolytes:   - replace as needed    # Left renal lesion chronic  - outpatient f/u with Urology     =====================[ ID ]============================  # Cellulitis   -  was on Cefazolin  - spiked fevers, escalated to zosyn  - no more fevers  - wbc trending up, no fevers, likely reactive to abd pathology?  - will dc abx  - ID Dr Jacobsen following       ===================[ HEME/ONC ]===========================  # anemia of chronic illness  -monitor cbc    =====================[ ENDO ]======================  # DM :   - target CBG < 180  - currently on  Lantus and HSS , adjust as needed  - lantus 45, admelog 14, ss  - uncontrolled due to steroids    # Hypothyroidism   - f/u TSH in setting of encephalopathy   - c/w synthroid      ================= Skin/Catheters============================  # Psoriasis  - outpatient management     ==================[ PROPHYLAXIS ]==========================   # Dvt : c/u SQH   # Gi : Protonix     ====================[ DISPO ]======================   - Monitor in ICU     ===================[ PROGNOSIS ]====================  - Guarded

## 2022-02-15 NOTE — PROGRESS NOTE ADULT - SUBJECTIVE AND OBJECTIVE BOX
INTERVAL HPI/OVERNIGHT EVENTS: Patient seen and examined at bedside. Patient extubated to bipap yesterday, doing well respiratory wise. overnight she developed mulitple diarrhea and vomiting. in the am blood pressure started to drop, was given 2L bolus with improvement. Pheny was ordered, but not started.     PRESSORS: [ ] YES [ ] NO  WHICH:    Antimicrobial:    Cardiovascular:  phenylephrine    Infusion 0.3 MICROgram(s)/kG/Min IV Continuous <Continuous>    Pulmonary:  ALBUTerol    90 MICROgram(s) HFA Inhaler 2 Puff(s) Inhalation every 6 hours  benzonatate 100 milliGRAM(s) Oral <User Schedule>  montelukast 10 milliGRAM(s) Oral at bedtime    Hematalogic:  aspirin  chewable 81 milliGRAM(s) Oral daily  heparin   Injectable 5000 Unit(s) SubCutaneous every 8 hours    Other:  acetaminophen    Suspension .. 650 milliGRAM(s) Oral every 6 hours PRN  atorvastatin 10 milliGRAM(s) Oral at bedtime  chlorhexidine 0.12% Liquid 15 milliLiter(s) Oral Mucosa two times a day  chlorhexidine 2% Cloths 1 Application(s) Topical <User Schedule>  influenza   Vaccine 0.5 milliLiter(s) IntraMuscular once  insulin glargine Injectable (LANTUS) 45 Unit(s) SubCutaneous at bedtime  insulin lispro (ADMELOG) corrective regimen sliding scale   SubCutaneous every 6 hours  insulin lispro Injectable (ADMELOG) 14 Unit(s) SubCutaneous every 6 hours  levothyroxine 112 MICROGram(s) Oral daily  lidocaine   4% Patch 2 Patch Transdermal daily  metoclopramide Injectable 10 milliGRAM(s) IV Push every 8 hours PRN  ondansetron Injectable 4 milliGRAM(s) IV Push every 6 hours PRN  pantoprazole  Injectable 40 milliGRAM(s) IV Push daily  sodium chloride 0.9% lock flush 10 milliLiter(s) IV Push every 1 hour PRN    acetaminophen    Suspension .. 650 milliGRAM(s) Oral every 6 hours PRN  ALBUTerol    90 MICROgram(s) HFA Inhaler 2 Puff(s) Inhalation every 6 hours  aspirin  chewable 81 milliGRAM(s) Oral daily  atorvastatin 10 milliGRAM(s) Oral at bedtime  benzonatate 100 milliGRAM(s) Oral <User Schedule>  chlorhexidine 0.12% Liquid 15 milliLiter(s) Oral Mucosa two times a day  chlorhexidine 2% Cloths 1 Application(s) Topical <User Schedule>  heparin   Injectable 5000 Unit(s) SubCutaneous every 8 hours  influenza   Vaccine 0.5 milliLiter(s) IntraMuscular once  insulin glargine Injectable (LANTUS) 45 Unit(s) SubCutaneous at bedtime  insulin lispro (ADMELOG) corrective regimen sliding scale   SubCutaneous every 6 hours  insulin lispro Injectable (ADMELOG) 14 Unit(s) SubCutaneous every 6 hours  levothyroxine 112 MICROGram(s) Oral daily  lidocaine   4% Patch 2 Patch Transdermal daily  metoclopramide Injectable 10 milliGRAM(s) IV Push every 8 hours PRN  montelukast 10 milliGRAM(s) Oral at bedtime  ondansetron Injectable 4 milliGRAM(s) IV Push every 6 hours PRN  pantoprazole  Injectable 40 milliGRAM(s) IV Push daily  phenylephrine    Infusion 0.3 MICROgram(s)/kG/Min IV Continuous <Continuous>  sodium chloride 0.9% lock flush 10 milliLiter(s) IV Push every 1 hour PRN    Drug Dosing Weight  Height (cm): 155 (2022 18:53)  Weight (kg): 127 (2022 18:53)  BMI (kg/m2): 52.9 (2022 18:53)  BSA (m2): 2.18 (2022 18:53)    CENTRAL LINE: [x ] YES [ ] NO  LOCATION: [facility]  DATE INSERTED:  REMOVE: [ ] YES [ ] NO  EXPLAIN:    WILDER: [ ] YES [x ] NO    DATE INSERTED:  REMOVE:  [ ] YES [ ] NO  EXPLAIN:    A-LINE:  [ ] YES [x ] NO  LOCATION:   DATE INSERTED:  REMOVE:  [ ] YES [ ] NO  EXPLAIN:    PMH -reviewed admission note, no change since admission  PAST MEDICAL & SURGICAL HISTORY:  Hypertension  vaginal cyst    Hyperlipidemia    Asthma  last inhaler use with in 10 days, on Pulm follow up [id number]    Hypothyroid    Obstructive sleep apnea  refuses Cpap    Obesity  morbid    Trigeminal neuralgia  R    Fibromyalgia    DM (diabetes mellitus)  2    DJD (degenerative joint disease)  Cervical/Thoracic/Lumbar    Cervical neuralgia  difficulty moving my neck    Back pain  thoracic &amp; lumbar    H/O bursitis  right shoulder    Radicular pain  arms, legs    Vasculitis  Legs, forerhead, arms &amp; hands, flare ups in R leg  Dr susie Ribeiro is my Rheumatologist    Renal cell carcinoma, left  on follow up Dr schulte, HOD renal [facility], waiting for suregry in 2020    Falls frequently    Morbid obesity with body mass index (BMI) of 50.0 to 59.9 in adult    Psoriasis    Diverticulosis    S/P abdominal hysterectomy        S/P  section  1    Left adrenal mass  excision, benign 2006    Vaginal cyst  removed     Vasculitis on nerve biopsy  , had another surgery called microvascular decompression         ICU Vital Signs Last 24 Hrs  T(C): 36.2 (15 Feb 2022 05:29), Max: 38.1 (2022 11:00)  T(F): 97.1 (15 Feb 2022 05:29), Max: 100.6 (2022 11:00)  HR: 99 (15 Feb 2022 08:22) (86 - 137)  BP: 103/54 (15 Feb 2022 08:20) (53/23 - 149/120)  BP(mean): 65 (15 Feb 2022 08:20) (29 - 128)  ABP: --  ABP(mean): --  RR: 19 (15 Feb 2022 08:20) (9 - 29)  SpO2: 96% (15 Feb 2022 08:22) (88% - 100%)      ABG - ( 2022 04:27 )  pH, Arterial: 7.37  pH, Blood: x     /  pCO2: 41    /  pO2: 69    / HCO3: 24    / Base Excess: -1.5  /  SaO2: 93                    -14 @ 07:01  -  02-15 @ 07:00  --------------------------------------------------------  IN: 233.8 mL / OUT: 403 mL / NET: -169.2 mL        Mode: standby      PHYSICAL EXAM:    GENERAL: NAD, obese  HEAD:  Atraumatic, Normocephalic  EYES: EOMI, PERRLA, conjunctiva and sclera clear  ENMT: no lesions, dry mucus membranes.   NECK: Supple, normal appearance, No JVD; Normal thyroid; Trachea midline  NERVOUS SYSTEM:  Alert & Oriented X3,  Motor Strength 5/5 B/L upper and lower extremities; sensation intact   CHEST/LUNG: CTA bl, no wheezing, rales, ronchi appreciated  HEART: Regular rate and rhythm; No murmurs, rubs, or gallops  ABDOMEN: Tender to palpation throughout, more tender in the RLQ, + BS  EXTREMITIES:  2+ Peripheral Pulses, No clubbing, cyanosis, or edema  LYMPH: No lymphadenopathy noted  SKIN: psoriatic lesions in lower extremities.       LABS:  CBC Full  -  ( 15 Feb 2022 04:28 )  WBC Count : 22.04 K/uL  RBC Count : 5.15 M/uL  Hemoglobin : 12.3 g/dL  Hematocrit : 40.5 %  Platelet Count - Automated : 324 K/uL  Mean Cell Volume : 78.6 fl  Mean Cell Hemoglobin : 23.9 pg  Mean Cell Hemoglobin Concentration : 30.4 gm/dL  Auto Neutrophil # : 17.39 K/uL  Auto Lymphocyte # : 1.97 K/uL  Auto Monocyte # : 2.19 K/uL  Auto Eosinophil # : 0.11 K/uL  Auto Basophil # : 0.04 K/uL  Auto Neutrophil % : 79.0 %  Auto Lymphocyte % : 8.9 %  Auto Monocyte % : 9.9 %  Auto Eosinophil % : 0.5 %  Auto Basophil % : 0.2 %    02-15    138  |  107  |  38<H>  ----------------------------<  301<H>  3.3<L>   |  23  |  1.17    Ca    9.0      15 Feb 2022 04:29  Phos  3.2     02-15  Mg     1.7     02-15    TPro  6.9  /  Alb  2.5<L>  /  TBili  0.5  /  DBili  x   /  AST  28  /  ALT  27  /  AlkPhos  97  02-15      Urinalysis Basic - ( 2022 15:08 )    Color: x / Appearance: x / SG: x / pH: x  Gluc: x / Ketone: x  / Bili: x / Urobili: x   Blood: x / Protein: x / Nitrite: x   Leuk Esterase: x / RBC: 5-10 /HPF / WBC 0-2 /HPF   Sq Epi: x / Non Sq Epi: Occasional /HPF / Bacteria: Trace /HPF          RADIOLOGY & ADDITIONAL STUDIES REVIEWED:  ***    [ ]GOALS OF CARE DISCUSSION WITH PATIENT/FAMILY/PROXY:    CRITICAL CARE TIME SPENT: 35 minutes

## 2022-02-16 LAB
ALBUMIN SERPL ELPH-MCNC: 2.2 G/DL — LOW (ref 3.5–5)
ALP SERPL-CCNC: 87 U/L — SIGNIFICANT CHANGE UP (ref 40–120)
ALT FLD-CCNC: 23 U/L DA — SIGNIFICANT CHANGE UP (ref 10–60)
ANION GAP SERPL CALC-SCNC: 3 MMOL/L — LOW (ref 5–17)
AST SERPL-CCNC: 19 U/L — SIGNIFICANT CHANGE UP (ref 10–40)
BASOPHILS # BLD AUTO: 0.01 K/UL — SIGNIFICANT CHANGE UP (ref 0–0.2)
BASOPHILS NFR BLD AUTO: 0.1 % — SIGNIFICANT CHANGE UP (ref 0–2)
BILIRUB SERPL-MCNC: 0.8 MG/DL — SIGNIFICANT CHANGE UP (ref 0.2–1.2)
BUN SERPL-MCNC: 17 MG/DL — SIGNIFICANT CHANGE UP (ref 7–18)
CALCIUM SERPL-MCNC: 8.4 MG/DL — SIGNIFICANT CHANGE UP (ref 8.4–10.5)
CHLORIDE SERPL-SCNC: 109 MMOL/L — HIGH (ref 96–108)
CO2 SERPL-SCNC: 28 MMOL/L — SIGNIFICANT CHANGE UP (ref 22–31)
CREAT SERPL-MCNC: 0.54 MG/DL — SIGNIFICANT CHANGE UP (ref 0.5–1.3)
EOSINOPHIL # BLD AUTO: 0.14 K/UL — SIGNIFICANT CHANGE UP (ref 0–0.5)
EOSINOPHIL NFR BLD AUTO: 1.4 % — SIGNIFICANT CHANGE UP (ref 0–6)
GLUCOSE BLDC GLUCOMTR-MCNC: 148 MG/DL — HIGH (ref 70–99)
GLUCOSE BLDC GLUCOMTR-MCNC: 152 MG/DL — HIGH (ref 70–99)
GLUCOSE BLDC GLUCOMTR-MCNC: 164 MG/DL — HIGH (ref 70–99)
GLUCOSE BLDC GLUCOMTR-MCNC: 237 MG/DL — HIGH (ref 70–99)
GLUCOSE SERPL-MCNC: 167 MG/DL — HIGH (ref 70–99)
HCT VFR BLD CALC: 37.1 % — SIGNIFICANT CHANGE UP (ref 34.5–45)
HGB BLD-MCNC: 11.1 G/DL — LOW (ref 11.5–15.5)
IMM GRANULOCYTES NFR BLD AUTO: 1.2 % — SIGNIFICANT CHANGE UP (ref 0–1.5)
LYMPHOCYTES # BLD AUTO: 1.04 K/UL — SIGNIFICANT CHANGE UP (ref 1–3.3)
LYMPHOCYTES # BLD AUTO: 10.4 % — LOW (ref 13–44)
MAGNESIUM SERPL-MCNC: 1.7 MG/DL — SIGNIFICANT CHANGE UP (ref 1.6–2.6)
MCHC RBC-ENTMCNC: 23.5 PG — LOW (ref 27–34)
MCHC RBC-ENTMCNC: 29.9 GM/DL — LOW (ref 32–36)
MCV RBC AUTO: 78.6 FL — LOW (ref 80–100)
MONOCYTES # BLD AUTO: 1.38 K/UL — HIGH (ref 0–0.9)
MONOCYTES NFR BLD AUTO: 13.9 % — SIGNIFICANT CHANGE UP (ref 2–14)
NEUTROPHILS # BLD AUTO: 7.27 K/UL — SIGNIFICANT CHANGE UP (ref 1.8–7.4)
NEUTROPHILS NFR BLD AUTO: 73 % — SIGNIFICANT CHANGE UP (ref 43–77)
NRBC # BLD: 0 /100 WBCS — SIGNIFICANT CHANGE UP (ref 0–0)
PHOSPHATE SERPL-MCNC: 3 MG/DL — SIGNIFICANT CHANGE UP (ref 2.5–4.5)
PLATELET # BLD AUTO: 306 K/UL — SIGNIFICANT CHANGE UP (ref 150–400)
POTASSIUM SERPL-MCNC: 4 MMOL/L — SIGNIFICANT CHANGE UP (ref 3.5–5.3)
POTASSIUM SERPL-SCNC: 4 MMOL/L — SIGNIFICANT CHANGE UP (ref 3.5–5.3)
PROT SERPL-MCNC: 6.1 G/DL — SIGNIFICANT CHANGE UP (ref 6–8.3)
RBC # BLD: 4.72 M/UL — SIGNIFICANT CHANGE UP (ref 3.8–5.2)
RBC # FLD: 16.7 % — HIGH (ref 10.3–14.5)
SODIUM SERPL-SCNC: 140 MMOL/L — SIGNIFICANT CHANGE UP (ref 135–145)
WBC # BLD: 9.96 K/UL — SIGNIFICANT CHANGE UP (ref 3.8–10.5)
WBC # FLD AUTO: 9.96 K/UL — SIGNIFICANT CHANGE UP (ref 3.8–10.5)

## 2022-02-16 RX ORDER — GABAPENTIN 400 MG/1
400 CAPSULE ORAL
Refills: 0 | Status: DISCONTINUED | OUTPATIENT
Start: 2022-02-16 | End: 2022-02-22

## 2022-02-16 RX ORDER — PANTOPRAZOLE SODIUM 20 MG/1
40 TABLET, DELAYED RELEASE ORAL
Refills: 0 | Status: DISCONTINUED | OUTPATIENT
Start: 2022-02-16 | End: 2022-02-22

## 2022-02-16 RX ORDER — SENNA PLUS 8.6 MG/1
2 TABLET ORAL AT BEDTIME
Refills: 0 | Status: DISCONTINUED | OUTPATIENT
Start: 2022-02-16 | End: 2022-02-22

## 2022-02-16 RX ORDER — OXCARBAZEPINE 300 MG/1
300 TABLET, FILM COATED ORAL
Refills: 0 | Status: DISCONTINUED | OUTPATIENT
Start: 2022-02-16 | End: 2022-02-22

## 2022-02-16 RX ADMIN — ATORVASTATIN CALCIUM 10 MILLIGRAM(S): 80 TABLET, FILM COATED ORAL at 21:21

## 2022-02-16 RX ADMIN — Medication 14 UNIT(S): at 17:55

## 2022-02-16 RX ADMIN — ALBUTEROL 2 PUFF(S): 90 AEROSOL, METERED ORAL at 09:00

## 2022-02-16 RX ADMIN — LIDOCAINE 2 PATCH: 4 CREAM TOPICAL at 14:00

## 2022-02-16 RX ADMIN — Medication 81 MILLIGRAM(S): at 12:24

## 2022-02-16 RX ADMIN — Medication 14 UNIT(S): at 23:05

## 2022-02-16 RX ADMIN — LIDOCAINE 2 PATCH: 4 CREAM TOPICAL at 19:32

## 2022-02-16 RX ADMIN — ALBUTEROL 2 PUFF(S): 90 AEROSOL, METERED ORAL at 21:25

## 2022-02-16 RX ADMIN — GABAPENTIN 400 MILLIGRAM(S): 400 CAPSULE ORAL at 17:25

## 2022-02-16 RX ADMIN — ALBUTEROL 2 PUFF(S): 90 AEROSOL, METERED ORAL at 03:05

## 2022-02-16 RX ADMIN — Medication 112 MICROGRAM(S): at 05:12

## 2022-02-16 RX ADMIN — Medication 14 UNIT(S): at 12:20

## 2022-02-16 RX ADMIN — Medication 2: at 23:05

## 2022-02-16 RX ADMIN — Medication 100 MILLIGRAM(S): at 17:25

## 2022-02-16 RX ADMIN — Medication 4: at 12:20

## 2022-02-16 RX ADMIN — HEPARIN SODIUM 5000 UNIT(S): 5000 INJECTION INTRAVENOUS; SUBCUTANEOUS at 05:08

## 2022-02-16 RX ADMIN — HEPARIN SODIUM 5000 UNIT(S): 5000 INJECTION INTRAVENOUS; SUBCUTANEOUS at 21:21

## 2022-02-16 RX ADMIN — SENNA PLUS 2 TABLET(S): 8.6 TABLET ORAL at 21:21

## 2022-02-16 RX ADMIN — HEPARIN SODIUM 5000 UNIT(S): 5000 INJECTION INTRAVENOUS; SUBCUTANEOUS at 15:19

## 2022-02-16 RX ADMIN — INSULIN GLARGINE 45 UNIT(S): 100 INJECTION, SOLUTION SUBCUTANEOUS at 23:05

## 2022-02-16 RX ADMIN — PIPERACILLIN AND TAZOBACTAM 25 GRAM(S): 4; .5 INJECTION, POWDER, LYOPHILIZED, FOR SOLUTION INTRAVENOUS at 05:12

## 2022-02-16 RX ADMIN — CHLORHEXIDINE GLUCONATE 1 APPLICATION(S): 213 SOLUTION TOPICAL at 05:08

## 2022-02-16 RX ADMIN — PANTOPRAZOLE SODIUM 40 MILLIGRAM(S): 20 TABLET, DELAYED RELEASE ORAL at 12:24

## 2022-02-16 RX ADMIN — OXCARBAZEPINE 300 MILLIGRAM(S): 300 TABLET, FILM COATED ORAL at 17:25

## 2022-02-16 RX ADMIN — Medication 100 MILLIGRAM(S): at 05:13

## 2022-02-16 RX ADMIN — MONTELUKAST 10 MILLIGRAM(S): 4 TABLET, CHEWABLE ORAL at 21:21

## 2022-02-16 RX ADMIN — LIDOCAINE 2 PATCH: 4 CREAM TOPICAL at 01:00

## 2022-02-16 RX ADMIN — ALBUTEROL 2 PUFF(S): 90 AEROSOL, METERED ORAL at 14:00

## 2022-02-16 NOTE — PROGRESS NOTE ADULT - ASSESSMENT
55 yo F, from home, ambulated with cane , PMHx of obesity, asthma, chronic bronchitis, NAKUL, HTN, DM, HLD, fibromyalgia, anxiety, trigeminal neuralgia, degenerative joint disease, ?IgA vasculitis, psoriasis, OA came with chief complain of chest pain and dysuria. Nephrology consult called for significant FERNIE    1) Nonoliguric FERNIE now resolved likely renal hypoperfusion with superimposed hypertensive/diabetic nephropathy  2) HCAP  3) Hypertension  4) Diabetic Nephropathy with protenuria/neuropathy  5) Morbid Obesity  6) Abdominal wall cellulitis  7) Left renal lesion chronic    Recommend:  Strict I/o  S cr 0.54 with non oliguria  Avoid Nephrotoxic agents  Maintain MAP>65-70 mm Hg  Adjust antibiotics as per renal dose adjustment  Monitor BMP and electrolytes daily  Ventilatory management per ICU team  Now off Abx  Can resume ACEI/ARB  No RRT/HD  Will follow

## 2022-02-16 NOTE — CHART NOTE - NSCHARTNOTEFT_GEN_A_CORE
Central catheter in right internal jugular vein was removed under aseptic conditions. Appropriate pressure was applied for 5 min to minimize blood loss. Gauze and adhesive tape was applied and the site was clean.

## 2022-02-16 NOTE — PROGRESS NOTE ADULT - SUBJECTIVE AND OBJECTIVE BOX
56y Female    Meds:    Allergies    Fioricet (Rash)  gadobutrol (Rash; Urticaria; Hives)  IV Contrast (Short breath)  mushroom (Unknown)  venlafaxine (Hives)    Intolerances        VITALS:  Vital Signs Last 24 Hrs  T(C): 37.1 (16 Feb 2022 04:00), Max: 37.6 (16 Feb 2022 00:00)  T(F): 98.8 (16 Feb 2022 04:00), Max: 99.7 (16 Feb 2022 00:00)  HR: 101 (16 Feb 2022 12:00) (94 - 118)  BP: 129/69 (16 Feb 2022 11:00) (102/49 - 156/74)  BP(mean): 81 (16 Feb 2022 11:00) (63 - 93)  RR: 34 (16 Feb 2022 11:00) (19 - 35)  SpO2: 93% (16 Feb 2022 12:00) (87% - 99%)    LABS/DIAGNOSTIC TESTS:                          11.1   9.96  )-----------( 306      ( 16 Feb 2022 04:46 )             37.1         02-16    140  |  109<H>  |  17  ----------------------------<  167<H>  4.0   |  28  |  0.54    Ca    8.4      16 Feb 2022 04:46  Phos  3.0     02-16  Mg     1.7     02-16    TPro  6.1  /  Alb  2.2<L>  /  TBili  0.8  /  DBili  x   /  AST  19  /  ALT  23  /  AlkPhos  87  02-16      LIVER FUNCTIONS - ( 16 Feb 2022 04:46 )  Alb: 2.2 g/dL / Pro: 6.1 g/dL / ALK PHOS: 87 U/L / ALT: 23 U/L DA / AST: 19 U/L / GGT: x             CULTURES: Clean Catch Clean Catch (Midstream)  02-05 @ 04:22   Normal Urogenital brody present  --  --            RADIOLOGY:      ROS:  [  ] UNABLE TO ELICIT ICU VISIT  56y Female     Meds:    Allergies    Fioricet (Rash)  gadobutrol (Rash; Urticaria; Hives)  IV Contrast (Short breath)  mushroom (Unknown)  venlafaxine (Hives)    Intolerances        VITALS:  Vital Signs Last 24 Hrs  T(C): 37.1 (16 Feb 2022 04:00), Max: 37.6 (16 Feb 2022 00:00)  T(F): 98.8 (16 Feb 2022 04:00), Max: 99.7 (16 Feb 2022 00:00)  HR: 101 (16 Feb 2022 12:00) (94 - 118)  BP: 129/69 (16 Feb 2022 11:00) (102/49 - 156/74)  BP(mean): 81 (16 Feb 2022 11:00) (63 - 93)  RR: 34 (16 Feb 2022 11:00) (19 - 35)  SpO2: 93% (16 Feb 2022 12:00) (87% - 99%)    LABS/DIAGNOSTIC TESTS:                          11.1   9.96  )-----------( 306      ( 16 Feb 2022 04:46 )             37.1         02-16    140  |  109<H>  |  17  ----------------------------<  167<H>  4.0   |  28  |  0.54    Ca    8.4      16 Feb 2022 04:46  Phos  3.0     02-16  Mg     1.7     02-16    TPro  6.1  /  Alb  2.2<L>  /  TBili  0.8  /  DBili  x   /  AST  19  /  ALT  23  /  AlkPhos  87  02-16      LIVER FUNCTIONS - ( 16 Feb 2022 04:46 )  Alb: 2.2 g/dL / Pro: 6.1 g/dL / ALK PHOS: 87 U/L / ALT: 23 U/L DA / AST: 19 U/L / GGT: x             CULTURES: Clean Catch Clean Catch (Midstream)  02-05 @ 04:22   Normal Urogenital brody present  --  --            RADIOLOGY:      ROS:  [  ] UNABLE TO ELICIT ICU VISIT  56y Female  who remains in the ICU, she is afebrile and her WBC count decreased to normal, she is doing well overall but does not feel as well as yesterday, she remains on oxygen via a NC and is saturating well, she has no fevers or chills, no diarrhea , she still has a cough but is improving. Her antibiotics were DCed by the ICU team today.     Meds:    Allergies    Fioricet (Rash)  gadobutrol (Rash; Urticaria; Hives)  IV Contrast (Short breath)  mushroom (Unknown)  venlafaxine (Hives)    Intolerances        VITALS:  Vital Signs Last 24 Hrs  T(C): 37.1 (16 Feb 2022 04:00), Max: 37.6 (16 Feb 2022 00:00)  T(F): 98.8 (16 Feb 2022 04:00), Max: 99.7 (16 Feb 2022 00:00)  HR: 101 (16 Feb 2022 12:00) (94 - 118)  BP: 129/69 (16 Feb 2022 11:00) (102/49 - 156/74)  BP(mean): 81 (16 Feb 2022 11:00) (63 - 93)  RR: 34 (16 Feb 2022 11:00) (19 - 35)  SpO2: 93% (16 Feb 2022 12:00) (87% - 99%)    LABS/DIAGNOSTIC TESTS:                          11.1   9.96  )-----------( 306      ( 16 Feb 2022 04:46 )             37.1         02-16    140  |  109<H>  |  17  ----------------------------<  167<H>  4.0   |  28  |  0.54    Ca    8.4      16 Feb 2022 04:46  Phos  3.0     02-16  Mg     1.7     02-16    TPro  6.1  /  Alb  2.2<L>  /  TBili  0.8  /  DBili  x   /  AST  19  /  ALT  23  /  AlkPhos  87  02-16      LIVER FUNCTIONS - ( 16 Feb 2022 04:46 )  Alb: 2.2 g/dL / Pro: 6.1 g/dL / ALK PHOS: 87 U/L / ALT: 23 U/L DA / AST: 19 U/L / GGT: x             CULTURES: Clean Catch Clean Catch (Midstream)  02-05 @ 04:22   Normal Urogenital brody present  --  --            RADIOLOGY:       ROS:  [  ] UNABLE TO ELICIT

## 2022-02-16 NOTE — PROGRESS NOTE ADULT - ASSESSMENT
Pneumonia - treated  Fevers - improved  Abdominal  wall cellulitis - resolved  Leukocytosis - normalized    Plan - off antibiotics  Time spent - 30 mins.

## 2022-02-16 NOTE — PHARMACOTHERAPY INTERVENTION NOTE - COMMENTS
Recommended to obtain triglyceride levels during the use of propofol.
Recommended switching pantoprazole from IV to PO due to patient’s diet and oral intake of medications.
Recommended changing frequency of cefazolin from Q12hrs to Q8hrs due to patient's CrCl of 101.1mL/min.

## 2022-02-16 NOTE — CHART NOTE - NSCHARTNOTEFT_GEN_A_CORE
Assessment:   Patient is a 56y old  Female who presents with a chief complaint of Chest pain and dysuria (2022 10:45). Pt s/p extubation from vent , s/p TF. Now on clear liquids, (Clears/ NPO day #3). Discussed with PGY 2, reports pt for D/G, SLP ordered.     Diet Prescription: Diet, Clear Liquid (02-15-22 @ 21:54)        Daily     Daily Weight in k.1 (10 Feb 2022 07:00)  Weight in k.9 (2022 07:00)  Weight in k.8 (2022 07:30)  Weight in k.4 (2022 04:40)    % Weight Change:  1+ generalized, 2+ B/L leg edema    Pertinent Medications: MEDICATIONS  (STANDING):  ALBUTerol    90 MICROgram(s) HFA Inhaler 2 Puff(s) Inhalation every 6 hours  aspirin  chewable 81 milliGRAM(s) Oral daily  atorvastatin 10 milliGRAM(s) Oral at bedtime  benzonatate 100 milliGRAM(s) Oral <User Schedule>  chlorhexidine 2% Cloths 1 Application(s) Topical <User Schedule>  gabapentin 400 milliGRAM(s) Oral two times a day  heparin   Injectable 5000 Unit(s) SubCutaneous every 8 hours  influenza   Vaccine 0.5 milliLiter(s) IntraMuscular once  insulin glargine Injectable (LANTUS) 45 Unit(s) SubCutaneous at bedtime  insulin lispro (ADMELOG) corrective regimen sliding scale   SubCutaneous every 6 hours  insulin lispro Injectable (ADMELOG) 14 Unit(s) SubCutaneous every 6 hours  levothyroxine 112 MICROGram(s) Oral daily  lidocaine   4% Patch 2 Patch Transdermal daily  montelukast 10 milliGRAM(s) Oral at bedtime  OXcarbazepine 300 milliGRAM(s) Oral two times a day  pantoprazole    Tablet 40 milliGRAM(s) Oral before breakfast  senna 2 Tablet(s) Oral at bedtime    MEDICATIONS  (PRN):  acetaminophen    Suspension .. 650 milliGRAM(s) Oral every 6 hours PRN Temp greater or equal to 38C (100.4F), Mild Pain (1 - 3)  metoclopramide Injectable 10 milliGRAM(s) IV Push every 8 hours PRN nausea/vomiting  ondansetron Injectable 4 milliGRAM(s) IV Push every 6 hours PRN Nausea and/or Vomiting  sodium chloride 0.9% lock flush 10 milliLiter(s) IV Push every 1 hour PRN Pre/post blood products, medications, blood draw, and to maintain line patency    Pertinent Labs:  Na140 mmol/L Glu 167 mg/dL<H> K+ 4.0 mmol/L Cr  0.54 mg/dL BUN 17 mg/dL  Phos 3.0 mg/dL  Alb 2.2 g/dL<L> 02-10 Chol --    LDL --    HDL --    Trig 358 mg/dL<H>.   A1c 11.4 <H>     CAPILLARY BLOOD GLUCOSE      POCT Blood Glucose.: 152 mg/dL (2022 05:34)  POCT Blood Glucose.: 156 mg/dL (15 Feb 2022 23:11)  POCT Blood Glucose.: 167 mg/dL (15 Feb 2022 17:47)  POCT Blood Glucose.: 168 mg/dL (15 Feb 2022 12:08)        Estimated Needs:   [ ] no change since previous assessment  [x ] recalculated: For D/ kcals/ day (14 kcals/ kg wt @ 127kg) Estimated protein needs ~80 gms/ day (~1.5 gm/kg IBW at 115.5#)      New Nutrition Diagnosis: [x ] PCM (moderate) related to acute, critical illness as evidenced by <75% needs met> 7 days, 1+ generalized edema, 2+ B/L feet edema, wt loss 2/6-2/7 as above.    Interventions:   Recommend  [ ] Change Diet To:  [ ] Nutrition Supplement  [ ] Nutrition Support  [X ] Other: Diet advancement per MD (include consistent CHO). SLP ordered. MD to monitor. RD available.     Monitoring and Evaluation:   [x ] PO intake [ x ] Tolerance to diet prescription [ x ] weights [ x ] labs[ x ] follow up per protocol  [ ] other:

## 2022-02-16 NOTE — PROGRESS NOTE ADULT - SUBJECTIVE AND OBJECTIVE BOX
Romeo Ribeiro MD (Nephrology progress note)  [address], [facility] ([place],  [id number],  [facility]  TEl: [phone]  Cell: [phone]    Patient is a 56y Female seen and evaluated at bedside. Vital signs, laboratory data, imaging studies, consult notes reviewed done within past 24 hours. Overnight events noted. Patient lying in bed in no distress sitting in chair offers no new complains. Interval stable renal function with non oliguria.    Allergies    Fioricet (Rash)  gadobutrol (Rash; Urticaria; Hives)  IV Contrast (Short breath)  mushroom (Unknown)  venlafaxine (Hives)    Intolerances        ROS:  CONSTITUTIONAL: No fever or chills.  HEENT: No headache or visual disturbances.  RESPIRATORY: No shortness of breath, cough, hemoptysis or wheezing  CARDIOVASCULAR: No Chest pain or SOB  GASTROINTESTINAL: No abdominal pain, nausea, vomiting, diarrhea, hematemesis or blood per rectum.  GENITOURINARY: No flank or supra pubic pain  NEUROLOGICAL: No headache, focal limb weakness, tingling or numbness   SKIN: No skin rash or lesion  MUSCULOSKELETAL: No leg pain, calf pain or swelling    VITALS:    T(C): 37.1 (02-16-22 @ 04:00), Max: 37.6 (02-16-22 @ 00:00)  HR: 101 (02-16-22 @ 12:00) (94 - 126)  BP: 129/69 (02-16-22 @ 11:00) (102/49 - 156/74)  RR: 34 (02-16-22 @ 11:00) (19 - 35)  SpO2: 93% (02-16-22 @ 12:00) (87% - 99%)  CAPILLARY BLOOD GLUCOSE      POCT Blood Glucose.: 237 mg/dL (16 Feb 2022 12:17)  POCT Blood Glucose.: 152 mg/dL (16 Feb 2022 05:34)  POCT Blood Glucose.: 156 mg/dL (15 Feb 2022 23:11)  POCT Blood Glucose.: 167 mg/dL (15 Feb 2022 17:47)      02-15-22 @ 07:01  -  02-16-22 @ 07:00  --------------------------------------------------------  IN: 458.2 mL / OUT: 1000 mL / NET: -541.8 mL      MEDICATIONS  (STANDING):  ALBUTerol    90 MICROgram(s) HFA Inhaler 2 Puff(s) Inhalation every 6 hours  aspirin  chewable 81 milliGRAM(s) Oral daily  atorvastatin 10 milliGRAM(s) Oral at bedtime  benzonatate 100 milliGRAM(s) Oral <User Schedule>  chlorhexidine 2% Cloths 1 Application(s) Topical <User Schedule>  gabapentin 400 milliGRAM(s) Oral two times a day  heparin   Injectable 5000 Unit(s) SubCutaneous every 8 hours  influenza   Vaccine 0.5 milliLiter(s) IntraMuscular once  insulin glargine Injectable (LANTUS) 45 Unit(s) SubCutaneous at bedtime  insulin lispro (ADMELOG) corrective regimen sliding scale   SubCutaneous every 6 hours  insulin lispro Injectable (ADMELOG) 14 Unit(s) SubCutaneous every 6 hours  levothyroxine 112 MICROGram(s) Oral daily  lidocaine   4% Patch 2 Patch Transdermal daily  montelukast 10 milliGRAM(s) Oral at bedtime  OXcarbazepine 300 milliGRAM(s) Oral two times a day  pantoprazole    Tablet 40 milliGRAM(s) Oral before breakfast  senna 2 Tablet(s) Oral at bedtime    MEDICATIONS  (PRN):  acetaminophen    Suspension .. 650 milliGRAM(s) Oral every 6 hours PRN Temp greater or equal to 38C (100.4F), Mild Pain (1 - 3)  metoclopramide Injectable 10 milliGRAM(s) IV Push every 8 hours PRN nausea/vomiting  ondansetron Injectable 4 milliGRAM(s) IV Push every 6 hours PRN Nausea and/or Vomiting  sodium chloride 0.9% lock flush 10 milliLiter(s) IV Push every 1 hour PRN Pre/post blood products, medications, blood draw, and to maintain line patency      PHYSICAL EXAM:  GENERAL: Alert, awake and oriented x3   HEENT: RITA, EOMI, neck supple, no JVP,  CHEST/LUNG: Bilateral clear breath sounds, no rales, no crepitations, no rhonchi, no wheezing  HEART: Regular rate and rhythm, LAURA II/VI at LPSB, no gallops, no rub   ABDOMEN: Soft, nontender, non distended, bowel sounds present, no palpable organomegaly  : No flank or supra pubic tenderness.  EXTREMITIES: Peripheral pulses are palpable, trace pedal edema  Neurology: AAOx3, no focal neurological deficit  SKIN: No rash or skin lesion  Musculoskeletal: No joint deformity    Vascular ACCESS:     LABS:                        11.1   9.96  )-----------( 306      ( 16 Feb 2022 04:46 )             37.1     02-16    140  |  109<H>  |  17  ----------------------------<  167<H>  4.0   |  28  |  0.54    Ca    8.4      16 Feb 2022 04:46  Phos  3.0     02-16  Mg     1.7     02-16    TPro  6.1  /  Alb  2.2<L>  /  TBili  0.8  /  DBili  x   /  AST  19  /  ALT  23  /  AlkPhos  87  02-16                RADIOLOGY & ADDITIONAL STUDIES:  < from: Xray Abdomen 1 View PORTABLE -Urgent (Xray Abdomen 1 View PORTABLE -Urgent .) (02.15.22 @ 14:03) >    ACC: [id number] EXAM:  XR ABDOMEN PORTABLE URGENT 1V                          PROCEDURE DATE:  02/15/2022          INTERPRETATION:  KUB: Upright abdominal radiograph    COMPARISON: CT abdomen 2/4/2022.    CLINICAL INFORMATION: Abdominal pain. Nausea and vomiting    FINDINGS:  Air distended hepatic flexure. Remaining visualized upper abdominal bowel   pattern unremarkable.  No free intra-abdominal air.  No abnormal calcifications.  The osseous structures are intact.    IMPRESSION:  Upper abdominalupright radiograph  No acute radiographic intra-abdominal findings.    --- End of Report ---            MELBA LE MD; Attending Radiologist  This document has been electronically signed. Feb 15 2022  2:53PM    < end of copied text >    Imaging Personally Reviewed:  [x] YES  [ ] NO    Consultant(s) Notes Reviewed:  [x] YES  [ ] NO    Care Discussed with Primary team/ Other Providers [x] YES  [ ] NO

## 2022-02-16 NOTE — PROGRESS NOTE ADULT - SUBJECTIVE AND OBJECTIVE BOX
INTERVAL HPI/OVERNIGHT EVENTS: Patient seen and examined at bedside. No events overnight. Pt stable, still with abdominal pain. no more vomiting or diarrhea. stable for downgrade. Coughing when feeding, speech and swallow pending.     PRESSORS: [ ] YES [ ] NO  WHICH:    Antimicrobial:    Cardiovascular:    Pulmonary:  ALBUTerol    90 MICROgram(s) HFA Inhaler 2 Puff(s) Inhalation every 6 hours  benzonatate 100 milliGRAM(s) Oral <User Schedule>  montelukast 10 milliGRAM(s) Oral at bedtime    Hematalogic:  aspirin  chewable 81 milliGRAM(s) Oral daily  heparin   Injectable 5000 Unit(s) SubCutaneous every 8 hours    Other:  acetaminophen    Suspension .. 650 milliGRAM(s) Oral every 6 hours PRN  atorvastatin 10 milliGRAM(s) Oral at bedtime  chlorhexidine 2% Cloths 1 Application(s) Topical <User Schedule>  influenza   Vaccine 0.5 milliLiter(s) IntraMuscular once  insulin glargine Injectable (LANTUS) 45 Unit(s) SubCutaneous at bedtime  insulin lispro (ADMELOG) corrective regimen sliding scale   SubCutaneous every 6 hours  insulin lispro Injectable (ADMELOG) 14 Unit(s) SubCutaneous every 6 hours  levothyroxine 112 MICROGram(s) Oral daily  lidocaine   4% Patch 2 Patch Transdermal daily  metoclopramide Injectable 10 milliGRAM(s) IV Push every 8 hours PRN  ondansetron Injectable 4 milliGRAM(s) IV Push every 6 hours PRN  pantoprazole  Injectable 40 milliGRAM(s) IV Push daily  senna 2 Tablet(s) Oral at bedtime  sodium chloride 0.9% lock flush 10 milliLiter(s) IV Push every 1 hour PRN    acetaminophen    Suspension .. 650 milliGRAM(s) Oral every 6 hours PRN  ALBUTerol    90 MICROgram(s) HFA Inhaler 2 Puff(s) Inhalation every 6 hours  aspirin  chewable 81 milliGRAM(s) Oral daily  atorvastatin 10 milliGRAM(s) Oral at bedtime  benzonatate 100 milliGRAM(s) Oral <User Schedule>  chlorhexidine 2% Cloths 1 Application(s) Topical <User Schedule>  heparin   Injectable 5000 Unit(s) SubCutaneous every 8 hours  influenza   Vaccine 0.5 milliLiter(s) IntraMuscular once  insulin glargine Injectable (LANTUS) 45 Unit(s) SubCutaneous at bedtime  insulin lispro (ADMELOG) corrective regimen sliding scale   SubCutaneous every 6 hours  insulin lispro Injectable (ADMELOG) 14 Unit(s) SubCutaneous every 6 hours  levothyroxine 112 MICROGram(s) Oral daily  lidocaine   4% Patch 2 Patch Transdermal daily  metoclopramide Injectable 10 milliGRAM(s) IV Push every 8 hours PRN  montelukast 10 milliGRAM(s) Oral at bedtime  ondansetron Injectable 4 milliGRAM(s) IV Push every 6 hours PRN  pantoprazole  Injectable 40 milliGRAM(s) IV Push daily  senna 2 Tablet(s) Oral at bedtime  sodium chloride 0.9% lock flush 10 milliLiter(s) IV Push every 1 hour PRN    Drug Dosing Weight  Height (cm): 155 (2022 18:53)  Weight (kg): 127 (2022 18:53)  BMI (kg/m2): 52.9 (2022 18:53)  BSA (m2): 2.18 (2022 18:53)    CENTRAL LINE: [x ] YES [ ] NO  LOCATION: [facility]  DATE INSERTED:  REMOVE: [ ] YES [ ] NO  EXPLAIN:    WILDER: [ ] YES [x ] NO    DATE INSERTED:  REMOVE:  [ ] YES [ ] NO  EXPLAIN:    A-LINE:  [ ] YES [x ] NO  LOCATION:   DATE INSERTED:  REMOVE:  [ ] YES [ ] NO  EXPLAIN:    PMH -reviewed admission note, no change since admission  PAST MEDICAL & SURGICAL HISTORY:  Hypertension  vaginal cyst    Hyperlipidemia    Asthma  last inhaler use with in 10 days, on Pulm follow up [id number]    Hypothyroid    Obstructive sleep apnea  refuses Cpap    Obesity  morbid    Trigeminal neuralgia  R    Fibromyalgia    DM (diabetes mellitus)  2    DJD (degenerative joint disease)  Cervical/Thoracic/Lumbar    Cervical neuralgia  difficulty moving my neck    Back pain  thoracic &amp; lumbar    H/O bursitis  right shoulder    Radicular pain  arms, legs    Vasculitis  Legs, forerhead, arms &amp; hands, flare ups in R leg  Dr susie Ribeiro is my Rheumatologist    Renal cell carcinoma, left  on follow up Dr schulte, HOD renal [facility], waiting for suregry in 2020    Falls frequently    Morbid obesity with body mass index (BMI) of 50.0 to 59.9 in adult    Psoriasis    Diverticulosis    S/P abdominal hysterectomy        S/P  section  1    Left adrenal mass  excision, benign 2006    Vaginal cyst  removed     Vasculitis on nerve biopsy  , had another surgery called microvascular decompression         ICU Vital Signs Last 24 Hrs  T(C): 37.1 (2022 04:00), Max: 37.6 (2022 00:00)  T(F): 98.8 (2022 04:00), Max: 99.7 (2022 00:00)  HR: 100 (2022 08:00) (94 - 126)  BP: 143/78 (2022 08:00) (102/49 - 156/74)  BP(mean): 90 (2022 08:00) (63 - 93)  ABP: --  ABP(mean): --  RR: 25 (2022 08:00) (18 - 35)  SpO2: 93% (2022 08:00) (87% - 99%)            02-15 @ 07:01  -  02-16 @ 07:00  --------------------------------------------------------  IN: 458.2 mL / OUT: 1000 mL / NET: -541.8 mL        PHYSICAL EXAM:    GENERAL: NAD, obese  HEAD:  Atraumatic, Normocephalic  EYES: EOMI, PERRLA, conjunctiva and sclera clear  ENMT: no lesions, dry mucus membranes.   NECK: Supple, normal appearance, No JVD; Normal thyroid; Trachea midline  NERVOUS SYSTEM:  Alert & Oriented X3, Motor Strength 5/5 B/L upper and lower extremities; sensation intact   CHEST/LUNG: CTA bl, no wheezing, rales, ronchi appreciated  HEART: Regular rate and rhythm; No murmurs, rubs, or gallops  ABDOMEN: Tender to palpation throughout, more tender in the RLQ, + BS  EXTREMITIES:  2+ Peripheral Pulses, No clubbing, cyanosis, or edema  LYMPH: No lymphadenopathy noted  SKIN: psoriatic lesions in lower extremities.     LABS:  CBC Full  -  ( 2022 04:46 )  WBC Count : 9.96 K/uL  RBC Count : 4.72 M/uL  Hemoglobin : 11.1 g/dL  Hematocrit : 37.1 %  Platelet Count - Automated : 306 K/uL  Mean Cell Volume : 78.6 fl  Mean Cell Hemoglobin : 23.5 pg  Mean Cell Hemoglobin Concentration : 29.9 gm/dL  Auto Neutrophil # : 7.27 K/uL  Auto Lymphocyte # : 1.04 K/uL  Auto Monocyte # : 1.38 K/uL  Auto Eosinophil # : 0.14 K/uL  Auto Basophil # : 0.01 K/uL  Auto Neutrophil % : 73.0 %  Auto Lymphocyte % : 10.4 %  Auto Monocyte % : 13.9 %  Auto Eosinophil % : 1.4 %  Auto Basophil % : 0.1 %        140  |  109<H>  |  17  ----------------------------<  167<H>  4.0   |  28  |  0.54    Ca    8.4      2022 04:46  Phos  3.0       Mg     1.7         TPro  6.1  /  Alb  2.2<L>  /  TBili  0.8  /  DBili  x   /  AST  19  /  ALT  23  /  AlkPhos  87              RADIOLOGY & ADDITIONAL STUDIES REVIEWED:  ***    [ ]GOALS OF CARE DISCUSSION WITH PATIENT/FAMILY/PROXY:    CRITICAL CARE TIME SPENT: 35 minutes

## 2022-02-16 NOTE — PROGRESS NOTE ADULT - ASSESSMENT
55 yo F, from home, ambulated with cane , PMHx of obesity, asthma, chronic bronchitis, NAKUL non compliant with CPAP over night, HTN, DM, HLD, fibromyalgia, anxiety, trigeminal neuralgia, degenerative joint disease, ?IgA vasculitis, psoriasis, OA came with chief complain of chest pain and dysuria. patient admitted to [facility], Troponin x 2 negative,  CTAP- 2/4- showed Skin thickening with subcutaneous stranding in the right lower quadrant anterior abdominal wall c/w cellulitis. pt received Zithromax and Rocephin in ED x1 , Abx dc,d due to negative UA, pt started on keflex 500mg qid for cellulitis , hospital course complicated with encephalopathy and ATN   on 2/5, Cr increased from 0.83 to 3.1,  IV cefazolin started in setting of possible sepsis 2/2 cellulitis. pt noted to progressively getting altered over 2/6, VBG remarkable for PH :7.14, pco2 82, pt was placed on Bi-pap reportedly over night.  Patient found to be more lethargic in the morning.  Unable to perform ROS. ABG showed acute on chronic respiratory acidosis , Hypercapnia 91, respiratory placed pt on AVAPS . ICU consulted for further evaluation. Patient will require  intubation , will transfer to ICU for further work-up.     Assessment:    Acute hypercapnic hypoxic respiratory failure   NAKUL requiring CPAP at night (non -compliant)  Cellulitis  Chronic bronchitis   Asthma   sepsis  Psoriasis   Fibromyalgia   DM  Hypothyroidism       Plan:    =====================[CNS ]==============================  # encephalopathy  - resolved  - Likely due to hypercapnia , PCO2:91 - resolved  - extubated, off sedation  - AO x 3  - Bipap at night    # History of Fibromyalgia on gabapentin, flexeril, Tylenol, aleve, oxcarbazepine for pain at home  - Lidoderm 4% patch 2 patches daily  - will resume home meds  - off fentanyl   =====================[CVS ]==========================  # HTN :    - BP currently acceptable off medication   - monitor BP in setting of ATN / sepsis   - keep MAP >65    =====================[RESP ]==========================  # Acute hypoxic/ hypercapnic respiratory failure - resolved  - likely in setting of NAKUL / chronic bronchitis / asthma   - extubated on 2/14  - continue bipap at night  - nasal canula day time  - monitor mentation    =====================[ GI ]============================  # Diarrhea  - patient started on multiple stool softeners, now with diarrhea and vomiting  - + abdominal pain  - c/w PPI for GI prophylaxis   - iv fluids  - abdominal xray unremarkable   - monitor BM  - zofran, reglan prn  - will resume senna.     ====================[ RENAL ]=============================   # FERNIE:   - improving  - pt with good urine output  - Strict I/o, Avoid Nephrotoxic agents  - Maintain MAP>65-70 mm Hg  - Monitor BMP and electrolytes    # abnormal electrolytes:   - replace as needed    # Left renal lesion chronic  - outpatient f/u with Urology     =====================[ ID ]============================  # Cellulitis   -  was on Cefazolin  - spiked fevers, escalated to zosyn  - wbc normalized after discontinuation of steroids.   - will dc abx  - ID Dr Jacobsen following       ===================[ HEME/ONC ]===========================  # anemia of chronic illness  -monitor cbc    =====================[ ENDO ]======================  # DM :   - target CBG < 180  - currently on  Lantus and HSS , adjust as needed  - lantus 45, admelog 14, ss  - uncontrolled due to steroids, monitor now that steroids are off    # Hypothyroidism   - Tsh 0.78  - c/w synthroid      ================= Skin/Catheters============================  # Psoriasis  - outpatient management     ==================[ PROPHYLAXIS ]==========================   # Dvt : c/u SQH   # Gi : Protonix     ====================[ DISPO ]======================   - Monitor in ICU     ===================[ PROGNOSIS ]====================  - Guarded

## 2022-02-17 LAB
ALBUMIN SERPL ELPH-MCNC: 2.1 G/DL — LOW (ref 3.5–5)
ALP SERPL-CCNC: 87 U/L — SIGNIFICANT CHANGE UP (ref 40–120)
ALT FLD-CCNC: 24 U/L DA — SIGNIFICANT CHANGE UP (ref 10–60)
ANION GAP SERPL CALC-SCNC: 3 MMOL/L — LOW (ref 5–17)
AST SERPL-CCNC: 16 U/L — SIGNIFICANT CHANGE UP (ref 10–40)
BASOPHILS # BLD AUTO: 0.02 K/UL — SIGNIFICANT CHANGE UP (ref 0–0.2)
BASOPHILS NFR BLD AUTO: 0.2 % — SIGNIFICANT CHANGE UP (ref 0–2)
BILIRUB SERPL-MCNC: 0.7 MG/DL — SIGNIFICANT CHANGE UP (ref 0.2–1.2)
BUN SERPL-MCNC: 12 MG/DL — SIGNIFICANT CHANGE UP (ref 7–18)
CALCIUM SERPL-MCNC: 8.2 MG/DL — LOW (ref 8.4–10.5)
CHLORIDE SERPL-SCNC: 107 MMOL/L — SIGNIFICANT CHANGE UP (ref 96–108)
CO2 SERPL-SCNC: 30 MMOL/L — SIGNIFICANT CHANGE UP (ref 22–31)
CREAT SERPL-MCNC: 0.44 MG/DL — LOW (ref 0.5–1.3)
EOSINOPHIL # BLD AUTO: 0.23 K/UL — SIGNIFICANT CHANGE UP (ref 0–0.5)
EOSINOPHIL NFR BLD AUTO: 2.6 % — SIGNIFICANT CHANGE UP (ref 0–6)
GLUCOSE BLDC GLUCOMTR-MCNC: 120 MG/DL — HIGH (ref 70–99)
GLUCOSE BLDC GLUCOMTR-MCNC: 136 MG/DL — HIGH (ref 70–99)
GLUCOSE BLDC GLUCOMTR-MCNC: 167 MG/DL — HIGH (ref 70–99)
GLUCOSE SERPL-MCNC: 105 MG/DL — HIGH (ref 70–99)
HCT VFR BLD CALC: 37.2 % — SIGNIFICANT CHANGE UP (ref 34.5–45)
HGB BLD-MCNC: 11.3 G/DL — LOW (ref 11.5–15.5)
IMM GRANULOCYTES NFR BLD AUTO: 0.9 % — SIGNIFICANT CHANGE UP (ref 0–1.5)
LYMPHOCYTES # BLD AUTO: 1.17 K/UL — SIGNIFICANT CHANGE UP (ref 1–3.3)
LYMPHOCYTES # BLD AUTO: 13.1 % — SIGNIFICANT CHANGE UP (ref 13–44)
MAGNESIUM SERPL-MCNC: 1.9 MG/DL — SIGNIFICANT CHANGE UP (ref 1.6–2.6)
MCHC RBC-ENTMCNC: 23.7 PG — LOW (ref 27–34)
MCHC RBC-ENTMCNC: 30.4 GM/DL — LOW (ref 32–36)
MCV RBC AUTO: 78 FL — LOW (ref 80–100)
MONOCYTES # BLD AUTO: 1.47 K/UL — HIGH (ref 0–0.9)
MONOCYTES NFR BLD AUTO: 16.4 % — HIGH (ref 2–14)
NEUTROPHILS # BLD AUTO: 5.97 K/UL — SIGNIFICANT CHANGE UP (ref 1.8–7.4)
NEUTROPHILS NFR BLD AUTO: 66.8 % — SIGNIFICANT CHANGE UP (ref 43–77)
NRBC # BLD: 0 /100 WBCS — SIGNIFICANT CHANGE UP (ref 0–0)
PHOSPHATE SERPL-MCNC: 2.2 MG/DL — LOW (ref 2.5–4.5)
PLATELET # BLD AUTO: 365 K/UL — SIGNIFICANT CHANGE UP (ref 150–400)
POTASSIUM SERPL-MCNC: 3.7 MMOL/L — SIGNIFICANT CHANGE UP (ref 3.5–5.3)
POTASSIUM SERPL-SCNC: 3.7 MMOL/L — SIGNIFICANT CHANGE UP (ref 3.5–5.3)
PROT SERPL-MCNC: 6.4 G/DL — SIGNIFICANT CHANGE UP (ref 6–8.3)
RBC # BLD: 4.77 M/UL — SIGNIFICANT CHANGE UP (ref 3.8–5.2)
RBC # FLD: 16.9 % — HIGH (ref 10.3–14.5)
SARS-COV-2 RNA SPEC QL NAA+PROBE: SIGNIFICANT CHANGE UP
SODIUM SERPL-SCNC: 140 MMOL/L — SIGNIFICANT CHANGE UP (ref 135–145)
WBC # BLD: 8.94 K/UL — SIGNIFICANT CHANGE UP (ref 3.8–10.5)
WBC # FLD AUTO: 8.94 K/UL — SIGNIFICANT CHANGE UP (ref 3.8–10.5)

## 2022-02-17 RX ORDER — POTASSIUM PHOSPHATE, MONOBASIC POTASSIUM PHOSPHATE, DIBASIC 236; 224 MG/ML; MG/ML
30 INJECTION, SOLUTION INTRAVENOUS ONCE
Refills: 0 | Status: COMPLETED | OUTPATIENT
Start: 2022-02-17 | End: 2022-02-17

## 2022-02-17 RX ORDER — SODIUM,POTASSIUM PHOSPHATES 278-250MG
2 POWDER IN PACKET (EA) ORAL EVERY 4 HOURS
Refills: 0 | Status: DISCONTINUED | OUTPATIENT
Start: 2022-02-17 | End: 2022-02-17

## 2022-02-17 RX ORDER — INSULIN GLARGINE 100 [IU]/ML
25 INJECTION, SOLUTION SUBCUTANEOUS ONCE
Refills: 0 | Status: DISCONTINUED | OUTPATIENT
Start: 2022-02-17 | End: 2022-02-18

## 2022-02-17 RX ORDER — BENZOCAINE AND MENTHOL 5; 1 G/100ML; G/100ML
1 LIQUID ORAL EVERY 4 HOURS
Refills: 0 | Status: DISCONTINUED | OUTPATIENT
Start: 2022-02-17 | End: 2022-02-22

## 2022-02-17 RX ADMIN — HEPARIN SODIUM 5000 UNIT(S): 5000 INJECTION INTRAVENOUS; SUBCUTANEOUS at 06:16

## 2022-02-17 RX ADMIN — GABAPENTIN 400 MILLIGRAM(S): 400 CAPSULE ORAL at 17:27

## 2022-02-17 RX ADMIN — Medication 100 MILLIGRAM(S): at 17:26

## 2022-02-17 RX ADMIN — HEPARIN SODIUM 5000 UNIT(S): 5000 INJECTION INTRAVENOUS; SUBCUTANEOUS at 13:59

## 2022-02-17 RX ADMIN — SENNA PLUS 2 TABLET(S): 8.6 TABLET ORAL at 22:01

## 2022-02-17 RX ADMIN — OXCARBAZEPINE 300 MILLIGRAM(S): 300 TABLET, FILM COATED ORAL at 17:28

## 2022-02-17 RX ADMIN — CHLORHEXIDINE GLUCONATE 1 APPLICATION(S): 213 SOLUTION TOPICAL at 06:16

## 2022-02-17 RX ADMIN — HEPARIN SODIUM 5000 UNIT(S): 5000 INJECTION INTRAVENOUS; SUBCUTANEOUS at 22:02

## 2022-02-17 RX ADMIN — MONTELUKAST 10 MILLIGRAM(S): 4 TABLET, CHEWABLE ORAL at 22:01

## 2022-02-17 RX ADMIN — Medication 14 UNIT(S): at 17:25

## 2022-02-17 RX ADMIN — POTASSIUM PHOSPHATE, MONOBASIC POTASSIUM PHOSPHATE, DIBASIC 83.33 MILLIMOLE(S): 236; 224 INJECTION, SOLUTION INTRAVENOUS at 06:15

## 2022-02-17 RX ADMIN — LIDOCAINE 2 PATCH: 4 CREAM TOPICAL at 12:04

## 2022-02-17 RX ADMIN — OXCARBAZEPINE 300 MILLIGRAM(S): 300 TABLET, FILM COATED ORAL at 06:19

## 2022-02-17 RX ADMIN — Medication 112 MICROGRAM(S): at 06:17

## 2022-02-17 RX ADMIN — ALBUTEROL 2 PUFF(S): 90 AEROSOL, METERED ORAL at 10:10

## 2022-02-17 RX ADMIN — Medication 81 MILLIGRAM(S): at 12:03

## 2022-02-17 RX ADMIN — ALBUTEROL 2 PUFF(S): 90 AEROSOL, METERED ORAL at 14:01

## 2022-02-17 RX ADMIN — Medication 100 MILLIGRAM(S): at 06:16

## 2022-02-17 RX ADMIN — ALBUTEROL 2 PUFF(S): 90 AEROSOL, METERED ORAL at 22:01

## 2022-02-17 RX ADMIN — Medication 14 UNIT(S): at 12:05

## 2022-02-17 RX ADMIN — ATORVASTATIN CALCIUM 10 MILLIGRAM(S): 80 TABLET, FILM COATED ORAL at 22:01

## 2022-02-17 RX ADMIN — ALBUTEROL 2 PUFF(S): 90 AEROSOL, METERED ORAL at 03:00

## 2022-02-17 RX ADMIN — GABAPENTIN 400 MILLIGRAM(S): 400 CAPSULE ORAL at 06:17

## 2022-02-17 RX ADMIN — LIDOCAINE 2 PATCH: 4 CREAM TOPICAL at 02:00

## 2022-02-17 RX ADMIN — PANTOPRAZOLE SODIUM 40 MILLIGRAM(S): 20 TABLET, DELAYED RELEASE ORAL at 06:17

## 2022-02-17 RX ADMIN — Medication 2: at 12:05

## 2022-02-17 RX ADMIN — LIDOCAINE 2 PATCH: 4 CREAM TOPICAL at 19:09

## 2022-02-17 NOTE — PROGRESS NOTE ADULT - NS NEC GEN PE MLT EXAM PC
detailed exam
No bruits; no thyromegaly or nodules
detailed exam

## 2022-02-17 NOTE — SWALLOW BEDSIDE ASSESSMENT ADULT - CONSISTENCIES ADMINISTERED
thin liquid/mildly thick/pureed thin liquid/pureed applesauce + cookie/soft & bite-sized regular solid

## 2022-02-17 NOTE — CHART NOTE - NSCHARTNOTEFT_GEN_A_CORE
57 yo F, from home, ambulated with cane , PMHx of obesity, asthma, chronic bronchitis, NAKUL, HTN, DM, HLD, fibromyalgia, anxiety, trigeminal neuralgia, degenerative joint disease, ?IgA vasculitis, psoriasis, OA came with chief complain of chest pain and dysuria. Pt stated that for few weeks she is having burning pain in her urine, foul smell, itchy, cloudy. She further stated that she is having pain in her lower abdomen as well as in chest whenever she pressed it. She denied any vaginal discharge, headaches, visual changes, N/V/D.     Of note pt admitted at [facility] in Jan-Feb 2021 and had extensive cardiac work up stress test and echo which were negative. Pt has left renal mass  diagnosed on imaging chances of malignancy 85% , followed outpatient urologist and heme/onc for monitoring . decreasing weight and controlled blood sugars to get the surgery at some point     In ED: UA +ve no wbc, few bacteria , trop x 1 neg     Hospital course: Patient had abdominal CT scan with celluilitis likey 2/2 to insulin injections, Started on Keflex, also found to have FERNIE, nephro was consulted.  On 2/6/22 patient lethargic, but able to follow commands and be aroused. CO2 84, and was placed on bipap overnight. Morning of 2/7/22 patient more lethargic with CO2 of 91, ICU consulted.     ICU Course:  Patient intubated, Hypercapnea resolved, her creatinine normalized without interventions. patient was weaned off sedation and was awake, following commands. trial of extubation on 2/9 was unsucessfull 2/2 failed leak test and concern for critical airway. She was started on steroids. Trial of extuabtion on 2/11 with anesthesia present, patient refued to be extuabted at that time, threatening to mariah. She was extuabted on 2/14 to bipap and was doing well. Utilizing bipap at night. Had Nausea, vomiting and diarrhea day of extuabtion likeley due to incresed medication for bowel regimen, her sxs improved after sessation of medication. Patient blood sugar and wbc normalized after discontinuation of steroids.     F/u:  Monitor fevers, wbc, she is post 7 days of zosyn, ID [place] following, WBC normalized, last fever 2/14  Pain medication restarted for fibromialgia.   Bipap at night.   F/u PT Eval, possible [facility]IRAG (they have been working with patient).   outpatient Uro follow up for renal mass.     Sign out given to: xx 55 yo F, from home, ambulated with cane , PMHx of obesity, asthma, chronic bronchitis, NAKUL, HTN, DM, HLD, fibromyalgia, anxiety, trigeminal neuralgia, degenerative joint disease, ?IgA vasculitis, psoriasis, OA came with chief complain of chest pain and dysuria. Pt stated that for few weeks she is having burning pain in her urine, foul smell, itchy, cloudy. She further stated that she is having pain in her lower abdomen as well as in chest whenever she pressed it. She denied any vaginal discharge, headaches, visual changes, N/V/D.     Of note pt admitted at [facility] in Jan-Feb 2021 and had extensive cardiac work up stress test and echo which were negative. Pt has left renal mass  diagnosed on imaging chances of malignancy 85% , followed outpatient urologist and heme/onc for monitoring . decreasing weight and controlled blood sugars to get the surgery at some point     In ED: UA +ve no wbc, few bacteria , trop x 1 neg     Hospital course: Patient had abdominal CT scan with celluilitis likey 2/2 to insulin injections, Started on Keflex, also found to have FERNIE, nephro was consulted.  On 2/6/22 patient lethargic, but able to follow commands and be aroused. CO2 84, and was placed on bipap overnight. Morning of 2/7/22 patient more lethargic with CO2 of 91, ICU consulted.     ICU Course:  Patient intubated, Hypercapnea resolved, her creatinine normalized without interventions. patient was weaned off sedation and was awake, following commands. trial of extubation on 2/9 was unsucessfull 2/2 failed leak test and concern for critical airway. She was started on steroids. Trial of extuabtion on 2/11 with anesthesia present, patient refued to be extuabted at that time, threatening to mariah. She was extuabted on 2/14 to bipap and was doing well. Utilizing bipap at night. Had Nausea, vomiting and diarrhea day of extuabtion likeley due to incresed medication for bowel regimen, her sxs improved after sessation of medication. Patient blood sugar and wbc normalized after discontinuation of steroids.     F/u:  Monitor fevers, wbc, she is post 7 days of zosyn, ID [place] following, WBC normalized, last fever 2/14  Pain medication restarted for fibromialgia.   Bipap at night.   F/u PT Eval, possible CHIRAG (they have been working with patient).   outpatient Uro follow up for renal mass.     Sign out given to: BRYSON Marie, 55 yo F, from home, ambulated with cane , PMHx of obesity, asthma, chronic bronchitis, NAKUL, HTN, DM, HLD, fibromyalgia, anxiety, trigeminal neuralgia, degenerative joint disease, ?IgA vasculitis, psoriasis, OA came with chief complain of chest pain and dysuria. Pt stated that for few weeks she is having burning pain in her urine, foul smell, itchy, cloudy. She further stated that she is having pain in her lower abdomen as well as in chest whenever she pressed it. She denied any vaginal discharge, headaches, visual changes, N/V/D.     Of note pt admitted at [facility] in Jan-Feb 2021 and had extensive cardiac work up stress test and echo which were negative. Pt has left renal mass  diagnosed on imaging chances of malignancy 85% , followed outpatient urologist and heme/onc for monitoring . decreasing weight and controlled blood sugars to get the surgery at some point     In ED: UA +ve no wbc, few bacteria , trop x 1 neg     Hospital course: Patient had abdominal CT scan with celluilitis likey 2/2 to insulin injections, Started on Keflex, also found to have FERNIE, nephro was consulted.  On 2/6/22 patient lethargic, but able to follow commands and be aroused. CO2 84, and was placed on bipap overnight. Morning of 2/7/22 patient more lethargic with CO2 of 91, ICU consulted.     ICU Course:  Patient intubated, Hypercapnea resolved, her creatinine normalized without interventions. patient was weaned off sedation and was awake, following commands. trial of extubation on 2/9 was unsucessfull 2/2 failed leak test and concern for critical airway. She was started on steroids. Trial of extuabtion on 2/11 with anesthesia present, patient refued to be extuabted at that time, threatening to mariah. She was extuabted on 2/14 to bipap and was doing well. Utilizing bipap at night. Had Nausea, vomiting and diarrhea day of extuabtion likeley due to incresed medication for bowel regimen, her sxs improved after sessation of medication. Patient blood sugar and wbc normalized after discontinuation of steroids.     F/u:  Monitor fevers, wbc, she is post 7 days of zosyn, ID [place] following, WBC normalized, last fever 2/14  Pain medication restarted for fibromialgia.   Bipap at night.   F/u PT Eval, possible [facility]AG (they have been working with patient).   outpatient Uro follow up for renal mass.     Sign out given to: BRYSON Marie, Attending Dr. Velez.

## 2022-02-17 NOTE — PROGRESS NOTE ADULT - ASSESSMENT
55 yo F, from home, ambulated with cane , PMHx of obesity, asthma, chronic bronchitis, NAKUL non compliant with CPAP over night, HTN, DM, HLD, fibromyalgia, anxiety, trigeminal neuralgia, degenerative joint disease, ?IgA vasculitis, psoriasis, OA came with chief complain of chest pain and dysuria. patient admitted to [facility], Troponin x 2 negative,  CTAP- 2/4- showed Skin thickening with subcutaneous stranding in the right lower quadrant anterior abdominal wall c/w cellulitis. pt received Zithromax and Rocephin in ED x1 , Abx dc,d due to negative UA, pt started on keflex 500mg qid for cellulitis , hospital course complicated with encephalopathy and ATN   on 2/5, Cr increased from 0.83 to 3.1,  IV cefazolin started in setting of possible sepsis 2/2 cellulitis. pt noted to progressively getting altered over 2/6, VBG remarkable for PH :7.14, pco2 82, pt was placed on Bi-pap reportedly over night.  Patient found to be more lethargic in the morning.  Unable to perform ROS. ABG showed acute on chronic respiratory acidosis , Hypercapnia 91, respiratory placed pt on AVAPS . ICU consulted for further evaluation. Patient will require  intubation , will transfer to ICU for further work-up.     Assessment:    Acute hypercapnic hypoxic respiratory failure   NAKUL requiring CPAP at night (non -compliant)  Cellulitis  Chronic bronchitis   Asthma   sepsis  Psoriasis   Fibromyalgia   DM  Hypothyroidism       Plan:    =====================[CNS ]==============================  # encephalopathy  - resolved  - Likely due to hypercapnia , PCO2:91 - resolved  - extubated, off sedation  - AO x 3  - Bipap at night    # History of Fibromyalgia on gabapentin, flexeril, Tylenol, aleve, oxcarbazepine for pain at home  - Lidoderm 4% patch 2 patches daily  - will resume home meds  - off fentanyl   =====================[CVS ]==========================  # HTN :    - BP currently acceptable off medication   - monitor BP in setting of ATN / sepsis   - keep MAP >65    =====================[RESP ]==========================  # Acute hypoxic/ hypercapnic respiratory failure - resolved  - likely in setting of NAKUL / chronic bronchitis / asthma   - extubated on 2/14  - continue bipap at night  - nasal canula day time  - monitor mentation    =====================[ GI ]============================  # Diarrhea  - patient started on multiple stool softeners, now with diarrhea and vomiting  - + abdominal pain  - c/w PPI for GI prophylaxis   - iv fluids  - abdominal xray unremarkable   - monitor BM  - zofran, reglan prn  - will resume senna.     ====================[ RENAL ]=============================   # FERNIE:   - improving  - pt with good urine output  - Strict I/o, Avoid Nephrotoxic agents  - Maintain MAP>65-70 mm Hg  - Monitor BMP and electrolytes    # abnormal electrolytes:   - replace as needed    # Left renal lesion chronic  - outpatient f/u with Urology     =====================[ ID ]============================  # Cellulitis   -  was on Cefazolin  - spiked fevers, escalated to zosyn  - wbc normalized after discontinuation of steroids.   - will dc abx  - ID Dr Jacobsen following       ===================[ HEME/ONC ]===========================  # anemia of chronic illness  -monitor cbc    =====================[ ENDO ]======================  # DM :   - target CBG < 180  - currently on  Lantus and HSS , adjust as needed  - lantus 45, admelog 14, ss  - uncontrolled due to steroids, monitor now that steroids are off    # Hypothyroidism   - Tsh 0.78  - c/w synthroid      ================= Skin/Catheters============================  # Psoriasis  - outpatient management     ==================[ PROPHYLAXIS ]==========================   # Dvt : c/u SQH   # Gi : Protonix     ====================[ DISPO ]======================   - Monitor in ICU     ===================[ PROGNOSIS ]====================  - Guarded

## 2022-02-17 NOTE — SWALLOW BEDSIDE ASSESSMENT ADULT - ORAL PREPARATORY PHASE
Within functional limits Increased mastication time/Decreased mastication ability Decreased mastication ability

## 2022-02-17 NOTE — SWALLOW BEDSIDE ASSESSMENT ADULT - ADDITIONAL RECOMMENDATIONS
Monitor for s/s aspiration/laryngeal penetration. If noted:  D/C p.o. intake, provide non-oral nutrition/hydration/meds.

## 2022-02-17 NOTE — PROGRESS NOTE ADULT - SUBJECTIVE AND OBJECTIVE BOX
ICU VISIT  56y Female    Meds:    Allergies    Fioricet (Rash)  gadobutrol (Rash; Urticaria; Hives)  IV Contrast (Short breath)  mushroom (Unknown)  venlafaxine (Hives)    Intolerances        VITALS:  Vital Signs Last 24 Hrs  T(C): 36.2 (17 Feb 2022 16:00), Max: 37.3 (17 Feb 2022 12:00)  T(F): 97.2 (17 Feb 2022 16:00), Max: 99.2 (17 Feb 2022 12:00)  HR: 92 (17 Feb 2022 17:00) (90 - 107)  BP: 121/79 (17 Feb 2022 17:00) (99/78 - 145/63)  BP(mean): 86 (17 Feb 2022 17:00) (68 - 91)  RR: 18 (17 Feb 2022 17:00) (16 - 32)  SpO2: 97% (17 Feb 2022 17:00) (91% - 100%)    LABS/DIAGNOSTIC TESTS:                          11.3   8.94  )-----------( 365      ( 17 Feb 2022 03:18 )             37.2         02-17    140  |  107  |  12  ----------------------------<  105<H>  3.7   |  30  |  0.44<L>    Ca    8.2<L>      17 Feb 2022 03:18  Phos  2.2     02-17  Mg     1.9     02-17    TPro  6.4  /  Alb  2.1<L>  /  TBili  0.7  /  DBili  x   /  AST  16  /  ALT  24  /  AlkPhos  87  02-17      LIVER FUNCTIONS - ( 17 Feb 2022 03:18 )  Alb: 2.1 g/dL / Pro: 6.4 g/dL / ALK PHOS: 87 U/L / ALT: 24 U/L DA / AST: 16 U/L / GGT: x             CULTURES: Clean Catch Clean Catch (Midstream)  02-05 @ 04:22   Normal Urogenital brody present  --  --            RADIOLOGY:      ROS:  [  ] UNABLE TO ELICIT ICU VISIT  56y Female who is doing well, she is still in the ICU but is being downgraded today. She still has a cough with sputum production but no fevers or chills, she has been having some loose stools 2-3 x per day but no roberto diarrhea. She has no SOB with oxygen, no other complaints.    Meds:    Allergies    Fioricet (Rash)  gadobutrol (Rash; Urticaria; Hives)  IV Contrast (Short breath)  mushroom (Unknown)  venlafaxine (Hives)    Intolerances        VITALS:  Vital Signs Last 24 Hrs  T(C): 36.2 (17 Feb 2022 16:00), Max: 37.3 (17 Feb 2022 12:00)  T(F): 97.2 (17 Feb 2022 16:00), Max: 99.2 (17 Feb 2022 12:00)  HR: 92 (17 Feb 2022 17:00) (90 - 107)  BP: 121/79 (17 Feb 2022 17:00) (99/78 - 145/63)  BP(mean): 86 (17 Feb 2022 17:00) (68 - 91)  RR: 18 (17 Feb 2022 17:00) (16 - 32)  SpO2: 97% (17 Feb 2022 17:00) (91% - 100%)    LABS/DIAGNOSTIC TESTS:                          11.3   8.94  )-----------( 365      ( 17 Feb 2022 03:18 )             37.2         02-17    140  |  107  |  12  ----------------------------<  105<H>  3.7   |  30  |  0.44<L>    Ca    8.2<L>      17 Feb 2022 03:18  Phos  2.2     02-17  Mg     1.9     02-17    TPro  6.4  /  Alb  2.1<L>  /  TBili  0.7  /  DBili  x   /  AST  16  /  ALT  24  /  AlkPhos  87  02-17      LIVER FUNCTIONS - ( 17 Feb 2022 03:18 )  Alb: 2.1 g/dL / Pro: 6.4 g/dL / ALK PHOS: 87 U/L / ALT: 24 U/L DA / AST: 16 U/L / GGT: x             CULTURES: Clean Catch Clean Catch (Midstream)  02-05 @ 04:22   Normal Urogenital brody present  --  --            RADIOLOGY:      ROS:  [  ] UNABLE TO ELICIT

## 2022-02-17 NOTE — SWALLOW BEDSIDE ASSESSMENT ADULT - PHARYNGEAL PHASE
palpable hyolaryngeal elevation/excursion; no overt s/s laryngeal penetration/aspiration palpable hyolaryngeal elevation/excursion; no overt s/s laryngeal penetration/aspiration/Within functional limits Within functional limits Delayed cough post oral intake/Delayed throat clear post oral intake/Multiple swallows

## 2022-02-17 NOTE — SWALLOW BEDSIDE ASSESSMENT ADULT - ASR SWALLOW ASPIRATION MONITOR
Monitor for s/s aspiration/laryngeal penetration. If noted:  D/C p.o. intake, provide non-oral nutrition/hydration/meds, and contact this service @ [phone]/change of breathing pattern/cough/gurgly voice/fever/pneumonia/throat clearing/upper respiratory infection change of breathing pattern/cough/gurgly voice/fever/pneumonia/throat clearing/upper respiratory infection

## 2022-02-17 NOTE — PROGRESS NOTE ADULT - ASSESSMENT
57 yo F, from home, ambulated with cane , PMHx of obesity, asthma, chronic bronchitis, NAKUL, HTN, DM, HLD, fibromyalgia, anxiety, trigeminal neuralgia, degenerative joint disease, ?IgA vasculitis, psoriasis, OA came with chief complain of chest pain and dysuria. Nephrology consult called for significant FERNIE    1) Nonoliguric FERNIE now resolved likely hypertensive/diabetic nephropathy  2) HCAP  3) Hypertension  4) Diabetic Nephropathy with protenuria/neuropathy  5) Morbid Obesity/NAKUL/OHS  6) Abdominal wall cellulitis  7) Left renal lesion chronic    Recommend:  Strict I/o  S cr 0.4 with non oliguria  Avoid Nephrotoxic agents  Maintain MAP>65-70 mm Hg  Adjust antibiotics as per renal dose adjustment  Monitor BMP and electrolytes daily  Ventilatory management per ICU team  Now off Abx  Can resume ACEI/ARB  Nocturnal BIPAP/CPAP as needed  No RRT/HD  Will follow

## 2022-02-17 NOTE — PROGRESS NOTE ADULT - SUBJECTIVE AND OBJECTIVE BOX
Romeo Ribeiro MD (Nephrology progress note)  [address], [facility],  [id number],  [place] [id number]  TEl: [phone]  Cell: [phone]    Patient is a 56y Female seen and evaluated at bedside. Vital signs, laboratory data, imaging studies, consult notes reviewed done within past 24 hours. Overnight events noted. Interval transfer from ICU to floor for further treatment. Interval stable renal function with non oliguria.     Allergies    Fioricet (Rash)  gadobutrol (Rash; Urticaria; Hives)  IV Contrast (Short breath)  mushroom (Unknown)  venlafaxine (Hives)    Intolerances        ROS:  CONSTITUTIONAL: No fever or chills.  HEENT: No headache or visual disturbances.  RESPIRATORY: No shortness of breath, cough, hemoptysis or wheezing  CARDIOVASCULAR: No chest pain or SOB  GASTROINTESTINAL: No abdominal pain, nausea, vomiting, diarrhea, hematemesis or blood per rectum.  GENITOURINARY: No flank or supra pubic pain  NEUROLOGICAL: No headache, focal limb weakness, tingling or numbness  SKIN: No skin rash or lesion  MUSCULOSKELETAL: No leg pain, calf pain or swelling    VITALS:    T(C): 37.2 (02-17-22 @ 18:54), Max: 37.3 (02-17-22 @ 12:00)  HR: 94 (02-17-22 @ 18:54) (90 - 107)  BP: 129/72 (02-17-22 @ 18:54) (99/78 - 145/63)  RR: 20 (02-17-22 @ 18:54) (16 - 32)  SpO2: 94% (02-17-22 @ 18:54) (91% - 100%)  CAPILLARY BLOOD GLUCOSE      POCT Blood Glucose.: 136 mg/dL (17 Feb 2022 17:24)  POCT Blood Glucose.: 167 mg/dL (17 Feb 2022 12:02)  POCT Blood Glucose.: 120 mg/dL (17 Feb 2022 05:34)  POCT Blood Glucose.: 164 mg/dL (16 Feb 2022 23:00)      02-16-22 @ 07:01  -  02-17-22 @ 07:00  --------------------------------------------------------  IN: 1186.6 mL / OUT: 500 mL / NET: 686.6 mL    02-17-22 @ 07:01  -  02-17-22 @ 19:01  --------------------------------------------------------  IN: 1259.8 mL / OUT: 0 mL / NET: 1259.8 mL      MEDICATIONS  (STANDING):  ALBUTerol    90 MICROgram(s) HFA Inhaler 2 Puff(s) Inhalation every 6 hours  aspirin  chewable 81 milliGRAM(s) Oral daily  atorvastatin 10 milliGRAM(s) Oral at bedtime  benzonatate 100 milliGRAM(s) Oral <User Schedule>  gabapentin 400 milliGRAM(s) Oral two times a day  heparin   Injectable 5000 Unit(s) SubCutaneous every 8 hours  influenza   Vaccine 0.5 milliLiter(s) IntraMuscular once  insulin glargine Injectable (LANTUS) 45 Unit(s) SubCutaneous at bedtime  insulin lispro (ADMELOG) corrective regimen sliding scale   SubCutaneous every 6 hours  insulin lispro Injectable (ADMELOG) 14 Unit(s) SubCutaneous every 6 hours  levothyroxine 112 MICROGram(s) Oral daily  lidocaine   4% Patch 2 Patch Transdermal daily  montelukast 10 milliGRAM(s) Oral at bedtime  OXcarbazepine 300 milliGRAM(s) Oral two times a day  pantoprazole    Tablet 40 milliGRAM(s) Oral before breakfast  senna 2 Tablet(s) Oral at bedtime    MEDICATIONS  (PRN):  acetaminophen    Suspension .. 650 milliGRAM(s) Oral every 6 hours PRN Temp greater or equal to 38C (100.4F), Mild Pain (1 - 3)  benzocaine 15 mG/menthol 3.6 mG Lozenge 1 Lozenge Oral every 4 hours PRN Sore Throat  metoclopramide Injectable 10 milliGRAM(s) IV Push every 8 hours PRN nausea/vomiting  ondansetron Injectable 4 milliGRAM(s) IV Push every 6 hours PRN Nausea and/or Vomiting  sodium chloride 0.9% lock flush 10 milliLiter(s) IV Push every 1 hour PRN Pre/post blood products, medications, blood draw, and to maintain line patency      PHYSICAL EXAM:  GENERAL: Alert, awake and oriented x2-3   HEENT: RITA, EOMI, neck supple, no JVP, no carotid bruit, oral mucosa moist and pink.  CHEST/LUNG: Bilateral clear breath sounds, no rales, no crepitations, no rhonchi, no wheezing  HEART: Regular rate and rhythm, LAURA II/VI at LPSB, no gallops, no rub   ABDOMEN: Soft, nontender, non distended, bowel sounds present, no palpable organomegaly  : No flank or supra pubic tenderness.  EXTREMITIES: Peripheral pulses are palpable, no pedal edema  Neurology: AAOx2-3, no focal neurological deficit  SKIN: No rash or skin lesion  Musculoskeletal: No joint deformity or spinal tenderness.      Vascular ACCESS:     LABS:                        11.3   8.94  )-----------( 365      ( 17 Feb 2022 03:18 )             37.2     02-17    140  |  107  |  12  ----------------------------<  105<H>  3.7   |  30  |  0.44<L>    Ca    8.2<L>      17 Feb 2022 03:18  Phos  2.2     02-17  Mg     1.9     02-17    TPro  6.4  /  Alb  2.1<L>  /  TBili  0.7  /  DBili  x   /  AST  16  /  ALT  24  /  AlkPhos  87  02-17                RADIOLOGY & ADDITIONAL STUDIES:  None  Imaging Personally Reviewed:  [x] YES  [ ] NO    Consultant(s) Notes Reviewed:  [x] YES  [ ] NO    Care Discussed with Primary team/ Other Providers [x] YES  [ ] NO

## 2022-02-17 NOTE — PROGRESS NOTE ADULT - SUBJECTIVE AND OBJECTIVE BOX
INTERVAL HPI/OVERNIGHT EVENTS: Patient seen and examined at bedside. No events overnight.     PRESSORS: [ ] YES [ ] NO  WHICH:    Antimicrobial:    Cardiovascular:    Pulmonary:  ALBUTerol    90 MICROgram(s) HFA Inhaler 2 Puff(s) Inhalation every 6 hours  benzonatate 100 milliGRAM(s) Oral <User Schedule>  montelukast 10 milliGRAM(s) Oral at bedtime    Hematalogic:  aspirin  chewable 81 milliGRAM(s) Oral daily  heparin   Injectable 5000 Unit(s) SubCutaneous every 8 hours    Other:  acetaminophen    Suspension .. 650 milliGRAM(s) Oral every 6 hours PRN  atorvastatin 10 milliGRAM(s) Oral at bedtime  chlorhexidine 2% Cloths 1 Application(s) Topical <User Schedule>  gabapentin 400 milliGRAM(s) Oral two times a day  influenza   Vaccine 0.5 milliLiter(s) IntraMuscular once  insulin glargine Injectable (LANTUS) 45 Unit(s) SubCutaneous at bedtime  insulin lispro (ADMELOG) corrective regimen sliding scale   SubCutaneous every 6 hours  insulin lispro Injectable (ADMELOG) 14 Unit(s) SubCutaneous every 6 hours  levothyroxine 112 MICROGram(s) Oral daily  lidocaine   4% Patch 2 Patch Transdermal daily  metoclopramide Injectable 10 milliGRAM(s) IV Push every 8 hours PRN  ondansetron Injectable 4 milliGRAM(s) IV Push every 6 hours PRN  OXcarbazepine 300 milliGRAM(s) Oral two times a day  pantoprazole    Tablet 40 milliGRAM(s) Oral before breakfast  senna 2 Tablet(s) Oral at bedtime  sodium chloride 0.9% lock flush 10 milliLiter(s) IV Push every 1 hour PRN    acetaminophen    Suspension .. 650 milliGRAM(s) Oral every 6 hours PRN  ALBUTerol    90 MICROgram(s) HFA Inhaler 2 Puff(s) Inhalation every 6 hours  aspirin  chewable 81 milliGRAM(s) Oral daily  atorvastatin 10 milliGRAM(s) Oral at bedtime  benzonatate 100 milliGRAM(s) Oral <User Schedule>  chlorhexidine 2% Cloths 1 Application(s) Topical <User Schedule>  gabapentin 400 milliGRAM(s) Oral two times a day  heparin   Injectable 5000 Unit(s) SubCutaneous every 8 hours  influenza   Vaccine 0.5 milliLiter(s) IntraMuscular once  insulin glargine Injectable (LANTUS) 45 Unit(s) SubCutaneous at bedtime  insulin lispro (ADMELOG) corrective regimen sliding scale   SubCutaneous every 6 hours  insulin lispro Injectable (ADMELOG) 14 Unit(s) SubCutaneous every 6 hours  levothyroxine 112 MICROGram(s) Oral daily  lidocaine   4% Patch 2 Patch Transdermal daily  metoclopramide Injectable 10 milliGRAM(s) IV Push every 8 hours PRN  montelukast 10 milliGRAM(s) Oral at bedtime  ondansetron Injectable 4 milliGRAM(s) IV Push every 6 hours PRN  OXcarbazepine 300 milliGRAM(s) Oral two times a day  pantoprazole    Tablet 40 milliGRAM(s) Oral before breakfast  senna 2 Tablet(s) Oral at bedtime  sodium chloride 0.9% lock flush 10 milliLiter(s) IV Push every 1 hour PRN    Drug Dosing Weight  Height (cm): 155 (2022 18:53)  Weight (kg): 127 (2022 18:53)  BMI (kg/m2): 52.9 (2022 18:53)  BSA (m2): 2.18 (2022 18:53)    CENTRAL LINE: [ ] YES [ ] NO  LOCATION:   DATE INSERTED:  REMOVE: [ ] YES [ ] NO  EXPLAIN:    WILDER: [ ] YES [ ] NO    DATE INSERTED:  REMOVE:  [ ] YES [ ] NO  EXPLAIN:    A-LINE:  [ ] YES [ ] NO  LOCATION:   DATE INSERTED:  REMOVE:  [ ] YES [ ] NO  EXPLAIN:    PMH -reviewed admission note, no change since admission  PAST MEDICAL & SURGICAL HISTORY:  Hypertension  vaginal cyst    Hyperlipidemia    Asthma  last inhaler use with in 10 days, on Pulm follow up [id number]    Hypothyroid    Obstructive sleep apnea  refuses Cpap    Obesity  morbid    Trigeminal neuralgia  R    Fibromyalgia    DM (diabetes mellitus)  2    DJD (degenerative joint disease)  Cervical/Thoracic/Lumbar    Cervical neuralgia  difficulty moving my neck    Back pain  thoracic &amp; lumbar    H/O bursitis  right shoulder    Radicular pain  arms, legs    Vasculitis  Legs, forerhead, arms &amp; hands, flare ups in R leg  Dr susie Ribeiro is my Rheumatologist    Renal cell carcinoma, left  on follow up Dr schulte, HOD renal [facility], waiting for suregry in 2020    Falls frequently    Morbid obesity with body mass index (BMI) of 50.0 to 59.9 in adult    Psoriasis    Diverticulosis    S/P abdominal hysterectomy        S/P  section  1    Left adrenal mass  excision, benign 2006    Vaginal cyst  removed     Vasculitis on nerve biopsy  , had another surgery called microvascular decompression         ICU Vital Signs Last 24 Hrs  T(C): 37 (2022 05:00), Max: 37.3 (2022 16:54)  T(F): 98.6 (2022 05:00), Max: 99.2 (2022 16:54)  HR: 99 (2022 09:00) (90 - 107)  BP: 127/65 (2022 09:00) (116/63 - 148/74)  BP(mean): 80 (2022 09:00) (66 - 91)  ABP: --  ABP(mean): --  RR: 16 (2022 09:00) (16 - 32)  SpO2: 93% (2022 09:00) (91% - 100%)            -16 @ 07:01  -  02-17 @ 07:00  --------------------------------------------------------  IN: 1186.6 mL / OUT: 500 mL / NET: 686.6 mL            PHYSICAL EXAM:    GENERAL: NAD, obese  HEAD:  Atraumatic, Normocephalic  EYES: EOMI, PERRLA, conjunctiva and sclera clear  ENMT: no lesions, dry mucus membranes.   NECK: Supple, normal appearance, No JVD; Normal thyroid; Trachea midline  NERVOUS SYSTEM:  Alert & Oriented X3, Motor Strength 5/5 B/L upper and lower extremities; sensation intact   CHEST/LUNG: CTA bl, no wheezing, rales, ronchi appreciated  HEART: Regular rate and rhythm; No murmurs, rubs, or gallops  ABDOMEN: soft, + BS  EXTREMITIES:  2+ Peripheral Pulses, No clubbing, cyanosis, or edema  LYMPH: No lymphadenopathy noted  SKIN: psoriatic lesions in lower extremities.       LABS:  CBC Full  -  ( 2022 03:18 )  WBC Count : 8.94 K/uL  RBC Count : 4.77 M/uL  Hemoglobin : 11.3 g/dL  Hematocrit : 37.2 %  Platelet Count - Automated : 365 K/uL  Mean Cell Volume : 78.0 fl  Mean Cell Hemoglobin : 23.7 pg  Mean Cell Hemoglobin Concentration : 30.4 gm/dL  Auto Neutrophil # : 5.97 K/uL  Auto Lymphocyte # : 1.17 K/uL  Auto Monocyte # : 1.47 K/uL  Auto Eosinophil # : 0.23 K/uL  Auto Basophil # : 0.02 K/uL  Auto Neutrophil % : 66.8 %  Auto Lymphocyte % : 13.1 %  Auto Monocyte % : 16.4 %  Auto Eosinophil % : 2.6 %  Auto Basophil % : 0.2 %        140  |  107  |  12  ----------------------------<  105<H>  3.7   |  30  |  0.44<L>    Ca    8.2<L>      2022 03:18  Phos  2.2       Mg     1.9         TPro  6.4  /  Alb  2.1<L>  /  TBili  0.7  /  DBili  x   /  AST  16  /  ALT  24  /  AlkPhos  87              RADIOLOGY & ADDITIONAL STUDIES REVIEWED:  ***    [ ]GOALS OF CARE DISCUSSION WITH PATIENT/FAMILY/PROXY:    CRITICAL CARE TIME SPENT: 35 minutes

## 2022-02-17 NOTE — SWALLOW BEDSIDE ASSESSMENT ADULT - SWALLOW EVAL: PATIENT/FAMILY GOALS STATEMENT
Pt denies h/o oropharyngeal dysphagia; reports desire to drink water. Ask for possible advancement of diet.

## 2022-02-17 NOTE — SWALLOW BEDSIDE ASSESSMENT ADULT - SLP PERTINENT HISTORY OF CURRENT PROBLEM
57 yo F, from home, ambulated with cane, PMHx of obesity, asthma, chronic bronchitis, NAKUL, HTN, DM, HLD, fibromyalgia, anxiety, trigeminal neuralgia, degenerative joint disease, ?IgA vasculitis, psoriasis, OA came with chief c/o chest pain and dysuria. [facility] and had extensive cardiac work up stress test and echo which were (-). Pt has L-renal mass Dx'd on imaging chances of malignancy 85% Pt intubated, Hypercapnea resolved, weaned off sedation and was awake, following commands. Trial of extubation on 2/9 was unsuccessful 2/2 failed leak test and concern for critical airway. She was started on steroids. Trial of extubation on 2/11 with anesthesia present, Pt refused to be extuabted at that time, threatening to mariah. She was extubated on 2/14 to bipap and was doing well. Utilizing bipap at night. Had Nausea, vomiting and diarrhea day of extubation likely due to increased medication for bowel regimen.

## 2022-02-17 NOTE — SWALLOW BEDSIDE ASSESSMENT ADULT - SWALLOW EVAL: DIAGNOSIS
Pt  Pt p/w oral dysphagia c/b impaired bolus formation & prolonged mastication 2/2 fatigue/deconditioning, slow A-P transport; However, oropharyngeal swallow was intact and timely with no overt s/s of laryngeal penetration or aspiration at this exam. Denied pain, but stated recently had nausea. Prefers softer solids at this time. Pt p/w mild oral dysphagia c/b impaired bolus formation & prolonged mastication 2/2 fatigue/deconditioning, slow A-P transport; However, oropharyngeal swallow was intact and timely with no overt s/s of laryngeal penetration or aspiration at this exam. Denied pain, but stated recently had nausea. Prefers softer solids at this time.

## 2022-02-17 NOTE — SWALLOW BEDSIDE ASSESSMENT ADULT - ASPIRATION PRECAUTIONS
Strict reflux/aspiration precautions, and if enteral feeds are initiated./yes Strict reflux/aspiration precautions/yes

## 2022-02-17 NOTE — SWALLOW BEDSIDE ASSESSMENT ADULT - SWALLOW EVAL: RECOMMENDED FEEDING/EATING TECHNIQUES
Monitor for s/s aspiration/laryngeal penetration. If noted:  D/C p.o. intake, provide non-oral nutrition/hydration/meds, and contact this service @ [phone]/maintain upright posture during/after eating for 30 mins/no straws/oral hygiene/position upright (90 degrees)/small sips/bites allow for swallow between intakes/alternate food with liquid/maintain upright posture during/after eating for 30 mins/no straws/oral hygiene/position upright (90 degrees)/small sips/bites

## 2022-02-17 NOTE — PROGRESS NOTE ADULT - ASSESSMENT
Pneumonia - treatment completed.  Fevers - resolved  Abdominal  wall cellulitis - resolved  Leukocytosis - normalized    Plan - Monitor off antibiotics  Time spent - 30 mins.

## 2022-02-17 NOTE — SWALLOW BEDSIDE ASSESSMENT ADULT - ORAL PHASE
Within functional limits Decreased anterior-posterior movement of the bolus Decreased anterior-posterior movement of the bolus/Delayed oral transit time/Stasis in lateral sulci/Lingual stasis

## 2022-02-17 NOTE — SWALLOW BEDSIDE ASSESSMENT ADULT - COMMENTS
Pt OOB seated upright in chair for exam. AA+Ox3. Pt OOB seated upright in chair for exam. AA+Ox3. Hoarse raspy vocal quality noted.  Of Noted food preferences: [employer].

## 2022-02-18 ENCOUNTER — TRANSCRIPTION ENCOUNTER (OUTPATIENT)
Age: 57
End: 2022-02-18

## 2022-02-18 ENCOUNTER — NON-APPOINTMENT (OUTPATIENT)
Age: 57
End: 2022-02-18

## 2022-02-18 DIAGNOSIS — Z02.9 ENCOUNTER FOR ADMINISTRATIVE EXAMINATIONS, UNSPECIFIED: ICD-10-CM

## 2022-02-18 LAB
ANION GAP SERPL CALC-SCNC: 6 MMOL/L — SIGNIFICANT CHANGE UP (ref 5–17)
BASOPHILS # BLD AUTO: 0.02 K/UL — SIGNIFICANT CHANGE UP (ref 0–0.2)
BASOPHILS NFR BLD AUTO: 0.2 % — SIGNIFICANT CHANGE UP (ref 0–2)
BUN SERPL-MCNC: 11 MG/DL — SIGNIFICANT CHANGE UP (ref 7–18)
CALCIUM SERPL-MCNC: 8.3 MG/DL — LOW (ref 8.4–10.5)
CHLORIDE SERPL-SCNC: 102 MMOL/L — SIGNIFICANT CHANGE UP (ref 96–108)
CO2 SERPL-SCNC: 30 MMOL/L — SIGNIFICANT CHANGE UP (ref 22–31)
CREAT SERPL-MCNC: 0.5 MG/DL — SIGNIFICANT CHANGE UP (ref 0.5–1.3)
EOSINOPHIL # BLD AUTO: 0.16 K/UL — SIGNIFICANT CHANGE UP (ref 0–0.5)
EOSINOPHIL NFR BLD AUTO: 2 % — SIGNIFICANT CHANGE UP (ref 0–6)
GLUCOSE BLDC GLUCOMTR-MCNC: 108 MG/DL — HIGH (ref 70–99)
GLUCOSE BLDC GLUCOMTR-MCNC: 127 MG/DL — HIGH (ref 70–99)
GLUCOSE BLDC GLUCOMTR-MCNC: 129 MG/DL — HIGH (ref 70–99)
GLUCOSE BLDC GLUCOMTR-MCNC: 143 MG/DL — HIGH (ref 70–99)
GLUCOSE BLDC GLUCOMTR-MCNC: 170 MG/DL — HIGH (ref 70–99)
GLUCOSE BLDC GLUCOMTR-MCNC: 171 MG/DL — HIGH (ref 70–99)
GLUCOSE BLDC GLUCOMTR-MCNC: 84 MG/DL — SIGNIFICANT CHANGE UP (ref 70–99)
GLUCOSE SERPL-MCNC: 146 MG/DL — HIGH (ref 70–99)
HCT VFR BLD CALC: 36.9 % — SIGNIFICANT CHANGE UP (ref 34.5–45)
HGB BLD-MCNC: 11.3 G/DL — LOW (ref 11.5–15.5)
IMM GRANULOCYTES NFR BLD AUTO: 1 % — SIGNIFICANT CHANGE UP (ref 0–1.5)
LYMPHOCYTES # BLD AUTO: 1.29 K/UL — SIGNIFICANT CHANGE UP (ref 1–3.3)
LYMPHOCYTES # BLD AUTO: 16 % — SIGNIFICANT CHANGE UP (ref 13–44)
MAGNESIUM SERPL-MCNC: 1.5 MG/DL — LOW (ref 1.6–2.6)
MCHC RBC-ENTMCNC: 24.1 PG — LOW (ref 27–34)
MCHC RBC-ENTMCNC: 30.6 GM/DL — LOW (ref 32–36)
MCV RBC AUTO: 78.8 FL — LOW (ref 80–100)
MONOCYTES # BLD AUTO: 1.19 K/UL — HIGH (ref 0–0.9)
MONOCYTES NFR BLD AUTO: 14.7 % — HIGH (ref 2–14)
NEUTROPHILS # BLD AUTO: 5.34 K/UL — SIGNIFICANT CHANGE UP (ref 1.8–7.4)
NEUTROPHILS NFR BLD AUTO: 66.1 % — SIGNIFICANT CHANGE UP (ref 43–77)
NRBC # BLD: 0 /100 WBCS — SIGNIFICANT CHANGE UP (ref 0–0)
PHOSPHATE SERPL-MCNC: 2.7 MG/DL — SIGNIFICANT CHANGE UP (ref 2.5–4.5)
PLATELET # BLD AUTO: 362 K/UL — SIGNIFICANT CHANGE UP (ref 150–400)
POTASSIUM SERPL-MCNC: 3.7 MMOL/L — SIGNIFICANT CHANGE UP (ref 3.5–5.3)
POTASSIUM SERPL-SCNC: 3.7 MMOL/L — SIGNIFICANT CHANGE UP (ref 3.5–5.3)
RBC # BLD: 4.68 M/UL — SIGNIFICANT CHANGE UP (ref 3.8–5.2)
RBC # FLD: 17 % — HIGH (ref 10.3–14.5)
SODIUM SERPL-SCNC: 138 MMOL/L — SIGNIFICANT CHANGE UP (ref 135–145)
WBC # BLD: 8.08 K/UL — SIGNIFICANT CHANGE UP (ref 3.8–10.5)
WBC # FLD AUTO: 8.08 K/UL — SIGNIFICANT CHANGE UP (ref 3.8–10.5)

## 2022-02-18 PROCEDURE — 99233 SBSQ HOSP IP/OBS HIGH 50: CPT

## 2022-02-18 RX ORDER — INSULIN LISPRO 100/ML
VIAL (ML) SUBCUTANEOUS
Refills: 0 | Status: DISCONTINUED | OUTPATIENT
Start: 2022-02-18 | End: 2022-02-20

## 2022-02-18 RX ORDER — SODIUM CHLORIDE 9 MG/ML
1000 INJECTION, SOLUTION INTRAVENOUS
Refills: 0 | Status: DISCONTINUED | OUTPATIENT
Start: 2022-02-18 | End: 2022-02-22

## 2022-02-18 RX ORDER — LOSARTAN POTASSIUM 100 MG/1
25 TABLET, FILM COATED ORAL DAILY
Refills: 0 | Status: DISCONTINUED | OUTPATIENT
Start: 2022-02-18 | End: 2022-02-22

## 2022-02-18 RX ORDER — GLUCAGON INJECTION, SOLUTION 0.5 MG/.1ML
1 INJECTION, SOLUTION SUBCUTANEOUS ONCE
Refills: 0 | Status: DISCONTINUED | OUTPATIENT
Start: 2022-02-18 | End: 2022-02-22

## 2022-02-18 RX ORDER — SODIUM CHLORIDE 0.65 %
1 AEROSOL, SPRAY (ML) NASAL EVERY 4 HOURS
Refills: 0 | Status: DISCONTINUED | OUTPATIENT
Start: 2022-02-18 | End: 2022-02-22

## 2022-02-18 RX ORDER — INSULIN LISPRO 100/ML
VIAL (ML) SUBCUTANEOUS AT BEDTIME
Refills: 0 | Status: DISCONTINUED | OUTPATIENT
Start: 2022-02-18 | End: 2022-02-20

## 2022-02-18 RX ORDER — CHLORHEXIDINE GLUCONATE 213 G/1000ML
1 SOLUTION TOPICAL
Refills: 0 | Status: DISCONTINUED | OUTPATIENT
Start: 2022-02-18 | End: 2022-02-22

## 2022-02-18 RX ORDER — MAGNESIUM SULFATE 500 MG/ML
1 VIAL (ML) INJECTION ONCE
Refills: 0 | Status: COMPLETED | OUTPATIENT
Start: 2022-02-18 | End: 2022-02-18

## 2022-02-18 RX ADMIN — HEPARIN SODIUM 5000 UNIT(S): 5000 INJECTION INTRAVENOUS; SUBCUTANEOUS at 05:54

## 2022-02-18 RX ADMIN — LIDOCAINE 2 PATCH: 4 CREAM TOPICAL at 19:59

## 2022-02-18 RX ADMIN — GABAPENTIN 400 MILLIGRAM(S): 400 CAPSULE ORAL at 17:50

## 2022-02-18 RX ADMIN — OXCARBAZEPINE 300 MILLIGRAM(S): 300 TABLET, FILM COATED ORAL at 17:50

## 2022-02-18 RX ADMIN — MONTELUKAST 10 MILLIGRAM(S): 4 TABLET, CHEWABLE ORAL at 22:03

## 2022-02-18 RX ADMIN — LIDOCAINE 2 PATCH: 4 CREAM TOPICAL at 23:29

## 2022-02-18 RX ADMIN — HEPARIN SODIUM 5000 UNIT(S): 5000 INJECTION INTRAVENOUS; SUBCUTANEOUS at 13:58

## 2022-02-18 RX ADMIN — Medication 100 MILLIGRAM(S): at 17:50

## 2022-02-18 RX ADMIN — Medication 650 MILLIGRAM(S): at 03:55

## 2022-02-18 RX ADMIN — Medication 14 UNIT(S): at 12:02

## 2022-02-18 RX ADMIN — ALBUTEROL 2 PUFF(S): 90 AEROSOL, METERED ORAL at 17:54

## 2022-02-18 RX ADMIN — CHLORHEXIDINE GLUCONATE 1 APPLICATION(S): 213 SOLUTION TOPICAL at 16:53

## 2022-02-18 RX ADMIN — Medication 1: at 08:14

## 2022-02-18 RX ADMIN — ALBUTEROL 2 PUFF(S): 90 AEROSOL, METERED ORAL at 21:40

## 2022-02-18 RX ADMIN — ATORVASTATIN CALCIUM 10 MILLIGRAM(S): 80 TABLET, FILM COATED ORAL at 22:03

## 2022-02-18 RX ADMIN — LOSARTAN POTASSIUM 25 MILLIGRAM(S): 100 TABLET, FILM COATED ORAL at 16:52

## 2022-02-18 RX ADMIN — HEPARIN SODIUM 5000 UNIT(S): 5000 INJECTION INTRAVENOUS; SUBCUTANEOUS at 22:04

## 2022-02-18 RX ADMIN — INSULIN GLARGINE 45 UNIT(S): 100 INJECTION, SOLUTION SUBCUTANEOUS at 22:04

## 2022-02-18 RX ADMIN — GABAPENTIN 400 MILLIGRAM(S): 400 CAPSULE ORAL at 05:54

## 2022-02-18 RX ADMIN — Medication 1 SPRAY(S): at 22:09

## 2022-02-18 RX ADMIN — Medication 100 GRAM(S): at 13:15

## 2022-02-18 RX ADMIN — LIDOCAINE 2 PATCH: 4 CREAM TOPICAL at 11:51

## 2022-02-18 RX ADMIN — Medication 81 MILLIGRAM(S): at 16:52

## 2022-02-18 RX ADMIN — PANTOPRAZOLE SODIUM 40 MILLIGRAM(S): 20 TABLET, DELAYED RELEASE ORAL at 06:07

## 2022-02-18 RX ADMIN — Medication 1: at 12:01

## 2022-02-18 RX ADMIN — Medication 14 UNIT(S): at 08:14

## 2022-02-18 RX ADMIN — SENNA PLUS 2 TABLET(S): 8.6 TABLET ORAL at 22:04

## 2022-02-18 RX ADMIN — Medication 112 MICROGRAM(S): at 05:54

## 2022-02-18 RX ADMIN — OXCARBAZEPINE 300 MILLIGRAM(S): 300 TABLET, FILM COATED ORAL at 05:54

## 2022-02-18 RX ADMIN — ALBUTEROL 2 PUFF(S): 90 AEROSOL, METERED ORAL at 08:16

## 2022-02-18 RX ADMIN — Medication 100 MILLIGRAM(S): at 05:54

## 2022-02-18 NOTE — PROGRESS NOTE ADULT - RS GEN PE MLT RESP DETAILS PC
breath sounds equal/good air movement/clear to auscultation bilaterally/no rales/no rhonchi/no wheezes
airway patent/breath sounds equal/good air movement/no rhonchi/no wheezes/rales
airway patent/breath sounds equal/good air movement/no rhonchi/no wheezes/rales
airway patent/breath sounds equal/good air movement/clear to auscultation bilaterally/no rales/no rhonchi/no wheezes
airway patent/breath sounds equal/good air movement/clear to auscultation bilaterally/no rales/no rhonchi/no wheezes

## 2022-02-18 NOTE — DISCHARGE NOTE PROVIDER - NSDCMRMEDTOKEN_GEN_ALL_CORE_FT
amitriptyline 10 mg oral tablet: 1 tab(s) orally once a day (at bedtime), As Needed  aspirin 81 mg oral tablet:   atorvastatin 10 mg oral tablet: 1 tab(s) orally once a day  gabapentin 400 mg oral capsule: 1 cap(s) orally 2 times a day  hydroCHLOROthiazide 12.5 mg oral tablet: 2 tab(s) orally once a day  Lantus 100 units/mL subcutaneous solution: 40 unit(s) subcutaneous once a day (at bedtime)  Lantus Solostar Pen 100 units/mL subcutaneous solution: 40 unit(s) subcutaneous once a day (at bedtime)   levothyroxine 112 mcg (0.112 mg) oral capsule: 1 cap(s) orally once a day  metFORMIN 1000 mg oral tablet: 1 tab(s) orally 2 times a day, last dose 07/16 am  montelukast 10 mg oral tablet: 1 tab(s) orally once a day (at bedtime)  NovoLOG 100 units/mL subcutaneous solution: 16 unit(s) subcutaneous once a day in breakfast  NovoLOG 100 units/mL subcutaneous solution: 13-18 unit(s) subcutaneous 2 times a day  at lunch and dinner   OXcarbazepine 300 mg oral tablet: 1 tab(s) orally 2 times a day  ramipril 10 mg oral tablet: 1 tab(s) orally once a day  Symbicort 160 mcg-4.5 mcg/inh inhalation aerosol: 2 puff(s) inhaled 2 times a day   albuterol 90 mcg/inh inhalation aerosol: 2 puff(s) inhaled every 6 hours  amitriptyline 10 mg oral tablet: 1 tab(s) orally once a day (at bedtime), As Needed  aspirin 81 mg oral tablet: 1 tab(s) orally once a day  atorvastatin 10 mg oral tablet: 1 tab(s) orally once a day  gabapentin 400 mg oral capsule: 1 cap(s) orally 2 times a day  hydroCHLOROthiazide 12.5 mg oral tablet: 2 tab(s) orally once a day  Lantus 100 units/mL subcutaneous solution: 40 unit(s) subcutaneous once a day (at bedtime)  Lantus Solostar Pen 100 units/mL subcutaneous solution: 40 unit(s) subcutaneous once a day (at bedtime)   levothyroxine 112 mcg (0.112 mg) oral capsule: 1 cap(s) orally once a day  lidocaine 4% topical film: Apply topically to affected area once a day  losartan 25 mg oral tablet: 1 tab(s) orally once a day  metFORMIN 1000 mg oral tablet: 1 tab(s) orally 2 times a day, last dose 07/16 am  montelukast 10 mg oral tablet: 1 tab(s) orally once a day (at bedtime)  OXcarbazepine 300 mg oral tablet: 1 tab(s) orally 2 times a day  pantoprazole 40 mg oral delayed release tablet: 1 tab(s) orally once a day (before a meal)  ramipril 10 mg oral tablet: 1 tab(s) orally once a day  senna oral tablet: 2 tab(s) orally once a day (at bedtime)  Symbicort 160 mcg-4.5 mcg/inh inhalation aerosol: 2 puff(s) inhaled 2 times a day

## 2022-02-18 NOTE — PROGRESS NOTE ADULT - MS EXT PE MLT D E PC
no cyanosis/pedal edema

## 2022-02-18 NOTE — DISCHARGE NOTE PROVIDER - PROVIDER TOKENS
PROVIDER:[TOKEN:[[id number]]] PROVIDER:[TOKEN:[[id number]]],PROVIDER:[TOKEN:[[id number]],FOLLOWUP:[2 weeks]]

## 2022-02-18 NOTE — PROGRESS NOTE ADULT - PROBLEM SELECTOR PLAN 1
BP currently acceptable off medication   - monitor BP in setting of ATN / sepsis   - keep MAP >65 BP currently acceptable off medication   monitor BP in setting of ATN / sepsis   keep MAP >65 BP currently acceptable  Continue Losartan  monitor BP in setting of ATN / sepsis   keep MAP >65

## 2022-02-18 NOTE — PROGRESS NOTE ADULT - SKIN COMMENTS
psoriasis unchanged
psoriatic rashes all over her exts, macular rash over her abdomen as seen previously
Psoriasis rash unchanged
psoriasis over all exts
Psoriasis as previously described
psoriasis all over exts
psoriasis rash unchanged
Psoriatic rash unchanged

## 2022-02-18 NOTE — DISCHARGE NOTE PROVIDER - NSDCCAREPROVSEEN_GEN_ALL_CORE_FT
Berenice Velez Berenice Velez Geevarghese Rosie, Berenice Brantley, Lashay Mckeon, Rajni Stack, Charlee Cotton, Inder Chance, Juventino H

## 2022-02-18 NOTE — DISCHARGE NOTE PROVIDER - NSDCCPCAREPLAN_GEN_ALL_CORE_FT
PRINCIPAL DISCHARGE DIAGNOSIS  Diagnosis: Chest pain of uncertain etiology  Assessment and Plan of Treatment:       SECONDARY DISCHARGE DIAGNOSES  Diagnosis: Acute UTI  Assessment and Plan of Treatment:     Diagnosis: Hypothyroidism  Assessment and Plan of Treatment: Continue taking your thyroid medication as prescribed and follow up with your doctor for your routine blood work.    Diagnosis: Fibromyalgia  Assessment and Plan of Treatment: Continue taking your medication as prescribed. Avoid driving or operating machinery while taking your pain medication.    Diagnosis: Diabetes  Assessment and Plan of Treatment: Continue taking your medication as prescribed and follow up with your doctor.  Report signs and symptoms of hypo and hyperglycemia to your doctor such as dizziness, lightheadedness, increasing thirst, appetite and increase urination, cold, clammy skin.      Diagnosis: Asthma  Assessment and Plan of Treatment: Continue taking your inhaler as prescribed and follow up with your doctor as outpatient.    Diagnosis: HTN (hypertension)  Assessment and Plan of Treatment: You are being treated for high blood pressure.  Continue taking your medication as prescribed to help prevent or minimize your risk of end organ damage.  Follow up with your doctor for routine blood pressure evaluation and medication regimen.  Report any symptoms of headaces with nausea or vomiting, visual changes, heart palpitation, chest pain or shortness.       PRINCIPAL DISCHARGE DIAGNOSIS  Diagnosis: Chest pain of uncertain etiology  Assessment and Plan of Treatment:   HOME CARE INSTRUCTIONS  For the next few days, avoid physical activities that bring on chest pain. Continue physical activities as directed.  Do not smoke.  Avoid drinking alcohol.   Only take over-the-counter or prescription medicine for pain, discomfort, or fever as directed by your caregiver.  Follow your caregiver's suggestions for further testing if your chest pain does not go away.  Keep any follow-up appointments you made. If you do not go to an appointment, you could develop lasting (chronic) problems with pain. If there is any problem keeping an appointment, you must call to reschedule.   SEEK MEDICAL CARE IF:  You think you are having problems from the medicine you are taking. Read your medicine instructions carefully.  Your chest pain does not go away, even after treatment.  You develop a rash with blisters on your chest.  SEEK IMMEDIATE MEDICAL CARE IF:  You have increased chest pain or pain that spreads to your arm, neck, jaw, back, or abdomen.   You develop shortness of breath, an increasing cough, or you are coughing up blood.  You have severe back or abdominal pain, feel nauseous, or vomit.  You develop severe weakness, fainting, or chills.  You have a fever.  THIS IS AN EMERGENCY. Do not wait to see if the pain will go away. Get medical help at once. Call your local emergency services (_____________________). Do not drive yourself to the hospital.        SECONDARY DISCHARGE DIAGNOSES  Diagnosis: Acute UTI  Assessment and Plan of Treatment:     Diagnosis: HTN (hypertension)  Assessment and Plan of Treatment: You are being treated for high blood pressure.  Continue taking your medication as prescribed to help prevent or minimize your risk of end organ damage.  Follow up with your doctor for routine blood pressure evaluation and medication regimen.  Report any symptoms of headaces with nausea or vomiting, visual changes, heart palpitation, chest pain or shortness.      Diagnosis: Hypothyroidism  Assessment and Plan of Treatment: Continue taking your thyroid medication as prescribed and follow up with your doctor for your routine blood work.    Diagnosis: Asthma  Assessment and Plan of Treatment: Continue taking your inhaler as prescribed and follow up with your doctor as outpatient.    Diagnosis: Diabetes  Assessment and Plan of Treatment: Continue taking your medication as prescribed and follow up with your doctor.  Report signs and symptoms of hypo and hyperglycemia to your doctor such as dizziness, lightheadedness, increasing thirst, appetite and increase urination, cold, clammy skin.      Diagnosis: Fibromyalgia  Assessment and Plan of Treatment: Continue taking your medication as prescribed. Avoid driving or operating machinery while taking your pain medication.     PRINCIPAL DISCHARGE DIAGNOSIS  Diagnosis: Chest pain of uncertain etiology  Assessment and Plan of Treatment: HOME CARE INSTRUCTIONS  For the next few days, avoid physical activities that bring on chest pain. Continue physical activities as directed.  Do not smoke.  Avoid drinking alcohol.   Only take over-the-counter or prescription medicine for pain, discomfort, or fever as directed by your caregiver.  Follow your caregiver's suggestions for further testing if your chest pain does not go away.  Keep any follow-up appointments you made. If you do not go to an appointment, you could develop lasting (chronic) problems with pain. If there is any problem keeping an appointment, you must call to reschedule.   SEEK MEDICAL CARE IF:  You think you are having problems from the medicine you are taking. Read your medicine instructions carefully.  Your chest pain does not go away, even after treatment.  You develop a rash with blisters on your chest.  SEEK IMMEDIATE MEDICAL CARE IF:  You have increased chest pain or pain that spreads to your arm, neck, jaw, back, or abdomen.   You develop shortness of breath, an increasing cough, or you are coughing up blood.  You have severe back or abdominal pain, feel nauseous, or vomit.  You develop severe weakness, fainting, or chills.  You have a fever.  THIS IS AN EMERGENCY. Do not wait to see if the pain will go away.         SECONDARY DISCHARGE DIAGNOSES  Diagnosis: Acute UTI  Assessment and Plan of Treatment: HOME CARE INSTRUCTIONS  Drink enough water and fluids to keep your urine clear or pale yellow.  Avoid caffeine, tea, and carbonated beverages. They tend to irritate your bladder.  Empty your bladder often. Avoid holding urine for long periods of time.  After a bowel movement, women should cleanse from front to back. Use each tissue only once.  SEEK MEDICAL CARE IF:  You have back pain.  You develop a fever.  Your symptoms do not begin to resolve within 3 days.  SEEK IMMEDIATE MEDICAL CARE IF:  You have severe back pain or lower abdominal pain.  You develop chills.  You have nausea or vomiting.  You have continued burning or discomfort with urination.      Diagnosis: HTN (hypertension)  Assessment and Plan of Treatment: You are being treated for high blood pressure.  Continue taking your medication as prescribed to help prevent or minimize your risk of end organ damage.  Follow up with your doctor for routine blood pressure evaluation and medication regimen.  Report any symptoms of headaces with nausea or vomiting, visual changes, heart palpitation, chest pain or shortness.      Diagnosis: Hypothyroidism  Assessment and Plan of Treatment: Continue taking your thyroid medication as prescribed and follow up with your doctor for your routine blood work.    Diagnosis: Asthma  Assessment and Plan of Treatment: Continue taking your inhaler as prescribed and follow up with your doctor as outpatient.    Diagnosis: Diabetes  Assessment and Plan of Treatment: Continue taking your medication as prescribed and follow up with your doctor.  Report signs and symptoms of hypo and hyperglycemia to your doctor such as dizziness, lightheadedness, increasing thirst, appetite and increase urination, cold, clammy skin.      Diagnosis: Fibromyalgia  Assessment and Plan of Treatment: Continue taking your medication as prescribed. Avoid driving or operating machinery while taking your pain medication.     PRINCIPAL DISCHARGE DIAGNOSIS  Diagnosis: Chest pain of uncertain etiology  Assessment and Plan of Treatment: HOME CARE INSTRUCTIONS  For the next few days, avoid physical activities that bring on chest pain. Continue physical activities as directed.  Do not smoke.  Avoid drinking alcohol.   Only take over-the-counter or prescription medicine for pain, discomfort, or fever as directed by your caregiver.  Follow your caregiver's suggestions for further testing if your chest pain does not go away.  Keep any follow-up appointments you made. If you do not go to an appointment, you could develop lasting (chronic) problems with pain. If there is any problem keeping an appointment, you must call to reschedule.   SEEK MEDICAL CARE IF:  You think you are having problems from the medicine you are taking. Read your medicine instructions carefully.  Your chest pain does not go away, even after treatment.  You develop a rash with blisters on your chest.  SEEK IMMEDIATE MEDICAL CARE IF:  You have increased chest pain or pain that spreads to your arm, neck, jaw, back, or abdomen.   You develop shortness of breath, an increasing cough, or you are coughing up blood.  You have severe back or abdominal pain, feel nauseous, or vomit.  You develop severe weakness, fainting, or chills.  You have a fever.  THIS IS AN EMERGENCY. Do not wait to see if the pain will go away.         SECONDARY DISCHARGE DIAGNOSES  Diagnosis: Acute respiratory failure with hypercapnia  Assessment and Plan of Treatment: You was noted to have acute encephalopathy after admission and noted to have elevated CO2 due to retention and needing ICU monitoring and intubation for few days. You was extubated on 2/14/22 and was downgraded to the medical floor. You are counseled at length to be compliant with CPAP to prevent future CO2 retention and complications.    Diagnosis: Acute UTI  Assessment and Plan of Treatment: You was found to have a urinary tract infection and was treated with intravenous antibiotics and completed course during ICU stay. Encourage oral fluids to keep hydrated to prevent recurrent UTI       Diagnosis: HTN (hypertension)  Assessment and Plan of Treatment: You are being treated for high blood pressure.  Continue taking your medication as prescribed to help prevent or minimize your risk of end organ damage.  Follow up with your doctor for routine blood pressure evaluation and medication regimen.  Report any symptoms of headaces with nausea or vomiting, visual changes, heart palpitation, chest pain or shortness.      Diagnosis: Hypothyroidism  Assessment and Plan of Treatment: Continue taking your thyroid medication as prescribed and follow up with your doctor for your routine blood work.    Diagnosis: Asthma  Assessment and Plan of Treatment: Continue taking your inhaler as prescribed and follow up with your doctor as outpatient.    Diagnosis: Diabetes  Assessment and Plan of Treatment: Continue taking your medication as prescribed and follow up with your doctor.  Report signs and symptoms of hypo and hyperglycemia to your doctor such as dizziness, lightheadedness, increasing thirst, appetite and increase urination, cold, clammy skin.      Diagnosis: Fibromyalgia  Assessment and Plan of Treatment: Continue taking your medication as prescribed. Avoid driving or operating machinery while taking your pain medication.     PRINCIPAL DISCHARGE DIAGNOSIS  Diagnosis: Chest pain of uncertain etiology  Assessment and Plan of Treatment: HOME CARE INSTRUCTIONS  For the next few days, avoid physical activities that bring on chest pain. Continue physical activities as directed.  Do not smoke.  Avoid drinking alcohol.   Only take over-the-counter or prescription medicine for pain, discomfort, or fever as directed by your caregiver.  Follow your caregiver's suggestions for further testing if your chest pain does not go away.  Keep any follow-up appointments you made. If you do not go to an appointment, you could develop lasting (chronic) problems with pain. If there is any problem keeping an appointment, you must call to reschedule.   SEEK MEDICAL CARE IF:  You think you are having problems from the medicine you are taking. Read your medicine instructions carefully.  Your chest pain does not go away, even after treatment.  You develop a rash with blisters on your chest.  SEEK IMMEDIATE MEDICAL CARE IF:  You have increased chest pain or pain that spreads to your arm, neck, jaw, back, or abdomen.   You develop shortness of breath, an increasing cough, or you are coughing up blood.  You have severe back or abdominal pain, feel nauseous, or vomit.  You develop severe weakness, fainting, or chills.  You have a fever.  THIS IS AN EMERGENCY. Do not wait to see if the pain will go away.         SECONDARY DISCHARGE DIAGNOSES  Diagnosis: Acute respiratory failure with hypercapnia  Assessment and Plan of Treatment: You was noted to have acute encephalopathy after admission and noted to have elevated CO2 due to retention and needing ICU monitoring and intubation for few days. You was extubated on 2/14/22 and was downgraded to the medical floor. You are counseled at length to be compliant with CPAP to prevent future CO2 retention and complications.    Diagnosis: Acute metabolic encephalopathy  Assessment and Plan of Treatment: You had altered mental status due to CO2 retention needing intubation and eventually extubated doing well on CPAP. Please be compliant with CPAP at night to prevent CO2 retention.    Diagnosis: Acute UTI  Assessment and Plan of Treatment: You was found to have a urinary tract infection and was treated with intravenous antibiotics and completed course during ICU stay. Encourage oral fluids to keep hydrated to prevent recurrent UTI       Diagnosis: HTN (hypertension)  Assessment and Plan of Treatment: You are being treated for high blood pressure.  Continue taking your medication as prescribed to help prevent or minimize your risk of end organ damage.  Follow up with your doctor for routine blood pressure evaluation and medication regimen.  Report any symptoms of headaces with nausea or vomiting, visual changes, heart palpitation, chest pain or shortness.      Diagnosis: Hypothyroidism  Assessment and Plan of Treatment: Continue taking your thyroid medication as prescribed and follow up with your doctor for your routine blood work.    Diagnosis: Asthma  Assessment and Plan of Treatment: Continue taking your inhaler as prescribed and follow up with your doctor as outpatient.    Diagnosis: Diabetes  Assessment and Plan of Treatment: Continue taking your medication as prescribed and follow up with your doctor.  Report signs and symptoms of hypo and hyperglycemia to your doctor such as dizziness, lightheadedness, increasing thirst, appetite and increase urination, cold, clammy skin.      Diagnosis: Fibromyalgia  Assessment and Plan of Treatment: Continue taking your medication as prescribed. Avoid driving or operating machinery while taking your pain medication.     PRINCIPAL DISCHARGE DIAGNOSIS  Diagnosis: Atypical chest pain  Assessment and Plan of Treatment: You presented to the hospital with chest pain. Serial cardiac enzymes were negative for Acute Coronary syndrome (ACS). You had a cardiac work up on your prior admission with negative stress test and Echocardiogram.      SECONDARY DISCHARGE DIAGNOSES  Diagnosis: Acute respiratory failure with hypercapnia  Assessment and Plan of Treatment: You was noted to have acute encephalopathy after admission and noted to have elevated CO2 due to retention and needing ICU monitoring and intubation for few days. You was extubated on 2/14/22 and was downgraded to the medical floor. You are counseled at length to be compliant with CPAP to prevent future CO2 retention and complications.    Diagnosis: Acute metabolic encephalopathy  Assessment and Plan of Treatment: You had altered mental status due to CO2 retention needing intubation and eventually extubated doing well on CPAP. Please be compliant with CPAP at night to prevent CO2 retention.    Diagnosis: Acute UTI  Assessment and Plan of Treatment: You was found to have a urinary tract infection and was treated with intravenous antibiotics and completed course during ICU stay. Encourage oral fluids to keep hydrated to prevent recurrent UTI       Diagnosis: HTN (hypertension)  Assessment and Plan of Treatment: You are being treated for high blood pressure.  Continue taking your medication as prescribed to help prevent or minimize your risk of end organ damage.  Follow up with your doctor for routine blood pressure evaluation and medication regimen.  Report any symptoms of headaces with nausea or vomiting, visual changes, heart palpitation, chest pain or shortness.      Diagnosis: Hypothyroidism  Assessment and Plan of Treatment: Continue taking your thyroid medication as prescribed and follow up with your doctor for your routine blood work.    Diagnosis: Asthma  Assessment and Plan of Treatment: Continue taking your inhaler as prescribed and follow up with your doctor as outpatient.    Diagnosis: Diabetes  Assessment and Plan of Treatment: Continue taking your medication as prescribed and follow up with your doctor.  Report signs and symptoms of hypo and hyperglycemia to your doctor such as dizziness, lightheadedness, increasing thirst, appetite and increase urination, cold, clammy skin.      Diagnosis: Fibromyalgia  Assessment and Plan of Treatment: Continue taking your medication as prescribed. Avoid driving or operating machinery while taking your pain medication.     PRINCIPAL DISCHARGE DIAGNOSIS  Diagnosis: Atypical chest pain  Assessment and Plan of Treatment: You presented to the hospital with chest pain. Serial cardiac enzymes were negative for Acute Coronary syndrome (ACS). You had mildly elevated troponin likely from Demand ischemia due to acute stress from infectious etiology during thius admission. You had a cardiac work up on your prior admission last year with negative stress test and Echocardiogram. Follow up with your PCP/ Cardiologist for continued follow up and treatment on discharge from [facility].      SECONDARY DISCHARGE DIAGNOSES  Diagnosis: Acute respiratory failure with hypercapnia  Assessment and Plan of Treatment: You was noted to have acute encephalopathy after admission and noted to have elevated CO2 due to retention and needing ICU monitoring and intubation for few days. You was extubated on 2/14/22 and was downgraded to the medical floor. You are counseled at length to be compliant with CPAP to prevent future CO2 retention and complications. You probably had respiratory failure from underlying infection contributing to CO2 retention and requiring subsequent intubation.    Diagnosis: Acute metabolic encephalopathy  Assessment and Plan of Treatment: You had altered mental status due to CO2 retention needing intubation and eventually extubated doing well on CPAP. Please be compliant with CPAP at night to prevent CO2 retention.    Diagnosis: Cellulitis, abdominal wall  Assessment and Plan of Treatment: You was noted to have anterior abdominal wall cellulitis on admission and was initially treated with IV antibiotic Keflex seen by Infectious Disease Specialist Dr. Devendra Jacobsen. You completed treatment with IV Zosyn in ICU once you was noted to have Gram negative Pneumonia.    Diagnosis: Gram-negative pneumonia  Assessment and Plan of Treatment: You was noted to have fever once intubated in ICU and was treated with IV Zosyn for suspected Gram negative/ Vent associated Pneumonia during ICU stay. IYou completed treatment.    Diagnosis: Acute UTI  Assessment and Plan of Treatment: You was found to have a urinary tract infection and was treated with intravenous antibiotics Zosyn and completed course during ICU stay. Encourage oral fluids to keep hydrated to prevent recurrent UTI       Diagnosis: FERNIE (acute kidney injury)  Assessment and Plan of Treatment: You was noted to have acute decline in your kidney function possibly related to Acute Tubular Necrosis (ATN) during ICU stay which has resolved on discharge. Please use medications like Aleeve, Advil, Ibuprofen (NSAIDs) and contrast agents with caution.    Diagnosis: HTN (hypertension)  Assessment and Plan of Treatment: You are being treated for high blood pressure.  Continue taking your medication as prescribed to help prevent or minimize your risk of end organ damage.  Follow up with your doctor for routine blood pressure evaluation and medication regimen.  Report any symptoms of headaces with nausea or vomiting, visual changes, heart palpitation, chest pain or shortness.      Diagnosis: Hypothyroidism  Assessment and Plan of Treatment: Continue taking your thyroid medication (Synthroid) every morning as prescribed and follow up with your doctor for your routine blood work every 3to 6 months and adjust dose accordingly.    Diagnosis: Asthma  Assessment and Plan of Treatment: Continue taking your inhaler as needed and [place]ast  at bedtime to maintain Asthma symptoms under control and follow up with your PCP and Pulmonary Dr. Monterroso as outpatient.    Diagnosis: Diabetes  Assessment and Plan of Treatment: Continue taking your medications Insulin and Metformin as prescribed and follow up with your PCP/ Endocrinologist.   Report signs and symptoms of hypo and hyperglycemia to your doctor such as dizziness, lightheadedness, increasing thirst, appetite and increase urination, cold, clammy skin.      Diagnosis: Fibromyalgia  Assessment and Plan of Treatment: Continue taking your medications Gabapentin and Oxcarbamazepine as prescribed. Avoid driving or operating machinery while taking your pain medication.     PRINCIPAL DISCHARGE DIAGNOSIS  Diagnosis: Atypical chest pain  Assessment and Plan of Treatment: You presented to the hospital with chest pain. Serial cardiac enzymes were negative for Acute Coronary syndrome (ACS). You had mildly elevated troponin likely from Demand ischemia due to acute stress from infectious etiology during thius admission. You had a cardiac work up on your prior admission last year with negative stress test and Echocardiogram. Follow up with your PCP/ Cardiologist for continued follow up and treatment on discharge from [facility].      SECONDARY DISCHARGE DIAGNOSES  Diagnosis: Acute respiratory failure with hypercapnia  Assessment and Plan of Treatment: You was noted to have acute encephalopathy after admission and noted to have elevated CO2 due to retention and needing ICU monitoring and intubation for few days. You was extubated on 2/14/22 and was downgraded to the medical floor. You are counseled at length to be compliant with CPAP to prevent future CO2 retention and complications. You probably had respiratory failure from underlying infection contributing to CO2 retention and requiring subsequent intubation.    Diagnosis: Acute metabolic encephalopathy  Assessment and Plan of Treatment: You had altered mental status due to CO2 retention needing intubation and eventually extubated doing well on CPAP. Please be compliant with CPAP at night to prevent CO2 retention.    Diagnosis: Cellulitis, abdominal wall  Assessment and Plan of Treatment: You was noted to have anterior abdominal wall cellulitis on admission and was initially treated with IV antibiotic Keflex seen by Infectious Disease Specialist Dr. Devendra Jacobsen. You completed treatment with IV Zosyn in ICU once you was noted to have Gram negative Pneumonia.    Diagnosis: Gram-negative pneumonia  Assessment and Plan of Treatment: You was noted to have fever once intubated in ICU and was treated with IV Zosyn for suspected Gram negative/ Vent associated Pneumonia during ICU stay. IYou completed treatment.    Diagnosis: Acute UTI  Assessment and Plan of Treatment: You was found to have a urinary tract infection and was treated with intravenous antibiotics Zosyn and completed course during ICU stay. Encourage oral fluids to keep hydrated to prevent recurrent UTI       Diagnosis: FERNIE (acute kidney injury)  Assessment and Plan of Treatment: You was noted to have acute decline in your kidney function possibly related to Acute Tubular Necrosis (ATN) during ICU stay which has resolved on discharge. Please use medications like Aleeve, Advil, Ibuprofen (NSAIDs) and contrast agents with caution.    Diagnosis: HTN (hypertension)  Assessment and Plan of Treatment: You are being treated for high blood pressure.  Continue taking your medication as prescribed to help prevent or minimize your risk of end organ damage.  Follow up with your doctor for routine blood pressure evaluation and medication regimen.  Report any symptoms of headaces with nausea or vomiting, visual changes, heart palpitation, chest pain or shortness.      Diagnosis: Hypothyroidism  Assessment and Plan of Treatment: Continue taking your thyroid medication (Synthroid) every morning as prescribed and follow up with your doctor for your routine blood work every 3to 6 months and adjust dose accordingly.    Diagnosis: Asthma  Assessment and Plan of Treatment: Continue taking your inhaler as needed and Monteleukast  at bedtime to maintain Asthma symptoms under control and follow up with your PCP and Pulmonary Dr. Monterroso as outpatient.    Diagnosis: Diabetes  Assessment and Plan of Treatment: Continue taking your medications Insulin and Metformin as prescribed and follow up with your PCP/ Endocrinologist.   Report signs and symptoms of hypo and hyperglycemia to your doctor such as dizziness, lightheadedness, increasing thirst, appetite and increase urination, cold, clammy skin.      Diagnosis: Morbid obesity  Assessment and Plan of Treatment: You are counseled to Exercise 30 mins daily at least 5 days a week and weight loss to minimze complications from multiple comorbodities associated with excess weight. You may benefit form Bariatric surgeon evaluation in consultation with your PCP. Youmay follow up and get evalauated by Dr. Cooney at [address] [place] [address] to see options.    Diagnosis: Fibromyalgia  Assessment and Plan of Treatment: Continue taking your medications Gabapentin and Oxcarbamazepine as prescribed. Avoid driving or operating machinery while taking your pain medication.

## 2022-02-18 NOTE — PROGRESS NOTE ADULT - PROBLEM SELECTOR PLAN 3
Acute hypoxic/ hypercapnic respiratory failure - resolved  - likely in setting of NAKUL / chronic bronchitis / asthma   - extubated on 2/14  - continue bipap at night  - nasal canula day time  - monitor mentation Acute hypoxic/ hypercapnic respiratory failure - resolved  likely in setting of NAKUL / chronic bronchitis / asthma   extubated on 2/14  continue bipap at night  nasal canula day time  monitor mentation Acute hypoxic/ hypercapnic respiratory failure - resolved  likely in setting of NAKUL / chronic bronchitis / asthma   extubated on 2/14  continue bipap at night  2 L o2 nasal canula day time  monitor mentation

## 2022-02-18 NOTE — DISCHARGE NOTE PROVIDER - DETAILS OF MALNUTRITION DIAGNOSIS/DIAGNOSES
This patient has been assessed with a concern for Malnutrition and was treated during this hospitalization for the following Nutrition diagnosis/diagnoses:     -  02/16/2022: Moderate protein-calorie malnutrition   -  02/16/2022: Morbid obesity (BMI > 40)   -  02/09/2022: Morbid obesity (BMI > 40)

## 2022-02-18 NOTE — PROGRESS NOTE ADULT - NEGATIVE RESPIRATORY AND THORAX SYMPTOMS
no pleuritic chest pain
no dyspnea/no pleuritic chest pain

## 2022-02-18 NOTE — PROGRESS NOTE ADULT - PROBLEM SELECTOR PLAN 7
Was on Cefazolin  Spiked fevers, escalated to zosyn  Wbc normalized after discontinuation of steroids.   Off Abx   ID Dr Jacobsen following Was on Cefazolin  CT showed abd wall cellulitis   Spiked fevers, escalated to zosyn  Wbc normalized after discontinuation of steroids.   Off Abx   ID Dr Jacobsen following

## 2022-02-18 NOTE — PROGRESS NOTE ADULT - GASTROINTESTINAL DETAILS
soft/no distention/no guarding/no rigidity
soft/nontender/bowel sounds normal/no guarding/no rigidity
soft/no distention/bowel sounds normal/no guarding/no rigidity
soft/no guarding/no rigidity
soft/no distention/bowel sounds normal/no guarding/no rigidity
soft/nontender/bowel sounds normal/no guarding/no rigidity
soft/nontender/no distention/bowel sounds normal/no guarding/no rigidity
soft/nontender/bowel sounds normal/no guarding/no rigidity

## 2022-02-18 NOTE — PROGRESS NOTE ADULT - ASSESSMENT
55 yo F, from home, ambulated with cane , PMHx of obesity, asthma, chronic bronchitis, NAKUL, HTN, DM, HLD, fibromyalgia, anxiety, trigeminal neuralgia, degenerative joint disease, ?IgA vasculitis, psoriasis, OA came with chief complain of chest pain and dysuria. Nephrology consult called for significant FERNIE    1) Nonoliguric FERNIE now resolved likely hypertensive/diabetic nephropathy  2) Hypertension  3) Diabetic Nephropathy with protenuria/neuropathy  4) Morbid Obesity/NAKUL/OHS  5) Abdominal wall cellulitis/PNA treated  6) Left renal lesion chronic    Recommend:  Strict I/o  S cr 0.4 with non oliguria  Avoid Nephrotoxic agents  Maintain MAP>65-70 mm Hg  Monitor BMP and electrolytes daily  Ventilatory management per ICU team  Now off Abx  Can resume ACEI/ARB  Nocturnal BIPAP/CPAP as needed  No RRT/HD  Will follow

## 2022-02-18 NOTE — DISCHARGE NOTE PROVIDER - CARE PROVIDER_API CALL
Ken John (MD)  Internal Medicine  [address] [facility], [address]rd [place] [address]  Phone: [phone]  Fax: [phone]  Follow Up Time:    Ken John)  Internal Medicine  [address] [facility], [address]rd [place] [address]  Phone: [phone]  Fax: [phone]  Follow Up Time:     Guido Monterroso)  Pulmonary Disease  [address] [facility], [address]st [place] [address]  Phone: [phone]  Fax: [phone]  Follow Up Time: 2 weeks

## 2022-02-18 NOTE — DISCHARGE NOTE PROVIDER - ATTENDING DISCHARGE PHYSICAL EXAMINATION:
Middle aged morbidly obese female OOB to chair  Psych: AAO x3  Neuro: No gross focal deficits; Power and sensation intact  CVS: S1S2 present, regular, no edema  Resp: BLAE+, No wheeze or Rhonchi  GI: Soft, Obese, BS+, Non tender, non distended  Extr: No  calf tenderness B/L Lower extremities  Skin: Warm and moist without any rashes

## 2022-02-18 NOTE — PROGRESS NOTE ADULT - PROBLEM SELECTOR PLAN 4
target CBG < 180  - currently on  Lantus and HSS , adjust as needed  - lantus 45, admelog 14, ss  - uncontrolled due to steroids, monitor now that steroids are off target CBG < 180  currently on  Lantus and HSS , adjust as needed  lantus 45, admelog 14, ss  uncontrolled due to steroids, monitor now that steroids are off target BG < 180  currently on Lantus and HSS, adjust as needed  lantus 45, admelog 14, ss  uncontrolled due to steroids, monitor now that steroids are off

## 2022-02-18 NOTE — PROGRESS NOTE ADULT - PROBLEM SELECTOR PLAN 5
on gabapentin, flexeril, Tylenol, aleve, oxcarbazepine for pain at home  - Lidoderm 4% patch 2 patches daily  - will resume home meds  - off fentanyl on gabapentin, flexeril, Tylenol, aleve, oxcarbazepine for pain at home  Lidoderm 4% patch 2 patches daily  will resume home meds  off fentanyl on gabapentin, flexeril, Tylenol, aleve, oxcarbazepine for pain at home  Lidoderm 4% patch 2 patches daily  Home meds resumed but Flexeril for now to avoid over sedation  off fentanyl

## 2022-02-18 NOTE — DISCHARGE NOTE PROVIDER - HOSPITAL COURSE
As per pt and per chart review, pt is a 57 yo F, from home, ambulated with cane , PMHx of obesity, asthma, chronic bronchitis, NAKUL, HTN, DM, HLD, fibromyalgia, anxiety, trigeminal neuralgia, degenerative joint disease, ?IgA vasculitis, psoriasis, OA came with chief complain of chest pain and dysuria. Pt stated that for few weeks she is having burning pain in her urine, foul smell, itchy, cloudy. She further stated that she is having pain in her lower abdomen as well as in chest whenever she pressed it. She denied any vaginal discharge, headaches, visual changes, N/V/D.     Of note pt admitted at [facility] in Jan-Feb 2021 and had extensive cardiac work up stress test and echo which were negative. Pt has left renal mass  diagnosed on imaging chances of malignancy 85%, followed outpatient urologist and heme/onc for monitoring . Decreasing weight and controlled blood sugars to get the surgery at some point.     In ED: UA +ve no wbc, few bacteria, trop x 1 neg     Hospital course: Patient had abdominal CT scan with cellulitis likely 2/2 to insulin injections, Started on Keflex, also found to have FERNIE, nephro was consulted.  On 2/6/22 patient lethargic, but able to follow commands and be aroused. CO2 84, and was placed on bipap overnight. Morning of 2/7/22 patient more lethargic with CO2 of 91, ICU consulted.     ICU Course:  Patient intubated, Hypercapnea resolved, her creatinine normalized without interventions. Patient was weaned off sedation and was awake, following commands. trial of extubation on 2/9 was unsuccessful  2/2 failed leak test and concern for critical airway. She was started on steroids. Trial of extubation on 2/11 with anesthesia present, patient refused to be extubated at that time, threatening to mariah. She was extubated on 2/14 to bipap and was doing well. Utilizing bipap at night. Had Nausea, vomiting and diarrhea day of extubation likely due to increased medication for bowel regimen, her sxs improved after cessation of medication. Patient blood sugar and wbc normalized after discontinuation of steroids.    2/17- Pt was transferred to Medicine for ongoing management, continue to monitor off Antibiotic. Pt will need to continue outpt follow up for renal mass and d/c planning to [facility] per pt recs.  Pt is now undecided if she wants to go to Rehab.      INCOMPLETE::::::    Please note that this a brief summary of hospital course, please refer to daily progress notes and consult notes for full course and events.   Patient is  a 57 yo F, from home, ambulated with cane , PMHx of obesity, asthma, chronic bronchitis, NAKUL, HTN, DM, HLD, fibromyalgia, anxiety, trigeminal neuralgia, degenerative joint disease, ?IgA vasculitis, psoriasis, OA came with chief complain of chest pain and dysuria. Pt stated that for few weeks she is having burning pain in her urine, foul smell, itchy, cloudy. She further stated that she is having pain in her lower abdomen as well as in chest whenever she pressed it. She denied any vaginal discharge, headaches, visual changes, N/V/D.     Of note pt admitted at [facility] in Jan-Feb 2021 and had extensive cardiac work up stress test and echo which were negative. Pt has left renal mass  diagnosed on imaging chances of malignancy 85%, followed outpatient urologist and heme/onc for monitoring . Decreasing weight and controlled blood sugars to get the surgery at some point.     In ED: UA +ve no wbc, few bacteria, trop x 1 neg     Hospital course: Patient had abdominal CT scan with cellulitis likely 2/2 to insulin injections, Started on Keflex, also found to have FERNIE, nephro was consulted.  On 2/6/22 patient lethargic, but able to follow commands and be aroused. CO2 84, and was placed on bipap overnight. Morning of 2/7/22 patient more lethargic with CO2 of 91, ICU consulted.     ICU Course:  Patient intubated, Hypercapnea resolved, her creatinine normalized without interventions. Patient was weaned off sedation and was awake, following commands. trial of extubation on 2/9 was unsuccessful  2/2 failed leak test and concern for critical airway. She was started on steroids. Trial of extubation on 2/11 with anesthesia present, patient refused to be extubated at that time, threatening to mariah. She was extubated on 2/14 to bipap and was doing well. Utilizing bipap at night. Had Nausea, vomiting and diarrhea day of extubation likely due to increased medication for bowel regimen, her sxs improved after cessation of medication. Patient blood sugar and wbc normalized after discontinuation of steroids.    2/17- Pt was transferred to Medicine for ongoing management, continue to monitor off Antibiotic. Pt will need to continue outpt follow up for renal mass and d/c planning to [facility] per pt recs.  Pt is now undecided if she wants to go to Rehab.      INCOMPLETE::::::    Please note that this a brief summary of hospital course, please refer to daily progress notes and consult notes for full course and events.   Patient is  a 55 yo F, from home, ambulated with cane , PMHx of obesity, asthma, chronic bronchitis, NAKUL, HTN, DM, HLD, fibromyalgia, anxiety, trigeminal neuralgia, degenerative joint disease, IgA vasculitis, psoriasis, OA came with chief complain of chest pain and dysuria. Pt stated that for few weeks she is having burning pain in her urine, foul smell, itchy, cloudy. She further stated that she is having pain in her lower abdomen as well as in chest whenever she pressed it. She denied any vaginal discharge, headaches, visual changes, N/V/D.   Patient was recently admitted at [facility] in Jan-Feb 2021 and had extensive cardiac work up stress test and echo which were negative. Pt has left renal mass  diagnosed on imaging chances of malignancy 85%, followed outpatient urologist and heme/onc for monitoring . Decreasing weight and controlled blood sugars to get the surgery at some point.     In ED: UA +ve no wbc, few bacteria, trop x 1 neg     Hospital course: Patient had abdominal CT scan with cellulitis likely 2/2 to insulin injections, Started on Keflex, also found to have FERNIE, nephro was consulted.  On 2/6/22 patient lethargic, but able to follow commands and be aroused. CO2 84, and was placed on bipap overnight. Morning of 2/7/22 patient more lethargic with CO2 of 91, ICU consulted.     ICU Course:  Patient intubated, Hypercapnea resolved, her creatinine normalized without interventions. Patient was weaned off sedation and was awake, following commands. trial of extubation on 2/9 was unsuccessful  2/2 failed leak test and concern for critical airway. She was started on steroids. Trial of extubation on 2/11 with anesthesia present, patient refused to be extubated at that time, threatening to mariah. She was extubated on 2/14 to bipap and was doing well. Utilizing bipap at night. Had Nausea, vomiting and diarrhea day of extubation likely due to increased medication for bowel regimen, her sxs improved after cessation of medication. Patient blood sugar and wbc normalized after discontinuation of steroids.    2/17- Pt was transferred to Medicine for ongoing management, continue to monitor off Antibiotic. Pt will need to continue outpt follow up for renal mass and d/c planning to [facility] per pt recs.  Pt is now undecided if she wants to go to Rehab.        Please note that this a brief summary of hospital course, please refer to daily progress notes and consult notes for full course and events.   Patient is  a 57 yo F, from home, ambulated with cane , PMHx of obesity, asthma, chronic bronchitis, NAKUL, HTN, DM, HLD, fibromyalgia, anxiety, trigeminal neuralgia, degenerative joint disease, IgA vasculitis, psoriasis, OA came with chief complain of chest pain and dysuria. Pt stated that for few weeks she is having burning pain in her urine, foul smell, itchy, cloudy. She further stated that she is having pain in her lower abdomen as well as in chest whenever she pressed it. She denied any vaginal discharge, headaches, visual changes, N/V/D. Patient was recently admitted at [facility] in Jan-Feb 2021 and had extensive cardiac work up stress test and echo which were negative. Pt has left renal mass diagnosed on imaging chances of malignancy 85%, followed outpatient urologist and heme/onc for monitoring . Decreasing weight and controlled blood sugars to get the surgery at some point.     In ED: UA +ve no wbc, few bacteria, trop x 1 neg     On Admission: Patient had abdominal CT scan with cellulitis likely 2/2 to insulin injections, Started on Keflex, also found to have FERNIE, nephro was consulted.  On 2/6/22 patient lethargic, but able to follow commands and be aroused. CO2 84, and was placed on bipap overnight. Morning of 2/7/22 patient more lethargic with CO2 of 91, ICU consulted and patient was transferred to ICU.     ICU Course:  Patient intubated on 2/9/22, Hypercapnea improved and her creatinine normalized without interventions. Patient was weaned off sedation and was awake, following commands. trial of extubation on 2/9 was unsuccessful  2/2 failed leak test and concern for critical airway. She was started on steroids. Another trial of extubation  was done on 2/11 with anesthesia present, however, patient refused to be extubated at that time, threatening to mariah. She was extubated on 2/14 to bipap and was doing well. Utilizing bipap at night. Had Nausea, vomiting and diarrhea day of extubation likely due to increased medication for bowel regimen. Patient's sxs improved after cessation of medication. Patient's blood sugar and wbc normalized after discontinuation of steroids.      Medicine  2/17- Pt was transferred to Medicine for ongoing management, continue to monitor off Antibiotics with nightly BIPAP. Patient now on 2L nasal canula during the day PRN, with nightly BIPAP CPAP 16 cmH20 Oxygen 28% CFlex Off. Pt will need to continue outpt follow up for renal mass with heme/onc, follow up with primary care and continue physical therapy at [facility]. Patient today reports that she feels well, eating soup and had a good night sleep with the BIPAP overnight. Patient is medically stable for discharge to [facility] and continue outpatient management.      Please note that this a brief summary of hospital course, please refer to the medical records for full course and events.

## 2022-02-18 NOTE — PROGRESS NOTE ADULT - CONSTITUTIONAL DETAILS
no distress
no distress/obese
well-groomed/no distress/obese
no distress/obese
no distress/obese

## 2022-02-18 NOTE — PROGRESS NOTE ADULT - ASSESSMENT
As per pt and per chart review, pt is a 55 yo F, from home, ambulated with cane , PMHx of obesity, asthma, chronic bronchitis, NAKUL, HTN, DM, HLD, fibromyalgia, anxiety, trigeminal neuralgia, degenerative joint disease, ?IgA vasculitis, psoriasis, OA came with chief complain of chest pain and dysuria. Pt stated that for few weeks she is having burning pain in her urine, foul smell, itchy, cloudy. She further stated that she is having pain in her lower abdomen as well as in chest whenever she pressed it. She denied any vaginal discharge, headaches, visual changes, N/V/D.     Of note pt admitted at [facility] in Jan-Feb 2021 and had extensive cardiac work up stress test and echo which were negative. Pt has left renal mass  diagnosed on imaging chances of malignancy 85%, followed outpatient urologist and heme/onc for monitoring . Decreasing weight and controlled blood sugars to get the surgery at some point.     In ED: UA +ve no wbc, few bacteria, trop x 1 neg     Hospital course: Patient had abdominal CT scan with cellulitis likely 2/2 to insulin injections, Started on Keflex, also found to have FERNIE, nephro was consulted.  On 2/6/22 patient lethargic, but able to follow commands and be aroused. CO2 84, and was placed on bipap overnight. Morning of 2/7/22 patient more lethargic with CO2 of 91, ICU consulted.     ICU Course:  Patient intubated, Hypercapnea resolved, her creatinine normalized without interventions. Patient was weaned off sedation and was awake, following commands. trial of extubation on 2/9 was unsuccessful  2/2 failed leak test and concern for critical airway. She was started on steroids. Trial of extubation on 2/11 with anesthesia present, patient refused to be extubated at that time, threatening to mariah. She was extubated on 2/14 to bipap and was doing well. Utilizing bipap at night. Had Nausea, vomiting and diarrhea day of extubation likely due to increased medication for bowel regimen, her sxs improved after cessation of medication. Patient blood sugar and wbc normalized after discontinuation of steroids.    2/17- Pt was transferred to Medicine for ongoing management, continue to monitor off Antibiotic. Pt will need to continue outpt follow up for renal mass and d/c planning to [facility] per pt recs.  Pt is now undecided if she wants to go to Rehab.

## 2022-02-18 NOTE — DISCHARGE NOTE PROVIDER - CARE PROVIDERS DIRECT ADDRESSES
[email or web address]

## 2022-02-18 NOTE — PROGRESS NOTE ADULT - ASSESSMENT
Pneumonia - treatment completed.  Fevers - resolved  Abdominal  wall cellulitis - resolved  Leukocytosis - normalized    Plan - Monitor off antibiotics

## 2022-02-18 NOTE — PROGRESS NOTE ADULT - PROBLEM SELECTOR PLAN 8
on gabapentin, flexeril, Tylenol, aleve, oxcarbazepine for pain at home  - Lidoderm 4% patch 2 patches daily  - will resume home meds  - off fentanyl on gabapentin, flexeril, Tylenol, aleve, oxcarbazepine for pain at home  Lidoderm 4% patch 2 patches daily  Resume home meds  off fentanyl Takes gabapentin, flexeril, Tylenol, aleve, oxcarbazepine for pain at home  Lidoderm 4% patch 2 patches daily  Resume home meds  off fentanyl

## 2022-02-18 NOTE — PROGRESS NOTE ADULT - NEGATIVE GASTROINTESTINAL SYMPTOMS
no nausea/no vomiting/no diarrhea/no abdominal pain

## 2022-02-18 NOTE — PROGRESS NOTE ADULT - SUBJECTIVE AND OBJECTIVE BOX
Romeo Ribeiro MD (Nephrology progress note)  [address], [facility],  [address],  [place] [id number]  TEl: [phone]  Cell: [phone]    Patient is a 56y Female seen and evaluated at bedside. Vital signs, laboratory data, imaging studies, consult notes reviewed done within past 24 hours. Overnight events noted. Patient sitting in bed in no distress complains of mild dry cough but denies any chest pain, SOB. Interval stable renal function with non oliguria.     Allergies    Fioricet (Rash)  gadobutrol (Rash; Urticaria; Hives)  IV Contrast (Short breath)  mushroom (Unknown)  venlafaxine (Hives)    Intolerances        ROS:  CONSTITUTIONAL: No fever or chills.  HEENT: No headache or visual disturbances.  RESPIRATORY: mild cough and SOB but denies hemoptysis or wheezing  CARDIOVASCULAR: No Chest pain, shortness of breath, palpitations, dizziness, syncope, orthopnea, PND or leg swelling.  GASTROINTESTINAL: No abdominal pain, nausea, vomiting, diarrhea, hematemesis or blood per rectum.  GENITOURINARY: No urinary frequency, urgency, gross hematuria, dysuria, flank or supra pubic pain, difficulty passing urine.  NEUROLOGICAL: No headache, focal limb weakness, tingling or numbness or seizure like activity  SKIN: No skin rash or lesion  MUSCULOSKELETAL: No leg pain, calf pain or swelling    VITALS:    T(C): 37.1 (02-18-22 @ 12:47), Max: 37.2 (02-17-22 @ 18:54)  HR: 96 (02-18-22 @ 12:47) (92 - 103)  BP: 141/80 (02-18-22 @ 12:47) (120/68 - 141/80)  RR: 19 (02-18-22 @ 12:47) (18 - 23)  SpO2: 98% (02-18-22 @ 12:47) (94% - 98%)  CAPILLARY BLOOD GLUCOSE      POCT Blood Glucose.: 171 mg/dL (18 Feb 2022 11:58)  POCT Blood Glucose.: 170 mg/dL (18 Feb 2022 07:56)  POCT Blood Glucose.: 143 mg/dL (18 Feb 2022 06:30)  POCT Blood Glucose.: 108 mg/dL (18 Feb 2022 00:09)  POCT Blood Glucose.: 136 mg/dL (17 Feb 2022 17:24)      02-17-22 @ 07:01  -  02-18-22 @ 07:00  --------------------------------------------------------  IN: 1259.8 mL / OUT: 0 mL / NET: 1259.8 mL      MEDICATIONS  (STANDING):  ALBUTerol    90 MICROgram(s) HFA Inhaler 2 Puff(s) Inhalation every 6 hours  aspirin  chewable 81 milliGRAM(s) Oral daily  atorvastatin 10 milliGRAM(s) Oral at bedtime  benzonatate 100 milliGRAM(s) Oral <User Schedule>  dextrose 5%. 1000 milliLiter(s) (100 mL/Hr) IV Continuous <Continuous>  gabapentin 400 milliGRAM(s) Oral two times a day  glucagon  Injectable 1 milliGRAM(s) IntraMuscular once  heparin   Injectable 5000 Unit(s) SubCutaneous every 8 hours  influenza   Vaccine 0.5 milliLiter(s) IntraMuscular once  insulin glargine Injectable (LANTUS) 45 Unit(s) SubCutaneous at bedtime  insulin lispro (ADMELOG) corrective regimen sliding scale   SubCutaneous three times a day before meals  insulin lispro (ADMELOG) corrective regimen sliding scale   SubCutaneous at bedtime  insulin lispro Injectable (ADMELOG) 14 Unit(s) SubCutaneous every 6 hours  levothyroxine 112 MICROGram(s) Oral daily  lidocaine   4% Patch 2 Patch Transdermal daily  montelukast 10 milliGRAM(s) Oral at bedtime  OXcarbazepine 300 milliGRAM(s) Oral two times a day  pantoprazole    Tablet 40 milliGRAM(s) Oral before breakfast  senna 2 Tablet(s) Oral at bedtime    MEDICATIONS  (PRN):  acetaminophen    Suspension .. 650 milliGRAM(s) Oral every 6 hours PRN Temp greater or equal to 38C (100.4F), Mild Pain (1 - 3)  benzocaine 15 mG/menthol 3.6 mG Lozenge 1 Lozenge Oral every 4 hours PRN Sore Throat  metoclopramide Injectable 10 milliGRAM(s) IV Push every 8 hours PRN nausea/vomiting  ondansetron Injectable 4 milliGRAM(s) IV Push every 6 hours PRN Nausea and/or Vomiting  sodium chloride 0.9% lock flush 10 milliLiter(s) IV Push every 1 hour PRN Pre/post blood products, medications, blood draw, and to maintain line patency      PHYSICAL EXAM:  GENERAL: Alert, awake and oriented x3 in no distress  HEENT: RITA, EOMI, neck supple, no JVP, no carotid bruit, oral mucosa moist and pink.  CHEST/LUNG: Bilateral decreased breath sounds at bases, no rales, no rhonchi, no wheezing  HEART: Regular rate and rhythm, LAURA II/VI at LPSB, no gallops, no rub   ABDOMEN: Soft, nontender, non distended, bowel sounds present, no palpable organomegaly  : No flank or supra pubic tenderness.  EXTREMITIES: Peripheral pulses are palpable, no pedal edema  Neurology: AAOx3, no focal neurological deficit  SKIN: No rash or skin lesion  Musculoskeletal: No joint deformity or spinal tenderness.      Vascular ACCESS:     LABS:                        11.3   8.08  )-----------( 362      ( 18 Feb 2022 06:59 )             36.9     02-18    138  |  102  |  11  ----------------------------<  146<H>  3.7   |  30  |  0.50    Ca    8.3<L>      18 Feb 2022 06:59  Phos  2.7     02-18  Mg     1.5     02-18    TPro  6.4  /  Alb  2.1<L>  /  TBili  0.7  /  DBili  x   /  AST  16  /  ALT  24  /  AlkPhos  87  02-17                RADIOLOGY & ADDITIONAL STUDIES:    Imaging Personally Reviewed:  [x] YES  [ ] NO    Consultant(s) Notes Reviewed:  [x] YES  [ ] NO    Care Discussed with Primary team/ Other Providers [x] YES  [ ] NO

## 2022-02-18 NOTE — PROGRESS NOTE ADULT - SUBJECTIVE AND OBJECTIVE BOX
56y Female    Meds:    Allergies    Fioricet (Rash)  gadobutrol (Rash; Urticaria; Hives)  IV Contrast (Short breath)  mushroom (Unknown)  venlafaxine (Hives)    Intolerances        VITALS:  Vital Signs Last 24 Hrs  T(C): 37.1 (18 Feb 2022 12:47), Max: 37.2 (17 Feb 2022 18:54)  T(F): 98.8 (18 Feb 2022 12:47), Max: 98.9 (17 Feb 2022 18:54)  HR: 92 (18 Feb 2022 17:02) (92 - 98)  BP: 129/74 (18 Feb 2022 17:02) (121/65 - 141/80)  BP(mean): --  RR: 19 (18 Feb 2022 17:02) (19 - 20)  SpO2: 97% (18 Feb 2022 17:02) (94% - 98%)    LABS/DIAGNOSTIC TESTS:                          11.3   8.08  )-----------( 362      ( 18 Feb 2022 06:59 )             36.9         02-18    138  |  102  |  11  ----------------------------<  146<H>  3.7   |  30  |  0.50    Ca    8.3<L>      18 Feb 2022 06:59  Phos  2.7     02-18  Mg     1.5     02-18    TPro  6.4  /  Alb  2.1<L>  /  TBili  0.7  /  DBili  x   /  AST  16  /  ALT  24  /  AlkPhos  87  02-17      LIVER FUNCTIONS - ( 17 Feb 2022 03:18 )  Alb: 2.1 g/dL / Pro: 6.4 g/dL / ALK PHOS: 87 U/L / ALT: 24 U/L DA / AST: 16 U/L / GGT: x             CULTURES: Clean Catch Clean Catch (Midstream)  02-05 @ 04:22   Normal Urogenital brody present  --  --            RADIOLOGY:      ROS:  [  ] UNABLE TO ELICIT 56y Female who is doing well but feels congested , likely from her oxygen as she uses it prn at home and has been on continuously here. She c/o a cough with sputum still but has been improving overall. She has no fevers off abxs and her WBC count has remained WNL also. She has no diarrhea or other complaints at this time.     Meds:    Allergies    Fioricet (Rash)  gadobutrol (Rash; Urticaria; Hives)  IV Contrast (Short breath)  mushroom (Unknown)  venlafaxine (Hives)    Intolerances        VITALS:  Vital Signs Last 24 Hrs  T(C): 37.1 (18 Feb 2022 12:47), Max: 37.2 (17 Feb 2022 18:54)  T(F): 98.8 (18 Feb 2022 12:47), Max: 98.9 (17 Feb 2022 18:54)  HR: 92 (18 Feb 2022 17:02) (92 - 98)  BP: 129/74 (18 Feb 2022 17:02) (121/65 - 141/80)  BP(mean): --  RR: 19 (18 Feb 2022 17:02) (19 - 20)  SpO2: 97% (18 Feb 2022 17:02) (94% - 98%)    LABS/DIAGNOSTIC TESTS:                          11.3   8.08  )-----------( 362      ( 18 Feb 2022 06:59 )             36.9         02-18    138  |  102  |  11  ----------------------------<  146<H>  3.7   |  30  |  0.50    Ca    8.3<L>      18 Feb 2022 06:59  Phos  2.7     02-18  Mg     1.5     02-18    TPro  6.4  /  Alb  2.1<L>  /  TBili  0.7  /  DBili  x   /  AST  16  /  ALT  24  /  AlkPhos  87  02-17      LIVER FUNCTIONS - ( 17 Feb 2022 03:18 )  Alb: 2.1 g/dL / Pro: 6.4 g/dL / ALK PHOS: 87 U/L / ALT: 24 U/L DA / AST: 16 U/L / GGT: x             CULTURES: Clean Catch Clean Catch (Midstream)  02-05 @ 04:22   Normal Urogenital brody present  --  --            RADIOLOGY:      ROS:  [  ] UNABLE TO ELICIT

## 2022-02-18 NOTE — DISCHARGE NOTE PROVIDER - NSDCFUSCHEDAPPT_GEN_ALL_CORE_FT
NOEMY CONTRERAS ; 03/16/2022 ; [facility]  NOEMY CONTRERAS ; 03/24/2022 ; NPP Rheum [address]  NOEMY CONTRERAS ; 03/28/2022 ; NPP Med [address] NOEMY CONTRERAS ; 03/16/2022 ; NPP Med [address]  NOEMY CONTRERAS ; 03/24/2022 ; NPP Rheum [address] [facility]  NOEMY CONTRERAS ; 03/28/2022 ; NPP INTMED [address]
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
## 2022-02-19 LAB
ANION GAP SERPL CALC-SCNC: 5 MMOL/L — SIGNIFICANT CHANGE UP (ref 5–17)
BUN SERPL-MCNC: 9 MG/DL — SIGNIFICANT CHANGE UP (ref 7–18)
CALCIUM SERPL-MCNC: 8.9 MG/DL — SIGNIFICANT CHANGE UP (ref 8.4–10.5)
CHLORIDE SERPL-SCNC: 100 MMOL/L — SIGNIFICANT CHANGE UP (ref 96–108)
CO2 SERPL-SCNC: 32 MMOL/L — HIGH (ref 22–31)
CREAT SERPL-MCNC: 0.59 MG/DL — SIGNIFICANT CHANGE UP (ref 0.5–1.3)
GLUCOSE BLDC GLUCOMTR-MCNC: 103 MG/DL — HIGH (ref 70–99)
GLUCOSE BLDC GLUCOMTR-MCNC: 107 MG/DL — HIGH (ref 70–99)
GLUCOSE BLDC GLUCOMTR-MCNC: 113 MG/DL — HIGH (ref 70–99)
GLUCOSE BLDC GLUCOMTR-MCNC: 114 MG/DL — HIGH (ref 70–99)
GLUCOSE BLDC GLUCOMTR-MCNC: 118 MG/DL — HIGH (ref 70–99)
GLUCOSE BLDC GLUCOMTR-MCNC: 134 MG/DL — HIGH (ref 70–99)
GLUCOSE BLDC GLUCOMTR-MCNC: 135 MG/DL — HIGH (ref 70–99)
GLUCOSE BLDC GLUCOMTR-MCNC: 95 MG/DL — SIGNIFICANT CHANGE UP (ref 70–99)
GLUCOSE SERPL-MCNC: 109 MG/DL — HIGH (ref 70–99)
HCT VFR BLD CALC: 37.7 % — SIGNIFICANT CHANGE UP (ref 34.5–45)
HGB BLD-MCNC: 11.3 G/DL — LOW (ref 11.5–15.5)
MAGNESIUM SERPL-MCNC: 1.6 MG/DL — SIGNIFICANT CHANGE UP (ref 1.6–2.6)
MCHC RBC-ENTMCNC: 23.7 PG — LOW (ref 27–34)
MCHC RBC-ENTMCNC: 30 GM/DL — LOW (ref 32–36)
MCV RBC AUTO: 79.2 FL — LOW (ref 80–100)
NRBC # BLD: 0 /100 WBCS — SIGNIFICANT CHANGE UP (ref 0–0)
PHOSPHATE SERPL-MCNC: 2.3 MG/DL — LOW (ref 2.5–4.5)
PLATELET # BLD AUTO: 469 K/UL — HIGH (ref 150–400)
POTASSIUM SERPL-MCNC: 3.3 MMOL/L — LOW (ref 3.5–5.3)
POTASSIUM SERPL-SCNC: 3.3 MMOL/L — LOW (ref 3.5–5.3)
RBC # BLD: 4.76 M/UL — SIGNIFICANT CHANGE UP (ref 3.8–5.2)
RBC # FLD: 16.7 % — HIGH (ref 10.3–14.5)
SODIUM SERPL-SCNC: 137 MMOL/L — SIGNIFICANT CHANGE UP (ref 135–145)
WBC # BLD: 9.27 K/UL — SIGNIFICANT CHANGE UP (ref 3.8–10.5)
WBC # FLD AUTO: 9.27 K/UL — SIGNIFICANT CHANGE UP (ref 3.8–10.5)

## 2022-02-19 PROCEDURE — 99233 SBSQ HOSP IP/OBS HIGH 50: CPT | Mod: GC

## 2022-02-19 PROCEDURE — 72074 X-RAY EXAM THORAC SPINE4/>VW: CPT | Mod: 26

## 2022-02-19 PROCEDURE — 72125 CT NECK SPINE W/O DYE: CPT | Mod: 26

## 2022-02-19 PROCEDURE — 71100 X-RAY EXAM RIBS UNI 2 VIEWS: CPT | Mod: 26

## 2022-02-19 RX ORDER — POTASSIUM CHLORIDE 20 MEQ
40 PACKET (EA) ORAL ONCE
Refills: 0 | Status: COMPLETED | OUTPATIENT
Start: 2022-02-19 | End: 2022-02-19

## 2022-02-19 RX ORDER — SODIUM,POTASSIUM PHOSPHATES 278-250MG
1 POWDER IN PACKET (EA) ORAL THREE TIMES A DAY
Refills: 0 | Status: COMPLETED | OUTPATIENT
Start: 2022-02-19 | End: 2022-02-20

## 2022-02-19 RX ADMIN — GABAPENTIN 400 MILLIGRAM(S): 400 CAPSULE ORAL at 18:25

## 2022-02-19 RX ADMIN — HEPARIN SODIUM 5000 UNIT(S): 5000 INJECTION INTRAVENOUS; SUBCUTANEOUS at 14:26

## 2022-02-19 RX ADMIN — LOSARTAN POTASSIUM 25 MILLIGRAM(S): 100 TABLET, FILM COATED ORAL at 05:48

## 2022-02-19 RX ADMIN — INSULIN GLARGINE 45 UNIT(S): 100 INJECTION, SOLUTION SUBCUTANEOUS at 22:13

## 2022-02-19 RX ADMIN — PANTOPRAZOLE SODIUM 40 MILLIGRAM(S): 20 TABLET, DELAYED RELEASE ORAL at 06:33

## 2022-02-19 RX ADMIN — CHLORHEXIDINE GLUCONATE 1 APPLICATION(S): 213 SOLUTION TOPICAL at 05:46

## 2022-02-19 RX ADMIN — OXCARBAZEPINE 300 MILLIGRAM(S): 300 TABLET, FILM COATED ORAL at 05:45

## 2022-02-19 RX ADMIN — ALBUTEROL 2 PUFF(S): 90 AEROSOL, METERED ORAL at 21:53

## 2022-02-19 RX ADMIN — ATORVASTATIN CALCIUM 10 MILLIGRAM(S): 80 TABLET, FILM COATED ORAL at 21:53

## 2022-02-19 RX ADMIN — LIDOCAINE 2 PATCH: 4 CREAM TOPICAL at 19:56

## 2022-02-19 RX ADMIN — Medication 112 MICROGRAM(S): at 05:45

## 2022-02-19 RX ADMIN — Medication 14 UNIT(S): at 17:00

## 2022-02-19 RX ADMIN — Medication 14 UNIT(S): at 23:46

## 2022-02-19 RX ADMIN — OXCARBAZEPINE 300 MILLIGRAM(S): 300 TABLET, FILM COATED ORAL at 18:25

## 2022-02-19 RX ADMIN — ALBUTEROL 2 PUFF(S): 90 AEROSOL, METERED ORAL at 03:28

## 2022-02-19 RX ADMIN — Medication 100 MILLIGRAM(S): at 18:25

## 2022-02-19 RX ADMIN — ALBUTEROL 2 PUFF(S): 90 AEROSOL, METERED ORAL at 18:25

## 2022-02-19 RX ADMIN — Medication 14 UNIT(S): at 08:18

## 2022-02-19 RX ADMIN — Medication 1 PACKET(S): at 23:44

## 2022-02-19 RX ADMIN — LIDOCAINE 2 PATCH: 4 CREAM TOPICAL at 12:06

## 2022-02-19 RX ADMIN — Medication 1 PACKET(S): at 16:21

## 2022-02-19 RX ADMIN — Medication 40 MILLIEQUIVALENT(S): at 15:38

## 2022-02-19 RX ADMIN — HEPARIN SODIUM 5000 UNIT(S): 5000 INJECTION INTRAVENOUS; SUBCUTANEOUS at 21:53

## 2022-02-19 RX ADMIN — Medication 1 PACKET(S): at 12:05

## 2022-02-19 RX ADMIN — ALBUTEROL 2 PUFF(S): 90 AEROSOL, METERED ORAL at 09:06

## 2022-02-19 RX ADMIN — HEPARIN SODIUM 5000 UNIT(S): 5000 INJECTION INTRAVENOUS; SUBCUTANEOUS at 05:47

## 2022-02-19 RX ADMIN — GABAPENTIN 400 MILLIGRAM(S): 400 CAPSULE ORAL at 05:46

## 2022-02-19 RX ADMIN — Medication 100 MILLIGRAM(S): at 05:45

## 2022-02-19 RX ADMIN — MONTELUKAST 10 MILLIGRAM(S): 4 TABLET, CHEWABLE ORAL at 21:53

## 2022-02-19 RX ADMIN — Medication 14 UNIT(S): at 12:14

## 2022-02-19 RX ADMIN — Medication 81 MILLIGRAM(S): at 12:05

## 2022-02-19 NOTE — PROGRESS NOTE ADULT - SUBJECTIVE AND OBJECTIVE BOX
56y Female is under our care for prior pneumonia and abdominal wall cellulitis. Patient is doing well, but admits she suffered a fall today when she slipped on her own urine. CT of C-S is negative. At present, patient is still c/o watery diarrhea but patient doesn't seem to be reliable on how she recalls diarrhea frequency. Remains afebrile with normal wbc count.     MEDS: no antibiotics     ALLERGIES: Allergies    Fioricet (Rash)  gadobutrol (Rash; Urticaria; Hives)  IV Contrast (Short breath)  mushroom (Unknown)  venlafaxine (Hives)    Intolerances      REVIEW OF SYSTEMS:  [  ] Not able to illicit  General: no fevers no malaise  Chest: no cough no sob  GI: no nv +diarrhea  : no urinary sxs   Skin: no rashes  Musculoskeletal: no trauma +LBP  Neuro: no ha's no dizziness     VITALS:  Vital Signs Last 24 Hrs  T(C): 36.6 (19 Feb 2022 13:17), Max: 37.1 (19 Feb 2022 04:45)  T(F): 97.9 (19 Feb 2022 13:17), Max: 98.8 (19 Feb 2022 04:45)  HR: 97 (19 Feb 2022 13:17) (92 - 100)  BP: 103/52 (19 Feb 2022 13:17) (103/52 - 124/68)  BP(mean): --  RR: 18 (19 Feb 2022 13:17) (18 - 25)  SpO2: 96% (19 Feb 2022 13:17) (93% - 98%)    PHYSICAL EXAM:  HEENT: +NC  Neck: supple no LN's   Respiratory: bilateral decreased breath sounds no rales  Cardiovascular: S1 S2 reg no murmurs  Gastrointestinal: +BS with soft, large abdominal pannus; nontender  Extremities: BLE edema  Skin: scattered scaly psoriasic plaques throughout body  Ortho: n/a  Neuro: AAO x 3      LABS/DIAGNOSTIC TESTS:                        11.3   9.27  )-----------( 469      ( 19 Feb 2022 06:46 )             37.7     WBC Count: 9.27 K/uL (02-19 @ 06:46)  WBC Count: 8.08 K/uL (02-18 @ 06:59)  WBC Count: 8.94 K/uL (02-17 @ 03:18)  WBC Count: 9.96 K/uL (02-16 @ 04:46)  WBC Count: 22.04 K/uL (02-15 @ 04:28)    02-19    137  |  100  |  9   ----------------------------<  109<H>  3.3<L>   |  32<H>  |  0.59    Ca    8.9      19 Feb 2022 06:46  Phos  2.3     02-19  Mg     1.6     02-19        CULTURES:   Clean Catch Clean Catch (Midstream)  02-05 @ 04:22   Normal Urogenital brody present  --  --        RADIOLOGY:  no new studies

## 2022-02-19 NOTE — CHART NOTE - NSCHARTNOTEFT_GEN_A_CORE
EVENT: 2/19/22, 5:10am,called by RN re: patient was found RN sitting on floor in her room.    ASSESSMENT: Pt. was seen in bed. Alert and oriented. She stated she fell after she went to bathroom. As per pt. floor was wet and she's sliding. Pt. denied weakness and dizziness. She denied any trauma of her head. No hematomas, skin excoriation and bruises. Skin- intact, warm to touch. Full range of motion bilateral upper and lower extremities. Patient denied any pain. As per pt. she hurt her back and right ribs. Assessed at bedside. No bruises, excoriation. Tenderness upon palpation in thoracic spine area.   VS - T - 98.6F, HR - 96, BP - 124/68, RR - 19, O2sat - 93%.     HPI:     55 y/o female with PMH of obesity, asthma, chronic bronchitis, NAKUL, HTN, DM, HLD, fibromyalgia, anxiety, trigeminal neuralgia, degenerative joint disease, ?IgA vasculitis, psoriasis, OA came with chief complain of chest pain and dysuria. Pt stated that for few weeks she was having burning pain in her urine, foul smell, itchy, cloudy. She further stated that she was having pain in her lower abdomen as well as in chest whenever she pressed it. She denied any vaginal discharge, headaches, visual changes, N/V/D.   OBJECTIVE:  Vital Signs Last 24 Hrs  T(C): 37 (19 Feb 2022 05:32), Max: 37.1 (18 Feb 2022 12:47)  T(F): 98.6 (19 Feb 2022 05:32), Max: 98.8 (18 Feb 2022 12:47)  HR: 96 (19 Feb 2022 05:32) (90 - 97)  BP: 124/68 (19 Feb 2022 05:32) (113/62 - 141/80)  BP(mean): --  RR: 19 (19 Feb 2022 05:32) (19 - 25)  SpO2: 93% (19 Feb 2022 05:32) (93% - 98%)    FOCUSED PHYSICAL EXAM:    LABS:                        11.3   8.08  )-----------( 362      ( 18 Feb 2022 06:59 )             36.9     02-18    138  |  102  |  11  ----------------------------<  146<H>  3.7   |  30  |  0.50    Ca    8.3<L>      18 Feb 2022 06:59  Phos  2.7     02-18  Mg     1.5     02-18    PLAN:   - X Ray Cervical spine AP and Lateral only, urgent,   - X ray Thoracic spine, I view, urgent,   - X Ray ribs 2 views, right.    - Fall precaution.     FOLLOW UP / RESULT:   - Follow-up the result of tests,   - Maintain safety measures. EVENT: 2/19/22, 5:10am,called by RN re: patient was found RN sitting on floor in her room.    ASSESSMENT: Pt. was seen in bed. Alert and oriented. She stated she fell after she went to bathroom. As per pt. floor was wet and she's sliding. Pt. denied weakness and dizziness. She denied any trauma of her head. No hematomas, skin excoriation and bruises. Skin- intact, warm to touch. Full range of motion bilateral upper and lower extremities. Patient denied any pain. As per pt. she hurt her back and right ribs. Assessed at bedside. No bruises, excoriation. Tenderness upon palpation in thoracic spine area.   VS - T - 98.6F, HR - 96, BP - 124/68, RR - 19, O2sat - 93%.     HPI:     57 y/o female with PMH of obesity, asthma, chronic bronchitis, NAKUL, HTN, DM, HLD, fibromyalgia, anxiety, trigeminal neuralgia, degenerative joint disease, ?IgA vasculitis, psoriasis, OA came with chief complain of chest pain and dysuria. Pt stated that for few weeks she was having burning pain in her urine, foul smell, itchy, cloudy. She further stated that she was having pain in her lower abdomen as well as in chest whenever she pressed it. She denied any vaginal discharge, headaches, visual changes, N/V/D.   OBJECTIVE:  Vital Signs Last 24 Hrs  T(C): 37 (19 Feb 2022 05:32), Max: 37.1 (18 Feb 2022 12:47)  T(F): 98.6 (19 Feb 2022 05:32), Max: 98.8 (18 Feb 2022 12:47)  HR: 96 (19 Feb 2022 05:32) (90 - 97)  BP: 124/68 (19 Feb 2022 05:32) (113/62 - 141/80)  BP(mean): --  RR: 19 (19 Feb 2022 05:32) (19 - 25)  SpO2: 93% (19 Feb 2022 05:32) (93% - 98%)    FOCUSED PHYSICAL EXAM:    LABS:                        11.3   8.08  )-----------( 362      ( 18 Feb 2022 06:59 )             36.9     02-18    138  |  102  |  11  ----------------------------<  146<H>  3.7   |  30  |  0.50    Ca    8.3<L>      18 Feb 2022 06:59  Phos  2.7     02-18  Mg     1.5     02-18    PLAN:   - CT cervical spine no cont,  urgent,   - X ray Thoracic spine, I view, urgent,   - X Ray ribs 2 views, right.    - Fall precaution.     FOLLOW UP / RESULT:   - Follow-up the result of tests,   - Maintain safety measures.

## 2022-02-19 NOTE — PROGRESS NOTE ADULT - ASSESSMENT
As per pt and per chart review, pt is a 57 yo F, from home, ambulated with cane , PMHx of obesity, asthma, chronic bronchitis, NAKUL, HTN, DM, HLD, fibromyalgia, anxiety, trigeminal neuralgia, degenerative joint disease, ?IgA vasculitis, psoriasis, OA came with chief complain of chest pain and dysuria. Pt stated that for few weeks she is having burning pain in her urine, foul smell, itchy, cloudy. She further stated that she is having pain in her lower abdomen as well as in chest whenever she pressed it. She denied any vaginal discharge, headaches, visual changes, N/V/D.     Of note pt admitted at [facility] in Jan-Feb 2021 and had extensive cardiac work up stress test and echo which were negative. Pt has left renal mass  diagnosed on imaging chances of malignancy 85%, followed outpatient urologist and heme/onc for monitoring . Decreasing weight and controlled blood sugars to get the surgery at some point.     In ED: UA +ve no wbc, few bacteria, trop x 1 neg     Hospital course: Patient had abdominal CT scan with cellulitis likely 2/2 to insulin injections, Started on Keflex, also found to have FERNIE, nephro was consulted.  On 2/6/22 patient lethargic, but able to follow commands and be aroused. CO2 84, and was placed on bipap overnight. Morning of 2/7/22 patient more lethargic with CO2 of 91, ICU consulted.     ICU Course:  Patient intubated, Hypercapnea resolved, her creatinine normalized without interventions. Patient was weaned off sedation and was awake, following commands. trial of extubation on 2/9 was unsuccessful  2/2 failed leak test and concern for critical airway. She was started on steroids. Trial of extubation on 2/11 with anesthesia present, patient refused to be extubated at that time, threatening to mariah. She was extubated on 2/14 to bipap and was doing well. Utilizing bipap at night. Had Nausea, vomiting and diarrhea day of extubation likely due to increased medication for bowel regimen, her sxs improved after cessation of medication. Patient blood sugar and wbc normalized after discontinuation of steroids.    2/17- Pt was transferred to Medicine for ongoing management, continue to monitor off Antibiotic. Pt will need to continue outpt follow up for renal mass and d/c planning to [facility] per pt recs.  Pt is now undecided if she wants to go to Rehab.

## 2022-02-19 NOTE — PROGRESS NOTE ADULT - PROBLEM SELECTOR PLAN 3
Acute hypoxic/ hypercapnic respiratory failure - resolved  likely in setting of NAKUL / chronic bronchitis / asthma   extubated on 2/14  continue bipap at night  2 L o2 nasal canula day time  monitor mentation

## 2022-02-19 NOTE — PROGRESS NOTE ADULT - PROBLEM SELECTOR PLAN 8
Takes gabapentin, flexeril, Tylenol, aleve, oxcarbazepine for pain at home  Lidoderm 4% patch 2 patches daily  Resume home meds  off fentanyl

## 2022-02-19 NOTE — PROGRESS NOTE ADULT - SUBJECTIVE AND OBJECTIVE BOX
Romeo Ribeiro MD (Nephrology progress note)  [address], [facility],  [address],  [place] [id number]  TEl: [phone]  Cell: [phone]    Patient is a 56y Female seen and evaluated at bedside. Vital signs, laboratory data, imaging studies, consult notes reviewed done within past 24 hours. Overnight events noted. Patient lying in bed in no distress states that she fell today morning. Interval stable renal function with non oliguria.     Allergies    Fioricet (Rash)  gadobutrol (Rash; Urticaria; Hives)  IV Contrast (Short breath)  mushroom (Unknown)  venlafaxine (Hives)    Intolerances        ROS:  CONSTITUTIONAL: No fever or chills.  HEENT: Mild neck pain and back pain after fall  RESPIRATORY: No shortness of breath, cough, hemoptysis or wheezing  CARDIOVASCULAR: No Chest pain or SOB  GASTROINTESTINAL: No abdominal pain, nausea, vomiting, diarrhea, hematemesis or blood per rectum.  GENITOURINARY: No flank or supra pubic pain  NEUROLOGICAL: No headache, focal limb weakness, tingling or numbness or seizure like activity  SKIN: No skin rash or lesion  MUSCULOSKELETAL: No leg pain, calf pain or swelling    VITALS:    T(C): 36.6 (02-19-22 @ 13:17), Max: 37.1 (02-19-22 @ 04:45)  HR: 97 (02-19-22 @ 13:17) (90 - 100)  BP: 103/52 (02-19-22 @ 13:17) (103/52 - 129/74)  RR: 18 (02-19-22 @ 13:17) (18 - 25)  SpO2: 96% (02-19-22 @ 13:17) (93% - 98%)  CAPILLARY BLOOD GLUCOSE      POCT Blood Glucose.: 107 mg/dL (19 Feb 2022 13:25)  POCT Blood Glucose.: 103 mg/dL (19 Feb 2022 12:09)  POCT Blood Glucose.: 118 mg/dL (19 Feb 2022 08:45)  POCT Blood Glucose.: 95 mg/dL (19 Feb 2022 08:01)  POCT Blood Glucose.: 113 mg/dL (19 Feb 2022 05:28)  POCT Blood Glucose.: 127 mg/dL (18 Feb 2022 23:20)  POCT Blood Glucose.: 129 mg/dL (18 Feb 2022 21:44)  POCT Blood Glucose.: 84 mg/dL (18 Feb 2022 16:43)      MEDICATIONS  (STANDING):  ALBUTerol    90 MICROgram(s) HFA Inhaler 2 Puff(s) Inhalation every 6 hours  aspirin  chewable 81 milliGRAM(s) Oral daily  atorvastatin 10 milliGRAM(s) Oral at bedtime  benzonatate 100 milliGRAM(s) Oral <User Schedule>  chlorhexidine 2% Cloths 1 Application(s) Topical <User Schedule>  dextrose 5%. 1000 milliLiter(s) (100 mL/Hr) IV Continuous <Continuous>  gabapentin 400 milliGRAM(s) Oral two times a day  glucagon  Injectable 1 milliGRAM(s) IntraMuscular once  heparin   Injectable 5000 Unit(s) SubCutaneous every 8 hours  influenza   Vaccine 0.5 milliLiter(s) IntraMuscular once  insulin glargine Injectable (LANTUS) 45 Unit(s) SubCutaneous at bedtime  insulin lispro (ADMELOG) corrective regimen sliding scale   SubCutaneous three times a day before meals  insulin lispro (ADMELOG) corrective regimen sliding scale   SubCutaneous at bedtime  insulin lispro Injectable (ADMELOG) 14 Unit(s) SubCutaneous every 6 hours  levothyroxine 112 MICROGram(s) Oral daily  lidocaine   4% Patch 2 Patch Transdermal daily  losartan 25 milliGRAM(s) Oral daily  montelukast 10 milliGRAM(s) Oral at bedtime  OXcarbazepine 300 milliGRAM(s) Oral two times a day  pantoprazole    Tablet 40 milliGRAM(s) Oral before breakfast  potassium phosphate / sodium phosphate Powder (PHOS-NaK) 1 Packet(s) Oral three times a day  senna 2 Tablet(s) Oral at bedtime    MEDICATIONS  (PRN):  acetaminophen    Suspension .. 650 milliGRAM(s) Oral every 6 hours PRN Temp greater or equal to 38C (100.4F), Mild Pain (1 - 3)  benzocaine 15 mG/menthol 3.6 mG Lozenge 1 Lozenge Oral every 4 hours PRN Sore Throat  metoclopramide Injectable 10 milliGRAM(s) IV Push every 8 hours PRN nausea/vomiting  ondansetron Injectable 4 milliGRAM(s) IV Push every 6 hours PRN Nausea and/or Vomiting  sodium chloride 0.65% Nasal 1 Spray(s) Both Nostrils every 4 hours PRN Nasal Congestion  sodium chloride 0.9% lock flush 10 milliLiter(s) IV Push every 1 hour PRN Pre/post blood products, medications, blood draw, and to maintain line patency      PHYSICAL EXAM:  GENERAL: Alert, awake and oriented x3 in no distress  HEENT: RITA, EOMI, neck supple, no JVP, no carotid bruit, oral mucosa moist and pale  CHEST/LUNG: Bilateral clear breath sounds, no rales, no crepitations, no rhonchi, no wheezing  HEART: Regular rate and rhythm, LAURA II/VI at LPSB, no gallops, no rub   ABDOMEN: Soft, obese, nontender, non distended, bowel sounds present, no palpable organomegaly  : No flank or supra pubic tenderness.  EXTREMITIES: Peripheral pulses are palpable, no pedal edema  Neurology: AAOx3, no focal neurological deficit  SKIN: No rash or skin lesion  Musculoskeletal: No joint deformity or spinal tenderness.      Vascular ACCESS:     LABS:                        11.3   9.27  )-----------( 469      ( 19 Feb 2022 06:46 )             37.7     02-19    137  |  100  |  9   ----------------------------<  109<H>  3.3<L>   |  32<H>  |  0.59    Ca    8.9      19 Feb 2022 06:46  Phos  2.3     02-19  Mg     1.6     02-19                  RADIOLOGY & ADDITIONAL STUDIES:  rad< from: CT Cervical Spine No Cont (02.19.22 @ 07:28) >    ACC: [id number] EXAM:  CT CERVICAL SPINE                          PROCEDURE DATE:  02/19/2022          INTERPRETATION:  CLINICAL INFORMATION:  s/p fall      TECHNIQUE:  Axial CT images were acquired through the cervical spine.  Intravenous contrast: None  Two-dimensional and 3-D reformats were generated.    COMPARISON STUDY: CT cervical spine 9/11/2021.    FINDINGS: There is no acute fracture or acute subluxation of the cervical   spine. Vertebral body heights and alignment are maintained. There is   straightening of the normal cervical lordosis. No significant   prevertebral soft tissue thickening. Mild cervical spondylosis is again   demonstrated. Incidental note is made of stable calcification seen   between the dens and right lateral massof C1.    Status post right suboccipital cranioplasty.    Visualized lung apices are clear.    IMPRESSION: No acute fracture or acute subluxation of the cervical spine.    --- End of Report ---            MALLORIE NOLASCO MD; Attending Radiologist  This document has been electronically signed. Feb 19 2022  8:06AM    < end of copied text >    Imaging Personally Reviewed:  [x] YES  [ ] NO    Consultant(s) Notes Reviewed:  [x] YES  [ ] NO    Care Discussed with Primary team/ Other Providers [x] YES  [ ] NO

## 2022-02-19 NOTE — PROGRESS NOTE ADULT - PROBLEM SELECTOR PLAN 4
target BG < 180  currently on Lantus and HSS, adjust as needed  lantus 45, admelog 14, ss  uncontrolled due to steroids, monitor now that steroids are off

## 2022-02-19 NOTE — PROGRESS NOTE ADULT - PROBLEM SELECTOR PLAN 5
on gabapentin, flexeril, Tylenol, aleve, oxcarbazepine for pain at home  Lidoderm 4% patch 2 patches daily  Home meds resumed but Flexeril for now to avoid over sedation  off fentanyl

## 2022-02-19 NOTE — PROGRESS NOTE ADULT - PROBLEM SELECTOR PLAN 7
Was on Cefazolin  CT showed abd wall cellulitis   Spiked fevers, escalated to zosyn  Wbc normalized after discontinuation of steroids.   Off Abx   ID Dr Jacobsen following

## 2022-02-19 NOTE — PROGRESS NOTE ADULT - ASSESSMENT
S/p pneumonia treatment   Abdominal  wall cellulitis - resolved    Plan:  ·	remain off antibiotics, monitoring for now   S/p pneumonia treatment   Abdominal  wall cellulitis - resolved    Plan:  ·	remain off antibiotics, monitoring for now    I agree with above

## 2022-02-19 NOTE — PROGRESS NOTE ADULT - ASSESSMENT
55 yo F, from home, ambulated with cane , PMHx of obesity, asthma, chronic bronchitis, NAKUL, HTN, DM, HLD, fibromyalgia, anxiety, trigeminal neuralgia, degenerative joint disease, ?IgA vasculitis, psoriasis, OA came with chief complain of chest pain and dysuria. Nephrology consult called for significant FERNIE    1) Nonoliguric FERNIE now resolved likely hypertensive/diabetic nephropathy  2) Hypertension  3) Diabetic Nephropathy with protenuria/neuropathy  4) Morbid Obesity/NAKUL/OHS  5) Abdominal wall cellulitis/PNA treated  6) Left renal lesion chronic  7) Mild hypokalemia    Recommend:  Strict I/o  S cr 0.4 with non oliguria  Avoid Nephrotoxic agents  Maintain MAP>65-70 mm Hg  Monitor BMP and electrolytes daily  Replete K with goal K >4 and <5, Mag>2 and Phos 2.5 to 3.5  Off Abx  Continue ACEI/ARB  Nocturnal BIPAP/CPAP as needed  No RRT/HD  Will follow

## 2022-02-19 NOTE — PROGRESS NOTE ADULT - SUBJECTIVE AND OBJECTIVE BOX
MEDICAL ATTENDING NOTE  Patient is a 56y old  Female who presents with a chief complaint of Chest pain and dysuria (19 Feb 2022 13:46)      HPI:  55 yo F, from home, ambulated with cane , PMHx of obesity, asthma, chronic bronchitis, NAKUL, HTN, DM, HLD, fibromyalgia, anxiety, trigeminal neuralgia, degenerative joint disease, ?IgA vasculitis, psoriasis, OA came with chief complain of chest pain and dysuria. Pt stated that for few weeks she is having burning pain in her urine, foul smell, itchy, cloudy. She further stated that she is having pain in her lower abdomen as well as in chest whenever she pressed it. She denied any vaginal discharge, headaches, visual changes, N/V/D.     Of note pt admitted at [facility] in Jan-Feb 2021 and had extensive cardiac work up stress test and echo which were negative. Pt has left renal mass  diagnosed on imaging chances of malignancy 85% , followed outpatient urologist and heme/onc for monitoring . decreasing weight and controlled blood sugars to get the surgery at some point     In ED: UA +ve no wbc, few bacteria , trop x 1 neg  (04 Feb 2022 23:29)      INTERVAL HPI/OVERNIGHT EVENTS: events of last night, noted.  Patient c/o sore throat.   MEDICATIONS  (STANDING):  ALBUTerol    90 MICROgram(s) HFA Inhaler 2 Puff(s) Inhalation every 6 hours  aspirin  chewable 81 milliGRAM(s) Oral daily  atorvastatin 10 milliGRAM(s) Oral at bedtime  benzonatate 100 milliGRAM(s) Oral <User Schedule>  chlorhexidine 2% Cloths 1 Application(s) Topical <User Schedule>  dextrose 5%. 1000 milliLiter(s) (100 mL/Hr) IV Continuous <Continuous>  gabapentin 400 milliGRAM(s) Oral two times a day  glucagon  Injectable 1 milliGRAM(s) IntraMuscular once  heparin   Injectable 5000 Unit(s) SubCutaneous every 8 hours  influenza   Vaccine 0.5 milliLiter(s) IntraMuscular once  insulin glargine Injectable (LANTUS) 45 Unit(s) SubCutaneous at bedtime  insulin lispro (ADMELOG) corrective regimen sliding scale   SubCutaneous three times a day before meals  insulin lispro (ADMELOG) corrective regimen sliding scale   SubCutaneous at bedtime  insulin lispro Injectable (ADMELOG) 14 Unit(s) SubCutaneous every 6 hours  levothyroxine 112 MICROGram(s) Oral daily  lidocaine   4% Patch 2 Patch Transdermal daily  losartan 25 milliGRAM(s) Oral daily  montelukast 10 milliGRAM(s) Oral at bedtime  OXcarbazepine 300 milliGRAM(s) Oral two times a day  pantoprazole    Tablet 40 milliGRAM(s) Oral before breakfast  potassium phosphate / sodium phosphate Powder (PHOS-NaK) 1 Packet(s) Oral three times a day  senna 2 Tablet(s) Oral at bedtime    MEDICATIONS  (PRN):  acetaminophen    Suspension .. 650 milliGRAM(s) Oral every 6 hours PRN Temp greater or equal to 38C (100.4F), Mild Pain (1 - 3)  benzocaine 15 mG/menthol 3.6 mG Lozenge 1 Lozenge Oral every 4 hours PRN Sore Throat  metoclopramide Injectable 10 milliGRAM(s) IV Push every 8 hours PRN nausea/vomiting  ondansetron Injectable 4 milliGRAM(s) IV Push every 6 hours PRN Nausea and/or Vomiting  sodium chloride 0.65% Nasal 1 Spray(s) Both Nostrils every 4 hours PRN Nasal Congestion  sodium chloride 0.9% lock flush 10 milliLiter(s) IV Push every 1 hour PRN Pre/post blood products, medications, blood draw, and to maintain line patency      __________________________________________________  REVIEW OF SYSTEMS:    CONSTITUTIONAL: No fever,   EYES: no acute visual disturbances  NECK: No pain or stiffness  RESPIRATORY: No cough; No shortness of breath; c/o sore throat  CARDIOVASCULAR: No chest pain, no palpitations  GASTROINTESTINAL: No pain. No nausea or vomiting; No diarrhea   NEUROLOGICAL: No headache or numbness, no tremors  MUSCULOSKELETAL: No joint pain, no muscle pain  GENITOURINARY: no dysuria, no frequency, no hesitancy  PSYCHIATRY: no depression , no anxiety  ALL OTHER  ROS negative        Vital Signs Last 24 Hrs  T(C): 36.6 (19 Feb 2022 13:17), Max: 37.1 (19 Feb 2022 04:45)  T(F): 97.9 (19 Feb 2022 13:17), Max: 98.8 (19 Feb 2022 04:45)  HR: 97 (19 Feb 2022 13:17) (90 - 100)  BP: 103/52 (19 Feb 2022 13:17) (103/52 - 124/68)  BP(mean): --  RR: 18 (19 Feb 2022 13:17) (18 - 25)  SpO2: 96% (19 Feb 2022 13:17) (93% - 98%)    ________________________________________________  PHYSICAL EXAM:  GENERAL: Obese, alert, cooperative woman  HEENT: Normocephalic;  conjunctivae and sclerae clear; moist mucous membranes; buffalo hump  NECK : supple  CHEST/LUNG: Clear to auscultation bilaterally with good air entry   HEART: S1 S2  regular; no murmurs, gallops or rubs  ABDOMEN: Soft, Nontender, Nondistended; Bowel sounds present  EXTREMITIES: no cyanosis; no edema; no calf tenderness  SKIN: scarred exfoliative lesions on the anterior aspect of both legs.  NERVOUS SYSTEM:  Awake and alert; Oriented  to place, person and time ; no new deficits    _________________________________________________  LABS:                        11.3   9.27  )-----------( 469      ( 19 Feb 2022 06:46 )             37.7     02-19    137  |  100  |  9   ----------------------------<  109<H>  3.3<L>   |  32<H>  |  0.59    Ca    8.9      19 Feb 2022 06:46  Phos  2.3     02-19  Mg     1.6     02-19    CAPILLARY BLOOD GLUCOSE    POCT Blood Glucose.: 114 mg/dL (19 Feb 2022 16:40)  POCT Blood Glucose.: 107 mg/dL (19 Feb 2022 13:25)  POCT Blood Glucose.: 103 mg/dL (19 Feb 2022 12:09)  POCT Blood Glucose.: 118 mg/dL (19 Feb 2022 08:45)  POCT Blood Glucose.: 95 mg/dL (19 Feb 2022 08:01)  POCT Blood Glucose.: 113 mg/dL (19 Feb 2022 05:28)  POCT Blood Glucose.: 127 mg/dL (18 Feb 2022 23:20)  POCT Blood Glucose.: 129 mg/dL (18 Feb 2022 21:44)

## 2022-02-20 DIAGNOSIS — E66.01 MORBID (SEVERE) OBESITY DUE TO EXCESS CALORIES: ICD-10-CM

## 2022-02-20 DIAGNOSIS — G47.33 OBSTRUCTIVE SLEEP APNEA (ADULT) (PEDIATRIC): ICD-10-CM

## 2022-02-20 LAB
ANION GAP SERPL CALC-SCNC: 6 MMOL/L — SIGNIFICANT CHANGE UP (ref 5–17)
BUN SERPL-MCNC: 6 MG/DL — LOW (ref 7–18)
CALCIUM SERPL-MCNC: 8.4 MG/DL — SIGNIFICANT CHANGE UP (ref 8.4–10.5)
CHLORIDE SERPL-SCNC: 104 MMOL/L — SIGNIFICANT CHANGE UP (ref 96–108)
CO2 SERPL-SCNC: 31 MMOL/L — SIGNIFICANT CHANGE UP (ref 22–31)
CREAT SERPL-MCNC: 0.56 MG/DL — SIGNIFICANT CHANGE UP (ref 0.5–1.3)
GLUCOSE BLDC GLUCOMTR-MCNC: 113 MG/DL — HIGH (ref 70–99)
GLUCOSE BLDC GLUCOMTR-MCNC: 117 MG/DL — HIGH (ref 70–99)
GLUCOSE BLDC GLUCOMTR-MCNC: 132 MG/DL — HIGH (ref 70–99)
GLUCOSE BLDC GLUCOMTR-MCNC: 138 MG/DL — HIGH (ref 70–99)
GLUCOSE BLDC GLUCOMTR-MCNC: 151 MG/DL — HIGH (ref 70–99)
GLUCOSE BLDC GLUCOMTR-MCNC: 198 MG/DL — HIGH (ref 70–99)
GLUCOSE SERPL-MCNC: 125 MG/DL — HIGH (ref 70–99)
HCT VFR BLD CALC: 37 % — SIGNIFICANT CHANGE UP (ref 34.5–45)
HGB BLD-MCNC: 10.8 G/DL — LOW (ref 11.5–15.5)
MAGNESIUM SERPL-MCNC: 1.4 MG/DL — LOW (ref 1.6–2.6)
MCHC RBC-ENTMCNC: 23.3 PG — LOW (ref 27–34)
MCHC RBC-ENTMCNC: 29.2 GM/DL — LOW (ref 32–36)
MCV RBC AUTO: 79.9 FL — LOW (ref 80–100)
NRBC # BLD: 0 /100 WBCS — SIGNIFICANT CHANGE UP (ref 0–0)
PHOSPHATE SERPL-MCNC: 4.2 MG/DL — SIGNIFICANT CHANGE UP (ref 2.5–4.5)
PLATELET # BLD AUTO: 436 K/UL — HIGH (ref 150–400)
POTASSIUM SERPL-MCNC: 3.4 MMOL/L — LOW (ref 3.5–5.3)
POTASSIUM SERPL-SCNC: 3.4 MMOL/L — LOW (ref 3.5–5.3)
RBC # BLD: 4.63 M/UL — SIGNIFICANT CHANGE UP (ref 3.8–5.2)
RBC # FLD: 17.2 % — HIGH (ref 10.3–14.5)
SODIUM SERPL-SCNC: 141 MMOL/L — SIGNIFICANT CHANGE UP (ref 135–145)
WBC # BLD: 9.27 K/UL — SIGNIFICANT CHANGE UP (ref 3.8–10.5)
WBC # FLD AUTO: 9.27 K/UL — SIGNIFICANT CHANGE UP (ref 3.8–10.5)

## 2022-02-20 PROCEDURE — 99233 SBSQ HOSP IP/OBS HIGH 50: CPT

## 2022-02-20 RX ORDER — INSULIN LISPRO 100/ML
VIAL (ML) SUBCUTANEOUS
Refills: 0 | Status: DISCONTINUED | OUTPATIENT
Start: 2022-02-20 | End: 2022-02-22

## 2022-02-20 RX ORDER — MAGNESIUM SULFATE 500 MG/ML
2 VIAL (ML) INJECTION ONCE
Refills: 0 | Status: COMPLETED | OUTPATIENT
Start: 2022-02-20 | End: 2022-02-20

## 2022-02-20 RX ORDER — POTASSIUM CHLORIDE 20 MEQ
40 PACKET (EA) ORAL ONCE
Refills: 0 | Status: COMPLETED | OUTPATIENT
Start: 2022-02-20 | End: 2022-02-20

## 2022-02-20 RX ADMIN — ALBUTEROL 2 PUFF(S): 90 AEROSOL, METERED ORAL at 18:45

## 2022-02-20 RX ADMIN — Medication 25 GRAM(S): at 11:58

## 2022-02-20 RX ADMIN — LOSARTAN POTASSIUM 25 MILLIGRAM(S): 100 TABLET, FILM COATED ORAL at 06:15

## 2022-02-20 RX ADMIN — GABAPENTIN 400 MILLIGRAM(S): 400 CAPSULE ORAL at 18:45

## 2022-02-20 RX ADMIN — LIDOCAINE 2 PATCH: 4 CREAM TOPICAL at 11:58

## 2022-02-20 RX ADMIN — GABAPENTIN 400 MILLIGRAM(S): 400 CAPSULE ORAL at 06:15

## 2022-02-20 RX ADMIN — CHLORHEXIDINE GLUCONATE 1 APPLICATION(S): 213 SOLUTION TOPICAL at 06:17

## 2022-02-20 RX ADMIN — MONTELUKAST 10 MILLIGRAM(S): 4 TABLET, CHEWABLE ORAL at 22:10

## 2022-02-20 RX ADMIN — Medication 40 MILLIEQUIVALENT(S): at 11:53

## 2022-02-20 RX ADMIN — Medication 100 MILLIGRAM(S): at 18:45

## 2022-02-20 RX ADMIN — Medication 14 UNIT(S): at 06:21

## 2022-02-20 RX ADMIN — PANTOPRAZOLE SODIUM 40 MILLIGRAM(S): 20 TABLET, DELAYED RELEASE ORAL at 06:25

## 2022-02-20 RX ADMIN — LIDOCAINE 2 PATCH: 4 CREAM TOPICAL at 23:00

## 2022-02-20 RX ADMIN — LIDOCAINE 2 PATCH: 4 CREAM TOPICAL at 00:10

## 2022-02-20 RX ADMIN — OXCARBAZEPINE 300 MILLIGRAM(S): 300 TABLET, FILM COATED ORAL at 06:15

## 2022-02-20 RX ADMIN — Medication 1: at 17:27

## 2022-02-20 RX ADMIN — Medication 1: at 11:59

## 2022-02-20 RX ADMIN — INSULIN GLARGINE 45 UNIT(S): 100 INJECTION, SOLUTION SUBCUTANEOUS at 22:11

## 2022-02-20 RX ADMIN — HEPARIN SODIUM 5000 UNIT(S): 5000 INJECTION INTRAVENOUS; SUBCUTANEOUS at 22:10

## 2022-02-20 RX ADMIN — Medication 14 UNIT(S): at 17:28

## 2022-02-20 RX ADMIN — Medication 1 PACKET(S): at 06:15

## 2022-02-20 RX ADMIN — ALBUTEROL 2 PUFF(S): 90 AEROSOL, METERED ORAL at 22:08

## 2022-02-20 RX ADMIN — ALBUTEROL 2 PUFF(S): 90 AEROSOL, METERED ORAL at 10:45

## 2022-02-20 RX ADMIN — HEPARIN SODIUM 5000 UNIT(S): 5000 INJECTION INTRAVENOUS; SUBCUTANEOUS at 14:06

## 2022-02-20 RX ADMIN — OXCARBAZEPINE 300 MILLIGRAM(S): 300 TABLET, FILM COATED ORAL at 18:45

## 2022-02-20 RX ADMIN — ALBUTEROL 2 PUFF(S): 90 AEROSOL, METERED ORAL at 03:50

## 2022-02-20 RX ADMIN — Medication 81 MILLIGRAM(S): at 11:58

## 2022-02-20 RX ADMIN — Medication 112 MICROGRAM(S): at 06:15

## 2022-02-20 RX ADMIN — HEPARIN SODIUM 5000 UNIT(S): 5000 INJECTION INTRAVENOUS; SUBCUTANEOUS at 06:16

## 2022-02-20 RX ADMIN — Medication 14 UNIT(S): at 11:59

## 2022-02-20 RX ADMIN — ATORVASTATIN CALCIUM 10 MILLIGRAM(S): 80 TABLET, FILM COATED ORAL at 22:10

## 2022-02-20 RX ADMIN — SENNA PLUS 2 TABLET(S): 8.6 TABLET ORAL at 22:10

## 2022-02-20 RX ADMIN — Medication 100 MILLIGRAM(S): at 06:15

## 2022-02-20 NOTE — PROGRESS NOTE ADULT - SUBJECTIVE AND OBJECTIVE BOX
Romeo Ribeiro MD (Nephrology progress note)  [address], [facility],  [id number],  [facility]  TEl: [phone]  Cell: [phone]    Patient is a 56y Female seen and evaluated at bedside. Vital signs, laboratory data, imaging studies, consult notes reviewed done within past 24 hours. Overnight events noted. Patient lying in bed in no distress offers no new complains. Interval stable renal function with non oliguria. K level 3.3 with Mag 1.4 today morning.    Allergies    Fioricet (Rash)  gadobutrol (Rash; Urticaria; Hives)  IV Contrast (Short breath)  mushroom (Unknown)  venlafaxine (Hives)    Intolerances        ROS:  CONSTITUTIONAL: No fever or chills.  HEENT: No headache or visual disturbances.  RESPIRATORY: No shortness of breath, cough, hemoptysis or wheezing  CARDIOVASCULAR: No chest pain or SOB  GASTROINTESTINAL: No abdominal pain, nausea, vomiting, diarrhea, hematemesis or blood per rectum.  GENITOURINARY: No flank or supra pubic pain  NEUROLOGICAL: No headache, focal limb weakness, tingling or numbness  SKIN: No skin rash or lesion  MUSCULOSKELETAL: No leg pain, calf pain or swelling    VITALS:    T(C): 36.7 (02-20-22 @ 05:03), Max: 36.7 (02-19-22 @ 21:32)  HR: 100 (02-20-22 @ 05:03) (97 - 981)  BP: 113/80 (02-20-22 @ 05:03) (103/52 - 113/80)  RR: 20 (02-20-22 @ 05:03) (18 - 20)  SpO2: 97% (02-20-22 @ 05:03) (96% - 97%)  CAPILLARY BLOOD GLUCOSE      POCT Blood Glucose.: 198 mg/dL (20 Feb 2022 11:46)  POCT Blood Glucose.: 138 mg/dL (20 Feb 2022 08:02)  POCT Blood Glucose.: 117 mg/dL (20 Feb 2022 05:44)  POCT Blood Glucose.: 135 mg/dL (19 Feb 2022 23:28)  POCT Blood Glucose.: 134 mg/dL (19 Feb 2022 21:57)  POCT Blood Glucose.: 114 mg/dL (19 Feb 2022 16:40)  POCT Blood Glucose.: 107 mg/dL (19 Feb 2022 13:25)      MEDICATIONS  (STANDING):  ALBUTerol    90 MICROgram(s) HFA Inhaler 2 Puff(s) Inhalation every 6 hours  aspirin  chewable 81 milliGRAM(s) Oral daily  atorvastatin 10 milliGRAM(s) Oral at bedtime  benzonatate 100 milliGRAM(s) Oral <User Schedule>  chlorhexidine 2% Cloths 1 Application(s) Topical <User Schedule>  dextrose 5%. 1000 milliLiter(s) (100 mL/Hr) IV Continuous <Continuous>  gabapentin 400 milliGRAM(s) Oral two times a day  glucagon  Injectable 1 milliGRAM(s) IntraMuscular once  heparin   Injectable 5000 Unit(s) SubCutaneous every 8 hours  influenza   Vaccine 0.5 milliLiter(s) IntraMuscular once  insulin glargine Injectable (LANTUS) 45 Unit(s) SubCutaneous at bedtime  insulin lispro (ADMELOG) corrective regimen sliding scale   SubCutaneous three times a day before meals  insulin lispro (ADMELOG) corrective regimen sliding scale   SubCutaneous at bedtime  insulin lispro Injectable (ADMELOG) 14 Unit(s) SubCutaneous every 6 hours  levothyroxine 112 MICROGram(s) Oral daily  lidocaine   4% Patch 2 Patch Transdermal daily  losartan 25 milliGRAM(s) Oral daily  montelukast 10 milliGRAM(s) Oral at bedtime  OXcarbazepine 300 milliGRAM(s) Oral two times a day  pantoprazole    Tablet 40 milliGRAM(s) Oral before breakfast  senna 2 Tablet(s) Oral at bedtime    MEDICATIONS  (PRN):  acetaminophen    Suspension .. 650 milliGRAM(s) Oral every 6 hours PRN Temp greater or equal to 38C (100.4F), Mild Pain (1 - 3)  benzocaine 15 mG/menthol 3.6 mG Lozenge 1 Lozenge Oral every 4 hours PRN Sore Throat  metoclopramide Injectable 10 milliGRAM(s) IV Push every 8 hours PRN nausea/vomiting  ondansetron Injectable 4 milliGRAM(s) IV Push every 6 hours PRN Nausea and/or Vomiting  sodium chloride 0.65% Nasal 1 Spray(s) Both Nostrils every 4 hours PRN Nasal Congestion  sodium chloride 0.9% lock flush 10 milliLiter(s) IV Push every 1 hour PRN Pre/post blood products, medications, blood draw, and to maintain line patency      PHYSICAL EXAM:  GENERAL: Alert, awake and oriented x2-3 in no distress  HEENT: RITA, EOMI, neck supple, no JVP, no carotid bruit, oral mucosa moist and pale  CHEST/LUNG: Bilateral decreased breath sounds at bases, no rales, no rhonchi, no wheezing  HEART: Regular rate and rhythm, LAURA II/VI at LPSB, no gallops, no rub   ABDOMEN: Soft, nontender, non distended, bowel sounds present, no palpable organomegaly  : No flank or supra pubic tenderness.  EXTREMITIES: Peripheral pulses are palpable, no pedal edema  Neurology: AAOx2-3, no focal neurological deficit  SKIN: No rash or skin lesion  Musculoskeletal: No joint deformity    Vascular ACCESS:     LABS:                        10.8   9.27  )-----------( 436      ( 20 Feb 2022 06:33 )             37.0     02-20    141  |  104  |  6<L>  ----------------------------<  125<H>  3.4<L>   |  31  |  0.56    Ca    8.4      20 Feb 2022 06:33  Phos  4.2     02-20  Mg     1.4     02-20                  RADIOLOGY & ADDITIONAL STUDIES:  rad  Imaging Personally Reviewed:  [x] YES  [ ] NO    Consultant(s) Notes Reviewed:  [x] YES  [ ] NO    Care Discussed with Primary team/ Other Providers [x] YES  [ ] NO

## 2022-02-20 NOTE — PROGRESS NOTE ADULT - SUBJECTIVE AND OBJECTIVE BOX
56y Female is under our care for prior pneumonia and abdominal wall cellulitis. Patient was found to be ambulating around the floor during PT session. She is doing much better and there is no documented loose stools lately. Remains afebrile with normal wbc count.     MEDS: no antibiotics     ALLERGIES: Allergies    Fioricet (Rash)  gadobutrol (Rash; Urticaria; Hives)  IV Contrast (Short breath)  mushroom (Unknown)  venlafaxine (Hives)    Intolerances      REVIEW OF SYSTEMS:  [  ] Not able to illicit  General: no fevers no malaise  Chest: no cough no sob  GI: no nvd  : no urinary sxs   Skin: no rashes      VITALS:  Vital Signs Last 24 Hrs  T(C): 36.9 (20 Feb 2022 13:25), Max: 36.9 (20 Feb 2022 13:25)  T(F): 98.4 (20 Feb 2022 13:25), Max: 98.4 (20 Feb 2022 13:25)  HR: 105 (20 Feb 2022 13:25) (100 - 981)  BP: 102/72 (20 Feb 2022 13:25) (102/72 - 113/80)  BP(mean): --  RR: 22 (20 Feb 2022 13:25) (18 - 22)  SpO2: 99% (20 Feb 2022 13:25) (97% - 99%)    PHYSICAL EXAM:  HEENT: +NC  Neck: supple no LN's   Respiratory: bilateral decreased breath sounds no rales  Cardiovascular: S1 S2 reg no murmurs  Gastrointestinal: +BS with soft, large abdominal pannus; nontender  Extremities: BLE edema  Skin: scattered scaly psoriasic plaques throughout body  Ortho: n/a  Neuro: AAO x 3      LABS/DIAGNOSTIC TESTS:                          10.8   9.27  )-----------( 436      ( 20 Feb 2022 06:33 )             37.0   WBC Count: 9.27 K/uL (02-20 @ 06:33)  WBC Count: 9.27 K/uL (02-19 @ 06:46)  WBC Count: 8.08 K/uL (02-18 @ 06:59)  WBC Count: 8.94 K/uL (02-17 @ 03:18)  WBC Count: 9.96 K/uL (02-16 @ 04:46)    02-20    141  |  104  |  6<L>  ----------------------------<  125<H>  3.4<L>   |  31  |  0.56    Ca    8.4      20 Feb 2022 06:33  Phos  4.2     02-20  Mg     1.4     02-20        CULTURES:   Clean Catch Clean Catch (Midstream)  02-05 @ 04:22   Normal Urogenital brody present  --  --        RADIOLOGY:  no new studies

## 2022-02-20 NOTE — PROGRESS NOTE ADULT - ASSESSMENT
S/p pneumonia treatment   Abdominal  wall cellulitis - resolved    Plan:  ·	remain off antibiotics, monitoring for now         S/p pneumonia treatment   Abdominal  wall cellulitis - resolved    Plan:  ·	remain off antibiotics, monitoring for now    I agree with above           S/p pneumonia treatment   Abdominal  wall cellulitis - resolved    Plan:  ·	remain off antibiotics, monitoring for now  ·	Reconsult prn.    I agree with above

## 2022-02-20 NOTE — PROGRESS NOTE ADULT - ASSESSMENT
55 yo F, from home, ambulated with cane , PMHx of obesity, asthma, chronic bronchitis, NAKUL, HTN, DM, HLD, fibromyalgia, anxiety, trigeminal neuralgia, degenerative joint disease, ?IgA vasculitis, psoriasis, OA came with chief complain of chest pain and dysuria. Nephrology consult called for significant FERNIE    1) Nonoliguric FERNIE now resolved with S cr 0.6 likely hypertensive/diabetic nephropathy  2) Hypertension  3) Diabetic Nephropathy with protenuria/neuropathy  4) Morbid Obesity/NAKUL/OHS  5) Abdominal wall cellulitis/PNA treated  6) Left renal lesion chronic  7) Hypokalemia/Hypomagnesemia    Recommend:  Strict I/o  S cr 0.4 with non oliguria  Avoid Nephrotoxic agents  Maintain MAP>65-70 mm Hg  Monitor BMP and electrolytes daily  Replete K with goal K >4 and <5, Mag>2 and Phos 2.5 to 3.5  Off Abx  Continue ACEI/ARB  Nocturnal BIPAP/CPAP as needed  No RRT/HD  Will follow

## 2022-02-20 NOTE — PROGRESS NOTE ADULT - SUBJECTIVE AND OBJECTIVE BOX
MEDICAL ATTENDING NOTE  Patient is a 56y old  Female who presents with a chief complaint of Chest pain and dysuria (20 Feb 2022 13:25)      HPI:  57 yo F, from home, ambulated with cane , PMHx of obesity, asthma, chronic bronchitis, NAKUL, HTN, DM, HLD, fibromyalgia, anxiety, trigeminal neuralgia, degenerative joint disease, ?IgA vasculitis, psoriasis, OA came with chief complain of chest pain and dysuria. Pt stated that for few weeks she is having burning pain in her urine, foul smell, itchy, cloudy. She further stated that she is having pain in her lower abdomen as well as in chest whenever she pressed it. She denied any vaginal discharge, headaches, visual changes, N/V/D.     Of note pt admitted at [facility] in Jan-Feb 2021 and had extensive cardiac work up stress test and echo which were negative. Pt has left renal mass  diagnosed on imaging chances of malignancy 85% , followed outpatient urologist and heme/onc for monitoring . decreasing weight and controlled blood sugars to get the surgery at some point     In ED: UA +ve no wbc, few bacteria , trop x 1 neg  (04 Feb 2022 23:29)      INTERVAL HPI/OVERNIGHT EVENTS: still c/o sore throat and cough, which is productive of yellow sputum.     MEDICATIONS  (STANDING):  ALBUTerol    90 MICROgram(s) HFA Inhaler 2 Puff(s) Inhalation every 6 hours  aspirin  chewable 81 milliGRAM(s) Oral daily  atorvastatin 10 milliGRAM(s) Oral at bedtime  benzonatate 100 milliGRAM(s) Oral <User Schedule>  chlorhexidine 2% Cloths 1 Application(s) Topical <User Schedule>  dextrose 5%. 1000 milliLiter(s) (100 mL/Hr) IV Continuous <Continuous>  gabapentin 400 milliGRAM(s) Oral two times a day  glucagon  Injectable 1 milliGRAM(s) IntraMuscular once  heparin   Injectable 5000 Unit(s) SubCutaneous every 8 hours  influenza   Vaccine 0.5 milliLiter(s) IntraMuscular once  insulin glargine Injectable (LANTUS) 45 Unit(s) SubCutaneous at bedtime  insulin lispro (ADMELOG) corrective regimen sliding scale   SubCutaneous three times a day before meals  insulin lispro (ADMELOG) corrective regimen sliding scale   SubCutaneous at bedtime  insulin lispro Injectable (ADMELOG) 14 Unit(s) SubCutaneous every 6 hours  levothyroxine 112 MICROGram(s) Oral daily  lidocaine   4% Patch 2 Patch Transdermal daily  losartan 25 milliGRAM(s) Oral daily  montelukast 10 milliGRAM(s) Oral at bedtime  OXcarbazepine 300 milliGRAM(s) Oral two times a day  pantoprazole    Tablet 40 milliGRAM(s) Oral before breakfast  senna 2 Tablet(s) Oral at bedtime    MEDICATIONS  (PRN):  acetaminophen    Suspension .. 650 milliGRAM(s) Oral every 6 hours PRN Temp greater or equal to 38C (100.4F), Mild Pain (1 - 3)  benzocaine 15 mG/menthol 3.6 mG Lozenge 1 Lozenge Oral every 4 hours PRN Sore Throat  metoclopramide Injectable 10 milliGRAM(s) IV Push every 8 hours PRN nausea/vomiting  ondansetron Injectable 4 milliGRAM(s) IV Push every 6 hours PRN Nausea and/or Vomiting  sodium chloride 0.65% Nasal 1 Spray(s) Both Nostrils every 4 hours PRN Nasal Congestion  sodium chloride 0.9% lock flush 10 milliLiter(s) IV Push every 1 hour PRN Pre/post blood products, medications, blood draw, and to maintain line patency      __________________________________________________  REVIEW OF SYSTEMS:    CONSTITUTIONAL: No fever,   EYES: no acute visual disturbances  NECK: No pain or stiffness  RESPIRATORY:  Cough, productive of yellow sputum.   CARDIOVASCULAR: No chest pain, no palpitations  GASTROINTESTINAL: No pain. No nausea or vomiting; No diarrhea   NEUROLOGICAL: No headache or numbness, no tremors  MUSCULOSKELETAL: No joint pain, no muscle pain  GENITOURINARY: no dysuria, no frequency, no hesitancy  PSYCHIATRY: no depression , no anxiety  ALL OTHER  ROS negative        Vital Signs Last 24 Hrs  T(C): 36.9 (20 Feb 2022 13:25), Max: 36.9 (20 Feb 2022 13:25)  T(F): 98.4 (20 Feb 2022 13:25), Max: 98.4 (20 Feb 2022 13:25)  HR: 105 (20 Feb 2022 13:25) (100 - 981)  BP: 102/72 (20 Feb 2022 13:25) (102/72 - 113/80)  BP(mean): --  RR: 22 (20 Feb 2022 13:25) (18 - 22)  SpO2: 99% (20 Feb 2022 13:25) (97% - 99%)    ________________________________________________  PHYSICAL EXAM:  GENERAL: Alert, cooperative, obese woman, seated on the edge of the bed.   HEENT: Normocephalic;  conjunctivae and sclerae clear; moist mucous membranes; buffalo hump  NECK : supple  CHEST/LUNG: No rales or wheezing; decreased bs  HEART: S1 S2  regular; no murmurs, gallops or rubs  ABDOMEN: Soft, Nontender, Nondistended; Bowel sounds present  EXTREMITIES: no cyanosis; no edema; no calf tenderness  SKIN: warm and dry; no rash  NERVOUS SYSTEM:  Awake and alert; Oriented  to place, person and time ; no new deficits    _________________________________________________  LABS:                        10.8   9.27  )-----------( 436      ( 20 Feb 2022 06:33 )             37.0     02-20    141  |  104  |  6<L>  ----------------------------<  125<H>  3.4<L>   |  31  |  0.56    Ca    8.4      20 Feb 2022 06:33  Phos  4.2     02-20  Mg     1.4     02-20          POCT Blood Glucose.: 198 mg/dL (20 Feb 2022 11:46)  POCT Blood Glucose.: 138 mg/dL (20 Feb 2022 08:02)  POCT Blood Glucose.: 117 mg/dL (20 Feb 2022 05:44)  POCT Blood Glucose.: 135 mg/dL (19 Feb 2022 23:28)  POCT Blood Glucose.: 134 mg/dL (19 Feb 2022 21:57)  POCT Blood Glucose.: 114 mg/dL (19 Feb 2022 16:40)

## 2022-02-20 NOTE — PROGRESS NOTE ADULT - ASSESSMENT
As per pt and per chart review, pt is a 55 yo F, from home, ambulated with cane , PMHx of obesity, asthma, chronic bronchitis, NAKUL, HTN, DM, HLD, fibromyalgia, anxiety, trigeminal neuralgia, degenerative joint disease, ?IgA vasculitis, psoriasis, OA came with chief complain of chest pain and dysuria. Pt stated that for few weeks she is having burning pain in her urine, foul smell, itchy, cloudy. She further stated that she is having pain in her lower abdomen as well as in chest whenever she pressed it. She denied any vaginal discharge, headaches, visual changes, N/V/D.     Of note pt admitted at [facility] in Jan-Feb 2021 and had extensive cardiac work up stress test and echo which were negative. Pt has left renal mass  diagnosed on imaging chances of malignancy 85%, followed outpatient urologist and heme/onc for monitoring . Decreasing weight and controlled blood sugars to get the surgery at some point.     In ED: UA +ve no wbc, few bacteria, trop x 1 neg     Hospital course: Patient had abdominal CT scan with cellulitis likely 2/2 to insulin injections, Started on Keflex, also found to have FERNIE, nephro was consulted.  On 2/6/22 patient lethargic, but able to follow commands and be aroused. CO2 84, and was placed on bipap overnight. Morning of 2/7/22 patient more lethargic with CO2 of 91, ICU consulted.     ICU Course:  Patient intubated, Hypercapnea resolved, her creatinine normalized without interventions. Patient was weaned off sedation and was awake, following commands. trial of extubation on 2/9 was unsuccessful  2/2 failed leak test and concern for critical airway. She was started on steroids. Trial of extubation on 2/11 with anesthesia present, patient refused to be extubated at that time, threatening to mariah. She was extubated on 2/14 to bipap and was doing well. Utilizing bipap at night. Had Nausea, vomiting and diarrhea day of extubation likely due to increased medication for bowel regimen, her sxs improved after cessation of medication. Patient blood sugar and wbc normalized after discontinuation of steroids.    2/17- Pt was transferred to Medicine for ongoing management, continue to monitor off Antibiotic. Pt will need to continue outpt follow up for renal mass and d/c planning to [facility] per pt recs.  Pt is now undecided if she wants to go to Rehab.

## 2022-02-21 LAB
ANION GAP SERPL CALC-SCNC: 5 MMOL/L — SIGNIFICANT CHANGE UP (ref 5–17)
BUN SERPL-MCNC: 7 MG/DL — SIGNIFICANT CHANGE UP (ref 7–18)
CALCIUM SERPL-MCNC: 8.6 MG/DL — SIGNIFICANT CHANGE UP (ref 8.4–10.5)
CHLORIDE SERPL-SCNC: 105 MMOL/L — SIGNIFICANT CHANGE UP (ref 96–108)
CO2 SERPL-SCNC: 30 MMOL/L — SIGNIFICANT CHANGE UP (ref 22–31)
CREAT SERPL-MCNC: 0.52 MG/DL — SIGNIFICANT CHANGE UP (ref 0.5–1.3)
GLUCOSE BLDC GLUCOMTR-MCNC: 146 MG/DL — HIGH (ref 70–99)
GLUCOSE BLDC GLUCOMTR-MCNC: 149 MG/DL — HIGH (ref 70–99)
GLUCOSE BLDC GLUCOMTR-MCNC: 164 MG/DL — HIGH (ref 70–99)
GLUCOSE BLDC GLUCOMTR-MCNC: 171 MG/DL — HIGH (ref 70–99)
GLUCOSE BLDC GLUCOMTR-MCNC: 187 MG/DL — HIGH (ref 70–99)
GLUCOSE SERPL-MCNC: 158 MG/DL — HIGH (ref 70–99)
HCT VFR BLD CALC: 37.5 % — SIGNIFICANT CHANGE UP (ref 34.5–45)
HGB BLD-MCNC: 11.1 G/DL — LOW (ref 11.5–15.5)
MAGNESIUM SERPL-MCNC: 1.6 MG/DL — SIGNIFICANT CHANGE UP (ref 1.6–2.6)
MCHC RBC-ENTMCNC: 23.8 PG — LOW (ref 27–34)
MCHC RBC-ENTMCNC: 29.6 GM/DL — LOW (ref 32–36)
MCV RBC AUTO: 80.5 FL — SIGNIFICANT CHANGE UP (ref 80–100)
NRBC # BLD: 0 /100 WBCS — SIGNIFICANT CHANGE UP (ref 0–0)
PHOSPHATE SERPL-MCNC: 3.2 MG/DL — SIGNIFICANT CHANGE UP (ref 2.5–4.5)
PLATELET # BLD AUTO: 392 K/UL — SIGNIFICANT CHANGE UP (ref 150–400)
POTASSIUM SERPL-MCNC: 3.5 MMOL/L — SIGNIFICANT CHANGE UP (ref 3.5–5.3)
POTASSIUM SERPL-SCNC: 3.5 MMOL/L — SIGNIFICANT CHANGE UP (ref 3.5–5.3)
RBC # BLD: 4.66 M/UL — SIGNIFICANT CHANGE UP (ref 3.8–5.2)
RBC # FLD: 17.5 % — HIGH (ref 10.3–14.5)
SODIUM SERPL-SCNC: 140 MMOL/L — SIGNIFICANT CHANGE UP (ref 135–145)
WBC # BLD: 7.14 K/UL — SIGNIFICANT CHANGE UP (ref 3.8–10.5)
WBC # FLD AUTO: 7.14 K/UL — SIGNIFICANT CHANGE UP (ref 3.8–10.5)

## 2022-02-21 PROCEDURE — 99233 SBSQ HOSP IP/OBS HIGH 50: CPT

## 2022-02-21 RX ADMIN — CHLORHEXIDINE GLUCONATE 1 APPLICATION(S): 213 SOLUTION TOPICAL at 05:44

## 2022-02-21 RX ADMIN — INSULIN GLARGINE 45 UNIT(S): 100 INJECTION, SOLUTION SUBCUTANEOUS at 21:20

## 2022-02-21 RX ADMIN — ALBUTEROL 2 PUFF(S): 90 AEROSOL, METERED ORAL at 08:24

## 2022-02-21 RX ADMIN — ATORVASTATIN CALCIUM 10 MILLIGRAM(S): 80 TABLET, FILM COATED ORAL at 21:20

## 2022-02-21 RX ADMIN — BENZOCAINE AND MENTHOL 1 LOZENGE: 5; 1 LIQUID ORAL at 06:44

## 2022-02-21 RX ADMIN — HEPARIN SODIUM 5000 UNIT(S): 5000 INJECTION INTRAVENOUS; SUBCUTANEOUS at 21:20

## 2022-02-21 RX ADMIN — Medication 81 MILLIGRAM(S): at 12:16

## 2022-02-21 RX ADMIN — ALBUTEROL 2 PUFF(S): 90 AEROSOL, METERED ORAL at 02:35

## 2022-02-21 RX ADMIN — LOSARTAN POTASSIUM 25 MILLIGRAM(S): 100 TABLET, FILM COATED ORAL at 05:46

## 2022-02-21 RX ADMIN — OXCARBAZEPINE 300 MILLIGRAM(S): 300 TABLET, FILM COATED ORAL at 05:43

## 2022-02-21 RX ADMIN — Medication 1: at 08:24

## 2022-02-21 RX ADMIN — Medication 1: at 11:59

## 2022-02-21 RX ADMIN — MONTELUKAST 10 MILLIGRAM(S): 4 TABLET, CHEWABLE ORAL at 21:23

## 2022-02-21 RX ADMIN — PANTOPRAZOLE SODIUM 40 MILLIGRAM(S): 20 TABLET, DELAYED RELEASE ORAL at 06:30

## 2022-02-21 RX ADMIN — Medication 100 MILLIGRAM(S): at 05:43

## 2022-02-21 RX ADMIN — OXCARBAZEPINE 300 MILLIGRAM(S): 300 TABLET, FILM COATED ORAL at 17:55

## 2022-02-21 RX ADMIN — ALBUTEROL 2 PUFF(S): 90 AEROSOL, METERED ORAL at 15:29

## 2022-02-21 RX ADMIN — BENZOCAINE AND MENTHOL 1 LOZENGE: 5; 1 LIQUID ORAL at 21:26

## 2022-02-21 RX ADMIN — HEPARIN SODIUM 5000 UNIT(S): 5000 INJECTION INTRAVENOUS; SUBCUTANEOUS at 15:00

## 2022-02-21 RX ADMIN — Medication 112 MICROGRAM(S): at 05:43

## 2022-02-21 RX ADMIN — LIDOCAINE 2 PATCH: 4 CREAM TOPICAL at 19:46

## 2022-02-21 RX ADMIN — GABAPENTIN 400 MILLIGRAM(S): 400 CAPSULE ORAL at 05:43

## 2022-02-21 RX ADMIN — ALBUTEROL 2 PUFF(S): 90 AEROSOL, METERED ORAL at 21:20

## 2022-02-21 RX ADMIN — GABAPENTIN 400 MILLIGRAM(S): 400 CAPSULE ORAL at 17:55

## 2022-02-21 RX ADMIN — LIDOCAINE 2 PATCH: 4 CREAM TOPICAL at 12:16

## 2022-02-21 RX ADMIN — Medication 1: at 21:22

## 2022-02-21 RX ADMIN — Medication 100 MILLIGRAM(S): at 17:55

## 2022-02-21 RX ADMIN — HEPARIN SODIUM 5000 UNIT(S): 5000 INJECTION INTRAVENOUS; SUBCUTANEOUS at 05:43

## 2022-02-21 NOTE — PROGRESS NOTE ADULT - SUBJECTIVE AND OBJECTIVE BOX
MEDICAL ATTENDING NOTE  Patient is a 56y old  Female who presents with a chief complaint of Chest pain and dysuria (21 Feb 2022 09:42)      HPI:  55 yo F, from home, ambulated with cane , PMHx of obesity, asthma, chronic bronchitis, NAKUL, HTN, DM, HLD, fibromyalgia, anxiety, trigeminal neuralgia, degenerative joint disease, ?IgA vasculitis, psoriasis, OA came with chief complain of chest pain and dysuria. Pt stated that for few weeks she is having burning pain in her urine, foul smell, itchy, cloudy. She further stated that she is having pain in her lower abdomen as well as in chest whenever she pressed it. She denied any vaginal discharge, headaches, visual changes, N/V/D.     Of note pt admitted at [facility] in Jan-Feb 2021 and had extensive cardiac work up stress test and echo which were negative. Pt has left renal mass  diagnosed on imaging chances of malignancy 85% , followed outpatient urologist and heme/onc for monitoring . decreasing weight and controlled blood sugars to get the surgery at some point     In ED: UA +ve no wbc, few bacteria , trop x 1 neg  (04 Feb 2022 23:29)      INTERVAL HPI/OVERNIGHT EVENTS: no new complaints    MEDICATIONS  (STANDING):  ALBUTerol    90 MICROgram(s) HFA Inhaler 2 Puff(s) Inhalation every 6 hours  aspirin  chewable 81 milliGRAM(s) Oral daily  atorvastatin 10 milliGRAM(s) Oral at bedtime  benzonatate 100 milliGRAM(s) Oral <User Schedule>  chlorhexidine 2% Cloths 1 Application(s) Topical <User Schedule>  dextrose 5%. 1000 milliLiter(s) (100 mL/Hr) IV Continuous <Continuous>  gabapentin 400 milliGRAM(s) Oral two times a day  glucagon  Injectable 1 milliGRAM(s) IntraMuscular once  heparin   Injectable 5000 Unit(s) SubCutaneous every 8 hours  influenza   Vaccine 0.5 milliLiter(s) IntraMuscular once  insulin glargine Injectable (LANTUS) 45 Unit(s) SubCutaneous at bedtime  insulin lispro (ADMELOG) corrective regimen sliding scale   SubCutaneous Before meals and at bedtime  levothyroxine 112 MICROGram(s) Oral daily  lidocaine   4% Patch 2 Patch Transdermal daily  losartan 25 milliGRAM(s) Oral daily  montelukast 10 milliGRAM(s) Oral at bedtime  OXcarbazepine 300 milliGRAM(s) Oral two times a day  pantoprazole    Tablet 40 milliGRAM(s) Oral before breakfast  senna 2 Tablet(s) Oral at bedtime    MEDICATIONS  (PRN):  acetaminophen    Suspension .. 650 milliGRAM(s) Oral every 6 hours PRN Temp greater or equal to 38C (100.4F), Mild Pain (1 - 3)  benzocaine 15 mG/menthol 3.6 mG Lozenge 1 Lozenge Oral every 4 hours PRN Sore Throat  metoclopramide Injectable 10 milliGRAM(s) IV Push every 8 hours PRN nausea/vomiting  ondansetron Injectable 4 milliGRAM(s) IV Push every 6 hours PRN Nausea and/or Vomiting  sodium chloride 0.65% Nasal 1 Spray(s) Both Nostrils every 4 hours PRN Nasal Congestion  sodium chloride 0.9% lock flush 10 milliLiter(s) IV Push every 1 hour PRN Pre/post blood products, medications, blood draw, and to maintain line patency      __________________________________________________  REVIEW OF SYSTEMS:    CONSTITUTIONAL: No fever,   EYES: no acute visual disturbances  NECK: No pain or stiffness  RESPIRATORY: sore throat is improving.  Still has a cough.  CARDIOVASCULAR: No chest pain, no palpitations  GASTROINTESTINAL: No pain. No nausea or vomiting; No diarrhea   NEUROLOGICAL: No headache or numbness, no tremors  MUSCULOSKELETAL: No joint pain, no muscle pain  GENITOURINARY: no dysuria, no frequency, no hesitancy  PSYCHIATRY: no depression , no anxiety  ALL OTHER  ROS negative        Vital Signs Last 24 Hrs  T(C): 36.9 (21 Feb 2022 12:27), Max: 36.9 (21 Feb 2022 12:27)  T(F): 98.5 (21 Feb 2022 12:27), Max: 98.5 (21 Feb 2022 12:27)  HR: 98 (21 Feb 2022 12:27) (98 - 103)  BP: 123/72 (21 Feb 2022 12:27) (123/72 - 134/90)  BP(mean): --  RR: 20 (21 Feb 2022 12:27) (20 - 20)  SpO2: 98% (21 Feb 2022 12:27) (94% - 98%)    ________________________________________________  PHYSICAL EXAM:  GENERAL: Alert, cooperative, obese woman, seated in a chair.  HEENT: Normocephalic;  conjunctivae and sclerae clear; moist mucous membranes;   NECK : supple; buffalo hump  CHEST/LUNG: Occasional rhonchi, good air entry  HEART: S1 S2  regular; no murmurs, gallops or rubs  ABDOMEN: Soft, Nontender, Nondistended; Bowel sounds present  EXTREMITIES: no cyanosis; no edema; no calf tenderness  SKIN: warm and dry; no rash  NERVOUS SYSTEM:  Awake and alert; Oriented  to place, person and time ; no new deficits    _________________________________________________  LABS:                        11.1   7.14  )-----------( 392      ( 21 Feb 2022 06:14 )             37.5     02-21    140  |  105  |  7   ----------------------------<  158<H>  3.5   |  30  |  0.52    Ca    8.6      21 Feb 2022 06:14  Phos  3.2     02-21  Mg     1.6     02-21      < from: CT Cervical Spine No Cont (02.19.22 @ 07:28) >  IMPRESSION: No acute fracture or acute subluxation of the cervical spine.    < end of copied text >  < from: Xray Ribs 2 Views, Right (02.19.22 @ 07:18) >    IMPRESSION:  No fracture seen.    < end of copied text >  < from: Xray Thoracic Spine (02.19.22 @ 07:18) >    Impression: No acute fracture or spinal malalignment.    < end of copied text >  < from: Xray Chest 1 View- PORTABLE-Routine (Xray Chest 1 View- PORTABLE-Routine in AM.) (02.10.22 @ 08:55) >  FOLLOW-UP AP PORTABLE CHEST RADIOGRAPH 2/10/2022 AT 8:25 AM:  Residual LEFT medial basilar airspace disease, otherwise no interval   change. Catheters and tubes in place.    < end of copied text >            POCT Blood Glucose.: 187 mg/dL (21 Feb 2022 11:30)  POCT Blood Glucose.: 171 mg/dL (21 Feb 2022 08:00)  POCT Blood Glucose.: 149 mg/dL (21 Feb 2022 05:56)  POCT Blood Glucose.: 132 mg/dL (20 Feb 2022 23:51)  POCT Blood Glucose.: 113 mg/dL (20 Feb 2022 21:34)  POCT Blood Glucose.: 151 mg/dL (20 Feb 2022 17:17)

## 2022-02-21 NOTE — PROGRESS NOTE ADULT - ASSESSMENT
57 yo F, from home, ambulated with cane , PMHx of obesity, asthma, chronic bronchitis, NAKUL, HTN, DM, HLD, fibromyalgia, anxiety, trigeminal neuralgia, degenerative joint disease, ?IgA vasculitis, psoriasis, OA came with chief complain of chest pain and dysuria. Nephrology consult called for significant FERNIE    1) Nonoliguric FERNIE now resolved with S cr 0.6 likely hypertensive/diabetic nephropathy  2) Hypertension  3) Diabetic Nephropathy with protenuria/neuropathy  4) Morbid Obesity/NAKUL/OHS  5) Abdominal wall cellulitis/PNA treated  6) Left renal lesion chronic  7) Hypokalemia/Hypomagnesemia now with improvement    Recommend:  Strict I/o  S cr 0.4 with non oliguria  Avoid Nephrotoxic agents  Maintain MAP>65-70 mm Hg  Monitor BMP and electrolytes daily  Replete K with goal K >4 and <5, Mag>2 and Phos 2.5 to 3.5  Off Abx  Continue ACEI/ARB  Can add Cardizem  mg po daily  Nocturnal BIPAP/CPAP as needed  No RRT/HD  Will follow

## 2022-02-21 NOTE — CONSULT NOTE ADULT - REASON FOR ADMISSION
Chest pain and dysuria

## 2022-02-21 NOTE — PROGRESS NOTE ADULT - ASSESSMENT
As per pt and per chart review, pt is a 55 yo F, from home, ambulated with cane , PMHx of obesity, asthma, chronic bronchitis, NAKUL, HTN, DM, HLD, fibromyalgia, anxiety, trigeminal neuralgia, degenerative joint disease, ?IgA vasculitis, psoriasis, OA came with chief complain of chest pain and dysuria. Pt stated that for few weeks she is having burning pain in her urine, foul smell, itchy, cloudy. She further stated that she is having pain in her lower abdomen as well as in chest whenever she pressed it. She denied any vaginal discharge, headaches, visual changes, N/V/D.     Of note pt admitted at [facility] in Jan-Feb 2021 and had extensive cardiac work up stress test and echo which were negative. Pt has left renal mass  diagnosed on imaging chances of malignancy 85%, followed outpatient urologist and heme/onc for monitoring . Decreasing weight and controlled blood sugars to get the surgery at some point.     In ED: UA +ve no wbc, few bacteria, trop x 1 neg     Hospital course: Patient had abdominal CT scan with cellulitis likely 2/2 to insulin injections, Started on Keflex, also found to have FERNIE, nephro was consulted.  On 2/6/22 patient lethargic, but able to follow commands and be aroused. CO2 84, and was placed on bipap overnight. Morning of 2/7/22 patient more lethargic with CO2 of 91, ICU consulted.     ICU Course:  Patient intubated, Hypercapnea resolved, her creatinine normalized without interventions. Patient was weaned off sedation and was awake, following commands. trial of extubation on 2/9 was unsuccessful  2/2 failed leak test and concern for critical airway. She was started on steroids. Trial of extubation on 2/11 with anesthesia present, patient refused to be extubated at that time, threatening to mariah. She was extubated on 2/14 to bipap and was doing well. Utilizing bipap at night. Had Nausea, vomiting and diarrhea day of extubation likely due to increased medication for bowel regimen, her sxs improved after cessation of medication. Patient blood sugar and wbc normalized after discontinuation of steroids.    2/17- Pt was transferred to Medicine for ongoing management, continue to monitor off Antibiotic. Pt will need to continue outpt follow up for renal mass and d/c planning to [facility] per pt recs.  Pt is now undecided if she wants to go to Rehab.

## 2022-02-21 NOTE — CONSULT NOTE ADULT - SUBJECTIVE AND OBJECTIVE BOX
Time of visit:    CHIEF COMPLAINT: Patient is a 56y old  Female who presents with a chief complaint of Chest pain and dysuria (2022 14:43)      HPI:  55 yo F, from home, ambulated with cane , PMHx of obesity, asthma, chronic bronchitis, NAKUL, HTN, DM, HLD, fibromyalgia, anxiety, trigeminal neuralgia, degenerative joint disease, ?IgA vasculitis, psoriasis, OA came with chief complain of chest pain and dysuria. Pt stated that for few weeks she is having burning pain in her urine, foul smell, itchy, cloudy. She further stated that she is having pain in her lower abdomen as well as in chest whenever she pressed it. She denied any vaginal discharge, headaches, visual changes, N/V/D.     Of note pt admitted at [facility] in -2021 and had extensive cardiac work up stress test and echo which were negative. Pt has left renal mass  diagnosed on imaging chances of malignancy 85% , followed outpatient urologist and heme/onc for monitoring . decreasing weight and controlled blood sugars to get the surgery at some point     In ED: UA +ve no wbc, few bacteria , trop x 1 neg  (2022 23:29)   Patient seen and examined.     PAST MEDICAL & SURGICAL HISTORY:  Hypertension  vaginal cyst    Hyperlipidemia    Asthma  last inhaler use with in 10 days, on Pulm follow up [id number]    Hypothyroid    Obstructive sleep apnea  refuses Cpap    Obesity  morbid    Trigeminal neuralgia  R    Fibromyalgia    DM (diabetes mellitus)  2    DJD (degenerative joint disease)  Cervical/Thoracic/Lumbar    Cervical neuralgia  difficulty moving my neck    Back pain  thoracic &amp; lumbar    H/O bursitis  right shoulder    Radicular pain  arms, legs    Vasculitis  Legs, forerhead, arms &amp; hands, flare ups in R leg  Dr susie Ribeiro is my Rheumatologist    Renal cell carcinoma, left  on follow up Dr schulte, HOD renal [facility], waiting for suregry in 2020    Falls frequently    Morbid obesity with body mass index (BMI) of 50.0 to 59.9 in adult    Psoriasis    Diverticulosis    S/P abdominal hysterectomy        S/P  section  1    Left adrenal mass  excision, benign 2006    Vaginal cyst  removed     Vasculitis on nerve biopsy  , had another surgery called microvascular decompression         Allergies    Fioricet (Rash)  gadobutrol (Rash; Urticaria; Hives)  IV Contrast (Short breath)  mushroom (Unknown)  venlafaxine (Hives)    Intolerances        MEDICATIONS  (STANDING):  ALBUTerol    90 MICROgram(s) HFA Inhaler 2 Puff(s) Inhalation every 6 hours  aspirin  chewable 81 milliGRAM(s) Oral daily  atorvastatin 10 milliGRAM(s) Oral at bedtime  benzonatate 100 milliGRAM(s) Oral <User Schedule>  chlorhexidine 2% Cloths 1 Application(s) Topical <User Schedule>  dextrose 5%. 1000 milliLiter(s) (100 mL/Hr) IV Continuous <Continuous>  gabapentin 400 milliGRAM(s) Oral two times a day  glucagon  Injectable 1 milliGRAM(s) IntraMuscular once  heparin   Injectable 5000 Unit(s) SubCutaneous every 8 hours  influenza   Vaccine 0.5 milliLiter(s) IntraMuscular once  insulin glargine Injectable (LANTUS) 45 Unit(s) SubCutaneous at bedtime  insulin lispro (ADMELOG) corrective regimen sliding scale   SubCutaneous Before meals and at bedtime  levothyroxine 112 MICROGram(s) Oral daily  lidocaine   4% Patch 2 Patch Transdermal daily  losartan 25 milliGRAM(s) Oral daily  montelukast 10 milliGRAM(s) Oral at bedtime  OXcarbazepine 300 milliGRAM(s) Oral two times a day  pantoprazole    Tablet 40 milliGRAM(s) Oral before breakfast  senna 2 Tablet(s) Oral at bedtime      MEDICATIONS  (PRN):  acetaminophen    Suspension .. 650 milliGRAM(s) Oral every 6 hours PRN Temp greater or equal to 38C (100.4F), Mild Pain (1 - 3)  benzocaine 15 mG/menthol 3.6 mG Lozenge 1 Lozenge Oral every 4 hours PRN Sore Throat  metoclopramide Injectable 10 milliGRAM(s) IV Push every 8 hours PRN nausea/vomiting  ondansetron Injectable 4 milliGRAM(s) IV Push every 6 hours PRN Nausea and/or Vomiting  sodium chloride 0.65% Nasal 1 Spray(s) Both Nostrils every 4 hours PRN Nasal Congestion  sodium chloride 0.9% lock flush 10 milliLiter(s) IV Push every 1 hour PRN Pre/post blood products, medications, blood draw, and to maintain line patency   Medications up to date at time of exam.    Medications up to date at time of exam.    FAMILY HISTORY:  Family history of diabetes mellitus    Family history of hypertension    Family history of obesity        SOCIAL HISTORY  Smoking History: [  x ]  none smoking/smoke exposure, [   ] former smoker  Living Condition: [   ] apartment, [   ] private house  Work History:   Travel History: denies recent travel  Illicit Substance Use: denies  Alcohol Use: denies    REVIEW OF SYSTEMS:    CONSTITUTIONAL:  denies fevers, chills, sweats, weight loss    HEENT:  denies diplopia or blurred vision, sore throat or runny nose.    CARDIOVASCULAR:  denies pressure, squeezing, tightness, or heaviness about the chest; no palpitations.    RESPIRATORY:  denies SOB, cough, MUÑOZ, wheezing.    GASTROINTESTINAL:  denies abdominal pain, nausea, vomiting or diarrhea.    GENITOURINARY: denies dysuria, frequency or urgency.    NEUROLOGIC:  denies numbness, tingling, seizures or weakness.    PSYCHIATRIC:  denies disorder of thought or mood.    MSK: denies swelling, redness      PHYSICAL EXAMINATION:    GENERAL: The patient is a well-developed, well-nourished, in no apparent distress.     Vital Signs Last 24 Hrs  T(C): 35.5 (2022 19:56), Max: 36.9 (2022 12:27)  T(F): 95.9 (2022 19:56), Max: 98.5 (2022 12:27)  HR: 91 (2022 19:56) (91 - 108)  BP: 121/72 (2022 19:56) (111/79 - 134/90)  BP(mean): --  RR: 19 (2022 19:56) (19 - 20)  SpO2: 97% (2022 19:56) (94% - 98%)   (if applicable)    Chest Tube (if applicable)    HEENT: Head is normocephalic and atraumatic. Extraocular muscles are intact. Mucous membranes are moist.     NECK: Supple, no palpable adenopathy. short thick     LUNGS: Clear to auscultation, no wheezing, rales, or rhonchi.    HEART: Regular rate and rhythm without murmur.    ABDOMEN: Soft, nontender, and nondistended.  No hepatosplenomegaly is noted.    RENAL: No difficulty voiding, no pelvic pain    EXTREMITIES: Without any cyanosis, clubbing, rash, lesions or edema.    NEUROLOGIC: Awake, alert, oriented, grossly intact    SKIN: Warm, dry, good turgor.      LABS:                        11.1   7.14  )-----------( 392      ( 2022 06:14 )             37.5     -    140  |  105  |  7   ----------------------------<  158<H>  3.5   |  30  |  0.52    Ca    8.6      2022 06:14  Phos  3.2       Mg     1.6                               MICROBIOLOGY: (if applicable)    RADIOLOGY & ADDITIONAL STUDIES:  EKG:   CXR: < from: Xray Ribs 2 Views, Right (22 @ 07:18) >    ACC: [id number] EXAM:  XR RIBS 2 VIEWS RT                          PROCEDURE DATE:  2022          INTERPRETATION:  clinical history: Trauma    Right RIBS 4 views    No evidence of fracture or focal osseous lesion. Nondisplaced fractures   may be occult on initial imaging.    IMPRESSION:  No fracture seen.    --- End of Report ---            HOLLY TURNER MD; Attending Radiologist  This document has been electronically signed. 2022  2:38PM    < end of copied text >    ECHO:    IMPRESSION: 56y Female PAST MEDICAL & SURGICAL HISTORY:  Hypertension  vaginal cyst    Hyperlipidemia    Asthma  last inhaler use with in 10 days, on Pulm follow up [id number]    Hypothyroid    Obstructive sleep apnea  refuses Cpap    Obesity  morbid    Trigeminal neuralgia  R    Fibromyalgia    DM (diabetes mellitus)  2    DJD (degenerative joint disease)  Cervical/Thoracic/Lumbar    Cervical neuralgia  difficulty moving my neck    Back pain  thoracic &amp; lumbar    H/O bursitis  right shoulder    Radicular pain  arms, legs    Vasculitis  Legs, forerhead, arms &amp; hands, flare ups in R leg  Dr susie Ribeiro is my Rheumatologist    Renal cell carcinoma, left  on follow up Dr schulte, HOD renal [facility], waiting for suregry in 2020    Falls frequently    Morbid obesity with body mass index (BMI) of 50.0 to 59.9 in adult    Psoriasis    Diverticulosis    S/P abdominal hysterectomy        S/P  section  1    Left adrenal mass  excision, benign 2006    Vaginal cyst  removed     Vasculitis on nerve biopsy  , had another surgery called microvascular decompression      p/w           IMP: This is a 56 yr old morbid obese  woman , from home, ambulated with cane  ,  obesity, asthma, chronic bronchitis, NAKUL on CPAP 16 , HTN, DM, HLD, fibromyalgia, anxiety, trigeminal neuralgia, degenerative joint disease, ?IgA vasculitis, psoriasis, OA came with chief complain of chest pain and dysuria. Pt stated that for few weeks she is having burning pain in her urine, foul smell, itchy, cloudy. She further stated that she is having pain in her lower abdomen as well as in chest whenever she pressed it. She denied any vaginal discharge, headaches, visual changes, N/V/D.     Of note pt admitted at [facility] in -2021 and had extensive cardiac work up stress test and echo which were negative. Pt has left renal mass  diagnosed on imaging chances of malignancy 85% , followed outpatient urologist and heme/onc for monitoring . decreasing weight and controlled blood sugars to get the surgery at some point     In ED: UA +ve no wbc, few bacteria , trop x 1 neg     Hospital course: Patient had abdominal CT scan with celluilitis likey  to insulin injections, Started on Keflex, also found to have FERNIE, nephro was consulted.  On 22 patient lethargic, but able to follow commands and be aroused. CO2 84, and was placed on bipap overnight. Morning of 22 patient more lethargic with CO2 of 91, ICU consulted.     ICU Course:  Patient intubated, Hypercapnea resolved, her creatinine normalized without interventions. patient was weaned off sedation and was awake, following commands. trial of extubation on  was unsucessfull 2/ failed leak test and concern for critical airway. She was started on steroids. Trial of extubation on  with anesthesia present, patient refused to be extuabted at that time, threatening to mariah. She was extubated on  to bipap and was doing well. Utilizing bipap at night.     Sugg  - Change to CPAP 16 at night with nasal mask   - Taper off O2 supp   - Albuterol inhaler   - OOB to chair   - Pat wish for rehab   - DVT GI prophy   - Advise wt loss  - Consider eval for wt reduction surgery

## 2022-02-21 NOTE — PROGRESS NOTE ADULT - SUBJECTIVE AND OBJECTIVE BOX
Romeo Ribeiro MD (Nephrology progress note)  [address], [facility],  [address],  [id number]  TEl: [phone]  Cell: [phone]    Patient is a 56y Female seen and evaluated at bedside. Vital signs, laboratory data, imaging studies, consult notes reviewed done within past 24 hours. Overnight events noted. Patient sitting in chair in no distress on nasal cannula oxygen complains of mild cough. Interval stable renal function with non oliguria. K and Mag level with improvement    Allergies    Fioricet (Rash)  gadobutrol (Rash; Urticaria; Hives)  IV Contrast (Short breath)  mushroom (Unknown)  venlafaxine (Hives)    Intolerances        ROS:  CONSTITUTIONAL: No fever or chills.  HEENT: No headache or visual disturbances.  RESPIRATORY: Mild cough but denies SOB, hemoptysis or wheezing  CARDIOVASCULAR: No chest pain or SOB  GASTROINTESTINAL: No abdominal pain, nausea, vomiting, diarrhea, hematemesis or blood per rectum.  GENITOURINARY: No flank or supra pubic pain  NEUROLOGICAL: No headache, focal limb weakness, tingling or numbness  SKIN: No skin rash or lesion  MUSCULOSKELETAL: No leg pain, calf pain or swelling    VITALS:    T(C): 36.4 (02-21-22 @ 05:00), Max: 36.9 (02-20-22 @ 13:25)  HR: 103 (02-21-22 @ 05:00) (101 - 105)  BP: 134/90 (02-21-22 @ 05:00) (102/72 - 134/90)  RR: 20 (02-21-22 @ 05:00) (20 - 22)  SpO2: 95% (02-21-22 @ 05:00) (94% - 99%)  CAPILLARY BLOOD GLUCOSE      POCT Blood Glucose.: 171 mg/dL (21 Feb 2022 08:00)  POCT Blood Glucose.: 149 mg/dL (21 Feb 2022 05:56)  POCT Blood Glucose.: 132 mg/dL (20 Feb 2022 23:51)  POCT Blood Glucose.: 113 mg/dL (20 Feb 2022 21:34)  POCT Blood Glucose.: 151 mg/dL (20 Feb 2022 17:17)  POCT Blood Glucose.: 198 mg/dL (20 Feb 2022 11:46)      MEDICATIONS  (STANDING):  ALBUTerol    90 MICROgram(s) HFA Inhaler 2 Puff(s) Inhalation every 6 hours  aspirin  chewable 81 milliGRAM(s) Oral daily  atorvastatin 10 milliGRAM(s) Oral at bedtime  benzonatate 100 milliGRAM(s) Oral <User Schedule>  chlorhexidine 2% Cloths 1 Application(s) Topical <User Schedule>  dextrose 5%. 1000 milliLiter(s) (100 mL/Hr) IV Continuous <Continuous>  gabapentin 400 milliGRAM(s) Oral two times a day  glucagon  Injectable 1 milliGRAM(s) IntraMuscular once  heparin   Injectable 5000 Unit(s) SubCutaneous every 8 hours  influenza   Vaccine 0.5 milliLiter(s) IntraMuscular once  insulin glargine Injectable (LANTUS) 45 Unit(s) SubCutaneous at bedtime  insulin lispro (ADMELOG) corrective regimen sliding scale   SubCutaneous Before meals and at bedtime  levothyroxine 112 MICROGram(s) Oral daily  lidocaine   4% Patch 2 Patch Transdermal daily  losartan 25 milliGRAM(s) Oral daily  montelukast 10 milliGRAM(s) Oral at bedtime  OXcarbazepine 300 milliGRAM(s) Oral two times a day  pantoprazole    Tablet 40 milliGRAM(s) Oral before breakfast  senna 2 Tablet(s) Oral at bedtime    MEDICATIONS  (PRN):  acetaminophen    Suspension .. 650 milliGRAM(s) Oral every 6 hours PRN Temp greater or equal to 38C (100.4F), Mild Pain (1 - 3)  benzocaine 15 mG/menthol 3.6 mG Lozenge 1 Lozenge Oral every 4 hours PRN Sore Throat  metoclopramide Injectable 10 milliGRAM(s) IV Push every 8 hours PRN nausea/vomiting  ondansetron Injectable 4 milliGRAM(s) IV Push every 6 hours PRN Nausea and/or Vomiting  sodium chloride 0.65% Nasal 1 Spray(s) Both Nostrils every 4 hours PRN Nasal Congestion  sodium chloride 0.9% lock flush 10 milliLiter(s) IV Push every 1 hour PRN Pre/post blood products, medications, blood draw, and to maintain line patency      PHYSICAL EXAM:  GENERAL: Alert, awake and oriented x3 in no distress  HEENT: RITA, EOMI, neck supple, no JVP, no carotid bruit, oral mucosa moist and pink.  CHEST/LUNG: Bilateral decreased breath sounds, bibasilar minimal rhonchi, no wheezing  HEART: Regular rate and rhythm, LAURA II/VI at LPSB, no gallops, no rub   ABDOMEN: Soft, nontender, non distended, bowel sounds present, no palpable organomegaly  : No flank or supra pubic tenderness.  EXTREMITIES: Peripheral pulses are palpable, trace pedal edema  Neurology: AAOx3, no focal neurological deficit  SKIN: No rash or skin lesion  Musculoskeletal: No joint deformity or spinal tenderness.      Vascular ACCESS:     LABS:                        11.1   7.14  )-----------( 392      ( 21 Feb 2022 06:14 )             37.5     02-21    140  |  105  |  7   ----------------------------<  158<H>  3.5   |  30  |  0.52    Ca    8.6      21 Feb 2022 06:14  Phos  3.2     02-21  Mg     1.6     02-21                  RADIOLOGY & ADDITIONAL STUDIES:    Imaging Personally Reviewed:  [x] YES  [ ] NO    Consultant(s) Notes Reviewed:  [x] YES  [ ] NO    Care Discussed with Primary team/ Other Providers [x] YES  [ ] NO

## 2022-02-22 ENCOUNTER — TRANSCRIPTION ENCOUNTER (OUTPATIENT)
Age: 57
End: 2022-02-22

## 2022-02-22 VITALS
SYSTOLIC BLOOD PRESSURE: 133 MMHG | RESPIRATION RATE: 18 BRPM | DIASTOLIC BLOOD PRESSURE: 68 MMHG | HEART RATE: 108 BPM | TEMPERATURE: 99 F

## 2022-02-22 LAB
ANION GAP SERPL CALC-SCNC: 4 MMOL/L — LOW (ref 5–17)
BUN SERPL-MCNC: 7 MG/DL — SIGNIFICANT CHANGE UP (ref 7–18)
CALCIUM SERPL-MCNC: 8.7 MG/DL — SIGNIFICANT CHANGE UP (ref 8.4–10.5)
CHLORIDE SERPL-SCNC: 103 MMOL/L — SIGNIFICANT CHANGE UP (ref 96–108)
CO2 SERPL-SCNC: 32 MMOL/L — HIGH (ref 22–31)
CREAT SERPL-MCNC: 0.65 MG/DL — SIGNIFICANT CHANGE UP (ref 0.5–1.3)
GLUCOSE BLDC GLUCOMTR-MCNC: 152 MG/DL — HIGH (ref 70–99)
GLUCOSE BLDC GLUCOMTR-MCNC: 175 MG/DL — HIGH (ref 70–99)
GLUCOSE BLDC GLUCOMTR-MCNC: 220 MG/DL — HIGH (ref 70–99)
GLUCOSE SERPL-MCNC: 144 MG/DL — HIGH (ref 70–99)
HCT VFR BLD CALC: 39.6 % — SIGNIFICANT CHANGE UP (ref 34.5–45)
HGB BLD-MCNC: 11.6 G/DL — SIGNIFICANT CHANGE UP (ref 11.5–15.5)
MAGNESIUM SERPL-MCNC: 1.4 MG/DL — LOW (ref 1.6–2.6)
MCHC RBC-ENTMCNC: 23.8 PG — LOW (ref 27–34)
MCHC RBC-ENTMCNC: 29.3 GM/DL — LOW (ref 32–36)
MCV RBC AUTO: 81.1 FL — SIGNIFICANT CHANGE UP (ref 80–100)
NRBC # BLD: 0 /100 WBCS — SIGNIFICANT CHANGE UP (ref 0–0)
PHOSPHATE SERPL-MCNC: 3.2 MG/DL — SIGNIFICANT CHANGE UP (ref 2.5–4.5)
PLATELET # BLD AUTO: 473 K/UL — HIGH (ref 150–400)
POTASSIUM SERPL-MCNC: 3.7 MMOL/L — SIGNIFICANT CHANGE UP (ref 3.5–5.3)
POTASSIUM SERPL-SCNC: 3.7 MMOL/L — SIGNIFICANT CHANGE UP (ref 3.5–5.3)
RBC # BLD: 4.88 M/UL — SIGNIFICANT CHANGE UP (ref 3.8–5.2)
RBC # FLD: 17.5 % — HIGH (ref 10.3–14.5)
SARS-COV-2 RNA SPEC QL NAA+PROBE: SIGNIFICANT CHANGE UP
SODIUM SERPL-SCNC: 139 MMOL/L — SIGNIFICANT CHANGE UP (ref 135–145)
WBC # BLD: 6.58 K/UL — SIGNIFICANT CHANGE UP (ref 3.8–10.5)
WBC # FLD AUTO: 6.58 K/UL — SIGNIFICANT CHANGE UP (ref 3.8–10.5)

## 2022-02-22 PROCEDURE — 83935 ASSAY OF URINE OSMOLALITY: CPT

## 2022-02-22 PROCEDURE — 84478 ASSAY OF TRIGLYCERIDES: CPT

## 2022-02-22 PROCEDURE — 99239 HOSP IP/OBS DSCHRG MGMT >30: CPT

## 2022-02-22 PROCEDURE — 84295 ASSAY OF SERUM SODIUM: CPT

## 2022-02-22 PROCEDURE — 84300 ASSAY OF URINE SODIUM: CPT

## 2022-02-22 PROCEDURE — 82550 ASSAY OF CK (CPK): CPT

## 2022-02-22 PROCEDURE — 92610 EVALUATE SWALLOWING FUNCTION: CPT

## 2022-02-22 PROCEDURE — 87640 STAPH A DNA AMP PROBE: CPT

## 2022-02-22 PROCEDURE — 84100 ASSAY OF PHOSPHORUS: CPT

## 2022-02-22 PROCEDURE — 85730 THROMBOPLASTIN TIME PARTIAL: CPT

## 2022-02-22 PROCEDURE — 80076 HEPATIC FUNCTION PANEL: CPT

## 2022-02-22 PROCEDURE — 36415 COLL VENOUS BLD VENIPUNCTURE: CPT

## 2022-02-22 PROCEDURE — 84132 ASSAY OF SERUM POTASSIUM: CPT

## 2022-02-22 PROCEDURE — 83605 ASSAY OF LACTIC ACID: CPT

## 2022-02-22 PROCEDURE — 80061 LIPID PANEL: CPT

## 2022-02-22 PROCEDURE — 87635 SARS-COV-2 COVID-19 AMP PRB: CPT

## 2022-02-22 PROCEDURE — 93306 TTE W/DOPPLER COMPLETE: CPT

## 2022-02-22 PROCEDURE — 93005 ELECTROCARDIOGRAM TRACING: CPT

## 2022-02-22 PROCEDURE — 94660 CPAP INITIATION&MGMT: CPT

## 2022-02-22 PROCEDURE — 71100 X-RAY EXAM RIBS UNI 2 VIEWS: CPT

## 2022-02-22 PROCEDURE — 94640 AIRWAY INHALATION TREATMENT: CPT

## 2022-02-22 PROCEDURE — 83036 HEMOGLOBIN GLYCOSYLATED A1C: CPT

## 2022-02-22 PROCEDURE — 84540 ASSAY OF URINE/UREA-N: CPT

## 2022-02-22 PROCEDURE — 85652 RBC SED RATE AUTOMATED: CPT

## 2022-02-22 PROCEDURE — 85610 PROTHROMBIN TIME: CPT

## 2022-02-22 PROCEDURE — 80053 COMPREHEN METABOLIC PANEL: CPT

## 2022-02-22 PROCEDURE — 74176 CT ABD & PELVIS W/O CONTRAST: CPT | Mod: MA

## 2022-02-22 PROCEDURE — 72125 CT NECK SPINE W/O DYE: CPT

## 2022-02-22 PROCEDURE — 85027 COMPLETE CBC AUTOMATED: CPT

## 2022-02-22 PROCEDURE — 86900 BLOOD TYPING SEROLOGIC ABO: CPT

## 2022-02-22 PROCEDURE — 82962 GLUCOSE BLOOD TEST: CPT

## 2022-02-22 PROCEDURE — 71046 X-RAY EXAM CHEST 2 VIEWS: CPT

## 2022-02-22 PROCEDURE — 87086 URINE CULTURE/COLONY COUNT: CPT

## 2022-02-22 PROCEDURE — 84145 PROCALCITONIN (PCT): CPT

## 2022-02-22 PROCEDURE — 71045 X-RAY EXAM CHEST 1 VIEW: CPT

## 2022-02-22 PROCEDURE — 84156 ASSAY OF PROTEIN URINE: CPT

## 2022-02-22 PROCEDURE — 81025 URINE PREGNANCY TEST: CPT

## 2022-02-22 PROCEDURE — 96374 THER/PROPH/DIAG INJ IV PUSH: CPT

## 2022-02-22 PROCEDURE — 72074 X-RAY EXAM THORAC SPINE4/>VW: CPT

## 2022-02-22 PROCEDURE — 83930 ASSAY OF BLOOD OSMOLALITY: CPT

## 2022-02-22 PROCEDURE — 85379 FIBRIN DEGRADATION QUANT: CPT

## 2022-02-22 PROCEDURE — 74018 RADEX ABDOMEN 1 VIEW: CPT

## 2022-02-22 PROCEDURE — 82570 ASSAY OF URINE CREATININE: CPT

## 2022-02-22 PROCEDURE — 94003 VENT MGMT INPAT SUBQ DAY: CPT

## 2022-02-22 PROCEDURE — 84484 ASSAY OF TROPONIN QUANT: CPT

## 2022-02-22 PROCEDURE — 86140 C-REACTIVE PROTEIN: CPT

## 2022-02-22 PROCEDURE — 86901 BLOOD TYPING SEROLOGIC RH(D): CPT

## 2022-02-22 PROCEDURE — 85025 COMPLETE CBC W/AUTO DIFF WBC: CPT

## 2022-02-22 PROCEDURE — 82803 BLOOD GASES ANY COMBINATION: CPT

## 2022-02-22 PROCEDURE — 99285 EMERGENCY DEPT VISIT HI MDM: CPT

## 2022-02-22 PROCEDURE — 83690 ASSAY OF LIPASE: CPT

## 2022-02-22 PROCEDURE — 83735 ASSAY OF MAGNESIUM: CPT

## 2022-02-22 PROCEDURE — 86850 RBC ANTIBODY SCREEN: CPT

## 2022-02-22 PROCEDURE — 84443 ASSAY THYROID STIM HORMONE: CPT

## 2022-02-22 PROCEDURE — 87641 MR-STAPH DNA AMP PROBE: CPT

## 2022-02-22 PROCEDURE — 83880 ASSAY OF NATRIURETIC PEPTIDE: CPT

## 2022-02-22 PROCEDURE — 80048 BASIC METABOLIC PNL TOTAL CA: CPT

## 2022-02-22 PROCEDURE — 81001 URINALYSIS AUTO W/SCOPE: CPT

## 2022-02-22 RX ORDER — SENNA PLUS 8.6 MG/1
2 TABLET ORAL
Qty: 0 | Refills: 0 | DISCHARGE
Start: 2022-02-22

## 2022-02-22 RX ORDER — LOSARTAN POTASSIUM 100 MG/1
1 TABLET, FILM COATED ORAL
Qty: 0 | Refills: 0 | DISCHARGE
Start: 2022-02-22

## 2022-02-22 RX ORDER — ALBUTEROL 90 UG/1
2 AEROSOL, METERED ORAL
Qty: 0 | Refills: 0 | DISCHARGE
Start: 2022-02-22

## 2022-02-22 RX ORDER — INSULIN ASPART 100 [IU]/ML
13 INJECTION, SOLUTION SUBCUTANEOUS
Qty: 0 | Refills: 0 | DISCHARGE

## 2022-02-22 RX ORDER — MAGNESIUM SULFATE 500 MG/ML
1 VIAL (ML) INJECTION ONCE
Refills: 0 | Status: COMPLETED | OUTPATIENT
Start: 2022-02-22 | End: 2022-02-22

## 2022-02-22 RX ORDER — LIDOCAINE 4 G/100G
1 CREAM TOPICAL
Qty: 0 | Refills: 0 | DISCHARGE
Start: 2022-02-22

## 2022-02-22 RX ORDER — ASPIRIN/CALCIUM CARB/MAGNESIUM 324 MG
0 TABLET ORAL
Qty: 0 | Refills: 0 | DISCHARGE

## 2022-02-22 RX ORDER — INSULIN ASPART 100 [IU]/ML
16 INJECTION, SOLUTION SUBCUTANEOUS
Qty: 0 | Refills: 0 | DISCHARGE

## 2022-02-22 RX ORDER — PANTOPRAZOLE SODIUM 20 MG/1
1 TABLET, DELAYED RELEASE ORAL
Qty: 0 | Refills: 0 | DISCHARGE
Start: 2022-02-22

## 2022-02-22 RX ADMIN — Medication 100 GRAM(S): at 09:03

## 2022-02-22 RX ADMIN — HEPARIN SODIUM 5000 UNIT(S): 5000 INJECTION INTRAVENOUS; SUBCUTANEOUS at 06:17

## 2022-02-22 RX ADMIN — HEPARIN SODIUM 5000 UNIT(S): 5000 INJECTION INTRAVENOUS; SUBCUTANEOUS at 13:26

## 2022-02-22 RX ADMIN — Medication 100 MILLIGRAM(S): at 17:04

## 2022-02-22 RX ADMIN — Medication 1: at 17:06

## 2022-02-22 RX ADMIN — LOSARTAN POTASSIUM 25 MILLIGRAM(S): 100 TABLET, FILM COATED ORAL at 06:17

## 2022-02-22 RX ADMIN — Medication 1: at 08:15

## 2022-02-22 RX ADMIN — CHLORHEXIDINE GLUCONATE 1 APPLICATION(S): 213 SOLUTION TOPICAL at 06:24

## 2022-02-22 RX ADMIN — Medication 100 MILLIGRAM(S): at 06:17

## 2022-02-22 RX ADMIN — Medication 112 MICROGRAM(S): at 06:17

## 2022-02-22 RX ADMIN — PANTOPRAZOLE SODIUM 40 MILLIGRAM(S): 20 TABLET, DELAYED RELEASE ORAL at 06:17

## 2022-02-22 RX ADMIN — Medication 81 MILLIGRAM(S): at 12:35

## 2022-02-22 RX ADMIN — Medication 2: at 12:35

## 2022-02-22 RX ADMIN — ALBUTEROL 2 PUFF(S): 90 AEROSOL, METERED ORAL at 03:22

## 2022-02-22 RX ADMIN — OXCARBAZEPINE 300 MILLIGRAM(S): 300 TABLET, FILM COATED ORAL at 06:17

## 2022-02-22 RX ADMIN — ALBUTEROL 2 PUFF(S): 90 AEROSOL, METERED ORAL at 09:11

## 2022-02-22 RX ADMIN — GABAPENTIN 400 MILLIGRAM(S): 400 CAPSULE ORAL at 06:17

## 2022-02-22 RX ADMIN — Medication 100 GRAM(S): at 13:26

## 2022-02-22 RX ADMIN — OXCARBAZEPINE 300 MILLIGRAM(S): 300 TABLET, FILM COATED ORAL at 17:04

## 2022-02-22 RX ADMIN — LIDOCAINE 2 PATCH: 4 CREAM TOPICAL at 00:14

## 2022-02-22 RX ADMIN — GABAPENTIN 400 MILLIGRAM(S): 400 CAPSULE ORAL at 17:04

## 2022-02-22 NOTE — PROGRESS NOTE ADULT - PROBLEM SELECTOR PLAN 9
Pt Recs CHIRAG  Pt is undecided if she wants to go to rehab.    Pt recommendation discussed w/ pt and her brother at length in explicit details and she is evaluating her options.  Pt currently lives with her brother and her son.    CM present at time of my discussion with pt who also reiterated to pt the overwhelm concerns for her safety.
On Lantus 40 units, Novolog 16 units in breakfast  13-18 units in lunch and dinner basis on blood sugars   Metformin 1000mg BID     Resume 20 units of Lantus and 8 units of Admelog with ISS  fs achs   f/u lipid profile
Pt Recs CHIRAG  Pt is undecided if she wants to go to rehab.    Pt recommendation discussed w/ pt and her brother at length in explicit details and she is evaluating her options.  Pt currently lives with her brother and her son.    CM present at time of my discussion with pt who also reiterated to pt the overwhelm concerns for her safety.

## 2022-02-22 NOTE — DISCHARGE NOTE NURSING/CASE MANAGEMENT/SOCIAL WORK - NSDCPEFALRISK_GEN_ALL_CORE
For information on Fall & Injury Prevention, visit: [email or web address] OR  [email or web address] OR  https://www.cdc.gov/steadi/patient.html

## 2022-02-22 NOTE — PROGRESS NOTE ADULT - PROBLEM SELECTOR PLAN 2
Continue w/ synthroid   TSh wnl
c/w chest pain which reproducible on palpation   Hx of fibromyalgia   Stress test and ECHO last year in Feb were normal   Tropx1 and 2 neg   Admit to tele   Heart Score 3 low score (Risk of Mace 0.9-1.7%)  Less likely cardiac more likely related to fibromyalgia and morbid obesity   Can consider Cardio Consult but hold off for now  Pain management consulted

## 2022-02-22 NOTE — PROGRESS NOTE ADULT - PROBLEM SELECTOR PROBLEM 6
DVT prophylaxis
HLD (hyperlipidemia)
DVT prophylaxis
Hypothyroidism

## 2022-02-22 NOTE — PROGRESS NOTE ADULT - PROBLEM SELECTOR PLAN 6
Continue subc heparin  GI prophylaxis
On levothyroxine 112mcg   Resume home med

## 2022-02-22 NOTE — PROGRESS NOTE ADULT - PROBLEM SELECTOR PROBLEM 1
HTN (hypertension)
Chronic back pain
HTN (hypertension)
Cellulitis of right abdominal wall

## 2022-02-22 NOTE — PROGRESS NOTE ADULT - ATTENDING COMMENTS
55 yo F, from home, ambulated with cane , PMHx of obesity, asthma, chronic bronchitis, NAKUL non compliant with CPAP over night, HTN, DM, HLD, fibromyalgia, anxiety, trigeminal neuralgia, degenerative joint disease, ?IgA vasculitis, psoriasis, OA came with chief complain of chest pain and dysuria. patient admitted to [facility], Troponin x 2 negative,  CTAP- 2/4- showed Skin thickening with subcutaneous stranding in the right lower quadrant anterior abdominal wall c/w cellulitis. pt received Zithromax and Rocephin in ED x1 , Abx dc,d due to negative UA, pt started on keflex 500mg qid for cellulitis , hospital course complicated with encephalopathy and ATN   on 2/5, Cr increased from 0.83 to 3.1,  IV cefazolin started in setting of possible sepsis 2/2 cellulitis. pt noted to progressively getting altered over 2/6, VBG remarkable for PH :7.14, pco2 82, pt was placed on Bi-pap reportedly over night.  Patient found to be more lethargic in the morning.  Unable to perform ROS. ABG showed acute on chronic respiratory acidosis , Hypercapnia 91, respiratory placed pt on AVAPS . ICU consulted for further evaluation. Patient will require  intubation , will transfer to ICU for further work-up.     Assessment:    - Acute hypercapnic hypoxic respiratory failure   - NAKUL requiring CPAP at night (non -compliant)  - Chronic bronchitis   - Asthma   - sepsis  - Psoriasis   - Fibromyalgia   - DM uncontrol   - Hypothyroidism    Plan     - Emergent intubated 2/7  - Tolerated SBT/ SAT   - Did not extubate due to Neg leak test   - Start decadron 4 mg q6h   - Adjust vent as per ABG  - FiO2 requirement is less    - Sedated for vent support   - Adjust sedation for SAT   - Hemodynamic monitoring   - Spiked fever   - Antibx changed   - Albuterol inhaler   - NGT for feed   - Monitor blood sugar with coverage .
55 yo F, from home, ambulated with cane , PMHx of obesity, asthma, chronic bronchitis, NAKUL non compliant with CPAP over night, HTN, DM, HLD, fibromyalgia, anxiety, trigeminal neuralgia, degenerative joint disease, ?IgA vasculitis, psoriasis, OA came with chief complain of chest pain and dysuria. patient admitted to [facility], Troponin x 2 negative,  CTAP- 2/4- showed Skin thickening with subcutaneous stranding in the right lower quadrant anterior abdominal wall c/w cellulitis. pt received Zithromax and Rocephin in ED x1 , Abx dc,d due to negative UA, pt started on keflex 500mg qid for cellulitis , hospital course complicated with encephalopathy and ATN   on 2/5, Cr increased from 0.83 to 3.1,  IV cefazolin started in setting of possible sepsis 2/2 cellulitis. pt noted to progressively getting altered over 2/6, VBG remarkable for PH :7.14, pco2 82, pt was placed on Bi-pap reportedly over night.  Patient found to be more lethargic in the morning.  Unable to perform ROS. ABG showed acute on chronic respiratory acidosis , Hypercapnia 91, respiratory placed pt on AVAPS . ICU consulted for further evaluation. Patient will require  intubation , will transfer to ICU for further work-up.     Assessment:    - Acute hypercapnic hypoxic respiratory failure   - NAKUL requiring CPAP at night (non -compliant)  - Chronic bronchitis   - Asthma   - sepsis  - Psoriasis   - Fibromyalgia   - DM uncontrol   - Hypothyroidism    Plan   - Transferred to icu 2/7  - Emergent intubated 2/7  - Adjust vent as per ABG  - FiO2 requirement is less    - Sedated for vent support   - Adjust sedation for SAT   - Hemodynamic monitoring   - Antibx   - Cultures   - Albuterol inhaler   - NGT for feed   - Monitor blood sugar with coverage .
IMP: This is a 56 yr old morbid obese woman  from home, ambulated with cane , PMHx of obesity, asthma, chronic bronchitis, NAKUL non compliant with CPAP over night, HTN, DM, HLD, fibromyalgia, anxiety, trigeminal neuralgia, degenerative joint disease, ?IgA vasculitis, psoriasis, OA came with chief complain of chest pain and dysuria. patient admitted to [facility], Troponin x 2 negative,  CTAP- 2/4- showed Skin thickening with subcutaneous stranding in the right lower quadrant anterior abdominal wall c/w cellulitis. pt received Zithromax and Rocephin in ED x1 , Abx dc,d due to negative UA, pt started on keflex 500mg qid for cellulitis , hospital course complicated with encephalopathy and ATN   on 2/5, Cr increased from 0.83 to 3.1,  IV cefazolin started in setting of possible sepsis 2/2 cellulitis. pt noted to progressively getting altered over 2/6, VBG remarkable for PH :7.14, pco2 82, pt was placed on Bi-pap reportedly over night.  Patient found to be more lethargic in the morning.  Unable to perform ROS. ABG showed acute on chronic respiratory acidosis , Hypercapnia 91, respiratory placed pt on AVAPS . ICU consulted for further evaluation. Patient was intubated after failing BiPaP     Assessment:    - Acute hypercapnic hypoxic respiratory failure   - NAKUL requiring CPAP at night (non -compliant)  - Chronic bronchitis   - Asthma   - sepsis  - Psoriasis   - Fibromyalgia   - DM uncontrol   - Hypothyroidism    Plan   - Extubated 2/14, on bipap support overnight  - Monitor respiratory status   - Episodes of vomitting overnight, will hold bipap for now   - Hemodynamic monitoring   - Completed antibx   - NPO for now   - Albuterol inhaler   - Bowel regimen, + BM now  - IV hydration   - Monitor blood sugar with coverage .  - DVT and stress ulcer prophylaxis  - PT evaluation  - OOB to chair
IMP: This is a 56 yr old morbid obese woman  from home, ambulated with cane , PMHx of obesity, asthma, chronic bronchitis, NAKUL non compliant with CPAP over night, HTN, DM, HLD, fibromyalgia, anxiety, trigeminal neuralgia, degenerative joint disease, ?IgA vasculitis, psoriasis, OA came with chief complain of chest pain and dysuria. patient admitted to [facility], Troponin x 2 negative,  CTAP- 2/4- showed Skin thickening with subcutaneous stranding in the right lower quadrant anterior abdominal wall c/w cellulitis. pt received Zithromax and Rocephin in ED x1 , Abx dc,d due to negative UA, pt started on keflex 500mg qid for cellulitis , hospital course complicated with encephalopathy and ATN   on 2/5, Cr increased from 0.83 to 3.1,  IV cefazolin started in setting of possible sepsis 2/2 cellulitis. pt noted to progressively getting altered over 2/6, VBG remarkable for PH :7.14, pco2 82, pt was placed on Bi-pap reportedly over night.  Patient found to be more lethargic in the morning.  Unable to perform ROS. ABG showed acute on chronic respiratory acidosis , Hypercapnia 91, respiratory placed pt on AVAPS . ICU consulted for further evaluation. Patient was intubated after failing BiPaP     Assessment:    - Acute hypercapnic hypoxic respiratory failure   - NAKUL requiring CPAP at night (non -compliant)  - Chronic bronchitis   - Asthma   - sepsis  - Psoriasis   - Fibromyalgia   - DM uncontrol   - Hypothyroidism    Plan   - Emergent intubated 2/7  - Tolerating SBT, + air leak on cuff deflation   - Extubated to bipap this morning   - Monitor respiratory status   - Hemodynamic monitoring   - Continue antibx   - Albuterol inhaler   - Bowel regimen   - Monitor blood sugar with coverage .  - DVT and stress ulcer prophylaxis  - PT evaluation
IMP: This is a 56 yr old morbid obese woman  from home, ambulated with cane , PMHx of obesity, asthma, chronic bronchitis, NAKUL non compliant with CPAP over night, HTN, DM, HLD, fibromyalgia, anxiety, trigeminal neuralgia, degenerative joint disease, ?IgA vasculitis, psoriasis, OA came with chief complain of chest pain and dysuria. patient admitted to [facility], Troponin x 2 negative,  CTAP- 2/4- showed Skin thickening with subcutaneous stranding in the right lower quadrant anterior abdominal wall c/w cellulitis. pt received Zithromax and Rocephin in ED x1 , Abx dc,d due to negative UA, pt started on keflex 500mg qid for cellulitis , hospital course complicated with encephalopathy and ATN   on 2/5, Cr increased from 0.83 to 3.1,  IV cefazolin started in setting of possible sepsis 2/2 cellulitis. pt noted to progressively getting altered over 2/6, VBG remarkable for PH :7.14, pco2 82, pt was placed on Bi-pap reportedly over night.  Patient found to be more lethargic in the morning.  Unable to perform ROS. ABG showed acute on chronic respiratory acidosis , Hypercapnia 91, respiratory placed pt on AVAPS . ICU consulted for further evaluation. Patient was intubated after failing BiPaP     Assessment:    - Acute hypercapnic hypoxic respiratory failure   - NAKUL requiring CPAP at night (non -compliant)  - Chronic bronchitis   - Asthma   - sepsis  - Psoriasis   - Fibromyalgia   - DM uncontrol   - Hypothyroidism    Plan   - Extubated 2/14, on bipap support overnight, NC during day renetta  - Monitor respiratory status   - No more episodes of vomiting  - Hemodynamic monitoring   - Completed antibx   - Start oral diet   - Albuterol inhaler   - Bowel regimen, + BM now  - Monitor blood sugar with coverage .  - DVT and stress ulcer prophylaxis  - PT evaluation  - OOB to chair  - Transfer to medical service
IMP: This is a 56 yr old morbid obese woman  from home, ambulated with cane , PMHx of obesity, asthma, chronic bronchitis, NAKUL non compliant with CPAP over night, HTN, DM, HLD, fibromyalgia, anxiety, trigeminal neuralgia, degenerative joint disease, ?IgA vasculitis, psoriasis, OA came with chief complain of chest pain and dysuria. patient admitted to [facility], Troponin x 2 negative,  CTAP- 2/4- showed Skin thickening with subcutaneous stranding in the right lower quadrant anterior abdominal wall c/w cellulitis. pt received Zithromax and Rocephin in ED x1 , Abx dc,d due to negative UA, pt started on keflex 500mg qid for cellulitis , hospital course complicated with encephalopathy and ATN   on 2/5, Cr increased from 0.83 to 3.1,  IV cefazolin started in setting of possible sepsis 2/2 cellulitis. pt noted to progressively getting altered over 2/6, VBG remarkable for PH :7.14, pco2 82, pt was placed on Bi-pap reportedly over night.  Patient found to be more lethargic in the morning.  Unable to perform ROS. ABG showed acute on chronic respiratory acidosis , Hypercapnia 91, respiratory placed pt on AVAPS . ICU consulted for further evaluation. Patient was intubated after failing BiPaP     Assessment:    - Acute hypercapnic hypoxic respiratory failure   - NAKUL requiring CPAP at night (non -compliant)  - Chronic bronchitis   - Asthma   - sepsis  - Psoriasis   - Fibromyalgia   - DM uncontrol   - Hypothyroidism    Plan     - Emergent intubated 2/7  - Tolerated SBT/ SAT   - Pat refused extubation , wish for a  to visit before extubation   - Monitor leak test prior to extubation   - Continue  decadron 4 mg q6h   - Adjust vent as per ABG  - FiO2 requirement is less    - Sedated for vent support   - Hemodynamic monitoring   - Continue antibx   - Albuterol inhaler   - NGT for feed   - Monitor TG while on propofol   - Monitor blood sugar with coverage .
IMP: This is a 56 yr old morbid obese woman  from home, ambulated with cane , PMHx of obesity, asthma, chronic bronchitis, NAKUL non compliant with CPAP over night, HTN, DM, HLD, fibromyalgia, anxiety, trigeminal neuralgia, degenerative joint disease, ?IgA vasculitis, psoriasis, OA came with chief complain of chest pain and dysuria. patient admitted to [facility], Troponin x 2 negative,  CTAP- 2/4- showed Skin thickening with subcutaneous stranding in the right lower quadrant anterior abdominal wall c/w cellulitis. pt received Zithromax and Rocephin in ED x1 , Abx dc,d due to negative UA, pt started on keflex 500mg qid for cellulitis , hospital course complicated with encephalopathy and ATN   on 2/5, Cr increased from 0.83 to 3.1,  IV cefazolin started in setting of possible sepsis 2/2 cellulitis. pt noted to progressively getting altered over 2/6, VBG remarkable for PH :7.14, pco2 82, pt was placed on Bi-pap reportedly over night.  Patient found to be more lethargic in the morning.  Unable to perform ROS. ABG showed acute on chronic respiratory acidosis , Hypercapnia 91, respiratory placed pt on AVAPS . ICU consulted for further evaluation. Patient was intubated after failing BiPaP     Assessment:    - Acute hypercapnic hypoxic respiratory failure   - NAKUL requiring CPAP at night (non -compliant)  - Chronic bronchitis   - Asthma   - sepsis  - Psoriasis   - Fibromyalgia   - DM uncontrol   - Hypothyroidism    Plan   - Extubated 2/14, on bipap support overnight, NC during day renetta  - Monitor respiratory status   - Advance diet   - Hemodynamic monitoring   - Completed antibx   - Albuterol inhaler   - Bowel regimen, + BM now  - Monitor blood sugar with coverage .  - DVT and stress ulcer prophylaxis  - PT evaluation  - OOB to chair  - Transfer to medical service
55 yo F, from home, ambulated with cane , PMHx of obesity, asthma, chronic bronchitis, NAKUL non compliant with CPAP over night, HTN, DM, HLD, fibromyalgia, anxiety, trigeminal neuralgia, degenerative joint disease, ?IgA vasculitis, psoriasis, OA came with chief complain of chest pain and dysuria. patient admitted to [facility], Troponin x 2 negative,  CTAP- 2/4- showed Skin thickening with subcutaneous stranding in the right lower quadrant anterior abdominal wall c/w cellulitis. pt received Zithromax and Rocephin in ED x1 , Abx dc,d due to negative UA, pt started on keflex 500mg qid for cellulitis , hospital course complicated with encephalopathy and ATN   on 2/5, Cr increased from 0.83 to 3.1,  IV cefazolin started in setting of possible sepsis 2/2 cellulitis. pt noted to progressively getting altered over 2/6, VBG remarkable for PH :7.14, pco2 82, pt was placed on Bi-pap reportedly over night.  Patient found to be more lethargic in the morning.  Unable to perform ROS. ABG showed acute on chronic respiratory acidosis , Hypercapnia 91, respiratory placed pt on AVAPS . ICU consulted for further evaluation. Patient will require  intubation , will transfer to ICU for further work-up.     Assessment:    - Acute hypercapnic hypoxic respiratory failure   - NAKUL requiring CPAP at night (non -compliant)  - Chronic bronchitis   - Asthma   - sepsis  - Psoriasis   - Fibromyalgia   - DM uncontrol   - Hypothyroidism    Plan     - Emergent intubated 2/7  - Tolerated SBT/ SAT   - Did not extubate due to Neg leak test   - Start decadron 4 mg q6h   - Adjust vent as per ABG  - FiO2 requirement is less    - Sedated for vent support   - Adjust sedation for SAT   - Hemodynamic monitoring   - Spiked fever   - Antibx changed   - Albuterol inhaler   - NGT for feed   - Monitor TG while on propofol   - Monitor blood sugar with coverage .
IMP: This is a 56 yr old morbid obese woman  from home, ambulated with cane , PMHx of obesity, asthma, chronic bronchitis, NAKUL non compliant with CPAP over night, HTN, DM, HLD, fibromyalgia, anxiety, trigeminal neuralgia, degenerative joint disease, ?IgA vasculitis, psoriasis, OA came with chief complain of chest pain and dysuria. patient admitted to [facility], Troponin x 2 negative,  CTAP- 2/4- showed Skin thickening with subcutaneous stranding in the right lower quadrant anterior abdominal wall c/w cellulitis. pt received Zithromax and Rocephin in ED x1 , Abx dc,d due to negative UA, pt started on keflex 500mg qid for cellulitis , hospital course complicated with encephalopathy and ATN   on 2/5, Cr increased from 0.83 to 3.1,  IV cefazolin started in setting of possible sepsis 2/2 cellulitis. pt noted to progressively getting altered over 2/6, VBG remarkable for PH :7.14, pco2 82, pt was placed on Bi-pap reportedly over night.  Patient found to be more lethargic in the morning.  Unable to perform ROS. ABG showed acute on chronic respiratory acidosis , Hypercapnia 91, respiratory placed pt on AVAPS . ICU consulted for further evaluation. Patient was intubated after failing BiPaP     Assessment:    - Acute hypercapnic hypoxic respiratory failure   - NAKUL requiring CPAP at night (non -compliant)  - Chronic bronchitis   - Asthma   - sepsis  - Psoriasis   - Fibromyalgia   - DM uncontrol   - Hypothyroidism    Plan     - Emergent intubated 2/7  - Tolerated SBT/ SAT   - Pat refused extubation   - Did not extubate due to Neg leak test   - Continue  decadron 4 mg q6h   - Adjust vent as per ABG  - FiO2 requirement is less    - Sedated for vent support   - Hemodynamic monitoring   - Continue antibx   - Albuterol inhaler   - NGT for feed   - Monitor TG while on propofol   - Monitor blood sugar with coverage .
Patient seen/evaluated at bedside on 2/21/2022.     57 yo F, from home, ambulated with cane , PMHx of morbid obesity, asthma, chronic bronchitis, NAKUL, HTN, DM, degenerative joint disease, ?IgA vasculitis, psoriasis,came with chief complain of chest pain and dysuria. Admitted to the ICU and intubated w/ respiratory failure in setting of gram negative PNA, abdominal cellulitis, FERNIE,- completed course of abx (ID- [facility]) -- combination of ceftriaxone/azithromycin, Zosyn, cefazolin.    Extubated to bipap on 2/14 and downgraded to the general medical floor on the evening of 2/17/2022.   Nephrology consult had been following for FERNIE, now resolved.    She is s/p a cardiac evaluation during hospitalization January-February with negative results.    Patient denies fevers, chills, nausea/vomiting, pain. She c/o sore throat. Still has a cough.   PE, as above.    < from: CT Cervical Spine No Cont (02.19.22 @ 07:28) >    IMPRESSION: No acute fracture or acute subluxation of the cervical spine.    < end of copied text >    -readdress disposition planning.  Patient may benefit from CHIRAG.     Patient is deconditioned after a prolonged hospital stay, which included intubation in ICU.   Plan:  PT follow up evaluation           Case management to offer choices for CHIRAG.
IMP: This is a 56 yr old morbid obese woman  from home, ambulated with cane , PMHx of obesity, asthma, chronic bronchitis, NAKUL non compliant with CPAP over night, HTN, DM, HLD, fibromyalgia, anxiety, trigeminal neuralgia, degenerative joint disease, ?IgA vasculitis, psoriasis, OA came with chief complain of chest pain and dysuria. patient admitted to [facility], Troponin x 2 negative,  CTAP- 2/4- showed Skin thickening with subcutaneous stranding in the right lower quadrant anterior abdominal wall c/w cellulitis. pt received Zithromax and Rocephin in ED x1 , Abx dc,d due to negative UA, pt started on keflex 500mg qid for cellulitis , hospital course complicated with encephalopathy and ATN   on 2/5, Cr increased from 0.83 to 3.1,  IV cefazolin started in setting of possible sepsis 2/2 cellulitis. pt noted to progressively getting altered over 2/6, VBG remarkable for PH :7.14, pco2 82, pt was placed on Bi-pap reportedly over night.  Patient found to be more lethargic in the morning.  Unable to perform ROS. ABG showed acute on chronic respiratory acidosis , Hypercapnia 91, respiratory placed pt on AVAPS . ICU consulted for further evaluation. Patient was intubated after failing BiPaP     Assessment:    - Acute hypercapnic hypoxic respiratory failure   - NAKUL requiring CPAP at night (non -compliant)  - Chronic bronchitis   - Asthma   - sepsis  - Psoriasis   - Fibromyalgia   - DM uncontrol   - Hypothyroidism    Plan     - Emergent intubated 2/7  - Tolerated SBT/ SAT   - Monitor leak test prior to extubation , neg leak test today   - Continue  decadron 4 mg q6h   - Adjust vent as per ABG  - Sedated for vent support   - Hemodynamic monitoring   - Continue antibx   - Albuterol inhaler   - NGT for feed   - Monitor TG while on propofol   - Monitor blood sugar with coverage .  - Will extubate with anesthesia at bedside
Patient seen/evaluated at bedside on 2/19/2022.     55 yo F, from home, ambulated with cane , PMHx of morbid obesity, asthma, chronic bronchitis, NAKUL, HTN, DM, degenerative joint disease, ?IgA vasculitis, psoriasis,came with chief complain of chest pain and dysuria. Admitted to the ICU and intubated w/ respiratory failure in setting of gram negative PNA, abdominal cellulitis, FERNIE,- completed course of abx (ID- [facility]) -- combination of ceftriaxone/azithromycin, Zosyn, cefazolin.  Extubated to bipap on 2/14 and downgraded to the general medical floor on the evening of 2/17/2022.   Nephrology consult had been following for FERNIE, now resolved.    She is s/p a cardiac evaluation during hospitalization January-February with negative results.    Patient denies fevers, chills, nausea/vomiting, pain. She c/o sore throat.  She c/o fall last night.  Exam is unchanged.    < from: CT Cervical Spine No Cont (02.19.22 @ 07:28) >    IMPRESSION: No acute fracture or acute subluxation of the cervical spine.    < end of copied text >    -readdress disposition planning.  Patient may benefit from CHIRAG.     -disposition planning for Monday/Tuesday
Patient seen/evaluated at bedside on 2/20/2022.     55 yo F, from home, ambulated with cane , PMHx of morbid obesity, asthma, chronic bronchitis, NAKUL, HTN, DM, degenerative joint disease, ?IgA vasculitis, psoriasis,came with chief complain of chest pain and dysuria. Admitted to the ICU and intubated w/ respiratory failure in setting of gram negative PNA, abdominal cellulitis, FERNIE,- completed course of abx (ID- [facility]) -- combination of ceftriaxone/azithromycin, Zosyn, cefazolin.    Extubated to bipap on 2/14 and downgraded to the general medical floor on the evening of 2/17/2022.   Nephrology consult had been following for FERNIE, now resolved.    She is s/p a cardiac evaluation during hospitalization January-February with negative results.    Patient denies fevers, chills, nausea/vomiting, pain. She c/o sore throat.  She c/o fall last two nights ago.  Exam is unchanged.    < from: CT Cervical Spine No Cont (02.19.22 @ 07:28) >    IMPRESSION: No acute fracture or acute subluxation of the cervical spine.    < end of copied text >    -readdress disposition planning.  Patient may benefit from CHIRAG.     -disposition planning for Monday/Tuesday
Patient seen and examined this afternoon.     55 yo F, from home, ambulated with cane , PMHx of morbid obesity, asthma, chronic bronchitis, NAKUL, HTN, DM, degenerative joint disease, ?IgA vasculitis, psoriasis, presented  with chief complain of chest pain and dysuria. Admitted to the ICU and intubated w/ respiratory failure in setting of gram negative PNA, abdominal cellulitis, FERNIE,- completed course of abx (ID- [facility]) -- combination of ceftriaxone/azithromycin, Zosyn, cefazolin.    Extubated to bipap on 2/14 and downgraded to the general medical floor on the evening of 2/17/2022.    Nephrology consult had been following for FERNIE, now resolved.    She is s/p a cardiac evaluation during hospitalization January-February with negative results.    Patient doing well; OOB to chair; planned for discharge to a rehab facility; As per patient has CPAP at home but not used in few months as there was a recall few months ago; She had filled up paerwork but never got a new one...    Patient denies fevers, chills, nausea/vomiting, pain. No new complaints    Vital Signs Last 24 Hrs  T(C): 37.1 (22 Feb 2022 13:18), Max: 37.1 (22 Feb 2022 13:18)  T(F): 98.7 (22 Feb 2022 13:18), Max: 98.7 (22 Feb 2022 13:18)  HR: 108 (22 Feb 2022 13:18) (84 - 108)  BP: 133/68 (22 Feb 2022 13:18) (121/72 - 133/79)  RR: 18 (22 Feb 2022 13:18) (18 - 19)  SpO2: 96% (22 Feb 2022 05:23) (96% - 97%)    P/E: As above.    CT Cervical Spine No Cont (02.19.22 @ 07:28)    IMPRESSION: No acute fracture or acute subluxation of the cervical spine.    D/D:  Acute on Chronic Hypoxic/ Hypercapneic RF resolved s/p extubation  ICU deconditioning       plan:  Await CHIRAG arrangements/ Authorization  Patient may benefit from CHIRAG.   Patient is deconditioned after a prolonged hospital stay, which included intubation in ICU.   Rest as per ACP note above  Discussed with ACP Charlee
57 yo F, from home, ambulated with cane , PMHx of obesity, asthma, chronic bronchitis, NAKUL, HTN, DM, HLD, fibromyalgia, anxiety, trigeminal neuralgia, degenerative joint disease, ?IgA vasculitis, psoriasis, OA came with chief complain of chest pain and dysuria. Admit for possible R abd wall cellulitis.    #Possible R abd wall cellulitis  #Chest pain  #Fibromyalgia  #Degenerative joint disease  #Psoriasis  #OA  CT abdomen revealed Skin thickening with subcutaneous stranding in the right lower quadrant anterior abdominal wall. Patient with TTP to abdominal wall but no skin changes/ erythema, leukocytosis, hemodynamic changes. Trop negative, no ekg changes  - ESR, CRP  - Keflex 500mg qid,, will follow up esr, crp, vitals, skin changes, pain.   - Continue oxcarbazepine, gabapentin for fibromyalgia  - Symbicort  - Levothyroxine  - Continue anti HTN  - lantus, SSI  - f/u pain management
Patient seen/evaluated at bedside on 2/18/2022. I agree with the NP progress note/outlined plan of care. My independent findings and conclusions are documented.    55 yo F, from home, ambulated with cane , PMHx of morbid obesity, asthma, chronic bronchitis, NAKUL, HTN, DM, degenerative joint disease, ?IgA vasculitis, psoriasis,came with chief complain of chest pain and dysuria. Admitted to the ICU and intubated w/ respiratory failure in setting of gram negative PNA, abdominal cellulitis, FERNIE,- completed course of abx (ID- [facility]) -- combination of ceftriaxone/azithromycin, Zosyn, cefazolin.  Extubated to bipap on 2/14 and downgraded to the general medical floor on the evening of 2/17/2022. Nephrology consult had been following for FERNIE, now resolved.  She is s/p a cardiac evaluation during hospitalization January-February with negative results.    Patient denies fevers, chills, nausea/vomiting, pain. States she would like to go home with plan to rely on her brother to assist with her care. Informed her that at this moment she requires a significant amount of assistance including even to settle in her bed. I expressed concerns about her safety and asked her to consider the risks and benefit so discharge home.     -readdress disposition planning  -pt is clinically stable and has completed her antibiotic course  -disposition planning for Monday/Tuesday

## 2022-02-22 NOTE — PROGRESS NOTE ADULT - SUBJECTIVE AND OBJECTIVE BOX
Romeo Ribeiro MD (Nephrology progress note)  [address], [facility],  [id number],  [id number]  TEl: [phone]  Cell: [phone]    Patient is a 56y Female seen and evaluated at bedside. Vital signs, laboratory data, imaging studies, consult notes reviewed done within past 24 hours. Overnight events noted. Patient sitting in chair in no distress feels well and offers no new complains. Still has mild dry cough. Interval stable renal function with non oliguria.     Allergies    Fioricet (Rash)  gadobutrol (Rash; Urticaria; Hives)  IV Contrast (Short breath)  mushroom (Unknown)  venlafaxine (Hives)    Intolerances        ROS:  CONSTITUTIONAL: No fever or chills.  HEENT: No headache or visual disturbances.  RESPIRATORY: Mild cough and dyspnea but denies hemoptysis or wheezing  CARDIOVASCULAR: No Chest pain, shortness of breath, palpitations, dizziness, syncope, orthopnea, PND or leg swelling.  GASTROINTESTINAL: No abdominal pain, nausea, vomiting, diarrhea, hematemesis or blood per rectum.  GENITOURINARY: No urinary frequency, urgency, gross hematuria, dysuria, flank or supra pubic pain, difficulty passing urine.  NEUROLOGICAL: No headache, focal limb weakness, tingling or numbness or seizure like activity  SKIN: No skin rash or lesion  MUSCULOSKELETAL: No leg pain, calf pain or swelling    VITALS:    T(C): 36.2 (02-22-22 @ 05:23), Max: 36.2 (02-22-22 @ 05:23)  HR: 84 (02-22-22 @ 05:23) (84 - 108)  BP: 133/79 (02-22-22 @ 05:23) (111/79 - 133/79)  RR: 19 (02-22-22 @ 05:23) (19 - 19)  SpO2: 96% (02-22-22 @ 05:23) (95% - 97%)  CAPILLARY BLOOD GLUCOSE      POCT Blood Glucose.: 220 mg/dL (22 Feb 2022 12:27)  POCT Blood Glucose.: 152 mg/dL (22 Feb 2022 08:05)  POCT Blood Glucose.: 164 mg/dL (21 Feb 2022 21:05)  POCT Blood Glucose.: 146 mg/dL (21 Feb 2022 16:29)      MEDICATIONS  (STANDING):  ALBUTerol    90 MICROgram(s) HFA Inhaler 2 Puff(s) Inhalation every 6 hours  aspirin  chewable 81 milliGRAM(s) Oral daily  atorvastatin 10 milliGRAM(s) Oral at bedtime  benzonatate 100 milliGRAM(s) Oral <User Schedule>  chlorhexidine 2% Cloths 1 Application(s) Topical <User Schedule>  dextrose 5%. 1000 milliLiter(s) (100 mL/Hr) IV Continuous <Continuous>  gabapentin 400 milliGRAM(s) Oral two times a day  glucagon  Injectable 1 milliGRAM(s) IntraMuscular once  heparin   Injectable 5000 Unit(s) SubCutaneous every 8 hours  influenza   Vaccine 0.5 milliLiter(s) IntraMuscular once  insulin glargine Injectable (LANTUS) 45 Unit(s) SubCutaneous at bedtime  insulin lispro (ADMELOG) corrective regimen sliding scale   SubCutaneous Before meals and at bedtime  levothyroxine 112 MICROGram(s) Oral daily  lidocaine   4% Patch 2 Patch Transdermal daily  losartan 25 milliGRAM(s) Oral daily  montelukast 10 milliGRAM(s) Oral at bedtime  OXcarbazepine 300 milliGRAM(s) Oral two times a day  pantoprazole    Tablet 40 milliGRAM(s) Oral before breakfast  senna 2 Tablet(s) Oral at bedtime    MEDICATIONS  (PRN):  acetaminophen    Suspension .. 650 milliGRAM(s) Oral every 6 hours PRN Temp greater or equal to 38C (100.4F), Mild Pain (1 - 3)  benzocaine 15 mG/menthol 3.6 mG Lozenge 1 Lozenge Oral every 4 hours PRN Sore Throat  metoclopramide Injectable 10 milliGRAM(s) IV Push every 8 hours PRN nausea/vomiting  ondansetron Injectable 4 milliGRAM(s) IV Push every 6 hours PRN Nausea and/or Vomiting  sodium chloride 0.65% Nasal 1 Spray(s) Both Nostrils every 4 hours PRN Nasal Congestion  sodium chloride 0.9% lock flush 10 milliLiter(s) IV Push every 1 hour PRN Pre/post blood products, medications, blood draw, and to maintain line patency      PHYSICAL EXAM:  GENERAL: Alert, awake and oriented x3 in no distress  HEENT: RITA, EOMI, neck supple, no JVP, no carotid bruit, oral mucosa moist and pink.  CHEST/LUNG: Bilateral decreased breath sounds, no rales, no rhonchi, no wheezing  HEART: Regular rate and rhythm, LAURA II/VI at LPSB, no gallops, no rub   ABDOMEN: Soft, nontender, non distended, bowel sounds present, no palpable organomegaly  : No flank or supra pubic tenderness.  EXTREMITIES: Peripheral pulses are palpable, no pedal edema  Neurology: AAOx3, no focal neurological deficit  SKIN: No rash or skin lesion  Musculoskeletal: No joint deformity or spinal tenderness.      Vascular ACCESS:     LABS:                        11.6   6.58  )-----------( 473      ( 22 Feb 2022 05:51 )             39.6     02-22    139  |  103  |  7   ----------------------------<  144<H>  3.7   |  32<H>  |  0.65    Ca    8.7      22 Feb 2022 05:51  Phos  3.2     02-22  Mg     1.4     02-22        RADIOLOGY & ADDITIONAL STUDIES:  rad< from: CT Cervical Spine No Cont (02.19.22 @ 07:28) >    ACC: [id number] EXAM:  CT CERVICAL SPINE                          PROCEDURE DATE:  02/19/2022          INTERPRETATION:  CLINICAL INFORMATION:  s/p fall      TECHNIQUE:  Axial CT images were acquired through the cervical spine.  Intravenous contrast: None  Two-dimensional and 3-D reformats were generated.    COMPARISON STUDY: CT cervical spine 9/11/2021.    FINDINGS: There is no acute fracture or acute subluxation of the cervical   spine. Vertebral body heights and alignment are maintained. There is   straightening of the normal cervical lordosis. No significant   prevertebral soft tissue thickening. Mild cervical spondylosis is again   demonstrated. Incidental note is made of stable calcification seen   between the dens and right lateral massof C1.    Status post right suboccipital cranioplasty.    Visualized lung apices are clear.    IMPRESSION: No acute fracture or acute subluxation of the cervical spine.    --- End of Report ---            MALLORIE NOLASCO MD; Attending Radiologist  This document has been electronically signed. Feb 19 2022  8:06AM    < end of copied text >    Imaging Personally Reviewed:  [x] YES  [ ] NO    Consultant(s) Notes Reviewed:  [x] YES  [ ] NO    Care Discussed with Primary team/ Other Providers [x] YES  [ ] NO

## 2022-02-22 NOTE — PROGRESS NOTE ADULT - PROBLEM SELECTOR PROBLEM 8
Chronic back pain
Asthma
Chronic back pain
Chronic back pain
Neuropathy
Chronic back pain
Chronic back pain

## 2022-02-22 NOTE — PROGRESS NOTE ADULT - PROVIDER SPECIALTY LIST ADULT
Critical Care
Infectious Disease
Infectious Disease
Internal Medicine
Nephrology
Nephrology
Critical Care
Infectious Disease
Nephrology
Nephrology
Pulmonology
Infectious Disease
Internal Medicine
Nephrology
Critical Care
Infectious Disease
Internal Medicine
Critical Care
Nephrology
Pain Medicine
Internal Medicine

## 2022-02-22 NOTE — PROGRESS NOTE ADULT - PROBLEM SELECTOR PROBLEM 5
HTN (hypertension)
Fibromyalgia
HLD (hyperlipidemia)

## 2022-02-22 NOTE — DISCHARGE NOTE NURSING/CASE MANAGEMENT/SOCIAL WORK - PATIENT PORTAL LINK FT
You can access the FollowMyHealth Patient Portal offered by [facility] by registering at the following website: [email or web address]. By joining [facility]’s FollowMyHealth portal, you will also be able to view your health information using other applications (apps) compatible with our system.

## 2022-02-22 NOTE — PROGRESS NOTE ADULT - NUTRITIONAL ASSESSMENT
This patient has been assessed with a concern for Malnutrition and has been determined to have a diagnosis/diagnoses of Moderate protein-calorie malnutrition and Morbid obesity (BMI > 40).    This patient is being managed with:   Diet Regular-  Consistent Carbohydrate {No Snacks}  DASH/TLC {Sodium & Cholesterol Restricted}  Entered: Feb 19 2022  6:01PM    
NPO/NG feeding
NPO/NG feeding
Po feeding as tolerated.
Po feeding as tolerated.
This patient has been assessed with a concern for Malnutrition and has been determined to have a diagnosis/diagnoses of Moderate protein-calorie malnutrition and Morbid obesity (BMI > 40).    This patient is being managed with:   Diet Regular-  Consistent Carbohydrate {No Snacks}  DASH/TLC {Sodium & Cholesterol Restricted}  Entered: Feb 19 2022  6:01PM    
This patient has been assessed with a concern for Malnutrition and has been determined to have a diagnosis/diagnoses of Morbid obesity (BMI > 40).    This patient is being managed with:   Diet Clear Liquid-  Entered: Feb 15 2022  9:54PM    
This patient has been assessed with a concern for Malnutrition and has been determined to have a diagnosis/diagnoses of Morbid obesity (BMI > 40).    This patient is being managed with:   Diet NPO with Tube Feed-  Tube Feeding Modality: Nasogastric  Glucerna 1.2 Giovanni  Total Volume for 24 Hours (mL): 960  Continuous  Starting Tube Feed Rate {mL per Hour}: 10  Increase Tube Feed Rate by (mL): 10     Every 6 hours  Until Goal Tube Feed Rate (mL per Hour): 40  Tube Feed Duration (in Hours): 24  Tube Feed Start Time: 12:00  Entered: Feb 10 2022 11:35AM    
NPO/NG feeding
NPO/NG feeding
NPO/NG feedng
Po feeding as tolerated.
Po feeding as tolerated.
This patient has been assessed with a concern for Malnutrition and has been determined to have a diagnosis/diagnoses of Moderate protein-calorie malnutrition and Morbid obesity (BMI > 40).    This patient is being managed with:   Diet Soft and Bite Sized-  Consistent Carbohydrate {No Snacks}  DASH/TLC {Sodium & Cholesterol Restricted}  Entered: Feb 17 2022 12:42PM    
This patient has been assessed with a concern for Malnutrition and has been determined to have a diagnosis/diagnoses of Morbid obesity (BMI > 40).    This patient is being managed with:   Diet NPO-  Except Medications  Entered: Feb 14 2022  9:31PM    
NPO/NG feeding
Po feeding as tolerated.
This patient has been assessed with a concern for Malnutrition and has been determined to have a diagnosis/diagnoses of Moderate protein-calorie malnutrition and Morbid obesity (BMI > 40).    This patient is being managed with:   Diet Clear Liquid-  Entered: Feb 15 2022  9:54PM    
This patient has been assessed with a concern for Malnutrition and has been determined to have a diagnosis/diagnoses of Morbid obesity (BMI > 40).    This patient is being managed with:   Diet NPO with Tube Feed-  Tube Feeding Modality: Nasogastric  Glucerna 1.2 Giovanni  Total Volume for 24 Hours (mL): 960  Continuous  Starting Tube Feed Rate {mL per Hour}: 10  Increase Tube Feed Rate by (mL): 10     Every 6 hours  Until Goal Tube Feed Rate (mL per Hour): 40  Tube Feed Duration (in Hours): 24  Tube Feed Start Time: 12:00  Entered: Feb 10 2022 11:35AM    
This patient has been assessed with a concern for Malnutrition and has been determined to have a diagnosis/diagnoses of Morbid obesity (BMI > 40).    This patient is being managed with:   Diet NPO with Tube Feed-  Tube Feeding Modality: Nasogastric  Glucerna 1.2 Giovanni  Total Volume for 24 Hours (mL): 960  Continuous  Starting Tube Feed Rate {mL per Hour}: 10  Increase Tube Feed Rate by (mL): 10     Every 6 hours  Until Goal Tube Feed Rate (mL per Hour): 40  Tube Feed Duration (in Hours): 24  Tube Feed Start Time: 12:00  Entered: Feb 10 2022 11:35AM    
This patient has been assessed with a concern for Malnutrition and has been determined to have a diagnosis/diagnoses of Moderate protein-calorie malnutrition and Morbid obesity (BMI > 40).    This patient is being managed with:   Diet Regular-  Consistent Carbohydrate {No Snacks}  DASH/TLC {Sodium & Cholesterol Restricted}  Entered: Feb 19 2022  6:01PM    
NPO/NG feeding
NPO/NG feeding
Po feeding as tolerated.
NPO
NPO/NG feeding
This patient has been assessed with a concern for Malnutrition and has been determined to have a diagnosis/diagnoses of Moderate protein-calorie malnutrition and Morbid obesity (BMI > 40).    This patient is being managed with:   Diet Regular-  Consistent Carbohydrate {No Snacks}  DASH/TLC {Sodium & Cholesterol Restricted}  Entered: Feb 19 2022  6:01PM

## 2022-02-22 NOTE — PROGRESS NOTE ADULT - ASSESSMENT
57 yo F, from home, ambulated with cane , PMHx of obesity, asthma, chronic bronchitis, NAKUL, HTN, DM, HLD, fibromyalgia, anxiety, trigeminal neuralgia, degenerative joint disease, ?IgA vasculitis, psoriasis, OA came with chief complain of chest pain and dysuria. Nephrology consult called for significant FERNIE    1) Nonoliguric FERNIE now resolved with S cr 0.6 likely hypertensive/diabetic nephropathy  2) Hypertension  3) Diabetic Nephropathy with protenuria/neuropathy  4) Morbid Obesity/NAKUL/OHS  5) Abdominal wall cellulitis/PNA treated  6) Left renal lesion chronic  7) Hypokalemia/Hypomagnesemia    Recommend:  Strict I/o  S cr 0.6 with non oliguria  Avoid Nephrotoxic agents  Maintain MAP>65-70 mm Hg  Monitor BMP and electrolytes daily  Replete K with goal K >4 and <5, Mag>2 and Phos 2.5 to 3.5  Off Abx  Continue ACEI/ARB  Can add Cardizem  mg po daily  Nocturnal BIPAP/CPAP as needed  No RRT/HD  Will follow

## 2022-02-22 NOTE — PROGRESS NOTE ADULT - ASSESSMENT
As per pt and per chart review, pt is a 55 yo F, from home, ambulated with cane , PMHx of obesity, asthma, chronic bronchitis, NAKUL, HTN, DM, HLD, fibromyalgia, anxiety, trigeminal neuralgia, degenerative joint disease, ?IgA vasculitis, psoriasis, OA came with chief complain of chest pain and dysuria. Pt stated that for few weeks she is having burning pain in her urine, foul smell, itchy, cloudy. She further stated that she is having pain in her lower abdomen as well as in chest whenever she pressed it. She denied any vaginal discharge, headaches, visual changes, N/V/D.     Of note pt admitted at [facility] in Jan-Feb 2021 and had extensive cardiac work up stress test and echo which were negative. Pt has left renal mass  diagnosed on imaging chances of malignancy 85%, followed outpatient urologist and heme/onc for monitoring . Decreasing weight and controlled blood sugars to get the surgery at some point.     In ED: UA +ve no wbc, few bacteria, trop x 1 neg     Hospital course: Patient had abdominal CT scan with cellulitis likely 2/2 to insulin injections, Started on Keflex, also found to have FERNIE, nephro was consulted.  On 2/6/22 patient lethargic, but able to follow commands and be aroused. CO2 84, and was placed on bipap overnight. Morning of 2/7/22 patient more lethargic with CO2 of 91, ICU consulted.     ICU Course:  Patient intubated, Hypercapnea resolved, her creatinine normalized without interventions. Patient was weaned off sedation and was awake, following commands. trial of extubation on 2/9 was unsuccessful  2/2 failed leak test and concern for critical airway. She was started on steroids. Trial of extubation on 2/11 with anesthesia present, patient refused to be extubated at that time, threatening to mariah. She was extubated on 2/14 to bipap and was doing well. Utilizing bipap at night. Had Nausea, vomiting and diarrhea day of extubation likely due to increased medication for bowel regimen, her sxs improved after cessation of medication. Patient blood sugar and wbc normalized after discontinuation of steroids.    2/17- Pt was transferred to Medicine for ongoing management, continue to monitor off Antibiotic. Pt will need to continue outpt follow up for renal mass and d/c planning to [place] per pt recs.  Pt is now undecided if she wants to go to Rehab.

## 2022-02-22 NOTE — PROGRESS NOTE ADULT - SUBJECTIVE AND OBJECTIVE BOX
Time of Visit:  Patient seen and examined.     MEDICATIONS  (STANDING):  ALBUTerol    90 MICROgram(s) HFA Inhaler 2 Puff(s) Inhalation every 6 hours  aspirin  chewable 81 milliGRAM(s) Oral daily  atorvastatin 10 milliGRAM(s) Oral at bedtime  benzonatate 100 milliGRAM(s) Oral <User Schedule>  chlorhexidine 2% Cloths 1 Application(s) Topical <User Schedule>  dextrose 5%. 1000 milliLiter(s) (100 mL/Hr) IV Continuous <Continuous>  gabapentin 400 milliGRAM(s) Oral two times a day  glucagon  Injectable 1 milliGRAM(s) IntraMuscular once  heparin   Injectable 5000 Unit(s) SubCutaneous every 8 hours  influenza   Vaccine 0.5 milliLiter(s) IntraMuscular once  insulin glargine Injectable (LANTUS) 45 Unit(s) SubCutaneous at bedtime  insulin lispro (ADMELOG) corrective regimen sliding scale   SubCutaneous Before meals and at bedtime  levothyroxine 112 MICROGram(s) Oral daily  lidocaine   4% Patch 2 Patch Transdermal daily  losartan 25 milliGRAM(s) Oral daily  montelukast 10 milliGRAM(s) Oral at bedtime  OXcarbazepine 300 milliGRAM(s) Oral two times a day  pantoprazole    Tablet 40 milliGRAM(s) Oral before breakfast  senna 2 Tablet(s) Oral at bedtime      MEDICATIONS  (PRN):  acetaminophen    Suspension .. 650 milliGRAM(s) Oral every 6 hours PRN Temp greater or equal to 38C (100.4F), Mild Pain (1 - 3)  benzocaine 15 mG/menthol 3.6 mG Lozenge 1 Lozenge Oral every 4 hours PRN Sore Throat  metoclopramide Injectable 10 milliGRAM(s) IV Push every 8 hours PRN nausea/vomiting  ondansetron Injectable 4 milliGRAM(s) IV Push every 6 hours PRN Nausea and/or Vomiting  sodium chloride 0.65% Nasal 1 Spray(s) Both Nostrils every 4 hours PRN Nasal Congestion  sodium chloride 0.9% lock flush 10 milliLiter(s) IV Push every 1 hour PRN Pre/post blood products, medications, blood draw, and to maintain line patency       Medications up to date at time of exam.      PHYSICAL EXAMINATION:  Patient has no new complaints.  GENERAL: The patient is a well-developed, well-nourished, in no apparent distress.     Vital Signs Last 24 Hrs  T(C): 37.1 (2022 13:18), Max: 37.1 (2022 13:18)  T(F): 98.7 (2022 13:18), Max: 98.7 (2022 13:18)  HR: 108 (2022 13:18) (84 - 108)  BP: 133/68 (2022 13:18) (121/72 - 133/79)  BP(mean): --  RR: 18 (2022 13:18) (18 - 19)  SpO2: 96% (2022 05:23) (96% - 97%)   (if applicable)    Chest Tube (if applicable)    HEENT: Head is normocephalic and atraumatic. Extraocular muscles are intact. Mucous membranes are moist.     NECK: Supple, no palpable adenopathy.    LUNGS: Clear to auscultation, no wheezing, rales, or rhonchi.    HEART: Regular rate and rhythm without murmur.    ABDOMEN: Soft, nontender, and nondistended.  No hepatosplenomegaly is noted.    : No painful voiding, no pelvic pain    EXTREMITIES: Without any cyanosis, clubbing, rash, lesions or edema.    NEUROLOGIC: Awake, alert, oriented, grossly intact    SKIN: Warm, dry, good turgor.      LABS:                        11.6   6.58  )-----------( 473      ( 2022 05:51 )             39.6     02-    139  |  103  |  7   ----------------------------<  144<H>  3.7   |  32<H>  |  0.65    Ca    8.7      2022 05:51  Phos  3.2     02-  Mg     1.4     -                          MICROBIOLOGY: (if applicable)    RADIOLOGY & ADDITIONAL STUDIES:  EKG:   CXR:  ECHO:    IMPRESSION: 56y Female PAST MEDICAL & SURGICAL HISTORY:  Hypertension  vaginal cyst    Hyperlipidemia    Asthma  last inhaler use with in 10 days, on Pulm follow up [id number]    Hypothyroid    Obstructive sleep apnea  refuses Cpap    Obesity  morbid    Trigeminal neuralgia  R    Fibromyalgia    DM (diabetes mellitus)  2    DJD (degenerative joint disease)  Cervical/Thoracic/Lumbar    Cervical neuralgia  difficulty moving my neck    Back pain  thoracic &amp; lumbar    H/O bursitis  right shoulder    Radicular pain  arms, legs    Vasculitis  Legs, forerhead, arms &amp; hands, flare ups in R leg  Dr susie Ribeiro is my Rheumatologist    Renal cell carcinoma, left  on follow up Dr schulte, HOD renal [facility], waiting for suregry in 2020    Falls frequently    Morbid obesity with body mass index (BMI) of 50.0 to 59.9 in adult    Psoriasis    Diverticulosis    S/P abdominal hysterectomy        S/P  section  1    Left adrenal mass  excision, benign 2006    Vaginal cyst  removed     Vasculitis on nerve biopsy  , had another surgery called microvascular decompression      p/w       IMP: This is a 56 yr old morbid obese  woman , from home, ambulated with cane  ,  obesity, asthma, chronic bronchitis, NAKUL on CPAP 16 , HTN, DM, HLD, fibromyalgia, anxiety, trigeminal neuralgia, degenerative joint disease, ?IgA vasculitis, psoriasis, OA came with chief complain of chest pain and dysuria. Pt stated that for few weeks she is having burning pain in her urine, foul smell, itchy, cloudy. She further stated that she is having pain in her lower abdomen as well as in chest whenever she pressed it. She denied any vaginal discharge, headaches, visual changes, N/V/D.     Of note pt admitted at [facility] in -2021 and had extensive cardiac work up stress test and echo which were negative. Pt has left renal mass  diagnosed on imaging chances of malignancy 85% , followed outpatient urologist and heme/onc for monitoring . decreasing weight and controlled blood sugars to get the surgery at some point     In ED: UA +ve no wbc, few bacteria , trop x 1 neg     Hospital course: Patient had abdominal CT scan with celluilitis likey 2/2 to insulin injections, Started on Keflex, also found to have FERNIE, nephro was consulted.  On 22 patient lethargic, but able to follow commands and be aroused. CO2 84, and was placed on bipap overnight. Morning of 22 patient more lethargic with CO2 of 91, ICU consulted.     ICU Course:  Patient intubated, Hypercapnea resolved, her creatinine normalized without interventions. patient was weaned off sedation and was awake, following commands. trial of extubation on  was unsucessfull 2/2 failed leak test and concern for critical airway. She was started on steroids. Trial of extubation on  with anesthesia present, patient refused to be extuabted at that time, threatening to mariah. She was extubated on  to bipap and was doing well. Utilizing bipap at night.     [place]ugg  - Change to CPAP 16 at night with nasal mask   - Pat stated that she had a restful sleep with new vent setting   - Please use nasal mask for CPAP  - Taper off O2 supp   - Albuterol inhaler   - OOB to chair   - Pat wish for rehab   - DVT GI prophy   - Advise wt loss  - Consider eval for wt reduction surgery

## 2022-02-22 NOTE — PROGRESS NOTE ADULT - ATTENDING SUPERVISION STATEMENT
Resident
ACP/Resident
ACP/Resident/Fellow
ACP/Resident
Resident/Fellow
ACP/Resident
ACP/Resident
ACP/Resident/Fellow
Resident/Fellow
Resident

## 2022-02-22 NOTE — PROGRESS NOTE ADULT - TIME BILLING
Patient/primary team

## 2022-02-22 NOTE — PROGRESS NOTE ADULT - PROBLEM SELECTOR PROBLEM 9
Discharge planning issues
Discharge planning issues
Type 2 diabetes, uncontrolled, with neuropathy
Discharge planning issues
Diabetes
Discharge planning issues
Discharge planning issues
day(s)

## 2022-02-22 NOTE — PROGRESS NOTE ADULT - SUBJECTIVE AND OBJECTIVE BOX
HPI:      OVERNIGHT EVENTS:      REVIEW OF SYSTEMS:      CONSTITUTIONAL: No fever,   EYES: no acute visual disturbances  NECK: No pain or stiffness  RESPIRATORY: No cough; No shortness of breath  CARDIOVASCULAR: No chest pain, no palpitations  GASTROINTESTINAL: No pain. No nausea, vomiting or diarrhea   NEUROLOGICAL: No headache or numbness, no tremors  MUSCULOSKELETAL: No joint pain, no muscle pain  GENITOURINARY: no dysuria, no frequency, no hesitancy  PSYCHIATRY: no depression , no anxiety  ALL OTHER  ROS negative        Vital Signs Last 24 Hrs  T(C): 36.2 (22 Feb 2022 05:23), Max: 36.9 (21 Feb 2022 12:27)  T(F): 97.2 (22 Feb 2022 05:23), Max: 98.5 (21 Feb 2022 12:27)  HR: 84 (22 Feb 2022 05:23) (84 - 108)  BP: 133/79 (22 Feb 2022 05:23) (111/79 - 133/79)  BP(mean): --  RR: 19 (22 Feb 2022 05:23) (19 - 20)  SpO2: 96% (22 Feb 2022 05:23) (95% - 98%)    ________________________________________________  PHYSICAL EXAM:    GENERAL: NAD  HEENT: Normocephalic; conjunctivae and sclerae clear; moist mucous membranes;   NECK : supple, no JVD  CHEST/LUNG: Clear to auscultation bilaterally   HEART: S1 S2  regular  ABDOMEN: Soft, Nontender, Nondistended; Bowel sounds present  EXTREMITIES: no cyanosis; no LE edema; no calf tenderness  SKIN: warm and dry; no rash  NERVOUS SYSTEM:  Alert & Oriented x3; no new deficits    _________________________________________________  CURRENT MEDICATIONS:    MEDICATIONS  (STANDING):  ALBUTerol    90 MICROgram(s) HFA Inhaler 2 Puff(s) Inhalation every 6 hours  aspirin  chewable 81 milliGRAM(s) Oral daily  atorvastatin 10 milliGRAM(s) Oral at bedtime  benzonatate 100 milliGRAM(s) Oral <User Schedule>  chlorhexidine 2% Cloths 1 Application(s) Topical <User Schedule>  dextrose 5%. 1000 milliLiter(s) (100 mL/Hr) IV Continuous <Continuous>  gabapentin 400 milliGRAM(s) Oral two times a day  glucagon  Injectable 1 milliGRAM(s) IntraMuscular once  heparin   Injectable 5000 Unit(s) SubCutaneous every 8 hours  influenza   Vaccine 0.5 milliLiter(s) IntraMuscular once  insulin glargine Injectable (LANTUS) 45 Unit(s) SubCutaneous at bedtime  insulin lispro (ADMELOG) corrective regimen sliding scale   SubCutaneous Before meals and at bedtime  levothyroxine 112 MICROGram(s) Oral daily  lidocaine   4% Patch 2 Patch Transdermal daily  losartan 25 milliGRAM(s) Oral daily  magnesium sulfate  IVPB 1 Gram(s) IV Intermittent once  montelukast 10 milliGRAM(s) Oral at bedtime  OXcarbazepine 300 milliGRAM(s) Oral two times a day  pantoprazole    Tablet 40 milliGRAM(s) Oral before breakfast  senna 2 Tablet(s) Oral at bedtime    MEDICATIONS  (PRN):  acetaminophen    Suspension .. 650 milliGRAM(s) Oral every 6 hours PRN Temp greater or equal to 38C (100.4F), Mild Pain (1 - 3)  benzocaine 15 mG/menthol 3.6 mG Lozenge 1 Lozenge Oral every 4 hours PRN Sore Throat  metoclopramide Injectable 10 milliGRAM(s) IV Push every 8 hours PRN nausea/vomiting  ondansetron Injectable 4 milliGRAM(s) IV Push every 6 hours PRN Nausea and/or Vomiting  sodium chloride 0.65% Nasal 1 Spray(s) Both Nostrils every 4 hours PRN Nasal Congestion  sodium chloride 0.9% lock flush 10 milliLiter(s) IV Push every 1 hour PRN Pre/post blood products, medications, blood draw, and to maintain line patency      __________________________________________________  LABS:                          11.6   6.58  )-----------( 473      ( 22 Feb 2022 05:51 )             39.6     02-22    139  |  103  |  7   ----------------------------<  144<H>  3.7   |  32<H>  |  0.65    Ca    8.7      22 Feb 2022 05:51  Phos  3.2     02-22  Mg     1.4     02-22          CAPILLARY BLOOD GLUCOSE      POCT Blood Glucose.: 152 mg/dL (22 Feb 2022 08:05)  POCT Blood Glucose.: 164 mg/dL (21 Feb 2022 21:05)  POCT Blood Glucose.: 146 mg/dL (21 Feb 2022 16:29)  POCT Blood Glucose.: 187 mg/dL (21 Feb 2022 11:30)      __________________________________________________  RADIOLOGY & ADDITIONAL TESTS:    Imaging Personally Reviewed:  YES      Consultant(s) Notes Reviewed:   YES     Plan of care was discussed with patient and /or primary care giver; all questions and concerns were addressed and care was aligned with patient's wishes.    Plan discussed with attending and consulting physicians.

## 2022-02-22 NOTE — PROGRESS NOTE ADULT - REASON FOR ADMISSION
Chest pain and dysuria

## 2022-02-22 NOTE — PROGRESS NOTE ADULT - PROBLEM SELECTOR PROBLEM 7
Cellulitis of right abdominal wall
Cellulitis of right abdominal wall
Hypothyroidism
Cellulitis of right abdominal wall
Asthma

## 2022-02-23 ENCOUNTER — NON-APPOINTMENT (OUTPATIENT)
Age: 57
End: 2022-02-23

## 2022-02-24 ENCOUNTER — NON-APPOINTMENT (OUTPATIENT)
Age: 57
End: 2022-02-24

## 2022-03-01 ENCOUNTER — APPOINTMENT (OUTPATIENT)
Dept: RHEUMATOLOGY | Facility: CLINIC | Age: 57
End: 2022-03-01

## 2022-03-02 ENCOUNTER — NON-APPOINTMENT (OUTPATIENT)
Age: 57
End: 2022-03-02

## 2022-03-02 NOTE — CHART NOTE - NSCHARTNOTEFT_GEN_A_CORE
Patient admitted with Abdominal wall cellulitis and UTI developed acute encephalopathy after admission noted to have Hypercapnic respiratory failure due to CO2 retention needing ICU monitoring and intubation treated for Gram negative Pneumonia and eventually extubated downgraded to medical floor and discharged to a Rehab; Discharge summary was today edited and updated with Hospital events and diagnosis. Discussed with  Lainey and new discharge instructions was requested to be faxed to Rehab ([facility]) for continuity of care. I also spoke with PCP Dr. Ken John and reviewed the patient hospital course and he also was able to review the instructions on [place] during our discussion. HIM Director (Denia) was notified of Discharge instructions being edited and updated today.

## 2022-03-02 NOTE — CHART NOTE - NSCHARTNOTESELECT_GEN_ALL_CORE
Apremilast/Event Note
Event Note
Family call/Event Note
ICU Downgrade/Event Note
Nutrition Services
Central Line Removal/Event Note
Event Note
Family Update/Event Note
Family update/Event Note
Hospital Medicine Addendum to Discharge note:
Nutrition Services
family update/Event Note

## 2022-03-14 PROBLEM — E66.01 MORBID (SEVERE) OBESITY DUE TO EXCESS CALORIES: Chronic | Status: ACTIVE | Noted: 2022-02-05

## 2022-03-14 PROBLEM — L40.9 PSORIASIS, UNSPECIFIED: Chronic | Status: ACTIVE | Noted: 2022-02-05

## 2022-03-14 PROBLEM — K57.90 DIVERTICULOSIS OF INTESTINE, PART UNSPECIFIED, WITHOUT PERFORATION OR ABSCESS WITHOUT BLEEDING: Chronic | Status: ACTIVE | Noted: 2022-02-05

## 2022-03-16 ENCOUNTER — APPOINTMENT (OUTPATIENT)
Dept: ENDOCRINOLOGY | Facility: CLINIC | Age: 57
End: 2022-03-16
Payer: MEDICARE

## 2022-03-16 PROCEDURE — 99443: CPT

## 2022-03-16 NOTE — HISTORY OF PRESENT ILLNESS
[Home] : at home, [unfilled] , at the time of the visit. [Medical Office: ([facility])___] : at the medical office located in  [Verbal consent obtained from patient] : the patient, [unfilled] [FreeTextEntry1] : Patient was admitted to the hospital with pneumonia, she had to be intubated. She had PT but she is still SOB. After discharge she say the blood sugars have improved, she has lost weight. At home they are about 130 mg/dl. She has been using the same medications.

## 2022-03-16 NOTE — ASSESSMENT
[FreeTextEntry1] : Patient was very ill, intubated with pneumonia\par She last weight\par The BS have improved not recent blood tests\par No recent thyroid tests\par Will send [facility] to do the blood testing\par The patient will call me back for results\par Will continue the same treatment in the meantime\par \par

## 2022-03-18 ENCOUNTER — NON-APPOINTMENT (OUTPATIENT)
Age: 57
End: 2022-03-18

## 2022-03-19 ENCOUNTER — APPOINTMENT (OUTPATIENT)
Dept: PULMONOLOGY | Facility: CLINIC | Age: 57
End: 2022-03-19
Payer: MEDICARE

## 2022-03-19 VITALS
RESPIRATION RATE: 14 BRPM | HEIGHT: 61 IN | WEIGHT: 280 LBS | TEMPERATURE: 97.2 F | BODY MASS INDEX: 52.87 KG/M2 | OXYGEN SATURATION: 96 % | HEART RATE: 112 BPM | DIASTOLIC BLOOD PRESSURE: 84 MMHG | SYSTOLIC BLOOD PRESSURE: 135 MMHG

## 2022-03-19 PROCEDURE — 99214 OFFICE O/P EST MOD 30 MIN: CPT

## 2022-03-19 NOTE — REASON FOR VISIT
[Follow-Up] : a follow-up visit [Sleep Apnea] : sleep apnea [Nocturnal Hypoventilation] : nocturnal hypoventilation [Shortness of Breath] : shortness of breath

## 2022-03-20 NOTE — DISCUSSION/SUMMARY
[FreeTextEntry1] : Recent hospitalization/intubation for pneumonia and hypercapnic respiratory failure\par \par She has improved after course of PT at Rehab facility\par \par She has LE edema, on diuretic\par \par Obesity Hypoventilation/ OSAS, still not not using BIPAP due to recall\par \par She has registered with [facility]\par \par My office was contacted by  at [facility] for assistance in obtaining new BIPAP equipment.  We have been working on it but there is shortage due to recall  of equipment and limited supply.\par \par \par LE edema persists\par \par PLAN\par she will continue to call [facility] regarding CPAP recall\par continue inhaled bronchodilator and ICS LABA\par \par She uses HCTZ daily\par \par If edema worse will use furosemide\par \par She may use her existing BIPAP if her symptoms of shortness of breath recur.  The benefit outweighs the risk\par \par Total time spent : 30 minutes\par Including:\par Preparation prior to visit - Reviewing prior record, results of tests and Consultation Reports as applicable\par Conducting an appropriate H & P during today's encounter\par Appropriate orders for tests, medications and procedures, as applicable\par Counseling patient \par Note completion \par \par Guido Mnoterroso MD \par

## 2022-03-20 NOTE — HISTORY OF PRESENT ILLNESS
[TextBox_4] : 56 yr old woman with asthma and NAKUL, chronic hypercapnic respiratory failure with ABG 7.34 PCO2 63, PO2 99, due to obesity and restrictive lung disease and kyphosis.  She is on NIV  BIPAP 20/16.\par \par She is also using Symbicort and albuterol via nebulizer, latter once per day.\par \par She has obtained AirPhysio PEP device and uses it with increased sputum  clearance.  Sputum is mostly clear.\par \par She uses saline nasal wash. She sometimes uses fluticasone nasal spray.  She uses Zyrtec as needed and montelukast every night.\par She uses benzonatate.\par \par \par She has not uses CPAP, especially since recall. \par \par \par She has had asthma since age [age]. She has few allergies (dust dust mites, flowers) not severe.\par Asthma is worse when allergies are worse. \par She has hypoxia with exertion.\par \par \par She also has a 4 mm lung nodule seen on prior CT chest. CT chest 5/2021 did not demonstrated any suspicious nodule. \par \par \par She has renal cell carcinoma and has not had removal due to COVID crisis. Lesion is being closely monitored per team\par \par She has had LE edema R>L.  She is on HCTZ\par \par She has been hospitalized at [facility] with dyspnea, LOC.  She was treated for pneumonia and hypercapnic respiratory failure.  she required intubation/mechanical ventilation\par \par She was discharged to Rehab facility for 20 days\par \par She has not been able to obtain updated NIV.  Her device has been recalled.  \par \par She is using Airphysio. \par \par Last CO2 on chemistry 36\par \par \par \par PMH:\par She has hyperlipidemia, elevated liver enzymes, DM, hypothyroid depression hypertension sleep apnea not using CPAP. fibromyalgia\par \par PSH\par c sect\par right ovary\par \par lysis of adhesion\par ERMELINDA/BSO\par removal of left adrenal gland due to nodule, benign\par \par Temporal artery biopsy\par \par \par  [Obstructive Sleep Apnea] : obstructive sleep apnea

## 2022-03-27 LAB
ALBUMIN SERPL ELPH-MCNC: 4 G/DL
ALP BLD-CCNC: 104 U/L
ALT SERPL-CCNC: 39 U/L
ANION GAP SERPL CALC-SCNC: 17 MMOL/L
AST SERPL-CCNC: 39 U/L
BILIRUB DIRECT SERPL-MCNC: 0.1 MG/DL
BILIRUB INDIRECT SERPL-MCNC: 0.2 MG/DL
BILIRUB SERPL-MCNC: 0.3 MG/DL
BUN SERPL-MCNC: 7 MG/DL
CALCIUM SERPL-MCNC: 9.3 MG/DL
CHLORIDE SERPL-SCNC: 99 MMOL/L
CHOLEST SERPL-MCNC: 182 MG/DL
CO2 SERPL-SCNC: 27 MMOL/L
CREAT SERPL-MCNC: 0.56 MG/DL
CREAT SPEC-SCNC: 140 MG/DL
EGFR: 107 ML/MIN/1.73M2
ESTIMATED AVERAGE GLUCOSE: 200 MG/DL
FRUCTOSAMINE SERPL-MCNC: 223 UMOL/L
GLUCOSE BS SERPL-MCNC: 160 MG/DL
GLUCOSE SERPL-MCNC: 134 MG/DL
HBA1C MFR BLD HPLC: 8.6 %
HDLC SERPL-MCNC: 39 MG/DL
LDLC SERPL CALC-MCNC: 118 MG/DL
MICROALBUMIN 24H UR DL<=1MG/L-MCNC: <1.2 MG/DL
MICROALBUMIN/CREAT 24H UR-RTO: NORMAL MG/G
NONHDLC SERPL-MCNC: 143 MG/DL
POTASSIUM SERPL-SCNC: 4.1 MMOL/L
PROT SERPL-MCNC: 6.8 G/DL
SODIUM SERPL-SCNC: 144 MMOL/L
T4 FREE SERPL-MCNC: 1.2 NG/DL
TRIGL SERPL-MCNC: 123 MG/DL
TSH SERPL-ACNC: 1.6 UIU/ML

## 2022-03-28 ENCOUNTER — APPOINTMENT (OUTPATIENT)
Dept: INTERNAL MEDICINE | Facility: CLINIC | Age: 57
End: 2022-03-28
Payer: MEDICARE

## 2022-03-28 VITALS
WEIGHT: 275 LBS | OXYGEN SATURATION: 95 % | HEIGHT: 61 IN | DIASTOLIC BLOOD PRESSURE: 86 MMHG | RESPIRATION RATE: 14 BRPM | BODY MASS INDEX: 51.92 KG/M2 | SYSTOLIC BLOOD PRESSURE: 134 MMHG | HEART RATE: 100 BPM

## 2022-03-28 PROCEDURE — 99214 OFFICE O/P EST MOD 30 MIN: CPT

## 2022-03-28 NOTE — HISTORY OF PRESENT ILLNESS
[de-identified] : 56 year old  female patient with history of stable , history as stated, presented for follow up examination. Patient is compliant with all medications. Denies shortness of breath, chest pain or abdominal pains at this time. ROS as stated.\par

## 2022-03-28 NOTE — HEALTH RISK ASSESSMENT
[Intercurrent hospitalizations] : was admitted to the hospital  [Never] : Never [No] : In the past 12 months have you used drugs other than those required for medical reasons? No [No falls in past year] : Patient reported no falls in the past year [0] : 2) Feeling down, depressed, or hopeless: Not at all (0) [de-identified] : 02/22 [de-identified] : ENDO/PULM [[id number]] : 0

## 2022-03-28 NOTE — ASSESSMENT
[FreeTextEntry1] : 56 year old female found to have stable Hypertension, Hypothyroidism, Type 2 Diabetes Mellitus, Reactive Airway Disease, Morbid Obesity, Renal Cell Carcinoma, Diabetic Neuropathy,with the current prescription regimen as recommended, diet and life style modifications, as counseled. Prior results reviewed, interpreted and discussed with the patient during today's examination, as appropriate. Follow up, treatment plan and tests, as ordered.\par \par Patient was recently hospitalized, S/P Rehab, findings and recommendations reviewed and discussed with the patient during today's examination.\par \par Total time spent : 30 minutes\par Including:\par Preparation prior to visit - Reviewing prior record, results of tests and Consultation Reports as applicable\par Conducting an appropriate H & P during today's encounter\par Appropriate orders for tests, medications and procedures, as applicable\par Counseling patient \par Note completion\par

## 2022-04-09 ENCOUNTER — APPOINTMENT (OUTPATIENT)
Dept: PULMONOLOGY | Facility: CLINIC | Age: 57
End: 2022-04-09
Payer: MEDICARE

## 2022-04-09 VITALS
OXYGEN SATURATION: 93 % | WEIGHT: 271 LBS | HEIGHT: 61 IN | RESPIRATION RATE: 16 BRPM | HEART RATE: 119 BPM | SYSTOLIC BLOOD PRESSURE: 147 MMHG | TEMPERATURE: 97 F | BODY MASS INDEX: 51.16 KG/M2 | DIASTOLIC BLOOD PRESSURE: 67 MMHG

## 2022-04-09 PROCEDURE — 94618 PULMONARY STRESS TESTING: CPT

## 2022-04-09 PROCEDURE — 99214 OFFICE O/P EST MOD 30 MIN: CPT | Mod: 25

## 2022-04-10 NOTE — PROCEDURE
[FreeTextEntry1] : Exercise oximetry O2 sat 90% RA, walked in hallway \par \par Guido Monterroso MD

## 2022-04-10 NOTE — DISCUSSION/SUMMARY
[FreeTextEntry1] : Recent hospitalization/intubation for pneumonia and hypercapnic respiratory failure\par \par She has improved after course of PT at Rehab facility\par \par She has LE edema, on diuretic\par \par Obesity Hypoventilation/ OSAS, still not not using PAP due to recall\par \par She has registered with [facility]\par \par My office was contacted by  at [facility] for assistance in obtaining new BIPAP equipment.  We have been working on it but there is shortage due to recall  of equipment and limited supply.\par \par \par LE edema persists\par \par PLAN\par she will continue to call [facility] regarding  recall\par continue inhaled bronchodilator and ICS LABA\par \par She uses HCTZ daily\par \par If edema worse will use furosemide\par \par She may use her existing BIPAP if her symptoms of shortness of breath recur.  The benefit outweighs the risk\par \par In process of obtaining new equipment. She has been contacted\par \par Exercise O2 testing requested\par \par Continue Symbicort 160/4.5 2 puffs twice per day.\par \par albuterol MDI as needed ,alb as needed, montelukast\par \par expectorant, nasal wash\par \par Monitor cough\par \par \par \par Total time spent : 30 minutes\par Including:\par Preparation prior to visit - Reviewing prior record, results of tests and Consultation Reports as applicable\par Conducting an appropriate H & P during today's encounter\par Appropriate orders for tests, medications and procedures, as applicable\par Counseling patient \par Note completion \par \par I walked with pt for exercise study\par \par Guido Monterroso MD \par

## 2022-04-10 NOTE — HISTORY OF PRESENT ILLNESS
[Obstructive Sleep Apnea] : obstructive sleep apnea [TextBox_4] : 56 yr old woman with asthma and NAKUL, chronic hypercapnic respiratory failure, due to obesity and restrictive lung disease and kyphosis.  She had been on NIV  BIPAP 20/16.\par \par She is also using Symbicort and albuterol via nebulizer, latter once per day.\par \par She has obtained AirPhysio PEP device and uses it with increased sputum  clearance.  Sputum is mostly clear.\par \par She uses saline nasal wash. She sometimes uses fluticasone nasal spray.  She uses Zyrtec as needed and montelukast every night.\par She uses benzonatate.\par \par \par She has not used PAP, especially since recall. \par \par \par She has had asthma since age [age]. She has few allergies (dust dust mites, flowers) not severe.\par Asthma is worse when allergies are worse. \par She has hypoxia with exertion.\par \par \par She also has a 4 mm lung nodule seen on prior CT chest. CT chest 5/2021 did not demonstrated any suspicious nodule. \par \par \par She has renal cell carcinoma and has not had removal due to COVID crisis. Lesion is being closely monitored per team\par \par She has had LE edema R>L.  She is on HCTZ\par \par She has been hospitalized at [facility] with dyspnea, LOC.  She was treated for pneumonia and hypercapnic respiratory failure.  she required intubation/mechanical ventilation\par \par She was discharged to Rehab facility for 20 days\par \par She has not been able to obtain updated NIV.  Her device has been recalled.  \par \par Last CO2 on chemistry 36\par \par She continues to have cough\par \par New VAPS equipment has been ordered\par \par Using Symbicort, albuterol and montelukast\par \par \par \par PMH:\par She has hyperlipidemia, elevated liver enzymes, DM, hypothyroid depression hypertension sleep apnea not using CPAP. fibromyalgia\par \par PSH\par c sect\par right ovary\par \par lysis of adhesion\par ERMELINDA/BSO\par removal of left adrenal gland due to nodule, benign\par \par Temporal artery biopsy\par \par \par

## 2022-04-11 RX ORDER — DOXYCYCLINE HYCLATE 100 MG/1
100 CAPSULE ORAL
Qty: 14 | Refills: 0 | Status: DISCONTINUED | COMMUNITY
Start: 2021-10-21 | End: 2022-04-11

## 2022-04-11 RX ORDER — DOXYCYCLINE HYCLATE 100 MG/1
100 CAPSULE ORAL
Qty: 14 | Refills: 0 | Status: DISCONTINUED | COMMUNITY
Start: 2021-12-23 | End: 2022-04-11

## 2022-04-14 ENCOUNTER — NON-APPOINTMENT (OUTPATIENT)
Age: 57
End: 2022-04-14

## 2022-04-14 ENCOUNTER — APPOINTMENT (OUTPATIENT)
Dept: RHEUMATOLOGY | Facility: CLINIC | Age: 57
End: 2022-04-14

## 2022-05-01 NOTE — CONSULT NOTE ADULT - SUBJECTIVE AND OBJECTIVE BOX
HISTORY OF PRESENT ILLNESS: Patient, a 55 year old female from home, PMH of asthma, chronic bronchitis, NAKUL, HTN, DM, HLD, fibromyalgia, anxiety, trigeminal neuralgia, degenerative joint disease, IgA vasculitis presents to the ED with chest pressure and shortness of breath for 4 days. Patient reports left sided chest pressure for 3-4 days, radiating to left arm, back and right hip, constant in nature, not related to exertion and occurring  while sitting, lying and standing, associated with left arm numbness. Patient states this is the second time she has had pain, first time it occurred was 2 years ago. Patient also reports severe MUÑOZ on even slight activity or mild exertion. Denies orthopnea. Patient also has right lower extremity swelling, reports intermittent chronic LE swelling. Patient also has chronic cough that is sometimes dry and sometimes associated with phlegm that is white, yellow or green. Patient also reports low grade fevers. Patient is on 2L home oxygen.     PAST MEDICAL & SURGICAL HISTORY:  Falls frequently    Renal cell carcinoma, left  on follow up Dr schulte, HOD renal [facility], waiting for suregry in 2020    Vasculitis  Legs, forerhead, arms &amp; hands, flare ups in R leg  Dr susie Ribeiro is my Rheumatologist    Radicular pain  arms, legs    H/O bursitis  right shoulder    Back pain  thoracic &amp; lumbar    Cervical neuralgia  difficulty moving my neck    DJD (degenerative joint disease)  Cervical/Thoracic/Lumbar    DM (diabetes mellitus) 2    Fibromyalgia    Trigeminal neuralgia R    Obesity morbid    Obstructive sleep apnea  refuses Cpap    Hypothyroid    Asthma  last inhaler use with in 10 days, on Pulm follow up [id number]    Hyperlipidemia    Hypertension  vaginal cyst    Vasculitis on nerve biopsy  , had another surgery called microvascular decompression     Vaginal cyst  removed     Left adrenal mass excision, benign     S/P  section 1    S/P abdominal hysterectomy     [ ] Diabetes   [ ] Hypertension  [ ] Hyperlipidemia  [ ] CAD  [ ] PCI  [ ] CABG    PREVIOUS DIAGNOSTIC TESTING:    [ ] Echocardiogram:  eo< from: Transthoracic Echocardiogram (19 @ 09:00) >  -----------------------------------------------------------------------  CONCLUSIONS:  1. Mild concentric left ventricular hypertrophy.  2. Normal Left Ventricular Systolic Function. LVEF 60-65%.  3. Grade I diastolic dysfunction.  4. Right ventricle not well visualized; appears normal in  size and systolic function.    *** Compared with echocardiogram of 2016, no  significant changes noted.  ------------------------------------------------------------------------  Confirmed on  2019 - 20:10:06 by Yash Nguyễn MD, [facility],  Critical access hospital, [facility]  --------------    < end of copied text >    [ ]  Catheterization:  [ ] Stress Test:  	 nu    MEDICATIONS:  aspirin enteric coated 81 milliGRAM(s) Oral daily  enoxaparin Injectable 40 milliGRAM(s) SubCutaneous daily  hydrochlorothiazide 12.5 milliGRAM(s) Oral daily      ALBUTerol    90 MICROgram(s) HFA Inhaler 2 Puff(s) Inhalation every 6 hours PRN  budesonide 160 MICROgram(s)/formoterol 4.5 MICROgram(s) Inhaler 2 Puff(s) Inhalation two times a day  montelukast 10 milliGRAM(s) Oral at bedtime    acetaminophen   Tablet .. 650 milliGRAM(s) Oral every 6 hours PRN  FLUoxetine 20 milliGRAM(s) Oral daily  gabapentin 400 milliGRAM(s) Oral daily  OXcarbazepine 300 milliGRAM(s) Oral two times a day    pantoprazole    Tablet 40 milliGRAM(s) Oral before breakfast  senna 2 Tablet(s) Oral at bedtime    atorvastatin 40 milliGRAM(s) Oral at bedtime  insulin glargine Injectable (LANTUS) 15 Unit(s) SubCutaneous every morning  insulin lispro (ADMELOG) corrective regimen sliding scale   SubCutaneous Before meals and at bedtime  insulin lispro Injectable (ADMELOG) 6 Unit(s) SubCutaneous three times a day before meals  levothyroxine 112 MICROGram(s) Oral daily    sodium chloride 0.9% lock flush 3 milliLiter(s) IV Push every 8 hours      Allergies    Fioricet (Rash)  gadobutrol (Rash; Urticaria; Hives)  IV Contrast (Short breath)  mushroom (Unknown)  venlafaxine (Hives)    Intolerances        FAMILY HISTORY:  Family history of obesity    Family history of hypertension    Family history of diabetes mellitus        SOCIAL HISTORY:    [ ] Non-smoker  [ ] Smoker  [ ] Alcohol      REVIEW OF SYSTEMS:  [ ]chest pain  [  ]shortness of breath  [  ]palpitations  [  ]syncope  [ ]near syncope [  ]diplopia  [  ]altered mental status   [  ]fevers  [ ]chills [ ]nausea  [ ]vomitting  [ ]abdominal pain  [ ]melena  [ ]BRBPR  [  ]epistaxis  [  ]rash  [  ]lower extremity edema      CONSTITUTIONAL: No fever, weight loss, or fatigue  EYES: No eye pain, visual disturbances, or discharge  ENMT:  No difficulty hearing, tinnitus, vertigo; No sinus or throat pain  NECK: No pain or stiffness  RESPIRATORY:  + SOB   CARDIOVASCULAR:  ++ chest pain,  + right leg swelling  GASTROINTESTINAL: No abdominal or epigastric pain. No nausea, vomiting, or hematemesis; No diarrhea or constipation. No melena or hematochezia.  GENITOURINARY: No dysuria, frequency, hematuria, or incontinence  NEUROLOGICAL: No headaches, memory loss, loss of strength, numbness, or tremors  SKIN: No itching, burning, rashes, or lesions   LYMPH Nodes: No enlarged glands  ENDOCRINE: No heat or cold intolerance; No hair loss  MUSCULOSKELETAL: No joint pain or swelling; No muscle, back, or extremity pain  PSYCHIATRIC: No depression, anxiety, mood swings, or difficulty sleeping  HEME/LYMPH: No easy bruising, or bleeding gums  ALLERY AND IMMUNOLOGIC: No hives or eczema	    [ ] All others negative	  [ ] Unable to obtain    PHYSICAL EXAM:  T(C): 36.7 (21 @ 05:22), Max: 36.9 (21 @ 20:45)  HR: 91 (21 @ 05:22) (81 - 91)  BP: 140/79 (21 @ 05:22) (125/84 - 151/81)  RR: 18 (21 @ 05:22) (17 - 21)  SpO2: 96% (21 @ 05:22) (90% - 96%)  Wt(kg): --  I&O's Summary    2021 07:01  -  2021 07:00  --------------------------------------------------------  IN: 325 mL / OUT: 0 mL / NET: 325 mL        Appearance: Normal	  HEENT:   Normal oral mucosa, PERRL, EOMI	  Lymphatic: No lymphadenopathy  Cardiovascular: Normal S1 S2, No JVD, No murmurs, No edema  Respiratory: Lungs clear to auscultation	  Psychiatry: A & O x 3, Mood & affect appropriate  Gastrointestinal:  Soft, Non-tender, + BS	  Skin: No rashes, No ecchymoses, No cyanosis	  Neurologic: Non-focal  Extremities: Normal range of motion, No clubbing, cyanosis, ++ right LE edema  Vascular: Peripheral pulses palpable 2+ bilaterally    TELEMETRY: 	  nsr    ECG:  	g< from: 12 Lead ECG (21 @ 02:46) >  Diagnosis Line Normal sinus rhythm  Possible Left atrial enlargement  Borderline ECG    Confirmed by NABA    < end of copied text >    RADIOLOGY:  OTHER: 	  	  LABS:	 	    CARDIAC MARKERS:  Troponin I, Serum: <0.015 ng/mL ( @ 11:01)                          12.6   9.94  )-----------( 323      ( 2021 07:15 )             42.8         135  |  97  |  15  ----------------------------<  331<H>  3.9   |  33<H>  |  0.83    Ca    8.7      2021 11:01  Phos  4.3       Mg     1.1         TPro  7.5  /  Alb  3.2<L>  /  TBili  0.5  /  DBili  x   /  AST  42<H>  /  ALT  56  /  AlkPhos  136<H>      proBNP:   Lipid Profile:   HgA1c:   TSH: Thyroid Stimulating Hormone, Serum: 1.91 uU/mL ( @ 11:01)    DUPLEX: < from: US Duplex Venous Lower Ext Complete, Bilateral (21 @ 14:46) >  IMPRESSION:  No evidence of deep venous thrombosis in either lower extremity.      NETO NOLASCO MD; Attending Radiologist  This document has     < end of copied text >        ASSESSMENT/PLAN: 	55 yr female with Renal CA, Vasculitis , chronic  back pain , obesity , home O2 use, contrast allg , asthma, HTN now with sob , chest pressure.     Pulm follow up , cont supp o2   ECG with no acute finding  cont ASA, statin    GI / DVT prophylaxis.   keep K>4, mag >2.0   Cardiac markers neg , doubt ACS   ECHo pending , evaluate LV fx   Dopplers neg for dvt .   VA scan for PE pending   D/W Dr David    Pt presents to er from nursing facility s/p fall from bed while trying to be assisted to get up, pt denies head strike, loc. pt with chronic waite catheter for enlarged prostate, as per EMS, pt had a hypotensive episode in ambulance to 80/50's and received 250cc bolus, now normotensive, respirations labored upon exertion and while speaking, lungs diminished bilaterally, right sided jvd, laceration to rue and left knee, pt sent to ct at this time.

## 2022-05-02 ENCOUNTER — NON-APPOINTMENT (OUTPATIENT)
Age: 57
End: 2022-05-02

## 2022-05-03 NOTE — H&P PST ADULT - LIVING CHILDREN, OB PROFILE
Review of Systems   Constitutional: Negative for chills, fatigue and fever. Eyes: Negative for pain, redness and visual disturbance. Respiratory: Negative for cough, shortness of breath and wheezing. Cardiovascular: Negative for chest pain and leg swelling. Gastrointestinal: Negative for abdominal pain, constipation, diarrhea, nausea and vomiting. Genitourinary: Negative for difficulty urinating, dysuria, flank pain, frequency, hematuria, scrotal swelling, testicular pain and urgency. Musculoskeletal: Negative for back pain, joint swelling and myalgias. Skin: Negative for rash and wound. Neurological: Negative for dizziness, tremors and numbness. Hematological: Does not bruise/bleed easily. 1

## 2022-05-09 ENCOUNTER — APPOINTMENT (OUTPATIENT)
Dept: CARDIOLOGY | Facility: CLINIC | Age: 57
End: 2022-05-09
Payer: MEDICARE

## 2022-05-09 VITALS
HEART RATE: 98 BPM | RESPIRATION RATE: 16 BRPM | DIASTOLIC BLOOD PRESSURE: 83 MMHG | SYSTOLIC BLOOD PRESSURE: 149 MMHG | HEIGHT: 61 IN | BODY MASS INDEX: 50.98 KG/M2 | WEIGHT: 270 LBS | OXYGEN SATURATION: 93 % | TEMPERATURE: 98 F

## 2022-05-09 DIAGNOSIS — R09.02 HYPOXEMIA: ICD-10-CM

## 2022-05-09 PROCEDURE — 99214 OFFICE O/P EST MOD 30 MIN: CPT

## 2022-05-09 PROCEDURE — 93000 ELECTROCARDIOGRAM COMPLETE: CPT

## 2022-05-18 NOTE — H&P PST ADULT - DOCUMENT STATUS
Surgery Consult Note     Ray Gaona PA-C  Pt Name: Kendal Morel  MRN: [id number]  [place]: 1991  Date of evaluation: 2022  Primary Care Physician: Michelle Montoya  Chief Complaint: abdominal pain, nausea, and vomiting  IMPRESSIONS:   1. Small bowel obstruction  2. Nausea and emesis. NGT in place. 3. Leukocytosis: WBC count 13.1  PLANS:   1. Monitor and control pain  2. Continue NGT. Keep NGT hooked up to continuous LWS. 3. NPO except for a few ice chips until bowel function improves  4. Abdominal xray's to further eval  5. OOB as tolerated  6. IVF  7. Continue to treat conservatively at this time. If the SBO fail to resolve with conservative measures then we will need to further discuss surgical intervention. SUBJECTIVE:   History of Chief Complaint:    Kendal Morel is a 27 y.o. female who presents with nausea, emesis and diffuse abdominal pain. She stated that these symptoms began 3-4 days ago. The pain continued in intensity and she came to the ER. A CT scan was performed and this showed her to have findings compatible with a partial small-bowel obstruction. At that time an NGT was placed and she was admitted to the hospital. She denies having had any BM's or flatulence since being in the hospital. She does have a surgical history of 3 C-sections, and a tubal ligation. She denies having any other pain. Denies any CP, SOB, cough, lightheadedness or dizziness. Past Medical History  Reviewed  has a past medical history of Anxiety and Depression. Past Surgical History  Reviewed has a past surgical history that includes  section and Tubal ligation.   Medications  Prior to Admission medications    Not on File    Scheduled Meds:   nicotine  1 patch TransDERmal Once    sodium chloride flush  5-40 mL IntraVENous 2 times per day    enoxaparin  30 mg SubCUTAneous Daily    pantoprazole  40 mg IntraVENous Daily     Continuous Infusions:   sodium chloride      lactated ringers 125 mL/hr at 05/18/22 0633     PRN Meds:.sodium chloride flush, sodium chloride, ondansetron **OR** ondansetron, potassium chloride, magnesium sulfate, acetaminophen, morphine **OR** morphine  Allergies  has No Known Allergies. Family History  Reviewedfamily history is not on file. Social History   reports that she has been smoking. She has never used smokeless tobacco. She reports that she does not drink alcohol and does not use drugs. EDUCATION  Patient educated about their illness/diagnosis, stated above, and all questions answered. We discussed the importance of nutrition, medications they are taking, and healthy lifestyle. Review of Systems:  General Denies any fever or chills  HEENT Denies any diplopia, tinnitus or vertigo  Resp Denies any shortness of breath, cough or wheezing  Cardiac Denies any chest pain, palpitations, claudication or edema  GI Denies any melena, hematochezia, hematemesis or pyrosis   Denies any frequency, urgency, hesitancy or incontinence  Heme Denies bruising or bleeding easily  Neuro Denies any focal motor or sensory deficits  OBJECTIVE:   VITALS:  height is 5' 3\" (1.6 m) and weight is 109 lb 12.8 oz (49.8 kg). Her oral temperature is 99.3 °F (37.4 °C). Her blood pressure is 120/83 and her pulse is 94. Her respiration is 16 and oxygen saturation is 94%. CONSTITUTIONAL: Alert and oriented times 3, no acute distress and cooperative to examination with proper mood and affect. SKIN: Skin color, texture, turgor normal. No rashes or lesions. LYMPH: no cervical nodes, no inguinal nodes  HEENT: Head is normocephalic, atraumatic. EOMI, PERRLA. NECK: Supple, symmetrical, trachea midline, no adenopathy, thyroid symmetric, not enlarged and no tenderness, skin normal.  CHEST/LUNGS: chest symmetric with normal A/P diameter, normal respiratory rate and rhythm   CARDIOVASCULAR: Heart sounds are normal.  Regular rate and rhythm   ABDOMEN: distended.  Tenderness: diffuse tenderness  RECTAL: deferred, not clinically indicated  NEUROLOGIC: There are no focalizing motor or sensory deficits. CN II-XII are grossly intact. Graylon Rasheed EXTREMITIES: no cyanosis, no clubbing and no edema. LABS:     Recent Labs     05/17/22 2343 05/17/22  2351   WBC 13.1*  --    HGB 15.3  --    HCT 43.7  --      --      --    K 3.5  --    CL 92*  --    CO2 27  --    BUN 24*  --    CREATININE 0.7  --    MG 2.40  --    CALCIUM 9.7  --    AST 33  --    ALT 34  --    BILITOT 1.3*  --    NITRU  --  POSITIVE*   COLORU  --  Yellow     Recent Labs     05/17/22 2343   ALKPHOS 56   ALT 34   AST 33   BILITOT 1.3*   LABALBU 5.0   LIPASE 9.0*     CBC:   Lab Results   Component Value Date    WBC 13.1 05/17/2022    RBC 4.95 05/17/2022    HGB 15.3 05/17/2022    HCT 43.7 05/17/2022    MCV 88.4 05/17/2022    MCH 31.0 05/17/2022    MCHC 35.0 05/17/2022    RDW 12.7 05/17/2022     05/17/2022    MPV 9.8 05/17/2022     CMP:    Lab Results   Component Value Date     05/17/2022    K 3.5 05/17/2022    CL 92 05/17/2022    CO2 27 05/17/2022    BUN 24 05/17/2022    CREATININE 0.7 05/17/2022    GFRAA >60 05/17/2022    GFRAA >60 09/15/2012    AGRATIO 2.0 05/17/2022    LABGLOM >60 05/17/2022    GLUCOSE 112 05/17/2022    PROT 7.5 05/17/2022    PROT 6.9 09/15/2012    LABALBU 5.0 05/17/2022    CALCIUM 9.7 05/17/2022    BILITOT 1.3 05/17/2022    ALKPHOS 56 05/17/2022    AST 33 05/17/2022    ALT 34 05/17/2022     Urine Culture:  No components found for: CURINE  Blood Culture:  No components found for: CBLOOD, CFUNGUSBL  RADIOLOGY:     CT scan abd/pelvis:   1.  Findings compatible with at least partial small-bowel obstruction with   transition and findings of stasis in the mid abdomen. 2.  Focal area of inflammatory opacity in the medial right lung base   compatible with bronchiolitis. Thank you for the interesting evaluation. Further recommendations to follow. Tawny Marina PA-C  General and Vascular Surgery [phone]  Electronically signed by Ray Gaona PA-C on 2022 at 1:21 PM    Agree with above note. The patient was personally seen and examined. Kendal Morel is a 26 yo female with hx of  x 3 and tubal ligation who presents with a 4 day history of diffuse abdominal pain, nausea, and vomiting. No flatus or BM during that time. No fevers or chills. Due to the persistence of her symptoms and inability to keep any liquids down, she came in to be evaluated. She has never had anything like this before.     NAD, alert and oriented  RRR  Normal respiratory effort, no accessory muscle use  Abd soft, minimally distended, minimally tender diffusely, no peritonitis  Ext: no cyanosis or clubbing    WBC 13.1  Cr 0.7  Cocaine +    CT abd/pelvis personally reviewed, showing small bowel obstruction with transtion point in distal small bowel    A/P: 26 yo female with SBO secondary to post operative adhesions    NPO, NG decompression  IV hydration  Repeat abdominal films show no improvement of bowel loops  Hopefully will be able to avoid surgery    Francisca Dimas MD Authored by Resident/PA/NP

## 2022-05-20 ENCOUNTER — APPOINTMENT (OUTPATIENT)
Dept: PULMONOLOGY | Facility: CLINIC | Age: 57
End: 2022-05-20

## 2022-05-27 ENCOUNTER — APPOINTMENT (OUTPATIENT)
Dept: PULMONOLOGY | Facility: CLINIC | Age: 57
End: 2022-05-27

## 2022-06-03 ENCOUNTER — APPOINTMENT (OUTPATIENT)
Dept: PULMONOLOGY | Facility: CLINIC | Age: 57
End: 2022-06-03
Payer: MEDICARE

## 2022-06-03 VITALS
WEIGHT: 271 LBS | OXYGEN SATURATION: 92 % | RESPIRATION RATE: 16 BRPM | HEART RATE: 101 BPM | BODY MASS INDEX: 51.16 KG/M2 | SYSTOLIC BLOOD PRESSURE: 137 MMHG | HEIGHT: 61 IN | TEMPERATURE: 98.1 F | DIASTOLIC BLOOD PRESSURE: 85 MMHG

## 2022-06-03 PROCEDURE — 99214 OFFICE O/P EST MOD 30 MIN: CPT

## 2022-06-03 RX ORDER — IPRATROPIUM BROMIDE 21 UG/1
0.03 SPRAY NASAL TWICE DAILY
Qty: 1 | Refills: 2 | Status: ACTIVE | COMMUNITY
Start: 2022-06-03 | End: 1900-01-01

## 2022-06-03 RX ORDER — AZITHROMYCIN 250 MG/1
250 TABLET, FILM COATED ORAL
Qty: 1 | Refills: 0 | Status: DISCONTINUED | COMMUNITY
Start: 2022-04-11 | End: 2022-06-03

## 2022-06-04 NOTE — DISCUSSION/SUMMARY
[FreeTextEntry1] : She has  history of hospitalization/intubation for pneumonia and hypercapnic respiratory failure\par \par She has improved after course of PT at Rehab facility\par \par She has LE edema, on diuretic\par \par Obesity Hypoventilation/ OSAS, has obtained VAPS\par \par LE edema less\par \par PLAN\par \par encouraged to use PAP\par continue inhaled bronchodilator and ICS LABA\par \par She uses HCTZ daily\par \par If edema worse will use furosemide\par \par Add azelastine and ipratropium nasal spray\par \par continue Flonase and intranasal steroid\par \par albuterol MDI as needed ,alb as needed, montelukast\par \par stop benzonatate\par \par may use fexofenadine if needed\par \par \par \par Total time spent : 30 minutes\par Including:\par Preparation prior to visit - Reviewing prior record, results of tests and Consultation Reports as applicable\par Conducting an appropriate H & P during today's encounter\par Appropriate orders for tests, medications and procedures, as applicable\par Counseling patient \par Note completion \par \par I walked with pt for exercise study\par \par Guido Monterroso MD \par

## 2022-06-04 NOTE — PHYSICAL EXAM
[No Acute Distress] : no acute distress [Well Nourished] : well nourished [Well Developed] : well developed [III] : Mallampati Class: III [Not Tender] : not tender [2+ Pitting] : 2+ pitting [TextBox_11] : some cough [TextBox_44] : kyphosis, short neck limited mobility [TextBox_68] : no wheeze, diminished aeration, unlabored [TextBox_89] : obese, protuberant

## 2022-06-04 NOTE — HISTORY OF PRESENT ILLNESS
[Obstructive Sleep Apnea] : obstructive sleep apnea [TextBox_4] : 56 yr old woman with asthma and NAKUL, chronic hypercapnic respiratory failure, due to obesity and restrictive lung disease and kyphosis.  She had been on NIV  BIPAP 20/16.\par \par She is also using Symbicort and albuterol via nebulizer, latter once per day.\par \par She has obtained AirPhysio PEP device and uses it with increased sputum  clearance.  Sputum is mostly clear.\par \par She uses saline nasal wash. She sometimes uses fluticasone nasal spray.  She uses Zyrtec as needed and montelukast every night.\par She uses benzonatate.\par \par \par She has not used PAP, especially since recall. \par \par \par She has had asthma since age [age]. She has few allergies (dust dust mites, flowers) not severe.\par Asthma is worse when allergies are worse. \par She has hypoxia with exertion.\par \par \par She also has a 4 mm lung nodule seen on prior CT chest. CT chest 5/2021 did not demonstrated any suspicious nodule. \par \par \par She has renal cell carcinoma and has not had removal due to COVID crisis. Lesion is being closely monitored per team\par \par She has had LE edema R>L.  She is on HCTZ\par \par She has been hospitalized at [facility] with dyspnea, LOC.  She was treated for pneumonia and hypercapnic respiratory failure.  she required intubation/mechanical ventilation\par \par She was discharged to Rehab facility for 20 days\par \par \par Last CO2 on chemistry 36\par \par She continues to have cough\par \par New VAPS equipment has been obtained\par \par She states she has sinusitis and rhinitis with cough \par \par She has been unable to use AVAPS because of this\par \par Using Symbicort, albuterol and montelukast\par \par \par \par PMH:\par She has hyperlipidemia, elevated liver enzymes, DM, hypothyroid depression hypertension sleep apnea not using CPAP. fibromyalgia\par \par PSH\par c sect\par right ovary\par \par lysis of adhesion\par ERMELINDA/BSO\par removal of left adrenal gland due to nodule, benign\par \par Temporal artery biopsy\par \par \par

## 2022-06-15 LAB
ALBUMIN SERPL ELPH-MCNC: 4.5 G/DL
ALP BLD-CCNC: 118 U/L
ALT SERPL-CCNC: 48 U/L
ANION GAP SERPL CALC-SCNC: 15 MMOL/L
AST SERPL-CCNC: 36 U/L
BASOPHILS # BLD AUTO: 0.05 K/UL
BASOPHILS NFR BLD AUTO: 0.6 %
BILIRUB SERPL-MCNC: 0.6 MG/DL
BUN SERPL-MCNC: 12 MG/DL
CALCIUM SERPL-MCNC: 10.1 MG/DL
CHLORIDE SERPL-SCNC: 96 MMOL/L
CHOLEST SERPL-MCNC: 209 MG/DL
CO2 SERPL-SCNC: 29 MMOL/L
CREAT SERPL-MCNC: 0.63 MG/DL
EGFR: 103 ML/MIN/1.73M2
EOSINOPHIL # BLD AUTO: 0.32 K/UL
EOSINOPHIL NFR BLD AUTO: 3.5 %
ERYTHROCYTE [SEDIMENTATION RATE] IN BLOOD BY WESTERGREN METHOD: 105 MM/HR
ESTIMATED AVERAGE GLUCOSE: 206 MG/DL
FRUCTOSAMINE SERPL-MCNC: 254 UMOL/L
GGT SERPL-CCNC: 54 U/L
GLUCOSE SERPL-MCNC: 144 MG/DL
HBA1C MFR BLD HPLC: 8.8 %
HCT VFR BLD CALC: 38.3 %
HDLC SERPL-MCNC: 41 MG/DL
HGB BLD-MCNC: 11.8 G/DL
IMM GRANULOCYTES NFR BLD AUTO: 0.6 %
LDLC SERPL CALC-MCNC: 141 MG/DL
LYMPHOCYTES # BLD AUTO: 2.5 K/UL
LYMPHOCYTES NFR BLD AUTO: 27.5 %
MAN DIFF?: NORMAL
MCHC RBC-ENTMCNC: 24 PG
MCHC RBC-ENTMCNC: 30.8 GM/DL
MCV RBC AUTO: 78 FL
MONOCYTES # BLD AUTO: 0.58 K/UL
MONOCYTES NFR BLD AUTO: 6.4 %
NEUTROPHILS # BLD AUTO: 5.59 K/UL
NEUTROPHILS NFR BLD AUTO: 61.4 %
NONHDLC SERPL-MCNC: 169 MG/DL
PLATELET # BLD AUTO: 383 K/UL
POTASSIUM SERPL-SCNC: 4.6 MMOL/L
PROT SERPL-MCNC: 7.5 G/DL
RBC # BLD: 4.91 M/UL
RBC # FLD: 15.4 %
SODIUM SERPL-SCNC: 140 MMOL/L
T3 SERPL-MCNC: 113 NG/DL
T4 FREE SERPL-MCNC: 1.1 NG/DL
TRIGL SERPL-MCNC: 136 MG/DL
TSH SERPL-ACNC: 2.58 UIU/ML
WBC # FLD AUTO: 9.09 K/UL

## 2022-06-23 ENCOUNTER — APPOINTMENT (OUTPATIENT)
Dept: RHEUMATOLOGY | Facility: CLINIC | Age: 57
End: 2022-06-23
Payer: MEDICARE

## 2022-06-23 VITALS
RESPIRATION RATE: 16 BRPM | HEIGHT: 61 IN | TEMPERATURE: 98.1 F | SYSTOLIC BLOOD PRESSURE: 131 MMHG | WEIGHT: 268 LBS | BODY MASS INDEX: 50.6 KG/M2 | DIASTOLIC BLOOD PRESSURE: 83 MMHG | OXYGEN SATURATION: 93 % | HEART RATE: 101 BPM

## 2022-06-23 DIAGNOSIS — D69.0 ALLERGIC PURPURA: ICD-10-CM

## 2022-06-23 DIAGNOSIS — M54.16 RADICULOPATHY, LUMBAR REGION: ICD-10-CM

## 2022-06-23 PROCEDURE — 20610 DRAIN/INJ JOINT/BURSA W/O US: CPT | Mod: 50

## 2022-06-23 PROCEDURE — 99214 OFFICE O/P EST MOD 30 MIN: CPT | Mod: 25

## 2022-06-23 RX ORDER — METHYLPRED ACET/NACL,ISO-OS/PF 40 MG/ML
40 VIAL (ML) INJECTION
Qty: 1 | Refills: 0 | Status: COMPLETED | OUTPATIENT
Start: 2022-06-23

## 2022-06-23 RX ORDER — LIDOCAINE HYDROCHLORIDE 10 MG/ML
1 INJECTION, SOLUTION INFILTRATION; PERINEURAL
Qty: 0 | Refills: 0 | Status: COMPLETED | OUTPATIENT
Start: 2022-06-23

## 2022-06-23 RX ADMIN — METHYLPREDNISOLONE ACETATE 0 MG/ML: 40 INJECTION, SUSPENSION INTRA-ARTICULAR; INTRALESIONAL; INTRAMUSCULAR; INTRASYNOVIAL; SOFT TISSUE at 00:00

## 2022-06-23 RX ADMIN — LIDOCAINE HYDROCHLORIDE 0 %: 10 INJECTION, SOLUTION EPIDURAL; INFILTRATION; INTRACAUDAL; PERINEURAL at 00:00

## 2022-06-24 PROBLEM — M54.16 LUMBAR RADICULOPATHY: Status: ACTIVE | Noted: 2019-05-16

## 2022-06-24 PROBLEM — D69.0 IGA MEDIATED LEUKOCYTOCLASTIC VASCULITIS: Status: ACTIVE | Noted: 2019-12-04

## 2022-06-24 NOTE — PHYSICAL EXAM
[General Appearance - Alert] : alert [General Appearance - In No Acute Distress] : in no acute distress [Sclera] : the sclera and conjunctiva were normal [Examination Of The Oral Cavity] : the lips and gums were normal [Oropharynx] : the oropharynx was normal [Neck Appearance] : the appearance of the neck was normal [] : no respiratory distress [Exaggerated Use Of Accessory Muscles For Inspiration] : no accessory muscle use [Heart Rate And Rhythm] : heart rate was normal and rhythm regular [Edema] : there was no peripheral edema [Abdomen Soft] : soft [No Spinal Tenderness] : no spinal tenderness [Motor Exam] : the motor exam was normal [Oriented To Time, Place, And Person] : oriented to person, place, and time [Impaired Insight] : insight and judgment were intact [FreeTextEntry1] : lightened plaque over LE, b/l forearm, olecranon , LT lower back

## 2022-06-24 NOTE — ASSESSMENT
[FreeTextEntry1] : Patient with fibromyalgia, IgA vasculitis resolved, psoriasis, LT RCC: SAB:\par \par Psoriasis development may be multifactorial , namely stemming from renal malignancy as well as metabolic syndromes including uncontrolled DM.  Discussed the importance of dietary modification and medication compliance.  Patient should avoid biologics in the setting of RCC, rec Derm intervention for light therapy and additional topical immunosuppressants, Otezla continued.\par Physical therapy to continue with mobility and muscle strengthening of the rotator cuff; cortisone injection given to the subacromial bursa b/l for pain relief .    Core strengthening exercises demonstrated for the lower back.  Quadriceps strengthening exercises encouraged in the office.  Weight loss has been encouraged to reduce load over the medial joint line.  Viscosupplementation has been encouraged to provide additional lubrication and joint support. \par The importance of dietary and lifestyle modifications was reiterated for the treatment of Fibromyalgia along with discussions on relaxation, stress-reducing techniques and importance of good sleep hygiene.\par \par She is in agreement with the above plan and will return in three months' time.\par \par \par

## 2022-06-24 NOTE — PROCEDURE
[Other Date:___] : Date: [unfilled] [Patient] : the patient [Risks] : risks [Benefits] : benefits [Consent Obtained] : written consent was obtained prior to the procedure and is detailed in the patient's record [Therapeutic] : therapeutic [#1 Site: ______] : #1 site identified in the [unfilled] [#2 Site: ___] : # 2 site identified in the [unfilled] [Ethyl Chloride] : ethyl chloride [Betadine] : with betadine solution [25 gauge 1.5 inch] : A 25 gauge 1.5 inch needle was used [___ml 1% Lidocaine] : [unfilled] ml of 1% lidocaine [Depomedrol ___ mg] : Depomedrol [unfilled] mg [Tolerated Well] : the patient tolerated the procedure well [No Complications] : there were no complications [Patient Instructed to Call] : patient was instructed to call if redness at site, a decrease in range of motion or an increase in pain is noted after procedure.

## 2022-06-24 NOTE — HISTORY OF PRESENT ILLNESS
[FreeTextEntry1] : Patient returns and explains increased difficulty with raising arms overhead and reaching for mid back . She denies accompanied trauma or paresthesias. She explains diffuse arthralgias notes improvement with continued weight loss as she is focusing on dietary and exercise modifications.  She notes lower extremity psoriasiform rash as well as rash over the mid back has improved with the use of Otezla and triamcinolone.  \par Patient developed skin rash spring of  2019,   diagnosed as leukocytoclastic vasculitis. Ig A, biopsy proven, having been preceded by GI infection with subsequent urinalysis showing hematuria / proteinuria. Renal protection with ACE-I and regular f/u rec by nephrology colleagues in the setting of 4.1cm left kidney lesion / RCC.   She otherwise denies motor/sensory disturbances or systemic symptoms.

## 2022-06-28 ENCOUNTER — APPOINTMENT (OUTPATIENT)
Dept: ENDOCRINOLOGY | Facility: CLINIC | Age: 57
End: 2022-06-28

## 2022-06-28 ENCOUNTER — APPOINTMENT (OUTPATIENT)
Dept: INTERNAL MEDICINE | Facility: CLINIC | Age: 57
End: 2022-06-28
Payer: MEDICARE

## 2022-06-28 VITALS
HEIGHT: 61 IN | RESPIRATION RATE: 16 BRPM | HEART RATE: 92 BPM | WEIGHT: 275 LBS | OXYGEN SATURATION: 95 % | SYSTOLIC BLOOD PRESSURE: 130 MMHG | BODY MASS INDEX: 51.92 KG/M2 | TEMPERATURE: 98 F | DIASTOLIC BLOOD PRESSURE: 80 MMHG

## 2022-06-28 PROCEDURE — 99214 OFFICE O/P EST MOD 30 MIN: CPT

## 2022-06-28 NOTE — ASSESSMENT
[FreeTextEntry1] : 57 year old female found to have stable Hypertension, Hypothyroidism, Type 2 Diabetes Mellitus, Hyperlipidemia, Reactive Airway Disease, Morbid Obesity, Renal Cell Carcinoma, Diabetic Neuropathy,,with the current prescription regimen as recommended, diet and life style modifications, as counseled. Prior results reviewed, interpreted and discussed with the patient during today's examination, as appropriate. Follow up, treatment plan and tests, as ordered.\par \par Total time spent : 30 minutes\par Including:\par Preparation prior to visit - Reviewing prior record, results of tests and Consultation Reports as applicable\par Conducting an appropriate H & P during today's encounter\par Appropriate orders for tests, medications and procedures, as applicable\par Counseling patient \par Note completion\par

## 2022-06-28 NOTE — HEALTH RISK ASSESSMENT
[Intercurrent hospitalizations] : was admitted to the hospital  [Never] : Never [No] : In the past 12 months have you used drugs other than those required for medical reasons? No [No falls in past year] : Patient reported no falls in the past year [0] : 2) Feeling down, depressed, or hopeless: Not at all (0) [de-identified] : 02/22 [de-identified] : RHEUM/PULM/CARD [[id number]] : 0

## 2022-06-28 NOTE — HISTORY OF PRESENT ILLNESS
[de-identified] : 57 year old  female patient with history of stable Hypertension, Hypothyroidism, Type 2 Diabetes Mellitus, Reactive Airway Disease, Morbid Obesity, Renal Cell Carcinoma, Diabetic Neuropathy, history as stated, presented for follow up examination. Patient is compliant with all medications. ROS as stated.\par \par

## 2022-07-06 ENCOUNTER — NON-APPOINTMENT (OUTPATIENT)
Age: 57
End: 2022-07-06

## 2022-08-10 NOTE — REASON FOR VISIT
[Follow-up Visit ___] : a follow-up visit  for [unfilled] Bactrim Pregnancy And Lactation Text: This medication is Pregnancy Category D and is known to cause fetal risk.  It is also excreted in breast milk.

## 2022-08-13 ENCOUNTER — OUTPATIENT (OUTPATIENT)
Dept: OUTPATIENT SERVICES | Facility: HOSPITAL | Age: 57
LOS: 1 days | End: 2022-08-13
Payer: MEDICARE

## 2022-08-13 ENCOUNTER — RESULT REVIEW (OUTPATIENT)
Age: 57
End: 2022-08-13

## 2022-08-13 ENCOUNTER — APPOINTMENT (OUTPATIENT)
Dept: MRI IMAGING | Facility: HOSPITAL | Age: 57
End: 2022-08-13

## 2022-08-13 DIAGNOSIS — Z90.710 ACQUIRED ABSENCE OF BOTH CERVIX AND UTERUS: Chronic | ICD-10-CM

## 2022-08-13 DIAGNOSIS — E27.8 OTHER SPECIFIED DISORDERS OF ADRENAL GLAND: Chronic | ICD-10-CM

## 2022-08-13 DIAGNOSIS — N28.89 OTHER SPECIFIED DISORDERS OF KIDNEY AND URETER: ICD-10-CM

## 2022-08-13 DIAGNOSIS — N89.8 OTHER SPECIFIED NONINFLAMMATORY DISORDERS OF VAGINA: Chronic | ICD-10-CM

## 2022-08-13 DIAGNOSIS — I77.6 ARTERITIS, UNSPECIFIED: Chronic | ICD-10-CM

## 2022-08-13 DIAGNOSIS — Z98.89 OTHER SPECIFIED POSTPROCEDURAL STATES: Chronic | ICD-10-CM

## 2022-08-13 PROCEDURE — 74181 MRI ABDOMEN W/O CONTRAST: CPT

## 2022-08-13 PROCEDURE — 74181 MRI ABDOMEN W/O CONTRAST: CPT | Mod: 26

## 2022-08-19 ENCOUNTER — APPOINTMENT (OUTPATIENT)
Dept: PULMONOLOGY | Facility: CLINIC | Age: 57
End: 2022-08-19

## 2022-08-19 VITALS
RESPIRATION RATE: 16 BRPM | WEIGHT: 280 LBS | HEIGHT: 61 IN | OXYGEN SATURATION: 90 % | HEART RATE: 86 BPM | TEMPERATURE: 98.1 F | BODY MASS INDEX: 52.87 KG/M2 | SYSTOLIC BLOOD PRESSURE: 145 MMHG | DIASTOLIC BLOOD PRESSURE: 82 MMHG

## 2022-08-19 PROCEDURE — 99215 OFFICE O/P EST HI 40 MIN: CPT

## 2022-08-19 RX ORDER — BENZONATATE 100 MG/1
100 CAPSULE ORAL TWICE DAILY
Qty: 180 | Refills: 3 | Status: DISCONTINUED | COMMUNITY
Start: 2021-03-12 | End: 2022-08-19

## 2022-08-21 NOTE — HISTORY OF PRESENT ILLNESS
[Obstructive Sleep Apnea] : obstructive sleep apnea [TextBox_4] : 57 yr old woman with asthma and NAKUL, chronic hypercapnic respiratory failure, due to obesity and restrictive lung disease and kyphosis.  She had been on NIV  BIPAP 20/16.\par \par She is also using Symbicort and albuterol via nebulizer, latter once per day.\par \par She has obtained AirPhysio PEP device and uses it with increased sputum  clearance.  Sputum is mostly clear.\par \par She uses saline nasal wash. She sometimes uses fluticasone nasal spray.  She uses Zyrtec as needed and montelukast every night.\par She uses benzonatate.\par \par \par She has not used PAP, especially since recall. \par \par \par She has had asthma since age [age]. She has few allergies (dust dust mites, flowers) not severe.\par Asthma is worse when allergies are worse. \par She has hypoxia with exertion.\par \par \par She also has a 4 mm lung nodule seen on prior CT chest. CT chest 5/2021 did not demonstrated any suspicious nodule. \par \par \par She has renal cell carcinoma and has not had removal due to COVID crisis. Lesion is being closely monitored per team\par \par She had repeat MRI 8/13/22, results not yet available. \par \par She has had LE edema R>L.  She is on HCTZ\par \par She has been hospitalized at [facility] with dyspnea, LOC.  She was treated for pneumonia and hypercapnic respiratory failure.  she required intubation/mechanical ventilation\par \par She was discharged to Rehab facility for 20 days\par \par \par Last CO2 on chemistry 36\par \par She continues to have cough\par \par New VAPS equipment has been obtained.  She still has sinusitis and rhinitis with cough \par \par She has been unable to use AVAPS because of this.  Also, she will not be able to continue the AVAPS due to cost.\par \par She has BIPAP which she has paid for.  \par \par Using Symbicort, albuterol and montelukast, nasal sprays ipratropium, saline, fluticasone\par \par She called because e she has chest congestion and nasal symptoms that do not allow her to use PAP.\par \par \par \par PMH:\par She has hyperlipidemia, elevated liver enzymes, DM, hypothyroid depression hypertension sleep apnea not using CPAP. fibromyalgia\par \par PSH\par c sect\par right ovary\par \par lysis of adhesion\par ERMELINDA/BSO\par removal of left adrenal gland due to nodule, benign\par \par Temporal artery biopsy\par \par \par

## 2022-08-21 NOTE — DISCUSSION/SUMMARY
[FreeTextEntry1] : She has  history of hospitalization/intubation for pneumonia and hypercapnic respiratory failure\par \par She has improved after course of PT at Rehab facility\par \par She has LE edema, on diuretic\par \par Obesity Hypoventilation/ OSAS, has obtained VAPS but too costly,using CPAP.\par \par She reports cough, rhinitis, chest congestion and dyspnea which persist despite treatment\par \par She also now reports discolored sputum\par \par She is being treated for obstructive airways disease\par \par PLAN\par \par encouraged to use PAP if able\par \par Continue nasal sprays fluticasone azelastine and ipratropium nasal spray\par \par She may continue to use cetirizine\par \par Continue ICS/LABA  and montelukast, along with albuterol MDI as needed \par She will continue using Air Physio\par \par I will treat with short course of prednisone 40 mg for 2 days 20 mg for 3-5 days and\par \par doxycycline 100 mg bid for 7 days\par \par trial furosemide 20 mg daily for 1-2 weeks as tolerated\par \par obtain CXR\par \par check chemistry, pro BNP, C reactive protein bicarb level\par \par Further recommendations based on results. \par \par \par Total time spent : 30 minutes\par Including:\par Preparation prior to visit - Reviewing prior record, results of tests and Consultation Reports as applicable\par Conducting an appropriate H & P during today's encounter\par Appropriate orders for tests, medications and procedures, as applicable\par Counseling patient \par Note completion \par \par I walked with pt for exercise study\par \par Guido Monterroso MD \par

## 2022-08-22 ENCOUNTER — TRANSCRIPTION ENCOUNTER (OUTPATIENT)
Age: 57
End: 2022-08-22

## 2022-08-22 NOTE — ED ADULT TRIAGE NOTE - DIRECT TO ROOM CARE INITIATED:
Dru Devries is a 59-year-old male status post robotic assisted laparoscopic radical prostatectomy by Dr Hiram Thomas 08/11/2022  Patient treated postoperatively with Macrobid for complaints of lower urinary tract symptoms  He presents to [address] [place] [address] yesterday evening with fever, T-max exceeding 102, general malaise and anorexia  He was admitted to the Internal Medicine service for symptom management and empiric antibiotics  Blood and urine cultures are currently pending  He is without leukocytosis or acute kidney injury at this time  Procalcitonin level within normal limits  Urinalysis demonstrated moderate amounts of bacteria and innumerable WBCs on microscopic  CT was obtained overnight  Official transcription unavailable  However per  V Rad findings include:  1  6 9 x 1 9 cm gas/fluid collection of the right pelvic sidewall consistent with postoperative seroma versus superinfection or abscess  2  Bilateral mild symmetrical hydronephrosis  3  Perivesical stranding and bladder wall thickening consistent with acute cystitis  4  Right exophytic hyperdense cyst versus solid lesion  Interval Plan:  · Continue medical optimization, symptom management and empiric antibiotics  · Maintain NPO  · Maintain Ramachandran catheter  Do not remove  · Consultation placed for Interventional Radiology, requesting percutaneous drain insertion  · Patient will be seen in formal urologic consultation by Charlie Gilbert, NORBERTO Fonseca or SUZIE Epstein attending on-call later this morning  29-Oct-2018 16:01

## 2022-08-26 ENCOUNTER — OUTPATIENT (OUTPATIENT)
Dept: OUTPATIENT SERVICES | Facility: HOSPITAL | Age: 57
LOS: 1 days | End: 2022-08-26
Payer: MEDICARE

## 2022-08-26 DIAGNOSIS — I77.6 ARTERITIS, UNSPECIFIED: Chronic | ICD-10-CM

## 2022-08-26 DIAGNOSIS — E27.8 OTHER SPECIFIED DISORDERS OF ADRENAL GLAND: Chronic | ICD-10-CM

## 2022-08-26 DIAGNOSIS — Z90.710 ACQUIRED ABSENCE OF BOTH CERVIX AND UTERUS: Chronic | ICD-10-CM

## 2022-08-26 DIAGNOSIS — Z98.89 OTHER SPECIFIED POSTPROCEDURAL STATES: Chronic | ICD-10-CM

## 2022-08-26 DIAGNOSIS — G47.33 OBSTRUCTIVE SLEEP APNEA (ADULT) (PEDIATRIC): ICD-10-CM

## 2022-08-26 DIAGNOSIS — N89.8 OTHER SPECIFIED NONINFLAMMATORY DISORDERS OF VAGINA: Chronic | ICD-10-CM

## 2022-08-26 PROCEDURE — 71046 X-RAY EXAM CHEST 2 VIEWS: CPT | Mod: 26

## 2022-08-26 PROCEDURE — 71046 X-RAY EXAM CHEST 2 VIEWS: CPT

## 2022-09-04 LAB
ALBUMIN SERPL ELPH-MCNC: 4.3 G/DL
ALP BLD-CCNC: 130 U/L
ALT SERPL-CCNC: 34 U/L
ANION GAP SERPL CALC-SCNC: 19 MMOL/L
AST SERPL-CCNC: 24 U/L
BASOPHILS # BLD AUTO: 0.06 K/UL
BASOPHILS NFR BLD AUTO: 0.5 %
BILIRUB SERPL-MCNC: 0.5 MG/DL
BUN SERPL-MCNC: 18 MG/DL
CALCIUM SERPL-MCNC: 9.7 MG/DL
CHLORIDE SERPL-SCNC: 93 MMOL/L
CO2 SERPL-SCNC: 30 MMOL/L
CREAT SERPL-MCNC: 0.65 MG/DL
CRP SERPL-MCNC: 14 MG/L
EGFR: 103 ML/MIN/1.73M2
EOSINOPHIL # BLD AUTO: 0.22 K/UL
EOSINOPHIL NFR BLD AUTO: 1.8 %
GLUCOSE SERPL-MCNC: 195 MG/DL
HCT VFR BLD CALC: 42.6 %
HGB BLD-MCNC: 12.6 G/DL
IMM GRANULOCYTES NFR BLD AUTO: 0.6 %
LYMPHOCYTES # BLD AUTO: 3.09 K/UL
LYMPHOCYTES NFR BLD AUTO: 25.6 %
MAN DIFF?: NORMAL
MCHC RBC-ENTMCNC: 23.6 PG
MCHC RBC-ENTMCNC: 29.6 GM/DL
MCV RBC AUTO: 79.9 FL
MONOCYTES # BLD AUTO: 0.75 K/UL
MONOCYTES NFR BLD AUTO: 6.2 %
NEUTROPHILS # BLD AUTO: 7.89 K/UL
NEUTROPHILS NFR BLD AUTO: 65.3 %
NT-PROBNP SERPL-MCNC: 47 PG/ML
PLATELET # BLD AUTO: 374 K/UL
POTASSIUM SERPL-SCNC: 4 MMOL/L
PROT SERPL-MCNC: 7.3 G/DL
RBC # BLD: 5.33 M/UL
RBC # FLD: 16.3 %
SODIUM SERPL-SCNC: 142 MMOL/L
TOTAL IGE SMQN RAST: 6 KU/L
WBC # FLD AUTO: 12.08 K/UL

## 2022-09-15 NOTE — ED ADULT NURSE NOTE - ED STAT RN HANDOFF TIME
-- DO NOT REPLY / DO NOT REPLY ALL --  -- Message is from Engagement Center Operations (ECO) --    General Patient Message Patient calling regarding has blood clots in left leg and both lungs that was diagnosed previously and just has a question for MD. Patient would like to know about traveling at this point if it's doable. Please advise.    Caller Information       Type Contact Phone/Fax    09/15/2022 09:38 AM CDT Phone (Incoming) Laury Dos Santos (Self) [phone] (H)        Alternative phone number: [phone]    Can a detailed message be left? Yes    Message Turnaround: [place]:    Refer to site's KB page for routing instructions    Please give this turnaround time to the caller:   \"You can expect to receive a response 1-3 business days after your provider's clinical team reviews the message\"               07:29 16:48

## 2022-09-16 ENCOUNTER — TRANSCRIPTION ENCOUNTER (OUTPATIENT)
Age: 57
End: 2022-09-16

## 2022-09-16 RX ORDER — AZITHROMYCIN 250 MG/1
250 TABLET, FILM COATED ORAL
Qty: 6 | Refills: 0 | Status: DISCONTINUED | COMMUNITY
Start: 2022-06-28 | End: 2022-09-16

## 2022-09-19 ENCOUNTER — APPOINTMENT (OUTPATIENT)
Dept: UROLOGY | Facility: CLINIC | Age: 57
End: 2022-09-19

## 2022-09-27 ENCOUNTER — NON-APPOINTMENT (OUTPATIENT)
Age: 57
End: 2022-09-27

## 2022-10-03 ENCOUNTER — APPOINTMENT (OUTPATIENT)
Dept: INTERNAL MEDICINE | Facility: CLINIC | Age: 57
End: 2022-10-03

## 2022-10-03 VITALS
HEIGHT: 61 IN | HEART RATE: 103 BPM | BODY MASS INDEX: 52.49 KG/M2 | RESPIRATION RATE: 16 BRPM | TEMPERATURE: 98 F | DIASTOLIC BLOOD PRESSURE: 81 MMHG | WEIGHT: 278 LBS | OXYGEN SATURATION: 95 % | SYSTOLIC BLOOD PRESSURE: 141 MMHG

## 2022-10-03 PROCEDURE — 99214 OFFICE O/P EST MOD 30 MIN: CPT

## 2022-10-03 NOTE — HEALTH RISK ASSESSMENT
[Intercurrent hospitalizations] : was admitted to the hospital  [Never] : Never [No] : In the past 12 months have you used drugs other than those required for medical reasons? No [No falls in past year] : Patient reported no falls in the past year [0] : 2) Feeling down, depressed, or hopeless: Not at all (0) [de-identified] : 02/22 [de-identified] : PULM [[id number]] : 0

## 2022-10-03 NOTE — HISTORY OF PRESENT ILLNESS
[de-identified] : 57 year old  female patient with history of stable Hypertension, Hypothyroidism, Type 2 Diabetes Mellitus, Reactive Airway Disease, Morbid Obesity, Renal Cell Carcinoma, Diabetic Neuropathy, history as stated, presented for follow up examination. Patient is compliant with all medications. ROS as stated.\par \par

## 2022-10-04 ENCOUNTER — APPOINTMENT (OUTPATIENT)
Dept: RHEUMATOLOGY | Facility: CLINIC | Age: 57
End: 2022-10-04

## 2022-10-04 VITALS
DIASTOLIC BLOOD PRESSURE: 78 MMHG | BODY MASS INDEX: 52.49 KG/M2 | WEIGHT: 278 LBS | TEMPERATURE: 98.1 F | SYSTOLIC BLOOD PRESSURE: 112 MMHG | RESPIRATION RATE: 16 BRPM | HEIGHT: 61 IN | HEART RATE: 106 BPM | OXYGEN SATURATION: 92 %

## 2022-10-04 DIAGNOSIS — M17.12 UNILATERAL PRIMARY OSTEOARTHRITIS, LEFT KNEE: ICD-10-CM

## 2022-10-04 DIAGNOSIS — G50.0 TRIGEMINAL NEURALGIA: ICD-10-CM

## 2022-10-04 PROCEDURE — 20610 DRAIN/INJ JOINT/BURSA W/O US: CPT | Mod: LT

## 2022-10-04 PROCEDURE — 99215 OFFICE O/P EST HI 40 MIN: CPT | Mod: 25

## 2022-10-04 RX ORDER — LIDOCAINE HYDROCHLORIDE 10 MG/ML
1 INJECTION, SOLUTION INFILTRATION; PERINEURAL
Qty: 0 | Refills: 0 | Status: COMPLETED | OUTPATIENT
Start: 2022-10-04

## 2022-10-04 RX ORDER — METHYLPRED ACET/NACL,ISO-OS/PF 80 MG/ML
80 VIAL (ML) INJECTION
Qty: 1 | Refills: 0 | Status: COMPLETED | OUTPATIENT
Start: 2022-10-04

## 2022-10-04 RX ADMIN — METHYLPREDNISOLONE ACETATE 0 MG/ML: 80 INJECTION, SUSPENSION INTRA-ARTICULAR; INTRALESIONAL; INTRAMUSCULAR; SOFT TISSUE at 00:00

## 2022-10-04 RX ADMIN — LIDOCAINE HYDROCHLORIDE 0 %: 10 INJECTION, SOLUTION EPIDURAL; INFILTRATION; INTRACAUDAL; PERINEURAL at 00:00

## 2022-10-04 NOTE — ASSESSMENT
[FreeTextEntry1] : Patient with fibromyalgia, IgA vasculitis resolved, psoriasis, LT RCC: LT KNEE DJD:\par \par Physical therapy to continue with mobility and muscle strengthening of the knee and rotator cuff; cortisone injection given to the LT knee for pain relief .   Quadriceps strengthening exercises encouraged in the office.  Weight loss has been encouraged to reduce load over the medial joint line.  Viscosupplementation has been encouraged to provide additional lubrication and joint support. Core strengthening exercises demonstrated for the lower back.  \par \par Psoriasis development may be multifactorial , namely stemming from renal malignancy as well as metabolic syndromes including uncontrolled DM.  Discussed the importance of dietary modification and medication compliance.  Patient should avoid biologics in the setting of RCC, rec Derm intervention for light therapy and additional topical immunosuppressants, Otezla continued. Surveillance labs to be performed in November end.\par \par The importance of dietary and lifestyle modifications was reiterated for the treatment of Fibromyalgia along with discussions on relaxation, stress-reducing techniques and importance of good sleep hygiene.  Neuropathic pain therapy given to address pain symptoms along with trigeminal neuralgia.\par \par She is in agreement with the above plan and will return in three months' time.\par \par \par

## 2022-10-04 NOTE — HISTORY OF PRESENT ILLNESS
[FreeTextEntry1] : Patient returns and explains LT knee pain, scaled 8 out of 10 pain scale, exacerbated upon prolonged walking or standing. She denies accompanied trauma, swelling or paresthesias. She explains diffuse arthralgias notes improvement with continued weight loss as she is focusing on dietary and exercise modifications.  She notes lower extremity psoriasiform rash as well as rash over the mid back has improved with the use of Otezla and triamcinolone.  \par \par Patient developed skin rash spring of  2019,   diagnosed as leukocytoclastic vasculitis. Ig A, biopsy proven, having been preceded by GI infection with subsequent urinalysis showing hematuria / proteinuria. Renal protection with ACE-I and regular f/u rec by nephrology colleagues in the setting of  4.8 x 4.5 cm left kidney lesion / RCC. Surgical intervention postponed due to multiple comorbidities.    \par \par She otherwise denies motor/sensory disturbances or systemic symptoms.

## 2022-10-04 NOTE — CONSULT LETTER
[Dear  ___] : Dear  [unfilled], [Courtesy Letter:] : I had the pleasure of seeing your patient, [unfilled], in my office today. [Please see my note below.] : Please see my note below. [Consult Closing:] : Thank you very much for allowing me to participate in the care of this patient.  If you have any questions, please do not hesitate to contact me. [Sincerely,] : Sincerely, [FreeTextEntry2] : Ken John MD [FreeTextEntry3] : Chante Ribeiro M.D., RhMSUS\par  of Medicine \par [employer] [facility]\par \par

## 2022-10-04 NOTE — PROCEDURE
[Patient] : the patient [Risks] : risks [Benefits] : benefits [Consent Obtained] : written consent was obtained prior to the procedure and is detailed in the patient's record [Therapeutic] : therapeutic [#1 Site: ______] : #1 site identified in the [unfilled] [Betadine] : betadine solution [25 gauge 1.5 inch] : A 25 gauge 1.5 inch needle was used [___ml 1% Lidocaine] : [unfilled] ml of 1% lidocaine [Depomedrol ___ mg] : Depomedrol [unfilled] mg [Tolerated Well] : the patient tolerated the procedure well [No Complications] : there were no complications [Patient Instructed to Call] : patient was instructed to call if redness at site, a decrease in range of motion or an increase in pain is noted after procedure. [Today's Date:] : Date: [unfilled]

## 2022-10-26 ENCOUNTER — APPOINTMENT (OUTPATIENT)
Dept: ENDOCRINOLOGY | Facility: CLINIC | Age: 57
End: 2022-10-26

## 2022-10-26 PROCEDURE — 99443: CPT

## 2022-10-26 RX ORDER — INSULIN DETEMIR 100 [IU]/ML
100 INJECTION, SOLUTION SUBCUTANEOUS
Qty: 10 | Refills: 2 | Status: ACTIVE | COMMUNITY
Start: 2020-08-17 | End: 1900-01-01

## 2022-10-26 NOTE — HISTORY OF PRESENT ILLNESS
[Home] : at home, [unfilled] , at the time of the visit. [Medical Office: ([facility])___] : at the medical office located in  [Verbal consent obtained from patient] : the patient, [unfilled] [FreeTextEntry1] : Patient is having respiratory problems, she has not been able to have the kidney tumor surgery for that reason. Her weight is unchanged. She is not exercising regularly she is following the diet. Her blood glucose at home are not well controlled. She denies low blood glucose during the night. She denies chest pain, or SOB. Denies numbness, tingling or burning sensation on her extremities. Taking her medications regularly. She has seen the Ophthalmologist recently. She has seen Podiatrist recently. She has seen the Cardiologist recently. She had an adrenal adenoma producing Cushing's syndrome removed.\par

## 2022-10-26 NOTE — ASSESSMENT
[FreeTextEntry1] : Her diabetes is not well control\par The patient is under stress\par She is having respiratory problems\par She needs surgery for a kidney tumor\par Will order the work up for diabetes and Cushing's syndrome\par Will discuss the results in 2 weeks

## 2022-10-27 ENCOUNTER — TRANSCRIPTION ENCOUNTER (OUTPATIENT)
Age: 57
End: 2022-10-27

## 2022-10-29 ENCOUNTER — APPOINTMENT (OUTPATIENT)
Dept: PULMONOLOGY | Facility: CLINIC | Age: 57
End: 2022-10-29

## 2022-10-29 VITALS
OXYGEN SATURATION: 97 % | HEIGHT: 61 IN | TEMPERATURE: 97.6 F | HEART RATE: 84 BPM | SYSTOLIC BLOOD PRESSURE: 126 MMHG | RESPIRATION RATE: 16 BRPM | DIASTOLIC BLOOD PRESSURE: 85 MMHG | BODY MASS INDEX: 51.73 KG/M2 | WEIGHT: 274 LBS

## 2022-10-29 DIAGNOSIS — J96.92 RESPIRATORY FAILURE, UNSPECIFIED WITH HYPERCAPNIA: ICD-10-CM

## 2022-10-29 PROCEDURE — 99213 OFFICE O/P EST LOW 20 MIN: CPT

## 2022-10-29 RX ORDER — PREDNISONE 20 MG/1
20 TABLET ORAL DAILY
Qty: 10 | Refills: 0 | Status: DISCONTINUED | COMMUNITY
Start: 2022-08-19 | End: 2022-10-29

## 2022-10-29 RX ORDER — PROMETHAZINE HYDROCHLORIDE AND DEXTROMETHORPHAN HYDROBROMIDE ORAL SOLUTION 15; 6.25 MG/5ML; MG/5ML
6.25-15 SOLUTION ORAL TWICE DAILY
Qty: 1 | Refills: 2 | Status: ACTIVE | COMMUNITY
Start: 2022-10-29 | End: 1900-01-01

## 2022-10-29 RX ORDER — INHALER, ASSIST DEVICES
SPACER (EA) MISCELLANEOUS
Qty: 1 | Refills: 2 | Status: ACTIVE | COMMUNITY
Start: 2022-10-29 | End: 1900-01-01

## 2022-10-29 RX ORDER — LEVOFLOXACIN 500 MG/1
500 TABLET, FILM COATED ORAL DAILY
Qty: 7 | Refills: 0 | Status: DISCONTINUED | COMMUNITY
Start: 2022-09-16 | End: 2022-10-29

## 2022-10-29 RX ORDER — DOXYCYCLINE HYCLATE 100 MG/1
100 TABLET ORAL TWICE DAILY
Qty: 14 | Refills: 0 | Status: DISCONTINUED | COMMUNITY
Start: 2022-08-19 | End: 2022-10-29

## 2022-10-29 NOTE — HISTORY OF PRESENT ILLNESS
[Obstructive Sleep Apnea] : obstructive sleep apnea [Never] : never [Hypersomnolence] : hypersomnolence [Nonrestorative Sleep] : nonrestorative sleep [Snoring] : snoring [Unintentional Sleep while Active] : unintentional sleep while active [TextBox_4] : 57 yr old woman with asthma and NAKUL, chronic hypercapnic respiratory failure, due to obesity and restrictive lung disease and kyphosis.  She had been on NIV  BIPAP 20/16.\par \par She is also using Symbicort and albuterol via nebulizer, latter once per day.\par \par She has obtained AirPhysio PEP device and uses it with increased sputum  clearance.  Sputum is mostly clear.\par \par She uses saline nasal wash. She also uses fluticasone nasal spray.  She uses Zyrtec as needed and montelukast every night.\par \par \par She has not used PAP, especially since recall.  She obtained NIV but it was too costly.  She now does not use any PAP.  She received replacement from [employer] but states it has not been set up to use, and she has not used it due to cough\par \par \par She has had asthma for many years. She has few allergies (dust dust mites, flowers) not severe.\par Asthma is worse when allergies are worse. \par \par She has hypoxia with exertion.\par \par \par She also has a 4 mm lung nodule seen on prior CT chest. CT chest 5/2021 did not demonstrated any suspicious nodule. \par \par \par She has renal cell carcinoma and has not had removal due to COVID crisis. Lesion is being closely monitored per team\par \par She had repeat MRI 8/13/22\par \par She has had LE edema R>L.  She is on HCTZ and ramipril\par \par She has been hospitalized at [facility] with dyspnea, LOC.  She was treated for pneumonia and hypercapnic respiratory failure.  she required intubation/mechanical ventilation\par \par \par Last CO2 on chemistry 36\par \par She continues to have cough which she states is incessant, especially when supine\par \par \par She  has also had chest congestion and nasal symptoms that were treated with diuretic antibiotic and steroid with brief transient benefit\par \par She has contacted me about her cough that she states is severe, mostly non productive\par \par PMH:\par She has hyperlipidemia, elevated liver enzymes, DM, hypothyroid depression hypertension sleep apnea not using CPAP. fibromyalgia\par \par PSH\par c sect\par right ovary\par \par lysis of adhesion\par ERMELINDA/BSO\par removal of left adrenal gland due to nodule, benign\par \par Temporal artery biopsy\par \par \par

## 2022-10-29 NOTE — PHYSICAL EXAM
[No Acute Distress] : no acute distress [Well Nourished] : well nourished [Well Developed] : well developed [III] : Mallampati Class: III [Not Tender] : not tender [2+ Pitting] : 2+ pitting [Low Lying Soft Palate] : low lying soft palate [Elongated Uvula] : elongated uvula [Enlarged Base of the Tongue] : enlarged base of the tongue [TextBox_11] : some cough [TextBox_44] : kyphosis, short neck limited mobility [TextBox_68] : no wheeze, diminished aeration, unlabored, some squeak on forced exhalation [TextBox_89] : obese, protuberant

## 2022-10-29 NOTE — DISCUSSION/SUMMARY
[FreeTextEntry1] : Chronic hypercapnic respiratory failure/NAKUL,  obstructive airway disease asthma\par \par morbid obeisty\par \par She has  history of hospitalization/intubation for pneumonia and hypercapnic respiratory failure\par \par She has improved after course of PT at Rehab facility\par \par She was prescribed and  obtained VAPS but too costly,\par \par However, she is not using BIPAP (which she has had) either, states it needs to be set.  She received new equipment fro [facility] using CPAP.\par \par She reports cough, rhinitis, chest congestion and dyspnea which persist despite treatment\par She has intermittent chronic severe cough\par \par PLAN\par \par Will need to have BIPAP set up for her.  [place] no longer accepts her insurance.  She could not afford NIV from  [facility].  \par \par Continue nasal sprays fluticasone azelastine and ipratropium nasal spray\par \par She may continue to use antihistamine\par \par Continue ICS/LABA  and montelukast, along with albuterol MDI as needed, \par order spacer\par \par She will return for PFT\par \par \par Stop ramipril and start amlodipine 10 mg check BP at home,  rule out ACE inhibitor induced cough though I suspect symptoms relate to NAKUL, hypercapnic resp failure\par \par I have ordered obtain dynamic CT chest\par \par I have reviewed CT C spine that demonstrated clear sinuses and upper airway\par \par check chemistry, pro BNP, C reactive protein bicarb level allergy testing\par \par consider ENT evaluation, though sinuses and throat patent on CT C spine\par \par \par \par continue HCTZ\par \par lasix if BNP high\par \par no significant edema\par \par consider speaking with insurance re PA for NIV.  Suggested the patient consider adjusting her plan as NIV would be beneficial\par \par \par Further recommendations based on results. \par \par \par Total time spent : 30 minutes\par Including:\par Preparation prior to visit - Reviewing prior record, results of tests and Consultation Reports as applicable\par Conducting an appropriate H & P during today's encounter\par Appropriate orders for tests, medications and procedures, as applicable\par Counseling patient \par Note completion \par \par I walked with pt for exercise study\par \par Guido Monterroso MD \par

## 2022-11-08 NOTE — CONSULT NOTE ADULT - CONSULT REQUESTED DATE/TIME
Regarding busprione 10MG Medication refill protocol not available. Please advise on refill.  In regards to Mirtazapine  15MG Medication protocol failed d/t the following:  Seen by prescribing provider or same department within the last 12 months or has a future appt in 3 months - IF FAILED PLEASE LOOK AT CHART REVIEW FOR LAST VISIT AND PROCEED ACCORDINGLY    Recent Visits  Date Type Provider Dept   07/01/22 Office Visit Catia Chino DO [facility]   05/10/22 Office Visit Tiffany Mckeon CNP [facility]   02/15/22 Office Visit Tiffany Mckeon CNP [facility]   Showing recent visits within past 365 days with a meds authorizing provider and meeting all other requirements  Future Appointments  Date Type Provider Dept   01/03/23 Appointment Tiffany Mckeon CNP [facility]   Showing future appointments within next 90 days with a meds authorizing provider and meeting all other requirements       Please advise on refill.      27-Jul-2020 16:16

## 2022-11-16 ENCOUNTER — APPOINTMENT (OUTPATIENT)
Dept: ENDOCRINOLOGY | Facility: CLINIC | Age: 57
End: 2022-11-16

## 2022-11-16 DIAGNOSIS — E24.9 CUSHING'S SYNDROME, UNSPECIFIED: ICD-10-CM

## 2022-11-16 PROCEDURE — 99443: CPT

## 2022-11-16 NOTE — DATA REVIEWED
[FreeTextEntry1] : The FBS and hbA1c are elevated. her renal function is fine. The Salivary cortisol was repeated

## 2022-11-16 NOTE — HISTORY OF PRESENT ILLNESS
[Home] : at home, [unfilled] , at the time of the visit. [Medical Office: ([facility])___] : at the medical office located in  [Verbal consent obtained from patient] : the patient, [unfilled] [FreeTextEntry1] : Patient feels weak, she has not been able to use the Ozempic because it is too expensive. Her diabetes is not well controlled. The HbA1c is elevated. The Salivary cortisol was not done properly.

## 2022-11-16 NOTE — ASSESSMENT
[FreeTextEntry1] : The diabetes is not well controlled\par Will try to get authorization for Januvia or Tradjenta \par The second salivary cortisol is not available yet.\par Advised to follow the diet and walk\par She is still awaiting the kidney surgery

## 2022-11-17 RX ORDER — FLASH GLUCOSE SCANNING READER
EACH MISCELLANEOUS
Qty: 1 | Refills: 0 | Status: ACTIVE | COMMUNITY
Start: 2022-11-17 | End: 1900-01-01

## 2022-11-17 RX ORDER — SEMAGLUTIDE 1.34 MG/ML
2 INJECTION, SOLUTION SUBCUTANEOUS
Qty: 12 | Refills: 6 | Status: COMPLETED | COMMUNITY
Start: 2021-07-01 | End: 2022-11-17

## 2022-11-21 ENCOUNTER — NON-APPOINTMENT (OUTPATIENT)
Age: 57
End: 2022-11-21

## 2022-11-21 ENCOUNTER — APPOINTMENT (OUTPATIENT)
Dept: CARDIOLOGY | Facility: CLINIC | Age: 57
End: 2022-11-21

## 2022-11-21 VITALS
BODY MASS INDEX: 52.53 KG/M2 | DIASTOLIC BLOOD PRESSURE: 81 MMHG | WEIGHT: 278 LBS | HEART RATE: 120 BPM | SYSTOLIC BLOOD PRESSURE: 142 MMHG | OXYGEN SATURATION: 95 % | TEMPERATURE: 97.9 F

## 2022-11-21 DIAGNOSIS — I77.810 THORACIC AORTIC ECTASIA: ICD-10-CM

## 2022-11-21 PROCEDURE — 99214 OFFICE O/P EST MOD 30 MIN: CPT | Mod: 25

## 2022-11-21 PROCEDURE — 93000 ELECTROCARDIOGRAM COMPLETE: CPT

## 2022-11-23 ENCOUNTER — APPOINTMENT (OUTPATIENT)
Dept: PULMONOLOGY | Facility: CLINIC | Age: 57
End: 2022-11-23

## 2022-11-29 ENCOUNTER — APPOINTMENT (OUTPATIENT)
Dept: PULMONOLOGY | Facility: CLINIC | Age: 57
End: 2022-11-29

## 2022-11-29 PROCEDURE — 94060 EVALUATION OF WHEEZING: CPT

## 2022-11-29 PROCEDURE — 94727 GAS DIL/WSHOT DETER LNG VOL: CPT

## 2022-11-29 PROCEDURE — 94729 DIFFUSING CAPACITY: CPT

## 2022-12-01 ENCOUNTER — APPOINTMENT (OUTPATIENT)
Dept: INTERNAL MEDICINE | Facility: CLINIC | Age: 57
End: 2022-12-01

## 2022-12-01 ENCOUNTER — RX RENEWAL (OUTPATIENT)
Age: 57
End: 2022-12-01

## 2022-12-01 DIAGNOSIS — Z12.39 ENCOUNTER FOR OTHER SCREENING FOR MALIGNANT NEOPLASM OF BREAST: ICD-10-CM

## 2022-12-08 ENCOUNTER — RESULT REVIEW (OUTPATIENT)
Age: 57
End: 2022-12-08

## 2022-12-08 ENCOUNTER — APPOINTMENT (OUTPATIENT)
Dept: MAMMOGRAPHY | Facility: HOSPITAL | Age: 57
End: 2022-12-08

## 2022-12-08 ENCOUNTER — OUTPATIENT (OUTPATIENT)
Dept: OUTPATIENT SERVICES | Facility: HOSPITAL | Age: 57
LOS: 1 days | End: 2022-12-08
Payer: MEDICARE

## 2022-12-08 ENCOUNTER — APPOINTMENT (OUTPATIENT)
Dept: ULTRASOUND IMAGING | Facility: HOSPITAL | Age: 57
End: 2022-12-08

## 2022-12-08 DIAGNOSIS — E27.8 OTHER SPECIFIED DISORDERS OF ADRENAL GLAND: Chronic | ICD-10-CM

## 2022-12-08 DIAGNOSIS — I77.6 ARTERITIS, UNSPECIFIED: Chronic | ICD-10-CM

## 2022-12-08 DIAGNOSIS — N89.8 OTHER SPECIFIED NONINFLAMMATORY DISORDERS OF VAGINA: Chronic | ICD-10-CM

## 2022-12-08 DIAGNOSIS — Z12.39 ENCOUNTER FOR OTHER SCREENING FOR MALIGNANT NEOPLASM OF BREAST: ICD-10-CM

## 2022-12-08 DIAGNOSIS — E78.5 HYPERLIPIDEMIA, UNSPECIFIED: ICD-10-CM

## 2022-12-08 DIAGNOSIS — Z98.89 OTHER SPECIFIED POSTPROCEDURAL STATES: Chronic | ICD-10-CM

## 2022-12-08 DIAGNOSIS — Z90.710 ACQUIRED ABSENCE OF BOTH CERVIX AND UTERUS: Chronic | ICD-10-CM

## 2022-12-08 PROCEDURE — 77063 BREAST TOMOSYNTHESIS BI: CPT

## 2022-12-08 PROCEDURE — 77063 BREAST TOMOSYNTHESIS BI: CPT | Mod: 26

## 2022-12-08 PROCEDURE — 77067 SCR MAMMO BI INCL CAD: CPT

## 2022-12-08 PROCEDURE — 93880 EXTRACRANIAL BILAT STUDY: CPT

## 2022-12-08 PROCEDURE — 77067 SCR MAMMO BI INCL CAD: CPT | Mod: 26

## 2022-12-08 PROCEDURE — 93880 EXTRACRANIAL BILAT STUDY: CPT | Mod: 26

## 2022-12-12 ENCOUNTER — OUTPATIENT (OUTPATIENT)
Dept: OUTPATIENT SERVICES | Facility: HOSPITAL | Age: 57
LOS: 1 days | End: 2022-12-12
Payer: MEDICARE

## 2022-12-12 DIAGNOSIS — Z90.710 ACQUIRED ABSENCE OF BOTH CERVIX AND UTERUS: Chronic | ICD-10-CM

## 2022-12-12 DIAGNOSIS — Z98.89 OTHER SPECIFIED POSTPROCEDURAL STATES: Chronic | ICD-10-CM

## 2022-12-12 DIAGNOSIS — E27.8 OTHER SPECIFIED DISORDERS OF ADRENAL GLAND: Chronic | ICD-10-CM

## 2022-12-12 DIAGNOSIS — N89.8 OTHER SPECIFIED NONINFLAMMATORY DISORDERS OF VAGINA: Chronic | ICD-10-CM

## 2022-12-12 DIAGNOSIS — I77.6 ARTERITIS, UNSPECIFIED: Chronic | ICD-10-CM

## 2022-12-12 DIAGNOSIS — R07.9 CHEST PAIN, UNSPECIFIED: ICD-10-CM

## 2022-12-12 PROCEDURE — 93306 TTE W/DOPPLER COMPLETE: CPT | Mod: 26

## 2022-12-12 PROCEDURE — 93306 TTE W/DOPPLER COMPLETE: CPT

## 2022-12-13 ENCOUNTER — OUTPATIENT (OUTPATIENT)
Dept: OUTPATIENT SERVICES | Facility: HOSPITAL | Age: 57
LOS: 1 days | End: 2022-12-13
Payer: MEDICARE

## 2022-12-13 ENCOUNTER — APPOINTMENT (OUTPATIENT)
Dept: CT IMAGING | Facility: CLINIC | Age: 57
End: 2022-12-13

## 2022-12-13 DIAGNOSIS — Z90.710 ACQUIRED ABSENCE OF BOTH CERVIX AND UTERUS: Chronic | ICD-10-CM

## 2022-12-13 DIAGNOSIS — I25.10 ATHEROSCLEROTIC HEART DISEASE OF NATIVE CORONARY ARTERY WITHOUT ANGINA PECTORIS: ICD-10-CM

## 2022-12-13 DIAGNOSIS — Z98.89 OTHER SPECIFIED POSTPROCEDURAL STATES: Chronic | ICD-10-CM

## 2022-12-13 DIAGNOSIS — E27.8 OTHER SPECIFIED DISORDERS OF ADRENAL GLAND: Chronic | ICD-10-CM

## 2022-12-13 DIAGNOSIS — R07.9 CHEST PAIN, UNSPECIFIED: ICD-10-CM

## 2022-12-13 DIAGNOSIS — I77.6 ARTERITIS, UNSPECIFIED: Chronic | ICD-10-CM

## 2022-12-13 DIAGNOSIS — J45.909 UNSPECIFIED ASTHMA, UNCOMPLICATED: ICD-10-CM

## 2022-12-13 DIAGNOSIS — N89.8 OTHER SPECIFIED NONINFLAMMATORY DISORDERS OF VAGINA: Chronic | ICD-10-CM

## 2022-12-13 PROCEDURE — 71250 CT THORAX DX C-: CPT | Mod: 26,59

## 2022-12-13 PROCEDURE — 75574 CT ANGIO HRT W/3D IMAGE: CPT | Mod: 26

## 2022-12-13 PROCEDURE — 75574 CT ANGIO HRT W/3D IMAGE: CPT

## 2022-12-13 PROCEDURE — 71250 CT THORAX DX C-: CPT

## 2022-12-13 PROCEDURE — 82565 ASSAY OF CREATININE: CPT

## 2022-12-19 ENCOUNTER — RX RENEWAL (OUTPATIENT)
Age: 57
End: 2022-12-19

## 2022-12-22 ENCOUNTER — APPOINTMENT (OUTPATIENT)
Dept: INTERNAL MEDICINE | Facility: CLINIC | Age: 57
End: 2022-12-22

## 2022-12-25 NOTE — PHYSICAL EXAM
[Comprehensive Foot Exam Normal] : Right and left foot were examined and both feet are normal. No ulcers in either foot. Toes are normal and with full ROM.  Normal tactile sensation with monofilament testing throughout both feet md

## 2023-01-16 NOTE — CONSULT NOTE ADULT - MUSCULOSKELETAL
negative No joint pain, swelling or deformity; no limitation of movement Rituxan Pregnancy And Lactation Text: This medication is Pregnancy Category C and it isn't know if it is safe during pregnancy. It is unknown if this medication is excreted in breast milk but similar antibodies are known to be excreted.

## 2023-01-30 NOTE — ASSESSMENT
[FreeTextEntry1] : 56 yo F with left renal mass\par \par - Reviewed recent MRI imaging\par - Limited options but will review at tumor board\par \par Addendum: Discussed pt's case at tumor board. Consensus at that time was possible surgery vs potentially radiation given high surgical risk. Will refer to Dr. Peck for further workup and evaluation

## 2023-01-30 NOTE — HISTORY OF PRESENT ILLNESS
[FreeTextEntry1] : 55 yo F with microhematuria\par Denies any dysuria or gross hematuria\par Does see Dr. Pena for mixed incontinence\par 1 yr of right flank pain, chronic, dull\par CT last Feb showed no stones\par Of note, had bad reaction to gadolinium in the past but has had CT angio with contrast before with no issues\par \par 11/21/19 Interval history: No issues since last visit\par Here to review CT urogram\par \par 2/13/20 Interval history: Since last visit, pt saw IR for possible perc ablation for her renal mass\par No flank pain, no urinary issues\par \par 1/14/21 Interval history: Pt was pending open partial nephrectomy but has been delayed due to Covid pandemic and then lost to follow-up\par Pt is s/p microvascular decompression for trigeminal neuralgia in July, 2020\par Postop complicated by hypoxia, hypercapnia with resp failure\par Since then pt states she has been sick due to lung issues - currently on supplemental O2 at home\par Always feels like there is an elephant on her chest\par Recently has been having chills, sore throat\par \par 6/14/21 Interval history: Still on supplemental O2 at home\par Pending cardiology workup\par No changes in symptoms - denies any flank pain or urinary issues\par \par 1/10/22 Interval history: No changes in symptoms from urologic standpoint\par No flank pain, no gross hematuria, no dysuria\par But having her baseline urinary urgency and incontinence\par Seen by urogyn (Dr. Stephanie Pena) in the past - anaphylactic reaction to tolterodine and not a surgical candidate\par Recent labwork done by PCP showed hgba1c of 11, pending UA\par Still on supplemental o2\par Recent MRI from September showed stable renal mass\par \par 9/19/22 Interval history: intubated in Feb for pneumonia\par currently no energy\par can't leave the house without supplemental O2\par \par All pertinent parts of the patient PFSH (past medical, family and social histories), laboratory, radiological studies and physician notes were reviewed prior to starting the face to face portion of the telemedicine visit.  Questionnaire results were discussed with patient. \par \par

## 2023-02-02 RX ORDER — MOMETASONE FUROATE 1 MG/G
0.1 OINTMENT TOPICAL TWICE DAILY
Qty: 3 | Refills: 1 | Status: ACTIVE | COMMUNITY
Start: 2021-08-23 | End: 1900-01-01

## 2023-02-06 ENCOUNTER — NON-APPOINTMENT (OUTPATIENT)
Age: 58
End: 2023-02-06

## 2023-02-15 NOTE — ASSESSMENT
[address] [facility]  H&P- Sandy Grater [birth year], 66 y o  female MRN: [id number]  Unit/Bed#: ED 24 Encounter: [id number]  Primary Care Provider: Darryl Vargas MD   Date and time admitted to hospital: 2/15/2023  8:26 AM    * Rectal bleeding  Assessment & Plan  · Patient with intermittent rectal bleeding since the beginning of January  · On Eliquis for history of portal vein thrombosis  · She was admitted twice in January underwent flex sigmoidoscopy x2  · Flex sigmoidoscopy 1/9 showed 1 cm linear superficial ulceration at the anastomotic area no evidence of any bleeding  · Flex sigmoidoscopy 1/16 showed anastomotic ulcer within the J-pouch, no evidence of anal fissure or hemorrhoids  · Continues to have intermittent episodes of maroon-colored stool that lasts 2 to 3 days and resolves on its own  She had maroon-colored stool for the past 3 days, but not today  · Evaluated by colorectal surgery in the ED  · Calmoseptine cream to the anal skin area  · Increase Carafate to 2 g 4 times daily, continue Pentasa 500 mg 4 times daily   · No need to rescope since patient is hemodynamically stable, hemoglobin is stable, bleeding resolved  · Admit overnight for observation  · Discussed with colorectal surgery, hold Eliquis until tomorrow a m  Fatigue  Assessment & Plan  · In December for feeling weak patient had 7-day course of prednisone taper  · After that she was off prednisone for about 2 weeks, about 10 days ago she was restarted on prednisone 5 mg, increased to 10 mg a few days ago    Hold prednisone    Hyponatremia  Assessment & Plan  · Strip SIADH  · Continue sodium chloride 1 g 3 times daily    Mild intermittent asthma without complication  Assessment & Plan  · Continue inhalers    Acquired hypothyroidism  Assessment & Plan  · Continue levothyroxine    Essential hypertension  Assessment & Plan  · Restart amlodipine 2 5 mg daily tomorrow  · Continue Cardizem 180 mg daily at [FreeTextEntry1] : Time spent for this encounter : 15  minutes.\par More than 50 % of the time spent for - Disease Management and Medication Adherence.\par \par 55 year F patient found to have stable Hypertension, Hypothyroidism, Type 2 Diabetes Mellitus, Reactive Airway Disease, Morbid Obesity, Asthma, MRI is consistent with likely Renal Cell Carcinoma, Diabetic Neuropathy, with the current regimen, diet and life style modifications, as counseled. Prior results reviewed and discussed with the patient during today's encounter. Plan as ordered.\par \par Patient granted verbal permission to provide Telephonic/Tele Health service in reference to today's encounter, as a substitute form of a visit, for a standard office visit encounter in the phase of an ongoing Pandemic / Covid -19, with the awareness and acceptance of a possible limited nature of such an encounter, with a possibility of an inadvertent omission of possible findings and misleading treatment options, due to possible erroneous and/or incomplete diagnostic impressions.\par  bedtime  · Restart diltiazem 60 mg AM tomorrow  · Restart lisinopril 20 mg 2 times daily tomorrow  · Blood pressure stable, hemodynamically stable, no active bleeding    Hyperlipidemia  Assessment & Plan  · Continue statin    Essential tremor  Assessment & Plan  · Deep brain stimulator    Anxiety  Assessment & Plan  · Continue Ativan as needed, continue Lexapro    Portal vein thrombosis  Assessment & Plan  · On Eliquis  · Discussed with colorectal surgery, hold Eliquis until tomorrow morning    VTE Pharmacologic Prophylaxis: VTE Score: 4 Moderate Risk (Score 3-4) - Pharmacological DVT Prophylaxis Contraindicated  Sequential Compression Devices Ordered  Code Status: Level 1 - Full Code   Discussion with family: Updated  () at bedside  Anticipated Length of Stay: Patient will be admitted on an observation basis with an anticipated length of stay of less than 2 midnights secondary to Rectal bleeding  Total Time Spent on Date of Encounter in care of patient: 55 minutes This time was spent on one or more of the following: performing physical exam; counseling and coordination of care; obtaining or reviewing history; documenting in the medical record; reviewing/ordering tests, medications or procedures; communicating with other healthcare professionals and discussing with patient's family/caregivers  Chief Complaint: Rectal bleeding    History of Present Illness:  Sunil Carlos is a 66 y o  female with a PMH of tremor, portal vein thrombosis, hypertension, hyperlipidemia, hypothyroidism, anxiety who presents with rectal bleeding  Patient started having intermittent rectal bleed, maroon-colored stool at the beginning of January  She was admitted to [facility] x2 and underwent flex sigmoidoscopy x2, it showed superficial ulceration within the J-pouch, no anal fissure or hemorrhoids  Patient is on Eliquis due to history of portal vein thrombosis    She returned to the hospital because for the past 3 days she had maroon-colored stools, yesterday she had an episode of diarrhea  No bleeding today so far  Denies any constipation  She is hemodynamically stable, hemoglobin stable  She was seen by colorectal surgery in the ED, recommended to increase Carafate to 2 g 4 times daily and continue Pentasa 500 mg 4 times daily, no need to rescope since she is hemodynamically stable her hemoglobin is stable and bleeding resolved today  Patient is not feeling comfortable going home, will admit her overnight for observation, hemoglobin check  Discussed with colorectal surgery okay to hold Eliquis until tomorrow morning  Review of Systems:  Review of Systems   Constitutional: Negative for activity change  HENT: Negative  Eyes: Negative  Respiratory: Negative for cough, shortness of breath and wheezing  Cardiovascular: Negative for chest pain, palpitations and leg swelling  Gastrointestinal: Positive for blood in stool  Negative for abdominal pain, nausea and vomiting  1 episode of diarrhea yesterday, denies constipation   Endocrine: Negative  Genitourinary: Negative for dysuria  Musculoskeletal: Negative  Skin: Negative  Neurological: Negative for dizziness  Hematological: Negative  Psychiatric/Behavioral: Negative          Past Medical and Surgical History:   Past Medical History:   Diagnosis Date   • Anemia    • Anxiety    • Arrhythmia    • Arthritis    • Asthma    • Blood clot in vein     portal vein   • [facility] ) 02/12/2021   • Breast lump     [id number] RESOLVED   • Cancer (HCC)    • Depression    • Disease of thyroid gland    • DVT, lower extremity (HCC)    • GERD (gastroesophageal reflux disease)    • Hyperlipidemia    • Hypertension    • Hypokalemia    • Hyponatremia    • Hypothyroidism    • Iron deficiency anemia    • Irregular heart beat    • Manic behavior (HCC)    • Mesenteric vein thrombosis (HCC)    • Osteoarthritis    • Palpitations     [id number] RESOLVED   • Paroxysmal supraventricular tachycardia (HCC)    • PE (pulmonary thromboembolism) (HCC)    • Sjoegren syndrome    • Sleep apnea    • Sleep difficulties    • Spinal stenosis    • Thrombocytosis     [id number]  RESOLVED   • Tremors of nervous system     dbs implanted right and left chest   • Ulcerative colitis ([address] )    • Vertigo     [id number] RESOLVED       Past Surgical History:   Procedure Laterality Date   • ABDOMINAL SURGERY     • APPENDECTOMY     • BREAST BIOPSY Left 11/07/2019    Stereo   • BREAST EXCISIONAL BIOPSY Left     x many years   • BREAST SURGERY      lumpectomy & biopsy   • CARMELLA HOLE W/ STEREOTACTIC INSERTION OF DBS LEADS / INTRAOP MICROELECTRODE RECORDING     • COLON SURGERY     • COLONOSCOPY N/A 08/30/2017    Procedure: Guillermo Bo;  Surgeon: Tiffany Tariq MD;  Location: [facility]; Service: Colorectal   • COLOPROCTECTOMY W/ ILEO J POUCH     • ESOPHAGOGASTRODUODENOSCOPY      ONSET 10/17/11   • FISTULA REPAIR      LLEOANAL FISTULA REPAIR TRANSPERIN TRANSABD APPROACH   • HYSTERECTOMY      age 44   • ILEOSTOMY CLOSURE     • KNEE ARTHROSCOPY      Right   • MAMMO STEREOTACTIC BREAST BIOPSY LEFT (ALL INC) Left 11/07/2019   • MAMMO STEREOTACTIC BREAST BIOPSY LEFT (ALL INC) Left 12/17/2020   • MAMMO STEREOTACTIC BREAST BIOPSY LEFT (ALL INC) EACH ADD Left 12/17/2020   • MASTECTOMY Left 02/12/2021    left mastectomy- Dr Billy Nelson   • MASTECTOMY W/ SENTINEL NODE BIOPSY Left 02/12/2021    Procedure: BREAST MASTECTOMY WITH SENTINEL LYMPH NODE BIOPSY, LYMPHATIC MAPPING WITH BLUE DYE AND RADIAOCTIVE DYE (INJECT AT 1100 BY DR GILLESPIE IN THE OR);   Surgeon: Erick Smith MD;  Location[facility];  Service: Surgical Oncology   • NC INSJ/RPLCMT CRANIAL NEUROSTIM PULSE GENERATOR Right 06/20/2017    Procedure: DBS GENERATOR REPLACEMENT;  Surgeon: Don Thompson MD;  Location: [facility];  Service: Neurosurgery   • NC INSJ/RPLCMT CRANIAL NEUROSTIM PULSE GENERATOR N/A 12/04/2019    Procedure: REPLACEMENT IMPLANTABLE PULSE GENERATOR FOR DEEP BRAIN STIMULATOR LEFT CHEST;  Surgeon: Marisa Saleh MD;  Location: [facility];  Service: Neurosurgery   • SPLENECTOMY     • TONSILLECTOMY         Meds/Allergies:  Prior to Admission medications    Medication Sig Start Date End Date Taking?  Authorizing Provider   sucralfate (CARAFATE) 1 g tablet Take 2 tablets (2 g total) by mouth 4 (four) times a day 2/15/23 3/17/23 Yes Jonathan Baeza MD   sucralfate (CARAFATE) 1 g tablet Take 2 tablets (2 g total) by mouth 4 (four) times a day 2/15/23 2/15/23 Yes Dee Lopez PA-C   acetaminophen (TYLENOL) 325 mg tablet Take 2 tablets (650 mg total) by mouth every 6 (six) hours as needed for mild pain 10/18/21   Ivet Bonilla PA-C   albuterol (PROVENTIL HFA,VENTOLIN HFA) 90 mcg/act inhaler Inhale 2 puffs every 6 (six) hours as needed for wheezing 11/28/22   Nik Villa MD   amLODIPine (NORVASC) 2 5 mg tablet TAKE ONE TABLET BY MOUTH EVERY DAY 12/5/22   César Winters MD   apixaban (Eliquis) 5 mg Take 1 tablet (5 mg total) by mouth 2 (two) times a day 2/1/23   Nik Villa MD   Calcium Carb-Cholecalciferol (CALCIUM 600-D PO) Take 1 tablet by mouth 2 (two) times a day    Historical Provider, MD   Coenzyme Q10 (CO Q-10 PO) Take 1 capsule by mouth daily    Historical Provider, MD   diltiazem (CARDIZEM CD) 180 mg 24 hr capsule Take 1 capsule (180 mg total) by mouth daily 2/8/23   Nik Villa MD   diltiazem (CARDIZEM) 60 mg tablet Take 1 tablet (60 mg total) by mouth daily 2/10/23   Nik Villa MD   escitalopram (LEXAPRO) 20 mg tablet TAKE ONE TABLET BY MOUTH EVERY DAY 9/19/22   Nik Villa MD   fluticasone [facility]) 50 mcg/act nasal spray INSTILL ONE SPRAY INTO EACH NOSTRIL ONCE DAILY AS NEEDED FOR RHINITIS 5/31/22   Nik Mattes, MD   fluticasone-salmeterol (Advair Diskus) 250-50 mcg/dose inhaler Inhale 1 puff 2 (two) times a day Rinse mouth after use 12/27/21   Magno Segovia MD Eva   gabapentin (NEURONTIN) 600 MG tablet TAKE ONE TABLET BY MOUTH IN THE MORNING, ONE TABLET IN THE AFTERNNON, AND TWO TABLETS AT BEDTIME 12/13/22   Lilibeth Jarrett MD   levothyroxine 50 mcg tablet TAKE ONE TABLET BY MOUTH EVERY DAY 10/28/22   RODNEY Wakefield   lisinopril (ZESTRIL) 20 mg tablet TAKE ONE TABLET BY MOUTH TWICE A DAY 1/29/23   Lilibeth Jarrett MD   loperamide (IMODIUM) 2 mg capsule Take 2 mg by mouth 4 (four) times a day as needed      Historical Provider, MD   LORazepam (ATIVAN) 1 mg tablet Take 1 tablet (1 mg total) by mouth every 6 (six) hours as needed for anxiety 12/7/22   Lilibeth Jarrett MD   MAGNESIUM PO Take 1 tablet by mouth daily    Historical Provider, MD   meclizine (ANTIVERT) 25 mg tablet Take 1 tablet (25 mg total) by mouth every 6 (six) hours as needed for dizziness 12/20/21   Lilibeth Jarrett MD   mesalamine (PENTASA) 500 mg CR capsule TAKE 1 CAPSULE BY MOUTH FOUR TIMES A DAY 1/23/23   Demetria Gosselin, MD   Multiple Vitamin (MULTIVITAMIN ADULT PO) Take 1 tablet by mouth daily    Historical Provider, MD   Omega-3 Fatty Acids (FISH OIL PO) Take 1 capsule by mouth daily    Historical Provider, MD   pantoprazole (PROTONIX) 40 mg tablet TAKE ONE TABLET BY MOUTH EVERY DAY 2/1/23   Lilibeth Jarrett MD   potassium chloride (MICRO-K) 10 MEQ CR capsule Take 2 capsules daily 10/17/22   Lilibeth Jarrett MD   predniSONE 5 mg tablet  1/5/23   Historical Provider, MD   simvastatin (ZOCOR) 5 MG tablet TAKE ONE TABLET BY MOUTH EVERY DAY 9/6/22   Lilibeth Jarrett MD   sodium chloride 1 g tablet Take 1 tablet (1 g total) by mouth 3 (three) times a day 5/10/22   Nigel Drop, DO   torsemide [facility]) 10 mg tablet Take 1 tablet (10 mg total) by mouth daily as needed (edema) 7/6/21   Lilibeth Jarrett MD   sucralfate (CARAFATE) 1 g tablet Take 1 tablet (1 g total) by mouth 4 (four) times a day 1/23/23 2/15/23  Demetria Gosselin, MD     I have reviewed home medications with patient personally  Allergies:    Allergies   Allergen Reactions   • Indomethacin GI Intolerance and Dizziness   • Macrobid [Nitrofurantoin Monohyd Macro] Rash   • Nitrofurantoin Other (See Comments)   • Penicillins Rash     Annotation - [id number]: can take cephalosporins   • Sulfa Antibiotics Rash       Social History:  Marital Status:    Substance Use History:   Social History     Substance and Sexual Activity   Alcohol Use Yes   • Alcohol/week: 2 0 standard drinks   • Types: 2 Cans of beer per week     Social History     Tobacco Use   Smoking Status Former   • Packs/day: 1 50   • Years: 5 00   • Pack years: 7 50   • Types: Cigarettes   • Quit date: 1965   • Years since quittin 1   Smokeless Tobacco Never   Tobacco Comments    Quit     Social History     Substance and Sexual Activity   Drug Use No       Family History:  Family History   Problem Relation Age of Onset   • Venous thrombosis Mother         ACUTE VENOUS THROMBOSIS OF DEEP VESSELS OF THE DISTAL LOWER EXTREMITY   • Other Mother         PHLEBITIS   • Hypertension Mother    • Peripheral vascular disease Mother    • COPD Father    • Diabetes Father         MELLITUS   • Stroke Father    • Diabetes Sister         MELLITUS   • Sjogren's syndrome Sister    • No Known Problems Daughter    • No Known Problems Maternal Grandmother    • No Known Problems Maternal Grandfather    • No Known Problems Paternal Grandmother    • No Known Problems Paternal Grandfather    • No Known Problems Sister    • No Known Problems Maternal Aunt    • No Known Problems Paternal Aunt    • No Known Problems Son        Physical Exam:     Vitals:   Blood Pressure: 155/70 (02/15/23 1358)  Pulse: 64 (02/15/23 1358)  Temperature: 99 1 °F (37 3 °C) (02/15/23 0832)  Temp Source: Oral (02/15/23 0832)  Respirations: 18 (02/15/23 1358)  Weight - Scale: 69 4 kg (153 lb) (02/15/23 0828)  SpO2: 97 % (02/15/23 1358)    Physical Exam  Constitutional: General: She is not in acute distress  HENT:      Head: Atraumatic  Cardiovascular:      Rate and Rhythm: Normal rate and regular rhythm  Heart sounds: No murmur heard  No friction rub  No gallop  Pulmonary:      Effort: Pulmonary effort is normal  No respiratory distress  Breath sounds: Normal breath sounds  No wheezing  Abdominal:      General: Bowel sounds are normal  There is no distension  Palpations: Abdomen is soft  Musculoskeletal:         General: No swelling  Cervical back: Neck supple  Skin:     General: Skin is warm  Neurological:      General: No focal deficit present  Mental Status: She is alert  Psychiatric:         Mood and Affect: Mood normal           Additional Data:     Lab Results:  Results from last 7 days   Lab Units 02/15/23  0919   WBC Thousand/uL 9 29   HEMOGLOBIN g/dL 10 8*   HEMATOCRIT % 32 7*   PLATELETS Thousands/uL 432*   NEUTROS PCT % 58   LYMPHS PCT % 25   MONOS PCT % 13*   EOS PCT % 2     Results from last 7 days   Lab Units 02/15/23  0919   SODIUM mmol/L 131*   POTASSIUM mmol/L 4 2   CHLORIDE mmol/L 98   CO2 mmol/L 28   BUN mg/dL 5   CREATININE mg/dL 0 70   ANION GAP mmol/L 5   CALCIUM mg/dL 8 8   ALBUMIN g/dL 3 2*   TOTAL BILIRUBIN mg/dL 0 29   ALK PHOS U/L 65   ALT U/L 25   AST U/L 19   GLUCOSE RANDOM mg/dL 95     Results from last 7 days   Lab Units 02/15/23  0919   INR  0 96                   Lines/Drains:  Invasive Devices     Peripheral Intravenous Line  Duration           Peripheral IV 02/15/23 Left Antecubital <1 day                    Imaging: No imaging done on this admission  No orders to display       EKG and Other Studies Reviewed on Admission:   · EKG: Normal sinus rhythm with occasional PVCs  ** Please Note: This note has been constructed using a voice recognition system   **

## 2023-02-21 ENCOUNTER — APPOINTMENT (OUTPATIENT)
Dept: PULMONOLOGY | Facility: CLINIC | Age: 58
End: 2023-02-21
Payer: MEDICARE

## 2023-02-21 VITALS
DIASTOLIC BLOOD PRESSURE: 78 MMHG | HEART RATE: 105 BPM | OXYGEN SATURATION: 94 % | BODY MASS INDEX: 51.54 KG/M2 | SYSTOLIC BLOOD PRESSURE: 158 MMHG | HEIGHT: 61 IN | TEMPERATURE: 96.4 F | WEIGHT: 273 LBS

## 2023-02-21 DIAGNOSIS — J20.9 ACUTE BRONCHITIS, UNSPECIFIED: ICD-10-CM

## 2023-02-21 PROCEDURE — 99214 OFFICE O/P EST MOD 30 MIN: CPT

## 2023-02-21 RX ORDER — BUDESONIDE AND FORMOTEROL FUMARATE DIHYDRATE 160; 4.5 UG/1; UG/1
160-4.5 AEROSOL RESPIRATORY (INHALATION) TWICE DAILY
Qty: 3 | Refills: 3 | Status: ACTIVE | COMMUNITY
Start: 2019-10-18 | End: 1900-01-01

## 2023-02-21 NOTE — HISTORY OF PRESENT ILLNESS
[Obstructive Sleep Apnea] : obstructive sleep apnea [Hypersomnolence] : hypersomnolence [Nonrestorative Sleep] : nonrestorative sleep [Snoring] : snoring [Unintentional Sleep while Active] : unintentional sleep while active [TextBox_4] : 57 yr old woman with asthma and NAKUL, chronic hypercapnic respiratory failure, due to obesity and restrictive lung disease and kyphosis.  She had been on NIV  BIPAP 20/16.\par \par She is also using Symbicort and albuterol via nebulizer, latter once per day.\par \par She has obtained AirPhysio PEP device and uses it with increased sputum  clearance.  Sputum is mostly clear.\par \par She uses saline nasal wash. She also uses fluticasone nasal spray.  She uses Zyrtec as needed and montelukast every night.\par \par \par She has not used PAP, especially since recall.  She obtained NIV but it was too costly.  She now does not use any PAP.  She received replacement from [employer] but states it has not been set up to use, and she has not used it due to cough\par \par \par She has had asthma for many years. She has few allergies (dust dust mites, flowers) not severe.\par Asthma is worse when allergies are worse. \par \par She has hypoxia with exertion.\par \par \par She also has a 4 mm lung nodule seen on prior CT chest. CT chest 5/2021 did not demonstrated any suspicious nodule. \par \par \par She has renal cell carcinoma and has not had removal due to COVID crisis. Lesion is being closely monitored per team\par \par She had repeat MRI 8/13/22\par \par She has had LE edema R>L.  She is on HCTZ and ramipril\par \par She has been hospitalized at [facility] with dyspnea, LOC.  She was treated for pneumonia and hypercapnic respiratory failure.  she required intubation/mechanical ventilation\par \par \par Last CO2 on chemistry 36\par \par She continues to have cough which she states is incessant, especially when supine\par \par \par She  has also had chest congestion and nasal symptoms that were treated with diuretic antibiotic and steroid with brief transient benefit\par \par She has contacted me about her cough that she states is severe, mostly non productive\par \par \par 2/21/23:  states she has been having increased cough for over 1 month.  She is having some sputum production, yellow at times.  \par \par CT coronary angiogram demonstrated elevated Argatson score 1069.  There were no significant stenoses.\par \par She has BIPAP therapy but has not initiated as it has yet to e initiated/serviced.\par \par CT 12/13/22 demonstrated a 3 mm nodule in RUL  but no emphysema , interstitial lung disease  or cystic lung disease.  There was also no tracheomalacia.\par \par PMH:\par She has hyperlipidemia, elevated liver enzymes, DM, hypothyroid depression hypertension sleep apnea not using CPAP. fibromyalgia\par \par PSH\par c sect\par right ovary\par \par lysis of adhesion\par ERMELINDA/BSO\par removal of left adrenal gland due to nodule, benign\par \par Temporal artery biopsy\par \par \par

## 2023-02-21 NOTE — DISCUSSION/SUMMARY
[FreeTextEntry1] : Chronic hypercapnic respiratory failure/NAKUL,  obstructive airway disease asthma\par \par morbid obesity\par \par She has  history of hospitalization/intubation for pneumonia and hypercapnic respiratory failure\par \par She has improved after course of PT at [facility]hab facility\par \par She was prescribed and  obtained VAPS but too costly,\par \par However, she is not using BIPAP (which she has had) either, states it needs to be set.  She received new equipment fro [employer] using CPAP.\par \par She reports cough, rhinitis, chest congestion and dyspnea which persist despite treatment\par She has intermittent chronic severe cough\par \par PLAN\par \par Will need to have BIPAP set up for her.\par \par Continue nasal sprays fluticasone azelastine and ipratropium nasal spray\par \par She may continue to use antihistamine\par \par Continue ICS/LABA  and montelukast, along with albuterol MDI as needed, \par order spacer\par \par She will be treated with short course of antibiotic\par \par She will emphasize regimen for postnasal drip\par \par Will contact supplier to initiate BIPAP  ([employer]  [phone])\par \par \par Stopped ramipril and started amlodipine 10 mg , cough has greatly improved\par I have ordered obtain dynamic CT chest\par \par I have reviewed CT C spine that demonstrated clear sinuses and upper airway\par \par check chemistry, pro BNP, C reactive protein bicarb level allergy testing\par \par consider ENT evaluation, though sinuses and throat patent on CT C spine\par \par \par \par continue HCTZ\par \par lasix if BNP high\par \par no significant edema\par \par \par \par \par Further recommendations based on results. \par \par \par Total time spent : 30 minutes\par Including:\par Preparation prior to visit - Reviewing prior record, results of tests and Consultation Reports as applicable\par Conducting an appropriate H & P during today's encounter\par Appropriate orders for tests, medications and procedures, as applicable\par Counseling patient \par Note completion \par \par I walked with pt for exercise study\par \par Guido Monterroso MD \par

## 2023-02-21 NOTE — PHYSICAL EXAM
[No Acute Distress] : no acute distress [Well Nourished] : well nourished [Well Developed] : well developed [Low Lying Soft Palate] : low lying soft palate [Elongated Uvula] : elongated uvula [Enlarged Base of the Tongue] : enlarged base of the tongue [III] : Mallampati Class: III [Not Tender] : not tender [2+ Pitting] : 2+ pitting [TextBox_11] : some cough [TextBox_44] : kyphosis, short neck limited mobility [TextBox_68] : no wheeze, diminished aeration, unlabored, some squeak on forced exhalation [TextBox_89] : obese, protuberant

## 2023-02-25 DIAGNOSIS — R19.5 OTHER FECAL ABNORMALITIES: ICD-10-CM

## 2023-03-02 ENCOUNTER — APPOINTMENT (OUTPATIENT)
Dept: INTERNAL MEDICINE | Facility: CLINIC | Age: 58
End: 2023-03-02
Payer: MEDICARE

## 2023-03-02 VITALS
HEIGHT: 61 IN | HEART RATE: 103 BPM | DIASTOLIC BLOOD PRESSURE: 84 MMHG | BODY MASS INDEX: 52.67 KG/M2 | SYSTOLIC BLOOD PRESSURE: 142 MMHG | TEMPERATURE: 97 F | WEIGHT: 279 LBS | RESPIRATION RATE: 16 BRPM | OXYGEN SATURATION: 94 %

## 2023-03-02 DIAGNOSIS — Z00.00 ENCOUNTER FOR GENERAL ADULT MEDICAL EXAMINATION W/OUT ABNORMAL FINDINGS: ICD-10-CM

## 2023-03-02 PROCEDURE — G0439: CPT

## 2023-03-02 RX ORDER — DICLOFENAC SODIUM 1% 10 MG/G
1 GEL TOPICAL
Qty: 3 | Refills: 2 | Status: DISCONTINUED | COMMUNITY
Start: 2022-10-04 | End: 2023-03-02

## 2023-03-02 RX ORDER — PEN NEEDLE, DIABETIC 32 GX 1/4"
32G X 6 MM NEEDLE, DISPOSABLE MISCELLANEOUS
Qty: 270 | Refills: 3 | Status: DISCONTINUED | COMMUNITY
Start: 2017-07-06 | End: 2023-03-02

## 2023-03-02 RX ORDER — LANCETS 30 GAUGE
EACH MISCELLANEOUS
Qty: 180 | Refills: 3 | Status: DISCONTINUED | COMMUNITY
Start: 2019-05-16 | End: 2023-03-02

## 2023-03-02 RX ORDER — BLOOD SUGAR DIAGNOSTIC
STRIP MISCELLANEOUS 3 TIMES DAILY
Qty: 270 | Refills: 3 | Status: DISCONTINUED | COMMUNITY
Start: 2019-04-26 | End: 2023-03-02

## 2023-03-02 RX ORDER — FLUOXETINE HYDROCHLORIDE 20 MG/1
20 CAPSULE ORAL
Refills: 0 | Status: DISCONTINUED | COMMUNITY
End: 2023-03-02

## 2023-03-02 RX ORDER — ERGOCALCIFEROL 1.25 MG/1
1.25 MG CAPSULE, LIQUID FILLED ORAL
Qty: 12 | Refills: 3 | Status: ACTIVE | COMMUNITY
Start: 2017-10-23 | End: 1900-01-01

## 2023-03-02 RX ORDER — PEN NEEDLE, DIABETIC 32 GX 1/6"
32G X 4 MM NEEDLE, DISPOSABLE MISCELLANEOUS
Qty: 120 | Refills: 5 | Status: DISCONTINUED | COMMUNITY
Start: 2017-07-06 | End: 2023-03-02

## 2023-03-02 RX ORDER — PANTOPRAZOLE 40 MG/1
40 TABLET, DELAYED RELEASE ORAL
Qty: 90 | Refills: 3 | Status: ACTIVE | COMMUNITY
Start: 2017-07-06 | End: 1900-01-01

## 2023-03-02 RX ORDER — ISOPROPYL ALCOHOL 70 ML/100ML
SWAB TOPICAL
Qty: 300 | Refills: 0 | Status: DISCONTINUED | COMMUNITY
Start: 2020-11-04 | End: 2023-03-02

## 2023-03-02 RX ORDER — RAMIPRIL 10 MG/1
10 CAPSULE ORAL
Qty: 90 | Refills: 3 | Status: DISCONTINUED | COMMUNITY
Start: 2019-04-08 | End: 2023-03-02

## 2023-03-02 RX ORDER — DOXYCYCLINE HYCLATE 100 MG/1
100 CAPSULE ORAL
Qty: 10 | Refills: 0 | Status: DISCONTINUED | COMMUNITY
Start: 2023-02-21 | End: 2023-03-02

## 2023-03-02 RX ORDER — DICLOFENAC SODIUM 50 MG/1
50 TABLET, DELAYED RELEASE ORAL
Qty: 60 | Refills: 1 | Status: DISCONTINUED | COMMUNITY
Start: 2021-10-05 | End: 2023-03-02

## 2023-03-02 RX ORDER — INSULIN ASPART 100 [IU]/ML
100 INJECTION, SOLUTION INTRAVENOUS; SUBCUTANEOUS
Qty: 8 | Refills: 0 | Status: DISCONTINUED | COMMUNITY
Start: 2021-05-13 | End: 2023-03-02

## 2023-03-02 RX ORDER — CLINDAMYCIN PHOSPHATE 10 MG/ML
1 LOTION TOPICAL TWICE DAILY
Qty: 1 | Refills: 5 | Status: ACTIVE | COMMUNITY
Start: 2021-04-02 | End: 1900-01-01

## 2023-03-02 RX ORDER — MELOXICAM 7.5 MG/1
7.5 TABLET ORAL
Qty: 30 | Refills: 5 | Status: DISCONTINUED | COMMUNITY
Start: 2021-02-04 | End: 2023-03-02

## 2023-03-02 RX ORDER — BLOOD SUGAR DIAGNOSTIC
STRIP MISCELLANEOUS 3 TIMES DAILY
Qty: 270 | Refills: 0 | Status: DISCONTINUED | COMMUNITY
Start: 2018-02-15 | End: 2023-03-02

## 2023-03-02 RX ORDER — DIPHENHYDRAMINE HCL 50 MG/1
50 CAPSULE ORAL
Qty: 1 | Refills: 0 | Status: DISCONTINUED | COMMUNITY
Start: 2020-10-29 | End: 2023-03-02

## 2023-03-02 RX ORDER — DOXYCYCLINE HYCLATE 100 MG/1
100 TABLET ORAL TWICE DAILY
Qty: 14 | Refills: 0 | Status: DISCONTINUED | COMMUNITY
Start: 2023-02-21 | End: 2023-03-02

## 2023-03-02 RX ORDER — FUROSEMIDE 20 MG/1
20 TABLET ORAL DAILY
Qty: 15 | Refills: 0 | Status: DISCONTINUED | COMMUNITY
Start: 2022-08-19 | End: 2023-03-02

## 2023-03-02 RX ORDER — LINAGLIPTIN 5 MG/1
5 TABLET, FILM COATED ORAL
Qty: 90 | Refills: 0 | Status: DISCONTINUED | COMMUNITY
Start: 2022-11-17 | End: 2023-03-02

## 2023-03-02 RX ORDER — ISOPROPYL ALCOHOL 0.7 ML/ML
SWAB TOPICAL
Qty: 300 | Refills: 0 | Status: DISCONTINUED | COMMUNITY
Start: 2021-03-09 | End: 2023-03-02

## 2023-03-02 RX ORDER — PEN NEEDLE, DIABETIC 31 GX3/16"
31G X 5 MM NEEDLE, DISPOSABLE MISCELLANEOUS
Qty: 3 | Refills: 3 | Status: DISCONTINUED | COMMUNITY
Start: 2021-01-01 | End: 2023-03-02

## 2023-03-02 RX ORDER — AZELASTINE HYDROCHLORIDE 137 UG/1
137 SPRAY, METERED NASAL
Qty: 3 | Refills: 3 | Status: DISCONTINUED | COMMUNITY
Start: 2022-06-03 | End: 2023-03-02

## 2023-03-02 RX ORDER — KETOCONAZOLE 20 MG/G
2 CREAM TOPICAL
Qty: 1 | Refills: 6 | Status: DISCONTINUED | COMMUNITY
Start: 2021-04-02 | End: 2023-03-02

## 2023-03-02 RX ORDER — FLUCONAZOLE 150 MG/1
150 TABLET ORAL
Qty: 1 | Refills: 2 | Status: DISCONTINUED | COMMUNITY
Start: 2022-02-04 | End: 2023-03-02

## 2023-03-02 RX ORDER — BLOOD-GLUCOSE METER
31G X 5 MM EACH MISCELLANEOUS
Qty: 30 | Refills: 5 | Status: DISCONTINUED | COMMUNITY
Start: 2020-08-03 | End: 2023-03-02

## 2023-03-02 RX ORDER — FUROSEMIDE 20 MG/1
20 TABLET ORAL DAILY
Qty: 15 | Refills: 0 | Status: DISCONTINUED | COMMUNITY
Start: 2021-07-17 | End: 2023-03-02

## 2023-03-02 RX ORDER — UREA 20 %
20 CREAM (GRAM) TOPICAL
Qty: 1 | Refills: 6 | Status: DISCONTINUED | COMMUNITY
Start: 2021-09-14 | End: 2023-03-02

## 2023-03-02 NOTE — ASSESSMENT
[FreeTextEntry1] : 57 year old female found to have stable Hypertension, Hypothyroidism, Type 2 Diabetes Mellitus, Hyperlipidemia, Reactive Airway Disease, Morbid Obesity, Renal Cell Carcinoma, Diabetic Neuropathy,with the current prescription regimen as recommended, diet and life style modifications, as counseled. Prior results reviewed, interpreted and discussed with the patient during today's examination, as appropriate. Follow up, treatment plan and tests, as ordered.\par

## 2023-03-02 NOTE — HISTORY OF PRESENT ILLNESS
[de-identified] : 57 year old female patient with history of stable Hypertension, Hypothyroidism, Type 2 Diabetes Mellitus, Hyperlipidemia, Reactive Airway Disease, Morbid Obesity, Renal Cell Carcinoma, Diabetic Neuropathy, history as stated, presented for an annual preventative examination.\par Patient denies any associated symptoms of shortness of breath, chest pain, abdominal pain at this time.\par

## 2023-03-02 NOTE — HEALTH RISK ASSESSMENT
[FreeTextEntry1] : Check up\par  [de-identified] : RHEUM/PULM/CARD [[id number]] : 0 [Change in mental status noted] : No change in mental status noted [Reports changes in hearing] : Reports no changes in hearing [Reports changes in vision] : Reports no changes in vision [Reports changes in dental health] : Reports no changes in dental health [MammogramDate] : 12/22 [BoneDensityDate] : 08/19 [ColonoscopyDate] : 07/21 [HIVDate] : 08/19 [HIVComments] : Negative [HepatitisCDate] : 12/18 [HepatitisCComments] : Negative [AdvancecareDate] : 03/23

## 2023-03-13 ENCOUNTER — APPOINTMENT (OUTPATIENT)
Dept: CARDIOLOGY | Facility: CLINIC | Age: 58
End: 2023-03-13
Payer: MEDICARE

## 2023-03-13 ENCOUNTER — NON-APPOINTMENT (OUTPATIENT)
Age: 58
End: 2023-03-13

## 2023-03-13 VITALS
WEIGHT: 276 LBS | HEART RATE: 99 BPM | TEMPERATURE: 98.1 F | BODY MASS INDEX: 52.15 KG/M2 | SYSTOLIC BLOOD PRESSURE: 115 MMHG | DIASTOLIC BLOOD PRESSURE: 80 MMHG

## 2023-03-13 DIAGNOSIS — R60.0 LOCALIZED EDEMA: ICD-10-CM

## 2023-03-13 PROCEDURE — 99214 OFFICE O/P EST MOD 30 MIN: CPT | Mod: 25

## 2023-03-13 PROCEDURE — 93000 ELECTROCARDIOGRAM COMPLETE: CPT

## 2023-03-23 ENCOUNTER — APPOINTMENT (OUTPATIENT)
Dept: MRI IMAGING | Facility: HOSPITAL | Age: 58
End: 2023-03-23

## 2023-03-24 RX ORDER — TRIAMCINOLONE ACETONIDE 1 MG/G
0.1 OINTMENT TOPICAL
Qty: 454 | Refills: 0 | Status: ACTIVE | COMMUNITY
Start: 2021-04-02 | End: 1900-01-01

## 2023-03-27 ENCOUNTER — NON-APPOINTMENT (OUTPATIENT)
Age: 58
End: 2023-03-27

## 2023-03-28 ENCOUNTER — NON-APPOINTMENT (OUTPATIENT)
Age: 58
End: 2023-03-28

## 2023-04-04 ENCOUNTER — APPOINTMENT (OUTPATIENT)
Dept: RHEUMATOLOGY | Facility: CLINIC | Age: 58
End: 2023-04-04
Payer: MEDICARE

## 2023-04-04 VITALS
OXYGEN SATURATION: 92 % | SYSTOLIC BLOOD PRESSURE: 154 MMHG | HEART RATE: 116 BPM | TEMPERATURE: 97.8 F | RESPIRATION RATE: 16 BRPM | BODY MASS INDEX: 51.92 KG/M2 | HEIGHT: 61 IN | WEIGHT: 275 LBS | DIASTOLIC BLOOD PRESSURE: 88 MMHG

## 2023-04-04 PROCEDURE — 20610 DRAIN/INJ JOINT/BURSA W/O US: CPT | Mod: LT

## 2023-04-04 PROCEDURE — 99214 OFFICE O/P EST MOD 30 MIN: CPT | Mod: 25

## 2023-04-04 RX ADMIN — Medication 0 MG/ML: at 00:00

## 2023-04-04 RX ADMIN — Medication 0 %: at 00:00

## 2023-04-04 NOTE — CONSULT LETTER
[Dear  ___] : Dear  [unfilled], [Courtesy Letter:] : I had the pleasure of seeing your patient, [unfilled], in my office today. [Please see my note below.] : Please see my note below. [Consult Closing:] : Thank you very much for allowing me to participate in the care of this patient.  If you have any questions, please do not hesitate to contact me. [Sincerely,] : Sincerely, [FreeTextEntry2] : Ken John MD [FreeTextEntry3] : Chante Ribeiro M.D., RhMSUS\par  of Medicine \par [facility] [employer] at [facility]\par \par

## 2023-04-04 NOTE — ASSESSMENT
[FreeTextEntry1] : Patient with fibromyalgia, IgA vasculitis resolved, psoriasis, LT RCC: LT SAB:\par \par Patient to continue with Otezla ; psoriasis development may be multifactorial , namely stemming from renal malignancy as well as metabolic syndromes including uncontrolled DM.  Discussed the importance of dietary modification and medication compliance.  Patient should avoid biologics in the setting of RCC, rec Derm intervention for light therapy and additional topical immunosuppressants.  Dermatology follow-up emphasized.\par Rotator cuff strengthening encouraged ; cortisone injection given for pain relief .  Quadriceps strengthening exercises encouraged .  Weight loss has been encouraged to reduce load over the medial joint line.  Viscosupplementation has been encouraged to provide additional lubrication and joint support. Core strengthening exercises demonstrated for the lower back.  \par The importance of dietary and lifestyle modifications was reiterated for the treatment of Fibromyalgia along with discussions on relaxation, stress-reducing techniques and importance of good sleep hygiene.  Neuropathic pain therapy given to address pain symptoms along with trigeminal neuralgia.\par \par She is in agreement with the above plan and will return in three months' time.\par \par \par

## 2023-04-04 NOTE — HISTORY OF PRESENT ILLNESS
[FreeTextEntry1] : Patient reports overall improvement in knee pain and notes lower extremity psoriasiform rash as well as rash over the mid back has improved with the use of Otezla and triamcinolone, however is trying to avoid the use of the latter.  Today she explains worsening pain over the left shoulder where she is having difficulty raising the arm overhead or reaching for the back and performing basic ADLs.  She also reports pruritic nature over the palms and dorsum of the hands.  Patient explains difficulty with sugar control with recent hemoglobin A1c in the 10 range.\par \par Patient developed skin rash spring of  2019,   diagnosed as leukocytoclastic vasculitis. Ig A, biopsy proven, having been preceded by GI infection with subsequent urinalysis showing hematuria / proteinuria. Renal protection with ACE-I and regular f/u rec by nephrology colleagues in the setting of  4.8 x 4.5 cm left kidney lesion / RCC. Surgical intervention postponed due to multiple comorbidities.    \par \par She otherwise denies motor/sensory disturbances or systemic symptoms.

## 2023-04-06 ENCOUNTER — NON-APPOINTMENT (OUTPATIENT)
Age: 58
End: 2023-04-06

## 2023-04-07 NOTE — PATIENT PROFILE ADULT - NSPROHMDIABETMGMTSTRAT_GEN_A_NUR
OFFICE VISIT      Patient: Ayush Reynolds Date of Service: 2023   : 1971 MRN: [id number]     SUBJECTIVE:   HISTORY OF PRESENT ILLNESS:  Ayush Reynolds is a 51 year old male who presents today for   Chief Complaint   Patient presents with   • Cerumen Impaction        Ear Problem   There is pain in both ears. This is a new problem. The current episode started yesterday. The problem occurs every few hours. The problem has been unchanged. There has been no fever. The pain is at a severity of 0/10. The patient is experiencing no pain (muffled L ear). He has tried nothing for the symptoms.       PAST MEDICAL HISTORY:  History reviewed. No pertinent past medical history.    MEDICATIONS:  No current outpatient medications on file.     No current facility-administered medications for this visit.       ALLERGIES:  ALLERGIES:  No Known Allergies    PAST SURGICAL HISTORY:  History reviewed. No pertinent surgical history.    FAMILY HISTORY:  Family History   Problem Relation Age of Onset   • Patient is unaware of any medical problems Mother    • Patient is unaware of any medical problems Father        SOCIAL HISTORY:       Review of Systems   Reason unable to perform ROS: see hpi.   HENT: Negative for ear pain.          OBJECTIVE:     Physical Exam  Constitutional:       Appearance: Normal appearance.   HENT:      Right Ear: No drainage, swelling or tenderness. There is impacted cerumen. Tympanic membrane is not perforated, erythematous, retracted or bulging.      Left Ear: No drainage, swelling or tenderness. There is impacted cerumen. Tympanic membrane is not perforated, erythematous, retracted or bulging.      Nose: Nose normal.   Cardiovascular:      Rate and Rhythm: Normal rate and regular rhythm.   Pulmonary:      Effort: Pulmonary effort is normal.      Breath sounds: Normal breath sounds.   Neurological:      General: No focal deficit present.      Mental Status: He is alert and oriented to person, place, and  time.         Visit Vitals  /50   Pulse 96   Temp 98 °F (36.7 °C)   Resp 20   SpO2 98%       Assessment AND PLAN:     No diagnosis found.  No orders of the defined types were placed in this encounter.      Ear Cerumen Removal    Date/Time: 4/7/2023 11:55 AM  Performed by: Angela Mata CNP  Authorized by: Angela Mata CNP     Consent:     Consent obtained:  Verbal    Consent given by:  Patient    Risks, benefits, and alternatives were discussed: yes      Risks discussed:  Bleeding, dizziness, incomplete removal, infection, pain and TM perforation    Alternatives discussed:  No treatment, delayed treatment, referral, observation and alternative treatment  Procedure details:     Location:  R ear    Procedure type: irrigation      Procedure outcomes: cerumen removed    Post-procedure details:     Inspection:  TM intact and ear canal clear    Hearing quality:  Normal    Procedure completion:  Tolerated well, no immediate complications          Instructions provided as documented in the AVS.    The patient indicated understanding of the diagnosis and agreed with the plan of care.    RHIANNON Barraza   diet modification/insulin therapy/medication therapy/weight management

## 2023-04-10 RX ORDER — METHYLPRED ACET/NACL,ISO-OS/PF 40 MG/ML
40 VIAL (ML) INJECTION
Qty: 1 | Refills: 0 | Status: COMPLETED | OUTPATIENT
Start: 2023-04-04

## 2023-04-10 RX ORDER — LIDOCAINE HYDROCHLORIDE 10 MG/ML
1 INJECTION, SOLUTION INFILTRATION; PERINEURAL
Qty: 0 | Refills: 0 | Status: COMPLETED | OUTPATIENT
Start: 2023-04-04

## 2023-04-11 ENCOUNTER — NON-APPOINTMENT (OUTPATIENT)
Age: 58
End: 2023-04-11

## 2023-04-21 ENCOUNTER — APPOINTMENT (OUTPATIENT)
Dept: ENDOCRINOLOGY | Facility: CLINIC | Age: 58
End: 2023-04-21
Payer: MEDICARE

## 2023-04-21 VITALS
RESPIRATION RATE: 16 BRPM | DIASTOLIC BLOOD PRESSURE: 87 MMHG | HEART RATE: 102 BPM | SYSTOLIC BLOOD PRESSURE: 142 MMHG | BODY MASS INDEX: 51.92 KG/M2 | TEMPERATURE: 98.2 F | OXYGEN SATURATION: 93 % | WEIGHT: 275 LBS | HEIGHT: 61 IN

## 2023-04-21 DIAGNOSIS — E55.9 VITAMIN D DEFICIENCY, UNSPECIFIED: ICD-10-CM

## 2023-04-21 LAB — GLUCOSE BLDC GLUCOMTR-MCNC: 293

## 2023-04-21 PROCEDURE — 82962 GLUCOSE BLOOD TEST: CPT

## 2023-04-21 PROCEDURE — 99214 OFFICE O/P EST MOD 30 MIN: CPT | Mod: 25

## 2023-04-21 RX ORDER — INSULIN ASPART 100 [IU]/ML
100 INJECTION, SOLUTION INTRAVENOUS; SUBCUTANEOUS
Qty: 8 | Refills: 3 | Status: ACTIVE | COMMUNITY
Start: 2023-04-21 | End: 1900-01-01

## 2023-04-21 RX ORDER — INSULIN LISPRO 100 [IU]/ML
100 INJECTION, SOLUTION INTRAVENOUS; SUBCUTANEOUS
Qty: 5 | Refills: 0 | Status: COMPLETED | COMMUNITY
Start: 2023-04-21 | End: 2023-04-21

## 2023-04-21 NOTE — REVIEW OF SYSTEMS
[Fatigue] : fatigue [Nasal Congestion] : nasal congestion [Palpitations] : palpitations [Shortness Of Breath] : shortness of breath [Joint Pain] : joint pain [Negative] : Heme/Lymph

## 2023-04-21 NOTE — PHYSICAL EXAM
[Alert] : alert [Well Nourished] : well nourished [Obese] : obese [No Acute Distress] : no acute distress [Well Developed] : well developed [Normal Sclera/Conjunctiva] : normal sclera/conjunctiva [EOMI] : extra ocular movement intact [No Proptosis] : no proptosis [Normal Oropharynx] : the oropharynx was normal [Thyroid Not Enlarged] : the thyroid was not enlarged [No Thyroid Nodules] : no palpable thyroid nodules [No Respiratory Distress] : no respiratory distress [No Accessory Muscle Use] : no accessory muscle use [Clear to Auscultation] : lungs were clear to auscultation bilaterally [Normal S1, S2] : normal S1 and S2 [Normal Rate] : heart rate was normal [Regular Rhythm] : with a regular rhythm [Pedal Pulses Normal] : the pedal pulses are present [Normal Bowel Sounds] : normal bowel sounds [Not Tender] : non-tender [Not Distended] : not distended [Soft] : abdomen soft [Normal Anterior Cervical Nodes] : no anterior cervical lymphadenopathy [Normal Posterior Cervical Nodes] : no posterior cervical lymphadenopathy [No Spinal Tenderness] : no spinal tenderness [Spine Straight] : spine straight [No Stigmata of Cushings Syndrome] : no stigmata of Cushings Syndrome [Normal Gait] : normal gait [Normal Strength/Tone] : muscle strength and tone were normal [No Rash] : no rash [Normal Reflexes] : deep tendon reflexes were 2+ and symmetric [No Tremors] : no tremors [Oriented x3] : oriented to person, place, and time [Acanthosis Nigricans] : no acanthosis nigricans [de-identified] : (+) bipedal edema [de-identified] : TSH 1.60, FT4 1.3

## 2023-04-21 NOTE — ASSESSMENT
[FreeTextEntry1] : Patient is a 58 y/o F w/ the following medical conditions:\par \par 1) DM\par - last A1c (10/2022) - 9.8, C-peptide 5.7\par - takes Metformin 1000 q12, Levemir 40 u HS\par - she is on a CGM\par - average glucose was 200-300s\par - will see the Ophthalmologist\par \par 2) HLD\par - Cholesterol 184, HDL 39, Triglyceride 125, \par - takes Atorvastatin 40 mg\par \par 3) Hypothyroidism\par - TSH 1.60, FT4 1.3 (10/2022)\par - takes Levothyroxine 112 mcg\par - Thyroid US (2021) - atrophic\par \par 4) Adrenal Mass\par - L adrenal mass (s/p surgery in 2006)\par - salivary cortisol (2/2023) - 113\par - serum cortisol (10/2022) - 7.9\par - Will add Ozempic SQ weekly\par - Will get an authorization\par \par Will repeat the Salivary Cortisol\par She needs surgery for the renal mass

## 2023-04-21 NOTE — REASON FOR VISIT
[Follow - Up] : a follow-up visit [Adrenal Evaluation/Adrenal Disorder] : adrenal evaluation/adrenal disorder [DM Type 2] : DM Type 2 [Hypothyroidism] : hypothyroidism [Weight Management/Obesity] : weight management/obesity [Other___] : [unfilled]

## 2023-04-21 NOTE — HISTORY OF PRESENT ILLNESS
[FreeTextEntry1] : Patient is a 58 y/o F w/ PMHx of HTN, HLD, DM, Hypothyroidism, Adrenal Adneoma (s/p L adrenal surgery in 2006), Renal Cell Ca, Diabetic Neuropathy, Fibromyalgia, Trigeminal Neuralgia, OA, Psoriasis. She is taking Atorvastatin 40 mg, Gabapentin 300 mg HS, levemir 40 u HS, levothyroxine 112 mcg, Metformin 1000 mg q12. She complains of cough, yellowish sputum, itchy throat and shortness of breath. She takes Dayquil which relieves her symptoms. She denies any headache, dizziness, nausea/vomiting, chest pain, palpitations, abdominal pain, diarrhea, dysuria. RBG was 293. Her last lab tests were from 10/2022 showing AM Cortisol 7.9, midnight salivary cortisol 113, Cholesterol 184, HDL 39, Triglyceride 125, , Prolactin 6.1, FSH 9.9, LH 6.2, TSH 1.60, FT4 1.3, A1c 9.8, C-peptide 5.7, Fructosamine 254, DHEAS 90.1, She is on a CGM with glucose ranging >240 and no episodes of hypoglycemia. DEXA (2019) - normal. MR Abdomen (8/2022) showed 4.8 cm L renal mass w/ renal clear cell carcinoma. Thyroid US (atrophic thyroid)

## 2023-04-22 ENCOUNTER — APPOINTMENT (OUTPATIENT)
Dept: MRI IMAGING | Facility: HOSPITAL | Age: 58
End: 2023-04-22
Payer: MEDICARE

## 2023-04-22 ENCOUNTER — OUTPATIENT (OUTPATIENT)
Dept: OUTPATIENT SERVICES | Facility: HOSPITAL | Age: 58
LOS: 1 days | End: 2023-04-22
Payer: MEDICARE

## 2023-04-22 DIAGNOSIS — N89.8 OTHER SPECIFIED NONINFLAMMATORY DISORDERS OF VAGINA: Chronic | ICD-10-CM

## 2023-04-22 DIAGNOSIS — I77.6 ARTERITIS, UNSPECIFIED: Chronic | ICD-10-CM

## 2023-04-22 DIAGNOSIS — Z90.710 ACQUIRED ABSENCE OF BOTH CERVIX AND UTERUS: Chronic | ICD-10-CM

## 2023-04-22 DIAGNOSIS — Z98.89 OTHER SPECIFIED POSTPROCEDURAL STATES: Chronic | ICD-10-CM

## 2023-04-22 DIAGNOSIS — E27.8 OTHER SPECIFIED DISORDERS OF ADRENAL GLAND: Chronic | ICD-10-CM

## 2023-04-22 DIAGNOSIS — N28.89 OTHER SPECIFIED DISORDERS OF KIDNEY AND URETER: ICD-10-CM

## 2023-04-22 PROCEDURE — 74181 MRI ABDOMEN W/O CONTRAST: CPT | Mod: 26

## 2023-04-22 PROCEDURE — 74181 MRI ABDOMEN W/O CONTRAST: CPT

## 2023-04-27 ENCOUNTER — TRANSCRIPTION ENCOUNTER (OUTPATIENT)
Age: 58
End: 2023-04-27

## 2023-05-09 ENCOUNTER — APPOINTMENT (OUTPATIENT)
Dept: PULMONOLOGY | Facility: CLINIC | Age: 58
End: 2023-05-09

## 2023-05-10 ENCOUNTER — TRANSCRIPTION ENCOUNTER (OUTPATIENT)
Age: 58
End: 2023-05-10

## 2023-05-11 ENCOUNTER — TRANSCRIPTION ENCOUNTER (OUTPATIENT)
Age: 58
End: 2023-05-11

## 2023-05-25 ENCOUNTER — NON-APPOINTMENT (OUTPATIENT)
Age: 58
End: 2023-05-25

## 2023-05-25 ENCOUNTER — TRANSCRIPTION ENCOUNTER (OUTPATIENT)
Age: 58
End: 2023-05-25

## 2023-05-25 ENCOUNTER — APPOINTMENT (OUTPATIENT)
Dept: UROLOGY | Facility: CLINIC | Age: 58
End: 2023-05-25

## 2023-06-02 ENCOUNTER — TRANSCRIPTION ENCOUNTER (OUTPATIENT)
Age: 58
End: 2023-06-02

## 2023-06-02 ENCOUNTER — APPOINTMENT (OUTPATIENT)
Dept: PULMONOLOGY | Facility: CLINIC | Age: 58
End: 2023-06-02
Payer: MEDICARE

## 2023-06-02 VITALS
OXYGEN SATURATION: 93 % | SYSTOLIC BLOOD PRESSURE: 143 MMHG | RESPIRATION RATE: 16 BRPM | TEMPERATURE: 98.2 F | HEART RATE: 116 BPM | BODY MASS INDEX: 52.3 KG/M2 | HEIGHT: 61 IN | WEIGHT: 277 LBS | DIASTOLIC BLOOD PRESSURE: 87 MMHG

## 2023-06-02 DIAGNOSIS — R05.3 CHRONIC COUGH: ICD-10-CM

## 2023-06-02 DIAGNOSIS — G47.33 OBSTRUCTIVE SLEEP APNEA (ADULT) (PEDIATRIC): ICD-10-CM

## 2023-06-02 PROCEDURE — 99214 OFFICE O/P EST MOD 30 MIN: CPT

## 2023-06-06 ENCOUNTER — APPOINTMENT (OUTPATIENT)
Dept: INTERNAL MEDICINE | Facility: CLINIC | Age: 58
End: 2023-06-06
Payer: MEDICARE

## 2023-06-06 VITALS
HEIGHT: 61 IN | TEMPERATURE: 98 F | OXYGEN SATURATION: 95 % | WEIGHT: 277 LBS | HEART RATE: 105 BPM | RESPIRATION RATE: 16 BRPM | BODY MASS INDEX: 52.3 KG/M2 | SYSTOLIC BLOOD PRESSURE: 147 MMHG | DIASTOLIC BLOOD PRESSURE: 89 MMHG

## 2023-06-06 DIAGNOSIS — J45.909 UNSPECIFIED ASTHMA, UNCOMPLICATED: ICD-10-CM

## 2023-06-06 PROCEDURE — 99214 OFFICE O/P EST MOD 30 MIN: CPT

## 2023-06-07 ENCOUNTER — APPOINTMENT (OUTPATIENT)
Dept: ENDOCRINOLOGY | Facility: CLINIC | Age: 58
End: 2023-06-07
Payer: MEDICARE

## 2023-06-07 DIAGNOSIS — D35.00 BENIGN NEOPLASM OF UNSPECIFIED ADRENAL GLAND: ICD-10-CM

## 2023-06-07 PROCEDURE — 99443: CPT

## 2023-06-07 NOTE — ASSESSMENT
[FreeTextEntry1] : Patient is feeling better, she is more optimistic\par Her weight has not changed\par The diabetes is better controlled with the Ozempic\par She has been feeling nauseous on and off\par She has an appointment with the Oncologist next month\par Advised to follow the diet and walk regularly\par The US thyroid revealed an atrophic gland om 9/21\par Will repeat the blood tests in 4 weeks\par \par

## 2023-06-07 NOTE — HISTORY OF PRESENT ILLNESS
[Home] : at home, [unfilled] , at the time of the visit. [Medical Office: ([facility])___] : at the medical office located in  [Verbal consent obtained from patient] : the patient, [unfilled] [FreeTextEntry1] : Patient feels better but she has been felling nauseous on and off. Her weight is unchanged. She is not walking regularly but she is following the diet better. Her blood glucose at home are better controlled, they are usually around 160 to 180 mg/dl. She denies low blood glucose during the night. She denies chest pain, or SOB. Denies numbness, tingling or burning sensation on her extremities. Taking her medications regularly. \par

## 2023-06-07 NOTE — REASON FOR VISIT
[Follow - Up] : a follow-up visit [Adrenal Evaluation/Adrenal Disorder] : adrenal evaluation/adrenal disorder [DM Type 2] : DM Type 2 [Hypothyroidism] : hypothyroidism [Other___] : [unfilled]

## 2023-06-11 PROBLEM — R05.3 CHRONIC COUGH: Status: ACTIVE | Noted: 2022-10-29

## 2023-06-11 PROBLEM — G47.33 OSA (OBSTRUCTIVE SLEEP APNEA): Status: ACTIVE | Noted: 2020-05-01

## 2023-06-11 NOTE — HISTORY OF PRESENT ILLNESS
[de-identified] : 57 year old  female patient with history of stable Hypertension, Hypothyroidism, Type 2 Diabetes Mellitus, Hyperlipidemia, Reactive Airway Disease, Morbid Obesity, Renal Cell Carcinoma, Diabetic Neuropathy, history as stated, presented for follow up examination. Patient is compliant with all medications. ROS as stated.\par

## 2023-06-11 NOTE — HEALTH RISK ASSESSMENT
[No] : In the past 12 months have you used drugs other than those required for medical reasons? No [No falls in past year] : Patient reported no falls in the past year [0] : 2) Feeling down, depressed, or hopeless: Not at all (0) [Never] : Never [de-identified] : RHEUM/PULM/CARD/ENDO [[id number]] : 0

## 2023-06-11 NOTE — ASSESSMENT
[FreeTextEntry1] : 57 year old female found to have stable Hypertension, Hypothyroidism, Type 2 Diabetes Mellitus, Hyperlipidemia, Reactive Airway Disease, Morbid Obesity, Renal Cell Carcinoma, Diabetic Neuropathy,with the current prescription regimen as recommended, diet and life style modifications, as counseled. Prior results reviewed, interpreted and discussed with the patient during today's examination, as appropriate. Follow up, treatment plan and tests, as ordered.\par \par High risk for surgical intervention for Renal Cell Carcinoma, HA1c greater than 11 persistently at this time, potential worsening of her condition and possible metastatic process due to delay in intervention, cannot be avoided, as counseled during today's examination.  Optimization of diabetes control, as per ENDO follow-up and recommendations, as discussed during today's examination.\par \par Total time spent : 30 minutes\par Including:\par Preparation prior to visit - Reviewing prior record, results of tests and Consultation Reports as applicable\par Conducting an appropriate H & P during today's encounter\par Appropriate orders for tests, medications and procedures, as applicable\par Counseling patient \par Note completion\par

## 2023-06-15 ENCOUNTER — TRANSCRIPTION ENCOUNTER (OUTPATIENT)
Age: 58
End: 2023-06-15

## 2023-06-26 ENCOUNTER — RX RENEWAL (OUTPATIENT)
Age: 58
End: 2023-06-26

## 2023-07-10 ENCOUNTER — APPOINTMENT (OUTPATIENT)
Dept: CARDIOLOGY | Facility: CLINIC | Age: 58
End: 2023-07-10

## 2023-07-20 ENCOUNTER — APPOINTMENT (OUTPATIENT)
Dept: RHEUMATOLOGY | Facility: CLINIC | Age: 58
End: 2023-07-20
Payer: MEDICARE

## 2023-07-20 ENCOUNTER — APPOINTMENT (OUTPATIENT)
Dept: UROLOGY | Facility: CLINIC | Age: 58
End: 2023-07-20
Payer: MEDICARE

## 2023-07-20 VITALS
WEIGHT: 276 LBS | RESPIRATION RATE: 16 BRPM | OXYGEN SATURATION: 96 % | DIASTOLIC BLOOD PRESSURE: 99 MMHG | HEIGHT: 60 IN | TEMPERATURE: 98 F | HEART RATE: 82 BPM | SYSTOLIC BLOOD PRESSURE: 153 MMHG | BODY MASS INDEX: 54.19 KG/M2

## 2023-07-20 DIAGNOSIS — C64.9 MALIGNANT NEOPLASM OF UNSPECIFIED KIDNEY, EXCEPT RENAL PELVIS: ICD-10-CM

## 2023-07-20 DIAGNOSIS — M75.51 BURSITIS OF RIGHT SHOULDER: ICD-10-CM

## 2023-07-20 PROCEDURE — 99214 OFFICE O/P EST MOD 30 MIN: CPT

## 2023-07-20 PROCEDURE — 99214 OFFICE O/P EST MOD 30 MIN: CPT | Mod: 25

## 2023-07-20 PROCEDURE — 20610 DRAIN/INJ JOINT/BURSA W/O US: CPT | Mod: RT

## 2023-07-20 RX ORDER — METHYLPRED ACET/NACL,ISO-OS/PF 40 MG/ML
40 VIAL (ML) INJECTION
Qty: 1 | Refills: 0 | Status: COMPLETED | OUTPATIENT
Start: 2023-07-20

## 2023-07-20 RX ORDER — LIDOCAINE HYDROCHLORIDE 10 MG/ML
1 INJECTION, SOLUTION INFILTRATION; PERINEURAL
Qty: 0 | Refills: 0 | Status: COMPLETED | OUTPATIENT
Start: 2023-07-20

## 2023-07-20 RX ADMIN — Medication 0 %: at 00:00

## 2023-07-20 RX ADMIN — METHYLPREDNISOLONE ACETATE 0 MG/ML: 40 INJECTION, SUSPENSION INTRA-ARTICULAR; INTRALESIONAL; INTRAMUSCULAR; INTRASYNOVIAL; SOFT TISSUE at 00:00

## 2023-07-20 NOTE — CONSULT LETTER
[Dear  ___] : Dear  [unfilled], [Courtesy Letter:] : I had the pleasure of seeing your patient, [unfilled], in my office today. [Please see my note below.] : Please see my note below. [Consult Closing:] : Thank you very much for allowing me to participate in the care of this patient.  If you have any questions, please do not hesitate to contact me. [Sincerely,] : Sincerely, [FreeTextEntry2] : Ken John MD [FreeTextEntry3] : Chante Ribeiro M.D., RhMSUS\par  of Medicine \par [facility] [employer] at [facility]\par \par

## 2023-07-20 NOTE — HISTORY OF PRESENT ILLNESS
[FreeTextEntry1] : Patient reports overall improvement in knee pain and notes lower extremity psoriasiform rash as well as rash over the mid back has improved with the use of Otezla . She explains worsening pain over the RT shoulder where she is having difficulty raising the arm overhead or reaching for the back and performing basic ADLs.  She also reports pruritic nature over the palms and dorsum of the hands.  Patient explains difficulty with sugar control with  hemoglobin A1c in the 11 range seen in April.  \par \par Patient developed skin rash spring of  2019,   diagnosed as leukocytoclastic vasculitis. Ig A, biopsy proven, having been preceded by GI infection with subsequent urinalysis showing hematuria / proteinuria. Renal protection with ACE-I and regular f/u rec by nephrology colleagues in the setting of  4.8 x 4.5 cm left kidney lesion / RCC. Surgical intervention postponed due to multiple comorbidities.    Patient considering radiation therapy.\par \par She otherwise denies motor/sensory disturbances or systemic symptoms.

## 2023-07-20 NOTE — PHYSICAL EXAM
[General Appearance - Alert] : alert [General Appearance - In No Acute Distress] : in no acute distress [Sclera] : the sclera and conjunctiva were normal [Examination Of The Oral Cavity] : the lips and gums were normal [Oropharynx] : the oropharynx was normal [Neck Appearance] : the appearance of the neck was normal [] : no respiratory distress [Exaggerated Use Of Accessory Muscles For Inspiration] : no accessory muscle use [Heart Rate And Rhythm] : heart rate was normal and rhythm regular [Edema] : there was no peripheral edema [Abdomen Soft] : soft [No Spinal Tenderness] : no spinal tenderness [FreeTextEntry1] : lightened plaque over LE, b/l forearm, olecranon , LT lower back [Motor Exam] : the motor exam was normal [Oriented To Time, Place, And Person] : oriented to person, place, and time [Impaired Insight] : insight and judgment were intact

## 2023-07-20 NOTE — ASSESSMENT
[FreeTextEntry1] : Patient with fibromyalgia, IgA vasculitis resolved, psoriasis, LT RCC: RT SAB:\par \par Patient to continue with Otezla ; psoriasis development may be multifactorial , namely stemming from renal malignancy as well as metabolic syndromes including uncontrolled DM.  Discussed the importance of dietary modification and medication compliance.  Patient should avoid biologics in the setting of RCC, rec Derm intervention for light therapy and additional topical immunosuppressants.  Dermatology follow-up emphasized.\par Rotator cuff strengthening encouraged ; cortisone injection given to the RT SAB for pain relief .  Quadriceps strengthening exercises encouraged .  Weight loss has been encouraged to reduce load over the medial joint line.  Viscosupplementation has been encouraged to provide additional lubrication and joint support. Core strengthening exercises demonstrated for the lower back.  \par The importance of dietary and lifestyle modifications was reiterated for the treatment of Fibromyalgia along with discussions on relaxation, stress-reducing techniques and importance of good sleep hygiene.  Neuropathic pain therapy given to address pain symptoms along with trigeminal neuralgia.\par \par She is in agreement with the above plan and will return in three months' time.\par \par \par

## 2023-07-31 ENCOUNTER — APPOINTMENT (OUTPATIENT)
Dept: ENDOCRINOLOGY | Facility: CLINIC | Age: 58
End: 2023-07-31

## 2023-08-01 ENCOUNTER — APPOINTMENT (OUTPATIENT)
Dept: OBGYN | Facility: CLINIC | Age: 58
End: 2023-08-01

## 2023-08-02 ENCOUNTER — APPOINTMENT (OUTPATIENT)
Dept: ENDOCRINOLOGY | Facility: CLINIC | Age: 58
End: 2023-08-02
Payer: MEDICARE

## 2023-08-02 PROCEDURE — 99443: CPT

## 2023-08-02 RX ORDER — SEMAGLUTIDE 1.34 MG/ML
4 INJECTION, SOLUTION SUBCUTANEOUS
Qty: 12 | Refills: 3 | Status: ACTIVE | COMMUNITY
Start: 2023-04-21 | End: 1900-01-01

## 2023-08-03 NOTE — ASSESSMENT
[FreeTextEntry1] : Patient is not feeling well now due to the trigeminal neuralgia Her weight is stable The diabetes has improved Will raise the Ozempic dose No diabetic Retinopathy or Nephropathy The lipid panel was fine except for the elevated Tg Advised to follow the diet and exercise regularly The thyroid tests were normal The last US thyroid was fine 2021

## 2023-08-03 NOTE — DATA REVIEWED
[FreeTextEntry1] : The FBS and HbA1c indicates better diabetic control. The renal function is fine. No microalbuminuria. The TIR was 49% and the GMI was 7.7%.

## 2023-08-03 NOTE — REASON FOR VISIT
[Follow - Up] : a follow-up visit [DM Type 2] : DM Type 2 [Hypothyroidism] : hypothyroidism [Home] : at home, [unfilled] , at the time of the visit. [Medical Office: ([facility])___] : at the medical office located in  [Verbal consent obtained from patient] : the patient, [unfilled]

## 2023-08-03 NOTE — HISTORY OF PRESENT ILLNESS
[FreeTextEntry1] : Patient feels tired, she has sometime no energy, for several days she has been having trigeminal neuralgia. Her weight is unchanged. She is walking regularly and following the diet. Her blood glucose at home are improved controlled, they are usually around 140 to 150 mg/dl. She denies low blood glucose during the night. She denies chest pain, or SOB. Denies numbness, tingling or burning sensation on her extremities. Taking her medications regularly. She has not seen the Ophthalmologist recently. She has not seen Podiatrist recently.

## 2023-08-08 NOTE — PHYSICAL EXAM
[No Acute Distress] : no acute distress [Well Nourished] : well nourished [Well Developed] : well developed [Low Lying Soft Palate] : low lying soft palate [Elongated Uvula] : elongated uvula [Enlarged Base of the Tongue] : enlarged base of the tongue [III] : Mallampati Class: III [Not Tender] : not tender [2+ Pitting] : 2+ pitting [No Edema] : no edema [No Focal Deficits] : no focal deficits [Oriented x3] : oriented x3 [TextBox_11] : some cough [TextBox_44] : kyphosis, short neck limited mobility [TextBox_68] : no wheeze, diminished aeration  much improved, breathes well [TextBox_89] : obese, protuberant

## 2023-08-08 NOTE — DISCUSSION/SUMMARY
[FreeTextEntry1] : Chronic hypercapnic respiratory failure/NAKUL,  obstructive airway disease asthma  morbid obesity  She has  history of  hypercapnic respiratory failure  She was prescribed and  obtained VAPS but too costly,  However, she is not using BIPAP (which she has had) either, states it needs to be set.  She received new equipment fro [facility] using CPAP.  She reports cough, rhinitis, chest congestion and dyspnea.  She has residual symptoms, but has improved   MRI abdomen 4/22/23 demonstrated Left renal mass consistent with renal cell carcinoma demonstrating continued mild interval growth as above.  Patient is experiencing respiratory distress due to kyphosis, elevated BMI, chronic respiratory failure and morbid obesity with alveolar hypoventilation causing diminished quality of life. Patient will benefit from an NIV at home to improve overall respiration, manage blood gas levels prevent hospitalization and life-threatening conditions    PLAN  Will need to have BIPAP set up.  I have attempted to find DME for this but having difficulty with insurance  Continue nasal sprays fluticasone azelastine and ipratropium nasal spray  We will now attempt to gain insurance authorization to use NIV which is the preferred treatment  Continue ICS/LABA  and montelukast, along with albuterol MDI as needed, has spacer   Stopped ramipril and started amlodipine 10 mg , cough has greatly improved   She is undergoing follow up for lesion consistent with renal cancer for which she will need resection   Total time spent : 30 minutes Including: Preparation prior to visit - Reviewing prior record, results of tests and Consultation Reports as applicable Conducting an appropriate H & P during today's encounter Appropriate orders for tests, medications and procedures, as applicable Counseling patient  Note completion   I walked with pt for exercise study  Guido Monterroso MD

## 2023-08-08 NOTE — HISTORY OF PRESENT ILLNESS
[Never] : never [Obstructive Sleep Apnea] : obstructive sleep apnea [Hypersomnolence] : hypersomnolence [Nonrestorative Sleep] : nonrestorative sleep [Snoring] : snoring [Unintentional Sleep while Active] : unintentional sleep while active [TextBox_4] : 57 yr old woman with asthma and NAKUL, chronic hypercapnic respiratory failure, due to obesity and restrictive lung disease and kyphosis.  She had been on NIV  BIPAP 20/16.\par \par She is also using Symbicort and albuterol via nebulizer, latter once per day.\par \par She has obtained AirPhysio PEP device and uses it with increased sputum  clearance.  Sputum is mostly clear.\par \par She uses saline nasal wash. She also uses fluticasone nasal spray.  She uses Zyrtec as needed and montelukast every night.\par \par \par She has not used PAP, especially since recall.  She obtained NIV but it was too costly.  She now does not use any PAP.  She received replacement from [employer] but states it has not been set up to use, and she has not used it due to cough\par \par \par She has had asthma for many years. She has few allergies (dust dust mites, flowers) not severe.\par Asthma is worse when allergies are worse. \par \par She has hypoxia with exertion.\par \par \par She also has a 4 mm lung nodule seen on prior CT chest. CT chest 5/2021 did not demonstrated any suspicious nodule. \par \par \par She has renal cell carcinoma and has not had removal due to COVID crisis. Lesion is being closely monitored per team\par \par She had repeat MRI 8/13/22\par \par She has had LE edema R>L.  She is on HCTZ and ramipril\par \par She has been hospitalized at [facility] with dyspnea, LOC.  She was treated for pneumonia and hypercapnic respiratory failure.  she required intubation/mechanical ventilation\par \par \par Last CO2 on chemistry 36\par \par She continues to have cough which she states is incessant, especially when supine\par \par \par She  has also had chest congestion and nasal symptoms that were treated with diuretic antibiotic and steroid with brief transient benefit\par \par She has contacted me about her cough that she states is severe, mostly non productive\par \par \par 2/21/23:  states she has been having increased cough for over 1 month.  She is having some sputum production, yellow at times.  \par \par CT coronary angiogram demonstrated elevated Argatson score 1069.  There were no significant stenoses.\par \par She has BIPAP therapy but has not initiated as it has yet to e initiated/serviced.\par \par I have tried to obtain PAP therapy through her insurance, with difficulty\par \par CT 12/13/22 demonstrated a 3 mm nodule in RUL  but no emphysema , interstitial lung disease  or cystic lung disease.  There was also no tracheomalacia.\par \par PMH:\par She has hyperlipidemia, elevated liver enzymes, DM, hypothyroid depression hypertension sleep apnea not using CPAP. fibromyalgia\par \par PSH\par c sect\par right ovary\par \par lysis of adhesion\par ERMELINDA/BSO\par removal of left adrenal gland due to nodule, benign\par \par Temporal artery biopsy\par \par \par

## 2023-08-08 NOTE — ADDENDUM
[FreeTextEntry1] : Ms Susie Leger has restrictive lung disease due to elevated BMI and kyphosis. She has been unable to tolerate BIPAP.  Non invasive ventilator has been recommended and ordered in the past.  This is the appropriate treatment fro Ms Leger respiratory condition. She has used it in past but could not afford the copay which was excessive.  Because of her condition, this is the appropriate form of respiratory care.  This addendum is written 8/8/2023  Guido Monterroso MD

## 2023-08-10 ENCOUNTER — APPOINTMENT (OUTPATIENT)
Dept: INTERNAL MEDICINE | Facility: CLINIC | Age: 58
End: 2023-08-10

## 2023-08-10 RX ORDER — DIPHENHYDRAMINE HCL 50 MG/1
50 CAPSULE ORAL
Qty: 1 | Refills: 0 | Status: ACTIVE | COMMUNITY
Start: 2023-08-10 | End: 1900-01-01

## 2023-08-10 RX ORDER — PREDNISONE 50 MG/1
50 TABLET ORAL
Qty: 3 | Refills: 0 | Status: ACTIVE | COMMUNITY
Start: 2023-08-10 | End: 1900-01-01

## 2023-08-10 NOTE — ASSESSMENT
[FreeTextEntry1] : Prior imaging reviewed. Patient is in better medical condition to consider surgery. Recommend repeat MRI abdomen w/wo IV contrast. Will review and call with results. Following imaging review, will consider going forward with OR planning for robotic/laparoscopic possible open left partial nephrectomy.  Reviewed operative procedure, hospital stay and recovery time.  Risks of surgery discussed including risks of bleeding (both immediate and delayed), urine leak, wound/abdominal infection, adjacent organ injury (bowel/vascular), conversion to open surgery, risk of renal loss, DVT/PE, and anesthetic complications.  All questions answered.

## 2023-08-10 NOTE — HISTORY OF PRESENT ILLNESS
[FreeTextEntry1] : 58F morbidly obese F for visit for Left renal mass.  Found incidentally for microhematuria workup on CTU 19 ([facility]. Report scanned, no images in system).  Referred by Dr. Smith.  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   Prior surgery for left renal mass delayed by prolonged recovery from pneumonia requiring prolonged intubation.  Pulmonary function has stabilized and optimized as per the patient.   Denies weight changes, hematuria, bone pain.  MRI 2023: 4.7 cm midpole left renal mass, increased in size from 4.4 cm previously.  PMH: NAKUL (poor compliant, not on CPAP), HTN, DM, morbid obesity, HLD, hypothyroidism, fibromyaglia, IgA vasculitis  PSH: L adrenalectomy, , R oophorectomy, ? open lysis of adhesions, uterine fibroidectomy

## 2023-08-11 ENCOUNTER — LABORATORY RESULT (OUTPATIENT)
Age: 58
End: 2023-08-11

## 2023-08-15 ENCOUNTER — NON-APPOINTMENT (OUTPATIENT)
Age: 58
End: 2023-08-15

## 2023-08-16 ENCOUNTER — TRANSCRIPTION ENCOUNTER (OUTPATIENT)
Age: 58
End: 2023-08-16

## 2023-08-25 ENCOUNTER — OUTPATIENT (OUTPATIENT)
Dept: OUTPATIENT SERVICES | Facility: HOSPITAL | Age: 58
LOS: 1 days | End: 2023-08-25
Payer: MEDICARE

## 2023-08-25 ENCOUNTER — APPOINTMENT (OUTPATIENT)
Dept: CT IMAGING | Facility: HOSPITAL | Age: 58
End: 2023-08-25
Payer: MEDICARE

## 2023-08-25 DIAGNOSIS — I77.6 ARTERITIS, UNSPECIFIED: Chronic | ICD-10-CM

## 2023-08-25 DIAGNOSIS — N89.8 OTHER SPECIFIED NONINFLAMMATORY DISORDERS OF VAGINA: Chronic | ICD-10-CM

## 2023-08-25 DIAGNOSIS — Z90.710 ACQUIRED ABSENCE OF BOTH CERVIX AND UTERUS: Chronic | ICD-10-CM

## 2023-08-25 DIAGNOSIS — C64.2 MALIGNANT NEOPLASM OF LEFT KIDNEY, EXCEPT RENAL PELVIS: ICD-10-CM

## 2023-08-25 DIAGNOSIS — E27.8 OTHER SPECIFIED DISORDERS OF ADRENAL GLAND: Chronic | ICD-10-CM

## 2023-08-25 DIAGNOSIS — Z98.89 OTHER SPECIFIED POSTPROCEDURAL STATES: Chronic | ICD-10-CM

## 2023-08-25 PROCEDURE — 74178 CT ABD&PLV WO CNTR FLWD CNTR: CPT

## 2023-08-25 PROCEDURE — 74178 CT ABD&PLV WO CNTR FLWD CNTR: CPT | Mod: 26

## 2023-08-31 RX ORDER — PREDNISONE 10 MG/1
10 TABLET ORAL
Qty: 21 | Refills: 0 | Status: ACTIVE | COMMUNITY
Start: 2023-08-31 | End: 1900-01-01

## 2023-09-02 ENCOUNTER — EMERGENCY (EMERGENCY)
Facility: HOSPITAL | Age: 58
LOS: 1 days | Discharge: HOME | End: 2023-09-02
Attending: EMERGENCY MEDICINE
Payer: MEDICARE

## 2023-09-02 VITALS
HEART RATE: 111 BPM | SYSTOLIC BLOOD PRESSURE: 135 MMHG | OXYGEN SATURATION: 93 % | TEMPERATURE: 98 F | DIASTOLIC BLOOD PRESSURE: 85 MMHG | RESPIRATION RATE: 20 BRPM

## 2023-09-02 VITALS
OXYGEN SATURATION: 94 % | DIASTOLIC BLOOD PRESSURE: 85 MMHG | RESPIRATION RATE: 22 BRPM | HEIGHT: 61 IN | TEMPERATURE: 98 F | WEIGHT: 279.99 LBS | HEART RATE: 106 BPM | SYSTOLIC BLOOD PRESSURE: 143 MMHG

## 2023-09-02 DIAGNOSIS — E27.8 OTHER SPECIFIED DISORDERS OF ADRENAL GLAND: Chronic | ICD-10-CM

## 2023-09-02 DIAGNOSIS — N89.8 OTHER SPECIFIED NONINFLAMMATORY DISORDERS OF VAGINA: Chronic | ICD-10-CM

## 2023-09-02 DIAGNOSIS — Z98.89 OTHER SPECIFIED POSTPROCEDURAL STATES: Chronic | ICD-10-CM

## 2023-09-02 DIAGNOSIS — Z90.710 ACQUIRED ABSENCE OF BOTH CERVIX AND UTERUS: Chronic | ICD-10-CM

## 2023-09-02 DIAGNOSIS — I77.6 ARTERITIS, UNSPECIFIED: Chronic | ICD-10-CM

## 2023-09-02 LAB
ALBUMIN SERPL ELPH-MCNC: 3.1 G/DL — LOW (ref 3.5–5)
ALP SERPL-CCNC: 121 U/L — HIGH (ref 40–120)
ALT FLD-CCNC: 54 U/L DA — SIGNIFICANT CHANGE UP (ref 10–60)
ANION GAP SERPL CALC-SCNC: 6 MMOL/L — SIGNIFICANT CHANGE UP (ref 5–17)
AST SERPL-CCNC: 26 U/L — SIGNIFICANT CHANGE UP (ref 10–40)
BASOPHILS # BLD AUTO: 0.05 K/UL — SIGNIFICANT CHANGE UP (ref 0–0.2)
BASOPHILS NFR BLD AUTO: 0.5 % — SIGNIFICANT CHANGE UP (ref 0–2)
BILIRUB SERPL-MCNC: 0.5 MG/DL — SIGNIFICANT CHANGE UP (ref 0.2–1.2)
BUN SERPL-MCNC: 15 MG/DL — SIGNIFICANT CHANGE UP (ref 7–18)
CALCIUM SERPL-MCNC: 9.5 MG/DL — SIGNIFICANT CHANGE UP (ref 8.4–10.5)
CHLORIDE SERPL-SCNC: 102 MMOL/L — SIGNIFICANT CHANGE UP (ref 96–108)
CO2 SERPL-SCNC: 32 MMOL/L — HIGH (ref 22–31)
CREAT SERPL-MCNC: 0.81 MG/DL — SIGNIFICANT CHANGE UP (ref 0.5–1.3)
EGFR: 84 ML/MIN/1.73M2 — SIGNIFICANT CHANGE UP
EOSINOPHIL # BLD AUTO: 0.09 K/UL — SIGNIFICANT CHANGE UP (ref 0–0.5)
EOSINOPHIL NFR BLD AUTO: 0.9 % — SIGNIFICANT CHANGE UP (ref 0–6)
GLUCOSE SERPL-MCNC: 205 MG/DL — HIGH (ref 70–99)
HCT VFR BLD CALC: 42.6 % — SIGNIFICANT CHANGE UP (ref 34.5–45)
HGB BLD-MCNC: 12.4 G/DL — SIGNIFICANT CHANGE UP (ref 11.5–15.5)
IMM GRANULOCYTES NFR BLD AUTO: 0.7 % — SIGNIFICANT CHANGE UP (ref 0–0.9)
LYMPHOCYTES # BLD AUTO: 1.76 K/UL — SIGNIFICANT CHANGE UP (ref 1–3.3)
LYMPHOCYTES # BLD AUTO: 17.3 % — SIGNIFICANT CHANGE UP (ref 13–44)
MCHC RBC-ENTMCNC: 22.8 PG — LOW (ref 27–34)
MCHC RBC-ENTMCNC: 29.1 GM/DL — LOW (ref 32–36)
MCV RBC AUTO: 78.5 FL — LOW (ref 80–100)
MONOCYTES # BLD AUTO: 0.75 K/UL — SIGNIFICANT CHANGE UP (ref 0–0.9)
MONOCYTES NFR BLD AUTO: 7.4 % — SIGNIFICANT CHANGE UP (ref 2–14)
NEUTROPHILS # BLD AUTO: 7.47 K/UL — HIGH (ref 1.8–7.4)
NEUTROPHILS NFR BLD AUTO: 73.2 % — SIGNIFICANT CHANGE UP (ref 43–77)
NRBC # BLD: 0 /100 WBCS — SIGNIFICANT CHANGE UP (ref 0–0)
NT-PROBNP SERPL-SCNC: 170 PG/ML — HIGH (ref 0–125)
PLATELET # BLD AUTO: 359 K/UL — SIGNIFICANT CHANGE UP (ref 150–400)
POTASSIUM SERPL-MCNC: 3.4 MMOL/L — LOW (ref 3.5–5.3)
POTASSIUM SERPL-SCNC: 3.4 MMOL/L — LOW (ref 3.5–5.3)
PROT SERPL-MCNC: 7.8 G/DL — SIGNIFICANT CHANGE UP (ref 6–8.3)
RBC # BLD: 5.43 M/UL — HIGH (ref 3.8–5.2)
RBC # FLD: 16.7 % — HIGH (ref 10.3–14.5)
SARS-COV-2 RNA SPEC QL NAA+PROBE: SIGNIFICANT CHANGE UP
SODIUM SERPL-SCNC: 140 MMOL/L — SIGNIFICANT CHANGE UP (ref 135–145)
TROPONIN I, HIGH SENSITIVITY RESULT: 12.7 NG/L — SIGNIFICANT CHANGE UP
TROPONIN I, HIGH SENSITIVITY RESULT: 14.8 NG/L — SIGNIFICANT CHANGE UP
WBC # BLD: 10.19 K/UL — SIGNIFICANT CHANGE UP (ref 3.8–10.5)
WBC # FLD AUTO: 10.19 K/UL — SIGNIFICANT CHANGE UP (ref 3.8–10.5)

## 2023-09-02 PROCEDURE — 87635 SARS-COV-2 COVID-19 AMP PRB: CPT

## 2023-09-02 PROCEDURE — 83880 ASSAY OF NATRIURETIC PEPTIDE: CPT

## 2023-09-02 PROCEDURE — 82962 GLUCOSE BLOOD TEST: CPT

## 2023-09-02 PROCEDURE — 99285 EMERGENCY DEPT VISIT HI MDM: CPT

## 2023-09-02 PROCEDURE — 93005 ELECTROCARDIOGRAM TRACING: CPT

## 2023-09-02 PROCEDURE — 99285 EMERGENCY DEPT VISIT HI MDM: CPT | Mod: 25

## 2023-09-02 PROCEDURE — 71045 X-RAY EXAM CHEST 1 VIEW: CPT | Mod: 26

## 2023-09-02 PROCEDURE — 85025 COMPLETE CBC W/AUTO DIFF WBC: CPT

## 2023-09-02 PROCEDURE — 71275 CT ANGIOGRAPHY CHEST: CPT | Mod: MA

## 2023-09-02 PROCEDURE — 93010 ELECTROCARDIOGRAM REPORT: CPT

## 2023-09-02 PROCEDURE — 94640 AIRWAY INHALATION TREATMENT: CPT

## 2023-09-02 PROCEDURE — 71045 X-RAY EXAM CHEST 1 VIEW: CPT

## 2023-09-02 PROCEDURE — 80053 COMPREHEN METABOLIC PANEL: CPT

## 2023-09-02 PROCEDURE — 36415 COLL VENOUS BLD VENIPUNCTURE: CPT

## 2023-09-02 PROCEDURE — 71275 CT ANGIOGRAPHY CHEST: CPT | Mod: 26,MA

## 2023-09-02 PROCEDURE — 84484 ASSAY OF TROPONIN QUANT: CPT

## 2023-09-02 RX ORDER — IPRATROPIUM/ALBUTEROL SULFATE 18-103MCG
3 AEROSOL WITH ADAPTER (GRAM) INHALATION ONCE
Refills: 0 | Status: COMPLETED | OUTPATIENT
Start: 2023-09-02 | End: 2023-09-02

## 2023-09-02 RX ADMIN — Medication 3 MILLILITER(S): at 15:37

## 2023-09-02 NOTE — ED PROVIDER NOTE - OBJECTIVE STATEMENT
58-year-old woman, history of renal cell carcinoma, currently not on treatment, asthma, IgA vasculitis, psoriasis, diabetes, hypertension, complains of about 4 days of shortness of breath, chest discomfort, chills, occasional cough.  She has some chronic leg swelling bilaterally.  She does use nasal cannula oxygen at home as needed and says she has recently been using it very often.  She denies any history of DVT or PE.  She says her doctor will be starting her on treatment for renal cell carcinoma soon as it is currently being arranged.  Primary doctor is Dr.Sunil John.

## 2023-09-02 NOTE — ED ADULT TRIAGE NOTE - CHIEF COMPLAINT QUOTE
chest thightness, radiating to the upper back  worse when taking a deep breath in   cough, congestion , dizziness x 3 days

## 2023-09-02 NOTE — ED ADULT NURSE NOTE - OBJECTIVE STATEMENT
pt came in the er with c/o chest tightness, radiating to the upper back worse when taking a deep breath in cough, congestion , dizziness x 3 days/pt on cardiac monitor /sinus tachy noted on the monitor /safety maintained /ekg done /awaiting for  see the pt /

## 2023-09-02 NOTE — ED PROVIDER NOTE - PATIENT PORTAL LINK FT
You can access the FollowMyHealth Patient Portal offered by [facility] by registering at the following website: [email or web address]. By joining [employer]’s FollowMyHealth portal, you will also be able to view your health information using other applications (apps) compatible with our system.

## 2023-09-02 NOTE — ED PROVIDER NOTE - PROGRESS NOTE DETAILS
Nguyễn: work up neg. cta neg. trop x 2 neg. pt hemodynamically stable. possibly chest tightness could be from NAKUL or gastritis. can see cardio outpt if persist

## 2023-09-02 NOTE — ED PROVIDER NOTE - CLINICAL SUMMARY MEDICAL DECISION MAKING FREE TEXT BOX
58-year-old woman, history of renal cell carcinoma, currently not on treatment, asthma, IgA vasculitis, psoriasis, diabetes, hypertension, complains of about 4 days of shortness of breath, chest discomfort, chills, occasional cough--Labs, EKG, chest x-ray, CT angio of the chest to rule out PE, will give trial of albuterol nebulizer treatment, reassess 58-year-old woman, history of renal cell carcinoma, currently not on treatment, asthma, IgA vasculitis, psoriasis, diabetes, hypertension, complains of about 4 days of shortness of breath, chest discomfort, chills, occasional cough--Labs, EKG, chest x-ray, CT angio of the chest to rule out PE, will give trial of albuterol nebulizer treatment, reassess  -Pt clearly states that she is only allergic to gadolinium MRI contrast dye.

## 2023-09-02 NOTE — ED PROVIDER NOTE - NSICDXPASTMEDICALHX_GEN_ALL_CORE_FT
PAST MEDICAL HISTORY:  Asthma last inhaler use with in 10 days, on Pulm follow up [id number]    Back pain thoracic & lumbar    Cervical neuralgia difficulty moving my neck    Diverticulosis     DJD (degenerative joint disease) Cervical/Thoracic/Lumbar    DM (diabetes mellitus) 2    Falls frequently     Fibromyalgia     H/O bursitis right shoulder    Hyperlipidemia     Hypertension vaginal cyst    Hypothyroid     Morbid obesity with body mass index (BMI) of 50.0 to 59.9 in adult     Obesity morbid    Obstructive sleep apnea refuses Cpap    Psoriasis     Radicular pain arms, legs    Renal cell carcinoma, left on follow up Dr schulte, HOD renal [facility], waiting for suregry in 09/2020    Trigeminal neuralgia R    Vasculitis Legs, forerhead, arms & hands, flare ups in R leg  Dr susie Ribeiro is my Rheumatologist

## 2023-09-05 ENCOUNTER — TRANSCRIPTION ENCOUNTER (OUTPATIENT)
Age: 58
End: 2023-09-05

## 2023-09-06 ENCOUNTER — TRANSCRIPTION ENCOUNTER (OUTPATIENT)
Age: 58
End: 2023-09-06

## 2023-09-07 NOTE — HISTORY OF PRESENT ILLNESS
[FreeTextEntry1] : 57 y/o female with HLD, DM2, Asthma, left renal mass, h/o left adrenalectomy for adenoma in 2016 presents to discuss radiation options for left renal mass.   4/2023 -- MRI showed 4.7cm left renal mass. IMPRESSION: Left renal mass consistent with renal cell carcinoma demonstrating continued mild interval growth as above. 8/2023 -- CT scan showed IMPRESSION: Enhancing mass in the upper pole of the left kidney measuring up to 5.0 cm, slightly increased in size compared to prior MRI 4/22/2023.  9/8/2023 Consultation.

## 2023-09-07 NOTE — REVIEW OF SYSTEMS
[Negative] : Allergic/Immunologic [FreeTextEntry5] : SUSAN [FreeTextEntry8] : left renal mass [de-identified] : DM2

## 2023-09-08 ENCOUNTER — APPOINTMENT (OUTPATIENT)
Dept: RADIATION ONCOLOGY | Facility: CLINIC | Age: 58
End: 2023-09-08
Payer: MEDICARE

## 2023-09-08 ENCOUNTER — NON-APPOINTMENT (OUTPATIENT)
Age: 58
End: 2023-09-08

## 2023-09-08 VITALS
HEIGHT: 60 IN | TEMPERATURE: 97.16 F | BODY MASS INDEX: 53.99 KG/M2 | DIASTOLIC BLOOD PRESSURE: 82 MMHG | OXYGEN SATURATION: 93 % | HEART RATE: 98 BPM | RESPIRATION RATE: 17 BRPM | WEIGHT: 275 LBS | SYSTOLIC BLOOD PRESSURE: 119 MMHG

## 2023-09-08 PROCEDURE — 99204 OFFICE O/P NEW MOD 45 MIN: CPT

## 2023-09-11 ENCOUNTER — APPOINTMENT (OUTPATIENT)
Dept: UROLOGY | Facility: CLINIC | Age: 58
End: 2023-09-11

## 2023-09-15 ENCOUNTER — OUTPATIENT (OUTPATIENT)
Dept: OUTPATIENT SERVICES | Facility: HOSPITAL | Age: 58
LOS: 1 days | Discharge: HOME | End: 2023-09-15
Payer: MEDICARE

## 2023-09-15 DIAGNOSIS — Z90.710 ACQUIRED ABSENCE OF BOTH CERVIX AND UTERUS: Chronic | ICD-10-CM

## 2023-09-15 DIAGNOSIS — E27.8 OTHER SPECIFIED DISORDERS OF ADRENAL GLAND: Chronic | ICD-10-CM

## 2023-09-15 DIAGNOSIS — Z98.89 OTHER SPECIFIED POSTPROCEDURAL STATES: Chronic | ICD-10-CM

## 2023-09-15 DIAGNOSIS — N89.8 OTHER SPECIFIED NONINFLAMMATORY DISORDERS OF VAGINA: Chronic | ICD-10-CM

## 2023-09-15 DIAGNOSIS — I77.6 ARTERITIS, UNSPECIFIED: Chronic | ICD-10-CM

## 2023-09-15 PROCEDURE — 77263 THER RADIOLOGY TX PLNG CPLX: CPT

## 2023-09-15 PROCEDURE — 77334 RADIATION TREATMENT AID(S): CPT | Mod: 26

## 2023-09-15 PROCEDURE — 77290 THER RAD SIMULAJ FIELD CPLX: CPT | Mod: 26

## 2023-09-18 DIAGNOSIS — C64.2 MALIGNANT NEOPLASM OF LEFT KIDNEY, EXCEPT RENAL PELVIS: ICD-10-CM

## 2023-09-22 ENCOUNTER — APPOINTMENT (OUTPATIENT)
Dept: PULMONOLOGY | Facility: CLINIC | Age: 58
End: 2023-09-22
Payer: MEDICARE

## 2023-09-22 VITALS
SYSTOLIC BLOOD PRESSURE: 124 MMHG | TEMPERATURE: 97.7 F | DIASTOLIC BLOOD PRESSURE: 82 MMHG | BODY MASS INDEX: 53.4 KG/M2 | HEIGHT: 60 IN | WEIGHT: 272 LBS | HEART RATE: 118 BPM | RESPIRATION RATE: 16 BRPM | OXYGEN SATURATION: 91 %

## 2023-09-22 PROCEDURE — 99213 OFFICE O/P EST LOW 20 MIN: CPT

## 2023-09-22 RX ORDER — APREMILAST 30 MG/1
30 TABLET, FILM COATED ORAL
Qty: 180 | Refills: 3 | Status: ACTIVE | COMMUNITY
Start: 2021-04-22

## 2023-09-25 ENCOUNTER — TRANSCRIPTION ENCOUNTER (OUTPATIENT)
Age: 58
End: 2023-09-25

## 2023-10-09 ENCOUNTER — NON-APPOINTMENT (OUTPATIENT)
Age: 58
End: 2023-10-09

## 2023-10-16 ENCOUNTER — NON-APPOINTMENT (OUTPATIENT)
Age: 58
End: 2023-10-16

## 2023-10-18 ENCOUNTER — RX RENEWAL (OUTPATIENT)
Age: 58
End: 2023-10-18

## 2023-10-23 ENCOUNTER — LABORATORY RESULT (OUTPATIENT)
Age: 58
End: 2023-10-23

## 2023-10-23 ENCOUNTER — NON-APPOINTMENT (OUTPATIENT)
Age: 58
End: 2023-10-23

## 2023-10-23 ENCOUNTER — APPOINTMENT (OUTPATIENT)
Dept: INTERNAL MEDICINE | Facility: CLINIC | Age: 58
End: 2023-10-23
Payer: MEDICARE

## 2023-10-23 VITALS
BODY MASS INDEX: 53.79 KG/M2 | HEART RATE: 99 BPM | SYSTOLIC BLOOD PRESSURE: 131 MMHG | RESPIRATION RATE: 18 BRPM | DIASTOLIC BLOOD PRESSURE: 85 MMHG | TEMPERATURE: 97.7 F | WEIGHT: 274 LBS | OXYGEN SATURATION: 90 % | HEIGHT: 60 IN

## 2023-10-23 DIAGNOSIS — J45.909 UNSPECIFIED ASTHMA, UNCOMPLICATED: ICD-10-CM

## 2023-10-23 PROCEDURE — 99214 OFFICE O/P EST MOD 30 MIN: CPT | Mod: 25

## 2023-10-23 PROCEDURE — 36415 COLL VENOUS BLD VENIPUNCTURE: CPT

## 2023-10-23 PROCEDURE — 93000 ELECTROCARDIOGRAM COMPLETE: CPT

## 2023-10-24 ENCOUNTER — APPOINTMENT (OUTPATIENT)
Dept: OBGYN | Facility: CLINIC | Age: 58
End: 2023-10-24

## 2023-10-24 LAB
ABO + RH PNL BLD: NORMAL
ALBUMIN SERPL ELPH-MCNC: 4.2 G/DL
ALP BLD-CCNC: 143 U/L
ALT SERPL-CCNC: 43 U/L
ANION GAP SERPL CALC-SCNC: 15 MMOL/L
APPEARANCE: ABNORMAL
APTT BLD: 30.5 SEC
AST SERPL-CCNC: 38 U/L
BASOPHILS # BLD AUTO: 0.06 K/UL
BASOPHILS NFR BLD AUTO: 0.7 %
BILIRUB SERPL-MCNC: 0.6 MG/DL
BILIRUBIN URINE: ABNORMAL
BLOOD URINE: NEGATIVE
BUN SERPL-MCNC: 11 MG/DL
CALCIUM SERPL-MCNC: 9.6 MG/DL
CHLORIDE SERPL-SCNC: 93 MMOL/L
CHOLEST SERPL-MCNC: 197 MG/DL
CO2 SERPL-SCNC: 32 MMOL/L
COLOR: NORMAL
CREAT SERPL-MCNC: 0.67 MG/DL
CREAT SPEC-SCNC: 351 MG/DL
EGFR: 101 ML/MIN/1.73M2
EOSINOPHIL # BLD AUTO: 0.19 K/UL
EOSINOPHIL NFR BLD AUTO: 2.2 %
ESTIMATED AVERAGE GLUCOSE: 252 MG/DL
GGT SERPL-CCNC: 65 U/L
GLUCOSE QUALITATIVE U: NEGATIVE MG/DL
GLUCOSE SERPL-MCNC: 165 MG/DL
HBA1C MFR BLD HPLC: 10.4 %
HCT VFR BLD CALC: 43.7 %
HDLC SERPL-MCNC: 34 MG/DL
HGB BLD-MCNC: 13 G/DL
IMM GRANULOCYTES NFR BLD AUTO: 0.6 %
INR PPP: 1.09 RATIO
KETONES URINE: ABNORMAL MG/DL
LDLC SERPL CALC-MCNC: 140 MG/DL
LEUKOCYTE ESTERASE URINE: ABNORMAL
LYMPHOCYTES # BLD AUTO: 2 K/UL
LYMPHOCYTES NFR BLD AUTO: 22.7 %
MAN DIFF?: NORMAL
MCHC RBC-ENTMCNC: 23.9 PG
MCHC RBC-ENTMCNC: 29.7 GM/DL
MCV RBC AUTO: 80.2 FL
MICROALBUMIN 24H UR DL<=1MG/L-MCNC: 7.7 MG/DL
MICROALBUMIN/CREAT 24H UR-RTO: 22 MG/G
MONOCYTES # BLD AUTO: 0.64 K/UL
MONOCYTES NFR BLD AUTO: 7.2 %
NEUTROPHILS # BLD AUTO: 5.89 K/UL
NEUTROPHILS NFR BLD AUTO: 66.6 %
NITRITE URINE: NEGATIVE
NONHDLC SERPL-MCNC: 163 MG/DL
PH URINE: 6
PLATELET # BLD AUTO: 353 K/UL
POTASSIUM SERPL-SCNC: 3.9 MMOL/L
PROT SERPL-MCNC: 7.6 G/DL
PROTEIN URINE: 30 MG/DL
PT BLD: 12.3 SEC
RBC # BLD: 5.45 M/UL
RBC # FLD: 18.8 %
SODIUM SERPL-SCNC: 139 MMOL/L
SPECIFIC GRAVITY URINE: >1.03
T3 SERPL-MCNC: 126 NG/DL
T4 FREE SERPL-MCNC: 1.3 NG/DL
TRIGL SERPL-MCNC: 126 MG/DL
TSH SERPL-ACNC: 4.67 UIU/ML
UROBILINOGEN URINE: 1 MG/DL
WBC # FLD AUTO: 8.83 K/UL

## 2023-10-25 ENCOUNTER — APPOINTMENT (OUTPATIENT)
Dept: CARDIOLOGY | Facility: CLINIC | Age: 58
End: 2023-10-25

## 2023-10-30 ENCOUNTER — APPOINTMENT (OUTPATIENT)
Dept: CARDIOLOGY | Facility: CLINIC | Age: 58
End: 2023-10-30
Payer: MEDICARE

## 2023-10-30 VITALS
OXYGEN SATURATION: 88 % | HEART RATE: 99 BPM | HEIGHT: 60 IN | DIASTOLIC BLOOD PRESSURE: 87 MMHG | TEMPERATURE: 99.1 F | WEIGHT: 278 LBS | SYSTOLIC BLOOD PRESSURE: 139 MMHG | BODY MASS INDEX: 54.58 KG/M2

## 2023-10-30 VITALS — SYSTOLIC BLOOD PRESSURE: 131 MMHG | HEART RATE: 97 BPM | DIASTOLIC BLOOD PRESSURE: 82 MMHG | OXYGEN SATURATION: 93 %

## 2023-10-30 PROCEDURE — 99214 OFFICE O/P EST MOD 30 MIN: CPT

## 2023-10-31 ENCOUNTER — OUTPATIENT (OUTPATIENT)
Dept: OUTPATIENT SERVICES | Facility: HOSPITAL | Age: 58
LOS: 1 days | End: 2023-10-31

## 2023-10-31 VITALS
HEART RATE: 78 BPM | TEMPERATURE: 98 F | SYSTOLIC BLOOD PRESSURE: 116 MMHG | RESPIRATION RATE: 16 BRPM | HEIGHT: 62 IN | OXYGEN SATURATION: 96 % | DIASTOLIC BLOOD PRESSURE: 75 MMHG | WEIGHT: 279.99 LBS

## 2023-10-31 DIAGNOSIS — R06.09 OTHER FORMS OF DYSPNEA: ICD-10-CM

## 2023-10-31 DIAGNOSIS — E66.01 MORBID (SEVERE) OBESITY DUE TO EXCESS CALORIES: ICD-10-CM

## 2023-10-31 DIAGNOSIS — C64.2 MALIGNANT NEOPLASM OF LEFT KIDNEY, EXCEPT RENAL PELVIS: ICD-10-CM

## 2023-10-31 DIAGNOSIS — Z90.710 ACQUIRED ABSENCE OF BOTH CERVIX AND UTERUS: Chronic | ICD-10-CM

## 2023-10-31 DIAGNOSIS — E27.8 OTHER SPECIFIED DISORDERS OF ADRENAL GLAND: Chronic | ICD-10-CM

## 2023-10-31 DIAGNOSIS — E03.9 HYPOTHYROIDISM, UNSPECIFIED: ICD-10-CM

## 2023-10-31 DIAGNOSIS — Z90.721 ACQUIRED ABSENCE OF OVARIES, UNILATERAL: Chronic | ICD-10-CM

## 2023-10-31 DIAGNOSIS — Z98.89 OTHER SPECIFIED POSTPROCEDURAL STATES: Chronic | ICD-10-CM

## 2023-10-31 DIAGNOSIS — I77.6 ARTERITIS, UNSPECIFIED: Chronic | ICD-10-CM

## 2023-10-31 DIAGNOSIS — J96.12 CHRONIC RESPIRATORY FAILURE WITH HYPERCAPNIA: ICD-10-CM

## 2023-10-31 DIAGNOSIS — G47.33 OBSTRUCTIVE SLEEP APNEA (ADULT) (PEDIATRIC): ICD-10-CM

## 2023-10-31 DIAGNOSIS — N89.8 OTHER SPECIFIED NONINFLAMMATORY DISORDERS OF VAGINA: Chronic | ICD-10-CM

## 2023-10-31 DIAGNOSIS — Z86.69 PERSONAL HISTORY OF OTHER DISEASES OF THE NERVOUS SYSTEM AND SENSE ORGANS: ICD-10-CM

## 2023-10-31 DIAGNOSIS — R73.09 OTHER ABNORMAL GLUCOSE: ICD-10-CM

## 2023-10-31 DIAGNOSIS — Z98.890 OTHER SPECIFIED POSTPROCEDURAL STATES: Chronic | ICD-10-CM

## 2023-10-31 LAB
BLD GP AB SCN SERPL QL: NEGATIVE — SIGNIFICANT CHANGE UP
BLD GP AB SCN SERPL QL: NEGATIVE — SIGNIFICANT CHANGE UP
RH IG SCN BLD-IMP: POSITIVE — SIGNIFICANT CHANGE UP
RH IG SCN BLD-IMP: POSITIVE — SIGNIFICANT CHANGE UP

## 2023-10-31 RX ORDER — AMITRIPTYLINE HCL 25 MG
2 TABLET ORAL
Refills: 0 | DISCHARGE

## 2023-10-31 RX ORDER — ASPIRIN/CALCIUM CARB/MAGNESIUM 324 MG
1 TABLET ORAL
Qty: 0 | Refills: 0 | DISCHARGE

## 2023-10-31 RX ORDER — GABAPENTIN 400 MG/1
1 CAPSULE ORAL
Qty: 0 | Refills: 0 | DISCHARGE

## 2023-10-31 RX ORDER — AMITRIPTYLINE HCL 25 MG
1 TABLET ORAL
Qty: 0 | Refills: 0 | DISCHARGE

## 2023-10-31 RX ORDER — HYDROCHLOROTHIAZIDE 25 MG
2 TABLET ORAL
Qty: 0 | Refills: 0 | DISCHARGE

## 2023-10-31 RX ORDER — INSULIN GLARGINE 100 [IU]/ML
40 INJECTION, SOLUTION SUBCUTANEOUS
Qty: 0 | Refills: 0 | DISCHARGE

## 2023-10-31 RX ORDER — ATORVASTATIN CALCIUM 80 MG/1
1 TABLET, FILM COATED ORAL
Qty: 0 | Refills: 0 | DISCHARGE

## 2023-10-31 RX ORDER — BUDESONIDE AND FORMOTEROL FUMARATE DIHYDRATE 160; 4.5 UG/1; UG/1
2 AEROSOL RESPIRATORY (INHALATION)
Qty: 0 | Refills: 0 | DISCHARGE

## 2023-10-31 RX ORDER — RAMIPRIL 5 MG
1 CAPSULE ORAL
Qty: 0 | Refills: 0 | DISCHARGE

## 2023-10-31 RX ORDER — SODIUM CHLORIDE 9 MG/ML
1000 INJECTION, SOLUTION INTRAVENOUS
Refills: 0 | Status: DISCONTINUED | OUTPATIENT
Start: 2023-11-03 | End: 2023-11-03

## 2023-10-31 NOTE — H&P PST ADULT - PROBLEM SELECTOR PLAN 1
Patient is tentatively scheduled left laparoscopic radical nephrectomy, possible open surgery with Dr Peck   on 11/3/23.    Pre-op instructions provided. Pt given verbal and written instructions with teach back on chlorhexidine wash and patient's own pantoprazole. Pt verbalized understanding with return demonstration.    CBC.CMP,T&S,  A1C sent

## 2023-10-31 NOTE — H&P PST ADULT - PROBLEM SELECTOR PLAN 4
Patient with Asthma, NAKUL non complaint with CPAP, chronic hypercapnic respiratory failure, restrictive lung disease- uses prn 3LNC - if spo2 goes below 90%.  Pre op pulmonology evaluation in chart.  PFT results in chart  Continue Symbicort inhalers

## 2023-10-31 NOTE — H&P PST ADULT - HISTORY OF PRESENT ILLNESS
58 year old female, presents to [facility], with PMH of  renal cell carcinoma, for pre op evaluation prior to scheduled left laparoscopic radical nephrectomy, possible open surgery with Dr Peck           *****PMHx of asthma, Chronic hypercapnic respiratory failure( prn nasal cannula 02-3L, R trigeminal neuralgia since 2008, chronic bronchitis, NAKUL( refuses C- pap due to claustrophobia,  HTN, DM2, HLD, fibromyalgia, anxiety, trigeminal neuralgia, degenerative joint disease,  IgA vasculitis since 2018

## 2023-10-31 NOTE — H&P PST ADULT - RESPIRATORY AND THORAX COMMENTS
asthma, chronic  hypercapnic respiratory failure, restrictive lung disease- wears 3l nasal cannula as needed-if SPO2 <90%

## 2023-10-31 NOTE — H&P PST ADULT - MUSCULOSKELETAL
no joint erythema/no joint warmth/no calf tenderness/decreased ROM/decreased ROM due to pain/back exam details…

## 2023-10-31 NOTE — H&P PST ADULT - NEUROLOGICAL COMMENTS
cervical, lumbar radiculopathy neuropathy in legs & arms, neuropathy from DM neuropathy in legs & arms, neuropathy from DM/ patient reports of history of trigeminal neuralgia- controlled with medications

## 2023-10-31 NOTE — H&P PST ADULT - PROBLEM SELECTOR PLAN 6
Elevated BMI- 51.2  short neck with width- 18.5 .cm  OR notified Elevated BMI- 51.2  short neck with width- 18.5 .cm, **** reports of chronic neck pain- moderate restriction of ROM of neck- denies paresthesias  OR notified

## 2023-10-31 NOTE — H&P PST ADULT - GENITOURINARY COMMENTS
pre op diagnosis of malignant neoplasm of left kidney patient with renal cell carcinoma for 4 years, was monitoring for last 4 years now- plan for surgery

## 2023-10-31 NOTE — H&P PST ADULT - NSICDXPASTMEDICALHX_GEN_ALL_CORE_FT
PAST MEDICAL HISTORY:  Asthma last inhaler use with in 10 days, on Pulm follow up [id number]    Back pain thoracic & lumbar    Cervical neuralgia difficulty moving my neck    Diverticulosis     DJD (degenerative joint disease) Cervical/Thoracic/Lumbar    DM (diabetes mellitus) 2    Falls frequently     Fatty liver     Fibromyalgia     H/O bursitis right shoulder    Hyperlipidemia     Hypertension vaginal cyst    Hypothyroid     Morbid obesity with body mass index (BMI) of 50.0 to 59.9 in adult     Obesity morbid    Obstructive sleep apnea refuses Cpap    Psoriasis     Radicular pain arms, legs    Renal cell carcinoma, left on follow up Dr schulte, HOD renal [facility], waiting for suregry in 09/2020    Trigeminal neuralgia R    Vasculitis Legs, forerhead, arms & hands, flare ups in R leg  Dr susie Ribeiro is my Rheumatologist

## 2023-10-31 NOTE — H&P PST ADULT - NSICDXPASTSURGICALHX_GEN_ALL_CORE_FT
PAST SURGICAL HISTORY:  Left adrenal mass excision, benign 2006    S/P abdominal hysterectomy 2003      S/P  section 1    S/P colonoscopic polypectomy     S/P left oophorectomy     Vaginal cyst removed     Vasculitis on nerve biopsy , had another surgery called microvascular decompression

## 2023-10-31 NOTE — H&P PST ADULT - PROBLEM SELECTOR PLAN 2
Patient with chronic MUÑOZ, with Obesity, HTN- pre op cardiology evaluation with ECHO and CTA coronaries result in chart  EKG in chart  Patient instructed to take amlodipine and losartan with a sip of water on the morning of procedure.   Hold HCTZ on the DOS

## 2023-10-31 NOTE — H&P PST ADULT - PROBLEM SELECTOR PLAN 3
Patient with elevated A1C- 10.4%-10/23/23, patient with multiple comorbidities, going for high risk surgery- as per Pre op medical evaluation - PCP discussed case with endocrinologist and obtained verbal endocrinology clearance for surgery.  Pre op medical evaluation in chart  Case discussed with Dr Borges- [facility]- Anesthesiologist  Instructed patient to hold metformin and Novolog insulin on DOS.  Instructed patient to take Levemir- 20 units the night before surgery.  Patient reports she already took Ozempic this Sunday.  Patient instructed to take gabapentin with a sip of water on the morning of procedure.

## 2023-10-31 NOTE — H&P PST ADULT - COMMENTS
patient came to PST-not wearing  oxygen, RA 96%, s/p hysterectomy patient came to PST-not wearing  oxygen, RA 96%,-( pt reports uses oxygen PRN - 3lnc if spo2 below 90%)

## 2023-11-02 ENCOUNTER — TRANSCRIPTION ENCOUNTER (OUTPATIENT)
Age: 58
End: 2023-11-02

## 2023-11-02 NOTE — ASU PATIENT PROFILE, ADULT - FALL HARM RISK - HARM RISK INTERVENTIONS

## 2023-11-03 ENCOUNTER — RESULT REVIEW (OUTPATIENT)
Age: 58
End: 2023-11-03

## 2023-11-03 ENCOUNTER — INPATIENT (INPATIENT)
Facility: HOSPITAL | Age: 58
LOS: 5 days | Discharge: HOME | End: 2023-11-09
Attending: UROLOGY | Admitting: UROLOGY
Payer: MEDICARE

## 2023-11-03 ENCOUNTER — APPOINTMENT (OUTPATIENT)
Dept: UROLOGY | Facility: HOSPITAL | Age: 58
End: 2023-11-03

## 2023-11-03 VITALS
OXYGEN SATURATION: 98 % | RESPIRATION RATE: 14 BRPM | TEMPERATURE: 99 F | DIASTOLIC BLOOD PRESSURE: 81 MMHG | HEIGHT: 62 IN | SYSTOLIC BLOOD PRESSURE: 133 MMHG | HEART RATE: 96 BPM | WEIGHT: 279.99 LBS

## 2023-11-03 DIAGNOSIS — Z98.89 OTHER SPECIFIED POSTPROCEDURAL STATES: Chronic | ICD-10-CM

## 2023-11-03 DIAGNOSIS — C64.2 MALIGNANT NEOPLASM OF LEFT KIDNEY, EXCEPT RENAL PELVIS: ICD-10-CM

## 2023-11-03 DIAGNOSIS — Z90.710 ACQUIRED ABSENCE OF BOTH CERVIX AND UTERUS: Chronic | ICD-10-CM

## 2023-11-03 DIAGNOSIS — K21.9 GASTRO-ESOPHAGEAL REFLUX DISEASE WITHOUT ESOPHAGITIS: ICD-10-CM

## 2023-11-03 DIAGNOSIS — E27.8 OTHER SPECIFIED DISORDERS OF ADRENAL GLAND: Chronic | ICD-10-CM

## 2023-11-03 DIAGNOSIS — N89.8 OTHER SPECIFIED NONINFLAMMATORY DISORDERS OF VAGINA: Chronic | ICD-10-CM

## 2023-11-03 DIAGNOSIS — I10 ESSENTIAL (PRIMARY) HYPERTENSION: ICD-10-CM

## 2023-11-03 DIAGNOSIS — Z29.9 ENCOUNTER FOR PROPHYLACTIC MEASURES, UNSPECIFIED: ICD-10-CM

## 2023-11-03 DIAGNOSIS — J45.909 UNSPECIFIED ASTHMA, UNCOMPLICATED: ICD-10-CM

## 2023-11-03 DIAGNOSIS — E11.9 TYPE 2 DIABETES MELLITUS WITHOUT COMPLICATIONS: ICD-10-CM

## 2023-11-03 DIAGNOSIS — I77.6 ARTERITIS, UNSPECIFIED: Chronic | ICD-10-CM

## 2023-11-03 DIAGNOSIS — Z90.721 ACQUIRED ABSENCE OF OVARIES, UNILATERAL: Chronic | ICD-10-CM

## 2023-11-03 DIAGNOSIS — Z98.890 OTHER SPECIFIED POSTPROCEDURAL STATES: Chronic | ICD-10-CM

## 2023-11-03 LAB
BLOOD GAS ARTERIAL - LYTES,HGB,ICA,LACT RESULT: SIGNIFICANT CHANGE UP
BLOOD GAS ARTERIAL - LYTES,HGB,ICA,LACT RESULT: SIGNIFICANT CHANGE UP
GLUCOSE BLDC GLUCOMTR-MCNC: 174 MG/DL — HIGH (ref 70–99)
GLUCOSE BLDC GLUCOMTR-MCNC: 174 MG/DL — HIGH (ref 70–99)
GLUCOSE BLDC GLUCOMTR-MCNC: 275 MG/DL — HIGH (ref 70–99)
GLUCOSE BLDC GLUCOMTR-MCNC: 275 MG/DL — HIGH (ref 70–99)
GLUCOSE BLDC GLUCOMTR-MCNC: 279 MG/DL — HIGH (ref 70–99)
GLUCOSE BLDC GLUCOMTR-MCNC: 279 MG/DL — HIGH (ref 70–99)
GLUCOSE BLDC GLUCOMTR-MCNC: 287 MG/DL — HIGH (ref 70–99)
GLUCOSE BLDC GLUCOMTR-MCNC: 287 MG/DL — HIGH (ref 70–99)
LACTATE SERPL-SCNC: 2.3 MMOL/L — HIGH (ref 0.5–2)
LACTATE SERPL-SCNC: 2.3 MMOL/L — HIGH (ref 0.5–2)

## 2023-11-03 PROCEDURE — 88307 TISSUE EXAM BY PATHOLOGIST: CPT | Mod: 26

## 2023-11-03 PROCEDURE — 88313 SPECIAL STAINS GROUP 2: CPT | Mod: 26

## 2023-11-03 PROCEDURE — 50545 LAPARO RADICAL NEPHRECTOMY: CPT | Mod: LT,22

## 2023-11-03 DEVICE — STAPLER COVIDIEN TRI-STAPLE 30MM TAN RELOAD: Type: IMPLANTABLE DEVICE | Site: LEFT | Status: FUNCTIONAL

## 2023-11-03 DEVICE — SURGICEL 2 X 14": Type: IMPLANTABLE DEVICE | Site: LEFT | Status: FUNCTIONAL

## 2023-11-03 DEVICE — STAPLER COVIDIEN TRI-STAPLE 60MM TAN RELOAD: Type: IMPLANTABLE DEVICE | Site: LEFT | Status: FUNCTIONAL

## 2023-11-03 DEVICE — SURGICEL NU-KNIT 6 X 9": Type: IMPLANTABLE DEVICE | Site: LEFT | Status: FUNCTIONAL

## 2023-11-03 DEVICE — LIGATING CLIPS WECK HEMOLOK POLYMER LARGE (PURPLE) 6: Type: IMPLANTABLE DEVICE | Site: LEFT | Status: FUNCTIONAL

## 2023-11-03 DEVICE — CLIP APPLIER COVIDIEN ENDOCLIP II 10MM MED/LG: Type: IMPLANTABLE DEVICE | Site: LEFT | Status: FUNCTIONAL

## 2023-11-03 RX ORDER — DEXTROSE 50 % IN WATER 50 %
25 SYRINGE (ML) INTRAVENOUS ONCE
Refills: 0 | Status: DISCONTINUED | OUTPATIENT
Start: 2023-11-03 | End: 2023-11-09

## 2023-11-03 RX ORDER — OXYCODONE HYDROCHLORIDE 5 MG/1
5 TABLET ORAL EVERY 6 HOURS
Refills: 0 | Status: DISCONTINUED | OUTPATIENT
Start: 2023-11-03 | End: 2023-11-07

## 2023-11-03 RX ORDER — ALBUTEROL 90 UG/1
2 AEROSOL, METERED ORAL EVERY 6 HOURS
Refills: 0 | Status: DISCONTINUED | OUTPATIENT
Start: 2023-11-03 | End: 2023-11-09

## 2023-11-03 RX ORDER — PANTOPRAZOLE SODIUM 20 MG/1
40 TABLET, DELAYED RELEASE ORAL
Refills: 0 | Status: DISCONTINUED | OUTPATIENT
Start: 2023-11-03 | End: 2023-11-09

## 2023-11-03 RX ORDER — ONDANSETRON 8 MG/1
4 TABLET, FILM COATED ORAL ONCE
Refills: 0 | Status: DISCONTINUED | OUTPATIENT
Start: 2023-11-03 | End: 2023-11-03

## 2023-11-03 RX ORDER — INSULIN LISPRO 100/ML
VIAL (ML) SUBCUTANEOUS AT BEDTIME
Refills: 0 | Status: DISCONTINUED | OUTPATIENT
Start: 2023-11-03 | End: 2023-11-09

## 2023-11-03 RX ORDER — ATORVASTATIN CALCIUM 80 MG/1
40 TABLET, FILM COATED ORAL AT BEDTIME
Refills: 0 | Status: DISCONTINUED | OUTPATIENT
Start: 2023-11-03 | End: 2023-11-09

## 2023-11-03 RX ORDER — SIMETHICONE 80 MG/1
80 TABLET, CHEWABLE ORAL ONCE
Refills: 0 | Status: DISCONTINUED | OUTPATIENT
Start: 2023-11-03 | End: 2023-11-09

## 2023-11-03 RX ORDER — CYCLOBENZAPRINE HYDROCHLORIDE 10 MG/1
10 TABLET, FILM COATED ORAL DAILY
Refills: 0 | Status: DISCONTINUED | OUTPATIENT
Start: 2023-11-03 | End: 2023-11-09

## 2023-11-03 RX ORDER — AMLODIPINE BESYLATE 2.5 MG/1
10 TABLET ORAL DAILY
Refills: 0 | Status: DISCONTINUED | OUTPATIENT
Start: 2023-11-03 | End: 2023-11-04

## 2023-11-03 RX ORDER — HYDROMORPHONE HYDROCHLORIDE 2 MG/ML
0.25 INJECTION INTRAMUSCULAR; INTRAVENOUS; SUBCUTANEOUS ONCE
Refills: 0 | Status: DISCONTINUED | OUTPATIENT
Start: 2023-11-03 | End: 2023-11-03

## 2023-11-03 RX ORDER — BUDESONIDE AND FORMOTEROL FUMARATE DIHYDRATE 160; 4.5 UG/1; UG/1
2 AEROSOL RESPIRATORY (INHALATION)
Refills: 0 | Status: DISCONTINUED | OUTPATIENT
Start: 2023-11-03 | End: 2023-11-09

## 2023-11-03 RX ORDER — SODIUM CHLORIDE 9 MG/ML
1000 INJECTION, SOLUTION INTRAVENOUS
Refills: 0 | Status: DISCONTINUED | OUTPATIENT
Start: 2023-11-03 | End: 2023-11-09

## 2023-11-03 RX ORDER — INSULIN LISPRO 100/ML
VIAL (ML) SUBCUTANEOUS
Refills: 0 | Status: DISCONTINUED | OUTPATIENT
Start: 2023-11-03 | End: 2023-11-09

## 2023-11-03 RX ORDER — SODIUM CHLORIDE 9 MG/ML
1000 INJECTION, SOLUTION INTRAVENOUS
Refills: 0 | Status: DISCONTINUED | OUTPATIENT
Start: 2023-11-03 | End: 2023-11-04

## 2023-11-03 RX ORDER — GLUCAGON INJECTION, SOLUTION 0.5 MG/.1ML
1 INJECTION, SOLUTION SUBCUTANEOUS ONCE
Refills: 0 | Status: DISCONTINUED | OUTPATIENT
Start: 2023-11-03 | End: 2023-11-09

## 2023-11-03 RX ORDER — SENNA PLUS 8.6 MG/1
2 TABLET ORAL AT BEDTIME
Refills: 0 | Status: DISCONTINUED | OUTPATIENT
Start: 2023-11-03 | End: 2023-11-09

## 2023-11-03 RX ORDER — GABAPENTIN 400 MG/1
1 CAPSULE ORAL
Refills: 0 | DISCHARGE

## 2023-11-03 RX ORDER — HYDROMORPHONE HYDROCHLORIDE 2 MG/ML
0.25 INJECTION INTRAMUSCULAR; INTRAVENOUS; SUBCUTANEOUS
Refills: 0 | Status: DISCONTINUED | OUTPATIENT
Start: 2023-11-03 | End: 2023-11-07

## 2023-11-03 RX ORDER — DEXTROSE 50 % IN WATER 50 %
12.5 SYRINGE (ML) INTRAVENOUS ONCE
Refills: 0 | Status: DISCONTINUED | OUTPATIENT
Start: 2023-11-03 | End: 2023-11-09

## 2023-11-03 RX ORDER — ALBUTEROL 90 UG/1
2.5 AEROSOL, METERED ORAL ONCE
Refills: 0 | Status: DISCONTINUED | OUTPATIENT
Start: 2023-11-03 | End: 2023-11-09

## 2023-11-03 RX ORDER — DEXTROSE 50 % IN WATER 50 %
15 SYRINGE (ML) INTRAVENOUS ONCE
Refills: 0 | Status: DISCONTINUED | OUTPATIENT
Start: 2023-11-03 | End: 2023-11-09

## 2023-11-03 RX ORDER — LOSARTAN POTASSIUM 100 MG/1
25 TABLET, FILM COATED ORAL DAILY
Refills: 0 | Status: DISCONTINUED | OUTPATIENT
Start: 2023-11-03 | End: 2023-11-04

## 2023-11-03 RX ORDER — HYDROMORPHONE HYDROCHLORIDE 2 MG/ML
0.5 INJECTION INTRAMUSCULAR; INTRAVENOUS; SUBCUTANEOUS
Refills: 0 | Status: DISCONTINUED | OUTPATIENT
Start: 2023-11-03 | End: 2023-11-03

## 2023-11-03 RX ORDER — HEPARIN SODIUM 5000 [USP'U]/ML
5000 INJECTION INTRAVENOUS; SUBCUTANEOUS EVERY 8 HOURS
Refills: 0 | Status: DISCONTINUED | OUTPATIENT
Start: 2023-11-03 | End: 2023-11-09

## 2023-11-03 RX ORDER — OXCARBAZEPINE 300 MG/1
600 TABLET, FILM COATED ORAL
Refills: 0 | Status: DISCONTINUED | OUTPATIENT
Start: 2023-11-03 | End: 2023-11-09

## 2023-11-03 RX ORDER — OXYCODONE HYDROCHLORIDE 5 MG/1
5 TABLET ORAL ONCE
Refills: 0 | Status: DISCONTINUED | OUTPATIENT
Start: 2023-11-03 | End: 2023-11-03

## 2023-11-03 RX ORDER — LEVOTHYROXINE SODIUM 125 MCG
112 TABLET ORAL DAILY
Refills: 0 | Status: DISCONTINUED | OUTPATIENT
Start: 2023-11-03 | End: 2023-11-09

## 2023-11-03 RX ORDER — MONTELUKAST 4 MG/1
10 TABLET, CHEWABLE ORAL DAILY
Refills: 0 | Status: DISCONTINUED | OUTPATIENT
Start: 2023-11-03 | End: 2023-11-09

## 2023-11-03 RX ORDER — GABAPENTIN 400 MG/1
400 CAPSULE ORAL
Refills: 0 | Status: DISCONTINUED | OUTPATIENT
Start: 2023-11-03 | End: 2023-11-09

## 2023-11-03 RX ADMIN — Medication 2: at 22:03

## 2023-11-03 RX ADMIN — HEPARIN SODIUM 5000 UNIT(S): 5000 INJECTION INTRAVENOUS; SUBCUTANEOUS at 21:01

## 2023-11-03 RX ADMIN — Medication 6: at 18:42

## 2023-11-03 RX ADMIN — SODIUM CHLORIDE 125 MILLILITER(S): 9 INJECTION, SOLUTION INTRAVENOUS at 23:44

## 2023-11-03 RX ADMIN — ALBUTEROL 2 PUFF(S): 90 AEROSOL, METERED ORAL at 21:53

## 2023-11-03 RX ADMIN — HYDROMORPHONE HYDROCHLORIDE 0.25 MILLIGRAM(S): 2 INJECTION INTRAMUSCULAR; INTRAVENOUS; SUBCUTANEOUS at 17:41

## 2023-11-03 RX ADMIN — HYDROMORPHONE HYDROCHLORIDE 0.25 MILLIGRAM(S): 2 INJECTION INTRAMUSCULAR; INTRAVENOUS; SUBCUTANEOUS at 18:00

## 2023-11-03 RX ADMIN — BUDESONIDE AND FORMOTEROL FUMARATE DIHYDRATE 2 PUFF(S): 160; 4.5 AEROSOL RESPIRATORY (INHALATION) at 21:30

## 2023-11-03 RX ADMIN — ATORVASTATIN CALCIUM 40 MILLIGRAM(S): 80 TABLET, FILM COATED ORAL at 21:52

## 2023-11-03 RX ADMIN — SODIUM CHLORIDE 125 MILLILITER(S): 9 INJECTION, SOLUTION INTRAVENOUS at 17:42

## 2023-11-03 RX ADMIN — GABAPENTIN 400 MILLIGRAM(S): 400 CAPSULE ORAL at 21:02

## 2023-11-03 NOTE — PATIENT PROFILE ADULT - DO YOU FEEL THREATENED BY OTHERS?
[FreeTextEntry1] : 1. Pt is here for 3 months F/u for review of medical problems including HTN\par 2. completed COVID vaccine x 3 \par 3. recurrence of LLQ pain [de-identified] : \par c/o LLQ pain has recurred \par 9/2020 LLQ pain x one month-- normal exam- guaiac negative- labs normal- Abd sono negative- pelvic sono limited visualization due to increased gas- ref GYN, GI\par 10/2020 GYN- MRI pelvis: fibroids, 3.2 cm mass possible fibroid- repeat 3-6 months, adenomyosis\par 11/2020 GI - EGD: chronic gastritis/cryptitis, + Hpylori- tx abx - completed abx - repeat urea breath test negative \par colonoscopy normal\par 10/2021 GYN - repeated MRI: stable- continue to monitor \par on/off\par 2-3/10\par 2-3 x day \par lasting hours\par achy/throbbing\par bloating \par gassiness\par worse w menses  \par nothing makes better \par no radiation\par not related to meals or activity\par not related to BM/menses\par no n/v/d/constipation \par no h/o gluten intolerance/UC/Celiac \par no h/o hernia/diverticulosis- it is, appendicitis\par no h/o gallstones/kidney stones\par h/o lactose intolerance\par h/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

## 2023-11-03 NOTE — PATIENT PROFILE ADULT - FALL HARM RISK - HARM RISK INTERVENTIONS
Assistance with ambulation/Assistance OOB with selected safe patient handling equipment/Communicate Risk of Fall with Harm to all staff/Monitor gait and stability/Reinforce activity limits and safety measures with patient and family/Sit up slowly, dangle for a short time, stand at bedside before walking/Tailored Fall Risk Interventions/Use of alarms - bed, chair and/or voice tab/Visual Cue: Yellow wristband and red socks/Bed in lowest position, wheels locked, appropriate side rails in place/Call bell, personal items and telephone in reach/Instruct patient to call for assistance before getting out of bed or chair/Non-slip footwear when patient is out of bed/[place] to call system/Physically safe environment - no spills, clutter or unnecessary equipment/Purposeful Proactive Rounding/Room/bathroom lighting operational, light cord in reach Assistance with ambulation/Assistance OOB with selected safe patient handling equipment/Communicate Risk of Fall with Harm to all staff/Discuss with provider need for PT consult/Monitor gait and stability/Provide patient with walking aids - walker, cane, crutches/Reinforce activity limits and safety measures with patient and family/Sit up slowly, dangle for a short time, stand at bedside before walking/Tailored Fall Risk Interventions/Use of alarms - bed, chair and/or voice tab/Visual Cue: Yellow wristband and red socks/Bed in lowest position, wheels locked, appropriate side rails in place/Call bell, personal items and telephone in reach/Instruct patient to call for assistance before getting out of bed or chair/Non-slip footwear when patient is out of bed/[place] to call system/Physically safe environment - no spills, clutter or unnecessary equipment/Purposeful Proactive Rounding/Room/bathroom lighting operational, light cord in reach

## 2023-11-03 NOTE — CONSULT NOTE ADULT - PROBLEM SELECTOR RECOMMENDATION 9
Left RCC s/p left laparoscopic radical nephrectomy 11/3    - Management per primary team - DVT ppx and diet per primary team

## 2023-11-03 NOTE — PROGRESS NOTE ADULT - SUBJECTIVE AND OBJECTIVE BOX
Note    Post op Check    s/p L laparoscopic radical nephrectomy  EBL 300ml    Patient seen and examined with c/o surgical site pain, improving with meds.  Denies nausea.     Vital Signs Last 24 Hrs  T(C): 36.9 (03 Nov 2023 20:00), Max: 37.2 (03 Nov 2023 10:31)  T(F): 98.4 (03 Nov 2023 20:00), Max: 99 (03 Nov 2023 10:31)  HR: 87 (03 Nov 2023 21:30) (66 - 96)  BP: 129/66 (03 Nov 2023 21:30) (112/86 - 145/69)  BP(mean): 82 (03 Nov 2023 21:30) (66 - 91)  RR: 20 (03 Nov 2023 21:30) (12 - 25)  SpO2: 95% (03 Nov 2023 21:30) (93% - 100%)    Parameters below as of 03 Nov 2023 21:00  Patient On (Oxygen Delivery Method): nasal cannula  O2 Flow (L/min): 4    I&O's Summary    03 Nov 2023 07:01  -  03 Nov 2023 22:11  --------------------------------------------------------  IN: 865 mL / OUT: 315 mL / NET: 550 mL    PHYSICAL EXAM:   Constitutional: no distress    Respiratory: clear     Cardiovascular: regular     Gastrointestinal: soft, minimal tenderness, dressings with minimal drainage    Genitourinary: castaneda in place, draining well, clear yellow urine.     Extremities: venodynes in place.

## 2023-11-04 LAB
A1C WITH ESTIMATED AVERAGE GLUCOSE RESULT: 9.6 % — HIGH (ref 4–5.6)
A1C WITH ESTIMATED AVERAGE GLUCOSE RESULT: 9.6 % — HIGH (ref 4–5.6)
ALBUMIN SERPL ELPH-MCNC: 3.5 G/DL — SIGNIFICANT CHANGE UP (ref 3.3–5)
ALBUMIN SERPL ELPH-MCNC: 3.5 G/DL — SIGNIFICANT CHANGE UP (ref 3.3–5)
ALP SERPL-CCNC: 92 U/L — SIGNIFICANT CHANGE UP (ref 40–120)
ALP SERPL-CCNC: 92 U/L — SIGNIFICANT CHANGE UP (ref 40–120)
ALT FLD-CCNC: 30 U/L — SIGNIFICANT CHANGE UP (ref 4–33)
ALT FLD-CCNC: 30 U/L — SIGNIFICANT CHANGE UP (ref 4–33)
ANION GAP SERPL CALC-SCNC: 8 MMOL/L — SIGNIFICANT CHANGE UP (ref 7–14)
ANION GAP SERPL CALC-SCNC: 8 MMOL/L — SIGNIFICANT CHANGE UP (ref 7–14)
AST SERPL-CCNC: 39 U/L — HIGH (ref 4–32)
AST SERPL-CCNC: 39 U/L — HIGH (ref 4–32)
BILIRUB SERPL-MCNC: 0.7 MG/DL — SIGNIFICANT CHANGE UP (ref 0.2–1.2)
BILIRUB SERPL-MCNC: 0.7 MG/DL — SIGNIFICANT CHANGE UP (ref 0.2–1.2)
BUN SERPL-MCNC: 12 MG/DL — SIGNIFICANT CHANGE UP (ref 7–23)
BUN SERPL-MCNC: 12 MG/DL — SIGNIFICANT CHANGE UP (ref 7–23)
CALCIUM SERPL-MCNC: 8.5 MG/DL — SIGNIFICANT CHANGE UP (ref 8.4–10.5)
CALCIUM SERPL-MCNC: 8.5 MG/DL — SIGNIFICANT CHANGE UP (ref 8.4–10.5)
CHLORIDE SERPL-SCNC: 97 MMOL/L — LOW (ref 98–107)
CHLORIDE SERPL-SCNC: 97 MMOL/L — LOW (ref 98–107)
CO2 SERPL-SCNC: 32 MMOL/L — HIGH (ref 22–31)
CO2 SERPL-SCNC: 32 MMOL/L — HIGH (ref 22–31)
CREAT SERPL-MCNC: 1.15 MG/DL — SIGNIFICANT CHANGE UP (ref 0.5–1.3)
CREAT SERPL-MCNC: 1.15 MG/DL — SIGNIFICANT CHANGE UP (ref 0.5–1.3)
EGFR: 55 ML/MIN/1.73M2 — LOW
EGFR: 55 ML/MIN/1.73M2 — LOW
ESTIMATED AVERAGE GLUCOSE: 229 — SIGNIFICANT CHANGE UP
ESTIMATED AVERAGE GLUCOSE: 229 — SIGNIFICANT CHANGE UP
GLUCOSE BLDC GLUCOMTR-MCNC: 165 MG/DL — HIGH (ref 70–99)
GLUCOSE BLDC GLUCOMTR-MCNC: 165 MG/DL — HIGH (ref 70–99)
GLUCOSE BLDC GLUCOMTR-MCNC: 176 MG/DL — HIGH (ref 70–99)
GLUCOSE BLDC GLUCOMTR-MCNC: 176 MG/DL — HIGH (ref 70–99)
GLUCOSE BLDC GLUCOMTR-MCNC: 213 MG/DL — HIGH (ref 70–99)
GLUCOSE BLDC GLUCOMTR-MCNC: 213 MG/DL — HIGH (ref 70–99)
GLUCOSE BLDC GLUCOMTR-MCNC: 214 MG/DL — HIGH (ref 70–99)
GLUCOSE BLDC GLUCOMTR-MCNC: 214 MG/DL — HIGH (ref 70–99)
GLUCOSE SERPL-MCNC: 209 MG/DL — HIGH (ref 70–99)
GLUCOSE SERPL-MCNC: 209 MG/DL — HIGH (ref 70–99)
HCT VFR BLD CALC: 37.9 % — SIGNIFICANT CHANGE UP (ref 34.5–45)
HCT VFR BLD CALC: 37.9 % — SIGNIFICANT CHANGE UP (ref 34.5–45)
HGB BLD-MCNC: 10.9 G/DL — LOW (ref 11.5–15.5)
HGB BLD-MCNC: 10.9 G/DL — LOW (ref 11.5–15.5)
LACTATE BLDV-MCNC: 1 MMOL/L — SIGNIFICANT CHANGE UP (ref 0.5–2)
LACTATE BLDV-MCNC: 1 MMOL/L — SIGNIFICANT CHANGE UP (ref 0.5–2)
MAGNESIUM SERPL-MCNC: 1.3 MG/DL — LOW (ref 1.6–2.6)
MAGNESIUM SERPL-MCNC: 1.3 MG/DL — LOW (ref 1.6–2.6)
MCHC RBC-ENTMCNC: 23.4 PG — LOW (ref 27–34)
MCHC RBC-ENTMCNC: 23.4 PG — LOW (ref 27–34)
MCHC RBC-ENTMCNC: 28.8 GM/DL — LOW (ref 32–36)
MCHC RBC-ENTMCNC: 28.8 GM/DL — LOW (ref 32–36)
MCV RBC AUTO: 81.5 FL — SIGNIFICANT CHANGE UP (ref 80–100)
MCV RBC AUTO: 81.5 FL — SIGNIFICANT CHANGE UP (ref 80–100)
NRBC # BLD: 0 /100 WBCS — SIGNIFICANT CHANGE UP (ref 0–0)
NRBC # BLD: 0 /100 WBCS — SIGNIFICANT CHANGE UP (ref 0–0)
NRBC # FLD: 0 K/UL — SIGNIFICANT CHANGE UP (ref 0–0)
NRBC # FLD: 0 K/UL — SIGNIFICANT CHANGE UP (ref 0–0)
PLATELET # BLD AUTO: 312 K/UL — SIGNIFICANT CHANGE UP (ref 150–400)
PLATELET # BLD AUTO: 312 K/UL — SIGNIFICANT CHANGE UP (ref 150–400)
POTASSIUM SERPL-MCNC: 4.3 MMOL/L — SIGNIFICANT CHANGE UP (ref 3.5–5.3)
POTASSIUM SERPL-MCNC: 4.3 MMOL/L — SIGNIFICANT CHANGE UP (ref 3.5–5.3)
POTASSIUM SERPL-SCNC: 4.3 MMOL/L — SIGNIFICANT CHANGE UP (ref 3.5–5.3)
POTASSIUM SERPL-SCNC: 4.3 MMOL/L — SIGNIFICANT CHANGE UP (ref 3.5–5.3)
PROT SERPL-MCNC: 6.7 G/DL — SIGNIFICANT CHANGE UP (ref 6–8.3)
PROT SERPL-MCNC: 6.7 G/DL — SIGNIFICANT CHANGE UP (ref 6–8.3)
RBC # BLD: 4.65 M/UL — SIGNIFICANT CHANGE UP (ref 3.8–5.2)
RBC # BLD: 4.65 M/UL — SIGNIFICANT CHANGE UP (ref 3.8–5.2)
RBC # FLD: 17.2 % — HIGH (ref 10.3–14.5)
RBC # FLD: 17.2 % — HIGH (ref 10.3–14.5)
SODIUM SERPL-SCNC: 137 MMOL/L — SIGNIFICANT CHANGE UP (ref 135–145)
SODIUM SERPL-SCNC: 137 MMOL/L — SIGNIFICANT CHANGE UP (ref 135–145)
WBC # BLD: 11.25 K/UL — HIGH (ref 3.8–10.5)
WBC # BLD: 11.25 K/UL — HIGH (ref 3.8–10.5)
WBC # FLD AUTO: 11.25 K/UL — HIGH (ref 3.8–10.5)
WBC # FLD AUTO: 11.25 K/UL — HIGH (ref 3.8–10.5)

## 2023-11-04 PROCEDURE — 99223 1ST HOSP IP/OBS HIGH 75: CPT

## 2023-11-04 PROCEDURE — 71045 X-RAY EXAM CHEST 1 VIEW: CPT | Mod: 26

## 2023-11-04 RX ORDER — MAGNESIUM SULFATE 500 MG/ML
2 VIAL (ML) INJECTION ONCE
Refills: 0 | Status: COMPLETED | OUTPATIENT
Start: 2023-11-04 | End: 2023-11-04

## 2023-11-04 RX ORDER — INFLUENZA VIRUS VACCINE 15; 15; 15; 15 UG/.5ML; UG/.5ML; UG/.5ML; UG/.5ML
0.5 SUSPENSION INTRAMUSCULAR ONCE
Refills: 0 | Status: COMPLETED | OUTPATIENT
Start: 2023-11-04 | End: 2023-11-09

## 2023-11-04 RX ORDER — SODIUM CHLORIDE 9 MG/ML
1000 INJECTION INTRAMUSCULAR; INTRAVENOUS; SUBCUTANEOUS ONCE
Refills: 0 | Status: COMPLETED | OUTPATIENT
Start: 2023-11-04 | End: 2023-11-04

## 2023-11-04 RX ADMIN — SODIUM CHLORIDE 125 MILLILITER(S): 9 INJECTION, SOLUTION INTRAVENOUS at 19:36

## 2023-11-04 RX ADMIN — GABAPENTIN 400 MILLIGRAM(S): 400 CAPSULE ORAL at 06:55

## 2023-11-04 RX ADMIN — Medication 25 GRAM(S): at 23:43

## 2023-11-04 RX ADMIN — HEPARIN SODIUM 5000 UNIT(S): 5000 INJECTION INTRAVENOUS; SUBCUTANEOUS at 13:51

## 2023-11-04 RX ADMIN — ATORVASTATIN CALCIUM 40 MILLIGRAM(S): 80 TABLET, FILM COATED ORAL at 22:47

## 2023-11-04 RX ADMIN — CYCLOBENZAPRINE HYDROCHLORIDE 10 MILLIGRAM(S): 10 TABLET, FILM COATED ORAL at 12:04

## 2023-11-04 RX ADMIN — Medication 2: at 12:02

## 2023-11-04 RX ADMIN — BUDESONIDE AND FORMOTEROL FUMARATE DIHYDRATE 2 PUFF(S): 160; 4.5 AEROSOL RESPIRATORY (INHALATION) at 08:16

## 2023-11-04 RX ADMIN — Medication 4: at 08:04

## 2023-11-04 RX ADMIN — AMLODIPINE BESYLATE 10 MILLIGRAM(S): 2.5 TABLET ORAL at 06:56

## 2023-11-04 RX ADMIN — HEPARIN SODIUM 5000 UNIT(S): 5000 INJECTION INTRAVENOUS; SUBCUTANEOUS at 06:57

## 2023-11-04 RX ADMIN — HEPARIN SODIUM 5000 UNIT(S): 5000 INJECTION INTRAVENOUS; SUBCUTANEOUS at 22:48

## 2023-11-04 RX ADMIN — SODIUM CHLORIDE 1000 MILLILITER(S): 9 INJECTION INTRAMUSCULAR; INTRAVENOUS; SUBCUTANEOUS at 22:45

## 2023-11-04 RX ADMIN — GABAPENTIN 400 MILLIGRAM(S): 400 CAPSULE ORAL at 17:48

## 2023-11-04 RX ADMIN — Medication 2: at 17:48

## 2023-11-04 RX ADMIN — OXCARBAZEPINE 600 MILLIGRAM(S): 300 TABLET, FILM COATED ORAL at 12:04

## 2023-11-04 RX ADMIN — MONTELUKAST 10 MILLIGRAM(S): 4 TABLET, CHEWABLE ORAL at 12:15

## 2023-11-04 RX ADMIN — BUDESONIDE AND FORMOTEROL FUMARATE DIHYDRATE 2 PUFF(S): 160; 4.5 AEROSOL RESPIRATORY (INHALATION) at 22:46

## 2023-11-04 RX ADMIN — Medication 112 MICROGRAM(S): at 06:57

## 2023-11-04 RX ADMIN — LOSARTAN POTASSIUM 25 MILLIGRAM(S): 100 TABLET, FILM COATED ORAL at 06:56

## 2023-11-04 NOTE — CONSULT NOTE ADULT - PROBLEM SELECTOR RECOMMENDATION 6
No evidence of acute exacerbation currently  - c/w home montelukast  - c/w PRN Albuterol
- Continue home levothyroxine

## 2023-11-04 NOTE — CONSULT NOTE ADULT - PROBLEM SELECTOR RECOMMENDATION 4
Patient on multiple antihypertensive medications at home  - agree with holding antihypertensives for now as patient currently hypo/normotensive
Last Hgb A1c 10.4% (improved from prior)    - Start lantus 15 units nightly  - ISS AC/HS  - Carb consistent diet  - Continue home atorvastatin  - Takes ASA 81 mg daily for primary prevention, resume when safe from bleeding perspective per primary team  - Hold home metformin

## 2023-11-04 NOTE — CONSULT NOTE ADULT - PROBLEM SELECTOR RECOMMENDATION 3
Patient with history of hypothyroidism  - most recent TSH of 0.78 on Feb 2022  - can check repeat TSH with AM labs  - c/w home levothyroxine 112mcg for now
NAKUL not on CPAP - trying to get set up for nocturnal BiPAP at home, as above

## 2023-11-04 NOTE — CONSULT NOTE ADULT - PROBLEM SELECTOR RECOMMENDATION 9
Patient with history of left sided renal neoplasm  - now s/p laparoscopic L radical nephrectomy by urology team  - management per primary team  - recommend early ambulation as tolerated

## 2023-11-04 NOTE — CONSULT NOTE ADULT - SUBJECTIVE AND OBJECTIVE BOX
[facility] Division of Hospital Medicine  Cyril Lu MD  Pager: [phone]    HPI:  58 year old female with PMH of obesity, asthma, chronic bronchitis, NAKUL, HTN, DM, HLD, fibromyalgia, anxiety, trigeminal neuralgia, degenerative joint disease, ?IgA vasculitis, psoriasis, OA presenting to [facility] for Left radical nephrectomy for management of left renal cell carcinoma. Patient now POD#1. Currently reports mild left sided flank pain. Otherwise reports that she generally feels tired. Patient reports that she has history of asthma and NAKUL for which she follows with a pulmonologist. States that she has been working with her pulmonologist to have a CPAP  at home. Patient states that her pressure is to be set at 3 however when offered to have CPAP started in hospital, patient refused. Currently denies any shortness of breath or CP.    No F/C, N/V, Cough, lightheadedness, dizziness, abdominal pain, diarrhea, dysuria.    Allergies  mushroom (Unknown)  venlafaxine (Hives)  gadobutrol (Rash; Urticaria; Hives)  Fioricet (Rash)  IV Contrast (Short breath)    Intolerances    HOME MEDICATIONS: Reviewed    MEDICATIONS  (STANDING):  albuterol    90 MICROgram(s) HFA Inhaler 2 Puff(s) Inhalation every 6 hours  atorvastatin 40 milliGRAM(s) Oral at bedtime  budesonide 160 MICROgram(s)/formoterol 4.5 MICROgram(s) Inhaler 2 Puff(s) Inhalation two times a day  dextrose 5%. 1000 milliLiter(s) (100 mL/Hr) IV Continuous <Continuous>  dextrose 5%. 1000 milliLiter(s) (50 mL/Hr) IV Continuous <Continuous>  dextrose 50% Injectable 25 Gram(s) IV Push once  dextrose 50% Injectable 12.5 Gram(s) IV Push once  dextrose 50% Injectable 25 Gram(s) IV Push once  gabapentin 400 milliGRAM(s) Oral two times a day  glucagon  Injectable 1 milliGRAM(s) IntraMuscular once  heparin   Injectable 5000 Unit(s) SubCutaneous every 8 hours  influenza   Vaccine 0.5 milliLiter(s) IntraMuscular once  insulin lispro (ADMELOG) corrective regimen sliding scale   SubCutaneous three times a day before meals  insulin lispro (ADMELOG) corrective regimen sliding scale   SubCutaneous at bedtime  levothyroxine 112 MICROGram(s) Oral daily  montelukast 10 milliGRAM(s) Oral daily  OXcarbazepine 600 milliGRAM(s) Oral two times a day  sodium chloride 0.9% Bolus 1000 milliLiter(s) IV Bolus once    MEDICATIONS  (PRN):  albuterol    0.083%. 2.5 milliGRAM(s) Nebulizer once PRN asthma  albuterol    90 MICROgram(s) HFA Inhaler 2 Puff(s) Inhalation every 6 hours PRN Bronchospasm  cyclobenzaprine 10 milliGRAM(s) Oral daily PRN Muscle Spasm  dextrose Oral Gel 15 Gram(s) Oral once PRN Blood Glucose LESS THAN 70 milliGRAM(s)/deciliter  HYDROmorphone  Injectable 0.25 milliGRAM(s) IV Push every 10 minutes PRN Severe Pain (7 - 10)  oxyCODONE    IR 5 milliGRAM(s) Oral every 6 hours PRN Moderate Pain (4 - 6)  pantoprazole    Tablet 40 milliGRAM(s) Oral before breakfast PRN acid reflux  senna 2 Tablet(s) Oral at bedtime PRN Constipation  simethicone 80 milliGRAM(s) Chew once PRN Gas    PAST MEDICAL & SURGICAL HISTORY:  Hypertension  vaginal cyst  Hyperlipidemia  Asthma  last inhaler use with in 10 days, on Pulm follow up [id number]  Hypothyroid  Obstructive sleep apnea  refuses Cpap  Obesity  morbid  Trigeminal neuralgia  R  Fibromyalgia  DM (diabetes mellitus)  DJD (degenerative joint disease)  Cervical/Thoracic/Lumbar  Cervical neuralgia  difficulty moving my neck  Back pain  thoracic & lumbar  H/O bursitis  right shoulder  Radicular pain  arms, legs  Vasculitis  Legs, forerhead, arms & hands, flare ups in R leg  Renal cell carcinoma, left  on follow up Dr schulte, HOD renal [facility], waiting for suregry in 2020  Falls frequently  Morbid obesity with body mass index (BMI) of 50.0 to 59.9 in adult  Psoriasis  Diverticulosis  Fatty liver  S/P abdominal hysterectomy    S/P  section  Left adrenal mass  excision, benign 2006  Vaginal cyst  removed   Vasculitis on nerve biopsy  , had another surgery called microvascular decompression   S/P left oophorectomy  S/P colonoscopic polypectomy    REVIEW OF SYSTEMS:    CONSTITUTIONAL: No fever, weight loss, or fatigue  EYES: No eye pain, visual disturbances, or discharge  ENMT:  No difficulty hearing, tinnitus, vertigo; No sinus or throat pain  NECK: No pain or stiffness  RESPIRATORY: No cough, wheezing, chills or hemoptysis; No shortness of breath  CARDIOVASCULAR: No chest pain, palpitations, dizziness, or leg swelling  GASTROINTESTINAL: No abdominal or epigastric pain. No nausea, vomiting, or hematemesis; No diarrhea or constipation. No melena or hematochezia.  GENITOURINARY: No dysuria, frequency, hematuria, or incontinence  NEUROLOGICAL: No headaches, memory loss, loss of strength, numbness, or tremors  SKIN: No itching, burning, rashes, or lesions   LYMPH NODES: No enlarged glands  ENDOCRINE: No heat or cold intolerance; No hair loss  MUSCULOSKELETAL:   PSYCHIATRIC: No depression, anxiety, mood swings, or difficulty sleeping  HEME/LYMPH: No easy bruising, or bleeding gums  ALLERGY AND IMMUNOLOGIC: No hives or eczema    [] Unable to obtain due to poor mental status    Vital Signs Last 24 Hrs  T(C): 36.6 (2023 18:11), Max: 37.3 (2023 22:25)  T(F): 97.9 (2023 18:11), Max: 99.1 (2023 22:25)  HR: 85 (2023 18:11) (84 - 97)  BP: 94/40 (2023 18:11) (94/40 - 127/62)  BP(mean): 78 (2023 22:00) (78 - 78)  RR: 19 (2023 18:11) (16 - 20)  SpO2: 95% (2023 18:11) (93% - 97%)    Parameters below as of 2023 18:11  Patient On (Oxygen Delivery Method): room air    CAPILLARY BLOOD GLUCOSE    POCT Blood Glucose.: 165 mg/dL (2023 17:31)  POCT Blood Glucose.: 176 mg/dL (2023 11:37)  POCT Blood Glucose.: 213 mg/dL (2023 07:31)  POCT Blood Glucose.: 275 mg/dL (2023 21:59)    PHYSICAL EXAM:    CONSTITUTIONAL: NAD, well-developed, well-groomed  EYES: PERRLA; conjunctiva and sclera clear  ENMT: Moist oral mucosa, no pharyngeal injection or exudates; normal dentition  NECK: Supple, no palpable masses; no thyromegaly  RESPIRATORY: Normal respiratory effort; lungs are clear to auscultation bilaterally  CARDIOVASCULAR: Regular rate and rhythm, normal S1 and S2, no murmur/rub/gallop; No lower extremity edema; Peripheral pulses are 2+ bilaterally  ABDOMEN: Nontender to palpation, normoactive bowel sounds, no rebound/guarding; No hepatosplenomegaly  MUSCULOSKELETAL:  Normal gait; no clubbing or cyanosis of digits; no joint swelling or tenderness to palpation  PSYCH: A+O to person, place, and time; affect appropriate  NEUROLOGY: CN 2-12 are intact and symmetric; no gross sensory deficits   SKIN: No rashes; no palpable lesions, surgical dressing C/D/I    LABS:    CAPILLARY BLOOD GLUCOSE  POCT Blood Glucose.: 165 mg/dL (2023 17:31)    RADIOLOGY & ADDITIONAL STUDIES:    Imaging:   Personally Reviewed:  [ ] YES               EKG:   Personally Reviewed:  [ ] YES     Care Discussed with Consultant(s)/Other Providers:  Care Discussed with Primary Team. [facility] Division of Hospital Medicine  Cyril Lu MD  Pager: [phone]    HPI:  58 year old female with PMH of obesity, asthma, chronic bronchitis, NAKUL, HTN, DM, HLD, fibromyalgia, anxiety, trigeminal neuralgia, degenerative joint disease, ?IgA vasculitis, psoriasis, OA presenting to [facility] for Left radical nephrectomy for management of left renal cell carcinoma. Patient now POD#1. Currently reports mild left sided flank pain. Otherwise reports that she generally feels tired. Patient reports that she has history of asthma and NAKUL for which she follows with a pulmonologist. States that she has been working with her pulmonologist to have a CPAP  at home. Patient states that her pressure is to be set at 3 however when offered to have CPAP started in hospital, patient refused. Currently denies any shortness of breath or CP.    No F/C, N/V, Cough, lightheadedness, dizziness, abdominal pain, diarrhea, dysuria.    Allergies  mushroom (Unknown)  venlafaxine (Hives)  gadobutrol (Rash; Urticaria; Hives)  Fioricet (Rash)  IV Contrast (Short breath)    Intolerances    HOME MEDICATIONS: Reviewed    MEDICATIONS  (STANDING):  albuterol    90 MICROgram(s) HFA Inhaler 2 Puff(s) Inhalation every 6 hours  atorvastatin 40 milliGRAM(s) Oral at bedtime  budesonide 160 MICROgram(s)/formoterol 4.5 MICROgram(s) Inhaler 2 Puff(s) Inhalation two times a day  dextrose 5%. 1000 milliLiter(s) (100 mL/Hr) IV Continuous <Continuous>  dextrose 5%. 1000 milliLiter(s) (50 mL/Hr) IV Continuous <Continuous>  dextrose 50% Injectable 25 Gram(s) IV Push once  dextrose 50% Injectable 12.5 Gram(s) IV Push once  dextrose 50% Injectable 25 Gram(s) IV Push once  gabapentin 400 milliGRAM(s) Oral two times a day  glucagon  Injectable 1 milliGRAM(s) IntraMuscular once  heparin   Injectable 5000 Unit(s) SubCutaneous every 8 hours  influenza   Vaccine 0.5 milliLiter(s) IntraMuscular once  insulin lispro (ADMELOG) corrective regimen sliding scale   SubCutaneous three times a day before meals  insulin lispro (ADMELOG) corrective regimen sliding scale   SubCutaneous at bedtime  levothyroxine 112 MICROGram(s) Oral daily  montelukast 10 milliGRAM(s) Oral daily  OXcarbazepine 600 milliGRAM(s) Oral two times a day  sodium chloride 0.9% Bolus 1000 milliLiter(s) IV Bolus once    MEDICATIONS  (PRN):  albuterol    0.083%. 2.5 milliGRAM(s) Nebulizer once PRN asthma  albuterol    90 MICROgram(s) HFA Inhaler 2 Puff(s) Inhalation every 6 hours PRN Bronchospasm  cyclobenzaprine 10 milliGRAM(s) Oral daily PRN Muscle Spasm  dextrose Oral Gel 15 Gram(s) Oral once PRN Blood Glucose LESS THAN 70 milliGRAM(s)/deciliter  HYDROmorphone  Injectable 0.25 milliGRAM(s) IV Push every 10 minutes PRN Severe Pain (7 - 10)  oxyCODONE    IR 5 milliGRAM(s) Oral every 6 hours PRN Moderate Pain (4 - 6)  pantoprazole    Tablet 40 milliGRAM(s) Oral before breakfast PRN acid reflux  senna 2 Tablet(s) Oral at bedtime PRN Constipation  simethicone 80 milliGRAM(s) Chew once PRN Gas    PAST MEDICAL & SURGICAL HISTORY:  Hypertension  vaginal cyst  Hyperlipidemia  Asthma  last inhaler use with in 10 days, on Pulm follow up [id number]  Hypothyroid  Obstructive sleep apnea  refuses Cpap  Obesity  morbid  Trigeminal neuralgia  R  Fibromyalgia  DM (diabetes mellitus)  DJD (degenerative joint disease)  Cervical/Thoracic/Lumbar  Cervical neuralgia  difficulty moving my neck  Back pain  thoracic & lumbar  H/O bursitis  right shoulder  Radicular pain  arms, legs  Vasculitis  Legs, forerhead, arms & hands, flare ups in R leg  Renal cell carcinoma, left  on follow up Dr schulte, HOD renal [facility], waiting for suregry in 2020  Falls frequently  Morbid obesity with body mass index (BMI) of 50.0 to 59.9 in adult  Psoriasis  Diverticulosis  Fatty liver  S/P abdominal hysterectomy    S/P  section  Left adrenal mass  excision, benign 2006  Vaginal cyst  removed   Vasculitis on nerve biopsy  , had another surgery called microvascular decompression   S/P left oophorectomy  S/P colonoscopic polypectomy    REVIEW OF SYSTEMS:    CONSTITUTIONAL: No fever, weight loss, or fatigue  EYES: No eye pain, visual disturbances, or discharge  ENMT:  No difficulty hearing, tinnitus, vertigo; No sinus or throat pain  NECK: No pain or stiffness  RESPIRATORY: No cough, wheezing, chills or hemoptysis; No shortness of breath  CARDIOVASCULAR: No chest pain, palpitations, dizziness, or leg swelling  GASTROINTESTINAL: No abdominal or epigastric pain. No nausea, vomiting, or hematemesis; No diarrhea or constipation. No melena or hematochezia.  GENITOURINARY: No dysuria, frequency, hematuria, or incontinence  NEUROLOGICAL: No headaches, memory loss, loss of strength, numbness, or tremors  SKIN: No itching, burning, rashes, or lesions     [] Unable to obtain due to poor mental status    Vital Signs Last 24 Hrs  T(C): 36.6 (2023 18:11), Max: 37.3 (2023 22:25)  T(F): 97.9 (2023 18:11), Max: 99.1 (2023 22:25)  HR: 85 (2023 18:11) (84 - 97)  BP: 94/40 (2023 18:11) (94/40 - 127/62)  BP(mean): 78 (2023 22:00) (78 - 78)  RR: 19 (2023 18:11) (16 - 20)  SpO2: 95% (2023 18:11) (93% - 97%)    Parameters below as of 2023 18:11  Patient On (Oxygen Delivery Method): room air    CAPILLARY BLOOD GLUCOSE    POCT Blood Glucose.: 165 mg/dL (2023 17:31)  POCT Blood Glucose.: 176 mg/dL (2023 11:37)  POCT Blood Glucose.: 213 mg/dL (2023 07:31)  POCT Blood Glucose.: 275 mg/dL (2023 21:59)    PHYSICAL EXAM:  CONSTITUTIONAL: NAD, obese  HEAD: Normocephalic, atraumatic  EYES: EOMI, PERRL  ENT: no rhinorrhea, no pharyngeal erythema, NC in place  RESPIRATORY: No increased work of breathing, CTAB, no wheezes or crackles appreciated  CARDIOVASCULAR: RRR, S1 and S2 present, no m/r/g  ABDOMEN: soft, NT, ND, bowel sounds present  EXTREMITIES: No LE edema  MUSCULOSKELETAL: no joint swelling, no tenderness to palpation  NEURO: A&Ox3, moving all extremities    LABS:    CAPILLARY BLOOD GLUCOSE  POCT Blood Glucose.: 165 mg/dL (2023 17:31)    RADIOLOGY & ADDITIONAL STUDIES:    Imaging:   Personally Reviewed:  [ ] YES               EKG:   Personally Reviewed:  [ ] YES     Care Discussed with Consultant(s)/Other Providers:  Care Discussed with Primary Team.

## 2023-11-04 NOTE — PROGRESS NOTE ADULT - SUBJECTIVE AND OBJECTIVE BOX
Subjective  AVSS. NAEON. On 4L NC. Has not passed gas, has some nausea. Pain controlled. Has not been out of bed.     Objective    Vital signs  T(F): , Max: 99.1 (11-03-23 @ 22:25)  HR: 88 (11-04-23 @ 10:24)  BP: 97/53 (11-04-23 @ 10:24)  SpO2: 95% (11-04-23 @ 10:24)  Wt(kg): --    Output     OUT:    Indwelling Catheter - Urethral (mL): 1665 mL  Total OUT: 1665 mL    Total NET: -1665 mL      OUT:    Indwelling Catheter - Urethral (mL): 450 mL  Total OUT: 450 mL    Total NET: -450 mL          Gen: NAD  Abd: soft, nondistended, appropriately tender, Incisions c/d/i  : castaneda secured in place, draining CYU    Labs              Imaging

## 2023-11-04 NOTE — CONSULT NOTE ADULT - PROBLEM SELECTOR RECOMMENDATION 2
Chronic hypercapnic respiratory failure due to combination of obesity, restrictive lung disease, and kyphosis. Follows with pulmonologist. Recommended for nocturnal BiPAP (per last pulm note settings 12/8), but has had logistical issues setting this up at home.
Patient following with outpatient pulmonology for NAKUL  - patient at high risk due to body habitus  - reportedly recommended for CPAP at night at pressure of 3  - patient offered CPAP while inpatient but refused as she feels claustrophobic  - continue to closely monitor mental status, patient remains at high risk for CO2 retention  - can consider checking daily blood gases to trend CO2 levels if concerned for retention

## 2023-11-04 NOTE — CONSULT NOTE ADULT - PROBLEM SELECTOR RECOMMENDATION 5
Patient reports taking multiple medications for DM at home  - she reports taking Levemir 40U Qhs, Admelog 7U TIDAC as well as Ozempic and Metformin  - c/w CC diet  - BG currently at goal  - patient with diminished PO intake currently  - can c/w mod ISS with meals and at bedtime  - once patient is tolerating meals, would gradually reintroduce nighttime Lantus
- Continue home amlodipine and hydrochlorothiazide

## 2023-11-05 LAB
BASE EXCESS BLDV CALC-SCNC: 4.3 MMOL/L — HIGH (ref -2–3)
BASE EXCESS BLDV CALC-SCNC: 4.3 MMOL/L — HIGH (ref -2–3)
BASE EXCESS BLDV CALC-SCNC: 7.9 MMOL/L — HIGH (ref -2–3)
BASE EXCESS BLDV CALC-SCNC: 7.9 MMOL/L — HIGH (ref -2–3)
BASE EXCESS BLDV CALC-SCNC: 8.9 MMOL/L — HIGH (ref -2–3)
BASE EXCESS BLDV CALC-SCNC: 8.9 MMOL/L — HIGH (ref -2–3)
CA-I SERPL-SCNC: 1.14 MMOL/L — LOW (ref 1.15–1.33)
CA-I SERPL-SCNC: 1.14 MMOL/L — LOW (ref 1.15–1.33)
CA-I SERPL-SCNC: 1.2 MMOL/L — SIGNIFICANT CHANGE UP (ref 1.15–1.33)
CHLORIDE BLDV-SCNC: 98 MMOL/L — SIGNIFICANT CHANGE UP (ref 96–108)
CO2 BLDV-SCNC: 35.6 MMOL/L — HIGH (ref 22–26)
CO2 BLDV-SCNC: 35.6 MMOL/L — HIGH (ref 22–26)
CO2 BLDV-SCNC: 38.2 MMOL/L — HIGH (ref 22–26)
CO2 BLDV-SCNC: 38.2 MMOL/L — HIGH (ref 22–26)
CO2 BLDV-SCNC: 39 MMOL/L — HIGH (ref 22–26)
CO2 BLDV-SCNC: 39 MMOL/L — HIGH (ref 22–26)
GAS PNL BLDV: 132 MMOL/L — LOW (ref 136–145)
GAS PNL BLDV: 132 MMOL/L — LOW (ref 136–145)
GAS PNL BLDV: 133 MMOL/L — LOW (ref 136–145)
GAS PNL BLDV: 133 MMOL/L — LOW (ref 136–145)
GAS PNL BLDV: 135 MMOL/L — LOW (ref 136–145)
GAS PNL BLDV: 135 MMOL/L — LOW (ref 136–145)
GAS PNL BLDV: SIGNIFICANT CHANGE UP
GLUCOSE BLDC GLUCOMTR-MCNC: 200 MG/DL — HIGH (ref 70–99)
GLUCOSE BLDC GLUCOMTR-MCNC: 200 MG/DL — HIGH (ref 70–99)
GLUCOSE BLDC GLUCOMTR-MCNC: 218 MG/DL — HIGH (ref 70–99)
GLUCOSE BLDC GLUCOMTR-MCNC: 218 MG/DL — HIGH (ref 70–99)
GLUCOSE BLDC GLUCOMTR-MCNC: 249 MG/DL — HIGH (ref 70–99)
GLUCOSE BLDC GLUCOMTR-MCNC: 249 MG/DL — HIGH (ref 70–99)
GLUCOSE BLDC GLUCOMTR-MCNC: 250 MG/DL — HIGH (ref 70–99)
GLUCOSE BLDC GLUCOMTR-MCNC: 250 MG/DL — HIGH (ref 70–99)
GLUCOSE BLDC GLUCOMTR-MCNC: 251 MG/DL — HIGH (ref 70–99)
GLUCOSE BLDC GLUCOMTR-MCNC: 251 MG/DL — HIGH (ref 70–99)
GLUCOSE BLDC GLUCOMTR-MCNC: 290 MG/DL — HIGH (ref 70–99)
GLUCOSE BLDC GLUCOMTR-MCNC: 290 MG/DL — HIGH (ref 70–99)
GLUCOSE BLDV-MCNC: 231 MG/DL — HIGH (ref 70–99)
GLUCOSE BLDV-MCNC: 231 MG/DL — HIGH (ref 70–99)
GLUCOSE BLDV-MCNC: 241 MG/DL — HIGH (ref 70–99)
GLUCOSE BLDV-MCNC: 241 MG/DL — HIGH (ref 70–99)
GLUCOSE BLDV-MCNC: 269 MG/DL — HIGH (ref 70–99)
GLUCOSE BLDV-MCNC: 269 MG/DL — HIGH (ref 70–99)
HCO3 BLDV-SCNC: 33 MMOL/L — HIGH (ref 22–29)
HCO3 BLDV-SCNC: 33 MMOL/L — HIGH (ref 22–29)
HCO3 BLDV-SCNC: 36 MMOL/L — HIGH (ref 22–29)
HCO3 BLDV-SCNC: 36 MMOL/L — HIGH (ref 22–29)
HCO3 BLDV-SCNC: 37 MMOL/L — HIGH (ref 22–29)
HCO3 BLDV-SCNC: 37 MMOL/L — HIGH (ref 22–29)
HCT VFR BLDA CALC: 32 % — LOW (ref 34.5–46.5)
HCT VFR BLDA CALC: 34 % — LOW (ref 34.5–46.5)
HCT VFR BLDA CALC: 34 % — LOW (ref 34.5–46.5)
HGB BLD CALC-MCNC: 10.6 G/DL — LOW (ref 11.7–16.1)
HGB BLD CALC-MCNC: 11.2 G/DL — LOW (ref 11.7–16.1)
HGB BLD CALC-MCNC: 11.2 G/DL — LOW (ref 11.7–16.1)
LACTATE BLDV-MCNC: 0.8 MMOL/L — SIGNIFICANT CHANGE UP (ref 0.5–2)
LACTATE BLDV-MCNC: 0.8 MMOL/L — SIGNIFICANT CHANGE UP (ref 0.5–2)
LACTATE BLDV-MCNC: 1 MMOL/L — SIGNIFICANT CHANGE UP (ref 0.5–2)
LACTATE BLDV-MCNC: 1 MMOL/L — SIGNIFICANT CHANGE UP (ref 0.5–2)
LACTATE BLDV-MCNC: 1.1 MMOL/L — SIGNIFICANT CHANGE UP (ref 0.5–2)
LACTATE BLDV-MCNC: 1.1 MMOL/L — SIGNIFICANT CHANGE UP (ref 0.5–2)
PCO2 BLDV: 70 MMHG — HIGH (ref 39–52)
PCO2 BLDV: 76 MMHG — HIGH (ref 39–52)
PCO2 BLDV: 76 MMHG — HIGH (ref 39–52)
PH BLDV: 7.25 — LOW (ref 7.32–7.43)
PH BLDV: 7.25 — LOW (ref 7.32–7.43)
PH BLDV: 7.32 — SIGNIFICANT CHANGE UP (ref 7.32–7.43)
PH BLDV: 7.32 — SIGNIFICANT CHANGE UP (ref 7.32–7.43)
PH BLDV: 7.33 — SIGNIFICANT CHANGE UP (ref 7.32–7.43)
PH BLDV: 7.33 — SIGNIFICANT CHANGE UP (ref 7.32–7.43)
PO2 BLDV: 40 MMHG — SIGNIFICANT CHANGE UP (ref 25–45)
PO2 BLDV: 40 MMHG — SIGNIFICANT CHANGE UP (ref 25–45)
PO2 BLDV: 50 MMHG — HIGH (ref 25–45)
PO2 BLDV: 50 MMHG — HIGH (ref 25–45)
PO2 BLDV: 57 MMHG — HIGH (ref 25–45)
PO2 BLDV: 57 MMHG — HIGH (ref 25–45)
POTASSIUM BLDV-SCNC: 4.2 MMOL/L — SIGNIFICANT CHANGE UP (ref 3.5–5.1)
POTASSIUM BLDV-SCNC: 4.4 MMOL/L — SIGNIFICANT CHANGE UP (ref 3.5–5.1)
POTASSIUM BLDV-SCNC: 4.4 MMOL/L — SIGNIFICANT CHANGE UP (ref 3.5–5.1)
SAO2 % BLDV: 73.1 % — SIGNIFICANT CHANGE UP (ref 67–88)
SAO2 % BLDV: 73.1 % — SIGNIFICANT CHANGE UP (ref 67–88)
SAO2 % BLDV: 78.7 % — SIGNIFICANT CHANGE UP (ref 67–88)
SAO2 % BLDV: 78.7 % — SIGNIFICANT CHANGE UP (ref 67–88)
SAO2 % BLDV: 88.6 % — HIGH (ref 67–88)
SAO2 % BLDV: 88.6 % — HIGH (ref 67–88)
TSH SERPL-MCNC: 1.02 UIU/ML — SIGNIFICANT CHANGE UP (ref 0.27–4.2)
TSH SERPL-MCNC: 1.02 UIU/ML — SIGNIFICANT CHANGE UP (ref 0.27–4.2)

## 2023-11-05 PROCEDURE — 71045 X-RAY EXAM CHEST 1 VIEW: CPT | Mod: 26

## 2023-11-05 PROCEDURE — 99233 SBSQ HOSP IP/OBS HIGH 50: CPT

## 2023-11-05 RX ORDER — FLUTICASONE PROPIONATE 50 MCG
1 SPRAY, SUSPENSION NASAL
Refills: 0 | Status: DISCONTINUED | OUTPATIENT
Start: 2023-11-05 | End: 2023-11-09

## 2023-11-05 RX ADMIN — Medication 4: at 08:14

## 2023-11-05 RX ADMIN — Medication 1 SPRAY(S): at 06:01

## 2023-11-05 RX ADMIN — OXCARBAZEPINE 600 MILLIGRAM(S): 300 TABLET, FILM COATED ORAL at 11:09

## 2023-11-05 RX ADMIN — BUDESONIDE AND FORMOTEROL FUMARATE DIHYDRATE 2 PUFF(S): 160; 4.5 AEROSOL RESPIRATORY (INHALATION) at 11:07

## 2023-11-05 RX ADMIN — OXCARBAZEPINE 600 MILLIGRAM(S): 300 TABLET, FILM COATED ORAL at 21:46

## 2023-11-05 RX ADMIN — BUDESONIDE AND FORMOTEROL FUMARATE DIHYDRATE 2 PUFF(S): 160; 4.5 AEROSOL RESPIRATORY (INHALATION) at 21:45

## 2023-11-05 RX ADMIN — MONTELUKAST 10 MILLIGRAM(S): 4 TABLET, CHEWABLE ORAL at 11:08

## 2023-11-05 RX ADMIN — Medication 2: at 22:27

## 2023-11-05 RX ADMIN — GABAPENTIN 400 MILLIGRAM(S): 400 CAPSULE ORAL at 06:06

## 2023-11-05 RX ADMIN — Medication 2: at 14:24

## 2023-11-05 RX ADMIN — ATORVASTATIN CALCIUM 40 MILLIGRAM(S): 80 TABLET, FILM COATED ORAL at 21:46

## 2023-11-05 RX ADMIN — OXCARBAZEPINE 600 MILLIGRAM(S): 300 TABLET, FILM COATED ORAL at 00:05

## 2023-11-05 RX ADMIN — Medication 4: at 19:30

## 2023-11-05 RX ADMIN — HEPARIN SODIUM 5000 UNIT(S): 5000 INJECTION INTRAVENOUS; SUBCUTANEOUS at 06:11

## 2023-11-05 RX ADMIN — Medication 112 MICROGRAM(S): at 06:05

## 2023-11-05 RX ADMIN — HEPARIN SODIUM 5000 UNIT(S): 5000 INJECTION INTRAVENOUS; SUBCUTANEOUS at 21:46

## 2023-11-05 RX ADMIN — HEPARIN SODIUM 5000 UNIT(S): 5000 INJECTION INTRAVENOUS; SUBCUTANEOUS at 14:00

## 2023-11-05 NOTE — PHYSICAL THERAPY INITIAL EVALUATION ADULT - CRITERIA FOR SKILLED THERAPEUTIC INTERVENTIONS
Heart association recommends 150 minutes of exercise per week. Happy exercising!    Patient Education     Prevention Guidelines, Men Ages 50 to 64  Screening tests and vaccines are an important part of managing your health. A screening test is done to find possible disorders or diseases in people who don't have any symptoms. The goal is to find a disease early so lifestyle changes can be made and you can be watched more closely to reduce the risk of disease, or to detect it early enough to treat it most effectively. Screening tests are not considered diagnostic, but are used to determine if more testing is needed. Health counseling is essential, too. Below are guidelines for these, for men ages 50 to 64. Talk with your healthcare provider to make sure you’re up-to-date on what you need.  Screening Who needs it How often   Alcohol misuse All men in this age group At routine exams   Blood pressure All men in this age group Yearly checkup if your blood pressure is normal  Normal blood pressure is less than 120/80 mm Hg  If your blood pressure reading is higher than normal, follow the advice of your healthcare provider      Colorectal cancer All men in this age group Flexible sigmoidoscopy every 5 years, or colonoscopy every 10 years, or double-contrast barium enema every 5 years; yearly fecal occult blood test or fecal immunochemical test; or a stool DNA test as often as your healthcare provider advises; talk with your healthcare provider about which tests are best for you   Depression All men in this age group At routine exams   Type 2 diabetes or prediabetes All men beginning at age 45 and men without symptoms at any age who are overweight or obese and have 1 or more other risk factors for diabetes At least every 3 years (yearly if your blood sugar has already begun to rise)   Type 2 diabetes All men with prediabetes Every year   Hepatitis C Men at increased risk for infection - talk with your healthcare provider At  routine exams. All men ages 50 to 70 should be tested at least once for hepatitis C.   High cholesterol or triglycerides All men in this age group At least every 5 years   HIV Men at increased risk for infection - talk with your healthcare provider At routine exams   Lung cancer Adults age 55 to 80 who have smoked Yearly screening in smokers with 30 pack-year history of smoking or who quit within 15 years   Obesity All men in this age group At routine exams   Prostate cancer Starting at age 45, talk to healthcare provider about risks and benefits of digital rectal exam (ADARSH) and prostate-specific antigen (PSA) screening1 At routine exams   Syphilis Men at increased risk for infection - talk with your healthcare provider At routine exams   Tuberculosis Men at increased risk for infection - talk with your healthcare provider Ask your healthcare provider   Vision All men in this age group Ask your healthcare provider   Vaccine Who needs it How often   Chickenpox (varicella) All men in this age group who have no record of this infection or vaccine 2 doses; second dose should be given at least 4 weeks after the first dose   Hepatitis A Men at increased risk for infection - talk with your healthcare provider 2 doses given at least 6 months apart   Hepatitis B Men at increased risk for infection - talk with your healthcare provider 3 doses over 6 months; second dose should be given 1 month after the first dose; the third dose should be given at least 2 months after the second dose and at least 4 months after the first dose   Haemophilus influenzae Type B (HIB) Men at increased risk for infection - talk with your healthcare provider 1 to 3 doses   Influenza (flu) All men in this age group Once a year   Measles, mumps, rubella (MMR) Men in this age group through their late 50s who have no record of these infections or vaccines 1 or 2 doses; ask your healthcare provider   Meningococcal Men at increased risk for infection -  talk with your healthcare provider 1 or more doses   Pneumococcal conjugate vaccine (PCV13) and pneumococcal polysaccharide vaccine (PPSV23) Men at increased risk for infection - talk with your healthcare provider PCV13: 1 dose ages 19 to 65 (protects against 13 types of pneumococcal bacteria)     PPSV23: 1 to 2 doses through age 64, or 1 dose at 65 or older (protects against 23 types of pneumococcal bacteria)      Tetanus/diphtheria/  pertussis (Td/Tdap) booster All men in this age group Td every 10 years, or a one-time dose of Tdap instead of a Td booster after age 18, then Td every 10 years   Zoster All men ages 60 and older 1 dose   Counseling Who needs it How often   Diet and exercise Men who are overweight or obese When diagnosed, and then at routine exams   Sexually transmitted infection prevention Men at increased risk for infection - talk with your healthcare provider At routine exams   Use of daily aspirin Men in this age group at risk for cardiovascular health problems At routine exams   Use of tobacco and the health effects it can cause All men in this age group Every visit   [address] [place] [address] [facility]  Date Last Reviewed: 2/1/2017 © 2000-2018 [employer]. [address] [place] [address]. All rights reserved. This information is not intended as a substitute for professional medical care. Always follow your healthcare professional's instructions.            impairments found

## 2023-11-05 NOTE — PHYSICAL THERAPY INITIAL EVALUATION ADULT - PERTINENT HX OF CURRENT PROBLEM, REHAB EVAL
Pt is a 58 year old female presenting for scheduled surgery for nephrectomy for management of left renal cell carcinoma.

## 2023-11-05 NOTE — PROGRESS NOTE ADULT - ATTENDING SUPERVISION STATEMENT
Returned patient's call  States she needs refill of Dayvigo  States she no longer takes Belsomra and hasn't taken it in months  Called [facility] to cancel script for Belsomra  Per pharmacist, patient picked up [address] on 4/28/22  I advised to cancel remaining refills  Dr Evelin Sandoval, please advise  See Rx for Dayvigo and sign if agreeable  [facility] on [facility]  Patient has follow up with you on 5/31/22 
Resident
Resident

## 2023-11-05 NOTE — PROGRESS NOTE ADULT - SUBJECTIVE AND OBJECTIVE BOX
[facility] Department of Hospital Medicine  Cyril Lu MD  Available on MS Teams  Pager: [phone]    Patient is a 58y old  Female who presents with a chief complaint of Left Radical Nephrectomy (04 Nov 2023 21:37)    OVERNIGHT EVENTS: No acute events overnight.    SUBJECTIVE: Pt seen and examined at bedside this morning. Currently on bipap, appearing comfortable. Patient somnolent but arousable to touch and voice. Intermittently participatory with interview.     ADDITIONAL REVIEW OF SYSTEMS: as above.    MEDICATIONS  (STANDING):  albuterol    90 MICROgram(s) HFA Inhaler 2 Puff(s) Inhalation every 6 hours  atorvastatin 40 milliGRAM(s) Oral at bedtime  budesonide 160 MICROgram(s)/formoterol 4.5 MICROgram(s) Inhaler 2 Puff(s) Inhalation two times a day  dextrose 5%. 1000 milliLiter(s) (100 mL/Hr) IV Continuous <Continuous>  dextrose 5%. 1000 milliLiter(s) (50 mL/Hr) IV Continuous <Continuous>  dextrose 50% Injectable 25 Gram(s) IV Push once  dextrose 50% Injectable 12.5 Gram(s) IV Push once  dextrose 50% Injectable 25 Gram(s) IV Push once  fluticasone propionate 50 MICROgram(s)/spray Nasal Spray 1 Spray(s) Both Nostrils two times a day  gabapentin 400 milliGRAM(s) Oral two times a day  glucagon  Injectable 1 milliGRAM(s) IntraMuscular once  heparin   Injectable 5000 Unit(s) SubCutaneous every 8 hours  influenza   Vaccine 0.5 milliLiter(s) IntraMuscular once  insulin lispro (ADMELOG) corrective regimen sliding scale   SubCutaneous three times a day before meals  insulin lispro (ADMELOG) corrective regimen sliding scale   SubCutaneous at bedtime  levothyroxine 112 MICROGram(s) Oral daily  montelukast 10 milliGRAM(s) Oral daily  OXcarbazepine 600 milliGRAM(s) Oral two times a day    MEDICATIONS  (PRN):  albuterol    0.083%. 2.5 milliGRAM(s) Nebulizer once PRN asthma  albuterol    90 MICROgram(s) HFA Inhaler 2 Puff(s) Inhalation every 6 hours PRN Bronchospasm  cyclobenzaprine 10 milliGRAM(s) Oral daily PRN Muscle Spasm  dextrose Oral Gel 15 Gram(s) Oral once PRN Blood Glucose LESS THAN 70 milliGRAM(s)/deciliter  HYDROmorphone  Injectable 0.25 milliGRAM(s) IV Push every 10 minutes PRN Severe Pain (7 - 10)  oxyCODONE    IR 5 milliGRAM(s) Oral every 6 hours PRN Moderate Pain (4 - 6)  pantoprazole    Tablet 40 milliGRAM(s) Oral before breakfast PRN acid reflux  senna 2 Tablet(s) Oral at bedtime PRN Constipation  simethicone 80 milliGRAM(s) Chew once PRN Gas    CAPILLARY BLOOD GLUCOSE    POCT Blood Glucose.: 200 mg/dL (05 Nov 2023 14:21)  POCT Blood Glucose.: 250 mg/dL (05 Nov 2023 11:17)  POCT Blood Glucose.: 218 mg/dL (05 Nov 2023 07:42)  POCT Blood Glucose.: 214 mg/dL (04 Nov 2023 23:08)  POCT Blood Glucose.: 165 mg/dL (04 Nov 2023 17:31)    I&O's Summary    04 Nov 2023 08:01  -  05 Nov 2023 07:00  --------------------------------------------------------  IN: 3880 mL / OUT: 2900 mL / NET: 980 mL    PHYSICAL EXAM:  Vital Signs Last 24 Hrs  T(C): 37 (05 Nov 2023 10:04), Max: 37.4 (05 Nov 2023 01:50)  T(F): 98.6 (05 Nov 2023 10:04), Max: 99.3 (05 Nov 2023 01:50)  HR: 96 (05 Nov 2023 10:04) (85 - 96)  BP: 114/65 (05 Nov 2023 10:04) (94/40 - 138/77)  BP(mean): --  RR: 21 (05 Nov 2023 10:04) (18 - 22)  SpO2: 93% (05 Nov 2023 10:58) (93% - 98%)    Parameters below as of 05 Nov 2023 10:04  Patient On (Oxygen Delivery Method): nasal cannula  O2 Flow (L/min): 5    CONSTITUTIONAL: NAD, obese  HEAD: Normocephalic, atraumatic  EYES: EOMI, PERRL  ENT: no rhinorrhea, on bipap  RESPIRATORY: No increased work of breathing, CTAB, no wheezes or crackles appreciated  CARDIOVASCULAR: RRR, S1 and S2 present, no m/r/g  ABDOMEN: soft, NT, ND, bowel sounds present  EXTREMITIES: No LE edema  MUSCULOSKELETAL: no joint swelling, no tenderness to palpation  NEURO: moving all extremities, withdrawing to pain    LABS:                        10.9   11.25 )-----------( 312      ( 04 Nov 2023 21:30 )             37.9     11-04    137  |  97<L>  |  12  ----------------------------<  209<H>  4.3   |  32<H>  |  1.15    Ca    8.5      04 Nov 2023 21:30  Mg     1.30     11-04    TPro  6.7  /  Alb  3.5  /  TBili  0.7  /  DBili  x   /  AST  39<H>  /  ALT  30  /  AlkPhos  92  11-04    Urinalysis Basic - ( 04 Nov 2023 21:30 )    Color: x / Appearance: x / SG: x / pH: x  Gluc: 209 mg/dL / Ketone: x  / Bili: x / Urobili: x   Blood: x / Protein: x / Nitrite: x   Leuk Esterase: x / RBC: x / WBC x   Sq Epi: x / Non Sq Epi: x / Bacteria: x    RADIOLOGY & ADDITIONAL TESTS:    Results Reviewed:   Imaging Personally Reviewed:  Electrocardiogram Personally Reviewed:    COORDINATION OF CARE:  Care Discussed with Consultants/Other Providers [Y/N]:  Prior or Outpatient Records Reviewed [Y/N]:

## 2023-11-05 NOTE — PHYSICAL THERAPY INITIAL EVALUATION ADULT - PATIENT PROFILE REVIEW, REHAB EVAL
PT initial evaluation received and chart review completed. Pt agreeable to participate in PT evaluation. Pt cleared by VEL Rivas./yes

## 2023-11-05 NOTE — PHYSICAL THERAPY INITIAL EVALUATION ADULT - ADDITIONAL COMMENTS
Pt lives in an apartment with her brother and son; there is elevator access with 3 steps to negotiate to elevator. Has a rolling walker, cane, and shower chair.

## 2023-11-05 NOTE — PHYSICAL THERAPY INITIAL EVALUATION ADULT - GENERAL OBSERVATIONS, REHAB EVAL
Upon entry, pt semi-supine in bed in NAD; + nasal cannula, + continuous pulse oximeter. SpO2 92% on 7L O2 via nasal cannula.

## 2023-11-05 NOTE — PROGRESS NOTE ADULT - PROBLEM SELECTOR PLAN 3
Patient with history of hypothyroidism  - most recent TSH of 0.78 on Feb 2022  - can check repeat TSH with AM labs  - c/w home levothyroxine 112mcg for now.

## 2023-11-05 NOTE — PROGRESS NOTE ADULT - SUBJECTIVE AND OBJECTIVE BOX
Subjective  Alert/responsible, however more lethargic this AM and c/o headache. Increase o2 requirements overnight, 5L via NC. Refused CPAP recommendations for elevated CO2. Pain controlled. Voiding.    Objective    Vital signs  T(F): , Max: 99.3 (11-05-23 @ 01:50)  HR: 96 (11-05-23 @ 10:04)  BP: 114/65 (11-05-23 @ 10:04)  SpO2: 93% (11-05-23 @ 10:58)  Wt(kg): --    Output     OUT:    Indwelling Catheter - Urethral (mL): 1850 mL    Voided (mL): 1050 mL  Total OUT: 2900 mL    Total NET: -2900 mL          Gen: NAD  Abd: soft, nondistended, appropriately tender, Incisions c/d/i  : castaneda secured in place, draining CYU        Labs      11-04 @ 21:30    WBC 11.25 / Hct 37.9  / SCr 1.15       Imaging  < from: Xray Chest 1 View AP/PA (11.04.23 @ 23:57) >    ACC: [id number] EXAM:  XR CHEST AP OR PA 1V   ORDERED BY: MARI PATE     PROCEDURE DATE:  11/04/2023          INTERPRETATION:  CLINICAL HISTORY: Increased oxygen requirements.    TECHNIQUE: Single upright AP chest radiograph.    COMPARISON: No priors for comparison.    COMMENT:  Study is limited secondary to poor inspiratory effort.    There are no vascular or support catheters within the image.    The lungs are clear without discrete infiltrate.  There is mild pulmonary   vascular congestion.  There are no pleural effusions.    The heart and mediastinal contours are within normal limits.  The trachea   is midline.    The osseous structures are intact.    IMPRESSION:  Mild pulmonary vascular congestion.    --- End of Report ---          MALU RUIZ DO; Resident Radiologist  This document has been electronically signed.  SUZANNE HARRIS MD; Attending Interventional Radiologist  This document has been electronically signed. Nov 5 2023 10:23AM    < end of copied text >

## 2023-11-05 NOTE — PHYSICAL THERAPY INITIAL EVALUATION ADULT - FOLLOWS COMMANDS/ANSWERS QUESTIONS, REHAB EVAL
Spiritual Plan of Care    Edwige Collins  Female, 89, 5/4/1929    Purpose:   Referral    Support Whom:  Patient    PT Affect:  Tired, anxious, open to visit and prayer    Intervention:  Emotional support, reassurance, prayer    Outcomes:  Reduced anxiety    Plan:  Refer to  for follow-up visit   75% of the time

## 2023-11-05 NOTE — PHYSICAL THERAPY INITIAL EVALUATION ADULT - LEVEL OF INDEPENDENCE: SUPINE/SIT, REHAB EVAL
Deferred due to SpO2 desaturation to lowest 77% on 7L O2 via nasal cannula with minimal mobility. VEL Rivas present at bedside.

## 2023-11-05 NOTE — PHYSICAL THERAPY INITIAL EVALUATION ADULT - MANUAL MUSCLE TESTING RESULTS, REHAB EVAL
Left UE grossly 3/5; Right UE grossly 2/5. In semi-supine, pt able to perform ankle circles and knee flexion to extension through available ROM

## 2023-11-06 DIAGNOSIS — Z29.9 ENCOUNTER FOR PROPHYLACTIC MEASURES, UNSPECIFIED: ICD-10-CM

## 2023-11-06 LAB
GLUCOSE BLDC GLUCOMTR-MCNC: 199 MG/DL — HIGH (ref 70–99)
GLUCOSE BLDC GLUCOMTR-MCNC: 199 MG/DL — HIGH (ref 70–99)
GLUCOSE BLDC GLUCOMTR-MCNC: 209 MG/DL — HIGH (ref 70–99)
GLUCOSE BLDC GLUCOMTR-MCNC: 209 MG/DL — HIGH (ref 70–99)
GLUCOSE BLDC GLUCOMTR-MCNC: 243 MG/DL — HIGH (ref 70–99)
GLUCOSE BLDC GLUCOMTR-MCNC: 243 MG/DL — HIGH (ref 70–99)
GLUCOSE BLDC GLUCOMTR-MCNC: 272 MG/DL — HIGH (ref 70–99)
GLUCOSE BLDC GLUCOMTR-MCNC: 272 MG/DL — HIGH (ref 70–99)

## 2023-11-06 PROCEDURE — 99223 1ST HOSP IP/OBS HIGH 75: CPT

## 2023-11-06 PROCEDURE — 99233 SBSQ HOSP IP/OBS HIGH 50: CPT

## 2023-11-06 RX ORDER — INSULIN GLARGINE 100 [IU]/ML
10 INJECTION, SOLUTION SUBCUTANEOUS AT BEDTIME
Refills: 0 | Status: ACTIVE | OUTPATIENT
Start: 2023-11-06 | End: 2024-10-04

## 2023-11-06 RX ORDER — FUROSEMIDE 40 MG
20 TABLET ORAL ONCE
Refills: 0 | Status: COMPLETED | OUTPATIENT
Start: 2023-11-06 | End: 2023-11-06

## 2023-11-06 RX ORDER — INSULIN LISPRO 100/ML
2 VIAL (ML) SUBCUTANEOUS
Refills: 0 | Status: DISCONTINUED | OUTPATIENT
Start: 2023-11-06 | End: 2023-11-08

## 2023-11-06 RX ORDER — AMLODIPINE BESYLATE 2.5 MG/1
10 TABLET ORAL DAILY
Refills: 0 | Status: DISCONTINUED | OUTPATIENT
Start: 2023-11-07 | End: 2023-11-09

## 2023-11-06 RX ADMIN — OXCARBAZEPINE 600 MILLIGRAM(S): 300 TABLET, FILM COATED ORAL at 10:35

## 2023-11-06 RX ADMIN — GABAPENTIN 400 MILLIGRAM(S): 400 CAPSULE ORAL at 16:56

## 2023-11-06 RX ADMIN — INSULIN GLARGINE 10 UNIT(S): 100 INJECTION, SOLUTION SUBCUTANEOUS at 21:46

## 2023-11-06 RX ADMIN — HEPARIN SODIUM 5000 UNIT(S): 5000 INJECTION INTRAVENOUS; SUBCUTANEOUS at 13:36

## 2023-11-06 RX ADMIN — Medication 1 SPRAY(S): at 07:16

## 2023-11-06 RX ADMIN — BUDESONIDE AND FORMOTEROL FUMARATE DIHYDRATE 2 PUFF(S): 160; 4.5 AEROSOL RESPIRATORY (INHALATION) at 21:47

## 2023-11-06 RX ADMIN — GABAPENTIN 400 MILLIGRAM(S): 400 CAPSULE ORAL at 07:15

## 2023-11-06 RX ADMIN — Medication 20 MILLIGRAM(S): at 12:28

## 2023-11-06 RX ADMIN — Medication 6: at 16:55

## 2023-11-06 RX ADMIN — MONTELUKAST 10 MILLIGRAM(S): 4 TABLET, CHEWABLE ORAL at 12:29

## 2023-11-06 RX ADMIN — Medication 2 UNIT(S): at 12:28

## 2023-11-06 RX ADMIN — ATORVASTATIN CALCIUM 40 MILLIGRAM(S): 80 TABLET, FILM COATED ORAL at 21:48

## 2023-11-06 RX ADMIN — Medication 2 UNIT(S): at 16:55

## 2023-11-06 RX ADMIN — HEPARIN SODIUM 5000 UNIT(S): 5000 INJECTION INTRAVENOUS; SUBCUTANEOUS at 21:48

## 2023-11-06 RX ADMIN — Medication 1 SPRAY(S): at 16:56

## 2023-11-06 RX ADMIN — BUDESONIDE AND FORMOTEROL FUMARATE DIHYDRATE 2 PUFF(S): 160; 4.5 AEROSOL RESPIRATORY (INHALATION) at 10:35

## 2023-11-06 RX ADMIN — Medication 112 MICROGRAM(S): at 07:15

## 2023-11-06 RX ADMIN — Medication 4: at 07:49

## 2023-11-06 RX ADMIN — Medication 4: at 12:28

## 2023-11-06 RX ADMIN — HEPARIN SODIUM 5000 UNIT(S): 5000 INJECTION INTRAVENOUS; SUBCUTANEOUS at 07:14

## 2023-11-06 RX ADMIN — OXCARBAZEPINE 600 MILLIGRAM(S): 300 TABLET, FILM COATED ORAL at 21:48

## 2023-11-06 NOTE — CONSULT NOTE ADULT - SUBJECTIVE AND OBJECTIVE BOX
CHIEF COMPLAINT:  Hx of OAD and NAKUL ; rula op pulmonary f/u    HPI: 57 yo morbidly obese female with hx of OAD, chronic hypercapnic resp failure and NAKUL (on PAP treatment), post laparoscopic L radical nephrectomy. The patient is well known to my associate Dr FREDERICK Monterroso from outpt care; recent problem with PAP treatment (BIPAP 20/16) due to machine recall in part. Patient is on ICS/LABA and albuterol as outpt.  Never smoker    PAST MEDICAL & SURGICAL HISTORY:  Hypertension  vaginal cyst  Hyperlipidemia  Asthma  Hypothyroid  Obstructive sleep apnea  Obesity  morbid  Fibromyalgia  DM 2  DJD (degenerative joint disease)  Cervical/Thoracic/Lumbar  Vasculitis  Legs, forerhead, arms & hands, flare ups in R leg  Renal cell carcinoma, left  Falls frequently  Morbid obesity with body mass index (BMI) of 50.0 to 59.9 in adult:    Allergies  mushroom (Unknown)  venlafaxine (Hives)  gadobutrol (Rash; Urticaria; Hives)  Fioricet (Rash)  IV Contrast (Short breath)    NAD on nasal O2    T(C): 36.5 (06 Nov 2023 06:50), Max: 37.2 (06 Nov 2023 02:00)  T(F): 97.7 (06 Nov 2023 06:50), Max: 99 (06 Nov 2023 02:00)  HR: 99 (06 Nov 2023 07:59) (88 - 99)  BP: 133/87 (06 Nov 2023 06:50) (114/65 - 140/79)  RR: 20 (06 Nov 2023 07:05) (19 - 21)  SpO2: 99% (06 Nov 2023 07:59) (93% - 99%)    Lungs- clear  Heart- Reg  Abd- soft non tender  Ext- no edema            11-05 @ 07:01  -  11-06 @ 07:00  --------------------------------------------------------  IN: 450 mL / OUT: 800 mL / NET: -350 mL    HOSPITAL MEDICATIONS:  MEDICATIONS  (STANDING):  albuterol    90 MICROgram(s) HFA Inhaler 2 Puff(s) Inhalation every 6 hours  atorvastatin 40 milliGRAM(s) Oral at bedtime  budesonide 160 MICROgram(s)/formoterol 4.5 MICROgram(s) Inhaler 2 Puff(s) Inhalation two times a day  dextrose 5%. 1000 milliLiter(s) (100 mL/Hr) IV Continuous <Continuous>  dextrose 5%. 1000 milliLiter(s) (50 mL/Hr) IV Continuous <Continuous>  dextrose 50% Injectable 25 Gram(s) IV Push once  dextrose 50% Injectable 12.5 Gram(s) IV Push once  dextrose 50% Injectable 25 Gram(s) IV Push once  fluticasone propionate 50 MICROgram(s)/spray Nasal Spray 1 Spray(s) Both Nostrils two times a day  gabapentin 400 milliGRAM(s) Oral two times a day  glucagon  Injectable 1 milliGRAM(s) IntraMuscular once  heparin   Injectable 5000 Unit(s) SubCutaneous every 8 hours  influenza   Vaccine 0.5 milliLiter(s) IntraMuscular once  insulin lispro (ADMELOG) corrective regimen sliding scale   SubCutaneous three times a day before meals  insulin lispro (ADMELOG) corrective regimen sliding scale   SubCutaneous at bedtime  levothyroxine 112 MICROGram(s) Oral daily  montelukast 10 milliGRAM(s) Oral daily  OXcarbazepine 600 milliGRAM(s) Oral two times a day    MEDICATIONS  (PRN):  albuterol    0.083%. 2.5 milliGRAM(s) Nebulizer once PRN asthma  albuterol    90 MICROgram(s) HFA Inhaler 2 Puff(s) Inhalation every 6 hours PRN Bronchospasm  cyclobenzaprine 10 milliGRAM(s) Oral daily PRN Muscle Spasm  dextrose Oral Gel 15 Gram(s) Oral once PRN Blood Glucose LESS THAN 70 milliGRAM(s)/deciliter  HYDROmorphone  Injectable 0.25 milliGRAM(s) IV Push every 10 minutes PRN Severe Pain (7 - 10)  oxyCODONE    IR 5 milliGRAM(s) Oral every 6 hours PRN Moderate Pain (4 - 6)  pantoprazole    Tablet 40 milliGRAM(s) Oral before breakfast PRN acid reflux  senna 2 Tablet(s) Oral at bedtime PRN Constipation  simethicone 80 milliGRAM(s) Chew once PRN Gas      LABS:                        10.9   11.25 )-----------( 312      ( 04 Nov 2023 21:30 )             37.9     Hgb Trend: 10.9<--  11-04    137  |  97<L>  |  12  ----------------------------<  209<H>  4.3   |  32<H>  |  1.15    Ca    8.5      04 Nov 2023 21:30  Mg     1.30     11-04    TPro  6.7  /  Alb  3.5  /  TBili  0.7  /  DBili  x   /  AST  39<H>  /  ALT  30  /  AlkPhos  92  11-04    Creatinine Trend: 1.15<--    Chest xray- mild increase interstitial markings

## 2023-11-06 NOTE — PROGRESS NOTE ADULT - SUBJECTIVE AND OBJECTIVE BOX
CHIEF COMPLAINT: f/u     SUBJECTIVE / OVERNIGHT EVENTS: Patient seen and examined. States she used the BIPAP last night and that her breathing is better today. Reports she was told she needs BIPAP at home and had the equipment delivered but only used it for a day due to the high monthly cost. She currently denies chest pain or shortness of breath. States she is eating better now. Would like to be discharged to [facility].     MEDICATIONS  (STANDING):  albuterol    90 MICROgram(s) HFA Inhaler 2 Puff(s) Inhalation every 6 hours  atorvastatin 40 milliGRAM(s) Oral at bedtime  budesonide 160 MICROgram(s)/formoterol 4.5 MICROgram(s) Inhaler 2 Puff(s) Inhalation two times a day  dextrose 5%. 1000 milliLiter(s) (100 mL/Hr) IV Continuous <Continuous>  dextrose 5%. 1000 milliLiter(s) (50 mL/Hr) IV Continuous <Continuous>  dextrose 50% Injectable 25 Gram(s) IV Push once  dextrose 50% Injectable 12.5 Gram(s) IV Push once  dextrose 50% Injectable 25 Gram(s) IV Push once  fluticasone propionate 50 MICROgram(s)/spray Nasal Spray 1 Spray(s) Both Nostrils two times a day  gabapentin 400 milliGRAM(s) Oral two times a day  glucagon  Injectable 1 milliGRAM(s) IntraMuscular once  heparin   Injectable 5000 Unit(s) SubCutaneous every 8 hours  influenza   Vaccine 0.5 milliLiter(s) IntraMuscular once  insulin glargine Injectable (LANTUS) 10 Unit(s) SubCutaneous at bedtime  insulin lispro (ADMELOG) corrective regimen sliding scale   SubCutaneous three times a day before meals  insulin lispro (ADMELOG) corrective regimen sliding scale   SubCutaneous at bedtime  insulin lispro Injectable (ADMELOG) 2 Unit(s) SubCutaneous three times a day before meals  levothyroxine 112 MICROGram(s) Oral daily  montelukast 10 milliGRAM(s) Oral daily  OXcarbazepine 600 milliGRAM(s) Oral two times a day    MEDICATIONS  (PRN):  albuterol    0.083%. 2.5 milliGRAM(s) Nebulizer once PRN asthma  albuterol    90 MICROgram(s) HFA Inhaler 2 Puff(s) Inhalation every 6 hours PRN Bronchospasm  cyclobenzaprine 10 milliGRAM(s) Oral daily PRN Muscle Spasm  dextrose Oral Gel 15 Gram(s) Oral once PRN Blood Glucose LESS THAN 70 milliGRAM(s)/deciliter  HYDROmorphone  Injectable 0.25 milliGRAM(s) IV Push every 10 minutes PRN Severe Pain (7 - 10)  oxyCODONE    IR 5 milliGRAM(s) Oral every 6 hours PRN Moderate Pain (4 - 6)  pantoprazole    Tablet 40 milliGRAM(s) Oral before breakfast PRN acid reflux  senna 2 Tablet(s) Oral at bedtime PRN Constipation  simethicone 80 milliGRAM(s) Chew once PRN Gas      VITALS:  T(F): 98.7 (23 @ 09:23), Max: 99 (23 @ 02:00)  HR: 85 (23 @ 11:00) (85 - 99)  BP: 130/78 (23 @ 09:23) (130/78 - 140/79)  RR: 20 (23 @ 09:23) (19 - 21)  SpO2: 99% (23 @ 11:00)  Wt(kg): --      PHYSICAL EXAM:  GENERAL: obese female in NAD  EYES: Conjunctiva and sclera clear  CHEST/LUNG: Clear to auscultation anteriorly; difficult to auscultate due to body habitus. No wheeze  HEART: Regular rate and rhythm; No murmurs, rubs, or gallops  ABDOMEN: Soft, Nontender, Nondistended; Bowel sounds present; incisions c/d/i  EXTREMITIES:  2+ Peripheral Pulses, No clubbing, cyanosis, or edema  PSYCH: AAOx3  NEUROLOGY: non-focal    LABS:              10.9                 137  | 32   | 12           11.25 >-----------< 312     ------------------------< 209                   37.9                 4.3  | 97   | 1.15                                         Ca 8.5   Mg 1.30  Ph x           TPro  6.7  /  Alb  3.5      TBili  0.7  /  DBili  x         AST  39  /  ALT  30            AlkPhos  92            Urinalysis Basic - ( 2023 21:30 )    Color: x / Appearance: x / SG: x / pH: x  Gluc: 209 mg/dL / Ketone: x  / Bili: x / Urobili: x   Blood: x / Protein: x / Nitrite: x   Leuk Esterase: x / RBC: x / WBC x   Sq Epi: x / Non Sq Epi: x / Bacteria: x        VB-05 @ 19:21  7.33/70/57/37/88.6/8.9    VB-05 @ 15:07  7.32/70/40/36/73.1/7.9    VB-04 @ 23:45  7.25/76/50/33/78.7/4.3        MICROBIOLOGY:        RADIOLOGY & ADDITIONAL TESTS:  Imaging Personally Reviewed: [ ] Yes    [ ] Consultant(s) Notes Reviewed:  [x] Care Discussed with Consultants/Other Providers: Urology PA - discussed

## 2023-11-06 NOTE — PROGRESS NOTE ADULT - SUBJECTIVE AND OBJECTIVE BOX
POD #3  Afeb 133/78 89 98%RA    Pt has no new c/o; placed on BIPAP  Abd- soft NT ND; + flatus     wounds C&D  Primafit - 600

## 2023-11-06 NOTE — CONSULT NOTE ADULT - ASSESSMENT
IMP: 59 yo obese female with hx of OAD and NAKUL. Pulmonary status stable post L nephrectomy.     PLAN: Nocturnal BIPAP (presently set 14/6)  Nasal O2 during day  Incentive spirometry  Pain meds  Inhaled ICS/LABA BID (symbicort 160/4.5)  OOB  As per surgery    Thank you,  Will follow    Javon Alanis MD [facility]  [phone]    (Zeke/Curt/Loc)    
58 year old female with PMH of obesity, asthma, chronic bronchitis, NAKUL, HTN, DM, HLD, fibromyalgia, anxiety, trigeminal neuralgia, degenerative joint disease, ?IgA vasculitis, psoriasis, OA now s/p Left radical nephrectomy for management of left renal cell carcinoma. 
58F w/ severe obesity, HTN, type 2 diabetes, asthma, NAKUL (not using CPAP), chronic hypercapneic respiratory failure 2/2 obesity, restrictive lung disease, and kyphosis (?nocturnal bipap 12/8?), left RCC now POD#0 s/p left laparoscopic radical nephrectomy.

## 2023-11-06 NOTE — PROGRESS NOTE ADULT - PROBLEM SELECTOR PLAN 3
A1c = 9.6  - she reports taking Levemir 40U Qhs, Admelog 7U TIDAC as well as Ozempic and Metformin  - Noted to be hyperglycemic   - Will restart Lantus 10U + 2U Admelog pre-meals given 24h insulin requirements  - Monitor FS and adjust regimen as needed now that patient is taking more PO

## 2023-11-07 DIAGNOSIS — M79.7 FIBROMYALGIA: ICD-10-CM

## 2023-11-07 LAB
GLUCOSE BLDC GLUCOMTR-MCNC: 201 MG/DL — HIGH (ref 70–99)
GLUCOSE BLDC GLUCOMTR-MCNC: 201 MG/DL — HIGH (ref 70–99)
GLUCOSE BLDC GLUCOMTR-MCNC: 263 MG/DL — HIGH (ref 70–99)
GLUCOSE BLDC GLUCOMTR-MCNC: 263 MG/DL — HIGH (ref 70–99)
GLUCOSE BLDC GLUCOMTR-MCNC: 279 MG/DL — HIGH (ref 70–99)
GLUCOSE BLDC GLUCOMTR-MCNC: 279 MG/DL — HIGH (ref 70–99)
GLUCOSE BLDC GLUCOMTR-MCNC: 303 MG/DL — HIGH (ref 70–99)
GLUCOSE BLDC GLUCOMTR-MCNC: 303 MG/DL — HIGH (ref 70–99)

## 2023-11-07 PROCEDURE — 99232 SBSQ HOSP IP/OBS MODERATE 35: CPT

## 2023-11-07 RX ADMIN — HEPARIN SODIUM 5000 UNIT(S): 5000 INJECTION INTRAVENOUS; SUBCUTANEOUS at 05:06

## 2023-11-07 RX ADMIN — GABAPENTIN 400 MILLIGRAM(S): 400 CAPSULE ORAL at 17:17

## 2023-11-07 RX ADMIN — OXCARBAZEPINE 600 MILLIGRAM(S): 300 TABLET, FILM COATED ORAL at 09:32

## 2023-11-07 RX ADMIN — Medication 1 SPRAY(S): at 05:06

## 2023-11-07 RX ADMIN — Medication 8: at 12:07

## 2023-11-07 RX ADMIN — Medication 2 UNIT(S): at 17:17

## 2023-11-07 RX ADMIN — Medication 1 SPRAY(S): at 17:18

## 2023-11-07 RX ADMIN — HEPARIN SODIUM 5000 UNIT(S): 5000 INJECTION INTRAVENOUS; SUBCUTANEOUS at 22:24

## 2023-11-07 RX ADMIN — MONTELUKAST 10 MILLIGRAM(S): 4 TABLET, CHEWABLE ORAL at 12:08

## 2023-11-07 RX ADMIN — HEPARIN SODIUM 5000 UNIT(S): 5000 INJECTION INTRAVENOUS; SUBCUTANEOUS at 14:52

## 2023-11-07 RX ADMIN — AMLODIPINE BESYLATE 10 MILLIGRAM(S): 2.5 TABLET ORAL at 05:09

## 2023-11-07 RX ADMIN — Medication 2: at 22:28

## 2023-11-07 RX ADMIN — Medication 6: at 17:16

## 2023-11-07 RX ADMIN — CYCLOBENZAPRINE HYDROCHLORIDE 10 MILLIGRAM(S): 10 TABLET, FILM COATED ORAL at 05:06

## 2023-11-07 RX ADMIN — Medication 2 UNIT(S): at 12:07

## 2023-11-07 RX ADMIN — INSULIN GLARGINE 10 UNIT(S): 100 INJECTION, SOLUTION SUBCUTANEOUS at 22:28

## 2023-11-07 RX ADMIN — BUDESONIDE AND FORMOTEROL FUMARATE DIHYDRATE 2 PUFF(S): 160; 4.5 AEROSOL RESPIRATORY (INHALATION) at 22:24

## 2023-11-07 RX ADMIN — Medication 112 MICROGRAM(S): at 05:06

## 2023-11-07 RX ADMIN — ALBUTEROL 2 PUFF(S): 90 AEROSOL, METERED ORAL at 10:03

## 2023-11-07 RX ADMIN — Medication 2 UNIT(S): at 08:08

## 2023-11-07 RX ADMIN — BUDESONIDE AND FORMOTEROL FUMARATE DIHYDRATE 2 PUFF(S): 160; 4.5 AEROSOL RESPIRATORY (INHALATION) at 09:32

## 2023-11-07 RX ADMIN — Medication 4: at 08:08

## 2023-11-07 RX ADMIN — ATORVASTATIN CALCIUM 40 MILLIGRAM(S): 80 TABLET, FILM COATED ORAL at 22:24

## 2023-11-07 RX ADMIN — OXCARBAZEPINE 600 MILLIGRAM(S): 300 TABLET, FILM COATED ORAL at 22:24

## 2023-11-07 RX ADMIN — GABAPENTIN 400 MILLIGRAM(S): 400 CAPSULE ORAL at 05:10

## 2023-11-07 NOTE — PROGRESS NOTE ADULT - PROBLEM SELECTOR PLAN 3
A1c = 9.6  - she reports taking Levemir 40U Qhs, Admelog 7U TIDAC as well as Ozempic and Metformin  - Noted to be hyperglycemic   - Restarted on Lantus 10U + 2U Admelog pre-meals --> Will monitor FS and adjust regimen as needed now that patient is taking more PO  - C/w statin   - Restart ARB as below

## 2023-11-07 NOTE — PROGRESS NOTE ADULT - SUBJECTIVE AND OBJECTIVE BOX
POD #4  Afeb 146/80 84 97% bipap    Pt has no c/o  Abd- soft NT ND; + flatus     steri's C&D  Prima-fit - 1L

## 2023-11-07 NOTE — PROGRESS NOTE ADULT - SUBJECTIVE AND OBJECTIVE BOX
CHIEF COMPLAINT: f/u     SUBJECTIVE / OVERNIGHT EVENTS: Patient seen and examined. States she tolerated the BIPAP well overnight. Reports her breathing feels fine and she urinated a large amount yesterday after the lasix. States she was able to get into the chair yesterday and would like to try again today. Reports she has incisional pain today, but does not want any medication at the moment because it makes her sleepy and she has a friend visiting.     MEDICATIONS  (STANDING):  albuterol    90 MICROgram(s) HFA Inhaler 2 Puff(s) Inhalation every 6 hours  amLODIPine   Tablet 10 milliGRAM(s) Oral daily  atorvastatin 40 milliGRAM(s) Oral at bedtime  budesonide 160 MICROgram(s)/formoterol 4.5 MICROgram(s) Inhaler 2 Puff(s) Inhalation two times a day  dextrose 5%. 1000 milliLiter(s) (50 mL/Hr) IV Continuous <Continuous>  dextrose 5%. 1000 milliLiter(s) (100 mL/Hr) IV Continuous <Continuous>  dextrose 50% Injectable 25 Gram(s) IV Push once  dextrose 50% Injectable 12.5 Gram(s) IV Push once  dextrose 50% Injectable 25 Gram(s) IV Push once  fluticasone propionate 50 MICROgram(s)/spray Nasal Spray 1 Spray(s) Both Nostrils two times a day  gabapentin 400 milliGRAM(s) Oral two times a day  glucagon  Injectable 1 milliGRAM(s) IntraMuscular once  heparin   Injectable 5000 Unit(s) SubCutaneous every 8 hours  hydrochlorothiazide 12.5 milliGRAM(s) Oral daily  influenza   Vaccine 0.5 milliLiter(s) IntraMuscular once  insulin glargine Injectable (LANTUS) 10 Unit(s) SubCutaneous at bedtime  insulin lispro (ADMELOG) corrective regimen sliding scale   SubCutaneous three times a day before meals  insulin lispro (ADMELOG) corrective regimen sliding scale   SubCutaneous at bedtime  insulin lispro Injectable (ADMELOG) 2 Unit(s) SubCutaneous three times a day before meals  levothyroxine 112 MICROGram(s) Oral daily  montelukast 10 milliGRAM(s) Oral daily  OXcarbazepine 600 milliGRAM(s) Oral two times a day    MEDICATIONS  (PRN):  albuterol    0.083%. 2.5 milliGRAM(s) Nebulizer once PRN asthma  albuterol    90 MICROgram(s) HFA Inhaler 2 Puff(s) Inhalation every 6 hours PRN Bronchospasm  cyclobenzaprine 10 milliGRAM(s) Oral daily PRN Muscle Spasm  dextrose Oral Gel 15 Gram(s) Oral once PRN Blood Glucose LESS THAN 70 milliGRAM(s)/deciliter  pantoprazole    Tablet 40 milliGRAM(s) Oral before breakfast PRN acid reflux  senna 2 Tablet(s) Oral at bedtime PRN Constipation  simethicone 80 milliGRAM(s) Chew once PRN Gas      VITALS:  T(F): 98.7 (23 @ 09:38), Max: 99.7 (23 @ 17:03)  HR: 87 (23 @ 09:38) (79 - 99)  BP: 132/77 (23 @ 09:38) (128/71 - 146/81)  RR: 18 (23 @ 09:38) (18 - 20)  SpO2: 98% (23 @ 09:38)  Wt(kg): --      PHYSICAL EXAM:  GENERAL: obese female in NAD  EYES: Conjunctiva and sclera clear  CHEST/LUNG: Clear to auscultation anteriorly; difficult to auscultate due to body habitus. No wheeze  HEART: Regular rate and rhythm; No murmurs, rubs, or gallops  ABDOMEN: Soft, Nontender, Nondistended; Bowel sounds present; lap incisions c/d/i  EXTREMITIES:  2+ Peripheral Pulses, No clubbing, cyanosis, or edema  PSYCH: AAOx3  NEUROLOGY: non-focal, answering questions appropriately, moving all ext       VB-05 @ 19:21  7.33/70/57/37/88.6/8.9    VB-05 @ 15:07  7.32/70/40/36/73.1/7.9    [x] Care Discussed with Consultants/Other Providers: Urology PA - discussed

## 2023-11-08 ENCOUNTER — TRANSCRIPTION ENCOUNTER (OUTPATIENT)
Age: 58
End: 2023-11-08

## 2023-11-08 LAB
GLUCOSE BLDC GLUCOMTR-MCNC: 195 MG/DL — HIGH (ref 70–99)
GLUCOSE BLDC GLUCOMTR-MCNC: 195 MG/DL — HIGH (ref 70–99)
GLUCOSE BLDC GLUCOMTR-MCNC: 233 MG/DL — HIGH (ref 70–99)
GLUCOSE BLDC GLUCOMTR-MCNC: 233 MG/DL — HIGH (ref 70–99)
GLUCOSE BLDC GLUCOMTR-MCNC: 238 MG/DL — HIGH (ref 70–99)
GLUCOSE BLDC GLUCOMTR-MCNC: 238 MG/DL — HIGH (ref 70–99)
GLUCOSE BLDC GLUCOMTR-MCNC: 321 MG/DL — HIGH (ref 70–99)
GLUCOSE BLDC GLUCOMTR-MCNC: 321 MG/DL — HIGH (ref 70–99)

## 2023-11-08 PROCEDURE — 99233 SBSQ HOSP IP/OBS HIGH 50: CPT

## 2023-11-08 RX ORDER — ACETAMINOPHEN 500 MG
975 TABLET ORAL ONCE
Refills: 0 | Status: COMPLETED | OUTPATIENT
Start: 2023-11-08 | End: 2023-11-08

## 2023-11-08 RX ORDER — INSULIN LISPRO 100/ML
7 VIAL (ML) SUBCUTANEOUS
Refills: 0 | Status: DISCONTINUED | OUTPATIENT
Start: 2023-11-08 | End: 2023-11-09

## 2023-11-08 RX ADMIN — AMLODIPINE BESYLATE 10 MILLIGRAM(S): 2.5 TABLET ORAL at 05:01

## 2023-11-08 RX ADMIN — HEPARIN SODIUM 5000 UNIT(S): 5000 INJECTION INTRAVENOUS; SUBCUTANEOUS at 22:43

## 2023-11-08 RX ADMIN — Medication 975 MILLIGRAM(S): at 10:57

## 2023-11-08 RX ADMIN — GABAPENTIN 400 MILLIGRAM(S): 400 CAPSULE ORAL at 18:39

## 2023-11-08 RX ADMIN — Medication 7 UNIT(S): at 17:01

## 2023-11-08 RX ADMIN — ATORVASTATIN CALCIUM 40 MILLIGRAM(S): 80 TABLET, FILM COATED ORAL at 22:43

## 2023-11-08 RX ADMIN — BUDESONIDE AND FORMOTEROL FUMARATE DIHYDRATE 2 PUFF(S): 160; 4.5 AEROSOL RESPIRATORY (INHALATION) at 22:42

## 2023-11-08 RX ADMIN — Medication 4: at 17:01

## 2023-11-08 RX ADMIN — HEPARIN SODIUM 5000 UNIT(S): 5000 INJECTION INTRAVENOUS; SUBCUTANEOUS at 13:57

## 2023-11-08 RX ADMIN — BUDESONIDE AND FORMOTEROL FUMARATE DIHYDRATE 2 PUFF(S): 160; 4.5 AEROSOL RESPIRATORY (INHALATION) at 10:24

## 2023-11-08 RX ADMIN — Medication 2 UNIT(S): at 07:21

## 2023-11-08 RX ADMIN — Medication 1 SPRAY(S): at 18:39

## 2023-11-08 RX ADMIN — OXCARBAZEPINE 600 MILLIGRAM(S): 300 TABLET, FILM COATED ORAL at 10:24

## 2023-11-08 RX ADMIN — HEPARIN SODIUM 5000 UNIT(S): 5000 INJECTION INTRAVENOUS; SUBCUTANEOUS at 05:01

## 2023-11-08 RX ADMIN — GABAPENTIN 400 MILLIGRAM(S): 400 CAPSULE ORAL at 05:01

## 2023-11-08 RX ADMIN — Medication 7 UNIT(S): at 11:45

## 2023-11-08 RX ADMIN — Medication 112 MICROGRAM(S): at 05:01

## 2023-11-08 RX ADMIN — Medication 8: at 11:44

## 2023-11-08 RX ADMIN — Medication 975 MILLIGRAM(S): at 11:15

## 2023-11-08 RX ADMIN — INSULIN GLARGINE 10 UNIT(S): 100 INJECTION, SOLUTION SUBCUTANEOUS at 22:41

## 2023-11-08 RX ADMIN — Medication 2: at 07:21

## 2023-11-08 RX ADMIN — Medication 1 SPRAY(S): at 05:00

## 2023-11-08 RX ADMIN — MONTELUKAST 10 MILLIGRAM(S): 4 TABLET, CHEWABLE ORAL at 11:43

## 2023-11-08 RX ADMIN — OXCARBAZEPINE 600 MILLIGRAM(S): 300 TABLET, FILM COATED ORAL at 22:43

## 2023-11-08 NOTE — DIETITIAN INITIAL EVALUATION ADULT - NUTRITIONGOAL OUTCOME1
PO intake to meet >75% estimated needs; encourage diet adherence / modification for improved glycemic control

## 2023-11-08 NOTE — DISCHARGE NOTE PROVIDER - NSDCFUADDINST_GEN_ALL_CORE_FT
Use Bipap or C-pap during sleep  Use nasal canula during day if needed; stay out of bed as much as possible

## 2023-11-08 NOTE — DISCHARGE NOTE PROVIDER - CARE PROVIDER_API CALL
"Subjective:       Patient ID: Norberto Knott is a 25 y.o. female.    Vitals:  height is 5' 3" (1.6 m) and weight is 70.8 kg (156 lb). Her oral temperature is 98.7 °F (37.1 °C). Her blood pressure is 108/61 and her pulse is 76. Her respiration is 16 and oxygen saturation is 99%.     Chief Complaint: COVID-19 Concerns (Entered by patient)    Patient is a 24 yo female who reports a headache,chills and body aches started yesterday and a cough for a week. Denies recorded fever, not checked. She also had some light nausea and a painful lymph node yesterday, which resolved. She has taken nothing for her symptoms. No known sick contacts. Patient reports she is fully covid vaccinated.      Constitution: Positive for chills and fatigue.   HENT: Negative for ear pain, drooling, congestion, postnasal drip and sore throat.    Neck: Negative for neck pain and neck stiffness.   Cardiovascular: Negative for chest pain and palpitations.   Eyes: Negative for eye discharge.   Respiratory: Positive for cough. Negative for chest tightness, shortness of breath and wheezing.    Gastrointestinal: Positive for nausea. Negative for vomiting and diarrhea.   Genitourinary: Negative for dysuria, frequency and urgency.   Skin: Negative for rash.   Neurological: Positive for light-headedness and headaches.       Objective:      Physical Exam   Constitutional: She is oriented to person, place, and time. She appears well-developed. She is cooperative.  Non-toxic appearance. She does not appear ill. No distress.   HENT:   Head: Normocephalic and atraumatic.   Ears:   Right Ear: Hearing, tympanic membrane, external ear and ear canal normal.   Left Ear: Hearing, tympanic membrane, external ear and ear canal normal.   Nose: Mucosal edema present. No rhinorrhea or nasal deformity. No epistaxis. Right sinus exhibits no maxillary sinus tenderness and no frontal sinus tenderness. Left sinus exhibits no maxillary sinus tenderness and no frontal sinus " tenderness.   Mouth/Throat: Uvula is midline, oropharynx is clear and moist and mucous membranes are normal. No trismus in the jaw. Normal dentition. No uvula swelling. No oropharyngeal exudate, posterior oropharyngeal edema or posterior oropharyngeal erythema.   Eyes: Conjunctivae and lids are normal. No scleral icterus.   Neck: Trachea normal and phonation normal. Neck supple. No edema present. No erythema present. No neck rigidity present.   Cardiovascular: Normal rate, regular rhythm, normal heart sounds and normal pulses.   Pulmonary/Chest: Effort normal and breath sounds normal. No respiratory distress. She has no decreased breath sounds. She has no rhonchi.   cough    Comments: cough    Abdominal: Normal appearance.   Musculoskeletal: Normal range of motion.         General: No deformity. Normal range of motion.   Lymphadenopathy:        Right cervical: No superficial cervical adenopathy present.       Left cervical: No superficial cervical adenopathy present.   Neurological: She is alert and oriented to person, place, and time. She exhibits normal muscle tone. Coordination normal.   Skin: Skin is warm, dry, intact, not diaphoretic and not pale.   Psychiatric: Her speech is normal and behavior is normal. Judgment and thought content normal.   Nursing note and vitals reviewed.        Assessment:       1. Viral URI with cough    2. Encounter for screening for other viral diseases    3. Body aches          Plan:         Viral URI with cough    Encounter for screening for other viral diseases  -     POCT COVID-19 Rapid Screening  -     POCT Influenza A/B MOLECULAR    Body aches         Patient Instructions   - You must understand that you have received an Urgent Care treatment only and that you may be released before all of your medical problems are known or treated.   - You, the patient, will arrange for follow up care as instructed.   - If your condition worsens or fails to improve we recommend that you receive  another evaluation at the ER immediately or contact your PCP to discuss your concerns.   - You can call [phone] or [phone] to help schedule an appointment with the appropriate provider.    Drink plenty of fluids   Get lots of rest  Tylenol or ibuprofen for pain/fever  Mucinex DM for cough  Saline nasal rinses to irrigate sinus cavities  Warm salt water gargles for sore throat  Zyrtec daily   Flonase nasal spray                  Rubén Peck.  Urology  [address] [place] [address], [address] [address]  Phone: [phone]  Fax: [phone]  Follow Up Time:

## 2023-11-08 NOTE — DIETITIAN INITIAL EVALUATION ADULT - ADD RECOMMEND
1) Monitor weights, PO intake/diet tolerance, skin integrity, pertinent labs.   2) Please consistently document % PO intake in nursing flowsheets to assess adequacy of nutritional intake/monitor need for further nutritional intervention(s).   3) Reinforce diet education while inpatient as needed, Patient would likely benefit from outpatient RD/CDE visit for ongoing nutrition education, individualized nutritional strategies to improve glycemic control,  and to help ensure long-term adherence to nutritional recommendations.

## 2023-11-08 NOTE — DIETITIAN INITIAL EVALUATION ADULT - CONTINUE CURRENT NUTRITION CARE PLAN
-Presents with GI bleed/anemia in setting of supra-therapeutic INR  -Recent GI evaluation/EGD reviewed  -Continue IV protonix  -Monitor H and H overnight  -Resume Coumadin   yes

## 2023-11-08 NOTE — PROGRESS NOTE ADULT - SUBJECTIVE AND OBJECTIVE BOX
Patient is a 58y old  Female who presents with a chief complaint of Left Radical Nephrectomy (07 Nov 2023 10:55)      SUBJECTIVE / OVERNIGHT EVENTS:    No events overnight. This AM, patient without n/v/d/cp/sob.  Patient states she tolerated CPAP overnight until 5am today. She reports pain is somewhat controlled and states she feels shaky when she walks.     MEDICATIONS  (STANDING):  albuterol    90 MICROgram(s) HFA Inhaler 2 Puff(s) Inhalation every 6 hours  amLODIPine   Tablet 10 milliGRAM(s) Oral daily  atorvastatin 40 milliGRAM(s) Oral at bedtime  budesonide 160 MICROgram(s)/formoterol 4.5 MICROgram(s) Inhaler 2 Puff(s) Inhalation two times a day  dextrose 5%. 1000 milliLiter(s) (100 mL/Hr) IV Continuous <Continuous>  dextrose 5%. 1000 milliLiter(s) (50 mL/Hr) IV Continuous <Continuous>  dextrose 50% Injectable 25 Gram(s) IV Push once  dextrose 50% Injectable 12.5 Gram(s) IV Push once  dextrose 50% Injectable 25 Gram(s) IV Push once  fluticasone propionate 50 MICROgram(s)/spray Nasal Spray 1 Spray(s) Both Nostrils two times a day  gabapentin 400 milliGRAM(s) Oral two times a day  glucagon  Injectable 1 milliGRAM(s) IntraMuscular once  heparin   Injectable 5000 Unit(s) SubCutaneous every 8 hours  hydrochlorothiazide 12.5 milliGRAM(s) Oral daily  influenza   Vaccine 0.5 milliLiter(s) IntraMuscular once  insulin glargine Injectable (LANTUS) 10 Unit(s) SubCutaneous at bedtime  insulin lispro (ADMELOG) corrective regimen sliding scale   SubCutaneous at bedtime  insulin lispro (ADMELOG) corrective regimen sliding scale   SubCutaneous three times a day before meals  insulin lispro Injectable (ADMELOG) 7 Unit(s) SubCutaneous three times a day before meals  levothyroxine 112 MICROGram(s) Oral daily  montelukast 10 milliGRAM(s) Oral daily  OXcarbazepine 600 milliGRAM(s) Oral two times a day    MEDICATIONS  (PRN):  albuterol    0.083%. 2.5 milliGRAM(s) Nebulizer once PRN asthma  albuterol    90 MICROgram(s) HFA Inhaler 2 Puff(s) Inhalation every 6 hours PRN Bronchospasm  cyclobenzaprine 10 milliGRAM(s) Oral daily PRN Muscle Spasm  dextrose Oral Gel 15 Gram(s) Oral once PRN Blood Glucose LESS THAN 70 milliGRAM(s)/deciliter  pantoprazole    Tablet 40 milliGRAM(s) Oral before breakfast PRN acid reflux  senna 2 Tablet(s) Oral at bedtime PRN Constipation  simethicone 80 milliGRAM(s) Chew once PRN Gas      PHYSICAL EXAM:  T(C): 37.1 (11-08-23 @ 09:41), Max: 37.4 (11-08-23 @ 01:54)  HR: 96 (11-08-23 @ 09:41) (92 - 98)  BP: 122/78 (11-08-23 @ 09:41) (122/78 - 146/80)  RR: 17 (11-08-23 @ 09:41) (17 - 18)  SpO2: 98% (11-08-23 @ 09:41) (94% - 99%)  I&O's Summary    07 Nov 2023 07:01  -  08 Nov 2023 07:00  --------------------------------------------------------  IN: 480 mL / OUT: 300 mL / NET: 180 mL    08 Nov 2023 07:01  -  08 Nov 2023 13:47  --------------------------------------------------------  IN: 240 mL / OUT: 0 mL / NET: 240 mL      GENERAL: NAD, obese female in NAD  HEAD:  Atraumatic, Normocephalic, MMM  CHEST/LUNG:  Clear to auscultation anteriorly; difficult to auscultate due to body habitus. breathing non-labored  COR: RR, no mrcg  ABD: Soft, ND/NT, +BS, lap incisions c/d/i  PSYCH: AAOx3  NEUROLOGY: CN II-XII grossly intact, moving all extremities  SKIN: No rashes or lesions  EXT: No clubbing, cyanosis, or edema    LABS:  CAPILLARY BLOOD GLUCOSE      POCT Blood Glucose.: 321 mg/dL (08 Nov 2023 11:34)  POCT Blood Glucose.: 195 mg/dL (08 Nov 2023 07:14)  POCT Blood Glucose.: 279 mg/dL (07 Nov 2023 22:27)  POCT Blood Glucose.: 263 mg/dL (07 Nov 2023 16:40)                        RADIOLOGY & ADDITIONAL TESTS:    Imaging Personally Reviewed -     Imaging Reviewed -     Consultant(s) Notes Reviewed -       Care Discussed with Consultants/Other Providers -

## 2023-11-08 NOTE — DIETITIAN INITIAL EVALUATION ADULT - OTHER INFO
RD visited with patient for requested consult re: a1c.    58F with morbid obesity, asthma, chronic bronchitis, NAKUL (not on home CPAP), HTN, T2DM, fibromyalgia, anxiety, trigeminal neuralgia, degenerative joint disease, ?IgA vasculitis, psoriasis, OA now s/p Left radical nephrectomy for management of left renal cell carcinoma.     Patient endorses good appetite / po intake at present, tolerating diet without issue. Denies nausea, vomiting, diarrhea.  No chewing or swallowing difficulties reported. Mushroom allergy reported and listed on chart.  Reviewed a1c - 9.6%; patient reported  RD visited with patient for requested consult re: a1c.    58F with morbid obesity, asthma, chronic bronchitis, NAKUL (not on home CPAP), HTN, T2DM, fibromyalgia, anxiety, trigeminal neuralgia, degenerative joint disease, ?IgA vasculitis, psoriasis, OA now s/p Left radical nephrectomy for management of left renal cell carcinoma.     Patient endorses good appetite / po intake at present, tolerating diet without issue. Denies nausea, vomiting, diarrhea.  No chewing or swallowing difficulties reported. Mushroom allergy reported and listed on chart.  Reviewed a1c - 9.6%; patient reported improvement in A1c from highest value being >12%; she reported she implemented lifestyle changes and was utilizing a CGM for BG monitoring PTA.  RD provided patient with a review of consistent carb diet education using plate method. Written materials provided for reinforcement. Encouraged patient importance of consuming well balanced meals and minimizing intake of concentrated sweets to help continue to improve overall glycemic control.  Patient unsure of recent weight trends. Weight per PST 10/31 - 127kg; current weight 11/8 - 140kg.

## 2023-11-08 NOTE — PROGRESS NOTE ADULT - PROBLEM SELECTOR PLAN 8
DVT ppx: HSQ  Dispo: PT recs [facility], patient agreeable  Plan for DC to [facility] today DVT ppx: HSQ  Dispo: PT recs [facility], patient agreeable  Plan for DC to [facility]

## 2023-11-08 NOTE — PROGRESS NOTE ADULT - SUBJECTIVE AND OBJECTIVE BOX
Comfortable in bed on nasal O2  Nasal congestion    VS Stable Afeb    Lungs- clear  Heart- Reg  Abd- non tender  Ext- no edema    IMP: Stable pulm status post op; unable to tolerate nocturnal PAP due to nasal congestion    PLAN: Outpt use of PAP  Nasal O2 as ordered  OOB  As per surgery    Will f/u with Dr Monterroso as before    Javon Alanis

## 2023-11-08 NOTE — DISCHARGE NOTE PROVIDER - NSDCCPCAREPLAN_GEN_ALL_CORE_FT
PRINCIPAL DISCHARGE DIAGNOSIS  Diagnosis: Malignant neoplasm of left kidney  Assessment and Plan of Treatment: Drink plenty of fluids.  No heavy lifting (greater than 10 pounds) or straining for 4 to 6 weeks.  You may shower, just pat white strips dry, they will fall off in a few weeks.   Call 's   office  to schedule a follow up appointment when you leave rehab  Call the office if you have fever greater than 101, difficulty urinating, pain not relieved with pain medication, nausea/vomiting.       PRINCIPAL DISCHARGE DIAGNOSIS  Diagnosis: Malignant neoplasm of left kidney  Assessment and Plan of Treatment: Drink plenty of fluids.  No heavy lifting (greater than 10 pounds) or straining for 4 to 6 weeks.  You may shower, just pat white strips dry, they will fall off in a few weeks.   Call Dr. Peck's office will call you to schedule a follow up appointment when you leave rehab  Call the office if you have fever greater than 101, difficulty urinating, pain not relieved with pain medication, nausea/vomiting.        SECONDARY DISCHARGE DIAGNOSES  Diagnosis: Hypothyroidism  Assessment and Plan of Treatment: Continue current home medications and follow up with your primary care provider      Diagnosis: Essential hypertension  Assessment and Plan of Treatment: Continue current home medications and follow up with your primary care provider      Diagnosis: Type 2 diabetes mellitus  Assessment and Plan of Treatment: levemir decreased to 16 units at bedtime    Diagnosis: GERD (gastroesophageal reflux disease)  Assessment and Plan of Treatment: Continue current home medications and follow up with your primary care provider      Diagnosis: Fibromyalgia  Assessment and Plan of Treatment:     Diagnosis: Asthma  Assessment and Plan of Treatment: Continue current home medications and follow up with your primary care provider      Diagnosis: Chronic hypercapnic respiratory failure  Assessment and Plan of Treatment: Use oxygen as needed, CPAP at night     PRINCIPAL DISCHARGE DIAGNOSIS  Diagnosis: Malignant neoplasm of left kidney  Assessment and Plan of Treatment: Drink plenty of fluids.  No heavy lifting (greater than 10 pounds) or straining for 4 to 6 weeks.  You may shower, just pat white strips dry, they will fall off in a few weeks.   Call Dr. Peck's office will call you to schedule a follow up appointment when you leave rehab  Call the office if you have fever greater than 101, difficulty urinating, pain not relieved with pain medication, nausea/vomiting.        SECONDARY DISCHARGE DIAGNOSES  Diagnosis: Hypothyroidism  Assessment and Plan of Treatment: Continue current home medications and follow up with your primary care provider      Diagnosis: Essential hypertension  Assessment and Plan of Treatment: Continue current home medications and follow up with your primary care provider      Diagnosis: Type 2 diabetes mellitus  Assessment and Plan of Treatment: levemir decreased to 16 units at bedtime    Diagnosis: GERD (gastroesophageal reflux disease)  Assessment and Plan of Treatment: Continue current home medications and follow up with your primary care provider      Diagnosis: Fibromyalgia  Assessment and Plan of Treatment: Continue current home medications and follow up with your primary care provider      Diagnosis: Asthma  Assessment and Plan of Treatment: Continue current home medications and follow up with your primary care provider      Diagnosis: Chronic hypercapnic respiratory failure  Assessment and Plan of Treatment: Use oxygen as needed, CPAP at night

## 2023-11-08 NOTE — DISCHARGE NOTE PROVIDER - DETAILS OF MALNUTRITION DIAGNOSIS/DIAGNOSES
This patient has been assessed with a concern for Malnutrition and was treated during this hospitalization for the following Nutrition diagnosis/diagnoses:     -  11/08/2023: Morbid obesity (BMI > 40)

## 2023-11-08 NOTE — DISCHARGE NOTE PROVIDER - NSDCMRMEDTOKEN_GEN_ALL_CORE_FT
albuterol 2.5 mg/3 mL (0.083%) inhalation solution: 3 milliliter(s) by nebulizer every 6 hours as needed for  shortness of breath and/or wheezing  amLODIPine 10 mg oral tablet: 1 tab(s) orally once a day  atorvastatin 40 mg oral tablet: 1 tab(s) orally once a day (at bedtime)  clindamycin 1% topical lotion: Apply topically to affected area 2 times a day  cyclobenzaprine 10 mg oral tablet: 1 tab(s) orally once a day (at bedtime)  ergocalciferol 1.25 mg (50,000 intl units) oral capsule: 1 cap(s) orally once a week  fluticasone 50 mcg/inh inhalation powder: 1 puff(s) inhaled 2 times a day  gabapentin 400 mg oral capsule: 1 cap(s) orally 2 times a day  hydroCHLOROthiazide 12.5 mg oral tablet: 1 tab(s) orally once a day (in the morning)  ipratropium 21 mcg/inh (0.03%) nasal spray: 2 spray(s) intranasally  Levemir 100 units/mL subcutaneous solution: subcutaneous 40 unit at bedtime  levothyroxine 112 mcg (0.112 mg) oral tablet: 1 tab(s) orally once a day  losartan 25 mg oral tablet: 1 tab(s) orally once a day (in the morning)  metFORMIN 1000 mg oral tablet: 1 tab(s) orally every 12 hours  montelukast 10 mg oral tablet: 1 tab(s) orally once a day  NovoLOG FlexPen 100 units/mL injectable solution: injectable 3 times a day (before meals) 6 units for blood sugar over 120  Otezla 30 mg oral tablet: 1 tab(s) orally 2 times a day  OXcarbazepine 600 mg oral tablet: 1 tab(s) orally 2 times a day  Ozempic 4 mg/3 mL (1 mg dose) subcutaneous solution: 1 milligram(s) subcutaneously 0.5mg Every sunday  pantoprazole 40 mg oral delayed release tablet: 1 tab(s) orally once a day as needed for acid reflux  Promethazine- DM-6.25-15mg/5ml- 5 ml twice daily PRN:   Symbicort 160 mcg-4.5 mcg/inh inhalation aerosol: 2 puff(s) inhaled 2 times a day  triamcinolone 0.1% topical ointment: Apply topically to affected area Apply twice daily for psoriasis on body for 2 weeks on 1 week off  Ventolin 90 mcg/inh inhalation aerosol: 2 puff(s) inhaled every 6 hours as needed for  shortness of breath and/or wheezing   acetaminophen 325 mg oral tablet: 3 tab(s) orally every 6 hours as needed for pain  albuterol 2.5 mg/3 mL (0.083%) inhalation solution: 3 milliliter(s) by nebulizer every 6 hours as needed for  shortness of breath and/or wheezing  amLODIPine 10 mg oral tablet: 1 tab(s) orally once a day  atorvastatin 40 mg oral tablet: 1 tab(s) orally once a day (at bedtime)  clindamycin 1% topical lotion: Apply topically to affected area 2 times a day  cyclobenzaprine 10 mg oral tablet: 1 tab(s) orally once a day (at bedtime)  ergocalciferol 1.25 mg (50,000 intl units) oral capsule: 1 cap(s) orally once a week  fluticasone 50 mcg/inh inhalation powder: 1 puff(s) inhaled 2 times a day  gabapentin 400 mg oral capsule: 1 cap(s) orally 2 times a day  hydroCHLOROthiazide 12.5 mg oral tablet: 1 tab(s) orally once a day (in the morning)  ipratropium 21 mcg/inh (0.03%) nasal spray: 2 spray(s) intranasally  Levemir 100 units/mL subcutaneous solution: 16 unit(s) subcutaneous once a day (at bedtime)  levothyroxine 112 mcg (0.112 mg) oral tablet: 1 tab(s) orally once a day  losartan 25 mg oral tablet: 1 tab(s) orally once a day (in the morning)  metFORMIN 1000 mg oral tablet: 1 tab(s) orally every 12 hours  montelukast 10 mg oral tablet: 1 tab(s) orally once a day  NovoLOG FlexPen 100 units/mL injectable solution: injectable 3 times a day (before meals) 6 units for blood sugar over 120  OXcarbazepine 600 mg oral tablet: 1 tab(s) orally 2 times a day  Ozempic 4 mg/3 mL (1 mg dose) subcutaneous solution: 1 milligram(s) subcutaneously 0.5mg Every sunday  pantoprazole 40 mg oral delayed release tablet: 1 tab(s) orally once a day as needed for acid reflux  Promethazine- DM-6.25-15mg/5ml- 5 ml twice daily PRN:   senna leaf extract oral tablet: 2 tab(s) orally once a day (at bedtime) As needed Constipation  Symbicort 160 mcg-4.5 mcg/inh inhalation aerosol: 2 puff(s) inhaled 2 times a day  triamcinolone 0.1% topical ointment: Apply topically to affected area Apply twice daily for psoriasis on body for 2 weeks on 1 week off  Ventolin 90 mcg/inh inhalation aerosol: 2 puff(s) inhaled every 6 hours as needed for  shortness of breath and/or wheezing

## 2023-11-08 NOTE — DIETITIAN INITIAL EVALUATION ADULT - PERTINENT LABORATORY DATA
POCT Blood Glucose.: 321 mg/dL (11-08-23 @ 11:34)  A1C with Estimated Average Glucose Result: 9.6 % (11-04-23 @ 07:15)     CAPILLARY BLOOD GLUCOSE  POCT Blood Glucose.: 321 mg/dL (08 Nov 2023 11:34)  POCT Blood Glucose.: 195 mg/dL (08 Nov 2023 07:14)  POCT Blood Glucose.: 279 mg/dL (07 Nov 2023 22:27)  POCT Blood Glucose.: 263 mg/dL (07 Nov 2023 16:40)    A1C with Estimated Average Glucose Result: 9.6 % (11-04-23 @ 07:15)

## 2023-11-08 NOTE — DIETITIAN INITIAL EVALUATION ADULT - PERTINENT MEDS FT
MEDICATIONS  (STANDING):  albuterol    90 MICROgram(s) HFA Inhaler 2 Puff(s) Inhalation every 6 hours  amLODIPine   Tablet 10 milliGRAM(s) Oral daily  atorvastatin 40 milliGRAM(s) Oral at bedtime  budesonide 160 MICROgram(s)/formoterol 4.5 MICROgram(s) Inhaler 2 Puff(s) Inhalation two times a day  dextrose 5%. 1000 milliLiter(s) (100 mL/Hr) IV Continuous <Continuous>  dextrose 5%. 1000 milliLiter(s) (50 mL/Hr) IV Continuous <Continuous>  dextrose 50% Injectable 25 Gram(s) IV Push once  dextrose 50% Injectable 12.5 Gram(s) IV Push once  dextrose 50% Injectable 25 Gram(s) IV Push once  fluticasone propionate 50 MICROgram(s)/spray Nasal Spray 1 Spray(s) Both Nostrils two times a day  gabapentin 400 milliGRAM(s) Oral two times a day  glucagon  Injectable 1 milliGRAM(s) IntraMuscular once  heparin   Injectable 5000 Unit(s) SubCutaneous every 8 hours  hydrochlorothiazide 12.5 milliGRAM(s) Oral daily  influenza   Vaccine 0.5 milliLiter(s) IntraMuscular once  insulin glargine Injectable (LANTUS) 10 Unit(s) SubCutaneous at bedtime  insulin lispro (ADMELOG) corrective regimen sliding scale   SubCutaneous at bedtime  insulin lispro (ADMELOG) corrective regimen sliding scale   SubCutaneous three times a day before meals  insulin lispro Injectable (ADMELOG) 7 Unit(s) SubCutaneous three times a day before meals  levothyroxine 112 MICROGram(s) Oral daily  montelukast 10 milliGRAM(s) Oral daily  OXcarbazepine 600 milliGRAM(s) Oral two times a day    MEDICATIONS  (PRN):  albuterol    0.083%. 2.5 milliGRAM(s) Nebulizer once PRN asthma  albuterol    90 MICROgram(s) HFA Inhaler 2 Puff(s) Inhalation every 6 hours PRN Bronchospasm  cyclobenzaprine 10 milliGRAM(s) Oral daily PRN Muscle Spasm  dextrose Oral Gel 15 Gram(s) Oral once PRN Blood Glucose LESS THAN 70 milliGRAM(s)/deciliter  pantoprazole    Tablet 40 milliGRAM(s) Oral before breakfast PRN acid reflux  senna 2 Tablet(s) Oral at bedtime PRN Constipation  simethicone 80 milliGRAM(s) Chew once PRN Gas

## 2023-11-08 NOTE — PROGRESS NOTE ADULT - PROBLEM SELECTOR PLAN 3
A1c = 9.6  she reports taking Levemir 40U Qhs, Admelog 7U TIDAC as well as Ozempic and Metformin  Noted to be hyperglycemic     - Restarted on Lantus 10U + changed to 5U Admelog pre-meals --> Will monitor FS and adjust regimen as needed now that patient is taking more PO  - Continue statin   - Restart ARB as below A1c = 9.6  she reports taking Levemir 40U Qhs, Admelog 7U TIDAC as well as Ozempic and Metformin  Noted to be hyperglycemic     - Restarted on Lantus 10U + changed to 7U Admelog pre-meals --> Will monitor FS and adjust regimen as needed now that patient is taking more PO  - Continue statin   - Restart ARB as below

## 2023-11-08 NOTE — DIETITIAN INITIAL EVALUATION ADULT - NSICDXPASTMEDICALHX_GEN_ALL_CORE_FT
PAST MEDICAL HISTORY:  Asthma last inhaler use with in 10 days, on Pulm follow up [id number]    Back pain thoracic & lumbar    Cervical neuralgia difficulty moving my neck    Diverticulosis     DJD (degenerative joint disease) Cervical/Thoracic/Lumbar    DM (diabetes mellitus) 2    Falls frequently     Fatty liver     Fibromyalgia     H/O bursitis right shoulder    Hyperlipidemia     Hypertension vaginal cyst    Hypothyroid     Morbid obesity with body mass index (BMI) of 50.0 to 59.9 in adult     Obesity morbid    Obstructive sleep apnea refuses Cpap    Psoriasis     Radicular pain arms, legs    Renal cell carcinoma, left on follow up Dr schulte, HOD renal [facility], waiting for suregry in 09/2020    Trigeminal neuralgia R    Vasculitis Legs, forerhead, arms & hands, flare ups in R leg  Dr susie Ribeiro is my Rheumatologist

## 2023-11-08 NOTE — DISCHARGE NOTE PROVIDER - HOSPITAL COURSE
57 y/o morbidly obese female s/p L laparoscopic radical nephrectomy, stable.     11/4: AVSS. NAEON. On 4L NC. Has not passed gas, has some nausea. Pain controlled. Has not been out of bed, CXR with mild vascular congestion, advanced to reg diet  11/5: +HA, increased WOB with O2 requirements, refusing CPAP, will attempt BIPAP placement, BP stable, repeat CXR pending, concern for Co2 retention  11/6 - no new events; 98% on BIPAP; OOB with P.T. recommends rehab; lasix given  11/7 - OOB; off BIPAP all day, just NC; applied to rehab  11/8 - no new events; refuses to get OOB; awaiting auth for rehab   57 y/o morbidly obese female s/p L laparoscopic radical nephrectomy, stable.     11/4: AVSS. NAEON. On 4L NC. Has not passed gas, has some nausea. Pain controlled. Has not been out of bed, CXR with mild vascular congestion, advanced to reg diet  11/5: +HA, increased WOB with O2 requirements, refusing CPAP, will attempt BIPAP placement, BP stable, repeat CXR pending, concern for Co2 retention  11/6 - no new events; 98% on BIPAP; OOB with P.T. recommends rehab; lasix given  11/7 - OOB; off BIPAP all day, just NC; applied to rehab  11/8 - no new events; refuses to get OOB; awaiting auth for rehab  11/9 - doing well, tolerating diet, d/c to rehab

## 2023-11-09 ENCOUNTER — TRANSCRIPTION ENCOUNTER (OUTPATIENT)
Age: 58
End: 2023-11-09

## 2023-11-09 VITALS — OXYGEN SATURATION: 94 %

## 2023-11-09 LAB
GLUCOSE BLDC GLUCOMTR-MCNC: 201 MG/DL — HIGH (ref 70–99)
GLUCOSE BLDC GLUCOMTR-MCNC: 201 MG/DL — HIGH (ref 70–99)
GLUCOSE BLDC GLUCOMTR-MCNC: 263 MG/DL — HIGH (ref 70–99)
GLUCOSE BLDC GLUCOMTR-MCNC: 263 MG/DL — HIGH (ref 70–99)

## 2023-11-09 PROCEDURE — 99232 SBSQ HOSP IP/OBS MODERATE 35: CPT

## 2023-11-09 RX ORDER — SENNA PLUS 8.6 MG/1
2 TABLET ORAL
Qty: 0 | Refills: 0 | DISCHARGE
Start: 2023-11-09

## 2023-11-09 RX ORDER — INSULIN GLARGINE 100 [IU]/ML
16 INJECTION, SOLUTION SUBCUTANEOUS AT BEDTIME
Refills: 0 | Status: DISCONTINUED | OUTPATIENT
Start: 2023-11-09 | End: 2023-11-09

## 2023-11-09 RX ORDER — APREMILAST 10-20-30MG
1 KIT ORAL
Refills: 0 | DISCHARGE

## 2023-11-09 RX ADMIN — MONTELUKAST 10 MILLIGRAM(S): 4 TABLET, CHEWABLE ORAL at 12:07

## 2023-11-09 RX ADMIN — GABAPENTIN 400 MILLIGRAM(S): 400 CAPSULE ORAL at 05:32

## 2023-11-09 RX ADMIN — Medication 4: at 12:33

## 2023-11-09 RX ADMIN — Medication 7 UNIT(S): at 08:01

## 2023-11-09 RX ADMIN — Medication 6: at 12:04

## 2023-11-09 RX ADMIN — AMLODIPINE BESYLATE 10 MILLIGRAM(S): 2.5 TABLET ORAL at 05:32

## 2023-11-09 RX ADMIN — Medication 7 UNIT(S): at 12:16

## 2023-11-09 RX ADMIN — Medication 112 MICROGRAM(S): at 05:32

## 2023-11-09 RX ADMIN — INFLUENZA VIRUS VACCINE 0.5 MILLILITER(S): 15; 15; 15; 15 SUSPENSION INTRAMUSCULAR at 12:05

## 2023-11-09 RX ADMIN — HEPARIN SODIUM 5000 UNIT(S): 5000 INJECTION INTRAVENOUS; SUBCUTANEOUS at 05:32

## 2023-11-09 RX ADMIN — OXCARBAZEPINE 600 MILLIGRAM(S): 300 TABLET, FILM COATED ORAL at 12:07

## 2023-11-09 RX ADMIN — BUDESONIDE AND FORMOTEROL FUMARATE DIHYDRATE 2 PUFF(S): 160; 4.5 AEROSOL RESPIRATORY (INHALATION) at 10:17

## 2023-11-09 RX ADMIN — CYCLOBENZAPRINE HYDROCHLORIDE 10 MILLIGRAM(S): 10 TABLET, FILM COATED ORAL at 12:08

## 2023-11-09 RX ADMIN — Medication 1 SPRAY(S): at 05:32

## 2023-11-09 NOTE — PROGRESS NOTE ADULT - PROBLEM SELECTOR PLAN 1
Patient following with outpatient pulmonology for NAKUL/Asthma  - patient at high risk due to body habitus  - reportedly recommended for CPAP at night at pressure of 3  - patient offered CPAP on initial evaluation but refused, amenable to try bipap today  - VBG from 11/4 PM noted, pCO2 elevated to 76 with pH of 7.25  - overall patient likely with hypercapnea 2/2 body habitus/recent procedure and sedation  - previous charts also reviewed, patient had previous episode of hypercarbic resp failure with pCO2 in the 90s  - recommend Bipap at 14/6 (previous settings ordered during most recent hospitalization) and recheck VBG after 2 hours  - CXR also noted, some patchy infiltrates noted but overall improving  - if no improvement in pCO2 or if patient has worsening mental status/unresponsiveness would consider escalating level of care to ICU and mechanical ventilation
Patient following with outpatient pulmonology for NAKUL/Asthma  Suspect patient likely has a component of obesity hypoventilation syndrome (OHS) as well as NAKUL that is contributing to nocturnal hypoxia and hypercarbia   reportedly recommended for CPAP at night at home, but unable to use it to due to cost     - tolerating BIPAP qHS, pulm following, continue with current settings 14/6 and at [facility]  - nasal cannula as needed during the day   - overall patient likely with hypercapnia 2/2 body habitus/recent procedure and sedation  - CXR noted to have pulm edema --> s/p lasix 20mg IVP x1 on 11/6 with good urine output    - Recommend: continue incentive spirometry, OOB, elevate head of bed ~90 deg
Patient following with outpatient pulmonology for NAKUL/Asthma  - Suspect patient likely has a component of obesity hypoventilation syndrome (OHS) as well as NAKUL that is contributing to nocturnal hypoxia and hypercarbia   - reportedly recommended for CPAP at night at home, but unable to use it to due to cost   - tolerating BIPAP qHS, pulm following, continue with current settings 14/6   - nasal cannula as needed during the day   - overall patient likely with hypercapnia 2/2 body habitus/recent procedure and sedation  - CXR noted to have pulm edema --> s/p lasix 20mg IVP x1 on 11/6 with good urine output    - Recommend: Incentive spirometry, OOB, elevate head of bed ~90 deg
Patient following with outpatient pulmonology for NAKUL/Asthma  - Suspect patient likely has a component of obesity hypoventilation syndrome (OHS) as well as NAKUL that is contributing to nocturnal hypoxia and hypercarbia   - reportedly recommended for CPAP at night at home, but unable to use it to due to cost   - tolerating BIPAP qHS, pulm following, continue with current settings   - nasal cannula as needed during the day   - overall patient likely with hypercapnea 2/2 body habitus/recent procedure and sedation  - CXR noted to have pulm edema --> recommend lasix 20mg IVP x1 today   - Recommend: Incentive spirometry, OOB, elevate head of bed ~90 deg
Patient following with outpatient pulmonology for NAKUL/Asthma  Suspect patient likely has a component of obesity hypoventilation syndrome (OHS) as well as NAKUL that is contributing to nocturnal hypoxia and hypercarbia   reportedly recommended for CPAP at night at home, but unable to use it to due to cost     - tolerating BIPAP qHS, pulm following, continue with current settings 14/6 and at [facility]  - nasal cannula as needed during the day   - overall patient likely with hypercapnia 2/2 body habitus/recent procedure and sedation  - CXR noted to have pulm edema --> s/p lasix 20mg IVP x1 on 11/6 with good urine output    - Recommend: continue incentive spirometry, OOB, elevate head of bed ~90 deg

## 2023-11-09 NOTE — PROGRESS NOTE ADULT - PROBLEM SELECTOR PLAN 8
DVT ppx: HSQ  Dispo: Dispo planning to [facility] DVT ppx: HSQ  Dispo: Dispo planning to [facility] DVT ppx: HSQ  Dispo: Dispo planning to [facility]

## 2023-11-09 NOTE — PROGRESS NOTE ADULT - ASSESSMENT
57 y/o female s/p L laparoscopic radical nephrectomy, stable.     11/4: AVSS. NAEON. On 4L NC. Has not passed gas, has some nausea. Pain controlled. Has not been out of bed, CXR with mild vascular congestion, advanced to reg diet  11/5: +HA, increased WOB with O2 requirements, refusing CPAP, will attempt BIPAP placement, BP stable, repeat CXR pending, concern for Co2 retention    Plan:  -overnight labs reviewed, elevated CO2, however pt refused CPAP  -concern for Co2 retention, d/w hospitalist Dr. Lu - will do BiPAP this AM with repeat venous blood gas after 2-3 hours, repeat CXR, continuous pulse ox  -consider lasix if increased congestion on CXR  -Regular diet, consistent carv  -Monitor UOP  -Analgesia prn  -Antiemetics prn  -DVT prophylaxis  -Incentive spirometry  -OOB  -PT consult  
57 y/o female s/p L laparoscopic radical nephrectomy, stable.     11/4: AVSS. NAEON. On 4L NC. Has not passed gas, has some nausea. Pain controlled. Has not been out of bed, CXR with mild vascular congestion, advanced to reg diet  11/5: +HA, increased WOB with O2 requirements, refusing CPAP, will attempt BIPAP placement, BP stable, repeat CXR pending, concern for Co2 retention  11/6 - no new events; 98% on BIPAP; OOB with P.T. recommends rehab; lasix given    Plan:  - rehab  - cont to monitor need for bipap  -DVT prophylaxis  -Incentive spirometry  -OOB    
59 y/o female s/p L laparoscopic radical nephrectomy, stable.     11/4: AVSS. NAEON. On 4L NC. Has not passed gas, has some nausea. Pain controlled. Has not been out of bed, CXR with mild vascular congestion, advanced to reg diet  11/5: +HA, increased WOB with O2 requirements, refusing CPAP, will attempt BIPAP placement, BP stable, repeat CXR pending, concern for Co2 retention  11/6 - no new events; 98% on BIPAP; OOB with P.T. recommends rehab; lasix given  11/7 - OOB; off BIPAP all day, just NC; applied to rehab  11/8 - no new events  Plan:  - rehab  - OOB  - NC as needed    
57 y/o female s/p L laparoscopic radical nephrectomy, stable.     11/4: AVSS. NAEON. On 4L NC. Has not passed gas, has some nausea. Pain controlled. Has not been out of bed, CXR with mild vascular congestion, advanced to reg diet  11/5: +HA, increased WOB with O2 requirements, refusing CPAP, will attempt BIPAP placement, BP stable, repeat CXR pending, concern for Co2 retention  11/6 - no new events; 98% on BIPAP    Plan:  -overnight labs reviewed, elevated CO2, however pt refused CPAP  -concern for Co2 retention, d/w hospitalist Dr. Lu - will do BiPAP this AM with repeat venous blood gas after 2-3 hours, repeat CXR, continuous pulse ox  -consider lasix if increased congestion on CXR  -Regular diet, consistent carb  -Monitor UOP  -Analgesia prn  -Antiemetics prn  -DVT prophylaxis  -Incentive spirometry  -OOB  -PT rehab  
59 y/o female s/p L laparoscopic radical nephrectomy, stable.     11/4: AVSS. NAEON. On 4L NC. Has not passed gas, has some nausea. Pain controlled. Has not been out of bed    Plan:   -AM labs reviewed  -Continue CLD  -TOV  -Strict I&O's  -Analgesia prn  -Antiemetics prn  -DVT prophylaxis  -Incentive spirometry  -OOB  -PT consult  
57 y/o female s/p L laparoscopic radical nephrectomy, stable.     11/4: AVSS. NAEON. On 4L NC. Has not passed gas, has some nausea. Pain controlled. Has not been out of bed, CXR with mild vascular congestion, advanced to reg diet  11/5: +HA, increased WOB with O2 requirements, refusing CPAP, will attempt BIPAP placement, BP stable, repeat CXR pending, concern for Co2 retention  11/6 - no new events; 98% on BIPAP; OOB with P.T. recommends rehab; lasix given  11/7 - OOB; off BIPAP all day, just NC; applied to rehab  11/8 - no new events  11/9 - tolerating diet, feels well, awaiting rehab placment    Plan:  - rehab  - OOB  - NC as needed    
59 y/o female s/p L laparoscopic radical nephrectomy, stable.     -Strict I&O's  -Analgesia prn  -Antiemetics prn  -DVT prophylaxis  -Incentive spirometry  -Clears   -OOB  
58F with morbid obesity, asthma, chronic bronchitis, NAKUL (not on home CPAP), HTN, T2DM, fibromyalgia, anxiety, trigeminal neuralgia, degenerative joint disease, ?IgA vasculitis, psoriasis, OA now s/p Left radical nephrectomy for management of left renal cell carcinoma. 
58 year old female with PMH of obesity, asthma, chronic bronchitis, NAKUL, HTN, DM, HLD, fibromyalgia, anxiety, trigeminal neuralgia, degenerative joint disease, ?IgA vasculitis, psoriasis, OA now s/p Left radical nephrectomy for management of left renal cell carcinoma. 
58F with morbid obesity, asthma, chronic bronchitis, NAKUL (not on home CPAP), HTN, T2DM, fibromyalgia, anxiety, trigeminal neuralgia, degenerative joint disease, ?IgA vasculitis, psoriasis, OA now s/p Left radical nephrectomy for management of left renal cell carcinoma.

## 2023-11-09 NOTE — PROGRESS NOTE ADULT - PROBLEM SELECTOR PLAN 3
A1c = 9.6  she reports taking Levemir 40U Qhs, Admelog 7U TIDAC as well as Ozempic and Metformin  Noted to be hyperglycemic     - increase to Lantus 15U + 7U Admelog pre-meals --> Will monitor FS and adjust regimen as needed now that patient is taking more PO  - Continue statin   - Restart ARB as below

## 2023-11-09 NOTE — PROGRESS NOTE ADULT - PROBLEM SELECTOR PLAN 7
C/w home gabapentin and oxcarbazepine
DVT ppx: HSQ  Dispo: PT recs [facility], patient agreeable
C/w home gabapentin and oxcarbazepine
C/w home gabapentin and oxcarbazepine

## 2023-11-09 NOTE — PROGRESS NOTE ADULT - PROBLEM SELECTOR PROBLEM 1
NAKUL (obstructive sleep apnea)
Chronic hypercapnic respiratory failure
Chronic hypercapnic respiratory failure
NAKUL (obstructive sleep apnea)
Chronic hypercapnic respiratory failure

## 2023-11-09 NOTE — PROGRESS NOTE ADULT - PROBLEM SELECTOR PLAN 4
Patient on multiple antihypertensive medications at home  - agree with holding antihypertensives for now as patient currently hypo/normotensive.
Patient on multiple antihypertensive medications at home  - Recommend restart amlodipine / HCTZ today    - Monitor BP and restart losartan as clinically indicated, likely in 24-48 hrs
Patient on multiple antihypertensive medications at home  - Continue Amlodipine / HCTZ   - Monitor BP and restart losartan as clinically indicated, likely in another 24-48 hrs   - DASH diet
Patient on multiple antihypertensive medications at home  - Can restart amlodipine / HCTZ tomorrow as getting lasix today and now normo-tensive   - Monitor BP and restart losartan as clinically indicated
Patient on multiple antihypertensive medications at home  - Continue Amlodipine / HCTZ   - Restart losartan on discharge as BP remains stable   - DASH diet

## 2023-11-09 NOTE — DISCHARGE NOTE NURSING/CASE MANAGEMENT/SOCIAL WORK - NSDCPNINST_GEN_ALL_CORE
Notify Dr Peck if you experience any increase in pain not relieved with medication, any redness, drainage or swelling around incision or any fever >100.5.  Drink plenty of fluids.  No heavy lifting or straining.  Continue to follow consistent carbohydrate diet and follow up with PMD for continued management of your diabetes.  Use over the counter stool softeners to assist with constipation.

## 2023-11-09 NOTE — PROGRESS NOTE ADULT - REASON FOR ADMISSION
Left Radical Nephrectomy
medicine post-op
s/p Left radical nephrectomy

## 2023-11-09 NOTE — DISCHARGE NOTE NURSING/CASE MANAGEMENT/SOCIAL WORK - PATIENT PORTAL LINK FT
You can access the FollowMyHealth Patient Portal offered by [facility] by registering at the following website: [email or web address]. By joining [facility]’s FollowMyHealth portal, you will also be able to view your health information using other applications (apps) compatible with our system.

## 2023-11-09 NOTE — PROGRESS NOTE ADULT - PROVIDER SPECIALTY LIST ADULT
Pulmonology
Urology
Hospitalist

## 2023-11-09 NOTE — PROGRESS NOTE ADULT - PROBLEM SELECTOR PLAN 5
No evidence of acute exacerbation currently  - c/w home Symbicort and montelukast  - c/w PRN Albuterol.
Patient reports taking multiple medications for DM at home  - she reports taking Levemir 40U Qhs, Admelog 7U TIDAC as well as Ozempic and Metformin  - c/w CC diet  - BG currently at goal  - patient with diminished PO intake currently and also requiring bipap  - can c/w mod ISS with meals and at bedtime  - once patient is tolerating meals, would gradually reintroduce nighttime Lantus.
No evidence of acute exacerbation currently  - c/w home Symbicort and montelukast  - c/w PRN Albuterol.

## 2023-11-09 NOTE — PROGRESS NOTE ADULT - PROBLEM SELECTOR PLAN 6
No evidence of acute exacerbation currently  - c/w home montelukast  - c/w PRN Albuterol.
Patient with history of hypothyroidism  - TSH wnl   - c/w home levothyroxine 112mcg

## 2023-11-09 NOTE — PROGRESS NOTE ADULT - PROBLEM SELECTOR PROBLEM 3
Type 2 diabetes mellitus
Type 2 diabetes mellitus
Hypothyroidism
Type 2 diabetes mellitus
Type 2 diabetes mellitus

## 2023-11-09 NOTE — PROGRESS NOTE ADULT - SUBJECTIVE AND OBJECTIVE BOX
CHIEF COMPLAINT: f/u     SUBJECTIVE / OVERNIGHT EVENTS: Patient seen and examined, sitting in chair and eating breakfast. She reports that she feels fine today, denies pain. Denies shortness of breath or LE swelling. Aware that she is being discharged to [facility] later today.     MEDICATIONS  (STANDING):  albuterol    90 MICROgram(s) HFA Inhaler 2 Puff(s) Inhalation every 6 hours  amLODIPine   Tablet 10 milliGRAM(s) Oral daily  atorvastatin 40 milliGRAM(s) Oral at bedtime  budesonide 160 MICROgram(s)/formoterol 4.5 MICROgram(s) Inhaler 2 Puff(s) Inhalation two times a day  dextrose 5%. 1000 milliLiter(s) (100 mL/Hr) IV Continuous <Continuous>  dextrose 5%. 1000 milliLiter(s) (50 mL/Hr) IV Continuous <Continuous>  dextrose 50% Injectable 12.5 Gram(s) IV Push once  dextrose 50% Injectable 25 Gram(s) IV Push once  dextrose 50% Injectable 25 Gram(s) IV Push once  fluticasone propionate 50 MICROgram(s)/spray Nasal Spray 1 Spray(s) Both Nostrils two times a day  gabapentin 400 milliGRAM(s) Oral two times a day  glucagon  Injectable 1 milliGRAM(s) IntraMuscular once  heparin   Injectable 5000 Unit(s) SubCutaneous every 8 hours  hydrochlorothiazide 12.5 milliGRAM(s) Oral daily  insulin glargine Injectable (LANTUS) 16 Unit(s) SubCutaneous at bedtime  insulin lispro (ADMELOG) corrective regimen sliding scale   SubCutaneous at bedtime  insulin lispro (ADMELOG) corrective regimen sliding scale   SubCutaneous three times a day before meals  insulin lispro Injectable (ADMELOG) 7 Unit(s) SubCutaneous three times a day before meals  levothyroxine 112 MICROGram(s) Oral daily  montelukast 10 milliGRAM(s) Oral daily  OXcarbazepine 600 milliGRAM(s) Oral two times a day    MEDICATIONS  (PRN):  albuterol    0.083%. 2.5 milliGRAM(s) Nebulizer once PRN asthma  albuterol    90 MICROgram(s) HFA Inhaler 2 Puff(s) Inhalation every 6 hours PRN Bronchospasm  cyclobenzaprine 10 milliGRAM(s) Oral daily PRN Muscle Spasm  dextrose Oral Gel 15 Gram(s) Oral once PRN Blood Glucose LESS THAN 70 milliGRAM(s)/deciliter  pantoprazole    Tablet 40 milliGRAM(s) Oral before breakfast PRN acid reflux  senna 2 Tablet(s) Oral at bedtime PRN Constipation  simethicone 80 milliGRAM(s) Chew once PRN Gas      VITALS:  T(F): 98.4 (11-09-23 @ 09:33), Max: 99.4 (11-08-23 @ 13:55)  HR: 88 (11-09-23 @ 09:33) (78 - 95)  BP: 124/82 (11-09-23 @ 09:33) (119/66 - 148/77)  RR: 18 (11-09-23 @ 09:33) (17 - 18)  SpO2: 94% (11-09-23 @ 11:02)  Wt(kg): --        PHYSICAL EXAM:  GENERAL: obese female in NAD  EYES: Conjunctiva and sclera clear  CHEST/LUNG: Clear to auscultation anteriorly; difficult to auscultate due to body habitus. No wheeze  HEART: Regular rate and rhythm; No murmurs, rubs, or gallops  ABDOMEN: Soft, Nontender, Nondistended; Bowel sounds present; lap incisions c/d/i  EXTREMITIES:  2+ Peripheral Pulses, No clubbing, cyanosis, or edema  PSYCH: AAOx3  NEUROLOGY: non-focal, answering questions appropriately, moving all ext       [x] Care Discussed with Consultants/Other Providers: Urology PA - discussed   CHIEF COMPLAINT: f/u     SUBJECTIVE / OVERNIGHT EVENTS: Patient seen and examined, sitting in chair and eating breakfast. She reports that she feels fine today, denies pain. Denies shortness of breath or LE swelling. Aware that she is being discharged to [facility] later today.     MEDICATIONS  (STANDING):  albuterol    90 MICROgram(s) HFA Inhaler 2 Puff(s) Inhalation every 6 hours  amLODIPine   Tablet 10 milliGRAM(s) Oral daily  atorvastatin 40 milliGRAM(s) Oral at bedtime  budesonide 160 MICROgram(s)/formoterol 4.5 MICROgram(s) Inhaler 2 Puff(s) Inhalation two times a day  dextrose 5%. 1000 milliLiter(s) (100 mL/Hr) IV Continuous <Continuous>  dextrose 5%. 1000 milliLiter(s) (50 mL/Hr) IV Continuous <Continuous>  dextrose 50% Injectable 12.5 Gram(s) IV Push once  dextrose 50% Injectable 25 Gram(s) IV Push once  dextrose 50% Injectable 25 Gram(s) IV Push once  fluticasone propionate 50 MICROgram(s)/spray Nasal Spray 1 Spray(s) Both Nostrils two times a day  gabapentin 400 milliGRAM(s) Oral two times a day  glucagon  Injectable 1 milliGRAM(s) IntraMuscular once  heparin   Injectable 5000 Unit(s) SubCutaneous every 8 hours  hydrochlorothiazide 12.5 milliGRAM(s) Oral daily  insulin glargine Injectable (LANTUS) 16 Unit(s) SubCutaneous at bedtime  insulin lispro (ADMELOG) corrective regimen sliding scale   SubCutaneous at bedtime  insulin lispro (ADMELOG) corrective regimen sliding scale   SubCutaneous three times a day before meals  insulin lispro Injectable (ADMELOG) 7 Unit(s) SubCutaneous three times a day before meals  levothyroxine 112 MICROGram(s) Oral daily  montelukast 10 milliGRAM(s) Oral daily  OXcarbazepine 600 milliGRAM(s) Oral two times a day    MEDICATIONS  (PRN):  albuterol    0.083%. 2.5 milliGRAM(s) Nebulizer once PRN asthma  albuterol    90 MICROgram(s) HFA Inhaler 2 Puff(s) Inhalation every 6 hours PRN Bronchospasm  cyclobenzaprine 10 milliGRAM(s) Oral daily PRN Muscle Spasm  dextrose Oral Gel 15 Gram(s) Oral once PRN Blood Glucose LESS THAN 70 milliGRAM(s)/deciliter  pantoprazole    Tablet 40 milliGRAM(s) Oral before breakfast PRN acid reflux  senna 2 Tablet(s) Oral at bedtime PRN Constipation  simethicone 80 milliGRAM(s) Chew once PRN Gas      VITALS:  T(F): 98.4 (11-09-23 @ 09:33), Max: 99.4 (11-08-23 @ 13:55)  HR: 88 (11-09-23 @ 09:33) (78 - 95)  BP: 124/82 (11-09-23 @ 09:33) (119/66 - 148/77)  RR: 18 (11-09-23 @ 09:33) (17 - 18)  SpO2: 94% (11-09-23 @ 11:02)  Wt(kg): --        PHYSICAL EXAM:  GENERAL: obese female in NAD  EYES: Conjunctiva and sclera clear  CHEST/LUNG: Clear to auscultation anteriorly; difficult to auscultate due to body habitus. No wheeze  HEART: Regular rate and rhythm; No murmurs, rubs, or gallops  ABDOMEN: Soft, Nontender, Nondistended; Bowel sounds present; lap incisions c/d/i  EXTREMITIES:  2+ Peripheral Pulses, No clubbing, cyanosis, or edema  PSYCH: AAOx3  NEUROLOGY: non-focal, answering questions appropriately, moving all ext       [x] Care Discussed with Consultants/Other Providers: Urology PA - discussed   CHIEF COMPLAINT: f/u     SUBJECTIVE / OVERNIGHT EVENTS: Patient seen and examined, sitting in chair and eating breakfast. She reports that she feels fine today, denies pain. Denies shortness of breath or LE swelling. Aware that she is being discharged to [facility] later today.     MEDICATIONS  (STANDING):  albuterol    90 MICROgram(s) HFA Inhaler 2 Puff(s) Inhalation every 6 hours  amLODIPine   Tablet 10 milliGRAM(s) Oral daily  atorvastatin 40 milliGRAM(s) Oral at bedtime  budesonide 160 MICROgram(s)/formoterol 4.5 MICROgram(s) Inhaler 2 Puff(s) Inhalation two times a day  dextrose 5%. 1000 milliLiter(s) (100 mL/Hr) IV Continuous <Continuous>  dextrose 5%. 1000 milliLiter(s) (50 mL/Hr) IV Continuous <Continuous>  dextrose 50% Injectable 12.5 Gram(s) IV Push once  dextrose 50% Injectable 25 Gram(s) IV Push once  dextrose 50% Injectable 25 Gram(s) IV Push once  fluticasone propionate 50 MICROgram(s)/spray Nasal Spray 1 Spray(s) Both Nostrils two times a day  gabapentin 400 milliGRAM(s) Oral two times a day  glucagon  Injectable 1 milliGRAM(s) IntraMuscular once  heparin   Injectable 5000 Unit(s) SubCutaneous every 8 hours  hydrochlorothiazide 12.5 milliGRAM(s) Oral daily  insulin glargine Injectable (LANTUS) 16 Unit(s) SubCutaneous at bedtime  insulin lispro (ADMELOG) corrective regimen sliding scale   SubCutaneous at bedtime  insulin lispro (ADMELOG) corrective regimen sliding scale   SubCutaneous three times a day before meals  insulin lispro Injectable (ADMELOG) 7 Unit(s) SubCutaneous three times a day before meals  levothyroxine 112 MICROGram(s) Oral daily  montelukast 10 milliGRAM(s) Oral daily  OXcarbazepine 600 milliGRAM(s) Oral two times a day    MEDICATIONS  (PRN):  albuterol    0.083%. 2.5 milliGRAM(s) Nebulizer once PRN asthma  albuterol    90 MICROgram(s) HFA Inhaler 2 Puff(s) Inhalation every 6 hours PRN Bronchospasm  cyclobenzaprine 10 milliGRAM(s) Oral daily PRN Muscle Spasm  dextrose Oral Gel 15 Gram(s) Oral once PRN Blood Glucose LESS THAN 70 milliGRAM(s)/deciliter  pantoprazole    Tablet 40 milliGRAM(s) Oral before breakfast PRN acid reflux  senna 2 Tablet(s) Oral at bedtime PRN Constipation  simethicone 80 milliGRAM(s) Chew once PRN Gas      VITALS:  T(F): 98.4 (11-09-23 @ 09:33), Max: 99.4 (11-08-23 @ 13:55)  HR: 88 (11-09-23 @ 09:33) (78 - 95)  BP: 124/82 (11-09-23 @ 09:33) (119/66 - 148/77)  RR: 18 (11-09-23 @ 09:33) (17 - 18)  SpO2: 94% (11-09-23 @ 11:02)  Wt(kg): --        PHYSICAL EXAM:  GENERAL: obese female in NAD  EYES: Conjunctiva and sclera clear  CHEST/LUNG: Clear to auscultation anteriorly; difficult to auscultate due to body habitus. No wheeze  HEART: Regular rate and rhythm; No murmurs, rubs, or gallops  ABDOMEN: Soft, Nontender, Nondistended; Bowel sounds present; lap incisions c/d/i  EXTREMITIES:  2+ Peripheral Pulses, No clubbing, cyanosis, or edema  PSYCH: AAOx3  NEUROLOGY: non-focal, answering questions appropriately, moving all ext       [x] Care Discussed with Consultants/Other Providers: Urology PA - discussed

## 2023-11-09 NOTE — PROGRESS NOTE ADULT - PROBLEM SELECTOR PLAN 2
Patient with history of left sided renal neoplasm  - now s/p laparoscopic L radical nephrectomy by urology team  - management per primary team  - recommend early ambulation as tolerated.
Patient with history of left sided renal neoplasm  - now s/p laparoscopic L radical nephrectomy by urology team on 11/3   - management per primary team  - Pain control + bowel regimen   - recommend early ambulation as tolerated.
Patient with history of left sided renal neoplasm  - now s/p laparoscopic L radical nephrectomy by urology team on 11/3   - management per primary team  - Pain control + bowel regimen   - recommend early ambulation as tolerated.
Patient with history of left sided renal neoplasm  now s/p laparoscopic L radical nephrectomy by urology team on 11/3     - management per primary team  - Pain control + bowel regimen   - recommend early ambulation as tolerated.
Patient with history of left sided renal neoplasm  now s/p laparoscopic L radical nephrectomy by urology team on 11/3     - management per primary team  - Pain control + bowel regimen   - recommend early ambulation as tolerated.

## 2023-11-09 NOTE — PROGRESS NOTE ADULT - PROBLEM SELECTOR PROBLEM 2
Malignant neoplasm of left kidney

## 2023-11-09 NOTE — PROGRESS NOTE ADULT - SUBJECTIVE AND OBJECTIVE BOX
Overnight events:  None    Subjective:  Pt offers no complaints, tolerating diet    Objective:    Vital signs  T(C): , Max: 37.4 (11-08-23 @ 13:55)  HR: 80 (11-09-23 @ 07:30)  BP: 127/87 (11-09-23 @ 05:30)  SpO2: 94% (11-09-23 @ 07:30)  Wt(kg): --    Output   Primafit: 1000  11-08 @ 07:01  -  11-09 @ 07:00  --------------------------------------------------------  IN: 1200 mL / OUT: 1800 mL / NET: -600 mL        Gen: NAD  Abd: obese, steris c/d/i, soft, nontender, nondistended  : primafit    Labs: none today

## 2023-11-15 LAB
SURGICAL PATHOLOGY STUDY: SIGNIFICANT CHANGE UP
SURGICAL PATHOLOGY STUDY: SIGNIFICANT CHANGE UP

## 2023-11-21 NOTE — PROGRESS NOTE ADULT - CARDIOVASCULAR
detailed exam details… Previous SLP input appreciated--FEES with evidence of silent laryngeal penetration and aspiration with multiple consistences, currently remains NPO.  Unclear if dysphagia temporary due to concurrent AHRF/coronavirus infection, at significant risk of evolving into long term dysphagia.  MBS from 11/15 confirmed moderate to severe oropharyngeal discussion--per my discussion with SLP, likely 2/2 progressive dementia, unlikely to resolve over the short term    Started on thickened liquid diet over weekend but now with likely aspiration event, back to NPO status, continue strict aspiration precautions.

## 2023-11-29 PROBLEM — K76.0 FATTY (CHANGE OF) LIVER, NOT ELSEWHERE CLASSIFIED: Chronic | Status: ACTIVE | Noted: 2023-10-31

## 2023-12-06 ENCOUNTER — APPOINTMENT (OUTPATIENT)
Dept: UROLOGY | Facility: CLINIC | Age: 58
End: 2023-12-06
Payer: MEDICARE

## 2023-12-06 VITALS
BODY MASS INDEX: 54.97 KG/M2 | HEART RATE: 96 BPM | HEIGHT: 60 IN | DIASTOLIC BLOOD PRESSURE: 88 MMHG | SYSTOLIC BLOOD PRESSURE: 151 MMHG | RESPIRATION RATE: 17 BRPM | WEIGHT: 280 LBS

## 2023-12-06 PROCEDURE — 99024 POSTOP FOLLOW-UP VISIT: CPT

## 2023-12-07 ENCOUNTER — NON-APPOINTMENT (OUTPATIENT)
Age: 58
End: 2023-12-07

## 2023-12-07 LAB
ANION GAP SERPL CALC-SCNC: 13 MMOL/L
BASOPHILS # BLD AUTO: 0.04 K/UL
BASOPHILS NFR BLD AUTO: 0.5 %
BUN SERPL-MCNC: 17 MG/DL
CALCIUM SERPL-MCNC: 9.9 MG/DL
CHLORIDE SERPL-SCNC: 97 MMOL/L
CO2 SERPL-SCNC: 31 MMOL/L
CREAT SERPL-MCNC: 0.99 MG/DL
EGFR: 66 ML/MIN/1.73M2
EOSINOPHIL # BLD AUTO: 0.35 K/UL
EOSINOPHIL NFR BLD AUTO: 4.8 %
GLUCOSE SERPL-MCNC: 164 MG/DL
HCT VFR BLD CALC: 39.6 %
HGB BLD-MCNC: 11.1 G/DL
IMM GRANULOCYTES NFR BLD AUTO: 0.5 %
LYMPHOCYTES # BLD AUTO: 1.83 K/UL
LYMPHOCYTES NFR BLD AUTO: 25 %
MAN DIFF?: NORMAL
MCHC RBC-ENTMCNC: 23.3 PG
MCHC RBC-ENTMCNC: 28 GM/DL
MCV RBC AUTO: 83.2 FL
MONOCYTES # BLD AUTO: 0.65 K/UL
MONOCYTES NFR BLD AUTO: 8.9 %
NEUTROPHILS # BLD AUTO: 4.42 K/UL
NEUTROPHILS NFR BLD AUTO: 60.3 %
PLATELET # BLD AUTO: 344 K/UL
POTASSIUM SERPL-SCNC: 4.8 MMOL/L
RBC # BLD: 4.76 M/UL
RBC # FLD: 16.7 %
SODIUM SERPL-SCNC: 142 MMOL/L
WBC # FLD AUTO: 7.33 K/UL

## 2023-12-08 NOTE — H&P PST ADULT - NS PRO FEM REPRO HEALTH SCREEN
See My Chart msg  Lipids have shifted a bit and the sugar is slightly elevated.    I would recommend keeping an eye on the sugar with a recheck (FASTING) in early June.  Lab order will be in.  
mammogram

## 2023-12-17 NOTE — ASSESSMENT
[FreeTextEntry1] : CBC and BMP ordered. Will call with results.  Follow up in 6 months with repeat imaging.

## 2023-12-17 NOTE — HISTORY OF PRESENT ILLNESS
[FreeTextEntry1] : Here for post op visit. s/p Left laparoscopic radical nephrectomy 11/3/2023 Path: ccRCC, pT1b, negative margins No post op complications. Was discharged to rehab.  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   Respiratory function improving. Denies abdominal pain. Normal BM.

## 2023-12-21 ENCOUNTER — APPOINTMENT (OUTPATIENT)
Dept: INTERNAL MEDICINE | Facility: CLINIC | Age: 58
End: 2023-12-21
Payer: MEDICARE

## 2023-12-21 VITALS
HEIGHT: 60 IN | TEMPERATURE: 97.6 F | BODY MASS INDEX: 53.79 KG/M2 | SYSTOLIC BLOOD PRESSURE: 150 MMHG | DIASTOLIC BLOOD PRESSURE: 82 MMHG | WEIGHT: 274 LBS | HEART RATE: 98 BPM | OXYGEN SATURATION: 93 % | RESPIRATION RATE: 17 BRPM

## 2023-12-21 DIAGNOSIS — E66.01 MORBID (SEVERE) OBESITY DUE TO EXCESS CALORIES: ICD-10-CM

## 2023-12-21 PROCEDURE — 99214 OFFICE O/P EST MOD 30 MIN: CPT | Mod: 25

## 2023-12-21 PROCEDURE — 36415 COLL VENOUS BLD VENIPUNCTURE: CPT

## 2023-12-21 RX ORDER — COLLAGENASE SANTYL 250 [ARB'U]/G
250 OINTMENT TOPICAL DAILY
Qty: 1 | Refills: 5 | Status: ACTIVE | COMMUNITY
Start: 2023-12-21 | End: 1900-01-01

## 2023-12-21 NOTE — HISTORY OF PRESENT ILLNESS
[de-identified] : 58 year old  female patient with history of stable Hypertension, Hypothyroidism, Type 2 Diabetes Mellitus, Hyperlipidemia, Reactive Airway Disease, Morbid Obesity, Renal Cell Carcinoma, Diabetic Neuropathy, history as stated, presented for follow up examination. Patient is compliant with all medications. ROS as stated.

## 2023-12-21 NOTE — REVIEW OF SYSTEMS
[Joint Pain] : joint pain [Muscle Pain] : muscle pain [Dyspnea on Exertion] : dyspnea on exertion [TextEntry] : CARDIOVASCULAR: Negative GASTROINTESTINAL: Negative NEUROLOGICAL: Negative

## 2023-12-21 NOTE — PHYSICAL EXAM
[TextEntry] : PULMONARY:  No respiratory distress and lungs were clear to auscultation bilaterally. HEART:  Heart rate was normal and rhythm regular, normal S1 and S2, no gallops/murmur/pericardial rub. VASCULAR:  No peripheral edema. ABDOMEN:  Normal bowel sounds, soft, non-tender, no hepatosplenomegaly and no abdominal mass palpated. NEUROLOGICAL: Poor balance and coordination noted, using cane for safety, no focal deficits.

## 2023-12-21 NOTE — ASSESSMENT
[FreeTextEntry1] : 58 year old female found to have stable Hypertension, Hypothyroidism, Type 2 Diabetes Mellitus, Hyperlipidemia, Reactive Airway Disease, Morbid Obesity, Renal Cell Carcinoma, Diabetic Neuropathy, with the current prescription regimen as recommended, diet and lifestyle modifications, as counseled. Prior results reviewed, interpreted and discussed with the patient during today's examination, as appropriate. Follow up, treatment plan and tests, as ordered.   Total time spent:: 30 minutes Including: Preparation prior to visit - Reviewing prior record, results of tests and Consultation Reports as applicable Conducting an appropriate H & P during today's encounter Appropriate orders for tests, medications and procedures, as applicable Counseling patient Note completion

## 2023-12-22 LAB
ALBUMIN SERPL ELPH-MCNC: 4.2 G/DL
ALP BLD-CCNC: 134 U/L
ALT SERPL-CCNC: 45 U/L
ANION GAP SERPL CALC-SCNC: 12 MMOL/L
AST SERPL-CCNC: 39 U/L
BASOPHILS # BLD AUTO: 0.04 K/UL
BASOPHILS NFR BLD AUTO: 0.6 %
BILIRUB SERPL-MCNC: 0.3 MG/DL
BUN SERPL-MCNC: 25 MG/DL
CALCIUM SERPL-MCNC: 9.7 MG/DL
CHLORIDE SERPL-SCNC: 97 MMOL/L
CHOLEST SERPL-MCNC: 224 MG/DL
CO2 SERPL-SCNC: 30 MMOL/L
CREAT SERPL-MCNC: 1.06 MG/DL
EGFR: 61 ML/MIN/1.73M2
EOSINOPHIL # BLD AUTO: 0.22 K/UL
EOSINOPHIL NFR BLD AUTO: 3 %
ESTIMATED AVERAGE GLUCOSE: 192 MG/DL
GGT SERPL-CCNC: 65 U/L
GLUCOSE SERPL-MCNC: 161 MG/DL
HBA1C MFR BLD HPLC: 8.3 %
HCT VFR BLD CALC: 39 %
HDLC SERPL-MCNC: 45 MG/DL
HGB BLD-MCNC: 11.3 G/DL
IMM GRANULOCYTES NFR BLD AUTO: 0.6 %
LDLC SERPL CALC-MCNC: 150 MG/DL
LYMPHOCYTES # BLD AUTO: 1.85 K/UL
LYMPHOCYTES NFR BLD AUTO: 25.6 %
MAN DIFF?: NORMAL
MCHC RBC-ENTMCNC: 23.3 PG
MCHC RBC-ENTMCNC: 29 GM/DL
MCV RBC AUTO: 80.4 FL
MONOCYTES # BLD AUTO: 0.6 K/UL
MONOCYTES NFR BLD AUTO: 8.3 %
NEUTROPHILS # BLD AUTO: 4.47 K/UL
NEUTROPHILS NFR BLD AUTO: 61.9 %
NONHDLC SERPL-MCNC: 179 MG/DL
PLATELET # BLD AUTO: 355 K/UL
POTASSIUM SERPL-SCNC: 4.7 MMOL/L
PROT SERPL-MCNC: 7.9 G/DL
RBC # BLD: 4.85 M/UL
RBC # FLD: 15.9 %
SODIUM SERPL-SCNC: 140 MMOL/L
T3 SERPL-MCNC: 102 NG/DL
T4 FREE SERPL-MCNC: 1.1 NG/DL
TRIGL SERPL-MCNC: 160 MG/DL
TSH SERPL-ACNC: 3.28 UIU/ML
WBC # FLD AUTO: 7.22 K/UL

## 2023-12-25 ENCOUNTER — NON-APPOINTMENT (OUTPATIENT)
Age: 58
End: 2023-12-25

## 2023-12-26 ENCOUNTER — NON-APPOINTMENT (OUTPATIENT)
Age: 58
End: 2023-12-26

## 2024-01-09 ENCOUNTER — NON-APPOINTMENT (OUTPATIENT)
Age: 59
End: 2024-01-09

## 2024-01-12 ENCOUNTER — APPOINTMENT (OUTPATIENT)
Dept: UROLOGY | Facility: CLINIC | Age: 59
End: 2024-01-12
Payer: MEDICARE

## 2024-01-12 VITALS — SYSTOLIC BLOOD PRESSURE: 148 MMHG | DIASTOLIC BLOOD PRESSURE: 86 MMHG | HEART RATE: 98 BPM | OXYGEN SATURATION: 94 %

## 2024-01-12 DIAGNOSIS — Z09 ENCOUNTER FOR FOLLOW-UP EXAMINATION AFTER COMPLETED TREATMENT FOR CONDITIONS OTHER THAN MALIGNANT NEOPLASM: ICD-10-CM

## 2024-01-12 DIAGNOSIS — R10.9 UNSPECIFIED ABDOMINAL PAIN: ICD-10-CM

## 2024-01-12 DIAGNOSIS — R82.90 UNSPECIFIED ABNORMAL FINDINGS IN URINE: ICD-10-CM

## 2024-01-12 DIAGNOSIS — S31.109A UNSPECIFIED OPEN WOUND OF ABDOMINAL WALL, UNSPECIFIED QUADRANT W/OUT PENETRATION INTO PERITONEAL CAVITY, INITIAL ENCOUNTER: ICD-10-CM

## 2024-01-12 DIAGNOSIS — Z98.890 OTHER SPECIFIED POSTPROCEDURAL STATES: ICD-10-CM

## 2024-01-12 PROCEDURE — 99024 POSTOP FOLLOW-UP VISIT: CPT

## 2024-01-12 NOTE — REVIEW OF SYSTEMS
[Sore Throat] : sore throat [Cough] : cough [SOB on Exertion] : shortness of breath during exertion [see HPI] : see HPI [Limb Swelling] : limb swelling [Skin Wound] : skin wound [Negative] : Heme/Lymph

## 2024-01-16 DIAGNOSIS — B95.8 UNSPECIFIED STAPHYLOCOCCUS AS THE CAUSE OF DISEASES CLASSIFIED ELSEWHERE: ICD-10-CM

## 2024-01-16 DIAGNOSIS — T81.49XA INFECTION FOLLOWING A PROCEDURE, OTHER SURGICAL SITE, INITIAL ENCOUNTER: ICD-10-CM

## 2024-01-16 LAB
BASOPHILS # BLD AUTO: 0.04 K/UL
BASOPHILS NFR BLD AUTO: 0.5 %
EOSINOPHIL # BLD AUTO: 0.3 K/UL
EOSINOPHIL NFR BLD AUTO: 3.6 %
HCT VFR BLD CALC: 38.7 %
HGB BLD-MCNC: 11.2 G/DL
IMM GRANULOCYTES NFR BLD AUTO: 0.5 %
LYMPHOCYTES # BLD AUTO: 1.88 K/UL
LYMPHOCYTES NFR BLD AUTO: 22.8 %
MAN DIFF?: NORMAL
MCHC RBC-ENTMCNC: 23.4 PG
MCHC RBC-ENTMCNC: 28.9 GM/DL
MCV RBC AUTO: 81 FL
MONOCYTES # BLD AUTO: 0.67 K/UL
MONOCYTES NFR BLD AUTO: 8.1 %
NEUTROPHILS # BLD AUTO: 5.33 K/UL
NEUTROPHILS NFR BLD AUTO: 64.5 %
PLATELET # BLD AUTO: 357 K/UL
RBC # BLD: 4.78 M/UL
RBC # FLD: 15.5 %
WBC # FLD AUTO: 8.26 K/UL

## 2024-01-16 RX ORDER — CEFADROXIL 500 MG/1
500 CAPSULE ORAL TWICE DAILY
Qty: 14 | Refills: 0 | Status: ACTIVE | COMMUNITY
Start: 2024-01-16 | End: 1900-01-01

## 2024-01-17 PROBLEM — R82.90 MALODOROUS URINE: Status: ACTIVE | Noted: 2024-01-17

## 2024-01-18 ENCOUNTER — LABORATORY RESULT (OUTPATIENT)
Age: 59
End: 2024-01-18

## 2024-01-18 LAB — BACTERIA WND CULT: ABNORMAL

## 2024-01-18 NOTE — PHYSICAL EXAM
[General Appearance - Well Developed] : well developed [General Appearance - Well Nourished] : well nourished [Normal Appearance] : normal appearance [Well Groomed] : well groomed [Oriented To Time, Place, And Person] : oriented to person, place, and time [de-identified] : 4cm open abdominal wound with 0.5cm slough in wound.

## 2024-01-18 NOTE — ASSESSMENT
[FreeTextEntry1] : Ms. Arsen Oh here for post-op visit presents with 4 cm left lower quadrant open wound with 0.5cm slough, no bleeding or drainage noted. Tender to touch. - Wound cleaned and 2x2 gauze applied with paper tape. -Recommend wound care consultation. - Wound culture and CBC sent today. Will call with result. - Advised pt to clean wound with normal saline daily and apply sterile gauze with paper tape if any drainage is noted. - Advised pt not to sleep on abdominal wound.  - Advised pt to continue with gabapentin for pain. - Instructed pt to call the office if symptoms worsen or develop a fever.  - Will order urinalysis and urine culture to be collected by [facility].

## 2024-01-18 NOTE — HISTORY OF PRESENT ILLNESS
[FreeTextEntry1] : 58-year-old female h/o kidney cancer presents for post- op visit. S/P Left laparoscopic radical nephrectomy with Dr cao on 11/3/2023. Path: ccRCC, pT1b, negative margins Last A1C 8.3%  Pt presents today with complaint of abdominal incision pain and yellow drainage that worsen at nights. Denies redness, chills or fever.   Endorses that she was getting [facility] care [facility] which ended in December, and at the last visit, wound nurse recommended Santyl, but due to unaffordability medication was never purchase. Pt contacted her insurance and was recommended papain urea as an alternative but never started due to inability to apply the cream herself.   Reports 6/10 dull incisional pain, which worsen with prolong sitting. Takes gabapentin 400mg bid with some relief.   Reports cloudy urine with foul odor, Denies dysuria, back pain, hematuria, fever/chills.

## 2024-01-19 ENCOUNTER — LABORATORY RESULT (OUTPATIENT)
Age: 59
End: 2024-01-19

## 2024-01-22 ENCOUNTER — INPATIENT (INPATIENT)
Facility: HOSPITAL | Age: 59
LOS: 8 days | Discharge: HOME | DRG: 920 | End: 2024-01-31
Attending: INTERNAL MEDICINE | Admitting: INTERNAL MEDICINE
Payer: MEDICARE

## 2024-01-22 ENCOUNTER — LABORATORY RESULT (OUTPATIENT)
Age: 59
End: 2024-01-22

## 2024-01-22 ENCOUNTER — NON-APPOINTMENT (OUTPATIENT)
Age: 59
End: 2024-01-22

## 2024-01-22 VITALS
HEIGHT: 62.99 IN | RESPIRATION RATE: 17 BRPM | OXYGEN SATURATION: 92 % | HEART RATE: 97 BPM | TEMPERATURE: 99 F | SYSTOLIC BLOOD PRESSURE: 150 MMHG | DIASTOLIC BLOOD PRESSURE: 81 MMHG | WEIGHT: 277.34 LBS

## 2024-01-22 DIAGNOSIS — Z90.710 ACQUIRED ABSENCE OF BOTH CERVIX AND UTERUS: Chronic | ICD-10-CM

## 2024-01-22 DIAGNOSIS — E27.8 OTHER SPECIFIED DISORDERS OF ADRENAL GLAND: Chronic | ICD-10-CM

## 2024-01-22 DIAGNOSIS — Z98.89 OTHER SPECIFIED POSTPROCEDURAL STATES: Chronic | ICD-10-CM

## 2024-01-22 DIAGNOSIS — I77.6 ARTERITIS, UNSPECIFIED: Chronic | ICD-10-CM

## 2024-01-22 DIAGNOSIS — R18.8 OTHER ASCITES: ICD-10-CM

## 2024-01-22 DIAGNOSIS — Z90.721 ACQUIRED ABSENCE OF OVARIES, UNILATERAL: Chronic | ICD-10-CM

## 2024-01-22 DIAGNOSIS — Z98.890 OTHER SPECIFIED POSTPROCEDURAL STATES: Chronic | ICD-10-CM

## 2024-01-22 DIAGNOSIS — N89.8 OTHER SPECIFIED NONINFLAMMATORY DISORDERS OF VAGINA: Chronic | ICD-10-CM

## 2024-01-22 LAB
ALBUMIN SERPL ELPH-MCNC: 3 G/DL — LOW (ref 3.5–5)
ALP SERPL-CCNC: 126 U/L — HIGH (ref 40–120)
ALT FLD-CCNC: 48 U/L DA — SIGNIFICANT CHANGE UP (ref 10–60)
ANION GAP SERPL CALC-SCNC: 1 MMOL/L — LOW (ref 5–17)
APTT BLD: 32.2 SEC — SIGNIFICANT CHANGE UP (ref 24.5–35.6)
AST SERPL-CCNC: 39 U/L — SIGNIFICANT CHANGE UP (ref 10–40)
BASOPHILS # BLD AUTO: 0.02 K/UL — SIGNIFICANT CHANGE UP (ref 0–0.2)
BASOPHILS NFR BLD AUTO: 0.3 % — SIGNIFICANT CHANGE UP (ref 0–2)
BILIRUB SERPL-MCNC: 0.3 MG/DL — SIGNIFICANT CHANGE UP (ref 0.2–1.2)
BUN SERPL-MCNC: 18 MG/DL — SIGNIFICANT CHANGE UP (ref 7–18)
CALCIUM SERPL-MCNC: 9.8 MG/DL — SIGNIFICANT CHANGE UP (ref 8.4–10.5)
CHLORIDE SERPL-SCNC: 100 MMOL/L — SIGNIFICANT CHANGE UP (ref 96–108)
CK MB BLD-MCNC: 2.4 % — SIGNIFICANT CHANGE UP (ref 0–3.5)
CK MB CFR SERPL CALC: 1.9 NG/ML — SIGNIFICANT CHANGE UP (ref 0–3.6)
CK SERPL-CCNC: 79 U/L — SIGNIFICANT CHANGE UP (ref 21–215)
CO2 SERPL-SCNC: 35 MMOL/L — HIGH (ref 22–31)
CREAT SERPL-MCNC: 1.04 MG/DL — SIGNIFICANT CHANGE UP (ref 0.5–1.3)
EGFR: 62 ML/MIN/1.73M2 — SIGNIFICANT CHANGE UP
EOSINOPHIL # BLD AUTO: 0.23 K/UL — SIGNIFICANT CHANGE UP (ref 0–0.5)
EOSINOPHIL NFR BLD AUTO: 3.4 % — SIGNIFICANT CHANGE UP (ref 0–6)
GLUCOSE SERPL-MCNC: 159 MG/DL — HIGH (ref 70–99)
HCT VFR BLD CALC: 38.3 % — SIGNIFICANT CHANGE UP (ref 34.5–45)
HGB BLD-MCNC: 11 G/DL — LOW (ref 11.5–15.5)
IMM GRANULOCYTES NFR BLD AUTO: 0.4 % — SIGNIFICANT CHANGE UP (ref 0–0.9)
INR BLD: 1.12 RATIO — SIGNIFICANT CHANGE UP (ref 0.85–1.18)
LYMPHOCYTES # BLD AUTO: 1.58 K/UL — SIGNIFICANT CHANGE UP (ref 1–3.3)
LYMPHOCYTES # BLD AUTO: 23.7 % — SIGNIFICANT CHANGE UP (ref 13–44)
MCHC RBC-ENTMCNC: 23 PG — LOW (ref 27–34)
MCHC RBC-ENTMCNC: 28.7 GM/DL — LOW (ref 32–36)
MCV RBC AUTO: 80 FL — SIGNIFICANT CHANGE UP (ref 80–100)
MONOCYTES # BLD AUTO: 0.51 K/UL — SIGNIFICANT CHANGE UP (ref 0–0.9)
MONOCYTES NFR BLD AUTO: 7.6 % — SIGNIFICANT CHANGE UP (ref 2–14)
NEUTROPHILS # BLD AUTO: 4.3 K/UL — SIGNIFICANT CHANGE UP (ref 1.8–7.4)
NEUTROPHILS NFR BLD AUTO: 64.6 % — SIGNIFICANT CHANGE UP (ref 43–77)
NRBC # BLD: 0 /100 WBCS — SIGNIFICANT CHANGE UP (ref 0–0)
NT-PROBNP SERPL-SCNC: 171 PG/ML — HIGH (ref 0–125)
PLATELET # BLD AUTO: 323 K/UL — SIGNIFICANT CHANGE UP (ref 150–400)
POTASSIUM SERPL-MCNC: 4.2 MMOL/L — SIGNIFICANT CHANGE UP (ref 3.5–5.3)
POTASSIUM SERPL-SCNC: 4.2 MMOL/L — SIGNIFICANT CHANGE UP (ref 3.5–5.3)
PROT SERPL-MCNC: 7.9 G/DL — SIGNIFICANT CHANGE UP (ref 6–8.3)
PROTHROM AB SERPL-ACNC: 12.7 SEC — SIGNIFICANT CHANGE UP (ref 9.5–13)
RAPID RVP RESULT: SIGNIFICANT CHANGE UP
RBC # BLD: 4.79 M/UL — SIGNIFICANT CHANGE UP (ref 3.8–5.2)
RBC # FLD: 15.2 % — HIGH (ref 10.3–14.5)
SARS-COV-2 RNA SPEC QL NAA+PROBE: SIGNIFICANT CHANGE UP
SODIUM SERPL-SCNC: 136 MMOL/L — SIGNIFICANT CHANGE UP (ref 135–145)
TROPONIN I, HIGH SENSITIVITY RESULT: 24.9 NG/L — SIGNIFICANT CHANGE UP
WBC # BLD: 6.67 K/UL — SIGNIFICANT CHANGE UP (ref 3.8–10.5)
WBC # FLD AUTO: 6.67 K/UL — SIGNIFICANT CHANGE UP (ref 3.8–10.5)

## 2024-01-22 PROCEDURE — 74177 CT ABD & PELVIS W/CONTRAST: CPT | Mod: 26,MA

## 2024-01-22 PROCEDURE — 99222 1ST HOSP IP/OBS MODERATE 55: CPT

## 2024-01-22 PROCEDURE — 71275 CT ANGIOGRAPHY CHEST: CPT | Mod: 26,MA

## 2024-01-22 PROCEDURE — 71045 X-RAY EXAM CHEST 1 VIEW: CPT | Mod: 26

## 2024-01-22 PROCEDURE — 99285 EMERGENCY DEPT VISIT HI MDM: CPT

## 2024-01-22 RX ORDER — LANOLIN ALCOHOL/MO/W.PET/CERES
3 CREAM (GRAM) TOPICAL AT BEDTIME
Refills: 0 | Status: DISCONTINUED | OUTPATIENT
Start: 2024-01-22 | End: 2024-01-31

## 2024-01-22 RX ORDER — IPRATROPIUM/ALBUTEROL SULFATE 18-103MCG
3 AEROSOL WITH ADAPTER (GRAM) INHALATION ONCE
Refills: 0 | Status: COMPLETED | OUTPATIENT
Start: 2024-01-22 | End: 2024-01-22

## 2024-01-22 RX ORDER — ONDANSETRON 8 MG/1
4 TABLET, FILM COATED ORAL EVERY 8 HOURS
Refills: 0 | Status: DISCONTINUED | OUTPATIENT
Start: 2024-01-22 | End: 2024-01-31

## 2024-01-22 RX ADMIN — Medication 3 MILLILITER(S): at 17:07

## 2024-01-22 RX ADMIN — Medication 125 MILLIGRAM(S): at 17:06

## 2024-01-22 NOTE — H&P ADULT - PROBLEM SELECTOR PLAN 3
presenting with left sided abdominal pain associated with left flank pain  h/o RCC s/p L renal resection 11/3/2023

## 2024-01-22 NOTE — H&P ADULT - PROBLEM SELECTOR PLAN 2
h/o COPD on 4L O2 PRN at home  p/w SOB and productive cough  currently on 4L, saturating 94% on admission  uses duoneb, albuterol, symbicort, fluticasone, and ventolin at home  will continue duoneb, albuterol and symbicort for now  maintain O2 88-92%

## 2024-01-22 NOTE — ED PROVIDER NOTE - PROGRESS NOTE DETAILS
What Type Of Note Output Would You Prefer (Optional)?: Bullet Format Is The Patient Presenting As Previously Scheduled?: Yes How Severe Is Your Rash?: mild Is This A New Presentation, Or A Follow-Up?: Rash Additional History: C/o eyelid eczema. Eczema spreading to body MD Glenda: rvp negative. CT CAP:  IMPRESSION:  *  No pulmonary embolism.  *  No acute pathology.  *  Interval left radical nephrectomy with a 7.2 x 6.0 cm postoperative   collection in the nephrectomy bed. No evidence of air or well formed wall.  *  4.8 x 3.1 cm left abdominal wall collection, likely postoperative.    urology consulted and saw pt in ED, recommend medicine admission and possible IR drainage of fluid collections, they will follow inpatient. d/w night attending and mar who accepts pt for admission

## 2024-01-22 NOTE — ED PROVIDER NOTE - OBJECTIVE STATEMENT
58F c PMH of asthma, COPD (on 4L NC PRN), cervica neuralgia, diverticulosis, DJD, DM, fatty liver, fibromyalgia, R shoulder bursitis, HLD, hypothyroidism, NAKUL, psoriasis, RCC (s/p L renal resection 11/3/23, no hx of CT/RT), PSH of L adrenal mass excision, abdominal hysterectomy, CS, L oophorectomy p/w several day hx of substernal non-radiating CP worse on inspiration with associated SOB and MUÑOZ with associated cough with non-bloody sputum production. not on hormones. no personal/FH of DVT/PE. no recent travel. pt had L renal resection in November and today reports her L lower abdominal incision has not been well healing. also reports that for past few days pt has had increased sob and cough with non-bloody sputum production. denies hemoptysis. states that she has had increased O2 requirements, usually wears 4L NC PRN but for past few days has needed it continuously. Also reports her baseline O2 IS 95% on RA and for past few days has been 88% on RA.

## 2024-01-22 NOTE — ED PROVIDER NOTE - PHYSICAL EXAMINATION
GENERAL:  Awake, alert and in NAD, non-toxic appearing  ENMT: Airway patent  EYES: conjunctiva clear  CARDIAC: Regular rate, regular rhythm.  Heart sounds S1, S2, no S3, S4. No murmurs, rubs or gallops.  RESPIRATORY: Breath sounds clear and equal in bilateral anterior lung fields, no wheezes/ronchi/crackles/stridor; pt breathing and speaking comfortably with no increased WOB, no accessory mm. use, no intercostal retractions, no nasal flaring  GI: abdomen soft, non-distended, non-tender, no rebound or guarding, two well healed scars in upper abdomen, 1 linear 1 cm scar in LLQ that shows small amount of superficial ulceration with no surrounding exudate/bleeding/crepitius/fluctuance/induration   SKIN: warm and dry, no rashes  PSYCH: awake, alert, calm and cooperative  EXTREMITIES: bilateral non-pitting edema   NEURO: gcs 15

## 2024-01-22 NOTE — H&P ADULT - NSHPPHYSICALEXAM_GEN_ALL_CORE
General - NAD, sitting up in bed, obese  Eyes - PERRLA, EOM intact  ENT - Nonicteric sclerae, PERRLA, EOMI. Oropharynx clear. Moist mucous membranes. Conjunctivae appear well perfused.   Neck - No noticeable or palpable swelling, redness or rash around throat or on face  Lymph Nodes - No lymphadenopathy  Cardiovascular - RRR no m/r/g, no JVD, no carotid bruits  Lungs - Diminished breath sounds b/l bases, no use of accessory muscles, no crackles or wheezes.  Skin - (+) LLQ laparoscopic incision open, no drainage noted, nonerythematous.(+) Psoriatic patches noted on b/l shins and left forearm. No skin warm and dry, no erythematous areas  Abdomen - (+) LUQ tender to palpation. Normal bowel sounds, abdomen soft and nontender  Rectal – Rectal exam not performed since no symptoms indicated blood loss.  Extremities - (+) nonpitting edema noted b/l LEs. No cyanosis or clubbing  Musculoskeletal - (+) L CVA tenderness to palpation.  5/5 strength, normal range of motion, no swollen or erythematous joints.  Neuro– Alert and oriented x 3, CN 2-12 grossly intact.

## 2024-01-22 NOTE — ED ADULT NURSE NOTE - NSFALLUNIVINTERV_ED_ALL_ED
Bed/Stretcher in lowest position, wheels locked, appropriate side rails in place/Call bell, personal items and telephone in reach/Instruct patient to call for assistance before getting out of bed/chair/stretcher/Non-slip footwear applied when patient is off stretcher/[place] to call system/Physically safe environment - no spills, clutter or unnecessary equipment/Purposeful proactive rounding/Room/bathroom lighting operational, light cord in reach

## 2024-01-22 NOTE — H&P ADULT - PROBLEM SELECTOR PLAN 4
h/o DM  takes levemir 16 at bedtime and novolog 6U premeals  blood glucose 159 on admission  will continue with lantus 16 and lispro 6 TID with ISS  monitor FS h/o DM  takes levemir 16 at bedtime and novolog 6U premeals  blood glucose 159 on admission  will continue with lantus 16 and lispro 6 TID with diet resumed  ISS q6 for now while NPO  monitor FS

## 2024-01-22 NOTE — PROGRESS NOTE ADULT - ASSESSMENT
59 y/o diabetic female s/p L radical nephrectomy 11/2023 w/ 7x6cm postop collection in nephrectomy bed and open LLQ wound a/w CP/SOB  VSS, afebrile, no leukocytosis    Plan   - medical admission   - start IV antibiotics  - IR eval for drainage of collection in AM  - local wound care daily  - pain control prn  - cardio / pulm workup   - remainder of care as per primary team  - uro to follow     59 y/o diabetic female s/p L radical nephrectomy 11/2023 w/ 7x6cm postop collection in nephrectomy bed and open LLQ wound a/w CP/SOB  VSS, afebrile, no leukocytosis    Plan   - CT images reviewed  - Labs reviewed  - start IV antibiotics  - IR eval for drainage of collection in AM  - local wound care daily  - pain control prn  - cardio / pulm workup   - remainder of care as per primary team  - Discussed with PA staff

## 2024-01-22 NOTE — ED ADULT TRIAGE NOTE - CHIEF COMPLAINT QUOTE
patient reports hx of COPD on 4: NC at home complaining of shortness of breath. patient also complaining of non healing surgical wound s/p renal carcinoma removal patient reports hx of COPD on 4L NC at home complaining of shortness of breath. patient also complaining of non healing surgical wound s/p renal carcinoma removal

## 2024-01-22 NOTE — ED PROVIDER NOTE - CLINICAL SUMMARY MEDICAL DECISION MAKING FREE TEXT BOX
58F c PMH of asthma, COPD (on 4L NC PRN), cervica neuralgia, diverticulosis, DJD, DM, fatty liver, fibromyalgia, R shoulder bursitis, HLD, hypothyroidism, NAKUL, psoriasis, RCC (s/p L renal resection 11/3/23, no hx of CT/RT), PSH of L adrenal mass excision, abdominal hysterectomy, CS, L oophorectomy p/w several day hx of substernal non-radiating CP worse on inspiration with associated SOB and MUÑOZ with associated cough with non-bloody sputum production. On exam, /81, VS otherwise wnl, +bilateral leg edema.     ddx: copd exacerbation vs asthma exacerbation vs pe vs chf. concern for post-surgical abscess given poorly healing surgical scar    Plan:   - ekg: nsr no clifton/std/twi  - labs: cbc grossly wnl, cmp grossly wnl, bnp mildly elevated, troponin wnl, d-dimer elevated 406  - rvp negative  - pt obtained CTPE and CTAP pendign final read

## 2024-01-22 NOTE — H&P ADULT - PROBLEM SELECTOR PLAN 5
h/o HTN  takes amlodipine, HCTZ, losartan, h/o HTN  takes amlodipine, HCTZ, losartan at home  /81 on admission  will continue home meds with parameters  monitor BP

## 2024-01-22 NOTE — H&P ADULT - ATTENDING COMMENTS
Vital Signs Last 24 Hrs  T(C): 37 (22 Jan 2024 15:46), Max: 37 (22 Jan 2024 15:46)  T(F): 98.6 (22 Jan 2024 15:46), Max: 98.6 (22 Jan 2024 15:46)  HR: 97 (22 Jan 2024 15:46) (97 - 97)  BP: 150/81 (22 Jan 2024 15:46) (150/81 - 150/81)  BP(mean): --  RR: 17 (22 Jan 2024 15:46) (17 - 17)  SpO2: 92% (22 Jan 2024 15:46) (92% - 92%)    Parameters below as of 22 Jan 2024 15:46  Patient On (Oxygen Delivery Method): room air Vital Signs Last 24 Hrs  T(C): 37 (22 Jan 2024 15:46), Max: 37 (22 Jan 2024 15:46)  T(F): 98.6 (22 Jan 2024 15:46), Max: 98.6 (22 Jan 2024 15:46)  HR: 97 (22 Jan 2024 15:46) (97 - 97)  BP: 150/81 (22 Jan 2024 15:46) (150/81 - 150/81)  RR: 17 (22 Jan 2024 15:46) (17 - 17)  SpO2: 92% (22 Jan 2024 15:46) (92% - 92%)  Parameters below as of 22 Jan 2024 15:46  Patient On (Oxygen Delivery Method): room air    Labs reviewed  unremarkable     CTA chest / Abd CT   *  No pulmonary embolism.  *  No acute pathology.  *  Interval left radical nephrectomy with a 7.2 x 6.0 cm postoperative   collection in the nephrectomy bed. No evidence of air or well formed wall.  *  4.8 x 3.1 cm left abdominal wall collection, likely postoperative.    Impression   - Acute URI likely viral  with nasal congestion, cough and post nasal drip   improved with bronchodilators  - Incidental finding of post op intraabdominal surgical bed and left abdominal wall collection likely  SEROMA with no evidence of infection clinically or on work up  - Chronic respiratory failure  - Hx of COPD  - DM - suboptimal control   - Hx of RCC    - Plan   Admit to Medicine   Supportive care including bronchodilators  Resume home meds after med reconciliation  IR consult for drainage of suspected seroma   Continue supplemental O2   Other plans as above

## 2024-01-22 NOTE — ED ADULT NURSE NOTE - CHIEF COMPLAINT QUOTE
patient reports hx of COPD on 4: NC at home complaining of shortness of breath. patient also complaining of non healing surgical wound s/p renal carcinoma removal

## 2024-01-22 NOTE — H&P ADULT - PROBLEM SELECTOR PLAN 1
presenting with left sided abdominal pain associated with left flank pain  h/o RCC s/p L renal resection 11/3/2023  CTAP: Interval left radical nephrectomy with a 7.2 x 6.0 cm postoperative collection in the nephrectomy bed. No evidence of air or well formed wall. 4.8 x 3.1 cm left abdominal wall collection, likely postoperative.  No signs of infection, LLQ lap site open with no drainage, minimal serosanguinous drainage at home x 2 days  Afeb, VSS  Urology following, recommending IR eval for drainage in AM  pain control: tylenol for mild, toradol 15 for mod, toradol 30 for severe  NPO after MN, may resume if IR eval does not warrant drainage.

## 2024-01-22 NOTE — H&P ADULT - HISTORY OF PRESENT ILLNESS
58F PMH of asthma, COPD (on 4L NC PRN), DM, fatty liver, fibromyalgia, R shoulder bursitis, HLD, hypothyroidism, NAKUL, psoriasis, RCC (s/p L renal resection 11/3/23, no hx of CT/RT) presenting with lower abdominal pain and SOB. Pt states that she has had a SOB for the past 3-4 days, associated with a productive cough with yellow sputum. She has also had LLQ abdominal pain associated with flank pain since her L renal resection in November The abdominal pain is nonradiating, 9/10, stabbing, and intermittent. The lower laparoscopic site was not closing and healing properly so she was seen by the heme/onc NP outpatient and was prescribed cefadroxil 500 BID, last day supposed to be today. She also noticed that she had serosanguinous fluid draining from the lower laparoscopic site for the past 2 days whenever she moved around. She denies any fevers, chills, vomiting, diarrhea, dysuria, numbness/tingling/weakness in extremities. Pt lives with her son and ambulates with a cane.

## 2024-01-22 NOTE — H&P ADULT - ASSESSMENT
58F PMH of asthma, COPD (on 4L NC PRN), DM, fatty liver, fibromyalgia, R shoulder bursitis, HLD, hypothyroidism, NAKUL, psoriasis, RCC (s/p L renal resection 11/3/23, no hx of CT/RT) presenting with lower abdominal pain and SOB admitted for left sided abdominal pain.

## 2024-01-22 NOTE — PROGRESS NOTE ADULT - ENMT
[facility] Call Center    Phone Message:  Pt would like a CALL BACK to discuss her MRI results.    May a detailed message be left on voicemail: Yes     Reason for Call: Other: MRI Review, Results, and Follow Up      Action Taken: Routed to [facility]    Travel Screening: Not Applicable                                                                       no gross abnormalities

## 2024-01-22 NOTE — ED ADULT NURSE REASSESSMENT NOTE - NS ED NURSE REASSESS COMMENT FT1
assumed care. pt resting in bed in NAD att. RR even and unlabored. no new complaints/concerns at this time. bed locked and in lowest position for safety. p ct. assumed care. pt resting in bed in NAD att. RR even and unlabored. no new complaints/concerns at this time. pt noted to have LLQ laparoscopic open incision, no drainage noted. bed locked and in lowest position for safety. p ct.

## 2024-01-22 NOTE — H&P ADULT - NSHPREVIEWOFSYSTEMS_GEN_ALL_CORE
CONSTITUTIONAL: No fever, weight loss, or fatigue  RESPIRATORY: (+) SOB and productive cough. No wheezing, chills or hemoptysis;   CARDIOVASCULAR: No chest pain, palpitations, dizziness, or leg swelling  GASTROINTESTINAL: (+) nausea, LLQ abdominal pain assoc. with left flank pain. No diarrhea or constipation. No melena or hematochezia.  GENITOURINARY: No dysuria or hematuria, urinary frequency  NEUROLOGICAL: No headaches, memory loss, loss of strength, numbness, or tremors  ENDOCRINE: No polyuria, polydipsia, or heat/cold intolerance  MUSKULOSKELETAL: No muscle aches, joint pains  HEME: no easy bruisability, no tender or enlarged lymph nodes  SKIN: No itching, burning, rashes, or lesions .

## 2024-01-22 NOTE — ED ADULT NURSE NOTE - OBJECTIVE STATEMENT
pt presents co SOB and post nasal drip x 4 days and c/o non healing surgical wound (s/p renal carcinoma removal). pmh: COPD 4L NC @home, bronchitis

## 2024-01-22 NOTE — ED PROVIDER NOTE - NS ED ROS FT
positive: cp, sob, cough, sputum production, leg edema bilat  negative: f/c/hemoptysis/abdominal pain/n/v/d/dysuria/hematuria/rash

## 2024-01-22 NOTE — PROGRESS NOTE ADULT - SUBJECTIVE AND OBJECTIVE BOX
UROLOGY CONSULT   59 y/o female with multiple comorbidities including HTN, T2 DM, Asthma, HLD, morbid obesity, NAKUL, psoriasis and past surgical history of lap L radical nephrectomy 2/2 RCC (2023), adrenal gland removal, C-secton, fibroid removal, oopherctomy presents to [facility] with multiple complaints. Pt admits most distressing complaint is congestion with chest pain/black pain and shortness of breath. Pt admits to worsening shortness of breath over the past week. Pt also presenting due to open draining L nephrectomy incision w/ associated L flank pain. Pt admits wound never fully closed since surgery, continues to drain despite constant local wound care at home w/ saline and dry gauze. Pt admits to following up with urologist, Dr. Peck, who prescribed PO antibiotics last week. Pt denies improvement. states L flank pain has been worsening, worse with movement. Endorses chills at home without documented fever. Denies nausea, vomiting, severe abd pain. Denies urinary complaints.     PAST MEDICAL & SURGICAL HISTORY:  Hypertension  vaginal cyst  Hyperlipidemia  Asthma  last inhaler use with in 10 days, on Pulm follow up [id number]  Hypothyroid  Obstructive sleep apnea  refuses Cpap  Obesity  morbid  Trigeminal neuralgia  R  Fibromyalgia  DM (diabetes mellitus)  2  DJD (degenerative joint disease)  Cervical/Thoracic/Lumbar  Cervical neuralgia  difficulty moving my neck  Back pain  thoracic & lumbar  H/O bursitis  right shoulder  Radicular pain  arms, legs  Vasculitis  Legs, forerhead, arms & hands, flare ups in R leg  Dr susie Ribeiro is my Rheumatologist  Renal cell carcinoma, left  on follow up Dr schulte, HOD renal [facility], waiting for suregry in 2020  Falls frequently  Morbid obesity with body mass index (BMI) of 50.0 to 59.9 in adult  Psoriasis  Diverticulosis  Fatty liver  S/P abdominal hysterectomy    S/P  section  1  Left adrenal mass  excision, benign 2006  Vaginal cyst  removed   Vasculitis on nerve biopsy  , had another surgery called microvascular decompression   S/P left oophorectomy  S/P colonoscopic polypectomy          MEDICATIONS  (STANDING):    MEDICATIONS  (PRN):      Allergies    gadobutrol (Rash; Urticaria; Hives)  venlafaxine (Hives)  mushroom (Unknown)  IV Contrast (Short breath)  Fioricet (Rash)    Intolerances        Vital Signs Last 24 Hrs  T(C): 37 (2024 15:46), Max: 37 (2024 15:46)  T(F): 98.6 (2024 15:46), Max: 98.6 (2024 15:46)  HR: 97 (2024 15:46) (97 - 97)  BP: 150/81 (2024 15:46) (150/81 - 150/81)  BP(mean): --  RR: 17 (2024 15:46) (17 - 17)  SpO2: 92% (2024 15:46) (92% - 92%)    Parameters below as of 2024 15:46  Patient On (Oxygen Delivery Method): room air        Physical:  Gen: A&Ox3. NAD  Resp: Unlabored breathing.   Abd: Obese, soft, ND, nontender to palpation diffusely. Open 6cm incision on LLQ draining minimal SS fluid. Thickened skin edges, nonerythematous, nonedematous. Wound base w/ granulomatous tissue. No purulence/ fluctuance/crepitus palpated. No active bleeding. Well healed pfannenstiel incision. Well healed lap ports and L nephrectomy incision site.   : +CVAT on L side. Voiding freely.         I&O's Detail      LABS:                        11.0   6.67  )-----------( 323      ( 2024 16:52 )             38.3                  136  |  100  |  18  ----------------------------<  159<H>  4.2   |  35<H>  |  1.04    Ca    9.8      2024 16:52    TPro  7.9  /  Alb  3.0<L>  /  TBili  0.3  /  DBili  x   /  AST  39  /  ALT  48  /  AlkPhos  126<H>              PT/INR - ( 2024 16:52 )   PT: 12.7 sec;   INR: 1.12 ratio         PTT - ( 2024 16:52 )  PTT:32.2 sec  Urinalysis Basic - ( 2024 16:52 )    Color: x / Appearance: x / SG: x / pH: x  Gluc: 159 mg/dL / Ketone: x  / Bili: x / Urobili: x   Blood: x / Protein: x / Nitrite: x   Leuk Esterase: x / RBC: x / WBC x   Sq Epi: x / Non Sq Epi: x / Bacteria: x        RADIOLOGY & ADDITIONAL STUDIES:  < from: CT Abdomen and Pelvis w/ IV Cont (24 @ 19:56) >    ACC: [id number] EXAM:  CT ABDOMEN AND PELVIS IC   ORDERED BY: JUSTIN MARTIN     ACC: [id number] EXAM:  CT ANGIO CHEST PULM ART WAWIC   ORDERED BY: JUSTIN MARTIN     PROCEDURE DATE:  2024          INTERPRETATION:  CLINICAL INFORMATION:Rule out postoperative PE, L renal   resection now with LLQ poorly healing surgical scar    COMPARISON: Chest CT 2023, CT abdomen and pelvis 2023    CONTRAST/COMPLICATIONS:  IV Contrast: Omnipaque 350 (accession [id number]), IV contrast documented   in unlinked concurrent exam (accession [id number])  90 cc administered   10   cc discarded  Oral Contrast: NONE  Complications: None reported at time of study completion    PROCEDURE:  CT Angiography of the Chest was performed followed by portal venous phase   imaging of the Abdomen and Pelvis.  Sagittal and coronal reformats were performed as well as 3D (MIP)   reconstructions.    FINDINGS:  CHEST:  PULMONARY ARTERIES: No pulmonary embolism.    LUNGS AND LARGE AIRWAYS: The central airways are patent. The lungs are   clear.  PLEURA: No pleural abnormality.  VESSELS: Normal caliber aorta.  HEART: No cardiomegaly. No pericardial effusion. Coronary artery   calcifications are present.  MEDIASTINUM AND KATARINA: No adenopathy.  CHEST WALL AND LOWER NECK: No masses.    ABDOMEN AND PELVIS:  LIVER: Diffuse steatosis.  BILE DUCTS: Nondilated.  GALLBLADDER: Normal.  SPLEEN: Normal.  PANCREAS: Normal.  ADRENALS: Stable small right adrenal nodule. Left adrenalectomy.  KIDNEYS/URETERS: Interval left radical nephrectomy with a 7.2 x 6.0 cm   postoperative collection in the nephrectomy bed. No evidence of air or   well formed wall. Right kidney is normal.    BLADDER: Normal.  REPRODUCTIVE ORGANS: No masses.    BOWEL: No bowel-related abnormality. No bowel obstruction or bowel   inflammation. Normal appendix and ileocecal region. No evidence of active   colitis or diverticulitis.  PERITONEUM: No free air or ascites.  VESSELS: Normal caliber aorta. No renal vein thrombosis.  RETROPERITONEUM/LYMPHNODES: No adenopathy.  ABDOMINAL WALL: 4.8 x 3.1 cm left abdominal wall collection, likely   postoperative. Additional areas of abdominal wall edema.  BONES: No acute bony abnormality.    IMPRESSION:  *  No pulmonary embolism.  *  No acute pathology.  *  Interval left radical nephrectomy with a 7.2 x 6.0 cm postoperative   collection in the nephrectomy bed. No evidence of air or well formed wall.  *  4.8 x 3.1 cm left abdominal wall collection, likely postoperative.      --- End of Report ---            CANDELARIAVIET DYSON MD; Attending Radiologist  This document has been electronically signed. 2024  8:49PM    < end of copied text >       UROLOGY CONSULT   57 y/o female with multiple comorbidities including HTN, T2 DM, Asthma, HLD, morbid obesity, NAKUL, psoriasis and past surgical history of lap L radical nephrectomy 2/2 RCC (2023), adrenal gland removal, C-secton, fibroid removal, oopherctomy presents to [facility] with multiple complaints. Pt admits most distressing complaint is congestion with chest pain/black pain and shortness of breath. Pt admits to worsening shortness of breath over the past week. Pt also presenting due to open draining L nephrectomy incision w/ associated L flank pain. Pt admits wound never fully closed since surgery, continues to drain despite constant local wound care at home w/ saline and dry gauze. Pt admits to following up with urologist, Dr. Peck, who prescribed PO antibiotics last week. Pt denies improvement. states L flank pain has been worsening, worse with movement. Endorses chills at home without documented fever. Denies nausea, vomiting, severe abd pain. Denies urinary complaints.     PAST MEDICAL & SURGICAL HISTORY:  Hypertension  vaginal cyst  Hyperlipidemia  Asthma  last inhaler use with in 10 days, on Pulm follow up [id number]  Hypothyroid  Obstructive sleep apnea  refuses Cpap  Obesity  morbid  Trigeminal neuralgia  R  Fibromyalgia  DM (diabetes mellitus)  2  DJD (degenerative joint disease)  Cervical/Thoracic/Lumbar  Cervical neuralgia  difficulty moving my neck  Back pain  thoracic & lumbar  H/O bursitis  right shoulder  Radicular pain  arms, legs  Vasculitis  Legs, forerhead, arms & hands, flare ups in R leg  Dr susie Ribeiro is my Rheumatologist  Renal cell carcinoma, left  on follow up Dr schulte, HOD renal [facility], waiting for suregry in 2020  Falls frequently  Morbid obesity with body mass index (BMI) of 50.0 to 59.9 in adult  Psoriasis  Diverticulosis  Fatty liver  S/P abdominal hysterectomy  2003  S/P  section  1  Left adrenal mass  excision, benign 2006  Vaginal cyst  removed   Vasculitis on nerve biopsy  , had another surgery called microvascular decompression   S/P left oophorectomy  S/P colonoscopic polypectomy    Family History: No family history of  malignancy  Social History: Does not smoke    ROS: All others negative except as noted above.      Home Medications:  acetaminophen 325 mg oral tablet: 3 tab(s) orally every 6 hours as needed for pain (2023 10:07)  albuterol 2.5 mg/3 mL (0.083%) inhalation solution: 3 milliliter(s) by nebulizer every 6 hours as needed for  shortness of breath and/or wheezing (2023 10:51)  amLODIPine 10 mg oral tablet: 1 tab(s) orally once a day (2024 02:14)  atorvastatin 40 mg oral tablet: 1 tab(s) orally once a day (at bedtime) (2023 10:51)  clindamycin 1% topical lotion: Apply topically to affected area 2 times a day (2023 10:51)  cyclobenzaprine 10 mg oral tablet: 1 tab(s) orally once a day (at bedtime) (2023 10:51)  ergocalciferol 1.25 mg (50,000 intl units) oral capsule: 1 cap(s) orally once a week (2023 10:51)  fluticasone 50 mcg/inh inhalation powder: 1 puff(s) inhaled 2 times a day (2023 10:51)  gabapentin 400 mg oral capsule: 1 cap(s) orally 2 times a day (2023 10:51)  hydroCHLOROthiazide 12.5 mg oral tablet: 1 tab(s) orally once a day (in the morning) (2023 10:51)  ipratropium 21 mcg/inh (0.03%) nasal spray: 2 spray(s) intranasally (2023 10:51)  Levemir 100 units/mL subcutaneous solution: 16 unit(s) subcutaneous once a day (at bedtime) (2023 10:07)  levothyroxine 112 mcg (0.112 mg) oral tablet: 1 tab(s) orally once a day (2023 10:51)  losartan 25 mg oral tablet: 1 tab(s) orally once a day (in the morning) (2023 10:51)  metFORMIN 1000 mg oral tablet: 1 tab(s) orally every 12 hours (2023 10:51)  montelukast 10 mg oral tablet: 1 tab(s) orally once a day (2023 10:51)  NovoLOG FlexPen 100 units/mL injectable solution: injectable 3 times a day (before meals) 6 units for blood sugar over 120 (2023 10:51)  OXcarbazepine 600 mg oral tablet: 1 tab(s) orally 2 times a day (2023 10:51)  Ozempic 4 mg/3 mL (1 mg dose) subcutaneous solution: 1 milligram(s) subcutaneously 0.5mg Every  (2023 10:51)  pantoprazole 40 mg oral delayed release tablet: 1 tab(s) orally once a day as needed for acid reflux (2023 10:51)  Promethazine- DM-6.25-15mg/5ml- 5 ml twice daily PRN:  (2023 10:51)  senna leaf extract oral tablet: 2 tab(s) orally once a day (at bedtime) As needed Constipation (2023 10:01)  Symbicort 160 mcg-4.5 mcg/inh inhalation aerosol: 2 puff(s) inhaled 2 times a day (2023 10:51)  triamcinolone 0.1% topical ointment: Apply topically to affected area Apply twice daily for psoriasis on body for 2 weeks on 1 week off (2023 10:51)  Ventolin 90 mcg/inh inhalation aerosol: 2 puff(s) inhaled every 6 hours as needed for  shortness of breath and/or wheezing (2023 10:51)      Allergies    gadobutrol (Rash; Urticaria; Hives)  venlafaxine (Hives)  mushroom (Unknown)  IV Contrast (Short breath)  Fioricet (Rash)      Vital Signs Last 24 Hrs  T(C): 37 (2024 15:46), Max: 37 (2024 15:46)  T(F): 98.6 (2024 15:46), Max: 98.6 (2024 15:46)  HR: 97 (2024 15:46) (97 - 97)  BP: 150/81 (2024 15:46) (150/81 - 150/81)  BP(mean): --  RR: 17 (2024 15:46) (17 - 17)  SpO2: 92% (2024 15:46) (92% - 92%)    Parameters below as of 2024 15:46  Patient On (Oxygen Delivery Method): room air        Physical:  Gen: A&Ox3. NAD  Resp: Unlabored breathing.   Abd: Obese, soft, ND, nontender to palpation diffusely. Open 6cm incision on LLQ draining minimal SS fluid. Thickened skin edges, nonerythematous, nonedematous. Wound base w/ granulomatous tissue. No purulence/ fluctuance/crepitus palpated. No active bleeding. Well healed pfannenstiel incision. Well healed lap ports and L nephrectomy incision site.   : +CVAT on L side. Voiding freely.         I&O's Detail      LABS:                        11.0   6.67  )-----------( 323      ( 2024 16:52 )             38.3                  136  |  100  |  18  ----------------------------<  159<H>  4.2   |  35<H>  |  1.04    Ca    9.8      2024 16:52    TPro  7.9  /  Alb  3.0<L>  /  TBili  0.3  /  DBili  x   /  AST  39  /  ALT  48  /  AlkPhos  126<H>              PT/INR - ( 2024 16:52 )   PT: 12.7 sec;   INR: 1.12 ratio         PTT - ( 2024 16:52 )  PTT:32.2 sec  Urinalysis Basic - ( 2024 16:52 )    Color: x / Appearance: x / SG: x / pH: x  Gluc: 159 mg/dL / Ketone: x  / Bili: x / Urobili: x   Blood: x / Protein: x / Nitrite: x   Leuk Esterase: x / RBC: x / WBC x   Sq Epi: x / Non Sq Epi: x / Bacteria: x        RADIOLOGY & ADDITIONAL STUDIES:  < from: CT Abdomen and Pelvis w/ IV Cont (24 @ 19:56) >    ACC: [id number] EXAM:  CT ABDOMEN AND PELVIS IC   ORDERED BY: JUSTIN MARTIN     ACC: [id number] EXAM:  CT ANGIO CHEST PULM ART WAWIC   ORDERED BY: JUSTIN MARTIN     PROCEDURE DATE:  2024          INTERPRETATION:  CLINICAL INFORMATION:Rule out postoperative PE, L renal   resection now with LLQ poorly healing surgical scar    COMPARISON: Chest CT 2023, CT abdomen and pelvis 2023    CONTRAST/COMPLICATIONS:  IV Contrast: Omnipaque 350 (accession [id number]), IV contrast documented   in unlinked concurrent exam (accession [id number])  90 cc administered   10   cc discarded  Oral Contrast: NONE  Complications: None reported at time of study completion    PROCEDURE:  CT Angiography of the Chest was performed followed by portal venous phase   imaging of the Abdomen and Pelvis.  Sagittal and coronal reformats were performed as well as 3D (MIP)   reconstructions.    FINDINGS:  CHEST:  PULMONARY ARTERIES: No pulmonary embolism.    LUNGS AND LARGE AIRWAYS: The central airways are patent. The lungs are   clear.  PLEURA: No pleural abnormality.  VESSELS: Normal caliber aorta.  HEART: No cardiomegaly. No pericardial effusion. Coronary artery   calcifications are present.  MEDIASTINUM AND KATARINA: No adenopathy.  CHEST WALL AND LOWER NECK: No masses.    ABDOMEN AND PELVIS:  LIVER: Diffuse steatosis.  BILE DUCTS: Nondilated.  GALLBLADDER: Normal.  SPLEEN: Normal.  PANCREAS: Normal.  ADRENALS: Stable small right adrenal nodule. Left adrenalectomy.  KIDNEYS/URETERS: Interval left radical nephrectomy with a 7.2 x 6.0 cm   postoperative collection in the nephrectomy bed. No evidence of air or   well formed wall. Right kidney is normal.    BLADDER: Normal.  REPRODUCTIVE ORGANS: No masses.    BOWEL: No bowel-related abnormality. No bowel obstruction or bowel   inflammation. Normal appendix and ileocecal region. No evidence of active   colitis or diverticulitis.  PERITONEUM: No free air or ascites.  VESSELS: Normal caliber aorta. No renal vein thrombosis.  RETROPERITONEUM/LYMPHNODES: No adenopathy.  ABDOMINAL WALL: 4.8 x 3.1 cm left abdominal wall collection, likely   postoperative. Additional areas of abdominal wall edema.  BONES: No acute bony abnormality.    IMPRESSION:  *  No pulmonary embolism.  *  No acute pathology.  *  Interval left radical nephrectomy with a 7.2 x 6.0 cm postoperative   collection in the nephrectomy bed. No evidence of air or well formed wall.  *  4.8 x 3.1 cm left abdominal wall collection, likely postoperative.      --- End of Report ---            CANDELARIA DYSON MD; Attending Radiologist  This document has been electronically signed. 2024  8:49PM    < end of copied text >

## 2024-01-22 NOTE — ED PROVIDER NOTE - CARE PLAN
Principal Discharge DX:	Shortness of breath   1 Principal Discharge DX:	Abdominal fluid collection

## 2024-01-23 DIAGNOSIS — N18.9 CHRONIC KIDNEY DISEASE, UNSPECIFIED: ICD-10-CM

## 2024-01-23 DIAGNOSIS — I10 ESSENTIAL (PRIMARY) HYPERTENSION: ICD-10-CM

## 2024-01-23 DIAGNOSIS — E83.42 HYPOMAGNESEMIA: ICD-10-CM

## 2024-01-23 DIAGNOSIS — N18.30 CHRONIC KIDNEY DISEASE, STAGE 3 UNSPECIFIED: ICD-10-CM

## 2024-01-23 DIAGNOSIS — L40.9 PSORIASIS, UNSPECIFIED: ICD-10-CM

## 2024-01-23 DIAGNOSIS — Z29.9 ENCOUNTER FOR PROPHYLACTIC MEASURES, UNSPECIFIED: ICD-10-CM

## 2024-01-23 DIAGNOSIS — R18.8 OTHER ASCITES: ICD-10-CM

## 2024-01-23 DIAGNOSIS — E11.9 TYPE 2 DIABETES MELLITUS WITHOUT COMPLICATIONS: ICD-10-CM

## 2024-01-23 DIAGNOSIS — R06.02 SHORTNESS OF BREATH: ICD-10-CM

## 2024-01-23 DIAGNOSIS — C64.9 MALIGNANT NEOPLASM OF UNSPECIFIED KIDNEY, EXCEPT RENAL PELVIS: ICD-10-CM

## 2024-01-23 DIAGNOSIS — J44.9 CHRONIC OBSTRUCTIVE PULMONARY DISEASE, UNSPECIFIED: ICD-10-CM

## 2024-01-23 LAB
ANION GAP SERPL CALC-SCNC: 7 MMOL/L — SIGNIFICANT CHANGE UP (ref 5–17)
APTT BLD: 31.6 SEC — SIGNIFICANT CHANGE UP (ref 24.5–35.6)
BUN SERPL-MCNC: 22 MG/DL — HIGH (ref 7–18)
CALCIUM SERPL-MCNC: 10 MG/DL — SIGNIFICANT CHANGE UP (ref 8.4–10.5)
CHLORIDE SERPL-SCNC: 95 MMOL/L — LOW (ref 96–108)
CK MB BLD-MCNC: 1.7 % — SIGNIFICANT CHANGE UP (ref 0–3.5)
CK MB CFR SERPL CALC: 1.5 NG/ML — SIGNIFICANT CHANGE UP (ref 0–3.6)
CK SERPL-CCNC: 88 U/L — SIGNIFICANT CHANGE UP (ref 21–215)
CO2 SERPL-SCNC: 31 MMOL/L — SIGNIFICANT CHANGE UP (ref 22–31)
CREAT SERPL-MCNC: 1.44 MG/DL — HIGH (ref 0.5–1.3)
EGFR: 42 ML/MIN/1.73M2 — LOW
GLUCOSE BLDC GLUCOMTR-MCNC: 192 MG/DL — HIGH (ref 70–99)
GLUCOSE BLDC GLUCOMTR-MCNC: 196 MG/DL — HIGH (ref 70–99)
GLUCOSE BLDC GLUCOMTR-MCNC: 208 MG/DL — HIGH (ref 70–99)
GLUCOSE BLDC GLUCOMTR-MCNC: 332 MG/DL — HIGH (ref 70–99)
GLUCOSE SERPL-MCNC: 358 MG/DL — HIGH (ref 70–99)
HCT VFR BLD CALC: 39.7 % — SIGNIFICANT CHANGE UP (ref 34.5–45)
HGB BLD-MCNC: 11.6 G/DL — SIGNIFICANT CHANGE UP (ref 11.5–15.5)
INR BLD: 1.14 RATIO — SIGNIFICANT CHANGE UP (ref 0.85–1.18)
MAGNESIUM SERPL-MCNC: 1.2 MG/DL — LOW (ref 1.6–2.6)
MCHC RBC-ENTMCNC: 23.1 PG — LOW (ref 27–34)
MCHC RBC-ENTMCNC: 29.2 GM/DL — LOW (ref 32–36)
MCV RBC AUTO: 78.9 FL — LOW (ref 80–100)
NRBC # BLD: 0 /100 WBCS — SIGNIFICANT CHANGE UP (ref 0–0)
PHOSPHATE SERPL-MCNC: 2.7 MG/DL — SIGNIFICANT CHANGE UP (ref 2.5–4.5)
PLATELET # BLD AUTO: 355 K/UL — SIGNIFICANT CHANGE UP (ref 150–400)
POTASSIUM SERPL-MCNC: 4.4 MMOL/L — SIGNIFICANT CHANGE UP (ref 3.5–5.3)
POTASSIUM SERPL-SCNC: 4.4 MMOL/L — SIGNIFICANT CHANGE UP (ref 3.5–5.3)
PROTHROM AB SERPL-ACNC: 12.9 SEC — SIGNIFICANT CHANGE UP (ref 9.5–13)
RBC # BLD: 5.03 M/UL — SIGNIFICANT CHANGE UP (ref 3.8–5.2)
RBC # FLD: 15.4 % — HIGH (ref 10.3–14.5)
SODIUM SERPL-SCNC: 133 MMOL/L — LOW (ref 135–145)
TROPONIN I, HIGH SENSITIVITY RESULT: 14.7 NG/L — SIGNIFICANT CHANGE UP
WBC # BLD: 9.58 K/UL — SIGNIFICANT CHANGE UP (ref 3.8–10.5)
WBC # FLD AUTO: 9.58 K/UL — SIGNIFICANT CHANGE UP (ref 3.8–10.5)

## 2024-01-23 PROCEDURE — 99232 SBSQ HOSP IP/OBS MODERATE 35: CPT

## 2024-01-23 PROCEDURE — 99223 1ST HOSP IP/OBS HIGH 75: CPT | Mod: 24

## 2024-01-23 RX ORDER — ATORVASTATIN CALCIUM 80 MG/1
40 TABLET, FILM COATED ORAL AT BEDTIME
Refills: 0 | Status: DISCONTINUED | OUTPATIENT
Start: 2024-01-23 | End: 2024-01-31

## 2024-01-23 RX ORDER — SODIUM CHLORIDE 9 MG/ML
1000 INJECTION, SOLUTION INTRAVENOUS
Refills: 0 | Status: DISCONTINUED | OUTPATIENT
Start: 2024-01-23 | End: 2024-01-23

## 2024-01-23 RX ORDER — SODIUM CHLORIDE 9 MG/ML
1000 INJECTION INTRAMUSCULAR; INTRAVENOUS; SUBCUTANEOUS
Refills: 0 | Status: DISCONTINUED | OUTPATIENT
Start: 2024-01-23 | End: 2024-01-23

## 2024-01-23 RX ORDER — INSULIN GLARGINE 100 [IU]/ML
8 INJECTION, SOLUTION SUBCUTANEOUS ONCE
Refills: 0 | Status: COMPLETED | OUTPATIENT
Start: 2024-01-23 | End: 2024-01-23

## 2024-01-23 RX ORDER — HYDROCORTISONE 1 %
1 OINTMENT (GRAM) TOPICAL
Refills: 0 | Status: DISCONTINUED | OUTPATIENT
Start: 2024-01-23 | End: 2024-01-31

## 2024-01-23 RX ORDER — ACETAMINOPHEN 500 MG
650 TABLET ORAL EVERY 6 HOURS
Refills: 0 | Status: DISCONTINUED | OUTPATIENT
Start: 2024-01-23 | End: 2024-01-31

## 2024-01-23 RX ORDER — GABAPENTIN 400 MG/1
400 CAPSULE ORAL
Refills: 0 | Status: DISCONTINUED | OUTPATIENT
Start: 2024-01-23 | End: 2024-01-31

## 2024-01-23 RX ORDER — INSULIN LISPRO 100/ML
VIAL (ML) SUBCUTANEOUS EVERY 6 HOURS
Refills: 0 | Status: DISCONTINUED | OUTPATIENT
Start: 2024-01-23 | End: 2024-01-23

## 2024-01-23 RX ORDER — MONTELUKAST 4 MG/1
10 TABLET, CHEWABLE ORAL DAILY
Refills: 0 | Status: DISCONTINUED | OUTPATIENT
Start: 2024-01-23 | End: 2024-01-31

## 2024-01-23 RX ORDER — AMLODIPINE BESYLATE 2.5 MG/1
10 TABLET ORAL DAILY
Refills: 0 | Status: DISCONTINUED | OUTPATIENT
Start: 2024-01-23 | End: 2024-01-31

## 2024-01-23 RX ORDER — INSULIN GLARGINE 100 [IU]/ML
16 INJECTION, SOLUTION SUBCUTANEOUS AT BEDTIME
Refills: 0 | Status: DISCONTINUED | OUTPATIENT
Start: 2024-01-23 | End: 2024-01-27

## 2024-01-23 RX ORDER — OXCARBAZEPINE 300 MG/1
600 TABLET, FILM COATED ORAL
Refills: 0 | Status: DISCONTINUED | OUTPATIENT
Start: 2024-01-23 | End: 2024-01-31

## 2024-01-23 RX ORDER — POVIDONE-IODINE 5 %
1 AEROSOL (ML) TOPICAL DAILY
Refills: 0 | Status: DISCONTINUED | OUTPATIENT
Start: 2024-01-23 | End: 2024-01-25

## 2024-01-23 RX ORDER — PANTOPRAZOLE SODIUM 20 MG/1
40 TABLET, DELAYED RELEASE ORAL
Refills: 0 | Status: DISCONTINUED | OUTPATIENT
Start: 2024-01-23 | End: 2024-01-31

## 2024-01-23 RX ORDER — MAGNESIUM SULFATE 500 MG/ML
2 VIAL (ML) INJECTION ONCE
Refills: 0 | Status: COMPLETED | OUTPATIENT
Start: 2024-01-23 | End: 2024-01-23

## 2024-01-23 RX ORDER — INSULIN LISPRO 100/ML
6 VIAL (ML) SUBCUTANEOUS
Refills: 0 | Status: DISCONTINUED | OUTPATIENT
Start: 2024-01-23 | End: 2024-01-31

## 2024-01-23 RX ORDER — LOSARTAN POTASSIUM 100 MG/1
25 TABLET, FILM COATED ORAL DAILY
Refills: 0 | Status: DISCONTINUED | OUTPATIENT
Start: 2024-01-23 | End: 2024-01-31

## 2024-01-23 RX ORDER — LEVOTHYROXINE SODIUM 125 MCG
112 TABLET ORAL DAILY
Refills: 0 | Status: DISCONTINUED | OUTPATIENT
Start: 2024-01-23 | End: 2024-01-31

## 2024-01-23 RX ORDER — INSULIN LISPRO 100/ML
VIAL (ML) SUBCUTANEOUS
Refills: 0 | Status: DISCONTINUED | OUTPATIENT
Start: 2024-01-23 | End: 2024-01-31

## 2024-01-23 RX ORDER — AMLODIPINE BESYLATE 2.5 MG/1
1 TABLET ORAL
Refills: 0 | DISCHARGE

## 2024-01-23 RX ADMIN — AMLODIPINE BESYLATE 10 MILLIGRAM(S): 2.5 TABLET ORAL at 05:37

## 2024-01-23 RX ADMIN — Medication 650 MILLIGRAM(S): at 02:46

## 2024-01-23 RX ADMIN — OXCARBAZEPINE 600 MILLIGRAM(S): 300 TABLET, FILM COATED ORAL at 17:16

## 2024-01-23 RX ADMIN — Medication 2: at 17:21

## 2024-01-23 RX ADMIN — LOSARTAN POTASSIUM 25 MILLIGRAM(S): 100 TABLET, FILM COATED ORAL at 05:37

## 2024-01-23 RX ADMIN — INSULIN GLARGINE 8 UNIT(S): 100 INJECTION, SOLUTION SUBCUTANEOUS at 23:19

## 2024-01-23 RX ADMIN — OXCARBAZEPINE 600 MILLIGRAM(S): 300 TABLET, FILM COATED ORAL at 05:37

## 2024-01-23 RX ADMIN — Medication 112 MICROGRAM(S): at 05:37

## 2024-01-23 RX ADMIN — Medication 4: at 05:36

## 2024-01-23 RX ADMIN — GABAPENTIN 400 MILLIGRAM(S): 400 CAPSULE ORAL at 05:37

## 2024-01-23 RX ADMIN — Medication 650 MILLIGRAM(S): at 07:24

## 2024-01-23 RX ADMIN — GABAPENTIN 400 MILLIGRAM(S): 400 CAPSULE ORAL at 17:16

## 2024-01-23 RX ADMIN — Medication 1: at 12:46

## 2024-01-23 RX ADMIN — Medication 1: at 22:45

## 2024-01-23 RX ADMIN — MONTELUKAST 10 MILLIGRAM(S): 4 TABLET, CHEWABLE ORAL at 12:11

## 2024-01-23 RX ADMIN — Medication 25 GRAM(S): at 08:38

## 2024-01-23 RX ADMIN — ATORVASTATIN CALCIUM 40 MILLIGRAM(S): 80 TABLET, FILM COATED ORAL at 22:39

## 2024-01-23 NOTE — PROGRESS NOTE ADULT - ASSESSMENT
59 y/o F PMH of asthma, COPD (on 4L NC PRN), DM, fatty liver, fibromyalgia, R shoulder bursitis, HLD, hypothyroidism, NAKUL, psoriasis, RCC (s/p L renal resection 11/3/23, no hx of CT/RT) presenting with lower abdominal pain and SOB. admitted for left sided abdominal pain.   Pt had CT angio chest that was negative for PE. Also had CT A/P that showed post-op left nephrectomy collection of 7.2 x 6 cm fluid collection and a 4.8 x3.1 cm left abdominal wall collection. IR consulted for drainage, pending on 1/24. will keep NPO after midnight. Urology also followed.

## 2024-01-23 NOTE — PROGRESS NOTE ADULT - SUBJECTIVE AND OBJECTIVE BOX
Patient for possible drainage of a left renal bed (s/p nephrectomy) collection in IR tomorrow.  Please keep patient NPO, send early AM labs including CBC, BMP, and PT, INR / PTT.  Hold all anticoagulation, NSAIDS, and antiplatelet medication.

## 2024-01-23 NOTE — PROGRESS NOTE ADULT - SUBJECTIVE AND OBJECTIVE BOX
Subjective  Patient seen and examined at bedside.     Objective    Vital signs  T(F): , Max: 98.6 (01-22-24 @ 15:46)  HR: 83 (01-23-24 @ 04:59)  BP: 128/88 (01-23-24 @ 04:59)  SpO2: 98% (01-23-24 @ 04:59)  Wt(kg): --    Output       Gen: NAD  Abd: soft, nontender, nondistended  : left lower quadrant incision without drainage - appears closed    Labs      01-23 @ 05:20    WBC 9.58  / Hct 39.7  / SCr 1.44     01-22 @ 16:52    WBC 6.67  / Hct 38.3  / SCr 1.04         Imaging  < from: CT Abdomen and Pelvis w/ IV Cont (01.22.24 @ 19:56) >    ACC: [id number] EXAM:  CT ABDOMEN AND PELVIS IC   ORDERED BY: JUSTIN MARTIN     ACC: [id number] EXAM:  CT ANGIO CHEST PULM ART WAWIC   ORDERED BY: JUSTIN MARTIN     PROCEDURE DATE:  01/22/2024          INTERPRETATION:  CLINICAL INFORMATION:Rule out postoperative PE, L renal   resection now with LLQ poorly healing surgical scar    COMPARISON: Chest CT 9/2/2023, CT abdomen and pelvis 8/25/2023    CONTRAST/COMPLICATIONS:  IV Contrast: Omnipaque 350 (accession [id number]), IV contrast documented   in unlinked concurrent exam (accession [id number])  90 cc administered   10   cc discarded  Oral Contrast: NONE  Complications: None reported at time of study completion    PROCEDURE:  CT Angiography of the Chest was performed followed by portal venous phase   imaging of the Abdomen and Pelvis.  Sagittal and coronal reformats were performed as well as 3D (MIP)   reconstructions.    FINDINGS:  CHEST:  PULMONARY ARTERIES: No pulmonary embolism.    LUNGS AND LARGE AIRWAYS: The central airways are patent. The lungs are   clear.  PLEURA: No pleural abnormality.  VESSELS: Normal caliber aorta.  HEART: No cardiomegaly. No pericardial effusion. Coronary artery   calcifications are present.  MEDIASTINUM AND KATARINA: No adenopathy.  CHEST WALL AND LOWER NECK: No masses.    ABDOMEN AND PELVIS:  LIVER: Diffuse steatosis.  BILE DUCTS: Nondilated.  GALLBLADDER: Normal.  SPLEEN: Normal.  PANCREAS: Normal.  ADRENALS: Stable small right adrenal nodule. Left adrenalectomy.  KIDNEYS/URETERS: Interval left radical nephrectomy with a 7.2 x 6.0 cm   postoperative collection in the nephrectomy bed. No evidence of air or   well formed wall. Right kidney is normal.    BLADDER: Normal.  REPRODUCTIVE ORGANS: No masses.    BOWEL: No bowel-related abnormality. No bowel obstruction or bowel   inflammation. Normal appendix and ileocecal region. No evidence of active   colitis or diverticulitis.  PERITONEUM: No free air or ascites.  VESSELS: Normal caliber aorta. No renal vein thrombosis.  RETROPERITONEUM/LYMPHNODES: No adenopathy.  ABDOMINAL WALL: 4.8 x 3.1 cm left abdominal wall collection, likely   postoperative. Additional areas of abdominal wall edema.  BONES: No acute bony abnormality.    IMPRESSION:  *  No pulmonary embolism.  *  No acute pathology.  *  Interval left radical nephrectomy with a 7.2 x 6.0 cm postoperative   collection in the nephrectomy bed. No evidence of air or well formed wall.  *  4.8 x 3.1 cm left abdominal wall collection, likely postoperative.    < end of copied text >   Subjective  Patient seen and examined at bedside. No fevers, chills.     Objective    Vital signs  T(F): , Max: 98.6 (01-22-24 @ 15:46)  HR: 83 (01-23-24 @ 04:59)  BP: 128/88 (01-23-24 @ 04:59)  SpO2: 98% (01-23-24 @ 04:59)  Wt(kg): --    Output       Gen: NAD  Abd: soft, nontender, nondistended  : left lower quadrant incision without drainage - appears closed    Labs      01-23 @ 05:20    WBC 9.58  / Hct 39.7  / SCr 1.44     01-22 @ 16:52    WBC 6.67  / Hct 38.3  / SCr 1.04         Imaging  < from: CT Abdomen and Pelvis w/ IV Cont (01.22.24 @ 19:56) >    ACC: [id number] EXAM:  CT ABDOMEN AND PELVIS IC   ORDERED BY: JUSTIN MARTIN     ACC: [id number] EXAM:  CT ANGIO CHEST PULM ART WAWIC   ORDERED BY: JUSTIN MARTIN     PROCEDURE DATE:  01/22/2024          INTERPRETATION:  CLINICAL INFORMATION:Rule out postoperative PE, L renal   resection now with LLQ poorly healing surgical scar    COMPARISON: Chest CT 9/2/2023, CT abdomen and pelvis 8/25/2023    CONTRAST/COMPLICATIONS:  IV Contrast: Omnipaque 350 (accession [id number]), IV contrast documented   in unlinked concurrent exam (accession [id number])  90 cc administered   10   cc discarded  Oral Contrast: NONE  Complications: None reported at time of study completion    PROCEDURE:  CT Angiography of the Chest was performed followed by portal venous phase   imaging of the Abdomen and Pelvis.  Sagittal and coronal reformats were performed as well as 3D (MIP)   reconstructions.    FINDINGS:  CHEST:  PULMONARY ARTERIES: No pulmonary embolism.    LUNGS AND LARGE AIRWAYS: The central airways are patent. The lungs are   clear.  PLEURA: No pleural abnormality.  VESSELS: Normal caliber aorta.  HEART: No cardiomegaly. No pericardial effusion. Coronary artery   calcifications are present.  MEDIASTINUM AND KATARINA: No adenopathy.  CHEST WALL AND LOWER NECK: No masses.    ABDOMEN AND PELVIS:  LIVER: Diffuse steatosis.  BILE DUCTS: Nondilated.  GALLBLADDER: Normal.  SPLEEN: Normal.  PANCREAS: Normal.  ADRENALS: Stable small right adrenal nodule. Left adrenalectomy.  KIDNEYS/URETERS: Interval left radical nephrectomy with a 7.2 x 6.0 cm   postoperative collection in the nephrectomy bed. No evidence of air or   well formed wall. Right kidney is normal.    BLADDER: Normal.  REPRODUCTIVE ORGANS: No masses.    BOWEL: No bowel-related abnormality. No bowel obstruction or bowel   inflammation. Normal appendix and ileocecal region. No evidence of active   colitis or diverticulitis.  PERITONEUM: No free air or ascites.  VESSELS: Normal caliber aorta. No renal vein thrombosis.  RETROPERITONEUM/LYMPHNODES: No adenopathy.  ABDOMINAL WALL: 4.8 x 3.1 cm left abdominal wall collection, likely   postoperative. Additional areas of abdominal wall edema.  BONES: No acute bony abnormality.    IMPRESSION:  *  No pulmonary embolism.  *  No acute pathology.  *  Interval left radical nephrectomy with a 7.2 x 6.0 cm postoperative   collection in the nephrectomy bed. No evidence of air or well formed wall.  *  4.8 x 3.1 cm left abdominal wall collection, likely postoperative.    < end of copied text >

## 2024-01-23 NOTE — PROGRESS NOTE ADULT - PROBLEM SELECTOR PLAN 4
presenting with left sided abdominal pain associated with left flank pain  h/o RCC s/p L renal resection 11/3/2023  f/u outpt oncologist

## 2024-01-23 NOTE — PROGRESS NOTE ADULT - ASSESSMENT
59 y/o diabetic female s/p L radical nephrectomy 11/2023 w/ 7x6cm postop collection in nephrectomy bed and 4.8 cm left abdominal wall collection  VSS, afebrile, no leukocytosis    Plan   - Continue IV antibiotics  - IR eval for drainage of left nephrectomy postop bed collection in AM, possible aspiration of left abdominal wall collection  - Local wound care   - Pain control prn  - Remainder of care as per primary team  - Uro to follow     59 y/o diabetic female s/p L radical nephrectomy 11/2023 w/ 7x6cm postop collection in nephrectomy bed and 4.8 cm left abdominal wall collection  VSS, afebrile, no leukocytosis    Plan   - Continue IV antibiotics  - IR eval for drainage of left nephrectomy postop bed collection and possible aspiration of left abdominal wall collection  - Local wound care   - Pain control prn  - Remainder of care as per primary team  - Uro to follow     59 y/o diabetic female s/p L radical nephrectomy 11/2023 w/ 7x6cm postop collection in nephrectomy bed and 4.8 cm left abdominal wall collection  VSS, afebrile, no leukocytosis    Plan   - Continue IV antibiotics  - IR eval for drainage of left nephrectomy postop bed collection and aspiration of left abdominal wall collection given body habitus   - Local wound care   - Pain control prn  - Remainder of care as per primary team  - Uro to follow     59 y/o diabetic female s/p L radical nephrectomy 11/2023 w/ 7x6cm postop collection in nephrectomy bed and 4.8 cm left abdominal wall collection  VSS, afebrile, no leukocytosis    Plan   - Labs reviewed  - CT imagees reviewed  - Continue IV antibiotics  - IR eval for drainage of left nephrectomy postop bed collection and aspiration of left abdominal wall collection given body habitus   - Local wound care   - Pain control prn  - Remainder of care as per primary team  - Uro to follow

## 2024-01-23 NOTE — PROGRESS NOTE ADULT - SUBJECTIVE AND OBJECTIVE BOX
Patient is a 58y old  Female who presents with a chief complaint of SOB, abdominal pain (23 Jan 2024 15:36)      INTERVAL HPI/OVERNIGHT EVENTS: No overnight events reported, patient informed that she has been having abdominal pain for last 1-2 weeks. She informed that she first developed cough after which her wound on the LLQ got opened  and has been having pain since then. She reports some chills but no fever     MEDICATIONS  (STANDING):  amLODIPine   Tablet 10 milliGRAM(s) Oral daily  atorvastatin 40 milliGRAM(s) Oral at bedtime  gabapentin 400 milliGRAM(s) Oral two times a day  hydrochlorothiazide 12.5 milliGRAM(s) Oral daily  insulin lispro (ADMELOG) corrective regimen sliding scale   SubCutaneous every 6 hours  levothyroxine 112 MICROGram(s) Oral daily  losartan 25 milliGRAM(s) Oral daily  montelukast 10 milliGRAM(s) Oral daily  OXcarbazepine 600 milliGRAM(s) Oral two times a day  povidone iodine 10% Solution 1 Application(s) Topical daily  sodium chloride 0.9%. 1000 milliLiter(s) (75 mL/Hr) IV Continuous <Continuous>    MEDICATIONS  (PRN):  acetaminophen     Tablet .. 650 milliGRAM(s) Oral every 6 hours PRN Temp greater or equal to 38C (100.4F), Mild Pain (1 - 3)  aluminum hydroxide/magnesium hydroxide/simethicone Suspension 30 milliLiter(s) Oral every 4 hours PRN Dyspepsia  melatonin 3 milliGRAM(s) Oral at bedtime PRN Insomnia  ondansetron Injectable 4 milliGRAM(s) IV Push every 8 hours PRN Nausea and/or Vomiting  pantoprazole    Tablet 40 milliGRAM(s) Oral before breakfast PRN acid reflux      __________________________________________________  REVIEW OF SYSTEMS:    CONSTITUTIONAL: No fever,   EYES: no acute visual disturbances  NECK: No pain or stiffness  RESPIRATORY: No cough; No shortness of breath  CARDIOVASCULAR: No chest pain, no palpitations  GASTROINTESTINAL: Abd pain+,  No nausea or vomiting; No diarrhea   NEUROLOGICAL: No headache or numbness, no tremors  MUSCULOSKELETAL: No joint pain, no muscle pain  GENITOURINARY: no dysuria, no frequency, no hesitancy  PSYCHIATRY: no depression , no anxiety  ALL OTHER  ROS negative        Vital Signs Last 24 Hrs  T(C): 36.8 (23 Jan 2024 14:30), Max: 37.2 (23 Jan 2024 07:29)  T(F): 98.3 (23 Jan 2024 14:30), Max: 99 (23 Jan 2024 07:29)  HR: 98 (23 Jan 2024 14:30) (83 - 98)  BP: 120/55 (23 Jan 2024 14:30) (119/78 - 152/82)  RR: 20 (23 Jan 2024 14:30) (18 - 20)  SpO2: 94% (23 Jan 2024 14:30) (94% - 98%)    Parameters below as of 23 Jan 2024 14:30  Patient On (Oxygen Delivery Method): nasal cannula  O2 Flow (L/min): 2      ________________________________________________  PHYSICAL EXAM:  GENERAL: NAD  HEENT: Normocephalic;  conjunctivae and sclerae clear; moist mucous membranes;   NECK : supple  CHEST/LUNG: Clear to auscultation bilaterally with good air entry   HEART: S1 S2  regular; no murmurs, gallops or rubs  ABDOMEN: Soft, Nontender, Nondistended; Bowel sounds present  EXTREMITIES: no cyanosis; no edema; no calf tenderness  SKIN: warm and dry; no rash  NERVOUS SYSTEM:  Awake and alert; Oriented  to place, person and time ; no new deficits    _________________________________________________  LABS:                        11.6   9.58  )-----------( 355      ( 23 Jan 2024 05:20 )             39.7     01-23    133<L>  |  95<L>  |  22<H>  ----------------------------<  358<H>  4.4   |  31  |  1.44<H>    Ca    10.0      23 Jan 2024 05:20  Phos  2.7     01-23  Mg     1.2     01-23    TPro  7.9  /  Alb  3.0<L>  /  TBili  0.3  /  DBili  x   /  AST  39  /  ALT  48  /  AlkPhos  126<H>  01-22    PT/INR - ( 23 Jan 2024 05:20 )   PT: 12.9 sec;   INR: 1.14 ratio         PTT - ( 23 Jan 2024 05:20 )  PTT:31.6 sec  Urinalysis Basic - ( 23 Jan 2024 05:20 )    Color: x / Appearance: x / SG: x / pH: x  Gluc: 358 mg/dL / Ketone: x  / Bili: x / Urobili: x   Blood: x / Protein: x / Nitrite: x   Leuk Esterase: x / RBC: x / WBC x   Sq Epi: x / Non Sq Epi: x / Bacteria: x      CAPILLARY BLOOD GLUCOSE      POCT Blood Glucose.: 192 mg/dL (23 Jan 2024 12:08)  POCT Blood Glucose.: 332 mg/dL (23 Jan 2024 05:09)        RADIOLOGY & ADDITIONAL TESTS:    Imaging Personally Reviewed:  YES    Consultant(s) Notes Reviewed:   YES    Care Discussed with Consultants : YES     Plan of care was discussed with patient and /or primary care giver; all questions and concerns were addressed and care was aligned with patient's wishes.

## 2024-01-23 NOTE — PROGRESS NOTE ADULT - PROBLEM SELECTOR PLAN 1
presenting with left sided abdominal pain associated with left flank pain  h/o RCC s/p L renal resection 11/3/2023  CTAP: Interval left radical nephrectomy with a 7.2 x 6.0 cm postoperative collection in the nephrectomy bed. No evidence of air or well formed wall. 4.8 x 3.1 cm left abdominal wall collection, likely postoperative.  No signs of infection, LLQ lap site open with no drainage, minimal serosanguinous drainage at home x 2 days  Afeb, VSS  pain control: tylenol for mild, toradol 15 for mod, toradol 30 for severe  wound care consulted for LLQ abd wound  Urology following  IR consulted, pending drainage of fluid collection on 1/24/24  NPO after MN

## 2024-01-23 NOTE — PROGRESS NOTE ADULT - PROBLEM SELECTOR PLAN 3
h/o COPD on 4L O2 PRN at home  p/w SOB and productive cough  currently on 4L, saturating 94% on admission  uses duoneb, albuterol, symbicort, fluticasone, and ventolin at home  continue duonebs if needed  continue albuterol PRN and symbicort for now  maintain O2 88-92%

## 2024-01-23 NOTE — PROGRESS NOTE ADULT - PROBLEM SELECTOR PLAN 6
h/o DM  takes levemir 16 at bedtime and novolog 6U premeals  resume lantus 16u and lispro 6u TID  + ISS tid before meals and at bedtime  monitor FS achs  if NPO fs q6h

## 2024-01-23 NOTE — ED ADULT NURSE REASSESSMENT NOTE - NS ED NURSE REASSESS COMMENT FT1
pt medicated for headache. pt resting in bedside chair in NAD att. RR even and unlabored. p medicine bed.

## 2024-01-23 NOTE — PROGRESS NOTE ADULT - ASSESSMENT
58F PMH of asthma, COPD (on 4L NC PRN), DM, fatty liver, fibromyalgia, R shoulder bursitis, HLD, hypothyroidism, NAKUL, psoriasis, RCC (s/p L renal resection 11/3/23, no hx of CT/RT) presenting with lower abdominal pain. CT scan showed, Interval left radical nephrectomy with a 7.2 x 6.0 cm postoperative collection in the nephrectomy bed. No evidence of air or well formed wall.*  4.8 x 3.1 cm left abdominal wall collection, likely postoperative. Admitted for further evaluation     A/P:  #Seroma- ? abscess   #Hyponatremia   #Acute URI likely viral  with nasal congestion, cough and post nasal drip   #Chronic Hypoxic respiratory failure   #COPD- not in exacerbation   #DM  #RCC s/p L renal resection   #DVT ppx     Plan:  -Pt p/w abd pain, CT noted w/ 7.2 x 6.0 cm postoperative collection in the nephrectomy bed. Spoke w/ IR PA- Ms. Multani, plan for IR drainage in am. Low clinical suspicion of an abscess- no fever/leukocytosis. Will keep NPO after midnight.   -BP stable, c/w home meds   -C/w levothyroxine   -Sodium noted to be low, start IVF. Hold hctz

## 2024-01-23 NOTE — PROGRESS NOTE ADULT - PROBLEM SELECTOR PLAN 5
s/p left nephrectomy  SCr elevated 1.44  baseline ~SCr 1.15 (11/2023)  likely intrinsic  monitor BMP  Avoid Nephrotoxic Meds/ Agents such as (NSAIDs, IV contrast, Aminoglycosides such as gentamicin, Gadolinium contrast, Phosphate containing enemas, etc..)

## 2024-01-23 NOTE — PROGRESS NOTE ADULT - PROBLEM SELECTOR PLAN 7
h/o HTN  takes amlodipine, HCTZ, losartan at home  /81 on admission  will continue home meds with parameters  now improved

## 2024-01-23 NOTE — PATIENT PROFILE ADULT - FUNCTIONAL ASSESSMENT - BASIC MOBILITY 6.
2-calculated by average/Not able to assess (calculate score using [facility] averaging method)

## 2024-01-23 NOTE — PROGRESS NOTE ADULT - SUBJECTIVE AND OBJECTIVE BOX
Patient is a 58y old  Female who presents with a chief complaint of SOB, abdominal pain (23 Jan 2024 16:13)    OVERNIGHT EVENTS: no acute changes.     Pt is aox3, comfortable appearing, tolerating PO, ambulatory. Pt is tolerating 4L oxygen nasal cannula.     REVIEW OF SYSTEMS:  CONSTITUTIONAL: No fever, chills  ENMT:  No difficulty hearing, no change in vision  NECK: No pain or stiffness  RESPIRATORY: +SOB  CARDIOVASCULAR: No chest pain, palpitations  GASTROINTESTINAL: +abdominal drainage  GENITOURINARY: No dysuria  NEUROLOGICAL: No HA  SKIN: No itching, burning, rashes, or lesions   LYMPH NODES: No enlarged glands  ENDOCRINE: No heat or cold intolerance; No hair loss  MUSCULOSKELETAL: No joint pain or swelling; No muscle, back, or extremity pain  PSYCHIATRIC: No depression, anxiety  HEME/LYMPH: No easy bruising, or bleeding gums    T(C): 36.8 (01-23-24 @ 14:30), Max: 37.2 (01-23-24 @ 07:29)  HR: 98 (01-23-24 @ 14:30) (83 - 98)  BP: 120/55 (01-23-24 @ 14:30) (119/78 - 152/82)  RR: 20 (01-23-24 @ 14:30) (18 - 20)  SpO2: 94% (01-23-24 @ 14:30) (94% - 98%)  Wt(kg): --Vital Signs Last 24 Hrs  T(C): 36.8 (23 Jan 2024 14:30), Max: 37.2 (23 Jan 2024 07:29)  T(F): 98.3 (23 Jan 2024 14:30), Max: 99 (23 Jan 2024 07:29)  HR: 98 (23 Jan 2024 14:30) (83 - 98)  BP: 120/55 (23 Jan 2024 14:30) (119/78 - 152/82)  BP(mean): --  RR: 20 (23 Jan 2024 14:30) (18 - 20)  SpO2: 94% (23 Jan 2024 14:30) (94% - 98%)    Parameters below as of 23 Jan 2024 14:30  Patient On (Oxygen Delivery Method): nasal cannula  O2 Flow (L/min): 2      MEDICATIONS  (STANDING):  amLODIPine   Tablet 10 milliGRAM(s) Oral daily  atorvastatin 40 milliGRAM(s) Oral at bedtime  gabapentin 400 milliGRAM(s) Oral two times a day  insulin lispro (ADMELOG) corrective regimen sliding scale   SubCutaneous every 6 hours  levothyroxine 112 MICROGram(s) Oral daily  losartan 25 milliGRAM(s) Oral daily  montelukast 10 milliGRAM(s) Oral daily  OXcarbazepine 600 milliGRAM(s) Oral two times a day  povidone iodine 10% Solution 1 Application(s) Topical daily  sodium chloride 0.9%. 1000 milliLiter(s) (75 mL/Hr) IV Continuous <Continuous>    MEDICATIONS  (PRN):  acetaminophen     Tablet .. 650 milliGRAM(s) Oral every 6 hours PRN Temp greater or equal to 38C (100.4F), Mild Pain (1 - 3)  aluminum hydroxide/magnesium hydroxide/simethicone Suspension 30 milliLiter(s) Oral every 4 hours PRN Dyspepsia  melatonin 3 milliGRAM(s) Oral at bedtime PRN Insomnia  ondansetron Injectable 4 milliGRAM(s) IV Push every 8 hours PRN Nausea and/or Vomiting  pantoprazole    Tablet 40 milliGRAM(s) Oral before breakfast PRN acid reflux      PHYSICAL EXAM:  GENERAL: NAD  EYES: clear conjunctiva  ENMT: Moist mucous membranes  NECK: Supple, No JVD, Normal thyroid  CHEST/LUNG: +nasal cannula. Clear to auscultation bilaterally; No rales, rhonchi, wheezing, or rubs  HEART: S1, S2, Regular rate and rhythm  ABDOMEN: +small LLQ ulcerated wound with yellow drainage. Soft, Nontender, Nondistended; Bowel sounds present  NEURO: Alert & Oriented X3  EXTREMITIES: +psoriasis plaques noted on elbows, and knees. +right foot swelling > left foot.   LYMPH: No lymphadenopathy noted  SKIN: No rashes or lesions    Consultant(s) Notes Reviewed:  [x ] YES  [ ] NO  Care Discussed with Consultants/Other Providers [ x] YES  [ ] NO    LABS:                        11.6   9.58  )-----------( 355      ( 23 Jan 2024 05:20 )             39.7     01-23    133<L>  |  95<L>  |  22<H>  ----------------------------<  358<H>  4.4   |  31  |  1.44<H>    Ca    10.0      23 Jan 2024 05:20  Phos  2.7     01-23  Mg     1.2     01-23    TPro  7.9  /  Alb  3.0<L>  /  TBili  0.3  /  DBili  x   /  AST  39  /  ALT  48  /  AlkPhos  126<H>  01-22    PT/INR - ( 23 Jan 2024 05:20 )   PT: 12.9 sec;   INR: 1.14 ratio         PTT - ( 23 Jan 2024 05:20 )  PTT:31.6 sec  CAPILLARY BLOOD GLUCOSE      POCT Blood Glucose.: 208 mg/dL (23 Jan 2024 16:59)  POCT Blood Glucose.: 192 mg/dL (23 Jan 2024 12:08)  POCT Blood Glucose.: 332 mg/dL (23 Jan 2024 05:09)        Urinalysis Basic - ( 23 Jan 2024 05:20 )    Color: x / Appearance: x / SG: x / pH: x  Gluc: 358 mg/dL / Ketone: x  / Bili: x / Urobili: x   Blood: x / Protein: x / Nitrite: x   Leuk Esterase: x / RBC: x / WBC x   Sq Epi: x / Non Sq Epi: x / Bacteria: x        RADIOLOGY & ADDITIONAL TESTS:  < from: CT Abdomen and Pelvis w/ IV Cont (01.22.24 @ 19:56) >    ACC: [id number] EXAM:  CT ABDOMEN AND PELVIS IC   ORDERED BY: JUSTIN MARTIN     ACC: [id number] EXAM:  CT ANGIO CHEST PULM ART [facility]   ORDERED BY: JUSTIN MARTIN     PROCEDURE DATE:  01/22/2024          INTERPRETATION:  CLINICAL INFORMATION:Rule out postoperative PE, L renal   resection now with LLQ poorly healing surgical scar    COMPARISON: Chest CT 9/2/2023, CT abdomen and pelvis 8/25/2023    CONTRAST/COMPLICATIONS:  IV Contrast: Omnipaque 350 (accession [id number]), IV contrast documented   in unlinked concurrent exam (accession [id number])  90 cc administered   10   cc discarded  Oral Contrast: NONE  Complications: None reported at time of study completion    PROCEDURE:  CT Angiography of the Chest was performed followed by portal venous phase   imaging of the Abdomen and Pelvis.  Sagittal and coronal reformats were performed as well as 3D (MIP)   reconstructions.    FINDINGS:  CHEST:  PULMONARY ARTERIES: No pulmonary embolism.    LUNGS AND LARGE AIRWAYS: The central airways are patent. The lungs are   clear.  PLEURA: No pleural abnormality.  VESSELS: Normal caliber aorta.  HEART: No cardiomegaly. No pericardial effusion. Coronary artery   calcifications are present.  MEDIASTINUM AND KATARINA: No adenopathy.  CHEST WALL AND LOWER NECK: No masses.    ABDOMEN AND PELVIS:  LIVER: Diffuse steatosis.  BILE DUCTS: Nondilated.  GALLBLADDER: Normal.  SPLEEN: Normal.  PANCREAS: Normal.  ADRENALS: Stable small right adrenal nodule. Left adrenalectomy.  KIDNEYS/URETERS: Interval left radical nephrectomy with a 7.2 x 6.0 cm   postoperative collection in the nephrectomy bed. No evidence of air or   well formed wall. Right kidney is normal.    BLADDER: Normal.  REPRODUCTIVE ORGANS: No masses.    BOWEL: No bowel-related abnormality. No bowel obstruction or bowel   inflammation. Normal appendix and ileocecal region. No evidence of active   colitis or diverticulitis.  PERITONEUM: No free air or ascites.  VESSELS: Normal caliber aorta. No renal vein thrombosis.  RETROPERITONEUM/LYMPHNODES: No adenopathy.  ABDOMINAL WALL: 4.8 x 3.1 cm left abdominal wall collection, likely   postoperative. Additional areas of abdominal wall edema.  BONES: No acute bony abnormality.    IMPRESSION:  *  No pulmonary embolism.  *  No acute pathology.  *  Interval left radical nephrectomy with a 7.2 x 6.0 cm postoperative   collection in the nephrectomy bed. No evidence of air or well formed wall.  *  4.8 x 3.1 cm left abdominal wall collection, likely postoperative.      --- End of Report ---            CANDELARIA DYSON MD; Attending Radiologist  This document has been electronically signed. Jan 22 2024  8:49PM    < end of copied text >    Imaging Personally Reviewed:  [ ] YES  [ ] NO

## 2024-01-24 DIAGNOSIS — T14.8XXA OTHER INJURY OF UNSPECIFIED BODY REGION, INITIAL ENCOUNTER: ICD-10-CM

## 2024-01-24 LAB
A1C WITH ESTIMATED AVERAGE GLUCOSE RESULT: 8.2 % — HIGH (ref 4–5.6)
ALBUMIN SERPL ELPH-MCNC: 3.1 G/DL — LOW (ref 3.5–5)
ALP SERPL-CCNC: 115 U/L — SIGNIFICANT CHANGE UP (ref 40–120)
ALT FLD-CCNC: 46 U/L DA — SIGNIFICANT CHANGE UP (ref 10–60)
ANION GAP SERPL CALC-SCNC: 3 MMOL/L — LOW (ref 5–17)
AST SERPL-CCNC: 39 U/L — SIGNIFICANT CHANGE UP (ref 10–40)
BILIRUB SERPL-MCNC: 0.3 MG/DL — SIGNIFICANT CHANGE UP (ref 0.2–1.2)
BUN SERPL-MCNC: 32 MG/DL — HIGH (ref 7–18)
CALCIUM SERPL-MCNC: 9.3 MG/DL — SIGNIFICANT CHANGE UP (ref 8.4–10.5)
CHLORIDE SERPL-SCNC: 99 MMOL/L — SIGNIFICANT CHANGE UP (ref 96–108)
CO2 SERPL-SCNC: 34 MMOL/L — HIGH (ref 22–31)
CREAT SERPL-MCNC: 1.46 MG/DL — HIGH (ref 0.5–1.3)
EGFR: 41 ML/MIN/1.73M2 — LOW
ESTIMATED AVERAGE GLUCOSE: 189 MG/DL — HIGH (ref 68–114)
GLUCOSE BLDC GLUCOMTR-MCNC: 168 MG/DL — HIGH (ref 70–99)
GLUCOSE BLDC GLUCOMTR-MCNC: 168 MG/DL — HIGH (ref 70–99)
GLUCOSE BLDC GLUCOMTR-MCNC: 173 MG/DL — HIGH (ref 70–99)
GLUCOSE BLDC GLUCOMTR-MCNC: 175 MG/DL — HIGH (ref 70–99)
GLUCOSE BLDC GLUCOMTR-MCNC: 211 MG/DL — HIGH (ref 70–99)
GLUCOSE SERPL-MCNC: 201 MG/DL — HIGH (ref 70–99)
GRAM STN FLD: SIGNIFICANT CHANGE UP
GRAM STN FLD: SIGNIFICANT CHANGE UP
HCT VFR BLD CALC: 38.6 % — SIGNIFICANT CHANGE UP (ref 34.5–45)
HGB BLD-MCNC: 11.4 G/DL — LOW (ref 11.5–15.5)
INR BLD: 1.05 RATIO — SIGNIFICANT CHANGE UP (ref 0.85–1.18)
MAGNESIUM SERPL-MCNC: 1.7 MG/DL — SIGNIFICANT CHANGE UP (ref 1.6–2.6)
MCHC RBC-ENTMCNC: 22.9 PG — LOW (ref 27–34)
MCHC RBC-ENTMCNC: 29.5 GM/DL — LOW (ref 32–36)
MCV RBC AUTO: 77.7 FL — LOW (ref 80–100)
MRSA PCR RESULT.: SIGNIFICANT CHANGE UP
NRBC # BLD: 0 /100 WBCS — SIGNIFICANT CHANGE UP (ref 0–0)
PLATELET # BLD AUTO: 312 K/UL — SIGNIFICANT CHANGE UP (ref 150–400)
POTASSIUM SERPL-MCNC: 4.3 MMOL/L — SIGNIFICANT CHANGE UP (ref 3.5–5.3)
POTASSIUM SERPL-SCNC: 4.3 MMOL/L — SIGNIFICANT CHANGE UP (ref 3.5–5.3)
PROT SERPL-MCNC: 8.1 G/DL — SIGNIFICANT CHANGE UP (ref 6–8.3)
PROTHROM AB SERPL-ACNC: 12 SEC — SIGNIFICANT CHANGE UP (ref 9.5–13)
RBC # BLD: 4.97 M/UL — SIGNIFICANT CHANGE UP (ref 3.8–5.2)
RBC # FLD: 15.7 % — HIGH (ref 10.3–14.5)
S AUREUS DNA NOSE QL NAA+PROBE: SIGNIFICANT CHANGE UP
SODIUM SERPL-SCNC: 136 MMOL/L — SIGNIFICANT CHANGE UP (ref 135–145)
SPECIMEN SOURCE: SIGNIFICANT CHANGE UP
SPECIMEN SOURCE: SIGNIFICANT CHANGE UP
WBC # BLD: 9.24 K/UL — SIGNIFICANT CHANGE UP (ref 3.8–10.5)
WBC # FLD AUTO: 9.24 K/UL — SIGNIFICANT CHANGE UP (ref 3.8–10.5)

## 2024-01-24 PROCEDURE — 99232 SBSQ HOSP IP/OBS MODERATE 35: CPT | Mod: 24

## 2024-01-24 PROCEDURE — 99232 SBSQ HOSP IP/OBS MODERATE 35: CPT

## 2024-01-24 PROCEDURE — 10160 PNXR ASPIR ABSC HMTMA BULLA: CPT

## 2024-01-24 PROCEDURE — 77012 CT SCAN FOR NEEDLE BIOPSY: CPT | Mod: 26

## 2024-01-24 RX ORDER — METRONIDAZOLE 500 MG
500 TABLET ORAL EVERY 8 HOURS
Refills: 0 | Status: DISCONTINUED | OUTPATIENT
Start: 2024-01-24 | End: 2024-01-26

## 2024-01-24 RX ORDER — CEFTRIAXONE 500 MG/1
1000 INJECTION, POWDER, FOR SOLUTION INTRAMUSCULAR; INTRAVENOUS EVERY 24 HOURS
Refills: 0 | Status: DISCONTINUED | OUTPATIENT
Start: 2024-01-24 | End: 2024-01-26

## 2024-01-24 RX ADMIN — Medication 1 APPLICATION(S): at 18:21

## 2024-01-24 RX ADMIN — OXCARBAZEPINE 600 MILLIGRAM(S): 300 TABLET, FILM COATED ORAL at 18:31

## 2024-01-24 RX ADMIN — Medication 1: at 08:28

## 2024-01-24 RX ADMIN — INSULIN GLARGINE 16 UNIT(S): 100 INJECTION, SOLUTION SUBCUTANEOUS at 22:58

## 2024-01-24 RX ADMIN — Medication 1 APPLICATION(S): at 06:57

## 2024-01-24 RX ADMIN — Medication 100 MILLIGRAM(S): at 22:57

## 2024-01-24 RX ADMIN — AMLODIPINE BESYLATE 10 MILLIGRAM(S): 2.5 TABLET ORAL at 06:55

## 2024-01-24 RX ADMIN — Medication 6 UNIT(S): at 18:23

## 2024-01-24 RX ADMIN — Medication 112 MICROGRAM(S): at 06:54

## 2024-01-24 RX ADMIN — Medication 2: at 22:59

## 2024-01-24 RX ADMIN — Medication 1: at 18:22

## 2024-01-24 RX ADMIN — Medication 1 APPLICATION(S): at 12:27

## 2024-01-24 RX ADMIN — OXCARBAZEPINE 600 MILLIGRAM(S): 300 TABLET, FILM COATED ORAL at 06:54

## 2024-01-24 RX ADMIN — GABAPENTIN 400 MILLIGRAM(S): 400 CAPSULE ORAL at 06:53

## 2024-01-24 RX ADMIN — ATORVASTATIN CALCIUM 40 MILLIGRAM(S): 80 TABLET, FILM COATED ORAL at 22:56

## 2024-01-24 RX ADMIN — GABAPENTIN 400 MILLIGRAM(S): 400 CAPSULE ORAL at 18:21

## 2024-01-24 RX ADMIN — CEFTRIAXONE 100 MILLIGRAM(S): 500 INJECTION, POWDER, FOR SOLUTION INTRAMUSCULAR; INTRAVENOUS at 11:10

## 2024-01-24 RX ADMIN — LOSARTAN POTASSIUM 25 MILLIGRAM(S): 100 TABLET, FILM COATED ORAL at 06:55

## 2024-01-24 NOTE — PROGRESS NOTE ADULT - SUBJECTIVE AND OBJECTIVE BOX
Subjective  Patient seen and examined at bedside.     Objective    Vital signs  T(F): , Max: 99.4 (01-23-24 @ 21:55)  HR: 87 (01-24-24 @ 06:46)  BP: 126/78 (01-24-24 @ 06:46)  SpO2: 98% (01-24-24 @ 05:46)  Wt(kg): --    Output       Gen: NAD  Abd: soft, nontender, nondistended  : voiding spontaneously     Labs      01-24 @ 05:40    WBC 9.24  / Hct 38.6  / SCr 1.46     01-23 @ 05:20    WBC 9.58  / Hct 39.7  / SCr 1.44        Subjective  Patient seen and examined at bedside. No events overnight. NPO for procedure today.    Objective    Vital signs  T(F): , Max: 99.4 (01-23-24 @ 21:55)  HR: 87 (01-24-24 @ 06:46)  BP: 126/78 (01-24-24 @ 06:46)  SpO2: 98% (01-24-24 @ 05:46)  Wt(kg): --    Output       Gen: NAD  Abd: soft, nontender, nondistended  : voiding spontaneously     Labs      01-24 @ 05:40    WBC 9.24  / Hct 38.6  / SCr 1.46     01-23 @ 05:20    WBC 9.58  / Hct 39.7  / SCr 1.44

## 2024-01-24 NOTE — ADVANCED PRACTICE NURSE CONSULT - RECOMMEDATIONS
-Clean the L. Lower Abdominal Quadrant wound with normal saline and apply skin prep to the surrounding skin  -Apply Silver Calcium Alginate (Aquacel Ag) to the wound bed and cover with a Foam dressing Q 72hrs PRN  -Encourage the patient to reposition Q 2hrs using wedges or pillows

## 2024-01-24 NOTE — PRE PROCEDURE NOTE - PRE PROCEDURE EVALUATION
Interventional Radiology Pre-Procedure Note    Diagnosis/Indication: Patient is a 58y old Female with multiple comorbidities including morbid obesity and recent left nephrectomy, who was found to have a left post-nephrectomy bed collection. Drainage of the collection was requested.     PAST MEDICAL & SURGICAL HISTORY:  Hypertension  vaginal cyst  Hyperlipidemia  Asthma  last inhaler use with in 10 days, on Pulm follow up [id number]  Hypothyroid  Obstructive sleep apnea  refuses Cpap  Obesity  morbid  Trigeminal neuralgia  R  Fibromyalgia  DM (diabetes mellitus)  2  DJD (degenerative joint disease)  Cervical/Thoracic/Lumbar  Cervical neuralgia  difficulty moving my neck  Back pain  thoracic & lumbar  H/O bursitis  right shoulder  Radicular pain  arms, legs  Vasculitis  Legs, forerhead, arms & hands, flare ups in R leg  Dr susie Ribeiro is my Rheumatologist  Renal cell carcinoma, left  on follow up Dr schulte, HOD renal [facility], waiting for suregry in 2020  Falls frequently  Morbid obesity with body mass index (BMI) of 50.0 to 59.9 in adult  Psoriasis  Diverticulosis  Fatty liver  S/P abdominal hysterectomy    S/P  section  1  Left adrenal mass  excision, benign   Vaginal cyst  removed   Vasculitis on nerve biopsy  , had another surgery called microvascular decompression   S/P left oophorectomy  S/P colonoscopic polypectomy       Allergies: gadobutrol (Rash; Urticaria; Hives)  venlafaxine (Hives)  mushroom (Unknown)  IV Contrast (Short breath)  Fioricet (Rash)      LABS:  CBC Full  -  ( 2024 05:40 )  WBC Count : 9.24 K/uL  RBC Count : 4.97 M/uL  Hemoglobin : 11.4 g/dL  Hematocrit : 38.6 %  Platelet Count - Automated : 312 K/uL  Mean Cell Volume : 77.7 fl  Mean Cell Hemoglobin : 22.9 pg  Mean Cell Hemoglobin Concentration : 29.5 gm/dL        136  |  99  |  32<H>  ----------------------------<  201<H>  4.3   |  34<H>  |  1.46<H>    Ca    9.3      2024 05:40  Phos  2.7       Mg     1.7         TPro  8.1  /  Alb  3.1<L>  /  TBili  0.3  /  DBili  x   /  AST  39  /  ALT  46  /  AlkPhos  115      PT/INR - ( 2024 05:40 )   PT: 12.0 sec;   INR: 1.05 ratio      PTT - ( 2024 05:20 )  PTT:31.6 sec    Procedure/ risks/ benefits/ alternatives were discussed with the patient, who verbalizes understanding, and witnessed informed consent was obtained.

## 2024-01-24 NOTE — PROGRESS NOTE ADULT - NS ATTEND AMEND GEN_ALL_CORE FT
58F PMH of asthma, COPD (on 4L NC PRN), DM, fatty liver, fibromyalgia, R shoulder bursitis, HLD, hypothyroidism, NAKUL, psoriasis, RCC (s/p L renal resection 11/3/23, no hx of CT/RT) presenting with lower abdominal pain. CT scan showed, Interval left radical nephrectomy with a 7.2 x 6.0 cm postoperative collection in the nephrectomy bed. No evidence of air or well formed wall.*  4.8 x 3.1 cm left abdominal wall collection, likely postoperative. Admitted for further evaluation     Pt was seen and examined, she had no new complaints. Patient was awaiting IR procedure     PE as above     Labs reviewed- cbc, bmp, LFTs     A/P:  #Left retroperitoneal collection s/p IR guided drainage   #Hyponatremia- resolved   #Acute URI likely viral  with nasal congestion, cough and post nasal drip   #Chronic Hypoxic respiratory failure   #COPD- not in exacerbation   #DM  #RCC s/p L renal resection   #DVT ppx     Plan:  -Pt p/w abd pain, CT noted w/ 7.2 x 6.0 cm postoperative collection in the nephrectomy bed. s/p IR guided drainage. 10.2Fr pigtail drainage catheter placed. 70 cc of cloudy dark red fluid aspirated- follow cx   -Start IV abx empiric   -BP stable, c/w home meds   -C/w levothyroxine   -Sodium improved   -SCr elevated, c/w mild hydration.

## 2024-01-24 NOTE — PROGRESS NOTE ADULT - PROBLEM SELECTOR PLAN 8
Controlled  Takes amlodipine, HCTZ, losartan at home  Continue home meds with parameters  Monitor BP

## 2024-01-24 NOTE — ADVANCED PRACTICE NURSE CONSULT - ASSESSMENT
This is a 58yr old female patient admitted for Ascites, presenting with a L. Lower Abdominal Quadrant Surgical wound (0.5cm x 2cm x 0.2cm) with granulation tissue, drainage, and white wound edges

## 2024-01-24 NOTE — PACU DISCHARGE NOTE - COMMENTS
No anesthetic complications noted.  Pt to be discharged after appropriate ~2h in PACU per procedure protocol and with all VSS

## 2024-01-24 NOTE — PROGRESS NOTE ADULT - ASSESSMENT
57 y/o diabetic female s/p L radical nephrectomy 11/2023 w/ 7x6cm postop collection in nephrectomy bed and 4.8 cm left abdominal wall collection  VSS, afebrile, no leukocytosis    Plan   - Start IV antibiotics  - IR for drainage of left nephrectomy postop bed collection  - Will aspirate left abdominal wall collection at bedside  - Local wound care   - Pain control prn  - Remainder of care as per primary team  - Uro to follow     57 y/o diabetic female s/p L radical nephrectomy 11/2023 w/ 7x6cm postop collection in nephrectomy bed and 4.8 cm left abdominal wall collection  VSS, afebrile, no leukocytosis    Plan   - Start IV antibiotics  - IR for drainage of left nephrectomy postop bed collection and aspiration of left abdominal wall collection at bedside given body habitus   - Local wound care   - Pain control prn  - Remainder of care as per primary team  - Uro to follow       57 y/o diabetic female s/p L radical nephrectomy 11/2023 w/ 7x6cm postop collection in nephrectomy bed and 4.8 cm left abdominal wall collection  VSS, afebrile, no leukocytosis    Plan   - Start IV antibiotics  - IR for drainage of left nephrectomy postop bed collection  - S/p bedside aspiration of left abdominal wall collection - fluid sent for gram stain and culture   - Local wound care   - Pain control prn  - Remainder of care as per primary team  - Uro to follow       59 y/o diabetic female s/p L radical nephrectomy 11/2023 w/ 7x6cm postop collection in nephrectomy bed and 4.8 cm left abdominal wall collection  VSS, afebrile, no leukocytosis    Plan   - Labs reviewed  - CT images reviewed  - IV antibiotics  - IR for drainage of left nephrectomy postop bed collection  - S/p bedside aspiration of left abdominal wall collection - fluid sent for gram stain and culture   - Local wound care   - Pain control prn  - Remainder of care as per primary team  - Uro to follow  - Discussed with house staff

## 2024-01-24 NOTE — PROCEDURE NOTE - PLAN
- Recovery in PACU.  - Monitor for sepsis and bleeding.   - Monitor drainage catheter output.   - F/u culture results.   - Flush drainage catheter with 5cc NS daily.

## 2024-01-24 NOTE — PROGRESS NOTE ADULT - PROBLEM SELECTOR PLAN 1
CTAP noted above  Pain control: tylenol for mild, toradol 15 for mod, toradol 30 for severe  IR to perform drainage today  Urology following  IR consulted, pending drainage of fluid collection on 1/24/24  Recommendations appreciated

## 2024-01-24 NOTE — PROGRESS NOTE ADULT - PROBLEM SELECTOR PLAN 5
Controlled  Takes levemir 16 at bedtime and novolog 6U premeals at home  Lantus coverage QHS  Standing dose insulin when not NPO  Sliding scale insulin coverage  Glucose monitoring  Low carb diet when not NPO

## 2024-01-24 NOTE — PROGRESS NOTE ADULT - PROBLEM SELECTOR PLAN 3
SpO2 98% on 2L, wean as tolerated  Takes duoneb, albuterol, symbicort, fluticasone, and ventolin at home  Continue duonebs if needed  Continue albuterol PRN and symbicort for now  Maintain O2 88-92%

## 2024-01-24 NOTE — PROCEDURE NOTE - PROCEDURE FINDINGS AND DETAILS
- Collection in the left post-nephrectomy bed again identified and drained.  - 10.2Fr pigtail drainage catheter placed. 70 cc of cloudy dark red fluid aspirated. Sample sent for culture.   - Cathete connected to gravity drainage.   - Official report to follow.

## 2024-01-24 NOTE — PROGRESS NOTE ADULT - PROBLEM SELECTOR PLAN 6
L. Lower Abdominal Quadrant Surgical wound  WOCN APRN recs:  	-Clean the L. Lower Abdominal Quadrant wound with normal saline and apply skin prep to the surrounding 	skin  	-Apply Silver Calcium Alginate (Aquacel Ag) to the wound bed and cover with a Foam dressing Q 72hrs 	PRN  	-Encourage the patient to reposition Q 2hrs using wedges or pillows

## 2024-01-24 NOTE — PROGRESS NOTE ADULT - PROBLEM SELECTOR PLAN 4
S/p left nephrectomy  SCr elevated 1.46 (baseline 1.15)  Avoid Nephrotoxic Meds/ Agents such as (NSAIDs, IV contrast, Aminoglycosides such as gentamicin, Gadolinium contrast, Phosphate containing enemas, etc..)  Trend BMP

## 2024-01-24 NOTE — PROGRESS NOTE ADULT - ASSESSMENT
57 y/o F PMH of asthma, COPD (on 4L NC PRN), DM, fatty liver, fibromyalgia, R shoulder bursitis, HLD, hypothyroidism, NAKUL, psoriasis, RCC (s/p L renal resection 11/3/23, no hx of CT/RT) presenting with lower abdominal pain and SOB. admitted for left sided abdominal pain.   Pt had CT angio chest that was negative for PE. Also had CT A/P that showed post-op left nephrectomy collection of 7.2 x 6 cm fluid collection and a 4.8 x3.1 cm left abdominal wall collection. IR consulted for drainage, pending on 1/24. will keep NPO after midnight. Urology also followed.

## 2024-01-24 NOTE — PROGRESS NOTE ADULT - SUBJECTIVE AND OBJECTIVE BOX
HPI:  58F PMH of asthma, COPD (on 4L NC PRN), DM, fatty liver, fibromyalgia, R shoulder bursitis, HLD, hypothyroidism, NAKUL, psoriasis, RCC (s/p L renal resection 11/3/23, no hx of CT/RT) presenting with lower abdominal pain and SOB. Pt states that she has had a SOB for the past 3-4 days, associated with a productive cough with yellow sputum. She has also had LLQ abdominal pain associated with flank pain since her L renal resection in November The abdominal pain is nonradiating, 9/10, stabbing, and intermittent. The lower laparoscopic site was not closing and healing properly so she was seen by the heme/onc NP outpatient and was prescribed cefadroxil 500 BID, last day supposed to be today. She also noticed that she had serosanguinous fluid draining from the lower laparoscopic site for the past 2 days whenever she moved around. She denies any fevers, chills, vomiting, diarrhea, dysuria, numbness/tingling/weakness in extremities. Pt lives with her son and ambulates with a cane.  (22 Jan 2024 23:55)      OVERNIGHT EVENTS:    No new overnight events.  Seen and examined at bedside.     REVIEW OF SYSTEMS:      CONSTITUTIONAL: No fever  EYES: no acute visual disturbances  NECK: No pain or stiffness  RESPIRATORY: No cough; No shortness of breath  CARDIOVASCULAR: No chest pain, no palpitations  GASTROINTESTINAL: No pain. No nausea, vomiting or diarrhea   NEUROLOGICAL: No headache or numbness, no tremors  MUSCULOSKELETAL: No joint pain, no muscle pain  GENITOURINARY: no dysuria, no frequency, no hesitancy  PSYCHIATRY: no depression, no anxiety  ALL OTHER  ROS negative        Vital Signs Last 24 Hrs  T(C): 36.7 (24 Jan 2024 05:46), Max: 37.4 (23 Jan 2024 21:55)  T(F): 98.1 (24 Jan 2024 05:46), Max: 99.4 (23 Jan 2024 21:55)  HR: 87 (24 Jan 2024 06:46) (74 - 98)  BP: 126/78 (24 Jan 2024 06:46) (116/58 - 128/76)  BP(mean): --  RR: 22 (24 Jan 2024 05:46) (20 - 22)  SpO2: 98% (24 Jan 2024 05:46) (93% - 98%)    Parameters below as of 24 Jan 2024 05:46  Patient On (Oxygen Delivery Method): nasal cannula  O2 Flow (L/min): 2      ________________________________________________  PHYSICAL EXAM:    GENERAL: NAD  HEENT: Normocephalic; conjunctivae and sclerae clear;  NECK : supple, no JVD  CHEST/LUNG: Clear to auscultation; Nonlabored  HEART: S1 S2  regular  ABDOMEN: Soft, Nontender, Nondistended; Bowel sounds present  EXTREMITIES: no cyanosis; no LE edema; no calf tenderness  NERVOUS SYSTEM:  Alert; no new deficits  SKIN: warm and dry; No new rashes or lesions  L. Lower Abdominal Quadrant Surgical wound    _________________________________________________  CURRENT MEDICATIONS:    MEDICATIONS  (STANDING):  amLODIPine   Tablet 10 milliGRAM(s) Oral daily  atorvastatin 40 milliGRAM(s) Oral at bedtime  cefTRIAXone   IVPB 1000 milliGRAM(s) IV Intermittent every 24 hours  gabapentin 400 milliGRAM(s) Oral two times a day  hydrocortisone 1% Cream 1 Application(s) Topical two times a day  insulin glargine Injectable (LANTUS) 16 Unit(s) SubCutaneous at bedtime  insulin lispro (ADMELOG) corrective regimen sliding scale   SubCutaneous Before meals and at bedtime  insulin lispro Injectable (ADMELOG) 6 Unit(s) SubCutaneous three times a day before meals  levothyroxine 112 MICROGram(s) Oral daily  losartan 25 milliGRAM(s) Oral daily  metroNIDAZOLE  IVPB 500 milliGRAM(s) IV Intermittent every 8 hours  montelukast 10 milliGRAM(s) Oral daily  OXcarbazepine 600 milliGRAM(s) Oral two times a day  povidone iodine 10% Solution 1 Application(s) Topical daily    MEDICATIONS  (PRN):  acetaminophen     Tablet .. 650 milliGRAM(s) Oral every 6 hours PRN Temp greater or equal to 38C (100.4F), Mild Pain (1 - 3)  aluminum hydroxide/magnesium hydroxide/simethicone Suspension 30 milliLiter(s) Oral every 4 hours PRN Dyspepsia  melatonin 3 milliGRAM(s) Oral at bedtime PRN Insomnia  ondansetron Injectable 4 milliGRAM(s) IV Push every 8 hours PRN Nausea and/or Vomiting  pantoprazole    Tablet 40 milliGRAM(s) Oral before breakfast PRN acid reflux      __________________________________________________  LABS:                          11.4   9.24  )-----------( 312      ( 24 Jan 2024 05:40 )             38.6     01-24    136  |  99  |  32<H>  ----------------------------<  201<H>  4.3   |  34<H>  |  1.46<H>    Ca    9.3      24 Jan 2024 05:40  Phos  2.7     01-23  Mg     1.7     01-24    TPro  8.1  /  Alb  3.1<L>  /  TBili  0.3  /  DBili  x   /  AST  39  /  ALT  46  /  AlkPhos  115  01-24    PT/INR - ( 24 Jan 2024 05:40 )   PT: 12.0 sec;   INR: 1.05 ratio         PTT - ( 23 Jan 2024 05:20 )  PTT:31.6 sec  Urinalysis Basic - ( 24 Jan 2024 05:40 )    Color: x / Appearance: x / SG: x / pH: x  Gluc: 201 mg/dL / Ketone: x  / Bili: x / Urobili: x   Blood: x / Protein: x / Nitrite: x   Leuk Esterase: x / RBC: x / WBC x   Sq Epi: x / Non Sq Epi: x / Bacteria: x      CAPILLARY BLOOD GLUCOSE      POCT Blood Glucose.: 173 mg/dL (24 Jan 2024 07:51)  POCT Blood Glucose.: 196 mg/dL (23 Jan 2024 22:30)  POCT Blood Glucose.: 208 mg/dL (23 Jan 2024 16:59)  POCT Blood Glucose.: 192 mg/dL (23 Jan 2024 12:08)      __________________________________________________  RADIOLOGY & ADDITIONAL TESTS:    Imaging Personally Reviewed:  YES    < from: CT Abdomen and Pelvis w/ IV Cont (01.22.24 @ 19:56) >  IMPRESSION:  *  No pulmonary embolism.  *  No acute pathology.  *  Interval left radical nephrectomy with a 7.2 x 6.0 cm postoperative   collection in the nephrectomy bed. No evidence of air or well formed wall.  *  4.8 x 3.1 cm left abdominal wall collection, likely postoperative.    < end of copied text >    Consultant(s) Notes Reviewed:   YES     Plan of care was discussed with patient and /or primary care giver; all questions and concerns were addressed and care was aligned with patient's wishes.    Plan discussed with attending and consulting physicians.

## 2024-01-25 LAB
ANION GAP SERPL CALC-SCNC: 3 MMOL/L — LOW (ref 5–17)
BUN SERPL-MCNC: 27 MG/DL — HIGH (ref 7–18)
CALCIUM SERPL-MCNC: 9.4 MG/DL — SIGNIFICANT CHANGE UP (ref 8.4–10.5)
CHLORIDE SERPL-SCNC: 98 MMOL/L — SIGNIFICANT CHANGE UP (ref 96–108)
CO2 SERPL-SCNC: 34 MMOL/L — HIGH (ref 22–31)
CREAT SERPL-MCNC: 1.19 MG/DL — SIGNIFICANT CHANGE UP (ref 0.5–1.3)
EGFR: 53 ML/MIN/1.73M2 — LOW
GLUCOSE BLDC GLUCOMTR-MCNC: 165 MG/DL — HIGH (ref 70–99)
GLUCOSE BLDC GLUCOMTR-MCNC: 194 MG/DL — HIGH (ref 70–99)
GLUCOSE BLDC GLUCOMTR-MCNC: 210 MG/DL — HIGH (ref 70–99)
GLUCOSE BLDC GLUCOMTR-MCNC: 284 MG/DL — HIGH (ref 70–99)
GLUCOSE SERPL-MCNC: 202 MG/DL — HIGH (ref 70–99)
HCT VFR BLD CALC: 38.8 % — SIGNIFICANT CHANGE UP (ref 34.5–45)
HGB BLD-MCNC: 11.2 G/DL — LOW (ref 11.5–15.5)
MAGNESIUM SERPL-MCNC: 1.9 MG/DL — SIGNIFICANT CHANGE UP (ref 1.6–2.6)
MCHC RBC-ENTMCNC: 23.2 PG — LOW (ref 27–34)
MCHC RBC-ENTMCNC: 28.9 GM/DL — LOW (ref 32–36)
MCV RBC AUTO: 80.3 FL — SIGNIFICANT CHANGE UP (ref 80–100)
NRBC # BLD: 0 /100 WBCS — SIGNIFICANT CHANGE UP (ref 0–0)
PHOSPHATE SERPL-MCNC: 4.5 MG/DL — SIGNIFICANT CHANGE UP (ref 2.5–4.5)
PLATELET # BLD AUTO: 288 K/UL — SIGNIFICANT CHANGE UP (ref 150–400)
POTASSIUM SERPL-MCNC: 4.7 MMOL/L — SIGNIFICANT CHANGE UP (ref 3.5–5.3)
POTASSIUM SERPL-SCNC: 4.7 MMOL/L — SIGNIFICANT CHANGE UP (ref 3.5–5.3)
RBC # BLD: 4.83 M/UL — SIGNIFICANT CHANGE UP (ref 3.8–5.2)
RBC # FLD: 15.4 % — HIGH (ref 10.3–14.5)
SODIUM SERPL-SCNC: 135 MMOL/L — SIGNIFICANT CHANGE UP (ref 135–145)
WBC # BLD: 7.17 K/UL — SIGNIFICANT CHANGE UP (ref 3.8–10.5)
WBC # FLD AUTO: 7.17 K/UL — SIGNIFICANT CHANGE UP (ref 3.8–10.5)

## 2024-01-25 PROCEDURE — 99232 SBSQ HOSP IP/OBS MODERATE 35: CPT | Mod: 24

## 2024-01-25 PROCEDURE — 99233 SBSQ HOSP IP/OBS HIGH 50: CPT

## 2024-01-25 RX ORDER — HEPARIN SODIUM 5000 [USP'U]/ML
5000 INJECTION INTRAVENOUS; SUBCUTANEOUS EVERY 8 HOURS
Refills: 0 | Status: DISCONTINUED | OUTPATIENT
Start: 2024-01-25 | End: 2024-01-31

## 2024-01-25 RX ORDER — LIDOCAINE 4 G/100G
1 CREAM TOPICAL EVERY 24 HOURS
Refills: 0 | Status: DISCONTINUED | OUTPATIENT
Start: 2024-01-25 | End: 2024-01-31

## 2024-01-25 RX ADMIN — OXCARBAZEPINE 600 MILLIGRAM(S): 300 TABLET, FILM COATED ORAL at 06:27

## 2024-01-25 RX ADMIN — Medication 100 MILLIGRAM(S): at 05:15

## 2024-01-25 RX ADMIN — ONDANSETRON 4 MILLIGRAM(S): 8 TABLET, FILM COATED ORAL at 03:44

## 2024-01-25 RX ADMIN — CEFTRIAXONE 100 MILLIGRAM(S): 500 INJECTION, POWDER, FOR SOLUTION INTRAMUSCULAR; INTRAVENOUS at 11:52

## 2024-01-25 RX ADMIN — HEPARIN SODIUM 5000 UNIT(S): 5000 INJECTION INTRAVENOUS; SUBCUTANEOUS at 14:36

## 2024-01-25 RX ADMIN — GABAPENTIN 400 MILLIGRAM(S): 400 CAPSULE ORAL at 06:30

## 2024-01-25 RX ADMIN — INSULIN GLARGINE 16 UNIT(S): 100 INJECTION, SOLUTION SUBCUTANEOUS at 22:50

## 2024-01-25 RX ADMIN — AMLODIPINE BESYLATE 10 MILLIGRAM(S): 2.5 TABLET ORAL at 06:26

## 2024-01-25 RX ADMIN — GABAPENTIN 400 MILLIGRAM(S): 400 CAPSULE ORAL at 17:27

## 2024-01-25 RX ADMIN — Medication 1 APPLICATION(S): at 06:31

## 2024-01-25 RX ADMIN — Medication 1 APPLICATION(S): at 17:31

## 2024-01-25 RX ADMIN — Medication 1: at 17:29

## 2024-01-25 RX ADMIN — LIDOCAINE 1 PATCH: 4 CREAM TOPICAL at 11:53

## 2024-01-25 RX ADMIN — Medication 6 UNIT(S): at 17:28

## 2024-01-25 RX ADMIN — OXCARBAZEPINE 600 MILLIGRAM(S): 300 TABLET, FILM COATED ORAL at 17:27

## 2024-01-25 RX ADMIN — Medication 100 MILLIGRAM(S): at 22:13

## 2024-01-25 RX ADMIN — ATORVASTATIN CALCIUM 40 MILLIGRAM(S): 80 TABLET, FILM COATED ORAL at 22:13

## 2024-01-25 RX ADMIN — LIDOCAINE 1 PATCH: 4 CREAM TOPICAL at 19:10

## 2024-01-25 RX ADMIN — HEPARIN SODIUM 5000 UNIT(S): 5000 INJECTION INTRAVENOUS; SUBCUTANEOUS at 22:13

## 2024-01-25 RX ADMIN — Medication 3: at 11:51

## 2024-01-25 RX ADMIN — MONTELUKAST 10 MILLIGRAM(S): 4 TABLET, CHEWABLE ORAL at 11:52

## 2024-01-25 RX ADMIN — Medication 6 UNIT(S): at 11:51

## 2024-01-25 RX ADMIN — Medication 2: at 22:50

## 2024-01-25 RX ADMIN — HEPARIN SODIUM 5000 UNIT(S): 5000 INJECTION INTRAVENOUS; SUBCUTANEOUS at 08:41

## 2024-01-25 RX ADMIN — LOSARTAN POTASSIUM 25 MILLIGRAM(S): 100 TABLET, FILM COATED ORAL at 06:26

## 2024-01-25 RX ADMIN — LIDOCAINE 1 PATCH: 4 CREAM TOPICAL at 23:30

## 2024-01-25 RX ADMIN — Medication 112 MICROGRAM(S): at 05:15

## 2024-01-25 RX ADMIN — Medication 6 UNIT(S): at 08:38

## 2024-01-25 RX ADMIN — Medication 1: at 08:39

## 2024-01-25 RX ADMIN — ONDANSETRON 4 MILLIGRAM(S): 8 TABLET, FILM COATED ORAL at 21:22

## 2024-01-25 NOTE — PROGRESS NOTE ADULT - PROBLEM SELECTOR PLAN 6
pt p/w L. Lower Abdominal Quadrant Surgical wound s/p left nephrectomy   -Clean the L. Lower Abdominal Quadrant wound with normal saline and apply skin prep to the surrounding 	skin  Apply Silver Calcium Alginate (Aquacel Ag) to the wound bed and cover with a Foam dressing Q 72hrs 	PRN  -Encourage the patient to reposition Q 2hrs using wedges or pillows

## 2024-01-25 NOTE — PROGRESS NOTE ADULT - PROBLEM SELECTOR PLAN 5
Controlled  Takes levemir 16 at bedtime and novolog 6U premeals at home  c/w lantus 16 units qhs and admelog 6 units tid with meals + ISS qid  Glucose monitoring  Low carb diet when not NPO

## 2024-01-25 NOTE — CHART NOTE - NSCHARTNOTEFT_GEN_A_CORE
EVENT: Called to assess pt for c/o nausea, vomiting    BRIEF HPI: 59 y/o F PMH of asthma, COPD (on 4L NC PRN), DM, fatty liver, fibromyalgia, R shoulder bursitis, HDL, hypothyroidism, NAKUL, psoriasis, RCC (s/p L renal resection 11/3/23, no hx of CT/RT) presenting with lower abdominal pain and SOB. admitted for left sided abdominal pain.   Pt had CT angio chest that was negative for PE. Also had CT A/P that showed post-op left nephrectomy collection of 7.2 x 6 cm fluid collection and a 4.8 x3.1 cm left abdominal wall collection. IR consulted for drainage, pending on 1/24. will keep NPO after midnight. Urology also followed. S/p IR for drainage of left nephrectomy postop bed collection.    Vital Signs Last 24 Hrs  T(C): 36.7 (24 Jan 2024 23:30), Max: 36.8 (24 Jan 2024 18:11)  T(F): 98 (24 Jan 2024 23:30), Max: 98.2 (24 Jan 2024 18:11)  HR: 77 (24 Jan 2024 23:30) (72 - 95)  BP: 128/87 (24 Jan 2024 23:30) (97/55 - 154/75)  BP(mean): 68 (24 Jan 2024 17:40) (68 - 84)  RR: 20 (24 Jan 2024 23:30) (13 - 29)  SpO2: 99% (24 Jan 2024 23:30) (88% - 99%)    Parameters below as of 24 Jan 2024 23:30  Patient On (Oxygen Delivery Method): nasal cannula  O2 Flow (L/min): 2    FOCUSSED PE:  ABD: Obese, rounded, + BS  RESP: Even, unlabored  CV: S1 S2, regular    PLAN  Pt constantly eating will make clear liquid X 12 hours  Cont ondansetron Injectable 4 geno GRAM(s) IV Push every 8 hours PRN Nausea and/or Vomiting  Cont pantoprazole Tablet 40 geno GRAM(s) Oral before breakfast PRN acid reflux    FOLLOW UP: AM labs
EVENT: Pt is NPO after MN for possible abdominal fluid drainage in AM by IR. On Lantus 16 Units @ HS    HPI: 57 y/o F PMH of asthma, COPD (on 4L NC PRN), DM, fatty liver, fibromyalgia, R shoulder bursitis, HLD, hypothyroidism, NAKUL, psoriasis, RCC (s/p L renal resection 11/3/23, no hx of CT/RT) presenting with lower abdominal pain and SOB. admitted for left sided abdominal pain.   Pt had CT angio chest that was negative for PE. Also had CT A/P that showed post-op left nephrectomy collection of 7.2 x 6 cm fluid collection and a 4.8 x3.1 cm left abdominal wall collection. IR consulted for drainage, pending on 1/24. will keep NPO after midnight. Urology also followed.     SUBJECTIVE: n/a    OBJECTIVE:  Vital Signs Last 24 Hrs  T(C): 37.4 (23 Jan 2024 21:55), Max: 37.4 (23 Jan 2024 21:55)  T(F): 99.4 (23 Jan 2024 21:55), Max: 99.4 (23 Jan 2024 21:55)  HR: 87 (23 Jan 2024 21:55) (83 - 98)  BP: 128/76 (23 Jan 2024 21:55) (119/78 - 152/82)  BP(mean): --  RR: 20 (23 Jan 2024 21:55) (18 - 20)  SpO2: 93% (23 Jan 2024 21:55) (93% - 98%)    Parameters below as of 23 Jan 2024 21:55  Patient On (Oxygen Delivery Method): nasal cannula  O2 Flow (L/min): 2      FOCUSED PHYSICAL EXAM:  Neuro: awake, alert, oriented x 3. In NAD  Cardiovascular: Pulses +2 B/L in lower and upper extremities, HR regular, BP stable, No edema.  Respiratory: Respirations regular, unlabored, breath sounds clear B/L.   GI: Abdomen soft, non-tender, positive bowel sounds.  : no bladder distention noted. No complaints at this time.  Skin: Dry, intact, no bruising, no diaphoresis.    LABS:                        11.6   9.58  )-----------( 355      ( 23 Jan 2024 05:20 )             39.7   CARDIAC MARKERS ( 23 Jan 2024 04:10 )  x     / x     / 88 U/L / x     / 1.5 ng/mL  CARDIAC MARKERS ( 22 Jan 2024 16:52 )  x     / x     / 79 U/L / x     / 1.9 ng/mL    01-23    133<L>  |  95<L>  |  22<H>  ----------------------------<  358<H>  4.4   |  31  |  1.44<H>    Ca    10.0      23 Jan 2024 05:20  Phos  2.7     01-23  Mg     1.2     01-23    TPro  7.9  /  Alb  3.0<L>  /  TBili  0.3  /  DBili  x   /  AST  39  /  ALT  48  /  AlkPhos  126<H>  01-22      EKG:   IMAGING:    ASSESSMENT/PROBLEM:       PLAN:   1. Order Lantus 8 Units SQ x 1 dose stat  2. Monitor response to treatment  3. Order FS Q 6 hrs while NPO  4. Cont present care/treatment  5. Supportive care

## 2024-01-25 NOTE — PROGRESS NOTE ADULT - ASSESSMENT
59 y/o F PMH of asthma, COPD (on 4L NC PRN), DM, fatty liver, fibromyalgia, R shoulder bursitis, HLD, hypothyroidism, NAKUL, psoriasis, RCC (s/p L renal resection 11/3/23, no hx of CT/RT) presenting with lower abdominal pain and SOB. admitted for left sided abdominal pain.   Pt had CT angio chest that was negative for PE. Also had CT A/P that showed post-op left nephrectomy collection of 7.2 x 6 cm fluid collection and a 4.8 x3.1 cm left abdominal wall collection. Pt was s/p IR drainage of fluid collection 70cc on 1/24, now with pigtail in place.     Pending resolution of drainage, waiting for <10 cc in 24 hrs. Per discussion with IR, pt can also follow up with outpt urology with referral to return back to IR for removal of drain.

## 2024-01-25 NOTE — PROGRESS NOTE ADULT - PROBLEM SELECTOR PLAN 8
Controlled  Takes amlodipine, HCTZ, losartan at home  Continue home meds with parameters - amlodipine and losartan  Monitor BP

## 2024-01-25 NOTE — PROGRESS NOTE ADULT - NS ATTEND AMEND GEN_ALL_CORE FT
58F PMH of asthma, COPD (on 4L NC PRN), DM, fatty liver, fibromyalgia, R shoulder bursitis, HLD, hypothyroidism, NAKUL, psoriasis, RCC (s/p L renal resection 11/3/23, no hx of CT/RT) presenting with lower abdominal pain. CT scan showed, Interval left radical nephrectomy with a 7.2 x 6.0 cm postoperative collection in the nephrectomy bed. No evidence of air or well formed wall.*  4.8 x 3.1 cm left abdominal wall collection, likely postoperative. Admitted for further evaluation     Pt was seen and examined, she had no new complaints. Patient was awaiting IR procedure     PE as above     Labs reviewed- cbc, bmp, LFTs     A/P:  #Left retroperitoneal collection s/p IR guided drainage   #Hyponatremia- resolved   #Acute URI likely viral  with nasal congestion, cough and post nasal drip   #Chronic Hypoxic respiratory failure   #COPD- not in exacerbation   #DM  #RCC s/p L renal resection   #DVT ppx     Plan:  -Pt p/w abd pain, CT noted w/ 7.2 x 6.0 cm postoperative collection in the nephrectomy bed. s/p IR guided drainage. 10.2Fr pigtail drainage catheter placed. 70 cc of cloudy dark red fluid aspirated. Cx noted w/ no growth. D/w IR PA MsAyde Nerissa advised to monitor out-put and once <10cc / 24 hrs over a period of 2-3 days then would have it removed. Advised out-patient f/u patient's urologist. Patient follows w/ Dr. Rubén Peck as out-patient   -c/w  IV abx empiric   -BP stable, c/w home meds   -C/w levothyroxine   -Sodium improved   -SCr improved s/p iV hydration.

## 2024-01-25 NOTE — PROGRESS NOTE ADULT - PROBLEM SELECTOR PLAN 1
CTAP noted above  c/w pain control: tylenol PRN  c/w abx ceftriaxone and flagyl IV  s/p IR drainage of left abdominal fluid collection on 1/24/24, removed 70cc  strict output of drain  waiting for output <10 cc in 24 hrs. Per discussion with IR, pt can also follow up with outpt urology with referral to return back to IR for removal of drain.   Urology following

## 2024-01-25 NOTE — PROGRESS NOTE ADULT - PROBLEM SELECTOR PLAN 4
S/p left nephrectomy  SCr improved from 1.46 > 1.19 (baseline 1.15)  Avoid Nephrotoxic Meds/ Agents such as (NSAIDs, IV contrast, Aminoglycosides such as gentamicin, Gadolinium contrast, Phosphate containing enemas, etc..)  Trend BMP

## 2024-01-25 NOTE — PROGRESS NOTE ADULT - PROBLEM SELECTOR PLAN 3
on home oxygen 4L NC   Takes duoneb, albuterol, symbicort, fluticasone, and ventolin at home  Continue duonebs if needed  Continue albuterol PRN and symbicort for now  Maintain O2 88-92%

## 2024-01-25 NOTE — PROGRESS NOTE ADULT - SUBJECTIVE AND OBJECTIVE BOX
INTERVAL HPI/OVERNIGHT EVENTS:  seen and examined   Pt resting comfortably. complaining of nausea   s/p IR drainage       MEDICATIONS  (STANDING):  amLODIPine   Tablet 10 milliGRAM(s) Oral daily  atorvastatin 40 milliGRAM(s) Oral at bedtime  cefTRIAXone   IVPB 1000 milliGRAM(s) IV Intermittent every 24 hours  gabapentin 400 milliGRAM(s) Oral two times a day  heparin   Injectable 5000 Unit(s) SubCutaneous every 8 hours  hydrocortisone 1% Cream 1 Application(s) Topical two times a day  insulin glargine Injectable (LANTUS) 16 Unit(s) SubCutaneous at bedtime  insulin lispro (ADMELOG) corrective regimen sliding scale   SubCutaneous Before meals and at bedtime  insulin lispro Injectable (ADMELOG) 6 Unit(s) SubCutaneous three times a day before meals  levothyroxine 112 MICROGram(s) Oral daily  losartan 25 milliGRAM(s) Oral daily  metroNIDAZOLE  IVPB 500 milliGRAM(s) IV Intermittent every 8 hours  montelukast 10 milliGRAM(s) Oral daily  OXcarbazepine 600 milliGRAM(s) Oral two times a day    MEDICATIONS  (PRN):  acetaminophen     Tablet .. 650 milliGRAM(s) Oral every 6 hours PRN Temp greater or equal to 38C (100.4F), Mild Pain (1 - 3)  aluminum hydroxide/magnesium hydroxide/simethicone Suspension 30 milliLiter(s) Oral every 4 hours PRN Dyspepsia  melatonin 3 milliGRAM(s) Oral at bedtime PRN Insomnia  ondansetron Injectable 4 milliGRAM(s) IV Push every 8 hours PRN Nausea and/or Vomiting  pantoprazole    Tablet 40 milliGRAM(s) Oral before breakfast PRN acid reflux      Vital Signs Last 24 Hrs  T(C): 36.8 (25 Jan 2024 05:32), Max: 36.8 (24 Jan 2024 18:11)  T(F): 98.3 (25 Jan 2024 05:32), Max: 98.3 (25 Jan 2024 05:32)  HR: 80 (25 Jan 2024 05:32) (72 - 95)  BP: 125/71 (25 Jan 2024 05:32) (97/55 - 154/75)  BP(mean): 68 (24 Jan 2024 17:40) (68 - 84)  RR: 20 (25 Jan 2024 05:32) (13 - 29)  SpO2: 98% (25 Jan 2024 05:32) (88% - 99%)    Parameters below as of 25 Jan 2024 05:32  Patient On (Oxygen Delivery Method): nasal cannula  O2 Flow (L/min): 2      Physical:  General: NAD.  Respirations: Unlabored  Abdomen: Soft nondistended, nontender. No rebound, no guarding, no peritonitis   gu: voiding spontaneously  back: left flank IR drain, ss, dressing cdi, appropriate tenderness     I&O's Detail    24 Jan 2024 07:01  -  25 Jan 2024 07:00  --------------------------------------------------------  IN:  Total IN: 0 mL    OUT:    Drain (mL): 55 mL  Total OUT: 55 mL    Total NET: -55 mL          LABS:                        11.4   9.24  )-----------( 312      ( 24 Jan 2024 05:40 )             38.6             01-24    136  |  99  |  32<H>  ----------------------------<  201<H>  4.3   |  34<H>  |  1.46<H>    Ca    9.3      24 Jan 2024 05:40  Mg     1.7     01-24    TPro  8.1  /  Alb  3.1<L>  /  TBili  0.3  /  DBili  x   /  AST  39  /  ALT  46  /  AlkPhos  115  01-24      58y.o. Female       INTERVAL HPI/OVERNIGHT EVENTS:  seen and examined   Pt resting comfortably. complaining of nausea   s/p IR drainage of RP collection  s/p drainage of abdominal wall collection      MEDICATIONS  (STANDING):  amLODIPine   Tablet 10 milliGRAM(s) Oral daily  atorvastatin 40 milliGRAM(s) Oral at bedtime  cefTRIAXone   IVPB 1000 milliGRAM(s) IV Intermittent every 24 hours  gabapentin 400 milliGRAM(s) Oral two times a day  heparin   Injectable 5000 Unit(s) SubCutaneous every 8 hours  hydrocortisone 1% Cream 1 Application(s) Topical two times a day  insulin glargine Injectable (LANTUS) 16 Unit(s) SubCutaneous at bedtime  insulin lispro (ADMELOG) corrective regimen sliding scale   SubCutaneous Before meals and at bedtime  insulin lispro Injectable (ADMELOG) 6 Unit(s) SubCutaneous three times a day before meals  levothyroxine 112 MICROGram(s) Oral daily  losartan 25 milliGRAM(s) Oral daily  metroNIDAZOLE  IVPB 500 milliGRAM(s) IV Intermittent every 8 hours  montelukast 10 milliGRAM(s) Oral daily  OXcarbazepine 600 milliGRAM(s) Oral two times a day    MEDICATIONS  (PRN):  acetaminophen     Tablet .. 650 milliGRAM(s) Oral every 6 hours PRN Temp greater or equal to 38C (100.4F), Mild Pain (1 - 3)  aluminum hydroxide/magnesium hydroxide/simethicone Suspension 30 milliLiter(s) Oral every 4 hours PRN Dyspepsia  melatonin 3 milliGRAM(s) Oral at bedtime PRN Insomnia  ondansetron Injectable 4 milliGRAM(s) IV Push every 8 hours PRN Nausea and/or Vomiting  pantoprazole    Tablet 40 milliGRAM(s) Oral before breakfast PRN acid reflux      Vital Signs Last 24 Hrs  T(C): 36.8 (25 Jan 2024 05:32), Max: 36.8 (24 Jan 2024 18:11)  T(F): 98.3 (25 Jan 2024 05:32), Max: 98.3 (25 Jan 2024 05:32)  HR: 80 (25 Jan 2024 05:32) (72 - 95)  BP: 125/71 (25 Jan 2024 05:32) (97/55 - 154/75)  BP(mean): 68 (24 Jan 2024 17:40) (68 - 84)  RR: 20 (25 Jan 2024 05:32) (13 - 29)  SpO2: 98% (25 Jan 2024 05:32) (88% - 99%)    Parameters below as of 25 Jan 2024 05:32  Patient On (Oxygen Delivery Method): nasal cannula  O2 Flow (L/min): 2      Physical:  General: NAD.  Respirations: Unlabored  Abdomen: Soft nondistended, nontender. No rebound, no guarding, no peritonitis   gu: voiding spontaneously  back: left flank IR drain, ss, dressing cdi, appropriate tenderness     I&O's Detail    24 Jan 2024 07:01  -  25 Jan 2024 07:00  --------------------------------------------------------  IN:  Total IN: 0 mL    OUT:    Drain (mL): 55 mL  Total OUT: 55 mL    Total NET: -55 mL          LABS:                        11.4   9.24  )-----------( 312      ( 24 Jan 2024 05:40 )             38.6             01-24    136  |  99  |  32<H>  ----------------------------<  201<H>  4.3   |  34<H>  |  1.46<H>    Ca    9.3      24 Jan 2024 05:40  Mg     1.7     01-24    TPro  8.1  /  Alb  3.1<L>  /  TBili  0.3  /  DBili  x   /  AST  39  /  ALT  46  /  AlkPhos  115  01-24      58y.o. Female

## 2024-01-25 NOTE — PROGRESS NOTE ADULT - ASSESSMENT
57 y/o diabetic female s/p L radical nephrectomy 11/2023 w/ 7x6cm postop collection in nephrectomy bed and 4.8 cm left abdominal wall collection, s/p IR drainage of L neph bed collection 1/24    -IV antibiotics  -IR drain care  -f/u cultures   -Local wound care   -Pain control prn  -Remainder of care as per primary team  -Uro to follow  -contents of this note were signed out to pearl GALLOWAY, [phone] 57 y/o diabetic female s/p L radical nephrectomy 11/2023 w/ 7x6cm postop collection in nephrectomy bed and 4.8 cm left abdominal wall collection, s/p IR drainage of L neph bed collection 1/24    -IV antibiotics  -IR drain care  -f/u cultures   -Local wound care   -Pain control prn  -Remainder of care as per primary team  -Uro to follow  -discussed with house staff

## 2024-01-25 NOTE — PROGRESS NOTE ADULT - SUBJECTIVE AND OBJECTIVE BOX
Patient is a 58y old  Female who presents with a chief complaint of SOB, abdominal pain (25 Jan 2024 07:55)    OVERNIGHT EVENTS: no acute changes.    Pt is aox3, comfortable appearing, tolerating PO, requires assistance to ambulate. Nausea now resolved. Diet advanced.     REVIEW OF SYSTEMS:  CONSTITUTIONAL: No fever, chills  ENMT:  No difficulty hearing, no change in vision  NECK: No pain or stiffness  RESPIRATORY: No cough, SOB  CARDIOVASCULAR: No chest pain, palpitations  GASTROINTESTINAL: No abdominal pain. No nausea, vomiting, or diarrhea  GENITOURINARY: No dysuria  NEUROLOGICAL: No HA  SKIN: No itching, burning, rashes, or lesions   LYMPH NODES: No enlarged glands  ENDOCRINE: No heat or cold intolerance; No hair loss  MUSCULOSKELETAL: No joint pain or swelling; No muscle, back, or extremity pain  PSYCHIATRIC: No depression, anxiety  HEME/LYMPH: No easy bruising, or bleeding gums    T(C): 36.6 (01-25-24 @ 13:44), Max: 36.8 (01-24-24 @ 18:11)  HR: 87 (01-25-24 @ 13:44) (72 - 95)  BP: 112/60 (01-25-24 @ 13:44) (97/55 - 154/75)  RR: 20 (01-25-24 @ 13:44) (13 - 23)  SpO2: 96% (01-25-24 @ 13:44) (89% - 99%)  Wt(kg): --Vital Signs Last 24 Hrs  T(C): 36.6 (25 Jan 2024 13:44), Max: 36.8 (24 Jan 2024 18:11)  T(F): 97.8 (25 Jan 2024 13:44), Max: 98.3 (25 Jan 2024 05:32)  HR: 87 (25 Jan 2024 13:44) (72 - 95)  BP: 112/60 (25 Jan 2024 13:44) (97/55 - 154/75)  BP(mean): 68 (24 Jan 2024 17:40) (68 - 84)  RR: 20 (25 Jan 2024 13:44) (13 - 23)  SpO2: 96% (25 Jan 2024 13:44) (89% - 99%)    Parameters below as of 25 Jan 2024 13:44  Patient On (Oxygen Delivery Method): nasal cannula  O2 Flow (L/min): 4      MEDICATIONS  (STANDING):  amLODIPine   Tablet 10 milliGRAM(s) Oral daily  atorvastatin 40 milliGRAM(s) Oral at bedtime  cefTRIAXone   IVPB 1000 milliGRAM(s) IV Intermittent every 24 hours  gabapentin 400 milliGRAM(s) Oral two times a day  heparin   Injectable 5000 Unit(s) SubCutaneous every 8 hours  hydrocortisone 1% Cream 1 Application(s) Topical two times a day  insulin glargine Injectable (LANTUS) 16 Unit(s) SubCutaneous at bedtime  insulin lispro (ADMELOG) corrective regimen sliding scale   SubCutaneous Before meals and at bedtime  insulin lispro Injectable (ADMELOG) 6 Unit(s) SubCutaneous three times a day before meals  levothyroxine 112 MICROGram(s) Oral daily  lidocaine   4% Patch 1 Patch Transdermal every 24 hours  losartan 25 milliGRAM(s) Oral daily  metroNIDAZOLE  IVPB 500 milliGRAM(s) IV Intermittent every 8 hours  montelukast 10 milliGRAM(s) Oral daily  OXcarbazepine 600 milliGRAM(s) Oral two times a day    MEDICATIONS  (PRN):  acetaminophen     Tablet .. 650 milliGRAM(s) Oral every 6 hours PRN Temp greater or equal to 38C (100.4F), Mild Pain (1 - 3)  aluminum hydroxide/magnesium hydroxide/simethicone Suspension 30 milliLiter(s) Oral every 4 hours PRN Dyspepsia  melatonin 3 milliGRAM(s) Oral at bedtime PRN Insomnia  ondansetron Injectable 4 milliGRAM(s) IV Push every 8 hours PRN Nausea and/or Vomiting  pantoprazole    Tablet 40 milliGRAM(s) Oral before breakfast PRN acid reflux      PHYSICAL EXAM:  GENERAL: NAD  EYES: clear conjunctiva  ENMT: Moist mucous membranes  NECK: Supple, No JVD, Normal thyroid  CHEST/LUNG: +nasal cannula. Clear to auscultation bilaterally; No rales, rhonchi, wheezing, or rubs  HEART: S1, S2, Regular rate and rhythm  ABDOMEN: +left flank pigtail with serosanguineous fluid. +small LLQ ulcerated wound with yellow drainage. Soft, Nontender, Nondistended; Bowel sounds present  NEURO: Alert & Oriented X3  EXTREMITIES: +psoriasis plaques noted on elbows, and knees. +right foot swelling > left foot.   LYMPH: No lymphadenopathy noted  SKIN: No rashes or lesions  Consultant(s) Notes Reviewed:  [x ] YES  [ ] NO  Care Discussed with Consultants/Other Providers [ x] YES  [ ] NO      LABS:                        11.2   7.17  )-----------( 288      ( 25 Jan 2024 06:40 )             38.8     01-25    135  |  98  |  27<H>  ----------------------------<  202<H>  4.7   |  34<H>  |  1.19    Ca    9.4      25 Jan 2024 06:40  Phos  4.5     01-25  Mg     1.9     01-25    TPro  8.1  /  Alb  3.1<L>  /  TBili  0.3  /  DBili  x   /  AST  39  /  ALT  46  /  AlkPhos  115  01-24    PT/INR - ( 24 Jan 2024 05:40 )   PT: 12.0 sec;   INR: 1.05 ratio           CAPILLARY BLOOD GLUCOSE      POCT Blood Glucose.: 284 mg/dL (25 Jan 2024 11:43)  POCT Blood Glucose.: 194 mg/dL (25 Jan 2024 07:57)  POCT Blood Glucose.: 211 mg/dL (24 Jan 2024 22:42)  POCT Blood Glucose.: 168 mg/dL (24 Jan 2024 18:16)        Urinalysis Basic - ( 25 Jan 2024 06:40 )    Color: x / Appearance: x / SG: x / pH: x  Gluc: 202 mg/dL / Ketone: x  / Bili: x / Urobili: x   Blood: x / Protein: x / Nitrite: x   Leuk Esterase: x / RBC: x / WBC x   Sq Epi: x / Non Sq Epi: x / Bacteria: x        RADIOLOGY & ADDITIONAL TESTS:  < from: CT Aspiration Abscess Hematoma, Cyst (01.24.24 @ 16:03) >    ACC: [id number] EXAM:  CT ASP ABSC HEMATOMA CYST#   ORDERED BY: MANDI DURANT     PROCEDURE DATE:  01/24/2024          INTERPRETATION:  PROCEDURE: CT-guided drainage of retroperitoneal   collection.    : DEVIN Flores MD    CLINICAL INDICATION: 58-year-old female with multiple comorbidities   including a recent left nephrectomy, who was found to have a left post   nephrectomy bed collection.    ANESTHESIA: Intravenous sedation was provided by the attending   anesthesiologist. A total of 6mL of 1% lidocaine was used for local   anesthesia.    ANTIBIOTICS: None    TECHNIQUE: After discussing the risks, benefits and alternatives to this   procedure with the patient, informed consent was obtained. A timeout was   performed.    The patient was placed into right lateral decubitus position on the CT   examination table and connected to physiologic monitoring. Noncontrast   transaxial images of the abdomen were obtained. The left retroperitoneal   collection which had been seen on a recent CT scan was again identified,   and the left flank was prepped and draped in the usual sterile fashion.    Using CT guidance, an 18-gauge Singh needle was advanced into the   collection using a left posterolateral approach. Access into the   collection was confirmed with CT images and aspiration of 5 mL of dark   red/brown cloudy fluid, which was sent for microbiological analysis. The   needle was then removed over a wire and the tract was dilated. A 10.2   [place] pigtail drainage catheter was then advanced into the collection   over the wire. An additional 65 mL of dark red/brown cloudy fluid was   manually aspirated. Repeat images demonstrated good positioning of the   catheter tip within the collection cavity with virtual complete collapse   of the cavity around the catheter tip. The catheter was secured to the   patient's skin with a silk suture, flushed with normal saline, and then   connected to a drainage bag. A dry sterile dressing was then applied.    The patient tolerated the procedure well and was transferred to the   recovery unit in stable condition. There were no immediate complications.    IMPRESSION: SUCCESSFUL CT-GUIDED PERCUTANEOUS DRAINAGE OF LEFT   RETROPERITONEAL COLLECTION, AS DESCRIBED ABOVE.    --- End of Report ---            PRATIBHA FLORES MD; Attending Interventional Radiologist  This document has been electronically signed. Jan 24 2024  4:10PM    < end of copied text >  < from: CT Abdomen and Pelvis w/ IV Cont (01.22.24 @ 19:56) >    ACC: [id number] EXAM:  CT ABDOMEN AND PELVIS IC   ORDERED BY: JUSTIN MARTIN     ACC: [id number] EXAM:  CT ANGIO CHEST PULM ART WAWIC   ORDERED BY: JUSTIN MARTIN     PROCEDURE DATE:  01/22/2024          INTERPRETATION:  CLINICAL INFORMATION:Rule out postoperative PE, L renal   resection now with LLQ poorly healing surgical scar    COMPARISON: Chest CT 9/2/2023, CT abdomen and pelvis 8/25/2023    CONTRAST/COMPLICATIONS:  IV Contrast: Omnipaque 350 (accession [id number]), IV contrast documented   in unlinked concurrent exam (accession [id number])  90 cc administered   10   cc discarded  Oral Contrast: NONE  Complications: None reported at time of study completion    PROCEDURE:  CT Angiography of the Chest was performed followed by portal venous phase   imaging of the Abdomen and Pelvis.  Sagittal and coronal reformats were performed as well as 3D (MIP)   reconstructions.    FINDINGS:  CHEST:  PULMONARY ARTERIES: No pulmonary embolism.    LUNGS AND LARGE AIRWAYS: The central airways are patent. The lungs are   clear.  PLEURA: No pleural abnormality.  VESSELS: Normal caliber aorta.  HEART: No cardiomegaly. No pericardial effusion. Coronary artery   calcifications are present.  MEDIASTINUM AND KATARINA: No adenopathy.  CHEST WALL AND LOWER NECK: No masses.    ABDOMEN AND PELVIS:  LIVER: Diffuse steatosis.  BILE DUCTS: Nondilated.  GALLBLADDER: Normal.  SPLEEN: Normal.  PANCREAS: Normal.  ADRENALS: Stable small right adrenal nodule. Left adrenalectomy.  KIDNEYS/URETERS: Interval left radical nephrectomy with a 7.2 x 6.0 cm   postoperative collection in the nephrectomy bed. No evidence of air or   well formed wall. Right kidney is normal.    BLADDER: Normal.  REPRODUCTIVE ORGANS: No masses.    BOWEL: No bowel-related abnormality. No bowel obstruction or bowel   inflammation. Normal appendix and ileocecal region. No evidence of active   colitis or diverticulitis.  PERITONEUM: No free air or ascites.  VESSELS: Normal caliber aorta. No renal vein thrombosis.  RETROPERITONEUM/LYMPHNODES: No adenopathy.  ABDOMINAL WALL: 4.8 x 3.1 cm left abdominal wall collection, likely   postoperative. Additional areas of abdominal wall edema.  BONES: No acute bony abnormality.    IMPRESSION:  *  No pulmonary embolism.  *  No acute pathology.  *  Interval left radical nephrectomy with a 7.2 x 6.0 cm postoperative   collection in the nephrectomy bed. No evidence of air or well formed wall.  *  4.8 x 3.1 cm left abdominal wall collection, likely postoperative.      --- End of Report ---            CANDELARIA DYSON MD; Attending Radiologist  This document has been electronically signed. Jan 22 2024  8:49PM    < end of copied text >      Imaging Personally Reviewed:  [ ] YES  [ ] NO

## 2024-01-26 ENCOUNTER — TRANSCRIPTION ENCOUNTER (OUTPATIENT)
Age: 59
End: 2024-01-26

## 2024-01-26 LAB
GLUCOSE BLDC GLUCOMTR-MCNC: 165 MG/DL — HIGH (ref 70–99)
GLUCOSE BLDC GLUCOMTR-MCNC: 169 MG/DL — HIGH (ref 70–99)
GLUCOSE BLDC GLUCOMTR-MCNC: 184 MG/DL — HIGH (ref 70–99)
GLUCOSE BLDC GLUCOMTR-MCNC: 201 MG/DL — HIGH (ref 70–99)
GLUCOSE BLDC GLUCOMTR-MCNC: 215 MG/DL — HIGH (ref 70–99)

## 2024-01-26 PROCEDURE — 99232 SBSQ HOSP IP/OBS MODERATE 35: CPT | Mod: 24

## 2024-01-26 PROCEDURE — 99232 SBSQ HOSP IP/OBS MODERATE 35: CPT | Mod: GC

## 2024-01-26 RX ADMIN — MONTELUKAST 10 MILLIGRAM(S): 4 TABLET, CHEWABLE ORAL at 11:35

## 2024-01-26 RX ADMIN — LIDOCAINE 1 PATCH: 4 CREAM TOPICAL at 11:37

## 2024-01-26 RX ADMIN — LIDOCAINE 1 PATCH: 4 CREAM TOPICAL at 23:37

## 2024-01-26 RX ADMIN — HEPARIN SODIUM 5000 UNIT(S): 5000 INJECTION INTRAVENOUS; SUBCUTANEOUS at 21:42

## 2024-01-26 RX ADMIN — Medication 112 MICROGRAM(S): at 06:05

## 2024-01-26 RX ADMIN — Medication 100 MILLIGRAM(S): at 13:05

## 2024-01-26 RX ADMIN — Medication 650 MILLIGRAM(S): at 09:10

## 2024-01-26 RX ADMIN — Medication 1: at 13:04

## 2024-01-26 RX ADMIN — GABAPENTIN 400 MILLIGRAM(S): 400 CAPSULE ORAL at 08:26

## 2024-01-26 RX ADMIN — HEPARIN SODIUM 5000 UNIT(S): 5000 INJECTION INTRAVENOUS; SUBCUTANEOUS at 13:05

## 2024-01-26 RX ADMIN — AMLODIPINE BESYLATE 10 MILLIGRAM(S): 2.5 TABLET ORAL at 06:05

## 2024-01-26 RX ADMIN — ATORVASTATIN CALCIUM 40 MILLIGRAM(S): 80 TABLET, FILM COATED ORAL at 21:42

## 2024-01-26 RX ADMIN — LOSARTAN POTASSIUM 25 MILLIGRAM(S): 100 TABLET, FILM COATED ORAL at 06:05

## 2024-01-26 RX ADMIN — CEFTRIAXONE 100 MILLIGRAM(S): 500 INJECTION, POWDER, FOR SOLUTION INTRAMUSCULAR; INTRAVENOUS at 11:38

## 2024-01-26 RX ADMIN — Medication 6 UNIT(S): at 08:23

## 2024-01-26 RX ADMIN — HEPARIN SODIUM 5000 UNIT(S): 5000 INJECTION INTRAVENOUS; SUBCUTANEOUS at 06:05

## 2024-01-26 RX ADMIN — LIDOCAINE 1 PATCH: 4 CREAM TOPICAL at 19:00

## 2024-01-26 RX ADMIN — Medication 100 MILLIGRAM(S): at 06:05

## 2024-01-26 RX ADMIN — Medication 1: at 17:25

## 2024-01-26 RX ADMIN — GABAPENTIN 400 MILLIGRAM(S): 400 CAPSULE ORAL at 17:26

## 2024-01-26 RX ADMIN — Medication 6 UNIT(S): at 17:24

## 2024-01-26 RX ADMIN — Medication 6 UNIT(S): at 13:03

## 2024-01-26 RX ADMIN — Medication 1 APPLICATION(S): at 17:28

## 2024-01-26 RX ADMIN — PANTOPRAZOLE SODIUM 40 MILLIGRAM(S): 20 TABLET, DELAYED RELEASE ORAL at 06:04

## 2024-01-26 RX ADMIN — OXCARBAZEPINE 600 MILLIGRAM(S): 300 TABLET, FILM COATED ORAL at 08:27

## 2024-01-26 RX ADMIN — Medication 1 APPLICATION(S): at 06:05

## 2024-01-26 RX ADMIN — INSULIN GLARGINE 16 UNIT(S): 100 INJECTION, SOLUTION SUBCUTANEOUS at 21:41

## 2024-01-26 RX ADMIN — ONDANSETRON 4 MILLIGRAM(S): 8 TABLET, FILM COATED ORAL at 08:26

## 2024-01-26 RX ADMIN — Medication 2: at 21:41

## 2024-01-26 RX ADMIN — Medication 2: at 08:23

## 2024-01-26 RX ADMIN — OXCARBAZEPINE 600 MILLIGRAM(S): 300 TABLET, FILM COATED ORAL at 17:25

## 2024-01-26 RX ADMIN — Medication 650 MILLIGRAM(S): at 08:26

## 2024-01-26 NOTE — PROGRESS NOTE ADULT - PROBLEM SELECTOR PLAN 10
DVT PPX: SCDs DVT PPX: SCDs      # Discharge planing:   PT eval pending  Patient does not want to go to Rehab if needed  Uses cane  Home O2  Needs BiPAP (Has BiPAP at home but not using it for over a year and needs serviced as per patient).   Patient to follow up with her Pulmonologist.   Patient will need HHA for drainage management and f/u with IR outpatient for drainage removal.

## 2024-01-26 NOTE — DISCHARGE NOTE PROVIDER - CARE PROVIDERS DIRECT ADDRESSES
,[email or web address],DirectAddress_Unknown ,[email or web address],DirectAddress_Unknown,[email or web address]

## 2024-01-26 NOTE — DISCHARGE NOTE PROVIDER - NSDCMRMEDTOKEN_GEN_ALL_CORE_FT
acetaminophen 325 mg oral tablet: 3 tab(s) orally every 6 hours as needed for pain  albuterol 2.5 mg/3 mL (0.083%) inhalation solution: 3 milliliter(s) by nebulizer every 6 hours as needed for  shortness of breath and/or wheezing  amLODIPine 10 mg oral tablet: 1 tab(s) orally once a day  atorvastatin 40 mg oral tablet: 1 tab(s) orally once a day (at bedtime)  clindamycin 1% topical lotion: Apply topically to affected area 2 times a day  cyclobenzaprine 10 mg oral tablet: 1 tab(s) orally once a day (at bedtime)  ergocalciferol 1.25 mg (50,000 intl units) oral capsule: 1 cap(s) orally once a week  fluticasone 50 mcg/inh inhalation powder: 1 puff(s) inhaled 2 times a day  gabapentin 400 mg oral capsule: 1 cap(s) orally 2 times a day  hydroCHLOROthiazide 12.5 mg oral tablet: 1 tab(s) orally once a day (in the morning)  ipratropium 21 mcg/inh (0.03%) nasal spray: 2 spray(s) intranasally  Levemir 100 units/mL subcutaneous solution: 16 unit(s) subcutaneous once a day (at bedtime)  levothyroxine 112 mcg (0.112 mg) oral tablet: 1 tab(s) orally once a day  losartan 25 mg oral tablet: 1 tab(s) orally once a day (in the morning)  metFORMIN 1000 mg oral tablet: 1 tab(s) orally every 12 hours  montelukast 10 mg oral tablet: 1 tab(s) orally once a day  NovoLOG FlexPen 100 units/mL injectable solution: injectable 3 times a day (before meals) 6 units for blood sugar over 120  OXcarbazepine 600 mg oral tablet: 1 tab(s) orally 2 times a day  Ozempic 4 mg/3 mL (1 mg dose) subcutaneous solution: 1 milligram(s) subcutaneously 0.5mg Every sunday  pantoprazole 40 mg oral delayed release tablet: 1 tab(s) orally once a day as needed for acid reflux  Promethazine- DM-6.25-15mg/5ml- 5 ml twice daily PRN:   senna leaf extract oral tablet: 2 tab(s) orally once a day (at bedtime) As needed Constipation  Symbicort 160 mcg-4.5 mcg/inh inhalation aerosol: 2 puff(s) inhaled 2 times a day  triamcinolone 0.1% topical ointment: Apply topically to affected area Apply twice daily for psoriasis on body for 2 weeks on 1 week off  Ventolin 90 mcg/inh inhalation aerosol: 2 puff(s) inhaled every 6 hours as needed for  shortness of breath and/or wheezing   acetaminophen 325 mg oral tablet: 3 tab(s) orally every 6 hours as needed for pain  albuterol 2.5 mg/3 mL (0.083%) inhalation solution: 3 milliliter(s) by nebulizer every 6 hours as needed for  shortness of breath and/or wheezing  aluminum hydroxide-magnesium hydroxide 200 mg-200 mg/5 mL oral suspension: 30 milliliter(s) orally every 4 hours As needed Dyspepsia  amLODIPine 10 mg oral tablet: 1 tab(s) orally once a day  atorvastatin 40 mg oral tablet: 1 tab(s) orally once a day (at bedtime)  clindamycin 1% topical lotion: Apply topically to affected area 2 times a day  cyclobenzaprine 10 mg oral tablet: 1 tab(s) orally once a day (at bedtime)  ergocalciferol 1.25 mg (50,000 intl units) oral capsule: 1 cap(s) orally once a week  fluticasone 50 mcg/inh inhalation powder: 1 puff(s) inhaled 2 times a day  gabapentin 400 mg oral capsule: 1 cap(s) orally 2 times a day  hydroCHLOROthiazide 12.5 mg oral tablet: 1 tab(s) orally once a day (in the morning) DO NOT RESUME UNTIL APPROVED BY YOUR DOCTOR  ipratropium 21 mcg/inh (0.03%) nasal spray: 2 spray(s) intranasally  Levemir 100 units/mL subcutaneous solution: 16 unit(s) subcutaneous once a day (at bedtime)  levothyroxine 112 mcg (0.112 mg) oral tablet: 1 tab(s) orally once a day  lidocaine 4% topical film: Apply topically to affected area once a day  losartan 25 mg oral tablet: 1 tab(s) orally once a day (in the morning)  metFORMIN 1000 mg oral tablet: 1 tab(s) orally every 12 hours  montelukast 10 mg oral tablet: 1 tab(s) orally once a day  NovoLOG FlexPen 100 units/mL injectable solution: injectable 3 times a day (before meals) 6 units for blood sugar over 120  OXcarbazepine 600 mg oral tablet: 1 tab(s) orally 2 times a day  Ozempic 4 mg/3 mL (1 mg dose) subcutaneous solution: 1 milligram(s) subcutaneously 0.5mg Every sunday  pantoprazole 40 mg oral delayed release tablet: 1 tab(s) orally once a day as needed for acid reflux  Promethazine- DM-6.25-15mg/5ml- 5 ml twice daily PRN:   senna leaf extract oral tablet: 2 tab(s) orally once a day (at bedtime) As needed Constipation  Symbicort 160 mcg-4.5 mcg/inh inhalation aerosol: 2 puff(s) inhaled 2 times a day  triamcinolone 0.1% topical ointment: Apply topically to affected area Apply twice daily for psoriasis on body for 2 weeks on 1 week off  Ventolin 90 mcg/inh inhalation aerosol: 2 puff(s) inhaled every 6 hours as needed for  shortness of breath and/or wheezing   acetaminophen 325 mg oral tablet: 3 tab(s) orally every 6 hours as needed for pain  albuterol 2.5 mg/3 mL (0.083%) inhalation solution: 3 milliliter(s) by nebulizer every 6 hours as needed for  shortness of breath and/or wheezing  aluminum hydroxide-magnesium hydroxide 200 mg-200 mg/5 mL oral suspension: 30 milliliter(s) orally every 4 hours As needed Dyspepsia  amLODIPine 10 mg oral tablet: 1 tab(s) orally once a day  atorvastatin 40 mg oral tablet: 1 tab(s) orally once a day (at bedtime)  clindamycin 1% topical lotion: Apply topically to affected area 2 times a day  cyclobenzaprine 10 mg oral tablet: 1 tab(s) orally once a day (at bedtime)  ergocalciferol 1.25 mg (50,000 intl units) oral capsule: 1 cap(s) orally once a week  fluticasone 50 mcg/inh inhalation powder: 1 puff(s) inhaled 2 times a day  gabapentin 400 mg oral capsule: 1 cap(s) orally 2 times a day  hydroCHLOROthiazide 12.5 mg oral tablet: 1 tab(s) orally once a day (in the morning)  ipratropium 21 mcg/inh (0.03%) nasal spray: 2 spray(s) intranasally  Levemir 100 units/mL subcutaneous solution: 16 unit(s) subcutaneous once a day (at bedtime)  levothyroxine 112 mcg (0.112 mg) oral tablet: 1 tab(s) orally once a day  lidocaine 4% topical film: Apply topically to affected area once a day  losartan 25 mg oral tablet: 1 tab(s) orally once a day (in the morning)  metFORMIN 1000 mg oral tablet: 1 tab(s) orally every 12 hours  montelukast 10 mg oral tablet: 1 tab(s) orally once a day  NovoLOG FlexPen 100 units/mL injectable solution: injectable 3 times a day (before meals) 6 units for blood sugar over 120  OXcarbazepine 600 mg oral tablet: 1 tab(s) orally 2 times a day  Ozempic 4 mg/3 mL (1 mg dose) subcutaneous solution: 1 milligram(s) subcutaneously 0.5mg Every sunday  pantoprazole 40 mg oral delayed release tablet: 1 tab(s) orally once a day as needed for acid reflux  Promethazine- DM-6.25-15mg/5ml- 5 ml twice daily PRN:   senna leaf extract oral tablet: 2 tab(s) orally once a day (at bedtime) As needed Constipation  Symbicort 160 mcg-4.5 mcg/inh inhalation aerosol: 2 puff(s) inhaled 2 times a day  triamcinolone 0.1% topical ointment: Apply topically to affected area Apply twice daily for psoriasis on body for 2 weeks on 1 week off  Ventolin 90 mcg/inh inhalation aerosol: 2 puff(s) inhaled every 6 hours as needed for  shortness of breath and/or wheezing   acetaminophen 325 mg oral tablet: 3 tab(s) orally every 6 hours as needed for pain  albuterol 2.5 mg/3 mL (0.083%) inhalation solution: 3 milliliter(s) by nebulizer every 6 hours as needed for  shortness of breath and/or wheezing  aluminum hydroxide-magnesium hydroxide 200 mg-200 mg/5 mL oral suspension: 30 milliliter(s) orally every 4 hours As needed Dyspepsia  amLODIPine 10 mg oral tablet: 1 tab(s) orally once a day  atorvastatin 40 mg oral tablet: 1 tab(s) orally once a day (at bedtime)  clindamycin 1% topical lotion: Apply topically to affected area 2 times a day  cyclobenzaprine 10 mg oral tablet: 1 tab(s) orally once a day (at bedtime)  ergocalciferol 1.25 mg (50,000 intl units) oral capsule: 1 cap(s) orally once a week  fluticasone 50 mcg/inh inhalation powder: 1 puff(s) inhaled 2 times a day  gabapentin 400 mg oral capsule: 1 cap(s) orally 2 times a day  hydroCHLOROthiazide 12.5 mg oral tablet: 1 tab(s) orally once a day (in the morning)  ipratropium 21 mcg/inh (0.03%) nasal spray: 2 spray(s) intranasally  Levemir 100 units/mL subcutaneous solution: 16 unit(s) subcutaneous once a day (at bedtime)  levothyroxine 112 mcg (0.112 mg) oral tablet: 1 tab(s) orally once a day  lidocaine 4% topical film: Apply topically to affected area once a day  losartan 25 mg oral tablet: 1 tab(s) orally once a day (in the morning)  metFORMIN 1000 mg oral tablet: 1 tab(s) orally every 12 hours  montelukast 10 mg oral tablet: 1 tab(s) orally once a day  NovoLOG FlexPen 100 units/mL injectable solution: injectable 3 times a day (before meals) 6 units for blood sugar over 120  OXcarbazepine 600 mg oral tablet: 1 tab(s) orally 2 times a day  Ozempic 4 mg/3 mL (1 mg dose) subcutaneous solution: 1 milligram(s) subcutaneously 0.5mg Every sunday  pantoprazole 40 mg oral delayed release tablet: 1 tab(s) orally once a day as needed for acid reflux  petrolatum topical ointment: Apply topically to affected area 2 times a day 1 Apply topically to affected area 2 times a day  Promethazine- DM-6.25-15mg/5ml- 5 ml twice daily PRN:   senna leaf extract oral tablet: 2 tab(s) orally once a day (at bedtime) As needed Constipation  Symbicort 160 mcg-4.5 mcg/inh inhalation aerosol: 2 puff(s) inhaled 2 times a day  triamcinolone 0.1% topical ointment: Apply topically to affected area Apply twice daily for psoriasis on body for 2 weeks on 1 week off  Ventolin 90 mcg/inh inhalation aerosol: 2 puff(s) inhaled every 6 hours as needed for  shortness of breath and/or wheezing   acetaminophen 325 mg oral tablet: 3 tab(s) orally every 6 hours as needed for pain  albuterol 2.5 mg/3 mL (0.083%) inhalation solution: 3 milliliter(s) by nebulizer every 6 hours as needed for  shortness of breath and/or wheezing  aluminum hydroxide-magnesium hydroxide 200 mg-200 mg/5 mL oral suspension: 30 milliliter(s) orally every 4 hours As needed Dyspepsia  amLODIPine 10 mg oral tablet: 1 tab(s) orally once a day  atorvastatin 40 mg oral tablet: 1 tab(s) orally once a day (at bedtime)  clindamycin 1% topical lotion: Apply topically to affected area 2 times a day  cyclobenzaprine 10 mg oral tablet: 1 tab(s) orally once a day (at bedtime)  ergocalciferol 1.25 mg (50,000 intl units) oral capsule: 1 cap(s) orally once a week  fluticasone 50 mcg/inh inhalation powder: 1 puff(s) inhaled 2 times a day  gabapentin 400 mg oral capsule: 1 cap(s) orally 2 times a day  hydroCHLOROthiazide 12.5 mg oral tablet: 1 tab(s) orally once a day (in the morning)   mg oral tablet: 1 tab(s) orally every 8 hours as needed for  moderate pain  ipratropium 21 mcg/inh (0.03%) nasal spray: 2 spray(s) intranasally  Levemir 100 units/mL subcutaneous solution: 16 unit(s) subcutaneous once a day (at bedtime)  levothyroxine 112 mcg (0.112 mg) oral tablet: 1 tab(s) orally once a day  lidocaine 4% topical film: Apply topically to affected area once a day  losartan 25 mg oral tablet: 1 tab(s) orally once a day (in the morning)  metFORMIN 1000 mg oral tablet: 1 tab(s) orally every 12 hours  montelukast 10 mg oral tablet: 1 tab(s) orally once a day  NovoLOG FlexPen 100 units/mL injectable solution: injectable 3 times a day (before meals) 6 units for blood sugar over 120  OXcarbazepine 600 mg oral tablet: 1 tab(s) orally 2 times a day  Ozempic 4 mg/3 mL (1 mg dose) subcutaneous solution: 1 milligram(s) subcutaneously 0.5mg Every sunday  pantoprazole 40 mg oral delayed release tablet: 1 tab(s) orally once a day as needed for acid reflux  petrolatum topical ointment: Apply topically to affected area 2 times a day 1 Apply topically to affected area 2 times a day  Promethazine- DM-6.25-15mg/5ml- 5 ml twice daily PRN:   senna leaf extract oral tablet: 2 tab(s) orally once a day (at bedtime) As needed Constipation  Symbicort 160 mcg-4.5 mcg/inh inhalation aerosol: 2 puff(s) inhaled 2 times a day  triamcinolone 0.1% topical ointment: Apply topically to affected area Apply twice daily for psoriasis on body for 2 weeks on 1 week off  Ventolin 90 mcg/inh inhalation aerosol: 2 puff(s) inhaled every 6 hours as needed for  shortness of breath and/or wheezing

## 2024-01-26 NOTE — PROGRESS NOTE ADULT - ASSESSMENT
57 y/o diabetic female s/p L radical nephrectomy 11/2023 w/ 7x6cm postop collection in nephrectomy bed and 4.8 cm left abdominal wall collection  VSS, afebrile, no leukocytosis    Plan   - Labs reviewed  - CT images reviewed  - IR drain and abdominal wall collection cultures no growth to date - possible it is a seroma   - Can discontinue IV abx  - Local wound care   - Pain control prn  - Remainder of care as per primary team  - Patient stable for discharge with drain - can remove once <10cc over 24 hours per IR  - Discussed with house staff     59 y/o diabetic female s/p L radical nephrectomy 11/2023 w/ 7x6cm postop collection in nephrectomy bed and 4.8 cm left abdominal wall collection  VSS, afebrile, no leukocytosis    Plan   - Labs reviewed  - CT images reviewed  - IR drain and abdominal wall collection cultures no growth to date - possible it is a seroma   - Can discontinue IV abx  - Local wound care   - Pain control prn  - Remainder of care as per primary team  - Patient stable for discharge with drain - can remove once <10cc over 24 hours per IR  - She is to follow up with Dr. Peck - her urologic oncologist on discharge  - Discussed with house staff     59 y/o diabetic female s/p L radical nephrectomy 11/2023 w/ 7x6cm postop collection in nephrectomy bed and 4.8 cm left abdominal wall collection  VSS, afebrile, no leukocytosis    Plan   - Labs reviewed  - CT images reviewed  - IR drain and abdominal wall collection cultures no growth to date - possible it is a seroma   - Can discontinue IV abx  - Local wound care   - Pain control prn  - Remainder of care as per primary team  - Patient stable for discharge with drain - can remove once <10cc over 48 hours per IR  - Please continue flushing drain with 5cc NS daily  - She is to follow up with Dr. Peck - her urologic oncologist on discharge  - Discussed with house staff     59 y/o diabetic female s/p L radical nephrectomy 11/2023 w/ 7x6cm postop collection in nephrectomy bed and 4.8 cm left abdominal wall collection  VSS, afebrile, no leukocytosis    Plan   - Labs reviewed  - CT images reviewed  - IR drain and abdominal wall collection cultures no growth to date  - Can discontinue IV abx  - Local wound care   - Pain control prn  - Remainder of care as per primary team  - Patient stable for discharge with drain - can remove once <10cc over 48 hours per IR  - Please continue flushing drain with 5cc NS daily  - She is to follow up with Dr. Peck - her urologic oncologist on discharge  - Discussed with house staff

## 2024-01-26 NOTE — DISCHARGE NOTE PROVIDER - CARE PROVIDER_API CALL
[facility]  [facility]  [address], [address]  [place] [address]  Phone: [phone]  Fax: [phone]  Follow Up Time: 1 week    Dr. Peck, Urologist  Phone: (   )    -  Fax: (   )    -  Established Patient  Follow Up Time: 1 week   [facility]  [facility]  [address], [address]  [place] [address]  Phone: [phone]  Fax: [phone]  Follow Up Time: 1 week    Dr. Peck, Urologist  Phone: (   )    -  Fax: (   )    -  Established Patient  Follow Up Time: 1 week    Naif Rosario  Pulmonary Disease  [address] [place] [id number]  Phone: [phone]  Fax: [phone]  Follow Up Time: 1 week

## 2024-01-26 NOTE — DISCHARGE NOTE PROVIDER - NSDCFUSCHEDAPPT_GEN_ALL_CORE_FT
Guido Monterroso  [facility] Physician [facility]  PULMMED [address]  Scheduled Appointment: 02/09/2024    Yash Nguyễn  [facility] Physician [facility]  CARDIOLOGY [address]  Scheduled Appointment: 02/12/2024    Ken John  [facility] Physician [facility]  INTMED [id number]  Scheduled Appointment: 03/21/2024     Guido Monterroso  [facility] Physician [facility]  PULMMED [address]  Scheduled Appointment: 03/01/2024    Yash Nguyễn  [facility] Physician [facility]  CARDIOLOGY [address]  Scheduled Appointment: 03/04/2024    Ken John  [facility] Physician [facility]  INTMED [id number]  Scheduled Appointment: 03/21/2024

## 2024-01-26 NOTE — DISCHARGE NOTE PROVIDER - HOSPITAL COURSE
59 y/o F PMH of asthma, COPD (on 4L NC PRN), DM, fatty liver, fibromyalgia, R shoulder bursitis, HLD, hypothyroidism, NAKUL, psoriasis, RCC (s/p L renal resection 11/3/23, no hx of CT/RT) presenting with lower abdominal pain and SOB. admitted for left sided abdominal pain.   Pt had CT angio chest that was negative for PE. Also had CT A/P that showed post-op left nephrectomy collection of 7.2 x 6 cm fluid collection and a 4.8 x3.1 cm left abdominal wall collection. Pt was s/p IR drainage of fluid collection 70cc on 1/24, now with pigtail in place. Culture with no growth, continued antibiotics empirically, pt to complete ----------------------  Patient stable for discharge with drain per urology with f/u with IR when drain is  <10cc over 24 hours, pt was advised to follow up with her urologic oncologist Dr. Peck on discharge    57 y/o F PMH of asthma, COPD (on 4L NC PRN), DM, fatty liver, fibromyalgia, R shoulder bursitis, HLD, hypothyroidism, NAKUL, psoriasis, RCC (s/p L renal resection 11/3/23, no hx of CT/RT) presenting with lower abdominal pain and SOB. admitted for left sided abdominal pain.   Pt had CT angio chest that was negative for PE. Also had CT A/P that showed post-op left nephrectomy collection of 7.2 x 6 cm fluid collection and a 4.8 x3.1 cm left abdominal wall collection. Pt was s/p IR drainage of fluid collection 70cc on 1/24, now with pigtail in place. Culture with no growth, continued antibiotics empirically, pt to complete ----------------------  Patient stable for discharge with drain per urology with f/u with IR when drain is  <10cc over 48 hours, pt was advised to follow up with her urologic oncologist Dr. Peck on discharge    57 y/o F PMH of asthma, COPD (on 4L NC PRN), DM, fatty liver, fibromyalgia, R shoulder bursitis, HLD, hypothyroidism, NAKUL, psoriasis, RCC (s/p L renal resection 11/3/23, no hx of CT/RT) presenting with lower abdominal pain and SOB. Admitted for left sided abdominal pain.   Pt had CT angio chest that was negative for PE. Also had CT A/P that showed post-op left nephrectomy collection of 7.2 x 6 cm fluid collection and a 4.8 x3.1 cm left abdominal wall collection. Pt was s/p IR drainage of fluid collection 70cc on 1/24, now with pigtail in place.     Per IR and urology waiting for <10 cc in 24 hrs drainage in order to remove pigtail drainage. Per discussion with IR attending today, Pigtail to be flushed 5cc of NS daily to maintain patency. Patient can also follow up with outpatient urology with referral to return back to IR for removal of drain. Patient pending PT eval. Per patient her BiPAP needs to serviced. She endorsed that last time she used it was over a year ago. Recommended to see her Pulm for a re-eval. Patient does not want a new BiPAP. Pulm (Dr. Karen Vazquez). Patient also sees Urologist Dr. Smith. Patient will need HHA for drainage management.   PT assesment pending recs.     Assessment:  #Left retroperitoneal collection s/p IR guided drainage on 1/24  #LE edema - r/o dvt  #Hyponatremia- resolved   #Acute URI likely viral with nasal congestion, cough and post nasal drip   #Chronic Hypoxic respiratory failure   #COPD- not in exacerbation   #DM  #RCC s/p L renal resection   #fibromyalgia  #HLD  #hypothyroidism  #Psoriasis  #DVT ppx     Plan:  - doppler LE R leg negative for DVT, left inconclusive. Low suspicion, R leg was the concerning one.  - Pt p/w abd pain, CT noted w/ 7.2 x 6.0 cm postoperative collection in the nephrectomy bed. s/p IR guided drainage- 70 cc out-put on 1/25. DC abx as cx are negative. Appreciate urology recommendations. Patient  will need out-patient f/u w/ urologist  to monitor drain out-put and have it discontinued by IR once out-put is minimal. Patient follows w/ Dr. Rubén Peck as out-patient   - BP stable, c/w home meds   - C/w levothyroxine   - Sodium improved   - SCr improved s/p iV hydration.  - Supportive measures for URI  - DC planning home w/ home care and out-patient urology f/u. Patient feels weak and doesn't want to go home today. PT eval in process, pending recs, although patient doesn't want to go to rehab, might benefit from home pt or outpatient pt.      57 y/o F PMH of asthma, COPD (on 4L NC PRN), DM, fatty liver, fibromyalgia, R shoulder bursitis, HLD, hypothyroidism, NAKUL, psoriasis, RCC (s/p L renal resection 11/3/23, no hx of CT/RT) presenting with lower abdominal pain and SOB. Admitted for left sided abdominal pain.   Pt had CT angio chest that was negative for PE. Also had CT A/P that showed post-op left nephrectomy collection of 7.2 x 6 cm fluid collection and a 4.8 x3.1 cm left abdominal wall collection. Pt was s/p IR drainage of fluid collection 70cc on 1/24, now with pigtail in place.     Per IR and urology waiting for <10 cc in 24 hrs drainage in order to remove pigtail drainage. Per discussion with IR attending today, Pigtail to be flushed 5cc of NS daily to maintain patency. Patient can also follow up with outpatient urology with referral to return back to IR for removal of drain. Patient pending PT eval. Per patient her BiPAP needs to serviced. She endorsed that last time she used it was over a year ago. Recommended to see her Pulm for a re-eval. Patient does not want a new BiPAP. Pulm (Dr. Karen Vazquez). Patient also sees Urologist Dr. Smith. Patient will need HHA for drainage management.   PT evaluated and endorses [facility]. Pt prefers home PT.     Assessment:  #Left retroperitoneal collection s/p IR guided drainage on 1/24  #LE edema - r/o dvt  #Hyponatremia- resolved   #Acute URI likely viral with nasal congestion, cough and post nasal drip   #Chronic Hypoxic respiratory failure   #COPD- not in exacerbation   #DM  #RCC s/p L renal resection   #fibromyalgia  #HLD  #hypothyroidism  #Psoriasis  #DVT ppx     Plan:  - doppler LE R leg negative for DVT, left inconclusive. Low suspicion, R leg was the concerning one.  - Pt p/w abd pain, CT noted w/ 7.2 x 6.0 cm postoperative collection in the nephrectomy bed. s/p IR guided drainage- 70 cc out-put on 1/25. DC abx as cx are negative. Appreciate urology recommendations. Patient  will need out-patient f/u w/ urologist  to monitor drain out-put and have it discontinued by IR once out-put is minimal. Patient follows w/ Dr. Rubén Peck as out-patient   - BP stable, c/w home meds   - C/w levothyroxine   - Sodium improved   - SCr improved s/p iV hydration.  - Supportive measures for URI  - DC planning home w/ home care and out-patient urology f/u. Patient feels weak and doesn't want to go home today. PT eval in process, pending recs, although patient doesn't want to go to rehab, might benefit from home pt or outpatient pt.

## 2024-01-26 NOTE — PROGRESS NOTE ADULT - SUBJECTIVE AND OBJECTIVE BOX
Patient is a 58y old  Female who presents with a chief complaint of SOB, abdominal pain (26 Jan 2024 11:33)      INTERVAL HPI/OVERNIGHT EVENTS: no events noted overnight. Patient was seen and examined, over all feels weak and was lying in bed.     MEDICATIONS  (STANDING):  amLODIPine   Tablet 10 milliGRAM(s) Oral daily  atorvastatin 40 milliGRAM(s) Oral at bedtime  gabapentin 400 milliGRAM(s) Oral two times a day  heparin   Injectable 5000 Unit(s) SubCutaneous every 8 hours  hydrocortisone 1% Cream 1 Application(s) Topical two times a day  insulin glargine Injectable (LANTUS) 16 Unit(s) SubCutaneous at bedtime  insulin lispro (ADMELOG) corrective regimen sliding scale   SubCutaneous Before meals and at bedtime  insulin lispro Injectable (ADMELOG) 6 Unit(s) SubCutaneous three times a day before meals  levothyroxine 112 MICROGram(s) Oral daily  lidocaine   4% Patch 1 Patch Transdermal every 24 hours  losartan 25 milliGRAM(s) Oral daily  montelukast 10 milliGRAM(s) Oral daily  OXcarbazepine 600 milliGRAM(s) Oral two times a day    MEDICATIONS  (PRN):  acetaminophen     Tablet .. 650 milliGRAM(s) Oral every 6 hours PRN Temp greater or equal to 38C (100.4F), Mild Pain (1 - 3)  aluminum hydroxide/magnesium hydroxide/simethicone Suspension 30 milliLiter(s) Oral every 4 hours PRN Dyspepsia  melatonin 3 milliGRAM(s) Oral at bedtime PRN Insomnia  ondansetron Injectable 4 milliGRAM(s) IV Push every 8 hours PRN Nausea and/or Vomiting  pantoprazole    Tablet 40 milliGRAM(s) Oral before breakfast PRN acid reflux      __________________________________________________  REVIEW OF SYSTEMS:    CONSTITUTIONAL: No fever,   EYES: no acute visual disturbances  NECK: No pain or stiffness  RESPIRATORY: No cough; No shortness of breath  CARDIOVASCULAR: No chest pain, no palpitations  GASTROINTESTINAL: No pain. No nausea or vomiting; No diarrhea   NEUROLOGICAL: No headache or numbness, no tremors  MUSCULOSKELETAL: No joint pain, no muscle pain  GENITOURINARY: no dysuria, no frequency, no hesitancy  PSYCHIATRY: no depression , no anxiety  ALL OTHER  ROS negative        Vital Signs Last 24 Hrs  T(C): 37.1 (26 Jan 2024 13:07), Max: 37.1 (26 Jan 2024 13:07)  T(F): 98.7 (26 Jan 2024 13:07), Max: 98.7 (26 Jan 2024 13:07)  HR: 78 (26 Jan 2024 05:21) (77 - 78)  BP: 144/74 (26 Jan 2024 05:21) (144/74 - 157/88)  RR: 20 (26 Jan 2024 05:21) (20 - 20)  SpO2: 96% (26 Jan 2024 05:21) (96% - 98%)    Parameters below as of 26 Jan 2024 05:21  Patient On (Oxygen Delivery Method): nasal cannula  O2 Flow (L/min): 4      ________________________________________________  PHYSICAL EXAM:  GENERAL: NAD, obese female patient   HEENT: Normocephalic;  conjunctivae and sclerae clear; moist mucous membranes;   NECK : supple  CHEST/LUNG: Clear to auscultation bilaterally with good air entry   HEART: S1 S2  regular; no murmurs, gallops or rubs  ABDOMEN: Soft, mild tenderness in LLQ, Drain in place noted w/ minimal out-put   EXTREMITIES: no cyanosis; no edema; no calf tenderness  SKIN: warm and dry; no rash  NERVOUS SYSTEM:  Awake and alert; Oriented  to place, person and time ; no new deficits    _________________________________________________  LABS:                        11.2   7.17  )-----------( 288      ( 25 Jan 2024 06:40 )             38.8     01-25    135  |  98  |  27<H>  ----------------------------<  202<H>  4.7   |  34<H>  |  1.19    Ca    9.4      25 Jan 2024 06:40  Phos  4.5     01-25  Mg     1.9     01-25        Urinalysis Basic - ( 25 Jan 2024 06:40 )    Color: x / Appearance: x / SG: x / pH: x  Gluc: 202 mg/dL / Ketone: x  / Bili: x / Urobili: x   Blood: x / Protein: x / Nitrite: x   Leuk Esterase: x / RBC: x / WBC x   Sq Epi: x / Non Sq Epi: x / Bacteria: x      CAPILLARY BLOOD GLUCOSE      POCT Blood Glucose.: 165 mg/dL (26 Jan 2024 12:57)  POCT Blood Glucose.: 184 mg/dL (26 Jan 2024 11:31)  POCT Blood Glucose.: 201 mg/dL (26 Jan 2024 08:03)  POCT Blood Glucose.: 210 mg/dL (25 Jan 2024 22:39)  POCT Blood Glucose.: 165 mg/dL (25 Jan 2024 17:02)        RADIOLOGY & ADDITIONAL TESTS:    Imaging Personally Reviewed:  YES    Consultant(s) Notes Reviewed:   YES    Care Discussed with Consultants : YES     Plan of care was discussed with patient and /or primary care giver; all questions and concerns were addressed and care was aligned with patient's wishes.     Patient/Caregiver provided printed discharge information.

## 2024-01-26 NOTE — DISCHARGE NOTE PROVIDER - ATTENDING DISCHARGE PHYSICAL EXAMINATION:
Alert, cooperative, chronically-ill woman in NAD  Vital Signs Last 24 Hrs  T(C): 36.8 (30 Jan 2024 13:17), Max: 36.8 (30 Jan 2024 13:17)  T(F): 98.3 (30 Jan 2024 13:17), Max: 98.3 (30 Jan 2024 13:17)  HR: 79 (30 Jan 2024 13:17) (70 - 84)  BP: 144/85 (30 Jan 2024 13:17) (144/85 - 164/83)  BP(mean): --  RR: 20 (30 Jan 2024 13:17) (20 - 20)  SpO2: 96% (30 Jan 2024 13:17) (92% - 98%)    Parameters below as of 30 Jan 2024 13:17  Patient On (Oxygen Delivery Method): nasal cannula  O2 Flow (L/min): 3  Lungs, clear  Cor, RRR  Abdomen, distended, reducible umbilical hernia, non-tender  Ext, no edema  Neurological, non-focal

## 2024-01-26 NOTE — DISCHARGE NOTE PROVIDER - NSDCCPCAREPLAN_GEN_ALL_CORE_FT
PRINCIPAL DISCHARGE DIAGNOSIS  Diagnosis: Retroperitoneal fluid collection  Assessment and Plan of Treatment: Left retroperitoneal fluid collection per CT   You were on IV antibitoics during this stay - ceftriaxone and flagyl IV  You underwent  drainage of left abdominal fluid collection on 1/24/24 with internvential radiology and removed 70cc   sample sent to lab - no growth which likely referring that is seroma   Drain was placed and Per IR,  you can also follow up with outpt urology with referral to return back to Interventional radiolgy  for removal of drain.   - can remove once <10cc over 24 hours per IR  - follow up with Dr. Peck - your  urologic oncologist on discharge        SECONDARY DISCHARGE DIAGNOSES  Diagnosis: Hyponatremia  Assessment and Plan of Treatment: resolved  HOME CARE INSTRUCTIONS  Only take medicines as directed by your caregiver. Many medicines can make hyponatremia worse. Discuss all your medicines with your caregiver.  Carefully follow any recommended diet, including any fluid restrictions.  You may be asked to repeat lab tests. Follow these directions.  Avoid alcohol and recreational drugs.  SEEK MEDICAL CARE IF:  You develop worsening nausea, fatigue, headache, confusion, or weakness.  Your original hyponatremia symptoms return.  You have problems following the recommended diet.   SEEK IMMEDIATE MEDICAL CARE IF:  You have a seizure.  You faint.  You have ongoing diarrhea or vomiting     PRINCIPAL DISCHARGE DIAGNOSIS  Diagnosis: Retroperitoneal fluid collection  Assessment and Plan of Treatment: Left retroperitoneal fluid collection per CT   You were on IV antibitoics during this stay - ceftriaxone and flagyl IV  You underwent  drainage of left abdominal fluid collection on 1/24/24 with internvential radiology and removed 70cc   sample sent to lab - no growth which likely referring that is seroma   Drain was placed and Per IR,  you can also follow up with outpt urology with referral to return back to Interventional radiolgy  for removal of drain.   - can remove once <10cc over 48 hours per IR  - flush drain with 5cc NS daily and record outputs in a logbook  - follow up with Dr. Peck - your  urologic oncologist on discharge        SECONDARY DISCHARGE DIAGNOSES  Diagnosis: Hyponatremia  Assessment and Plan of Treatment: resolved  HOME CARE INSTRUCTIONS  Only take medicines as directed by your caregiver. Many medicines can make hyponatremia worse. Discuss all your medicines with your caregiver.  Carefully follow any recommended diet, including any fluid restrictions.  You may be asked to repeat lab tests. Follow these directions.  Avoid alcohol and recreational drugs.  SEEK MEDICAL CARE IF:  You develop worsening nausea, fatigue, headache, confusion, or weakness.  Your original hyponatremia symptoms return.  You have problems following the recommended diet.   SEEK IMMEDIATE MEDICAL CARE IF:  You have a seizure.  You faint.  You have ongoing diarrhea or vomiting     PRINCIPAL DISCHARGE DIAGNOSIS  Diagnosis: Retroperitoneal fluid collection  Assessment and Plan of Treatment: Left retroperitoneal fluid collection per CT   You were on IV antibitoics during this stay - ceftriaxone and flagyl IV  You underwent  drainage of left abdominal fluid collection on 1/24/24 with internvential radiology and removed 70cc   sample sent to lab - no growth which likely referring that is seroma   Drain was placed and Per IR,  you can also follow up with outpt urology with referral to return back to Interventional radiolgy  for removal of drain.   - can remove once <10cc over 48 hours per IR  - flush drain with 5cc NS daily and record outputs in a logbook  - follow up with Dr. Peck - your  urologic oncologist on discharge        SECONDARY DISCHARGE DIAGNOSES  Diagnosis: Hyponatremia  Assessment and Plan of Treatment: resolved  HOME CARE INSTRUCTIONS  Only take medicines as directed by your caregiver. Many medicines can make hyponatremia worse. Discuss all your medicines with your caregiver.  Carefully follow any recommended diet, including any fluid restrictions.  You may be asked to repeat lab tests. Follow these directions.  Avoid alcohol and recreational drugs.  SEEK MEDICAL CARE IF:  You develop worsening nausea, fatigue, headache, confusion, or weakness.  Your original hyponatremia symptoms return.  You have problems following the recommended diet.   SEEK IMMEDIATE MEDICAL CARE IF:  You have a seizure.  You faint.  You have ongoing diarrhea or vomiting    Diagnosis: COPD without exacerbation  Assessment and Plan of Treatment: not in exacerbation  Continue supplemental oxygen and inhalers as prescribed by your doctor.   Continue BIPAP as per your primary pulmonologist .   Follow-up with your primary Pulmonologist Dr. Karen Vazquez      Diagnosis: HTN (hypertension)  Assessment and Plan of Treatment: Low salt diet  Activity as tolerated.  Take all medication as prescribed.  Follow up with your medical doctor for routine blood pressure monitoring at your next visit.  Notify your doctor if you have any of the following symptoms:   Dizziness, Lightheadedness, Blurry vision, Headache, Chest pain, Shortness of breath      Diagnosis: Stage 3 chronic kidney disease  Assessment and Plan of Treatment: Avoid taking (NSAIDs) - (ex: Ibuprofen, Advil, Celebrex, Naprosyn)  Avoid taking any nephrotoxic agents (can harm kidneys) - Intravenous contrast for diagnostic testing, combination cold medications.  Have all medications adjusted for your renal function by your Health Care Provider.  Blood pressure control is important.  Take all medication as prescribed.      Diagnosis: Psoriasis  Assessment and Plan of Treatment: Continue your topical creams as prescribed by your doctor.   Follow-up with your primary care physician      Diagnosis: DM (diabetes mellitus)  Assessment and Plan of Treatment: Continue anti-glycemic regimen and management as prescribed by your doctor.   Follow-up with your primary care physician

## 2024-01-26 NOTE — PROGRESS NOTE ADULT - SUBJECTIVE AND OBJECTIVE BOX
Subjective  Patient seen and examined at bedside. Reports left flank pain.    Objective    Vital signs  T(F): , Max: 98.5 (01-26-24 @ 05:21)  HR: 78 (01-26-24 @ 05:21)  BP: 144/74 (01-26-24 @ 05:21)  SpO2: 96% (01-26-24 @ 05:21)  Wt(kg): --    Output     OUT:    Drain (mL): 16 mL  Total OUT: 16 mL    Total NET: -16 mL          Gen: NAD  Abd: soft, nontender, nondistended  : left flank drain with SS output     Labs      01-25 @ 06:40    WBC 7.17  / Hct 38.8  / SCr 1.19         Culture - Abscess with Gram Stain (collected 01-24-24 @ 15:15)  Source: .Abscess left retroperitoneal abcess  Gram Stain (01-24-24 @ 23:31):    No polymorphonuclear cells seen per low power field    No organisms seen per oil power field  Preliminary Report (01-25-24 @ 15:42):    No growth to date.    Culture - Abscess with Gram Stain (collected 01-24-24 @ 10:10)  Source: .Abscess Hip - Left  Gram Stain (01-24-24 @ 17:26):    Few polymorphonuclear leukocytes per low power field    No organisms seen  Preliminary Report (01-25-24 @ 13:10):    No growth

## 2024-01-26 NOTE — DISCHARGE NOTE PROVIDER - NSDCHHNEEDSERVICEOTHER_GEN_ALL_CORE_FT
Drain management with 5 ml flushes daily. Once drainage is 10 ml or less in 24 hours, outpatient intervention radiology to remove drain.   Patient needs rolling walker and bedside commode.

## 2024-01-26 NOTE — DISCHARGE NOTE PROVIDER - NSDCFUADDAPPT_GEN_ALL_CORE_FT
Call Dr. Rosario to schedule appointment (Pulmonologist).  Needs Pulmonary function test and BiPAP settings.

## 2024-01-26 NOTE — PROGRESS NOTE ADULT - SUBJECTIVE AND OBJECTIVE BOX
NP note    HPI:  58F PMH of asthma, COPD (on 4L NC PRN), DM, fatty liver, fibromyalgia, R shoulder bursitis, HLD, hypothyroidism, NAKUL, psoriasis, RCC (s/p L renal resection 11/3/23, no hx of CT/RT) presenting with lower abdominal pain and SOB. Pt states that she has had a SOB for the past 3-4 days, associated with a productive cough with yellow sputum. She has also had LLQ abdominal pain associated with flank pain since her L renal resection in November The abdominal pain is nonradiating, 9/10, stabbing, and intermittent. The lower laparoscopic site was not closing and healing properly so she was seen by the heme/onc NP outpatient and was prescribed cefadroxil 500 BID, last day supposed to be today. She also noticed that she had serosanguinous fluid draining from the lower laparoscopic site for the past 2 days whenever she moved around. She denies any fevers, chills, vomiting, diarrhea, dysuria, numbness/tingling/weakness in extremities. Pt lives with her son and ambulates with a cane.  (22 Jan 2024 23:55)      Patient is a 58y old  Female who presents with a chief complaint of SOB, abdominal pain (26 Jan 2024 13:48)      INTERVAL HPI/OVERNIGHT EVENTS:  T(C): 36.5 (01-26-24 @ 15:20), Max: 37.1 (01-26-24 @ 13:07)  HR: 101 (01-26-24 @ 15:20) (77 - 101)  BP: 134/85 (01-26-24 @ 15:20) (127/73 - 157/88)  RR: 20 (01-26-24 @ 15:20) (20 - 20)  SpO2: 99% (01-26-24 @ 15:20) (96% - 99%)  Wt(kg): --  I&O's Summary    25 Jan 2024 07:01  -  26 Jan 2024 07:00  --------------------------------------------------------  IN: 0 mL / OUT: 23 mL / NET: -23 mL    26 Jan 2024 07:01  -  26 Jan 2024 18:58  --------------------------------------------------------  IN: 0 mL / OUT: 5 mL / NET: -5 mL        REVIEW OF SYSTEMS: denies fever, chills, SOB, palpitations, chest pain, abdominal pain, nausea, vomitting, diarrhea, constipation, dizziness    MEDICATIONS  (STANDING):  amLODIPine   Tablet 10 milliGRAM(s) Oral daily  atorvastatin 40 milliGRAM(s) Oral at bedtime  gabapentin 400 milliGRAM(s) Oral two times a day  heparin   Injectable 5000 Unit(s) SubCutaneous every 8 hours  hydrocortisone 1% Cream 1 Application(s) Topical two times a day  insulin glargine Injectable (LANTUS) 16 Unit(s) SubCutaneous at bedtime  insulin lispro (ADMELOG) corrective regimen sliding scale   SubCutaneous Before meals and at bedtime  insulin lispro Injectable (ADMELOG) 6 Unit(s) SubCutaneous three times a day before meals  levothyroxine 112 MICROGram(s) Oral daily  lidocaine   4% Patch 1 Patch Transdermal every 24 hours  losartan 25 milliGRAM(s) Oral daily  montelukast 10 milliGRAM(s) Oral daily  OXcarbazepine 600 milliGRAM(s) Oral two times a day    MEDICATIONS  (PRN):  acetaminophen     Tablet .. 650 milliGRAM(s) Oral every 6 hours PRN Temp greater or equal to 38C (100.4F), Mild Pain (1 - 3)  aluminum hydroxide/magnesium hydroxide/simethicone Suspension 30 milliLiter(s) Oral every 4 hours PRN Dyspepsia  melatonin 3 milliGRAM(s) Oral at bedtime PRN Insomnia  ondansetron Injectable 4 milliGRAM(s) IV Push every 8 hours PRN Nausea and/or Vomiting  pantoprazole    Tablet 40 milliGRAM(s) Oral before breakfast PRN acid reflux      PHYSICAL EXAM:  GENERAL: NAD, well-groomed, well-developed  HEAD:  Atraumatic, Normocephalic  EYES: EOMI, PERRLA, conjunctiva and sclera clear  ENMT: No tonsillar erythema, exudates, or enlargement; Moist mucous membranes, Good dentition, No lesions  NECK: Supple, No JVD, Normal thyroid  NERVOUS SYSTEM:  Alert & Oriented X3, Good concentration; Motor Strength 5/5 B/L upper and lower extremities; DTRs 2+ intact and symmetric  CHEST/LUNG: Clear to percussion bilaterally; No rales, rhonchi, wheezing, or rubs  HEART: Regular rate and rhythm; No murmurs, rubs, or gallops  ABDOMEN: Soft, Nontender, Nondistended; Bowel sounds present  EXTREMITIES:  2+ Peripheral Pulses, No clubbing, cyanosis, or edema  LYMPH: No lymphadenopathy noted  SKIN: No rashes or lesions  LABS:                        11.2   7.17  )-----------( 288      ( 25 Jan 2024 06:40 )             38.8     01-25    135  |  98  |  27<H>  ----------------------------<  202<H>  4.7   |  34<H>  |  1.19    Ca    9.4      25 Jan 2024 06:40  Phos  4.5     01-25  Mg     1.9     01-25        Urinalysis Basic - ( 25 Jan 2024 06:40 )    Color: x / Appearance: x / SG: x / pH: x  Gluc: 202 mg/dL / Ketone: x  / Bili: x / Urobili: x   Blood: x / Protein: x / Nitrite: x   Leuk Esterase: x / RBC: x / WBC x   Sq Epi: x / Non Sq Epi: x / Bacteria: x      CAPILLARY BLOOD GLUCOSE      POCT Blood Glucose.: 169 mg/dL (26 Jan 2024 16:41)  POCT Blood Glucose.: 165 mg/dL (26 Jan 2024 12:57)  POCT Blood Glucose.: 184 mg/dL (26 Jan 2024 11:31)  POCT Blood Glucose.: 201 mg/dL (26 Jan 2024 08:03)  POCT Blood Glucose.: 210 mg/dL (25 Jan 2024 22:39)        Urinalysis Basic - ( 25 Jan 2024 06:40 )    Color: x / Appearance: x / SG: x / pH: x  Gluc: 202 mg/dL / Ketone: x  / Bili: x / Urobili: x   Blood: x / Protein: x / Nitrite: x   Leuk Esterase: x / RBC: x / WBC x   Sq Epi: x / Non Sq Epi: x / Bacteria: x     NP note    HPI:  58F PMH of asthma, COPD (on 4L NC PRN), DM, fatty liver, fibromyalgia, R shoulder bursitis, HLD, hypothyroidism, NAKUL, psoriasis, RCC (s/p L renal resection 11/3/23, no hx of CT/RT) presenting with lower abdominal pain and SOB. Pt states that she has had a SOB for the past 3-4 days, associated with a productive cough with yellow sputum. She has also had LLQ abdominal pain associated with flank pain since her L renal resection in November The abdominal pain is nonradiating, 9/10, stabbing, and intermittent. The lower laparoscopic site was not closing and healing properly so she was seen by the heme/onc NP outpatient and was prescribed cefadroxil 500 BID, last day supposed to be today. She also noticed that she had serosanguinous fluid draining from the lower laparoscopic site for the past 2 days whenever she moved around. She denies any fevers, chills, vomiting, diarrhea, dysuria, numbness/tingling/weakness in extremities. Pt lives with her son and ambulates with a cane.  (22 Jan 2024 23:55)      Patient is a 58y old  Female who presents with a chief complaint of SOB, abdominal pain (26 Jan 2024 13:48)      INTERVAL HPI/OVERNIGHT EVENTS:  T(C): 36.5 (01-26-24 @ 15:20), Max: 37.1 (01-26-24 @ 13:07)  HR: 101 (01-26-24 @ 15:20) (77 - 101)  BP: 134/85 (01-26-24 @ 15:20) (127/73 - 157/88)  RR: 20 (01-26-24 @ 15:20) (20 - 20)  SpO2: 99% (01-26-24 @ 15:20) (96% - 99%)  Wt(kg): --  I&O's Summary    25 Jan 2024 07:01  -  26 Jan 2024 07:00  --------------------------------------------------------  IN: 0 mL / OUT: 23 mL / NET: -23 mL    26 Jan 2024 07:01  -  26 Jan 2024 18:58  --------------------------------------------------------  IN: 0 mL / OUT: 5 mL / NET: -5 mL        REVIEW OF SYSTEMS: denies fever, SOB, chest pain, abdominal pain, nausea, vomiting diarrhea, constipation. Endorses chills.     MEDICATIONS  (STANDING):  amLODIPine   Tablet 10 milliGRAM(s) Oral daily  atorvastatin 40 milliGRAM(s) Oral at bedtime  gabapentin 400 milliGRAM(s) Oral two times a day  heparin   Injectable 5000 Unit(s) SubCutaneous every 8 hours  hydrocortisone 1% Cream 1 Application(s) Topical two times a day  insulin glargine Injectable (LANTUS) 16 Unit(s) SubCutaneous at bedtime  insulin lispro (ADMELOG) corrective regimen sliding scale   SubCutaneous Before meals and at bedtime  insulin lispro Injectable (ADMELOG) 6 Unit(s) SubCutaneous three times a day before meals  levothyroxine 112 MICROGram(s) Oral daily  lidocaine   4% Patch 1 Patch Transdermal every 24 hours  losartan 25 milliGRAM(s) Oral daily  montelukast 10 milliGRAM(s) Oral daily  OXcarbazepine 600 milliGRAM(s) Oral two times a day    MEDICATIONS  (PRN):  acetaminophen     Tablet .. 650 milliGRAM(s) Oral every 6 hours PRN Temp greater or equal to 38C (100.4F), Mild Pain (1 - 3)  aluminum hydroxide/magnesium hydroxide/simethicone Suspension 30 milliLiter(s) Oral every 4 hours PRN Dyspepsia  melatonin 3 milliGRAM(s) Oral at bedtime PRN Insomnia  ondansetron Injectable 4 milliGRAM(s) IV Push every 8 hours PRN Nausea and/or Vomiting  pantoprazole    Tablet 40 milliGRAM(s) Oral before breakfast PRN acid reflux      PHYSICAL EXAM:  GENERAL: NAD, obese  HEAD:  Atraumatic, Normocephalic  EYES: EOMI, PERRLA, conjunctiva and sclera clear  ENMT: No tonsillar erythema, exudates, or enlargement; Moist mucous membranes, Good dentition, No lesions  NECK: Supple, No JVD, Normal thyroid  NERVOUS SYSTEM:  Alert & Oriented X3, Good concentration; Moves all exts.   CHEST/LUNG: diminished at the bases  bilaterally; No rales, rhonchi, wheezing, or rubs. On 4L NC  HEART: Regular rate and rhythm; No murmurs, rubs, or gallops  ABDOMEN: Soft, Nontender, Nondistended; Bowel sounds present. Obese.   EXTREMITIES:  2+ Peripheral Pulses, No clubbing, cyanosis,  2+ pitting edema of ankles   LYMPH: No lymphadenopathy noted  SKIN: Scars to abdomen. Surgical incision to left flank witth drain in place.     LABS:                        11.2   7.17  )-----------( 288      ( 25 Jan 2024 06:40 )             38.8     01-25    135  |  98  |  27<H>  ----------------------------<  202<H>  4.7   |  34<H>  |  1.19    Ca    9.4      25 Jan 2024 06:40  Phos  4.5     01-25  Mg     1.9     01-25        Urinalysis Basic - ( 25 Jan 2024 06:40 )    Color: x / Appearance: x / SG: x / pH: x  Gluc: 202 mg/dL / Ketone: x  / Bili: x / Urobili: x   Blood: x / Protein: x / Nitrite: x   Leuk Esterase: x / RBC: x / WBC x   Sq Epi: x / Non Sq Epi: x / Bacteria: x      CAPILLARY BLOOD GLUCOSE      POCT Blood Glucose.: 169 mg/dL (26 Jan 2024 16:41)  POCT Blood Glucose.: 165 mg/dL (26 Jan 2024 12:57)  POCT Blood Glucose.: 184 mg/dL (26 Jan 2024 11:31)  POCT Blood Glucose.: 201 mg/dL (26 Jan 2024 08:03)  POCT Blood Glucose.: 210 mg/dL (25 Jan 2024 22:39)        Urinalysis Basic - ( 25 Jan 2024 06:40 )    Color: x / Appearance: x / SG: x / pH: x  Gluc: 202 mg/dL / Ketone: x  / Bili: x / Urobili: x   Blood: x / Protein: x / Nitrite: x   Leuk Esterase: x / RBC: x / WBC x   Sq Epi: x / Non Sq Epi: x / Bacteria: x    < from: CT Aspiration Abscess Hematoma, Cyst (01.24.24 @ 16:03) >    ACC: [id number] EXAM:  CT ASP ABSC HEMATOMA CYST#   ORDERED BY: MANDI DURANT     PROCEDURE DATE:  01/24/2024          INTERPRETATION:  PROCEDURE: CT-guided drainage of retroperitoneal   collection.    : DEVIN Flores MD    CLINICAL INDICATION: 58-year-old female with multiple comorbidities   including a recent left nephrectomy, who was found to have a left post   nephrectomy bed collection.    ANESTHESIA: Intravenous sedation was provided by the attending   anesthesiologist. A total of 6mL of 1% lidocaine was used for local   anesthesia.    ANTIBIOTICS: None    TECHNIQUE: After discussing the risks, benefits and alternatives to this   procedure with the patient, informed consent was obtained. A timeout was   performed.    The patient was placed into right lateral decubitus position on the CT   examination table and connected to physiologic monitoring. Noncontrast   transaxial images of the abdomen were obtained. The left retroperitoneal   collection which had been seen on a recent CT scan was again identified,   and the left flank was prepped and draped in the usual sterile fashion.    Using CT guidance, an 18-gauge Singh needle was advanced into the   collection using a left posterolateral approach. Access into the   collection was confirmed with CT images and aspiration of 5 mL of dark   red/brown cloudy fluid, which was sent for microbiological analysis. The   needle was then removed over a wire and the tract was dilated. A 10.2   [place]guillan pigtail drainage catheter was then advanced into the collection   over the wire. An additional 65 mL of dark red/brown cloudy fluid was   manually aspirated. Repeat images demonstrated good positioning of the   catheter tip within the collection cavity with virtual complete collapse   of the cavity around the catheter tip. The catheter was secured to the   patient's skin with a silk suture, flushed with normal saline, and then   connected to a drainage bag. A dry sterile dressing was then applied.    The patient tolerated the procedure well and was transferred to the   recovery unit in stable condition. There were no immediate complications.    IMPRESSION: SUCCESSFUL CT-GUIDED PERCUTANEOUS DRAINAGE OF LEFT   RETROPERITONEAL COLLECTION, AS DESCRIBED ABOVE.    --- End of Report ---      PRATIBHA FLORES MD; Attending Interventional Radiologist  This document has been electronically signed. Jan 24 2024  4:10PM    < end of copied text >

## 2024-01-26 NOTE — PROGRESS NOTE ADULT - ASSESSMENT
57 y/o F PMH of asthma, COPD (on 4L NC PRN), DM, fatty liver, fibromyalgia, R shoulder bursitis, HLD, hypothyroidism, NAKUL, psoriasis, RCC (s/p L renal resection 11/3/23, no hx of CT/RT) presenting with lower abdominal pain and SOB. admitted for left sided abdominal pain.   Pt had CT angio chest that was negative for PE. Also had CT A/P that showed post-op left nephrectomy collection of 7.2 x 6 cm fluid collection and a 4.8 x3.1 cm left abdominal wall collection. Pt was s/p IR drainage of fluid collection 70cc on 1/24, now with pigtail in place.     Per IR waiting for <10 cc in 24 hrs drainage in order to remove pigtail drainage. Per discussion with IR attending today, Pigtail to be flushed 5cc of NS daily to maintain patency. Patient can also follow up with outpatient urology with referral to return back to IR for removal of drain.  59 y/o F PMH of asthma, COPD (on 4L NC PRN), DM, fatty liver, fibromyalgia, R shoulder bursitis, HLD, hypothyroidism, NAKUL, psoriasis, RCC (s/p L renal resection 11/3/23, no hx of CT/RT) presenting with lower abdominal pain and SOB. Admitted for left sided abdominal pain.   Pt had CT angio chest that was negative for PE. Also had CT A/P that showed post-op left nephrectomy collection of 7.2 x 6 cm fluid collection and a 4.8 x3.1 cm left abdominal wall collection. Pt was s/p IR drainage of fluid collection 70cc on 1/24, now with pigtail in place.     Per IR waiting for <10 cc in 24 hrs drainage in order to remove pigtail drainage. Per discussion with IR attending today, Pigtail to be flushed 5cc of NS daily to maintain patency. Patient can also follow up with outpatient urology with referral to return back to IR for removal of drain. Patient pending PT eval. Per patient her BiPAP needs to serviced. She endorsed that last time she used it was over a year ago. Recommended to see her Pulm for a re-eval. Patient does not want a new BiPAP. Pulm (Dr. Karen Vazquez). Patient also sees Urologist Dr. Smith. Patient will need HHA for drainage management.

## 2024-01-26 NOTE — PROGRESS NOTE ADULT - PROBLEM SELECTOR PLAN 4
S/p left nephrectomy  SCr improved from 1.46 > 1.19 (baseline 1.15)  Avoid Nephrotoxic Meds/ Agents such as (NSAIDs, IV contrast, Aminoglycosides such as gentamicin, Gadolinium contrast, Phosphate containing enemas, etc..)  Trend BMP S/p left nephrectomy  SCr improved   Avoid Nephrotoxic Meds/ Agents such as (NSAIDs, IV contrast, Aminoglycosides such as gentamicin, Gadolinium contrast, Phosphate containing enemas, etc..)  Trend BMP

## 2024-01-26 NOTE — DISCHARGE NOTE PROVIDER - NSDCHHNEEDSERVICE_GEN_ALL_CORE
Medication teaching and assessment/Observation and assessment/Ostomy care and management/Rehabilitation services/Teaching and training Medication teaching and assessment/Observation and assessment/Rehabilitation services/Teaching and training/Other, specify...

## 2024-01-26 NOTE — DISCHARGE NOTE PROVIDER - NSDCHHHOMEBOUND_GEN_ALL_CORE
Ataxic gait/Shortness of breath with minimal ambulation Shortness of breath with minimal ambulation/Fall risk

## 2024-01-26 NOTE — DISCHARGE NOTE PROVIDER - PROVIDER TOKENS
PROVIDER:[TOKEN:[[id number]],FOLLOWUP:[1 week]],FREE:[LAST:[Dr. Peck],FIRST:[Urologist],PHONE:[(   )    -],FAX:[(   )    -],FOLLOWUP:[1 week],ESTABLISHEDPATIENT:[T]] PROVIDER:[[id number]],FOLLOWUP:[1 week]],FREE:[LAST:[Dr. Peck],FIRST:[Urologist],PHONE:[(   )    -],FAX:[(   )    -],FOLLOWUP:[1 week],ESTABLISHEDPATIENT:[T]],PROVIDER:[[id number]],FOLLOWUP:[1 week]]

## 2024-01-26 NOTE — PROGRESS NOTE ADULT - PROBLEM SELECTOR PLAN 3
on home oxygen 4L NC   Takes duoneb, albuterol, symbicort, fluticasone, and ventolin at home  Continue duonebs if needed  Continue albuterol PRN and symbicort for now  Maintain O2 88-92% Home oxygen 4L NC   Takes duoneb, albuterol, symbicort, fluticasone, and ventolin at home  Continue duonebs if needed  Continue albuterol PRN and symbicort for now  Maintain O2 88-92%  BiPAP QHS  F/u with Pulm outpatient

## 2024-01-26 NOTE — PROGRESS NOTE ADULT - ATTENDING COMMENTS
Patient seen/examined. Agree with above and edited as appropriate.

## 2024-01-26 NOTE — PROGRESS NOTE ADULT - PROBLEM SELECTOR PLAN 5
Controlled  Takes levemir 16 at bedtime and novolog 6U premeals at home  c/w lantus 16 units qhs and admelog 6 units tid with meals + ISS qid  Glucose monitoring  Low carb diet when not NPO Controlled  Takes Levemir 16 at bedtime and Novolog 6U pre meals at home  c/w Lantus 16 units qhs and admelog 6 units tid with meals + ISS qid  Glucose monitoring  Low carb diet when not NPO

## 2024-01-27 LAB
ANION GAP SERPL CALC-SCNC: 3 MMOL/L — LOW (ref 5–17)
BUN SERPL-MCNC: 20 MG/DL — HIGH (ref 7–18)
CALCIUM SERPL-MCNC: 9.9 MG/DL — SIGNIFICANT CHANGE UP (ref 8.4–10.5)
CHLORIDE SERPL-SCNC: 96 MMOL/L — SIGNIFICANT CHANGE UP (ref 96–108)
CO2 SERPL-SCNC: 34 MMOL/L — HIGH (ref 22–31)
CREAT SERPL-MCNC: 1.16 MG/DL — SIGNIFICANT CHANGE UP (ref 0.5–1.3)
EGFR: 55 ML/MIN/1.73M2 — LOW
GLUCOSE BLDC GLUCOMTR-MCNC: 186 MG/DL — HIGH (ref 70–99)
GLUCOSE BLDC GLUCOMTR-MCNC: 192 MG/DL — HIGH (ref 70–99)
GLUCOSE BLDC GLUCOMTR-MCNC: 192 MG/DL — HIGH (ref 70–99)
GLUCOSE BLDC GLUCOMTR-MCNC: 206 MG/DL — HIGH (ref 70–99)
GLUCOSE SERPL-MCNC: 177 MG/DL — HIGH (ref 70–99)
HCT VFR BLD CALC: 37.8 % — SIGNIFICANT CHANGE UP (ref 34.5–45)
HGB BLD-MCNC: 10.8 G/DL — LOW (ref 11.5–15.5)
MCHC RBC-ENTMCNC: 22.9 PG — LOW (ref 27–34)
MCHC RBC-ENTMCNC: 28.6 GM/DL — LOW (ref 32–36)
MCV RBC AUTO: 80.3 FL — SIGNIFICANT CHANGE UP (ref 80–100)
NRBC # BLD: 0 /100 WBCS — SIGNIFICANT CHANGE UP (ref 0–0)
PLATELET # BLD AUTO: 292 K/UL — SIGNIFICANT CHANGE UP (ref 150–400)
POTASSIUM SERPL-MCNC: 4.3 MMOL/L — SIGNIFICANT CHANGE UP (ref 3.5–5.3)
POTASSIUM SERPL-SCNC: 4.3 MMOL/L — SIGNIFICANT CHANGE UP (ref 3.5–5.3)
RBC # BLD: 4.71 M/UL — SIGNIFICANT CHANGE UP (ref 3.8–5.2)
RBC # FLD: 15 % — HIGH (ref 10.3–14.5)
SODIUM SERPL-SCNC: 133 MMOL/L — LOW (ref 135–145)
WBC # BLD: 8.22 K/UL — SIGNIFICANT CHANGE UP (ref 3.8–10.5)
WBC # FLD AUTO: 8.22 K/UL — SIGNIFICANT CHANGE UP (ref 3.8–10.5)

## 2024-01-27 PROCEDURE — 99232 SBSQ HOSP IP/OBS MODERATE 35: CPT | Mod: 24

## 2024-01-27 PROCEDURE — 93970 EXTREMITY STUDY: CPT | Mod: 26

## 2024-01-27 PROCEDURE — 99232 SBSQ HOSP IP/OBS MODERATE 35: CPT

## 2024-01-27 RX ORDER — INSULIN GLARGINE 100 [IU]/ML
20 INJECTION, SOLUTION SUBCUTANEOUS AT BEDTIME
Refills: 0 | Status: DISCONTINUED | OUTPATIENT
Start: 2024-01-27 | End: 2024-01-31

## 2024-01-27 RX ADMIN — Medication 6 UNIT(S): at 17:49

## 2024-01-27 RX ADMIN — MONTELUKAST 10 MILLIGRAM(S): 4 TABLET, CHEWABLE ORAL at 12:04

## 2024-01-27 RX ADMIN — LIDOCAINE 1 PATCH: 4 CREAM TOPICAL at 19:01

## 2024-01-27 RX ADMIN — OXCARBAZEPINE 600 MILLIGRAM(S): 300 TABLET, FILM COATED ORAL at 20:52

## 2024-01-27 RX ADMIN — Medication 6 UNIT(S): at 08:59

## 2024-01-27 RX ADMIN — LIDOCAINE 1 PATCH: 4 CREAM TOPICAL at 12:05

## 2024-01-27 RX ADMIN — Medication 1 APPLICATION(S): at 18:05

## 2024-01-27 RX ADMIN — GABAPENTIN 400 MILLIGRAM(S): 400 CAPSULE ORAL at 09:00

## 2024-01-27 RX ADMIN — HEPARIN SODIUM 5000 UNIT(S): 5000 INJECTION INTRAVENOUS; SUBCUTANEOUS at 06:38

## 2024-01-27 RX ADMIN — Medication 1 APPLICATION(S): at 06:39

## 2024-01-27 RX ADMIN — HEPARIN SODIUM 5000 UNIT(S): 5000 INJECTION INTRAVENOUS; SUBCUTANEOUS at 13:53

## 2024-01-27 RX ADMIN — Medication 6 UNIT(S): at 12:04

## 2024-01-27 RX ADMIN — Medication 1: at 08:59

## 2024-01-27 RX ADMIN — Medication 1: at 17:49

## 2024-01-27 RX ADMIN — HEPARIN SODIUM 5000 UNIT(S): 5000 INJECTION INTRAVENOUS; SUBCUTANEOUS at 22:09

## 2024-01-27 RX ADMIN — AMLODIPINE BESYLATE 10 MILLIGRAM(S): 2.5 TABLET ORAL at 06:37

## 2024-01-27 RX ADMIN — ATORVASTATIN CALCIUM 40 MILLIGRAM(S): 80 TABLET, FILM COATED ORAL at 22:09

## 2024-01-27 RX ADMIN — LOSARTAN POTASSIUM 25 MILLIGRAM(S): 100 TABLET, FILM COATED ORAL at 06:38

## 2024-01-27 RX ADMIN — INSULIN GLARGINE 20 UNIT(S): 100 INJECTION, SOLUTION SUBCUTANEOUS at 22:08

## 2024-01-27 RX ADMIN — OXCARBAZEPINE 600 MILLIGRAM(S): 300 TABLET, FILM COATED ORAL at 09:00

## 2024-01-27 RX ADMIN — Medication 112 MICROGRAM(S): at 05:03

## 2024-01-27 RX ADMIN — Medication 1: at 12:04

## 2024-01-27 RX ADMIN — GABAPENTIN 400 MILLIGRAM(S): 400 CAPSULE ORAL at 18:05

## 2024-01-27 RX ADMIN — Medication 2: at 22:07

## 2024-01-27 NOTE — PHYSICAL THERAPY INITIAL EVALUATION ADULT - LIVES WITH, PROFILE
son in an apartment building with 3+3 OLGA; ambulates adlib with st cane and rollator, has shower chair, requires assistance with ADLs-son/brother assists PRN

## 2024-01-27 NOTE — PHYSICAL THERAPY INITIAL EVALUATION ADULT - CRITERIA FOR SKILLED THERAPEUTIC INTERVENTIONS
[facility] however pt prefers D/C home, has son and brother who can help her at home. Pt was in rehab in Nov. 2023/impairments found/functional limitations in following categories/anticipated discharge recommendation

## 2024-01-27 NOTE — PHYSICAL THERAPY INITIAL EVALUATION ADULT - GAIT DEVIATIONS NOTED, PT EVAL
decreased nayana/increased time in double stance/decreased velocity of limb motion/decreased step length

## 2024-01-27 NOTE — PHYSICAL THERAPY INITIAL EVALUATION ADULT - RANGE OF MOTION EXAMINATION, REHAB EVAL
except for sh fl to 90 deg/bilateral upper extremity ROM was WFL (within functional limits)/bilateral lower extremity ROM was WFL (within functional limits)

## 2024-01-27 NOTE — PHYSICAL THERAPY INITIAL EVALUATION ADULT - LEVEL OF INDEPENDENCE: STAIR NEGOTIATION, REHAB EVAL
defer to perform due to generalized weakness; pt was sleepy during assessment; however refused to go to bed; claims she prefers to sit up; however was dozing off

## 2024-01-27 NOTE — PROGRESS NOTE ADULT - ASSESSMENT
Very pleasant 58 year old woman s/p L radical nephrectomy 11/2023 w/ 7x6cm postop collection in nephrectomy bed and 4.8 cm left abdominal wall collection  VSS, afebrile, no leukocytosis    Plan   - Labs reviewed  - CT images reviewed  - IR drain and abdominal wall collection cultures no growth  - Local wound care   - Pain control prn  - Remainder of care as per primary team  - Patient stable for discharge with drain - can remove once <10cc over 48 hours per IR  - Please continue flushing drain with 5cc NS daily  - She is to follow up with Dr. Peck - her urologic oncologist on discharge  - Discussed with house staff

## 2024-01-27 NOTE — PHYSICAL THERAPY INITIAL EVALUATION ADULT - IMPAIRMENTS CONTRIBUTING TO GAIT DEVIATIONS, PT EVAL
both knees intermittently buckled during stance phase-unstable gait/impaired balance/decreased strength

## 2024-01-27 NOTE — PROGRESS NOTE ADULT - SUBJECTIVE AND OBJECTIVE BOX
Patient seen/examined. No events overnight. Feels better    Vital Signs Last 24 Hrs  T(C): 36.6 (27 Jan 2024 13:18), Max: 36.8 (27 Jan 2024 05:00)  T(F): 97.8 (27 Jan 2024 13:18), Max: 98.2 (27 Jan 2024 05:00)  HR: 81 (27 Jan 2024 13:18) (81 - 101)  BP: 130/73 (27 Jan 2024 13:18) (130/73 - 156/84)  BP(mean): --  RR: 20 (27 Jan 2024 13:18) (19 - 20)  SpO2: 95% (27 Jan 2024 13:18) (95% - 100%)    Parameters below as of 27 Jan 2024 13:18  Patient On (Oxygen Delivery Method): nasal cannula  O2 Flow (L/min): 4      Gen: NAD. AA0x3  Abd: Soft. NT/ND                          10.8   8.22  )-----------( 292      ( 27 Jan 2024 07:10 )             37.8       01-27    133<L>  |  96  |  20<H>  ----------------------------<  177<H>  4.3   |  34<H>  |  1.16    Ca    9.9      27 Jan 2024 07:10

## 2024-01-27 NOTE — PROGRESS NOTE ADULT - ASSESSMENT
59 y/o F PMH of asthma, COPD (on 4L NC PRN), DM, fatty liver, fibromyalgia, R shoulder bursitis, HLD, hypothyroidism, NAKUL, psoriasis, RCC (s/p L renal resection 11/3/23, no hx of CT/RT) presenting with lower abdominal pain and SOB. Admitted for left sided abdominal pain.   Pt had CT angio chest that was negative for PE. Also had CT A/P that showed post-op left nephrectomy collection of 7.2 x 6 cm fluid collection and a 4.8 x3.1 cm left abdominal wall collection. Pt was s/p IR drainage of fluid collection 70cc on 1/24, now with pigtail in place.     Per IR and urology waiting for <10 cc in 24 hrs drainage in order to remove pigtail drainage. Per discussion with IR attending today, Pigtail to be flushed 5cc of NS daily to maintain patency. Patient can also follow up with outpatient urology with referral to return back to IR for removal of drain. Patient pending PT eval. Per patient her BiPAP needs to serviced. She endorsed that last time she used it was over a year ago. Recommended to see her Pulm for a re-eval. Patient does not want a new BiPAP. Pulm (Dr. Karen Vazquez). Patient also sees Urologist Dr. Smith. Patient will need HHA for drainage management.   PT assesment pending recs.     Assessment:  #Left retroperitoneal collection s/p IR guided drainage on 1/24  #LE edema - r/o dvt  #Hyponatremia- resolved   #Acute URI likely viral with nasal congestion, cough and post nasal drip   #Chronic Hypoxic respiratory failure   #COPD- not in exacerbation   #DM  #RCC s/p L renal resection   #fibromyalgia  #HLD  #hypothyroidism  #Psoriasis  #DVT ppx     Plan:  - doppler LE R leg negative for DVT, left inconclusive. Low suspicion, R leg was the concerning one.  - Pt p/w abd pain, CT noted w/ 7.2 x 6.0 cm postoperative collection in the nephrectomy bed. s/p IR guided drainage- 70 cc out-put on 1/25. DC abx as cx are negative. Appreciate urology recommendations. Patient  will need out-patient f/u w/ urologist  to monitor drain out-put and have it discontinued by IR once out-put is minimal. Patient follows w/ Dr. Rubén Peck as out-patient   - BP stable, c/w home meds   - C/w levothyroxine   - Sodium improved   - SCr improved s/p iV hydration.  - Supportive measures for URI  - DC planning home w/ home care and out-patient urology f/u. Patient feels weak and doesn't want to go home today. PT eval in process, pending recs, although patient doesn't want to go to rehab, might benefit from home pt or outpatient pt.

## 2024-01-27 NOTE — PROGRESS NOTE ADULT - SUBJECTIVE AND OBJECTIVE BOX
**********Transfer from Cath Lab to CCU*********************  HPI:    63F, current smoker, w/ PMHx Obesity, HTN, HLD, HFpEF (EF 63%), pre-DM, CKD, ALKA (non compliance w/ CPAP), TIA (2012), and chronic bronchitis, known CAD (s/p recent cardiac cath 5/23/23 w/ Dr Frausto: PAZ x 3 to p/m/d RCA with residual pLAD 70-75%, mLAD 70-75% (iFR + 0.85), OM2 85-90%, ostial OM1 60%) and now returns for staged PCI. Pt initially presented her outpatient cardiologist Dr. Frausto for preop risk assessment for left knee arthroscopy and was found with subsequent abnormal NST 5/17/23: moderate reversible perfusion defect of the inferior septal inferolateral wall most consistent with ischemia of the RCA versus left circumflex, EF 48% by nuclear.  Echo 4/1/23: showed LVEF 63% w/ grade 1 DD. Pt denies active CP, SOB, palpitations, diaphoresis, orthopnea, PND, dizziness, syncope, abdominal pain/discomfort, LE swelling or any other complaints at this time.  Pt endorses some compliance with DAP (states she takes her Plavix but sometimes forget ASA).  Denies any recent hospitalizations or surgeries since she was discharged.     In light of the pt's risk factors, recent LHC showing residual CAD, pt now presents for staged PCI with Dr Frausto.  (04 Aug 2023 18:44)       INTERVAL HPI/OVERNIGHT EVENTS:   No overnight events   Afebrile, hemodynamically stable     Subjective:        I&O's Summary        Daily     Daily     Adult Advanced Hemodynamics Last 24 Hrs  CVP(mm Hg): --  CVP(cm H2O): --  CO: --  CI: --  PA: --  PA(mean): --  PCWP: --  SVR: --  SVRI: --  PVR: --  PVRI: --    EKG/Telemetry Events:    MEDICATIONS  (STANDING):    MEDICATIONS  (PRN):      PHYSICAL EXAM:  GENERAL:   HEAD:  Atraumatic, Normocephalic  EYES: EOMI, PERRLA, conjunctiva and sclera clear  NECK: Supple, No JVD, Normal thyroid, no enlarged nodes  NERVOUS SYSTEM:  Alert & Awake.   CHEST/LUNG: B/L good air entry; No rales, rhonchi, or wheezing  HEART: S1S2 normal, no S3, Regular rate and rhythm; No murmurs  ABDOMEN: Soft, Nontender, Nondistended; Bowel sounds present  EXTREMITIES:  2+ Peripheral Pulses, No clubbing, cyanosis, or edema  LYMPH: No lymphadenopathy noted  SKIN: No rashes or lesions    LABS:                        13.0   6.37  )-----------( 308      ( 10 Aug 2023 08:21 )             42.1     08-10    137  |  101  |  21  ----------------------------<  122<H>  3.2<L>   |  27  |  1.03    Ca    9.6      10 Aug 2023 08:21  Mg     2.0     08-10    TPro  7.3  /  Alb  3.9  /  TBili  0.3  /  DBili  x   /  AST  10  /  ALT  7<L>  /  AlkPhos  110  08-10    LIVER FUNCTIONS - ( 10 Aug 2023 08:21 )  Alb: 3.9 g/dL / Pro: 7.3 g/dL / ALK PHOS: 110 U/L / ALT: 7 U/L / AST: 10 U/L / GGT: x           PT/INR - ( 10 Aug 2023 08:21 )   PT: 11.2 sec;   INR: 0.98          PTT - ( 10 Aug 2023 08:21 )  PTT:39.2 sec  CAPILLARY BLOOD GLUCOSE          Creatine Kinase, Serum: 58 U/L (08-10 @ 08:21)  CKMB Units: 1.3 ng/mL (08-10 @ 08:21)    CARDIAC MARKERS ( 10 Aug 2023 08:21 )  x     / x     / 58 U/L / x     / 1.3 ng/mL      Urinalysis Basic - ( 10 Aug 2023 08:21 )    Color: x / Appearance: x / SG: x / pH: x  Gluc: 122 mg/dL / Ketone: x  / Bili: x / Urobili: x   Blood: x / Protein: x / Nitrite: x   Leuk Esterase: x / RBC: x / WBC x   Sq Epi: x / Non Sq Epi: x / Bacteria: x          RADIOLOGY & ADDITIONAL TESTS:  CXR:        Care Discussed with Consultants/Other Providers [ x] YES  [ ] NO           CHIEF COMPLAINT & BRIEF HOSPITAL COURSE:      INTERVAL HPI/OVERNIGHT EVENTS:       REVIEW OF SYSTEMS:  CONSTITUTIONAL: No fever, weight loss, or fatigue  RESPIRATORY: No cough, wheezing, chills or hemoptysis; No shortness of breath  CARDIOVASCULAR: No chest pain, palpitations, dizziness, or leg swelling  GASTROINTESTINAL: No abdominal pain. No nausea, vomiting, or hematemesis; No diarrhea or constipation. No melena or hematochezia.  GENITOURINARY: No dysuria or hematuria, urinary frequency  MUSCULOSKELETAL: No pain, no Limited ROM  NEUROLOGICAL: No headaches, memory loss, loss of strength, numbness, or tremors  SKIN: No itching, burning, rashes, or lesions     MEDICATIONS  (STANDING):  amLODIPine   Tablet 10 milliGRAM(s) Oral daily  atorvastatin 40 milliGRAM(s) Oral at bedtime  gabapentin 400 milliGRAM(s) Oral two times a day  heparin   Injectable 5000 Unit(s) SubCutaneous every 8 hours  hydrocortisone 1% Cream 1 Application(s) Topical two times a day  insulin glargine Injectable (LANTUS) 20 Unit(s) SubCutaneous at bedtime  insulin lispro (ADMELOG) corrective regimen sliding scale   SubCutaneous Before meals and at bedtime  insulin lispro Injectable (ADMELOG) 6 Unit(s) SubCutaneous three times a day before meals  levothyroxine 112 MICROGram(s) Oral daily  lidocaine   4% Patch 1 Patch Transdermal every 24 hours  losartan 25 milliGRAM(s) Oral daily  montelukast 10 milliGRAM(s) Oral daily  OXcarbazepine 600 milliGRAM(s) Oral two times a day    MEDICATIONS  (PRN):  acetaminophen     Tablet .. 650 milliGRAM(s) Oral every 6 hours PRN Temp greater or equal to 38C (100.4F), Mild Pain (1 - 3)  aluminum hydroxide/magnesium hydroxide/simethicone Suspension 30 milliLiter(s) Oral every 4 hours PRN Dyspepsia  melatonin 3 milliGRAM(s) Oral at bedtime PRN Insomnia  ondansetron Injectable 4 milliGRAM(s) IV Push every 8 hours PRN Nausea and/or Vomiting  pantoprazole    Tablet 40 milliGRAM(s) Oral before breakfast PRN acid reflux      Vital Signs Last 24 Hrs  T(C): 36.8 (27 Jan 2024 05:00), Max: 37.1 (26 Jan 2024 13:07)  T(F): 98.2 (27 Jan 2024 05:00), Max: 98.7 (26 Jan 2024 13:07)  HR: 87 (27 Jan 2024 05:00) (81 - 101)  BP: 156/84 (27 Jan 2024 05:00) (127/73 - 156/84)  BP(mean): --  RR: 19 (27 Jan 2024 05:00) (19 - 20)  SpO2: 100% (27 Jan 2024 05:00) (99% - 100%)    Parameters below as of 27 Jan 2024 05:00  Patient On (Oxygen Delivery Method): nasal cannula  O2 Flow (L/min): 4      PHYSICAL EXAMINATION:  GENERAL: NAD, well built  HEAD:  Atraumatic, Normocephalic  EYES:  conjunctiva and sclera clear, no scleral icterus  NECK: Supple, No JVD, Normal thyroid  CHEST/LUNG: Clear to auscultation.  No rales, rhonchi, wheezing, or rubs  HEART: Regular rate and rhythm; No murmurs, rubs, or gallops  ABDOMEN: Soft, Nontender, Nondistended; Bowel sounds present  NERVOUS SYSTEM:  Alert & Oriented X3,  Strength 5/5 in upper and lower extremities, sensation intact  EXTREMITIES:  2+ Peripheral Pulses, No clubbing, cyanosis, or edema  SKIN: warm dry, no lesions noted                          10.8   8.22  )-----------( 292      ( 27 Jan 2024 07:10 )             37.8     01-27    133<L>  |  96  |  20<H>  ----------------------------<  177<H>  4.3   |  34<H>  |  1.16    Ca    9.9      27 Jan 2024 07:10              I&O's Summary    26 Jan 2024 07:01  -  27 Jan 2024 07:00  --------------------------------------------------------  IN: 0 mL / OUT: 25 mL / NET: -25 mL        Culture - Abscess with Gram Stain (collected 24 Jan 2024 15:15)  Source: .Abscess left retroperitoneal abcess  Gram Stain (24 Jan 2024 23:31):    No polymorphonuclear cells seen per low power field    No organisms seen per oil power field  Preliminary Report (25 Jan 2024 15:42):    No growth to date.        RADIOLOGY & ADDITIONAL TESTS:                     CHIEF COMPLAINT & BRIEF HOSPITAL COURSE:  58F PMH of asthma, COPD (on 4L NC PRN), DM, fatty liver, fibromyalgia, R shoulder bursitis, HLD, hypothyroidism, NAKUL, psoriasis, RCC (s/p L renal resection 11/3/23, no hx of CT/RT) presenting with lower abdominal pain. CT scan showed, Interval left radical nephrectomy with a 7.2 x 6.0 cm postoperative collection in the nephrectomy bed. No evidence of air or well formed wall.*  4.8 x 3.1 cm left abdominal wall collection, likely postoperative. Admitted for further evaluation       INTERVAL HPI/OVERNIGHT EVENTS:   Patient seen and examined at bedside. No events overnight. Patient with general malaise complaints, sob, cough, LE edema, LE pain. Patient seen by PT recomended     REVIEW OF SYSTEMS:  CONSTITUTIONAL: No fever, weight loss, or fatigue  RESPIRATORY: No cough, wheezing, chills or hemoptysis; No shortness of breath  CARDIOVASCULAR: No chest pain, palpitations, dizziness, or leg swelling  GASTROINTESTINAL: No abdominal pain. No nausea, vomiting, or hematemesis; No diarrhea or constipation. No melena or hematochezia.  GENITOURINARY: No dysuria or hematuria, urinary frequency  MUSCULOSKELETAL: No pain, no Limited ROM  NEUROLOGICAL: No headaches, memory loss, loss of strength, numbness, or tremors  SKIN: No itching, burning, rashes, or lesions     MEDICATIONS  (STANDING):  amLODIPine   Tablet 10 milliGRAM(s) Oral daily  atorvastatin 40 milliGRAM(s) Oral at bedtime  gabapentin 400 milliGRAM(s) Oral two times a day  heparin   Injectable 5000 Unit(s) SubCutaneous every 8 hours  hydrocortisone 1% Cream 1 Application(s) Topical two times a day  insulin glargine Injectable (LANTUS) 20 Unit(s) SubCutaneous at bedtime  insulin lispro (ADMELOG) corrective regimen sliding scale   SubCutaneous Before meals and at bedtime  insulin lispro Injectable (ADMELOG) 6 Unit(s) SubCutaneous three times a day before meals  levothyroxine 112 MICROGram(s) Oral daily  lidocaine   4% Patch 1 Patch Transdermal every 24 hours  losartan 25 milliGRAM(s) Oral daily  montelukast 10 milliGRAM(s) Oral daily  OXcarbazepine 600 milliGRAM(s) Oral two times a day    MEDICATIONS  (PRN):  acetaminophen     Tablet .. 650 milliGRAM(s) Oral every 6 hours PRN Temp greater or equal to 38C (100.4F), Mild Pain (1 - 3)  aluminum hydroxide/magnesium hydroxide/simethicone Suspension 30 milliLiter(s) Oral every 4 hours PRN Dyspepsia  melatonin 3 milliGRAM(s) Oral at bedtime PRN Insomnia  ondansetron Injectable 4 milliGRAM(s) IV Push every 8 hours PRN Nausea and/or Vomiting  pantoprazole    Tablet 40 milliGRAM(s) Oral before breakfast PRN acid reflux      Vital Signs Last 24 Hrs  T(C): 36.8 (27 Jan 2024 05:00), Max: 37.1 (26 Jan 2024 13:07)  T(F): 98.2 (27 Jan 2024 05:00), Max: 98.7 (26 Jan 2024 13:07)  HR: 87 (27 Jan 2024 05:00) (81 - 101)  BP: 156/84 (27 Jan 2024 05:00) (127/73 - 156/84)  BP(mean): --  RR: 19 (27 Jan 2024 05:00) (19 - 20)  SpO2: 100% (27 Jan 2024 05:00) (99% - 100%)    Parameters below as of 27 Jan 2024 05:00  Patient On (Oxygen Delivery Method): nasal cannula  O2 Flow (L/min): 4      PHYSICAL EXAMINATION:  GENERAL: NAD, well built  HEAD:  Atraumatic, Normocephalic  EYES:  conjunctiva and sclera clear, no scleral icterus  NECK: Supple, No JVD, Normal thyroid  CHEST/LUNG: Clear to auscultation.  No rales, rhonchi, wheezing, or rubs  HEART: Regular rate and rhythm; No murmurs, rubs, or gallops  ABDOMEN: Soft, Nontender, Nondistended; Bowel sounds present  NERVOUS SYSTEM:  Alert & Oriented X3,  Strength 5/5 in upper and lower extremities, sensation intact  EXTREMITIES:  2+ Peripheral Pulses, No clubbing, cyanosis, or edema  SKIN: warm dry, no lesions noted                          10.8   8.22  )-----------( 292      ( 27 Jan 2024 07:10 )             37.8     01-27    133<L>  |  96  |  20<H>  ----------------------------<  177<H>  4.3   |  34<H>  |  1.16    Ca    9.9      27 Jan 2024 07:10              I&O's Summary    26 Jan 2024 07:01  -  27 Jan 2024 07:00  --------------------------------------------------------  IN: 0 mL / OUT: 25 mL / NET: -25 mL        Culture - Abscess with Gram Stain (collected 24 Jan 2024 15:15)  Source: .Abscess left retroperitoneal abcess  Gram Stain (24 Jan 2024 23:31):    No polymorphonuclear cells seen per low power field    No organisms seen per oil power field  Preliminary Report (25 Jan 2024 15:42):    No growth to date.        RADIOLOGY & ADDITIONAL TESTS:                     CHIEF COMPLAINT & BRIEF HOSPITAL COURSE:  58F PMH of asthma, COPD (on 4L NC PRN), DM, fatty liver, fibromyalgia, R shoulder bursitis, HLD, hypothyroidism, NAKUL, psoriasis, RCC (s/p L renal resection 11/3/23, no hx of CT/RT) presenting with lower abdominal pain. CT scan showed, Interval left radical nephrectomy with a 7.2 x 6.0 cm postoperative collection in the nephrectomy bed. No evidence of air or well formed wall.*  4.8 x 3.1 cm left abdominal wall collection, likely postoperative. Admitted for further evaluation       INTERVAL HPI/OVERNIGHT EVENTS:   Patient seen and examined at bedside. No events overnight. Patient with general malaise complaints, sob, cough, LE edema, LE pain. As per RN patient seen by PT recomended Dillan but patient refusing. She is pending A set up for drain management. Drain output 25cc in 24 hrs.     REVIEW OF SYSTEMS:  CONSTITUTIONAL: general malaise  RESPIRATORY: + SOB, mild cough  CARDIOVASCULAR: No chest pain, palpitations, dizziness, + leg swelling  GASTROINTESTINAL: No abdominal pain. No nausea, vomiting, or hematemesis; No diarrhea or constipation. No melena or hematochezia.  GENITOURINARY: No dysuria or hematuria, urinary frequency  MUSCULOSKELETAL:  + leg pain  NEUROLOGICAL: No headaches, memory loss, loss of strength, numbness, or tremors  SKIN: No itching, burning, rashes, or lesions     MEDICATIONS  (STANDING):  amLODIPine   Tablet 10 milliGRAM(s) Oral daily  atorvastatin 40 milliGRAM(s) Oral at bedtime  gabapentin 400 milliGRAM(s) Oral two times a day  heparin   Injectable 5000 Unit(s) SubCutaneous every 8 hours  hydrocortisone 1% Cream 1 Application(s) Topical two times a day  insulin glargine Injectable (LANTUS) 20 Unit(s) SubCutaneous at bedtime  insulin lispro (ADMELOG) corrective regimen sliding scale   SubCutaneous Before meals and at bedtime  insulin lispro Injectable (ADMELOG) 6 Unit(s) SubCutaneous three times a day before meals  levothyroxine 112 MICROGram(s) Oral daily  lidocaine   4% Patch 1 Patch Transdermal every 24 hours  losartan 25 milliGRAM(s) Oral daily  montelukast 10 milliGRAM(s) Oral daily  OXcarbazepine 600 milliGRAM(s) Oral two times a day    MEDICATIONS  (PRN):  acetaminophen     Tablet .. 650 milliGRAM(s) Oral every 6 hours PRN Temp greater or equal to 38C (100.4F), Mild Pain (1 - 3)  aluminum hydroxide/magnesium hydroxide/simethicone Suspension 30 milliLiter(s) Oral every 4 hours PRN Dyspepsia  melatonin 3 milliGRAM(s) Oral at bedtime PRN Insomnia  ondansetron Injectable 4 milliGRAM(s) IV Push every 8 hours PRN Nausea and/or Vomiting  pantoprazole    Tablet 40 milliGRAM(s) Oral before breakfast PRN acid reflux      Vital Signs Last 24 Hrs  T(C): 36.8 (27 Jan 2024 05:00), Max: 37.1 (26 Jan 2024 13:07)  T(F): 98.2 (27 Jan 2024 05:00), Max: 98.7 (26 Jan 2024 13:07)  HR: 87 (27 Jan 2024 05:00) (81 - 101)  BP: 156/84 (27 Jan 2024 05:00) (127/73 - 156/84)  BP(mean): --  RR: 19 (27 Jan 2024 05:00) (19 - 20)  SpO2: 100% (27 Jan 2024 05:00) (99% - 100%)    Parameters below as of 27 Jan 2024 05:00  Patient On (Oxygen Delivery Method): nasal cannula  O2 Flow (L/min): 4      PHYSICAL EXAMINATION:  GENERAL: NAD, sitting in chair, Obese  HEAD:  Atraumatic, Normocephalic  EYES:  conjunctiva and sclera clear, no scleral icterus  NECK: Supple, No JVD, Normal thyroid  CHEST/LUNG: Clear to auscultation.  No rales, rhonchi, wheezing, or rubs  HEART: Regular rate and rhythm; No murmurs, rubs, or gallops  ABDOMEN: Soft, Nontender, Nondistended; Bowel sounds present  NERVOUS SYSTEM:  Alert & Oriented X3,  moving all extremities  EXTREMITIES:  Lower extremity edema bl, R> L  SKIN: extensor surface psoriatic plaques                           10.8   8.22  )-----------( 292      ( 27 Jan 2024 07:10 )             37.8     01-27    133<L>  |  96  |  20<H>  ----------------------------<  177<H>  4.3   |  34<H>  |  1.16    Ca    9.9      27 Jan 2024 07:10              I&O's Summary    26 Jan 2024 07:01  -  27 Jan 2024 07:00  --------------------------------------------------------  IN: 0 mL / OUT: 25 mL / NET: -25 mL        Culture - Abscess with Gram Stain (collected 24 Jan 2024 15:15)  Source: .Abscess left retroperitoneal abcess  Gram Stain (24 Jan 2024 23:31):    No polymorphonuclear cells seen per low power field    No organisms seen per oil power field  Preliminary Report (25 Jan 2024 15:42):    No growth to date.        RADIOLOGY & ADDITIONAL TESTS:

## 2024-01-27 NOTE — PHYSICAL THERAPY INITIAL EVALUATION ADULT - GENERAL OBSERVATIONS, REHAB EVAL
Pt seen sitting @ EOB, noted with involuntary muscle twitching(?) of both UE, (+) edema of both legs; was cooperative during assessment; SPO2 @ RA 84%; improved to 94% with NC @ 4L/min; patient claims she has Home O2 that she uses PRN (?)

## 2024-01-28 LAB
ANION GAP SERPL CALC-SCNC: 6 MMOL/L — SIGNIFICANT CHANGE UP (ref 5–17)
BUN SERPL-MCNC: 24 MG/DL — HIGH (ref 7–18)
CALCIUM SERPL-MCNC: 8.9 MG/DL — SIGNIFICANT CHANGE UP (ref 8.4–10.5)
CHLORIDE SERPL-SCNC: 97 MMOL/L — SIGNIFICANT CHANGE UP (ref 96–108)
CO2 SERPL-SCNC: 32 MMOL/L — HIGH (ref 22–31)
CREAT SERPL-MCNC: 1.21 MG/DL — SIGNIFICANT CHANGE UP (ref 0.5–1.3)
EGFR: 52 ML/MIN/1.73M2 — LOW
GLUCOSE BLDC GLUCOMTR-MCNC: 159 MG/DL — HIGH (ref 70–99)
GLUCOSE BLDC GLUCOMTR-MCNC: 163 MG/DL — HIGH (ref 70–99)
GLUCOSE BLDC GLUCOMTR-MCNC: 172 MG/DL — HIGH (ref 70–99)
GLUCOSE BLDC GLUCOMTR-MCNC: 235 MG/DL — HIGH (ref 70–99)
GLUCOSE SERPL-MCNC: 174 MG/DL — HIGH (ref 70–99)
HCT VFR BLD CALC: 36.4 % — SIGNIFICANT CHANGE UP (ref 34.5–45)
HGB BLD-MCNC: 10.3 G/DL — LOW (ref 11.5–15.5)
MCHC RBC-ENTMCNC: 22.8 PG — LOW (ref 27–34)
MCHC RBC-ENTMCNC: 28.3 GM/DL — LOW (ref 32–36)
MCV RBC AUTO: 80.7 FL — SIGNIFICANT CHANGE UP (ref 80–100)
NRBC # BLD: 0 /100 WBCS — SIGNIFICANT CHANGE UP (ref 0–0)
PLATELET # BLD AUTO: 283 K/UL — SIGNIFICANT CHANGE UP (ref 150–400)
POTASSIUM SERPL-MCNC: 4.4 MMOL/L — SIGNIFICANT CHANGE UP (ref 3.5–5.3)
POTASSIUM SERPL-SCNC: 4.4 MMOL/L — SIGNIFICANT CHANGE UP (ref 3.5–5.3)
RBC # BLD: 4.51 M/UL — SIGNIFICANT CHANGE UP (ref 3.8–5.2)
RBC # FLD: 15.3 % — HIGH (ref 10.3–14.5)
SODIUM SERPL-SCNC: 135 MMOL/L — SIGNIFICANT CHANGE UP (ref 135–145)
WBC # BLD: 9.47 K/UL — SIGNIFICANT CHANGE UP (ref 3.8–10.5)
WBC # FLD AUTO: 9.47 K/UL — SIGNIFICANT CHANGE UP (ref 3.8–10.5)

## 2024-01-28 PROCEDURE — 99232 SBSQ HOSP IP/OBS MODERATE 35: CPT | Mod: 24

## 2024-01-28 PROCEDURE — 99231 SBSQ HOSP IP/OBS SF/LOW 25: CPT

## 2024-01-28 RX ORDER — FLUTICASONE PROPIONATE 220 MCG
1 AEROSOL WITH ADAPTER (GRAM) INHALATION
Qty: 1 | Refills: 0
Start: 2024-01-28 | End: 2024-02-26

## 2024-01-28 RX ORDER — FLUTICASONE PROPIONATE 220 MCG
1 AEROSOL WITH ADAPTER (GRAM) INHALATION
Refills: 0 | DISCHARGE

## 2024-01-28 RX ORDER — FLUTICASONE PROPIONATE 50 MCG
1 SPRAY, SUSPENSION NASAL
Refills: 0 | Status: DISCONTINUED | OUTPATIENT
Start: 2024-01-28 | End: 2024-01-31

## 2024-01-28 RX ORDER — LIDOCAINE 4 G/100G
1 CREAM TOPICAL
Qty: 0 | Refills: 0 | DISCHARGE
Start: 2024-01-28

## 2024-01-28 RX ADMIN — OXCARBAZEPINE 600 MILLIGRAM(S): 300 TABLET, FILM COATED ORAL at 17:35

## 2024-01-28 RX ADMIN — HEPARIN SODIUM 5000 UNIT(S): 5000 INJECTION INTRAVENOUS; SUBCUTANEOUS at 15:19

## 2024-01-28 RX ADMIN — Medication 1: at 17:35

## 2024-01-28 RX ADMIN — Medication 6 UNIT(S): at 17:35

## 2024-01-28 RX ADMIN — ATORVASTATIN CALCIUM 40 MILLIGRAM(S): 80 TABLET, FILM COATED ORAL at 22:02

## 2024-01-28 RX ADMIN — GABAPENTIN 400 MILLIGRAM(S): 400 CAPSULE ORAL at 17:35

## 2024-01-28 RX ADMIN — Medication 1 SPRAY(S): at 18:32

## 2024-01-28 RX ADMIN — MONTELUKAST 10 MILLIGRAM(S): 4 TABLET, CHEWABLE ORAL at 12:50

## 2024-01-28 RX ADMIN — GABAPENTIN 400 MILLIGRAM(S): 400 CAPSULE ORAL at 06:40

## 2024-01-28 RX ADMIN — HEPARIN SODIUM 5000 UNIT(S): 5000 INJECTION INTRAVENOUS; SUBCUTANEOUS at 06:40

## 2024-01-28 RX ADMIN — Medication 1: at 22:33

## 2024-01-28 RX ADMIN — LIDOCAINE 1 PATCH: 4 CREAM TOPICAL at 12:52

## 2024-01-28 RX ADMIN — HEPARIN SODIUM 5000 UNIT(S): 5000 INJECTION INTRAVENOUS; SUBCUTANEOUS at 22:02

## 2024-01-28 RX ADMIN — Medication 2: at 12:51

## 2024-01-28 RX ADMIN — Medication 1: at 08:38

## 2024-01-28 RX ADMIN — OXCARBAZEPINE 600 MILLIGRAM(S): 300 TABLET, FILM COATED ORAL at 06:40

## 2024-01-28 RX ADMIN — Medication 1 APPLICATION(S): at 17:37

## 2024-01-28 RX ADMIN — LIDOCAINE 1 PATCH: 4 CREAM TOPICAL at 00:01

## 2024-01-28 RX ADMIN — Medication 3 MILLIGRAM(S): at 22:03

## 2024-01-28 RX ADMIN — Medication 112 MICROGRAM(S): at 06:41

## 2024-01-28 RX ADMIN — Medication 1 APPLICATION(S): at 06:42

## 2024-01-28 RX ADMIN — Medication 6 UNIT(S): at 08:37

## 2024-01-28 RX ADMIN — AMLODIPINE BESYLATE 10 MILLIGRAM(S): 2.5 TABLET ORAL at 06:41

## 2024-01-28 RX ADMIN — Medication 6 UNIT(S): at 12:50

## 2024-01-28 RX ADMIN — LOSARTAN POTASSIUM 25 MILLIGRAM(S): 100 TABLET, FILM COATED ORAL at 06:41

## 2024-01-28 RX ADMIN — INSULIN GLARGINE 20 UNIT(S): 100 INJECTION, SOLUTION SUBCUTANEOUS at 22:32

## 2024-01-28 RX ADMIN — LIDOCAINE 1 PATCH: 4 CREAM TOPICAL at 19:56

## 2024-01-28 NOTE — PROGRESS NOTE ADULT - SUBJECTIVE AND OBJECTIVE BOX
Patient seen/examined. No events overnight. Feels better.    Vital Signs Last 24 Hrs  T(C): 36.8 (28 Jan 2024 14:31), Max: 36.8 (28 Jan 2024 05:12)  T(F): 98.2 (28 Jan 2024 14:31), Max: 98.2 (28 Jan 2024 05:12)  HR: 79 (28 Jan 2024 14:31) (79 - 90)  BP: 121/63 (28 Jan 2024 14:31) (115/69 - 137/72)  BP(mean): --  RR: 18 (28 Jan 2024 14:31) (18 - 20)  SpO2: 91% (28 Jan 2024 14:31) (91% - 92%)    Parameters below as of 28 Jan 2024 14:31  Patient On (Oxygen Delivery Method): nasal cannula  O2 Flow (L/min): 3      Gen: NAD. AA0x3  Abd: Soft. NT/ND                          10.3   9.47  )-----------( 283      ( 28 Jan 2024 06:24 )             36.4     01-28    135  |  97  |  24<H>  ----------------------------<  174<H>  4.4   |  32<H>  |  1.21    Ca    8.9      28 Jan 2024 06:24

## 2024-01-28 NOTE — PROGRESS NOTE ADULT - ASSESSMENT
57 y/o F PMH of asthma, COPD (on 4L NC PRN), DM, fatty liver, fibromyalgia, R shoulder bursitis, HLD, hypothyroidism, NAKUL, psoriasis, RCC (s/p L renal resection 11/3/23, no hx of CT/RT) presenting with lower abdominal pain and SOB. Admitted for left sided abdominal pain.   Pt had CT angio chest that was negative for PE. Also had CT A/P that showed post-op left nephrectomy collection of 7.2 x 6 cm fluid collection and a 4.8 x3.1 cm left abdominal wall collection. Pt was s/p IR drainage of fluid collection 70cc on 1/24, now with pigtail in place.     Per IR and urology waiting for <10 cc in 24 hrs drainage in order to remove pigtail drainage. Per discussion with IR attending today, Pigtail to be flushed 5cc of NS daily to maintain patency. Patient can also follow up with outpatient urology with referral to return back to IR for removal of drain. Patient pending PT eval. Per patient her BiPAP needs to serviced. She endorsed that last time she used it was over a year ago. Recommended to see her Pulm for a re-eval. Patient does not want a new BiPAP. Pulm (Dr. Karen Vazquez). Patient also sees Urologist Dr. Smith. Patient will need HHA for drainage management.   PT assesment pending recs.     Assessment:  #Left retroperitoneal collection s/p IR guided drainage on 1/24  #LE edema - r/o dvt  #Hyponatremia- resolved   #Acute URI likely viral with nasal congestion, cough and post nasal drip   #Chronic Hypoxic respiratory failure   #COPD- not in exacerbation   #DM  #RCC s/p L renal resection   #fibromyalgia  #HLD  #hypothyroidism  #Psoriasis  #DVT ppx     Plan:  - doppler LE R leg negative for DVT, left inconclusive. Low suspicion, R leg was the concerning one.  - Pt p/w abd pain, CT noted w/ 7.2 x 6.0 cm postoperative collection in the nephrectomy bed. s/p IR guided drainage- 70 cc out-put on 1/25. DC abx as cx are negative. Appreciate urology recommendations. Patient  will need out-patient f/u w/ urologist  to monitor drain out-put and have it discontinued by IR once out-put is minimal. Patient follows w/ Dr. Rubén Peck as out-patient   - BP stable, c/w home meds   - C/w levothyroxine   - Sodium improved   - SCr improved s/p iV hydration.  - Supportive measures for URI  - DC planning home w/ home care and out-patient urology f/u. Patient feels weak and doesn't want to go home today. PT eval in process, pending recs, although patient doesn't want to go to rehab, might benefit from home pt or outpatient pt.  59 y/o F PMH of asthma, COPD (on 4L NC PRN), DM, fatty liver, fibromyalgia, R shoulder bursitis, HLD, hypothyroidism, NAKUL, psoriasis, RCC (s/p L renal resection 11/3/23, no hx of CT/RT) presenting with lower abdominal pain and SOB. Admitted for left sided abdominal pain.   Pt had CT angio chest that was negative for PE. Also had CT A/P that showed post-op left nephrectomy collection of 7.2 x 6 cm fluid collection and a 4.8 x3.1 cm left abdominal wall collection. Pt was s/p IR drainage of fluid collection 70cc on 1/24, now with pigtail in place.     Per IR and urology waiting for <10 cc in 24 hrs drainage in order to remove pigtail drainage. Per discussion with IR attending today, Pigtail to be flushed 5cc of NS daily to maintain patency. Patient can also follow up with outpatient urology with referral to return back to IR for removal of drain. Patient pending PT eval. Per patient her BiPAP needs to serviced. She endorsed that last time she used it was over a year ago. Recommended to see her Pulm for a re-eval. Patient does not want a new BiPAP. Pulm (Dr. Karen Vazquez). Patient also sees Urologist Dr. Smith. Patient will need HHA for drainage management. PT recommended Dillan but patient refused. HHA not able to be set up as per , patient doesn't want to go home, given 24 hr discharge.     Assessment:  #Left retroperitoneal collection s/p IR guided drainage on 1/24  #LE edema - r/o dvt  #Hyponatremia- resolved   #Acute URI likely viral with nasal congestion, cough and post nasal drip   #Chronic Hypoxic respiratory failure   #COPD- not in exacerbation   #DM  #RCC s/p L renal resection   #fibromyalgia  #HLD  #hypothyroidism  #Psoriasis  #DVT ppx     Plan:  - doppler LE R leg negative for DVT, left inconclusive. Low suspicion, R leg was the concerning one.  - Pt p/w abd pain, CT noted w/ 7.2 x 6.0 cm postoperative collection in the nephrectomy bed. s/p IR guided drainage- 70 cc out-put on 1/25. DC abx as cx are negative. Appreciate urology recommendations. Patient will need out-patient f/u w/ urologist  to monitor drain out-put and have it discontinued by IR once out-put is minimal. Patient follows w/ Dr. Rubén Peck as out-patient   - BP stable, c/w home meds   - C/w levothyroxine   - Sodium improved   - SCr improved s/p iV hydration.  - Supportive measures for URI  - DC planning home w/ home care and out-patient urology f/u. Patient doesn't want to go home with drain, explain that drain can be monitored outpatient and removal can be organized by her urologist.   - PT eval  recommended dillan although patient doesn't want to go to rehab, recommended home pt instead.  -  involved for DC tomorrow, 24 hr notice given

## 2024-01-28 NOTE — PROGRESS NOTE ADULT - ASSESSMENT
Very pleasant 58 year old woman s/p L radical nephrectomy 11/2023 w/ 7x6cm postop collection in nephrectomy bed and 4.8 cm left abdominal wall collection  VSS, afebrile, no leukocytosis    Plan   - Labs reviewed  - IR drain and abdominal wall collection cultures no growth  - Local wound care   - Pain control prn  - Remainder of care as per primary team  - Patient stable for discharge with drain - can remove once <10cc over 48 hours per IR  - Please continue flushing drain with 5cc NS daily  - She is to follow up with Dr. Peck - her urologic oncologist on discharge  - Discussed with house staff

## 2024-01-28 NOTE — PROGRESS NOTE ADULT - SUBJECTIVE AND OBJECTIVE BOX
INTERVAL HPI/OVERNIGHT EVENTS:   Patient seen and examined at bedside. No events overnight. patient doing well, still has above 10 cc / 24 hrs. Still complaining of generalized pains. Patient is medically stable for DC, attempted to organize HHA and transport, but it was not able to be done, patient given 24 hr discharge. DC tomorrow am.    REVIEW OF SYSTEMS:  CONSTITUTIONAL: No fever, weight loss, or fatigue  RESPIRATORY: + nasal congestion, No cough, wheezing, chills or hemoptysis; No shortness of breath  CARDIOVASCULAR: No chest pain, palpitations, dizziness, or leg swelling  GASTROINTESTINAL: No abdominal pain. No nausea, vomiting, or hematemesis; No diarrhea or constipation. No melena or hematochezia.  GENITOURINARY: No dysuria or hematuria, urinary frequency  MUSCULOSKELETAL:+ back pain  NEUROLOGICAL: No headaches, memory loss, loss of strength, numbness, or tremors  SKIN: No itching, burning, rashes, or lesions     MEDICATIONS  (STANDING):  amLODIPine   Tablet 10 milliGRAM(s) Oral daily  atorvastatin 40 milliGRAM(s) Oral at bedtime  fluticasone propionate 50 MICROgram(s)/spray Nasal Spray 1 Spray(s) Both Nostrils two times a day  gabapentin 400 milliGRAM(s) Oral two times a day  heparin   Injectable 5000 Unit(s) SubCutaneous every 8 hours  hydrocortisone 1% Cream 1 Application(s) Topical two times a day  insulin glargine Injectable (LANTUS) 20 Unit(s) SubCutaneous at bedtime  insulin lispro (ADMELOG) corrective regimen sliding scale   SubCutaneous Before meals and at bedtime  insulin lispro Injectable (ADMELOG) 6 Unit(s) SubCutaneous three times a day before meals  levothyroxine 112 MICROGram(s) Oral daily  lidocaine   4% Patch 1 Patch Transdermal every 24 hours  losartan 25 milliGRAM(s) Oral daily  montelukast 10 milliGRAM(s) Oral daily  OXcarbazepine 600 milliGRAM(s) Oral two times a day    MEDICATIONS  (PRN):  acetaminophen     Tablet .. 650 milliGRAM(s) Oral every 6 hours PRN Temp greater or equal to 38C (100.4F), Mild Pain (1 - 3)  aluminum hydroxide/magnesium hydroxide/simethicone Suspension 30 milliLiter(s) Oral every 4 hours PRN Dyspepsia  melatonin 3 milliGRAM(s) Oral at bedtime PRN Insomnia  ondansetron Injectable 4 milliGRAM(s) IV Push every 8 hours PRN Nausea and/or Vomiting  pantoprazole    Tablet 40 milliGRAM(s) Oral before breakfast PRN acid reflux      Vital Signs Last 24 Hrs  T(C): 36.8 (28 Jan 2024 14:31), Max: 36.8 (28 Jan 2024 05:12)  T(F): 98.2 (28 Jan 2024 14:31), Max: 98.2 (28 Jan 2024 05:12)  HR: 79 (28 Jan 2024 14:31) (79 - 90)  BP: 121/63 (28 Jan 2024 14:31) (115/69 - 137/72)  BP(mean): --  RR: 18 (28 Jan 2024 14:31) (18 - 20)  SpO2: 91% (28 Jan 2024 14:31) (91% - 92%)    Parameters below as of 28 Jan 2024 14:31  Patient On (Oxygen Delivery Method): nasal cannula  O2 Flow (L/min): 3      PHYSICAL EXAMINATION:  GENERAL: NAD, sitting in chair, obese  HEAD:  Atraumatic, Normocephalic  EYES:  conjunctiva and sclera clear, no scleral icterus  NECK: Supple, No JVD, Normal thyroid  CHEST/LUNG: Clear to auscultation.  No rales, rhonchi, wheezing, or rubs  HEART: Regular rate and rhythm; No murmurs, rubs, or gallops  ABDOMEN: Soft, Nontender, Nondistended; Bowel sounds present  NERVOUS SYSTEM:  Alert & Oriented X3,  Moving all extremities, sensation intact  EXTREMITIES:  2+ Peripheral Pulses, + edema bl  SKIN: warm dry, no lesions noted  Back: Drain with no discharge or erythema in tissue.                           10.3   9.47  )-----------( 283      ( 28 Jan 2024 06:24 )             36.4     01-28    135  |  97  |  24<H>  ----------------------------<  174<H>  4.4   |  32<H>  |  1.21    Ca    8.9      28 Jan 2024 06:24              I&O's Summary    27 Jan 2024 07:01  -  28 Jan 2024 07:00  --------------------------------------------------------  IN: 0 mL / OUT: 10 mL / NET: -10 mL          RADIOLOGY & ADDITIONAL TESTS:

## 2024-01-29 DIAGNOSIS — Z02.9 ENCOUNTER FOR ADMINISTRATIVE EXAMINATIONS, UNSPECIFIED: ICD-10-CM

## 2024-01-29 LAB
ANION GAP SERPL CALC-SCNC: 6 MMOL/L — SIGNIFICANT CHANGE UP (ref 5–17)
BUN SERPL-MCNC: 21 MG/DL — HIGH (ref 7–18)
CALCIUM SERPL-MCNC: 9 MG/DL — SIGNIFICANT CHANGE UP (ref 8.4–10.5)
CHLORIDE SERPL-SCNC: 98 MMOL/L — SIGNIFICANT CHANGE UP (ref 96–108)
CO2 SERPL-SCNC: 30 MMOL/L — SIGNIFICANT CHANGE UP (ref 22–31)
CREAT SERPL-MCNC: 1.11 MG/DL — SIGNIFICANT CHANGE UP (ref 0.5–1.3)
CULTURE RESULTS: SIGNIFICANT CHANGE UP
EGFR: 58 ML/MIN/1.73M2 — LOW
GLUCOSE BLDC GLUCOMTR-MCNC: 164 MG/DL — HIGH (ref 70–99)
GLUCOSE BLDC GLUCOMTR-MCNC: 166 MG/DL — HIGH (ref 70–99)
GLUCOSE BLDC GLUCOMTR-MCNC: 174 MG/DL — HIGH (ref 70–99)
GLUCOSE BLDC GLUCOMTR-MCNC: 182 MG/DL — HIGH (ref 70–99)
GLUCOSE BLDC GLUCOMTR-MCNC: 202 MG/DL — HIGH (ref 70–99)
GLUCOSE SERPL-MCNC: 171 MG/DL — HIGH (ref 70–99)
HCT VFR BLD CALC: 37.5 % — SIGNIFICANT CHANGE UP (ref 34.5–45)
HGB BLD-MCNC: 10.8 G/DL — LOW (ref 11.5–15.5)
MCHC RBC-ENTMCNC: 22.7 PG — LOW (ref 27–34)
MCHC RBC-ENTMCNC: 28.8 GM/DL — LOW (ref 32–36)
MCV RBC AUTO: 78.8 FL — LOW (ref 80–100)
NRBC # BLD: 0 /100 WBCS — SIGNIFICANT CHANGE UP (ref 0–0)
PLATELET # BLD AUTO: 272 K/UL — SIGNIFICANT CHANGE UP (ref 150–400)
POTASSIUM SERPL-MCNC: 4.3 MMOL/L — SIGNIFICANT CHANGE UP (ref 3.5–5.3)
POTASSIUM SERPL-SCNC: 4.3 MMOL/L — SIGNIFICANT CHANGE UP (ref 3.5–5.3)
RBC # BLD: 4.76 M/UL — SIGNIFICANT CHANGE UP (ref 3.8–5.2)
RBC # FLD: 15.7 % — HIGH (ref 10.3–14.5)
SODIUM SERPL-SCNC: 134 MMOL/L — LOW (ref 135–145)
SPECIMEN SOURCE: SIGNIFICANT CHANGE UP
WBC # BLD: 9.23 K/UL — SIGNIFICANT CHANGE UP (ref 3.8–10.5)
WBC # FLD AUTO: 9.23 K/UL — SIGNIFICANT CHANGE UP (ref 3.8–10.5)

## 2024-01-29 PROCEDURE — 99233 SBSQ HOSP IP/OBS HIGH 50: CPT

## 2024-01-29 RX ORDER — MUPIROCIN 20 MG/G
1 OINTMENT TOPICAL
Refills: 0 | Status: DISCONTINUED | OUTPATIENT
Start: 2024-01-29 | End: 2024-01-31

## 2024-01-29 RX ORDER — PETROLATUM,WHITE
1 JELLY (GRAM) TOPICAL
Refills: 0 | Status: DISCONTINUED | OUTPATIENT
Start: 2024-01-29 | End: 2024-01-31

## 2024-01-29 RX ADMIN — GABAPENTIN 400 MILLIGRAM(S): 400 CAPSULE ORAL at 17:38

## 2024-01-29 RX ADMIN — Medication 1 APPLICATION(S): at 17:37

## 2024-01-29 RX ADMIN — ATORVASTATIN CALCIUM 40 MILLIGRAM(S): 80 TABLET, FILM COATED ORAL at 21:31

## 2024-01-29 RX ADMIN — Medication 1 APPLICATION(S): at 06:23

## 2024-01-29 RX ADMIN — Medication 1 SPRAY(S): at 06:22

## 2024-01-29 RX ADMIN — OXCARBAZEPINE 600 MILLIGRAM(S): 300 TABLET, FILM COATED ORAL at 17:37

## 2024-01-29 RX ADMIN — INSULIN GLARGINE 20 UNIT(S): 100 INJECTION, SOLUTION SUBCUTANEOUS at 22:31

## 2024-01-29 RX ADMIN — HEPARIN SODIUM 5000 UNIT(S): 5000 INJECTION INTRAVENOUS; SUBCUTANEOUS at 14:07

## 2024-01-29 RX ADMIN — Medication 1: at 17:36

## 2024-01-29 RX ADMIN — Medication 112 MICROGRAM(S): at 06:21

## 2024-01-29 RX ADMIN — Medication 650 MILLIGRAM(S): at 15:49

## 2024-01-29 RX ADMIN — Medication 1 APPLICATION(S): at 17:40

## 2024-01-29 RX ADMIN — Medication 1: at 23:40

## 2024-01-29 RX ADMIN — HEPARIN SODIUM 5000 UNIT(S): 5000 INJECTION INTRAVENOUS; SUBCUTANEOUS at 21:30

## 2024-01-29 RX ADMIN — Medication 6 UNIT(S): at 17:35

## 2024-01-29 RX ADMIN — Medication 6 UNIT(S): at 12:20

## 2024-01-29 RX ADMIN — LIDOCAINE 1 PATCH: 4 CREAM TOPICAL at 12:21

## 2024-01-29 RX ADMIN — Medication 1: at 08:06

## 2024-01-29 RX ADMIN — Medication 6 UNIT(S): at 08:06

## 2024-01-29 RX ADMIN — PANTOPRAZOLE SODIUM 40 MILLIGRAM(S): 20 TABLET, DELAYED RELEASE ORAL at 06:21

## 2024-01-29 RX ADMIN — GABAPENTIN 400 MILLIGRAM(S): 400 CAPSULE ORAL at 08:07

## 2024-01-29 RX ADMIN — MONTELUKAST 10 MILLIGRAM(S): 4 TABLET, CHEWABLE ORAL at 12:21

## 2024-01-29 RX ADMIN — Medication 1 SPRAY(S): at 17:36

## 2024-01-29 RX ADMIN — Medication 650 MILLIGRAM(S): at 14:49

## 2024-01-29 RX ADMIN — Medication 2: at 12:20

## 2024-01-29 RX ADMIN — MUPIROCIN 1 APPLICATION(S): 20 OINTMENT TOPICAL at 17:37

## 2024-01-29 RX ADMIN — OXCARBAZEPINE 600 MILLIGRAM(S): 300 TABLET, FILM COATED ORAL at 08:03

## 2024-01-29 RX ADMIN — LIDOCAINE 1 PATCH: 4 CREAM TOPICAL at 20:21

## 2024-01-29 RX ADMIN — HEPARIN SODIUM 5000 UNIT(S): 5000 INJECTION INTRAVENOUS; SUBCUTANEOUS at 06:21

## 2024-01-29 NOTE — PROGRESS NOTE ADULT - PROBLEM SELECTOR PLAN 9
Continue topical treatment  Supportive care DVT PPX: SCDs      # Discharge planing:   Patient does not want to go to Rehab if needed  Uses cane  Home O2  Needs BiPAP (Has BiPAP at home but not using it for over a year and needs serviced as per patient).   Patient to follow up with her Pulmonologist.   Patient will need HHA for drainage management and f/u with IR outpatient for drainage removal.

## 2024-01-29 NOTE — PROGRESS NOTE ADULT - SUBJECTIVE AND OBJECTIVE BOX
Patient seen at bedside resting on the chair comfortably. evaluated left retroperitoneal drainage catheter .  Dressing wasn't changed since the placement . Patient complains of tenderness around the catheter.  Site around the catheter is non tender and  soft.  Catheter was flushed with 5 cc sterile saline without resistance and reattached to leg bag.  Dressing changed c/d/i.  Draining 17.5 cc serosanguinous collection. Primary team was informed if less then 10 cc in 24 hrs will do tube check. Cont to monitor the output  Patient seen at bedside resting on the chair comfortably. Evaluated left retroperitoneal drainage catheter.  Originally placed dressing in place. Patient complains of tenderness around the catheter. Site around the catheter is non tender and soft. Catheter was flushed with 5 cc sterile saline without resistance and reattached to leg bag.  Dressing changed c/d/i.  Draining 17.5 cc serosanguinous fluid. Primary team was informed if less then 10 cc in 24 hrs x 2days, at which time tube check can be done.   - Continue to monitor and record output.

## 2024-01-29 NOTE — PROGRESS NOTE ADULT - PROBLEM SELECTOR PLAN 8
Controlled  Takes amlodipine, HCTZ, losartan at home  Continue home meds with parameters - amlodipine and losartan  Monitor BP Continue topical treatment  Supportive care

## 2024-01-29 NOTE — PROGRESS NOTE ADULT - PROBLEM SELECTOR PROBLEM 1
Intra-abdominal fluid collection

## 2024-01-29 NOTE — PROGRESS NOTE ADULT - NS ATTEND OPT1A GEN_ALL_CORE
History/Exam/Medical decision making
History/Exam/Medical decision making
Exam/Medical decision making

## 2024-01-29 NOTE — PROGRESS NOTE ADULT - REASON FOR ADMISSION
SOB, abdominal pain
CP, SOB
SOB, abdominal pain
left abdominal fluid collection

## 2024-01-29 NOTE — PROGRESS NOTE ADULT - PROBLEM SELECTOR PLAN 3
Home oxygen 4L NC   Takes duoneb, albuterol, symbicort, fluticasone, and ventolin at home  Continue duonebs if needed  Continue albuterol PRN and symbicort for now  Maintain O2 88-92%  BiPAP QHS  F/u with Pulm outpatient S/p left nephrectomy  SCr improved   Avoid Nephrotoxic Meds/ Agents such as (NSAIDs, IV contrast, Aminoglycosides such as gentamicin, Gadolinium contrast, Phosphate containing enemas, etc..)  Trend BMP

## 2024-01-29 NOTE — PROGRESS NOTE ADULT - PROBLEM SELECTOR PLAN 1
CTAP noted above  c/w pain control: tylenol PRN  c/w abx ceftriaxone and flagyl IV  s/p IR drainage of left abdominal fluid collection on 1/24/24, removed 70cc  strict output of drain  waiting for output <10 cc in 24 hrs. Per discussion with IR, pt can also follow up with outpt urology with referral to return back to IR for removal of drain.   Urology following CTAP noted above  c/w pain control: tylenol PRN  S/p  abx ceftriaxone and flagyl IV  s/p IR drainage of left abdominal wall hematoma abscess fluid collection on 1/24/24, removed 70cc  strict output of drain  waiting for output <10 cc in 24 hrs. Per discussion with IR, pt can also follow up with outpt urology with referral to return back to IR for removal of drain.   Urology following  Pt c/o pain to site. IR evaluated. Dressing changed. possible tube check tomorrow by IR.   Bactroban ointment BID to site.

## 2024-01-29 NOTE — PROGRESS NOTE ADULT - PROBLEM SELECTOR PLAN 4
S/p left nephrectomy  SCr improved   Avoid Nephrotoxic Meds/ Agents such as (NSAIDs, IV contrast, Aminoglycosides such as gentamicin, Gadolinium contrast, Phosphate containing enemas, etc..)  Trend BMP Controlled  Takes Levemir 16 at bedtime and Novolog 6U pre meals at home  c/w Lantus 16 units qhs and admelog 6 units tid with meals + ISS qid  Glucose monitoring  Diabetic diet

## 2024-01-29 NOTE — PROGRESS NOTE ADULT - PROBLEM SELECTOR PLAN 6
pt p/w L. Lower Abdominal Quadrant Surgical wound s/p left nephrectomy   -Clean the L. Lower Abdominal Quadrant wound with normal saline and apply skin prep to the surrounding 	skin  Apply Silver Calcium Alginate (Aquacel Ag) to the wound bed and cover with a Foam dressing Q 72hrs 	PRN  -Encourage the patient to reposition Q 2hrs using wedges or pillows S/p L renal resection 11/3/2023  F/u outpt oncologist

## 2024-01-29 NOTE — PROGRESS NOTE ADULT - NS ATTEND AMEND GEN_ALL_CORE FT
S: reports discomfort at tube insertion site. No fevers/chills. Discussed plan of care for reassessment by IR tomorrow and likely discharge after. Pt states she prefers home discharge and would not be amenable to [facility]. Pt is reluctant to be discharged with pigtail in place. Per chart review was given a 24 hour notice over the weekend.      PE AAOx3, nontoxic appearing, morbid obesity  significant for minimal erythema surrounding left flank pigtail insertion site, no discharge/drainage      labs reviewed cbc/bmp/FS  mild hyponatremia. Renal function overall improved and stable    57 y/o F PMH of asthma, COPD (on 4L NC PRN), DM, fatty liver, fibromyalgia, R shoulder bursitis, HLD, hypothyroidism, NAKUL, psoriasis, RCC (s/p L renal resection 11/3/23, no hx of CT/RT) presenting with lower abdominal pain and SOB. Admitted for left sided abdominal pain.   Pt had CT angio chest that was negative for PE. Also had CT A/P that showed post-op left nephrectomy collection of 7.2 x 6 cm fluid collection and a 4.8 x3.1 cm left abdominal wall collection. Pt was s/p IR drainage of fluid collection 70cc on 1/24, now with pigtail in place.         Assessment:  #Left retroperitoneal collection s/p IR guided drainage on 1/24  #LE edema - r/o dvt  #Hyponatremia- resolved   #Acute URI likely viral with nasal congestion, cough and post nasal drip   #Chronic Hypoxic respiratory failure   #COPD- not in exacerbation   #DM  #RCC s/p L renal resection   #fibromyalgia  #HLD  #hypothyroidism  #Psoriasis  #DVT ppx     Plan:  - doppler LE R leg negative for DVT, left inconclusive. Low suspicion, R leg was the concerning one.  - Pt p/w abd pain, CT noted w/ 7.2 x 6.0 cm postoperative collection in the nephrectomy bed. s/p IR guided drainage- 70 cc out-put on 1/25. DC abx as cx are negative. Appreciate urology recommendations. Patient will need out-patient f/u w/ urologist  to monitor drain out-put and have it discontinued by IR once out-put is minimal (<10 cc in 24 hrs). Patient follows w/ Dr. Rubén Peck as out-patient. Per patient her BiPAP needs to serviced. She endorsed that last time she used it was over a year ago. Recommended to see her Pulm for a re-eval. Patient does not want a new BiPAP. Pulm (Dr. Monterroso). Patient also sees Urologist Dr. Smith. Patient will need HHA for drainage management.     -Close IR follow-up/re-eval on 1/30/24  - BP stable, c/w home meds   - C/w levothyroxine   - Sodium improved   - SCr improved s/p iV hydration.  - Supportive measures for URI  - DC planning home w/ home care and out-patient urology f/u. Patient doesn't want to go home with drain, explain that drain can be monitored outpatient and removal can be organized by her urologist.   - PT eval  recommended [facility] although patient doesn't want to go to rehab, recommended home pt instead. Pt at high risk for readmission due to level of deconditioning  -  involved for DC tomorrow, 24 hr notice  on Sunday 1/28/24 S: reports discomfort at tube insertion site. No fevers/chills. Discussed plan of care for reassessment by IR tomorrow and likely discharge after. Pt states she prefers home discharge and would not be amenable to [facility]. Pt is reluctant to be discharged with pigtail in place. Per chart review was given a 24 hour notice over the weekend.      PE AAOx3, nontoxic appearing, morbid obesity  significant for minimal erythema surrounding left flank pigtail insertion site, no discharge/drainage      labs reviewed cbc/bmp/FS  mild hyponatremia. Renal function overall improved and stable    57 y/o F PMH of asthma, COPD (on 4L NC PRN), DM, fatty liver, fibromyalgia, R shoulder bursitis, HLD, hypothyroidism, NAKUL, psoriasis, RCC (s/p L renal resection 11/3/23, no hx of CT/RT) presenting with lower abdominal pain and SOB. Admitted for left sided abdominal pain.   Pt had CT angio chest that was negative for PE. Also had CT A/P that showed post-op left nephrectomy collection of 7.2 x 6 cm fluid collection and a 4.8 x3.1 cm left abdominal wall collection. Pt was s/p IR drainage of fluid collection 70cc on 1/24, now with pigtail in place.         Assessment:  #Left retroperitoneal Post operative fluid collection s/p IR guided drainage on 1/24- no growth to date on cultures  #LE edema - r/o dvt  #Hyponatremia- resolved   #Acute URI likely viral with nasal congestion, cough and post nasal drip   #Chronic Hypoxic respiratory failure   #COPD- not in exacerbation   #DM  #RCC s/p L renal resection   #fibromyalgia  #HLD  #hypothyroidism  #Psoriasis  #DVT ppx     Plan:  - doppler LE R leg negative for DVT, left inconclusive. Low suspicion, R leg was the concerning one.  - Pt p/w abd pain, CT noted w/ 7.2 x 6.0 cm postoperative collection in the nephrectomy bed. s/p IR guided drainage- 70 cc out-put on 1/25. DC abx as cx are negative. Appreciate urology recommendations. Patient will need out-patient f/u w/ urologist  to monitor drain out-put and have it discontinued by IR once out-put is minimal (<10 cc in 24 hrs). Patient follows w/ Dr. Rubén Peck as out-patient. Per patient her BiPAP needs to serviced. She endorsed that last time she used it was over a year ago. Recommended to see her Pulm for a re-eval. Patient does not want a new BiPAP. Pulm (Dr. Monterroso). Patient also sees Urologist Dr. Smith. Patient will need HHA for drainage management.     -Close IR follow-up/re-eval on 1/30/24  - BP stable, c/w home meds   - C/w levothyroxine   - Sodium improved   - SCr improved s/p iV hydration.  - Supportive measures for URI  - DC planning home w/ home care and out-patient urology f/u. Patient doesn't want to go home with drain, explain that drain can be monitored outpatient and removal can be organized by her urologist.   - PT eval  recommended [facility] although patient doesn't want to go to rehab, recommended home pt instead. Pt at high risk for readmission due to level of deconditioning  -  involved for DC tomorrow, 24 hr notice  on Sunday 1/28/24

## 2024-01-29 NOTE — PROGRESS NOTE ADULT - ASSESSMENT
57 y/o F PMH of asthma, COPD (on 4L NC PRN), DM, fatty liver, fibromyalgia, R shoulder bursitis, HLD, hypothyroidism, NAKUL, psoriasis, RCC (s/p L renal resection 11/3/23, no hx of CT/RT) presenting with lower abdominal pain and SOB. Admitted for left sided abdominal pain.   Pt had CT angio chest that was negative for PE. Also had CT A/P that showed post-op left nephrectomy collection of 7.2 x 6 cm fluid collection and a 4.8 x3.1 cm left abdominal wall collection. Pt was s/p IR drainage of fluid collection 70cc on 1/24, now with pigtail in place.     Per IR waiting for <10 cc in 24 hrs drainage in order to remove pigtail drainage. Per discussion with IR attending today, Pigtail to be flushed 5cc of NS daily to maintain patency. Patient can also follow up with outpatient urology with referral to return back to IR for removal of drain. Patient pending PT eval. Per patient her BiPAP needs to serviced. She endorsed that last time she used it was over a year ago. Recommended to see her Pulm for a re-eval. Patient does not want a new BiPAP. Pulm (Dr. Karen Vazquez). Patient also sees Urologist Dr. Smith. Patient will need HHA for drainage management.   01/29/2024:  57 y/o F PMH of asthma, COPD (on 4L NC PRN), DM, fatty liver, fibromyalgia, R shoulder bursitis, HLD, hypothyroidism, NAKUL, psoriasis, RCC (s/p L renal resection 11/3/23, no hx of CT/RT) presenting with lower abdominal pain and SOB. Admitted for left sided abdominal pain.   Pt had CT angio chest that was negative for PE. Also had CT A/P that showed post-op left nephrectomy collection of 7.2 x 6 cm fluid collection and a 4.8 x3.1 cm left abdominal wall collection. Pt was s/p IR drainage of fluid collection 70cc on 1/24, now with pigtail in place.     Per IR waiting for <10 cc in 24 hrs drainage in order to remove pigtail drainage. Per discussion with IR attending today, Pigtail to be flushed 5cc of NS daily to maintain patency. Patient can also follow up with outpatient urology with referral to return back to IR for removal of drain. Patient pending PT eval. Per patient her BiPAP needs to serviced. She endorsed that last time she used it was over a year ago. Recommended to see her Pulm for a re-eval. Patient does not want a new BiPAP. Pulm (Dr. Karen Vazquez). Patient also sees Urologist Dr. Smith. Patient will need HHA for drainage management.   01/29/2024: Patient c/o tenderness to drainage site. Discussed with IR. IR assessed patient. Dressing changed and Drain flushed by PA. Patient to be re-evaluated tomorrow by IR for possible tube check. Dispo: Home with HHA and optimized.  CM following. Per IR waiting for <10 cc in 24 hrs drainage in order to remove pigtail drainage. Serosanguinous drainage. Continue to monitor output. 17.5 ml on 1/28.

## 2024-01-29 NOTE — PROGRESS NOTE ADULT - PROVIDER SPECIALTY LIST ADULT
Internal Medicine
Urology
Internal Medicine
Intervent Radiology
Urology
Internal Medicine
Intervent Radiology
Surgery
Urology
Urology
Internal Medicine
Urology
Urology
Internal Medicine
Nsaids Counseling: NSAID Counseling: I discussed with the patient that NSAIDs should be taken with food. Prolonged use of NSAIDs can result in the development of stomach ulcers.  Patient advised to stop taking NSAIDs if abdominal pain occurs.  The patient verbalized understanding of the proper use and possible adverse effects of NSAIDs.  All of the patient's questions and concerns were addressed.

## 2024-01-29 NOTE — PROGRESS NOTE ADULT - SUBJECTIVE AND OBJECTIVE BOX
NOEMY CONTRERAS    SCU progress note    INTERVAL HPI/OVERNIGHT EVENTS: ***    DNR [ ]   DNI  [  ]    Covid - 19 PCR:     The 4Ms    What Matters Most: see GOC  Age appropriate Medications/Screen for High Risk Medication: Yes  Mentation: see CAM below  Mobility: defer to physical exam    The Confusion Assessment Method (CAM) Diagnostic Algorithm     1: Acute Onset or Fluctuating Course  - Is there evidence of an acute change in mental status from the patient’s baseline? Did the (abnormal) behavior  fluctuate during the day, that is, tend to come and go, or increase and decrease in severity?  [ ] YES [ ] NO     2: Inattention  - Did the patient have difficulty focusing attention, being easily distractible, or having difficulty keeping track of what was being said?  [ ] YES [ ] NO     3: Disorganized thinking  -Was the patient’s thinking disorganized or incoherent, such as rambling or irrelevant conversation, unclear or illogical flow of ideas, or unpredictable switching from subject to subject?  [ ] YES [ ] NO    4: Altered Level of consciousness?  [ ] YES [ ] NO    The diagnosis of delirium by CAM requires the presence of features 1 and 2 and either 3 or 4.    PRESSORS: [ ] YES [ ] NO    Cardiovascular:  Heart Failure  Acute   Acute on Chronic  Chronic       amLODIPine   Tablet 10 milliGRAM(s) Oral daily  losartan 25 milliGRAM(s) Oral daily    Pulmonary:  montelukast 10 milliGRAM(s) Oral daily    Hematalogic:  heparin   Injectable 5000 Unit(s) SubCutaneous every 8 hours    Other:  acetaminophen     Tablet .. 650 milliGRAM(s) Oral every 6 hours PRN  aluminum hydroxide/magnesium hydroxide/simethicone Suspension 30 milliLiter(s) Oral every 4 hours PRN  AQUAPHOR (petrolatum Ointment) 1 Application(s) Topical two times a day  atorvastatin 40 milliGRAM(s) Oral at bedtime  fluticasone propionate 50 MICROgram(s)/spray Nasal Spray 1 Spray(s) Both Nostrils two times a day  gabapentin 400 milliGRAM(s) Oral two times a day  hydrocortisone 1% Cream 1 Application(s) Topical two times a day  insulin glargine Injectable (LANTUS) 20 Unit(s) SubCutaneous at bedtime  insulin lispro (ADMELOG) corrective regimen sliding scale   SubCutaneous Before meals and at bedtime  insulin lispro Injectable (ADMELOG) 6 Unit(s) SubCutaneous three times a day before meals  levothyroxine 112 MICROGram(s) Oral daily  lidocaine   4% Patch 1 Patch Transdermal every 24 hours  melatonin 3 milliGRAM(s) Oral at bedtime PRN  mupirocin 2% Ointment 1 Application(s) Topical two times a day  ondansetron Injectable 4 milliGRAM(s) IV Push every 8 hours PRN  OXcarbazepine 600 milliGRAM(s) Oral two times a day  pantoprazole    Tablet 40 milliGRAM(s) Oral before breakfast PRN    acetaminophen     Tablet .. 650 milliGRAM(s) Oral every 6 hours PRN  aluminum hydroxide/magnesium hydroxide/simethicone Suspension 30 milliLiter(s) Oral every 4 hours PRN  amLODIPine   Tablet 10 milliGRAM(s) Oral daily  AQUAPHOR (petrolatum Ointment) 1 Application(s) Topical two times a day  atorvastatin 40 milliGRAM(s) Oral at bedtime  fluticasone propionate 50 MICROgram(s)/spray Nasal Spray 1 Spray(s) Both Nostrils two times a day  gabapentin 400 milliGRAM(s) Oral two times a day  heparin   Injectable 5000 Unit(s) SubCutaneous every 8 hours  hydrocortisone 1% Cream 1 Application(s) Topical two times a day  insulin glargine Injectable (LANTUS) 20 Unit(s) SubCutaneous at bedtime  insulin lispro (ADMELOG) corrective regimen sliding scale   SubCutaneous Before meals and at bedtime  insulin lispro Injectable (ADMELOG) 6 Unit(s) SubCutaneous three times a day before meals  levothyroxine 112 MICROGram(s) Oral daily  lidocaine   4% Patch 1 Patch Transdermal every 24 hours  losartan 25 milliGRAM(s) Oral daily  melatonin 3 milliGRAM(s) Oral at bedtime PRN  montelukast 10 milliGRAM(s) Oral daily  mupirocin 2% Ointment 1 Application(s) Topical two times a day  ondansetron Injectable 4 milliGRAM(s) IV Push every 8 hours PRN  OXcarbazepine 600 milliGRAM(s) Oral two times a day  pantoprazole    Tablet 40 milliGRAM(s) Oral before breakfast PRN    Drug Dosing Weight  Height (cm): 160 (2024 15:46)  Weight (kg): 125.8 (2024 15:46)  BMI (kg/m2): 49.1 (2024 15:46)  BSA (m2): 2.22 (2024 15:46)    CENTRAL LINE: [ ] YES [ ] NO  LOCATION:   DATE INSERTED:  REMOVE: [ ] YES [ ] NO  EXPLAIN:    WILDER: [ ] YES [ ] NO    DATE INSERTED:  REMOVE:  [ ] YES [ ] NO  EXPLAIN:    PAST MEDICAL & SURGICAL HISTORY:  Hypertension  vaginal cyst      Hyperlipidemia      Asthma  last inhaler use with in 10 days, on Pulm follow up [id number]      Hypothyroid      Obstructive sleep apnea  refuses Cpap      Obesity  morbid      Trigeminal neuralgia  R      Fibromyalgia      DM (diabetes mellitus)  2      DJD (degenerative joint disease)  Cervical/Thoracic/Lumbar      Cervical neuralgia  difficulty moving my neck      Back pain  thoracic & lumbar      H/O bursitis  right shoulder      Radicular pain  arms, legs      Vasculitis  Legs, forerhead, arms & hands, flare ups in R leg  Dr susie Ribeiro is my Rheumatologist      Renal cell carcinoma, left  on follow up Dr schulte, HOD renal [facility], waiting for suregry in 2020      Falls frequently      Morbid obesity with body mass index (BMI) of 50.0 to 59.9 in adult      Psoriasis      Diverticulosis      Fatty liver      S/P abdominal hysterectomy          S/P  section  1      Left adrenal mass  excision, benign 2006      Vaginal cyst  removed       Vasculitis on nerve biopsy  , had another surgery called microvascular decompression       S/P left oophorectomy      S/P colonoscopic polypectomy                   @ 07:  -   @ 07:00  --------------------------------------------------------  IN: 0 mL / OUT: 17.5 mL / NET: -17.5 mL            PHYSICAL EXAM:    GENERAL: NAD, well-groomed, well-developed  HEAD:  Atraumatic, Normocephalic  EYES: EOMI, PERRLA, conjunctiva and sclera clear  ENMT: No tonsillar erythema, exudates, or enlargement; Moist mucous membranes, Good dentition, No lesions  NECK: Supple, No JVD, Normal thyroid  NERVOUS SYSTEM:  Alert & Oriented X3, Good concentration; Motor Strength 5/5 B/L upper and lower extremities; DTRs 2+ intact and symmetric  CHEST/LUNG: Clear to percussion bilaterally; No rales, rhonchi, wheezing, or rubs  HEART: Regular rate and rhythm; No murmurs, rubs, or gallops  ABDOMEN: Soft, Nontender, Nondistended; Bowel sounds present  EXTREMITIES:  2+ Peripheral Pulses, No clubbing, cyanosis, or edema  LYMPH: No lymphadenopathy noted  SKIN: No rashes or lesions      LABS:  CBC Full  -  ( 2024 07:28 )  WBC Count : 9.23 K/uL  RBC Count : 4.76 M/uL  Hemoglobin : 10.8 g/dL  Hematocrit : 37.5 %  Platelet Count - Automated : 272 K/uL  Mean Cell Volume : 78.8 fl  Mean Cell Hemoglobin : 22.7 pg  Mean Cell Hemoglobin Concentration : 28.8 gm/dL  Auto Neutrophil # : x  Auto Lymphocyte # : x  Auto Monocyte # : x  Auto Eosinophil # : x  Auto Basophil # : x  Auto Neutrophil % : x  Auto Lymphocyte % : x  Auto Monocyte % : x  Auto Eosinophil % : x  Auto Basophil % : x    01-29    134<L>  |  98  |  21<H>  ----------------------------<  171<H>  4.3   |  30  |  1.11    Ca    9.0      2024 07:28        Urinalysis Basic - ( 2024 07:28 )    Color: x / Appearance: x / SG: x / pH: x  Gluc: 171 mg/dL / Ketone: x  / Bili: x / Urobili: x   Blood: x / Protein: x / Nitrite: x   Leuk Esterase: x / RBC: x / WBC x   Sq Epi: x / Non Sq Epi: x / Bacteria: x            [  ]  DVT Prophylaxis  [  ]  Nutrition, Brand, Rate         Goal Rate        Abnormal Nutritional Status -  Malnutrition   Cachexia      Morbid Obesity BMI >/=40    RADIOLOGY & ADDITIONAL STUDIES:  ***    Goals of Care Discussion with Family/Proxy/Other   - see note from/family meeting set up for...     NOEMY CONTRERAS    SCU progress note    INTERVAL HPI/OVERNIGHT EVENTS: No acute events overnight.   FULL CODE    Covid - 19 PCR: non-reactive    The 4Ms    What Matters Most: see GOC  Age appropriate Medications/Screen for High Risk Medication: Yes  Mentation: see CAM below  Mobility: defer to physical exam    The Confusion Assessment Method (CAM) Diagnostic Algorithm     1: Acute Onset or Fluctuating Course  - Is there evidence of an acute change in mental status from the patient’s baseline? Did the (abnormal) behavior  fluctuate during the day, that is, tend to come and go, or increase and decrease in severity?  [ ] YES [x ] NO     2: Inattention  - Did the patient have difficulty focusing attention, being easily distractible, or having difficulty keeping track of what was being said?  [ ] YES [ x] NO     3: Disorganized thinking  -Was the patient’s thinking disorganized or incoherent, such as rambling or irrelevant conversation, unclear or illogical flow of ideas, or unpredictable switching from subject to subject?  [ ] YES [ x] NO    4: Altered Level of consciousness?  [ ] YES [ x] NO    The diagnosis of delirium by CAM requires the presence of features 1 and 2 and either 3 or 4.    PRESSORS: [ ] YES [ x] NO    Cardiovascular:  Heart Failure  Acute   Acute on Chronic  Chronic       amLODIPine   Tablet 10 milliGRAM(s) Oral daily  losartan 25 milliGRAM(s) Oral daily    Pulmonary:  montelukast 10 milliGRAM(s) Oral daily    Hematalogic:  heparin   Injectable 5000 Unit(s) SubCutaneous every 8 hours    Other:  acetaminophen     Tablet .. 650 milliGRAM(s) Oral every 6 hours PRN  aluminum hydroxide/magnesium hydroxide/simethicone Suspension 30 milliLiter(s) Oral every 4 hours PRN  AQUAPHOR (petrolatum Ointment) 1 Application(s) Topical two times a day  atorvastatin 40 milliGRAM(s) Oral at bedtime  fluticasone propionate 50 MICROgram(s)/spray Nasal Spray 1 Spray(s) Both Nostrils two times a day  gabapentin 400 milliGRAM(s) Oral two times a day  hydrocortisone 1% Cream 1 Application(s) Topical two times a day  insulin glargine Injectable (LANTUS) 20 Unit(s) SubCutaneous at bedtime  insulin lispro (ADMELOG) corrective regimen sliding scale   SubCutaneous Before meals and at bedtime  insulin lispro Injectable (ADMELOG) 6 Unit(s) SubCutaneous three times a day before meals  levothyroxine 112 MICROGram(s) Oral daily  lidocaine   4% Patch 1 Patch Transdermal every 24 hours  melatonin 3 milliGRAM(s) Oral at bedtime PRN  mupirocin 2% Ointment 1 Application(s) Topical two times a day  ondansetron Injectable 4 milliGRAM(s) IV Push every 8 hours PRN  OXcarbazepine 600 milliGRAM(s) Oral two times a day  pantoprazole    Tablet 40 milliGRAM(s) Oral before breakfast PRN    acetaminophen     Tablet .. 650 milliGRAM(s) Oral every 6 hours PRN  aluminum hydroxide/magnesium hydroxide/simethicone Suspension 30 milliLiter(s) Oral every 4 hours PRN  amLODIPine   Tablet 10 milliGRAM(s) Oral daily  AQUAPHOR (petrolatum Ointment) 1 Application(s) Topical two times a day  atorvastatin 40 milliGRAM(s) Oral at bedtime  fluticasone propionate 50 MICROgram(s)/spray Nasal Spray 1 Spray(s) Both Nostrils two times a day  gabapentin 400 milliGRAM(s) Oral two times a day  heparin   Injectable 5000 Unit(s) SubCutaneous every 8 hours  hydrocortisone 1% Cream 1 Application(s) Topical two times a day  insulin glargine Injectable (LANTUS) 20 Unit(s) SubCutaneous at bedtime  insulin lispro (ADMELOG) corrective regimen sliding scale   SubCutaneous Before meals and at bedtime  insulin lispro Injectable (ADMELOG) 6 Unit(s) SubCutaneous three times a day before meals  levothyroxine 112 MICROGram(s) Oral daily  lidocaine   4% Patch 1 Patch Transdermal every 24 hours  losartan 25 milliGRAM(s) Oral daily  melatonin 3 milliGRAM(s) Oral at bedtime PRN  montelukast 10 milliGRAM(s) Oral daily  mupirocin 2% Ointment 1 Application(s) Topical two times a day  ondansetron Injectable 4 milliGRAM(s) IV Push every 8 hours PRN  OXcarbazepine 600 milliGRAM(s) Oral two times a day  pantoprazole    Tablet 40 milliGRAM(s) Oral before breakfast PRN    Drug Dosing Weight  Height (cm): 160 (2024 15:46)  Weight (kg): 125.8 (2024 15:46)  BMI (kg/m2): 49.1 (2024 15:46)  BSA (m2): 2.22 (2024 15:46)    CENTRAL LINE: [ ] YES [ x] NO  LOCATION:   DATE INSERTED:  REMOVE: [ ] YES [ ] NO  EXPLAIN:    WILDER: [ ] YES [ x] NO    DATE INSERTED:  REMOVE:  [ ] YES [ ] NO  EXPLAIN:    PAST MEDICAL & SURGICAL HISTORY:  Hypertension  vaginal cyst      Hyperlipidemia      Asthma  last inhaler use with in 10 days, on Pulm follow up [id number]      Hypothyroid      Obstructive sleep apnea  refuses Cpap      Obesity  morbid      Trigeminal neuralgia  R      Fibromyalgia      DM (diabetes mellitus)  2      DJD (degenerative joint disease)  Cervical/Thoracic/Lumbar      Cervical neuralgia  difficulty moving my neck      Back pain  thoracic & lumbar      H/O bursitis  right shoulder      Radicular pain  arms, legs      Vasculitis  Legs, forerhead, arms & hands, flare ups in R leg  Dr susie Ribeiro is my Rheumatologist      Renal cell carcinoma, left  on follow up Dr schulte, HOD renal [facility], waiting for suregry in 2020      Falls frequently      Morbid obesity with body mass index (BMI) of 50.0 to 59.9 in adult      Psoriasis      Diverticulosis      Fatty liver      S/P abdominal hysterectomy          S/P  section  1      Left adrenal mass  excision, benign       Vaginal cyst  removed       Vasculitis on nerve biopsy  , had another surgery called microvascular decompression       S/P left oophorectomy      S/P colonoscopic polypectomy       @ 07:01  -   @ 07:00  --------------------------------------------------------  IN: 0 mL / OUT: 17.5 mL / NET: -17.5 mL    ROS: + for tenderness to drainage site. Denies fever. Endorses chronic back pain. Denies constipation, diarrhea, CP, SOB.     PHYSICAL EXAM:  GENERAL: NAD, Obese  HEAD:  Atraumatic, Normocephalic  EYES:  PERRLA, conjunctiva and sclera clear  ENMT: Moist mucous membranes, Good dentition, No lesions  NECK: Supple  NERVOUS SYSTEM:  Alert & Oriented X3, Good concentration; Generalized weakness. OOB to chair.   CHEST/LUNG: Diminished bilaterally; No rales, rhonchi, wheezing, or rubs. On 4L NC  HEART: Regular rate and rhythm; No murmurs, rubs, or gallops  ABDOMEN: Soft, Nontender, Nondistended; Bowel sounds present. Obese  EXTREMITIES:  2+ Peripheral Pulses, No clubbing, cyanosis,2 + pitting edema lower exts, ankles, feet.   SKIN: Psoriasis plaques to arms, knees, shins. Dry skin.       LABS:  CBC Full  -  ( 2024 07:28 )  WBC Count : 9.23 K/uL  RBC Count : 4.76 M/uL  Hemoglobin : 10.8 g/dL  Hematocrit : 37.5 %  Platelet Count - Automated : 272 K/uL  Mean Cell Volume : 78.8 fl  Mean Cell Hemoglobin : 22.7 pg  Mean Cell Hemoglobin Concentration : 28.8 gm/dL  Auto Neutrophil # : x  Auto Lymphocyte # : x  Auto Monocyte # : x  Auto Eosinophil # : x  Auto Basophil # : x  Auto Neutrophil % : x  Auto Lymphocyte % : x  Auto Monocyte % : x  Auto Eosinophil % : x  Auto Basophil % : x    -    134<L>  |  98  |  21<H>  ----------------------------<  171<H>  4.3   |  30  |  1.11    Ca    9.0      2024 07:28      Urinalysis Basic - ( 2024 07:28 )    Color: x / Appearance: x / SG: x / pH: x  Gluc: 171 mg/dL / Ketone: x  / Bili: x / Urobili: x   Blood: x / Protein: x / Nitrite: x   Leuk Esterase: x / RBC: x / WBC x   Sq Epi: x / Non Sq Epi: x / Bacteria: x    [ x ]  DVT Prophylaxis    RADIOLOGY & ADDITIONAL STUDIES:    < from: US Duplex Venous Lower Ext Complete, Bilateral (24 @ 13:19) >    ACC: [id number] EXAM:  US DPLX LWR EXT VEINS COMPL BI   ORDERED BY: EVANS BARNETT     PROCEDURE DATE:  2024          INTERPRETATION:  CLINICAL INFORMATION: Pain and lower extremity edema    COMPARISON: None available.    TECHNIQUE: Duplex sonography of the BILATERAL LOWER extremity veins with   color and spectral Doppler, with and without compression.    FINDINGS:    RIGHT:  Normal compressibility of the RIGHT common femoral, femoral and popliteal   veins.  Doppler examination shows normal spontaneous and phasic flow.  No RIGHT calf vein thrombosis is detected.    LEFT:  LEFT common femoral vein obscured by body habitus and not evaluated  Normal compressibility of the LEFT femoral and popliteal veins.  Doppler examination shows normal spontaneous and phasic flow.  No LEFT calf vein thrombosis is detected.    IMPRESSION:  1.  No evidence of deep venous thrombosis in either lower extremity  2.  LEFT common femoral vein obscured by body habitus and not evaluated            --- End of Report ---            MASON MORENO MD; Attending Radiologist  This document has been electronically signed. 2024  1:50PM    < end of copied text >  < from: CT Aspiration Abscess Hematoma, Cyst (24 @ 16:03) >  ACC: [id number] EXAM:  CT ASP ABSC HEMATOMA CYST#   ORDERED BY: MANDI DURANT     PROCEDURE DATE:  2024          INTERPRETATION:  PROCEDURE: CT-guided drainage of retroperitoneal   collection.    : DEVIN Flores MD    CLINICAL INDICATION: 58-year-old female with multiple comorbidities   including a recent left nephrectomy, who was found to have a left post   nephrectomy bed collection.    ANESTHESIA: Intravenous sedation was provided by the attending   anesthesiologist. A total of 6mL of 1% lidocaine was used for local   anesthesia.    ANTIBIOTICS: None    TECHNIQUE: After discussing the risks, benefits and alternatives to this   procedure with the patient, informed consent was obtained. A timeout was   performed.    The patient was placed into right lateral decubitus position on the CT   examination table and connected to physiologic monitoring. Noncontrast   transaxial images of the abdomen were obtained. The left retroperitoneal   collection which had been seen on a recent CT scan was again identified,   and the left flank was prepped and draped in the usual sterile fashion.    Using CT guidance, an 18-gauge Singh needle was advanced into the   collection using a left posterolateral approach. Access into the   collection was confirmed with CT images and aspiration of 5 mL of dark   red/brown cloudy fluid, which was sent for microbiological analysis. The   needle was then removed over a wire and the tract was dilated. A 10.2   Romansh pigtail drainage catheter was then advanced into the collection   over the wire. An additional 65 mL of dark red/brown cloudy fluid was   manually aspirated. Repeat images demonstrated good positioning of the   catheter tip within the collection cavity with virtual complete collapse   of the cavity around the catheter tip. The catheter was secured to the   patient's skin with a silk suture, flushed with normal saline, and then   connected to a drainage bag. A dry sterile dressing was then applied.    The patient tolerated the procedure well and was transferred to the   recovery unit in stable condition. There were no immediate complications.    IMPRESSION: SUCCESSFUL CT-GUIDED PERCUTANEOUS DRAINAGE OF LEFT   RETROPERITONEAL COLLECTION, AS DESCRIBED ABOVE.    --- End of Report ---        PRATIBHA FLORES MD; Attending Interventional Radiologist  This document has been electronically signed. 2024  4:10PM    < end of copied text >      Goals of Care Discussion with Family/Proxy/Other   - see note from/family meeting set up for...

## 2024-01-29 NOTE — PROGRESS NOTE ADULT - PROBLEM SELECTOR PLAN 10
DVT PPX: SCDs      # Discharge planing:   PT eval pending  Patient does not want to go to Rehab if needed  Uses cane  Home O2  Needs BiPAP (Has BiPAP at home but not using it for over a year and needs serviced as per patient).   Patient to follow up with her Pulmonologist.   Patient will need HHA for drainage management and f/u with IR outpatient for drainage removal. Patient to be re-evaluated tomorrow by IR for possible tube check.   Dispo: Home with HHA and optimized.  CM following.   Per IR waiting for <10 cc in 24 hrs drainage in order to remove drain.

## 2024-01-29 NOTE — PROGRESS NOTE ADULT - PROBLEM SELECTOR PLAN 5
Controlled  Takes Levemir 16 at bedtime and Novolog 6U pre meals at home  c/w Lantus 16 units qhs and admelog 6 units tid with meals + ISS qid  Glucose monitoring  Low carb diet when not NPO pt p/w L. Lower Abdominal Quadrant Surgical wound s/p left nephrectomy   Clean the L. Lower Abdominal Quadrant wound with normal saline and apply skin prep to the surrounding 	skin  Apply Silver Calcium Alginate (Aquacel Ag) to the wound bed and cover with a Foam dressing Q 72hr PRN  Encourage the patient to reposition Q 2hrs using wedges or pillows

## 2024-01-29 NOTE — PROGRESS NOTE ADULT - PROBLEM SELECTOR PLAN 2
Resolved  Trend BMP, Mag & Phos Home oxygen 4L NC   Takes Duoneb, albuterol, Symbicort, fluticasone, and ventolin at home  Continue Duoneb if needed  Continue albuterol PRN and Symbicort  Maintain O2 88-92%  BiPAP QHS  F/u with Pulm outpatient

## 2024-01-29 NOTE — PROGRESS NOTE ADULT - PROBLEM SELECTOR PLAN 7
S/p L renal resection 11/3/2023  F/u outpt oncologist Controlled  Takes amlodipine, HCTZ, losartan at home  Continue home meds with parameters - amlodipine and losartan (Monitor kidney function).   Monitor BP

## 2024-01-30 ENCOUNTER — TRANSCRIPTION ENCOUNTER (OUTPATIENT)
Age: 59
End: 2024-01-30

## 2024-01-30 VITALS
SYSTOLIC BLOOD PRESSURE: 156 MMHG | TEMPERATURE: 98 F | RESPIRATION RATE: 19 BRPM | OXYGEN SATURATION: 94 % | DIASTOLIC BLOOD PRESSURE: 88 MMHG | HEART RATE: 77 BPM

## 2024-01-30 LAB
ANION GAP SERPL CALC-SCNC: 6 MMOL/L — SIGNIFICANT CHANGE UP (ref 5–17)
BUN SERPL-MCNC: 23 MG/DL — HIGH (ref 7–18)
CALCIUM SERPL-MCNC: 9.9 MG/DL — SIGNIFICANT CHANGE UP (ref 8.4–10.5)
CHLORIDE SERPL-SCNC: 95 MMOL/L — LOW (ref 96–108)
CO2 SERPL-SCNC: 34 MMOL/L — HIGH (ref 22–31)
CREAT SERPL-MCNC: 1.18 MG/DL — SIGNIFICANT CHANGE UP (ref 0.5–1.3)
EGFR: 54 ML/MIN/1.73M2 — LOW
GLUCOSE BLDC GLUCOMTR-MCNC: 154 MG/DL — HIGH (ref 70–99)
GLUCOSE BLDC GLUCOMTR-MCNC: 177 MG/DL — HIGH (ref 70–99)
GLUCOSE BLDC GLUCOMTR-MCNC: 211 MG/DL — HIGH (ref 70–99)
GLUCOSE BLDC GLUCOMTR-MCNC: 218 MG/DL — HIGH (ref 70–99)
GLUCOSE SERPL-MCNC: 167 MG/DL — HIGH (ref 70–99)
HCT VFR BLD CALC: 39 % — SIGNIFICANT CHANGE UP (ref 34.5–45)
HGB BLD-MCNC: 11.6 G/DL — SIGNIFICANT CHANGE UP (ref 11.5–15.5)
MCHC RBC-ENTMCNC: 23 PG — LOW (ref 27–34)
MCHC RBC-ENTMCNC: 29.7 GM/DL — LOW (ref 32–36)
MCV RBC AUTO: 77.4 FL — LOW (ref 80–100)
NRBC # BLD: 0 /100 WBCS — SIGNIFICANT CHANGE UP (ref 0–0)
PLATELET # BLD AUTO: 278 K/UL — SIGNIFICANT CHANGE UP (ref 150–400)
POTASSIUM SERPL-MCNC: 4.1 MMOL/L — SIGNIFICANT CHANGE UP (ref 3.5–5.3)
POTASSIUM SERPL-SCNC: 4.1 MMOL/L — SIGNIFICANT CHANGE UP (ref 3.5–5.3)
RBC # BLD: 5.04 M/UL — SIGNIFICANT CHANGE UP (ref 3.8–5.2)
RBC # FLD: 16 % — HIGH (ref 10.3–14.5)
SODIUM SERPL-SCNC: 135 MMOL/L — SIGNIFICANT CHANGE UP (ref 135–145)
WBC # BLD: 7.92 K/UL — SIGNIFICANT CHANGE UP (ref 3.8–10.5)
WBC # FLD AUTO: 7.92 K/UL — SIGNIFICANT CHANGE UP (ref 3.8–10.5)

## 2024-01-30 PROCEDURE — 99238 HOSP IP/OBS DSCHRG MGMT 30/<: CPT

## 2024-01-30 RX ORDER — PETROLATUM,WHITE
1 JELLY (GRAM) TOPICAL
Qty: 1 | Refills: 0
Start: 2024-01-30 | End: 2024-02-28

## 2024-01-30 RX ADMIN — Medication 1 APPLICATION(S): at 05:35

## 2024-01-30 RX ADMIN — HEPARIN SODIUM 5000 UNIT(S): 5000 INJECTION INTRAVENOUS; SUBCUTANEOUS at 05:28

## 2024-01-30 RX ADMIN — Medication 1 APPLICATION(S): at 17:29

## 2024-01-30 RX ADMIN — HEPARIN SODIUM 5000 UNIT(S): 5000 INJECTION INTRAVENOUS; SUBCUTANEOUS at 17:21

## 2024-01-30 RX ADMIN — Medication 2: at 17:20

## 2024-01-30 RX ADMIN — GABAPENTIN 400 MILLIGRAM(S): 400 CAPSULE ORAL at 05:28

## 2024-01-30 RX ADMIN — Medication 2: at 22:34

## 2024-01-30 RX ADMIN — LOSARTAN POTASSIUM 25 MILLIGRAM(S): 100 TABLET, FILM COATED ORAL at 05:25

## 2024-01-30 RX ADMIN — Medication 650 MILLIGRAM(S): at 12:16

## 2024-01-30 RX ADMIN — ATORVASTATIN CALCIUM 40 MILLIGRAM(S): 80 TABLET, FILM COATED ORAL at 22:34

## 2024-01-30 RX ADMIN — Medication 1: at 12:14

## 2024-01-30 RX ADMIN — MUPIROCIN 1 APPLICATION(S): 20 OINTMENT TOPICAL at 17:30

## 2024-01-30 RX ADMIN — Medication 6 UNIT(S): at 12:13

## 2024-01-30 RX ADMIN — GABAPENTIN 400 MILLIGRAM(S): 400 CAPSULE ORAL at 17:24

## 2024-01-30 RX ADMIN — LIDOCAINE 1 PATCH: 4 CREAM TOPICAL at 00:42

## 2024-01-30 RX ADMIN — LIDOCAINE 1 PATCH: 4 CREAM TOPICAL at 12:17

## 2024-01-30 RX ADMIN — Medication 650 MILLIGRAM(S): at 13:00

## 2024-01-30 RX ADMIN — OXCARBAZEPINE 600 MILLIGRAM(S): 300 TABLET, FILM COATED ORAL at 05:25

## 2024-01-30 RX ADMIN — MONTELUKAST 10 MILLIGRAM(S): 4 TABLET, CHEWABLE ORAL at 12:14

## 2024-01-30 RX ADMIN — LIDOCAINE 1 PATCH: 4 CREAM TOPICAL at 07:31

## 2024-01-30 RX ADMIN — Medication 1 SPRAY(S): at 05:25

## 2024-01-30 RX ADMIN — Medication 112 MICROGRAM(S): at 05:24

## 2024-01-30 RX ADMIN — OXCARBAZEPINE 600 MILLIGRAM(S): 300 TABLET, FILM COATED ORAL at 17:21

## 2024-01-30 RX ADMIN — Medication 1: at 08:43

## 2024-01-30 RX ADMIN — Medication 1 SPRAY(S): at 17:21

## 2024-01-30 RX ADMIN — Medication 1 APPLICATION(S): at 17:27

## 2024-01-30 RX ADMIN — AMLODIPINE BESYLATE 10 MILLIGRAM(S): 2.5 TABLET ORAL at 05:25

## 2024-01-30 RX ADMIN — MUPIROCIN 1 APPLICATION(S): 20 OINTMENT TOPICAL at 06:33

## 2024-01-30 RX ADMIN — Medication 1 APPLICATION(S): at 06:33

## 2024-01-30 RX ADMIN — Medication 6 UNIT(S): at 17:20

## 2024-01-30 RX ADMIN — LIDOCAINE 1 PATCH: 4 CREAM TOPICAL at 19:59

## 2024-01-30 RX ADMIN — INSULIN GLARGINE 20 UNIT(S): 100 INJECTION, SOLUTION SUBCUTANEOUS at 22:33

## 2024-01-30 RX ADMIN — Medication 6 UNIT(S): at 08:43

## 2024-01-30 NOTE — DISCHARGE NOTE NURSING/CASE MANAGEMENT/SOCIAL WORK - NSDCVIVACCINE_GEN_ALL_CORE_FT
Influenza, seasonal, injectable; 2013/10/28 ; Claudia Sherwood (RN);   influenza, injectable, quadrivalent, preservative free; 09-Nov-2023 12:05; Issac Fernandez (RN); [employer]; [id number] (Exp. Date: 30-Jun-2024); IntraMuscular; Deltoid Right.; 0.5 milliLiter(s); VIS (VIS Published: 06-Aug-2021, VIS Presented: 09-Nov-2023);

## 2024-01-30 NOTE — DISCHARGE NOTE NURSING/CASE MANAGEMENT/SOCIAL WORK - PATIENT PORTAL LINK FT
You can access the FollowMyHealth Patient Portal offered by [facility] by registering at the following website: [email or web address]. By joining [employer]’s FollowMyHealth portal, you will also be able to view your health information using other applications (apps) compatible with our system.

## 2024-01-30 NOTE — DISCHARGE NOTE NURSING/CASE MANAGEMENT/SOCIAL WORK - NSDCPEFALRISK_GEN_ALL_CORE
For information on Fall & Injury Prevention, visit: [email or web address] OR  [email or web address] OR  https://www.cdc.gov/steadi/patient.html

## 2024-01-31 PROCEDURE — 87641 MR-STAPH DNA AMP PROBE: CPT

## 2024-01-31 PROCEDURE — 84100 ASSAY OF PHOSPHORUS: CPT

## 2024-01-31 PROCEDURE — 71275 CT ANGIOGRAPHY CHEST: CPT | Mod: MA

## 2024-01-31 PROCEDURE — 99285 EMERGENCY DEPT VISIT HI MDM: CPT

## 2024-01-31 PROCEDURE — 83880 ASSAY OF NATRIURETIC PEPTIDE: CPT

## 2024-01-31 PROCEDURE — 96374 THER/PROPH/DIAG INJ IV PUSH: CPT

## 2024-01-31 PROCEDURE — 85730 THROMBOPLASTIN TIME PARTIAL: CPT

## 2024-01-31 PROCEDURE — 93005 ELECTROCARDIOGRAM TRACING: CPT

## 2024-01-31 PROCEDURE — 80048 BASIC METABOLIC PNL TOTAL CA: CPT

## 2024-01-31 PROCEDURE — 87205 SMEAR GRAM STAIN: CPT

## 2024-01-31 PROCEDURE — 93970 EXTREMITY STUDY: CPT

## 2024-01-31 PROCEDURE — 85027 COMPLETE CBC AUTOMATED: CPT

## 2024-01-31 PROCEDURE — 83036 HEMOGLOBIN GLYCOSYLATED A1C: CPT

## 2024-01-31 PROCEDURE — 82553 CREATINE MB FRACTION: CPT

## 2024-01-31 PROCEDURE — 10160 PNXR ASPIR ABSC HMTMA BULLA: CPT

## 2024-01-31 PROCEDURE — 97162 PT EVAL MOD COMPLEX 30 MIN: CPT

## 2024-01-31 PROCEDURE — 94640 AIRWAY INHALATION TREATMENT: CPT

## 2024-01-31 PROCEDURE — 85379 FIBRIN DEGRADATION QUANT: CPT

## 2024-01-31 PROCEDURE — 82550 ASSAY OF CK (CPK): CPT

## 2024-01-31 PROCEDURE — 87640 STAPH A DNA AMP PROBE: CPT

## 2024-01-31 PROCEDURE — 71045 X-RAY EXAM CHEST 1 VIEW: CPT

## 2024-01-31 PROCEDURE — 80053 COMPREHEN METABOLIC PANEL: CPT

## 2024-01-31 PROCEDURE — 85610 PROTHROMBIN TIME: CPT

## 2024-01-31 PROCEDURE — 84484 ASSAY OF TROPONIN QUANT: CPT

## 2024-01-31 PROCEDURE — 77012 CT SCAN FOR NEEDLE BIOPSY: CPT

## 2024-01-31 PROCEDURE — 83735 ASSAY OF MAGNESIUM: CPT

## 2024-01-31 PROCEDURE — 87070 CULTURE OTHR SPECIMN AEROBIC: CPT

## 2024-01-31 PROCEDURE — 82962 GLUCOSE BLOOD TEST: CPT

## 2024-01-31 PROCEDURE — 0225U NFCT DS DNA&RNA 21 SARSCOV2: CPT

## 2024-01-31 PROCEDURE — 74177 CT ABD & PELVIS W/CONTRAST: CPT | Mod: MA

## 2024-01-31 PROCEDURE — 85025 COMPLETE CBC W/AUTO DIFF WBC: CPT

## 2024-01-31 PROCEDURE — 36415 COLL VENOUS BLD VENIPUNCTURE: CPT

## 2024-02-02 LAB
CULTURE RESULTS: NO GROWTH — SIGNIFICANT CHANGE UP
SPECIMEN SOURCE: SIGNIFICANT CHANGE UP

## 2024-02-09 ENCOUNTER — APPOINTMENT (OUTPATIENT)
Dept: PULMONOLOGY | Facility: CLINIC | Age: 59
End: 2024-02-09

## 2024-02-09 NOTE — DISCUSSION/SUMMARY
[FreeTextEntry1] : Chronic hypercapnic respiratory failure/NAKUL,  obstructive airway disease asthma  morbid obesity  She has  history of  hypercapnic respiratory failure  She was prescribed and  obtained VAPS but too costly,  However, she is not using BIPAP (which she has had) either, states it needs to be set.  She received new equipment fro [facility] using CPAP.  She reports cough, rhinitis, chest congestion and dyspnea.  She has residual symptoms, but has improved   MRI abdomen 4/22/23 demonstrated Left renal mass consistent with renal cell carcinoma demonstrating continued mild interval growth as above.  Her respiratory status has improved  PLAN  Will need to have BIPAP set up.  I have attempted to find DME for this but having difficulty with insurance I have evaluated her device and had her try.  She is most comfortable on 12/8.  Continue nasal sprays fluticasone azelastine and ipratropium nasal spray  We will now attempt to gain insurance authorization to use NIV which is the preferred treatment  Continue ICS/LABA  and montelukast, along with albuterol MDI as needed, has spacer  Stopped ramipril and started amlodipine 10 mg , cough has greatly improved   She is undergoing follow up for lesion consistent with renal cancer for which she will need resection  Set AutoPIPAP to 12/8 as per the patient this is comfortable   Total time spent : 30 minutes Including: Preparation prior to visit - Reviewing prior record, results of tests and Consultation Reports as applicable Conducting an appropriate H & P during today's encounter Appropriate orders for tests, medications and procedures, as applicable Counseling patient  Note completion   I walked with pt for exercise study  Guido Monterroso MD

## 2024-02-09 NOTE — HISTORY OF PRESENT ILLNESS
[Never] : never [Obstructive Sleep Apnea] : obstructive sleep apnea [Hypersomnolence] : hypersomnolence [Nonrestorative Sleep] : nonrestorative sleep [Snoring] : snoring [Unintentional Sleep while Active] : unintentional sleep while active [TextBox_4] : 58 yr old woman with asthma and NAKUL, chronic hypercapnic respiratory failure, due to obesity and restrictive lung disease and kyphosis.  She had been on NIV  BIPAP 20/16.  She is also using Symbicort and albuterol via nebulizer, latter once per day.  She has obtained AirPhysio PEP device and uses it with increased sputum  clearance.  Sputum is mostly clear.  She uses saline nasal wash. She also uses fluticasone nasal spray.  She uses Zyrtec as needed and montelukast every night.   She has not used PAP, especially since recall.  She obtained NIV but it was too costly.  She now does not use any PAP.  She received replacement from [employer] but states it has not been set up to use, and she has not used it due to cough   She has had asthma for many years. She has few allergies (dust dust mites, flowers) not severe. Asthma is worse when allergies are worse.   She has hypoxia with exertion.   She also has a 4 mm lung nodule seen on prior CT chest. CT chest 5/2021 did not demonstrated any suspicious nodule.    She has renal cell carcinoma and has not had removal due to COVID crisis. Lesion is being closely monitored per team  She had repeat MRI 8/13/22  She has had LE edema R>L.  She is on HCTZ and ramipril  She has been hospitalized at [facility] with dyspnea, LOC.  She was treated for pneumonia and hypercapnic respiratory failure.  she required intubation/mechanical ventilation   Last CO2 on chemistry 36  She continues to have cough which she states is incessant, especially when supine   She  has also had chest congestion and nasal symptoms that were treated with diuretic antibiotic and steroid with brief transient benefit  She has contacted me about her cough that she states is severe, mostly non productive   2/21/23:  states she has been having increased cough for over 1 month.  She is having some sputum production, yellow at times.    CT coronary angiogram demonstrated elevated Argatson score 1069.  There were no significant stenoses.  She has BIPAP therapy but has not initiated as it has yet to e initiated/serviced.  I have tried to obtain PAP therapy through her insurance, with difficulty  CT 12/13/22 demonstrated a 3 mm nodule in RUL  but no emphysema , interstitial lung disease  or cystic lung disease.  There was also no tracheomalacia.  PMH: She has hyperlipidemia, elevated liver enzymes, DM, hypothyroid depression hypertension sleep apnea not using CPAP. fibromyalgia  PSH c sect right ovary  lysis of adhesion ERMELINDA/BSO removal of left adrenal gland due to nodule, benign  Temporal artery biopsy

## 2024-02-09 NOTE — PHYSICAL EXAM
[No Acute Distress] : no acute distress [Well Nourished] : well nourished [Well Developed] : well developed [Low Lying Soft Palate] : low lying soft palate [Elongated Uvula] : elongated uvula [Enlarged Base of the Tongue] : enlarged base of the tongue [III] : Mallampati Class: III [Not Tender] : not tender [No Edema] : no edema [2+ Pitting] : 2+ pitting [No Focal Deficits] : no focal deficits [Oriented x3] : oriented x3 [TextBox_11] : some cough [TextBox_68] : no wheeze, diminished aeration  much improved, breathes well [TextBox_44] : kyphosis, short neck limited mobility [TextBox_89] : obese, protuberant

## 2024-02-09 NOTE — ADDENDUM
[FreeTextEntry1] : Th epatient may undergo nephrectomy for removal of renal carcinoma  her status is optimized  There is no pulmonary contraindication to the planned surgery. The patient is at some increased risk for perioperative pulmonary compilations due to presence of sleep apnea and elevated BMI. The patient should have cautious monitoring for oxygen desaturation or respiratory depression in the postoperative period. BIPAP should be available if respiratory depression develops. Usual DVT prophylaxis  is necessary.  Addendum written 10/23/23  As discussed with Dr Alvaro Monterroso MD

## 2024-02-16 ENCOUNTER — NON-APPOINTMENT (OUTPATIENT)
Age: 59
End: 2024-02-16

## 2024-02-22 ENCOUNTER — NON-APPOINTMENT (OUTPATIENT)
Age: 59
End: 2024-02-22

## 2024-02-22 ENCOUNTER — EMERGENCY (EMERGENCY)
Facility: HOSPITAL | Age: 59
LOS: 1 days | Discharge: HOME | End: 2024-02-22
Attending: EMERGENCY MEDICINE
Payer: MEDICARE

## 2024-02-22 VITALS
RESPIRATION RATE: 20 BRPM | DIASTOLIC BLOOD PRESSURE: 83 MMHG | OXYGEN SATURATION: 94 % | TEMPERATURE: 99 F | HEIGHT: 62.99 IN | SYSTOLIC BLOOD PRESSURE: 149 MMHG | HEART RATE: 96 BPM | WEIGHT: 286.38 LBS

## 2024-02-22 DIAGNOSIS — Z90.721 ACQUIRED ABSENCE OF OVARIES, UNILATERAL: Chronic | ICD-10-CM

## 2024-02-22 DIAGNOSIS — Z98.890 OTHER SPECIFIED POSTPROCEDURAL STATES: Chronic | ICD-10-CM

## 2024-02-22 DIAGNOSIS — E27.8 OTHER SPECIFIED DISORDERS OF ADRENAL GLAND: Chronic | ICD-10-CM

## 2024-02-22 DIAGNOSIS — I77.6 ARTERITIS, UNSPECIFIED: Chronic | ICD-10-CM

## 2024-02-22 DIAGNOSIS — N89.8 OTHER SPECIFIED NONINFLAMMATORY DISORDERS OF VAGINA: Chronic | ICD-10-CM

## 2024-02-22 DIAGNOSIS — Z98.89 OTHER SPECIFIED POSTPROCEDURAL STATES: Chronic | ICD-10-CM

## 2024-02-22 DIAGNOSIS — Z90.710 ACQUIRED ABSENCE OF BOTH CERVIX AND UTERUS: Chronic | ICD-10-CM

## 2024-02-22 LAB
ALBUMIN SERPL ELPH-MCNC: 2.9 G/DL — LOW (ref 3.5–5)
ALP SERPL-CCNC: 131 U/L — HIGH (ref 40–120)
ALT FLD-CCNC: 28 U/L DA — SIGNIFICANT CHANGE UP (ref 10–60)
ANION GAP SERPL CALC-SCNC: 0 MMOL/L — LOW (ref 5–17)
APPEARANCE UR: CLEAR — SIGNIFICANT CHANGE UP
AST SERPL-CCNC: 47 U/L — HIGH (ref 10–40)
BASOPHILS # BLD AUTO: 0.03 K/UL — SIGNIFICANT CHANGE UP (ref 0–0.2)
BASOPHILS NFR BLD AUTO: 0.4 % — SIGNIFICANT CHANGE UP (ref 0–2)
BILIRUB SERPL-MCNC: 0.5 MG/DL — SIGNIFICANT CHANGE UP (ref 0.2–1.2)
BILIRUB UR-MCNC: NEGATIVE — SIGNIFICANT CHANGE UP
BUN SERPL-MCNC: 18 MG/DL — SIGNIFICANT CHANGE UP (ref 7–18)
CALCIUM SERPL-MCNC: 9.6 MG/DL — SIGNIFICANT CHANGE UP (ref 8.4–10.5)
CHLORIDE SERPL-SCNC: 100 MMOL/L — SIGNIFICANT CHANGE UP (ref 96–108)
CO2 SERPL-SCNC: 35 MMOL/L — HIGH (ref 22–31)
COLOR SPEC: YELLOW — SIGNIFICANT CHANGE UP
CREAT SERPL-MCNC: 1.14 MG/DL — SIGNIFICANT CHANGE UP (ref 0.5–1.3)
DIFF PNL FLD: NEGATIVE — SIGNIFICANT CHANGE UP
EGFR: 56 ML/MIN/1.73M2 — LOW
EOSINOPHIL # BLD AUTO: 0.31 K/UL — SIGNIFICANT CHANGE UP (ref 0–0.5)
EOSINOPHIL NFR BLD AUTO: 4 % — SIGNIFICANT CHANGE UP (ref 0–6)
GLUCOSE SERPL-MCNC: 142 MG/DL — HIGH (ref 70–99)
GLUCOSE UR QL: NEGATIVE MG/DL — SIGNIFICANT CHANGE UP
HCT VFR BLD CALC: 37.5 % — SIGNIFICANT CHANGE UP (ref 34.5–45)
HGB BLD-MCNC: 10.9 G/DL — LOW (ref 11.5–15.5)
IMM GRANULOCYTES NFR BLD AUTO: 0.5 % — SIGNIFICANT CHANGE UP (ref 0–0.9)
KETONES UR-MCNC: NEGATIVE MG/DL — SIGNIFICANT CHANGE UP
LEUKOCYTE ESTERASE UR-ACNC: NEGATIVE — SIGNIFICANT CHANGE UP
LIDOCAIN IGE QN: 26 U/L — SIGNIFICANT CHANGE UP (ref 13–75)
LYMPHOCYTES # BLD AUTO: 1.8 K/UL — SIGNIFICANT CHANGE UP (ref 1–3.3)
LYMPHOCYTES # BLD AUTO: 23.3 % — SIGNIFICANT CHANGE UP (ref 13–44)
MAGNESIUM SERPL-MCNC: 1.6 MG/DL — SIGNIFICANT CHANGE UP (ref 1.6–2.6)
MCHC RBC-ENTMCNC: 23 PG — LOW (ref 27–34)
MCHC RBC-ENTMCNC: 29.1 GM/DL — LOW (ref 32–36)
MCV RBC AUTO: 79.1 FL — LOW (ref 80–100)
MONOCYTES # BLD AUTO: 0.55 K/UL — SIGNIFICANT CHANGE UP (ref 0–0.9)
MONOCYTES NFR BLD AUTO: 7.1 % — SIGNIFICANT CHANGE UP (ref 2–14)
NEUTROPHILS # BLD AUTO: 4.99 K/UL — SIGNIFICANT CHANGE UP (ref 1.8–7.4)
NEUTROPHILS NFR BLD AUTO: 64.7 % — SIGNIFICANT CHANGE UP (ref 43–77)
NITRITE UR-MCNC: NEGATIVE — SIGNIFICANT CHANGE UP
NRBC # BLD: 0 /100 WBCS — SIGNIFICANT CHANGE UP (ref 0–0)
PH UR: 7.5 — SIGNIFICANT CHANGE UP (ref 5–8)
PLATELET # BLD AUTO: 381 K/UL — SIGNIFICANT CHANGE UP (ref 150–400)
POTASSIUM SERPL-MCNC: 4.6 MMOL/L — SIGNIFICANT CHANGE UP (ref 3.5–5.3)
POTASSIUM SERPL-SCNC: 4.6 MMOL/L — SIGNIFICANT CHANGE UP (ref 3.5–5.3)
PROT SERPL-MCNC: 8.2 G/DL — SIGNIFICANT CHANGE UP (ref 6–8.3)
PROT UR-MCNC: NEGATIVE MG/DL — SIGNIFICANT CHANGE UP
RBC # BLD: 4.74 M/UL — SIGNIFICANT CHANGE UP (ref 3.8–5.2)
RBC # FLD: 15.9 % — HIGH (ref 10.3–14.5)
SODIUM SERPL-SCNC: 135 MMOL/L — SIGNIFICANT CHANGE UP (ref 135–145)
SP GR SPEC: 1.02 — SIGNIFICANT CHANGE UP (ref 1–1.03)
UROBILINOGEN FLD QL: 1 MG/DL — SIGNIFICANT CHANGE UP (ref 0.2–1)
WBC # BLD: 7.72 K/UL — SIGNIFICANT CHANGE UP (ref 3.8–10.5)
WBC # FLD AUTO: 7.72 K/UL — SIGNIFICANT CHANGE UP (ref 3.8–10.5)

## 2024-02-22 PROCEDURE — 81003 URINALYSIS AUTO W/O SCOPE: CPT

## 2024-02-22 PROCEDURE — 83690 ASSAY OF LIPASE: CPT

## 2024-02-22 PROCEDURE — 83735 ASSAY OF MAGNESIUM: CPT

## 2024-02-22 PROCEDURE — 99284 EMERGENCY DEPT VISIT MOD MDM: CPT | Mod: 25

## 2024-02-22 PROCEDURE — 99285 EMERGENCY DEPT VISIT HI MDM: CPT

## 2024-02-22 PROCEDURE — 80053 COMPREHEN METABOLIC PANEL: CPT

## 2024-02-22 PROCEDURE — 87086 URINE CULTURE/COLONY COUNT: CPT

## 2024-02-22 PROCEDURE — 74176 CT ABD & PELVIS W/O CONTRAST: CPT | Mod: MC

## 2024-02-22 PROCEDURE — 96374 THER/PROPH/DIAG INJ IV PUSH: CPT

## 2024-02-22 PROCEDURE — 74176 CT ABD & PELVIS W/O CONTRAST: CPT | Mod: 26,MC

## 2024-02-22 PROCEDURE — 85025 COMPLETE CBC W/AUTO DIFF WBC: CPT

## 2024-02-22 PROCEDURE — 96361 HYDRATE IV INFUSION ADD-ON: CPT

## 2024-02-22 PROCEDURE — 36415 COLL VENOUS BLD VENIPUNCTURE: CPT

## 2024-02-22 RX ORDER — MORPHINE SULFATE 50 MG/1
4 CAPSULE, EXTENDED RELEASE ORAL ONCE
Refills: 0 | Status: DISCONTINUED | OUTPATIENT
Start: 2024-02-22 | End: 2024-02-22

## 2024-02-22 RX ORDER — IBUPROFEN 200 MG
1 TABLET ORAL
Qty: 20 | Refills: 0
Start: 2024-02-22

## 2024-02-22 RX ORDER — SODIUM CHLORIDE 9 MG/ML
1000 INJECTION INTRAMUSCULAR; INTRAVENOUS; SUBCUTANEOUS ONCE
Refills: 0 | Status: COMPLETED | OUTPATIENT
Start: 2024-02-22 | End: 2024-02-22

## 2024-02-22 RX ADMIN — MORPHINE SULFATE 4 MILLIGRAM(S): 50 CAPSULE, EXTENDED RELEASE ORAL at 20:12

## 2024-02-22 RX ADMIN — SODIUM CHLORIDE 1000 MILLILITER(S): 9 INJECTION INTRAMUSCULAR; INTRAVENOUS; SUBCUTANEOUS at 22:00

## 2024-02-22 RX ADMIN — SODIUM CHLORIDE 1000 MILLILITER(S): 9 INJECTION INTRAMUSCULAR; INTRAVENOUS; SUBCUTANEOUS at 20:12

## 2024-02-22 RX ADMIN — MORPHINE SULFATE 4 MILLIGRAM(S): 50 CAPSULE, EXTENDED RELEASE ORAL at 21:00

## 2024-02-22 NOTE — ED PROVIDER NOTE - NSFOLLOWUPINSTRUCTIONS_ED_ALL_ED_FT
Percutaneous Nephrostomy, Care After  After the procedure, it is common to have:  Soreness where the nephrostomy tube was inserted (tube insertion site).  Drainage with a bit of blood from the tube for the first 24 hours.  Follow these instructions at home:  Activity    If you were given a sedative during the procedure, it can affect you for several hours. Do not drive or operate machinery until your health care provider says that it is safe.  Do not lift anything that is heavier than 10 lb (4.5 kg), or the limit that you are told, until your health care provider says that it is safe.  Avoid activities that may cause your tube to bend.  Do not take baths, swim, or use a hot tub until your health care provider approves. Ask your health care provider if you may take showers. You may only be allowed to take sponge baths.  Cover your bandage (dressing) with a watertight covering if you take a shower.  Return to your normal activities as told by your health care provider. Ask your health care provider what activities are safe for you.  Care of the tube insertion site    Washing hands with soap and water.  Follow instructions from your health care provider about how to take care of your tube insertion site. Make sure you:  Wash your hands with soap and water for at least 20 seconds before and after you change your dressing. If soap and water are not available, use hand .  Change your dressing as told by your health care provider. Do not pull on your tube while taking off the dressing.  When you change the dressing, wash the skin around the tube, rinse well, and pat the skin dry.  Check the tube insertion area every day for signs of infection. Check for:  Redness, swelling, or pain.  Fluid or blood.  Warmth.  Pus or a bad smell.  Care of the nephrostomy tube and drainage bag    When you connect your tube to a drainage bag, make sure there are no kinks in the tubing. Check that your urine is draining freely. You may want to use an elastic bandage to wrap any exposed tubing that goes from the nephrostomy tube to any of the connecting tubes.  At night, connect your tube or the leg bag to a larger bedside drainage bag.  Always keep the tubing, the leg bag, and the bedside drainage bag below the level of your kidney. This ensures that your urine can drain freely.  Empty the drainage bag when it becomes ? full.  Follow instructions from your health care provider about how to empty or change the drainage bag.  Replace the drainage bag and any extension tubing that is connected to your tube every 7 days or as told by your health care provider. Your health care provider will explain how to change the bag and extension tubing.  General instructions    Take over-the-counter and prescription medicines only as told by your health care provider.  Keep all follow-up visits. Your tube will need to be changed every 8–12 weeks.  Your health care provider may give you more instructions. Make sure you know what you can and cannot do.    Contact a health care provider if:  You have problems with any of the drain valves or tubing.  You have back pain that does not get better with medicine.  You have any signs of infection.  You have a fever or chills.  You make more urine than normal, or it burns when you urinate.  You make less urine than normal.  You have new blood in your urine.  Get help right away if:  You have chest pain or trouble breathing.  Your tube comes out.  These symptoms may be an emergency. Get help right away. Call 911.  Do not wait to see if the symptoms will go away.  Do not drive yourself to the hospital.  This information is not intended to replace advice given to you by your health care provider. Make sure you discuss any questions you have with your health care provider.    Document Revised: 07/06/2023 Document Reviewed: 07/06/2023  [employer] Patient Education © 2023 [employer]

## 2024-02-22 NOTE — ED ADULT TRIAGE NOTE - CHIEF COMPLAINT QUOTE
Patient c/o dizziness, headache, and generalized back pain on and off x couple of months. Denies falls/injuries. Patient also reports her left nephrostomy tube has been bothering her x 1month, wants to remove. Patient at home 4L nasal cannula oxygen as needed. Patient c/o dizziness, headache, and generalized back pain on and off x couple of months. Denies falls/injuries. Patient also reports her left nephrostomy tube has been bothering her x 1month, wants to remove. Patient uses 4L nasal cannula oxygen as needed at home. Denies SOB. Patient c/o dizziness, headache, and generalized back pain on and off x 2 months. Denies falls/injuries. Patient also reports her left nephrostomy tube has been bothering her x 1month, wants to remove. Patient uses 4L nasal cannula oxygen as needed at home. Denies SOB. Patient c/o dizziness, headache, and generalized back pain on and off x 2 months. Denies falls/injuries. Patient also reports her left nephrostomy tube has been bothering her x 1month, requesting it to be removed. Patient uses 4L nasal cannula oxygen as needed at home. Denies SOB.

## 2024-02-22 NOTE — ED PROVIDER NOTE - PATIENT PORTAL LINK FT
You can access the FollowMyHealth Patient Portal offered by [facility] by registering at the following website: [email or web address]. By joining [facility]’s FollowMyHealth portal, you will also be able to view your health information using other applications (apps) compatible with our system.

## 2024-02-22 NOTE — ED PROVIDER NOTE - PROGRESS NOTE DETAILS
CT showing Interval decrease in the size of left renal fossa collection with   evidence of an indwelling drain as described above.  surgery consulted and recommend d.c with clinic follow  up for removal.  Westley mercedes

## 2024-02-22 NOTE — ED PROVIDER NOTE - OBJECTIVE STATEMENT
58F y/o female with a PMHx of asthma, COPD on 4L NC, diverticulosis, DJD, DM, fibromyalgia, HLD, hypothyroidism, NAKUL, RCC s/p L renal resection 11/3/23 complicated by abscess.  pt was admitted and fluid collection was drained by IR.  pt still with left nephrectomy tube in place.  As per patient, tube is leaking around tube.  Also with pain around insertion site.  Pt asking for tube to be removed.  Associated with symptoms patient has been expericing weakness and dizziness.  pt feels unsteady with her gait secondary to symptoms.  Symptoms have been worsening so patient came to ED for further eval.  No fever, no chest pain, no shortness of breath, no vomiting, no diarrhea.     , no hx of CT/RT), PSH of L adrenal mass excision, abdominal hysterectomy, CS, L oophorectomy p/w several day hx of substernal non-radiating CP worse on inspiration with associated SOB and MUÑOZ with associated cough with non-bloody sputum production. not on hormones. no personal/FH of DVT/PE. no recent travel. pt had L renal resection in November and today reports her L lower abdominal incision has not been well healing. also reports that for past few days pt has had increased sob and cough with non-bloody sputum production. denies hemoptysis. states that she has had increased O2 requirements, usually wears 4L NC PRN but for past few days has needed it continuously. Also reports her baseline O2 IS 95% on RA and for past few days has been 88% on RA.

## 2024-02-22 NOTE — ED PROVIDER NOTE - CLINICAL SUMMARY MEDICAL DECISION MAKING FREE TEXT BOX
58-year-old female history of nephrectomy complicated by abscess which was drained by IR complaining of pain at nephrostomy site associated with headache dizziness.  Patient feels unsteady secondary to symptoms.  Symptoms worsen is why she came in for evaluation.  Concern for possibly fluid collection in abdomen.  Will check CAT scan, labs, analgesia, reassess

## 2024-02-22 NOTE — ED ADULT NURSE NOTE - CHIEF COMPLAINT QUOTE
Patient c/o dizziness, headache, and generalized back pain on and off x 2 months. Denies falls/injuries. Patient also reports her left nephrostomy tube has been bothering her x 1month, requesting it to be removed. Patient uses 4L nasal cannula oxygen as needed at home. Denies SOB.

## 2024-02-22 NOTE — ED PROVIDER NOTE - NSICDXPASTMEDICALHX_GEN_ALL_CORE_FT
PAST MEDICAL HISTORY:  Asthma last inhaler use with in 10 days, on Pulm follow up [id number]    Back pain thoracic & lumbar    Cervical neuralgia difficulty moving my neck    Diverticulosis     DJD (degenerative joint disease) Cervical/Thoracic/Lumbar    DM (diabetes mellitus) 2    Falls frequently     Fatty liver     Fibromyalgia     H/O bursitis right shoulder    Hyperlipidemia     Hypertension vaginal cyst    Hypothyroid     Morbid obesity with body mass index (BMI) of 50.0 to 59.9 in adult     Obesity morbid    Obstructive sleep apnea refuses Cpap    Psoriasis     Radicular pain arms, legs    Renal cell carcinoma, left on follow up Dr schulte, HOD renal [facility], waiting for suregry in 09/2020    Trigeminal neuralgia R    Vasculitis Legs, forerhead, arms & hands, flare ups in R leg  Dr susie Ribeiro is my Rheumatologist

## 2024-02-22 NOTE — ED ADULT NURSE NOTE - NS ED NURSE LEVEL OF CONSCIOUSNESS MENTAL STATUS
Patients POA stopped in the morning asking about some paperwork sent by patients Brielle escobar.  She was told  paperwork usually goes to Administration before coming back to the doctors desk and that we will have to check to see where it is in the process. She was also told Dr. Montes will not be in the office until Monday March 20, 2017. She just wants to know if we received it and if so when will is it expected to be complete. She is going to have them fax one to our office in case we did not receive it. Please check on this and call her.    Awake

## 2024-02-22 NOTE — ED ADULT NURSE NOTE - CHIEF COMPLAINT
Pt is scheduled  Future Appointments   Date Time Provider Lukasz Hernadez   7/15/2020  2:30 PM Miya Anne MD [facility]       Please refill The patient is a 58y Female complaining of dizziness.

## 2024-02-22 NOTE — ED ADULT NURSE NOTE - NSICDXPASTMEDICALHX_GEN_ALL_CORE_FT
Breast Surgery - TELEPHONE VISIT PROGRESS NOTE    This Telephone call was made to Heidi Villeda to discuss follow up after breast cancer     This is an established patient of [facility].  The patient is in [place] and her identity has been established.   Heidi Villeda understands that we are limiting office visits due to the coronavirus pandemic and she consents to a virtual visit with charges submitted to insurance.   5-10 minutes were spent in this encounter.  Without the patient being seen and evaluated in person, there is a risk that the information and/or assessment may be incomplete or inaccurate.     [facility]  Breast Surgery  Follow Up Visit  6/3/2020      PATIENT: Heidi Villeda  MRN:  [id number]  :  1942      Referring Provider: Angel Limon MD    Chaperone: Daughter - Carmen    Reason for Visit: 6 month follow up evaluation; history of left breast cancer    HPI: This is a 77 year old female presents for a f/u visit.  s/p Left total mastectomy with sentinel lymph node procedure on 2019  Multifocal LEFT ILC Grade 1 95/2/ ki < 5%  (2.2cm) and IDC Grade 1 // ki = 5% (1.5cm)  LN Neg  T2 Lobular; N0  Genetics Negative     On Arimidex with Dr. Hernández; tolerating well without complaints.    Patient denies any new palpable masses, pain, or skin changes.   Patient also denies any new onset headaches, unintentional weight loss, or bone pain.     She was hospitalized in early may for abdominal pain, she was told she had an infection in her colon - feeling much better after antibiotics.      The patient reports a previous history of right breast cancer diagnosed at [facility] and is status post right modified radical mastectomy and adjuvant chemotherapy.  She denies any history of radiation treatment.      H/o ovarian ca s/p RAFA/BSO - no  Chemo or  RT 10  Yrs  Ago     She is following with Dr. Taylor to monitor Pulm nodules        Imaging:     CT A/P 2020: 1.  No  hydronephrosis bilaterally.  Two small calculi in the left kidney noted.  Ureters are   not dilated.  Mild bladder wall thickening.  No bladder calculus is seen.  Correlate with urinalysis.  2. There is mild infiltration noted posterior to the cecum.  Possibility of right colonic diverticulitis cannot be excluded.  Appendix is normal.  Extensive sigmoid diverticulosis.  No colonic obstruction is seen.Wall thickening of the rectum    CT Chest for PE with Contrast 3/31/19 at [facility]:  Evaluation is limited. No large central proximal segmental pulmonary artery embolus identified, however, distal segmental and subsegmental pulmonary artery branches cannot adequately be evaluated on this exam.  2. Multiple randomly scattered nonspecific pulmonary nodules, right greater than left and measuring up to 7 mm in size. Given nodule size and multiplicity follow-up CT of the chest in 3-6 months is recommended to document stability as per Fleischner Society guidelines.  3. Inadequately characterized ill-defined right thyroid lobe nodules measuring up to 1.9 cm in size and with evidence of internal calcifications. Given nodule size and morphology follow-up with dedicated thyroid ultrasound is recommended to better characterize.  4. A 4 mm nonobstructing calculus within the left kidney.   5. Nonspecific 1.2 cm nodular thickening of the left adrenal gland. Consider follow-up with CT or MRI in 12 months to document stability.  6. Patient status post bilateral mastectomy.   7. Scattered colonic diverticula and additional findings as above.     CT Chest for PE with Contrast 7/29/19 at [facility]:  1. No CT evidence for pulmonary embolism.  2. No acute cardiothoracic process. No significant change from 3/31/19 CT.  3. Multiple small < 5 mm pulmonary noncalcified and occasional calcified pulmonary nodules in the bilateral lungs, unchanged from 3/31/19. Given history of malignancy, I would suggest follow-up non contrast CT in 6 months.  4. Right  thyroid lob enlargement by multiple calcified and noncalcified pulmonary nodules measuring up to 1.5 cm, unchanged, suggest dedicated thyroid ultrasound to further evaluate if not.    Bone scan 12/9/2019: No scintigraphic evidence of osteoblastic metastatic disease, with stability of the findings since the previous study of 02/01/2019    Pathology:  Breast: LEFT   Type: ILC Grade 1 and IDC Grade 1   Stage: T2 (lobular) N0  Surgery: LEFT total mastectomy + SLNB  Surgery date:   2/21/2019  ER: 95 / 95  MA:2/ 25  her2/earl:1/1 negative   ki67: <5/5  Chemotherapy: None  Radiation: NA  Endocrine: Arimidex, March 2019  Genetics: Negative - VUS in PMS2 and SUFU  Radiation Oncology: [facility]  Medical Oncology: MARY JO    Review of Systems:  Review of Systems   All other systems reviewed and are negative.      Medical History:    Past Medical History:   Diagnosis Date   • CAD (coronary artery disease)    • CHF (congestive heart failure) (CMS/HCC)    • Diabetes mellitus (CMS/HCC)    • Essential (primary) hypertension    • Memory loss    • Personal history of breast cancer    • Personal history of ovarian cancer        Surgical History:  Past Surgical History:   Procedure Laterality Date   • Breast surgery     • Hysterectomy         Medications:    Current Outpatient Medications   Medication Sig Dispense Refill   • omeprazole (PRILOSEC) 20 MG capsule TAKE ONE CAPSULE BY MOUTH DAILY 30 MINUTES BEFORE MEALS 30 capsule 3   • anastrozole (ARIMIDEX) 1 MG tablet TAKE 1 TABLET BY MOUTH DAILY 90 tablet 0   • sAXagliptin HCl (ONGLYZA) 2.5 MG Tab Take 1 tablet by mouth daily. 30 tablet 3   • losartan (COZAAR) 50 MG tablet TAKE 1 TABLET BY MOUTH DAILY 30 tablet 1   • hydrALAZINE (APRESOLINE) 25 MG tablet TAKE 1 TABLET BY MOUTH EVERY 8 HOURS 30 tablet 0   • clopidogrel (PLAVIX) 75 MG tablet TAKE 1 TABLET BY MOUTH DAILY 90 tablet 0   • enalapril (VASOTEC) 20 MG tablet TAKE 1 TABLET BY MOUTH DAILY 90 tablet 0   • amLODIPine (NORVASC) 5  MG tablet Take 1 tablet by mouth daily. 90 tablet 1   • hydrochlorothiazide (HYDRODIURIL) 25 MG tablet Take 1 tablet by mouth daily. 90 tablet 1   • VENTOLIN  (90 Base) MCG/ACT inhaler INL 1 TO 2 PFS PO Q 6 H PRN  4   • anastrozole (ARIMIDEX) 1 MG tablet Take 1 tablet by mouth daily. 30 tablet 11   • atorvastatin (LIPITOR) 10 MG tablet Take 1 tablet by mouth daily. 30 tablet 3   • aspirin 81 MG chewable tablet Chew 1 tablet by mouth daily. 90 tablet 1   • blood glucose (ONE TOUCH ULTRA TEST) test strip Test blood sugar 1 times daily as directed. 100 strip 3   • ONE TOUCH LANCETS Misc Use twice daily 100 each 3   • Blood Glucose Monitoring Suppl (ONE TOUCH ULTRA 2) w/Device Kit Use as directed 1 kit 1   • OYSCO 500 + D 500-200 MG-UNIT tablet TK 1 T PO  BID  11   • metoPROLOL succinate (TOPROL-XL) 100 MG 24 hr tablet Take 1 tablet by mouth daily. 90 tablet 0   • Cyanocobalamin (VITAMIN B-12) 1000 MCG sublingual tablet One   daily 30 tablet 3   • fluticasone-umeclidinium-vilanterol (TRELEGY ELLIPTA) 100-62.5-25 MCG/INH inhaler      • umeclidinium-vilanterol (ANORO ELLIPTA) 62.5-25 MCG/INH inhaler Inhale 1 puff into the lungs as needed.     • isosorbide mononitrate (IMDUR) 30 MG 24 hr tablet Take 1 tablet by mouth daily. 30 tablet 3   • acetaminophen (MAPAP) 325 MG tablet every 4 hours as needed.      • omeprazole 20 MG tablet Take 1 tablet by mouth daily. 30 mins before meals 30 tablet 2   • isosorbide mononitrate (IMDUR) 30 MG 24 hr tablet Take 1 tablet by mouth daily. 90 tablet 0   • aspirin 81 MG tablet Take 81 mg by mouth daily.       No current facility-administered medications for this visit.        Allergies:  ALLERGIES:   Allergen Reactions   • Enalapril Maleate Cough   • Lisinopril Other (See Comments)     Unknown   • Losartan Potassium Other (See Comments)     Unknown         Family History:  Mother with brain cancer in her 50s  sister with breast cancer diagnosed in her 50s,  mets to brain,  mastectomies, chemotherapy and RT   sister with breast cancer diagnosed late 40s, b/l mastectomies, chemotherapy, living     Panel testing negative    Vital Signs:  Visit Vitals  Resp 18   Ht 5' 3\" (1.6 m)   Wt 59.9 kg (132 lb)   SpO2 98%   BMI 23.38 kg/m²       Physical Exam:  Gen: Normocephalic, atraumatic  HEENT: Anicteric  Respiratory: No respiratory distress  Breast: Deferred due to video visit    Assessment:   Heidi Villeda is a 77 year old female who is here for follow-up visit s/p left mastectomy with sentinel lymph node procedure for T2 N0 lobular carcinoma and T1CN0 infiltrating ductal carcinoma -stage II, in February 2019  Doing well, denies new issues today.  She continues on the anastrozole.  I emphasized that she will need to take that daily for 5 years.  I asked her to make sure that she is examining her chest wall once a month and to bring to our attention any new nodules or skin changes that she sees.    She saw pulmonology in January who recommended a six-month follow-up CT of her lungs and then to see them after.  She did not recall that she needed this done.  I will put in for a noncontrast CT chest and she will call Maverick to schedule.  I reminded her to see pulmonology after that for an exam.  She has an appointment with her eye doctor in June.    Per Dr. Noble's note she wants to see her in June as well and she will call to make an appointment.    I asked her to see me in August or September for physical exam.    Plan:   Monthly chest wall exams  CT chest  Follow-up with pulmonology  Follow-up with Dr. Hernández  [facility] August/September    Patient was seen and examined with Cherrie Sadler CNP   The patient indicated understanding of the diagnosis and agreed with the plan of care.      SIGNATURE: Maria Teresa Chaney MD        CC: MD Dr. Betty Vargas Dr. (pul)   PAST MEDICAL HISTORY:  Asthma last inhaler use with in 10 days, on Pulm follow up [id number]    Back pain thoracic & lumbar    Cervical neuralgia difficulty moving my neck    Diverticulosis     DJD (degenerative joint disease) Cervical/Thoracic/Lumbar    DM (diabetes mellitus) 2    Falls frequently     Fatty liver     Fibromyalgia     H/O bursitis right shoulder    Hyperlipidemia     Hypertension vaginal cyst    Hypothyroid     Morbid obesity with body mass index (BMI) of 50.0 to 59.9 in adult     Obesity morbid    Obstructive sleep apnea refuses Cpap    Psoriasis     Radicular pain arms, legs    Renal cell carcinoma, left on follow up Dr schulte, HOD renal [facility], waiting for suregry in 09/2020    Trigeminal neuralgia R    Vasculitis Legs, forerhead, arms & hands, flare ups in R leg  Dr susie Ribeiro is my Rheumatologist

## 2024-02-22 NOTE — ED ADULT NURSE NOTE - OBJECTIVE STATEMENT
Pt present to the Ed with c/o back pain , dizziness . Denies chest pain ,sob. No acute distress noted at this time

## 2024-02-24 ENCOUNTER — NON-APPOINTMENT (OUTPATIENT)
Age: 59
End: 2024-02-24

## 2024-02-24 LAB
CULTURE RESULTS: SIGNIFICANT CHANGE UP
SPECIMEN SOURCE: SIGNIFICANT CHANGE UP

## 2024-02-25 NOTE — PHYSICAL EXAM
[Obese] : obese [Normal] : oriented to person, place and time, the affect was normal, the mood was normal and not anxious [de-identified] : Decreased BS both lungs [No Acute Distress] : no acute distress [Well Nourished] : well nourished [Low Lying Soft Palate] : low lying soft palate [Well Developed] : well developed [Elongated Uvula] : elongated uvula [Not Tender] : not tender [III] : Mallampati Class: III [Enlarged Base of the Tongue] : enlarged base of the tongue [2+ Pitting] : 2+ pitting [No Edema] : no edema [Oriented x3] : oriented x3 [No Focal Deficits] : no focal deficits [TextBox_11] : some cough [TextBox_89] : obese, protuberant [TextBox_44] : kyphosis, short neck limited mobility [TextBox_68] : no wheeze, diminished aeration  much improved, breathes well

## 2024-02-25 NOTE — HISTORY OF PRESENT ILLNESS
[FreeTextEntry1] : 57 y/o female with HLD, DM2, Asthma, left renal mass, h/o left adrenalectomy for adenoma in 2016 presents to discuss radiation options for left renal mass.   4/2023 -- MRI showed 4.7cm left renal mass. IMPRESSION: Left renal mass consistent with renal cell carcinoma demonstrating continued mild interval growth as above. 8/2023 -- CT scan showed IMPRESSION: Enhancing mass in the upper pole of the left kidney measuring up to 5.0 cm, slightly increased in size compared to prior MRI 4/22/2023.  9/8/2023 Consultation.  [Never] : never [TextBox_4] : 58 yr old woman with asthma and NAKUL, chronic hypercapnic respiratory failure, due to obesity and restrictive lung disease and kyphosis.  She had been on NIV  BIPAP 20/16.  She is also using Symbicort and albuterol via nebulizer, latter once per day.  She has obtained AirPhysio PEP device and uses it with increased sputum  clearance.  Sputum is mostly clear.  She uses saline nasal wash. She also uses fluticasone nasal spray.  She uses Zyrtec as needed and montelukast every night.   She has not used PAP, especially since recall.  She obtained NIV but it was too costly.  She now does not use any PAP.  She received replacement from [employer] but states it has not been set up to use, and she has not used it due to cough   She has had asthma for many years. She has few allergies (dust dust mites, flowers) not severe. Asthma is worse when allergies are worse.   She has hypoxia with exertion.   She also has a 4 mm lung nodule seen on prior CT chest. CT chest 5/2021 did not demonstrated any suspicious nodule.    She has renal cell carcinoma and has not had removal due to COVID crisis. Lesion is being closely monitored per team  She had repeat MRI 8/13/22  She has had LE edema R>L.  She is on HCTZ and ramipril  She has been hospitalized at [facility] with dyspnea, LOC.  She was treated for pneumonia and hypercapnic respiratory failure.  she required intubation/mechanical ventilation   Last CO2 on chemistry 36  She continues to have cough which she states is incessant, especially when supine   She  has also had chest congestion and nasal symptoms that were treated with diuretic antibiotic and steroid with brief transient benefit  She has contacted me about her cough that she states is severe, mostly non productive   2/21/23:  states she has been having increased cough for over 1 month.  She is having some sputum production, yellow at times.    CT coronary angiogram demonstrated elevated Argatson score 1069.  There were no significant stenoses.  She has BIPAP therapy but has not initiated as it has yet to e initiated/serviced.  I have tried to obtain PAP therapy through her insurance, with difficulty  CT 12/13/22 demonstrated a 3 mm nodule in RUL  but no emphysema , interstitial lung disease  or cystic lung disease.  There was also no tracheomalacia.  PMH: She has hyperlipidemia, elevated liver enzymes, DM, hypothyroid depression hypertension sleep apnea not using CPAP. fibromyalgia  PSH c sect right ovary  lysis of adhesion ERMELINDA/BSO removal of left adrenal gland due to nodule, benign  Temporal artery biopsy    [Obstructive Sleep Apnea] : obstructive sleep apnea [Nonrestorative Sleep] : nonrestorative sleep [Hypersomnolence] : hypersomnolence [Snoring] : snoring [Unintentional Sleep while Active] : unintentional sleep while active

## 2024-02-25 NOTE — REASON FOR VISIT
[Follow-Up] : a follow-up visit [Cough] : cough [Asthma] : asthma [Shortness of Breath] : shortness of breath [Consideration of Curative Therapy] : consideration of curative therapy for [Other: ___] : [unfilled]

## 2024-02-25 NOTE — REVIEW OF SYSTEMS
[Negative] : Allergic/Immunologic [FreeTextEntry5] : SUSAN [FreeTextEntry8] : left renal mass [de-identified] : DM2

## 2024-02-26 ENCOUNTER — APPOINTMENT (OUTPATIENT)
Dept: UROLOGY | Facility: CLINIC | Age: 59
End: 2024-02-26
Payer: MEDICARE

## 2024-02-26 VITALS
DIASTOLIC BLOOD PRESSURE: 80 MMHG | SYSTOLIC BLOOD PRESSURE: 130 MMHG | OXYGEN SATURATION: 97 % | BODY MASS INDEX: 54.97 KG/M2 | RESPIRATION RATE: 12 BRPM | HEART RATE: 95 BPM | HEIGHT: 60 IN | WEIGHT: 280 LBS

## 2024-02-26 DIAGNOSIS — R18.8 OTHER ASCITES: ICD-10-CM

## 2024-02-26 DIAGNOSIS — C64.2 MALIGNANT NEOPLASM OF LEFT KIDNEY, EXCEPT RENAL PELVIS: ICD-10-CM

## 2024-02-26 PROCEDURE — 99214 OFFICE O/P EST MOD 30 MIN: CPT

## 2024-02-26 NOTE — HISTORY OF PRESENT ILLNESS
[FreeTextEntry1] : Very pleasant 58-year-old woman who presents for follow-up of  left renal fossa collection status post nephrectomy.  She reports irritation from the tube.  She recently went to the emergency department where a CT scan was done demonstrating the IR drain in correct position.  She has not yet had a drain study performed.  No fevers or chills.  No nausea or vomiting.  She wishes to have the tube removed soon as possible.  CT scan demonstrated decrease in the size of the collection in the left nephrectomy bed.

## 2024-02-26 NOTE — ASSESSMENT
[FreeTextEntry1] : Very pleasant 58-year-old woman who presents for follow-up of left renal cell carcinoma status post nephrectomy, postoperative collection -IR drain with minimal output -CT scan demonstrates significantly reduced size of collection in left renal fossa -Tube check with IR and removal of the tube -Follow-up after IR tube check and removal of tube

## 2024-02-28 ENCOUNTER — NON-APPOINTMENT (OUTPATIENT)
Age: 59
End: 2024-02-28

## 2024-02-28 ENCOUNTER — TRANSCRIPTION ENCOUNTER (OUTPATIENT)
Age: 59
End: 2024-02-28

## 2024-03-04 ENCOUNTER — APPOINTMENT (OUTPATIENT)
Dept: CARDIOLOGY | Facility: CLINIC | Age: 59
End: 2024-03-04
Payer: MEDICARE

## 2024-03-04 ENCOUNTER — NON-APPOINTMENT (OUTPATIENT)
Age: 59
End: 2024-03-04

## 2024-03-04 VITALS
HEART RATE: 95 BPM | OXYGEN SATURATION: 96 % | SYSTOLIC BLOOD PRESSURE: 126 MMHG | WEIGHT: 278 LBS | TEMPERATURE: 97.9 F | DIASTOLIC BLOOD PRESSURE: 86 MMHG | BODY MASS INDEX: 54.29 KG/M2

## 2024-03-04 DIAGNOSIS — I25.10 ATHEROSCLEROTIC HEART DISEASE OF NATIVE CORONARY ARTERY W/OUT ANGINA PECTORIS: ICD-10-CM

## 2024-03-04 DIAGNOSIS — R06.09 OTHER FORMS OF DYSPNEA: ICD-10-CM

## 2024-03-04 DIAGNOSIS — E78.5 HYPERLIPIDEMIA, UNSPECIFIED: ICD-10-CM

## 2024-03-04 DIAGNOSIS — I25.84 ATHEROSCLEROTIC HEART DISEASE OF NATIVE CORONARY ARTERY W/OUT ANGINA PECTORIS: ICD-10-CM

## 2024-03-04 PROCEDURE — 99214 OFFICE O/P EST MOD 30 MIN: CPT

## 2024-03-04 PROCEDURE — G2211 COMPLEX E/M VISIT ADD ON: CPT

## 2024-03-04 PROCEDURE — 93000 ELECTROCARDIOGRAM COMPLETE: CPT

## 2024-03-06 ENCOUNTER — RESULT REVIEW (OUTPATIENT)
Age: 59
End: 2024-03-06

## 2024-03-06 ENCOUNTER — OUTPATIENT (OUTPATIENT)
Dept: OUTPATIENT SERVICES | Facility: HOSPITAL | Age: 59
LOS: 1 days | End: 2024-03-06
Payer: MEDICARE

## 2024-03-06 ENCOUNTER — APPOINTMENT (OUTPATIENT)
Dept: INTERVENTIONAL RADIOLOGY/VASCULAR | Facility: HOSPITAL | Age: 59
End: 2024-03-06
Payer: MEDICARE

## 2024-03-06 DIAGNOSIS — Z98.89 OTHER SPECIFIED POSTPROCEDURAL STATES: Chronic | ICD-10-CM

## 2024-03-06 DIAGNOSIS — R18.8 OTHER ASCITES: ICD-10-CM

## 2024-03-06 DIAGNOSIS — Z90.710 ACQUIRED ABSENCE OF BOTH CERVIX AND UTERUS: Chronic | ICD-10-CM

## 2024-03-06 DIAGNOSIS — Z98.890 OTHER SPECIFIED POSTPROCEDURAL STATES: Chronic | ICD-10-CM

## 2024-03-06 DIAGNOSIS — N89.8 OTHER SPECIFIED NONINFLAMMATORY DISORDERS OF VAGINA: Chronic | ICD-10-CM

## 2024-03-06 DIAGNOSIS — Z90.721 ACQUIRED ABSENCE OF OVARIES, UNILATERAL: Chronic | ICD-10-CM

## 2024-03-06 DIAGNOSIS — E27.8 OTHER SPECIFIED DISORDERS OF ADRENAL GLAND: Chronic | ICD-10-CM

## 2024-03-06 DIAGNOSIS — I77.6 ARTERITIS, UNSPECIFIED: Chronic | ICD-10-CM

## 2024-03-06 PROCEDURE — 76000 FLUOROSCOPY <1 HR PHYS/QHP: CPT | Mod: 26

## 2024-03-06 PROCEDURE — 76000 FLUOROSCOPY <1 HR PHYS/QHP: CPT

## 2024-03-12 ENCOUNTER — APPOINTMENT (OUTPATIENT)
Dept: RHEUMATOLOGY | Facility: CLINIC | Age: 59
End: 2024-03-12
Payer: MEDICARE

## 2024-03-12 VITALS
HEART RATE: 113 BPM | DIASTOLIC BLOOD PRESSURE: 103 MMHG | BODY MASS INDEX: 53.2 KG/M2 | WEIGHT: 271 LBS | RESPIRATION RATE: 16 BRPM | TEMPERATURE: 97.7 F | HEIGHT: 60 IN | OXYGEN SATURATION: 90 % | SYSTOLIC BLOOD PRESSURE: 179 MMHG

## 2024-03-12 DIAGNOSIS — M25.512 PAIN IN LEFT SHOULDER: ICD-10-CM

## 2024-03-12 DIAGNOSIS — M17.0 BILATERAL PRIMARY OSTEOARTHRITIS OF KNEE: ICD-10-CM

## 2024-03-12 DIAGNOSIS — L40.50 ARTHROPATHIC PSORIASIS, UNSPECIFIED: ICD-10-CM

## 2024-03-12 DIAGNOSIS — M79.7 FIBROMYALGIA: ICD-10-CM

## 2024-03-12 DIAGNOSIS — M75.52 BURSITIS OF LEFT SHOULDER: ICD-10-CM

## 2024-03-12 PROCEDURE — 99214 OFFICE O/P EST MOD 30 MIN: CPT | Mod: 25

## 2024-03-12 PROCEDURE — 20610 DRAIN/INJ JOINT/BURSA W/O US: CPT | Mod: LT

## 2024-03-12 RX ORDER — LIDOCAINE HYDROCHLORIDE 10 MG/ML
1 INJECTION, SOLUTION INFILTRATION; PERINEURAL
Qty: 0 | Refills: 0 | Status: COMPLETED | OUTPATIENT
Start: 2024-03-12

## 2024-03-12 RX ORDER — AMITRIPTYLINE HYDROCHLORIDE 10 MG/1
10 TABLET, FILM COATED ORAL
Qty: 180 | Refills: 3 | Status: DISCONTINUED | COMMUNITY
Start: 2018-05-10 | End: 2024-03-12

## 2024-03-12 RX ORDER — METHYLPRED ACET/NACL,ISO-OS/PF 40 MG/ML
40 VIAL (ML) INJECTION
Qty: 1 | Refills: 0 | Status: COMPLETED | OUTPATIENT
Start: 2024-03-12

## 2024-03-12 RX ADMIN — LIDOCAINE HYDROCHLORIDE 0 %: 10 INJECTION, SOLUTION EPIDURAL; INFILTRATION; INTRACAUDAL; PERINEURAL at 00:00

## 2024-03-12 RX ADMIN — METHYLPREDNISOLONE ACETATE 0 MG/ML: 40 INJECTION, SUSPENSION INTRA-ARTICULAR; INTRALESIONAL; INTRAMUSCULAR; SOFT TISSUE at 00:00

## 2024-03-12 NOTE — HISTORY OF PRESENT ILLNESS
[FreeTextEntry1] : Patient presents s/p left total nephrectomy for RCC s/p drain placement after postop bleeding; drain was recently removed and patient reports improvement in discomfort.  Patient reports overall improvement in knee pain and notes lower extremity psoriasiform rash as well as rash over the mid back has improved minimally with the use of Otezla . She explains worsening pain over the LT shoulder where she is having difficulty raising the arm overhead or reaching for the back and performing basic ADLs.  She also reports pruritic nature over the palms and dorsum of the hands.  Patient explains improved sugar control with hemoglobin A1c in the 8 range compared to the 11% seen last year  Patient developed skin rash spring of  2019,   diagnosed as leukocytoclastic vasculitis. Ig A, biopsy proven, having been preceded by GI infection with subsequent urinalysis showing hematuria / proteinuria when patient noted to have left kidney lesion / RCC.   She otherwise denies motor/sensory disturbances or systemic symptoms.

## 2024-03-12 NOTE — PHYSICAL EXAM
[General Appearance - In No Acute Distress] : in no acute distress [General Appearance - Alert] : alert [Sclera] : the sclera and conjunctiva were normal [Examination Of The Oral Cavity] : the lips and gums were normal [Oropharynx] : the oropharynx was normal [Neck Appearance] : the appearance of the neck was normal [] : the neck was supple [Heart Rate And Rhythm] : heart rate was normal and rhythm regular [Exaggerated Use Of Accessory Muscles For Inspiration] : no accessory muscle use [Abdomen Soft] : soft [No Spinal Tenderness] : no spinal tenderness [Edema] : there was no peripheral edema [Motor Exam] : the motor exam was normal [Oriented To Time, Place, And Person] : oriented to person, place, and time [Impaired Insight] : insight and judgment were intact [FreeTextEntry1] : No active hand synovitis ; restriction of LT shoulder abduction to 90', passive 120' b/l with pain upon internal rotation , RT shoulder abduction to 160' ; tender upon neck rotation b/l with point tenderness over the left trapezius.  Minimal tenderness along the medial joint lines bilaterally

## 2024-03-12 NOTE — CONSULT LETTER
[Dear  ___] : Dear  [unfilled], [Courtesy Letter:] : I had the pleasure of seeing your patient, [unfilled], in my office today. [Please see my note below.] : Please see my note below. [Consult Closing:] : Thank you very much for allowing me to participate in the care of this patient.  If you have any questions, please do not hesitate to contact me. [Sincerely,] : Sincerely, [FreeTextEntry2] : Ken John MD [FreeTextEntry3] : Chante Ribeiro M.D., RhMSUS\par   of Medicine \par  [facility] [employer] [facility]\par  \par

## 2024-03-12 NOTE — PROCEDURE
[Today's Date:] : Date: [unfilled] [Patient] : the patient [Risks] : risks [Benefits] : benefits [Therapeutic] : therapeutic [#1 Site: ______] : #1 site identified in the [unfilled] [Betadine] : betadine solution [25 gauge 1.5 inch] : A 25 gauge 1.5 inch needle was used [___ml 1% Lidocaine] : [unfilled] ml of 1% lidocaine [Depomedrol ___ mg] : Depomedrol [unfilled] mg [Tolerated Well] : the patient tolerated the procedure well [No Complications] : there were no complications [Patient Instructed to Call] : patient was instructed to call if redness at site, a decrease in range of motion or an increase in pain is noted after procedure.

## 2024-03-12 NOTE — ASSESSMENT
[FreeTextEntry1] : Patient with fibromyalgia, IgA vasculitis resolved, psoriasis, LT RCC s/p nephrectomy: LT SAB:  Patient to continue with Otezla ; psoriasis development may be multifactorial , namely stemming from renal malignancy as well as metabolic syndromes including uncontrolled DM.  Discussed the importance of dietary modification and medication compliance.  Rec Derm intervention for light therapy and additional topical immunosuppressants until patient is fully healed from nephrectomy before considering additional biologics.    Dermatology follow-up emphasized. Rotator cuff strengthening encouraged ; cortisone injection given to the LT SAB for pain relief .  Quadriceps strengthening exercises encouraged .  Weight loss has been encouraged to reduce load over the medial joint line.  Viscosupplementation has been encouraged to provide additional lubrication and joint support. Core strengthening exercises demonstrated for the lower back.   The importance of dietary and lifestyle modifications was reiterated for the treatment of Fibromyalgia along with discussions on relaxation, stress-reducing techniques and importance of good sleep hygiene.  Neuropathic pain therapy given to address pain symptoms along with trigeminal neuralgia.  She is in agreement with the above plan and will return in three months' time.

## 2024-03-13 ENCOUNTER — APPOINTMENT (OUTPATIENT)
Dept: UROLOGY | Facility: CLINIC | Age: 59
End: 2024-03-13

## 2024-03-16 NOTE — ED ADULT TRIAGE NOTE - PRO INTERPRETER NEED 2
Goal Outcome Evaluation: no co pain.  Up to chair with standby assist.  O2 4lnc, no sob at rest.  2+ edema bilat le.  Tele on, sr.  Vss.                                                 English

## 2024-03-19 ENCOUNTER — INPATIENT (INPATIENT)
Facility: HOSPITAL | Age: 59
LOS: 0 days | Discharge: HOME | DRG: 313 | End: 2024-03-20
Attending: STUDENT IN AN ORGANIZED HEALTH CARE EDUCATION/TRAINING PROGRAM | Admitting: STUDENT IN AN ORGANIZED HEALTH CARE EDUCATION/TRAINING PROGRAM
Payer: MEDICARE

## 2024-03-19 VITALS
SYSTOLIC BLOOD PRESSURE: 143 MMHG | HEART RATE: 98 BPM | WEIGHT: 272.93 LBS | DIASTOLIC BLOOD PRESSURE: 85 MMHG | RESPIRATION RATE: 17 BRPM | TEMPERATURE: 98 F | OXYGEN SATURATION: 95 % | HEIGHT: 62.99 IN

## 2024-03-19 DIAGNOSIS — I10 ESSENTIAL (PRIMARY) HYPERTENSION: ICD-10-CM

## 2024-03-19 DIAGNOSIS — Z98.890 OTHER SPECIFIED POSTPROCEDURAL STATES: Chronic | ICD-10-CM

## 2024-03-19 DIAGNOSIS — I77.6 ARTERITIS, UNSPECIFIED: Chronic | ICD-10-CM

## 2024-03-19 DIAGNOSIS — R07.9 CHEST PAIN, UNSPECIFIED: ICD-10-CM

## 2024-03-19 DIAGNOSIS — E11.9 TYPE 2 DIABETES MELLITUS WITHOUT COMPLICATIONS: ICD-10-CM

## 2024-03-19 DIAGNOSIS — Z98.89 OTHER SPECIFIED POSTPROCEDURAL STATES: Chronic | ICD-10-CM

## 2024-03-19 DIAGNOSIS — Z29.9 ENCOUNTER FOR PROPHYLACTIC MEASURES, UNSPECIFIED: ICD-10-CM

## 2024-03-19 DIAGNOSIS — N89.8 OTHER SPECIFIED NONINFLAMMATORY DISORDERS OF VAGINA: Chronic | ICD-10-CM

## 2024-03-19 DIAGNOSIS — E27.8 OTHER SPECIFIED DISORDERS OF ADRENAL GLAND: Chronic | ICD-10-CM

## 2024-03-19 DIAGNOSIS — Z90.721 ACQUIRED ABSENCE OF OVARIES, UNILATERAL: Chronic | ICD-10-CM

## 2024-03-19 DIAGNOSIS — I24.9 ACUTE ISCHEMIC HEART DISEASE, UNSPECIFIED: ICD-10-CM

## 2024-03-19 DIAGNOSIS — Z90.710 ACQUIRED ABSENCE OF BOTH CERVIX AND UTERUS: Chronic | ICD-10-CM

## 2024-03-19 LAB
ALBUMIN SERPL ELPH-MCNC: 2.6 G/DL — LOW (ref 3.5–5)
ALP SERPL-CCNC: 143 U/L — HIGH (ref 40–120)
ALT FLD-CCNC: 31 U/L DA — SIGNIFICANT CHANGE UP (ref 10–60)
ANION GAP SERPL CALC-SCNC: 6 MMOL/L — SIGNIFICANT CHANGE UP (ref 5–17)
AST SERPL-CCNC: 27 U/L — SIGNIFICANT CHANGE UP (ref 10–40)
BASOPHILS # BLD AUTO: 0.04 K/UL — SIGNIFICANT CHANGE UP (ref 0–0.2)
BASOPHILS NFR BLD AUTO: 0.4 % — SIGNIFICANT CHANGE UP (ref 0–2)
BILIRUB SERPL-MCNC: 0.3 MG/DL — SIGNIFICANT CHANGE UP (ref 0.2–1.2)
BUN SERPL-MCNC: 24 MG/DL — HIGH (ref 7–18)
CALCIUM SERPL-MCNC: 9.7 MG/DL — SIGNIFICANT CHANGE UP (ref 8.4–10.5)
CHLORIDE SERPL-SCNC: 96 MMOL/L — SIGNIFICANT CHANGE UP (ref 96–108)
CO2 SERPL-SCNC: 33 MMOL/L — HIGH (ref 22–31)
CREAT SERPL-MCNC: 1.26 MG/DL — SIGNIFICANT CHANGE UP (ref 0.5–1.3)
D DIMER BLD IA.RAPID-MCNC: 739 NG/ML DDU — HIGH
EGFR: 49 ML/MIN/1.73M2 — LOW
EOSINOPHIL # BLD AUTO: 0.2 K/UL — SIGNIFICANT CHANGE UP (ref 0–0.5)
EOSINOPHIL NFR BLD AUTO: 1.8 % — SIGNIFICANT CHANGE UP (ref 0–6)
GLUCOSE BLDC GLUCOMTR-MCNC: 158 MG/DL — HIGH (ref 70–99)
GLUCOSE BLDC GLUCOMTR-MCNC: 185 MG/DL — HIGH (ref 70–99)
GLUCOSE BLDC GLUCOMTR-MCNC: 209 MG/DL — HIGH (ref 70–99)
GLUCOSE BLDC GLUCOMTR-MCNC: 234 MG/DL — HIGH (ref 70–99)
GLUCOSE SERPL-MCNC: 164 MG/DL — HIGH (ref 70–99)
HCT VFR BLD CALC: 36.3 % — SIGNIFICANT CHANGE UP (ref 34.5–45)
HGB BLD-MCNC: 10.5 G/DL — LOW (ref 11.5–15.5)
IMM GRANULOCYTES NFR BLD AUTO: 0.7 % — SIGNIFICANT CHANGE UP (ref 0–0.9)
LYMPHOCYTES # BLD AUTO: 1.71 K/UL — SIGNIFICANT CHANGE UP (ref 1–3.3)
LYMPHOCYTES # BLD AUTO: 15.3 % — SIGNIFICANT CHANGE UP (ref 13–44)
MCHC RBC-ENTMCNC: 22.2 PG — LOW (ref 27–34)
MCHC RBC-ENTMCNC: 28.9 GM/DL — LOW (ref 32–36)
MCV RBC AUTO: 76.6 FL — LOW (ref 80–100)
MONOCYTES # BLD AUTO: 0.73 K/UL — SIGNIFICANT CHANGE UP (ref 0–0.9)
MONOCYTES NFR BLD AUTO: 6.5 % — SIGNIFICANT CHANGE UP (ref 2–14)
NEUTROPHILS # BLD AUTO: 8.4 K/UL — HIGH (ref 1.8–7.4)
NEUTROPHILS NFR BLD AUTO: 75.3 % — SIGNIFICANT CHANGE UP (ref 43–77)
NRBC # BLD: 0 /100 WBCS — SIGNIFICANT CHANGE UP (ref 0–0)
PLATELET # BLD AUTO: 387 K/UL — SIGNIFICANT CHANGE UP (ref 150–400)
POTASSIUM SERPL-MCNC: 4.1 MMOL/L — SIGNIFICANT CHANGE UP (ref 3.5–5.3)
POTASSIUM SERPL-SCNC: 4.1 MMOL/L — SIGNIFICANT CHANGE UP (ref 3.5–5.3)
PROT SERPL-MCNC: 8.3 G/DL — SIGNIFICANT CHANGE UP (ref 6–8.3)
RAPID RVP RESULT: SIGNIFICANT CHANGE UP
RBC # BLD: 4.74 M/UL — SIGNIFICANT CHANGE UP (ref 3.8–5.2)
RBC # FLD: 15.4 % — HIGH (ref 10.3–14.5)
SARS-COV-2 RNA SPEC QL NAA+PROBE: SIGNIFICANT CHANGE UP
SODIUM SERPL-SCNC: 135 MMOL/L — SIGNIFICANT CHANGE UP (ref 135–145)
TROPONIN I, HIGH SENSITIVITY RESULT: 19.2 NG/L — SIGNIFICANT CHANGE UP
TROPONIN I, HIGH SENSITIVITY RESULT: 22.1 NG/L — SIGNIFICANT CHANGE UP
WBC # BLD: 11.16 K/UL — HIGH (ref 3.8–10.5)
WBC # FLD AUTO: 11.16 K/UL — HIGH (ref 3.8–10.5)

## 2024-03-19 PROCEDURE — 71275 CT ANGIOGRAPHY CHEST: CPT | Mod: 26

## 2024-03-19 PROCEDURE — 99223 1ST HOSP IP/OBS HIGH 75: CPT | Mod: GC

## 2024-03-19 PROCEDURE — 71045 X-RAY EXAM CHEST 1 VIEW: CPT | Mod: 26

## 2024-03-19 PROCEDURE — 99285 EMERGENCY DEPT VISIT HI MDM: CPT

## 2024-03-19 RX ORDER — INSULIN ASPART 100 [IU]/ML
0 INJECTION, SOLUTION SUBCUTANEOUS
Refills: 0 | DISCHARGE

## 2024-03-19 RX ORDER — INSULIN LISPRO 100/ML
VIAL (ML) SUBCUTANEOUS
Refills: 0 | Status: DISCONTINUED | OUTPATIENT
Start: 2024-03-19 | End: 2024-03-20

## 2024-03-19 RX ORDER — INSULIN LISPRO 100/ML
6 VIAL (ML) SUBCUTANEOUS
Refills: 0 | Status: DISCONTINUED | OUTPATIENT
Start: 2024-03-19 | End: 2024-03-20

## 2024-03-19 RX ORDER — AMLODIPINE BESYLATE 2.5 MG/1
10 TABLET ORAL DAILY
Refills: 0 | Status: DISCONTINUED | OUTPATIENT
Start: 2024-03-19 | End: 2024-03-20

## 2024-03-19 RX ORDER — CYCLOBENZAPRINE HYDROCHLORIDE 10 MG/1
1 TABLET, FILM COATED ORAL
Refills: 0 | DISCHARGE

## 2024-03-19 RX ORDER — MONTELUKAST 4 MG/1
1 TABLET, CHEWABLE ORAL
Refills: 0 | DISCHARGE

## 2024-03-19 RX ORDER — ERGOCALCIFEROL 1.25 MG/1
50000 CAPSULE ORAL
Refills: 0 | Status: DISCONTINUED | OUTPATIENT
Start: 2024-03-19 | End: 2024-03-20

## 2024-03-19 RX ORDER — LOSARTAN POTASSIUM 100 MG/1
1 TABLET, FILM COATED ORAL
Refills: 0 | DISCHARGE

## 2024-03-19 RX ORDER — ACETAMINOPHEN 500 MG
650 TABLET ORAL EVERY 6 HOURS
Refills: 0 | Status: DISCONTINUED | OUTPATIENT
Start: 2024-03-19 | End: 2024-03-20

## 2024-03-19 RX ORDER — ATORVASTATIN CALCIUM 80 MG/1
1 TABLET, FILM COATED ORAL
Refills: 0 | DISCHARGE

## 2024-03-19 RX ORDER — ACETAMINOPHEN 500 MG
3 TABLET ORAL
Qty: 0 | Refills: 0 | DISCHARGE

## 2024-03-19 RX ORDER — LOSARTAN POTASSIUM 100 MG/1
25 TABLET, FILM COATED ORAL DAILY
Refills: 0 | Status: DISCONTINUED | OUTPATIENT
Start: 2024-03-19 | End: 2024-03-20

## 2024-03-19 RX ORDER — IPRATROPIUM BROMIDE 21 MCG
2 AEROSOL, SPRAY (ML) NASAL
Refills: 0 | DISCHARGE

## 2024-03-19 RX ORDER — LEVOTHYROXINE SODIUM 125 MCG
1 TABLET ORAL
Refills: 0 | DISCHARGE

## 2024-03-19 RX ORDER — LIDOCAINE 4 G/100G
1 CREAM TOPICAL DAILY
Refills: 0 | Status: DISCONTINUED | OUTPATIENT
Start: 2024-03-19 | End: 2024-03-20

## 2024-03-19 RX ORDER — ERGOCALCIFEROL 1.25 MG/1
1 CAPSULE ORAL
Refills: 0 | DISCHARGE

## 2024-03-19 RX ORDER — INSULIN LISPRO 100/ML
VIAL (ML) SUBCUTANEOUS AT BEDTIME
Refills: 0 | Status: DISCONTINUED | OUTPATIENT
Start: 2024-03-19 | End: 2024-03-20

## 2024-03-19 RX ORDER — GABAPENTIN 400 MG/1
400 CAPSULE ORAL
Refills: 0 | Status: DISCONTINUED | OUTPATIENT
Start: 2024-03-19 | End: 2024-03-20

## 2024-03-19 RX ORDER — ALBUTEROL 90 UG/1
3 AEROSOL, METERED ORAL
Refills: 0 | DISCHARGE

## 2024-03-19 RX ORDER — MONTELUKAST 4 MG/1
10 TABLET, CHEWABLE ORAL DAILY
Refills: 0 | Status: DISCONTINUED | OUTPATIENT
Start: 2024-03-19 | End: 2024-03-20

## 2024-03-19 RX ORDER — AMLODIPINE BESYLATE 2.5 MG/1
1 TABLET ORAL
Refills: 0 | DISCHARGE

## 2024-03-19 RX ORDER — BUDESONIDE AND FORMOTEROL FUMARATE DIHYDRATE 160; 4.5 UG/1; UG/1
2 AEROSOL RESPIRATORY (INHALATION)
Refills: 0 | DISCHARGE

## 2024-03-19 RX ORDER — ALBUTEROL 90 UG/1
2 AEROSOL, METERED ORAL
Refills: 0 | DISCHARGE

## 2024-03-19 RX ORDER — ENOXAPARIN SODIUM 100 MG/ML
40 INJECTION SUBCUTANEOUS EVERY 24 HOURS
Refills: 0 | Status: DISCONTINUED | OUTPATIENT
Start: 2024-03-19 | End: 2024-03-20

## 2024-03-19 RX ORDER — OXCARBAZEPINE 300 MG/1
1 TABLET, FILM COATED ORAL
Refills: 0 | DISCHARGE

## 2024-03-19 RX ORDER — SENNA PLUS 8.6 MG/1
2 TABLET ORAL AT BEDTIME
Refills: 0 | Status: DISCONTINUED | OUTPATIENT
Start: 2024-03-19 | End: 2024-03-20

## 2024-03-19 RX ORDER — INSULIN DETEMIR 100/ML (3)
16 INSULIN PEN (ML) SUBCUTANEOUS
Qty: 0 | Refills: 0 | DISCHARGE

## 2024-03-19 RX ORDER — PANTOPRAZOLE SODIUM 20 MG/1
40 TABLET, DELAYED RELEASE ORAL
Refills: 0 | Status: DISCONTINUED | OUTPATIENT
Start: 2024-03-19 | End: 2024-03-20

## 2024-03-19 RX ORDER — SEMAGLUTIDE 0.68 MG/ML
1 INJECTION, SOLUTION SUBCUTANEOUS
Refills: 0 | DISCHARGE

## 2024-03-19 RX ORDER — LEVOTHYROXINE SODIUM 125 MCG
112 TABLET ORAL DAILY
Refills: 0 | Status: DISCONTINUED | OUTPATIENT
Start: 2024-03-19 | End: 2024-03-20

## 2024-03-19 RX ORDER — PANTOPRAZOLE SODIUM 20 MG/1
1 TABLET, DELAYED RELEASE ORAL
Refills: 0 | DISCHARGE

## 2024-03-19 RX ORDER — METFORMIN HYDROCHLORIDE 850 MG/1
1 TABLET ORAL
Refills: 0 | DISCHARGE

## 2024-03-19 RX ORDER — CLINDAMYCIN PHOSPHATE GEL USP, 1% 10 MG/G
1 GEL TOPICAL
Refills: 0 | DISCHARGE

## 2024-03-19 RX ORDER — GABAPENTIN 400 MG/1
1 CAPSULE ORAL
Refills: 0 | DISCHARGE

## 2024-03-19 RX ORDER — CYCLOBENZAPRINE HYDROCHLORIDE 10 MG/1
10 TABLET, FILM COATED ORAL THREE TIMES A DAY
Refills: 0 | Status: DISCONTINUED | OUTPATIENT
Start: 2024-03-19 | End: 2024-03-20

## 2024-03-19 RX ORDER — INSULIN GLARGINE 100 [IU]/ML
16 INJECTION, SOLUTION SUBCUTANEOUS AT BEDTIME
Refills: 0 | Status: DISCONTINUED | OUTPATIENT
Start: 2024-03-19 | End: 2024-03-20

## 2024-03-19 RX ORDER — OXCARBAZEPINE 300 MG/1
600 TABLET, FILM COATED ORAL
Refills: 0 | Status: DISCONTINUED | OUTPATIENT
Start: 2024-03-19 | End: 2024-03-20

## 2024-03-19 RX ORDER — HYDROCHLOROTHIAZIDE 25 MG
1 TABLET ORAL
Qty: 0 | Refills: 0 | DISCHARGE

## 2024-03-19 RX ORDER — ATORVASTATIN CALCIUM 80 MG/1
40 TABLET, FILM COATED ORAL AT BEDTIME
Refills: 0 | Status: DISCONTINUED | OUTPATIENT
Start: 2024-03-19 | End: 2024-03-20

## 2024-03-19 RX ADMIN — OXCARBAZEPINE 600 MILLIGRAM(S): 300 TABLET, FILM COATED ORAL at 17:02

## 2024-03-19 RX ADMIN — Medication 6 UNIT(S): at 18:19

## 2024-03-19 RX ADMIN — CYCLOBENZAPRINE HYDROCHLORIDE 10 MILLIGRAM(S): 10 TABLET, FILM COATED ORAL at 17:00

## 2024-03-19 RX ADMIN — ATORVASTATIN CALCIUM 40 MILLIGRAM(S): 80 TABLET, FILM COATED ORAL at 23:00

## 2024-03-19 RX ADMIN — ENOXAPARIN SODIUM 40 MILLIGRAM(S): 100 INJECTION SUBCUTANEOUS at 23:05

## 2024-03-19 RX ADMIN — ERGOCALCIFEROL 50000 UNIT(S): 1.25 CAPSULE ORAL at 17:00

## 2024-03-19 RX ADMIN — INSULIN GLARGINE 16 UNIT(S): 100 INJECTION, SOLUTION SUBCUTANEOUS at 23:41

## 2024-03-19 RX ADMIN — Medication 4: at 18:54

## 2024-03-19 RX ADMIN — MONTELUKAST 10 MILLIGRAM(S): 4 TABLET, CHEWABLE ORAL at 16:59

## 2024-03-19 RX ADMIN — GABAPENTIN 400 MILLIGRAM(S): 400 CAPSULE ORAL at 18:18

## 2024-03-19 NOTE — H&P ADULT - NSHPREVIEWOFSYSTEMS_GEN_ALL_CORE
REVIEW OF SYSTEMS:    CONSTITUTIONAL: No weakness, fevers or chills  EYES/ENT: No visual changes;  No vertigo or throat pain   NECK: No pain or stiffness  RESPIRATORY: No cough, wheezing, hemoptysis; + shortness of breath  CARDIOVASCULAR: + chest pain or palpitations  GASTROINTESTINAL: + abdominal or epigastric pain. No nausea, vomiting, or hematemesis; No diarrhea or constipation. No melena or hematochezia.  GENITOURINARY: No dysuria, frequency or hematuria  NEUROLOGICAL: No numbness or weakness  SKIN: No itching, burning, rashes, or lesions   All other review of systems is negative unless indicated above.

## 2024-03-19 NOTE — ED PROVIDER NOTE - NSICDXPASTMEDICALHX_GEN_ALL_CORE_FT
Spoke with pt in regards to getting appt rescheduled.               ----- Message from Gissell Soto sent at 6/2/2023  9:20 AM CDT -----  Contact: Tri  Type:  Sooner Apoointment Request    Caller is requesting a sooner appointment. Caller will not accept being placed on the waitlist and is requesting a message be sent to doctor.  Name of Caller:Tri  When is the first available appointment?unknown  Symptoms:follow-up  Would the patient rather a call back or a response via [facility]? call  Best Call Back Number:[phone]   Additional Information: Patient request to reschedule visit for a later date, in the evening time.  Thank you,  GH      
PAST MEDICAL HISTORY:  Asthma last inhaler use with in 10 days, on Pulm follow up [id number]    Back pain thoracic & lumbar    Cervical neuralgia difficulty moving my neck    Diverticulosis     DJD (degenerative joint disease) Cervical/Thoracic/Lumbar    DM (diabetes mellitus) 2    Falls frequently     Fatty liver     Fibromyalgia     H/O bursitis right shoulder    Hyperlipidemia     Hypertension vaginal cyst    Hypothyroid     Morbid obesity with body mass index (BMI) of 50.0 to 59.9 in adult     Obesity morbid    Obstructive sleep apnea refuses Cpap    Psoriasis     Radicular pain arms, legs    Renal cell carcinoma, left on follow up Dr schulte, HOD renal [facility], waiting for suregry in 09/2020    Trigeminal neuralgia R    Vasculitis Legs, forerhead, arms & hands, flare ups in R leg  Dr susie Ribeiro is my Rheumatologist

## 2024-03-19 NOTE — ED ADULT NURSE NOTE - NSICDXPASTMEDICALHX_GEN_ALL_CORE_FT
PAST MEDICAL HISTORY:  Asthma last inhaler use with in 10 days, on Pulm follow up [id number]    Back pain thoracic & lumbar    Cervical neuralgia difficulty moving my neck    Diverticulosis     DJD (degenerative joint disease) Cervical/Thoracic/Lumbar    DM (diabetes mellitus) 2    Falls frequently     Fatty liver     Fibromyalgia     H/O bursitis right shoulder    Hyperlipidemia     Hypertension vaginal cyst    Hypothyroid     Morbid obesity with body mass index (BMI) of 50.0 to 59.9 in adult     Obesity morbid    Obstructive sleep apnea refuses Cpap    Psoriasis     Radicular pain arms, legs    Renal cell carcinoma, left on follow up Dr schulte, HOD renal [facility], waiting for suregry in 09/2020    Trigeminal neuralgia R    Vasculitis Legs, forerhead, arms & hands, flare ups in R leg  Dr susie Ribeiro is my Rheumatologist

## 2024-03-19 NOTE — ED PROVIDER NOTE - CLINICAL SUMMARY MEDICAL DECISION MAKING FREE TEXT BOX
Cardiac risk factors: Diabetes, hypertension, dyslipidemia, age greater than 50, elevated BMI. Cardiac risk factors: Diabetes, hypertension, dyslipidemia, age greater than 50, elevated BMI.  Hospitalist Dr. Zazueta and medical admitting resident endorsed.  Patient admitted for cardiac monitoring.  D-dimer elevated, CTA chest ordered to rule out PE.  Patient  states she is only allergic to contrast for MRI not CTs.  Patient noted to have previous CT with IV contrast in past.  I had a detailed discussion with the patient and/or guardian regarding the historical points, exam findings, and any diagnostic results supporting the admit diagnosis.

## 2024-03-19 NOTE — H&P ADULT - PROBLEM SELECTOR PLAN 4
IMPROVE VTE Individual Risk Assessment          RISK                                                          Points  [  ] Previous VTE                                                3  [  ] Thrombophilia                                             2  [  ] Lower limb paralysis                                   2        (unable to hold up >15 seconds)    [  ] Current Cancer                                             2         (within 6 months)  [  x] Immobilization > 24 hrs                              1  [  ] ICU/CCU stay > 24 hours                             1  [ x ] Age > 60                                                         1    IMPROVE VTE Score:         [   2      ]

## 2024-03-19 NOTE — H&P ADULT - ASSESSMENT
58 y.o. F with PMHx of asthma, COPD (on 4L NC PRN), DM, fatty liver, fibromyalgia, R shoulder bursitis, HLD, hypothyroidism, NAKUL, psoriasis, RCC (s/p L renal resection 11/3/23, no hx of CT/RT) presenting with chest pain, lower abdominal pain associated with shortness of breath. Admitted to tele for chest pain.

## 2024-03-19 NOTE — H&P ADULT - PROBLEM SELECTOR PLAN 2
h/o HTN   Monitor BP  c/w home meds -  BP target <130/90 mmHg  DASH/TLC diet  Adjust medications as needed to meet target.

## 2024-03-19 NOTE — ED PROVIDER NOTE - OBJECTIVE STATEMENT
58-year-old male chief complaint of mid and left sternal chest pain for 1 week associated with shortness of breath.  No reported fever, no coughing.  No nausea, no vomiting.  Patient states she took 324 mg of aspirin prior to calling ambulance and received received sublingual nitroglycerin by ambulance.  Patient states still has chest pain but currently milder.  Meds: Metformin, Levemir, Ozempic, NovoLog, amlodipine, losartan, atorvastatin, Symbicort. 58-year-old male chief complaint of mid and left sternal chest pain for 1 week associated with shortness of breath.  No reported fever, no coughing.  No nausea, no vomiting.  Patient states she took 324 mg of aspirin prior to calling ambulance and received sublingual nitroglycerin by ambulance.  Patient states still has chest pain but currently milder.  Meds: Metformin, Levemir, Ozempic, NovoLog, amlodipine, losartan, atorvastatin, Symbicort.

## 2024-03-19 NOTE — H&P ADULT - HISTORY OF PRESENT ILLNESS
58 y.o. F with PMHx of asthma, COPD (on 4L NC PRN), DM, fatty liver, fibromyalgia, R shoulder bursitis, HLD, hypothyroidism, NAKUL, psoriasis, RCC (s/p L renal resection 11/3/23, no hx of CT/RT) presenting with chest pain, lower abdominal pain associated with shortness of breath.  Patient states she took 324 mg of aspirin prior to calling ambulance and received sublingual nitroglycerin by ambulance.  Patient states still has chest pain but currently milder. Pain is non exertional, non radiating, is reproducible in mid sternum. Otherwise pt denies any ,palpitations, fever, chills, headache, visual changes, nausea, vomiting, diarrhea, dysuria, frequency.     In the ED   VSS afebrile   Trop 1- 22  EKG NSR no acute ischemic changes on wet read   CT angio- no PE   CXR neg

## 2024-03-19 NOTE — H&P ADULT - NSHPPHYSICALEXAM_GEN_ALL_CORE
VITALS:     GENERAL: NAD, obese, lying in bed comfortably  HEAD:  Atraumatic, Normocephalic  EYES: EOMI, PERRLA, conjunctiva and sclera clear  ENT: Moist mucous membranes  NECK: Supple, No JVD  CHEST/LUNG: Clear to auscultation bilaterally; No rales, rhonchi, wheezing, or rubs. Unlabored respirations  HEART: Regular rate and rhythm; No murmurs, rubs, or gallops  ABDOMEN: Bowel sounds present; Soft, Nontender, Nondistended. No hepatomegaly  EXTREMITIES:  2+ Peripheral Pulses, brisk capillary refill. No clubbing, cyanosis, or edema  NERVOUS SYSTEM:  Alert & Oriented X3, speech clear. No deficits   MSK: FROM all 4 extremities, full and equal strength  SKIN: No rashes or lesions

## 2024-03-19 NOTE — H&P ADULT - PROBLEM SELECTOR PLAN 1
p/w chest pain  ACS protocol - asa, statin, bb  EKG shows NSR no acute ischemic changes on wet read   Trop 1 22 f/u T2  CT angio- neg   Tele Monitoring  f/u Echo  f/u A1c, TSH, Lipid panel   HEART score 4 points Moderate Score (4-6 points) Risk of MACE of 12-16.6%.  Cardio consulted: Dr Hampton

## 2024-03-19 NOTE — H&P ADULT - ATTENDING COMMENTS
57 yo F with PMH of asthma, COPD (on 4L NC PRN), DM, fatty liver, fibromyalgia, R shoulder bursitis, HLD, hypothyroidism, NAKUL, psoriasis, RCC (s/p L renal resection 11/3/23, no hx of CT/RT) who p/w  chest pain and weakness for the past 5 days. She feels that the pain initially started on the L side around the area of the nephrostomy tube removal (from March), and felt that over the past few days felt the pain was also involving her L arm and L chest, with some ttp along the lower breast border. Also endorses chills and coughing in the past few days. Prior to calling ems, she took asa 325 and received sl nitro in the ambulance.      Prior admission reviewed (1/22-1/30) - presenting with lower abdominal pain and SOB, and was admitted for left sided abdominal pain. During that time, pt had CT angio chest that was negative for PE. Also had CT A/P that showed post-op left nephrectomy collection of 7.2 x 6 cm fluid collection and a 4.8 x3.1 cm left abdominal wall collection. Pt was s/p IR drainage of fluid collection 70cc on 1/24, and then s/p removal on March 6th by IR.     Labs, vitals and imaging reviewed. On arrival noted afebrile 98F, HR 98, but on tele noted to jump to -105, O2 sats 95% on 2L NC, Bp 143/85. WBC 11. Hgb 10.5, stable from prior. Cr wnl, K 4.1. EKG with NSR. CTA chest noted limited PE study for evaluation of a few of the segmental and many of the subsegmental pulmonary arterial branches due to respiratory motion artifact. No pulmonary arterial emboli within the other well visualized pulmonary arteries. CXR - No focal infiltrate or congestion.  Elevation of the right hemidiaphragm again noted. A calcific ossific density seen by the lateral humeral head on the left which could be related to some tendinopathy.   Physical exam: obese female, NC/AT, tachycardic, lungs CTA grossly but limited due to body habitus, L midsternal small area with mild ttp, and ribs under L breast region wit mild ttp, no suprapubic ttp, no CVA ttp, however, area where L nephrostomy tube was removed has mild ttp, bandage clean and dry no strikethrough bleed seen, LE with psoriatic plaques present.      A/P   #Chest pain   #HTN   #DM   #HLD   #COPD (on intermittent home 4L NC)   #Chronic hypoxic respiratory failure   #RCC s/p L renal resection    #Fibromyalgia   #Hypothyroidism   #Microcytic anemia     -monitor on telemetry   -tsh, a1c, lipid   -repeat trop   -TTE   -cardiology evaluation   -trial of lidocaine patch for rib pain, tylenol prn   -check RVP    -monitor FS/ISS + lantus (med rec for home levemir doses)   -resume home BP, COPD inhalers, thyroid medications after med rec 59 yo F with PMH of asthma, COPD (on 4L NC PRN), DM, fatty liver, fibromyalgia, R shoulder bursitis, HLD, hypothyroidism, NAKUL, psoriasis, RCC (s/p L renal resection 11/3/23, no hx of CT/RT) who p/w  chest pain and weakness for the past 5 days. She feels that the pain initially started on the L side around the area of the nephrostomy tube removal (from March), and felt that over the past few days felt the pain was also involving her L arm and L chest, with some ttp along the lower breast border. Also endorses chills and coughing in the past few days. Prior to calling ems, she took asa 325 and received sl nitro in the ambulance.      Prior admission reviewed (1/22-1/30) - presenting with lower abdominal pain and SOB, and was admitted for left sided abdominal pain. During that time, pt had CT angio chest that was negative for PE. Also had CT A/P that showed post-op left nephrectomy collection of 7.2 x 6 cm fluid collection and a 4.8 x3.1 cm left abdominal wall collection. Pt was s/p IR drainage of fluid collection 70cc on 1/24, and then s/p removal on March 6th by IR.     Labs, vitals and imaging reviewed. On arrival noted afebrile 98F, HR 98, but on tele noted to jump to -105, O2 sats 95% on 2L NC, Bp 143/85. WBC 11. Hgb 10.5, stable from prior. Cr wnl, K 4.1. EKG with NSR. CTA chest noted limited PE study for evaluation of a few of the segmental and many of the subsegmental pulmonary arterial branches due to respiratory motion artifact. No pulmonary arterial emboli within the other well visualized pulmonary arteries. CXR - No focal infiltrate or congestion.  Elevation of the right hemidiaphragm again noted. A calcific ossific density seen by the lateral humeral head on the left which could be related to some tendinopathy.   Physical exam: obese female, NC/AT, tachycardic, lungs CTA grossly but limited due to body habitus, L midsternal small area with mild ttp, and ribs under L breast region wit mild ttp, no suprapubic ttp, no CVA ttp, however, area where L nephrostomy tube was removed has mild ttp, bandage clean and dry no strikethrough bleed seen, LE with psoriatic plaques present.      A/P   #Chest pain   #HTN   #DM   #HLD   #COPD (on intermittent home 4L NC)   #Chronic hypoxic respiratory failure   #RCC s/p L renal resection    #Fibromyalgia   #Hypothyroidism   #Microcytic anemia     -monitor on telemetry   -tsh, a1c, lipid   -repeat trop   -TTE   -cardiology evaluation   -trial of lidocaine patch for rib pain, tylenol prn   -check RVP    -monitor FS/ISS + lantus (med rec for home levemir doses)   -resume home BP, COPD inhalers, thyroid medications after med rec  -oobtc/dvt ppx

## 2024-03-19 NOTE — H&P ADULT - PROBLEM SELECTOR PLAN 3
Patient takes Metformin, Levemir 16u, Novolog 6u TID at home  will hold home medications  will start sliding scale  f/u HgA1c  consistent carb diabetic diet

## 2024-03-19 NOTE — ED ADULT NURSE NOTE - NSFALLHARMRISKINTERV_ED_ALL_ED
Assistance OOB with selected safe patient handling equipment if applicable/Communicate risk of Fall with Harm to all staff, patient, and family/Monitor for mental status changes and reorient to person, place, and time, as needed/Move patient closer to nursing station/within visual sight of ED staff/Provide visual cue: red socks, yellow wristband, yellow gown, etc/Reinforce activity limits and safety measures with patient and family/Toileting schedule using arm’s reach rule for commode and bathroom/Use of alarms - bed, stretcher, chair and/or video monitoring/Bed in lowest position, wheels locked, appropriate side rails in place/Call bell, personal items and telephone in reach/Instruct patient to call for assistance before getting out of bed/chair/stretcher/Non-slip footwear applied when patient is off stretcher/[place] to call system/Physically safe environment - no spills, clutter or unnecessary equipment/Purposeful Proactive Rounding/Room/bathroom lighting operational, light cord in reach

## 2024-03-20 ENCOUNTER — RESULT REVIEW (OUTPATIENT)
Age: 59
End: 2024-03-20

## 2024-03-20 ENCOUNTER — TRANSCRIPTION ENCOUNTER (OUTPATIENT)
Age: 59
End: 2024-03-20

## 2024-03-20 VITALS
SYSTOLIC BLOOD PRESSURE: 115 MMHG | OXYGEN SATURATION: 96 % | RESPIRATION RATE: 18 BRPM | DIASTOLIC BLOOD PRESSURE: 65 MMHG | HEART RATE: 95 BPM | TEMPERATURE: 98 F

## 2024-03-20 LAB
ANION GAP SERPL CALC-SCNC: 2 MMOL/L — LOW (ref 5–17)
BUN SERPL-MCNC: 25 MG/DL — HIGH (ref 7–18)
CALCIUM SERPL-MCNC: 9.6 MG/DL — SIGNIFICANT CHANGE UP (ref 8.4–10.5)
CHLORIDE SERPL-SCNC: 97 MMOL/L — SIGNIFICANT CHANGE UP (ref 96–108)
CHOLEST SERPL-MCNC: 137 MG/DL — SIGNIFICANT CHANGE UP
CO2 SERPL-SCNC: 36 MMOL/L — HIGH (ref 22–31)
CREAT SERPL-MCNC: 1.26 MG/DL — SIGNIFICANT CHANGE UP (ref 0.5–1.3)
EGFR: 49 ML/MIN/1.73M2 — LOW
GLUCOSE BLDC GLUCOMTR-MCNC: 160 MG/DL — HIGH (ref 70–99)
GLUCOSE BLDC GLUCOMTR-MCNC: 206 MG/DL — HIGH (ref 70–99)
GLUCOSE BLDC GLUCOMTR-MCNC: 212 MG/DL — HIGH (ref 70–99)
GLUCOSE SERPL-MCNC: 230 MG/DL — HIGH (ref 70–99)
HCT VFR BLD CALC: 35.6 % — SIGNIFICANT CHANGE UP (ref 34.5–45)
HDLC SERPL-MCNC: 35 MG/DL — LOW
HGB BLD-MCNC: 10.3 G/DL — LOW (ref 11.5–15.5)
LIPID PNL WITH DIRECT LDL SERPL: 77 MG/DL — SIGNIFICANT CHANGE UP
MAGNESIUM SERPL-MCNC: 1.3 MG/DL — LOW (ref 1.6–2.6)
MCHC RBC-ENTMCNC: 22.3 PG — LOW (ref 27–34)
MCHC RBC-ENTMCNC: 28.9 GM/DL — LOW (ref 32–36)
MCV RBC AUTO: 77.2 FL — LOW (ref 80–100)
NON HDL CHOLESTEROL: 102 MG/DL — SIGNIFICANT CHANGE UP
NRBC # BLD: 0 /100 WBCS — SIGNIFICANT CHANGE UP (ref 0–0)
PHOSPHATE SERPL-MCNC: 3.8 MG/DL — SIGNIFICANT CHANGE UP (ref 2.5–4.5)
PLATELET # BLD AUTO: 381 K/UL — SIGNIFICANT CHANGE UP (ref 150–400)
POTASSIUM SERPL-MCNC: 4.5 MMOL/L — SIGNIFICANT CHANGE UP (ref 3.5–5.3)
POTASSIUM SERPL-SCNC: 4.5 MMOL/L — SIGNIFICANT CHANGE UP (ref 3.5–5.3)
RBC # BLD: 4.61 M/UL — SIGNIFICANT CHANGE UP (ref 3.8–5.2)
RBC # FLD: 15.5 % — HIGH (ref 10.3–14.5)
SODIUM SERPL-SCNC: 135 MMOL/L — SIGNIFICANT CHANGE UP (ref 135–145)
TRIGL SERPL-MCNC: 125 MG/DL — SIGNIFICANT CHANGE UP
TSH SERPL-MCNC: 2.56 UU/ML — SIGNIFICANT CHANGE UP (ref 0.34–4.82)
WBC # BLD: 9.07 K/UL — SIGNIFICANT CHANGE UP (ref 3.8–10.5)
WBC # FLD AUTO: 9.07 K/UL — SIGNIFICANT CHANGE UP (ref 3.8–10.5)

## 2024-03-20 PROCEDURE — 80053 COMPREHEN METABOLIC PANEL: CPT

## 2024-03-20 PROCEDURE — 0225U NFCT DS DNA&RNA 21 SARSCOV2: CPT

## 2024-03-20 PROCEDURE — 99239 HOSP IP/OBS DSCHRG MGMT >30: CPT

## 2024-03-20 PROCEDURE — 99285 EMERGENCY DEPT VISIT HI MDM: CPT

## 2024-03-20 PROCEDURE — 84443 ASSAY THYROID STIM HORMONE: CPT

## 2024-03-20 PROCEDURE — 85379 FIBRIN DEGRADATION QUANT: CPT

## 2024-03-20 PROCEDURE — 80061 LIPID PANEL: CPT

## 2024-03-20 PROCEDURE — 93306 TTE W/DOPPLER COMPLETE: CPT

## 2024-03-20 PROCEDURE — 84484 ASSAY OF TROPONIN QUANT: CPT

## 2024-03-20 PROCEDURE — 78452 HT MUSCLE IMAGE SPECT MULT: CPT | Mod: MC

## 2024-03-20 PROCEDURE — A9502: CPT

## 2024-03-20 PROCEDURE — 85027 COMPLETE CBC AUTOMATED: CPT

## 2024-03-20 PROCEDURE — 71275 CT ANGIOGRAPHY CHEST: CPT | Mod: MC

## 2024-03-20 PROCEDURE — 99222 1ST HOSP IP/OBS MODERATE 55: CPT

## 2024-03-20 PROCEDURE — 85025 COMPLETE CBC W/AUTO DIFF WBC: CPT

## 2024-03-20 PROCEDURE — 84100 ASSAY OF PHOSPHORUS: CPT

## 2024-03-20 PROCEDURE — 36415 COLL VENOUS BLD VENIPUNCTURE: CPT

## 2024-03-20 PROCEDURE — 71045 X-RAY EXAM CHEST 1 VIEW: CPT

## 2024-03-20 PROCEDURE — 93017 CV STRESS TEST TRACING ONLY: CPT

## 2024-03-20 PROCEDURE — 93005 ELECTROCARDIOGRAM TRACING: CPT

## 2024-03-20 PROCEDURE — 82962 GLUCOSE BLOOD TEST: CPT

## 2024-03-20 PROCEDURE — 83735 ASSAY OF MAGNESIUM: CPT

## 2024-03-20 PROCEDURE — 80048 BASIC METABOLIC PNL TOTAL CA: CPT

## 2024-03-20 RX ORDER — MAGNESIUM SULFATE 500 MG/ML
1 VIAL (ML) INJECTION ONCE
Refills: 0 | Status: COMPLETED | OUTPATIENT
Start: 2024-03-20 | End: 2024-03-20

## 2024-03-20 RX ORDER — ENOXAPARIN SODIUM 100 MG/ML
40 INJECTION SUBCUTANEOUS EVERY 12 HOURS
Refills: 0 | Status: DISCONTINUED | OUTPATIENT
Start: 2024-03-20 | End: 2024-03-20

## 2024-03-20 RX ADMIN — Medication 6 UNIT(S): at 14:06

## 2024-03-20 RX ADMIN — Medication 6 UNIT(S): at 17:10

## 2024-03-20 RX ADMIN — MONTELUKAST 10 MILLIGRAM(S): 4 TABLET, CHEWABLE ORAL at 14:05

## 2024-03-20 RX ADMIN — Medication 6 UNIT(S): at 08:12

## 2024-03-20 RX ADMIN — Medication 4: at 08:13

## 2024-03-20 RX ADMIN — Medication 100 GRAM(S): at 14:05

## 2024-03-20 RX ADMIN — Medication 2: at 14:07

## 2024-03-20 RX ADMIN — LOSARTAN POTASSIUM 25 MILLIGRAM(S): 100 TABLET, FILM COATED ORAL at 06:33

## 2024-03-20 RX ADMIN — Medication 4: at 17:10

## 2024-03-20 RX ADMIN — OXCARBAZEPINE 600 MILLIGRAM(S): 300 TABLET, FILM COATED ORAL at 08:11

## 2024-03-20 RX ADMIN — GABAPENTIN 400 MILLIGRAM(S): 400 CAPSULE ORAL at 08:14

## 2024-03-20 RX ADMIN — AMLODIPINE BESYLATE 10 MILLIGRAM(S): 2.5 TABLET ORAL at 06:31

## 2024-03-20 RX ADMIN — Medication 112 MICROGRAM(S): at 06:34

## 2024-03-20 NOTE — PATIENT PROFILE ADULT - NSPROMEDSDISPOSITION_GEN_A_NUR
pt has some pills in the prescription bottle no label, as per pt its xanax and resfused to send to pharmacy; pt educated on not taking home meds

## 2024-03-20 NOTE — PATIENT PROFILE ADULT - FALL HARM RISK - RISK INTERVENTIONS

## 2024-03-20 NOTE — DISCHARGE NOTE NURSING/CASE MANAGEMENT/SOCIAL WORK - PATIENT PORTAL LINK FT
You can access the FollowMyHealth Patient Portal offered by [facility] by registering at the following website: [email or web address]. By joining [employer]’s FollowMyHealth portal, you will also be able to view your health information using other applications (apps) compatible with our system.

## 2024-03-20 NOTE — DISCHARGE NOTE PROVIDER - HOSPITAL COURSE
58 y.o. F with PMHx of asthma, COPD (on 4L NC PRN), DM, fatty liver, fibromyalgia, R shoulder bursitis, HLD, hypothyroidism, NAKUL, psoriasis, RCC (s/p L renal resection 11/3/23, no hx of CT/RT) presenting with chest pain, lower abdominal pain associated with shortness of breath.  Patient states she took 324 mg of aspirin prior to calling ambulance and received sublingual nitroglycerin by ambulance.  Patient states still has chest pain but currently milder. Pain is non exertional, non radiating, is reproducible in mid sternum. Admitted for chest pain.    #TTE W or WO Ultrasound Enhancing Agent:   1. Technically difficult image quality.   2. Left ventricular endocardium is not well visualized; however, the left ventricular systolic function appears grossly normal.   3. There is mild (grade 1) left ventriculardiastolic dysfunction.   4. There is normal LV mass and normal geometry.   5. Normal right ventricular cavity size, with normal wall thickness, and normal systolic function.    Troponin peaked at 22.1.    Pain has resolved.  Patient to follow up with Dr. Nguyễn as outpatient.    Case discussed with attending.  Given patient's improved clinical status and current hemodynamic stability, the decision was made for discharge.    This is a summary of the patients hospitalization.  For full hospital course, please see medical record.

## 2024-03-20 NOTE — DISCHARGE NOTE PROVIDER - NSDCFUSCHEDAPPT_GEN_ALL_CORE_FT
Ken John  [facility] Physician [facility]  INTMED [id number]  Scheduled Appointment: 03/26/2024    Guido Monterroso  [facility]  PULMMED [address]  Scheduled Appointment: 04/05/2024    Nehemiah Ambrose  [facility] Physician [facility]  ENDOCRIN [address]  Scheduled Appointment: 04/26/2024    Lisa Ribeiro  [facility]  DERM [address]  Scheduled Appointment: 04/29/2024    Rubén Peck  [facility]  UROLOGY [address] [facility]  Scheduled Appointment: 06/04/2024

## 2024-03-20 NOTE — PATIENT PROFILE ADULT - INFLUENZA IMMUNIZATION DATE (APPROXIMATE)
Left message on answering machine for patient to call back to [phone].  Is this taken care of?  Did he get any pain medication over the weekend. If not is he still having pain?  Dunia LLOYDN, RN        
Left message on answering machine for patient to call back.  Needing to know if patient was able to get his pain medication?    Miranda LLOYDN, RN, CPN    
Patient is returning call from SHARIF Duque please call patient back at  [phone]  WENCESLAO Bradshaw    
Patient is returning call from SHARIF Duque please call patient back at  [phone] forgot to turn ringer on pleasse call back.  WENCESLAO Bradshaw  
Patient states he is picking up prescription today    Miranda Abarca BSN, RN, CPN    
Reason for call:  Medication   If this is a refill request, has the caller requested the refill from the pharmacy already? No  Will the patient be using a [facility]? Yes  Name of the pharmacy and phone number for the current request: [facility]    Name of the medication requested: Pain medication prescribed    Other request: Patient is in need of pain medication from recent dental procedure. Patient had a prescription sent to the [facility], but it is closed today, so it needs to be sent to the [facility].     Phone number to reach patient:  Cell number on file:    Telephone Information:   Mobile [phone]       Best Time:  As soon as possible    Can we leave a detailed message on this number?  YES    Travel screening: Not Applicable      
09-Nov-2023

## 2024-03-20 NOTE — DISCHARGE NOTE NURSING/CASE MANAGEMENT/SOCIAL WORK - NSDCVIVACCINE_GEN_ALL_CORE_FT
Influenza, seasonal, injectable; 2013/10/28 ; Claudia Sherwood (RN);   influenza, injectable, quadrivalent, preservative free; 09-Nov-2023 12:05; Issac Fernandez (RN); Exit41; [id number] (Exp. Date: 30-Jun-2024); IntraMuscular; Deltoid Right.; 0.5 milliLiter(s); VIS (VIS Published: 06-Aug-2021, VIS Presented: 09-Nov-2023);

## 2024-03-20 NOTE — DISCHARGE NOTE PROVIDER - ATTENDING DISCHARGE PHYSICAL EXAMINATION:
Constitutional/General: Obese, vitals reviewed, on NC  EYE: Symmetrical pupils, conjunctiva clear   ENT: Good dentition, oropharynx clear  NECK: No visual masses, no JVD  CHEST: No respiratory distress, bilateral symmetrical chest rise  ABDOMEN: Nondistended, no visual masses  SKIN: No rash, warm, dry  NEURO: A+Ox3, Cranial nerves grossly intact, moves all extremities, follows commands  PSYCH: Normal mood, normal affect

## 2024-03-20 NOTE — DISCHARGE NOTE PROVIDER - CARE PROVIDER_API CALL
Yash Nguyễn  Cardiology  [id number] [place] [address], [address]  [place] [address]  Phone: [phone]  Fax: [phone]  Follow Up Time: 1 week

## 2024-03-20 NOTE — CONSULT NOTE ADULT - SUBJECTIVE AND OBJECTIVE BOX
Patient seen and evaluated at bedside    Chief Complaint:    HPI:  58 y.o. F with PMHx of asthma, COPD (on 4L NC PRN), DM, fatty liver, fibromyalgia, R shoulder bursitis, HLD, hypothyroidism, NAKUL, psoriasis, RCC (s/p L renal resection 11/3/23, no hx of CT/RT) presenting with chest pain, lower abdominal pain associated with shortness of breath.  Patient states she took 324 mg of aspirin prior to calling ambulance and received sublingual nitroglycerin by ambulance.  Patient states still has chest pain but currently milder. Pain is non exertional, non radiating, is reproducible in mid sternum. Otherwise pt denies any ,palpitations, fever, chills, headache, visual changes, nausea, vomiting, diarrhea, dysuria, frequency.     In the ED   VSS afebrile   Trop   EKG NSR no acute ischemic changes on wet read   CT angio- no PE   CXR neg     (19 Mar 2024 11:34)    On my evaluation, pt reports that she presented to the hospital with L groin pain that radiated up to the L side of her chest with associated SOB. Pt saw Dr. Yash Nguyễn 2 weeks ago and she states she was fine at that time aside from chornic exertional SOB which she was attributing to her weight and deconditioning. She has not restarted ASA since her kidney surgery.    PMHx:   Hypertension    Hyperlipidemia    Asthma    Hypothyroid    Obstructive sleep apnea    Obesity    Trigeminal neuralgia    Fibromyalgia    DM (diabetes mellitus)    DJD (degenerative joint disease)    Cervical neuralgia    Back pain    H/O bursitis    Radicular pain    Vasculitis    Renal cell carcinoma, left    Falls frequently    Morbid obesity with body mass index (BMI) of 50.0 to 59.9 in adult    Psoriasis    Diverticulosis    Fatty liver        PSHx:   S/P abdominal hysterectomy    S/P  section    Left adrenal mass    Vaginal cyst    Vasculitis on nerve biopsy    S/P left oophorectomy    S/P colonoscopic polypectomy        Allergies:  mushroom (Unknown)  Fioricet (Rash)  venlafaxine (Hives)  gadobutrol (Rash; Urticaria; Hives)  IV Contrast (Short breath)      Home Meds:    Current Medications:   acetaminophen     Tablet .. 650 milliGRAM(s) Oral every 6 hours PRN  amLODIPine   Tablet 10 milliGRAM(s) Oral daily  atorvastatin 40 milliGRAM(s) Oral at bedtime  cyclobenzaprine 10 milliGRAM(s) Oral three times a day PRN  enoxaparin Injectable 40 milliGRAM(s) SubCutaneous every 12 hours  ergocalciferol 93152 Unit(s) Oral <User Schedule>  gabapentin 400 milliGRAM(s) Oral two times a day  hydrochlorothiazide 12.5 milliGRAM(s) Oral daily  insulin glargine Injectable (LANTUS) 16 Unit(s) SubCutaneous at bedtime  insulin lispro (ADMELOG) corrective regimen sliding scale   SubCutaneous three times a day before meals  insulin lispro (ADMELOG) corrective regimen sliding scale   SubCutaneous at bedtime  insulin lispro Injectable (ADMELOG) 6 Unit(s) SubCutaneous three times a day before meals  levothyroxine 112 MICROGram(s) Oral daily  lidocaine   4% Patch 1 Patch Transdermal daily  losartan 25 milliGRAM(s) Oral daily  magnesium sulfate  IVPB 1 Gram(s) IV Intermittent once  montelukast 10 milliGRAM(s) Oral daily  OXcarbazepine 600 milliGRAM(s) Oral two times a day  pantoprazole    Tablet 40 milliGRAM(s) Oral before breakfast PRN  senna 2 Tablet(s) Oral at bedtime PRN      FAMILY HISTORY:  Family history of diabetes mellitus    Family history of hypertension    Family history of obesity        Social History:  Smoking History:  Alcohol Use:  Drug Use:    REVIEW OF SYSTEMS:  CONSTITUTIONAL: No weakness, fevers or chills  EYES/ENT: No visual changes;  No dysphagia  NECK: No pain or stiffness  RESPIRATORY: No cough, wheezing, hemoptysis; No shortness of breath  CARDIOVASCULAR: No chest pain or palpitations; No lower extremity edema  GASTROINTESTINAL: No abdominal or epigastric pain. No nausea, vomiting, or hematemesis; No diarrhea or constipation. No melena or hematochezia.  BACK: No back pain  GENITOURINARY: No dysuria, frequency or hematuria  NEUROLOGICAL: No numbness or weakness  SKIN: No itching, burning, rashes, or lesions   All other review of systems is negative unless indicated above.    Physical Exam:  T(F): 99 (-), Max: 99 (-)  HR: 91 (-) (86 - 97)  BP: 124/91 (-20) (118/78 - 167/90)  RR: 18 (-20)  SpO2: 93% (-20)  GENERAL: morbid obesity  CHEST/LUNG: Clear to auscultation bilaterally; No wheeze, equal breath sounds bilaterally   BACK: No spinal tenderness  HEART: Regular rate and rhythm; No murmurs, rubs, or gallops  ABDOMEN: Soft, Nontender, Nondistended; Bowel sounds present  EXTREMITIES:  1+ pitting edema up to mid-shins  PSYCH: Nl behavior, nl affect  NEUROLOGY: AAOx3, non-focal, cranial nerves intact    LINES:    Labs:  reviewed                        10.3   9.07  )-----------( 381      ( 20 Mar 2024 07:16 )             35.6         135  |  97  |  25<H>  ----------------------------<  230<H>  4.5   |  36<H>  |  1.26    Ca    9.6      20 Mar 2024 07:16  Phos  3.8       Mg     1.3         TPro  8.3  /  Alb  2.6<L>  /  TBili  0.3  /  DBili  x   /  AST  27  /  ALT  31  /  AlkPhos  143<H>        Total Cholesterol: 137  LDL: --  HDL: 35  T      Thyroid Stimulating Hormone, Serum: 2.56 uU/mL ( @ 07:16)      Cardiovascular Diagnostic Testing:    ECG: sinus rhythm without ischemic changes

## 2024-03-20 NOTE — DISCHARGE NOTE NURSING/CASE MANAGEMENT/SOCIAL WORK - NSDCPEFALRISK_GEN_ALL_CORE
For information on Fall & Injury Prevention, visit: [email or web address] OR  [email or web address] OR  https://www.cdc.gov/steadi/patient.html

## 2024-03-20 NOTE — REVIEW OF SYSTEMS
Render Risk Assessment In Note?: no Additional Notes: Patient concerned about the darker pigment underneath bilateral eyes.  IPL was discussed in full detail with her.  She may continue OTC eye creams.  Did discuss treating her whole face with IPL. Detail Level: Simple [Muscle Pain] : muscle pain

## 2024-03-20 NOTE — CONSULT NOTE ADULT - ASSESSMENT
Assessment and Recommendations:  58 y.o. F with PMHx of asthma, COPD, DM, morbid obesity, fibromyalgia, RCC s/p (s/p L renal resection 11/3/23) who presents for L liza pain that radiated up her chest and down her legs.    1) Atypical CP  2) DM  3) obesity  4) Fibromyalgia  - EKG showed sinus rhythm without ischemic changes  - Troponins negative x 2  - Overall, her chest discomfort is very atypical in nature. However, her CCTA in 12/2022 showed Ca score of 1069.2 with no significant obstructive CAD. Recommend pharm nuclear stress to r/o significant ischemia  - Check TTE  - Continue atorvastatin 40mg daily. Would resume ASA 81mg daily if no contraindication from bleeding/abdominal standpoint in recent of recent IR drainage in 1/2024 and then tube removal on 3/6/24.    5) HTN  - Continue home meds      Andrés Hampton MD ([employer] TEAMS Message PREFERRED)  Cardiology Attending  [facility] [place] - Cardiology  [phone] [place] [address], 4[address] Floor, [address], [place] [address]  Office: [phone]    All Cardiology service information can be found 24/7 on [email or web address], password: [email or web address]

## 2024-03-20 NOTE — CHART NOTE - NSCHARTNOTEFT_GEN_A_CORE
Attempted to reach Dr. Ken John, informed by his admin, Ana that he is not in the office. Provided summary of hospital course, and she requested a copy of the Discharge Summary be faxed to the office at time of discharge ([phone]).

## 2024-03-20 NOTE — DISCHARGE NOTE PROVIDER - NSDCMRMEDTOKEN_GEN_ALL_CORE_FT
acetaminophen 325 mg oral tablet: 3 tab(s) orally every 6 hours as needed for pain  albuterol 2.5 mg/3 mL (0.083%) inhalation solution: 3 milliliter(s) by nebulizer every 6 hours as needed for  shortness of breath and/or wheezing  amLODIPine 10 mg oral tablet: 1 tab(s) orally once a day  atorvastatin 40 mg oral tablet: 1 tab(s) orally once a day (at bedtime)  cyclobenzaprine 10 mg oral tablet: 1 tab(s) orally once a day (at bedtime)  ergocalciferol 1.25 mg (50,000 intl units) oral capsule: 1 cap(s) orally once a week  gabapentin 400 mg oral capsule: 1 cap(s) orally 2 times a day  hydroCHLOROthiazide 12.5 mg oral tablet: 1 tab(s) orally once a day (in the morning)  ipratropium 21 mcg/inh (0.03%) nasal spray: 2 spray(s) intranasally  Levemir 100 units/mL subcutaneous solution: 16 unit(s) subcutaneous once a day (at bedtime)  levothyroxine 112 mcg (0.112 mg) oral tablet: 1 tab(s) orally once a day  lidocaine 4% topical film: Apply topically to affected area once a day  losartan 25 mg oral tablet: 1 tab(s) orally once a day (in the morning)  metFORMIN 1000 mg oral tablet: 1 tab(s) orally every 12 hours  montelukast 10 mg oral tablet: 1 tab(s) orally once a day  NovoLOG FlexPen 100 units/mL injectable solution: injectable 3 times a day (before meals) 6 units for blood sugar over 120  OXcarbazepine 600 mg oral tablet: 1 tab(s) orally 2 times a day  Ozempic 4 mg/3 mL (1 mg dose) subcutaneous solution: 1 milligram(s) subcutaneously 0.5mg Every sunday  pantoprazole 40 mg oral delayed release tablet: 1 tab(s) orally once a day as needed for acid reflux  senna leaf extract oral tablet: 2 tab(s) orally once a day (at bedtime) As needed Constipation  Symbicort 160 mcg-4.5 mcg/inh inhalation aerosol: 2 puff(s) inhaled 2 times a day  triamcinolone 0.1% topical ointment: Apply topically to affected area Apply twice daily for psoriasis on body for 2 weeks on 1 week off  Ventolin 90 mcg/inh inhalation aerosol: 2 puff(s) inhaled every 6 hours as needed for  shortness of breath and/or wheezing

## 2024-03-26 ENCOUNTER — APPOINTMENT (OUTPATIENT)
Dept: INTERNAL MEDICINE | Facility: CLINIC | Age: 59
End: 2024-03-26
Payer: MEDICARE

## 2024-03-26 VITALS
DIASTOLIC BLOOD PRESSURE: 80 MMHG | TEMPERATURE: 98 F | HEART RATE: 80 BPM | OXYGEN SATURATION: 9 % | SYSTOLIC BLOOD PRESSURE: 158 MMHG | RESPIRATION RATE: 16 BRPM | BODY MASS INDEX: 53.4 KG/M2 | WEIGHT: 272 LBS | HEIGHT: 60 IN

## 2024-03-26 DIAGNOSIS — I10 ESSENTIAL (PRIMARY) HYPERTENSION: ICD-10-CM

## 2024-03-26 DIAGNOSIS — E03.9 HYPOTHYROIDISM, UNSPECIFIED: ICD-10-CM

## 2024-03-26 DIAGNOSIS — E11.40 TYPE 2 DIABETES MELLITUS WITH DIABETIC NEUROPATHY, UNSPECIFIED: ICD-10-CM

## 2024-03-26 DIAGNOSIS — J45.909 UNSPECIFIED ASTHMA, UNCOMPLICATED: ICD-10-CM

## 2024-03-26 PROCEDURE — 99496 TRANSJ CARE MGMT HIGH F2F 7D: CPT

## 2024-03-26 RX ORDER — GABAPENTIN 400 MG/1
400 CAPSULE ORAL
Qty: 180 | Refills: 3 | Status: ACTIVE | COMMUNITY
Start: 2018-02-15 | End: 1900-01-01

## 2024-03-26 RX ORDER — MONTELUKAST 10 MG/1
10 TABLET, FILM COATED ORAL DAILY
Qty: 90 | Refills: 3 | Status: ACTIVE | COMMUNITY
Start: 2019-10-18 | End: 1900-01-01

## 2024-03-26 RX ORDER — OXCARBAZEPINE 600 MG/1
600 TABLET, FILM COATED ORAL TWICE DAILY
Qty: 180 | Refills: 3 | Status: ACTIVE | COMMUNITY
Start: 2018-10-10 | End: 1900-01-01

## 2024-03-26 RX ORDER — BENZOYL PEROXIDE 100 MG/ML
10 LIQUID TOPICAL
Qty: 1 | Refills: 5 | Status: ACTIVE | COMMUNITY
Start: 2021-04-02 | End: 1900-01-01

## 2024-03-26 RX ORDER — AMLODIPINE BESYLATE 10 MG/1
10 TABLET ORAL DAILY
Qty: 90 | Refills: 3 | Status: ACTIVE | COMMUNITY
Start: 2022-10-29 | End: 1900-01-01

## 2024-03-26 RX ORDER — LOSARTAN POTASSIUM 25 MG/1
25 TABLET, FILM COATED ORAL
Qty: 90 | Refills: 3 | Status: ACTIVE | COMMUNITY
Start: 2023-04-21 | End: 1900-01-01

## 2024-03-26 RX ORDER — FLUTICASONE PROPIONATE 50 UG/1
50 SPRAY, METERED NASAL TWICE DAILY
Qty: 3 | Refills: 3 | Status: ACTIVE | COMMUNITY
Start: 2018-04-17 | End: 1900-01-01

## 2024-03-26 RX ORDER — CYCLOBENZAPRINE HYDROCHLORIDE 10 MG/1
10 TABLET, FILM COATED ORAL
Qty: 30 | Refills: 0 | Status: ACTIVE | COMMUNITY
Start: 2020-06-25 | End: 1900-01-01

## 2024-03-26 RX ORDER — HYDROCHLOROTHIAZIDE 12.5 MG/1
12.5 TABLET ORAL DAILY
Qty: 90 | Refills: 3 | Status: ACTIVE | COMMUNITY
Start: 2019-04-08 | End: 1900-01-01

## 2024-03-26 NOTE — ADDENDUM
[FreeTextEntry1] : NOEMY CONTRERAS Pref.Name: "", "" "" F Female 1965 58 Yrs                                          	 Discharge Note Provider    	Shrink    	Status	Complete: Signed		Document Date	03- 15:28    	Facility	[facility]		Encounter Date	03- 06:44    	Clinician	Meir Alejandra		Last Update Date	03- 15:28    	Doc Type   	[id number]		Finalized Date	03- 16:50    	 Wrap Text: Off    Hospital Course: Discharge Date	20-Mar-2024 Admission Date	19-Mar-2024 06:44 Hospital Course	 58 y.o. F with PMHx of asthma, COPD (on 4L NC PRN), DM, fatty liver, fibromyalgia, R shoulder bursitis, HLD, hypothyroidism, NAKUL, psoriasis, RCC (s/p L renal resection 11/3/23, no hx of CT/RT) presenting with chest pain, lower abdominal pain associated with shortness of breath.  Patient states she took 324 mg of aspirin prior to calling ambulance and received sublingual nitroglycerin by ambulance.  Patient states still has chest pain but currently milder. Pain is non exertional, non radiating, is reproducible in mid sternum. Admitted for chest pain.   #TTE W or WO Ultrasound Enhancing Agent:  1. Technically difficult image quality.  2. Left ventricular endocardium is not well visualized; however, the left ventricular systolic function appears grossly normal.  3. There is mild (grade 1) left ventriculardiastolic dysfunction.  4. There is normal LV mass and normal geometry.  5. Normal right ventricular cavity size, with normal wall thickness, and normal systolic function.   Troponin peaked at 22.1.   Pain has resolved.  Patient to follow up with Dr. Nguyễn as outpatient.   Case discussed with attending.  Given patient's improved clinical status and current hemodynamic stability, the decision was made for discharge.   This is a summary of the patients hospitalization.  For full hospital course, please see medical record.   Med Reconciliation: Medication Reconciliation Status	Admission Reconciliation is Completed Discharge Reconciliation is Completed Discharge Medications	acetaminophen 325 mg oral tablet: 3 tab(s) orally every 6 hours as needed for pain albuterol 2.5 mg/3 mL (0.083%) inhalation solution: 3 milliliter(s) by nebulizer every 6 hours as needed for  shortness of breath and/or wheezing amLODIPine 10 mg oral tablet: 1 tab(s) orally once a day atorvastatin 40 mg oral tablet: 1 tab(s) orally once a day (at bedtime) cyclobenzaprine 10 mg oral tablet: 1 tab(s) orally once a day (at bedtime) ergocalciferol 1.25 mg (50,000 intl units) oral capsule: 1 cap(s) orally once a week gabapentin 400 mg oral capsule: 1 cap(s) orally 2 times a day hydroCHLOROthiazide 12.5 mg oral tablet: 1 tab(s) orally once a day (in the morning) ipratropium 21 mcg/inh (0.03%) nasal spray: 2 spray(s) intranasally Levemir 100 units/mL subcutaneous solution: 16 unit(s) subcutaneous once a day (at bedtime) levothyroxine 112 mcg (0.112 mg) oral tablet: 1 tab(s) orally once a day lidocaine 4% topical film: Apply topically to affected area once a day losartan 25 mg oral tablet: 1 tab(s) orally once a day (in the morning) metFORMIN 1000 mg oral tablet: 1 tab(s) orally every 12 hours montelukast 10 mg oral tablet: 1 tab(s) orally once a day NovoLOG FlexPen 100 units/mL injectable solution: injectable 3 times a day (before meals) 6 units for blood sugar over 120 OXcarbazepine 600 mg oral tablet: 1 tab(s) orally 2 times a day Ozempic 4 mg/3 mL (1 mg dose) subcutaneous solution: 1 milligram(s) subcutaneously 0.5mg Every sunday pantoprazole 40 mg oral delayed release tablet: 1 tab(s) orally once a day as needed for acid reflux senna leaf extract oral tablet: 2 tab(s) orally once a day (at bedtime) As needed Constipation Symbicort 160 mcg-4.5 mcg/inh inhalation aerosol: 2 puff(s) inhaled 2 times a day triamcinolone 0.1% topical ointment: Apply topically to affected area Apply twice daily for psoriasis on body for 2 weeks on 1 week off Ventolin 90 mcg/inh inhalation aerosol: 2 puff(s) inhaled every 6 hours as needed for  shortness of breath and/or wheezing , , Care Plan/Procedures: Discharge Diagnoses, Assessment and Plan of Treatment	PRINCIPAL DISCHARGE DIAGNOSIS Diagnosis: Acute coronary syndrome Assessment and Plan of Treatment: You came to the hospital with chest pain. Echo was completed and showed:  normal left ventricular systolic function, grade 1 diastolic dysfunction, normal RV cavity size. Your work up did not reveal any cardiac causes for your pain.  Your chest pain resolved, and acute coronary syndrome was ruled out. Please continue to take your medications as prescribed and follow up with your provider as recommended. Please see cardio, Dr. Nguyễn within 1 week of discharge.     SECONDARY DISCHARGE DIAGNOSES Diagnosis: HTN (hypertension) Assessment and Plan of Treatment: Please continue to take your medications as prescribed. Follow a low sodium diet and exercise as tolerated.   Diagnosis: DM (diabetes mellitus) Assessment and Plan of Treatment: Please continue to take your medications as prescribed. Follow a low carb diet and exercise as tolerated.   Diagnosis: Asthma Assessment and Plan of Treatment: Please continue to take your medications as prescribed.   Diagnosis: Fibromyalgia Assessment and Plan of Treatment: Please continue to take your medications as prescribed.   Diagnosis: Hyperlipidemia Assessment and Plan of Treatment: Please continue to take your medications as prescribed.   Diagnosis: COPD (chronic obstructive pulmonary disease) Assessment and Plan of Treatment: Please continue to take your medications as prescribed.   Diagnosis: Obstructive sleep apnea Assessment and Plan of Treatment: Please continue to take your medications as prescribed.   Goal(s)	To get better and follow your care plan as instructed. Follow Up: Care Providers for Follow up (PCP/Outpatient Provider)	Yash Nguy[facility]n [facility] [address] [place] [address], [address] [place] [address] Phone: [phone] Fax: [phone] Follow Up Time: 1 week Additional Provider Info (For SysAdmin Use Only)	 PROVIDER:[TOKEN:[[id number]],FOLLOWUP:[1 week]] Care Providers Direct Addresses (For SYSAdmin Use Only)	 ,[email or web address] NPI number (For SysAdmin Use Only) :	[[id number]] Patient's Scheduled Appointments	[facility] [address] Scheduled Appointment: 03/26/2024   [facility] [address] Scheduled Appointment: 04/05/2024   Nehemiah Ambrose [facility] ENDOCRIN [address] Scheduled Appointment: 04/26/2024   Lisa Ribeiro [facility] DERM [address] Scheduled Appointment: 04/29/2024   Rubén Peck [facility] UROLOGY [address] Scheduled Appointment: 06/04/2024 Discharge Diet	DASH Diet Activity	No heavy lifting/straining Quality Measures: Hospice Patient	No Core Measure Site	Yes Does the patient have a principal diagnosis of ischemic stroke, hemorrhagic stroke, or TIA?	No Does the patient have a principal diagnosis of Acute Myocardial Infarction?	No Has the patient had a Percutaneous Coronary Intervention?	No Tobacco Usage Within the Last Year	No Document Complete: Care Provider Seen in Hospital	MemoAndrés gauthier Sumeeta   Physician Section Complete	This document is complete and the patient is ready for discharge. For questions about your prescriptions, please call:	[phone] Is this contact telephone number correct?	Yes Attending Attestation Statement	I have personally seen and examined the patient. I have collaborated with and supervised the .	ACP .	on the discharge service for the patient. I have reviewed and made amendments to the documentation where necessary. Attending Discharge Physical Examination:	Constitutional/General: Obese, vitals reviewed, on NC EYE: Symmetrical pupils, conjunctiva clear ENT: Good dentition, oropharynx clear NECK: No visual masses, no JVD CHEST: No respiratory distress, bilateral symmetrical chest rise ABDOMEN: Nondistended, no visual masses SKIN: No rash, warm, dry NEURO: A+Ox3, Cranial nerves grossly intact, moves all extremities, follows commands PSYCH: Normal mood, normal affect Time Spent:	I personally spent ?	38 ?	minutes on the discharge service. Date of Discharge Service:	20-Mar-2024 Discharging Attending Physician:	Meir Alejandra     Electronic Signatures: Mavis Phillips (BRYSON)  (Signed 20-Mar-2024 16:50) 	Entered: Follow Up 	Authored: Hospital Course, Med Reconciliation, Care Plan/Procedures, Follow Up, Quality Measures, Document Complete Meir Alejandra)  (Signed 20-Mar-2024 17:22) 	Authored: Care Plan/Procedures, Document Complete 	Co-Signer: Hospital Course, Med Reconciliation, Care Plan/Procedures, Follow Up, Quality Measures, Document Complete     Last Updated: 20-Mar-2024 17:22 by Meir Alejandra)

## 2024-03-26 NOTE — ASSESSMENT
[FreeTextEntry1] : 58 year old female found to have stable Hypertension, Hypothyroidism, Type 2 Diabetes Mellitus, Hyperlipidemia, Allergic Asthma, Morbid Obesity, Renal Cell Carcinoma, Diabetic Neuropathy, with the current prescription regimen as recommended, diet and lifestyle modifications, as counseled. Prior results reviewed, interpreted and discussed with the patient during today's examination, as appropriate. Follow up, treatment plan and tests, as ordered.  Patient was recently hospitalized, findings and recommendations reviewed and discussed with the patient during today's examination.

## 2024-03-26 NOTE — REVIEW OF SYSTEMS
[Muscle Pain] : muscle pain [Back Pain] : back pain [Headache] : headache [TextEntry] : CARDIOVASCULAR: Negative RESPIRATORY: Negative GASTROINTESTINAL: Negative

## 2024-03-26 NOTE — HISTORY OF PRESENT ILLNESS
[Post-hospitalization from ___ Hospital] : Post-hospitalization from [unfilled] Hospital [Admitted on: ___] : The patient was admitted on [unfilled] [Discharged on ___] : discharged on [unfilled] [Discharge Summary] : discharge summary [FreeTextEntry2] : As per addendum section.  58 year old  female patient with history of stable Hypertension, Hypothyroidism, Type 2 Diabetes Mellitus, Hyperlipidemia, Allergic Asthma, Morbid Obesity, Renal Cell Carcinoma, Diabetic Neuropathy, history as stated, presented for follow up examination after hospital discharge. Patient is compliant with all medications. ROS as stated.

## 2024-03-26 NOTE — HEALTH RISK ASSESSMENT
[Intercurrent hospitalizations] : was admitted to the hospital  [No] : In the past 12 months have you used drugs other than those required for medical reasons? No [No falls in past year] : Patient reported no falls in the past year [0] : 2) Feeling down, depressed, or hopeless: Not at all (0) [de-identified] : CARD/URO/RHEUM [de-identified] : As per addendum section. [OUE5Cpeom] : 0 [Never] : Never

## 2024-04-15 NOTE — CONSULT NOTE ADULT - SKIN
Spoke with patient. He stated he won't be able to get labs drawn this week due to work. He wanted to r/s appt. Appt rescheduled; [facility], patient verbalized understanding.    ----- Message from Minnie Cartagena MD sent at 4/13/2024  8:24 PM EDT -----  Hi,    Please call patient and remind him to complete labs on Monday 04/15, before his appointment on 04/17.    Thank you       Awake detailed exam

## 2024-04-18 ENCOUNTER — APPOINTMENT (OUTPATIENT)
Dept: PULMONOLOGY | Facility: CLINIC | Age: 59
End: 2024-04-18

## 2024-04-29 ENCOUNTER — APPOINTMENT (OUTPATIENT)
Dept: DERMATOLOGY | Facility: CLINIC | Age: 59
End: 2024-04-29
Payer: MEDICARE

## 2024-04-29 ENCOUNTER — APPOINTMENT (OUTPATIENT)
Dept: MAMMOGRAPHY | Facility: CLINIC | Age: 59
End: 2024-04-29
Payer: MEDICARE

## 2024-04-29 ENCOUNTER — RESULT REVIEW (OUTPATIENT)
Age: 59
End: 2024-04-29

## 2024-04-29 DIAGNOSIS — L73.2 HIDRADENITIS SUPPURATIVA: ICD-10-CM

## 2024-04-29 DIAGNOSIS — L40.9 PSORIASIS, UNSPECIFIED: ICD-10-CM

## 2024-04-29 PROCEDURE — 99214 OFFICE O/P EST MOD 30 MIN: CPT

## 2024-04-29 PROCEDURE — 77063 BREAST TOMOSYNTHESIS BI: CPT

## 2024-04-29 PROCEDURE — 77067 SCR MAMMO BI INCL CAD: CPT

## 2024-04-29 RX ORDER — BETAMETHASONE DIPROPIONATE 0.5 MG/G
0.05 OINTMENT, AUGMENTED TOPICAL TWICE DAILY
Qty: 2 | Refills: 3 | Status: ACTIVE | COMMUNITY
Start: 2024-04-29 | End: 1900-01-01

## 2024-04-29 RX ORDER — CALCIPOTRIENE 50 UG/G
0.01 OINTMENT TOPICAL TWICE DAILY
Qty: 2 | Refills: 3 | Status: ACTIVE | COMMUNITY
Start: 2024-04-29 | End: 1900-01-01

## 2024-04-29 RX ORDER — CLINDAMYCIN PHOSPHATE AND BENZOYL PEROXIDE 10; 50 MG/G; MG/G
1.2-5 GEL TOPICAL TWICE DAILY
Qty: 1 | Refills: 3 | Status: ACTIVE | COMMUNITY
Start: 2024-04-29 | End: 1900-01-01

## 2024-04-29 NOTE — ASSESSMENT
[External notes review: [ enter provider(s) name(s) ] :____] : As part of my evaluation, I have reviewed prior clinical note(s) from provider(s) outside of my group practice. The name(s) are: [unfilled] [FreeTextEntry1] : 1. Hidradenitis, axilla - chronic; flaring (mild comedonal) on L axilla - Diagnosis, chronic nature, disease course, treatment options and goals of therapy discussed - Start BP-clinda combined gel daily to AA (if not covered by insurance can send each component separately)  2. Psoriasis, plaque and guttate type - chronic; progressing.  Saw Rheumatology to evaluate joint pains, favor degenerative arthritis and fibromyalgia. Also started on Otezla since April 2021 by Rheum; preference to avoid biologics due to h/o renal carcinoma (now s/p L nephrectomy in 2023).  Unable to tolerate NBUVB due to claustrophobia - Diagnosis, chronic nature, disease course, treatment options and goals of therapy discussed - START betamethasone ointment BID to affected areas for 1 month until follow-up Strong topical steroids should generally not be used on the face, in armpits, or in other skin folds, unless specifically directed otherwise. Overuse of steroids can lead to thinning of the skin, appearance of red blood vessels, or discoloration of the skin. Use only as directed. - START calcipotriene ointment BID to all plaques - Continue Otezla per Rheum - Consider non-TNF alpha inhibitor for biologic therapy if unresponsive to topicals, pending clearance by nephrologist  RTC 1 mo

## 2024-04-29 NOTE — PHYSICAL EXAM
[Alert] : alert [Oriented x 3] : ~L oriented x 3 [Well Nourished] : well nourished [Conjunctiva Non-injected] : conjunctiva non-injected [Declined] : declined [FreeTextEntry3] : - erythematous thick scaly plaques on bilateral extensor elbows/forearms and knees/lower legs. BSA 5-10% - few dilated pores/open comedones in the L axilla

## 2024-04-29 NOTE — HISTORY OF PRESENT ILLNESS
[FreeTextEntry1] : fuv psoriasis  [de-identified] : Ms. NOEMY CONTRERAS is a 58 year old obese F with hx of IgA vasculitis (post-URI), uncontrolled Type 2 DM with neuropathy, NAKUL, Trigeminal neuralgia, fibromyalgia, HTN, HLD, L renal cell carcinoma (s/p nephrectomy in 2023) here for evaluation of below  # Problem: psoriasis Location: on left lower leg, now spreading to torso and legs Duration: years Associated symptoms: itchy, scaly +joint pains in back and hips, prolonged morning stiffness Modifying factors: previously tx with betamethasone and ILK to LLE in 12/2019 - 9/2021: Since LV in April 2021, given Rx for triamcinolone/betamethasone. Referred to Rheum for joint pains (Dr. Chante Ribeiro) who started apremilast in April 2021 (prefers to avoid biologics due to RCC pending nephrectomy); tolerates well but reports it has little effect on the skin. Stopped Triamcinolone due to ?nausea, switched to mometasone ointment per Rheum. She reports plaques have grown since last visit, and she experiences constant pain and pruritus. She showers multiple times a day and has difficulty sleeping at night, is fairly distressed. Interested in phototherapy.  - 4/2024: was unable to go through with phototherapy due to anxiety/claustrophobia. has been unresponsive to TAC ointment. also using a topical OTC vitamin A and E cream. feels Otezla is not doing much for psoriasis plaques. continues to have joint pains managed by rheum with steroid injections for likely degenerative arthritis.  Derm Hx:  - IgA vasculitis (biopsied March 2019), skin with ?renal involvement. Skin cleared with clobetasol cream  - Hidradenitis, axilla & groin: using clindamycin pads and BPO 10% wash PRN  Personal hx of skin cancer: none FHx of skin cancer: none Social Hx: lives with son

## 2024-04-30 ENCOUNTER — APPOINTMENT (OUTPATIENT)
Dept: PULMONOLOGY | Facility: CLINIC | Age: 59
End: 2024-04-30

## 2024-04-30 NOTE — PHYSICAL EXAM
[No Acute Distress] : no acute distress [Well Nourished] : well nourished [Well Developed] : well developed [Low Lying Soft Palate] : low lying soft palate [Elongated Uvula] : elongated uvula [Enlarged Base of the Tongue] : enlarged base of the tongue [III] : Mallampati Class: III [Not Tender] : not tender [No Edema] : no edema [2+ Pitting] : 2+ pitting [No Focal Deficits] : no focal deficits [Oriented x3] : oriented x3 [TextBox_11] : some cough [TextBox_44] : kyphosis, short neck limited mobility [TextBox_68] : no wheeze, diminished aeration  much improved, breathes well [TextBox_89] : obese, protuberant

## 2024-04-30 NOTE — HISTORY OF PRESENT ILLNESS
[Never] : never [Obstructive Sleep Apnea] : obstructive sleep apnea [Hypersomnolence] : hypersomnolence [Nonrestorative Sleep] : nonrestorative sleep [Snoring] : snoring [Unintentional Sleep while Active] : unintentional sleep while active [TextBox_4] : 58 yr old woman with asthma and NAKUL, chronic hypercapnic respiratory failure, due to obesity and restrictive lung disease and kyphosis.  She had been on NIV  BIPAP 20/16.  She is also using Symbicort and albuterol via nebulizer, latter once per day.  She has obtained AirPhysio PEP device and uses it with increased sputum  clearance.  Sputum is mostly clear.  She uses saline nasal wash. She also uses fluticasone nasal spray.  She uses Zyrtec as needed and montelukast every night.   She has not used PAP, especially since recall.  She obtained NIV but it was too costly.  She now does not use any PAP.  She received replacement from [employer] but states it has not been set up to use, and she has not used it due to cough   She has had asthma for many years. She has few allergies (dust dust mites, flowers) not severe. Asthma is worse when allergies are worse.   She has hypoxia with exertion.   She also has a 4 mm lung nodule seen on prior CT chest. CT chest 5/2021 did not demonstrated any suspicious nodule.    She has renal cell carcinoma and has not had removal due to COVID crisis. Lesion is being closely monitored per team  She had repeat MRI 8/13/22  She has had LE edema R>L.  She is on HCTZ and ramipril  She has been hospitalized at [facility] with dyspnea, LOC.  She was treated for pneumonia and hypercapnic respiratory failure.  she required intubation/mechanical ventilation   Last CO2 on chemistry 36  She continues to have cough which she states is incessant, especially when supine   She  has also had chest congestion and nasal symptoms that were treated with diuretic antibiotic and steroid with brief transient benefit  She has contacted me about her cough that she states is severe, mostly non productive   2/21/23:  states she has been having increased cough for over 1 month.  She is having some sputum production, yellow at times.    CT coronary angiogram demonstrated elevated Argatson score 1069.  There were no significant stenoses.  She has BIPAP therapy but has not initiated as it has yet to e initiated/serviced.  I have tried to obtain PAP therapy through her insurance, with difficulty  CT 12/13/22 demonstrated a 3 mm nodule in RUL  but no emphysema , interstitial lung disease  or cystic lung disease.  There was also no tracheomalacia.  PMH: She has hyperlipidemia, elevated liver enzymes, DM, hypothyroid depression hypertension sleep apnea not using CPAP. fibromyalgia  PSH c sect right ovary  lysis of adhesion ERMELINDA/BSO removal of left adrenal gland due to nodule, benign  Temporal artery biopsy

## 2024-05-06 RX ORDER — BENZOYL PEROXIDE 5 G/100G
5 LIQUID TOPICAL
Qty: 1 | Refills: 7 | Status: ACTIVE | COMMUNITY
Start: 2024-05-06 | End: 1900-01-01

## 2024-05-06 RX ORDER — CLINDAMYCIN PHOSPHATE 10 MG/ML
1 LOTION TOPICAL
Qty: 1 | Refills: 3 | Status: ACTIVE | COMMUNITY
Start: 2024-05-06 | End: 1900-01-01

## 2024-05-06 NOTE — PATIENT PROFILE ADULT - HAS THE PATIENT RECEIVED THE INFLUENZA VACCINE THIS SEASON?
[facility] Dermatology Note  Encounter Date: May 6, 2024  Office Visit         Dermatology Problem List:  Skin exam: 5/6/2024  1. Skin cancer screening  2. Cherry angiomas  3. Tinea versicolor  - current tx: ketoconazole cream      ____________________________________________    Assessment & Plan:    # Skin cancer screening with multiple benign nevi, solar lentigines    - ABCDEs: Counseled ABCDEs of melanoma: Asymmetry, Border (irregularity), Color (not uniform, changes in color), Diameter (greater than 6 mm which is about the size of a pencil eraser), and Evolving (any changes in preexisting moles).  - Sun protection: Counseled SPF30+ sunscreen, UPF clothing, sun avoidance, tanning bed avoidance.    # Cherry angiomas and seborrheic keratoses,  Benign, reassurance given.        # Tinea versicolor-left lower axilla/chest wall   - Start ketoconazole cream dialy until clear    # hx of hand xerosis  Discussed gentle skin cares, avoiding soaking hands in water and moisturizing after washing.  Recommend Vaseline /Aquaphor with white cotton gloves at bedtime when active.   Samples of Aquaphor given.    Procedures Performed:   None      Follow-up: 1 year(s) in-person, or earlier for new or changing lesions    Staff and Scribe:     Scribe Disclosure:   I, Felipa Mart, am serving as a scribe; to document services personally performed by Charlotte Phillip PA-C -based on data collection and the provider's statements to me.     Provider Disclosure:  I agree with above History, Review of Systems, Physical exam and Plan.  I have reviewed the content of the documentation and have edited it as needed. I have personally performed the services documented here and the documentation accurately represents those services and the decisions I have made.      Electronically signed by:  All risk, benefits and alternatives were discussed with patient.  Patient is in agreement and understands the assessment and  plan.  All questions were answered.  Sun Screen Education was given.   Return to Clinic annually or sooner as needed.   Charlotte Phillip PA-C    ____________________________________________    CC: Skin Check (FBSE.  Started on the immunosuppressant )    HPI:  Ms. Lou Treviño is a(n) 37 year old female who presents today as a new patient for skin check    She reports that she is taking Skyrizi for her crohn's,  which she started on in 11/2023. She is unsure, but her maternal grandmother may have had lupus and rosacea.       Patient is otherwise feeling well, without additional skin concerns.    Labs Reviewed:  N/A    Physical Exam:  Vitals: There were no vitals taken for this visit.  SKIN: Full skin, which includes the head/face, both arms, chest, back, abdomen,both legs, genitalia and/or groin buttocks, digits and/or nails, was examined.  - There are dome shaped bright red papules on the head/neck, trunk, extremities.   - Multiple regular brown pigmented macules and papules are identified on the head/neck, trunk, extremities.   - Scattered brown macules on sun exposed areas.  - There are waxy stuck on tan to brown papules on the head/neck, trunk, extremities.   - tan flaky thin tan papules on left chest/axilla  - No other lesions of concern on areas examined.     Medications:  Current Outpatient Medications   Medication Sig Dispense Refill    budesonide (PULMICORT FLEXHALER) 90 MCG/ACT inhaler Inhale 2 puffs into the lungs 2 times daily 1 each 3    cetirizine (ZYRTEC) 10 MG tablet Take 1 tablet (10 mg) by mouth daily 90 tablet 1    citalopram (CELEXA) 20 MG tablet Take 1 tablet (20 mg) by mouth daily 90 tablet 1    famotidine (PEPCID) 20 MG tablet Take 1 tablet (20 mg) by mouth 2 times daily 180 tablet 0    fluticasone (FLONASE) 50 MCG/ACT nasal spray Spray 1 spray into both nostrils daily 18.2 mL 0    fluticasone-salmeterol (ADVAIR HFA) 115-21 MCG/ACT inhaler Inhale 2 puffs into the lungs 2 times daily       levalbuterol (XOPENEX HFA) 45 MCG/ACT inhaler Inhale 1-2 puffs into the lungs every 6 hours as needed for wheezing 15 g 1    multivitamin w/minerals (MULTI-VITAMIN) tablet Take 1 tablet by mouth daily      norethindrone-ethinyl estradiol (ORTHO-NOVUM) 1-35 MG-MCG tablet Take 1 tablet by mouth daily 84 tablet 3    propranolol ER (INDERAL LA) 60 MG 24 hr capsule Take 1 capsule (60 mg) by mouth daily 90 capsule 1    rimegepant (NURTEC) 75 MG ODT tablet Take 1 tablet (75 mg) by mouth every 48 hours 45 tablet 1    Risankizumab-rzaa (SKYRIZI) 360 MG/2.4ML SOCT Inject 2.4 mLs (360 mg) Subcutaneous once every eight weeks 2.4 mL 2     No current facility-administered medications for this visit.      Past Medical History:   Patient Active Problem List   Diagnosis    Mild persistent asthma    Migraines    IBS (irritable bowel syndrome)    Anxiety and depression    Allergies    History of intussusception    Family history of factor V deficiency    Generalized anxiety disorder    Healthcare maintenance    Prediabetes    Morbid obesity (H)    Nausea    Hypoalbuminemia    Hepatic steatosis    Left knee pain    Elevated fecal calprotectin    Crohn's disease of both small and large intestine with rectal bleeding (H)     Past Medical History:   Diagnosis Date    Migraine     Noninfectious ileitis     Uncomplicated asthma         CC Aiyana Ivey MD  [address] [address] on close of this encounter.    no...

## 2024-05-24 ENCOUNTER — NON-APPOINTMENT (OUTPATIENT)
Age: 59
End: 2024-05-24

## 2024-05-29 ENCOUNTER — APPOINTMENT (OUTPATIENT)
Dept: ENDOCRINOLOGY | Facility: CLINIC | Age: 59
End: 2024-05-29

## 2024-05-29 DIAGNOSIS — E11.9 TYPE 2 DIABETES MELLITUS W/OUT COMPLICATIONS: ICD-10-CM

## 2024-05-29 RX ORDER — FLASH GLUCOSE SENSOR
KIT MISCELLANEOUS
Qty: 10 | Refills: 3 | Status: ACTIVE | COMMUNITY
Start: 2022-11-17 | End: 1900-01-01

## 2024-05-30 ENCOUNTER — LABORATORY RESULT (OUTPATIENT)
Age: 59
End: 2024-05-30

## 2024-05-30 ENCOUNTER — NON-APPOINTMENT (OUTPATIENT)
Age: 59
End: 2024-05-30

## 2024-05-31 ENCOUNTER — TRANSCRIPTION ENCOUNTER (OUTPATIENT)
Age: 59
End: 2024-05-31

## 2024-06-04 ENCOUNTER — APPOINTMENT (OUTPATIENT)
Dept: UROLOGY | Facility: CLINIC | Age: 59
End: 2024-06-04

## 2024-06-06 ENCOUNTER — APPOINTMENT (OUTPATIENT)
Dept: RHEUMATOLOGY | Facility: CLINIC | Age: 59
End: 2024-06-06

## 2024-06-12 ENCOUNTER — APPOINTMENT (OUTPATIENT)
Dept: ENDOCRINOLOGY | Facility: CLINIC | Age: 59
End: 2024-06-12

## 2024-06-17 ENCOUNTER — APPOINTMENT (OUTPATIENT)
Dept: CT IMAGING | Facility: CLINIC | Age: 59
End: 2024-06-17
Payer: MEDICARE

## 2024-06-17 PROCEDURE — 74178 CT ABD&PLV WO CNTR FLWD CNTR: CPT

## 2024-06-20 DIAGNOSIS — C64.2 MALIGNANT NEOPLASM OF LEFT KIDNEY, EXCEPT RENAL PELVIS: ICD-10-CM

## 2024-06-27 ENCOUNTER — APPOINTMENT (OUTPATIENT)
Dept: INTERNAL MEDICINE | Facility: CLINIC | Age: 59
End: 2024-06-27

## 2024-07-02 ENCOUNTER — APPOINTMENT (OUTPATIENT)
Dept: PULMONOLOGY | Facility: CLINIC | Age: 59
End: 2024-07-02

## 2024-07-09 ENCOUNTER — APPOINTMENT (OUTPATIENT)
Dept: ENDOCRINOLOGY | Facility: CLINIC | Age: 59
End: 2024-07-09
Payer: MEDICARE

## 2024-07-09 ENCOUNTER — APPOINTMENT (OUTPATIENT)
Dept: RHEUMATOLOGY | Facility: CLINIC | Age: 59
End: 2024-07-09
Payer: MEDICARE

## 2024-07-09 VITALS
HEART RATE: 102 BPM | TEMPERATURE: 97.3 F | DIASTOLIC BLOOD PRESSURE: 81 MMHG | SYSTOLIC BLOOD PRESSURE: 127 MMHG | BODY MASS INDEX: 53.79 KG/M2 | WEIGHT: 274 LBS | HEIGHT: 60 IN | OXYGEN SATURATION: 98 % | RESPIRATION RATE: 16 BRPM

## 2024-07-09 DIAGNOSIS — E11.9 TYPE 2 DIABETES MELLITUS W/OUT COMPLICATIONS: ICD-10-CM

## 2024-07-09 DIAGNOSIS — M17.12 UNILATERAL PRIMARY OSTEOARTHRITIS, LEFT KNEE: ICD-10-CM

## 2024-07-09 DIAGNOSIS — M75.52 BURSITIS OF LEFT SHOULDER: ICD-10-CM

## 2024-07-09 DIAGNOSIS — E66.01 MORBID (SEVERE) OBESITY DUE TO EXCESS CALORIES: ICD-10-CM

## 2024-07-09 DIAGNOSIS — M54.16 RADICULOPATHY, LUMBAR REGION: ICD-10-CM

## 2024-07-09 DIAGNOSIS — L40.9 PSORIASIS, UNSPECIFIED: ICD-10-CM

## 2024-07-09 DIAGNOSIS — E03.9 HYPOTHYROIDISM, UNSPECIFIED: ICD-10-CM

## 2024-07-09 DIAGNOSIS — M17.0 BILATERAL PRIMARY OSTEOARTHRITIS OF KNEE: ICD-10-CM

## 2024-07-09 LAB — GLUCOSE BLDC GLUCOMTR-MCNC: 191

## 2024-07-09 PROCEDURE — 20610 DRAIN/INJ JOINT/BURSA W/O US: CPT | Mod: LT

## 2024-07-09 PROCEDURE — 99214 OFFICE O/P EST MOD 30 MIN: CPT

## 2024-07-09 PROCEDURE — 82962 GLUCOSE BLOOD TEST: CPT

## 2024-07-09 PROCEDURE — 99214 OFFICE O/P EST MOD 30 MIN: CPT | Mod: 25

## 2024-07-09 PROCEDURE — G2211 COMPLEX E/M VISIT ADD ON: CPT

## 2024-07-09 RX ADMIN — METHYLPREDNISOLONE ACETATE 0 MG/ML: 40 INJECTION, SUSPENSION INTRA-ARTICULAR; INTRALESIONAL; INTRAMUSCULAR; SOFT TISSUE at 00:00

## 2024-07-09 RX ADMIN — LIDOCAINE HYDROCHLORIDE 0 %: 10 INJECTION, SOLUTION EPIDURAL; INFILTRATION; INTRACAUDAL; PERINEURAL at 00:00

## 2024-07-09 RX ADMIN — Medication 0 MG/ML: at 00:00

## 2024-07-10 ENCOUNTER — LABORATORY RESULT (OUTPATIENT)
Age: 59
End: 2024-07-10

## 2024-07-11 RX ORDER — METHYLPRED ACET/NACL,ISO-OS/PF 40 MG/ML
40 VIAL (ML) INJECTION
Qty: 0 | Refills: 0 | Status: COMPLETED | OUTPATIENT
Start: 2024-07-09

## 2024-07-11 RX ORDER — LIDOCAINE HYDROCHLORIDE 10 MG/ML
1 INJECTION, SOLUTION INFILTRATION; PERINEURAL
Qty: 0 | Refills: 0 | Status: COMPLETED | OUTPATIENT
Start: 2024-07-09

## 2024-07-13 ENCOUNTER — APPOINTMENT (OUTPATIENT)
Dept: INTERNAL MEDICINE | Facility: CLINIC | Age: 59
End: 2024-07-13

## 2024-07-16 ENCOUNTER — NON-APPOINTMENT (OUTPATIENT)
Age: 59
End: 2024-07-16

## 2024-07-17 ENCOUNTER — TRANSCRIPTION ENCOUNTER (OUTPATIENT)
Age: 59
End: 2024-07-17

## 2024-07-18 ENCOUNTER — APPOINTMENT (OUTPATIENT)
Dept: INTERNAL MEDICINE | Facility: CLINIC | Age: 59
End: 2024-07-18

## 2024-07-19 ENCOUNTER — TRANSCRIPTION ENCOUNTER (OUTPATIENT)
Age: 59
End: 2024-07-19

## 2024-07-22 ENCOUNTER — EMERGENCY (EMERGENCY)
Facility: HOSPITAL | Age: 59
LOS: 1 days | Discharge: HOME | End: 2024-07-22
Attending: EMERGENCY MEDICINE
Payer: MEDICARE

## 2024-07-22 VITALS
WEIGHT: 278.22 LBS | HEIGHT: 62.2 IN | RESPIRATION RATE: 16 BRPM | TEMPERATURE: 98 F | SYSTOLIC BLOOD PRESSURE: 137 MMHG | DIASTOLIC BLOOD PRESSURE: 84 MMHG | HEART RATE: 96 BPM | OXYGEN SATURATION: 99 %

## 2024-07-22 VITALS — OXYGEN SATURATION: 92 % | DIASTOLIC BLOOD PRESSURE: 68 MMHG | HEART RATE: 76 BPM | SYSTOLIC BLOOD PRESSURE: 115 MMHG

## 2024-07-22 DIAGNOSIS — N89.8 OTHER SPECIFIED NONINFLAMMATORY DISORDERS OF VAGINA: Chronic | ICD-10-CM

## 2024-07-22 DIAGNOSIS — Z90.710 ACQUIRED ABSENCE OF BOTH CERVIX AND UTERUS: Chronic | ICD-10-CM

## 2024-07-22 DIAGNOSIS — Z98.89 UNDEFINED: Chronic | ICD-10-CM

## 2024-07-22 DIAGNOSIS — I77.6 ARTERITIS, UNSPECIFIED: Chronic | ICD-10-CM

## 2024-07-22 DIAGNOSIS — E27.8 OTHER SPECIFIED DISORDERS OF ADRENAL GLAND: Chronic | ICD-10-CM

## 2024-07-22 DIAGNOSIS — Z98.890 OTHER SPECIFIED POSTPROCEDURAL STATES: Chronic | ICD-10-CM

## 2024-07-22 DIAGNOSIS — Z90.721 ACQUIRED ABSENCE OF OVARIES, UNILATERAL: Chronic | ICD-10-CM

## 2024-07-22 LAB
ALBUMIN SERPL ELPH-MCNC: 3.2 G/DL — LOW (ref 3.5–5)
ALP SERPL-CCNC: 125 U/L — HIGH (ref 40–120)
ALT FLD-CCNC: 28 U/L DA — SIGNIFICANT CHANGE UP (ref 10–60)
ANION GAP SERPL CALC-SCNC: 5 MMOL/L — SIGNIFICANT CHANGE UP (ref 5–17)
AST SERPL-CCNC: 23 U/L — SIGNIFICANT CHANGE UP (ref 10–40)
BASOPHILS # BLD AUTO: 0.03 K/UL — SIGNIFICANT CHANGE UP (ref 0–0.2)
BASOPHILS NFR BLD AUTO: 0.4 % — SIGNIFICANT CHANGE UP (ref 0–2)
BILIRUB SERPL-MCNC: 0.4 MG/DL — SIGNIFICANT CHANGE UP (ref 0.2–1.2)
BUN SERPL-MCNC: 20 MG/DL — HIGH (ref 7–18)
CALCIUM SERPL-MCNC: 9.5 MG/DL — SIGNIFICANT CHANGE UP (ref 8.4–10.5)
CHLORIDE SERPL-SCNC: 101 MMOL/L — SIGNIFICANT CHANGE UP (ref 96–108)
CO2 SERPL-SCNC: 34 MMOL/L — HIGH (ref 22–31)
CREAT SERPL-MCNC: 1.08 MG/DL — SIGNIFICANT CHANGE UP (ref 0.5–1.3)
EGFR: 59 ML/MIN/1.73M2 — LOW
EOSINOPHIL # BLD AUTO: 0.23 K/UL — SIGNIFICANT CHANGE UP (ref 0–0.5)
EOSINOPHIL NFR BLD AUTO: 2.7 % — SIGNIFICANT CHANGE UP (ref 0–6)
GLUCOSE SERPL-MCNC: 141 MG/DL — HIGH (ref 70–99)
HCT VFR BLD CALC: 40 % — SIGNIFICANT CHANGE UP (ref 34.5–45)
HGB BLD-MCNC: 11.8 G/DL — SIGNIFICANT CHANGE UP (ref 11.5–15.5)
IMM GRANULOCYTES NFR BLD AUTO: 0.6 % — SIGNIFICANT CHANGE UP (ref 0–0.9)
LIDOCAIN IGE QN: 39 U/L — SIGNIFICANT CHANGE UP (ref 13–75)
LYMPHOCYTES # BLD AUTO: 2.12 K/UL — SIGNIFICANT CHANGE UP (ref 1–3.3)
LYMPHOCYTES # BLD AUTO: 24.9 % — SIGNIFICANT CHANGE UP (ref 13–44)
MCHC RBC-ENTMCNC: 22.9 PG — LOW (ref 27–34)
MCHC RBC-ENTMCNC: 29.5 GM/DL — LOW (ref 32–36)
MCV RBC AUTO: 77.7 FL — LOW (ref 80–100)
MONOCYTES # BLD AUTO: 0.6 K/UL — SIGNIFICANT CHANGE UP (ref 0–0.9)
MONOCYTES NFR BLD AUTO: 7 % — SIGNIFICANT CHANGE UP (ref 2–14)
NEUTROPHILS # BLD AUTO: 5.49 K/UL — SIGNIFICANT CHANGE UP (ref 1.8–7.4)
NEUTROPHILS NFR BLD AUTO: 64.4 % — SIGNIFICANT CHANGE UP (ref 43–77)
NRBC # BLD: 0 /100 WBCS — SIGNIFICANT CHANGE UP (ref 0–0)
PLATELET # BLD AUTO: 309 K/UL — SIGNIFICANT CHANGE UP (ref 150–400)
POTASSIUM SERPL-MCNC: 4.2 MMOL/L — SIGNIFICANT CHANGE UP (ref 3.5–5.3)
POTASSIUM SERPL-SCNC: 4.2 MMOL/L — SIGNIFICANT CHANGE UP (ref 3.5–5.3)
PROT SERPL-MCNC: 8.3 G/DL — SIGNIFICANT CHANGE UP (ref 6–8.3)
RBC # BLD: 5.15 M/UL — SIGNIFICANT CHANGE UP (ref 3.8–5.2)
RBC # FLD: 17.1 % — HIGH (ref 10.3–14.5)
SODIUM SERPL-SCNC: 140 MMOL/L — SIGNIFICANT CHANGE UP (ref 135–145)
WBC # BLD: 8.52 K/UL — SIGNIFICANT CHANGE UP (ref 3.8–10.5)
WBC # FLD AUTO: 8.52 K/UL — SIGNIFICANT CHANGE UP (ref 3.8–10.5)

## 2024-07-22 PROCEDURE — 99285 EMERGENCY DEPT VISIT HI MDM: CPT

## 2024-07-22 PROCEDURE — 83690 ASSAY OF LIPASE: CPT

## 2024-07-22 PROCEDURE — 74177 CT ABD & PELVIS W/CONTRAST: CPT | Mod: MC

## 2024-07-22 PROCEDURE — 85025 COMPLETE CBC W/AUTO DIFF WBC: CPT

## 2024-07-22 PROCEDURE — 96361 HYDRATE IV INFUSION ADD-ON: CPT

## 2024-07-22 PROCEDURE — 74177 CT ABD & PELVIS W/CONTRAST: CPT | Mod: 26,MC

## 2024-07-22 PROCEDURE — 93010 ELECTROCARDIOGRAM REPORT: CPT

## 2024-07-22 PROCEDURE — 93005 ELECTROCARDIOGRAM TRACING: CPT

## 2024-07-22 PROCEDURE — 96374 THER/PROPH/DIAG INJ IV PUSH: CPT | Mod: XU

## 2024-07-22 PROCEDURE — 82962 GLUCOSE BLOOD TEST: CPT

## 2024-07-22 PROCEDURE — 76705 ECHO EXAM OF ABDOMEN: CPT | Mod: 26

## 2024-07-22 PROCEDURE — 36415 COLL VENOUS BLD VENIPUNCTURE: CPT

## 2024-07-22 PROCEDURE — 76705 ECHO EXAM OF ABDOMEN: CPT

## 2024-07-22 PROCEDURE — 99284 EMERGENCY DEPT VISIT MOD MDM: CPT | Mod: 25

## 2024-07-22 PROCEDURE — 80053 COMPREHEN METABOLIC PANEL: CPT

## 2024-07-22 RX ORDER — SODIUM CHLORIDE 0.9 % (FLUSH) 0.9 %
1000 SYRINGE (ML) INJECTION ONCE
Refills: 0 | Status: COMPLETED | OUTPATIENT
Start: 2024-07-22 | End: 2024-07-22

## 2024-07-22 RX ORDER — ONDANSETRON HYDROCHLORIDE 2 MG/ML
4 INJECTION INTRAMUSCULAR; INTRAVENOUS ONCE
Refills: 0 | Status: COMPLETED | OUTPATIENT
Start: 2024-07-22 | End: 2024-07-22

## 2024-07-22 RX ADMIN — Medication 1000 MILLILITER(S): at 15:48

## 2024-07-22 RX ADMIN — ONDANSETRON HYDROCHLORIDE 4 MILLIGRAM(S): 2 INJECTION INTRAMUSCULAR; INTRAVENOUS at 12:50

## 2024-07-22 RX ADMIN — Medication 1000 MILLILITER(S): at 12:51

## 2024-07-22 NOTE — ED ADULT NURSE NOTE - NSFALLHARMRISKINTERV_ED_ALL_ED

## 2024-07-22 NOTE — ED PROVIDER NOTE - NSICDXPASTMEDICALHX_GEN_ALL_CORE_FT
PAST MEDICAL HISTORY:  Asthma last inhaler use with in 10 days, on Pulm follow up [id number]    Back pain thoracic & lumbar    Cervical neuralgia difficulty moving my neck    Diverticulosis     DJD (degenerative joint disease) Cervical/Thoracic/Lumbar    DM (diabetes mellitus) 2    Falls frequently     Fatty liver     Fibromyalgia     H/O bursitis right shoulder    Hyperlipidemia     Hypertension vaginal cyst    Hypothyroid     Morbid obesity with body mass index (BMI) of 50.0 to 59.9 in adult     Obesity morbid    Obstructive sleep apnea refuses Cpap    Psoriasis     Radicular pain arms, legs    Renal cell carcinoma, left on follow up Dr schulte, HOD renal [facility], waiting for suregry in 09/2020    Trigeminal neuralgia R    Vasculitis Legs, forerhead, arms & hands, flare ups in R leg  Dr susie Ribeiro is my Rheumatologist

## 2024-07-22 NOTE — ED PROVIDER NOTE - PHYSICAL EXAMINATION
General: Patient well appearing, vital signs within normal limits,  Elevated BMI  HEENT: MMM, trachea midline  GI: Soft,  nondistended, tender to palpation in the right upper quadrant, + Landaverde's  Respiratory: No respiratory distress  Neuro: Moves all extremities  Skin: No rashes or lesions

## 2024-07-22 NOTE — ED PROVIDER NOTE - NSICDXFAMILYHX_GEN_ALL_CORE_FT
Take 2 tablets of tylenol/acetaminophen every 4 hours as needed for pain. The maximum dosing of acetaminophen from all sources is 3000 mg in a 24-hour period.    
FAMILY HISTORY:  Family history of diabetes mellitus  Family history of hypertension  Family history of obesity

## 2024-07-22 NOTE — ED PROVIDER NOTE - PROGRESS NOTE DETAILS
valdivia: s/o from dr vann to f/u ct. - pt states right sided abd pain x 3 weeks with diarrhea. no trauma, no rash. ct no acute findings. advise to see gi and pcp. last colonoscopy 2020 which was normal per pt. dc - No gross change in incompletely imaged marked subcutaneous   edema/inflammation with overlying skin thickening in right lower anterior   abdominal wall since prior CT of 6/17/2024.

## 2024-07-22 NOTE — ED PROVIDER NOTE - CLINICAL SUMMARY MEDICAL DECISION MAKING FREE TEXT BOX
59-year-old female who presented with right upper quadrant abdominal pain and associated diarrhea.  Elevated BMI.  Abdomen soft, nondistended, tenderness to palpation in the right upper quadrant, + Landaverde's.  Ultrasound shows no acute abnormalities, no gallbladder pathology.  Due to severity of patient's pain, CT abdomen/pelvis  ordered.  Patient care signed out pending results of CT imaging.  Laboratories are unremarkable including normal LFTs, WBC WNL.

## 2024-07-22 NOTE — ED PROVIDER NOTE - NSFOLLOWUPINSTRUCTIONS_ED_ALL_ED_FT
No gross change in incompletely imaged marked subcutaneous   edema/inflammation with overlying skin thickening in right lower anterior   abdominal wall since prior CT of 6/17/2024.    - could be from nerve irritation of skin vs intestinal irritation

## 2024-07-22 NOTE — ED PROVIDER NOTE - PATIENT PORTAL LINK FT
You can access the FollowMyHealth Patient Portal offered by [facility] by registering at the following website: [email or web address]. By joining [employer] [facility]’s FollowMyHealth portal, you will also be able to view your health information using other applications (apps) compatible with our system.

## 2024-07-22 NOTE — ED PROVIDER NOTE - DISCHARGE REVIEW MATERIAL PRESENTED
Progress Note    Patient: Alfonzo Grove Date: 6/5/2018   male, 77 year old  Admit Date: 6/3/2018   Attending: Pierre Love MD      Subjective:  Alfonzo Grove is a 77 year old male who is being seen in follow up for Hx of seizure disorder.    No acute overnight events.             Medications: personally reviewed today in this patient's active orders section of epic  Allergies:   Allergies as of 06/03/2018 - Reviewed 06/03/2018   Allergen Reaction Noted   • Zithromax [azithromycin dihydrate] Other (See Comments) 04/01/2014       PHYSICAL EXAM:  Visit Vitals  /84 (BP Location: [facility], Patient Position: Sitting)   Pulse 73   Temp 97.4 °F (36.3 °C) (Oral)   Resp 18   Ht 5' 6\" (1.676 m)   Wt 55 kg   SpO2 98%   BMI 19.57 kg/m²     General: A & O unable to assess due to developmental delay in no acute distress, normal cephalic/atraumatic, Mood and Affect appropriate.  Lungs: Clear to auscultation bilaterally  Heart:  Regular rate and rhythm and S1, S2 present  Abdomen: NT/ND;  + B.S.; No guarding or rebound; No peritoneal signs  Extremities: cyanosis absent; no obvious lesions or wounds.  Neurologic:  CN 2-12 Grossly intact as best as patient can cooperate with exam     strength is bilaterally intact in all 4 extremities , sensation is grossly intact throughout    Labs:    Recent Labs  Lab 06/03/18  1455   WBC 4.0*   RBC 4.92   HGB 11.6*   HCT 34.0*      SEG 66       Recent Labs  Lab 06/06/18  0854 06/05/18  1209 06/04/18  1303 06/04/18  0836  06/03/18  1455   SODIUM 123* 125* 125* 128*  < > 125*   POTASSIUM  --   --  4.4 4.6  --  4.2   CHLORIDE  --   --  92* 92*  --  90*   CO2  --   --  30 29  --  29   BUN  --   --  17 14  --  14   CREATININE  --   --  0.48* 0.52*  --  0.48*   GFRNA  --   --  >90 >90  --  >90   GLUCOSE  --   --  93 114*  --  76   CALCIUM  --   --  8.5 8.7  --  8.4   ALBUMIN  --   --   --   --   --  3.4*   AST  --   --   --   --   --  34   GPT  --   --   --   --   --  59    BILIRUBIN  --   --   --   --   --  0.2   ALKPT  --   --   --   --   --  98   INR  --   --   --   --   --  1.1   < > = values in this interval not displayed.    Assessment & Plan:     · Epilepsy with seizure  - seen by neurology.Agress with  increasing depakote level.   · Hyponatremia likely hypovolemic - stable, asymptomaticwith IV fluids.   · Developmental delay  - monitor for any behavioral changes.         DVT Propylaxis - Lovenox, SCDs and TEDs    Code status: Full Resuscitation    Disposition: Dc tomorrow    Pierre Love MD  Hospitalist  6/5/2018  10:41 AM           .

## 2024-07-22 NOTE — ED PROVIDER NOTE - OBJECTIVE STATEMENT
59-year-old female with past medical history of diabetes, hyperlipidemia, hypertension, fibromyalgia who presents with right upper quadrant abdominal pain associated with diarrhea over the last 3 weeks.  Denies fevers.  Also has palpitations, dizziness.  Denies chest pain,  nausea/vomiting.

## 2024-07-25 NOTE — DISCHARGE NOTE PROVIDER - NSDCHHHOMEBOUND_GEN_ALL_CORE
Addended by: SANTINO THOMAS on: 7/25/2024 03:20 PM     Modules accepted: Orders    
Ataxic gait/Fall risk

## 2024-07-25 NOTE — ED ADULT NURSE NOTE - FINAL NURSING ELECTRONIC SIGNATURE
AES Post-Procedure Note     S/p EUS    Impression:                                     - Normal esophagus.                          - Scar in the gastric antrum.                          - Erythematous duodenopathy.                          - Pancreatic parenchymal abnormalities consisting                          of diffuse echogenicity, hyperechoic strands and                          lobularity were noted in the pancreatic head and                          genu of the pancreas.                          - There was no evidence of significant pathology                          in the visualized portion of the liver.                          - An anechoic, multicystic and septated lesion                          suggestive of a cyst was identified in the                          pancreatic head, genu of the pancreas and                          pancreatic body. The lesion measured 60.6 mm by                          30.8 mm in maximal cross-sectional diameter. There                          were a few compartments thinly septated. The outer                          wall of the lesion was thick. There was no                          associated mass. There was internal debris within                          the fluid-filled cavity. One of the cyst have                          hyperechoic material highly suspicious for blood.                          The cyst is complex and may not be amenable to                          endoscopic drainage. Unlikely that the cyst is                          infected, therefore I will not recommend IR                          drainage at this time.                          - A few enlarged lymph nodes were visualized in                          the peripancreatic region and davis hepatis                          region. Tissue has not been obtained. However, the                          endosonographic appearance is highly suspicious                          for benign inflammatory  changes.                          - No specimens collected.   Recommendation:                     - Return patient to hospital [facility] for ongoing care.                          - Recommend a CTA given the high suspicious of                          intracystic bleed.     Thank you for involving us in the care of Tasia Cardona. Please call with any additional questions, concerns or changes in the patient's clinical status.       Roxy Moon MD  Gastroenterology Fellow, PGY IV               29-Oct-2018 21:25

## 2024-08-03 NOTE — ASSESSMENT
[FreeTextEntry1] : 59 year old female found to have stable Hypertension, Hypothyroidism, Type 2 Diabetes Mellitus, Hyperlipidemia, Reactive Airway Disease, Morbid Obesity, Renal Cell Carcinoma, Diabetic Neuropathy, with the current prescription regimen as recommended, diet and lifestyle modifications, as counseled. Prior results reviewed, interpreted and discussed with the patient during today's examination, as appropriate. Follow up, treatment plan and tests, as ordered.   Total time spent:: 30 minutes Including: Preparation prior to visit - Reviewing prior record, results of tests and Consultation Reports as applicable Conducting an appropriate H & P during today's encounter Appropriate orders for tests, medications and procedures, as applicable Counseling patient Note completion

## 2024-08-03 NOTE — HISTORY OF PRESENT ILLNESS
[de-identified] : 59 year old  female patient with history of stable Hypertension, Hypothyroidism, Type 2 Diabetes Mellitus, Hyperlipidemia, Reactive Airway Disease, Morbid Obesity, Renal Cell Carcinoma, Diabetic Neuropathy, history as stated, presented for follow up examination. Patient is compliant with all medications. ROS as stated.

## 2024-08-05 ENCOUNTER — APPOINTMENT (OUTPATIENT)
Dept: INTERNAL MEDICINE | Facility: CLINIC | Age: 59
End: 2024-08-05

## 2024-08-12 NOTE — ED ADULT TRIAGE NOTE - PAIN RATING/NUMBER SCALE (0-10): ACTIVITY
Spoke with patient regarding right sided mouth pain/swelling.   Recall he has poor dentition and was recommended for full dental extractions but only had partial extractions done at [facility].     Patient reports 2 days ago, he chomped down on something hard on the right side of mouth and the pain/swelling started a little while after that. Today the pain and swelling is better than yesterday. He said this has happened before and resolved on its on after a few days. NO fever/chills.   Advised that patient go to be evaluated at the ED or Urgent care for possible broken tooth with dental infection but patient refused. He just want to come to his scheduled 8/22 [facility] and [facility] appts.     He is agreeable to restarting his Chlorhexidine 0.12% rinse. Refills sent to [facility] pharmacy per patient request.   Asked that patient call back in 2 days if oral swelling dose not improve.      6

## 2024-08-13 ENCOUNTER — APPOINTMENT (OUTPATIENT)
Dept: PULMONOLOGY | Facility: CLINIC | Age: 59
End: 2024-08-13
Payer: MEDICARE

## 2024-08-13 VITALS
TEMPERATURE: 96.4 F | SYSTOLIC BLOOD PRESSURE: 140 MMHG | BODY MASS INDEX: 53.71 KG/M2 | OXYGEN SATURATION: 93 % | DIASTOLIC BLOOD PRESSURE: 90 MMHG | WEIGHT: 275 LBS | HEART RATE: 106 BPM

## 2024-08-13 DIAGNOSIS — R91.1 SOLITARY PULMONARY NODULE: ICD-10-CM

## 2024-08-13 DIAGNOSIS — M79.7 FIBROMYALGIA: ICD-10-CM

## 2024-08-13 DIAGNOSIS — J45.909 UNSPECIFIED ASTHMA, UNCOMPLICATED: ICD-10-CM

## 2024-08-13 DIAGNOSIS — R09.02 HYPOXEMIA: ICD-10-CM

## 2024-08-13 DIAGNOSIS — R05.3 CHRONIC COUGH: ICD-10-CM

## 2024-08-13 DIAGNOSIS — G47.33 OBSTRUCTIVE SLEEP APNEA (ADULT) (PEDIATRIC): ICD-10-CM

## 2024-08-13 DIAGNOSIS — G47.30 SLEEP APNEA, UNSPECIFIED: ICD-10-CM

## 2024-08-13 DIAGNOSIS — J96.92 RESPIRATORY FAILURE, UNSPECIFIED WITH HYPERCAPNIA: ICD-10-CM

## 2024-08-13 PROCEDURE — 99214 OFFICE O/P EST MOD 30 MIN: CPT

## 2024-08-13 NOTE — PHYSICAL EXAM
[Well Nourished] : well nourished [No Acute Distress] : no acute distress [Well Developed] : well developed [Low Lying Soft Palate] : low lying soft palate [Elongated Uvula] : elongated uvula [Enlarged Base of the Tongue] : enlarged base of the tongue [III] : Mallampati Class: III [Not Tender] : not tender [No Edema] : no edema [2+ Pitting] : 2+ pitting [No Focal Deficits] : no focal deficits [Oriented x3] : oriented x3 [TextBox_11] : some cough [TextBox_44] : kyphosis, short neck limited mobility [TextBox_68] : no wheeze, diminished aeration  much improved, breathes well [TextBox_89] : obese, protuberant

## 2024-08-13 NOTE — HISTORY OF PRESENT ILLNESS
[Never] : never [Obstructive Sleep Apnea] : obstructive sleep apnea [Hypersomnolence] : hypersomnolence [Nonrestorative Sleep] : nonrestorative sleep [Snoring] : snoring [Unintentional Sleep while Active] : unintentional sleep while active [TextBox_4] : 59 yr old woman with asthma and NAKUL, chronic hypercapnic respiratory failure, due to obesity and restrictive lung disease and kyphosis.  She had been on NIV  BIPAP 20/16.  She is also using Symbicort and albuterol via nebulizer, latter once per day.  She has obtained AirPhysio PEP device and uses it with increased sputum  clearance.  Sputum is mostly clear.  She uses saline nasal wash. She also uses fluticasone nasal spray.  She uses Zyrtec as needed and montelukast every night.   She has not used PAP, especially since recall.  She obtained NIV but it was too costly.  She now does not use any PAP.  She received replacement from [employer] but states it has not been set up to use, and she has not used it due to cough   She has had asthma for many years. She has few allergies (dust dust mites, flowers) not severe. Asthma is worse when allergies are worse.   She has hypoxia with exertion.   She also has a 4 mm lung nodule seen on prior CT chest. CT chest 5/2021 did not demonstrated any suspicious nodule.    She has renal cell carcinoma and has not had removal due to COVID crisis. Lesion is being closely monitored per team  She had repeat MRI 8/13/22  She has had LE edema R>L.  She is on HCTZ and ramipril  She was hospitalized at [facility] with dyspnea, LOC.  She was treated for pneumonia and hypercapnic respiratory failure.  she required intubation/mechanical ventilation  She had contacted me about her cough that she states is severe, mostly non productive   CT coronary angiogram demonstrated elevated Argatson score 1069.  There were no significant stenoses.  She has BIPAP therapy but has not initiated as it has yet to e initiated/serviced.  I have tried to obtain PAP therapy through her insurance, with difficulty  CT 12/13/22 demonstrated a 3 mm nodule in RUL  but no emphysema , interstitial lung disease  or cystic lung disease.  There was also no tracheomalacia.  PMH: She has hyperlipidemia, elevated liver enzymes, DM, hypothyroid depression hypertension sleep apnea not using CPAP. fibromyalgia  PSH c sect right ovary  lysis of adhesion ERMELINDA/BSO removal of left adrenal gland due to nodule, benign  Temporal artery biopsy

## 2024-08-13 NOTE — HISTORY OF PRESENT ILLNESS
[Never] : never [Obstructive Sleep Apnea] : obstructive sleep apnea [Hypersomnolence] : hypersomnolence [Nonrestorative Sleep] : nonrestorative sleep [Snoring] : snoring [Unintentional Sleep while Active] : unintentional sleep while active [TextBox_4] : 59 yr old woman with asthma and NAKUL, chronic hypercapnic respiratory failure, due to obesity and restrictive lung disease and kyphosis.  She had been on NIV  BIPAP 20/16.  She is also using Symbicort and albuterol via nebulizer, latter once per day.  She has obtained AirPhysio PEP device and uses it with increased sputum  clearance.  Sputum is mostly clear.  She uses saline nasal wash. She also uses fluticasone nasal spray.  She uses Zyrtec as needed and montelukast every night.   She has not used PAP, especially since recall.  She obtained NIV but it was too costly.  She now does not use any PAP.  She received replacement from [employer] but states it has not been set up to use, and she has not used it due to cough   She has had asthma for many years. She has few allergies (dust dust mites, flowers) not severe. Asthma is worse when allergies are worse.   She has hypoxia with exertion.   She also has a 4 mm lung nodule seen on prior CT chest. CT chest 5/2021 did not demonstrated any suspicious nodule.    She has renal cell carcinoma and has not had removal due to COVID crisis. Lesion is being closely monitored per team  She had repeat MRI 8/13/22  She has had LE edema R>L.  She is on HCTZ and ramipril  She was hospitalized at [facility] with dyspnea, LOC.  She was treated for pneumonia and hypercapnic respiratory failure.  she required intubation/mechanical ventilation  She had contacted me about her cough that she states is severe, mostly non productive   CT coronary angiogram demonstrated elevated Argatson score 1069.  There were no significant stenoses.  She has BIPAP therapy but has not initiated as it has yet to e initiated/serviced.  I have tried to obtain PAP therapy through her insurance, with difficulty  CT 12/13/22 demonstrated a 3 mm nodule in RUL  but no emphysema , interstitial lung disease  or cystic lung disease.  There was also no tracheomalacia.  PMH: She has hyperlipidemia, elevated liver enzymes, DM, hypothyroid depression hypertension sleep apnea not using CPAP. fibromyalgia  PSH c sect right ovary  lysis of adhesion ERMELINDA/BSO removal of left adrenal gland due to nodule, benign  Temporal artery biopsy

## 2024-08-13 NOTE — DISCUSSION/SUMMARY
[FreeTextEntry1] : Chronic hypercapnic respiratory failure/NAKUL,  obstructive airway disease asthma  morbid obesity  She has  history of  hypercapnic respiratory failure  She was prescribed and  obtained VAPS but too costly,  However, she is not using BIPAP (which she has had) either, states it needs to be set.  She received new equipment fro [facility] using CPAP.  MRI abdomen 4/22/23 demonstrated Left renal mass consistent with renal cell carcinoma demonstrating continued mild interval growth as above.   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 She has an appointment with sleep medicine to discuss options including the inspire device which she has inquired about. I suspect that she may not be a candidate due to her weight.  However she will discuss options regarding improvement tolerance of her current equipment.  She is advised to bring her CPAP with her to that visit   PLAN   Continue nasal sprays fluticasone azelastine and ipratropium nasal spray  Continue ICS/LABA  and montelukast, along with albuterol MDI as needed, has spacer  Using amlodipine 10 mg , previous chronic cough has resolved She will have follow-up CT scan of the chest already ordered on November 20, 2024, by her nephrologist  Prior records, radiographic studies and history reviewed  Total time spent : 40 minutes Including: Preparation prior to visit - Reviewing prior record, results of tests and Consultation Reports as applicable Conducting an appropriate H & P during today's encounter Appropriate orders for tests, medications and procedures, as applicable Counseling patient  Note completion   I walked with pt for exercise study  Guido Monterroso MD

## 2024-08-14 ENCOUNTER — APPOINTMENT (OUTPATIENT)
Dept: ENDOCRINOLOGY | Facility: CLINIC | Age: 59
End: 2024-08-14
Payer: MEDICARE

## 2024-08-14 DIAGNOSIS — E11.9 TYPE 2 DIABETES MELLITUS W/OUT COMPLICATIONS: ICD-10-CM

## 2024-08-14 DIAGNOSIS — E03.9 HYPOTHYROIDISM, UNSPECIFIED: ICD-10-CM

## 2024-08-14 PROCEDURE — 99443: CPT

## 2024-08-14 NOTE — ASSESSMENT
[FreeTextEntry1] : The patient has been unable to get the Ozempic She has called [facility] several times but she did not get it An Ozempic prescription was sent again to [facility] Advised to follow the diet better and try to walk regularly The Levemir, Novolog and metformin were also renewed She says that when she used the Ozempic her blood sugars were better Will repeat the blood tests in 6 weeks

## 2024-08-14 NOTE — HISTORY OF PRESENT ILLNESS
[Home] : at home, [unfilled] , at the time of the visit. [Other Location: e.g. Home (Enter Location, City,State)___] : at [unfilled] [Verbal consent obtained from patient] : the patient, [unfilled] [FreeTextEntry1] : Patient feels well, she is asymptomatic. No weight change. She is not exercising regularly  she is tryin to  follow the diet. Her blood sugars at home are not well controlled, they are usually around 140 to 160 mg/dl. She denies low blood glucose during the night. She denies chest pain, or SOB. Denies numbness, tingling or burning sensation on her extremities. Taking her medications regularly. She has seen the Ophthalmologist recently. She has not seen the Podiatrist recently. She has not seen the Cardiologist recently.

## 2024-09-05 ENCOUNTER — APPOINTMENT (OUTPATIENT)
Dept: UROLOGY | Facility: CLINIC | Age: 59
End: 2024-09-05

## 2024-09-16 ENCOUNTER — APPOINTMENT (OUTPATIENT)
Dept: CARDIOLOGY | Facility: CLINIC | Age: 59
End: 2024-09-16

## 2024-09-18 ENCOUNTER — APPOINTMENT (OUTPATIENT)
Dept: GASTROENTEROLOGY | Facility: CLINIC | Age: 59
End: 2024-09-18
Payer: MEDICARE

## 2024-09-18 VITALS
HEART RATE: 104 BPM | SYSTOLIC BLOOD PRESSURE: 127 MMHG | WEIGHT: 280 LBS | DIASTOLIC BLOOD PRESSURE: 84 MMHG | OXYGEN SATURATION: 93 % | TEMPERATURE: 97.1 F | HEIGHT: 60 IN | BODY MASS INDEX: 54.97 KG/M2

## 2024-09-18 DIAGNOSIS — R10.84 GENERALIZED ABDOMINAL PAIN: ICD-10-CM

## 2024-09-18 DIAGNOSIS — Z86.010 PERSONAL HISTORY OF COLONIC POLYPS: ICD-10-CM

## 2024-09-18 DIAGNOSIS — K21.9 GASTRO-ESOPHAGEAL REFLUX DISEASE W/OUT ESOPHAGITIS: ICD-10-CM

## 2024-09-18 DIAGNOSIS — R07.89 OTHER CHEST PAIN: ICD-10-CM

## 2024-09-18 DIAGNOSIS — R19.8 OTHER SPECIFIED SYMPTOMS AND SIGNS INVOLVING THE DIGESTIVE SYSTEM AND ABDOMEN: ICD-10-CM

## 2024-09-18 DIAGNOSIS — K76.0 FATTY (CHANGE OF) LIVER, NOT ELSEWHERE CLASSIFIED: ICD-10-CM

## 2024-09-18 DIAGNOSIS — R11.0 NAUSEA: ICD-10-CM

## 2024-09-18 PROCEDURE — 99204 OFFICE O/P NEW MOD 45 MIN: CPT

## 2024-09-18 RX ORDER — PANTOPRAZOLE 40 MG/1
40 TABLET, DELAYED RELEASE ORAL DAILY
Qty: 30 | Refills: 3 | Status: ACTIVE | COMMUNITY
Start: 2024-09-18 | End: 1900-01-01

## 2024-09-18 RX ORDER — SIMETHICONE 180 MG
180 CAPSULE ORAL 4 TIMES DAILY
Qty: 120 | Refills: 3 | Status: ACTIVE | COMMUNITY
Start: 2024-09-18 | End: 1900-01-01

## 2024-09-18 NOTE — ASSESSMENT
[FreeTextEntry1] : Abdominal Pain: The patient complains of abdominal pain. The patient is to avoid nonsteroidal anti-inflammatory drugs and aspirin.  I recommend a trial of Phazyme 1 tablet PO 3 times a day for 3 months for the symptoms. Dyspepsia: The patient complains of dyspeptic symptoms.  The patient was advised to abide by an anti-gas (low FOD-MAP) diet.  The patient was given a pamphlet for anti-gas (low FOD-MAP).  The patient and I reviewed the anti-gas (low FOD-MAP) diet at length. The patient is to start on a trial of Simethicone one tablet 4 times a day p.r.n. abdominal pain and gas. GERD: The patient was advised to avoid late-night meals and dietary indiscretions.  The patient was advised to avoid fried and fatty foods.  The patient was advised to abide by an anti-GERD diet. The patient was given a pamphlet for anti-GERD.  The patient and I reviewed the anti-GERD diet at length. I recommend a trial of Pantoprazole 40 mg once a day x 3 months for the symptoms. Nausea/Vomiting: The patient complains of nausea/vomiting. If the symptoms of nausea/vomiting persists, the patient may require a trial of Zofran 4 mg twice a day. Atypical Chest Pain: The patient complains of atypical chest pain of unclear etiology.  The patient was advised to follow up with the PMD and cardiologist regarding evaluation for the atypical chest pain. The patient was told of possible etiologies such as cardiac, pulmonary, GI, musculoskeletal, stress and other causes for the atypical chest pain.  The patient agrees and will follow-up with the PMD and cardiologist.  Alternating Diarrhea/Constipation: The patient complains of alternating diarrhea/constipation.  I recommend a low residue diet. The patient is to avoid fiber supplementation. The patient is to consider starting a trial of a probiotic such as Align once a day.  The patient agreed and will follow-up to reassess the symptoms. History of Colonic Polyps: The patient has a history of colonic polyps.  I recommend a repeat colonoscopy to reassess for colonic polyps.  The patient was told of the risks and benefits of the procedure.  The patient was told of the risks of perforation, emergency surgery, bleeding, infections and missed lesions.  The patient is to be on a clear liquid diet the entire day prior to the procedure. The patient is to complete the entire prescribed bowel prep the day prior to the procedure as directed. The patient is told not to drive, drink alcohol, use recreational drugs, exercise, or work the day of the procedure.  The patient was told of the need for an escort to accompany the patient home after the procedure. The patient is aware that the procedure may be cancelled if they fail to follow the directions.  The patient agreed and will schedule for the procedure. The patient can take the antihypertensive medication with a sip of water one hour prior to the procedure. The patient is to hold the diabetic medication the day before and the morning of the procedure. The patient is to be n.p.o. after midnight and bowel prep was given.  The patient is to return for the procedure after cardiac clearance with Dr Yash Nguyễn.  Colonoscopy: I recommend a colonoscopy to assess the symptoms and for colonic polyps.  The patient was told of the risks and benefits of the procedure.  The patient was told of the risks of perforation, emergency surgery, bleeding, infections and missed lesions.  The patient is to be on a clear liquid diet the entire day prior to the procedure. The patient is to complete the entire prescribed bowel prep the day prior to the procedure as directed. The patient is told not to drive, drink alcohol, use recreational drugs, exercise, or work the day of the procedure.  The patient was told of the need for an escort to accompany the patient home after the procedure. The patient is aware that the procedure may be cancelled if they fail to follow the directions.  The patient agreed and will schedule for the procedure. The patient can take the antihypertensive medication with a sip of water one hour prior to the procedure. The patient is to hold the diabetic medication the day before and the morning of the procedure. The patient is to be n.p.o. after midnight and bowel prep was given.  The patient is to return for the procedure after cardiac clearance with Dr Yash Nguyễn. Upper Endoscopy: I recommend an upper endoscopy to assess for peptic ulcer disease versus esophagitis.  The patient was told of the risks and benefits of the procedure.  The patient was told of the risks of perforation, emergency surgery, bleeding, infections and missed lesions. The patient is told not to drive, drink alcohol, use recreational drugs, exercise, or work the day of the procedure.  The patient was told of the need for an escort to accompany the patient home after the procedure. The patient is aware that the procedure may be cancelled if they fail to follow the directions.  The patient agreed and will schedule for the procedure. The patient can take the antihypertensive medication with a sip of water one hour prior to the procedure. The patient is to hold the diabetic medication the day the morning of the procedure. The patient is to be n.p.o. after midnight.  The patient is to return for the procedure after cardiac clearance with Dr Yash Nguyễn. Fatty Liver:  The patient had an imaging study suggestive of fatty infiltration of the liver.  The patient denies any jaundice or pruritus.  The patient denies any alcohol use.  The patient denies taking large doses of nonsteroidal anti-inflammatory drugs or acetaminophen.  The findings are suggestive of fatty liver.  The patient and I had a long discussion regarding the risks of fatty liver and possible progression to cirrhosis.  The patient was told of the possible increased risk of developing liver failure, cirrhosis, ascites, GI bleeding secondary to varices, hepatic encephalopathy, bleeding tendencies and liver cancer.  The patient was told of the importance of follow-up.  The patient was advised to follow up every 6 months for blood work and imaging studies. The patient agreed and will follow up. The patient was advised to lose weight and exercise.   The patient was advised to abide by a Mediterranean diet. I recommend a trial of vitamin E supplementation for the fatty liver.  If the liver enzymes remain elevated, the patient may require a trial of Pioglitazone for the fatty liver. I recommend avoid alcohol and hepato-toxic agents.  The patient was also advised to avoid NSAIDs, Acetaminophen and any other hepatotoxic drugs. The patient was also advised not to share needles, razors, scissors, nail clippers, etc.. The patient is to continue close follow-up in our office for blood work and exams.  If the liver enzymes remain elevated, the patient may require a CT guided liver biopsy to assess the liver parenchyma for possible treatment.  We had a long discussion regarding the risks and benefits of the procedure.  The patient was told of the risks of bleeding, perforation, infections, emergency surgery and missing lesions.  The patient agreed and will follow-up to reassess the symptoms.   Follow-up: The patient is to follow-up in the office in 4 weeks to reassess the symptoms. The patient was told to call the office if any further problems.   Prior Endoscopic Procedures: The patient had a prior upper endoscopy and colonoscopy performed by another gastroenterologist.  I will try to obtain the prior upper endoscopy and colonoscopy reports.  The patient is to sign a record release to obtain those records. Family History of GI Malignancy: The patient admits to a family history of GI problems.  The patient's maternal cousin had a history of pancreatic cancer age 61.

## 2024-09-18 NOTE — HISTORY OF PRESENT ILLNESS
[de-identified] : The CAT scan of the abdomen and pelvis with IV contrast performed on June 17, 2024 revealed status post left nephrectomy with evolving postsurgical change in the nephrectomy bed since February 22, 2024 with no CT evidence of recurrent or metastatic disease.  Also noted with degenerative changes of the bone, subcutaneous edema with overlying skin thickening involving the lower abdominal wall asymmetric to the right waxing and waning on multiple prior studies, and asymmetric prominent right external iliac node measuring 1.9 x 1.1 cm unchanged from 2022 and nonspecific, multiple foci of fat necrosis just deep in the left ventral abdominal wall similar to prior study, atherosclerotic changes of the vessels, scattered sigmoid diverticula without acute diverticulitis, status post hysterectomy, a 1.2 cm right adrenal nodule indeterminate but stable since 2020, status post left adrenalectomy, aortic valve and coronary artery calcifications.   [FreeTextEntry1] : The MRI of the abdomen without IV contrast performed on April 22, 2023 revealed left renal mass consistent with renal cell carcinoma demonstrating continued mild interval growth as above and hepatic steatosis.

## 2024-09-23 ENCOUNTER — APPOINTMENT (OUTPATIENT)
Dept: UROLOGY | Facility: CLINIC | Age: 59
End: 2024-09-23
Payer: MEDICARE

## 2024-09-23 VITALS — SYSTOLIC BLOOD PRESSURE: 142 MMHG | DIASTOLIC BLOOD PRESSURE: 83 MMHG | HEART RATE: 101 BPM | OXYGEN SATURATION: 95 %

## 2024-09-23 PROCEDURE — 99213 OFFICE O/P EST LOW 20 MIN: CPT

## 2024-09-23 NOTE — ADDENDUM
[FreeTextEntry1] : Entered by Aftab Trujillo, acting as scribe for Dr. Jorge Stubbs. The documentation recorded by the scribe accurately reflects the service I personally performed and the decisions made by me.

## 2024-09-23 NOTE — LETTER BODY
[FreeTextEntry1] :  Ken John MD [address], [address] [place] [id number] [phone]  Dear Dr. John,   Reason for Visit: Prevoius left kidney cancer. Stress incontinence. Pelvic prolapse.       This is a 59 year-old woman with kidney cancer, stress incontinence, and pelvic prolapse. Patient underwent nephrectomy last year for T1b clear-cell renal cell carcinoma. Her postop course was complicated by a wound infection which has resolved. Patient is here for follow-up. Patient reports stress urinary incontinence and pelvic prolapse for many years. She previously saw a urogynecologist for her condition. Unfortunately, her urogynecologist has moved away. Patient denies any gross hematuria or dysuria. The patient denies any aggravating or relieving factors. The patient denies any interference of function. The patient is entirely asymptomatic. All other review of systems are negative. She has no cancer in her family medical history. She has no previous surgical history. Past medical history, family history and social history were inquired and were noncontributory to current condition. The patient does not use tobacco or drink alcohol. Medications and allergies were reviewed. She has no known allergies to medication.       On examination, the patient is a morbidly obese woman in no acute distress. She is alert and oriented follows commands. She has normal mood and affect. She is normocephalic. Oral no thrush. Neck is supple. Respirations are unlabored.  Patient has a large pannus.  He has a large left subcostal incision her abdomen is soft and nontender. Liver is nonpalpable. Bladder is nonpalpable. No CVA tenderness. Neurologically she is grossly intact. No peripheral edema. Skin without gross abnormality.        Assessment: Kidney cancer. Stress incontinence. Pelvic prolapse.       I counseled the patient. I discussed the various etiologies of her symptoms. In terms of her kidney cancer, I recommended the patient follow-up with Dr. Khanna in December for further evaluation of her renal cell carcinoma. In terms of her pelvic prolapse and stress incontinence, I discussed the treatment options available. Patient is not interested in pessary because of the discomfort from the device. Her previous urogynecologsit unfortunately moved, I encouraged her to see either Dr. Mariano Melvin or Dr. Devika Stallworth for further evaluation. Risks and alternatives were discussed. I answered the patient questions. The patient will follow-up as directed and will contact me with any questions or concerns. Thank you for the opportunity to participate in the care of this patient. I'll keep you updated on her progress.       Plan: Follow-up Dr. Peck in December regarding renal cell carcinoma. See urogynecologist Dr. Melvin or Dr. Stallworth.  I spent 20-minutes time today on all issues related to this encounter on today date of service including non face to face time.

## 2024-09-23 NOTE — HISTORY OF PRESENT ILLNESS
[FreeTextEntry1] : Follow-up kidney cancer.  Patient underwent nephrectomy last year for T1b clear-cell renal cell carcinoma.  Her postop course was complicated by a wound infection which is resolved.  Patient reports stress urinary incontinence and pelvic prolapse for many years.  She previously saw a urogynecologist for condition.  Unfortunately her urogynecology has moved.  I encouraged her to see either Dr. Mariano Melvin or Dr. Devika Stallworth for further evaluation.  Patient is not interested in pessary because of the discomfort from the device.  Plan: Follow-up Dr. Khanna in December regarding renal cell carcinoma.  See urogynecologist Dr. Melvin or Dr. Stallworth.  Please refer to URO Consult Note.

## 2024-09-24 ENCOUNTER — NON-APPOINTMENT (OUTPATIENT)
Age: 59
End: 2024-09-24

## 2024-09-26 ENCOUNTER — APPOINTMENT (OUTPATIENT)
Dept: INTERNAL MEDICINE | Facility: CLINIC | Age: 59
End: 2024-09-26
Payer: MEDICARE

## 2024-09-26 DIAGNOSIS — Z23 ENCOUNTER FOR IMMUNIZATION: ICD-10-CM

## 2024-09-26 PROCEDURE — 90656 IIV3 VACC NO PRSV 0.5 ML IM: CPT

## 2024-09-26 PROCEDURE — G0008: CPT

## 2024-09-26 NOTE — PROGRESS NOTE ADULT - ASSESSMENT
58F PMH of asthma, COPD (on 4L NC PRN), DM, fatty liver, fibromyalgia, R shoulder bursitis, HLD, hypothyroidism, NAKUL, psoriasis, RCC (s/p L renal resection 11/3/23, no hx of CT/RT) presenting with lower abdominal pain. CT scan showed, Interval left radical nephrectomy with a 7.2 x 6.0 cm postoperative collection in the nephrectomy bed. No evidence of air or well formed wall.*  4.8 x 3.1 cm left abdominal wall collection, likely postoperative. Admitted for further evaluation       A/P:  #Left retroperitoneal collection s/p IR guided drainage   #Hyponatremia- resolved   #Acute URI likely viral  with nasal congestion, cough and post nasal drip   #Chronic Hypoxic respiratory failure   #COPD- not in exacerbation   #DM  #RCC s/p L renal resection   #DVT ppx     Plan:  -Pt p/w abd pain, CT noted w/ 7.2 x 6.0 cm postoperative collection in the nephrectomy bed. s/p IR guided drainage- 70 cc out-put on 1/25. DC abx as cx are negative. Appreciate urology recommendations. Patient  will need out-patient f/u w/ urologist  to monitor drain out-put and have it discontinued by IR once out-put is minimal. Patient follows w/ Dr. Rubén Peck as out-patient   -BP stable, c/w home meds   -C/w levothyroxine   -Sodium improved   -SCr improved s/p iV hydration.  -DC planning home w/ home care and out-patient urology f/u. Patient feels weak and can't go home today. PT onelal requested.      No

## 2024-10-07 ENCOUNTER — APPOINTMENT (OUTPATIENT)
Dept: CARDIOLOGY | Facility: CLINIC | Age: 59
End: 2024-10-07
Payer: MEDICARE

## 2024-10-07 ENCOUNTER — NON-APPOINTMENT (OUTPATIENT)
Age: 59
End: 2024-10-07

## 2024-10-07 VITALS
TEMPERATURE: 97.8 F | OXYGEN SATURATION: 91 % | HEART RATE: 116 BPM | DIASTOLIC BLOOD PRESSURE: 80 MMHG | WEIGHT: 286 LBS | SYSTOLIC BLOOD PRESSURE: 142 MMHG | BODY MASS INDEX: 55.86 KG/M2

## 2024-10-07 PROCEDURE — 99214 OFFICE O/P EST MOD 30 MIN: CPT

## 2024-10-07 PROCEDURE — G2211 COMPLEX E/M VISIT ADD ON: CPT

## 2024-10-07 PROCEDURE — 93000 ELECTROCARDIOGRAM COMPLETE: CPT

## 2024-10-30 ENCOUNTER — APPOINTMENT (OUTPATIENT)
Dept: GASTROENTEROLOGY | Facility: CLINIC | Age: 59
End: 2024-10-30
Payer: MEDICARE

## 2024-10-30 VITALS
BODY MASS INDEX: 55.76 KG/M2 | HEIGHT: 60 IN | WEIGHT: 284 LBS | HEART RATE: 98 BPM | OXYGEN SATURATION: 92 % | DIASTOLIC BLOOD PRESSURE: 83 MMHG | SYSTOLIC BLOOD PRESSURE: 146 MMHG

## 2024-10-30 DIAGNOSIS — K63.5 POLYP OF COLON: ICD-10-CM

## 2024-10-30 DIAGNOSIS — E11.9 TYPE 2 DIABETES MELLITUS W/OUT COMPLICATIONS: ICD-10-CM

## 2024-10-30 DIAGNOSIS — Z82.49 FAMILY HISTORY OF ISCHEMIC HEART DISEASE AND OTHER DISEASES OF THE CIRCULATORY SYSTEM: ICD-10-CM

## 2024-10-30 DIAGNOSIS — I10 ESSENTIAL (PRIMARY) HYPERTENSION: ICD-10-CM

## 2024-10-30 DIAGNOSIS — E78.00 PURE HYPERCHOLESTEROLEMIA, UNSPECIFIED: ICD-10-CM

## 2024-10-30 DIAGNOSIS — Z82.3 FAMILY HISTORY OF STROKE: ICD-10-CM

## 2024-10-30 DIAGNOSIS — Z63.5 DISRUPTION OF FAMILY BY SEPARATION AND DIVORCE: ICD-10-CM

## 2024-10-30 PROCEDURE — 99214 OFFICE O/P EST MOD 30 MIN: CPT

## 2024-10-30 RX ORDER — SODIUM SULFATE, POTASSIUM SULFATE AND MAGNESIUM SULFATE 1.6; 3.13; 17.5 G/177ML; G/177ML; G/177ML
17.5-3.13-1.6 SOLUTION ORAL
Qty: 2 | Refills: 0 | Status: ACTIVE | COMMUNITY
Start: 2024-10-30 | End: 1900-01-01

## 2024-10-30 SDOH — SOCIAL STABILITY - SOCIAL INSECURITY: DISRUPTION OF FAMILY BY SEPARATION AND DIVORCE: Z63.5

## 2024-11-06 NOTE — ED PROVIDER NOTE - NSFOLLOWUPINSTRUCTIONS_ED_ALL_ED_FT
This was a shared visit with the PAMELA. I reviewed and verified the documentation.
possibly chest tightness could be from NAKUL or gastritis. can see cardio outpt if persist

## 2024-11-14 NOTE — HISTORY OF PRESENT ILLNESS
No [No Weight Changes] : No weight changes [None] : No weight lost attempts [Following Diet Successfully] : Following the diet successfully [Sedentary] : Patient is sedentary [Following  treatment as advised] : Patient is following  treatment as advised [Contemplation] : Contemplation: patient is considering to lose weight within the next 6 months, patient did not attempt to lose weight in the last year. [Good understanding] : Patient has a good understanding of disease, goals and obesity follow-up plan [de-identified] : 53 year old  female patient with history of stable Hypertension, Type 2 Diabetes Mellitus, Hypothyroidism, Reactive Airway Disease, Morbid Obesity, history as stated, presented for follow up examination of Elevated BP/Sugar checked. Patient is compliant with all medications. Denies shortness of breath, chest pain at this time. ROS as stated.\par

## 2024-11-20 ENCOUNTER — APPOINTMENT (OUTPATIENT)
Dept: CT IMAGING | Facility: CLINIC | Age: 59
End: 2024-11-20

## 2024-11-26 ENCOUNTER — APPOINTMENT (OUTPATIENT)
Dept: GASTROENTEROLOGY | Facility: HOSPITAL | Age: 59
End: 2024-11-26

## 2024-12-05 DIAGNOSIS — Z86.0100 PERSONAL HISTORY OF COLON POLYPS, UNSPECIFIED: ICD-10-CM

## 2024-12-05 RX ORDER — SODIUM SULFATE, POTASSIUM SULFATE AND MAGNESIUM SULFATE 1.6; 3.13; 17.5 G/177ML; G/177ML; G/177ML
17.5-3.13-1.6 SOLUTION ORAL
Qty: 1 | Refills: 0 | Status: ACTIVE | COMMUNITY
Start: 2024-12-05 | End: 1900-01-01

## 2024-12-18 ENCOUNTER — APPOINTMENT (OUTPATIENT)
Dept: PULMONOLOGY | Facility: CLINIC | Age: 59
End: 2024-12-18

## 2025-01-02 ENCOUNTER — APPOINTMENT (OUTPATIENT)
Dept: INTERNAL MEDICINE | Facility: CLINIC | Age: 60
End: 2025-01-02
Payer: MEDICARE

## 2025-01-02 VITALS
SYSTOLIC BLOOD PRESSURE: 163 MMHG | TEMPERATURE: 98.4 F | HEIGHT: 60 IN | RESPIRATION RATE: 16 BRPM | DIASTOLIC BLOOD PRESSURE: 85 MMHG | HEART RATE: 94 BPM | BODY MASS INDEX: 56.35 KG/M2 | WEIGHT: 287 LBS | OXYGEN SATURATION: 93 %

## 2025-01-02 DIAGNOSIS — I10 ESSENTIAL (PRIMARY) HYPERTENSION: ICD-10-CM

## 2025-01-02 DIAGNOSIS — E78.5 HYPERLIPIDEMIA, UNSPECIFIED: ICD-10-CM

## 2025-01-02 DIAGNOSIS — E03.9 HYPOTHYROIDISM, UNSPECIFIED: ICD-10-CM

## 2025-01-02 DIAGNOSIS — E11.9 TYPE 2 DIABETES MELLITUS W/OUT COMPLICATIONS: ICD-10-CM

## 2025-01-02 PROCEDURE — 36415 COLL VENOUS BLD VENIPUNCTURE: CPT

## 2025-01-02 PROCEDURE — 99213 OFFICE O/P EST LOW 20 MIN: CPT

## 2025-01-03 LAB
ALBUMIN SERPL ELPH-MCNC: 4.4 G/DL
ALP BLD-CCNC: 135 U/L
ALT SERPL-CCNC: 42 U/L
ANION GAP SERPL CALC-SCNC: 12 MMOL/L
AST SERPL-CCNC: 29 U/L
BASOPHILS # BLD AUTO: 0.04 K/UL
BASOPHILS NFR BLD AUTO: 0.5 %
BILIRUB SERPL-MCNC: 0.3 MG/DL
BUN SERPL-MCNC: 21 MG/DL
C PEPTIDE SERPL-MCNC: 7.1 NG/ML
CALCIUM SERPL-MCNC: 10.1 MG/DL
CHLORIDE SERPL-SCNC: 98 MMOL/L
CHOLEST SERPL-MCNC: 171 MG/DL
CO2 SERPL-SCNC: 31 MMOL/L
CREAT SERPL-MCNC: 0.94 MG/DL
EGFR: 70 ML/MIN/1.73M2
EOSINOPHIL # BLD AUTO: 0.33 K/UL
EOSINOPHIL NFR BLD AUTO: 3.8 %
ESTIMATED AVERAGE GLUCOSE: 223 MG/DL
GGT SERPL-CCNC: 59 U/L
GLUCOSE SERPL-MCNC: 161 MG/DL
HBA1C MFR BLD HPLC: 9.4 %
HCT VFR BLD CALC: 40 %
HDLC SERPL-MCNC: 42 MG/DL
HGB BLD-MCNC: 11.5 G/DL
IMM GRANULOCYTES NFR BLD AUTO: 0.8 %
LDLC SERPL CALC-MCNC: 106 MG/DL
LYMPHOCYTES # BLD AUTO: 1.86 K/UL
LYMPHOCYTES NFR BLD AUTO: 21.7 %
MAGNESIUM SERPL-MCNC: 1.4 MG/DL
MAN DIFF?: NORMAL
MCHC RBC-ENTMCNC: 23.3 PG
MCHC RBC-ENTMCNC: 28.8 G/DL
MCV RBC AUTO: 81.1 FL
MONOCYTES # BLD AUTO: 0.51 K/UL
MONOCYTES NFR BLD AUTO: 5.9 %
NEUTROPHILS # BLD AUTO: 5.77 K/UL
NEUTROPHILS NFR BLD AUTO: 67.3 %
NONHDLC SERPL-MCNC: 128 MG/DL
PLATELET # BLD AUTO: 323 K/UL
POTASSIUM SERPL-SCNC: 4.9 MMOL/L
PROT SERPL-MCNC: 8 G/DL
RBC # BLD: 4.93 M/UL
RBC # FLD: 16.9 %
SODIUM SERPL-SCNC: 141 MMOL/L
T3 SERPL-MCNC: 106 NG/DL
T4 FREE SERPL-MCNC: 0.8 NG/DL
TRIGL SERPL-MCNC: 122 MG/DL
TSH SERPL-ACNC: 3.68 UIU/ML
WBC # FLD AUTO: 8.58 K/UL

## 2025-01-09 ENCOUNTER — APPOINTMENT (OUTPATIENT)
Dept: GASTROENTEROLOGY | Facility: HOSPITAL | Age: 60
End: 2025-01-09

## 2025-01-22 ENCOUNTER — APPOINTMENT (OUTPATIENT)
Dept: ENDOCRINOLOGY | Facility: CLINIC | Age: 60
End: 2025-01-22
Payer: MEDICARE

## 2025-01-22 ENCOUNTER — APPOINTMENT (OUTPATIENT)
Dept: GASTROENTEROLOGY | Facility: CLINIC | Age: 60
End: 2025-01-22

## 2025-01-22 DIAGNOSIS — E11.9 TYPE 2 DIABETES MELLITUS W/OUT COMPLICATIONS: ICD-10-CM

## 2025-01-22 DIAGNOSIS — E78.5 HYPERLIPIDEMIA, UNSPECIFIED: ICD-10-CM

## 2025-01-22 DIAGNOSIS — E03.9 HYPOTHYROIDISM, UNSPECIFIED: ICD-10-CM

## 2025-01-22 PROCEDURE — 99214 OFFICE O/P EST MOD 30 MIN: CPT | Mod: 93

## 2025-01-22 RX ORDER — INSULIN GLARGINE 100 [IU]/ML
100 INJECTION, SOLUTION SUBCUTANEOUS
Qty: 30 | Refills: 3 | Status: ACTIVE | COMMUNITY
Start: 2025-01-22 | End: 1900-01-01

## 2025-01-23 ENCOUNTER — APPOINTMENT (OUTPATIENT)
Dept: UROLOGY | Facility: CLINIC | Age: 60
End: 2025-01-23

## 2025-02-03 ENCOUNTER — APPOINTMENT (OUTPATIENT)
Dept: PULMONOLOGY | Facility: CLINIC | Age: 60
End: 2025-02-03
Payer: MEDICARE

## 2025-02-03 VITALS
OXYGEN SATURATION: 90 % | SYSTOLIC BLOOD PRESSURE: 152 MMHG | HEIGHT: 60 IN | TEMPERATURE: 97.6 F | HEART RATE: 99 BPM | BODY MASS INDEX: 54.97 KG/M2 | DIASTOLIC BLOOD PRESSURE: 80 MMHG | WEIGHT: 280 LBS

## 2025-02-03 DIAGNOSIS — R05.3 CHRONIC COUGH: ICD-10-CM

## 2025-02-03 DIAGNOSIS — M79.89 OTHER SPECIFIED SOFT TISSUE DISORDERS: ICD-10-CM

## 2025-02-03 DIAGNOSIS — J45.909 UNSPECIFIED ASTHMA, UNCOMPLICATED: ICD-10-CM

## 2025-02-03 DIAGNOSIS — J20.9 ACUTE BRONCHITIS, UNSPECIFIED: ICD-10-CM

## 2025-02-03 PROCEDURE — 99215 OFFICE O/P EST HI 40 MIN: CPT

## 2025-02-03 RX ORDER — PREDNISONE 20 MG/1
20 TABLET ORAL DAILY
Qty: 7 | Refills: 0 | Status: ACTIVE | COMMUNITY
Start: 2025-02-03 | End: 1900-01-01

## 2025-02-03 RX ORDER — DOXYCYCLINE HYCLATE 100 MG/1
100 CAPSULE ORAL
Qty: 10 | Refills: 0 | Status: ACTIVE | COMMUNITY
Start: 2025-02-03 | End: 1900-01-01

## 2025-02-04 ENCOUNTER — APPOINTMENT (OUTPATIENT)
Dept: RHEUMATOLOGY | Facility: CLINIC | Age: 60
End: 2025-02-04

## 2025-02-10 NOTE — ED ADULT NURSE NOTE - SUICIDE SCREENING DEPRESSION
[facility]     Dwight Palacios MD, FACP, MACG, FAASLD   MD Sharmila Erazo, [place], [facility]   Dejah Garcia, [facility]   Alexa Zan, FNP-C  Efe Amin, FN-C   [place], [facility]   [address], [address]   [place]  [address] [phone]   FAX: [phone]  [facility]   [address], [address]   [place]  [address] [phone]   FAX: [phone]       UPPER ENDOSCOPY PROCEDURE NOTE    NAME: Frankie Kauffman  :  1964  MRN:  [id number]    INDICATION: Cirrhosis.  Screening for esophageal varices with variceal ligation if  indicated.    : Dwight Palacios MD    SURGICAL ASSISTANT:  None    PROSTHETIC DEVISES, TISSUE GRAFTS, ORGAN TRANSPLANTS:  Not applicable     ANESTHESIA/SEDATION: MAC Sedation per anesthesiology      PROCEDURE DESCRIPTION:  [employer] consent was obtained from the patient for the procedure.  All risks and benefits of the procedure explained.     The procedure was performed in the endoscopy suite.  The patient was laying on a stretcher and moved to the left lateral decubitus position prior to administration of sedation.    Sedation was administered by anesthesiology.  See their note for details.      The endoscope was inserted into the mouth and advanced under direct vision to the second portion of the duodenum.  Careful inspection of upper gastrointestinal tract was made as the endoscope was inserted and withdrawn.  Retroflexion of the endoscope to view of the cardia of the stomach was performed.  The findings and interventions are described below.      FINDINGS:  Esophagus:    Normal.      Stomach:   Normal    Duodenum:   Normal bulb and second portion    SPECIMENS COLLECTED:  Negative

## 2025-02-18 ENCOUNTER — APPOINTMENT (OUTPATIENT)
Dept: ULTRASOUND IMAGING | Facility: CLINIC | Age: 60
End: 2025-02-18
Payer: MEDICARE

## 2025-02-18 ENCOUNTER — APPOINTMENT (OUTPATIENT)
Dept: RADIOLOGY | Facility: CLINIC | Age: 60
End: 2025-02-18
Payer: MEDICARE

## 2025-02-18 PROCEDURE — 93970 EXTREMITY STUDY: CPT

## 2025-02-18 PROCEDURE — 71046 X-RAY EXAM CHEST 2 VIEWS: CPT

## 2025-02-20 ENCOUNTER — APPOINTMENT (OUTPATIENT)
Dept: RHEUMATOLOGY | Facility: CLINIC | Age: 60
End: 2025-02-20
Payer: MEDICARE

## 2025-02-20 VITALS
RESPIRATION RATE: 16 BRPM | BODY MASS INDEX: 55.56 KG/M2 | TEMPERATURE: 98.6 F | SYSTOLIC BLOOD PRESSURE: 158 MMHG | HEART RATE: 93 BPM | WEIGHT: 283 LBS | HEIGHT: 60 IN | DIASTOLIC BLOOD PRESSURE: 90 MMHG | OXYGEN SATURATION: 92 %

## 2025-02-20 DIAGNOSIS — L40.50 ARTHROPATHIC PSORIASIS, UNSPECIFIED: ICD-10-CM

## 2025-02-20 DIAGNOSIS — M54.50 LOW BACK PAIN, UNSPECIFIED: ICD-10-CM

## 2025-02-20 DIAGNOSIS — L40.9 PSORIASIS, UNSPECIFIED: ICD-10-CM

## 2025-02-20 DIAGNOSIS — M46.1 SACROILIITIS, NOT ELSEWHERE CLASSIFIED: ICD-10-CM

## 2025-02-20 DIAGNOSIS — M70.61 TROCHANTERIC BURSITIS, RIGHT HIP: ICD-10-CM

## 2025-02-20 DIAGNOSIS — M79.7 FIBROMYALGIA: ICD-10-CM

## 2025-02-20 PROCEDURE — 20610 DRAIN/INJ JOINT/BURSA W/O US: CPT | Mod: RT

## 2025-02-20 PROCEDURE — 20552 NJX 1/MLT TRIGGER POINT 1/2: CPT | Mod: 59

## 2025-02-20 PROCEDURE — 99214 OFFICE O/P EST MOD 30 MIN: CPT | Mod: 25

## 2025-02-23 RX ADMIN — LIDOCAINE HYDROCHLORIDE 0 %: 10 INJECTION, SOLUTION INFILTRATION; PERINEURAL at 00:00

## 2025-02-23 RX ADMIN — Medication 0 MG/ML: at 00:00

## 2025-02-25 RX ORDER — LIDOCAINE HYDROCHLORIDE 10 MG/ML
1 INJECTION, SOLUTION INFILTRATION; PERINEURAL
Qty: 0 | Refills: 0 | Status: COMPLETED | OUTPATIENT
Start: 2025-02-23

## 2025-02-25 RX ORDER — METHYLPRED ACET/NACL,ISO-OS/PF 40 MG/ML
40 VIAL (ML) INJECTION
Qty: 0 | Refills: 0 | Status: COMPLETED | OUTPATIENT
Start: 2025-02-23

## 2025-02-26 RX ORDER — METHYLPRED ACET/NACL,ISO-OS/PF 40 MG/ML
40 VIAL (ML) INJECTION
Qty: 0 | Refills: 0 | Status: COMPLETED | OUTPATIENT
Start: 2025-02-23

## 2025-03-04 NOTE — ED ADULT NURSE NOTE - CCCP TRG CHIEF CMPLNT
s/p MRI contrast today/allergic reaction

## 2025-03-05 ENCOUNTER — APPOINTMENT (OUTPATIENT)
Dept: ENDOCRINOLOGY | Facility: CLINIC | Age: 60
End: 2025-03-05
Payer: MEDICARE

## 2025-03-05 DIAGNOSIS — E11.9 TYPE 2 DIABETES MELLITUS W/OUT COMPLICATIONS: ICD-10-CM

## 2025-03-05 DIAGNOSIS — D35.00 BENIGN NEOPLASM OF UNSPECIFIED ADRENAL GLAND: ICD-10-CM

## 2025-03-05 PROCEDURE — 99214 OFFICE O/P EST MOD 30 MIN: CPT | Mod: 93

## 2025-03-05 PROCEDURE — G2211 COMPLEX E/M VISIT ADD ON: CPT | Mod: 93

## 2025-03-10 ENCOUNTER — INPATIENT (INPATIENT)
Facility: HOSPITAL | Age: 60
LOS: 7 days | Discharge: HOME | DRG: 208 | End: 2025-03-18
Attending: INTERNAL MEDICINE | Admitting: INTERNAL MEDICINE
Payer: MEDICARE

## 2025-03-10 VITALS
HEART RATE: 109 BPM | WEIGHT: 288.81 LBS | SYSTOLIC BLOOD PRESSURE: 169 MMHG | DIASTOLIC BLOOD PRESSURE: 83 MMHG | RESPIRATION RATE: 18 BRPM | HEIGHT: 61 IN | TEMPERATURE: 98 F | OXYGEN SATURATION: 89 %

## 2025-03-10 DIAGNOSIS — Z90.721 ACQUIRED ABSENCE OF OVARIES, UNILATERAL: Chronic | ICD-10-CM

## 2025-03-10 DIAGNOSIS — E27.8 OTHER SPECIFIED DISORDERS OF ADRENAL GLAND: Chronic | ICD-10-CM

## 2025-03-10 DIAGNOSIS — Z98.890 OTHER SPECIFIED POSTPROCEDURAL STATES: Chronic | ICD-10-CM

## 2025-03-10 DIAGNOSIS — N89.8 OTHER SPECIFIED NONINFLAMMATORY DISORDERS OF VAGINA: Chronic | ICD-10-CM

## 2025-03-10 DIAGNOSIS — Z98.89 OTHER SPECIFIED POSTPROCEDURAL STATES: Chronic | ICD-10-CM

## 2025-03-10 DIAGNOSIS — Z90.710 ACQUIRED ABSENCE OF BOTH CERVIX AND UTERUS: Chronic | ICD-10-CM

## 2025-03-10 DIAGNOSIS — I77.6 ARTERITIS, UNSPECIFIED: Chronic | ICD-10-CM

## 2025-03-10 LAB
ALBUMIN SERPL ELPH-MCNC: 3.1 G/DL — LOW (ref 3.5–5)
ALP SERPL-CCNC: 153 U/L — HIGH (ref 40–120)
ALT FLD-CCNC: 44 U/L DA — SIGNIFICANT CHANGE UP (ref 10–60)
ANION GAP SERPL CALC-SCNC: 4 MMOL/L — LOW (ref 5–17)
APTT BLD: 29.1 SEC — SIGNIFICANT CHANGE UP (ref 24.5–35.6)
AST SERPL-CCNC: 34 U/L — SIGNIFICANT CHANGE UP (ref 10–40)
BASOPHILS NFR BLD AUTO: 0.4 % — SIGNIFICANT CHANGE UP (ref 0–2)
BILIRUB SERPL-MCNC: 0.3 MG/DL — SIGNIFICANT CHANGE UP (ref 0.2–1.2)
BUN SERPL-MCNC: 22 MG/DL — HIGH (ref 7–18)
CALCIUM SERPL-MCNC: 9.2 MG/DL — SIGNIFICANT CHANGE UP (ref 8.4–10.5)
CHLORIDE SERPL-SCNC: 100 MMOL/L — SIGNIFICANT CHANGE UP (ref 96–108)
CO2 SERPL-SCNC: 32 MMOL/L — HIGH (ref 22–31)
CREAT SERPL-MCNC: 1.22 MG/DL — SIGNIFICANT CHANGE UP (ref 0.5–1.3)
EGFR: 51 ML/MIN/1.73M2 — LOW
EGFR: 51 ML/MIN/1.73M2 — LOW
EOSINOPHIL # BLD AUTO: 0.27 K/UL — SIGNIFICANT CHANGE UP (ref 0–0.5)
EOSINOPHIL NFR BLD AUTO: 2.9 % — SIGNIFICANT CHANGE UP (ref 0–6)
FLUAV AG NPH QL: SIGNIFICANT CHANGE UP
FLUBV AG NPH QL: SIGNIFICANT CHANGE UP
GLUCOSE SERPL-MCNC: 273 MG/DL — HIGH (ref 70–99)
HCT VFR BLD CALC: 40.9 % — SIGNIFICANT CHANGE UP (ref 34.5–45)
HGB BLD-MCNC: 12.2 G/DL — SIGNIFICANT CHANGE UP (ref 11.5–15.5)
IMM GRANULOCYTES NFR BLD AUTO: 1 % — HIGH (ref 0–0.9)
INR BLD: 1.09 RATIO — SIGNIFICANT CHANGE UP (ref 0.85–1.16)
LIDOCAIN IGE QN: 25 U/L — SIGNIFICANT CHANGE UP (ref 13–75)
LYMPHOCYTES # BLD AUTO: 1.84 K/UL — SIGNIFICANT CHANGE UP (ref 1–3.3)
LYMPHOCYTES # BLD AUTO: 19.7 % — SIGNIFICANT CHANGE UP (ref 13–44)
MAGNESIUM SERPL-MCNC: 1.2 MG/DL — LOW (ref 1.6–2.6)
MCHC RBC-ENTMCNC: 24 PG — LOW (ref 27–34)
MCHC RBC-ENTMCNC: 29.8 G/DL — LOW (ref 32–36)
MCV RBC AUTO: 80.5 FL — SIGNIFICANT CHANGE UP (ref 80–100)
MONOCYTES # BLD AUTO: 0.63 K/UL — SIGNIFICANT CHANGE UP (ref 0–0.9)
MONOCYTES NFR BLD AUTO: 6.7 % — SIGNIFICANT CHANGE UP (ref 2–14)
NEUTROPHILS # BLD AUTO: 6.47 K/UL — SIGNIFICANT CHANGE UP (ref 1.8–7.4)
NRBC BLD AUTO-RTO: 0 /100 WBCS — SIGNIFICANT CHANGE UP (ref 0–0)
PLATELET # BLD AUTO: 270 K/UL — SIGNIFICANT CHANGE UP (ref 150–400)
POTASSIUM SERPL-MCNC: 4.3 MMOL/L — SIGNIFICANT CHANGE UP (ref 3.5–5.3)
PROT SERPL-MCNC: 7.8 G/DL — SIGNIFICANT CHANGE UP (ref 6–8.3)
PROTHROM AB SERPL-ACNC: 12.7 SEC — SIGNIFICANT CHANGE UP (ref 9.9–13.4)
RBC # BLD: 5.08 M/UL — SIGNIFICANT CHANGE UP (ref 3.8–5.2)
RBC # FLD: 16.6 % — HIGH (ref 10.3–14.5)
RSV RNA NPH QL NAA+NON-PROBE: SIGNIFICANT CHANGE UP
SARS-COV-2 RNA SPEC QL NAA+PROBE: SIGNIFICANT CHANGE UP
SODIUM SERPL-SCNC: 136 MMOL/L — SIGNIFICANT CHANGE UP (ref 135–145)
TROPONIN I, HIGH SENSITIVITY RESULT: 32.5 NG/L — SIGNIFICANT CHANGE UP
WBC # BLD: 9.34 K/UL — SIGNIFICANT CHANGE UP (ref 3.8–10.5)
WBC # FLD AUTO: 9.34 K/UL — SIGNIFICANT CHANGE UP (ref 3.8–10.5)

## 2025-03-10 PROCEDURE — 93010 ELECTROCARDIOGRAM REPORT: CPT

## 2025-03-10 PROCEDURE — 71045 X-RAY EXAM CHEST 1 VIEW: CPT | Mod: 26

## 2025-03-10 PROCEDURE — 99285 EMERGENCY DEPT VISIT HI MDM: CPT

## 2025-03-10 RX ORDER — ONDANSETRON HCL/PF 4 MG/2 ML
4 VIAL (ML) INJECTION ONCE
Refills: 0 | Status: COMPLETED | OUTPATIENT
Start: 2025-03-10 | End: 2025-03-10

## 2025-03-10 RX ADMIN — Medication 4 MILLIGRAM(S): at 22:34

## 2025-03-10 RX ADMIN — Medication 4 MILLIGRAM(S): at 23:07

## 2025-03-10 RX ADMIN — Medication 500 MILLILITER(S): at 22:33

## 2025-03-10 RX ADMIN — Medication 4 MILLIGRAM(S): at 22:37

## 2025-03-10 NOTE — H&P ADULT - PROBLEM SELECTOR PLAN 5
hx of COPD   c/w home oxygen   c/w home inhalers hx of migraines   c/w home trileptal Statement Selected

## 2025-03-10 NOTE — H&P ADULT - ATTENDING COMMENTS
Chest X-Ray: Pending official read; on my read there is no infiltrate, consolidation, or effusion.  EKG: Normal Sinus Rhythm, 100 bpm, QTc 428ms, OK 138ms, on my read no ST segment elevation or depression, TWI in leads I, aVL    59 year old female patient with pmhx  DM, HLD, asthma, COPD on 4L NC as needed, NAKUL not on CPAP, RCC status post left renal resection 11/3/23, obesity, hypothyroidism, right shoulder bursitis, fibromyalgia, psoriasis who presented to the ER due to chest pain and palpitations for the past 4 days.  The chest pain is a left sided non-radiating stabbing feeling that lasts for a minute or two at a time and occurs often. She has associated nausea with the chest pain. She also has palpitations worse at night that are persistent. She thinks her last stress test was last year in March and was negative at that time. She fell 10 days ago and hurt her chest, lower abdomen, and left side and thinks the chest pain may have been due to her fall.  Note: Patient states she is allergic to only IV Contrast for MRI Scans; not IV Contrast for CT Scans.  Review Of Systems included: + chest pain, + palpitations (worst at night), + chest pain on exertion, + dyspnea on exertion, + nausea, + lightheadedness/dizziness, + diarrhea/constipation intermittently, + dysuria, + chronic shoulder pain unchanged, + fall, + unsteady gait, no fever, no chills, no cough    General: Awake, Alert, Oriented, left forehead dark patch which patient states was due to an adverse reaction of taking tetracycline and sun exposure on her forehead  Cardiac: RRR, mild tenderness to palpation of chest wall  Pulmonary: CTA b/l  Abdominal: Soft, High BMI, + RLQ and LLQ tenderness  Extremities: Right leg 2+ edema larger than left leg 1+ edema. Psoriasis on arms b/l    # Chest Pain, r/o ACS  > HEART Score of 4 (age 1 pt, risk factors 2 pts, moderately suspicious 1 pt)  - Cardiac Telemetry Monitoring  - Consult Cardiology  - Check ECHO  - Aspirin  - Statin  - Check A1C  - Check Lipid Profile  - Follow up repeat Troponin    # Unsteady Gait  - PT Evaluation  - Fall Precautions    # Asymmetrical Lower Extremity Edema  - Lower Extremity Venous Duplex U/S    # Hx of DM  - Insulin Sliding Scale  - Blood Glucose Monitoring  - Can continue home insulin regimen at 70-80% of home dose, titrate as needed    # Hx of HLD, asthma, COPD on 4L NC as needed, NAKUL not on CPAP, RCC status post left renal resection 11/23, obesity, hypothyroidism, right shoulder bursitis, fibromyalgia, psoriasis  - Continue home medications  - Patient refuses nocturnal CPAP; she does not want to use it    # DVT PPx: Lovenox    # FEN  - Diabetic DASH Diet  - Monitor and replete electrolytes as needed    Previous Admissions in Past 3 Years Included  3/5/2025: Endocrinology Clinic Visit: The diabetes is poorly controlled. She has been on steroids on and off for bronchitis. Will raise the insulin dose. Will renew the Ozempic weekly. Will repeat the blood tests in 2 months. The CT scan 6/24 revealed a 1.2 cm right adrenal nodule unchanged. Will do a Salivary Cortisol  2/24/2025: Rheumatology Clinic Visit: Sacroiliitis, Psoriasis, Fibromyalgia, Psoriatic arthritis, Greater trochanteric bursitis of right hip, Low back pain, IgA vasculitis resolved. Patient to continue with Otezla ; psoriasis development may be multifactorial  2/3/2025: Pulmonology Clinic Visit: She had been on NIV BIPAP 20/16. She is not using any form of PAP therapy as she states remains uncomfortable. I have previously set her BiPAP in the office but she has not continued to use this after her kidney surgery. She has history of hypercapnic respiratory failure. She continues to use inhaled ICS/LABA. Taken several courses of Augmentin and recently took prednisone. Describes pressure in face, eyes  10/7/2024: Cardiology Clinic Visit: intermittent bouts of dizziness. She follows with a neurologist. Workup has been unrevealing. She also reports having atypical left sided chest discomfort that is reproducible as well as chronic stable dyspnea. From the cardiac perspective, patient had an unremarkable stress test in March 2024. Echocardiogram at that time also noted normal biventricular systolic function and mild diastolic dysfunction  7/22/2024: ER Visit for abdominal pain  3/19/2024 - 3/20/2024: Chest pain, s/p stress test  2/22/2024: ER Visit for nephrostomy disfunction; outpatient follow up for nephrostomy removal  1/22/2024 - 1/30/2024: post-op left nephrectomy collection s/p IR drainage of fluid collection 70cc on 1/24, now with pigtail in place.  11/3/2023 - 11/9/2023: s/p L laparoscopic radical nephrectomy  9/2/2023: ER Visit for chest pressure  2/4/2022 - 2/22/2022: Chest pain, COPD Exacerbation requiring intubation, Cellulitis, PNA, UTI, Left renal mass    Patient case and management was discussed with ER Attending  I did examine all labs (including CBC, PT/INR, APTT, CMP, Troponin, Lipase, Mg, pro-BNP, COVID/Flu/RSV Test), imaging, prior notes    Time-based billing (NON-critical care).  76 minutes spent on total encounter excluding any time spent teaching residents. The necessity of the time spent during the encounter on this date of service was due to: Review of records, vital signs, labs, microbiology, and imaging, Ordering labs and/or tests, General physical examination performed, Generation of treatment plan, Counseling of the patient and/or family members Chest X-Ray: Pending official read; on my read there is no infiltrate, consolidation, or effusion.  EKG: Normal Sinus Rhythm, 100 bpm, QTc 428ms, [place] 138ms, on my read no ST segment elevation or depression, TWI in leads I, aVL    59 year old female patient with pmhx  DM, HLD, asthma, COPD on 4L NC as needed, NAKUL not on CPAP, RCC status post left renal resection 11/3/23, obesity, hypothyroidism, right shoulder bursitis, fibromyalgia, psoriasis who presented to the ER due to chest pain and palpitations for the past 4 days.  The chest pain is a left sided non-radiating stabbing feeling that lasts for a minute or two at a time and occurs often. She has associated nausea with the chest pain. She also has palpitations worse at night that are persistent. She thinks her last stress test was last year in March and was negative at that time. She fell 10 days ago and hurt her chest, lower abdomen, and left side and thinks the chest pain may have been due to her fall.  Note: Patient states she is allergic to only IV Contrast for MRI Scans; not IV Contrast for CT Scans.  Review Of Systems included: + chest pain, + palpitations (worst at night), + chest pain on exertion, + dyspnea on exertion, + nausea, + lightheadedness/dizziness, + diarrhea/constipation intermittently, + dysuria, + chronic shoulder pain unchanged, + fall, + unsteady gait, no fever, no chills, no cough    General: Awake, Alert, Oriented, left forehead dark patch which patient states was due to an adverse reaction of taking tetracycline and sun exposure on her forehead  Cardiac: RRR, mild tenderness to palpation of chest wall  Pulmonary: CTA b/l  Abdominal: Soft, High BMI, + RLQ and LLQ tenderness  Extremities: Right leg 2+ edema larger than left leg 1+ edema. Psoriasis on arms b/l    # Chest Pain, r/o ACS  > HEART Score of 4 (age 1 pt, risk factors 2 pts, moderately suspicious 1 pt)  - Cardiac Telemetry Monitoring  - Consult Cardiology  - Check ECHO  - Aspirin  - Statin  - Check A1C  - Check Lipid Profile  - Follow up repeat Troponin    # Unsteady Gait  - PT Evaluation  - Fall Precautions    # Asymmetrical Lower Extremity Edema  - Lower Extremity Venous Duplex U/S    # Hx of DM  - Insulin Sliding Scale  - Blood Glucose Monitoring  - Can continue home insulin regimen at 70-80% of home dose, titrate as needed    # Hx of HLD, asthma, COPD on 4L NC as needed, NAKUL not on CPAP, RCC status post left renal resection 11/23, obesity, hypothyroidism, right shoulder bursitis, fibromyalgia, psoriasis  - Continue home medications  - Patient refuses nocturnal CPAP; she does not want to use it    # DVT PPx: Lovenox BID (High BMI)    # FEN  - Diabetic DASH Diet  - Monitor and replete electrolytes as needed    Previous Admissions in Past 3 Years Included  3/5/2025: Endocrinology Clinic Visit: The diabetes is poorly controlled. She has been on steroids on and off for bronchitis. Will raise the insulin dose. Will renew the Ozempic weekly. Will repeat the blood tests in 2 months. The CT scan 6/24 revealed a 1.2 cm right adrenal nodule unchanged. Will do a Salivary Cortisol  2/24/2025: Rheumatology Clinic Visit: Sacroiliitis, Psoriasis, Fibromyalgia, Psoriatic arthritis, Greater trochanteric bursitis of right hip, Low back pain, IgA vasculitis resolved. Patient to continue with Otezla ; psoriasis development may be multifactorial  2/3/2025: Pulmonology Clinic Visit: She had been on NIV BIPAP 20/16. She is not using any form of PAP therapy as she states remains uncomfortable. I have previously set her BiPAP in the office but she has not continued to use this after her kidney surgery. She has history of hypercapnic respiratory failure. She continues to use inhaled ICS/LABA. Taken several courses of Augmentin and recently took prednisone. Describes pressure in face, eyes  10/7/2024: Cardiology Clinic Visit: intermittent bouts of dizziness. She follows with a neurologist. Workup has been unrevealing. She also reports having atypical left sided chest discomfort that is reproducible as well as chronic stable dyspnea. From the cardiac perspective, patient had an unremarkable stress test in March 2024. Echocardiogram at that time also noted normal biventricular systolic function and mild diastolic dysfunction  7/22/2024: ER Visit for abdominal pain  3/19/2024 - 3/20/2024: Chest pain, s/p stress test  2/22/2024: ER Visit for nephrostomy disfunction; outpatient follow up for nephrostomy removal  1/22/2024 - 1/30/2024: post-op left nephrectomy collection s/p IR drainage of fluid collection 70cc on 1/24, now with pigtail in place.  11/3/2023 - 11/9/2023: s/p L laparoscopic radical nephrectomy  9/2/2023: ER Visit for chest pressure  2/4/2022 - 2/22/2022: Chest pain, COPD Exacerbation requiring intubation, Cellulitis, PNA, UTI, Left renal mass    Patient case and management was discussed with ER Attending  I did examine all labs (including CBC, PT/INR, APTT, CMP, Troponin, Lipase, Mg, pro-BNP, COVID/Flu/RSV Test), imaging, prior notes    Time-based billing (NON-critical care).  76 minutes spent on total encounter excluding any time spent teaching residents. The necessity of the time spent during the encounter on this date of service was due to: Review of records, vital signs, labs, microbiology, and imaging, Ordering labs and/or tests, General physical examination performed, Generation of treatment plan, Counseling of the patient and/or family members Chest X-Ray: Pending official read; on my read there is no infiltrate, consolidation, or effusion.  EKG: Normal Sinus Rhythm, 100 bpm, QTc 428ms, [place] 138ms, on my read no ST segment elevation or depression, TWI in leads I, aVL    59 year old female patient with pmhx  DM, HLD, asthma, COPD on 4L NC as needed, NAKUL not on CPAP, RCC status post left renal resection 11/3/23, obesity, hypothyroidism, right shoulder bursitis, fibromyalgia, psoriasis who presented to the ER due to chest pain and palpitations for the past 4 days.  The chest pain is a left sided non-radiating stabbing feeling that lasts for a minute or two at a time and occurs often. She has associated nausea with the chest pain. She also has palpitations worse at night that are persistent. She thinks her last stress test was last year in March and was negative at that time. She fell 10 days ago and hurt her chest, lower abdomen, and left side and thinks the chest pain may have been due to her fall.  Note: Patient states she is allergic to only IV Contrast for MRI Scans; not IV Contrast for CT Scans.  Review Of Systems included: + chest pain, + palpitations (worst at night), + chest pain on exertion, + dyspnea on exertion, + nausea, + lightheadedness/dizziness, + diarrhea/constipation intermittently, + dysuria, + chronic shoulder pain unchanged, + fall, + unsteady gait, no fever, no chills, no cough    General: Awake, Alert, Oriented, left forehead dark patch which patient states was due to an adverse reaction of taking tetracycline and sun exposure on her forehead  Cardiac: RRR, mild tenderness to palpation of chest wall  Pulmonary: CTA b/l  Abdominal: Soft, High BMI, + RLQ and LLQ tenderness  Extremities: Right leg 2+ edema larger than left leg 1+ edema. Psoriasis on arms b/l    # Chest Pain, r/o ACS  > HEART Score of 4 (age 1 pt, risk factors 2 pts, moderately suspicious 1 pt)  - Cardiac Telemetry Monitoring  - Consult Cardiology  - Check ECHO  - Aspirin  - Statin  - Check A1C  - Check Lipid Profile  - Follow up repeat Troponin    # Unsteady Gait  - PT Evaluation  - Fall Precautions    # Asymmetrical Lower Extremity Edema  - Lower Extremity Venous Duplex U/S    # Hx of DM  - Insulin Sliding Scale  - Blood Glucose Monitoring  - Can continue home insulin regimen at 70-80% of home dose, titrate as needed    # Hx of HLD, asthma, COPD on 4L NC as needed, NAKUL not on CPAP, RCC status post left renal resection 11/23, obesity, hypothyroidism, right shoulder bursitis, fibromyalgia, psoriasis  - Continue home medications Albuterol prn, Amlodipine, Atorvastatin, HCTZ, Synthroid, Montelukast, Oxcarbazepine  - Patient refuses nocturnal CPAP; she does not want to use it    # DVT PPx: Lovenox BID (High BMI)    # FEN  - Diabetic DASH Diet  - Monitor and replete electrolytes as needed    Previous Admissions in Past 3 Years Included  3/5/2025: Endocrinology Clinic Visit: The diabetes is poorly controlled. She has been on steroids on and off for bronchitis. Will raise the insulin dose. Will renew the Ozempic weekly. Will repeat the blood tests in 2 months. The CT scan 6/24 revealed a 1.2 cm right adrenal nodule unchanged. Will do a Salivary Cortisol  2/24/2025: Rheumatology Clinic Visit: Sacroiliitis, Psoriasis, Fibromyalgia, Psoriatic arthritis, Greater trochanteric bursitis of right hip, Low back pain, IgA vasculitis resolved. Patient to continue with Otezla ; psoriasis development may be multifactorial  2/3/2025: Pulmonology Clinic Visit: She had been on NIV BIPAP 20/16. She is not using any form of PAP therapy as she states remains uncomfortable. I have previously set her BiPAP in the office but she has not continued to use this after her kidney surgery. She has history of hypercapnic respiratory failure. She continues to use inhaled ICS/LABA. Taken several courses of Augmentin and recently took prednisone. Describes pressure in face, eyes  10/7/2024: Cardiology Clinic Visit: intermittent bouts of dizziness. She follows with a neurologist. Workup has been unrevealing. She also reports having atypical left sided chest discomfort that is reproducible as well as chronic stable dyspnea. From the cardiac perspective, patient had an unremarkable stress test in March 2024. Echocardiogram at that time also noted normal biventricular systolic function and mild diastolic dysfunction  7/22/2024: ER Visit for abdominal pain  3/19/2024 - 3/20/2024: Chest pain, s/p stress test  2/22/2024: ER Visit for nephrostomy disfunction; outpatient follow up for nephrostomy removal  1/22/2024 - 1/30/2024: post-op left nephrectomy collection s/p IR drainage of fluid collection 70cc on 1/24, now with pigtail in place.  11/3/2023 - 11/9/2023: s/p L laparoscopic radical nephrectomy  9/2/2023: ER Visit for chest pressure  2/4/2022 - 2/22/2022: Chest pain, COPD Exacerbation requiring intubation, Cellulitis, PNA, UTI, Left renal mass    Patient case and management was discussed with ER Attending  I did examine all labs (including CBC, PT/INR, APTT, CMP, Troponin, Lipase, Mg, pro-BNP, COVID/Flu/RSV Test), imaging, prior notes    Time-based billing (NON-critical care).  76 minutes spent on total encounter excluding any time spent teaching residents. The necessity of the time spent during the encounter on this date of service was due to: Review of records, vital signs, labs, microbiology, and imaging, Ordering labs and/or tests, General physical examination performed, Generation of treatment plan, Counseling of the patient and/or family members

## 2025-03-10 NOTE — H&P ADULT - PROBLEM SELECTOR PLAN 1
Presents for intermittent palpitations and chest pain   Vitals: temp 98.2, , /83, RR 18 spo2 90 on 3L  glucose 273, ma 1.2   trop neg   s/p 500 nacl , morphine, zofran   EKG showing NSR  HEART score 4   DOLLY score 2   -telemetry  -f/u TTE  -f/u lipids  -f/u A1c  -cardiology consult in Am Presents for intermittent palpitations and chest pain   Vitals: temp 98.2, , /83, RR 18 spo2 90 on 3L  glucose 273, ma 1.2   trop neg   s/p 500 nacl , morphine, zofran   EKG showing NSR  HEART score 4   DOLLY score 2   -telemetry  -f/u TTE  -f/u lipids  -f/u A1c  - f/u doppler to rule out dvt   -cardiology consult in Am Presents for intermittent palpitations and chest pain   Vitals: temp 98.2, , /83, RR 18 spo2 90 on 3L  glucose 273, mg 1.2   trop neg   s/p 500 nacl , morphine, zofran   EKG showing NSR  HEART score 4   DOLLY score 2   -telemetry  -f/u TTE  -f/u lipids  -f/u A1c  - f/u doppler to rule out dvt   -cardiology consult in Am Presents for intermittent palpitations and chest pain   Vitals: temp 98.2, , /83, RR 18 spo2 90 on 3L  glucose 273, mg 1.2   trop neg   s/p 500 nacl , morphine, zofran   EKG showing NSR  HEART score 4   DOLLY score 2   -telemetry  - c/w aspirin and statin   -f/u TTE  -f/u lipids  -f/u A1c  - f/u doppler to rule out dvt   -cardiology consult in Am

## 2025-03-10 NOTE — ED PROVIDER NOTE - CLINICAL SUMMARY MEDICAL DECISION MAKING FREE TEXT BOX
59-year-old female here for multiple comorbidities here with multiple complaints including chest pain, uncontrolled diabetes, chronic left flank pain.  Appears stable in NAD.  Plan to perform labs and EKG, chest x-ray to evaluate for ACS.  Also plan to check labs and ketones to evaluate for DKA, metabolic derangement, UTI.

## 2025-03-10 NOTE — H&P ADULT - PROBLEM SELECTOR PLAN 2
hx of dm  takes metformin, lantus, admelog, and ozempic   hold oral diabetic medication   c/w home lantus 45 and admelog 10   c/w sliding scale  monitor sugars had a recent fall  patient ambulates with a  cane  has no HHA and feels unsteady on her feet.   f/u PT had a recent fall  patient ambulates with a  cane  has no HHA and feels unsteady on her feet.   fall precautions   f/u PT

## 2025-03-10 NOTE — H&P ADULT - NSICDXPASTMEDICALHX_GEN_ALL_CORE_FT
PAST MEDICAL HISTORY:  Asthma last inhaler use with in 10 days, on Pulm follow up [id number]    Back pain thoracic & lumbar    Cervical neuralgia difficulty moving my neck    Diverticulosis     DJD (degenerative joint disease) Cervical/Thoracic/Lumbar    DM (diabetes mellitus) 2    Falls frequently     Fatty liver     Fibromyalgia     H/O bursitis right shoulder    Hyperlipidemia     Hypertension vaginal cyst    Hypothyroid     Morbid obesity with body mass index (BMI) of 50.0 to 59.9 in adult     Obesity morbid    Obstructive sleep apnea refuses Cpap    Psoriasis     Radicular pain arms, legs    Renal cell carcinoma, left on follow up Dr schulte, HOD renal [facility], waiting for suregry in 09/2020    Trigeminal neuralgia R    Vasculitis Legs, forerhead, arms & hands, flare ups in R leg  Dr susie Ribeiro is my Rheumatologist

## 2025-03-10 NOTE — H&P ADULT - PROBLEM SELECTOR PLAN 3
hx of htn   c/w losartan and amlodipine hx of dm  takes metformin, lantus, admelog, and ozempic   hold oral diabetic medication   c/w home lantus 45 and admelog 10   c/w sliding scale  monitor sugars

## 2025-03-10 NOTE — H&P ADULT - HISTORY OF PRESENT ILLNESS
59-year-old female, AAOx3, ambulates with a cane, no HHA,  history of DM, HLD, asthma, COPD on 4L NC, migraines, hypothyroidism, NAKUL, RCC status post left renal resection 11/23 present for intermittent palpitations and chest pain that started a week ago. She describes her chest pain as dull and stays on the left side. Admits to dizziness, nausea and sob with the palpitation episodes. Also reports recent fall and feels unsteady. Denies head strike or LOC.  Denies any fever, chills, abdominal pain, n/v or dysuria. Also Admits to chronic diarrhea who she has a follow up for a colonoscopy.    ED course:  Vitals: temp 98.2, , /83, RR 18 spo2 90 on 3L  glucose 273, ma 1.2   s/p 500 nacl , morphine, zofran  59-year-old female, AAOx3, ambulates with a cane, no HHA,  history of DM, HLD, asthma, COPD on 4L NC, migraines, hypothyroidism, NAKUL, RCC status post left renal resection 11/23 present for intermittent palpitations and chest pain that started a week ago. She describes her chest pain as dull and stays on the left side. Admits to dizziness, nausea and sob with the palpitation episodes. Also reports recent fall and feels unsteady. Denies head strike or LOC.  Denies any fever, chills, abdominal pain, n/v or dysuria. Also Admits to chronic diarrhea who she has a follow up for a colonoscopy.    ED course:  Vitals: temp 98.2, , /83, RR 18 spo2 90 on 3L  glucose 273, mg 1.2   s/p 500 nacl , morphine, zofran

## 2025-03-10 NOTE — ED PROVIDER NOTE - OBJECTIVE STATEMENT
59-year-old female history of DM, HLD, asthma, COPD on 4L NC as needed, NAKUL, RCC status post left renal resection 11/23 presenting with multiple complaints.  Reports dull chest pain and palpitations that are worsening over the past week.  Relates elevated blood sugars at home after recent completion of course of p.o. antibiotics and steroids last week.  Reports trip and fall 10 days ago at home.  Was recently seen by visiting doctor from insurance who told her to go to the emergency room.  Denies any history of CAD.  Denies any leg pain or swelling.  No fever chills or productive cough.

## 2025-03-10 NOTE — H&P ADULT - ASSESSMENT
59-year-old female, AAOx3, ambulates with a cane, no HHA,  history of DM, HLD, asthma, COPD on 4L NC, migraines, hypothyroidism, NAKUL, RCC status post left renal resection 11/23 present for intermittent palpitations and chest pain that started a week ago. Patient is being admitted for ACS rule out.

## 2025-03-11 ENCOUNTER — RESULT REVIEW (OUTPATIENT)
Age: 60
End: 2025-03-11

## 2025-03-11 DIAGNOSIS — E78.5 HYPERLIPIDEMIA, UNSPECIFIED: ICD-10-CM

## 2025-03-11 DIAGNOSIS — M79.89 OTHER SPECIFIED SOFT TISSUE DISORDERS: ICD-10-CM

## 2025-03-11 DIAGNOSIS — E66.01 MORBID (SEVERE) OBESITY DUE TO EXCESS CALORIES: ICD-10-CM

## 2025-03-11 DIAGNOSIS — I24.9 ACUTE ISCHEMIC HEART DISEASE, UNSPECIFIED: ICD-10-CM

## 2025-03-11 DIAGNOSIS — M79.7 FIBROMYALGIA: ICD-10-CM

## 2025-03-11 DIAGNOSIS — Z87.39 PERSONAL HISTORY OF OTHER DISEASES OF THE MUSCULOSKELETAL SYSTEM AND CONNECTIVE TISSUE: ICD-10-CM

## 2025-03-11 DIAGNOSIS — G43.909 MIGRAINE, UNSPECIFIED, NOT INTRACTABLE, WITHOUT STATUS MIGRAINOSUS: ICD-10-CM

## 2025-03-11 DIAGNOSIS — I10 ESSENTIAL (PRIMARY) HYPERTENSION: ICD-10-CM

## 2025-03-11 DIAGNOSIS — E11.9 TYPE 2 DIABETES MELLITUS WITHOUT COMPLICATIONS: ICD-10-CM

## 2025-03-11 DIAGNOSIS — M25.50 PAIN IN UNSPECIFIED JOINT: ICD-10-CM

## 2025-03-11 DIAGNOSIS — Z29.9 ENCOUNTER FOR PROPHYLACTIC MEASURES, UNSPECIFIED: ICD-10-CM

## 2025-03-11 DIAGNOSIS — E83.42 HYPOMAGNESEMIA: ICD-10-CM

## 2025-03-11 DIAGNOSIS — E11.65 TYPE 2 DIABETES MELLITUS WITH HYPERGLYCEMIA: ICD-10-CM

## 2025-03-11 DIAGNOSIS — G47.33 OBSTRUCTIVE SLEEP APNEA (ADULT) (PEDIATRIC): ICD-10-CM

## 2025-03-11 DIAGNOSIS — Z87.898 PERSONAL HISTORY OF OTHER SPECIFIED CONDITIONS: ICD-10-CM

## 2025-03-11 DIAGNOSIS — E03.9 HYPOTHYROIDISM, UNSPECIFIED: ICD-10-CM

## 2025-03-11 DIAGNOSIS — R07.9 CHEST PAIN, UNSPECIFIED: ICD-10-CM

## 2025-03-11 DIAGNOSIS — J44.9 CHRONIC OBSTRUCTIVE PULMONARY DISEASE, UNSPECIFIED: ICD-10-CM

## 2025-03-11 DIAGNOSIS — K59.00 CONSTIPATION, UNSPECIFIED: ICD-10-CM

## 2025-03-11 DIAGNOSIS — R52 PAIN, UNSPECIFIED: ICD-10-CM
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
## 2025-03-11 LAB
A1C WITH ESTIMATED AVERAGE GLUCOSE RESULT: 13 % — HIGH (ref 4–5.6)
ALBUMIN SERPL ELPH-MCNC: 3 G/DL — LOW (ref 3.5–5)
ALP SERPL-CCNC: 129 U/L — HIGH (ref 40–120)
ALT FLD-CCNC: 40 U/L DA — SIGNIFICANT CHANGE UP (ref 10–60)
ANION GAP SERPL CALC-SCNC: 5 MMOL/L — SIGNIFICANT CHANGE UP (ref 5–17)
AST SERPL-CCNC: 28 U/L — SIGNIFICANT CHANGE UP (ref 10–40)
BILIRUB SERPL-MCNC: 0.6 MG/DL — SIGNIFICANT CHANGE UP (ref 0.2–1.2)
BUN SERPL-MCNC: 19 MG/DL — HIGH (ref 7–18)
CALCIUM SERPL-MCNC: 8.6 MG/DL — SIGNIFICANT CHANGE UP (ref 8.4–10.5)
CHLORIDE SERPL-SCNC: 99 MMOL/L — SIGNIFICANT CHANGE UP (ref 96–108)
CHOLEST SERPL-MCNC: 192 MG/DL — SIGNIFICANT CHANGE UP
CO2 SERPL-SCNC: 35 MMOL/L — HIGH (ref 22–31)
CREAT SERPL-MCNC: 1.05 MG/DL — SIGNIFICANT CHANGE UP (ref 0.5–1.3)
EGFR: 61 ML/MIN/1.73M2 — SIGNIFICANT CHANGE UP
EGFR: 61 ML/MIN/1.73M2 — SIGNIFICANT CHANGE UP
ESTIMATED AVERAGE GLUCOSE: 326 MG/DL — HIGH (ref 68–114)
GAS PNL BLDA: SIGNIFICANT CHANGE UP
GLUCOSE BLDC GLUCOMTR-MCNC: 273 MG/DL — HIGH (ref 70–99)
GLUCOSE BLDC GLUCOMTR-MCNC: 284 MG/DL — HIGH (ref 70–99)
GLUCOSE BLDC GLUCOMTR-MCNC: 289 MG/DL — HIGH (ref 70–99)
GLUCOSE BLDC GLUCOMTR-MCNC: 330 MG/DL — HIGH (ref 70–99)
GLUCOSE BLDC GLUCOMTR-MCNC: 370 MG/DL — HIGH (ref 70–99)
GLUCOSE SERPL-MCNC: 305 MG/DL — HIGH (ref 70–99)
HCT VFR BLD CALC: 39 % — SIGNIFICANT CHANGE UP (ref 34.5–45)
HGB BLD-MCNC: 11.4 G/DL — LOW (ref 11.5–15.5)
LIPID PNL WITH DIRECT LDL SERPL: 120 MG/DL — HIGH
MAGNESIUM SERPL-MCNC: 1.2 MG/DL — LOW (ref 1.6–2.6)
MCHC RBC-ENTMCNC: 23.8 PG — LOW (ref 27–34)
MCV RBC AUTO: 81.4 FL — SIGNIFICANT CHANGE UP (ref 80–100)
NON HDL CHOLESTEROL: 149 MG/DL — HIGH
NRBC BLD AUTO-RTO: 0 /100 WBCS — SIGNIFICANT CHANGE UP (ref 0–0)
PHOSPHATE SERPL-MCNC: 4 MG/DL — SIGNIFICANT CHANGE UP (ref 2.5–4.5)
PLATELET # BLD AUTO: 256 K/UL — SIGNIFICANT CHANGE UP (ref 150–400)
POTASSIUM SERPL-MCNC: 4.1 MMOL/L — SIGNIFICANT CHANGE UP (ref 3.5–5.3)
POTASSIUM SERPL-SCNC: 4.1 MMOL/L — SIGNIFICANT CHANGE UP (ref 3.5–5.3)
PROT SERPL-MCNC: 7.4 G/DL — SIGNIFICANT CHANGE UP (ref 6–8.3)
RBC # BLD: 4.79 M/UL — SIGNIFICANT CHANGE UP (ref 3.8–5.2)
RBC # FLD: 16.5 % — HIGH (ref 10.3–14.5)
SODIUM SERPL-SCNC: 139 MMOL/L — SIGNIFICANT CHANGE UP (ref 135–145)
TRIGL SERPL-MCNC: 163 MG/DL — HIGH
TROPONIN I, HIGH SENSITIVITY RESULT: 33.7 NG/L — SIGNIFICANT CHANGE UP
WBC # BLD: 10.24 K/UL — SIGNIFICANT CHANGE UP (ref 3.8–10.5)
WBC # FLD AUTO: 10.24 K/UL — SIGNIFICANT CHANGE UP (ref 3.8–10.5)

## 2025-03-11 PROCEDURE — 99222 1ST HOSP IP/OBS MODERATE 55: CPT

## 2025-03-11 PROCEDURE — 99223 1ST HOSP IP/OBS HIGH 75: CPT | Mod: GC

## 2025-03-11 PROCEDURE — 99223 1ST HOSP IP/OBS HIGH 75: CPT

## 2025-03-11 PROCEDURE — 99497 ADVNCD CARE PLAN 30 MIN: CPT

## 2025-03-11 PROCEDURE — 93970 EXTREMITY STUDY: CPT | Mod: 26

## 2025-03-11 RX ORDER — LEVOTHYROXINE SODIUM 300 MCG
1 TABLET ORAL
Refills: 0 | DISCHARGE

## 2025-03-11 RX ORDER — INSULIN LISPRO 100 U/ML
INJECTION, SOLUTION INTRAVENOUS; SUBCUTANEOUS AT BEDTIME
Refills: 0 | Status: DISCONTINUED | OUTPATIENT
Start: 2025-03-11 | End: 2025-03-11

## 2025-03-11 RX ORDER — IPRATROPIUM BROMIDE AND ALBUTEROL SULFATE .5; 2.5 MG/3ML; MG/3ML
1 SOLUTION RESPIRATORY (INHALATION)
Refills: 0 | Status: DISCONTINUED | OUTPATIENT
Start: 2025-03-11 | End: 2025-03-12

## 2025-03-11 RX ORDER — AMLODIPINE BESYLATE 10 MG/1
10 TABLET ORAL DAILY
Refills: 0 | Status: DISCONTINUED | OUTPATIENT
Start: 2025-03-11 | End: 2025-03-12

## 2025-03-11 RX ORDER — ACETAMINOPHEN 500 MG/5ML
650 LIQUID (ML) ORAL EVERY 6 HOURS
Refills: 0 | Status: DISCONTINUED | OUTPATIENT
Start: 2025-03-11 | End: 2025-03-11

## 2025-03-11 RX ORDER — HYDROCORTISONE 10 MG/G
1 CREAM TOPICAL EVERY 12 HOURS
Refills: 0 | Status: DISCONTINUED | OUTPATIENT
Start: 2025-03-11 | End: 2025-03-18

## 2025-03-11 RX ORDER — MONTELUKAST SODIUM 10 MG/1
10 TABLET ORAL DAILY
Refills: 0 | Status: DISCONTINUED | OUTPATIENT
Start: 2025-03-11 | End: 2025-03-12

## 2025-03-11 RX ORDER — ATORVASTATIN CALCIUM 80 MG/1
40 TABLET, FILM COATED ORAL AT BEDTIME
Refills: 0 | Status: DISCONTINUED | OUTPATIENT
Start: 2025-03-11 | End: 2025-03-12

## 2025-03-11 RX ORDER — OXCARBAZEPINE 150 MG/1
600 TABLET, FILM COATED ORAL
Refills: 0 | Status: DISCONTINUED | OUTPATIENT
Start: 2025-03-11 | End: 2025-03-12

## 2025-03-11 RX ORDER — LEVOTHYROXINE SODIUM 300 MCG
112 TABLET ORAL DAILY
Refills: 0 | Status: DISCONTINUED | OUTPATIENT
Start: 2025-03-11 | End: 2025-03-12

## 2025-03-11 RX ORDER — INSULIN LISPRO 100 U/ML
INJECTION, SOLUTION INTRAVENOUS; SUBCUTANEOUS
Refills: 0 | Status: DISCONTINUED | OUTPATIENT
Start: 2025-03-11 | End: 2025-03-11

## 2025-03-11 RX ORDER — INSULIN GLARGINE-YFGN 100 [IU]/ML
45 INJECTION, SOLUTION SUBCUTANEOUS AT BEDTIME
Refills: 0 | Status: DISCONTINUED | OUTPATIENT
Start: 2025-03-11 | End: 2025-03-11

## 2025-03-11 RX ORDER — LOSARTAN POTASSIUM 100 MG/1
25 TABLET, FILM COATED ORAL DAILY
Refills: 0 | Status: DISCONTINUED | OUTPATIENT
Start: 2025-03-11 | End: 2025-03-12

## 2025-03-11 RX ORDER — MAGNESIUM SULFATE 500 MG/ML
1 SYRINGE (ML) INJECTION ONCE
Refills: 0 | Status: COMPLETED | OUTPATIENT
Start: 2025-03-11 | End: 2025-03-11

## 2025-03-11 RX ORDER — INSULIN LISPRO 100 U/ML
10 INJECTION, SOLUTION INTRAVENOUS; SUBCUTANEOUS
Refills: 0 | Status: DISCONTINUED | OUTPATIENT
Start: 2025-03-11 | End: 2025-03-11

## 2025-03-11 RX ORDER — ENOXAPARIN SODIUM 100 MG/ML
40 INJECTION SUBCUTANEOUS EVERY 12 HOURS
Refills: 0 | Status: DISCONTINUED | OUTPATIENT
Start: 2025-03-11 | End: 2025-03-18

## 2025-03-11 RX ORDER — ACETAMINOPHEN 500 MG/5ML
1000 LIQUID (ML) ORAL EVERY 8 HOURS
Refills: 0 | Status: COMPLETED | OUTPATIENT
Start: 2025-03-11 | End: 2025-03-15

## 2025-03-11 RX ORDER — INSULIN GLARGINE-YFGN 100 [IU]/ML
38 INJECTION, SOLUTION SUBCUTANEOUS AT BEDTIME
Refills: 0 | Status: DISCONTINUED | OUTPATIENT
Start: 2025-03-11 | End: 2025-03-13

## 2025-03-11 RX ORDER — DEXTROSE 50 % IN WATER 50 %
15 SYRINGE (ML) INTRAVENOUS ONCE
Refills: 0 | Status: DISCONTINUED | OUTPATIENT
Start: 2025-03-11 | End: 2025-03-12

## 2025-03-11 RX ORDER — FLUTICASONE PROPIONATE 50 UG/1
1 SPRAY, METERED NASAL
Refills: 0 | Status: DISCONTINUED | OUTPATIENT
Start: 2025-03-11 | End: 2025-03-12

## 2025-03-11 RX ORDER — ASPIRIN 325 MG
81 TABLET ORAL DAILY
Refills: 0 | Status: DISCONTINUED | OUTPATIENT
Start: 2025-03-11 | End: 2025-03-12

## 2025-03-11 RX ORDER — CYCLOBENZAPRINE HYDROCHLORIDE 15 MG/1
5 CAPSULE, EXTENDED RELEASE ORAL THREE TIMES A DAY
Refills: 0 | Status: DISCONTINUED | OUTPATIENT
Start: 2025-03-11 | End: 2025-03-12

## 2025-03-11 RX ORDER — INSULIN LISPRO 100 U/ML
INJECTION, SOLUTION INTRAVENOUS; SUBCUTANEOUS AT BEDTIME
Refills: 0 | Status: DISCONTINUED | OUTPATIENT
Start: 2025-03-11 | End: 2025-03-12

## 2025-03-11 RX ORDER — ENOXAPARIN SODIUM 100 MG/ML
40 INJECTION SUBCUTANEOUS EVERY 24 HOURS
Refills: 0 | Status: DISCONTINUED | OUTPATIENT
Start: 2025-03-11 | End: 2025-03-11

## 2025-03-11 RX ORDER — MAGNESIUM SULFATE 500 MG/ML
2 SYRINGE (ML) INJECTION ONCE
Refills: 0 | Status: COMPLETED | OUTPATIENT
Start: 2025-03-11 | End: 2025-03-11

## 2025-03-11 RX ORDER — POLYETHYLENE GLYCOL 3350 17 G/17G
17 POWDER, FOR SOLUTION ORAL DAILY
Refills: 0 | Status: DISCONTINUED | OUTPATIENT
Start: 2025-03-11 | End: 2025-03-12

## 2025-03-11 RX ORDER — OXYCODONE HYDROCHLORIDE 30 MG/1
5 TABLET ORAL EVERY 4 HOURS
Refills: 0 | Status: DISCONTINUED | OUTPATIENT
Start: 2025-03-11 | End: 2025-03-11

## 2025-03-11 RX ORDER — INSULIN LISPRO 100 U/ML
INJECTION, SOLUTION INTRAVENOUS; SUBCUTANEOUS
Refills: 0 | Status: DISCONTINUED | OUTPATIENT
Start: 2025-03-11 | End: 2025-03-12

## 2025-03-11 RX ORDER — GABAPENTIN 400 MG/1
400 CAPSULE ORAL
Refills: 0 | Status: DISCONTINUED | OUTPATIENT
Start: 2025-03-11 | End: 2025-03-12

## 2025-03-11 RX ORDER — SENNA 187 MG
2 TABLET ORAL AT BEDTIME
Refills: 0 | Status: DISCONTINUED | OUTPATIENT
Start: 2025-03-11 | End: 2025-03-12

## 2025-03-11 RX ADMIN — Medication 81 MILLIGRAM(S): at 13:33

## 2025-03-11 RX ADMIN — INSULIN LISPRO 8: 100 INJECTION, SOLUTION INTRAVENOUS; SUBCUTANEOUS at 16:57

## 2025-03-11 RX ADMIN — POLYETHYLENE GLYCOL 3350 17 GRAM(S): 17 POWDER, FOR SOLUTION ORAL at 16:57

## 2025-03-11 RX ADMIN — ENOXAPARIN SODIUM 40 MILLIGRAM(S): 100 INJECTION SUBCUTANEOUS at 17:32

## 2025-03-11 RX ADMIN — LOSARTAN POTASSIUM 25 MILLIGRAM(S): 100 TABLET, FILM COATED ORAL at 06:07

## 2025-03-11 RX ADMIN — CYCLOBENZAPRINE HYDROCHLORIDE 5 MILLIGRAM(S): 15 CAPSULE, EXTENDED RELEASE ORAL at 16:55

## 2025-03-11 RX ADMIN — INSULIN LISPRO 10 UNIT(S): 100 INJECTION, SOLUTION INTRAVENOUS; SUBCUTANEOUS at 08:34

## 2025-03-11 RX ADMIN — Medication 1000 MILLIGRAM(S): at 16:56

## 2025-03-11 RX ADMIN — AMLODIPINE BESYLATE 10 MILLIGRAM(S): 10 TABLET ORAL at 06:07

## 2025-03-11 RX ADMIN — OXCARBAZEPINE 600 MILLIGRAM(S): 150 TABLET, FILM COATED ORAL at 08:35

## 2025-03-11 RX ADMIN — INSULIN LISPRO 10 UNIT(S): 100 INJECTION, SOLUTION INTRAVENOUS; SUBCUTANEOUS at 13:32

## 2025-03-11 RX ADMIN — Medication 25 GRAM(S): at 08:34

## 2025-03-11 RX ADMIN — Medication 100 GRAM(S): at 06:07

## 2025-03-11 RX ADMIN — INSULIN LISPRO 5: 100 INJECTION, SOLUTION INTRAVENOUS; SUBCUTANEOUS at 08:34

## 2025-03-11 RX ADMIN — Medication 1 DOSE(S): at 10:12

## 2025-03-11 RX ADMIN — INSULIN LISPRO 2: 100 INJECTION, SOLUTION INTRAVENOUS; SUBCUTANEOUS at 21:51

## 2025-03-11 RX ADMIN — IPRATROPIUM BROMIDE AND ALBUTEROL SULFATE 1 PUFF(S): .5; 2.5 SOLUTION RESPIRATORY (INHALATION) at 13:32

## 2025-03-11 RX ADMIN — INSULIN GLARGINE-YFGN 38 UNIT(S): 100 INJECTION, SOLUTION SUBCUTANEOUS at 21:51

## 2025-03-11 RX ADMIN — Medication 1000 MILLIGRAM(S): at 17:57

## 2025-03-11 RX ADMIN — GABAPENTIN 400 MILLIGRAM(S): 400 CAPSULE ORAL at 17:32

## 2025-03-11 RX ADMIN — INSULIN LISPRO 10 UNIT(S): 100 INJECTION, SOLUTION INTRAVENOUS; SUBCUTANEOUS at 16:57

## 2025-03-11 RX ADMIN — Medication 112 MICROGRAM(S): at 06:08

## 2025-03-11 RX ADMIN — GABAPENTIN 400 MILLIGRAM(S): 400 CAPSULE ORAL at 06:08

## 2025-03-11 RX ADMIN — OXCARBAZEPINE 600 MILLIGRAM(S): 150 TABLET, FILM COATED ORAL at 17:33

## 2025-03-11 RX ADMIN — MONTELUKAST SODIUM 10 MILLIGRAM(S): 10 TABLET ORAL at 13:33

## 2025-03-11 NOTE — CHART NOTE - NSCHARTNOTEFT_GEN_A_CORE
RRT was called at 2322 for unresponsiveness. Pt required intubation, and was transferred to ICU. ( see RRT note). SonMarquis ([phone]) was updated, GOC was discussed, JANET drafted and in chart. RRT was called at 2322 for unresponsiveness and AHRF. Pt required intubation, and was transferred to ICU. ( see RRT note). SonMarquis ([phone]) was updated, GOC was discussed, JANET drafted and in chart.

## 2025-03-11 NOTE — ED ADULT NURSE NOTE - OBJECTIVE STATEMENT
Pt received from triage on stretcher, A/Ox4 answers all questions appropriately. C/O Palpitations w/ associated intermittent mid sternal chest pain x 4 days. Pt on 4L O2 @ home for COPD, breathing is even and unlabored on 4LNC. Abdomen is soft and distended, non-tender to touch. Pt ambulates assisted by her cane at baseline, moves all four extremities on command, skin is intact. Pt resting comfortably at the bedside, no apparent acute visible distress noted on initial assessment. Vital signs stable, NKA to medications. PMHx DM HLD Asthma COPD. 20g peripheral IV placed on RAC, all labs obtained and sent. Pending results

## 2025-03-11 NOTE — RAPID RESPONSE TEAM SUMMARY - NSSITUATIONBACKGROUNDRRT_GEN_ALL_CORE
59-year-old female, AAOx3, ambulates with a cane, no HHA,  history of DM, HLD, asthma, COPD on 4L NC, migraines, hypothyroidism, NAKUL, RCC status post left renal resection 11/23 present for intermittent palpitations and chest pain that started a week ago. She describes her chest pain as dull and stays on the left side. Admits to dizziness, nausea and sob with the palpitation episodes. Also reports recent fall and feels unsteady. Denies head strike or LOC.  Denies any fever, chills, abdominal pain, n/v or dysuria. Also Admits to chronic diarrhea who she has a follow up for a colonoscopy.    RRT called for pt being unresponsive, noted by the respiratory therapist while trying to put the BiPAP. PT unresponsive to loud verbal or painful stimuli, Vital signs showed /101 , BP20, Sat 77% on NRB, blood glucose of 284. Bag mask was started and pt sat well to 100% with remaining of the vitals stable. Anesthesia was called by the floor team and pt was intubated on the floor. Etomidate 20 and rocuronium 50 was provided. Pt was transferred to ICU for further management. ABG during the RRT showed 7.04/>125/53/NR bicarb, Lactate 0.5. Pt was started on Mechanical ventilation Vol control 25/420/100%/5.

## 2025-03-11 NOTE — PROGRESS NOTE ADULT - ASSESSMENT
59-year-old female, AAOx3, ambulates with a cane, no HHA,  history of DM, HLD, asthma, COPD on 4L NC, migraines, hypothyroidism, NAKLU, RCC status post left renal resection 11/23 present for intermittent palpitations and chest pain that started a week ago. Patient is being admitted for ACS rule out.     Pt was noted this morning, laying on her side in the ED with hypoxia 60s% while on 4L oxygen nasal cannula, likely due to NAKUL.   Pulm consulted, pt has difficulties with getting bipap setup at home.  Hypoxia improved with sitting up in chair. Remains on telemetry showing sinus rhythm 80s bpm.   Awaiting echo, Cardiology Dr. Hampton consulted.   Also found with b/l LE edema, pending US doppler to r/o DVT.   Pt c/o recent foul odor in urine with left flank pain, LUQ and RUQ abd pain from previous incision sites s/p left renal resection.   Will send UA/UCx. Will further eval first with US Abd.   Lastly, for uncontrolled type 2 diabetes with hyperglycemia glucose 300s, will consult endo.  59-year-old female, AAOx3, ambulates with a cane, no HHA,  history of DM, HLD, asthma, COPD on 4L NC, migraines, hypothyroidism, NAKUL, RCC status post left renal resection 11/23 present for intermittent palpitations and chest pain that started a week ago. Patient is being admitted for ACS rule out. EKG with TWI in 1, avL. Trops neg x2.     Pt was noted this morning, laying on her side in the ED with hypoxia 60s% while on 4L oxygen nasal cannula, likely due to NAKUL.   Pulm consulted, pt was supposedly refusing CPAP at home due to being uncomfortable.   Hypoxia improved with sitting up in chair. Remains on telemetry showing sinus rhythm 80s bpm.   Awaiting echo, Cardiology Dr. Hampton consulted.   Also found with b/l LE edema, pending US doppler to r/o DVT.   Pt c/o recent foul odor in urine with left flank pain, LUQ and RUQ abd pain from previous incision sites s/p left renal resection.   Will send UA/UCx. Pain management consulted for fibromyalgia.   Lastly, for uncontrolled type 2 diabetes with hyperglycemia glucose 300s, a1c 9 in 1/2025. will consult Endo Dr. Hayden.

## 2025-03-11 NOTE — PROGRESS NOTE ADULT - PROBLEM SELECTOR PLAN 4
hx of htn   c/w losartan 25 mg qd and amlodipine 10 mg qd   bp controlled hx of dm  takes metformin, lantus, admelog, and ozempic   hold oral diabetic medication   c/w home lantus 45 units qhs and admelog 10 units tid before meals   c/w sliding scale  monitor sugars  f/u a1c - last a1c 1/2025- 9.4  Endo Dr. Hayden consulted.   nutrition consulted pt with right leg swelling > left   f/u US doppler b/l LE to r/o DVT

## 2025-03-11 NOTE — PROGRESS NOTE ADULT - PROBLEM SELECTOR PLAN 5
hx of migraines   c/w home trileptal pt with right leg swelling > left   f/u US doppler b/l LE to r/o DVT hx of dm  takes metformin, lantus, admelog, and ozempic   hold oral diabetic medication   c/w home lantus 45 units qhs and admelog 10 units tid before meals   c/w sliding scale  monitor sugars  f/u a1c - last a1c 1/2025- 9.4  Endo Dr. Hayden consulted.   nutrition consulted hx of dm  takes metformin, lantus, admelog, and ozempic   hold oral diabetic medication   on home lantus 45 units qhs and admelog 10 units tid before meals   reduce lantus to 38 units qhs  increase to moderate sliding scale qid  monitor sugars  f/u a1c - last a1c 1/2025- 9.4  Endo Dr. Hayden consulted.   nutrition consulted

## 2025-03-11 NOTE — PROGRESS NOTE ADULT - PROBLEM SELECTOR PLAN 2
had a recent fall  patient ambulates with a  cane  has no HHA and feels unsteady on her feet.   fall precautions   f/u PT hx of COPD   c/w home oxygen 4L  c/w home inhalers - symbicort, albuterol   c/w montelukast 10 mg qd   maintain o2 sat 88-92%  Pulm. Dr. Ramirez consulted, apprec recs

## 2025-03-11 NOTE — CONSULT NOTE ADULT - ASSESSMENT
Confidential Drug Utilization Report  Search Terms: abigail washington, 1965Search Date: 03/11/2025 11:54:37 AM  The Drug Utilization Report below displays all of the controlled substance prescriptions, if any, that your patient has filled in the last twelve months. The information displayed on this report is compiled from pharmacy submissions to the Department, and accurately reflects the information as submitted by the pharmacies.    This report was requested by: Britt Jean | Reference #: [id number]    There are no results for the search terms that you entered.

## 2025-03-11 NOTE — PROGRESS NOTE ADULT - PROBLEM SELECTOR PLAN 6
hx of COPD   c/w home oxygen 4L  c/w home inhalers - symbicort, albuterol   c/w montelukast 10 mg qd   maintain o2 sat 88-92% pt c/o left flank pain, abd pain  on home med - gabapentin  Pain Management consulted mag 1.2  s/p 3g mag IV  f/u mag serum

## 2025-03-11 NOTE — ED ADULT NURSE NOTE - NSICDXPASTMEDICALHX_GEN_ALL_CORE_FT
PAST MEDICAL HISTORY:  Asthma last inhaler use with in 10 days, on Pulm follow up [id number]    Back pain thoracic & lumbar    Cervical neuralgia difficulty moving my neck    Diverticulosis     DJD (degenerative joint disease) Cervical/Thoracic/Lumbar    DM (diabetes mellitus) 2    Falls frequently     Fatty liver     Fibromyalgia     H/O bursitis right shoulder    Hyperlipidemia     Hypertension vaginal cyst    Hypothyroid     Morbid obesity with body mass index (BMI) of 50.0 to 59.9 in adult     Obesity morbid    Obstructive sleep apnea refuses Cpap    Psoriasis     Radicular pain arms, legs    Renal cell carcinoma, left on follow up Dr schulte, HOD renal [facility], waiting for suregry in 09/2020    Trigeminal neuralgia R    Vasculitis Legs, forerhead, arms & hands, flare ups in R leg  Dr susie Ribeiro is my Rheumatologist

## 2025-03-11 NOTE — PROGRESS NOTE ADULT - PROBLEM SELECTOR PLAN 7
hx of hypothyroidism   c/w home levothyroxine 112 mcg qd had a recent fall  patient ambulates with a  cane  has no HHA and feels unsteady on her feet.   fall precautions   f/u PT pt is on ozempic weekly inj  nutrition consult pt c/o left flank pain, abd pain  on home med - gabapentin  Pain Management consulted

## 2025-03-11 NOTE — CONSULT NOTE ADULT - SUBJECTIVE AND OBJECTIVE BOX
PULMONARY SERVICE INITIAL CONSULT NOTE    HPI:  59-year-old female, AAOx3, ambulates with a cane, no HHA,  history of DM, HLD, asthma, COPD on 4L NC, migraines, hypothyroidism, NAKUL, RCC status post left renal resection  present for intermittent palpitations and chest pain that started a week ago. She describes her chest pain as dull and stays on the left side. Admits to dizziness, nausea and sob with the palpitation episodes. Also reports recent fall and feels unsteady. Denies head strike or LOC.  Denies any fever, chills, abdominal pain, n/v or dysuria. Also Admits to chronic diarrhea who she has a follow up for a colonoscopy    REVIEW OF SYSTEMS:  Constitutional: No fever, weight loss or fatigue  Eyes: No eye pain, visual disturbances, or discharge  ENMT:  No difficulty hearing, tinnitus, vertigo; No sinus or throat pain  Neck: No pain, stiffness or neck swelling  Respiratory: see HPI  Cardiovascular: No chest pain, palpitations, dizziness or leg swelling  Gastrointestinal: No abdominal or epigastric pain. No nausea, vomiting or hematemesis; No diarrhea or constipation. No melena or hematochezia.  Genitourinary: No dysuria, frequency, hematuria or incontinence  Neurological: No headaches, memory loss, loss of strength, numbness or tremors  Skin: No itching, burning, rashes or lesions   Lymph Nodes: No enlarged glands  Endocrine: No heat or cold intolerance; No hair loss  Musculoskeletal: No joint pain or swelling; No muscle, back or extremity pain  Psychiatric: No depression, anxiety, mood swings or difficulty sleeping  Heme/Lymph: No easy bruising or bleeding gums  Allergy and Immunologic: No hives or eczema    PAST MEDICAL & SURGICAL HISTORY:  Hypertension  vaginal cyst      Hyperlipidemia      Asthma  last inhaler use with in 10 days, on Pulm follow up [id number]      Hypothyroid      Obstructive sleep apnea  refuses Cpap      Obesity  morbid      Trigeminal neuralgia  R      Fibromyalgia      DM (diabetes mellitus)  2      DJD (degenerative joint disease)  Cervical/Thoracic/Lumbar      Cervical neuralgia  difficulty moving my neck      Back pain  thoracic & lumbar      H/O bursitis  right shoulder      Radicular pain  arms, legs      Vasculitis  Legs, forerhead, arms & hands, flare ups in R leg  Dr susie Ribeiro is my Rheumatologist      Renal cell carcinoma, left  on follow up Dr schulte, HOD renal [facility], waiting for suregry in 2020      Falls frequently      Morbid obesity with body mass index (BMI) of 50.0 to 59.9 in adult      Psoriasis      Diverticulosis      Fatty liver      S/P abdominal hysterectomy  2003        S/P  section  1      Left adrenal mass  excision, benign 2006      Vaginal cyst  removed       Vasculitis on nerve biopsy  , had another surgery called microvascular decompression       S/P left oophorectomy      S/P colonoscopic polypectomy      FAMILY HISTORY:  Family history of diabetes mellitus    Family history of hypertension    Family history of obesity      SOCIAL HISTORY:  Smoking Status: [ ] Current, [ ] Former, [x ] Never  Pack Years:    MEDICATIONS:  Pulmonary:  albuterol/ipratropium (CFC free) Inhaler. 1 Puff(s) Inhalation four times a day  fluticasone propionate/ salmeterol 250-50 MICROgram(s) Diskus 1 Dose(s) Inhalation two times a day  montelukast 10 milliGRAM(s) Oral daily    Antimicrobials:    Anticoagulants:  aspirin  chewable 81 milliGRAM(s) Oral daily  enoxaparin Injectable 40 milliGRAM(s) SubCutaneous every 12 hours    Onc:    GI/:  polyethylene glycol 3350 17 Gram(s) Oral daily  senna 2 Tablet(s) Oral at bedtime    Endocrine:  atorvastatin 40 milliGRAM(s) Oral at bedtime  dextrose Oral Gel 15 Gram(s) Oral once PRN  insulin glargine Injectable (LANTUS) 38 Unit(s) SubCutaneous at bedtime  insulin lispro (ADMELOG) corrective regimen sliding scale   SubCutaneous three times a day before meals  insulin lispro (ADMELOG) corrective regimen sliding scale   SubCutaneous at bedtime  levothyroxine 112 MICROGram(s) Oral daily    Cardiac:  amLODIPine   Tablet 10 milliGRAM(s) Oral daily  losartan 25 milliGRAM(s) Oral daily    Other Medications:  acetaminophen     Tablet .. 1000 milliGRAM(s) Oral every 8 hours  cyclobenzaprine 5 milliGRAM(s) Oral three times a day  fluticasone propionate 50 MICROgram(s)/spray Nasal Spray 1 Spray(s) Both Nostrils two times a day  gabapentin 400 milliGRAM(s) Oral two times a day  hydrocortisone 1% Ointment 1 Application(s) Topical every 12 hours  OXcarbazepine 600 milliGRAM(s) Oral two times a day      Allergies    Fioricet (Rash)  venlafaxine (Hives)  mushroom (Unknown)  gadobutrol (Rash; Urticaria; Hives)  IV Contrast (Short breath)  honeydew (Rash)    Intolerances    Vital Signs Last 24 Hrs  T(C): 36.8 (11 Mar 2025 16:01), Max: 37.3 (10 Mar 2025 22:56)  T(F): 98.2 (11 Mar 2025 16:01), Max: 99.2 (10 Mar 2025 22:56)  HR: 91 (11 Mar 2025 16:) (80 - 97)  BP: 104/55 (11 Mar 2025 16:) (104/55 - 156/88)  BP(mean): --  RR: 19 (11 Mar 2025 16:) (17 - 20)  SpO2: 93% (11 Mar 2025 16:) (91% - 96%)    Parameters below as of 11 Mar 2025 16:  Patient On (Oxygen Delivery Method): nasal cannula  O2 Flow (L/min): 4    PHYSICAL EXAM:  Constitutional: non-toxic morbidly obese woman sitting in chair; NAD  Head: atraumatic  EENT: PERRL, anicteric sclera; oropharynx clear, MMM  Neck: supple, no appreciable JVD  Respiratory: diminished throughout, possibly due to body habitus, respirations appear non-labored  Cardiovascular: +S1/S2, RRR  Gastrointestinal: soft, NT/ND  Extremities: WWP; 1+ pedal edema; no clubbing or cyanosis  Vascular: 2+ radial pulses B/L  Neurological: awake and alert; CHATMAN    LABS:  CBC Full  -  ( 11 Mar 2025 05:00 )  WBC Count : 10.24 K/uL  RBC Count : 4.79 M/uL  Hemoglobin : 11.4 g/dL  Hematocrit : 39.0 %  Platelet Count - Automated : 256 K/uL  Mean Cell Volume : 81.4 fl  Mean Cell Hemoglobin : 23.8 pg  Mean Cell Hemoglobin Concentration : 29.2 g/dL  Auto Neutrophil # : x  Auto Lymphocyte # : x  Auto Monocyte # : x  Auto Eosinophil # : x  Auto Basophil # : x  Auto Neutrophil % : x  Auto Lymphocyte % : x  Auto Monocyte % : x  Auto Eosinophil % : x  Auto Basophil % : x    03-11    139  |  99  |  19[H]  ----------------------------<  305[H]  4.1   |  35[H]  |  1.05    Ca    8.6      11 Mar 2025 05:00  Phos  4.0     03-11  Mg     1.2     03-11    TPro  7.4  /  Alb  3.0[L]  /  TBili  0.6  /  DBili  x   /  AST  28  /  ALT  40  /  AlkPhos  129[H]  03-11    PT/INR - ( 10 Mar 2025 19:20 )   PT: 12.7 sec;   INR: 1.09 ratio         PTT - ( 10 Mar 2025 19:20 )  PTT:29.1 sec      Urinalysis Basic - ( 11 Mar 2025 05:00 )    Color: x / Appearance: x / SG: x / pH: x  Gluc: 305 mg/dL / Ketone: x  / Bili: x / Urobili: x   Blood: x / Protein: x / Nitrite: x   Leuk Esterase: x / RBC: x / WBC x   Sq Epi: x / Non Sq Epi: x / Bacteria: x    RADIOLOGY & ADDITIONAL STUDIES (personally reviewed):  < from: Xray Chest 1 View- PORTABLE-Urgent (03.10.25 @ 19:00) >  IMPRESSION:   No radiographic evidence of active chest disease..

## 2025-03-11 NOTE — ED ADULT NURSE REASSESSMENT NOTE - NS ED NURSE REASSESS COMMENT FT1
Patient admitted to 509A, receiving RN Sugar unable to take report at this time @ 1400 will try again.

## 2025-03-11 NOTE — PROGRESS NOTE ADULT - PROBLEM SELECTOR PLAN 12
DVT: lovenox hx of hld  c/w home atorvastatin 40 mg qd hx of hypothyroidism   c/w home levothyroxine 112 mcg qd

## 2025-03-11 NOTE — PROGRESS NOTE ADULT - PROBLEM SELECTOR PLAN 10
hx of hypothyroidism   c/w home levothyroxine 112 mcg qd hx of migraines   c/w home trileptal hx of htn   c/w losartan 25 mg qd and amlodipine 10 mg qd   bp controlled

## 2025-03-11 NOTE — CONSULT NOTE ADULT - SUBJECTIVE AND OBJECTIVE BOX
Patient seen and evaluated at bedside    Chief Complaint: chest discomfort, palpitation, SOB, pain    HPI:  59 year old F with PMHx of asthma, COPD (on 4L NC PRN), DM, fatty liver, fibromyalgia, R shoulder bursitis, HLD, hypothyroidism, NAKUL, psoriasis, RCC (s/p L renal resection 11/3/23, no hx of CT/RT) presenting palpitations, chest discomfort and lower abdominal pain.    Pt states she suffered a fall while getting out of the bathtub a week ago, landing on her chest/abdomen. Since then, she reports pain throughout her chest and torso. She also reports palpitations and SOB. She also reports R greater than L leg swelling which has been like that for the past 7 months.    PMHx:   Hypertension    Hyperlipidemia    Asthma    Hypothyroid    Obstructive sleep apnea    Obesity    Trigeminal neuralgia    Fibromyalgia    DM (diabetes mellitus)    DJD (degenerative joint disease)    Cervical neuralgia    Back pain    H/O bursitis    Radicular pain    Vasculitis    Renal cell carcinoma, left    Falls frequently    Morbid obesity with body mass index (BMI) of 50.0 to 59.9 in adult    Psoriasis    Diverticulosis    Fatty liver        PSHx:   S/P abdominal hysterectomy    S/P  section    Left adrenal mass    Vaginal cyst    Vasculitis on nerve biopsy    S/P left oophorectomy    S/P colonoscopic polypectomy        Allergies:  Fioricet (Rash)  venlafaxine (Hives)  mushroom (Unknown)  gadobutrol (Rash; Urticaria; Hives)  IV Contrast (Short breath)  honeydew (Rash)      Home Meds:    Current Medications:   acetaminophen     Tablet .. 1000 milliGRAM(s) Oral every 8 hours  albuterol/ipratropium (CFC free) Inhaler. 1 Puff(s) Inhalation four times a day  amLODIPine   Tablet 10 milliGRAM(s) Oral daily  aspirin  chewable 81 milliGRAM(s) Oral daily  atorvastatin 40 milliGRAM(s) Oral at bedtime  cyclobenzaprine 5 milliGRAM(s) Oral three times a day  dextrose Oral Gel 15 Gram(s) Oral once PRN  enoxaparin Injectable 40 milliGRAM(s) SubCutaneous every 12 hours  fluticasone propionate 50 MICROgram(s)/spray Nasal Spray 1 Spray(s) Both Nostrils two times a day  fluticasone propionate/ salmeterol 250-50 MICROgram(s) Diskus 1 Dose(s) Inhalation two times a day  gabapentin 400 milliGRAM(s) Oral two times a day  hydrocortisone 1% Ointment 1 Application(s) Topical every 12 hours  insulin glargine Injectable (LANTUS) 38 Unit(s) SubCutaneous at bedtime  insulin lispro (ADMELOG) corrective regimen sliding scale   SubCutaneous three times a day before meals  insulin lispro (ADMELOG) corrective regimen sliding scale   SubCutaneous at bedtime  insulin lispro Injectable (ADMELOG) 10 Unit(s) SubCutaneous three times a day before meals  levothyroxine 112 MICROGram(s) Oral daily  losartan 25 milliGRAM(s) Oral daily  montelukast 10 milliGRAM(s) Oral daily  OXcarbazepine 600 milliGRAM(s) Oral two times a day  polyethylene glycol 3350 17 Gram(s) Oral daily  senna 2 Tablet(s) Oral at bedtime      FAMILY HISTORY:  Family history of diabetes mellitus    Family history of hypertension    Family history of obesity    Social History:  Smoking History:  Alcohol Use:  Drug Use:    REVIEW OF SYSTEMS:  CONSTITUTIONAL: No weakness, fevers or chills  EYES/ENT: No visual changes;  No dysphagia  NECK: No pain or stiffness  RESPIRATORY: No cough, wheezing, hemoptysis; SOB  CARDIOVASCULAR: CP, palpitations, LE edema  GASTROINTESTINAL: No abdominal or epigastric pain. No nausea, vomiting, or hematemesis; No diarrhea or constipation. No melena or hematochezia.  BACK: No back pain  GENITOURINARY: No dysuria, frequency or hematuria  NEUROLOGICAL: No numbness or weakness  SKIN: No itching, burning, rashes, or lesions   All other review of systems is negative unless indicated above.    Physical Exam:  T(F): 97.4 (-), Max: 99.2 (-10)  HR: 89 () (80 - 109)  BP: 116/64 (-) (108/69 - 169/83)  RR: 18 (-11)  SpO2: 94% (-11)  GENERAL: morbid obesity  CHEST/LUNG: Clear to auscultation bilaterally; No wheeze, equal breath sounds bilaterally   BACK: No spinal tenderness  HEART: Regular rate and rhythm; No murmurs, rubs, or gallops  ABDOMEN: Soft, Nontender, Nondistended; Bowel sounds present  EXTREMITIES:  2+ edema up below the knee on R leg and minimal on L leg  PSYCH: Nl behavior, nl affect  NEUROLOGY: AAOx3, non-focal, cranial nerves intact    LINES:    Labs:  reviewed                        11.4   10.24 )-----------( 256      ( 11 Mar 2025 05:00 )             39.0     03-11    139  |  99  |  19[H]  ----------------------------<  305[H]  4.1   |  35[H]  |  1.05    Ca    8.6      11 Mar 2025 05:00  Phos  4.0     03-11  Mg     1.2     03-11    TPro  7.4  /  Alb  3.0[L]  /  TBili  0.6  /  DBili  x   /  AST  28  /  ALT  40  /  AlkPhos  129[H]  03-11    PT/INR - ( 10 Mar 2025 19:20 )   PT: 12.7 sec;   INR: 1.09 ratio         PTT - ( 10 Mar 2025 19:20 )  PTT:29.1 sec    Total Cholesterol: 192  LDL: --  HDL: 43  T    Cardiovascular Diagnostic Testing:    ECG: sinus rhythm with TWI in I and aVL    Echo 3/2024:    CONCLUSIONS:      1. Technically difficult image quality.   2. Left ventricular endocardium is not well visualized; however, the left ventricular systolic function appears grossly normal.   3. There is mild (grade 1) left ventriculardiastolic dysfunction.   4. There is normal LV mass and normal geometry.   5. Normal right ventricular cavity size, with normal wall thickness, and normal systolic function.    Stress Testing 3/2024 Conclusions:   1. Normal myocardial perfusion scan, with no evidence of infarction or inducible ischemia.   2. Baseline electrocardiogram: normal sinus rhythm at a rate of 89 bpm with no arrhythmias and poor R wave progression.   3. Stress electrocardiogram: No ischemic ST segment changes.   4. The left ventricle is normal in function and normal in size. The post stress left ventricular EF is 81 %. The stress end diastolic volume is 86 ml and systolic volume is 16 ml.      LE Duplex -   IMPRESSION:  No evidence of deep venous thrombosis in either lower extremity.    CXR:  reviewed

## 2025-03-11 NOTE — PROGRESS NOTE ADULT - PROBLEM SELECTOR PLAN 8
hx of hld  c/w home atorvastatin 40 mg qd hx of htn   c/w losartan 25 mg qd and amlodipine 10 mg qd   bp controlled had a recent fall  patient ambulates with a  cane  has no HHA and feels unsteady on her feet.   fall precautions   f/u PT pt is on ozempic weekly inj  nutrition consult

## 2025-03-11 NOTE — CONSULT NOTE ADULT - SUBJECTIVE AND OBJECTIVE BOX
ENDOCRINE INITIAL CONSULT NOTE:    Patient is a 59y old  Female who presents with a chief complaint of palpitation (11 Mar 2025 15:06)      HPI:  59-year-old female, AAOx3, ambulates with a cane, no HHA,  history of DM, HLD, asthma, COPD on 4L NC, migraines, hypothyroidism, NAKUL, RCC status post left renal resection  present for intermittent palpitations and chest pain that started a week ago. She describes her chest pain as dull and stays on the left side. Admits to dizziness, nausea and sob with the palpitation episodes. Also reports recent fall and feels unsteady. Denies head strike or LOC.  Denies any fever, chills, abdominal pain, n/v or dysuria. Also Admits to chronic diarrhea who she has a follow up for a colonoscopy.    ED course:  Vitals: temp 98.2, , /83, RR 18 spo2 90 on 3L  glucose 273, mg 1.2   s/p 500 nacl , morphine, zofran  (10 Mar 2025 23:24)    59y Female    Diabetes History:  Diagnosis:  Home regimen:  Home fingersticks/ CGM:  Hypoglycemia events:  Retinopathy/most recent opthalmology:  Peripheral Neuropathy:  Nephropathy:  Cardiovascular disease:  Peripheral vascular disease:  Diet:  Recent A1C   PCP  Endocrinologist:    Review of systems:  Constitutional:  Constitutional: No fever, weight loss, fatigue, low energy, generalized weakness, poor appetite  Eyes: No redness, no dryness, no pain, no tearing, no gritty or cathy feeling, no bulging  Cardiovascular/ Respiratory: No palpitations,, no chest pain, no shortness of breath, no exercise intolerance, no cough, no leg/ ankle swelling  Gastrointestinal: No trouble swallowing, no heart burn, no abdominal pain, no bloating, no nausea, no vomiting, no constipation, no diarrhea, no frequent bowel movements  Skin: No excessive hair growth, no hair loss, no acne, no excessive sweating, no rash, no easy bruising  Neurological: No headaches, no change in vision, no dizziness/ lightheadedness, no tremors, no numbness/ tingling in feet, no pain/ burning in feet, no trouble with balance, no muscular weakness.   Endocrine: Frequent urination, excessive urination, excessive thirst, symptoms     PAST MEDICAL & SURGICAL HISTORY:  Hypertension  vaginal cyst      Hyperlipidemia      Asthma  last inhaler use with in 10 days, on Pulm follow up [id number]      Hypothyroid      Obstructive sleep apnea  refuses Cpap      Obesity  morbid      Trigeminal neuralgia  R      Fibromyalgia      DM (diabetes mellitus)  2      DJD (degenerative joint disease)  Cervical/Thoracic/Lumbar      Cervical neuralgia  difficulty moving my neck      Back pain  thoracic & lumbar      H/O bursitis  right shoulder      Radicular pain  arms, legs      Vasculitis  Legs, forerhead, arms & hands, flare ups in R leg  Dr susie Ribeiro is my Rheumatologist      Renal cell carcinoma, left  on follow up Dr schulte, HOD renal [facility], waiting for suregry in 2020      Falls frequently      Morbid obesity with body mass index (BMI) of 50.0 to 59.9 in adult      Psoriasis      Diverticulosis      Fatty liver      S/P abdominal hysterectomy          S/P  section  1      Left adrenal mass  excision, benign 2006      Vaginal cyst  removed       Vasculitis on nerve biopsy  , had another surgery called microvascular decompression       S/P left oophorectomy      S/P colonoscopic polypectomy          FAMILY HISTORY:  Family history of diabetes mellitus    Family history of hypertension    Family history of obesity        Social History:  Occupation:  Marital status/Lives with  Exercise:  Tobacco:  Alcohol:  illicit drug abuse:  Health insurance status:    MEDICATIONS  (STANDING):  acetaminophen     Tablet .. 1000 milliGRAM(s) Oral every 8 hours  albuterol/ipratropium (CFC free) Inhaler. 1 Puff(s) Inhalation four times a day  amLODIPine   Tablet 10 milliGRAM(s) Oral daily  aspirin  chewable 81 milliGRAM(s) Oral daily  atorvastatin 40 milliGRAM(s) Oral at bedtime  cyclobenzaprine 5 milliGRAM(s) Oral three times a day  enoxaparin Injectable 40 milliGRAM(s) SubCutaneous every 12 hours  fluticasone propionate 50 MICROgram(s)/spray Nasal Spray 1 Spray(s) Both Nostrils two times a day  fluticasone propionate/ salmeterol 250-50 MICROgram(s) Diskus 1 Dose(s) Inhalation two times a day  gabapentin 400 milliGRAM(s) Oral two times a day  hydrocortisone 1% Ointment 1 Application(s) Topical every 12 hours  insulin glargine Injectable (LANTUS) 38 Unit(s) SubCutaneous at bedtime  insulin lispro (ADMELOG) corrective regimen sliding scale   SubCutaneous three times a day before meals  insulin lispro (ADMELOG) corrective regimen sliding scale   SubCutaneous at bedtime  levothyroxine 112 MICROGram(s) Oral daily  losartan 25 milliGRAM(s) Oral daily  montelukast 10 milliGRAM(s) Oral daily  OXcarbazepine 600 milliGRAM(s) Oral two times a day  polyethylene glycol 3350 17 Gram(s) Oral daily  senna 2 Tablet(s) Oral at bedtime    MEDICATIONS  (PRN):  dextrose Oral Gel 15 Gram(s) Oral once PRN Blood Glucose LESS THAN 70 milliGRAM(s)/deciliter      Physical Examination  Vital Signs Last 24 Hrs  T(C): 36.5 (11 Mar 2025 19:17), Max: 37.3 (10 Mar 2025 22:56)  T(F): 97.7 (11 Mar 2025 19:17), Max: 99.2 (10 Mar 2025 22:56)  HR: 93 (11 Mar 2025 19:17) (80 - 97)  BP: 101/49 (11 Mar 2025 19:17) (101/49 - 156/88)  BP(mean): --  RR: 18 (11 Mar 2025 19:17) (17 - 20)  SpO2: 93% (11 Mar 2025 19:17) (91% - 96%)    Parameters below as of 11 Mar 2025 19:17  Patient On (Oxygen Delivery Method): nasal cannula  O2 Flow (L/min): 4    Constitutional: No acute distress, ill- appearing, no anxious appearing, hyperkinetic, no diaphoretic  HEENT: Moist mucous membranes  Neck:  No JVD, bruits or thyromegaly, No thyroid nodules palpable, no LAD  Respiratory:  Respiratory effort normal, lungs clear to ausculation, without rales or rhonchi  Cardiovascular:  Regular heart rate, normal S1 and S2 sounds, without murmur, rub or gallop.  Gastrointestinal: Soft, non tender without hepatosplenomegaly and masses, no abdominal obesity  Extremities: Sensation intact to monofilament in feet, no cyanosis, clubbing or edema, positive pedal pulses  Neurological:  Oriented to person, place and time, No gross sensory or motor defects, visual fields intact to confrontation, normal deep tendon reflexes    Labs:                        11.4   10.24 )-----------( 256      ( 11 Mar 2025 05:00 )             39.0     03-11    139  |  99  |  19[H]  ----------------------------<  305[H]  4.1   |  35[H]  |  1.05    Ca    8.6      11 Mar 2025 05:00  Phos  4.0     03-11  Mg     1.2     03-11    TPro  7.4  /  Alb  3.0[L]  /  TBili  0.6  /  DBili  x   /  AST  28  /  ALT  40  /  AlkPhos  129[H]  03-11        PT/INR - ( 10 Mar 2025 19:20 )   PT: 12.7 sec;   INR: 1.09 ratio         PTT - ( 10 Mar 2025 19:20 )  PTT:29.1 sec  Urinalysis Basic - ( 11 Mar 2025 05:00 )    Color: x / Appearance: x / SG: x / pH: x  Gluc: 305 mg/dL / Ketone: x  / Bili: x / Urobili: x   Blood: x / Protein: x / Nitrite: x   Leuk Esterase: x / RBC: x / WBC x   Sq Epi: x / Non Sq Epi: x / Bacteria: x      CAPILLARY BLOOD GLUCOSE      POCT Blood Glucose.: 289 mg/dL (11 Mar 2025 21:05)  POCT Blood Glucose.: 330 mg/dL (11 Mar 2025 16:30)  POCT Blood Glucose.: 273 mg/dL (11 Mar 2025 13:31)  POCT Blood Glucose.: 370 mg/dL (11 Mar 2025 08:28)      Radiology and diagnostic studies:      Assessment and Plan:  59y Female   Endocrinology is consulted fro    1) Type 2 diabetes:      Recommendations:  Basal Insulin:   Glargine ( Lantus) units once daily    Nutritional Insulin:   Lispro (Admelog) units with breakfast, Hold if NPO or eating <50% of meals  Lispro ( Admelog) units with lunch, Hold if NPO or eating < 50% of meals  Lispro (Admelog) units with dinner, Hold if NPO or eating < 50% of meals    Correctional Insulin:  Normal Lispro ( Admelog) correctional scale with meals and bedtime    Oral Diabetes Medications:  Non in the hospital     Endocrine initial consult note:    59 year old Female who presents with a chief complaint of palpitation (11 Mar 2025 15:06)    HPI:  59-year-old female with PMH of T2DM, HLD, asthma, COPD on 4L NC, migraines, hypothyroidism, NAKUL, RCC status post left renal resection  present for intermittent palpitations and chest pain that started a week ago. She describes her chest pain as dull and stays on the left side. Admits to dizziness, nausea and sob with the palpitation episodes. Also reports recent fall and feels unsteady. Denies head strike or LOC.  Denies any fever, chills, abdominal pain, n/v or dysuria. Also Admits to chronic diarrhea who she has a follow up for a colonoscopy. Endocrinology is consulted for glycemic management (10 Mar 2025 23:24)    59y Female    Diabetes History:  Diagnosis:  Home regimen:  Home fingersticks/ CGM:  Hypoglycemia events:  Retinopathy/most recent opthalmology:  Peripheral Neuropathy:  Nephropathy:  Cardiovascular disease:  Peripheral vascular disease:  Diet:  Recent A1C   PCP  Endocrinologist:    Review of systems:  Constitutional:  Constitutional: No fever, weight loss, fatigue, low energy, generalized weakness, poor appetite  Eyes: No redness, no dryness, no pain, no tearing, no gritty or cathy feeling, no bulging  Cardiovascular/ Respiratory: No palpitations,, no chest pain, no shortness of breath, no exercise intolerance, no cough, no leg/ ankle swelling  Gastrointestinal: No trouble swallowing, no heart burn, no abdominal pain, no bloating, no nausea, no vomiting, no constipation, no diarrhea, no frequent bowel movements  Skin: No excessive hair growth, no hair loss, no acne, no excessive sweating, no rash, no easy bruising  Neurological: No headaches, no change in vision, no dizziness/ lightheadedness, no tremors, no numbness/ tingling in feet, no pain/ burning in feet, no trouble with balance, no muscular weakness.   Endocrine: Frequent urination, excessive urination, excessive thirst, symptoms     PAST MEDICAL & SURGICAL HISTORY:  Hypertension  vaginal cyst      Hyperlipidemia      Asthma  last inhaler use with in 10 days, on Pulm follow up [id number]      Hypothyroid      Obstructive sleep apnea  refuses Cpap      Obesity  morbid      Trigeminal neuralgia  R      Fibromyalgia      DM (diabetes mellitus)  2      DJD (degenerative joint disease)  Cervical/Thoracic/Lumbar      Cervical neuralgia  difficulty moving my neck      Back pain  thoracic & lumbar      H/O bursitis  right shoulder      Radicular pain  arms, legs      Vasculitis  Legs, forerhead, arms & hands, flare ups in R leg  Dr susie Ribeiro is my Rheumatologist      Renal cell carcinoma, left  on follow up Dr schulte, HOD renal [facility], waiting for suregry in 2020      Falls frequently      Morbid obesity with body mass index (BMI) of 50.0 to 59.9 in adult      Psoriasis      Diverticulosis      Fatty liver      S/P abdominal hysterectomy          S/P  section  1      Left adrenal mass  excision, benign 2006      Vaginal cyst  removed       Vasculitis on nerve biopsy  , had another surgery called microvascular decompression       S/P left oophorectomy      S/P colonoscopic polypectomy          FAMILY HISTORY:  Family history of diabetes mellitus    Family history of hypertension    Family history of obesity        Social History:  Occupation:  Marital status/Lives with  Exercise:  Tobacco:  Alcohol:  illicit drug abuse:  Health insurance status:    MEDICATIONS  (STANDING):  acetaminophen     Tablet .. 1000 milliGRAM(s) Oral every 8 hours  albuterol/ipratropium (CFC free) Inhaler. 1 Puff(s) Inhalation four times a day  amLODIPine   Tablet 10 milliGRAM(s) Oral daily  aspirin  chewable 81 milliGRAM(s) Oral daily  atorvastatin 40 milliGRAM(s) Oral at bedtime  cyclobenzaprine 5 milliGRAM(s) Oral three times a day  enoxaparin Injectable 40 milliGRAM(s) SubCutaneous every 12 hours  fluticasone propionate 50 MICROgram(s)/spray Nasal Spray 1 Spray(s) Both Nostrils two times a day  fluticasone propionate/ salmeterol 250-50 MICROgram(s) Diskus 1 Dose(s) Inhalation two times a day  gabapentin 400 milliGRAM(s) Oral two times a day  hydrocortisone 1% Ointment 1 Application(s) Topical every 12 hours  insulin glargine Injectable (LANTUS) 38 Unit(s) SubCutaneous at bedtime  insulin lispro (ADMELOG) corrective regimen sliding scale   SubCutaneous three times a day before meals  insulin lispro (ADMELOG) corrective regimen sliding scale   SubCutaneous at bedtime  levothyroxine 112 MICROGram(s) Oral daily  losartan 25 milliGRAM(s) Oral daily  montelukast 10 milliGRAM(s) Oral daily  OXcarbazepine 600 milliGRAM(s) Oral two times a day  polyethylene glycol 3350 17 Gram(s) Oral daily  senna 2 Tablet(s) Oral at bedtime    MEDICATIONS  (PRN):  dextrose Oral Gel 15 Gram(s) Oral once PRN Blood Glucose LESS THAN 70 milliGRAM(s)/deciliter      Physical Examination  Vital Signs Last 24 Hrs  T(C): 36.5 (11 Mar 2025 19:17), Max: 37.3 (10 Mar 2025 22:56)  T(F): 97.7 (11 Mar 2025 19:17), Max: 99.2 (10 Mar 2025 22:56)  HR: 93 (11 Mar 2025 19:17) (80 - 97)  BP: 101/49 (11 Mar 2025 19:17) (101/49 - 156/88)  BP(mean): --  RR: 18 (11 Mar 2025 19:17) (17 - 20)  SpO2: 93% (11 Mar 2025 19:17) (91% - 96%)    Parameters below as of 11 Mar 2025 19:17  Patient On (Oxygen Delivery Method): nasal cannula  O2 Flow (L/min): 4    Constitutional: No acute distress, ill- appearing, no anxious appearing, hyperkinetic, no diaphoretic  HEENT: Moist mucous membranes  Neck:  No JVD, bruits or thyromegaly, No thyroid nodules palpable, no LAD  Respiratory:  Respiratory effort normal, lungs clear to ausculation, without rales or rhonchi  Cardiovascular:  Regular heart rate, normal S1 and S2 sounds, without murmur, rub or gallop.  Gastrointestinal: Soft, non tender without hepatosplenomegaly and masses, no abdominal obesity  Extremities: Sensation intact to monofilament in feet, no cyanosis, clubbing or edema, positive pedal pulses  Neurological:  Oriented to person, place and time, No gross sensory or motor defects, visual fields intact to confrontation, normal deep tendon reflexes    Labs:                        11.4   10.24 )-----------( 256      ( 11 Mar 2025 05:00 )             39.0     03-11    139  |  99  |  19[H]  ----------------------------<  305[H]  4.1   |  35[H]  |  1.05    Ca    8.6      11 Mar 2025 05:00  Phos  4.0     03-11  Mg     1.2     03-11    TPro  7.4  /  Alb  3.0[L]  /  TBili  0.6  /  DBili  x   /  AST  28  /  ALT  40  /  AlkPhos  129[H]  03-11        PT/INR - ( 10 Mar 2025 19:20 )   PT: 12.7 sec;   INR: 1.09 ratio         PTT - ( 10 Mar 2025 19:20 )  PTT:29.1 sec  Urinalysis Basic - ( 11 Mar 2025 05:00 )    Color: x / Appearance: x / SG: x / pH: x  Gluc: 305 mg/dL / Ketone: x  / Bili: x / Urobili: x   Blood: x / Protein: x / Nitrite: x   Leuk Esterase: x / RBC: x / WBC x   Sq Epi: x / Non Sq Epi: x / Bacteria: x      CAPILLARY BLOOD GLUCOSE      POCT Blood Glucose.: 289 mg/dL (11 Mar 2025 21:05)  POCT Blood Glucose.: 330 mg/dL (11 Mar 2025 16:30)  POCT Blood Glucose.: 273 mg/dL (11 Mar 2025 13:31)  POCT Blood Glucose.: 370 mg/dL (11 Mar 2025 08:28)      Radiology and diagnostic studies:      Assessment and Plan:  59y Female   Endocrinology is consulted fro    1) Type 2 diabetes:      Recommendations:  Basal Insulin:   Glargine ( Lantus) units once daily    Nutritional Insulin:   Lispro (Admelog) units with breakfast, Hold if NPO or eating <50% of meals  Lispro ( Admelog) units with lunch, Hold if NPO or eating < 50% of meals  Lispro (Admelog) units with dinner, Hold if NPO or eating < 50% of meals    Correctional Insulin:  Normal Lispro ( Admelog) correctional scale with meals and bedtime    Oral Diabetes Medications:  Non in the hospital     Endocrine initial consult note:    59 year old Female who presents with a chief complaint of palpitation (11 Mar 2025 15:06)    HPI:  59-year-old female with PMH of T2DM, HLD, asthma, COPD on 4L NC, migraines, hypothyroidism, NAKUL, RCC status post left renal resection  present for intermittent palpitations and chest pain that started a week ago. She describes her chest pain as dull and stays on the left side. Admits to dizziness, nausea and sob with the palpitation episodes. Also reports recent fall and feels unsteady. Denies head strike or LOC.  Denies any fever, chills, abdominal pain, n/v or dysuria. Also Admits to chronic diarrhea who she has a follow up for a colonoscopy. Endocrinology is consulted for glycemic management (10 Mar 2025 23:24)    Patient seen at the bedside, reports feeling hungry. Eating well. Providing diabetes hx   Diabetes History:  Diagnosis:  Home regimen:  Home fingersticks/ CGM:  Hypoglycemia events:  Retinopathy/most recent opthalmology:  Peripheral Neuropathy:  Nephropathy:  Cardiovascular disease:  Peripheral vascular disease:  Diet:  Recent A1C   PCP  Endocrinologist:    Review of systems:  Constitutional:  Constitutional: No fever, weight loss, fatigue, low energy, generalized weakness, poor appetite  Eyes: No redness, no dryness, no pain, no tearing, no gritty or cathy feeling, no bulging  Cardiovascular/ Respiratory: No palpitations,, no chest pain, no shortness of breath, no exercise intolerance, no cough, no leg/ ankle swelling  Gastrointestinal: No trouble swallowing, no heart burn, no abdominal pain, no bloating, no nausea, no vomiting, no constipation, no diarrhea, no frequent bowel movements  Skin: No excessive hair growth, no hair loss, no acne, no excessive sweating, no rash, no easy bruising  Neurological: No headaches, no change in vision, no dizziness/ lightheadedness, no tremors, no numbness/ tingling in feet, no pain/ burning in feet, no trouble with balance, no muscular weakness.   Endocrine: Frequent urination, excessive urination, excessive thirst, symptoms     PAST MEDICAL & SURGICAL HISTORY:  Hypertension  vaginal cyst      Hyperlipidemia      Asthma  last inhaler use with in 10 days, on Pulm follow up [id number]      Hypothyroid      Obstructive sleep apnea  refuses Cpap      Obesity  morbid      Trigeminal neuralgia  R      Fibromyalgia      DM (diabetes mellitus)  2      DJD (degenerative joint disease)  Cervical/Thoracic/Lumbar      Cervical neuralgia  difficulty moving my neck      Back pain  thoracic & lumbar      H/O bursitis  right shoulder      Radicular pain  arms, legs      Vasculitis  Legs, forerhead, arms & hands, flare ups in R leg  Dr susie Ribeiro is my Rheumatologist      Renal cell carcinoma, left  on follow up Dr schulte, [facility] renal [facility], waiting for suregry in 2020      Falls frequently      Morbid obesity with body mass index (BMI) of 50.0 to 59.9 in adult      Psoriasis      Diverticulosis      Fatty liver      S/P abdominal hysterectomy          S/P  section  1      Left adrenal mass  excision, benign 2006      Vaginal cyst  removed       Vasculitis on nerve biopsy  , had another surgery called microvascular decompression       S/P left oophorectomy      S/P colonoscopic polypectomy          FAMILY HISTORY:  Family history of diabetes mellitus    Family history of hypertension    Family history of obesity        Social History:  Occupation:  Marital status/Lives with  Exercise:  Tobacco:  Alcohol:  illicit drug abuse:  Health insurance status:    MEDICATIONS  (STANDING):  acetaminophen     Tablet .. 1000 milliGRAM(s) Oral every 8 hours  albuterol/ipratropium (CFC free) Inhaler. 1 Puff(s) Inhalation four times a day  amLODIPine   Tablet 10 milliGRAM(s) Oral daily  aspirin  chewable 81 milliGRAM(s) Oral daily  atorvastatin 40 milliGRAM(s) Oral at bedtime  cyclobenzaprine 5 milliGRAM(s) Oral three times a day  enoxaparin Injectable 40 milliGRAM(s) SubCutaneous every 12 hours  fluticasone propionate 50 MICROgram(s)/spray Nasal Spray 1 Spray(s) Both Nostrils two times a day  fluticasone propionate/ salmeterol 250-50 MICROgram(s) Diskus 1 Dose(s) Inhalation two times a day  gabapentin 400 milliGRAM(s) Oral two times a day  hydrocortisone 1% Ointment 1 Application(s) Topical every 12 hours  insulin glargine Injectable (LANTUS) 38 Unit(s) SubCutaneous at bedtime  insulin lispro (ADMELOG) corrective regimen sliding scale   SubCutaneous three times a day before meals  insulin lispro (ADMELOG) corrective regimen sliding scale   SubCutaneous at bedtime  levothyroxine 112 MICROGram(s) Oral daily  losartan 25 milliGRAM(s) Oral daily  montelukast 10 milliGRAM(s) Oral daily  OXcarbazepine 600 milliGRAM(s) Oral two times a day  polyethylene glycol 3350 17 Gram(s) Oral daily  senna 2 Tablet(s) Oral at bedtime    MEDICATIONS  (PRN):  dextrose Oral Gel 15 Gram(s) Oral once PRN Blood Glucose LESS THAN 70 milliGRAM(s)/deciliter      Physical Examination  Vital Signs Last 24 Hrs  T(C): 36.5 (11 Mar 2025 19:17), Max: 37.3 (10 Mar 2025 22:56)  T(F): 97.7 (11 Mar 2025 19:17), Max: 99.2 (10 Mar 2025 22:56)  HR: 93 (11 Mar 2025 19:17) (80 - 97)  BP: 101/49 (11 Mar 2025 19:17) (101/49 - 156/88)  BP(mean): --  RR: 18 (11 Mar 2025 19:17) (17 - 20)  SpO2: 93% (11 Mar 2025 19:17) (91% - 96%)    Parameters below as of 11 Mar 2025 19:17  Patient On (Oxygen Delivery Method): nasal cannula  O2 Flow (L/min): 4    Constitutional: No acute distress, ill- appearing, no anxious appearing, hyperkinetic, no diaphoretic  HEENT: Moist mucous membranes  Neck:  No JVD, bruits or thyromegaly, No thyroid nodules palpable, no LAD  Respiratory:  Respiratory effort normal, lungs clear to ausculation, without rales or rhonchi  Cardiovascular:  Regular heart rate, normal S1 and S2 sounds, without murmur, rub or gallop.  Gastrointestinal: Soft, non tender without hepatosplenomegaly and masses, no abdominal obesity  Extremities: Sensation intact to monofilament in feet, no cyanosis, clubbing or edema, positive pedal pulses  Neurological:  Oriented to person, place and time, No gross sensory or motor defects, visual fields intact to confrontation, normal deep tendon reflexes    Labs:                        11.4   10.24 )-----------( 256      ( 11 Mar 2025 05:00 )             39.0     03-11    139  |  99  |  19[H]  ----------------------------<  305[H]  4.1   |  35[H]  |  1.05    Ca    8.6      11 Mar 2025 05:00  Phos  4.0     03-11  Mg     1.2     03-11    TPro  7.4  /  Alb  3.0[L]  /  TBili  0.6  /  DBili  x   /  AST  28  /  ALT  40  /  AlkPhos  129[H]  03-11        PT/INR - ( 10 Mar 2025 19:20 )   PT: 12.7 sec;   INR: 1.09 ratio         PTT - ( 10 Mar 2025 19:20 )  PTT:29.1 sec  Urinalysis Basic - ( 11 Mar 2025 05:00 )    Color: x / Appearance: x / SG: x / pH: x  Gluc: 305 mg/dL / Ketone: x  / Bili: x / Urobili: x   Blood: x / Protein: x / Nitrite: x   Leuk Esterase: x / RBC: x / WBC x   Sq Epi: x / Non Sq Epi: x / Bacteria: x      CAPILLARY BLOOD GLUCOSE      POCT Blood Glucose.: 289 mg/dL (11 Mar 2025 21:05)  POCT Blood Glucose.: 330 mg/dL (11 Mar 2025 16:30)  POCT Blood Glucose.: 273 mg/dL (11 Mar 2025 13:31)  POCT Blood Glucose.: 370 mg/dL (11 Mar 2025 08:28)      Radiology and diagnostic studies:      Assessment and Plan:  59y Female   Endocrinology is consulted fro    1) Type 2 diabetes:      Recommendations:  Basal Insulin:   Glargine ( Lantus) units once daily    Nutritional Insulin:   Lispro (Admelog) units with breakfast, Hold if NPO or eating <50% of meals  Lispro ( Admelog) units with lunch, Hold if NPO or eating < 50% of meals  Lispro (Admelog) units with dinner, Hold if NPO or eating < 50% of meals    Correctional Insulin:  Normal Lispro ( Admelog) correctional scale with meals and bedtime    Oral Diabetes Medications:  Non in the hospital     Endocrine initial consult note:    59 year old Female who presents with a chief complaint of palpitation (11 Mar 2025 15:06)    HPI:  59-year-old female with PMH of T2DM, HLD, asthma, COPD on 4L NC, migraines, hypothyroidism, NAKUL, RCC status post left renal resection  present for intermittent palpitations and chest pain that started a week ago. She describes her chest pain as dull and stays on the left side. Admits to dizziness, nausea and sob with the palpitation episodes. Also reports recent fall and feels unsteady. Denies head strike or LOC.  Denies any fever, chills, abdominal pain, n/v or dysuria. Also Admits to chronic diarrhea who she has a follow up for a colonoscopy. Endocrinology is consulted for glycemic management (10 Mar 2025 23:24)    Patient seen at the bedside, reports feeling hungry. Eating well. Providing diabetes hx     Diabetes History: Diagnosed with type 2 diabetes around the age of 40  Home regimen: Ozempic 1 mg weekly (Her endocrinologist just recently increased the Ozempic to 2 mg weekly), Metformin 1000 mg BID, Levemir and Novolog before meals   Home fingersticks/ CGM:  Hypoglycemia events:  Retinopathy/most recent opthalmology:  Peripheral Neuropathy:  Nephropathy:  Cardiovascular disease:  Peripheral vascular disease:  Diet:  Recent A1C   PCP  Endocrinologist:    Review of systems:  Constitutional:  Constitutional: No fever, weight loss, fatigue, low energy, generalized weakness, poor appetite  Eyes: No redness, no dryness, no pain, no tearing, no gritty or cathy feeling, no bulging  Cardiovascular/ Respiratory: No palpitations,, no chest pain, no shortness of breath, no exercise intolerance, no cough, no leg/ ankle swelling  Gastrointestinal: No trouble swallowing, no heart burn, no abdominal pain, no bloating, no nausea, no vomiting, no constipation, no diarrhea, no frequent bowel movements  Skin: No excessive hair growth, no hair loss, no acne, no excessive sweating, no rash, no easy bruising  Neurological: No headaches, no change in vision, no dizziness/ lightheadedness, no tremors, no numbness/ tingling in feet, no pain/ burning in feet, no trouble with balance, no muscular weakness.   Endocrine: Frequent urination, excessive urination, excessive thirst, symptoms     PAST MEDICAL & SURGICAL HISTORY:  Hypertension  vaginal cyst      Hyperlipidemia      Asthma  last inhaler use with in 10 days, on Pulm follow up [id number]      Hypothyroid      Obstructive sleep apnea  refuses Cpap      Obesity  morbid      Trigeminal neuralgia  R      Fibromyalgia      DM (diabetes mellitus)  2      DJD (degenerative joint disease)  Cervical/Thoracic/Lumbar      Cervical neuralgia  difficulty moving my neck      Back pain  thoracic & lumbar      H/O bursitis  right shoulder      Radicular pain  arms, legs      Vasculitis  Legs, forerhead, arms & hands, flare ups in R leg  Dr susie Ribeiro is my Rheumatologist      Renal cell carcinoma, left  on follow up Dr schulte, HOD renal [facility], waiting for suregry in 2020      Falls frequently      Morbid obesity with body mass index (BMI) of 50.0 to 59.9 in adult      Psoriasis      Diverticulosis      Fatty liver      S/P abdominal hysterectomy          S/P  section  1      Left adrenal mass  excision, benign 2006      Vaginal cyst  removed       Vasculitis on nerve biopsy  , had another surgery called microvascular decompression       S/P left oophorectomy      S/P colonoscopic polypectomy          FAMILY HISTORY:  Family history of diabetes mellitus    Family history of hypertension    Family history of obesity        Social History:  Occupation:  Marital status/Lives with  Exercise:  Tobacco:  Alcohol:  illicit drug abuse:  Health insurance status:    MEDICATIONS  (STANDING):  acetaminophen     Tablet .. 1000 milliGRAM(s) Oral every 8 hours  albuterol/ipratropium (CFC free) Inhaler. 1 Puff(s) Inhalation four times a day  amLODIPine   Tablet 10 milliGRAM(s) Oral daily  aspirin  chewable 81 milliGRAM(s) Oral daily  atorvastatin 40 milliGRAM(s) Oral at bedtime  cyclobenzaprine 5 milliGRAM(s) Oral three times a day  enoxaparin Injectable 40 milliGRAM(s) SubCutaneous every 12 hours  fluticasone propionate 50 MICROgram(s)/spray Nasal Spray 1 Spray(s) Both Nostrils two times a day  fluticasone propionate/ salmeterol 250-50 MICROgram(s) Diskus 1 Dose(s) Inhalation two times a day  gabapentin 400 milliGRAM(s) Oral two times a day  hydrocortisone 1% Ointment 1 Application(s) Topical every 12 hours  insulin glargine Injectable (LANTUS) 38 Unit(s) SubCutaneous at bedtime  insulin lispro (ADMELOG) corrective regimen sliding scale   SubCutaneous three times a day before meals  insulin lispro (ADMELOG) corrective regimen sliding scale   SubCutaneous at bedtime  levothyroxine 112 MICROGram(s) Oral daily  losartan 25 milliGRAM(s) Oral daily  montelukast 10 milliGRAM(s) Oral daily  OXcarbazepine 600 milliGRAM(s) Oral two times a day  polyethylene glycol 3350 17 Gram(s) Oral daily  senna 2 Tablet(s) Oral at bedtime    MEDICATIONS  (PRN):  dextrose Oral Gel 15 Gram(s) Oral once PRN Blood Glucose LESS THAN 70 milliGRAM(s)/deciliter      Physical Examination  Vital Signs Last 24 Hrs  T(C): 36.5 (11 Mar 2025 19:17), Max: 37.3 (10 Mar 2025 22:56)  T(F): 97.7 (11 Mar 2025 19:17), Max: 99.2 (10 Mar 2025 22:56)  HR: 93 (11 Mar 2025 19:17) (80 - 97)  BP: 101/49 (11 Mar 2025 19:17) (101/49 - 156/88)  BP(mean): --  RR: 18 (11 Mar 2025 19:17) (17 - 20)  SpO2: 93% (11 Mar 2025 19:17) (91% - 96%)    Parameters below as of 11 Mar 2025 19:17  Patient On (Oxygen Delivery Method): nasal cannula  O2 Flow (L/min): 4    Constitutional: No acute distress, ill- appearing, no anxious appearing, hyperkinetic, no diaphoretic  HEENT: Moist mucous membranes  Neck:  No JVD, bruits or thyromegaly, No thyroid nodules palpable, no LAD  Respiratory:  Respiratory effort normal, lungs clear to ausculation, without rales or rhonchi  Cardiovascular:  Regular heart rate, normal S1 and S2 sounds, without murmur, rub or gallop.  Gastrointestinal: Soft, non tender without hepatosplenomegaly and masses, no abdominal obesity  Extremities: Sensation intact to monofilament in feet, no cyanosis, clubbing or edema, positive pedal pulses  Neurological:  Oriented to person, place and time, No gross sensory or motor defects, visual fields intact to confrontation, normal deep tendon reflexes    Labs:                        11.4   10.24 )-----------( 256      ( 11 Mar 2025 05:00 )             39.0     03-11    139  |  99  |  19[H]  ----------------------------<  305[H]  4.1   |  35[H]  |  1.05    Ca    8.6      11 Mar 2025 05:00  Phos  4.0     03-11  Mg     1.2     03-11    TPro  7.4  /  Alb  3.0[L]  /  TBili  0.6  /  DBili  x   /  AST  28  /  ALT  40  /  AlkPhos  129[H]  03-11        PT/INR - ( 10 Mar 2025 19:20 )   PT: 12.7 sec;   INR: 1.09 ratio         PTT - ( 10 Mar 2025 19:20 )  PTT:29.1 sec  Urinalysis Basic - ( 11 Mar 2025 05:00 )    Color: x / Appearance: x / SG: x / pH: x  Gluc: 305 mg/dL / Ketone: x  / Bili: x / Urobili: x   Blood: x / Protein: x / Nitrite: x   Leuk Esterase: x / RBC: x / WBC x   Sq Epi: x / Non Sq Epi: x / Bacteria: x      CAPILLARY BLOOD GLUCOSE      POCT Blood Glucose.: 289 mg/dL (11 Mar 2025 21:05)  POCT Blood Glucose.: 330 mg/dL (11 Mar 2025 16:30)  POCT Blood Glucose.: 273 mg/dL (11 Mar 2025 13:31)  POCT Blood Glucose.: 370 mg/dL (11 Mar 2025 08:28)      Radiology and diagnostic studies:      Assessment and Plan:  59y Female   Endocrinology is consulted fro    1) Type 2 diabetes:      Recommendations:  Basal Insulin:   Glargine ( Lantus) units once daily    Nutritional Insulin:   Lispro (Admelog) units with breakfast, Hold if NPO or eating <50% of meals  Lispro ( Admelog) units with lunch, Hold if NPO or eating < 50% of meals  Lispro (Admelog) units with dinner, Hold if NPO or eating < 50% of meals    Correctional Insulin:  Normal Lispro ( Admelog) correctional scale with meals and bedtime    Oral Diabetes Medications:  Non in the hospital     Endocrine initial consult note:    59 year old Female who presents with a chief complaint of palpitation (11 Mar 2025 15:06)    HPI:  59-year-old female with PMH of T2DM, HLD, asthma, COPD on 4L NC, migraines, hypothyroidism, NAKUL, RCC status post left renal resection 11/23 present for intermittent palpitations and chest pain that started a week ago. She describes her chest pain as dull and stays on the left side. Admits to dizziness, nausea and sob with the palpitation episodes. Also reports recent fall and feels unsteady. Denies head strike or LOC.  Denies any fever, chills, abdominal pain, n/v or dysuria. Also Admits to chronic diarrhea who she has a follow up for a colonoscopy. Endocrinology is consulted for glycemic management (10 Mar 2025 23:24)    Patient seen at the bedside, reports feeling hungry. Eating well. Providing diabetes hx     Diabetes History: Diagnosed with type 2 diabetes around the age of 40  Home regimen: Ozempic 1 mg weekly (Her endocrinologist just recently increased the Ozempic to 2 mg weekly), Metformin 1000 mg BID, Levemir 45 units at bedtime and Novolog 10 units before meals   Home fingersticks: Reports BS are sometimes high 200s.  Hypoglycemia events: Denies  Retinopathy/most recent opthalmology: Has not seen ophthalmologist 2 years ago. Denies retinopathy   Peripheral Neuropathy:  Yes sometimes  Nephropathy: Unknown  Cardiovascular disease: Denies  Diet: Reports eating high carb diet  Recent A1C: 9.7%  Endocrinologist: Dr. Arnol Cerna    Review of systems:  Constitutional: No fever, no weight loss, fatigue, low energy, generalized weakness, no poor appetite  Cardiovascular/ Respiratory: No palpitations, no chest pain, no shortness of breath, no cough, yes leg/ ankle swelling  Gastrointestinal: No heart burn, yes generalized abdominal pain, no bloating, no nausea, no vomiting  Neurological: No headaches, no change in vision, no dizziness/ lightheadedness, no tremors, yes numbness/ tingling in feet  Endocrine: Yes Frequent urination, excessive urination, excessive thirst, symptoms     Past Medical and Surgical Hx:  Hypertension  vaginal cyst  Hyperlipidemia  Asthma  Hypothyroid  Obstructive sleep apnea  refuses Cpap  Obesity  morbid  Trigeminal neuralgia  R  Fibromyalgia  DM (diabetes mellitus)  2  DJD (degenerative joint disease)  Cervical/Thoracic/Lumbar  Cervical neuralgia  difficulty moving my neck  Back pain  thoracic & lumbar  H/O bursitis  right shoulder  Radicular pain  arms, legs  Vasculitis  Legs, forehead, arms & hands, flare ups in R leg  Renal cell carcinoma, left  on follow up Dr schulte, [facility] renal [facility], waiting for suregry in 09/2020  Morbid obesity with body mass index (BMI) of 50.0 to 59.9 in adult  Psoriasis  Diverticulosis  Fatty liver  S/P abdominal hysterectomy  2003  Left adrenal mass  excision, benign 2006  Vaginal cyst  removed 2010  Vasculitis on nerve biopsy  2013, had another surgery called microvascular decompression 2013  S/P left oophorectomy  S/P colonoscopic polypectomy    Family hx:  Family history of diabetes mellitus  Family history of hypertension  Family history of obesity    Social History:  Tobacco: Denies  Alcohol: Denies  illicit drug abuse: Denies    Medications  (standing):  acetaminophen     Tablet .. 1000 milliGRAM(s) Oral every 8 hours  albuterol/ipratropium (CFC free) Inhaler. 1 Puff(s) Inhalation four times a day  amLODIPine   Tablet 10 milliGRAM(s) Oral daily  aspirin  chewable 81 milliGRAM(s) Oral daily  atorvastatin 40 milliGRAM(s) Oral at bedtime  cyclobenzaprine 5 milliGRAM(s) Oral three times a day  enoxaparin Injectable 40 milliGRAM(s) SubCutaneous every 12 hours  fluticasone propionate 50 MICROgram(s)/spray Nasal Spray 1 Spray(s) Both Nostrils two times a day  fluticasone propionate/ salmeterol 250-50 MICROgram(s) Diskus 1 Dose(s) Inhalation two times a day  gabapentin 400 milliGRAM(s) Oral two times a day  hydrocortisone 1% Ointment 1 Application(s) Topical every 12 hours  insulin glargine Injectable (LANTUS) 38 Unit(s) SubCutaneous at bedtime  insulin lispro (ADMELOG) corrective regimen sliding scale   SubCutaneous three times a day before meals  insulin lispro (ADMELOG) corrective regimen sliding scale   SubCutaneous at bedtime  levothyroxine 112 MICROGram(s) Oral daily  losartan 25 milliGRAM(s) Oral daily  montelukast 10 milliGRAM(s) Oral daily  OXcarbazepine 600 milliGRAM(s) Oral two times a day  polyethylene glycol 3350 17 Gram(s) Oral daily  senna 2 Tablet(s) Oral at bedtime    Medications (PRN):  dextrose Oral Gel 15 Gram(s) Oral once PRN Blood Glucose LESS THAN 70 milliGRAM(s)/deciliter    Physical Examination  Vital Signs Last 24 Hrs  T(C): 36.5 (11 Mar 2025 19:17), Max: 37.3 (10 Mar 2025 22:56)  T(F): 97.7 (11 Mar 2025 19:17), Max: 99.2 (10 Mar 2025 22:56)  HR: 93 (11 Mar 2025 19:17) (80 - 97)  BP: 101/49 (11 Mar 2025 19:17) (101/49 - 156/88)  BP(mean): --  RR: 18 (11 Mar 2025 19:17) (17 - 20)  SpO2: 93% (11 Mar 2025 19:17) (91% - 96%)    Constitutional: No acute distress,Yes ill- appearing, morbidly obese on nasal cannula  HEENT: Dry mucous membranes  Neck:  No JVD, bruits or thyromegaly, No thyroid nodules palpable, no LAD, hump on th back  Gastrointestinal: Soft, Distended, non tender without hepatosplenomegaly and masses, yes abdominal obesity  Extremities: Sensation intact in feet, no cyanosis, clubbing or Yes B/L pitting edema in legs, positive pedal pulses  Neurological:  Oriented to person, place and time    Labs:                    11.4   10.24 )-----------( 256      ( 11 Mar 2025 05:00 )             39.0   03-11  139  |  99  |  19[H]  ----------------------------<  305[H]  4.1   |  35[H]  |  1.05  Ca    8.6      11 Mar 2025 05:00  Phos  4.0     03-11  Mg     1.2     03-11  TPro  7.4  /  Alb  3.0[L]  /  TBili  0.6  /  DBili  x   /  AST  28  /  ALT  40  /  AlkPhos  129[H]  03-11    Capillary blood glucose:  POCT Blood Glucose.: 289 mg/dL (11 Mar 2025 21:05)  POCT Blood Glucose.: 330 mg/dL (11 Mar 2025 16:30)  POCT Blood Glucose.: 273 mg/dL (11 Mar 2025 13:31)  POCT Blood Glucose.: 370 mg/dL (11 Mar 2025 08:28)    A1C with Estimated Average Glucose Result: 13.0 % (03.11.25 @ 05:00)    Assessment and Plan:   59-year-old female with PMH of T2DM, HLD, asthma, COPD on 4L NC, migraines, hypothyroidism, NAKUL, RCC status post left renal resection 11/23 present for intermittent palpitations and chest pain that started a week ago.  Admitted for ACS rule out. Endocrinology is consulted for glycemic management    1) Poorly controlled Type 2 diabetes:  A1C is above goal likely due to dietary indiscretion  Patient reports compliance with medications at home  She was about to start ozempic 2 mg from the following weeks    Inpatient Recommendations:  Basal Insulin:   Reduce Glargine ( Lantus) 38 units once daily    Nutritional Insulin:  Increase Lispro (Admelog) 12 units with meals, Hold if NPO or eating <50% of meals    Correctional Insulin:  Continue moderate Lispro ( Admelog) correctional scale with meals and bedtime    Oral Diabetes Medications:  None in the hospital    Diabetes Education  Extensive education about diabetes, hyperglycemia, hypoglycemia, glucose self-monitoring with glucometer, glucose target ranges, adherence to diabetes medications, diet, physical activity, importance of following up with outpatient endocrinology/ opthalmology and podiatry appointments discussed.   Explained the definition of HBA1C and target range.   Explained the complications of diabetes including stroke, renal failure and blindness  Reviewed hypoglycemic sign/symptoms and necessary precautions.   Discussed the goal fasting and postprandial BS at home  Patient verbalized understanding and agrees with the plan

## 2025-03-11 NOTE — ED ADULT NURSE NOTE - NSFALLHARMRISKINTERV_ED_ALL_ED

## 2025-03-11 NOTE — CONSULT NOTE ADULT - PROBLEM SELECTOR RECOMMENDATION 9
Pt with chronic generalized joint pain which is somatic and neuropathic in nature due to hx fibromyalgia, left renal cell carcinoma, hx herniated discs and psoriatic arthritis. + hx constipation and migraine headache  Maximize Non-opioid pain recommendations   - Avoid NSAIDs  - Acetaminophen 1 gram PO q 8 hours x 3 days.   - Continue Gabapentin 400mg po BID- home dose. Monitor renal function.   - Refused Lidoderm 4% patch daily.   Bowel Regimen  - Miralax 17G PO daily  - Senna 2 tablets at bedtime for constipation  Mild pain (score 1-3)  - Non-pharmacological pain treatment recommendations  - Warm/ Cool packs PRN   - Repositioning, imagery, relaxation, distraction.  - Physical therapy OOB if no contraindications   Recommendations discussed with primary team and RN

## 2025-03-11 NOTE — PROGRESS NOTE ADULT - PROBLEM SELECTOR PLAN 3
hx of dm  takes metformin, lantus, admelog, and ozempic   hold oral diabetic medication   c/w home lantus 45 units qhs and admelog 10 units tid before meals   c/w sliding scale  monitor sugars  f/u a1c pt follows with outpt Pulm Dr. Monterroso, prescribed CPAP but patient did not want to use it  c/w home oxygen 4L via nasal cannula

## 2025-03-11 NOTE — CONSULT NOTE ADULT - ASSESSMENT
Assessment and Recommendations:  59 year old F with PMHx of asthma, COPD (on 4L NC PRN), DM, fatty liver, fibromyalgia, R shoulder bursitis, HLD, hypothyroidism, NAKUL, psoriasis, RCC (s/p L renal resection 11/3/23, no hx of CT/RT) presenting palpitations, chest discomfort and lower abdominal pain, in setting of fall 1 week ago    1) Atypical CP, chronic SOB, multifactorial in setting of COPD, morbid obesity, fibromyalgia  2) Elevated Ca score  3) Palpitations  4) DM  5) Morbid obesity  6) Fibromyalgia  - EKG showed sinus rhythm with non-specific T wave abnormality in I and aVL  - Troponins negative x 2  - CCTA 12/2022 showed Ca score of 1069.2 with no significant obstructive CAD. Pharm nuclear stress 3/2024 showed normal myocardial perfusion  - Continue ASA 81 and atorvastatin 40mg daily for non-obstructive CAD   - RLE > LLE edema. LE duplex negative for DVT. Can repeat TTE to reassess. If TTE is unremarkable, likely no further inpatient cardiac testing will be planned    7) HTN  - Continue home meds    Andrés Hampton MD ([employer] TEAMS Message PREFERRED)  Cardiology Attending  [facility] - Cardiology  [address], [address], [address], [place] [address]    All Cardiology service information can be found 24/7 on [email or web address], password: [email or web address]

## 2025-03-11 NOTE — PROGRESS NOTE ADULT - PROBLEM SELECTOR PLAN 11
hx of hld  c/w home atorvastatin 40 mg qd hx of hypothyroidism   c/w home levothyroxine 112 mcg qd hx of migraines   c/w home trileptal

## 2025-03-11 NOTE — CONSULT NOTE ADULT - SUBJECTIVE AND OBJECTIVE BOX
Source of information: NOEMY CONTRERAS, Chart review  Patient language: English  : n/a    HPI:  59-year-old female, AAOx3, ambulates with a cane, no HHA,  history of DM, HLD, asthma, COPD on 4L NC, migraines, hypothyroidism, NAKUL, RCC status post left renal resection  present for intermittent palpitations and chest pain that started a week ago. She describes her chest pain as dull and stays on the left side. Admits to dizziness, nausea and sob with the palpitation episodes. Also reports recent fall and feels unsteady. Denies head strike or LOC.  Denies any fever, chills, abdominal pain, n/v or dysuria. Also Admits to chronic diarrhea who she has a follow up for a colonoscopy.    ED course:  Vitals: temp 98.2, , /83, RR 18 spo2 90 on 3L  glucose 273, mg 1.2   s/p 500 nacl , morphine, zofran  (10 Mar 2025 23:24)      Patient is a 59y old  Female who is admitted for palpitations. Pain consulted for hx chronic joint pain 3/11. Pt seen and examined at bedside. Reports generalized back and joint pain, hx fibromyalgia and cervical and lumbar herniated discs. Pt was seeing pain management outpatient and reports having 6 cervical and 4 lumbar JOSE DAVID in the past. Was recommended to have surgery, however refused. Pt takes Tylenol and gabapentin and occasional cyclobenzaprine at home for pain control. Pt also reports fall several weeks ago. Currently rating back pain score 9/10 SCALE USED: (1-10 VNRS). Pt describes pain as constant, aching, radiating throughout back, alleviated by pain medication and rest, exacerbated by movement. Pt also reports headache, over right temple, rating pain score 8/10, describes pain as throbbing. Pt with hx of migraine and cluster headaches. Pt tolerating PO diet. Denies lethargy, nausea, vomiting, constipation. Reports last BM 3/10. Reports SOB on exertion, on home O2 4L at baseline, productive cough with yellow sputum and hoarse voice and occasional palpiations. Patient stated goal for pain control: to be able to take deep breaths, get out of bed to chair and ambulate with tolerable pain control. Pt denies taking medications for pain at home.     PAST MEDICAL & SURGICAL HISTORY:  Hypertension  vaginal cyst      Hyperlipidemia      Asthma  last inhaler use with in 10 days, on Pulm follow up [id number]      Hypothyroid      Obstructive sleep apnea  refuses Cpap      Obesity  morbid      Trigeminal neuralgia  R      Fibromyalgia      DM (diabetes mellitus)  2      DJD (degenerative joint disease)  Cervical/Thoracic/Lumbar      Cervical neuralgia  difficulty moving my neck      Back pain  thoracic & lumbar      H/O bursitis  right shoulder      Radicular pain  arms, legs      Vasculitis  Legs, forerhead, arms & hands, flare ups in R leg  Dr susie Ribeiro is my Rheumatologist      Renal cell carcinoma, left  on follow up Dr schulte, HOD renal [facility], waiting for suregry in 2020      Falls frequently      Morbid obesity with body mass index (BMI) of 50.0 to 59.9 in adult      Psoriasis      Diverticulosis      Fatty liver      S/P abdominal hysterectomy          S/P  section  1      Left adrenal mass  excision, benign 2006      Vaginal cyst  removed       Vasculitis on nerve biopsy  , had another surgery called microvascular decompression       S/P left oophorectomy      S/P colonoscopic polypectomy          FAMILY HISTORY:  Family history of diabetes mellitus    Family history of hypertension    Family history of obesity        Social History:   [X ] Denies ETOH use, illicit drug use and smoking    Allergies    Fioricet (Rash)  venlafaxine (Hives)  mushroom (Unknown)  gadobutrol (Rash; Urticaria; Hives)  IV Contrast (Short breath)  honeydew (Rash)    MEDICATIONS  (STANDING):  albuterol/ipratropium (CFC free) Inhaler. 1 Puff(s) Inhalation four times a day  amLODIPine   Tablet 10 milliGRAM(s) Oral daily  aspirin  chewable 81 milliGRAM(s) Oral daily  atorvastatin 40 milliGRAM(s) Oral at bedtime  enoxaparin Injectable 40 milliGRAM(s) SubCutaneous every 12 hours  fluticasone propionate 50 MICROgram(s)/spray Nasal Spray 1 Spray(s) Both Nostrils two times a day  fluticasone propionate/ salmeterol 250-50 MICROgram(s) Diskus 1 Dose(s) Inhalation two times a day  gabapentin 400 milliGRAM(s) Oral two times a day  hydrocortisone 1% Ointment 1 Application(s) Topical every 12 hours  insulin glargine Injectable (LANTUS) 38 Unit(s) SubCutaneous at bedtime  insulin lispro (ADMELOG) corrective regimen sliding scale   SubCutaneous three times a day before meals  insulin lispro (ADMELOG) corrective regimen sliding scale   SubCutaneous at bedtime  insulin lispro Injectable (ADMELOG) 10 Unit(s) SubCutaneous three times a day before meals  levothyroxine 112 MICROGram(s) Oral daily  losartan 25 milliGRAM(s) Oral daily  montelukast 10 milliGRAM(s) Oral daily  OXcarbazepine 600 milliGRAM(s) Oral two times a day  senna 2 Tablet(s) Oral at bedtime    MEDICATIONS  (PRN):  dextrose Oral Gel 15 Gram(s) Oral once PRN Blood Glucose LESS THAN 70 milliGRAM(s)/deciliter      Vital Signs Last 24 Hrs  T(C): 36.5 (11 Mar 2025 12:09), Max: 37.3 (10 Mar 2025 22:56)  T(F): 97.7 (11 Mar 2025 12:09), Max: 99.2 (10 Mar 2025 22:56)  HR: 80 (11 Mar 2025 12:09) (80 - 109)  BP: 108/69 (11 Mar 2025 12:09) (108/69 - 169/83)  BP(mean): --  RR: 20 (11 Mar 2025 12:09) (17 - 20)  SpO2: 96% (11 Mar 2025 12:09) (89% - 96%)    Parameters below as of 11 Mar 2025 12:09  Patient On (Oxygen Delivery Method): nasal cannula  O2 Flow (L/min): 4      LABS: Reviewed.                          11.4   10.24 )-----------( 256      ( 11 Mar 2025 05:00 )             39.0     03-11    139  |  99  |  19[H]  ----------------------------<  305[H]  4.1   |  35[H]  |  1.05    Ca    8.6      11 Mar 2025 05:00  Phos  4.0     03-11  Mg     1.2     03-11    TPro  7.4  /  Alb  3.0[L]  /  TBili  0.6  /  DBili  x   /  AST  28  /  ALT  40  /  AlkPhos  129[H]  03-11    PT/INR - ( 10 Mar 2025 19:20 )   PT: 12.7 sec;   INR: 1.09 ratio         PTT - ( 10 Mar 2025 19:20 )  PTT:29.1 sec  LIVER FUNCTIONS - ( 11 Mar 2025 05:00 )  Alb: 3.0 g/dL / Pro: 7.4 g/dL / ALK PHOS: 129 U/L / ALT: 40 U/L DA / AST: 28 U/L / GGT: x           Urinalysis Basic - ( 11 Mar 2025 05:00 )    Color: x / Appearance: x / SG: x / pH: x  Gluc: 305 mg/dL / Ketone: x  / Bili: x / Urobili: x   Blood: x / Protein: x / Nitrite: x   Leuk Esterase: x / RBC: x / WBC x   Sq Epi: x / Non Sq Epi: x / Bacteria: x      CAPILLARY BLOOD GLUCOSE      POCT Blood Glucose.: 273 mg/dL (11 Mar 2025 13:31)  POCT Blood Glucose.: 370 mg/dL (11 Mar 2025 08:28)  POCT Blood Glucose.: 289 mg/dL (10 Mar 2025 17:11)    Radiology: Reviewed.  < from: US Duplex Venous Lower Ext Complete, Bilateral (25 @ 11:26) >  BASALELI     PROCEDURE DATE:  2025          INTERPRETATION:  CLINICAL INFORMATION: Chest pain. Evaluate for DVT.    COMPARISON: None available.    TECHNIQUE: Duplex sonography of the BILATERAL LOWER extremity veins with   color and spectral Doppler, with and without compression.    FINDINGS:    RIGHT:  Normal compressibility of the RIGHT common femoral, femoral and popliteal   veins.  Doppler examination shows normal spontaneous and phasic flow.  No RIGHT calf vein thrombosis is detected.    LEFT:  Normal compressibility of the LEFT common femoral, femoral and popliteal   veins.  Doppler examination shows normal spontaneous and phasic flow.  No LEFT calf vein thrombosis is detected.    Superficial edema in both calves.    IMPRESSION:  No evidence of deep venous thrombosis in either lower extremity.            --- End of Report ---            AKHIL GREENE MD; Attending Radiologist  This document has been electronically signed. Mar 11 2025 11:49AM    < end of copied text >    < from: Xray Chest 1 View- PORTABLE-Urgent (03.10.25 @ 19:00) >    ACC: [id number] EXAM:  XR CHEST PORTABLE URGENT 1V   ORDERED BY: MARK APPIAH     PROCEDURE DATE:  03/10/2025          INTERPRETATION:  Portable AP chest radiograph    COMPARISON: 2025 chest x-ray.    CLINICAL INFORMATION: Chest pain.    FINDINGS:  CATHETERS AND TUBES: None    PULMONARY:  No airspace consolidations or radiographic evidence of pulmonary nodules..  No pleural effusion or pneumothorax.    HEART/VASCULAR: The heart size and mediastinum configuration are within   the limits of normal.    BONES: The visualized osseous thorax is intact.    IMPRESSION:   No radiographic evidence of active chest disease..    --- End of Report ---            MELBA LE MD; Attending Radiologist  This document has been electronically signed. Mar 11 2025  7:11AM    < end of copied text >       ORT Score -   Family Hx of substance abuse	Female	      Male  Alcohol 	                                           1                     3  Illegal drugs	                                   2                     3  Rx drugs                                           4 	                  4  Personal Hx of substance abuse		  Alcohol 	                                          3	                  3  Illegal drugs                                     4	                  4  Rx drugs                                            5 	                  5  Age between 16- 45 years	           1                     1  hx preadolescent sexual abuse	   3 	                  0  Psychological disease		  ADD, OCD, bipolar, schizophrenia   2	          2  Depression                                           1 	          1  Total: 0    a score of 3 or lower indicates low risk for opioid abuse		  a score of 4-7 indicates moderate risk for opioid abuse		  a score of 8 or higher indicates high risk for opioid abuse    REVIEW OF SYSTEMS:  CONSTITUTIONAL: No fever + fatigue + hx migraine headache   RESPIRATORY: + hoarse voice, productive cough with yellow sputum, no wheezing, chills or hemoptysis; + occasional shortness of breath; home O2 4L NC  CARDIOVASCULAR: + occasional palpitations; No chest pain, dizziness,   GASTROINTESTINAL: No loss of appetite, decreased PO intake. No abdominal or epigastric pain. No nausea, vomiting; No diarrhea + hx constipation.   GENITOURINARY: No dysuria, frequency, hematuria, retention or incontinence  MUSCULOSKELETAL: + chronic joint pain and b/l leg swelling; o upper or lower motor strength weakness, no saddle anesthesia, bowel/bladder incontinence, + hx falls   NEURO: No headaches, No numbness/tingling b/l LE    PHYSICAL EXAM:  GENERAL:  Alert & Oriented X4, cooperative, NAD, Good concentration. Speech is clear.   RESPIRATORY: Respirations even and unlabored. Diminished to auscultation bilaterally; No rales, rhonchi, wheezing, or rubs + O2 4L NC   CARDIOVASCULAR: Normal S1/S2, regular rate and rhythm; No murmurs, rubs, or gallops. No JVD. + cardiac monitor   GASTROINTESTINAL: + morbid obese per BMI Soft, Nontender, Nondistended; Bowel sounds present  PERIPHERAL VASCULAR:  Extremities warm with b/l leg edema. 2+ Peripheral Pulses, No cyanosis, No calf tenderness  MUSCULOSKELETAL: Motor Strength 5/5 B/L upper and 3/5 lower extremities; ROM decreased b/l lower extremities; + lumbar back and right flank tenderness on palpation   SKIN: Warm, dry, intact. No rashes, lesions, + right flank scar noted (previous biopsy site).     Risk factors associated with adverse outcomes related to opioid treatment  [ ]  Concurrent benzodiazepine use  [ ]  History/ Active substance use or alcohol use disorder  [ ] Psychiatric co-morbidity  [X ] Sleep apnea  [X ] COPD  [X ] BMI> 35  [ ] Liver dysfunction  [ ] Renal dysfunction  [ ] CHF  [ ] Smoker  [ ]  Age > 60 years    [X ]  [place]  Reviewed and Copied to Chart. See below.    Plan of care and goal oriented pain management treatment options were discussed with patient and /or primary care giver; all questions and concerns were addressed and care was aligned with patient's wishes.    Educated patient on goal oriented pain management treatment options

## 2025-03-11 NOTE — PROGRESS NOTE ADULT - PROBLEM SELECTOR PLAN 9
DVT: lovenox hx of migraines   c/w home trileptal hx of htn   c/w losartan 25 mg qd and amlodipine 10 mg qd   bp controlled had a recent fall  patient ambulates with a  cane  has no HHA and feels unsteady on her feet.   fall precautions   f/u PT

## 2025-03-11 NOTE — PROGRESS NOTE ADULT - PROBLEM SELECTOR PLAN 1
Presents for intermittent palpitations and chest pain   Vitals: temp 98.2, , /83, RR 18 spo2 90 on 3L  glucose 273, mg 1.2   trop neg   s/p 500 nacl , morphine, zofran   EKG showing NSR  HEART score 4   DOLLY score 2   -telemetry  - c/w aspirin and statin   -f/u TTE  -f/u lipids  -f/u A1c  -f/u doppler to rule out dvt   -cardiology consult in Am Presents for intermittent palpitations and chest pain   Vitals: temp 98.2, , /83, RR 18 spo2 90 on 3L  glucose 273, mg 1.2   trop neg x2  s/p 500 nacl , morphine, zofran   EKG TWI in 1, avL  HEART score 4   DOLLY score 2   -telemetry  -c/w aspirin and statin   -f/u TTE  -f/u lipids  -f/u A1c  -f/u doppler to rule out dvt   -cardiology Dr. Hampton following

## 2025-03-11 NOTE — PROGRESS NOTE ADULT - SUBJECTIVE AND OBJECTIVE BOX
Patient is a 59y old  Female who presents with a chief complaint of palpitation (10 Mar 2025 23:24)    OVERNIGHT EVENTS: no acute changes.     Pt is aox3, comfortable appearing, tolerating PO, ambulatory.   Currently on 4L oxygen via nasal cannula.     REVIEW OF SYSTEMS:  CONSTITUTIONAL: No fever, chills  ENMT: +nasal congestion. No difficulty hearing, no change in vision  NECK: No pain or stiffness  RESPIRATORY: No cough, SOB  CARDIOVASCULAR: No chest pain, palpitations  GASTROINTESTINAL: +left flank pain +abdominal pain. No nausea, vomiting or diarrhea.   GENITOURINARY: +recent foul odor in urine. No dysuria  NEUROLOGICAL: No HA  SKIN: No itching, burning, rashes, or lesions   LYMPH NODES: No enlarged glands  ENDOCRINE: No heat or cold intolerance; No hair loss  MUSCULOSKELETAL: No joint pain or swelling; No muscle, back, or extremity pain  PSYCHIATRIC: No depression, anxiety  HEME/LYMPH: No easy bruising, or bleeding gums    T(C): 36.9 (03-11-25 @ 07:32), Max: 37.3 (03-10-25 @ 22:56)  HR: 95 (03-11-25 @ 10:01) (88 - 109)  BP: 134/83 (03-11-25 @ 07:32) (134/83 - 169/83)  RR: 19 (03-11-25 @ 10:01) (17 - 20)  SpO2: 95% (03-11-25 @ 10:01) (89% - 95%)  Wt(kg): --Vital Signs Last 24 Hrs  T(C): 36.9 (11 Mar 2025 07:32), Max: 37.3 (10 Mar 2025 22:56)  T(F): 98.4 (11 Mar 2025 07:32), Max: 99.2 (10 Mar 2025 22:56)  HR: 95 (11 Mar 2025 10:01) (88 - 109)  BP: 134/83 (11 Mar 2025 07:32) (134/83 - 169/83)  BP(mean): --  RR: 19 (11 Mar 2025 10:01) (17 - 20)  SpO2: 95% (11 Mar 2025 10:01) (89% - 95%)    Parameters below as of 11 Mar 2025 10:01  Patient On (Oxygen Delivery Method): nasal cannula  O2 Flow (L/min): 4      MEDICATIONS  (STANDING):  albuterol/ipratropium (CFC free) Inhaler. 1 Puff(s) Inhalation four times a day  amLODIPine   Tablet 10 milliGRAM(s) Oral daily  aspirin  chewable 81 milliGRAM(s) Oral daily  atorvastatin 40 milliGRAM(s) Oral at bedtime  enoxaparin Injectable 40 milliGRAM(s) SubCutaneous every 12 hours  fluticasone propionate/ salmeterol 250-50 MICROgram(s) Diskus 1 Dose(s) Inhalation two times a day  gabapentin 400 milliGRAM(s) Oral two times a day  insulin glargine Injectable (LANTUS) 45 Unit(s) SubCutaneous at bedtime  insulin lispro (ADMELOG) corrective regimen sliding scale   SubCutaneous three times a day before meals  insulin lispro (ADMELOG) corrective regimen sliding scale   SubCutaneous at bedtime  insulin lispro Injectable (ADMELOG) 10 Unit(s) SubCutaneous three times a day before meals  levothyroxine 112 MICROGram(s) Oral daily  losartan 25 milliGRAM(s) Oral daily  montelukast 10 milliGRAM(s) Oral daily  OXcarbazepine 600 milliGRAM(s) Oral two times a day  senna 2 Tablet(s) Oral at bedtime    MEDICATIONS  (PRN):  dextrose Oral Gel 15 Gram(s) Oral once PRN Blood Glucose LESS THAN 70 milliGRAM(s)/deciliter  oxyCODONE    IR 5 milliGRAM(s) Oral every 4 hours PRN Severe Pain (7 - 10)      PHYSICAL EXAM:  GENERAL: +obese, NAD  EYES: clear conjunctiva  ENMT: Moist mucous membranes  NECK: Supple, No JVD, Normal thyroid  CHEST/LUNG: +nasal cannula. Clear to auscultation bilaterally; No rales, rhonchi, wheezing, or rubs  HEART: S1, S2, Regular rate and rhythm  ABDOMEN: Soft, +left flank tenderness, +LUQ/RUQ tenderness, Nondistended; Bowel sounds present  No signs of any open wounds or erythema from previous incision site s/p nephrectomy.   NEURO: Alert & Oriented X3  EXTREMITIES: +right leg +2 edema > left   LYMPH: No lymphadenopathy noted  SKIN: +psoriatic patches noted on b/l knees    Consultant(s) Notes Reviewed:  [x ] YES  [ ] NO  Care Discussed with Consultants/Other Providers [ x] YES  [ ] NO    LABS:                        11.4   10.24 )-----------( 256      ( 11 Mar 2025 05:00 )             39.0     03-11    139  |  99  |  19[H]  ----------------------------<  305[H]  4.1   |  35[H]  |  1.05    Ca    8.6      11 Mar 2025 05:00  Phos  4.0     03-11  Mg     1.2     03-11    TPro  7.4  /  Alb  3.0[L]  /  TBili  0.6  /  DBili  x   /  AST  28  /  ALT  40  /  AlkPhos  129[H]  03-11    PT/INR - ( 10 Mar 2025 19:20 )   PT: 12.7 sec;   INR: 1.09 ratio         PTT - ( 10 Mar 2025 19:20 )  PTT:29.1 sec  CAPILLARY BLOOD GLUCOSE      POCT Blood Glucose.: 370 mg/dL (11 Mar 2025 08:28)  POCT Blood Glucose.: 289 mg/dL (10 Mar 2025 17:11)        Urinalysis Basic - ( 11 Mar 2025 05:00 )    Color: x / Appearance: x / SG: x / pH: x  Gluc: 305 mg/dL / Ketone: x  / Bili: x / Urobili: x   Blood: x / Protein: x / Nitrite: x   Leuk Esterase: x / RBC: x / WBC x   Sq Epi: x / Non Sq Epi: x / Bacteria: x        RADIOLOGY & ADDITIONAL TESTS:  < from: Xray Chest 1 View- PORTABLE-Urgent (03.10.25 @ 19:00) >    ACC: [id number] EXAM:  XR CHEST PORTABLE URGENT 1V   ORDERED BY: MARK APPIAH     PROCEDURE DATE:  03/10/2025          INTERPRETATION:  Portable AP chest radiograph    COMPARISON: 2/18/2025 chest x-ray.    CLINICAL INFORMATION: Chest pain.    FINDINGS:  CATHETERS AND TUBES: None    PULMONARY:  No airspace consolidations or radiographic evidence of pulmonary nodules..  No pleural effusion or pneumothorax.    HEART/VASCULAR: The heart size and mediastinum configuration are within   the limits of normal.    BONES: The visualized osseous thorax is intact.    IMPRESSION:   No radiographic evidence of active chest disease..    --- End of Report ---            MELBA LE MD; Attending Radiologist  This document has been electronically signed. Mar 11 2025  7:11AM    < end of copied text >      Imaging Personally Reviewed:  [ ] YES  [ ] NO

## 2025-03-11 NOTE — CONSULT NOTE ADULT - TIME BILLING
review of EMR , coordination of care, discussion with family and patient, and communication with primary team
- personally reviewed pt's labs, VS/flowsheets, pertinent imaging, and consultant notes  - bedside SpO2 measurement to determine supplemental O2 needs  - general pulm hx/exam  - medication reconciliation  - coordination of care with primary team  - review of outpatient pulm office charts/notes

## 2025-03-11 NOTE — CONSULT NOTE ADULT - ASSESSMENT
58yo woman w/ PMH asthma, NAKUL, chronic hypoxemic/hypercapnic respiratory failure 2/2 morbid obesity, restrictive lung disease and kyphosis admitted for atypical CP for ACS ruleout. Asked to evaluate for mgmt recommendations in NAKUL.    #NAKUL  #Chronic respiratory failure with hypoxemia and hypercapnia  #Asthma - not in exacerbation    Recommendations:  - per review of outpatient pul notes pt should be on nocturnal NiV (ideally BiPAP 20/16 per pulmonologist note) but no longer has NIV at home. Had CPAP device replaced after recall but per pt it has not been calibrated and she has upcoming follow-up for this.   - consider nocturnal CPAP (or ideally, BiPAP) at CPAP 16cm H2O vs. BiPAP (IPAP 20, EPAP 16) if tolerated and permitted per by pt and hospital unit protocol   - cont home albuterol PRN  - cont Advair interchanged for home Symbicort  - cont montelukast  - caution with narcotics as this can exacerbate sleep disordered breathing / hypoventilation  - CP/ACS workup per primary team/cardiology    Patient follows w/ pulmonologist Dr. Monterroso as outpatient and pt says she has upcoming f/u in a few weeks, would maintain outpt pulm f/u     Please reach pulmonary with questions/concerns

## 2025-03-12 LAB
ALBUMIN SERPL ELPH-MCNC: 2.7 G/DL — LOW (ref 3.5–5)
ALBUMIN SERPL ELPH-MCNC: 2.9 G/DL — LOW (ref 3.5–5)
ALP SERPL-CCNC: 114 U/L — SIGNIFICANT CHANGE UP (ref 40–120)
ALP SERPL-CCNC: 133 U/L — HIGH (ref 40–120)
ALT FLD-CCNC: 39 U/L DA — SIGNIFICANT CHANGE UP (ref 10–60)
ALT FLD-CCNC: 42 U/L DA — SIGNIFICANT CHANGE UP (ref 10–60)
ANION GAP SERPL CALC-SCNC: 10 MMOL/L — SIGNIFICANT CHANGE UP (ref 5–17)
APTT BLD: 33.2 SEC — SIGNIFICANT CHANGE UP (ref 24.5–35.6)
AST SERPL-CCNC: 29 U/L — SIGNIFICANT CHANGE UP (ref 10–40)
AST SERPL-CCNC: 33 U/L — SIGNIFICANT CHANGE UP (ref 10–40)
BASE EXCESS BLDA CALC-SCNC: 5.2 MMOL/L — HIGH (ref -2–3)
BASE EXCESS BLDA CALC-SCNC: 5.4 MMOL/L — HIGH (ref -2–3)
BASE EXCESS BLDA CALC-SCNC: 9.1 MMOL/L — HIGH (ref -2–3)
BASE EXCESS BLDV CALC-SCNC: 5.5 MMOL/L — SIGNIFICANT CHANGE UP
BASOPHILS # BLD AUTO: 0.02 K/UL — SIGNIFICANT CHANGE UP (ref 0–0.2)
BASOPHILS # BLD AUTO: 0.05 K/UL — SIGNIFICANT CHANGE UP (ref 0–0.2)
BASOPHILS NFR BLD AUTO: 0.6 % — SIGNIFICANT CHANGE UP (ref 0–2)
BILIRUB SERPL-MCNC: 0.7 MG/DL — SIGNIFICANT CHANGE UP (ref 0.2–1.2)
BILIRUB SERPL-MCNC: 0.8 MG/DL — SIGNIFICANT CHANGE UP (ref 0.2–1.2)
BLOOD GAS COMMENTS ARTERIAL: SIGNIFICANT CHANGE UP
BLOOD GAS COMMENTS, VENOUS: SIGNIFICANT CHANGE UP
BUN SERPL-MCNC: 30 MG/DL — HIGH (ref 7–18)
BUN SERPL-MCNC: 32 MG/DL — HIGH (ref 7–18)
CA-I SERPL-SCNC: SIGNIFICANT CHANGE UP MMOL/L (ref 1.15–1.33)
CALCIUM SERPL-MCNC: 8.7 MG/DL — SIGNIFICANT CHANGE UP (ref 8.4–10.5)
CHLORIDE SERPL-SCNC: 96 MMOL/L — SIGNIFICANT CHANGE UP (ref 96–108)
CK MB BLD-MCNC: 2.2 % — SIGNIFICANT CHANGE UP (ref 0–3.5)
CK MB CFR SERPL CALC: 3 NG/ML — SIGNIFICANT CHANGE UP (ref 0–3.6)
CK SERPL-CCNC: 137 U/L — SIGNIFICANT CHANGE UP (ref 21–215)
CO2 SERPL-SCNC: 29 MMOL/L — SIGNIFICANT CHANGE UP (ref 22–31)
CO2 SERPL-SCNC: 32 MMOL/L — HIGH (ref 22–31)
CREAT SERPL-MCNC: 1.47 MG/DL — HIGH (ref 0.5–1.3)
CREAT SERPL-MCNC: 1.62 MG/DL — HIGH (ref 0.5–1.3)
D DIMER BLD IA.RAPID-MCNC: <150 NG/ML DDU — SIGNIFICANT CHANGE UP
EGFR: 36 ML/MIN/1.73M2 — LOW
EGFR: 36 ML/MIN/1.73M2 — LOW
EGFR: 41 ML/MIN/1.73M2 — LOW
EOSINOPHIL # BLD AUTO: 0.11 K/UL — SIGNIFICANT CHANGE UP (ref 0–0.5)
EOSINOPHIL # BLD AUTO: 0.11 K/UL — SIGNIFICANT CHANGE UP (ref 0–0.5)
EOSINOPHIL NFR BLD AUTO: 1.3 % — SIGNIFICANT CHANGE UP (ref 0–6)
GAS PNL BLDV: SIGNIFICANT CHANGE UP
GLUCOSE BLDC GLUCOMTR-MCNC: 220 MG/DL — HIGH (ref 70–99)
GLUCOSE BLDC GLUCOMTR-MCNC: 360 MG/DL — HIGH (ref 70–99)
GLUCOSE BLDV-MCNC: 353 MG/DL — HIGH (ref 70–99)
GLUCOSE SERPL-MCNC: 305 MG/DL — HIGH (ref 70–99)
GLUCOSE SERPL-MCNC: 378 MG/DL — HIGH (ref 70–99)
HCO3 BLDA-SCNC: 30 MMOL/L — HIGH (ref 21–28)
HCO3 BLDA-SCNC: 33 MMOL/L — HIGH (ref 21–28)
HCO3 BLDA-SCNC: 33 MMOL/L — HIGH (ref 21–28)
HCO3 BLDV-SCNC: 32 MMOL/L — HIGH (ref 22–29)
HCT VFR BLD CALC: 33.2 % — LOW (ref 34.5–45)
HCT VFR BLD CALC: 39.5 % — SIGNIFICANT CHANGE UP (ref 34.5–45)
HGB BLD-MCNC: 10.2 G/DL — LOW (ref 11.5–15.5)
HGB BLD-MCNC: 11.4 G/DL — LOW (ref 11.5–15.5)
HOROWITZ INDEX BLDA+IHG-RTO: 100 — SIGNIFICANT CHANGE UP
HOROWITZ INDEX BLDA+IHG-RTO: 90 — SIGNIFICANT CHANGE UP
HOROWITZ INDEX BLDV+IHG-RTO: 100 — SIGNIFICANT CHANGE UP
IMM GRANULOCYTES NFR BLD AUTO: 0.9 % — SIGNIFICANT CHANGE UP (ref 0–0.9)
IMM GRANULOCYTES NFR BLD AUTO: 1.7 % — HIGH (ref 0–0.9)
INR BLD: 1.02 RATIO — SIGNIFICANT CHANGE UP (ref 0.85–1.16)
LACTATE BLDV-MCNC: 1.4 MMOL/L — SIGNIFICANT CHANGE UP (ref 0.5–2)
LACTATE SERPL-SCNC: 1.5 MMOL/L — SIGNIFICANT CHANGE UP (ref 0.7–2)
LYMPHOCYTES # BLD AUTO: 0.59 K/UL — LOW (ref 1–3.3)
LYMPHOCYTES # BLD AUTO: 1.37 K/UL — SIGNIFICANT CHANGE UP (ref 1–3.3)
LYMPHOCYTES # BLD AUTO: 16.1 % — SIGNIFICANT CHANGE UP (ref 13–44)
LYMPHOCYTES # BLD AUTO: 7.7 % — LOW (ref 13–44)
MAGNESIUM SERPL-MCNC: 1.6 MG/DL — SIGNIFICANT CHANGE UP (ref 1.6–2.6)
MAGNESIUM SERPL-MCNC: 1.8 MG/DL — SIGNIFICANT CHANGE UP (ref 1.6–2.6)
MCHC RBC-ENTMCNC: 23.8 PG — LOW (ref 27–34)
MCHC RBC-ENTMCNC: 24.3 PG — LOW (ref 27–34)
MCHC RBC-ENTMCNC: 28.9 G/DL — LOW (ref 32–36)
MCHC RBC-ENTMCNC: 30.7 G/DL — LOW (ref 32–36)
MCV RBC AUTO: 79.2 FL — LOW (ref 80–100)
MCV RBC AUTO: 82.6 FL — SIGNIFICANT CHANGE UP (ref 80–100)
MONOCYTES # BLD AUTO: 0.47 K/UL — SIGNIFICANT CHANGE UP (ref 0–0.9)
MONOCYTES # BLD AUTO: 0.54 K/UL — SIGNIFICANT CHANGE UP (ref 0–0.9)
MONOCYTES NFR BLD AUTO: 6.1 % — SIGNIFICANT CHANGE UP (ref 2–14)
MONOCYTES NFR BLD AUTO: 6.3 % — SIGNIFICANT CHANGE UP (ref 2–14)
MRSA PCR RESULT.: SIGNIFICANT CHANGE UP
NEUTROPHILS # BLD AUTO: 6.36 K/UL — SIGNIFICANT CHANGE UP (ref 1.8–7.4)
NEUTROPHILS # BLD AUTO: 6.4 K/UL — SIGNIFICANT CHANGE UP (ref 1.8–7.4)
NEUTROPHILS NFR BLD AUTO: 75.2 % — SIGNIFICANT CHANGE UP (ref 43–77)
NEUTROPHILS NFR BLD AUTO: 82.5 % — HIGH (ref 43–77)
NRBC BLD AUTO-RTO: 0 /100 WBCS — SIGNIFICANT CHANGE UP (ref 0–0)
NRBC BLD AUTO-RTO: 0 /100 WBCS — SIGNIFICANT CHANGE UP (ref 0–0)
NT-PROBNP SERPL-SCNC: 141 PG/ML — HIGH (ref 0–125)
PCO2 BLDA: 40 MMHG — HIGH (ref 32–35)
PCO2 BLDA: 42 MMHG — HIGH (ref 32–35)
PCO2 BLDA: 64 MMHG — HIGH (ref 32–35)
PCO2 BLDV: 56 MMHG — HIGH (ref 39–42)
PH BLDA: 7.32 — LOW (ref 7.35–7.45)
PH BLDA: 7.46 — HIGH (ref 7.35–7.45)
PH BLDA: 7.52 — HIGH (ref 7.35–7.45)
PH BLDV: 7.37 — SIGNIFICANT CHANGE UP (ref 7.32–7.43)
PHOSPHATE SERPL-MCNC: 2.5 MG/DL — SIGNIFICANT CHANGE UP (ref 2.5–4.5)
PHOSPHATE SERPL-MCNC: 5.2 MG/DL — HIGH (ref 2.5–4.5)
PLATELET # BLD AUTO: 234 K/UL — SIGNIFICANT CHANGE UP (ref 150–400)
PLATELET # BLD AUTO: 235 K/UL — SIGNIFICANT CHANGE UP (ref 150–400)
PO2 BLDA: 102 MMHG — SIGNIFICANT CHANGE UP (ref 83–108)
PO2 BLDA: 106 MMHG — SIGNIFICANT CHANGE UP (ref 83–108)
PO2 BLDA: 71 MMHG — LOW (ref 83–108)
PO2 BLDV: 47 MMHG — SIGNIFICANT CHANGE UP
POTASSIUM BLDV-SCNC: 4.8 MMOL/L — SIGNIFICANT CHANGE UP (ref 3.5–5.1)
POTASSIUM SERPL-MCNC: 4.3 MMOL/L — SIGNIFICANT CHANGE UP (ref 3.5–5.3)
POTASSIUM SERPL-MCNC: 4.8 MMOL/L — SIGNIFICANT CHANGE UP (ref 3.5–5.3)
POTASSIUM SERPL-SCNC: 4.3 MMOL/L — SIGNIFICANT CHANGE UP (ref 3.5–5.3)
POTASSIUM SERPL-SCNC: 4.8 MMOL/L — SIGNIFICANT CHANGE UP (ref 3.5–5.3)
PROT SERPL-MCNC: 6.5 G/DL — SIGNIFICANT CHANGE UP (ref 6–8.3)
PROT SERPL-MCNC: 7.3 G/DL — SIGNIFICANT CHANGE UP (ref 6–8.3)
RBC # BLD: 4.19 M/UL — SIGNIFICANT CHANGE UP (ref 3.8–5.2)
RBC # BLD: 4.78 M/UL — SIGNIFICANT CHANGE UP (ref 3.8–5.2)
RBC # FLD: 16.7 % — HIGH (ref 10.3–14.5)
RBC # FLD: 16.7 % — HIGH (ref 10.3–14.5)
SAO2 % BLDA: 93 % — SIGNIFICANT CHANGE UP
SAO2 % BLDA: 96 % — SIGNIFICANT CHANGE UP
SAO2 % BLDA: 97 % — SIGNIFICANT CHANGE UP
SAO2 % BLDV: 81.1 % — SIGNIFICANT CHANGE UP
SODIUM SERPL-SCNC: 135 MMOL/L — SIGNIFICANT CHANGE UP (ref 135–145)
SODIUM SERPL-SCNC: 137 MMOL/L — SIGNIFICANT CHANGE UP (ref 135–145)
TROPONIN I, HIGH SENSITIVITY RESULT: 1226.8 NG/L — HIGH
TROPONIN I, HIGH SENSITIVITY RESULT: 128.1 NG/L — HIGH
TROPONIN I, HIGH SENSITIVITY RESULT: 1369.6 NG/L — HIGH
TROPONIN I, HIGH SENSITIVITY RESULT: 2340.5 NG/L — HIGH
WBC # BLD: 7.71 K/UL — SIGNIFICANT CHANGE UP (ref 3.8–10.5)
WBC # BLD: 8.52 K/UL — SIGNIFICANT CHANGE UP (ref 3.8–10.5)
WBC # FLD AUTO: 7.71 K/UL — SIGNIFICANT CHANGE UP (ref 3.8–10.5)
WBC # FLD AUTO: 8.52 K/UL — SIGNIFICANT CHANGE UP (ref 3.8–10.5)

## 2025-03-12 PROCEDURE — 71045 X-RAY EXAM CHEST 1 VIEW: CPT | Mod: 26

## 2025-03-12 PROCEDURE — 99233 SBSQ HOSP IP/OBS HIGH 50: CPT

## 2025-03-12 PROCEDURE — 99231 SBSQ HOSP IP/OBS SF/LOW 25: CPT

## 2025-03-12 RX ORDER — LEVOTHYROXINE SODIUM 300 MCG
85 TABLET ORAL AT BEDTIME
Refills: 0 | Status: DISCONTINUED | OUTPATIENT
Start: 2025-03-12 | End: 2025-03-13

## 2025-03-12 RX ORDER — NYSTATIN 100000 [USP'U]/G
1 CREAM TOPICAL ONCE
Refills: 0 | Status: COMPLETED | OUTPATIENT
Start: 2025-03-12 | End: 2025-03-12

## 2025-03-12 RX ORDER — INSULIN LISPRO 100 U/ML
INJECTION, SOLUTION INTRAVENOUS; SUBCUTANEOUS EVERY 6 HOURS
Refills: 0 | Status: DISCONTINUED | OUTPATIENT
Start: 2025-03-12 | End: 2025-03-13

## 2025-03-12 RX ORDER — SENNA 187 MG
2 TABLET ORAL AT BEDTIME
Refills: 0 | Status: DISCONTINUED | OUTPATIENT
Start: 2025-03-12 | End: 2025-03-13

## 2025-03-12 RX ORDER — IPRATROPIUM BROMIDE AND ALBUTEROL SULFATE .5; 2.5 MG/3ML; MG/3ML
3 SOLUTION RESPIRATORY (INHALATION) EVERY 6 HOURS
Refills: 0 | Status: DISCONTINUED | OUTPATIENT
Start: 2025-03-12 | End: 2025-03-12

## 2025-03-12 RX ORDER — POLYETHYLENE GLYCOL 3350 17 G/17G
17 POWDER, FOR SOLUTION ORAL DAILY
Refills: 0 | Status: DISCONTINUED | OUTPATIENT
Start: 2025-03-12 | End: 2025-03-13

## 2025-03-12 RX ORDER — MIDAZOLAM IN 0.9 % SOD.CHLORID 1 MG/ML
4 PLASTIC BAG, INJECTION (ML) INTRAVENOUS ONCE
Refills: 0 | Status: DISCONTINUED | OUTPATIENT
Start: 2025-03-12 | End: 2025-03-12

## 2025-03-12 RX ORDER — OXCARBAZEPINE 150 MG/1
600 TABLET, FILM COATED ORAL
Refills: 0 | Status: DISCONTINUED | OUTPATIENT
Start: 2025-03-12 | End: 2025-03-13

## 2025-03-12 RX ORDER — ASPIRIN 325 MG
81 TABLET ORAL DAILY
Refills: 0 | Status: DISCONTINUED | OUTPATIENT
Start: 2025-03-12 | End: 2025-03-13

## 2025-03-12 RX ORDER — ETOMIDATE 2 MG/ML
20 SYRINGE (ML) INTRAVENOUS ONCE
Refills: 0 | Status: COMPLETED | OUTPATIENT
Start: 2025-03-12 | End: 2025-03-12

## 2025-03-12 RX ORDER — EPINEPHRINE 11.25MG/ML
0.5 SOLUTION, NON-ORAL INHALATION ONCE
Refills: 0 | Status: COMPLETED | OUTPATIENT
Start: 2025-03-12 | End: 2025-03-12

## 2025-03-12 RX ORDER — ALBUTEROL SULFATE 2.5 MG/3ML
2 VIAL, NEBULIZER (ML) INHALATION EVERY 6 HOURS
Refills: 0 | Status: DISCONTINUED | OUTPATIENT
Start: 2025-03-12 | End: 2025-03-12

## 2025-03-12 RX ORDER — ALBUTEROL SULFATE 2.5 MG/3ML
1 VIAL, NEBULIZER (ML) INHALATION EVERY 4 HOURS
Refills: 0 | Status: DISCONTINUED | OUTPATIENT
Start: 2025-03-12 | End: 2025-03-18

## 2025-03-12 RX ORDER — MONTELUKAST SODIUM 10 MG/1
10 TABLET ORAL DAILY
Refills: 0 | Status: DISCONTINUED | OUTPATIENT
Start: 2025-03-12 | End: 2025-03-13

## 2025-03-12 RX ORDER — METHYLPREDNISOLONE ACETATE 80 MG/ML
40 INJECTION, SUSPENSION INTRA-ARTICULAR; INTRALESIONAL; INTRAMUSCULAR; SOFT TISSUE ONCE
Refills: 0 | Status: COMPLETED | OUTPATIENT
Start: 2025-03-12 | End: 2025-03-12

## 2025-03-12 RX ORDER — ALBUTEROL SULFATE 2.5 MG/3ML
2.5 VIAL, NEBULIZER (ML) INHALATION EVERY 6 HOURS
Refills: 0 | Status: DISCONTINUED | OUTPATIENT
Start: 2025-03-12 | End: 2025-03-18

## 2025-03-12 RX ORDER — ROCURONIUM BROMIDE 10 MG/ML
50 INJECTION, SOLUTION INTRAVENOUS ONCE
Refills: 0 | Status: COMPLETED | OUTPATIENT
Start: 2025-03-12 | End: 2025-03-12

## 2025-03-12 RX ORDER — ATORVASTATIN CALCIUM 80 MG/1
40 TABLET, FILM COATED ORAL AT BEDTIME
Refills: 0 | Status: DISCONTINUED | OUTPATIENT
Start: 2025-03-12 | End: 2025-03-13

## 2025-03-12 RX ORDER — PROPOFOL 10 MG/ML
10 INJECTION, EMULSION INTRAVENOUS
Qty: 1000 | Refills: 0 | Status: DISCONTINUED | OUTPATIENT
Start: 2025-03-12 | End: 2025-03-12

## 2025-03-12 RX ADMIN — IPRATROPIUM BROMIDE AND ALBUTEROL SULFATE 3 MILLILITER(S): .5; 2.5 SOLUTION RESPIRATORY (INHALATION) at 08:53

## 2025-03-12 RX ADMIN — PROPOFOL 7.86 MICROGRAM(S)/KG/MIN: 10 INJECTION, EMULSION INTRAVENOUS at 07:39

## 2025-03-12 RX ADMIN — PROPOFOL 7.86 MICROGRAM(S)/KG/MIN: 10 INJECTION, EMULSION INTRAVENOUS at 00:27

## 2025-03-12 RX ADMIN — Medication 1 DOSE(S): at 23:00

## 2025-03-12 RX ADMIN — INSULIN LISPRO 4: 100 INJECTION, SOLUTION INTRAVENOUS; SUBCUTANEOUS at 13:36

## 2025-03-12 RX ADMIN — PROPOFOL 7.86 MICROGRAM(S)/KG/MIN: 10 INJECTION, EMULSION INTRAVENOUS at 03:02

## 2025-03-12 RX ADMIN — Medication 40 MILLIGRAM(S): at 13:26

## 2025-03-12 RX ADMIN — PROPOFOL 7.86 MICROGRAM(S)/KG/MIN: 10 INJECTION, EMULSION INTRAVENOUS at 06:19

## 2025-03-12 RX ADMIN — Medication 15 MILLILITER(S): at 06:19

## 2025-03-12 RX ADMIN — ENOXAPARIN SODIUM 40 MILLIGRAM(S): 100 INJECTION SUBCUTANEOUS at 06:20

## 2025-03-12 RX ADMIN — INSULIN LISPRO 6: 100 INJECTION, SOLUTION INTRAVENOUS; SUBCUTANEOUS at 00:42

## 2025-03-12 RX ADMIN — HYDROCORTISONE 1 APPLICATION(S): 10 CREAM TOPICAL at 18:10

## 2025-03-12 RX ADMIN — Medication 20 MILLIGRAM(S): at 00:27

## 2025-03-12 RX ADMIN — Medication 2.5 MILLIGRAM(S): at 17:30

## 2025-03-12 RX ADMIN — Medication 0.5 MILLILITER(S): at 09:53

## 2025-03-12 RX ADMIN — Medication 4 MILLIGRAM(S): at 00:49

## 2025-03-12 RX ADMIN — HYDROCORTISONE 1 APPLICATION(S): 10 CREAM TOPICAL at 06:20

## 2025-03-12 RX ADMIN — NYSTATIN 1 APPLICATION(S): 100000 CREAM TOPICAL at 06:19

## 2025-03-12 RX ADMIN — OXCARBAZEPINE 600 MILLIGRAM(S): 150 TABLET, FILM COATED ORAL at 06:20

## 2025-03-12 RX ADMIN — INSULIN LISPRO 4: 100 INJECTION, SOLUTION INTRAVENOUS; SUBCUTANEOUS at 18:10

## 2025-03-12 RX ADMIN — METHYLPREDNISOLONE ACETATE 40 MILLIGRAM(S): 80 INJECTION, SUSPENSION INTRA-ARTICULAR; INTRALESIONAL; INTRAMUSCULAR; SOFT TISSUE at 09:52

## 2025-03-12 RX ADMIN — INSULIN GLARGINE-YFGN 38 UNIT(S): 100 INJECTION, SOLUTION SUBCUTANEOUS at 23:10

## 2025-03-12 RX ADMIN — Medication 2.5 MILLIGRAM(S): at 20:28

## 2025-03-12 RX ADMIN — ENOXAPARIN SODIUM 40 MILLIGRAM(S): 100 INJECTION SUBCUTANEOUS at 18:10

## 2025-03-12 RX ADMIN — Medication 1 APPLICATION(S): at 06:20

## 2025-03-12 RX ADMIN — Medication 85 MICROGRAM(S): at 23:11

## 2025-03-12 RX ADMIN — ROCURONIUM BROMIDE 50 MILLIGRAM(S): 10 INJECTION, SOLUTION INTRAVENOUS at 00:27

## 2025-03-12 NOTE — PROGRESS NOTE ADULT - NS ATTEND AMEND GEN_ALL_CORE FT
59 year old F with PMHx of asthma, COPD (on 4L NC PRN), DM, fatty liver, fibromyalgia, R shoulder bursitis, HLD, hypothyroidism, NAKUL, psoriasis, RCC (s/p L renal resection 11/3/23, no hx of CT/RT) presenting palpitations, chest discomfort and lower abdominal pain, in setting of fall 1 week ago. Course c/b by acute on chronic hypercapneic respiratory failure s/p intubation, now with elevated troponins.    1) Atypical CP, chronic SOB, multifactorial in setting of COPD, morbid obesity, fibromyalgia  2) Elevated Ca score  3) Palpitations  4) DM  5) Morbid obesity  6) Fibromyalgia  7) Elevated troponins, became positive after unresponsive event due to acute on chronic hypercapneic respiratory failure   - EKG showed sinus rhythm with non-specific T wave abnormality in I and aVL  - Troponins were negative x 2 on admission then increased in setting of acute hypercapneic event requiring intubation  - CCTA 12/2022 showed Ca score of 1069.2 with no significant obstructive CAD. Pharm nuclear stress 3/2024 showed normal myocardial perfusion  - Continue ASA 81 and atorvastatin 40mg daily for non-obstructive CAD   - RLE > LLE edema. LE duplex negative for DVT.  - TTE showed normal LV and RV function without significant valvular disease, however this was prior to unresponsive/hypercapneic event and elevated troponins. Once clinically stable from respiratory standpoint, would repeat limited TTE with Definity to r/o new WMA. Will consider further testing pending repeat limited TTE    8) HTN  - Home meds on hold due to normotension

## 2025-03-12 NOTE — PROGRESS NOTE ADULT - SUBJECTIVE AND OBJECTIVE BOX
PULMONARY CONSULT SERVICE FOLLOW-UP NOTE    INTERVAL HPI:  Reviewed chart and overnight events; patient seen and examined at bedside. Intubated overnight due to low GCS in setting of hypoxemia, had not yet started full BiPAP -- self-extubated in AM and refused reintubation. Intermittently refusing BiPAP per ICU team after removing ETT. Denies new complaints currently and feels her breathing is fine.     MEDICATIONS:  Pulmonary:  albuterol    0.083% 2.5 milliGRAM(s) Nebulizer every 6 hours  albuterol    90 MICROgram(s) HFA Inhaler 1 Puff(s) Inhalation every 4 hours PRN  fluticasone propionate/ salmeterol 250-50 MICROgram(s) Diskus 1 Dose(s) Inhalation two times a day  montelukast 10 milliGRAM(s) Oral daily    Antimicrobials:    Anticoagulants:  aspirin  chewable 81 milliGRAM(s) Oral daily  enoxaparin Injectable 40 milliGRAM(s) SubCutaneous every 12 hours    Cardiac:    Allergies    Fioricet (Rash)  venlafaxine (Hives)  mushroom (Unknown)  gadobutrol (Rash; Urticaria; Hives)  IV Contrast (Short breath)  honeydew (Rash)    Intolerances    Vital Signs Last 24 Hrs  T(C): 37.3 (12 Mar 2025 16:00), Max: 37.3 (12 Mar 2025 16:00)  T(F): 99.1 (12 Mar 2025 16:00), Max: 99.1 (12 Mar 2025 16:00)  HR: 99 (12 Mar 2025 13:00) (67 - 110)  BP: 151/60 (12 Mar 2025 13:00) (87/70 - 151/60)  BP(mean): 86 (12 Mar 2025 13:00) (61 - 120)  RR: 21 (12 Mar 2025 13:00) (14 - 31)  SpO2: 93% (12 Mar 2025 13:00) (87% - 100%)    Parameters below as of 12 Mar 2025 13:00  Patient On (Oxygen Delivery Method): nasal cannula  O2 Flow (L/min): 4      03-11 @ 07:01  -  03-12 @ 07:00  --------------------------------------------------------  IN: 502 mL / OUT: 501 mL / NET: 1 mL    Mode: AC/ CMV (Assist Control/ Continuous Mandatory Ventilation)  RR (machine): 20  TV (machine): 420  FiO2: 40  PEEP: 6  ITime: 0.9  MAP: 14  PIP: 28    PHYSICAL EXAM:  Constitutional: non-toxic morbidly obese woman in bed; NAD  HEENT: atraumatic; PERRL, anicteric sclera; MMM  Neck: supple  Cardiovascular: +S1/S2, RRR  Respiratory: diminished throughout, possibly due to body habitus, respirations appear non-labored  Gastrointestinal: soft, NT/ND  Extremities: WWP; no edema, clubbing or cyanosis  Vascular: 2+ radial pulses B/L  Neurological: awake and alert; CHATMAN    LABS:  ABG - ( 12 Mar 2025 13:19 )  pH, Arterial: 7.32  pH, Blood: x     /  pCO2: 64    /  pO2: 71    / HCO3: 33    / Base Excess: 5.2   /  SaO2: 93        CBC Full  -  ( 12 Mar 2025 03:30 )  WBC Count : 8.52 K/uL  RBC Count : 4.19 M/uL  Hemoglobin : 10.2 g/dL  Hematocrit : 33.2 %  Platelet Count - Automated : 234 K/uL  Mean Cell Volume : 79.2 fl  Mean Cell Hemoglobin : 24.3 pg  Mean Cell Hemoglobin Concentration : 30.7 g/dL  Auto Neutrophil # : x  Auto Lymphocyte # : x  Auto Monocyte # : x  Auto Eosinophil # : x  Auto Basophil # : x  Auto Neutrophil % : x  Auto Lymphocyte % : x  Auto Monocyte % : x  Auto Eosinophil % : x  Auto Basophil % : x    03-12    135  |  96  |  30[H]  ----------------------------<  305[H]  4.3   |  29  |  1.47[H]    Ca    8.7      12 Mar 2025 03:30  Phos  2.5     03-12  Mg     1.6     03-12    TPro  6.5  /  Alb  2.7[L]  /  TBili  0.8  /  DBili  x   /  AST  33  /  ALT  39  /  AlkPhos  114  03-12    PT/INR - ( 12 Mar 2025 00:50 )   PT: 11.8 sec;   INR: 1.02 ratio         PTT - ( 12 Mar 2025 00:50 )  PTT:33.2 sec    Urinalysis Basic - ( 12 Mar 2025 03:30 )    Color: x / Appearance: x / SG: x / pH: x  Gluc: 305 mg/dL / Ketone: x  / Bili: x / Urobili: x   Blood: x / Protein: x / Nitrite: x   Leuk Esterase: x / RBC: x / WBC x   Sq Epi: x / Non Sq Epi: x / Bacteria: x    RADIOLOGY & ADDITIONAL STUDIES (personally reviewed):  < from: Xray Chest 1 View- PORTABLE-Urgent (Xray Chest 1 View- PORTABLE-Urgent .) (03.12.25 @ 10:22) >  INTERPRETATION:  Exam:XR CHEST URGENT    clinical history:intubated    Removal of endotracheal and nasogastric tubes. Improved aeration right   lung. Stable left basilar airspace disease.    IMPRESSION: Improved aeration.

## 2025-03-12 NOTE — PROGRESS NOTE ADULT - NS ATTEND AMEND GEN_ALL_CORE FT
59-year-old female, AAOx3, ambulates with a cane, no HHA,  history of DM, HLD, asthma, COPD on 4L NC, migraines, hypothyroidism, NAKUL, RCC status post left renal resection 11/23 present for intermittent palpitations and chest pain that started a week ago. RRT called for unresponsiveness and AHHRF, Pt admitted to ICU s/p intubation.       =================== Neuro============================  - Unresponsive on RRT likel 2/2 CO2 retention  - Sedated and intubated 3/12  - minimize sedation, SAT/SBT  - extubated 3/12  - no active issues s/p extubation      ================= Cardiovascular==========================  #chest pain   - p/w chest pain, palpitation  - admitted to medicine for ACS rule out  - Trop x2 neg on admission, presently uptrending troponemia 128>1369 > 2340, cont to trend  - f/u TTE   - Cardio Dr. Hampton consulted on admission, recommend continuation of ASA and Atorvastatin 40 mg  - appreciate cardiology recs     #HTN  - hx of HTN on Losartan 25 mg, Amlodipine 10 mg daily   - will hold home meds for now given normotension    #HLD   - hx of HLD on Atorvastatin 40 mg   - ,   - c/w home meds     ================- Pulm=================================  #AHHRF  #COPD on 4L NC   #NAKUL on nocturnal bipap  - ABG during the RRT showed 7.04/>125/53/NR bicarb  - intubated and sedated   - ABG during the RRT showed 7.04/>125/53/NR bicarb,  - RVP on admission neg   - resume home advair  - D-dimer negative   - Self extubated   - Refusing PAP   ==================ID===================================  No active issues     ================= Nephro================================  #FERNIE  - likely pre-renal  - avoid nephrotoxic agents  - monitor BMP      =================GI====================================  No active issues       ================ Heme==================================  #Anemia  - likely anemia of chronic disease vs DEWAYNE  - HB 11.4  - outpt iron studies recommended     =================Endocrine===============================  #DM  - uncontrolled IDDM on Lantus 35 and Lispro 12 U pre-meals  - NPO for now   - HbA1c 13%  - c/w Lantus and mod ISS   - blood glucose q6   - Endo Dr. Catracho consulted     #Hypothyroidism  - cont home synthroid    ================= Skin/Catheters============================  - Peripheral IV lines - x2   - Bustamante catheter placed in ICU     =================Prophylaxis =============================  - DVT prophylaxis - Lovenox   - GI prophylaxis - PPI

## 2025-03-12 NOTE — PROGRESS NOTE ADULT - SUBJECTIVE AND OBJECTIVE BOX
Subjective:  Chart Notes, Work list Manager, and fingersticks reviewed.    Review of Systems:  Constitutional: No fever  Eyes: No blurry vision  Neuro: No tremors  HEENT: No pain  Cardiovascular: No chest pain, palpitations  Respiratory: No SOB, no cough  GI: No nausea, vomiting, abdominal pain  : No dysuria  Skin: no rash  Psych: no depression  Endocrine: no polyuria, polydipsia  Hem/lymph: no swelling  Osteoporosis: no fractures    Medications (Standing):  acetaminophen     Tablet .. 1000 milliGRAM(s) Oral every 8 hours  albuterol    0.083% 2.5 milliGRAM(s) Nebulizer every 6 hours  aspirin  chewable 81 milliGRAM(s) Oral daily  atorvastatin 40 milliGRAM(s) Oral at bedtime  chlorhexidine 2% Cloths 1 Application(s) Topical <User Schedule>  enoxaparin Injectable 40 milliGRAM(s) SubCutaneous every 12 hours  fluticasone propionate/ salmeterol 250-50 MICROgram(s) Diskus 1 Dose(s) Inhalation two times a day  hydrocortisone 1% Ointment 1 Application(s) Topical every 12 hours  insulin glargine Injectable (LANTUS) 38 Unit(s) SubCutaneous at bedtime  insulin lispro (ADMELOG) corrective regimen sliding scale   SubCutaneous every 6 hours  levothyroxine Injectable 85 MICROGram(s) IV Push at bedtime  montelukast 10 milliGRAM(s) Oral daily  OXcarbazepine 600 milliGRAM(s) Oral two times a day  pantoprazole  Injectable 40 milliGRAM(s) IV Push daily  polyethylene glycol 3350 17 Gram(s) Oral daily  senna 2 Tablet(s) Oral at bedtime    Medications (PRN):  albuterol    90 MICROgram(s) HFA Inhaler 1 Puff(s) Inhalation every 4 hours PRN Bronchospasm      Physical Exam:  VITALS: T(C): 36.9 (03-12-25 @ 08:00)  T(F): 98.4 (03-12-25 @ 08:00), Max: 98.6 (03-12-25 @ 05:24)  HR: 99 (03-12-25 @ 13:00) (67 - 110)  BP: 151/60 (03-12-25 @ 13:00) (87/70 - 151/60)  RR:  (14 - 31)  SpO2:  (87% - 100%)  Wt(kg): --    GENERAL: NAD, +obese  HEENT:  +dry mucus membranes. Atraumatic, Normocephalic  THYROID: Normal size, no palpable nodules  RESPIRATORY: Clear to auscultation bilaterally; No rales, rhonchi, wheezing  CARDIOVASCULAR: Regular rate and rhythm; No murmurs; no peripheral edema  GI: Soft, nontender, non distended, +ve abdominal obesity  EXTREMITIES: +ve peripheral pulses, -ve pedal edema  SKIN: Dry, intact, No rashes or lesions  PSYCH: Alert and oriented x 3    CAPILLARY BLOOD GLUCOSE      POCT Blood Glucose.: 314 mg/dL (12 Mar 2025 13:25)  POCT Blood Glucose.: 220 mg/dL (12 Mar 2025 06:18)  POCT Blood Glucose.: 360 mg/dL (12 Mar 2025 00:41)  POCT Blood Glucose.: 284 mg/dL (11 Mar 2025 23:15)  POCT Blood Glucose.: 289 mg/dL (11 Mar 2025 21:05)  POCT Blood Glucose.: 330 mg/dL (11 Mar 2025 16:30)    03-12    135  |  96  |  30[H]  ----------------------------<  305[H]  4.3   |  29  |  1.47[H]    eGFR: 41[L]    Ca    8.7      03-12  Mg     1.6     03-12  Phos  2.5     03-12    TPro  6.5  /  Alb  2.7[L]  /  TBili  0.8  /  DBili  x   /  AST  33  /  ALT  39  /  AlkPhos  114  03-12    Thyroid Function Tests:        Assessment and Plan:    1) Type 2 diabetes:      59-year-old female with PMH of T2DM, HLD, asthma, COPD on 4L NC, migraines, hypothyroidism, NAKUL, RCC status post left renal resection 11/23 present for intermittent palpitations and chest pain that started a week ago.  Admitted for ACS rule out. Endocrinology is consulted for glycemic management    1) Poorly controlled Type 2 diabetes:  A1C is above goal likely due to dietary indiscretion  Patient reports compliance with medications at home  She was about to start ozempic 2 mg from the following weeks    Inpatient Recommendations:  Basal Insulin:   Increase Glargine ( Lantus) 42 units once daily    Nutritional Insulin:  Decrease Lispro (Admelog) 10 units with meals    Correctional Insulin:  Continue moderate Lispro ( Admelog) correctional scale with meals and bedtime    Oral Diabetes Medications:  None in the hospital               Subjective:  Chart Notes, Work list Manager, and fingersticks reviewed.     Review of Systems:  Constitutional: No fever  Cardiovascular: No chest pain, palpitations  Respiratory: No SOB, no cough  GI: No nausea, vomiting, abdominal pain  Endocrine: no polyuria, polydipsia    Medications (Standing):  acetaminophen     Tablet .. 1000 milliGRAM(s) Oral every 8 hours  albuterol    0.083% 2.5 milliGRAM(s) Nebulizer every 6 hours  aspirin  chewable 81 milliGRAM(s) Oral daily  atorvastatin 40 milliGRAM(s) Oral at bedtime  chlorhexidine 2% Cloths 1 Application(s) Topical <User Schedule>  enoxaparin Injectable 40 milliGRAM(s) SubCutaneous every 12 hours  fluticasone propionate/ salmeterol 250-50 MICROgram(s) Diskus 1 Dose(s) Inhalation two times a day  hydrocortisone 1% Ointment 1 Application(s) Topical every 12 hours  insulin glargine Injectable (LANTUS) 38 Unit(s) SubCutaneous at bedtime  insulin lispro (ADMELOG) corrective regimen sliding scale   SubCutaneous every 6 hours  levothyroxine Injectable 85 MICROGram(s) IV Push at bedtime  montelukast 10 milliGRAM(s) Oral daily  OXcarbazepine 600 milliGRAM(s) Oral two times a day  pantoprazole  Injectable 40 milliGRAM(s) IV Push daily  polyethylene glycol 3350 17 Gram(s) Oral daily  senna 2 Tablet(s) Oral at bedtime    Medications (PRN):  albuterol    90 MICROgram(s) HFA Inhaler 1 Puff(s) Inhalation every 4 hours PRN Bronchospasm      Physical Exam:  VITALS: T(C): 36.9 (03-12-25 @ 08:00)  T(F): 98.4 (03-12-25 @ 08:00), Max: 98.6 (03-12-25 @ 05:24)  HR: 99 (03-12-25 @ 13:00) (67 - 110)  BP: 151/60 (03-12-25 @ 13:00) (87/70 - 151/60)  RR:  (14 - 31)  SpO2:  (87% - 100%)  Wt(kg): --    GENERAL: NAD, +obese  HEENT:  +dry mucus membranes. Atraumatic, Normocephalic  THYROID: Normal size, no palpable nodules  RESPIRATORY: Clear to auscultation bilaterally; No rales, rhonchi, wheezing  CARDIOVASCULAR: Regular rate and rhythm; No murmurs; no peripheral edema  GI: Soft, nontender, non distended, +ve abdominal obesity  EXTREMITIES: +ve peripheral pulses, -ve pedal edema  SKIN: Dry, intact, No rashes or lesions  PSYCH: Alert and oriented x 3    Labs:  Capillary blood Glucose:  POCT Blood Glucose.: 314 mg/dL (12 Mar 2025 13:25)  POCT Blood Glucose.: 220 mg/dL (12 Mar 2025 06:18)  POCT Blood Glucose.: 360 mg/dL (12 Mar 2025 00:41)  POCT Blood Glucose.: 284 mg/dL (11 Mar 2025 23:15)  POCT Blood Glucose.: 289 mg/dL (11 Mar 2025 21:05)  POCT Blood Glucose.: 330 mg/dL (11 Mar 2025 16:30)    03-12  135  |  96  |  30[H]  ----------------------------<  305[H]  4.3   |  29  |  1.47[H]  eGFR: 41[L]  Ca    8.7      03-12  Mg     1.6     03-12  Phos  2.5     03-12  TPro  6.5  /  Alb  2.7[L]  /  TBili  0.8  /  DBili  x   /  AST  33  /  ALT  39  /  AlkPhos  114  03-12  A1C with Estimated Average Glucose Result: 13.0: % (03.11.25 @ 05:00)      Assessment and Plan:    59-year-old female with PMH of T2DM, HLD, asthma, COPD on 4L NC, migraines, hypothyroidism, NAKUL, RCC status post left renal resection 11/23 present for intermittent palpitations and chest pain that started a week ago.  Admitted for ACS rule out. Endocrinology is consulted for glycemic management    1) Poorly controlled Type 2 diabetes:  A1C is above goal likely due to dietary indiscretion  Patient reports compliance with medications at home  She was about to start ozempic 2 mg from the following weeks    Inpatient Recommendations:  Basal Insulin:   Continue Glargine ( Lantus) 42 units once daily    Nutritional Insulin:  Continue Lispro (Admelog) 10 units with meals    Correctional Insulin:  Continue moderate Lispro ( Admelog) correctional scale with meals and bedtime    Oral Diabetes Medications:  None in the hospital

## 2025-03-12 NOTE — PHYSICAL THERAPY INITIAL EVALUATION ADULT - DIAGNOSIS, PT EVAL
Admitting diagnosis: AHHRF, Palpitations, dizziness, nausea, unsteadiness, recent fall; impaired cardiopulmonary status, deconditioning, generalized weakness; resulting in difficulty with functional mobility compared to PLOF.

## 2025-03-12 NOTE — PHYSICAL THERAPY INITIAL EVALUATION ADULT - NSPTDISCHREC_GEN_A_CORE
As per assessment, demonstrates with impaired cardiopulmonary status and generalized weakness, patient would benefit from skilled PT services to optimize functional mobility. Will continue to optimize patient and discuss discharge recommendations as appropriate in subsequent sessions./Sub-acute Rehab

## 2025-03-12 NOTE — PROGRESS NOTE ADULT - SUBJECTIVE AND OBJECTIVE BOX
59-year-old female, AAOx3, ambulates with a cane, no HHA,  history of DM, HLD, asthma, COPD on 4L NC, migraines, hypothyroidism, NAKUL, RCC status post left renal resection 11/23 present for intermittent palpitations and chest pain that started a week ago. She describes her chest pain as dull and stays on the left side. Admits to dizziness, nausea and sob with the palpitation episodes. Also reports recent fall and feels unsteady. Denies head strike or LOC.  Denies any fever, chills, abdominal pain, n/v or dysuria. Also Admits to chronic diarrhea who she has a follow up for a colonoscopy.    Interval Events:  Patient w/ C02 narcosis on floor, intuabted for poor mentation (CO2 >125)    Review of Systems  Unattainable 2/2 intubation     ICU Vital Signs Last 24 Hrs  T(C): 37 (12 Mar 2025 05:24), Max: 37 (12 Mar 2025 05:24)  T(F): 98.6 (12 Mar 2025 05:24), Max: 98.6 (12 Mar 2025 05:24)  HR: 77 (12 Mar 2025 07:00) (67 - 105)  BP: 95/58 (12 Mar 2025 07:00) (87/70 - 150/99)  BP(mean): 70 (12 Mar 2025 07:00) (63 - 120)  ABP: --  ABP(mean): --  RR: 20 (12 Mar 2025 07:00) (17 - 28)  SpO2: 92% (12 Mar 2025 07:00) (92% - 100%)    O2 Parameters below as of 12 Mar 2025 07:00  Patient On (Oxygen Delivery Method): ventilator    O2 Concentration (%): 90    I&O's Summary    11 Mar 2025 07:01  -  12 Mar 2025 07:00  --------------------------------------------------------  IN: 502 mL / OUT: 501 mL / NET: 1 mL    MEDICATIONS  (STANDING):  acetaminophen     Tablet .. 1000 milliGRAM(s) Oral every 8 hours  albuterol    90 MICROgram(s) HFA Inhaler 2 Puff(s) Inhalation every 6 hours  aspirin  chewable 81 milliGRAM(s) Oral daily  atorvastatin 40 milliGRAM(s) Oral at bedtime  chlorhexidine 0.12% Liquid 15 milliLiter(s) Oral Mucosa every 12 hours  chlorhexidine 2% Cloths 1 Application(s) Topical <User Schedule>  enoxaparin Injectable 40 milliGRAM(s) SubCutaneous every 12 hours  hydrocortisone 1% Ointment 1 Application(s) Topical every 12 hours  insulin glargine Injectable (LANTUS) 38 Unit(s) SubCutaneous at bedtime  insulin lispro (ADMELOG) corrective regimen sliding scale   SubCutaneous every 6 hours  ipratropium 17 MICROgram(s) HFA Inhaler 1 Puff(s) Inhalation every 6 hours  levothyroxine Injectable 85 MICROGram(s) IV Push at bedtime  montelukast 10 milliGRAM(s) Oral daily  OXcarbazepine 600 milliGRAM(s) Oral two times a day  pantoprazole  Injectable 40 milliGRAM(s) IV Push daily  polyethylene glycol 3350 17 Gram(s) Oral daily  propofol Infusion 10 MICROgram(s)/kG/Min (7.86 mL/Hr) IV Continuous <Continuous>  senna 2 Tablet(s) Oral at bedtime    MEDICATIONS  (PRN):  dextrose Oral Gel 15 Gram(s) Oral once PRN Blood Glucose LESS THAN 70 milliGRAM(s)/deciliter                          10.2   8.52  )-----------( 234      ( 12 Mar 2025 03:30 )             33.2     03-12    135  |  96  |  30[H]  ----------------------------<  305[H]  4.3   |  29  |  1.47[H]    Ca    8.7      12 Mar 2025 03:30  Phos  2.5     03-12  Mg     1.6     03-12    TPro  6.5  /  Alb  2.7[L]  /  TBili  0.8  /  DBili  x   /  AST  33  /  ALT  39  /  AlkPhos  114  03-12      Physical Exam  Neuro:  Pulm:  CV:  Abd:  Integ:  Peripheral Vasc:       59-year-old female, AAOx3, ambulates with a cane, no HHA,  history of DM, HLD, asthma, COPD on 4L NC, migraines, hypothyroidism, NAKUL, RCC status post left renal resection 11/23 present for intermittent palpitations and chest pain that started a week ago. She describes her chest pain as dull and stays on the left side. Admits to dizziness, nausea and sob with the palpitation episodes. Also reports recent fall and feels unsteady. Denies head strike or LOC.  Denies any fever, chills, abdominal pain, n/v or dysuria. Also Admits to chronic diarrhea who she has a follow up for a colonoscopy.    Interval Events:  Patient w/ C02 narcosis on floor, intubated for poor mentation (CO2 >125)    Review of Systems  Unattainable 2/2 intubation     ICU Vital Signs Last 24 Hrs  T(C): 37 (12 Mar 2025 05:24), Max: 37 (12 Mar 2025 05:24)  T(F): 98.6 (12 Mar 2025 05:24), Max: 98.6 (12 Mar 2025 05:24)  HR: 77 (12 Mar 2025 07:00) (67 - 105)  BP: 95/58 (12 Mar 2025 07:00) (87/70 - 150/99)  BP(mean): 70 (12 Mar 2025 07:00) (63 - 120)  ABP: --  ABP(mean): --  RR: 20 (12 Mar 2025 07:00) (17 - 28)  SpO2: 92% (12 Mar 2025 07:00) (92% - 100%)    O2 Parameters below as of 12 Mar 2025 07:00  Patient On (Oxygen Delivery Method): ventilator    O2 Concentration (%): 90    I&O's Summary    11 Mar 2025 07:01  -  12 Mar 2025 07:00  --------------------------------------------------------  IN: 502 mL / OUT: 501 mL / NET: 1 mL    MEDICATIONS  (STANDING):  acetaminophen     Tablet .. 1000 milliGRAM(s) Oral every 8 hours  albuterol    90 MICROgram(s) HFA Inhaler 2 Puff(s) Inhalation every 6 hours  aspirin  chewable 81 milliGRAM(s) Oral daily  atorvastatin 40 milliGRAM(s) Oral at bedtime  chlorhexidine 0.12% Liquid 15 milliLiter(s) Oral Mucosa every 12 hours  chlorhexidine 2% Cloths 1 Application(s) Topical <User Schedule>  enoxaparin Injectable 40 milliGRAM(s) SubCutaneous every 12 hours  hydrocortisone 1% Ointment 1 Application(s) Topical every 12 hours  insulin glargine Injectable (LANTUS) 38 Unit(s) SubCutaneous at bedtime  insulin lispro (ADMELOG) corrective regimen sliding scale   SubCutaneous every 6 hours  ipratropium 17 MICROgram(s) HFA Inhaler 1 Puff(s) Inhalation every 6 hours  levothyroxine Injectable 85 MICROGram(s) IV Push at bedtime  montelukast 10 milliGRAM(s) Oral daily  OXcarbazepine 600 milliGRAM(s) Oral two times a day  pantoprazole  Injectable 40 milliGRAM(s) IV Push daily  polyethylene glycol 3350 17 Gram(s) Oral daily  propofol Infusion 10 MICROgram(s)/kG/Min (7.86 mL/Hr) IV Continuous <Continuous>  senna 2 Tablet(s) Oral at bedtime    MEDICATIONS  (PRN):  dextrose Oral Gel 15 Gram(s) Oral once PRN Blood Glucose LESS THAN 70 milliGRAM(s)/deciliter                          10.2   8.52  )-----------( 234      ( 12 Mar 2025 03:30 )             33.2     03-12    135  |  96  |  30[H]  ----------------------------<  305[H]  4.3   |  29  |  1.47[H]    Ca    8.7      12 Mar 2025 03:30  Phos  2.5     03-12  Mg     1.6     03-12    TPro  6.5  /  Alb  2.7[L]  /  TBili  0.8  /  DBili  x   /  AST  33  /  ALT  39  /  AlkPhos  114  03-12      Physical Exam  Neuro:  Pulm:  CV:  Abd:  Integ:  Peripheral Vasc:       59-year-old female, AAOx3, ambulates with a cane, no HHA,  history of DM, HLD, asthma, COPD on 4L NC, migraines, hypothyroidism, NAKUL, RCC status post left renal resection 11/23 present for intermittent palpitations and chest pain that started a week ago. She describes her chest pain as dull and stays on the left side. Admits to dizziness, nausea and sob with the palpitation episodes. Also reports recent fall and feels unsteady. Denies head strike or LOC.  Denies any fever, chills, abdominal pain, n/v or dysuria. Also Admits to chronic diarrhea who she has a follow up for a colonoscopy.    Interval Events:  Patient w/ C02 narcosis on floor, intubated for poor mentation (CO2 >125)    Review of Systems  Unattainable 2/2 intubation     ICU Vital Signs Last 24 Hrs  T(C): 37 (12 Mar 2025 05:24), Max: 37 (12 Mar 2025 05:24)  T(F): 98.6 (12 Mar 2025 05:24), Max: 98.6 (12 Mar 2025 05:24)  HR: 77 (12 Mar 2025 07:00) (67 - 105)  BP: 95/58 (12 Mar 2025 07:00) (87/70 - 150/99)  BP(mean): 70 (12 Mar 2025 07:00) (63 - 120)  ABP: --  ABP(mean): --  RR: 20 (12 Mar 2025 07:00) (17 - 28)  SpO2: 92% (12 Mar 2025 07:00) (92% - 100%)    O2 Parameters below as of 12 Mar 2025 07:00  Patient On (Oxygen Delivery Method): ventilator    O2 Concentration (%): 90    I&O's Summary    11 Mar 2025 07:01  -  12 Mar 2025 07:00  --------------------------------------------------------  IN: 502 mL / OUT: 501 mL / NET: 1 mL    MEDICATIONS  (STANDING):  acetaminophen     Tablet .. 1000 milliGRAM(s) Oral every 8 hours  albuterol    90 MICROgram(s) HFA Inhaler 2 Puff(s) Inhalation every 6 hours  aspirin  chewable 81 milliGRAM(s) Oral daily  atorvastatin 40 milliGRAM(s) Oral at bedtime  chlorhexidine 0.12% Liquid 15 milliLiter(s) Oral Mucosa every 12 hours  chlorhexidine 2% Cloths 1 Application(s) Topical <User Schedule>  enoxaparin Injectable 40 milliGRAM(s) SubCutaneous every 12 hours  hydrocortisone 1% Ointment 1 Application(s) Topical every 12 hours  insulin glargine Injectable (LANTUS) 38 Unit(s) SubCutaneous at bedtime  insulin lispro (ADMELOG) corrective regimen sliding scale   SubCutaneous every 6 hours  ipratropium 17 MICROgram(s) HFA Inhaler 1 Puff(s) Inhalation every 6 hours  levothyroxine Injectable 85 MICROGram(s) IV Push at bedtime  montelukast 10 milliGRAM(s) Oral daily  OXcarbazepine 600 milliGRAM(s) Oral two times a day  pantoprazole  Injectable 40 milliGRAM(s) IV Push daily  polyethylene glycol 3350 17 Gram(s) Oral daily  propofol Infusion 10 MICROgram(s)/kG/Min (7.86 mL/Hr) IV Continuous <Continuous>  senna 2 Tablet(s) Oral at bedtime    MEDICATIONS  (PRN):  dextrose Oral Gel 15 Gram(s) Oral once PRN Blood Glucose LESS THAN 70 milliGRAM(s)/deciliter                          10.2   8.52  )-----------( 234      ( 12 Mar 2025 03:30 )             33.2     03-12    135  |  96  |  30[H]  ----------------------------<  305[H]  4.3   |  29  |  1.47[H]    Ca    8.7      12 Mar 2025 03:30  Phos  2.5     03-12  Mg     1.6     03-12    TPro  6.5  /  Alb  2.7[L]  /  TBili  0.8  /  DBili  x   /  AST  33  /  ALT  39  /  AlkPhos  114  03-12      Physical Exam  Neuro: A&Ox3, FC, PERRL, CHATMAN 5/5  Pulm: diminished bases BL, o/w cta  CV: +s1s2, rrr  Abd: soft ntnd x4  Integ: no rashes/lesions   Female

## 2025-03-12 NOTE — PHYSICAL THERAPY INITIAL EVALUATION ADULT - ADDITIONAL COMMENTS
As per patient, patient was fully independent prior with functional mobility and ADLs. Patient states she ambulated prior with a cane and was able to get out of bed and go to the restroom independently however with some difficulty. Patient states her brother occasionally assist with IADLs (shopping), however she is also able to manage IADLs. Patient mentions while at home she is on 24 HR nasal bipap 4L/min.

## 2025-03-12 NOTE — PROGRESS NOTE ADULT - ASSESSMENT
58yo woman w/ PMH asthma, NAKUL, chronic hypoxemic/hypercapnic respiratory failure 2/2 morbid obesity, restrictive lung disease and kyphosis admitted for atypical CP for ACS ruleout. Asked to evaluate for mgmt recommendations in NAKUL. C/c/b RRT 3/11 overnight for acute on chronic hypercapnic respiratory failure w/ hypoxemia req. intubation and then self-extubated 3/12. In ICU for further mgmt.     #Acute on chronic respiratory failure with hypoxemia and hypercapnia  #NAKUL/OHS  #Asthma - not in exacerbation    Recommendations:  - intermittent BiPAP (while asleep/napping) and during the night vs. AVAPS; per review of outpatient pul notes pt should be on nocturnal NiV documented as BiPAP 20/16 per pulmonologist note but no longer has NIV at home and has a CPAP which, pt states, was not yet calibrated and thus she is not using it (she had upcoming pulm appt this month for calibration)  --> pt will need BiPAP/AVAPS for discharge, especially given interval events and acute hypercapnic respiratory failure  - cont home albuterol PRN  - cont Advair interchanged for home Symbicort  - cont montelukast  - caution with narcotics as this can exacerbate sleep disordered breathing / hypoventilation  - CP/ACS workup per primary team/cardiology    Patient follows w/ pulmonologist Dr. Monterroso as outpatient and pt says she has upcoming f/u in a few weeks, would maintain outpt pulm f/u

## 2025-03-12 NOTE — CHART NOTE - NSCHARTNOTEFT_GEN_A_CORE
Patient status post successfully SAT this morning, but self-extubated, received prophylactic racemic epinephrine and decadron.  Patient requesting break after about 1 hour of bipap, saturating well and transitioned to 4L nasal canula.  Approximately 40 mins after being place on nasal canula, patient found to be somnolent, dozing off mid-sentence and desaturating to 82%.  Patient refusing to reapply bipap and states that she does not want to placed on mechanical ventilation.  Reviewed risks of hypoxia and hypercapnea with patient at length, however despite verablizing understanding that bipap would help her oxygenate better and prevent accumulation of CO2, she continues to refuse to reapply bipap for any period of time right now.  Patient remains on 4L nasal canula with waxing and waning mentation.  RN at bedside, Dr Cotton notified. Patient status post successfully SAT this morning, but self-extubated, received prophylactic racemic epinephrine and decadron.  Patient requesting break after about 1 hour of bipap, saturating well and transitioned to 4L nasal canula.  Approximately 40 mins after being place on nasal canula, patient found to be somnolent, dozing off mid-sentence and desaturating to 82%.  Patient refusing to reapply bipap and states that she does not want to placed on mechanical ventilation.  Reviewed risks of hypoxia and hypercapnea with patient at length, however despite verablizing understanding that bipap would help her oxygenate better and prevent accumulation of CO2, she continues to refuse to reapply bipap for any period of time right now.  Patient states she understands that remaining off bipap could result in complications such as heart attack, hypoxia, and even cardiac arrest. Patient remains on 4L nasal canula with waxing and waning mentation.  RN at bedside, Dr Cotton notified.

## 2025-03-12 NOTE — PROGRESS NOTE ADULT - ASSESSMENT
Assessment and Recommendations:  59 year old F with PMHx of asthma, COPD (on 4L NC PRN), DM, fatty liver, fibromyalgia, R shoulder bursitis, HLD, hypothyroidism, NAKUL, psoriasis, RCC (s/p L renal resection 11/3/23, no hx of CT/RT) presenting palpitations, chest discomfort and lower abdominal pain, in setting of fall 1 week ago    1) Atypical CP, chronic SOB, multifactorial in setting of COPD, morbid obesity, fibromyalgia  2) Elevated Ca score  3) Palpitations  4) DM  5) Morbid obesity  6) Fibromyalgia  7) Elevated troponins, became positive after unresponsive event due to acute on chronic hypercapneic respiratory failure   - EKG showed sinus rhythm with non-specific T wave abnormality in I and aVL  - Troponins negative x 2 on admission then increased likely due to hypercapnia trended down  - CCTA 12/2022 showed Ca score of 1069.2 with no significant obstructive CAD. Pharm nuclear stress 3/2024 showed normal myocardial perfusion  - Continue ASA 81 and atorvastatin 40mg daily for non-obstructive CAD   - RLE > LLE edema. LE duplex negative for DVT.  - TTE showed normal LV and RV function without significant valvular disease, however this was prior to unresponsive/hypercapneic event and elevated troponins. Once clinically stable from respiratory standpoint, would repeat limited TTE with Definity to r/o new WMA    8) HTN  - Home meds on hold due to normotension

## 2025-03-12 NOTE — PHYSICAL THERAPY INITIAL EVALUATION ADULT - LIVES WITH, PROFILE
As per patient, patient lives in a apartment with elevator access 2nd floor, patient lives with her brother and son and does not have a HHA./children/other relative

## 2025-03-12 NOTE — PHYSICAL THERAPY INITIAL EVALUATION ADULT - PERTINENT HX OF CURRENT PROBLEM, REHAB EVAL
Admitting diagnosis: AHHRF, Palpitations, dizziness, nausea, unsteadiness, recent fall (landed on chest/abdomen)   PMHx: DM, HLD, asthma, COPD on 24 hours Nasal Bipap 4L/min at home, migraines, hypothyroidism, Obstructive sleep apnea, RCC status post left renal resection 11/23, Fibromyalgia, Vasculitis, Psoriasis    On 3/11/25, RRT was called for unresponsiveness and AHHRF. Patient required intubation, and was transferred to ICU.   On 3/12/25 AM, patient self extubated, placed on Bipap 40% FiO2.  3/12/25 AM, attempt weaning off bipap, patient placed on 4L/min NC.

## 2025-03-12 NOTE — CONSULT NOTE ADULT - SUBJECTIVE AND OBJECTIVE BOX
Patient is a 59y old  Female who presents with a chief complaint of palpitations (11 Mar 2025 22:49)      HPI:  59-year-old female, AAOx3, ambulates with a cane, no HHA,  history of DM, HLD, asthma, COPD on 4L NC, migraines, hypothyroidism, NAKUL, RCC status post left renal resection  present for intermittent palpitations and chest pain that started a week ago. She describes her chest pain as dull and stays on the left side. Admits to dizziness, nausea and sob with the palpitation episodes. Also reports recent fall and feels unsteady. Denies head strike or LOC.  Denies any fever, chills, abdominal pain, n/v or dysuria. Also Admits to chronic diarrhea who she has a follow up for a colonoscopy.    ED course:  Vitals: temp 98.2, , /83, RR 18 spo2 90 on 3L  glucose 273, mg 1.2   s/p 500 nacl , morphine, zofran  (10 Mar 2025 23:24)    RRT called for pt being unresponsive, noted by the respiratory therapist while trying to put the BiPAP. PT unresponsive to loud verbal or painful stimuli, Vital signs showed /101 , BP20, Sat 77% on NRB, blood glucose of 284. Bag mask was started and pt sat well to 100% with remaining of the vitals stable. Anesthesia was called by the floor team and pt was intubated on the floor. Etomidate 20 and rocuronium 50 was provided. Pt was transferred to ICU for further management. ABG during the RRT showed 7.04/>125/53/NR bicarb, Lactate 0.5. Pt was started on Mechanical ventilation Vol control 25/420/100%/5.        Allergies    Fioricet (Rash)  venlafaxine (Hives)  mushroom (Unknown)  gadobutrol (Rash; Urticaria; Hives)  IV Contrast (Short breath)  honeydew (Rash)    Intolerances        MEDICATIONS  (STANDING):  acetaminophen     Tablet .. 1000 milliGRAM(s) Oral every 8 hours  albuterol/ipratropium (CFC free) Inhaler. 1 Puff(s) Inhalation four times a day  amLODIPine   Tablet 10 milliGRAM(s) Oral daily  aspirin  chewable 81 milliGRAM(s) Oral daily  atorvastatin 40 milliGRAM(s) Oral at bedtime  chlorhexidine 0.12% Liquid 15 milliLiter(s) Oral Mucosa every 12 hours  chlorhexidine 2% Cloths 1 Application(s) Topical <User Schedule>  enoxaparin Injectable 40 milliGRAM(s) SubCutaneous every 12 hours  etomidate Injectable 20 milliGRAM(s) IV Push once  fluticasone propionate 50 MICROgram(s)/spray Nasal Spray 1 Spray(s) Both Nostrils two times a day  fluticasone propionate/ salmeterol 250-50 MICROgram(s) Diskus 1 Dose(s) Inhalation two times a day  gabapentin 400 milliGRAM(s) Oral two times a day  hydrocortisone 1% Ointment 1 Application(s) Topical every 12 hours  insulin glargine Injectable (LANTUS) 38 Unit(s) SubCutaneous at bedtime  insulin lispro (ADMELOG) corrective regimen sliding scale   SubCutaneous every 6 hours  levothyroxine Injectable 85 MICROGram(s) IV Push at bedtime  losartan 25 milliGRAM(s) Oral daily  montelukast 10 milliGRAM(s) Oral daily  OXcarbazepine 600 milliGRAM(s) Oral two times a day  pantoprazole  Injectable 40 milliGRAM(s) IV Push daily  polyethylene glycol 3350 17 Gram(s) Oral daily  propofol Infusion 10 MICROgram(s)/kG/Min (7.86 mL/Hr) IV Continuous <Continuous>  rocuronium Injectable 50 milliGRAM(s) IV Push once  senna 2 Tablet(s) Oral at bedtime    MEDICATIONS  (PRN):  dextrose Oral Gel 15 Gram(s) Oral once PRN Blood Glucose LESS THAN 70 milliGRAM(s)/deciliter      Daily     Daily     Drug Dosing Weight  Height (cm): 154.9 (10 Mar 2025 17:08)  Weight (kg): 131 (10 Mar 2025 17:08)  BMI (kg/m2): 54.6 (10 Mar 2025 17:08)  BSA (m2): 2.21 (10 Mar 2025 17:08)    PAST MEDICAL & SURGICAL HISTORY:  Hypertension  vaginal cyst      Hyperlipidemia      Asthma  last inhaler use with in 10 days, on Pulm follow up [id number]      Hypothyroid      Obstructive sleep apnea  refuses Cpap      Obesity  morbid      Trigeminal neuralgia  R      Fibromyalgia      DM (diabetes mellitus)  2      DJD (degenerative joint disease)  Cervical/Thoracic/Lumbar      Cervical neuralgia  difficulty moving my neck      Back pain  thoracic & lumbar      H/O bursitis  right shoulder      Radicular pain  arms, legs      Vasculitis  Legs, forerhead, arms & hands, flare ups in R leg  Dr susie Ribeiro is my Rheumatologist      Renal cell carcinoma, left  on follow up Dr schulte, HOD renal [facility], waiting for suregry in 2020      Falls frequently      Morbid obesity with body mass index (BMI) of 50.0 to 59.9 in adult      Psoriasis      Diverticulosis      Fatty liver      S/P abdominal hysterectomy          S/P  section  1      Left adrenal mass  excision, benign 2006      Vaginal cyst  removed       Vasculitis on nerve biopsy  , had another surgery called microvascular decompression       S/P left oophorectomy      S/P colonoscopic polypectomy          FAMILY HISTORY:  Family history of diabetes mellitus    Family history of hypertension    Family history of obesity        SOCIAL HISTORY:    ADVANCE DIRECTIVES:    REVIEW OF SYSTEMS:  Unable to obtain, pt unresponsive during RRT       ICU Vital Signs Last 24 Hrs  T(C): 36.5 (11 Mar 2025 19:17), Max: 36.9 (11 Mar 2025 02:54)  T(F): 97.7 (11 Mar 2025 19:17), Max: 98.5 (11 Mar 2025 02:54)  HR: 93 (11 Mar 2025 19:17) (80 - 97)  BP: 101/49 (11 Mar 2025 19:17) (101/49 - 156/88)  BP(mean): --  ABP: --  ABP(mean): --  RR: 18 (11 Mar 2025 19:17) (17 - 20)  SpO2: 93% (11 Mar 2025 19:17) (91% - 96%)    O2 Parameters below as of 11 Mar 2025 19:17  Patient On (Oxygen Delivery Method): nasal cannula  O2 Flow (L/min): 4          ABG - ( 11 Mar 2025 23:32 )  pH, Arterial: 7.04  pH, Blood: x     /  pCO2: >125  /  pO2: 53    / HCO3: NR    / Base Excess: NR    /  SaO2: 78                  I&O's Detail    11 Mar 2025 07:01  -  12 Mar 2025 00:12  --------------------------------------------------------  IN:    Oral Fluid: 200 mL  Total IN: 200 mL    OUT:    Stool (mL): 1 mL  Total OUT: 1 mL    Total NET: 199 mL          PHYSICAL EXAM:    GENERAL: unresponsive, lying in bed, severely morbidly obese   HEAD:  Atraumatic, Normocephalic  EYES: EOMI, PERRLA, conjunctiva and sclera clear  ENT: Moist mucous membranes  NECK: Supple, No JVD  CHEST/LUNG: Clear to auscultation bilaterally; sat 77% on NRB   HEART: Regular rate and rhythm; No murmurs, rubs, or gallops  ABDOMEN: Bowel sounds present; Soft, Nontender, severely obese abdomen   EXTREMITIES:  2+ Peripheral Pulses, brisk capillary refill. No clubbing, cyanosis,+ edema  NERVOUS SYSTEM:  Unresponsive   SKIN: No rashes or lesions      LABS:  CBC Full  -  ( 11 Mar 2025 05:00 )  WBC Count : 10.24 K/uL  RBC Count : 4.79 M/uL  Hemoglobin : 11.4 g/dL  Hematocrit : 39.0 %  Platelet Count - Automated : 256 K/uL  Mean Cell Volume : 81.4 fl  Mean Cell Hemoglobin : 23.8 pg  Mean Cell Hemoglobin Concentration : 29.2 g/dL  Auto Neutrophil # : x  Auto Lymphocyte # : x  Auto Monocyte # : x  Auto Eosinophil # : x  Auto Basophil # : x  Auto Neutrophil % : x  Auto Lymphocyte % : x  Auto Monocyte % : x  Auto Eosinophil % : x  Auto Basophil % : x    03-    139  |  99  |  19[H]  ----------------------------<  305[H]  4.1   |  35[H]  |  1.05    Ca    8.6      11 Mar 2025 05:00  Phos  4.0     -  Mg     1.2     -    TPro  7.4  /  Alb  3.0[L]  /  TBili  0.6  /  DBili  x   /  AST  28  /  ALT  40  /  AlkPhos  129[H]  03-11    CAPILLARY BLOOD GLUCOSE      POCT Blood Glucose.: 284 mg/dL (11 Mar 2025 23:15)    PT/INR - ( 10 Mar 2025 19:20 )   PT: 12.7 sec;   INR: 1.09 ratio         PTT - ( 10 Mar 2025 19:20 )  PTT:29.1 sec  Urinalysis Basic - ( 11 Mar 2025 05:00 )    Color: x / Appearance: x / SG: x / pH: x  Gluc: 305 mg/dL / Ketone: x  / Bili: x / Urobili: x   Blood: x / Protein: x / Nitrite: x   Leuk Esterase: x / RBC: x / WBC x   Sq Epi: x / Non Sq Epi: x / Bacteria: x

## 2025-03-12 NOTE — PROGRESS NOTE ADULT - SUBJECTIVE AND OBJECTIVE BOX
Cardiology Progress Note  ------------------------------------------------------------------------------------------  SUBJECTIVE:   Patient on Bipap. s/p self extubation today  -------------------------------------------------------------------------------------------  ROS:  CV: chest pain (-), palpitation (-), orthopnea (-), PND (-), edema (-)  PULM: SOB (-), cough (-), wheezing (-), hemoptysis (-).   CONST: fever (-), chills (-) or fatigue (-)  GI: abdominal distension (-), abdominal pain (-) , nausea/vomiting (-), hematemesis, (-), melena (-), hematochezia (-)  : dysuria (-), frequency (-), hematuria (-).   NEURO: numbness (-), weakness (-), dizziness (-)  MSK: myalgia (-), joint pain (-)   SKIN: itching (-), rash (-)  HEENT:  visual changes (-); vertigo or throat pain (-);  neck stiffness (-)   Psych: change in mood (-), anxiety (-), depression (-)     All other review of systems is negative unless indicated above.   -------------------------------------------------------------------------------------------  VS:  T(F): 98.4 (03-12), Max: 98.6 (03-12)  HR: 99 (03-12) (67 - 110)  BP: 151/60 (03-12) (87/70 - 151/60)  RR: 21 (03-12)  SpO2: 93% (03-12)  I&O's Summary    11 Mar 2025 07:01  -  12 Mar 2025 07:00  --------------------------------------------------------  IN: 502 mL / OUT: 501 mL / NET: 1 mL      ------------------------------------------------------------------------------------------  PHYSICAL EXAM:  Vital Signs were reviewed.  GENERAL: NAD, well-appearing, well nourished.   HEAD:  Atraumatic, Normocephalic.  PERRLA, conjunctiva and sclera clear  ENT: Moist mucous membranes, no external lesions on nose.   NECK: Supple, JVP   CHEST/LUNG: No chest wall tenderness. Diminished bilaterally   HEART: S1, S2, no murmur   ABDOMEN: Bowel sounds present; Soft, Nontender, Nondistended.   EXTREMITIES:  2+ edema up below the knee on R leg and minimal on L leg  NERVOUS SYSTEM:  Alert & Oriented X3, speech clear. No deficits.   SKIN: No rashes or lesions.    -------------------------------------------------------------------------------------------  LABS:  03-12    135  |  96  |  30[H]  ----------------------------<  305[H]  4.3   |  29  |  1.47[H]    Ca    8.7      12 Mar 2025 03:30  Phos  2.5     03-12  Mg     1.6     03-12    TPro  6.5  /  Alb  2.7[L]  /  TBili  0.8  /  DBili  x   /  AST  33  /  ALT  39  /  AlkPhos  114  03-12    Creatinine Trend: 1.47<--, 1.62<--, 1.05<--, 1.22<--                        10.2   8.52  )-----------( 234      ( 12 Mar 2025 03:30 )             33.2     PT/INR - ( 12 Mar 2025 00:50 )   PT: 11.8 sec;   INR: 1.02 ratio         PTT - ( 12 Mar 2025 00:50 )  PTT:33.2 sec    Lipid Panel: T(F): 98.4 (03-12), Max: 98.6 (03-12)  HR: 99 (03-12) (67 - 110)  BP: 151/60 (03-12) (87/70 - 151/60)  RR: 21 (03-12)  SpO2: 93% (03-12)  Cardiac Enzymes: CARDIAC MARKERS ( 12 Mar 2025 00:50 )  x     / x     / x     / x     / 3.0 ng/mL          -------------------------------------------------------------------------------------------  Meds:  acetaminophen     Tablet .. 1000 milliGRAM(s) Oral every 8 hours  albuterol    0.083% 2.5 milliGRAM(s) Nebulizer every 6 hours  albuterol    90 MICROgram(s) HFA Inhaler 1 Puff(s) Inhalation every 4 hours PRN  aspirin  chewable 81 milliGRAM(s) Oral daily  atorvastatin 40 milliGRAM(s) Oral at bedtime  chlorhexidine 2% Cloths 1 Application(s) Topical <User Schedule>  enoxaparin Injectable 40 milliGRAM(s) SubCutaneous every 12 hours  fluticasone propionate/ salmeterol 250-50 MICROgram(s) Diskus 1 Dose(s) Inhalation two times a day  hydrocortisone 1% Ointment 1 Application(s) Topical every 12 hours  insulin glargine Injectable (LANTUS) 38 Unit(s) SubCutaneous at bedtime  insulin lispro (ADMELOG) corrective regimen sliding scale   SubCutaneous every 6 hours  levothyroxine Injectable 85 MICROGram(s) IV Push at bedtime  montelukast 10 milliGRAM(s) Oral daily  OXcarbazepine 600 milliGRAM(s) Oral two times a day  pantoprazole  Injectable 40 milliGRAM(s) IV Push daily  polyethylene glycol 3350 17 Gram(s) Oral daily  senna 2 Tablet(s) Oral at bedtime    Cardiovascular Diagnostic Testing:    ECG: sinus rhythm with TWI in I and aVL    TELE: NSR HR range     Echo     < from: TTE W or WO Ultrasound Enhancing Agent (03.11.25 @ 12:19) >  CONCLUSIONS:      1. Technically difficult image quality.   2. Left ventricular cavity is normal in size. Left ventricular wall thickness is normal. Left ventricular systolic function is normal with an ejection fraction of 54 % by Valdez's method of disks. There are no regional wall motion abnormalities seen.   3. There is mild (grade 1) left ventricular diastolic dysfunction.   4. Left atrium is mildly dilated.   5. Normal right ventricular systolic function. Tricuspid annular plane systolic excursion (TAPSE) is 2.3 cm (normal >=1.7 cm).   6. No significant valvular disease.   7. Pulmonary artery systolic pressure could not be estimated.   8. No pericardial effusion seen.   9. Compared to the transthoracic echocardiogram performed on 3/20/2024, there have been no significant interval changes.      < end of copied text >    Stress Testing 3/2024 Conclusions:   1. Normal myocardial perfusion scan, with no evidence of infarction or inducible ischemia.   2. Baseline electrocardiogram: normal sinus rhythm at a rate of 89 bpm with no arrhythmias and poor R wave progression.   3. Stress electrocardiogram: No ischemic ST segment changes.   4. The left ventricle is normal in function and normal in size. The post stress left ventricular EF is 81 %. The stress end diastolic volume is 86 ml and systolic volume is 16 ml.      LE Duplex -   IMPRESSION:  No evidence of deep venous thrombosis in either lower extremity.      < from: Xray Chest 1 View- PORTABLE-Urgent (Xray Chest 1 View- PORTABLE-Urgent .) (03.12.25 @ 10:22) >  INTERPRETATION:  Exam:XR CHEST URGENT    clinical history: intubated    Removal of endotracheal and nasogastric tubes. Improved aeration right   lung. Stable left basilar airspace disease.    IMPRESSION: Improved aeration.    --- End of Report ---    < from: Xray Chest 1 View- PORTABLE-Urgent (Xray Chest 1 View- PORTABLE-Urgent .) (03.12.25 @ 00:44) >  INTERPRETATION:  Exam:XR CHEST URGENT    clinical history:RRT    Endotracheal tube above the mic. NG tube left upper quadrant.   Bibasilar airspace disease left greater than right.    IMPRESSION: Bibasilar airspace disease. Support tubes as above    --- End of Report ---    < end of copied text >        < end of copied text >      CXR:  reviewed          Assessment and Recommendation:   · Assessment	  Assessment and Recommendations:  59 year old F with PMHx of asthma, COPD (on 4L NC PRN), DM, fatty liver, fibromyalgia, R shoulder bursitis, HLD, hypothyroidism, NAKUL, psoriasis, RCC (s/p L renal resection 11/3/23, no hx of CT/RT) presenting palpitations, chest discomfort and lower abdominal pain, in setting of fall 1 week ago    1) Atypical CP, chronic SOB, multifactorial in setting of COPD, morbid obesity, fibromyalgia  2) Elevated Ca score  3) Palpitations  4) DM  5) Morbid obesity  6) Fibromyalgia  - EKG showed sinus rhythm with non-specific T wave abnormality in I and aVL  - Troponins negative x 2 on admission then increased likely due to hypercapnia trended down  - CCTA 12/2022 showed Ca score of 1069.2 with no significant obstructive CAD. Pharm nuclear stress 3/2024 showed normal myocardial perfusion  - Continue ASA 81 and atorvastatin 40mg daily for non-obstructive CAD   - RLE > LLE edema. LE duplex negative for DVT.  - TTE showed normal LV and RV function without significant valvular disease  - no further inpatient cardiac testing will be planned    7) HTN  - Continue home meds     Cardiology Progress Note  ------------------------------------------------------------------------------------------  SUBJECTIVE:   Patient on Bipap. s/p self extubation today  -------------------------------------------------------------------------------------------  ROS:  CV: chest pain (-), palpitation (-), orthopnea (-), PND (-), edema (-)  PULM: SOB (-), cough (-), wheezing (-), hemoptysis (-).   CONST: fever (-), chills (-) or fatigue (-)  GI: abdominal distension (-), abdominal pain (-) , nausea/vomiting (-), hematemesis, (-), melena (-), hematochezia (-)  : dysuria (-), frequency (-), hematuria (-).   NEURO: numbness (-), weakness (-), dizziness (-)  MSK: myalgia (-), joint pain (-)   SKIN: itching (-), rash (-)  HEENT:  visual changes (-); vertigo or throat pain (-);  neck stiffness (-)   Psych: change in mood (-), anxiety (-), depression (-)     All other review of systems is negative unless indicated above.   -------------------------------------------------------------------------------------------  VS:  T(F): 98.4 (03-12), Max: 98.6 (03-12)  HR: 99 (03-12) (67 - 110)  BP: 151/60 (03-12) (87/70 - 151/60)  RR: 21 (03-12)  SpO2: 93% (03-12)  I&O's Summary    11 Mar 2025 07:01  -  12 Mar 2025 07:00  --------------------------------------------------------  IN: 502 mL / OUT: 501 mL / NET: 1 mL      ------------------------------------------------------------------------------------------  PHYSICAL EXAM:  Vital Signs were reviewed.  GENERAL: NAD, well-appearing, well nourished.   HEAD:  Atraumatic, Normocephalic.  PERRLA, conjunctiva and sclera clear  ENT: Moist mucous membranes, no external lesions on nose.   NECK: Supple, JVP   CHEST/LUNG: No chest wall tenderness. Diminished bilaterally   HEART: S1, S2, no murmur   ABDOMEN: Bowel sounds present; Soft, Nontender, Nondistended.   EXTREMITIES:  2+ edema up below the knee on R leg and minimal on L leg  NERVOUS SYSTEM:  Alert & Oriented X3, speech clear. No deficits.   SKIN: No rashes or lesions.    -------------------------------------------------------------------------------------------  LABS:  03-12    135  |  96  |  30[H]  ----------------------------<  305[H]  4.3   |  29  |  1.47[H]    Ca    8.7      12 Mar 2025 03:30  Phos  2.5     03-12  Mg     1.6     03-12    TPro  6.5  /  Alb  2.7[L]  /  TBili  0.8  /  DBili  x   /  AST  33  /  ALT  39  /  AlkPhos  114  03-12    Creatinine Trend: 1.47<--, 1.62<--, 1.05<--, 1.22<--                        10.2   8.52  )-----------( 234      ( 12 Mar 2025 03:30 )             33.2     PT/INR - ( 12 Mar 2025 00:50 )   PT: 11.8 sec;   INR: 1.02 ratio         PTT - ( 12 Mar 2025 00:50 )  PTT:33.2 sec    Lipid Panel: T(F): 98.4 (03-12), Max: 98.6 (03-12)  HR: 99 (03-12) (67 - 110)  BP: 151/60 (03-12) (87/70 - 151/60)  RR: 21 (03-12)  SpO2: 93% (03-12)  Cardiac Enzymes: CARDIAC MARKERS ( 12 Mar 2025 00:50 )  x     / x     / x     / x     / 3.0 ng/mL          -------------------------------------------------------------------------------------------  Meds:  acetaminophen     Tablet .. 1000 milliGRAM(s) Oral every 8 hours  albuterol    0.083% 2.5 milliGRAM(s) Nebulizer every 6 hours  albuterol    90 MICROgram(s) HFA Inhaler 1 Puff(s) Inhalation every 4 hours PRN  aspirin  chewable 81 milliGRAM(s) Oral daily  atorvastatin 40 milliGRAM(s) Oral at bedtime  chlorhexidine 2% Cloths 1 Application(s) Topical <User Schedule>  enoxaparin Injectable 40 milliGRAM(s) SubCutaneous every 12 hours  fluticasone propionate/ salmeterol 250-50 MICROgram(s) Diskus 1 Dose(s) Inhalation two times a day  hydrocortisone 1% Ointment 1 Application(s) Topical every 12 hours  insulin glargine Injectable (LANTUS) 38 Unit(s) SubCutaneous at bedtime  insulin lispro (ADMELOG) corrective regimen sliding scale   SubCutaneous every 6 hours  levothyroxine Injectable 85 MICROGram(s) IV Push at bedtime  montelukast 10 milliGRAM(s) Oral daily  OXcarbazepine 600 milliGRAM(s) Oral two times a day  pantoprazole  Injectable 40 milliGRAM(s) IV Push daily  polyethylene glycol 3350 17 Gram(s) Oral daily  senna 2 Tablet(s) Oral at bedtime    Cardiovascular Diagnostic Testing:    ECG: sinus rhythm with TWI in I and aVL    TELE: NSR HR range     Echo     < from: TTE W or WO Ultrasound Enhancing Agent (03.11.25 @ 12:19) >  CONCLUSIONS:      1. Technically difficult image quality.   2. Left ventricular cavity is normal in size. Left ventricular wall thickness is normal. Left ventricular systolic function is normal with an ejection fraction of 54 % by Valdez's method of disks. There are no regional wall motion abnormalities seen.   3. There is mild (grade 1) left ventricular diastolic dysfunction.   4. Left atrium is mildly dilated.   5. Normal right ventricular systolic function. Tricuspid annular plane systolic excursion (TAPSE) is 2.3 cm (normal >=1.7 cm).   6. No significant valvular disease.   7. Pulmonary artery systolic pressure could not be estimated.   8. No pericardial effusion seen.   9. Compared to the transthoracic echocardiogram performed on 3/20/2024, there have been no significant interval changes.      < end of copied text >    Stress Testing 3/2024 Conclusions:   1. Normal myocardial perfusion scan, with no evidence of infarction or inducible ischemia.   2. Baseline electrocardiogram: normal sinus rhythm at a rate of 89 bpm with no arrhythmias and poor R wave progression.   3. Stress electrocardiogram: No ischemic ST segment changes.   4. The left ventricle is normal in function and normal in size. The post stress left ventricular EF is 81 %. The stress end diastolic volume is 86 ml and systolic volume is 16 ml.      LE Duplex -   IMPRESSION:  No evidence of deep venous thrombosis in either lower extremity.      < from: Xray Chest 1 View- PORTABLE-Urgent (Xray Chest 1 View- PORTABLE-Urgent .) (03.12.25 @ 10:22) >  INTERPRETATION:  Exam:XR CHEST URGENT    clinical history: intubated    Removal of endotracheal and nasogastric tubes. Improved aeration right   lung. Stable left basilar airspace disease.    IMPRESSION: Improved aeration.    --- End of Report ---    < from: Xray Chest 1 View- PORTABLE-Urgent (Xray Chest 1 View- PORTABLE-Urgent .) (03.12.25 @ 00:44) >  INTERPRETATION:  Exam:XR CHEST URGENT    clinical history:RRT    Endotracheal tube above the mic. NG tube left upper quadrant.   Bibasilar airspace disease left greater than right.    IMPRESSION: Bibasilar airspace disease. Support tubes as above    --- End of Report ---    < end of copied text >        < end of copied text >      CXR:  reviewed

## 2025-03-12 NOTE — PHYSICAL THERAPY INITIAL EVALUATION ADULT - PASSIVE RANGE OF MOTION EXAMINATION, REHAB EVAL
Hip and knee flexion to ~ 90 degree, further ROM limited by abdominal obesity./bilateral upper extremity Passive ROM was WFL (within functional limits)/bilateral lower extremity Passive ROM was WFL (within functional limits)

## 2025-03-12 NOTE — GOALS OF CARE CONVERSATION - ADVANCED CARE PLANNING - CONVERSATION DETAILS
Due to recent hypoxia from COPD/NAKUL, GOC was discussed. Pt refuses to use bipap/cpap at night while in hospital.     Spoke in full detail with patient regarding life sustaining treatment in the event of cardiac and respiratory arrest. patient wants to have CPR and mechanical ventilation to stay alive. Patient encouraged to verbalize concerns and emotional support give, Patient FULL Code.
Goals of care discussed with patient. She states that she does not want to be intubated again. She understands the risk that she might die if she does not get re intubated. She refused strongly. However, she is okay with CPR.   meaning of CPR described in detail and after understanding risk associated with age.  Pt is DNI with CPR  MOLST form filled in chart pending attending signature
This writer called son, Marquis Leger (son) at [phone] and spoke to him regarding goals of care for his mother, . Marquis endorsed that pt has had previous hospitalizations with respiratory complications requiring intubation. His wishes are to have everything done for his mother. She remains FULL CODE at this time.

## 2025-03-12 NOTE — PHYSICAL THERAPY INITIAL EVALUATION ADULT - LEVEL OF INDEPENDENCE: SUPINE/SIT, REHAB EVAL
Patient self-extubated in the morning, focusing on breathing strategies with patient currently as patient is being weaned from bipap to NC, will further assess functional mobility at next best opportunity.

## 2025-03-12 NOTE — CONSULT NOTE ADULT - ATTENDING COMMENTS
Patient seen and examined with ICU resident in [facility] upon RRT. Data reviewed. Case and plan of care discussed with resident.  59 year old morbidly obese woman with multiple comorbid states including COPD (on home oxygen), NAKUL was admitted to medicine service for chest pain R/o ACS. RRT was called because she was found unresponsive with pH 7.04 and PCO2 of 125. She was emergently intubated by anesthesiologist and placed on mechanical ventilation. Transferred to ICU for further management.    Exam -Vital signs reviewed and stable, middle aged morbidly oibese woman orally intubated and sedated, Heart- normal heart sounds, , Lungs -decreased air entry bilaterally because of body habitus, Abdomen- Obese,  soft,  Ext: ++edema.    Labs/Imaging reviewed and pertinent for severe acute on chronic respiratory acidosis prior to intubation, FERNIE, BUN/Cr 32/1.62, HCO3     Assessment  Acute on chronic hypercapnic respiratory failure in the setting of likely NAKUL/OHS s/p intubatioin  ?COPD exacerbation  R/o ACS  FERNIE  H/o Morbid obesity/NAKUL  H/o DM, HLD, Asthma, COPD (on 4L NC)  H/o Migraines, hypothyroidism,  H/o RCC status post left renal resection 11/23    Plan  Accepted to ICU  Continue mechanical ventilation  Monitor ABG and adjust vent settings as needed.  Start Solumedrol 40mg q6h, Duonebs a4h for suspected copd exacerbation  Trend troponins, `12 lead EKG,   OG tuibe for feeding and medications  DVT/GI prophylaxis.

## 2025-03-12 NOTE — PHYSICAL THERAPY INITIAL EVALUATION ADULT - GENERAL OBSERVATIONS, REHAB EVAL
Patient received supine with HOB elevated, BiPap 40% FiO2, morbidly obese, denies any current complaints of pain, cardiac monitoring in place.

## 2025-03-12 NOTE — PHYSICAL THERAPY INITIAL EVALUATION ADULT - MODALITIES TREATMENT COMMENTS
Patient self-extubated in the morning, focusing on breathing strategies with patient currently as patient is being weaned from bipap to NC, will further assess functional mobility at next best opportunity. Patient noted to have decrease diaphragmatic excursion with breathing, patient shown diaphragmatic breathing. Patient Rass of 0, alert and calm, CAM ICU negative for delirium.

## 2025-03-12 NOTE — PHYSICAL THERAPY INITIAL EVALUATION ADULT - ACTIVE RANGE OF MOTION EXAMINATION, REHAB EVAL
Patient demonstrates AROM hip and knee flexion to ~ 90 degrees, further ROM limited due to abdominal obesity./bilateral upper extremity Active ROM was WFL (within functional limits)/bilateral  lower extremity Active ROM was WFL (within functional limits)

## 2025-03-12 NOTE — CONSULT NOTE ADULT - ASSESSMENT
59-year-old female, AAOx3, ambulates with a cane, no HHA,  history of DM, HLD, asthma, COPD on 4L NC, migraines, hypothyroidism, NAKUL, RCC status post left renal resection 11/23 present for intermittent palpitations and chest pain that started a week ago. RRT called for unresponsiveness and AHHRF, Pt admitted to ICU s/p intubation.       =================== Neuro============================  Unresponsive on RRT  Sedated and intubated   c/w propofol   will give versed 4 mg as well     ================= Cardiovascular==========================  #chest pain   p/w chest pain, palpitation  admitted to medicine for ACS rule out  Trop x2 neg on admission   pro-BNP neg   RRT for unresponsiveness in the setting of AHHRF, rest of vitals /101 , BP20, Sat 77% on NRB, blood glucose of 284  ECG during RRT showed sinus tachycardia with T wave inversion on lead I, without ST or T wave changes  f/u repeat cardiac enzyme  Cardio Dr. Hampton consulted on admission, recommend continuation of ASA and ATorvastatin 40 mg     #HTN  hx of HTN on Losartan 25 mg, Amlodipine 10 mg daily   will hold home meds for now that pt BP soft in the setting of Propofol   resume home meds if BP sustaining >140/90    #HLD   hx of HLD on Atorvastatin 40 mg   ,   c/w home meds     ================- Pulm=================================  #AHHRF  #COPD on 4L NC   #NAKUL on nocturnal bipap  ABG during the RRT showed 7.04/>125/53/NR bicarb  intubated and sedated   ABG during the RRT showed 7.04/>125/53/NR bicarb,  f/u repeat ABG in an hour and adjust the vent accordingly  CXR s/p intubation noted and tube adjusted  no consolidation or effusion   on Combivent and advair at home  c/w Duoneb while intubated and resume home meds   No concern for COPD exacerbation at this time   No abx requirement at this time   less concern for PE at this time, bilateral LE duplex neg for DVT  f/u ddimer   consider CTA in AM     ==================ID===================================  No active issues     ================= Nephro================================  No active issues       =================GI====================================  No active issues       ================ Heme==================================  No active issues       =================Endocrine===============================  #DM  uncontrolled IDDM on Lantus 35 and Lispro 12 U pre-meals  NPO for now   HbA1c 13%  c/w Lantus and mod ISS   blood glucose q6   Endo Dr. Catracho consulted     ================= Skin/Catheters============================  Peripheral IV lines - x2   Bustamante catheter placed in ICU     =================Prophylaxis =============================  DVT prophylaxis - Lovenox   GI prophylaxis - PPI     ==================GOC==================================  FULL CODE   Disposition - ICU      59-year-old female, AAOx3, ambulates with a cane, no HHA,  history of DM, HLD, asthma, COPD on 4L NC, migraines, hypothyroidism, NAKUL, RCC status post left renal resection 11/23 present for intermittent palpitations and chest pain that started a week ago. RRT called for unresponsiveness and AHHRF, Pt admitted to ICU s/p intubation.       =================== Neuro============================  Unresponsive on RRT  Sedated and intubated   c/w propofol   will give versed 4 mg as well     ================= Cardiovascular==========================  #chest pain   p/w chest pain, palpitation  admitted to medicine for ACS rule out  Trop x2 neg on admission   pro-BNP neg   RRT for unresponsiveness in the setting of AHHRF, rest of vitals /101 , BP20, Sat 77% on NRB, blood glucose of 284  ECG during RRT showed sinus tachycardia with T wave inversion on lead I, without ST or T wave changes  f/u repeat cardiac enzyme  Cardio Dr. Hampton consulted on admission, recommend continuation of ASA and ATorvastatin 40 mg     #HTN  hx of HTN on Losartan 25 mg, Amlodipine 10 mg daily   will hold home meds for now that pt BP soft in the setting of Propofol   resume home meds if BP sustaining >140/90    #HLD   hx of HLD on Atorvastatin 40 mg   ,   c/w home meds     ================- Pulm=================================  #AHHRF  #COPD on 4L NC   #NAKUL on nocturnal bipap  ABG during the RRT showed 7.04/>125/53/NR bicarb  intubated and sedated   ABG during the RRT showed 7.04/>125/53/NR bicarb,  f/u repeat ABG in an hour and adjust the vent accordingly  CXR s/p intubation noted and tube adjusted  no consolidation or effusion   on Combivent and advair at home  c/w Duoneb while intubated and resume home meds   No concern for COPD exacerbation at this time   No abx requirement at this time   less concern for PE at this time, bilateral LE duplex neg for DVT  f/u ddimer   consider CTA in AM     ==================ID===================================  No active issues     ================= Nephro================================  No active issues       =================GI====================================  No active issues       ================ Heme==================================  #Anemia  likely anemia of chronic disease vs DEWAYNE  HB 11.4  outpt iron studies recommended     =================Endocrine===============================  #DM  uncontrolled IDDM on Lantus 35 and Lispro 12 U pre-meals  NPO for now   HbA1c 13%  c/w Lantus and mod ISS   blood glucose q6   Endo DrAyde Catracho consulted     ================= Skin/Catheters============================  Peripheral IV lines - x2   Bustamante catheter placed in ICU     =================Prophylaxis =============================  DVT prophylaxis - Lovenox   GI prophylaxis - PPI     ==================GOC==================================  FULL CODE   Disposition - ICU      59-year-old female, AAOx3, ambulates with a cane, no HHA,  history of DM, HLD, asthma, COPD on 4L NC, migraines, hypothyroidism, NAKUL, RCC status post left renal resection 11/23 present for intermittent palpitations and chest pain that started a week ago. RRT called for unresponsiveness and AHHRF, Pt admitted to ICU s/p intubation.       =================== Neuro============================  Unresponsive on RRT  Sedated and intubated   c/w propofol   will give versed 4 mg as well     ================= Cardiovascular==========================  #chest pain   p/w chest pain, palpitation  admitted to medicine for ACS rule out  Trop x2 neg on admission   pro-BNP neg   RRT for unresponsiveness in the setting of AHHRF, rest of vitals /101 , BP20, Sat 77% on NRB, blood glucose of 284  ECG during RRT showed sinus tachycardia with T wave inversion on lead I, without ST or T wave changes  f/u repeat cardiac enzyme  f/u TTE   Cardio Dr. Hampton consulted on admission, recommend continuation of ASA and Atorvastatin 40 mg     #HTN  hx of HTN on Losartan 25 mg, Amlodipine 10 mg daily   will hold home meds for now that pt BP soft in the setting of Propofol   resume home meds if BP sustaining >140/90    #HLD   hx of HLD on Atorvastatin 40 mg   ,   c/w home meds     ================- Pulm=================================  #AHHRF  #COPD on 4L NC   #NAKUL on nocturnal bipap  ABG during the RRT showed 7.04/>125/53/NR bicarb  intubated and sedated   ABG during the RRT showed 7.04/>125/53/NR bicarb,  RVP on admission neg   CXR s/p intubation noted and tube adjusted  no consolidation or effusion   on Combivent and advair at home  c/w Duoneb while intubated and resume home meds   No concern for COPD exacerbation at this time   No abx requirement at this time   less concern for PE at this time, bilateral LE duplex neg for DVT  f/u repeat ABG in an hour and adjust the vent accordingly  f/u ddimer   consider CTA in AM     ==================ID===================================  No active issues     ================= Nephro================================  No active issues       =================GI====================================  No active issues       ================ Heme==================================  #Anemia  likely anemia of chronic disease vs DEWAYNE  HB 11.4  outpt iron studies recommended     =================Endocrine===============================  #DM  uncontrolled IDDM on Lantus 35 and Lispro 12 U pre-meals  NPO for now   HbA1c 13%  c/w Lantus and mod ISS   blood glucose q6   Endo DrAyde Hayden consulted     ================= Skin/Catheters============================  Peripheral IV lines - x2   Bustamante catheter placed in ICU     =================Prophylaxis =============================  DVT prophylaxis - Lovenox   GI prophylaxis - PPI     ==================GOC==================================  FULL CODE   Disposition - ICU

## 2025-03-12 NOTE — PROGRESS NOTE ADULT - ASSESSMENT
59-year-old female, AAOx3, ambulates with a cane, no HHA,  history of DM, HLD, asthma, COPD on 4L NC, migraines, hypothyroidism, NAKUL, RCC status post left renal resection 11/23 present for intermittent palpitations and chest pain that started a week ago. RRT called for unresponsiveness and AHHRF, Pt admitted to ICU s/p intubation.       =================== Neuro============================  - Unresponsive on RRT likel 2/2 CO2 retention  - Sedated and intubated 3/12  - minimize sedation, SAT/SBT      ================= Cardiovascular==========================  #chest pain   - p/w chest pain, palpitation  - admitted to medicine for ACS rule out  - Trop x2 neg on admission, presently uptrending troponemia 128>1369, cont to trend  - ECG during RRT showed sinus tachycardia with T wave inversion on lead I, without ST or T wave changes  - f/u TTE   - Cardio Dr. Hampton consulted on admission, recommend continuation of ASA and Atorvastatin 40 mg     #HTN  - hx of HTN on Losartan 25 mg, Amlodipine 10 mg daily   - will hold home meds for now that pt BP soft in the setting of Propofol     #HLD   - hx of HLD on Atorvastatin 40 mg   - ,   - c/w home meds     ================- Pulm=================================  #AHHRF  #COPD on 4L NC   #NAKUL on nocturnal bipap  - ABG during the RRT showed 7.04/>125/53/NR bicarb  - intubated and sedated   - ABG during the RRT showed 7.04/>125/53/NR bicarb,  - RVP on admission neg   - CXR s/p intubation noted and tube adjusted  - no consolidation or effusion   - on Combivent and advair at home  - c/w Duoneb while intubated and resume home meds   - No concern for COPD exacerbation at this time   - No abx requirement at this time   - less concern for PE at this time, bilateral LE duplex neg for DVT  - D-dimer negative     ==================ID===================================  No active issues     ================= Nephro================================  No active issues       =================GI====================================  No active issues       ================ Heme==================================  #Anemia  - likely anemia of chronic disease vs DEWAYNE  - HB 11.4  - outpt iron studies recommended     =================Endocrine===============================  #DM  - uncontrolled IDDM on Lantus 35 and Lispro 12 U pre-meals  - NPO for now   - HbA1c 13%  - c/w Lantus and mod ISS   - blood glucose q6   - Endo Dr. Catracho consulted     ================= Skin/Catheters============================  - Peripheral IV lines - x2   - Bustamante catheter placed in ICU     =================Prophylaxis =============================  - DVT prophylaxis - Lovenox   - GI prophylaxis - PPI     ==================GOC==================================  FULL CODE   Disposition - ICU    59-year-old female, AAOx3, ambulates with a cane, no HHA,  history of DM, HLD, asthma, COPD on 4L NC, migraines, hypothyroidism, NAKUL, RCC status post left renal resection 11/23 present for intermittent palpitations and chest pain that started a week ago. RRT called for unresponsiveness and AHHRF, Pt admitted to ICU s/p intubation.       =================== Neuro============================  - Unresponsive on RRT likel 2/2 CO2 retention  - Sedated and intubated 3/12  - minimize sedation, SAT/SBT      ================= Cardiovascular==========================  #chest pain   - p/w chest pain, palpitation  - admitted to medicine for ACS rule out  - Trop x2 neg on admission, presently uptrending troponemia 128>1369, cont to trend  - ECG during RRT showed sinus tachycardia with T wave inversion on lead I, without ST or T wave changes  - f/u TTE   - Cardio Dr. Hampton consulted on admission, recommend continuation of ASA and Atorvastatin 40 mg     #HTN  - hx of HTN on Losartan 25 mg, Amlodipine 10 mg daily   - will hold home meds for now that pt BP soft in the setting of Propofol     #HLD   - hx of HLD on Atorvastatin 40 mg   - ,   - c/w home meds     ================- Pulm=================================  #AHHRF  #COPD on 4L NC   #NAKUL on nocturnal bipap  - ABG during the RRT showed 7.04/>125/53/NR bicarb  - intubated and sedated   - ABG during the RRT showed 7.04/>125/53/NR bicarb,  - RVP on admission neg   - CXR s/p intubation noted and tube adjusted  - no consolidation or effusion   - on Combivent and advair at home  - c/w Duoneb while intubated and resume home meds   - No concern for COPD exacerbation at this time   - No abx requirement at this time   - less concern for PE at this time, bilateral LE duplex neg for DVT  - D-dimer negative     ==================ID===================================  No active issues     ================= Nephro================================  #FERNIE  - likely pre-renal  - avoid nephrotoxic agents  - optimize renal perfusion       =================GI====================================  No active issues       ================ Heme==================================  #Anemia  - likely anemia of chronic disease vs DEWAYNE  - HB 11.4  - outpt iron studies recommended     =================Endocrine===============================  #DM  - uncontrolled IDDM on Lantus 35 and Lispro 12 U pre-meals  - NPO for now   - HbA1c 13%  - c/w Lantus and mod ISS   - blood glucose q6   - Endo Dr. Catracho consulted     ================= Skin/Catheters============================  - Peripheral IV lines - x2   - Bustamante catheter placed in ICU     =================Prophylaxis =============================  - DVT prophylaxis - Lovenox   - GI prophylaxis - PPI     ==================GOC==================================  FULL CODE   Disposition - ICU    59-year-old female, AAOx3, ambulates with a cane, no HHA,  history of DM, HLD, asthma, COPD on 4L NC, migraines, hypothyroidism, NAKUL, RCC status post left renal resection 11/23 present for intermittent palpitations and chest pain that started a week ago. RRT called for unresponsiveness and AHHRF, Pt admitted to ICU s/p intubation.       =================== Neuro============================  - Unresponsive on RRT likel 2/2 CO2 retention  - Sedated and intubated 3/12  - minimize sedation, SAT/SBT      ================= Cardiovascular==========================  #chest pain   - p/w chest pain, palpitation  - admitted to medicine for ACS rule out  - Trop x2 neg on admission, presently uptrending troponemia 128>1369, cont to trend  - ECG during RRT showed sinus tachycardia with T wave inversion on lead I, without ST or T wave changes  - f/u TTE   - Cardio Dr. Hampton consulted on admission, recommend continuation of ASA and Atorvastatin 40 mg     #HTN  - hx of HTN on Losartan 25 mg, Amlodipine 10 mg daily   - will hold home meds for now that pt BP soft in the setting of Propofol     #HLD   - hx of HLD on Atorvastatin 40 mg   - ,   - c/w home meds     ================- Pulm=================================  #AHHRF  #COPD on 4L NC   #NAKUL on nocturnal bipap  - ABG during the RRT showed 7.04/>125/53/NR bicarb  - intubated and sedated   - ABG during the RRT showed 7.04/>125/53/NR bicarb,  - RVP on admission neg   - CXR s/p intubation noted and tube adjusted  - no consolidation or effusion   - on Combivent and advair at home  - c/w Duoneb while intubated and resume home meds   - No concern for COPD exacerbation at this time   - No abx requirement at this time   - less concern for PE at this time, bilateral LE duplex neg for DVT  - D-dimer negative     ==================ID===================================  No active issues     ================= Nephro================================  #FERNIE  - likely pre-renal  - avoid nephrotoxic agents  - optimize renal perfusion       =================GI====================================  No active issues       ================ Heme==================================  #Anemia  - likely anemia of chronic disease vs DEWAYNE  - HB 11.4  - outpt iron studies recommended     =================Endocrine===============================  #DM  - uncontrolled IDDM on Lantus 35 and Lispro 12 U pre-meals  - NPO for now   - HbA1c 13%  - c/w Lantus and mod ISS   - blood glucose q6   - Endo Dr. Hayden consulted     #Hypothyroidism  - cont home synthroid    ================= Skin/Catheters============================  - Peripheral IV lines - x2   - Bustamante catheter placed in ICU     =================Prophylaxis =============================  - DVT prophylaxis - Lovenox   - GI prophylaxis - PPI     ==================GOC==================================  FULL CODE   Disposition - ICU    59-year-old female, AAOx3, ambulates with a cane, no HHA,  history of DM, HLD, asthma, COPD on 4L NC, migraines, hypothyroidism, NAKUL, RCC status post left renal resection 11/23 present for intermittent palpitations and chest pain that started a week ago. RRT called for unresponsiveness and AHHRF, Pt admitted to ICU s/p intubation.       =================== Neuro============================  - Unresponsive on RRT likel 2/2 CO2 retention  - Sedated and intubated 3/12  - minimize sedation, SAT/SBT      ================= Cardiovascular==========================  #chest pain   - p/w chest pain, palpitation  - admitted to medicine for ACS rule out  - Trop x2 neg on admission, presently uptrending troponemia 128>1369 > 2340, cont to trend  - ECG during RRT showed sinus tachycardia with T wave inversion on lead I, without ST or T wave changes  - f/u TTE   - Cardio Dr. Hampton consulted on admission, recommend continuation of ASA and Atorvastatin 40 mg, appreciate cardiology recs     #HTN  - hx of HTN on Losartan 25 mg, Amlodipine 10 mg daily   - will hold home meds for now that pt BP soft in the setting of Propofol     #HLD   - hx of HLD on Atorvastatin 40 mg   - ,   - c/w home meds     ================- Pulm=================================  #AHHRF  #COPD on 4L NC   #NAKUL on nocturnal bipap  - ABG during the RRT showed 7.04/>125/53/NR bicarb  - intubated and sedated   - ABG during the RRT showed 7.04/>125/53/NR bicarb,  - RVP on admission neg   - CXR s/p intubation noted and tube adjusted  - no consolidation or effusion   - on Combivent and advair at home  - c/w Duoneb while intubated and resume home meds   - No concern for COPD exacerbation at this time   - No abx requirement at this time   - less concern for PE at this time, bilateral LE duplex neg for DVT  - D-dimer negative     ==================ID===================================  No active issues     ================= Nephro================================  #FERNIE  - likely pre-renal  - avoid nephrotoxic agents  - optimize renal perfusion       =================GI====================================  No active issues       ================ Heme==================================  #Anemia  - likely anemia of chronic disease vs DEWAYNE  - HB 11.4  - outpt iron studies recommended     =================Endocrine===============================  #DM  - uncontrolled IDDM on Lantus 35 and Lispro 12 U pre-meals  - NPO for now   - HbA1c 13%  - c/w Lantus and mod ISS   - blood glucose q6   - Endo Dr. Hayden consulted     #Hypothyroidism  - cont home synthroid    ================= Skin/Catheters============================  - Peripheral IV lines - x2   - Bustamante catheter placed in ICU     =================Prophylaxis =============================  - DVT prophylaxis - Lovenox   - GI prophylaxis - PPI     ==================GOC==================================  FULL CODE   Disposition - ICU    59-year-old female, AAOx3, ambulates with a cane, no HHA,  history of DM, HLD, asthma, COPD on 4L NC, migraines, hypothyroidism, NAKUL, RCC status post left renal resection 11/23 present for intermittent palpitations and chest pain that started a week ago. RRT called for unresponsiveness and AHHRF, Pt admitted to ICU s/p intubation.       =================== Neuro============================  - Unresponsive on RRT likel 2/2 CO2 retention  - Sedated and intubated 3/12  - minimize sedation, SAT/SBT  - extubated 3/12  - no active issues s/p extubation      ================= Cardiovascular==========================  #chest pain   - p/w chest pain, palpitation  - admitted to medicine for ACS rule out  - Trop x2 neg on admission, presently uptrending troponemia 128>1369 > 2340, cont to trend  - f/u TTE   - Cardio Dr. Hampton consulted on admission, recommend continuation of ASA and Atorvastatin 40 mg  - appreciate cardiology recs     #HTN  - hx of HTN on Losartan 25 mg, Amlodipine 10 mg daily   - will hold home meds for now given normotension    #HLD   - hx of HLD on Atorvastatin 40 mg   - ,   - c/w home meds     ================- Pulm=================================  #AHHRF  #COPD on 4L NC   #NAKUL on nocturnal bipap  - ABG during the RRT showed 7.04/>125/53/NR bicarb  - intubated and sedated   - ABG during the RRT showed 7.04/>125/53/NR bicarb,  - RVP on admission neg   - resume home advair  - D-dimer negative     ==================ID===================================  No active issues     ================= Nephro================================  #FERNIE  - likely pre-renal  - avoid nephrotoxic agents  - monitor BMP      =================GI====================================  No active issues       ================ Heme==================================  #Anemia  - likely anemia of chronic disease vs DEWAYNE  - HB 11.4  - outpt iron studies recommended     =================Endocrine===============================  #DM  - uncontrolled IDDM on Lantus 35 and Lispro 12 U pre-meals  - NPO for now   - HbA1c 13%  - c/w Lantus and mod ISS   - blood glucose q6   - Endo Dr. Catracho consulted     #Hypothyroidism  - cont home synthroid    ================= Skin/Catheters============================  - Peripheral IV lines - x2   - Bustamante catheter placed in ICU     =================Prophylaxis =============================  - DVT prophylaxis - Lovenox   - GI prophylaxis - PPI     ==================GOC==================================  FULL CODE   Disposition - ICU

## 2025-03-13 ENCOUNTER — APPOINTMENT (OUTPATIENT)
Dept: GASTROENTEROLOGY | Facility: HOSPITAL | Age: 60
End: 2025-03-13

## 2025-03-13 LAB
ANION GAP SERPL CALC-SCNC: 5 MMOL/L — SIGNIFICANT CHANGE UP (ref 5–17)
BASE EXCESS BLDA CALC-SCNC: 9.2 MMOL/L — HIGH (ref -2–3)
BASOPHILS # BLD AUTO: 0.03 K/UL — SIGNIFICANT CHANGE UP (ref 0–0.2)
BASOPHILS NFR BLD AUTO: 0.4 % — SIGNIFICANT CHANGE UP (ref 0–2)
BLOOD GAS COMMENTS ARTERIAL: SIGNIFICANT CHANGE UP
CALCIUM SERPL-MCNC: 9.2 MG/DL — SIGNIFICANT CHANGE UP (ref 8.4–10.5)
CO2 SERPL-SCNC: 33 MMOL/L — HIGH (ref 22–31)
CREAT SERPL-MCNC: 1.02 MG/DL — SIGNIFICANT CHANGE UP (ref 0.5–1.3)
EGFR: 63 ML/MIN/1.73M2 — SIGNIFICANT CHANGE UP
EGFR: 63 ML/MIN/1.73M2 — SIGNIFICANT CHANGE UP
EOSINOPHIL # BLD AUTO: 0.02 K/UL — SIGNIFICANT CHANGE UP (ref 0–0.5)
EOSINOPHIL NFR BLD AUTO: 0.2 % — SIGNIFICANT CHANGE UP (ref 0–6)
GLUCOSE SERPL-MCNC: 197 MG/DL — HIGH (ref 70–99)
HCO3 BLDA-SCNC: 35 MMOL/L — HIGH (ref 21–28)
HCT VFR BLD CALC: 36.5 % — SIGNIFICANT CHANGE UP (ref 34.5–45)
HGB BLD-MCNC: 10.7 G/DL — LOW (ref 11.5–15.5)
HOROWITZ INDEX BLDA+IHG-RTO: 40 — SIGNIFICANT CHANGE UP
IMM GRANULOCYTES NFR BLD AUTO: 0.8 % — SIGNIFICANT CHANGE UP (ref 0–0.9)
LYMPHOCYTES # BLD AUTO: 1.38 K/UL — SIGNIFICANT CHANGE UP (ref 1–3.3)
LYMPHOCYTES # BLD AUTO: 16.4 % — SIGNIFICANT CHANGE UP (ref 13–44)
MAGNESIUM SERPL-MCNC: 1.7 MG/DL — SIGNIFICANT CHANGE UP (ref 1.6–2.6)
MCHC RBC-ENTMCNC: 23.4 PG — LOW (ref 27–34)
MCV RBC AUTO: 79.9 FL — LOW (ref 80–100)
MONOCYTES # BLD AUTO: 0.67 K/UL — SIGNIFICANT CHANGE UP (ref 0–0.9)
MONOCYTES NFR BLD AUTO: 8 % — SIGNIFICANT CHANGE UP (ref 2–14)
NEUTROPHILS # BLD AUTO: 6.22 K/UL — SIGNIFICANT CHANGE UP (ref 1.8–7.4)
NEUTROPHILS NFR BLD AUTO: 74.2 % — SIGNIFICANT CHANGE UP (ref 43–77)
PCO2 BLDA: 50 MMHG — HIGH (ref 32–35)
PH BLDA: 7.45 — SIGNIFICANT CHANGE UP (ref 7.35–7.45)
PHOSPHATE SERPL-MCNC: 3 MG/DL — SIGNIFICANT CHANGE UP (ref 2.5–4.5)
PLATELET # BLD AUTO: 247 K/UL — SIGNIFICANT CHANGE UP (ref 150–400)
PO2 BLDA: 229 MMHG — HIGH (ref 83–108)
POTASSIUM SERPL-MCNC: 4.2 MMOL/L — SIGNIFICANT CHANGE UP (ref 3.5–5.3)
POTASSIUM SERPL-SCNC: 4.2 MMOL/L — SIGNIFICANT CHANGE UP (ref 3.5–5.3)
RBC # BLD: 4.57 M/UL — SIGNIFICANT CHANGE UP (ref 3.8–5.2)
SAO2 % BLDA: 97 % — SIGNIFICANT CHANGE UP
SODIUM SERPL-SCNC: 138 MMOL/L — SIGNIFICANT CHANGE UP (ref 135–145)
TSH SERPL-MCNC: 1.41 UU/ML — SIGNIFICANT CHANGE UP (ref 0.34–4.82)
WBC # BLD: 8.39 K/UL — SIGNIFICANT CHANGE UP (ref 3.8–10.5)
WBC # FLD AUTO: 8.39 K/UL — SIGNIFICANT CHANGE UP (ref 3.8–10.5)

## 2025-03-13 PROCEDURE — 99232 SBSQ HOSP IP/OBS MODERATE 35: CPT

## 2025-03-13 PROCEDURE — 99233 SBSQ HOSP IP/OBS HIGH 50: CPT

## 2025-03-13 RX ORDER — INSULIN GLARGINE-YFGN 100 [IU]/ML
42 INJECTION, SOLUTION SUBCUTANEOUS AT BEDTIME
Refills: 0 | Status: DISCONTINUED | OUTPATIENT
Start: 2025-03-13 | End: 2025-03-15

## 2025-03-13 RX ORDER — POLYETHYLENE GLYCOL 3350 17 G/17G
17 POWDER, FOR SOLUTION ORAL DAILY
Refills: 0 | Status: DISCONTINUED | OUTPATIENT
Start: 2025-03-13 | End: 2025-03-18

## 2025-03-13 RX ORDER — INSULIN LISPRO 100 U/ML
INJECTION, SOLUTION INTRAVENOUS; SUBCUTANEOUS
Refills: 0 | Status: DISCONTINUED | OUTPATIENT
Start: 2025-03-13 | End: 2025-03-13

## 2025-03-13 RX ORDER — INSULIN LISPRO 100 U/ML
INJECTION, SOLUTION INTRAVENOUS; SUBCUTANEOUS
Refills: 0 | Status: DISCONTINUED | OUTPATIENT
Start: 2025-03-13 | End: 2025-03-18

## 2025-03-13 RX ORDER — MONTELUKAST SODIUM 10 MG/1
10 TABLET ORAL DAILY
Refills: 0 | Status: DISCONTINUED | OUTPATIENT
Start: 2025-03-13 | End: 2025-03-18

## 2025-03-13 RX ORDER — ATORVASTATIN CALCIUM 80 MG/1
40 TABLET, FILM COATED ORAL AT BEDTIME
Refills: 0 | Status: DISCONTINUED | OUTPATIENT
Start: 2025-03-13 | End: 2025-03-18

## 2025-03-13 RX ORDER — INSULIN LISPRO 100 U/ML
INJECTION, SOLUTION INTRAVENOUS; SUBCUTANEOUS AT BEDTIME
Refills: 0 | Status: DISCONTINUED | OUTPATIENT
Start: 2025-03-13 | End: 2025-03-18

## 2025-03-13 RX ORDER — FUROSEMIDE 10 MG/ML
20 INJECTION INTRAMUSCULAR; INTRAVENOUS ONCE
Refills: 0 | Status: COMPLETED | OUTPATIENT
Start: 2025-03-13 | End: 2025-03-13

## 2025-03-13 RX ORDER — ASPIRIN 325 MG
81 TABLET ORAL DAILY
Refills: 0 | Status: DISCONTINUED | OUTPATIENT
Start: 2025-03-13 | End: 2025-03-18

## 2025-03-13 RX ORDER — INSULIN LISPRO 100 U/ML
10 INJECTION, SOLUTION INTRAVENOUS; SUBCUTANEOUS
Refills: 0 | Status: DISCONTINUED | OUTPATIENT
Start: 2025-03-13 | End: 2025-03-15

## 2025-03-13 RX ORDER — LEVOTHYROXINE SODIUM 300 MCG
112 TABLET ORAL DAILY
Refills: 0 | Status: DISCONTINUED | OUTPATIENT
Start: 2025-03-13 | End: 2025-03-18

## 2025-03-13 RX ORDER — OXCARBAZEPINE 150 MG/1
600 TABLET, FILM COATED ORAL
Refills: 0 | Status: DISCONTINUED | OUTPATIENT
Start: 2025-03-13 | End: 2025-03-18

## 2025-03-13 RX ORDER — SENNA 187 MG
2 TABLET ORAL AT BEDTIME
Refills: 0 | Status: DISCONTINUED | OUTPATIENT
Start: 2025-03-13 | End: 2025-03-18

## 2025-03-13 RX ORDER — INSULIN LISPRO 100 U/ML
INJECTION, SOLUTION INTRAVENOUS; SUBCUTANEOUS AT BEDTIME
Refills: 0 | Status: DISCONTINUED | OUTPATIENT
Start: 2025-03-13 | End: 2025-03-13

## 2025-03-13 RX ADMIN — Medication 2.5 MILLIGRAM(S): at 20:32

## 2025-03-13 RX ADMIN — INSULIN LISPRO 10 UNIT(S): 100 INJECTION, SOLUTION INTRAVENOUS; SUBCUTANEOUS at 17:10

## 2025-03-13 RX ADMIN — Medication 2.5 MILLIGRAM(S): at 16:22

## 2025-03-13 RX ADMIN — Medication 1 APPLICATION(S): at 05:45

## 2025-03-13 RX ADMIN — HYDROCORTISONE 1 APPLICATION(S): 10 CREAM TOPICAL at 05:45

## 2025-03-13 RX ADMIN — ENOXAPARIN SODIUM 40 MILLIGRAM(S): 100 INJECTION SUBCUTANEOUS at 05:45

## 2025-03-13 RX ADMIN — Medication 1 DOSE(S): at 21:47

## 2025-03-13 RX ADMIN — FUROSEMIDE 20 MILLIGRAM(S): 10 INJECTION INTRAMUSCULAR; INTRAVENOUS at 12:33

## 2025-03-13 RX ADMIN — INSULIN LISPRO 10 UNIT(S): 100 INJECTION, SOLUTION INTRAVENOUS; SUBCUTANEOUS at 13:15

## 2025-03-13 RX ADMIN — Medication 1 PUFF(S): at 12:33

## 2025-03-13 RX ADMIN — HYDROCORTISONE 1 APPLICATION(S): 10 CREAM TOPICAL at 17:10

## 2025-03-13 RX ADMIN — INSULIN GLARGINE-YFGN 42 UNIT(S): 100 INJECTION, SOLUTION SUBCUTANEOUS at 22:23

## 2025-03-13 RX ADMIN — ENOXAPARIN SODIUM 40 MILLIGRAM(S): 100 INJECTION SUBCUTANEOUS at 17:13

## 2025-03-13 RX ADMIN — ATORVASTATIN CALCIUM 40 MILLIGRAM(S): 80 TABLET, FILM COATED ORAL at 21:46

## 2025-03-13 RX ADMIN — Medication 2 TABLET(S): at 21:48

## 2025-03-13 RX ADMIN — Medication 1000 MILLIGRAM(S): at 14:16

## 2025-03-13 RX ADMIN — Medication 2.5 MILLIGRAM(S): at 09:23

## 2025-03-13 RX ADMIN — POLYETHYLENE GLYCOL 3350 17 GRAM(S): 17 POWDER, FOR SOLUTION ORAL at 12:33

## 2025-03-13 RX ADMIN — Medication 2.5 MILLIGRAM(S): at 03:30

## 2025-03-13 RX ADMIN — INSULIN LISPRO 0: 100 INJECTION, SOLUTION INTRAVENOUS; SUBCUTANEOUS at 13:14

## 2025-03-13 RX ADMIN — OXCARBAZEPINE 600 MILLIGRAM(S): 150 TABLET, FILM COATED ORAL at 17:11

## 2025-03-13 RX ADMIN — Medication 81 MILLIGRAM(S): at 12:33

## 2025-03-13 RX ADMIN — Medication 1000 MILLIGRAM(S): at 22:40

## 2025-03-13 RX ADMIN — Medication 1000 MILLIGRAM(S): at 13:16

## 2025-03-13 RX ADMIN — Medication 1 DOSE(S): at 13:19

## 2025-03-13 RX ADMIN — INSULIN LISPRO 6: 100 INJECTION, SOLUTION INTRAVENOUS; SUBCUTANEOUS at 17:12

## 2025-03-13 RX ADMIN — Medication 1000 MILLIGRAM(S): at 21:46

## 2025-03-13 RX ADMIN — MONTELUKAST SODIUM 10 MILLIGRAM(S): 10 TABLET ORAL at 12:32

## 2025-03-13 NOTE — DIETITIAN INITIAL EVALUATION ADULT - PROBLEM SELECTOR PLAN 2
had a recent fall  patient ambulates with a  cane  has no HHA and feels unsteady on her feet.   fall precautions   f/u PT

## 2025-03-13 NOTE — DIETITIAN INITIAL EVALUATION ADULT - PERTINENT LABORATORY DATA
03-13    138  |  100  |  17  ----------------------------<  197[H]  4.2   |  33[H]  |  1.02    Ca    9.2      13 Mar 2025 03:43  Phos  3.0     03-13  Mg     1.7     03-13    TPro  6.5  /  Alb  2.7[L]  /  TBili  0.8  /  DBili  x   /  AST  33  /  ALT  39  /  AlkPhos  114  03-12  POCT Blood Glucose.: 186 mg/dL (03-13-25 @ 05:43)  A1C with Estimated Average Glucose Result: 13.0 % (03-11-25 @ 05:00)

## 2025-03-13 NOTE — PROGRESS NOTE ADULT - SUBJECTIVE AND OBJECTIVE BOX
Patient is a 59y old  Female who presents with a chief complaint of palpitation (12 Mar 2025 15:05)      INTERVAL HPI/OVERNIGHT EVENTS:       Subjective: Patient seen and examined at bedside.    ICU Vital Signs Last 24 Hrs  T(C): 36.6 (13 Mar 2025 04:00), Max: 37.3 (12 Mar 2025 16:00)  T(F): 97.8 (13 Mar 2025 04:00), Max: 99.1 (12 Mar 2025 16:00)  HR: 83 (13 Mar 2025 07:00) (71 - 110)  BP: 128/73 (13 Mar 2025 07:00) (56/32 - 151/60)  BP(mean): 90 (13 Mar 2025 07:00) (41 - 116)  ABP: --  ABP(mean): --  RR: 17 (13 Mar 2025 07:00) (10 - 31)  SpO2: 96% (13 Mar 2025 07:00) (87% - 100%)    O2 Parameters below as of 13 Mar 2025 07:00  Patient On (Oxygen Delivery Method): BiPAP/CPAP          I&O's Summary    12 Mar 2025 07:01  -  13 Mar 2025 07:00  --------------------------------------------------------  IN: 0 mL / OUT: 2075 mL / NET: -2075 mL      Mode: AC/ CMV (Assist Control/ Continuous Mandatory Ventilation)  RR (machine): 20  TV (machine): 420  FiO2: 40  PEEP: 6  ITime: 0.9  MAP: 14  PIP: 28      PHYSICAL EXAM:      LABS:                        10.7   8.39  )-----------( 247      ( 13 Mar 2025 03:43 )             36.5     03-13    138  |  100  |  17  ----------------------------<  197[H]  4.2   |  33[H]  |  1.02    Ca    9.2      13 Mar 2025 03:43  Phos  3.0     03-13  Mg     1.7     03-13    TPro  6.5  /  Alb  2.7[L]  /  TBili  0.8  /  DBili  x   /  AST  33  /  ALT  39  /  AlkPhos  114  03-12    PT/INR - ( 12 Mar 2025 00:50 )   PT: 11.8 sec;   INR: 1.02 ratio         PTT - ( 12 Mar 2025 00:50 )  PTT:33.2 sec  Urinalysis Basic - ( 13 Mar 2025 03:43 )    Color: x / Appearance: x / SG: x / pH: x  Gluc: 197 mg/dL / Ketone: x  / Bili: x / Urobili: x   Blood: x / Protein: x / Nitrite: x   Leuk Esterase: x / RBC: x / WBC x   Sq Epi: x / Non Sq Epi: x / Bacteria: x      CAPILLARY BLOOD GLUCOSE      POCT Blood Glucose.: 186 mg/dL (13 Mar 2025 05:43)  POCT Blood Glucose.: 219 mg/dL (12 Mar 2025 23:07)  POCT Blood Glucose.: 315 mg/dL (12 Mar 2025 18:06)  POCT Blood Glucose.: 314 mg/dL (12 Mar 2025 13:25)    ABG - ( 13 Mar 2025 03:02 )  pH, Arterial: 7.45  pH, Blood: x     /  pCO2: 50    /  pO2: 229   / HCO3: 35    / Base Excess: 9.2   /  SaO2: 97                  Consultant(s) Notes Reviewed:  [x ] YES  [ ] NO    MEDICATIONS  (STANDING):  acetaminophen     Tablet .. 1000 milliGRAM(s) Oral every 8 hours  albuterol    0.083% 2.5 milliGRAM(s) Nebulizer every 6 hours  aspirin  chewable 81 milliGRAM(s) Oral daily  atorvastatin 40 milliGRAM(s) Oral at bedtime  chlorhexidine 2% Cloths 1 Application(s) Topical <User Schedule>  enoxaparin Injectable 40 milliGRAM(s) SubCutaneous every 12 hours  fluticasone propionate/ salmeterol 250-50 MICROgram(s) Diskus 1 Dose(s) Inhalation two times a day  hydrocortisone 1% Ointment 1 Application(s) Topical every 12 hours  insulin glargine Injectable (LANTUS) 38 Unit(s) SubCutaneous at bedtime  insulin lispro (ADMELOG) corrective regimen sliding scale   SubCutaneous every 6 hours  levothyroxine Injectable 85 MICROGram(s) IV Push at bedtime  montelukast 10 milliGRAM(s) Oral daily  OXcarbazepine 600 milliGRAM(s) Oral two times a day  pantoprazole  Injectable 40 milliGRAM(s) IV Push daily  polyethylene glycol 3350 17 Gram(s) Oral daily  senna 2 Tablet(s) Oral at bedtime    MEDICATIONS  (PRN):  albuterol    90 MICROgram(s) HFA Inhaler 1 Puff(s) Inhalation every 4 hours PRN Bronchospasm      Care Discussed with Consultants/Other Providers [ x] YES  [ ] NO    RADIOLOGY & ADDITIONAL TESTS: Patient is a 59y old  Female who presents with a chief complaint of palpitation (12 Mar 2025 15:05)      INTERVAL HPI/OVERNIGHT EVENTS:   Patient maintained on AVAPS overnight, tolerated well. This morning requesting to be placed on NC which was done and patient tolerated well. She is awake, alert, oriented. reports ongoing chest/abdomen generalized discomfort but otherwise appears comfortable without any respiratory distress.    Subjective: Patient seen and examined at bedside.    ICU Vital Signs Last 24 Hrs  T(C): 36.6 (13 Mar 2025 04:00), Max: 37.3 (12 Mar 2025 16:00)  T(F): 97.8 (13 Mar 2025 04:00), Max: 99.1 (12 Mar 2025 16:00)  HR: 83 (13 Mar 2025 07:00) (71 - 110)  BP: 128/73 (13 Mar 2025 07:00) (56/32 - 151/60)  BP(mean): 90 (13 Mar 2025 07:00) (41 - 116)  ABP: --  ABP(mean): --  RR: 17 (13 Mar 2025 07:00) (10 - 31)  SpO2: 96% (13 Mar 2025 07:00) (87% - 100%)    O2 Parameters below as of 13 Mar 2025 07:00  Patient On (Oxygen Delivery Method): AVAPS 14/6/30%          I&O's Summary    12 Mar 2025 07:01  -  13 Mar 2025 07:00  --------------------------------------------------------  IN: 0 mL / OUT: 2075 mL / NET: -2075 mL      Mode: AC/ CMV (Assist Control/ Continuous Mandatory Ventilation)  RR (machine): 20  TV (machine): 420  FiO2: 40  PEEP: 6  ITime: 0.9  MAP: 14  PIP: 28      PHYSICAL EXAM:  Gen: on AVAPS, in no acute distress, large body habitus.  Neuro: A&Ox3, EOMI, PERRL, CHATMAN 5/5 throughout  Pulm: AVAPS assisted, non-labored, regular, + wheezing bilaterally, diminished lower lung fields, occasional productive cough mod thick white/yellow secretions   CV: RRR, +s1s2, extremities warm, well perfused. + anasarca, 3+ BLE edema right slightly > left, + pitting  Abd: abdomen large, non-distended, normoactive bowel sounds, soft. LBM 3/11  : indwelling urinary catheter draining clear yellow urine  Integ: chronic skin changes to lower extremities    LABS:                        10.7   8.39  )-----------( 247      ( 13 Mar 2025 03:43 )             36.5     03-13    138  |  100  |  17  ----------------------------<  197[H]  4.2   |  33[H]  |  1.02    Ca    9.2      13 Mar 2025 03:43  Phos  3.0     03-13  Mg     1.7     03-13    TPro  6.5  /  Alb  2.7[L]  /  TBili  0.8  /  DBili  x   /  AST  33  /  ALT  39  /  AlkPhos  114  03-12    PT/INR - ( 12 Mar 2025 00:50 )   PT: 11.8 sec;   INR: 1.02 ratio         PTT - ( 12 Mar 2025 00:50 )  PTT:33.2 sec  Urinalysis Basic - ( 13 Mar 2025 03:43 )    Color: x / Appearance: x / SG: x / pH: x  Gluc: 197 mg/dL / Ketone: x  / Bili: x / Urobili: x   Blood: x / Protein: x / Nitrite: x   Leuk Esterase: x / RBC: x / WBC x   Sq Epi: x / Non Sq Epi: x / Bacteria: x      CAPILLARY BLOOD GLUCOSE      POCT Blood Glucose.: 186 mg/dL (13 Mar 2025 05:43)  POCT Blood Glucose.: 219 mg/dL (12 Mar 2025 23:07)  POCT Blood Glucose.: 315 mg/dL (12 Mar 2025 18:06)  POCT Blood Glucose.: 314 mg/dL (12 Mar 2025 13:25)    ABG - ( 13 Mar 2025 03:02 )  pH, Arterial: 7.45  pH, Blood: x     /  pCO2: 50    /  pO2: 229   / HCO3: 35    / Base Excess: 9.2   /  SaO2: 97                  Consultant(s) Notes Reviewed:  [x ] YES  [ ] NO    MEDICATIONS  (STANDING):  acetaminophen     Tablet .. 1000 milliGRAM(s) Oral every 8 hours  albuterol    0.083% 2.5 milliGRAM(s) Nebulizer every 6 hours  aspirin  chewable 81 milliGRAM(s) Oral daily  atorvastatin 40 milliGRAM(s) Oral at bedtime  chlorhexidine 2% Cloths 1 Application(s) Topical <User Schedule>  enoxaparin Injectable 40 milliGRAM(s) SubCutaneous every 12 hours  fluticasone propionate/ salmeterol 250-50 MICROgram(s) Diskus 1 Dose(s) Inhalation two times a day  hydrocortisone 1% Ointment 1 Application(s) Topical every 12 hours  insulin glargine Injectable (LANTUS) 38 Unit(s) SubCutaneous at bedtime  insulin lispro (ADMELOG) corrective regimen sliding scale   SubCutaneous every 6 hours  levothyroxine Injectable 85 MICROGram(s) IV Push at bedtime  montelukast 10 milliGRAM(s) Oral daily  OXcarbazepine 600 milliGRAM(s) Oral two times a day  pantoprazole  Injectable 40 milliGRAM(s) IV Push daily  polyethylene glycol 3350 17 Gram(s) Oral daily  senna 2 Tablet(s) Oral at bedtime    MEDICATIONS  (PRN):  albuterol    90 MICROgram(s) HFA Inhaler 1 Puff(s) Inhalation every 4 hours PRN Bronchospasm      Care Discussed with Consultants/Other Providers [ x] YES  [ ] NO    RADIOLOGY & ADDITIONAL TESTS:

## 2025-03-13 NOTE — PROGRESS NOTE ADULT - NS ATTEND AMEND GEN_ALL_CORE FT
59 year old F with PMHx of asthma, COPD (on 4L NC PRN), DM, fatty liver, fibromyalgia, R shoulder bursitis, HLD, hypothyroidism, NAKUL, psoriasis, RCC (s/p L renal resection 11/3/23, no hx of CT/RT) presenting palpitations, chest discomfort and lower abdominal pain, in setting of fall 1 week ago. Course c/b by acute on chronic hypercapneic respiratory failure s/p intubation, now with elevated troponins.    1) Atypical CP, chronic SOB, multifactorial in setting of COPD, morbid obesity, fibromyalgia  2) Elevated Ca score  3) Palpitations  4) DM  5) Morbid obesity  6) Fibromyalgia  7) Elevated troponins, became positive after unresponsive event due to acute on chronic hypercapneic respiratory failure   - EKG showed sinus rhythm with non-specific T wave abnormality in I and aVL  - Troponins were negative x 2 on admission then increased in setting of acute hypercapneic event requiring intubation  - CCTA 12/2022 showed Ca score of 1069.2 with no significant obstructive CAD. Pharm nuclear stress 3/2024 showed normal myocardial perfusion  - Continue ASA 81 and atorvastatin 40mg daily for non-obstructive CAD   - RLE > LLE edema. LE duplex negative for DVT.  - TTE showed normal LV and RV function without significant valvular disease, however this was done PRIOR to her unresponsive/hypercapneic event. Once clinically stable from respiratory standpoint, would repeat limited TTE with Definity to r/o new WMA. Will consider further testing pending repeat limited TTE    8) HTN  - Home meds on hold due to normotension

## 2025-03-13 NOTE — PROGRESS NOTE ADULT - SUBJECTIVE AND OBJECTIVE BOX
Subjective:  Chart Notes, Work list Manager, and fingersticks reviewed.    Review of Systems:  Constitutional: No fever  Eyes: No blurry vision  Neuro: No tremors  HEENT: No pain  Cardiovascular: No chest pain, palpitations  Respiratory: No SOB, no cough  GI: No nausea, vomiting, abdominal pain  : No dysuria  Skin: no rash  Psych: no depression  Endocrine: no polyuria, polydipsia  Hem/lymph: no swelling  Osteoporosis: no fractures    Medications (Standing):  acetaminophen     Tablet .. 1000 milliGRAM(s) Oral every 8 hours  albuterol    0.083% 2.5 milliGRAM(s) Nebulizer every 6 hours  aspirin  chewable 81 milliGRAM(s) Oral daily  atorvastatin 40 milliGRAM(s) Oral at bedtime  chlorhexidine 2% Cloths 1 Application(s) Topical <User Schedule>  enoxaparin Injectable 40 milliGRAM(s) SubCutaneous every 12 hours  fluticasone propionate/ salmeterol 250-50 MICROgram(s) Diskus 1 Dose(s) Inhalation two times a day  furosemide   Injectable 20 milliGRAM(s) IV Push once  hydrocortisone 1% Ointment 1 Application(s) Topical every 12 hours  insulin glargine Injectable (LANTUS) 42 Unit(s) SubCutaneous at bedtime  insulin lispro (ADMELOG) corrective regimen sliding scale   SubCutaneous three times a day before meals  insulin lispro (ADMELOG) corrective regimen sliding scale   SubCutaneous at bedtime  insulin lispro Injectable (ADMELOG) 10 Unit(s) SubCutaneous three times a day before meals  levothyroxine 112 MICROGram(s) Oral daily  montelukast 10 milliGRAM(s) Oral daily  OXcarbazepine 600 milliGRAM(s) Oral two times a day  pantoprazole    Tablet 40 milliGRAM(s) Oral before breakfast  polyethylene glycol 3350 17 Gram(s) Oral daily  senna 2 Tablet(s) Oral at bedtime    Medications (PRN):  albuterol    90 MICROgram(s) HFA Inhaler 1 Puff(s) Inhalation every 4 hours PRN Bronchospasm      Physical Exam:  VITALS: T(C): 36.8 (03-13-25 @ 08:00)  T(F): 98.2 (03-13-25 @ 08:00), Max: 99.1 (03-12-25 @ 16:00)  HR: 80 (03-13-25 @ 10:00) (71 - 102)  BP: 125/72 (03-13-25 @ 10:00) (56/32 - 151/60)  RR:  (10 - 31)  SpO2:  (87% - 100%)  Wt(kg): --    GENERAL: NAD, +obese  HEENT:  +dry mucus membranes. Atraumatic, Normocephalic  THYROID: Normal size, no palpable nodules  RESPIRATORY: Clear to auscultation bilaterally; No rales, rhonchi, wheezing  CARDIOVASCULAR: Regular rate and rhythm; No murmurs; no peripheral edema  GI: Soft, nontender, non distended, +ve abdominal obesity  EXTREMITIES: +ve peripheral pulses, -ve pedal edema  SKIN: Dry, intact, No rashes or lesions  PSYCH: Alert and oriented x 3    CAPILLARY BLOOD GLUCOSE      POCT Blood Glucose.: 186 mg/dL (13 Mar 2025 05:43)  POCT Blood Glucose.: 219 mg/dL (12 Mar 2025 23:07)  POCT Blood Glucose.: 315 mg/dL (12 Mar 2025 18:06)  POCT Blood Glucose.: 314 mg/dL (12 Mar 2025 13:25)    03-13    138  |  100  |  17  ----------------------------<  197[H]  4.2   |  33[H]  |  1.02    eGFR: 63    Ca    9.2      03-13  Mg     1.7     03-13  Phos  3.0     03-13    TPro  6.5  /  Alb  2.7[L]  /  TBili  0.8  /  DBili  x   /  AST  33  /  ALT  39  /  AlkPhos  114  03-12    Thyroid Function Tests:  03-13 @ 03:43 TSH 1.41 FreeT4 -- T3 -- Anti TPO -- Anti Thyroglobulin Ab -- TSI --        Assessment and Plan:    1) Type 2 diabetes:      59-year-old female with PMH of T2DM, HLD, asthma, COPD on 4L NC, migraines, hypothyroidism, NAKUL, RCC status post left renal resection 11/23 present for intermittent palpitations and chest pain that started a week ago.  Admitted for ACS rule out. Endocrinology is consulted for glycemic management    1) Poorly controlled Type 2 diabetes:  A1C is above goal likely due to dietary indiscretion  Patient reports compliance with medications at home  She was about to start ozempic 2 mg from the following weeks    Inpatient Recommendations:  Basal Insulin:   Increase Glargine ( Lantus) 42 units once daily    Nutritional Insulin:  Decrease Lispro (Admelog) 10 units with meals    Correctional Insulin:  Continue moderate Lispro ( Admelog) correctional scale with meals and bedtime    Oral Diabetes Medications:  None in the hospital                 Subjective:  Chart Notes, Work list Manager, and fingersticks reviewed.    Review of Systems:  Constitutional: No fever  Eyes: No blurry vision  Neuro: No tremors  HEENT: No pain  Cardiovascular: No chest pain, palpitations  Respiratory: No SOB, no cough  GI: No nausea, vomiting, abdominal pain  : No dysuria  Skin: no rash  Psych: no depression  Endocrine: no polyuria, polydipsia  Hem/lymph: no swelling  Osteoporosis: no fractures    Medications (Standing):  acetaminophen     Tablet .. 1000 milliGRAM(s) Oral every 8 hours  albuterol    0.083% 2.5 milliGRAM(s) Nebulizer every 6 hours  aspirin  chewable 81 milliGRAM(s) Oral daily  atorvastatin 40 milliGRAM(s) Oral at bedtime  chlorhexidine 2% Cloths 1 Application(s) Topical <User Schedule>  enoxaparin Injectable 40 milliGRAM(s) SubCutaneous every 12 hours  fluticasone propionate/ salmeterol 250-50 MICROgram(s) Diskus 1 Dose(s) Inhalation two times a day  furosemide   Injectable 20 milliGRAM(s) IV Push once  hydrocortisone 1% Ointment 1 Application(s) Topical every 12 hours  insulin glargine Injectable (LANTUS) 42 Unit(s) SubCutaneous at bedtime  insulin lispro (ADMELOG) corrective regimen sliding scale   SubCutaneous three times a day before meals  insulin lispro (ADMELOG) corrective regimen sliding scale   SubCutaneous at bedtime  insulin lispro Injectable (ADMELOG) 10 Unit(s) SubCutaneous three times a day before meals  levothyroxine 112 MICROGram(s) Oral daily  montelukast 10 milliGRAM(s) Oral daily  OXcarbazepine 600 milliGRAM(s) Oral two times a day  pantoprazole    Tablet 40 milliGRAM(s) Oral before breakfast  polyethylene glycol 3350 17 Gram(s) Oral daily  senna 2 Tablet(s) Oral at bedtime    Medications (PRN):  albuterol    90 MICROgram(s) HFA Inhaler 1 Puff(s) Inhalation every 4 hours PRN Bronchospasm      Physical Exam:  VITALS: T(C): 36.8 (03-13-25 @ 08:00)  T(F): 98.2 (03-13-25 @ 08:00), Max: 99.1 (03-12-25 @ 16:00)  HR: 80 (03-13-25 @ 10:00) (71 - 102)  BP: 125/72 (03-13-25 @ 10:00) (56/32 - 151/60)  RR:  (10 - 31)  SpO2:  (87% - 100%)  Wt(kg): --    GENERAL: NAD, +obese  HEENT:  +dry mucus membranes. Atraumatic, Normocephalic  THYROID: Normal size, no palpable nodules  RESPIRATORY: Clear to auscultation bilaterally; No rales, rhonchi, wheezing  CARDIOVASCULAR: Regular rate and rhythm; No murmurs; no peripheral edema  GI: Soft, nontender, non distended, +ve abdominal obesity  EXTREMITIES: +ve peripheral pulses, -ve pedal edema  SKIN: Dry, intact, No rashes or lesions  PSYCH: Alert and oriented x 3    CAPILLARY BLOOD GLUCOSE      POCT Blood Glucose.: 186 mg/dL (13 Mar 2025 05:43)  POCT Blood Glucose.: 219 mg/dL (12 Mar 2025 23:07)  POCT Blood Glucose.: 315 mg/dL (12 Mar 2025 18:06)  POCT Blood Glucose.: 314 mg/dL (12 Mar 2025 13:25)    03-13    138  |  100  |  17  ----------------------------<  197[H]  4.2   |  33[H]  |  1.02    eGFR: 63    Ca    9.2      03-13  Mg     1.7     03-13  Phos  3.0     03-13    TPro  6.5  /  Alb  2.7[L]  /  TBili  0.8  /  DBili  x   /  AST  33  /  ALT  39  /  AlkPhos  114  03-12    Thyroid Function Tests:  03-13 @ 03:43 TSH 1.41 FreeT4 -- T3 -- Anti TPO -- Anti Thyroglobulin Ab -- TSI --        Assessment and Plan:    1) Type 2 diabetes:      59-year-old female with PMH of T2DM, HLD, asthma, COPD on 4L NC, migraines, hypothyroidism, NAKUL, RCC status post left renal resection 11/23 present for intermittent palpitations and chest pain that started a week ago.  Admitted for ACS rule out. Endocrinology is consulted for glycemic management    1) Poorly controlled Type 2 diabetes:  A1C is above goal likely due to dietary indiscretion  Patient reports compliance with medications at home  She was about to start ozempic 2 mg from the following weeks    Inpatient Recommendations:  Basal Insulin:   Glargine ( Lantus) 42 units once daily    Nutritional Insulin:  Lispro (Admelog) 10 units with meals    Correctional Insulin:  Continue moderate Lispro ( Admelog) correctional scale with meals and bedtime    Oral Diabetes Medications:  None in the hospital                 Subjective:  Chart Notes, Work list Manager, and fingersticks reviewed. Patient reports eating well    Review of Systems:  Constitutional: No fever  Cardiovascular: No chest pain, palpitations  Respiratory: No SOB, no cough  GI: No nausea, vomiting, abdominal pain  Endocrine: no polyuria, polydipsia    Medications (Standing):  acetaminophen     Tablet .. 1000 milliGRAM(s) Oral every 8 hours  albuterol    0.083% 2.5 milliGRAM(s) Nebulizer every 6 hours  aspirin  chewable 81 milliGRAM(s) Oral daily  atorvastatin 40 milliGRAM(s) Oral at bedtime  chlorhexidine 2% Cloths 1 Application(s) Topical <User Schedule>  enoxaparin Injectable 40 milliGRAM(s) SubCutaneous every 12 hours  fluticasone propionate/ salmeterol 250-50 MICROgram(s) Diskus 1 Dose(s) Inhalation two times a day  furosemide   Injectable 20 milliGRAM(s) IV Push once  hydrocortisone 1% Ointment 1 Application(s) Topical every 12 hours  insulin glargine Injectable (LANTUS) 42 Unit(s) SubCutaneous at bedtime  insulin lispro (ADMELOG) corrective regimen sliding scale   SubCutaneous three times a day before meals  insulin lispro (ADMELOG) corrective regimen sliding scale   SubCutaneous at bedtime  insulin lispro Injectable (ADMELOG) 10 Unit(s) SubCutaneous three times a day before meals  levothyroxine 112 MICROGram(s) Oral daily  montelukast 10 milliGRAM(s) Oral daily  OXcarbazepine 600 milliGRAM(s) Oral two times a day  pantoprazole    Tablet 40 milliGRAM(s) Oral before breakfast  polyethylene glycol 3350 17 Gram(s) Oral daily  senna 2 Tablet(s) Oral at bedtime    Medications (PRN):  albuterol    90 MICROgram(s) HFA Inhaler 1 Puff(s) Inhalation every 4 hours PRN Bronchospasm    Physical Exam:  VITALS: T(C): 36.8 (03-13-25 @ 08:00)  T(F): 98.2 (03-13-25 @ 08:00), Max: 99.1 (03-12-25 @ 16:00)  HR: 80 (03-13-25 @ 10:00) (71 - 102)  BP: 125/72 (03-13-25 @ 10:00) (56/32 - 151/60)  RR:  (10 - 31)  SpO2:  (87% - 100%)  Wt(kg): --    GENERAL: NAD, +obese  HEENT:  +dry mucus membranes. Atraumatic, Normocephalic  THYROID: Normal size, no palpable nodules  RESPIRATORY: Clear to auscultation bilaterally; No rales, rhonchi, wheezing  CARDIOVASCULAR: Regular rate and rhythm; No murmurs; no peripheral edema  GI: Soft, nontender, non distended, +ve abdominal obesity  EXTREMITIES: +ve peripheral pulses, -ve pedal edema  SKIN: Dry, intact, No rashes or lesions  PSYCH: Alert and oriented x 3    Labs:  Capillary Blood Glucose:  POCT Blood Glucose.: 186 mg/dL (13 Mar 2025 05:43)  POCT Blood Glucose.: 219 mg/dL (12 Mar 2025 23:07)  POCT Blood Glucose.: 315 mg/dL (12 Mar 2025 18:06)  POCT Blood Glucose.: 314 mg/dL (12 Mar 2025 13:25)    03-13  138  |  100  |  17  ----------------------------<  197[H]  4.2   |  33[H]  |  1.02  eGFR: 63  Ca    9.2      03-13  Mg     1.7     03-13  Phos  3.0     03-13  TPro  6.5  /  Alb  2.7[L]  /  TBili  0.8  /  DBili  x   /  AST  33  /  ALT  39  /  AlkPhos  114  03-12    A1C with Estimated Average Glucose Result: 13.0 % (03.11.25 @ 05:00)      Assessment and Plan:    59-year-old female with PMH of T2DM, HLD, asthma, COPD on 4L NC, migraines, hypothyroidism, NAKUL, RCC status post left renal resection 11/23 present for intermittent palpitations and chest pain that started a week ago.  Admitted for ACS rule out. Endocrinology is consulted for glycemic management    1) Poorly controlled Type 2 diabetes:  A1C is above goal likely due to dietary indiscretion  Patient reports compliance with medications at home  She was about to start ozempic 2 mg from the following weeks    Inpatient Recommendations:  Basal Insulin:   Glargine ( Lantus) 42 units once daily    Nutritional Insulin:  Lispro (Admelog) 10 units with meals    Correctional Insulin:  Continue moderate Lispro ( Admelog) correctional scale with meals and bedtime    Oral Diabetes Medications:  None in the hospital

## 2025-03-13 NOTE — PROGRESS NOTE ADULT - SUBJECTIVE AND OBJECTIVE BOX
Cardiology Progress Note  ------------------------------------------------------------------------------------------  SUBJECTIVE:   No events overnight. pateint on 4L NC today. Denies CP, Palpitations.   -------------------------------------------------------------------------------------------  ROS:  CV: chest pain (-), palpitation (-), orthopnea (-), PND (-), edema (-)  PULM: SOB (-), cough (-), wheezing (-), hemoptysis (-).   CONST: fever (-), chills (-) or fatigue (-)  GI: abdominal distension (-), abdominal pain (-) , nausea/vomiting (-), hematemesis, (-), melena (-), hematochezia (-)  : dysuria (-), frequency (-), hematuria (-).   NEURO: numbness (-), weakness (-), dizziness (-)  MSK: myalgia (-), joint pain (-)   SKIN: itching (-), rash (-)  HEENT:  visual changes (-); vertigo or throat pain (-);  neck stiffness (-)   Psych: change in mood (-), anxiety (-), depression (-)     All other review of systems is negative unless indicated above.   -------------------------------------------------------------------------------------------  VS:  T(F): 98 (03-13), Max: 99.1 (03-12)  HR: 85 (03-13) (71 - 102)  BP: 125/70 (03-13) (56/32 - 144/82)  RR: 20 (03-13)  SpO2: 95% (03-13)  I&O's Summary    12 Mar 2025 07:01  -  13 Mar 2025 07:00  --------------------------------------------------------  IN: 0 mL / OUT: 2075 mL / NET: -2075 mL    13 Mar 2025 07:01  -  13 Mar 2025 15:34  --------------------------------------------------------  IN: 0 mL / OUT: 1000 mL / NET: -1000 mL      ------------------------------------------------------------------------------------------  PHYSICAL EXAM:  Vital Signs were reviewed.  GENERAL: NAD, well-appearing, well nourished.   HEAD:  Atraumatic, Normocephalic.  PERRLA, conjunctiva and sclera clear  ENT: Moist mucous membranes, no external lesions on nose.   NECK: Supple, JVP   CHEST/LUNG: No chest wall tenderness. Diminished bilaterally   HEART: S1, S2, no murmur   ABDOMEN: Bowel sounds present; Soft, Nontender, Nondistended.   EXTREMITIES:  2+ edema up below the knee on R leg and minimal on L leg  NERVOUS SYSTEM:  Alert & Oriented X3, speech clear. No deficits.   SKIN: No rashes or lesions.      -------------------------------------------------------------------------------------------  LABS:  03-13    138  |  100  |  17  ----------------------------<  197[H]  4.2   |  33[H]  |  1.02    Ca    9.2      13 Mar 2025 03:43  Phos  3.0     03-13  Mg     1.7     03-13    TPro  6.5  /  Alb  2.7[L]  /  TBili  0.8  /  DBili  x   /  AST  33  /  ALT  39  /  AlkPhos  114  03-12    Creatinine Trend: 1.02<--, 1.47<--, 1.62<--, 1.05<--, 1.22<--                        10.7   8.39  )-----------( 247      ( 13 Mar 2025 03:43 )             36.5     PT/INR - ( 12 Mar 2025 00:50 )   PT: 11.8 sec;   INR: 1.02 ratio         PTT - ( 12 Mar 2025 00:50 )  PTT:33.2 sec    Lipid Panel: T(F): 98 (03-13), Max: 99.1 (03-12)  HR: 85 (03-13) (71 - 102)  BP: 125/70 (03-13) (56/32 - 144/82)  RR: 20 (03-13)  SpO2: 95% (03-13)  Cardiac Enzymes: CARDIAC MARKERS ( 12 Mar 2025 00:50 )  x     / x     / x     / x     / 3.0 ng/mL          -------------------------------------------------------------------------------------------  Meds:  acetaminophen     Tablet .. 1000 milliGRAM(s) Oral every 8 hours  albuterol    0.083% 2.5 milliGRAM(s) Nebulizer every 6 hours  albuterol    90 MICROgram(s) HFA Inhaler 1 Puff(s) Inhalation every 4 hours PRN  aspirin  chewable 81 milliGRAM(s) Oral daily  atorvastatin 40 milliGRAM(s) Oral at bedtime  chlorhexidine 2% Cloths 1 Application(s) Topical <User Schedule>  enoxaparin Injectable 40 milliGRAM(s) SubCutaneous every 12 hours  fluticasone propionate/ salmeterol 250-50 MICROgram(s) Diskus 1 Dose(s) Inhalation two times a day  hydrocortisone 1% Ointment 1 Application(s) Topical every 12 hours  insulin glargine Injectable (LANTUS) 42 Unit(s) SubCutaneous at bedtime  insulin lispro (ADMELOG) corrective regimen sliding scale   SubCutaneous three times a day before meals  insulin lispro (ADMELOG) corrective regimen sliding scale   SubCutaneous at bedtime  insulin lispro Injectable (ADMELOG) 10 Unit(s) SubCutaneous three times a day before meals  levothyroxine 112 MICROGram(s) Oral daily  montelukast 10 milliGRAM(s) Oral daily  OXcarbazepine 600 milliGRAM(s) Oral two times a day  pantoprazole    Tablet 40 milliGRAM(s) Oral before breakfast  polyethylene glycol 3350 17 Gram(s) Oral daily  senna 2 Tablet(s) Oral at bedtime    Cardiovascular Diagnostic Testing:    ECG: sinus rhythm with TWI in I and aVL    TELE: NSR HR range     Echo     < from: TTE W or WO Ultrasound Enhancing Agent (03.11.25 @ 12:19) >  CONCLUSIONS:      1. Technically difficult image quality.   2. Left ventricular cavity is normal in size. Left ventricular wall thickness is normal. Left ventricular systolic function is normal with an ejection fraction of 54 % by Valdez's method of disks. There are no regional wall motion abnormalities seen.   3. There is mild (grade 1) left ventricular diastolic dysfunction.   4. Left atrium is mildly dilated.   5. Normal right ventricular systolic function. Tricuspid annular plane systolic excursion (TAPSE) is 2.3 cm (normal >=1.7 cm).   6. No significant valvular disease.   7. Pulmonary artery systolic pressure could not be estimated.   8. No pericardial effusion seen.   9. Compared to the transthoracic echocardiogram performed on 3/20/2024, there have been no significant interval changes.      < end of copied text >    Stress Testing 3/2024 Conclusions:   1. Normal myocardial perfusion scan, with no evidence of infarction or inducible ischemia.   2. Baseline electrocardiogram: normal sinus rhythm at a rate of 89 bpm with no arrhythmias and poor R wave progression.   3. Stress electrocardiogram: No ischemic ST segment changes.   4. The left ventricle is normal in function and normal in size. The post stress left ventricular EF is 81 %. The stress end diastolic volume is 86 ml and systolic volume is 16 ml.      LE Duplex -   IMPRESSION:  No evidence of deep venous thrombosis in either lower extremity.      < from: Xray Chest 1 View- PORTABLE-Urgent (Xray Chest 1 View- PORTABLE-Urgent .) (03.12.25 @ 10:22) >  INTERPRETATION:  Exam:XR CHEST URGENT    clinical history: intubated    Removal of endotracheal and nasogastric tubes. Improved aeration right   lung. Stable left basilar airspace disease.    IMPRESSION: Improved aeration.    --- End of Report ---    < from: Xray Chest 1 View- PORTABLE-Urgent (Xray Chest 1 View- PORTABLE-Urgent .) (03.12.25 @ 00:44) >  INTERPRETATION:  Exam:XR CHEST URGENT    clinical history:RRT    Endotracheal tube above the mic. NG tube left upper quadrant.   Bibasilar airspace disease left greater than right.    IMPRESSION: Bibasilar airspace disease. Support tubes as above    --- End of Report ---    < end of copied text >        < end of copied text >      CXR:  reviewed              Assessment and Plan:   · Assessment	  Assessment and Recommendations:  59 year old F with PMHx of asthma, COPD (on 4L NC PRN), DM, fatty liver, fibromyalgia, R shoulder bursitis, HLD, hypothyroidism, NAKUL, psoriasis, RCC (s/p L renal resection 11/3/23, no hx of CT/RT) presenting palpitations, chest discomfort and lower abdominal pain, in setting of fall 1 week ago    1) Atypical CP, chronic SOB, multifactorial in setting of COPD, morbid obesity, fibromyalgia  2) Elevated Ca score  3) Palpitations  4) DM  5) Morbid obesity  6) Fibromyalgia  7) Elevated troponins, became positive after unresponsive event due to acute on chronic hypercapneic respiratory failure   - EKG showed sinus rhythm with non-specific T wave abnormality in I and aVL  - Troponins negative x 2 on admission then increased likely due to hypercapnia trended down  - CCTA 12/2022 showed Ca score of 1069.2 with no significant obstructive CAD. Pharm nuclear stress 3/2024 showed normal myocardial perfusion  - Continue ASA 81 and atorvastatin 40mg daily for non-obstructive CAD   - RLE > LLE edema. LE duplex negative for DVT.  - TTE showed normal LV and RV function without significant valvular disease, however this was prior to unresponsive/hypercapneic event and elevated troponins. Once clinically stable from respiratory standpoint, would repeat limited TTE with Definity to r/o new WMA    8) HTN  - Home meds on hold due to normotension

## 2025-03-13 NOTE — DIETITIAN NUTRITION RISK NOTIFICATION - TREATMENT: THE FOLLOWING DIET HAS BEEN RECOMMENDED
Diet, DASH/TLC:   Sodium & Cholesterol Restricted  Consistent Carbohydrate {No Snacks} (03-13-25 @ 10:52) [Active]  ( diet just advanced)

## 2025-03-13 NOTE — PROGRESS NOTE ADULT - ASSESSMENT
59-year-old female, AAOx3, ambulates with a cane, no HHA,  history of DM, HLD, asthma, COPD on 4L NC, migraines, hypothyroidism, NAKUL, RCC status post left renal resection 11/23 present for intermittent palpitations and chest pain that started a week ago. RRT called for unresponsiveness and AHHRF, Pt admitted to ICU s/p intubation.       =================== Neuro============================  - Unresponsive on RRT likel 2/2 CO2 retention  - Sedated and intubated 3/12  - minimize sedation, SAT/SBT  - extubated 3/12  - no active issues s/p extubation      ================= Cardiovascular==========================  #chest pain   - p/w chest pain, palpitation  - admitted to medicine for ACS rule out  - Trop x2 neg on admission, presently uptrending troponemia 128>1369 > 2340, cont to trend  - f/u TTE   - Cardio Dr. Hampton consulted on admission, recommend continuation of ASA and Atorvastatin 40 mg  - appreciate cardiology recs     #HTN  - hx of HTN on Losartan 25 mg, Amlodipine 10 mg daily   - will hold home meds for now given normotension    #HLD   - hx of HLD on Atorvastatin 40 mg   - ,   - c/w home meds     ================- Pulm=================================  #AHHRF  #COPD on 4L NC   #NAKUL on nocturnal bipap  - ABG during the RRT showed 7.04/>125/53/NR bicarb  - intubated and sedated   - ABG during the RRT showed 7.04/>125/53/NR bicarb,  - RVP on admission neg   - resume home advair  - D-dimer negative     ==================ID===================================  No active issues     ================= Nephro================================  #FERNIE  - likely pre-renal  - avoid nephrotoxic agents  - monitor BMP      =================GI====================================  No active issues       ================ Heme==================================  #Anemia  - likely anemia of chronic disease vs DEWAYNE  - HB 11.4  - outpt iron studies recommended     =================Endocrine===============================  #DM  - uncontrolled IDDM on Lantus 35 and Lispro 12 U pre-meals  - NPO for now   - HbA1c 13%  - c/w Lantus and mod ISS   - blood glucose q6   - Endo Dr. Catracho consulted     #Hypothyroidism  - cont home synthroid    ================= Skin/Catheters============================  - Peripheral IV lines - x2   - Bustamante catheter placed in ICU     =================Prophylaxis =============================  - DVT prophylaxis - Lovenox   - GI prophylaxis - PPI     ==================GOC==================================  FULL CODE   Disposition - ICU  59-year-old female, AAOx3, ambulates with a cane, no HHA,  history of DM, HLD, asthma, COPD on 4L NC, migraines, hypothyroidism, NAKUL, RCC status post left renal resection 11/23 present for intermittent palpitations and chest pain that started a week ago. RRT called for unresponsiveness and AHHRF, Pt admitted to ICU s/p intubation.       =================== Neuro============================  - Unresponsive on RRT likely 2/2 CO2 retention  - analgosedation d/cd 3/12, patient extubated   - mental status at baseline (A&O x4) post extubation    #Migraines  - continue Tylenol, home oxcarbazepine    ================= Cardiovascular==========================  #chest pain   - p/w chest pain, palpitation  - admitted to medicine for ACS rule out  - Trop x2 neg on admission, presently uptrending troponemia 128>1369 > 2340, downtrended 2268, no need to follow  - 3/11 TTE: nl LV, EF 54%  - Cardio Dr. Hampton consulted on admission, recommend continuation of ASA and Atorvastatin 40 mg  - appreciate cardiology recs     #HTN  - hx of HTN on Losartan 25 mg, Amlodipine 10 mg daily   - will hold home meds for now given normotension, consider transitioning from amlodipine if needs antihypertensive as may be contributing to lower extremity edema    #HLD   - hx of HLD on Atorvastatin 40 mg   - ,   - c/w home meds     ================- Pulm=================================  #AHHRF  #no documented COPD testing on 4L NC   #NAKUL and obesity hypoventilation syndrome on nocturnal bipap but does not tolerate and declines use  - ABG during the RRT showed 7.04/>125/53/NR bicarb  - intubated and sedated 3/11, extubated 3/12  - continue nightly AVAPS, education provided  - RVP on admission neg   - continue home advair  - continue montelukast daily  - albuterol q6h + q4h PRN  - D-dimer negative     ==================ID===================================  No active issues     ================= Nephro================================  #FERNIE  - likely pre-renal, resolved  - avoid nephrotoxic agents  - monitor BMP      =================GI====================================  No active issues   - resume diet DASH/CHO    ================ Heme==================================  #Anemia  - likely anemia of chronic disease vs DEWAYNE  - HB 11.4  - outpt iron studies recommended     #BLE edema  3/11 BLE US negative for DVT    =================Endocrine===============================  #DM  - uncontrolled IDDM on Lantus 35 and Lispro 12 U pre-meals  - HbA1c 13%  - c/w Lantus and mod ISS per endocrine recommendations  - initiated on diet, will perform FSBG before meals and bedtime  - increase lantus to 42 units at bedtime, 10 units lispro pre-meals with moderate ISS per endocrine recs  - Endo Dr. Hayden consulted     #Hypothyroidism  - cont home synthroid    ================= Skin/Catheters============================  - Peripheral IV lines - x2   - Bustamante catheter placed in ICU -> d/cd 3/12 ~3pm    =================Prophylaxis =============================  - DVT prophylaxis - Lovenox   - GI prophylaxis - PPI     ==================GOC==================================  FULL CODE   Disposition - downgrade to AI 59-year-old female, AAOx3, ambulates with a cane, no HHA,  history of DM, HLD, asthma, COPD on 4L NC, migraines, hypothyroidism, NAKUL, RCC status post left renal resection 11/23 present for intermittent palpitations and chest pain that started a week ago. RRT called for unresponsiveness and AHHRF, Pt admitted to ICU s/p intubation.       =================== Neuro============================  - Unresponsive on RRT likely 2/2 CO2 retention  - analgosedation d/cd 3/12, patient extubated   - mental status at baseline (A&O x4) post extubation    #Migraines  - continue Tylenol, home oxcarbazepine    ================= Cardiovascular==========================  #chest pain   - p/w chest pain, palpitation  - admitted to medicine for ACS rule out  - Trop x2 neg on admission, presently uptrending troponemia 128>1369 > 2340, downtrended 2268, no need to follow  - 3/11 TTE: nl LV, EF 54%  - Cardio Dr. Hampton consulted on admission, recommend continuation of ASA and Atorvastatin 40 mg  - appreciate cardiology recs     #HTN  - hx of HTN on Losartan 25 mg, Amlodipine 10 mg daily   - will hold home meds for now given normotension, consider transitioning from amlodipine if needs antihypertensive as may be contributing to lower extremity edema    #HLD   - hx of HLD on Atorvastatin 40 mg   - ,   - c/w home meds     ================- Pulm=================================  #AHHRF  #no documented COPD testing on 4L NC   #NAKUL and obesity hypoventilation syndrome on nocturnal bipap but does not tolerate and declines use  - ABG during the RRT showed 7.04/>125/53/NR bicarb  - intubated and sedated 3/11, extubated 3/12  - continue nightly AVAPS, education provided  - RVP on admission neg   - continue home advair  - continue montelukast daily  - albuterol q6h + q4h PRN  - D-dimer negative     ==================ID===================================  No active issues     ================= Nephro================================  #FERNIE  - likely pre-renal, resolved  - avoid nephrotoxic agents  - monitor BMP      =================GI====================================  No active issues   - resume diet DASH/CHO    ================ Heme==================================  #Anemia  - likely anemia of chronic disease vs DEWAYNE  - HB 11.4  - outpt iron studies recommended     #BLE edema  3/11 BLE US negative for DVT  goal for neg fluid balance     =================Endocrine===============================  #DM  - uncontrolled IDDM on Lantus 35 and Lispro 12 U pre-meals  - HbA1c 13%  - c/w Lantus and mod ISS per endocrine recommendations  - initiated on diet, will perform FSBG before meals and bedtime  - increase lantus to 42 units at bedtime, 10 units lispro pre-meals with moderate ISS per endocrine recs  - Endo eval    #Hypothyroidism  - cont home synthroid    ================= Skin/Catheters============================  - Peripheral IV lines - x2   - Bustamante catheter placed in ICU -> d/cd 3/12 ~3pm    =================Prophylaxis =============================  - DVT prophylaxis - Lovenox   - GI prophylaxis - PPI     ==================GOC==================================  FULL CODE   Disposition - downgrade to AI

## 2025-03-13 NOTE — PROGRESS NOTE ADULT - SUBJECTIVE AND OBJECTIVE BOX
PULMONARY CONSULT SERVICE FOLLOW-UP NOTE    INTERVAL HPI:  Reviewed chart and overnight events; patient seen and examined at bedside. Used AVAPS overnight, tolerated well. Off this AM to nasal cannula. No new specific complaints other than feeling thirsty and wanting to drink.     MEDICATIONS:  Pulmonary:  albuterol    0.083% 2.5 milliGRAM(s) Nebulizer every 6 hours  albuterol    90 MICROgram(s) HFA Inhaler 1 Puff(s) Inhalation every 4 hours PRN  fluticasone propionate/ salmeterol 250-50 MICROgram(s) Diskus 1 Dose(s) Inhalation two times a day  montelukast 10 milliGRAM(s) Oral daily    Antimicrobials:    Anticoagulants:  aspirin  chewable 81 milliGRAM(s) Oral daily  enoxaparin Injectable 40 milliGRAM(s) SubCutaneous every 12 hours    Cardiac:    Allergies    Fioricet (Rash)  venlafaxine (Hives)  mushroom (Unknown)  gadobutrol (Rash; Urticaria; Hives)  IV Contrast (Short breath)  honeydew (Rash)    Intolerances    Vital Signs Last 24 Hrs  T(C): 36.8 (13 Mar 2025 08:00), Max: 37.3 (12 Mar 2025 16:00)  T(F): 98.2 (13 Mar 2025 08:00), Max: 99.1 (12 Mar 2025 16:00)  HR: 84 (13 Mar 2025 13:00) (71 - 102)  BP: 144/82 (13 Mar 2025 13:00) (56/32 - 150/101)  BP(mean): 99 (13 Mar 2025 13:00) (41 - 116)  RR: 18 (13 Mar 2025 13:00) (10 - 31)  SpO2: 95% (13 Mar 2025 13:00) (87% - 100%)    Parameters below as of 13 Mar 2025 08:00  Patient On (Oxygen Delivery Method): BiPAP/CPAP        03-12 @ 07:01  -  03-13 @ 07:00  --------------------------------------------------------  IN: 0 mL / OUT: 2075 mL / NET: -2075 mL    PHYSICAL EXAM:  Constitutional: non-toxic morbidly obese woman in bed; NAD  HEENT: atraumatic; PERRL, anicteric sclera; MMM  Neck: supple  Cardiovascular: +S1/S2, RRR  Respiratory: diminished throughout, possibly due to body habitus, respirations appear non-labored  Gastrointestinal: soft, NT/ND  Extremities: WWP; no edema, clubbing or cyanosis  Vascular: 2+ radial pulses B/L  Neurological: awake and alert; CHATMAN    LABS:  ABG - ( 13 Mar 2025 03:02 )  pH, Arterial: 7.45  pH, Blood: x     /  pCO2: 50    /  pO2: 229   / HCO3: 35    / Base Excess: 9.2   /  SaO2: 97        CBC Full  -  ( 13 Mar 2025 03:43 )  WBC Count : 8.39 K/uL  RBC Count : 4.57 M/uL  Hemoglobin : 10.7 g/dL  Hematocrit : 36.5 %  Platelet Count - Automated : 247 K/uL  Mean Cell Volume : 79.9 fl  Mean Cell Hemoglobin : 23.4 pg  Mean Cell Hemoglobin Concentration : 29.3 g/dL  Auto Neutrophil # : x  Auto Lymphocyte # : x  Auto Monocyte # : x  Auto Eosinophil # : x  Auto Basophil # : x  Auto Neutrophil % : x  Auto Lymphocyte % : x  Auto Monocyte % : x  Auto Eosinophil % : x  Auto Basophil % : x    03-13    138  |  100  |  17  ----------------------------<  197[H]  4.2   |  33[H]  |  1.02    Ca    9.2      13 Mar 2025 03:43  Phos  3.0     03-13  Mg     1.7     03-13    TPro  6.5  /  Alb  2.7[L]  /  TBili  0.8  /  DBili  x   /  AST  33  /  ALT  39  /  AlkPhos  114  03-12    PT/INR - ( 12 Mar 2025 00:50 )   PT: 11.8 sec;   INR: 1.02 ratio         PTT - ( 12 Mar 2025 00:50 )  PTT:33.2 sec      Urinalysis Basic - ( 13 Mar 2025 03:43 )    Color: x / Appearance: x / SG: x / pH: x  Gluc: 197 mg/dL / Ketone: x  / Bili: x / Urobili: x   Blood: x / Protein: x / Nitrite: x   Leuk Esterase: x / RBC: x / WBC x   Sq Epi: x / Non Sq Epi: x / Bacteria: x    RADIOLOGY & ADDITIONAL STUDIES:  No interval chest study for review

## 2025-03-13 NOTE — DIETITIAN INITIAL EVALUATION ADULT - PROBLEM SELECTOR PLAN 3
hx of dm  takes metformin, lantus, admelog, and ozempic   hold oral diabetic medication   c/w home lantus 45 and admelog 10   c/w sliding scale  monitor sugars hx of dm  takes metformin, lantus, admelog, and ozempic

## 2025-03-13 NOTE — DIETITIAN INITIAL EVALUATION ADULT - PROBLEM SELECTOR PLAN 1
Presents for intermittent palpitations and chest pain   Vitals: temp 98.2, , /83, RR 18 spo2 90 on 3L  glucose 273, mg 1.2   trop neg   s/p 500 nacl , morphine, zofran   EKG showing NSR  HEART score 4   DOLLY score 2   -telemetry  - c/w aspirin and statin   -f/u TTE  -f/u lipids  -f/u A1c  - f/u doppler to rule out dvt   -cardiology consult in Am

## 2025-03-13 NOTE — DIETITIAN INITIAL EVALUATION ADULT - OTHER INFO
Pt lives home PTA, s/p RRT 3/11/25, in ICU, s/p intubation-->extubation, alert, oriented, limited communication via pt writing on paper due to BiPAP when visited in ICU; Pt wants to drink water, informed NPO status, discussed with RN; Allergy to honeydew, mushroom; NPO x 2d in house; h/o DM, JosH6Y=90%, POCT glucose=186 to 370, Glucose=378-->197, Endocrinology consult noted  Pt lives home PTA, s/p RRT 3/11/25, in ICU, s/p intubation-->extubation, alert, oriented, limited communication via pt writing on paper due to BiPAP when visited in ICU; Pt wants to drink water, informed NPO status, discussed with RN; Allergy to honeydew, mushroom; NPO x 2d in house; h/o DM, CtcA3T=21%, POCT glucose=186 to 370, Glucose=378-->197, Endocrinology consult noted, on Ozempic Rx PTA

## 2025-03-13 NOTE — DIETITIAN INITIAL EVALUATION ADULT - PERTINENT MEDS FT
MEDICATIONS  (STANDING):  acetaminophen     Tablet .. 1000 milliGRAM(s) Oral every 8 hours  albuterol    0.083% 2.5 milliGRAM(s) Nebulizer every 6 hours  aspirin  chewable 81 milliGRAM(s) Oral daily  atorvastatin 40 milliGRAM(s) Oral at bedtime  chlorhexidine 2% Cloths 1 Application(s) Topical <User Schedule>  enoxaparin Injectable 40 milliGRAM(s) SubCutaneous every 12 hours  fluticasone propionate/ salmeterol 250-50 MICROgram(s) Diskus 1 Dose(s) Inhalation two times a day  hydrocortisone 1% Ointment 1 Application(s) Topical every 12 hours  insulin glargine Injectable (LANTUS) 38 Unit(s) SubCutaneous at bedtime  insulin lispro (ADMELOG) corrective regimen sliding scale   SubCutaneous every 6 hours  levothyroxine Injectable 85 MICROGram(s) IV Push at bedtime  montelukast 10 milliGRAM(s) Oral daily  OXcarbazepine 600 milliGRAM(s) Oral two times a day  pantoprazole  Injectable 40 milliGRAM(s) IV Push daily  polyethylene glycol 3350 17 Gram(s) Oral daily  senna 2 Tablet(s) Oral at bedtime    MEDICATIONS  (PRN):  albuterol    90 MICROgram(s) HFA Inhaler 1 Puff(s) Inhalation every 4 hours PRN Bronchospasm

## 2025-03-13 NOTE — DIETITIAN INITIAL EVALUATION ADULT - FACTORS AFF FOOD INTAKE
acute on chronic comorbidities including acute hypoxic respiratory failure, s/p intubation-->extubation, uncontrolled DM/Zoroastrian/ethnic/cultural/personal food preferences

## 2025-03-13 NOTE — DIETITIAN INITIAL EVALUATION ADULT - ETIOLOGY
----- Message from Khoi Brandt MD sent at 5/5/2023  2:43 PM CDT -----  All labs are overall normal   impaired glucose metabolism with uncontrolled DM, morbid obesity, ? adherence

## 2025-03-13 NOTE — DIETITIAN INITIAL EVALUATION ADULT - NSICDXPASTMEDICALHX_GEN_ALL_CORE_FT
PAST MEDICAL HISTORY:  Asthma last inhaler use with in 10 days, on Pulm follow up [id number]    Back pain thoracic & lumbar    Cervical neuralgia difficulty moving my neck    Diverticulosis     DJD (degenerative joint disease) Cervical/Thoracic/Lumbar    DM (diabetes mellitus) 2    Falls frequently     Fatty liver     Fibromyalgia     H/O bursitis right shoulder    Hyperlipidemia     Hypertension vaginal cyst    Hypothyroid     Morbid obesity with body mass index (BMI) of 50.0 to 59.9 in adult     Obesity morbid    Obstructive sleep apnea refuses Cpap    Psoriasis     Radicular pain arms, legs    Renal cell carcinoma, left on follow up Dr schulte, HOD renal [facility], waiting for suregry in 09/2020    Trigeminal neuralgia R    Vasculitis Legs, forerhead, arms & hands, flare ups in R leg  Dr susie Ribeiro is my Rheumatologist

## 2025-03-13 NOTE — PROGRESS NOTE ADULT - ATTENDING COMMENTS
I agree with the resident progress note/outlined plan of care. I have the edited the above note as needed.
Patient was seen and examined at bedside. Complains of chest pain, tightness and chronic SOB. Also reports chronic abd pain after the surgery which has recently worsened after a fall 2 months ago. Denies any leg swelling, cough but reports orthopnea. Also reports she does not sleep at night, only during the day time, but uses O2 when she sleeps.   Patient was reported to be desaturating to low 80s when sleeping even with 4L NC     ICU Vital Signs Last 24 Hrs  T(C): 36.5 (11 Mar 2025 12:09), Max: 37.3 (10 Mar 2025 22:56)  T(F): 97.7 (11 Mar 2025 12:09), Max: 99.2 (10 Mar 2025 22:56)  HR: 80 (11 Mar 2025 12:09) (80 - 109)  BP: 108/69 (11 Mar 2025 12:09) (108/69 - 169/83)  BP(mean): --  ABP: --  ABP(mean): --  RR: 20 (11 Mar 2025 12:09) (17 - 20)  SpO2: 96% (11 Mar 2025 12:09) (89% - 96%)    O2 Parameters below as of 11 Mar 2025 12:09  Patient On (Oxygen Delivery Method): nasal cannula  O2 Flow (L/min): 4      P/E:  NAD, morbidly obese  AAOx3, no focal deficit  Clear to auscultation BL (difficult exam due to body habitus)  S1S2 WNL  abd soft, obese, no bruise or hematoma, no focal tenderness rather diffuse abd tenderness, BS present   BLLE no edema or calf tenderness     Labs noted     A/P:  EKG NSR. Trop x2 neg. Follow up TTE, cardiology consult   Outpatient record review from patient's primary pulmonologist Dr. Mitchell is noted, BiPAP recommended but unable to obtain due to insurance issues. Pulmonology consulted   Will defer abdominal imaging as pain appears to be chronic. Will dc any opioid given chronic lung disease. Will obtain pain management consult   Obtain UA   Outpatient rheumatology consult noted   A1c noted, obtain endocrine consult

## 2025-03-13 NOTE — CHART NOTE - NSCHARTNOTEFT_GEN_A_CORE
59-year-old female, AAOx3, ambulates with a cane, no HHA,  history of DM, HLD, asthma, obesity hypoventilation syndrome on 4L NC, migraines, hypothyroidism, NAKUL, RCC status post left renal resection 11/23 presented to the hospital 3/11 with intermittent palpitations and chest pain that started a week prior to admission. An RRT was activated for unresponsiveness and AHHRF for which she was intubated and admitted to the ICU, She self-extubated on 3/12 and was treated prophylactically with racemic epi and dexamethasone, placed on BiPAP and subsequently transitioned to nasal cannula. While napping, she was noted to develop hypoxia to 80% which resolved upon awakening the patient. She repeatedly declines to use BiPAP and is able to verbalize her awareness of the risks to her health/life by not wearing it prn and at night. She has also stated that she does not want to be re-intubated. Nocturnal AVAPS has been recommended, encouraged and initiated. She toelrated AVAPS overnight 3/12-3/13 with improvement in her pCO2. In the AM 3/13, she was transitioned back to nasal cannula. She continues to receive Advair inhaler and duonebs q6.     Her hospital course has also been notable for TWI in anterior leads with associated troponemia with peaked at 2340 and downtrended. Cardiologist Memo was consulted and recommended continuation of ASA and atorvastatin. Repeat TTE 3/11/25 showed normal LV function, EF 54%. Anti-HTN are held at present and she has remained normotensive; will be restarted as appropriate. She has lower extremity edema which she reports being present for the past 7 months; possibly related to the amlodipine. Endocrinology has also been consulted for recommendations regarding uncontrolled hyperglycemia/diabetes with A1c 13%. She was initiated on Lantus 38 units qHS with mod ISS; regimen adjusted to Lantus 42 units qHS, 10 units pre-meal lispro and moderate ISS as her diet has been resumed. She is being transferred to  for ongoing management. Her castaneda catheter was removed on 3/13.     Bed obtained on 4N. Verbal signout given to Manda Mcclain NP including plan for nightly AVAPS and with sleeping during day; castaneda catheter removed prior to leaving ICU. Ordered for bladder scan if patient does not void in 4hrs.

## 2025-03-13 NOTE — DIETITIAN INITIAL EVALUATION ADULT - PROBLEM/PLAN-2
"    [address] [facility]  [place] [address]  Phone: [phone]  Fax: [phone]      Return to School    Darin Molina (Hector) was seen and treated in our urgent care clinic on 3/9/2023.     COVID-19 is present in our communities across the state. There is limited testing for COVID at this time, so not all patients can be tested. In this situation, your employee meets the following criteria:    Darin Molina has met the criteria for COVID-19 testing and has a POSITIVE result. He can return to school once they are asymptomatic for 24 hours without the use of fever reducing medications AND at least five days from the first positive result. A mask is recommended for 5 days post quarantine.     If you have any questions or concerns, or if I can be of further assistance, please do not hesitate to contact me.    Sincerely,           Geni Lopez PA-C  " DISPLAY PLAN FREE TEXT

## 2025-03-13 NOTE — PROGRESS NOTE ADULT - ASSESSMENT
60yo woman w/ PMH asthma, NAKUL, chronic hypoxemic/hypercapnic respiratory failure 2/2 morbid obesity, restrictive lung disease and kyphosis admitted for atypical CP for ACS ruleout. Asked to evaluate for mgmt recommendations in NAKUL. C/c/b RRT 3/11 overnight for acute on chronic hypercapnic respiratory failure w/ hypoxemia req. intubation and then self-extubated 3/12. In ICU for further mgmt.     #Acute on chronic respiratory failure with hypoxemia and hypercapnia  #NAKUL/OHS  #Asthma - not in exacerbation    Per review of outpatient pul notes on admission, pt was supposed to be on nocturnal NiV documented as BiPAP 20/16 per pulmonologist note but no longer has NIV at home as insurance wouldn't cover machine. She does have a CPAP which, pt states, was not yet calibrated and thus she is not using it (she had upcoming pulm appt this month for calibration). CPAP would not be appropriate in this condition, given her hypercapnic respiratory failure and recent hospital acute respiratory failure which necessitated intubation.     Recommendations:  - cont intermittent AVAPS (while asleep/napping) and during the night vs. BiPAP ;   --> pt will need preferrably AVAPS (or alternatively BiPAP) for discharge w/ supplemental O2, especially given interval events and acute hypercapnic respiratory failure  - cont home albuterol PRN  - cont Advair interchanged for home Symbicort  - cont montelukast  - interval ABGs reviewed  - caution with narcotics as this can exacerbate sleep disordered breathing / hypoventilation  - cardiac mgmt per primary team/cardiology    Patient follows w/ pulmonologist Dr. Monterroso as outpatient and pt says she has upcoming f/u in a few weeks, would maintain outpt pulm f/u   Please ensure patient is discharged w/ NIPPV/AVAPS

## 2025-03-14 DIAGNOSIS — J96.21 ACUTE AND CHRONIC RESPIRATORY FAILURE WITH HYPOXIA: ICD-10-CM

## 2025-03-14 DIAGNOSIS — E66.2 MORBID (SEVERE) OBESITY WITH ALVEOLAR HYPOVENTILATION: ICD-10-CM

## 2025-03-14 DIAGNOSIS — J45.909 UNSPECIFIED ASTHMA, UNCOMPLICATED: ICD-10-CM

## 2025-03-14 DIAGNOSIS — M40.209 UNSPECIFIED KYPHOSIS, SITE UNSPECIFIED: ICD-10-CM

## 2025-03-14 DIAGNOSIS — E65 LOCALIZED ADIPOSITY: ICD-10-CM

## 2025-03-14 DIAGNOSIS — Z71.89 OTHER SPECIFIED COUNSELING: ICD-10-CM

## 2025-03-14 LAB
ALBUMIN SERPL ELPH-MCNC: 3 G/DL — LOW (ref 3.5–5)
ALT FLD-CCNC: 36 U/L DA — SIGNIFICANT CHANGE UP (ref 10–60)
ANION GAP SERPL CALC-SCNC: 5 MMOL/L — SIGNIFICANT CHANGE UP (ref 5–17)
AST SERPL-CCNC: 46 U/L — HIGH (ref 10–40)
BILIRUB SERPL-MCNC: 0.6 MG/DL — SIGNIFICANT CHANGE UP (ref 0.2–1.2)
BUN SERPL-MCNC: 16 MG/DL — SIGNIFICANT CHANGE UP (ref 7–18)
CALCIUM SERPL-MCNC: 9.4 MG/DL — SIGNIFICANT CHANGE UP (ref 8.4–10.5)
CHLORIDE SERPL-SCNC: 95 MMOL/L — LOW (ref 96–108)
CO2 SERPL-SCNC: 32 MMOL/L — HIGH (ref 22–31)
CREAT SERPL-MCNC: 1.13 MG/DL — SIGNIFICANT CHANGE UP (ref 0.5–1.3)
EGFR: 56 ML/MIN/1.73M2 — LOW
EGFR: 56 ML/MIN/1.73M2 — LOW
GLUCOSE SERPL-MCNC: 253 MG/DL — HIGH (ref 70–99)
HCT VFR BLD CALC: 37.9 % — SIGNIFICANT CHANGE UP (ref 34.5–45)
HGB BLD-MCNC: 11.3 G/DL — LOW (ref 11.5–15.5)
MAGNESIUM SERPL-MCNC: 1.4 MG/DL — LOW (ref 1.6–2.6)
MCHC RBC-ENTMCNC: 24 PG — LOW (ref 27–34)
MCHC RBC-ENTMCNC: 29.8 G/DL — LOW (ref 32–36)
MCV RBC AUTO: 80.5 FL — SIGNIFICANT CHANGE UP (ref 80–100)
NRBC BLD AUTO-RTO: 0 /100 WBCS — SIGNIFICANT CHANGE UP (ref 0–0)
PHOSPHATE SERPL-MCNC: 2.8 MG/DL — SIGNIFICANT CHANGE UP (ref 2.5–4.5)
PLATELET # BLD AUTO: 232 K/UL — SIGNIFICANT CHANGE UP (ref 150–400)
POTASSIUM SERPL-MCNC: 4.9 MMOL/L — SIGNIFICANT CHANGE UP (ref 3.5–5.3)
POTASSIUM SERPL-SCNC: 4.9 MMOL/L — SIGNIFICANT CHANGE UP (ref 3.5–5.3)
PROT SERPL-MCNC: 8 G/DL — SIGNIFICANT CHANGE UP (ref 6–8.3)
RBC # FLD: 16.6 % — HIGH (ref 10.3–14.5)
SODIUM SERPL-SCNC: 132 MMOL/L — LOW (ref 135–145)
WBC # BLD: 7.52 K/UL — SIGNIFICANT CHANGE UP (ref 3.8–10.5)
WBC # FLD AUTO: 7.52 K/UL — SIGNIFICANT CHANGE UP (ref 3.8–10.5)

## 2025-03-14 PROCEDURE — 99232 SBSQ HOSP IP/OBS MODERATE 35: CPT

## 2025-03-14 RX ORDER — FLUTICASONE PROPIONATE 50 UG/1
1 SPRAY, METERED NASAL
Refills: 0 | Status: DISCONTINUED | OUTPATIENT
Start: 2025-03-14 | End: 2025-03-18

## 2025-03-14 RX ORDER — DEXTROMETHORPHAN HBR, GUAIFENESIN 200 MG/10ML
200 LIQUID ORAL EVERY 6 HOURS
Refills: 0 | Status: DISCONTINUED | OUTPATIENT
Start: 2025-03-14 | End: 2025-03-16

## 2025-03-14 RX ORDER — MAGNESIUM SULFATE 500 MG/ML
1 SYRINGE (ML) INJECTION ONCE
Refills: 0 | Status: COMPLETED | OUTPATIENT
Start: 2025-03-14 | End: 2025-03-14

## 2025-03-14 RX ORDER — SODIUM CHLORIDE 0.65 %
1 AEROSOL, SPRAY (ML) NASAL THREE TIMES A DAY
Refills: 0 | Status: DISCONTINUED | OUTPATIENT
Start: 2025-03-14 | End: 2025-03-18

## 2025-03-14 RX ADMIN — OXCARBAZEPINE 600 MILLIGRAM(S): 150 TABLET, FILM COATED ORAL at 05:42

## 2025-03-14 RX ADMIN — Medication 100 GRAM(S): at 11:45

## 2025-03-14 RX ADMIN — HYDROCORTISONE 1 APPLICATION(S): 10 CREAM TOPICAL at 17:06

## 2025-03-14 RX ADMIN — Medication 40 MILLIGRAM(S): at 05:47

## 2025-03-14 RX ADMIN — HYDROCORTISONE 1 APPLICATION(S): 10 CREAM TOPICAL at 05:45

## 2025-03-14 RX ADMIN — DEXTROMETHORPHAN HBR, GUAIFENESIN 200 MILLIGRAM(S): 200 LIQUID ORAL at 23:13

## 2025-03-14 RX ADMIN — MONTELUKAST SODIUM 10 MILLIGRAM(S): 10 TABLET ORAL at 11:47

## 2025-03-14 RX ADMIN — Medication 2.5 MILLIGRAM(S): at 15:28

## 2025-03-14 RX ADMIN — INSULIN LISPRO 10 UNIT(S): 100 INJECTION, SOLUTION INTRAVENOUS; SUBCUTANEOUS at 17:04

## 2025-03-14 RX ADMIN — Medication 1000 MILLIGRAM(S): at 13:30

## 2025-03-14 RX ADMIN — FLUTICASONE PROPIONATE 1 SPRAY(S): 50 SPRAY, METERED NASAL at 05:45

## 2025-03-14 RX ADMIN — Medication 1000 MILLIGRAM(S): at 06:37

## 2025-03-14 RX ADMIN — INSULIN GLARGINE-YFGN 42 UNIT(S): 100 INJECTION, SOLUTION SUBCUTANEOUS at 21:29

## 2025-03-14 RX ADMIN — Medication 2.5 MILLIGRAM(S): at 09:13

## 2025-03-14 RX ADMIN — POLYETHYLENE GLYCOL 3350 17 GRAM(S): 17 POWDER, FOR SOLUTION ORAL at 11:49

## 2025-03-14 RX ADMIN — INSULIN LISPRO 6: 100 INJECTION, SOLUTION INTRAVENOUS; SUBCUTANEOUS at 11:45

## 2025-03-14 RX ADMIN — FLUTICASONE PROPIONATE 1 SPRAY(S): 50 SPRAY, METERED NASAL at 17:06

## 2025-03-14 RX ADMIN — INSULIN LISPRO 6: 100 INJECTION, SOLUTION INTRAVENOUS; SUBCUTANEOUS at 08:21

## 2025-03-14 RX ADMIN — INSULIN LISPRO 10 UNIT(S): 100 INJECTION, SOLUTION INTRAVENOUS; SUBCUTANEOUS at 11:45

## 2025-03-14 RX ADMIN — Medication 1000 MILLIGRAM(S): at 13:08

## 2025-03-14 RX ADMIN — Medication 1 DOSE(S): at 21:27

## 2025-03-14 RX ADMIN — ENOXAPARIN SODIUM 40 MILLIGRAM(S): 100 INJECTION SUBCUTANEOUS at 17:05

## 2025-03-14 RX ADMIN — ATORVASTATIN CALCIUM 40 MILLIGRAM(S): 80 TABLET, FILM COATED ORAL at 21:22

## 2025-03-14 RX ADMIN — Medication 1 APPLICATION(S): at 05:45

## 2025-03-14 RX ADMIN — INSULIN LISPRO 10 UNIT(S): 100 INJECTION, SOLUTION INTRAVENOUS; SUBCUTANEOUS at 08:20

## 2025-03-14 RX ADMIN — ENOXAPARIN SODIUM 40 MILLIGRAM(S): 100 INJECTION SUBCUTANEOUS at 05:44

## 2025-03-14 RX ADMIN — Medication 2 TABLET(S): at 21:26

## 2025-03-14 RX ADMIN — INSULIN LISPRO 4: 100 INJECTION, SOLUTION INTRAVENOUS; SUBCUTANEOUS at 17:05

## 2025-03-14 RX ADMIN — OXCARBAZEPINE 600 MILLIGRAM(S): 150 TABLET, FILM COATED ORAL at 17:05

## 2025-03-14 RX ADMIN — Medication 2.5 MILLIGRAM(S): at 20:32

## 2025-03-14 RX ADMIN — Medication 1000 MILLIGRAM(S): at 05:44

## 2025-03-14 RX ADMIN — Medication 1 SPRAY(S): at 23:12

## 2025-03-14 RX ADMIN — Medication 1 DOSE(S): at 11:46

## 2025-03-14 RX ADMIN — Medication 2.5 MILLIGRAM(S): at 02:32

## 2025-03-14 RX ADMIN — INSULIN LISPRO 2: 100 INJECTION, SOLUTION INTRAVENOUS; SUBCUTANEOUS at 21:23

## 2025-03-14 RX ADMIN — Medication 1000 MILLIGRAM(S): at 21:22

## 2025-03-14 RX ADMIN — Medication 81 MILLIGRAM(S): at 11:47

## 2025-03-14 RX ADMIN — Medication 1000 MILLIGRAM(S): at 22:48

## 2025-03-14 RX ADMIN — Medication 112 MICROGRAM(S): at 05:42

## 2025-03-14 NOTE — PROGRESS NOTE ADULT - ASSESSMENT
Assessment and Recommendations:  59 year old F with PMHx of asthma, COPD (on 4L NC PRN), DM, fatty liver, fibromyalgia, R shoulder bursitis, HLD, hypothyroidism, NAKUL, psoriasis, RCC (s/p L renal resection 11/3/23, no hx of CT/RT) presenting palpitations, chest discomfort and lower abdominal pain, in setting of fall 1 week ago. Course c/b by acute on chronic hypercapneic respiratory failure s/p intubation, now with elevated troponins.    1) Atypical CP, chronic SOB, multifactorial in setting of COPD, morbid obesity, fibromyalgia  2) Elevated Ca score  3) Palpitations  4) DM  5) Morbid obesity  6) Fibromyalgia  7) Elevated troponins, became positive after unresponsive event due to acute on chronic hypercapneic respiratory failure   - EKG showed sinus rhythm with non-specific T wave abnormality in I and aVL  - Troponins were negative x 2 on admission then increased in setting of acute hypercapneic event requiring intubation  - CCTA 12/2022 showed Ca score of 1069.2 with no significant obstructive CAD. Pharm nuclear stress 3/2024 showed normal myocardial perfusion  - Continue ASA 81 and atorvastatin 40mg daily for non-obstructive CAD   - RLE > LLE edema. LE duplex negative for DVT.  - TTE showed normal LV and RV function without significant valvular disease, however this was done PRIOR to her unresponsive/hypercapneic event. Repeat limited TTE with Definity to r/o new WMA. Will consider further testing pending repeat limited TTE     8) HTN  - Home meds on hold due to normotension.    Andrés Hampton MD ([employer] TEAMS message PREFERRED)  Cardiology Attending  [facility] - Cardiology  [address] [address] [place] [address], [address] Floor, [address], [place] [address]    All Cardiology service information can be found 24/7 on [email or web address], password: [email or web address]

## 2025-03-14 NOTE — PROGRESS NOTE ADULT - SUBJECTIVE AND OBJECTIVE BOX
Overnight Events: NAEON. Pt reports feeling okay.     Review Of Systems: No chest pain, shortness of breath, or palpitations  CONSTITUTIONAL: no fevers or chills  EYES/ENT: No visual changes;  No vertigo or throat pain   NECK: No pain or stiffness  RESPIRATORY: No cough, wheezing. No shortness of breath  CARDIOVASCULAR: No chest pain or palpitations  GASTROINTESTINAL: No abdominal or epigastric pain. No nausea, vomiting. No diarrhea. No melena.  GENITOURINARY: No dysuria, frequency or hematuria  NEUROLOGICAL: No numbness or weakness  SKIN: No itching, burning, rashes, or lesions   All other review of systems is negative unless indicated above.     Current Meds:  acetaminophen     Tablet .. 1000 milliGRAM(s) Oral every 8 hours  albuterol    0.083% 2.5 milliGRAM(s) Nebulizer every 6 hours  albuterol    90 MICROgram(s) HFA Inhaler 1 Puff(s) Inhalation every 4 hours PRN  aspirin  chewable 81 milliGRAM(s) Oral daily  atorvastatin 40 milliGRAM(s) Oral at bedtime  chlorhexidine 2% Cloths 1 Application(s) Topical <User Schedule>  enoxaparin Injectable 40 milliGRAM(s) SubCutaneous every 12 hours  fluticasone propionate 50 MICROgram(s)/spray Nasal Spray 1 Spray(s) Both Nostrils two times a day  fluticasone propionate/ salmeterol 250-50 MICROgram(s) Diskus 1 Dose(s) Inhalation two times a day  hydrocortisone 1% Ointment 1 Application(s) Topical every 12 hours  insulin glargine Injectable (LANTUS) 42 Unit(s) SubCutaneous at bedtime  insulin lispro (ADMELOG) corrective regimen sliding scale   SubCutaneous three times a day before meals  insulin lispro (ADMELOG) corrective regimen sliding scale   SubCutaneous at bedtime  insulin lispro Injectable (ADMELOG) 10 Unit(s) SubCutaneous three times a day before meals  levothyroxine 112 MICROGram(s) Oral daily  montelukast 10 milliGRAM(s) Oral daily  OXcarbazepine 600 milliGRAM(s) Oral two times a day  pantoprazole    Tablet 40 milliGRAM(s) Oral before breakfast  polyethylene glycol 3350 17 Gram(s) Oral daily  senna 2 Tablet(s) Oral at bedtime      Vitals:  T(F): 98 (03-14), Max: 99.1 (03-13)  HR: 91 (03-14) (80 - 91)  BP: 151/83 (03-14) (120/65 - 151/83)  RR: 18 (03-14)  SpO2: 93% (03-14)  I&O's Summary    13 Mar 2025 07:01  -  14 Mar 2025 07:00  --------------------------------------------------------  IN: 0 mL / OUT: 1100 mL / NET: -1100 mL    14 Mar 2025 07:01  -  14 Mar 2025 15:54  --------------------------------------------------------  IN: 0 mL / OUT: 1 mL / NET: -1 mL    Physical Exam:  Appearance: No acute distress; well appearing  Eyes: PERRL, EOMI, pink conjunctiva  HEENT: Normal oral mucosa  Cardiovascular: RRR, S1, S2, no murmurs, rubs, or gallops; no JVD  Respiratory: Clear to auscultation bilaterally  Gastrointestinal: soft, non-tender, non-distended with normal bowel sounds  Extremities: 2+ edema up below the knee on R leg and minimal on L leg  Musculoskeletal: No clubbing; no joint deformity   Neurologic: Non-focal  Lymphatic: No lymphadenopathy  Psychiatry: AAOx3, mood & affect appropriate  Skin: No rashes, ecchymoses, or cyanosis                          11.3   7.52  )-----------( 232      ( 14 Mar 2025 08:43 )             37.9     03-14    132[L]  |  95[L]  |  16  ----------------------------<  253[H]  4.9   |  32[H]  |  1.13    Ca    9.4      14 Mar 2025 07:00  Phos  2.8     03-14  Mg     1.4     03-14    TPro  8.0  /  Alb  3.0[L]  /  TBili  0.6  /  DBili  x   /  AST  46[H]  /  ALT  36  /  AlkPhos  106  03-14      CARDIAC MARKERS ( 12 Mar 2025 00:50 )  x     / x     / x     / x     / x     / 3.0 ng/mL    Echo 3/11/25 -    CONCLUSIONS:      1. Technically difficult image quality.   2. Left ventricular cavity is normal in size. Left ventricular wall thickness is normal. Left ventricular systolic function is normal with an ejection fraction of 54 % by Valdez's method of disks. There are no regional wall motion abnormalities seen.   3. There is mild (grade 1) left ventricular diastolic dysfunction.   4. Left atrium is mildly dilated.   5. Normal right ventricular systolic function. Tricuspid annular plane systolic excursion (TAPSE) is 2.3 cm (normal >=1.7 cm).   6. No significant valvular disease.   7. Pulmonary artery systolic pressure could not be estimated.   8. No pericardial effusion seen.   9. Compared to the transthoracic echocardiogram performed on 3/20/2024, there have been no significant interval changes.    Imaging:    LE Duplex -   IMPRESSION:  No evidence of deep venous thrombosis in either lower extremity.

## 2025-03-14 NOTE — PROGRESS NOTE ADULT - PROBLEM SELECTOR PLAN 4
Severe abdominal obesity with known areolar hypoventilation.  PCO2 greater than 125 upon admission which required patient to be intubated and started on mechanical ventilation.  Patient unable to tolerate very high setting on BIPAP 20/16  Started on AVAPS therapy in hospital. Tolerating better. PCO2 on AVAPS 50  Will need AVAPS-AE when discharged. Patient need Tidal volume that AVAPS-AE delivers to help control PCO2 and to provide adequate gas exchange gas exchange.  Order sent to [facility].

## 2025-03-14 NOTE — PROGRESS NOTE ADULT - SUBJECTIVE AND OBJECTIVE BOX
PULMONARY CONSULT SERVICE FOLLOW-UP NOTE    INTERVAL HPI:  Reviewed chart and overnight events; patient seen and examined at bedside.    MEDICATIONS:  Pulmonary:  albuterol    0.083% 2.5 milliGRAM(s) Nebulizer every 6 hours  albuterol    90 MICROgram(s) HFA Inhaler 1 Puff(s) Inhalation every 4 hours PRN  fluticasone propionate/ salmeterol 250-50 MICROgram(s) Diskus 1 Dose(s) Inhalation two times a day  guaiFENesin Oral Liquid (Sugar-Free) 200 milliGRAM(s) Oral every 6 hours PRN  montelukast 10 milliGRAM(s) Oral daily    Antimicrobials:    Anticoagulants:  aspirin  chewable 81 milliGRAM(s) Oral daily  enoxaparin Injectable 40 milliGRAM(s) SubCutaneous every 12 hours    Cardiac:      Allergies    Fioricet (Rash)  venlafaxine (Hives)  mushroom (Unknown)  gadobutrol (Rash; Urticaria; Hives)  IV Contrast (Short breath)  honeydew (Rash)    Intolerances    Vital Signs Last 24 Hrs  T(C): 36.7 (14 Mar 2025 20:58), Max: 36.9 (14 Mar 2025 05:04)  T(F): 98 (14 Mar 2025 20:58), Max: 98.5 (14 Mar 2025 05:04)  HR: 85 (14 Mar 2025 20:58) (80 - 91)  BP: 161/92 (14 Mar 2025 20:58) (120/65 - 161/92)  BP(mean): --  RR: 20 (14 Mar 2025 20:58) (18 - 20)  SpO2: 100% (14 Mar 2025 20:58) (93% - 100%)    Parameters below as of 14 Mar 2025 13:29  Patient On (Oxygen Delivery Method): room air    03-13 @ 07:01  -  03-14 @ 07:00  --------------------------------------------------------  IN: 0 mL / OUT: 1100 mL / NET: -1100 mL    03-14 @ 07:01  -  03-14 @ 21:09  --------------------------------------------------------  IN: 0 mL / OUT: 1 mL / NET: -1 mL    PHYSICAL EXAM:  Constitutional: well-appearing  HEENT: NC/AT; PERRL, anicteric sclera; MMM  Neck: supple  Cardiovascular: +S1/S2, RRR  Respiratory: CTA B/L; no W/R/R  Gastrointestinal: soft, NT/ND  Extremities: WWP; no edema, clubbing or cyanosis  Vascular: 2+ radial pulses B/L  Neurological: awake and alert; CHATMAN    LABS:  ABG - ( 13 Mar 2025 03:02 )  pH, Arterial: 7.45  pH, Blood: x     /  pCO2: 50    /  pO2: 229   / HCO3: 35    / Base Excess: 9.2   /  SaO2: 97        CBC Full  -  ( 14 Mar 2025 08:43 )  WBC Count : 7.52 K/uL  RBC Count : 4.71 M/uL  Hemoglobin : 11.3 g/dL  Hematocrit : 37.9 %  Platelet Count - Automated : 232 K/uL  Mean Cell Volume : 80.5 fl  Mean Cell Hemoglobin : 24.0 pg  Mean Cell Hemoglobin Concentration : 29.8 g/dL  Auto Neutrophil # : x  Auto Lymphocyte # : x  Auto Monocyte # : x  Auto Eosinophil # : x  Auto Basophil # : x  Auto Neutrophil % : x  Auto Lymphocyte % : x  Auto Monocyte % : x  Auto Eosinophil % : x  Auto Basophil % : x    03-14    132[L]  |  95[L]  |  16  ----------------------------<  253[H]  4.9   |  32[H]  |  1.13    Ca    9.4      14 Mar 2025 07:00  Phos  2.8     03-14  Mg     1.4     03-14    TPro  8.0  /  Alb  3.0[L]  /  TBili  0.6  /  DBili  x   /  AST  46[H]  /  ALT  36  /  AlkPhos  106  03-14    Urinalysis Basic - ( 14 Mar 2025 07:00 )    Color: x / Appearance: x / SG: x / pH: x  Gluc: 253 mg/dL / Ketone: x  / Bili: x / Urobili: x   Blood: x / Protein: x / Nitrite: x   Leuk Esterase: x / RBC: x / WBC x   Sq Epi: x / Non Sq Epi: x / Bacteria: x      RADIOLOGY & ADDITIONAL STUDIES:  No interval chest study for review

## 2025-03-14 NOTE — PROGRESS NOTE ADULT - PROBLEM SELECTOR PLAN 1
Secondary to Class 3/4 Obesity with alveloar hypoventilation.  BMI 51.5  Upon admission PCO2 was greater than 125.  Was intubated and transferred to ICU.  Self extubated following day.  Started on BIPAP however remained hypercapnic and patient could not tolerate settings of 20/16  On AVAPS PCO2 decreased to 50. Patient would benefit from AVAPS-AE at home to control hypoventilation and hypercapnia. Thoracic expansion is limited secondary to severe central obesity and multilevel spinal stenosis and scoliosis. Patient requires that tidal volume that AVAPS-AE delivers for adequate gas exchange. Without AVAPS-AE patient is at a high risk of readmission and possible death from respiratory failure. BIPAP does not have capability to deliver tidal volume and patient cannot tolerate extremely high settings on a BIPAP.  Continue AVAPS nightly and as needed during the day.  Oxygen supplementation 3LPM when not using AVAPS.

## 2025-03-14 NOTE — PROGRESS NOTE ADULT - PROBLEM SELECTOR PLAN 7
Continue synthroid. Controlled.  Normotensive cureently.  Uses losartan and possible diuretic at home. Will verify with pharmacy.

## 2025-03-14 NOTE — PROGRESS NOTE ADULT - PROBLEM SELECTOR PLAN 10
DVT and GI prophylaxis.  Continue AVAPS nightly and as needed during the day.  Oxygen supplementation when of AVAPS.   AVAPS-AE upon discharge. Order sent to [facility].  Has some kind of PAP machine at home. Only oxygen records at [facility].

## 2025-03-14 NOTE — PROGRESS NOTE ADULT - PROBLEM SELECTOR PLAN 2
Uncontrolled. A1c 13 upon admission.  Endo Dr Hayden.  Nutrition consult appreciated.  Continue lantus 42U daily  Lispro 10U with meals   Moderate sliding scale coverage.  Monitor glucose.

## 2025-03-14 NOTE — PROGRESS NOTE ADULT - ASSESSMENT
59-year-old female, AAOx3, ambulates with a cane, no HHA,  history of DM, HLD, asthma, obesity hypoventilation syndrome on 4L NC, migraines, hypothyroidism, NAKUL, RCC status post left renal resection 11/23 presented to the hospital 3/11 with intermittent palpitations and chest pain that started a week prior to admission. An RRT was activated for unresponsiveness and AHHRF for which she was intubated and admitted to the ICU, She self-extubated on 3/12 and was treated prophylactically with racemic epi and dexamethasone, placed on BiPAP and subsequently transitioned to nasal cannula. While napping, she was noted to develop hypoxia to 80% which resolved upon awakening the patient. She repeatedly declines to use BiPAP and is able to verbalize her awareness of the risks to her health/life by not wearing it prn and at night. She has also stated that she does not want to be re-intubated. Nocturnal AVAPS has been recommended, encouraged and initiated. She toelrated AVAPS overnight 3/12-3/13 with improvement in her pCO2. In the AM 3/13, she was transitioned back to nasal cannula. She continues to receive Advair inhaler and duonebs q6.     Her hospital course has also been notable for TWI in anterior leads with associated troponemia with peaked at 2340 and downtrended. Cardiologist Memo was consulted and recommended continuation of ASA and atorvastatin. Repeat TTE 3/11/25 showed normal LV function, EF 54%. Anti-HTN are held at present and she has remained normotensive; will be restarted as appropriate. She has lower extremity edema which she reports being present for the past 7 months; possibly related to the amlodipine. Endocrinology has also been consulted for recommendations regarding uncontrolled hyperglycemia/diabetes with A1c 13%. She was initiated on Lantus 38 units qHS with mod ISS; regimen adjusted to Lantus 42 units qHS, 10 units pre-meal lispro and moderate ISS as her diet has been resumed. She is being transferred to  for ongoing management. Her castaneda catheter was removed on 3/13.

## 2025-03-14 NOTE — PROGRESS NOTE ADULT - TIME BILLING
- personally reviewed pt's labs, VS/flowsheets, pertinent imaging, and consultant notes  - bedside SpO2 measurement to determine supplemental O2 needs  - general pulm hx/exam  - medication reconciliation  - coordination of care with primary team/ICU
- personally reviewed pt's labs, VS/flowsheets, pertinent imaging, and consultant notes  - bedside SpO2 measurement to determine supplemental O2 needs  - general pulm hx/exam  - medication reconciliation  - coordination of care with primary team/ICU
- personally reviewed pt's labs, VS/flowsheets, pertinent imaging, and consultant notes  - bedside SpO2 measurement to determine supplemental O2 needs  - general pulm hx/exam  - medication reconciliation  - coordination of care with primary team
minutes spent the time noted on the day of this patient encounter preparing for, reviewing records/charts/labs, coordination of care with primary team, providing and documenting the above E/M service.    I have discussed the patient's plan of care with primary team and resident.  I agree with the resident progress note/outlined plan of care. I have edited the note as necessary.
minutes spent the time noted on the day of this patient encounter preparing for, reviewing records/charts/labs, interview and physical examination, coordination of care with patient and primary team, providing and documenting the above E/M service and 50% of time spent face to face counseling and education provided to patient on disease course, and treatment/management. All questions and concerns were answered and addressed in detail.

## 2025-03-14 NOTE — PROGRESS NOTE ADULT - PROBLEM SELECTOR PLAN 5
Not in exacerbation.  Continue bronchodilators and ICS.  AVAPS nightly and as needed during the day.  Oxygen supplementation when off AVAPS.  Follows with Dr Monterroso as an outpatient.  Dr Ramirez while inpatient. +Kyphosis of the spine which is diminishing her thoracic expansion which is contributing to her hypercapnia.  Continue AVAPS nightly and as needed during the day.  AVAPS-AE ordered from [facility].

## 2025-03-14 NOTE — PROGRESS NOTE ADULT - PROBLEM SELECTOR PROBLEM 3
Class 3 obesity with alveolar hypoventilation, serious comorbidity, and body mass index (BMI) of 50.0 to 59.9 in adult

## 2025-03-14 NOTE — PROGRESS NOTE ADULT - SUBJECTIVE AND OBJECTIVE BOX
NOEMY CONTRERAS    SCU progress note    INTERVAL HPI/OVERNIGHT EVENTS: ***Transferred from ICU to SCU    DNR [ ]   DNI  [  ]  FULL CODE.    Covid - 19 PCR:     The 4Ms    What Matters Most: see GOC  Age appropriate Medications/Screen for High Risk Medication: Yes  Mentation: see CAM below  Mobility: defer to physical exam    The Confusion Assessment Method (CAM) Diagnostic Algorithm     1: Acute Onset or Fluctuating Course  - Is there evidence of an acute change in mental status from the patient’s baseline? Did the (abnormal) behavior  fluctuate during the day, that is, tend to come and go, or increase and decrease in severity?  [ ] YES [X ] NO     2: Inattention  - Did the patient have difficulty focusing attention, being easily distractible, or having difficulty keeping track of what was being said?  [ ] YES [X ] NO     3: Disorganized thinking  -Was the patient’s thinking disorganized or incoherent, such as rambling or irrelevant conversation, unclear or illogical flow of ideas, or unpredictable switching from subject to subject?  [ ] YES [X ] NO    4: Altered Level of consciousness?  [ ] YES [X ] NO    The diagnosis of delirium by CAM requires the presence of features 1 and 2 and either 3 or 4.    PRESSORS: [ ] YES [X ] NO    Cardiovascular:  Heart Failure  Acute   Acute on Chronic  Chronic         Pulmonary:  albuterol    0.083% 2.5 milliGRAM(s) Nebulizer every 6 hours  albuterol    90 MICROgram(s) HFA Inhaler 1 Puff(s) Inhalation every 4 hours PRN  fluticasone propionate/ salmeterol 250-50 MICROgram(s) Diskus 1 Dose(s) Inhalation two times a day  montelukast 10 milliGRAM(s) Oral daily    Hematalogic:  aspirin  chewable 81 milliGRAM(s) Oral daily  enoxaparin Injectable 40 milliGRAM(s) SubCutaneous every 12 hours    Other:  acetaminophen     Tablet .. 1000 milliGRAM(s) Oral every 8 hours  atorvastatin 40 milliGRAM(s) Oral at bedtime  chlorhexidine 2% Cloths 1 Application(s) Topical <User Schedule>  fluticasone propionate 50 MICROgram(s)/spray Nasal Spray 1 Spray(s) Both Nostrils two times a day  hydrocortisone 1% Ointment 1 Application(s) Topical every 12 hours  insulin glargine Injectable (LANTUS) 42 Unit(s) SubCutaneous at bedtime  insulin lispro (ADMELOG) corrective regimen sliding scale   SubCutaneous three times a day before meals  insulin lispro (ADMELOG) corrective regimen sliding scale   SubCutaneous at bedtime  insulin lispro Injectable (ADMELOG) 10 Unit(s) SubCutaneous three times a day before meals  levothyroxine 112 MICROGram(s) Oral daily  magnesium sulfate  IVPB 1 Gram(s) IV Intermittent once  OXcarbazepine 600 milliGRAM(s) Oral two times a day  pantoprazole    Tablet 40 milliGRAM(s) Oral before breakfast  polyethylene glycol 3350 17 Gram(s) Oral daily  senna 2 Tablet(s) Oral at bedtime    acetaminophen     Tablet .. 1000 milliGRAM(s) Oral every 8 hours  albuterol    0.083% 2.5 milliGRAM(s) Nebulizer every 6 hours  albuterol    90 MICROgram(s) HFA Inhaler 1 Puff(s) Inhalation every 4 hours PRN  aspirin  chewable 81 milliGRAM(s) Oral daily  atorvastatin 40 milliGRAM(s) Oral at bedtime  chlorhexidine 2% Cloths 1 Application(s) Topical <User Schedule>  enoxaparin Injectable 40 milliGRAM(s) SubCutaneous every 12 hours  fluticasone propionate 50 MICROgram(s)/spray Nasal Spray 1 Spray(s) Both Nostrils two times a day  fluticasone propionate/ salmeterol 250-50 MICROgram(s) Diskus 1 Dose(s) Inhalation two times a day  hydrocortisone 1% Ointment 1 Application(s) Topical every 12 hours  insulin glargine Injectable (LANTUS) 42 Unit(s) SubCutaneous at bedtime  insulin lispro (ADMELOG) corrective regimen sliding scale   SubCutaneous three times a day before meals  insulin lispro (ADMELOG) corrective regimen sliding scale   SubCutaneous at bedtime  insulin lispro Injectable (ADMELOG) 10 Unit(s) SubCutaneous three times a day before meals  levothyroxine 112 MICROGram(s) Oral daily  magnesium sulfate  IVPB 1 Gram(s) IV Intermittent once  montelukast 10 milliGRAM(s) Oral daily  OXcarbazepine 600 milliGRAM(s) Oral two times a day  pantoprazole    Tablet 40 milliGRAM(s) Oral before breakfast  polyethylene glycol 3350 17 Gram(s) Oral daily  senna 2 Tablet(s) Oral at bedtime    Drug Dosing Weight  Height (cm): 162.6 (12 Mar 2025 00:00)  Weight (kg): 136.1 (12 Mar 2025 00:00)  BMI (kg/m2): 51.5 (12 Mar 2025 00:00)  BSA (m2): 2.32 (12 Mar 2025 00:00)    CENTRAL LINE: [ ] YES [X ] NO  LOCATION:   DATE INSERTED:  REMOVE: [ ] YES [ ] NO  EXPLAIN:    WILDER: [ ] YES [X ] NO    DATE INSERTED:  REMOVE:  [ ] YES [ ] NO  EXPLAIN:    PAST MEDICAL & SURGICAL HISTORY:  Hypertension  vaginal cyst      Hyperlipidemia      Asthma  last inhaler use with in 10 days, on Pulm follow up [id number]      Hypothyroid      Obstructive sleep apnea  refuses Cpap      Obesity  morbid      Trigeminal neuralgia  R      Fibromyalgia      DM (diabetes mellitus)  2      DJD (degenerative joint disease)  Cervical/Thoracic/Lumbar      Cervical neuralgia  difficulty moving my neck      Back pain  thoracic & lumbar      H/O bursitis  right shoulder      Radicular pain  arms, legs      Vasculitis  Legs, forerhead, arms & hands, flare ups in R leg  Dr susie Ribeiro is my Rheumatologist      Renal cell carcinoma, left  on follow up Dr schulte, HOD renal [facility], waiting for suregry in 2020      Falls frequently      Morbid obesity with body mass index (BMI) of 50.0 to 59.9 in adult      Psoriasis      Diverticulosis      Fatty liver      S/P abdominal hysterectomy          S/P  section  1      Left adrenal mass  excision, benign 2006      Vaginal cyst  removed       Vasculitis on nerve biopsy  , had another surgery called microvascular decompression       S/P left oophorectomy      S/P colonoscopic polypectomy            ABG - ( 13 Mar 2025 03:02 )  pH, Arterial: 7.45  pH, Blood: x     /  pCO2: 50    /  pO2: 229   / HCO3: 35    / Base Excess: 9.2   /  SaO2: 97                    03-13 @ 07:01  -  03-14 @ 07:00  --------------------------------------------------------  IN: 0 mL / OUT: 1100 mL / NET: -1100 mL            PHYSICAL EXAM:    GENERAL: NAD, MORBIDLY OBESE  HEAD:  Atraumatic, Normocephalic  EYES: EOMI, PERRLA, conjunctiva and sclera clear  ENMT: No tonsillar erythema, exudates  NECK: Supple, No JVD  NERVOUS SYSTEM:  Alert & Oriented X3, following commands moving all extremities.   CHEST/LUNG: Diminished breath sounds bilateral bases. No wheezing at times of exam  HEART: Regular rate and rhythm; No murmurs, rubs, or gallops  ABDOMEN: Morbidly obese Soft, Nontender, Nondistended; Bowel sounds present  EXTREMITIES: +1 edema bilateral lower extremities. 2+ Peripheral Pulses, No clubbing or cyanosis  LYMPH: No lymphadenopathy noted  SKIN: Chronic changes bilateral lower extremities.      LABS:  CBC Full  -  ( 14 Mar 2025 08:43 )  WBC Count : 7.52 K/uL  RBC Count : 4.71 M/uL  Hemoglobin : 11.3 g/dL  Hematocrit : 37.9 %  Platelet Count - Automated : 232 K/uL  Mean Cell Volume : 80.5 fl  Mean Cell Hemoglobin : 24.0 pg  Mean Cell Hemoglobin Concentration : 29.8 g/dL  Auto Neutrophil # : x  Auto Lymphocyte # : x  Auto Monocyte # : x  Auto Eosinophil # : x  Auto Basophil # : x  Auto Neutrophil % : x  Auto Lymphocyte % : x  Auto Monocyte % : x  Auto Eosinophil % : x  Auto Basophil % : x    03-14    132[L]  |  95[L]  |  16  ----------------------------<  253[H]  4.9   |  32[H]  |  1.13    Ca    9.4      14 Mar 2025 07:00  Phos  2.8     03-14  Mg     1.4     -14    TPro  8.0  /  Alb  3.0[L]  /  TBili  0.6  /  DBili  x   /  AST  46[H]  /  ALT  36  /  AlkPhos  106  03-14      Urinalysis Basic - ( 14 Mar 2025 07:00 )    Color: x / Appearance: x / SG: x / pH: x  Gluc: 253 mg/dL / Ketone: x  / Bili: x / Urobili: x   Blood: x / Protein: x / Nitrite: x   Leuk Esterase: x / RBC: x / WBC x   Sq Epi: x / Non Sq Epi: x / Bacteria: x            [  ]  DVT Prophylaxis  [  ]  Nutrition, Brand, Rate         Goal Rate        Abnormal Nutritional Status -  Malnutrition   Cachexia      Morbid Obesity BMI >51.5    RADIOLOGY & ADDITIONAL STUDIES:  ***  < from: Xray Chest 1 View- PORTABLE-Urgent (Xray Chest 1 View- PORTABLE-Urgent .) (25 @ 10:22) >  INTERPRETATION:  Exam:XR CHEST URGENT    clinical history:intubated    Removal of endotracheal and nasogastric tubes. Improved aeration right   lung. Stable left basilar airspace disease.    IMPRESSION: Improved aeration.    --- End of Report ---      < end of copied text >    Goals of Care Discussion with Family/Proxy/Other   - see note from 3/12/25     NOEMY CONTRERAS    SCU progress note    INTERVAL HPI/OVERNIGHT EVENTS: ***Transferred from ICU to SCU    DNR [ ]   DNI  [  ]  FULL CODE.    Covid - 19 PCR:     The 4Ms    What Matters Most: see GOC  Age appropriate Medications/Screen for High Risk Medication: Yes  Mentation: see CAM below  Mobility: defer to physical exam    The Confusion Assessment Method (CAM) Diagnostic Algorithm     1: Acute Onset or Fluctuating Course  - Is there evidence of an acute change in mental status from the patient’s baseline? Did the (abnormal) behavior  fluctuate during the day, that is, tend to come and go, or increase and decrease in severity?  [ ] YES [X ] NO     2: Inattention  - Did the patient have difficulty focusing attention, being easily distractible, or having difficulty keeping track of what was being said?  [ ] YES [X ] NO     3: Disorganized thinking  -Was the patient’s thinking disorganized or incoherent, such as rambling or irrelevant conversation, unclear or illogical flow of ideas, or unpredictable switching from subject to subject?  [ ] YES [X ] NO    4: Altered Level of consciousness?  [ ] YES [X ] NO    The diagnosis of delirium by CAM requires the presence of features 1 and 2 and either 3 or 4.    PRESSORS: [ ] YES [X ] NO    Cardiovascular:  Heart Failure  Acute   Acute on Chronic  Chronic         Pulmonary:  albuterol    0.083% 2.5 milliGRAM(s) Nebulizer every 6 hours  albuterol    90 MICROgram(s) HFA Inhaler 1 Puff(s) Inhalation every 4 hours PRN  fluticasone propionate/ salmeterol 250-50 MICROgram(s) Diskus 1 Dose(s) Inhalation two times a day  montelukast 10 milliGRAM(s) Oral daily    Hematalogic:  aspirin  chewable 81 milliGRAM(s) Oral daily  enoxaparin Injectable 40 milliGRAM(s) SubCutaneous every 12 hours    Other:  acetaminophen     Tablet .. 1000 milliGRAM(s) Oral every 8 hours  atorvastatin 40 milliGRAM(s) Oral at bedtime  chlorhexidine 2% Cloths 1 Application(s) Topical <User Schedule>  fluticasone propionate 50 MICROgram(s)/spray Nasal Spray 1 Spray(s) Both Nostrils two times a day  hydrocortisone 1% Ointment 1 Application(s) Topical every 12 hours  insulin glargine Injectable (LANTUS) 42 Unit(s) SubCutaneous at bedtime  insulin lispro (ADMELOG) corrective regimen sliding scale   SubCutaneous three times a day before meals  insulin lispro (ADMELOG) corrective regimen sliding scale   SubCutaneous at bedtime  insulin lispro Injectable (ADMELOG) 10 Unit(s) SubCutaneous three times a day before meals  levothyroxine 112 MICROGram(s) Oral daily  magnesium sulfate  IVPB 1 Gram(s) IV Intermittent once  OXcarbazepine 600 milliGRAM(s) Oral two times a day  pantoprazole    Tablet 40 milliGRAM(s) Oral before breakfast  polyethylene glycol 3350 17 Gram(s) Oral daily  senna 2 Tablet(s) Oral at bedtime    acetaminophen     Tablet .. 1000 milliGRAM(s) Oral every 8 hours  albuterol    0.083% 2.5 milliGRAM(s) Nebulizer every 6 hours  albuterol    90 MICROgram(s) HFA Inhaler 1 Puff(s) Inhalation every 4 hours PRN  aspirin  chewable 81 milliGRAM(s) Oral daily  atorvastatin 40 milliGRAM(s) Oral at bedtime  chlorhexidine 2% Cloths 1 Application(s) Topical <User Schedule>  enoxaparin Injectable 40 milliGRAM(s) SubCutaneous every 12 hours  fluticasone propionate 50 MICROgram(s)/spray Nasal Spray 1 Spray(s) Both Nostrils two times a day  fluticasone propionate/ salmeterol 250-50 MICROgram(s) Diskus 1 Dose(s) Inhalation two times a day  hydrocortisone 1% Ointment 1 Application(s) Topical every 12 hours  insulin glargine Injectable (LANTUS) 42 Unit(s) SubCutaneous at bedtime  insulin lispro (ADMELOG) corrective regimen sliding scale   SubCutaneous three times a day before meals  insulin lispro (ADMELOG) corrective regimen sliding scale   SubCutaneous at bedtime  insulin lispro Injectable (ADMELOG) 10 Unit(s) SubCutaneous three times a day before meals  levothyroxine 112 MICROGram(s) Oral daily  magnesium sulfate  IVPB 1 Gram(s) IV Intermittent once  montelukast 10 milliGRAM(s) Oral daily  OXcarbazepine 600 milliGRAM(s) Oral two times a day  pantoprazole    Tablet 40 milliGRAM(s) Oral before breakfast  polyethylene glycol 3350 17 Gram(s) Oral daily  senna 2 Tablet(s) Oral at bedtime    Drug Dosing Weight  Height (cm): 162.6 (12 Mar 2025 00:00)  Weight (kg): 136.1 (12 Mar 2025 00:00)  BMI (kg/m2): 51.5 (12 Mar 2025 00:00)  BSA (m2): 2.32 (12 Mar 2025 00:00)    CENTRAL LINE: [ ] YES [X ] NO  LOCATION:   DATE INSERTED:  REMOVE: [ ] YES [ ] NO  EXPLAIN:    WILDER: [ ] YES [X ] NO    DATE INSERTED:  REMOVE:  [ ] YES [ ] NO  EXPLAIN:    PAST MEDICAL & SURGICAL HISTORY:  Hypertension  vaginal cyst      Hyperlipidemia      Asthma  last inhaler use with in 10 days, on Pulm follow up [id number]      Hypothyroid      Obstructive sleep apnea  refuses Cpap      Obesity  morbid      Trigeminal neuralgia  R      Fibromyalgia      DM (diabetes mellitus)  2      DJD (degenerative joint disease)  Cervical/Thoracic/Lumbar      Cervical neuralgia  difficulty moving my neck      Back pain  thoracic & lumbar      H/O bursitis  right shoulder      Radicular pain  arms, legs      Vasculitis  Legs, forerhead, arms & hands, flare ups in R leg  Dr susie Ribeiro is my Rheumatologist      Renal cell carcinoma, left  on follow up Dr schulte, HOD renal [facility], waiting for suregry in 2020      Falls frequently      Morbid obesity with body mass index (BMI) of 50.0 to 59.9 in adult      Psoriasis      Diverticulosis      Fatty liver      S/P abdominal hysterectomy          S/P  section  1      Left adrenal mass  excision, benign 2006      Vaginal cyst  removed       Vasculitis on nerve biopsy  , had another surgery called microvascular decompression       S/P left oophorectomy      S/P colonoscopic polypectomy            ABG - ( 13 Mar 2025 03:02 )  pH, Arterial: 7.45  pH, Blood: x     /  pCO2: 50    /  pO2: 229   / HCO3: 35    / Base Excess: 9.2   /  SaO2: 97                    03-13 @ 07:01  -  03-14 @ 07:00  --------------------------------------------------------  IN: 0 mL / OUT: 1100 mL / NET: -1100 mL            PHYSICAL EXAM:    GENERAL: NAD, MORBIDLY OBESE  HEAD:  Atraumatic, Normocephalic  EYES: EOMI, PERRLA, conjunctiva and sclera clear  ENMT: No tonsillar erythema, exudates. Mallampati 4   NECK: Supple, No JVD  NERVOUS SYSTEM:  Alert & Oriented X3, following commands moving all extremities.   CHEST/LUNG: Diminished breath sounds bilateral bases. No wheezing at times of exam  HEART: Regular rate and rhythm; No murmurs, rubs, or gallops  ABDOMEN: Morbidly obese Soft, Nontender, Nondistended; Bowel sounds present  EXTREMITIES: +1 edema bilateral lower extremities. 2+ Peripheral Pulses, No clubbing or cyanosis  LYMPH: No lymphadenopathy noted  SKIN: Chronic changes bilateral lower extremities.  +Kyphosis    LABS:  CBC Full  -  ( 14 Mar 2025 08:43 )  WBC Count : 7.52 K/uL  RBC Count : 4.71 M/uL  Hemoglobin : 11.3 g/dL  Hematocrit : 37.9 %  Platelet Count - Automated : 232 K/uL  Mean Cell Volume : 80.5 fl  Mean Cell Hemoglobin : 24.0 pg  Mean Cell Hemoglobin Concentration : 29.8 g/dL  Auto Neutrophil # : x  Auto Lymphocyte # : x  Auto Monocyte # : x  Auto Eosinophil # : x  Auto Basophil # : x  Auto Neutrophil % : x  Auto Lymphocyte % : x  Auto Monocyte % : x  Auto Eosinophil % : x  Auto Basophil % : x    03-14    132[L]  |  95[L]  |  16  ----------------------------<  253[H]  4.9   |  32[H]  |  1.13    Ca    9.4      14 Mar 2025 07:00  Phos  2.8     03-14  Mg     1.4     -14    TPro  8.0  /  Alb  3.0[L]  /  TBili  0.6  /  DBili  x   /  AST  46[H]  /  ALT  36  /  AlkPhos  106  03-14      Urinalysis Basic - ( 14 Mar 2025 07:00 )    Color: x / Appearance: x / SG: x / pH: x  Gluc: 253 mg/dL / Ketone: x  / Bili: x / Urobili: x   Blood: x / Protein: x / Nitrite: x   Leuk Esterase: x / RBC: x / WBC x   Sq Epi: x / Non Sq Epi: x / Bacteria: x            [  ]  DVT Prophylaxis  [  ]  Nutrition, Brand, Rate         Goal Rate        Abnormal Nutritional Status -  Malnutrition   Cachexia      Morbid Obesity BMI >51.5    RADIOLOGY & ADDITIONAL STUDIES:  ***  < from: Xray Chest 1 View- PORTABLE-Urgent (Xray Chest 1 View- PORTABLE-Urgent .) (25 @ 10:22) >  INTERPRETATION:  Exam:XR CHEST URGENT    clinical history:intubated    Removal of endotracheal and nasogastric tubes. Improved aeration right   lung. Stable left basilar airspace disease.    IMPRESSION: Improved aeration.    --- End of Report ---      < end of copied text >    Goals of Care Discussion with Family/Proxy/Other   - see note from 3/12/25

## 2025-03-14 NOTE — PROGRESS NOTE ADULT - NS ATTEND AMEND GEN_ALL_CORE FT
Problem/Plan - 1:  ·  Problem: Acute on chronic respiratory failure with hypoxia and hypercapnia.   ·  Plan: Secondary to Class 3/4 Obesity with alveloar hypoventilation.  BMI 51.5  Upon admission PCO2 was greater than 125.  Was intubated and transferred to ICU.  Self extubated following day.  Started on BIPAP however remained hypercapnic and patient could not tolerate settings of 20/16  On AVAPS PCO2 decreased to 50. Patient would benefit from AVAPS-AE at home to control hypoventilation and hypercapnia. Thoracic expansion is limited secondary to severe central obesity and multilevel spinal stenosis and scoliosis. Patient requires that tidal volume that AVAPS-AE delivers for adequate gas exchange. Without AVAPS-AE patient is at a high risk of readmission and possible death from respiratory failure. BIPAP does not have capability to deliver tidal volume and patient cannot tolerate extremely high settings on a BIPAP.  Continue AVAPS nightly and as needed during the day.  Oxygen supplementation 3LPM when not using AVAPS.     Problem/Plan - 2:  ·  Problem: Uncontrolled type 2 diabetes mellitus with hyperglycemia.   ·  Plan: Uncontrolled. A1c 13 upon admission.  Endo Dr Hayden.  Nutrition consult appreciated.  Continue lantus 42U daily  Lispro 10U with meals   Moderate sliding scale coverage.  Monitor glucose.     Problem/Plan - 3:  ·  Problem: Class 3 obesity with alveolar hypoventilation, serious comorbidity, and body mass index (BMI) of 50.0 to 59.9 in adult.   ·  Plan: Class 3/4 Obesity with alveloar hypoventilation.  BMI 51.5  Central obesity. Mallampati 4  PCO2 greater than 125 requiring intubation  Patient has been intubated several times in the past.  Unable to tolerate BIPAP with settings of 20/16  Tolerating AVAPS nightly.  Thoracic expansion is limited secondary to severe central obesity and multilevel spinal stenosis and scoliosis. Patient requires that tidal volume that AVAPS-AE delivers for adequate gas exchange. Without AVAPS-AE patient is at a high risk of readmission and possible death from respiratory failure. BIPAP does not have capability to deliver tidal volume and patient cannot tolerate extremely high settings on a BIPAP.     Problem/Plan - 4:  ·  Problem: Central obesity.   ·  Plan: Severe abdominal obesity with known areolar hypoventilation.  PCO2 greater than 125 upon admission which required patient to be intubated and started on mechanical ventilation.  Patient unable to tolerate very high setting on BIPAP 20/16  Started on AVAPS therapy in hospital. Tolerating better. PCO2 on AVAPS 50  Will need AVAPS-AE when discharged. Patient need Tidal volume that AVAPS-AE delivers to help control PCO2 and to provide adequate gas exchange gas exchange.  [facility]     Problem/Plan - 5:  ·  Problem: Kyphosis.  ·  Plan: +Kyphosis of the spine which is diminishing her thoracic expansion which is contributing to her hypercapnia.  Continue AVAPS nightly and as needed during the day.  [facility]AE ordered from [facility].

## 2025-03-14 NOTE — PROGRESS NOTE ADULT - PROBLEM SELECTOR PLAN 6
Controlled.  Normotensive cureently.  Uses losartan and possible diuretic at home. Will verify with pharmacy. Not in exacerbation.  Continue bronchodilators and ICS.  AVAPS nightly and as needed during the day.  Oxygen supplementation when off AVAPS.  Follows with Dr Monterroso as an outpatient.  Dr Ramirez while inpatient.

## 2025-03-14 NOTE — PROGRESS NOTE ADULT - PROBLEM SELECTOR PLAN 9
DVT and GI prophylaxis.  Continue AVAPS nightly and as needed during the day.  Oxygen supplementation when of AVAPS.   AVAPS-AE upon discharge. Order sent to [facility].  Has some kind of PAP machine at home. Only oxygen records at [facility]. Doppler negative for DVT  Wrap lower extremities when out of bed.

## 2025-03-14 NOTE — PROGRESS NOTE ADULT - ASSESSMENT
60yo woman w/ PMH asthma, NAKUL, chronic hypoxemic/hypercapnic respiratory failure 2/2 morbid obesity, restrictive lung disease and kyphosis admitted for atypical CP for ACS ruleout. Asked to evaluate for mgmt recommendations in NAKUL. C/c/b RRT 3/11 overnight for acute on chronic hypercapnic respiratory failure w/ hypoxemia req. intubation and then self-extubated 3/12. In ICU for further mgmt.     #Acute on chronic respiratory failure with hypoxemia and hypercapnia  #NAKUL/OHS  #Asthma - not in exacerbation    Per review of outpatient pul notes on admission, pt was supposed to be on nocturnal NiV documented as BiPAP 20/16 per pulmonologist note but no longer has NIV at home as insurance wouldn't cover machine. She does have a CPAP which, pt states, was not yet calibrated and thus she is not using it (she had upcoming pulm appt this month for calibration). CPAP would not be appropriate in this condition, given her hypercapnic respiratory failure and recent hospital acute respiratory failure which necessitated intubation.     Recommendations:  - cont intermittent AVAPS (while asleep/napping) and during the night vs. BiPAP ;   --> pt will need AVAPS for discharge - per primary team, approved by [facility] to have AVAPS-AE delivered for discharge  - cont home albuterol PRN  - cont Advair interchanged for home Symbicort  - cont montelukast  - interval ABGs reviewed  - caution with narcotics as this can exacerbate sleep disordered breathing / hypoventilation  - cardiac mgmt per primary team/cardiology    Patient follows w/ pulmonologist Dr. Monterroso as outpatient and pt says she has upcoming f/u in a few weeks, would maintain outpt pulm f/u

## 2025-03-15 ENCOUNTER — TRANSCRIPTION ENCOUNTER (OUTPATIENT)
Age: 60
End: 2025-03-15

## 2025-03-15 LAB
ALBUMIN SERPL ELPH-MCNC: 3.1 G/DL — LOW (ref 3.5–5)
ALP SERPL-CCNC: 123 U/L — HIGH (ref 40–120)
ALT FLD-CCNC: 35 U/L DA — SIGNIFICANT CHANGE UP (ref 10–60)
ANION GAP SERPL CALC-SCNC: 7 MMOL/L — SIGNIFICANT CHANGE UP (ref 5–17)
AST SERPL-CCNC: 25 U/L — SIGNIFICANT CHANGE UP (ref 10–40)
BUN SERPL-MCNC: 15 MG/DL — SIGNIFICANT CHANGE UP (ref 7–18)
CALCIUM SERPL-MCNC: 9.8 MG/DL — SIGNIFICANT CHANGE UP (ref 8.4–10.5)
CHLORIDE SERPL-SCNC: 98 MMOL/L — SIGNIFICANT CHANGE UP (ref 96–108)
CO2 SERPL-SCNC: 31 MMOL/L — SIGNIFICANT CHANGE UP (ref 22–31)
CREAT SERPL-MCNC: 1.19 MG/DL — SIGNIFICANT CHANGE UP (ref 0.5–1.3)
EGFR: 53 ML/MIN/1.73M2 — LOW
EGFR: 53 ML/MIN/1.73M2 — LOW
GLUCOSE SERPL-MCNC: 249 MG/DL — HIGH (ref 70–99)
HCT VFR BLD CALC: 40.9 % — SIGNIFICANT CHANGE UP (ref 34.5–45)
HGB BLD-MCNC: 12.4 G/DL — SIGNIFICANT CHANGE UP (ref 11.5–15.5)
MAGNESIUM SERPL-MCNC: 1.6 MG/DL — SIGNIFICANT CHANGE UP (ref 1.6–2.6)
MCHC RBC-ENTMCNC: 24.2 PG — LOW (ref 27–34)
MCHC RBC-ENTMCNC: 30.3 G/DL — LOW (ref 32–36)
MCV RBC AUTO: 79.9 FL — LOW (ref 80–100)
NRBC BLD AUTO-RTO: 0 /100 WBCS — SIGNIFICANT CHANGE UP (ref 0–0)
PHOSPHATE SERPL-MCNC: 2.9 MG/DL — SIGNIFICANT CHANGE UP (ref 2.5–4.5)
PLATELET # BLD AUTO: 322 K/UL — SIGNIFICANT CHANGE UP (ref 150–400)
POTASSIUM SERPL-MCNC: 4.1 MMOL/L — SIGNIFICANT CHANGE UP (ref 3.5–5.3)
POTASSIUM SERPL-SCNC: 4.1 MMOL/L — SIGNIFICANT CHANGE UP (ref 3.5–5.3)
PROT SERPL-MCNC: 8.4 G/DL — HIGH (ref 6–8.3)
RBC # BLD: 5.12 M/UL — SIGNIFICANT CHANGE UP (ref 3.8–5.2)
RBC # FLD: 16.2 % — HIGH (ref 10.3–14.5)
SODIUM SERPL-SCNC: 136 MMOL/L — SIGNIFICANT CHANGE UP (ref 135–145)
WBC # BLD: 8.62 K/UL — SIGNIFICANT CHANGE UP (ref 3.8–10.5)
WBC # FLD AUTO: 8.62 K/UL — SIGNIFICANT CHANGE UP (ref 3.8–10.5)

## 2025-03-15 PROCEDURE — 99232 SBSQ HOSP IP/OBS MODERATE 35: CPT

## 2025-03-15 RX ORDER — MAGNESIUM OXIDE 400 MG
400 TABLET ORAL
Refills: 0 | Status: COMPLETED | OUTPATIENT
Start: 2025-03-15 | End: 2025-03-15

## 2025-03-15 RX ORDER — INSULIN LISPRO 100 U/ML
12 INJECTION, SOLUTION INTRAVENOUS; SUBCUTANEOUS
Refills: 0 | Status: DISCONTINUED | OUTPATIENT
Start: 2025-03-15 | End: 2025-03-17

## 2025-03-15 RX ORDER — INSULIN GLARGINE-YFGN 100 [IU]/ML
50 INJECTION, SOLUTION SUBCUTANEOUS AT BEDTIME
Refills: 0 | Status: DISCONTINUED | OUTPATIENT
Start: 2025-03-15 | End: 2025-03-17

## 2025-03-15 RX ORDER — INSULIN LISPRO 100 U/ML
12 INJECTION, SOLUTION INTRAVENOUS; SUBCUTANEOUS ONCE
Refills: 0 | Status: COMPLETED | OUTPATIENT
Start: 2025-03-15 | End: 2025-03-15

## 2025-03-15 RX ORDER — DIPHENHYDRAMINE HCL 12.5MG/5ML
25 ELIXIR ORAL EVERY 6 HOURS
Refills: 0 | Status: DISCONTINUED | OUTPATIENT
Start: 2025-03-15 | End: 2025-03-16

## 2025-03-15 RX ADMIN — Medication 400 MILLIGRAM(S): at 17:33

## 2025-03-15 RX ADMIN — MONTELUKAST SODIUM 10 MILLIGRAM(S): 10 TABLET ORAL at 12:05

## 2025-03-15 RX ADMIN — Medication 112 MICROGRAM(S): at 05:45

## 2025-03-15 RX ADMIN — Medication 1000 MILLIGRAM(S): at 17:31

## 2025-03-15 RX ADMIN — ATORVASTATIN CALCIUM 40 MILLIGRAM(S): 80 TABLET, FILM COATED ORAL at 21:02

## 2025-03-15 RX ADMIN — INSULIN LISPRO 12 UNIT(S): 100 INJECTION, SOLUTION INTRAVENOUS; SUBCUTANEOUS at 09:24

## 2025-03-15 RX ADMIN — Medication 2.5 MILLIGRAM(S): at 20:21

## 2025-03-15 RX ADMIN — ENOXAPARIN SODIUM 40 MILLIGRAM(S): 100 INJECTION SUBCUTANEOUS at 05:47

## 2025-03-15 RX ADMIN — Medication 400 MILLIGRAM(S): at 09:27

## 2025-03-15 RX ADMIN — ENOXAPARIN SODIUM 40 MILLIGRAM(S): 100 INJECTION SUBCUTANEOUS at 17:33

## 2025-03-15 RX ADMIN — Medication 40 MILLIGRAM(S): at 06:49

## 2025-03-15 RX ADMIN — OXCARBAZEPINE 600 MILLIGRAM(S): 150 TABLET, FILM COATED ORAL at 06:49

## 2025-03-15 RX ADMIN — Medication 2.5 MILLIGRAM(S): at 14:18

## 2025-03-15 RX ADMIN — Medication 1000 MILLIGRAM(S): at 06:49

## 2025-03-15 RX ADMIN — HYDROCORTISONE 1 APPLICATION(S): 10 CREAM TOPICAL at 17:38

## 2025-03-15 RX ADMIN — Medication 2 TABLET(S): at 21:02

## 2025-03-15 RX ADMIN — OXCARBAZEPINE 600 MILLIGRAM(S): 150 TABLET, FILM COATED ORAL at 17:34

## 2025-03-15 RX ADMIN — Medication 1000 MILLIGRAM(S): at 15:13

## 2025-03-15 RX ADMIN — INSULIN LISPRO 4: 100 INJECTION, SOLUTION INTRAVENOUS; SUBCUTANEOUS at 17:31

## 2025-03-15 RX ADMIN — HYDROCORTISONE 1 APPLICATION(S): 10 CREAM TOPICAL at 05:47

## 2025-03-15 RX ADMIN — FLUTICASONE PROPIONATE 1 SPRAY(S): 50 SPRAY, METERED NASAL at 05:47

## 2025-03-15 RX ADMIN — POLYETHYLENE GLYCOL 3350 17 GRAM(S): 17 POWDER, FOR SOLUTION ORAL at 12:04

## 2025-03-15 RX ADMIN — Medication 2.5 MILLIGRAM(S): at 09:03

## 2025-03-15 RX ADMIN — Medication 81 MILLIGRAM(S): at 12:04

## 2025-03-15 RX ADMIN — Medication 1 DOSE(S): at 09:27

## 2025-03-15 RX ADMIN — Medication 2.5 MILLIGRAM(S): at 02:08

## 2025-03-15 RX ADMIN — Medication 400 MILLIGRAM(S): at 12:04

## 2025-03-15 RX ADMIN — INSULIN LISPRO 12 UNIT(S): 100 INJECTION, SOLUTION INTRAVENOUS; SUBCUTANEOUS at 12:03

## 2025-03-15 RX ADMIN — INSULIN GLARGINE-YFGN 50 UNIT(S): 100 INJECTION, SOLUTION SUBCUTANEOUS at 21:05

## 2025-03-15 RX ADMIN — Medication 1 DOSE(S): at 21:08

## 2025-03-15 RX ADMIN — INSULIN LISPRO 8: 100 INJECTION, SOLUTION INTRAVENOUS; SUBCUTANEOUS at 09:24

## 2025-03-15 RX ADMIN — INSULIN LISPRO 12 UNIT(S): 100 INJECTION, SOLUTION INTRAVENOUS; SUBCUTANEOUS at 17:32

## 2025-03-15 RX ADMIN — INSULIN LISPRO 8: 100 INJECTION, SOLUTION INTRAVENOUS; SUBCUTANEOUS at 12:03

## 2025-03-15 RX ADMIN — INSULIN LISPRO 0: 100 INJECTION, SOLUTION INTRAVENOUS; SUBCUTANEOUS at 21:06

## 2025-03-15 RX ADMIN — FLUTICASONE PROPIONATE 1 SPRAY(S): 50 SPRAY, METERED NASAL at 17:37

## 2025-03-15 RX ADMIN — Medication 1000 MILLIGRAM(S): at 09:09

## 2025-03-15 RX ADMIN — Medication 1 APPLICATION(S): at 05:46

## 2025-03-15 NOTE — DISCHARGE NOTE PROVIDER - NSDCFUSCHEDAPPT_GEN_ALL_CORE_FT
Guido Monterroso  [facility]  PULED [id number]  Scheduled Appointment: 04/07/2025    Rubén Peck  [facility]  UROLOGY [address] [facility]  Scheduled Appointment: 06/17/2025

## 2025-03-15 NOTE — DISCHARGE NOTE PROVIDER - NSDCCPCAREPLAN_GEN_ALL_CORE_FT
PRINCIPAL DISCHARGE DIAGNOSIS  Diagnosis: Acute on chronic respiratory failure with hypoxia and hypercapnia  Assessment and Plan of Treatment: Secondary to Class 3/4 Obesity with alveloar hypoventilation.  BMI 51.5  You presented with chest pain,  high carbon dioxide , and low oxygen levels.   You required intubation, and ICU level of care. You self -extubated on 3/12  You did not tolerate bi-level positive airway pressure.   Continue AVAPS nightly and as needed during the day.  Oxygen supplementation 3LPM when not using AVAPS.  Continue bronchodilator, and inhaled corticosteroids.   Activity as tolerated.   Follow-up with your primary care physician within 1 week. Call for appointment.        SECONDARY DISCHARGE DIAGNOSES  Diagnosis: ACS (acute coronary syndrome)  Assessment and Plan of Treatment:     Diagnosis: Class 3 obesity with alveolar hypoventilation, serious comorbidity, and body mass index (BMI) of 50.0 to 59.9 in adult  Assessment and Plan of Treatment:     Diagnosis: Kyphosis  Assessment and Plan of Treatment: Noted Kyphosis of spine which is diminishing her thoracic expansion which is contributing to her hypercapnia.  Continue AVAPS nightly and as needed during the day.    Diagnosis: Uncontrolled type 2 diabetes mellitus with hyperglycemia  Assessment and Plan of Treatment: Your A1C is 13.  Continue insulins as prescribed .   continue glucose monitoring as instructed by your doctor.   Have you A1C monitored every 3 months at your doctor's office.   Maintain heart healthy diet and lifestyle.   Follow-up with your primary care physician within 1 week. Call for appointment.  Report to your doctor or seek immediate medical attention if you develop any changes in your condition and or worsening signs/symptoms (such as and not limited : high or low blood sugar: change in mental status, cold /clammy skin, increased thirst, increased urination, dehydration, fruity breath )    Diagnosis: Asthma  Assessment and Plan of Treatment: Not in exacerbation.   continue bronchodilator, and inhaled corticosteroid.  Follow-up with your primary care physician within 1 week. Call for appointment.       PRINCIPAL DISCHARGE DIAGNOSIS  Diagnosis: Acute on chronic respiratory failure with hypoxia and hypercapnia  Assessment and Plan of Treatment: Secondary to Class 3/4 Obesity with alveloar hypoventilation.  BMI 51.5  You presented with chest pain,  high carbon dioxide , and low oxygen levels.   You required intubation, and ICU level of care. You self -extubated on 3/12  You did not tolerate bi-level positive airway pressure.   Continue AVAPS nightly and as needed during the day.  Oxygen supplementation 3LPM when not using AVAPS.  Continue bronchodilator, and inhaled corticosteroids.   Activity as tolerated.   Follow-up with your primary care physician within 1 week. Call for appointment.        SECONDARY DISCHARGE DIAGNOSES  Diagnosis: Uncontrolled type 2 diabetes mellitus with hyperglycemia  Assessment and Plan of Treatment: Your A1C is 13.  Continue insulins as prescribed .   continue glucose monitoring as instructed by your doctor.   Have you A1C monitored every 3 months at your doctor's office.   Maintain heart healthy diet and lifestyle.   Follow-up with your primary care physician within 1 week. Call for appointment.  Report to your doctor or seek immediate medical attention if you develop any changes in your condition and or worsening signs/symptoms (such as and not limited : high or low blood sugar: change in mental status, cold /clammy skin, increased thirst, increased urination, dehydration, fruity breath )    Diagnosis: Class 3 obesity with alveolar hypoventilation, serious comorbidity, and body mass index (BMI) of 50.0 to 59.9 in adult  Assessment and Plan of Treatment: Maintain heart healthy diet and lifestyle.   Follow-up with your primary care physician      Diagnosis: Kyphosis  Assessment and Plan of Treatment: Noted Kyphosis of spine which is diminishing her thoracic expansion which is contributing to her hypercapnia.  Continue AVAPS nightly and as needed during the day.    Diagnosis: Asthma  Assessment and Plan of Treatment: Not in exacerbation.   continue bronchodilator, and inhaled corticosteroid.  Follow-up with your primary care physician within 1 week. Call for appointment.      Diagnosis: ACS (acute coronary syndrome)  Assessment and Plan of Treatment:      PRINCIPAL DISCHARGE DIAGNOSIS  Diagnosis: Acute on chronic respiratory failure with hypoxia and hypercapnia  Assessment and Plan of Treatment: Secondary to Class 3/4 Obesity with alveloar hypoventilation.  BMI 51.5  You presented with chest pain,  high carbon dioxide , and low oxygen levels.   You required intubation, and ICU level of care. You self -extubated on 3/12  You did not tolerate bi-level positive airway pressure.   Continue AVAPS nightly and as needed during the day.  Oxygen supplementation 3LPM when not using AVAPS.  Continue bronchodilator, and inhaled corticosteroids.   Activity as tolerated.   Follow-up with your primary care physician within 1 week. Call for appointment.        SECONDARY DISCHARGE DIAGNOSES  Diagnosis: Uncontrolled type 2 diabetes mellitus with hyperglycemia  Assessment and Plan of Treatment: Your A1C is 13.  Continue insulins as prescribed .   continue glucose monitoring as instructed by your doctor.   Have you A1C monitored every 3 months at your doctor's office.   Maintain heart healthy diet and lifestyle.   Follow-up with your primary care physician within 1 week. Call for appointment.  Report to your doctor or seek immediate medical attention if you develop any changes in your condition and or worsening signs/symptoms (such as and not limited : high or low blood sugar: change in mental status, cold /clammy skin, increased thirst, increased urination, dehydration, fruity breath )  You were seen by an endocrinologist and was prescribed insulin regimen below:   Lantus 65 units every night and Lispro 18 units before 3 x day; Also continue taking Metformin 1000 mg BID and Ozempic 2mg weekly.   Please follow up with your Endocrinologist within 3 days from dischagre.    Diagnosis: Class 3 obesity with alveolar hypoventilation, serious comorbidity, and body mass index (BMI) of 50.0 to 59.9 in adult  Assessment and Plan of Treatment: Maintain heart healthy diet and lifestyle.   Follow-up with your primary care physician      Diagnosis: Kyphosis  Assessment and Plan of Treatment: Noted Kyphosis of spine which is diminishing her thoracic expansion which is contributing to her hypercapnia.  Continue AVAPS nightly and as needed during the day.    Diagnosis: Asthma  Assessment and Plan of Treatment: Not in exacerbation.   continue bronchodilator, and inhaled corticosteroid.  Follow-up with your primary care physician within 1 week. Call for appointment.

## 2025-03-15 NOTE — DISCHARGE NOTE PROVIDER - NSDCFUADDAPPT_GEN_ALL_CORE_FT
APPTS ARE READY TO BE MADE: [X] YES    Best Family or Patient Contact (if needed):    Additional Information about above appointments (if needed):    1: Pulmonologist Dr. Monterroso  2: Endocrinologist Dr. Ambrose  3: Cardiologist Dr. [place]    Other comments or requests:    APPTS ARE READY TO BE MADE: [X] YES    Best Family or Patient Contact (if needed):    Additional Information about above appointments (if needed):    1: Pulmonologist Dr. Monterroso  2: Endocrinologist Dr. Ambrose  3: Cardiologist Dr. [place]    Other comments or requests:               Prior to outreaching the patient, it was visible that the patient has secured a follow up appointment for Pulmonary with Dr. Clements on 4/7/2025 at 11:15am, [address] [facility]  [id number], which was not scheduled by our team.    Patient was provided with referral details by phone for the  [facility] providers on the referrals, and will coordinate the appointments on their own.

## 2025-03-15 NOTE — PROGRESS NOTE ADULT - SUBJECTIVE AND OBJECTIVE BOX
Overnight Events: NAEON. She reports stable breathing at this time.    Review Of Systems: No chest pain, shortness of breath, or palpitations  CONSTITUTIONAL: no fevers or chills  EYES/ENT: No visual changes;  No vertigo or throat pain   NECK: No pain or stiffness  RESPIRATORY: No cough, wheezing. No shortness of breath  CARDIOVASCULAR: No chest pain or palpitations  GASTROINTESTINAL: No abdominal or epigastric pain. No nausea, vomiting. No diarrhea. No melena.  GENITOURINARY: No dysuria, frequency or hematuria  NEUROLOGICAL: No numbness or weakness  SKIN: No itching, burning, rashes, or lesions   All other review of systems is negative unless indicated above.     Current Meds:  acetaminophen     Tablet .. 1000 milliGRAM(s) Oral every 8 hours  albuterol    0.083% 2.5 milliGRAM(s) Nebulizer every 6 hours  albuterol    90 MICROgram(s) HFA Inhaler 1 Puff(s) Inhalation every 4 hours PRN  aspirin  chewable 81 milliGRAM(s) Oral daily  atorvastatin 40 milliGRAM(s) Oral at bedtime  chlorhexidine 2% Cloths 1 Application(s) Topical <User Schedule>  diphenhydrAMINE 25 milliGRAM(s) Oral every 6 hours PRN  enoxaparin Injectable 40 milliGRAM(s) SubCutaneous every 12 hours  fluticasone propionate 50 MICROgram(s)/spray Nasal Spray 1 Spray(s) Both Nostrils two times a day  fluticasone propionate/ salmeterol 250-50 MICROgram(s) Diskus 1 Dose(s) Inhalation two times a day  guaiFENesin Oral Liquid (Sugar-Free) 200 milliGRAM(s) Oral every 6 hours PRN  hydrocortisone 1% Ointment 1 Application(s) Topical every 12 hours  insulin glargine Injectable (LANTUS) 50 Unit(s) SubCutaneous at bedtime  insulin lispro (ADMELOG) corrective regimen sliding scale   SubCutaneous three times a day before meals  insulin lispro (ADMELOG) corrective regimen sliding scale   SubCutaneous at bedtime  insulin lispro Injectable (ADMELOG) 12 Unit(s) SubCutaneous three times a day before meals  levothyroxine 112 MICROGram(s) Oral daily  magnesium oxide 400 milliGRAM(s) Oral three times a day with meals  montelukast 10 milliGRAM(s) Oral daily  OXcarbazepine 600 milliGRAM(s) Oral two times a day  pantoprazole    Tablet 40 milliGRAM(s) Oral before breakfast  polyethylene glycol 3350 17 Gram(s) Oral daily  senna 2 Tablet(s) Oral at bedtime  sodium chloride 0.65% Nasal 1 Spray(s) Both Nostrils three times a day PRN    Vitals:  T(F): 98.5 (03-15), Max: 98.5 (03-15)  HR: 87 (03-15) (85 - 91)  BP: 146/82 (03-15) (139/80 - 161/92)  RR: 22 (03-15)  SpO2: 95% (03-15)  I&O's Summary    14 Mar 2025 07:01  -  15 Mar 2025 07:00  --------------------------------------------------------  IN: 0 mL / OUT: 1 mL / NET: -1 mL    Physical Exam:  Appearance: No acute distress; well appearing  Eyes: PERRL, EOMI, pink conjunctiva  HEENT: Normal oral mucosa  Cardiovascular: RRR, S1, S2, no murmurs, rubs, or gallops; no JVD  Respiratory: Clear to auscultation bilaterally  Gastrointestinal: soft, non-tender, non-distended with normal bowel sounds  Extremities: 2+ edema up below the knee on R leg and minimal on L leg  Musculoskeletal: No clubbing; no joint deformity   Neurologic: Non-focal  Lymphatic: No lymphadenopathy  Psychiatry: AAOx3, mood & affect appropriate  Skin: No rashes, ecchymoses, or cyanosis                        12.4   8.62  )-----------( 322      ( 15 Mar 2025 06:17 )             40.9     03-15    136  |  98  |  15  ----------------------------<  249[H]  4.1   |  31  |  1.19    Ca    9.8      15 Mar 2025 06:17  Phos  2.9     03-15  Mg     1.6     03-15    TPro  8.4[H]  /  Alb  3.1[L]  /  TBili  0.5  /  DBili  x   /  AST  25  /  ALT  35  /  AlkPhos  123[H]  03-15      CARDIAC MARKERS ( 12 Mar 2025 00:50 )  x     / x     / x     / x     / x     / 3.0 ng/mL    Echo 3/11/25 -    CONCLUSIONS:      1. Technically difficult image quality.   2. Left ventricular cavity is normal in size. Left ventricular wall thickness is normal. Left ventricular systolic function is normal with an ejection fraction of 54 % by Valdez's method of disks. There are no regional wall motion abnormalities seen.   3. There is mild (grade 1) left ventricular diastolic dysfunction.   4. Left atrium is mildly dilated.   5. Normal right ventricular systolic function. Tricuspid annular plane systolic excursion (TAPSE) is 2.3 cm (normal >=1.7 cm).   6. No significant valvular disease.   7. Pulmonary artery systolic pressure could not be estimated.   8. No pericardial effusion seen.   9. Compared to the transthoracic echocardiogram performed on 3/20/2024, there have been no significant interval changes.    Imaging:    LE Duplex -   IMPRESSION:  No evidence of deep venous thrombosis in either lower extremity.

## 2025-03-15 NOTE — PROGRESS NOTE ADULT - PROBLEM SELECTOR PLAN 12
DVT ppx: Lovenox  GI PPx: PPI    #Constipation: continue bowel regimen  #HLD: continue statin    Pt from home.   Dispo: home with AVAPS-AE  CM following   Overall prognosis guarded  DNI with CPR    DNI with CPR

## 2025-03-15 NOTE — CHART NOTE - NSCHARTNOTEFT_GEN_A_CORE
EVENT:   3/14/25, 9:30 pm, patient c/o nasal congestion and productive cough. She requested medications. Ocean nasal spray TID PRN for congestion and Robitussin syrup 200 mg (10 ml) po Q6hrs PRN for cough ordered.   3/15/25, 4:40am, patient c/o allergic symptoms and requested meds.   HPI:  59-year-old female, AAOx3, ambulates with a cane, no HHA,  history of DM, HLD, asthma, COPD on 4L NC, migraines, hypothyroidism, NAKUL, RCC status post left renal resection 11/23 present for intermittent palpitations and chest pain that started a week ago. She describes her chest pain as dull and stays on the left side. Admits to dizziness, nausea and sob with the palpitation episodes. Also reports recent fall and feels unsteady. Denies head strike or LOC.  Denies any fever, chills, abdominal pain, n/v or dysuria. Also Admits to chronic diarrhea who she has a follow up for a colonoscopy.  OBJECTIVE:  Vital Signs Last 24 Hrs  T(C): 36.7 (14 Mar 2025 20:58), Max: 36.9 (14 Mar 2025 05:04)  T(F): 98 (14 Mar 2025 20:58), Max: 98.5 (14 Mar 2025 05:04)  HR: 86 (14 Mar 2025 23:10) (80 - 91)  BP: 139/80 (14 Mar 2025 23:10) (120/65 - 161/92)  BP(mean): 94 (14 Mar 2025 23:10) (94 - 94)  RR: 20 (14 Mar 2025 23:10) (18 - 20)    PLAN:   - Benadryl 25 mg po Q6hrs PRN for c/o allergy symptoms.     FOLLOW UP / RESULT:   - Reassess patient's comfort measures,   - Continue supportive care and treatment. EVENT:   3/14/25, 9:30 pm, patient c/o nasal congestion and productive cough. She requested medications. Sodium chloride (Ocean) 0.65% nasal spray, one spray both nostrils TID PRN for nasal congestion and Robitussin syrup 200 mg (10 ml) po Q6hrs PRN for cough given.   3/15/25, 4:40am, patient c/o allergic symptoms and requested meds.   HPI:  59-year-old female, AAOx3, ambulates with a cane, no HHA,  history of DM, HLD, asthma, COPD on 4L NC, migraines, hypothyroidism, NAKUL, RCC status post left renal resection 11/23 present for intermittent palpitations and chest pain that started a week ago. She describes her chest pain as dull and stays on the left side. Admits to dizziness, nausea and sob with the palpitation episodes. Also reports recent fall and feels unsteady. Denies head strike or LOC.  Denies any fever, chills, abdominal pain, n/v or dysuria. Also Admits to chronic diarrhea who she has a follow up for a colonoscopy.  OBJECTIVE:  Vital Signs Last 24 Hrs  T(C): 36.7 (14 Mar 2025 20:58), Max: 36.9 (14 Mar 2025 05:04)  T(F): 98 (14 Mar 2025 20:58), Max: 98.5 (14 Mar 2025 05:04)  HR: 86 (14 Mar 2025 23:10) (80 - 91)  BP: 139/80 (14 Mar 2025 23:10) (120/65 - 161/92)  BP(mean): 94 (14 Mar 2025 23:10) (94 - 94)  RR: 20 (14 Mar 2025 23:10) (18 - 20)    PLAN:   - Benadryl 25 mg po Q6hrs PRN for c/o allergy symptoms.     FOLLOW UP / RESULT:   - Reassess patient's comfort measures,   - Continue supportive care and treatment.

## 2025-03-15 NOTE — PROGRESS NOTE ADULT - PROBLEM SELECTOR PLAN 6
Not in exacerbation.   Continue bronchodilator, and ICS  AVAPS nightly and as needed during the day.   Oxygen supplementation when off AVAPS  Pulm Dr. Ramirez following inpt  Follows with Dr. Elizalde as an outpatient

## 2025-03-15 NOTE — PROGRESS NOTE ADULT - ASSESSMENT
Assessment and Recommendations:  59 year old F with PMHx of asthma, COPD (on 4L NC PRN), DM, fatty liver, fibromyalgia, R shoulder bursitis, HLD, hypothyroidism, NAKUL, psoriasis, RCC (s/p L renal resection 11/3/23, no hx of CT/RT) presenting palpitations, chest discomfort and lower abdominal pain, in setting of fall 1 week ago. Course c/b by acute on chronic hypercapneic respiratory failure s/p intubation, now with elevated troponins.    1) Atypical CP, chronic SOB, multifactorial in setting of COPD, morbid obesity, fibromyalgia  2) Elevated Ca score  3) Palpitations  4) DM  5) Morbid obesity  6) Fibromyalgia  7) Elevated troponins, became positive after unresponsive event due to acute on chronic hypercapneic respiratory failure   - EKG showed sinus rhythm with non-specific T wave abnormality in I and aVL  - Troponins were negative x 2 on admission then became abnormal in setting of acute hypercapneic event requiring intubation  - CCTA 12/2022 showed Ca score of 1069.2 with no significant obstructive CAD. Pharm nuclear stress 3/2024 showed normal myocardial perfusion  - Continue ASA 81 and atorvastatin 40mg daily for non-obstructive CAD   - RLE > LLE edema. LE duplex negative for DVT.  - TTE showed normal LV and RV function without significant valvular disease, however this was done PRIOR to her unresponsive/hypercapneic event. Repeat limited TTE with Definity to r/o new WMA. Will consider further testing pending repeat limited TTE     8) HTN  - Home meds on hold due to normotension.    Andrés [place] MD (W[employer] TEAMS message PREFERRED)  Cardiology Attending  [facility] - Cardiology  [address] [address] [place] [address], [address] Floor, [address], [place] [address]    All Cardiology service information can be found 24/7 on [email or web address], password: [email or web address]

## 2025-03-15 NOTE — PROGRESS NOTE ADULT - PROBLEM SELECTOR PLAN 5
Noted Kyphosis of spine which is diminishing her thoracic expansion which is contributing to her hypercapnia.  Continue AVAPS nightly and as needed during the day.  AVAPS-AE ordered from [facility].

## 2025-03-15 NOTE — DISCHARGE NOTE PROVIDER - PROVIDER TOKENS
PROVIDER:[[id number]],FOLLOWUP:[1 week],ESTABLISHEDPATIENT:[T]],PROVIDER:[[id number]],FOLLOWUP:[1 week],ESTABLISHEDPATIENT:[T]],PROVIDER:[TOKEN:[[id number]],FOLLOWUP:[1 week],ESTABLISHEDPATIENT:[T]] PROVIDER:[TOKEN:[[id number]],FOLLOWUP:[1 week],ESTABLISHEDPATIENT:[T]],PROVIDER:[[id number]],FOLLOWUP:[1 week],ESTABLISHEDPATIENT:[T]],PROVIDER:[TOKEN:[[id number]],FOLLOWUP:[1 week],ESTABLISHEDPATIENT:[T]],PROVIDER:[TOKEN:[[id number]],FOLLOWUP:[1 week]] PROVIDER:[[id number]],FOLLOWUP:[1 week],ESTABLISHEDPATIENT:[T]],PROVIDER:[[id number]],FOLLOWUP:[1 week],ESTABLISHEDPATIENT:[T]],PROVIDER:[TOKEN:[[id number]],FOLLOWUP:[1-3 days],ESTABLISHEDPATIENT:[T]],PROVIDER:[TOKEN:[[id number]],FOLLOWUP:[1 week]],PROVIDER:[TOKEN:[[id number]],FOLLOWUP:[1 week]]

## 2025-03-15 NOTE — PROGRESS NOTE ADULT - PROBLEM SELECTOR PLAN 7
Atypical chest pain , chronic SOB, multifactorial iso of COPD, morbid obesity, fibromyalgia  Elevated troponins, became positive after unresponsive event due to acute on chronic hypercapneic respiratory failure   - EKG showed sinus rhythm with non-specific T wave abnormality in I and aVL  - Troponins were negative x 2 on admission then increased in setting of acute hypercapneic event requiring intubation  - CCTA 12/2022 showed Ca score of 1069.2 with no significant obstructive CAD. Pharm nuclear stress 3/2024 showed normal myocardial perfusion  - Continue ASA 81 and atorvastatin 40mg daily for non-obstructive CAD   - RLE > LLE edema. LE duplex negative for DVT.  - TTE showed normal LV and RV function without significant valvular disease, however this was done PRIOR to her unresponsive/hypercapneic event. Repeat limited TTE with Definity to r/o new WMA. Will consider further testing pending repeat limited TTE  Card Dr. [place] following

## 2025-03-15 NOTE — PROGRESS NOTE ADULT - PROBLEM SELECTOR PLAN 3
Central obesity, Mallampati 4  PCO2 greater than 125 requiring intubation.  Patient has been intubated several times in the past.  Unable to tolerate BIPAP with settings of 20/16  Tolerating AVAPS nightly.  Thoracic expansion is limited secondary to severe central obesity and multilevel spinal stenosis and scoliosis. Patient requires that tidal volume that AVAPS-AE delivers for adequate gas exchange. Without AVAPS-AE patient is at a high risk of readmission and possible death from respiratory failure. BIPAP does not have capability to deliver tidal volume and patient cannot tolerate extremely high settings on a BIPAP.

## 2025-03-15 NOTE — DISCHARGE NOTE PROVIDER - CARE PROVIDERS DIRECT ADDRESSES
,[email or web address] ,[email or web address],[email or web address] ,[email or web address],[email or web address]

## 2025-03-15 NOTE — DISCHARGE NOTE PROVIDER - NPI NUMBER (FOR SYSADMIN USE ONLY) :
[[id number]],[[id number]],[[id number]] [[id number]],[[id number]] [[id number]],[[id number]]

## 2025-03-15 NOTE — DISCHARGE NOTE PROVIDER - HOSPITAL COURSE
59-year-old female, AAOx3, ambulates with a cane, no HHA,  history of DM, HLD, asthma, obesity hypoventilation syndrome on 4L NC, migraines, hypothyroidism, NAKUL, RCC status post left renal resection 11/23 presented to the hospital 3/11 with intermittent palpitations and chest pain that started a week prior to admission. An RRT was activated for unresponsiveness and AHHRF for which she was intubated and admitted to the ICU, She self-extubated on 3/12 and was treated prophylactically with racemic epi and dexamethasone, placed on BiPAP and subsequently transitioned to nasal cannula. While napping, she was noted to develop hypoxia to 80% which resolved upon awakening the patient. She repeatedly declines to use BiPAP and is able to verbalize her awareness of the risks to her health/life by not wearing it prn and at night. She has also stated that she does not want to be re-intubated. Nocturnal AVAPS has been recommended, encouraged and initiated. She tolerated AVAPS overnight 3/12-3/13 with improvement in her pCO2. In the AM 3/13, she was transitioned back to nasal cannula. She continues to receive Advair inhaler and duonebs q6.   Patient would benefit from AVAPS-AE at home to control hypoventilation and hypercapnia. Thoracic expansion is limited secondary to severe central obesity and multilevel spinal stenosis and scoliosis. Patient requires that tidal volume that AVAPS-AE delivers for adequate gas exchange. Without AVAPS-AE patient is at a high risk of readmission and possible death from respiratory failure. BIPAP does not have capability to deliver tidal volume and patient cannot tolerate extremely high settings on a BIPAP.      Her hospital course has also been notable for TWI in anterior leads with associated troponemia with peaked at 2340 and downtrended. Cardiologist Memo was consulted and recommended continuation of ASA and atorvastatin. Repeat TTE 3/11/25 showed normal LV function, EF 54%. Anti-HTN are held at present and she has remained normotensive; will be restarted as appropriate. She has lower extremity edema which she reports being present for the past 7 months; possibly related to the amlodipine. Endocrinology has also been consulted for recommendations regarding uncontrolled hyperglycemia/diabetes with A1c 13%. She was initiated on Lantus 38 units qHS with mod ISS; regimen adjusted to Lantus 42 units qHS, 10 units pre-meal lispro and moderate ISS as her diet has been resumed. She is being transferred to  for ongoing management. Her castaneda catheter was removed on 3/13. Pt voiding freely.      Case discussed with primary attending. CHERIE-AE approved. Pt is medically optimized for discharge. Continue medications as instructed. Follow-up with PCP.     59-year-old female, AAOx3, ambulates with a cane, no HHA,  history of DM, HLD, asthma, obesity hypoventilation syndrome on 4L NC, migraines, hypothyroidism, NAKUL, RCC status post left renal resection 11/23 presented to the hospital 3/11 with intermittent palpitations and chest pain that started a week prior to admission. An RRT was activated for unresponsiveness and AHHRF for which she was intubated and admitted to the ICU, She self-extubated on 3/12 and was treated prophylactically with racemic epi and dexamethasone, placed on BiPAP and subsequently transitioned to nasal cannula. While napping, she was noted to develop hypoxia to 80% which resolved upon awakening the patient. She repeatedly declines to use BiPAP and is able to verbalize her awareness of the risks to her health/life by not wearing it prn and at night. She has also stated that she does not want to be re-intubated. Nocturnal AVAPS has been recommended, encouraged and initiated. She tolerated AVAPS overnight 3/12-3/13 with improvement in her pCO2. In the AM 3/13, she was transitioned back to nasal cannula. She continues to receive Advair inhaler and duonebs q6.   Patient would benefit from AVAPS-AE at home to control hypoventilation and hypercapnia. Thoracic expansion is limited secondary to severe central obesity and multilevel spinal stenosis and scoliosis. Patient requires that tidal volume that AVAPS-AE delivers for adequate gas exchange. Without AVAPS-AE patient is at a high risk of readmission and possible death from respiratory failure. BIPAP does not have capability to deliver tidal volume and patient cannot tolerate extremely high settings on a BIPAP.      Her hospital course has also been notable for TWI in anterior leads with associated troponemia with peaked at 2340 and downtrended. Cardiologist Memo was consulted and recommended continuation of ASA and atorvastatin. Repeat TTE 3/11/25 showed normal LV function, EF 54%. Anti-HTN are held at present and she has remained normotensive; will be restarted as appropriate. She has lower extremity edema which she reports being present for the past 7 months; possibly related to the amlodipine. Endocrinology has also been consulted for recommendations regarding uncontrolled hyperglycemia/diabetes with A1c 13%. She was initiated on Lantus 38 units qHS with mod ISS; regimen adjusted to Lantus 42 units qHS, 10 units pre-meal lispro and moderate ISS as her diet has been resumed. She is being transferred to  for ongoing management. Her castaneda catheter was removed on 3/13. Pt voiding freely.    Patient was subsequently downgraded to SCU for further management. While in SCU patient blood pressure high and adjustments made to BP meds with improvement. Patient was c/o sinus pain on left side. Noted mild facial swelling accompanied by pain with palpation. Augmentin and Decadron 4 day taper ordered. Patient using Flonase. Tolerating 2L NC during the day with AVAPS at night. Cardiologist recommending to repeat TTE to r/o new WMA (CArdio Dr. Hampton).     Case discussed with primary attending. AVAPS-AE approved. Pt is medically optimized for discharge. Continue medications as instructed. Follow-up with PCP. Patient to follow up with Pulmonologist and  Cardiologist.      59-year-old female, AAOx3, ambulates with a cane, no HHA,  history of DM, HLD, asthma, obesity hypoventilation syndrome on 4L NC, migraines, hypothyroidism, NAKUL, RCC status post left renal resection 11/23 presented to the hospital 3/11 with intermittent palpitations and chest pain that started a week prior to admission. An RRT was activated for unresponsiveness and AHHRF for which she was intubated and admitted to the ICU, She self-extubated on 3/12 and was treated prophylactically with racemic epi and dexamethasone, placed on BiPAP and subsequently transitioned to nasal cannula. While napping, she was noted to develop hypoxia to 80% which resolved upon awakening the patient. She repeatedly declines to use BiPAP and is able to verbalize her awareness of the risks to her health/life by not wearing it prn and at night. She has also stated that she does not want to be re-intubated. Nocturnal AVAPS has been recommended, encouraged and initiated. She tolerated AVAPS overnight 3/12-3/13 with improvement in her pCO2. In the AM 3/13, she was transitioned back to nasal cannula. She continues to receive Advair inhaler and duonebs q6.   Patient would benefit from AVAPS-AE at home to control hypoventilation and hypercapnia. Thoracic expansion is limited secondary to severe central obesity and multilevel spinal stenosis and scoliosis. Patient requires that tidal volume that AVAPS-AE delivers for adequate gas exchange. Without AVAPS-AE patient is at a high risk of readmission and possible death from respiratory failure. BIPAP does not have capability to deliver tidal volume and patient cannot tolerate extremely high settings on a BIPAP.      Her hospital course has also been notable for TWI in anterior leads with associated troponemia with peaked at 2340 and downtrended. Cardiologist Memo was consulted and recommended continuation of ASA and atorvastatin. Repeat TTE 3/11/25 showed normal LV function, EF 54%. Anti-HTN are held at present and she has remained normotensive; will be restarted as appropriate. She has lower extremity edema which she reports being present for the past 7 months; possibly related to the amlodipine. Endocrinology has also been consulted for recommendations regarding uncontrolled hyperglycemia/diabetes with A1c 13%. She was initiated on Lantus 38 units qHS with mod ISS; regimen adjusted to Lantus 42 units qHS, 10 units pre-meal lispro and moderate ISS as her diet has been resumed. She is being transferred to  for ongoing management. Her castaneda catheter was removed on 3/13. Pt voiding freely.    Patient was subsequently downgraded to SCU for further management. While in SCU patient blood pressure high and adjustments made to BP meds with improvement.    Hospital course complicated by Uncontrolled hyperglycemia in T2DM. Blood glucose monitored, insulins adjusted. Endocrinologist following and recommends at discharge: ??????    Pt with recurrent  sinusitis.  Patient was c/o sinus pain on left side. Noted mild facial swelling accompanied by pain with palpation.  Continue Augmentin for total of 7 days (thru 3/23), short course of Decadron. Patient using flonase. Pt with worsening dependent BLE edema. Bilat leg dopplers negative for DVT. Pt refusing ACE wraps when OOB. Elevate BLE as tolerated. Vascular consulted. No vascular intervention at this time. Recommend outpatient follow up for venous studies.    Tolerating 2L NC during the day with AVAPS at night. Cardiologist recommending to repeat TTE to r/o new WMA (CArdio Dr. Hampton). Repeat TTE findings : ???      Case discussed with primary attending. AVAPS-AE approved. Pt is medically optimized for discharge. Continue medications as instructed. Follow-up with PCP. Patient to follow up with Pulmonologist and  Cardiologist.      59-year-old female, AAOx3, ambulates with a cane, no HHA,  history of DM, HLD, asthma, obesity hypoventilation syndrome on 4L NC, migraines, hypothyroidism, NAKUL, RCC status post left renal resection 11/23 presented to the hospital 3/11 with intermittent palpitations and chest pain that started a week prior to admission. An RRT was activated for unresponsiveness and AHHRF for which she was intubated and admitted to the ICU, She self-extubated on 3/12 and was treated prophylactically with racemic epi and dexamethasone, placed on BiPAP and subsequently transitioned to nasal cannula. While napping, she was noted to develop hypoxia to 80% which resolved upon awakening the patient. She repeatedly declines to use BiPAP and is able to verbalize her awareness of the risks to her health/life by not wearing it prn and at night. She has also stated that she does not want to be re-intubated. Nocturnal AVAPS has been recommended, encouraged and initiated. She tolerated AVAPS overnight 3/12-3/13 with improvement in her pCO2. In the AM 3/13, she was transitioned back to nasal cannula. She continues to receive Advair inhaler and duonebs q6.   Patient would benefit from AVAPS-AE at home to control hypoventilation and hypercapnia. Thoracic expansion is limited secondary to severe central obesity and multilevel spinal stenosis and scoliosis. Patient requires that tidal volume that AVAPS-AE delivers for adequate gas exchange. Without AVAPS-AE patient is at a high risk of readmission and possible death from respiratory failure. BIPAP does not have capability to deliver tidal volume and patient cannot tolerate extremely high settings on a BIPAP.      Her hospital course has also been notable for TWI in anterior leads with associated troponemia with peaked at 2340 and downtrended. Cardiologist Memo was consulted and recommended continuation of ASA and atorvastatin. Repeat TTE 3/11/25 showed normal LV function, EF 54%. Anti-HTN are held at present and she has remained normotensive; will be restarted as appropriate. She has lower extremity edema which she reports being present for the past 7 months; possibly related to the amlodipine. Endocrinology has also been consulted for recommendations regarding uncontrolled hyperglycemia/diabetes with A1c 13%. She was initiated on Lantus 38 units qHS with mod ISS; regimen adjusted to Lantus 42 units qHS, 10 units pre-meal lispro and moderate ISS as her diet has been resumed. She is being transferred to  for ongoing management. Her castaneda catheter was removed on 3/13. Pt voiding freely.    Patient was subsequently downgraded to SCU for further management. While in SCU patient blood pressure high and adjustments made to BP meds with improvement.    Hospital course complicated by Uncontrolled hyperglycemia in T2DM. Blood glucose monitored, insulins adjusted. Endocrinologist following and recommends at discharge: Lantus 65 units QHS, Lispro 18 units TID before meals and Metformin 1000mg BID. Patient should continue Ozempic 2mg weekly per Endo.     Pt with recurrent  sinusitis.  Patient was c/o sinus pain on left side. Noted mild facial swelling accompanied by pain with palpation.  Continue Augmentin for total of 7 days (thru 3/23), short course of Decadron. Patient using Flonase. Pt with worsening dependent BLE edema. Bilat leg dopplers negative for DVT. Pt refusing ACE wraps when OOB. Elevate BLE as tolerated. Vascular consulted. No vascular intervention at this time. Recommend outpatient follow up for venous studies.    Tolerating 2L NC during the day with AVAPS at night. Cardiologist recommending to repeat TTE to r/o new WMA (CArdio Dr. Hampton). Repeat TTE findings : Repeat limited TTE with Definity (preliminary) reveals no wall motion abnormalities. No further cardiac testing indicated per Cardio.     Case discussed with primary attending. AVAPS-AE approved. Pt is medically optimized for discharge. Continue medications as instructed. Follow-up with PCP, Pulm, Endocrinology, Vascular,  Patient to follow up with Pulmonologist and  Cardiologist.      59-year-old female, AAOx3, ambulates with a cane, no HHA,  history of DM, HLD, asthma, obesity hypoventilation syndrome on 4L NC, migraines, hypothyroidism, NAKUL, RCC status post left renal resection 11/23 presented to the hospital 3/11 with intermittent palpitations and chest pain that started a week prior to admission. An RRT was activated for unresponsiveness and AHHRF for which she was intubated and admitted to the ICU, She self-extubated on 3/12 and was treated prophylactically with racemic epi and dexamethasone, placed on BiPAP and subsequently transitioned to nasal cannula. While napping, she was noted to develop hypoxia to 80% which resolved upon awakening the patient. She repeatedly declines to use BiPAP and is able to verbalize her awareness of the risks to her health/life by not wearing it prn and at night. She has also stated that she does not want to be re-intubated. Nocturnal AVAPS has been recommended, encouraged and initiated. She tolerated AVAPS overnight 3/12-3/13 with improvement in her pCO2. In the AM 3/13, she was transitioned back to nasal cannula. She continues to receive Advair inhaler and duonebs q6.   Patient would benefit from AVAPS-AE at home to control hypoventilation and hypercapnia. Thoracic expansion is limited secondary to severe central obesity and multilevel spinal stenosis and scoliosis. Patient requires that tidal volume that AVAPS-AE delivers for adequate gas exchange. Without AVAPS-AE patient is at a high risk of readmission and possible death from respiratory failure. BIPAP does not have capability to deliver tidal volume and patient cannot tolerate extremely high settings on a BIPAP.      Her hospital course has also been notable for TWI in anterior leads with associated troponemia with peaked at 2340 and downtrended. Cardiologist Memo was consulted and recommended continuation of ASA and atorvastatin. Repeat TTE 3/11/25 showed normal LV function, EF 54%. Anti-HTN are held at present and she has remained normotensive; will be restarted as appropriate. She has lower extremity edema which she reports being present for the past 7 months; possibly related to the amlodipine. Endocrinology has also been consulted for recommendations regarding uncontrolled hyperglycemia/diabetes with A1c 13%. She was initiated on Lantus 38 units qHS with mod ISS; regimen adjusted to Lantus 42 units qHS, 10 units pre-meal lispro and moderate ISS as her diet has been resumed. She is being transferred to  for ongoing management. Her castaneda catheter was removed on 3/13. Pt voiding freely.    Patient was subsequently downgraded to SCU for further management. While in SCU patient blood pressure high and adjustments made to BP meds with improvement.    Hospital course complicated by Uncontrolled hyperglycemia in T2DM. Blood glucose monitored, insulins adjusted. Endocrinologist following and recommends at discharge: Lantus 65 units QHS, Lispro 18 units TID before meals and Metformin 1000mg BID. Patient should continue Ozempic 2mg weekly per Endo.     Pt with recurrent  sinusitis.  Patient was c/o sinus pain on left side. Noted mild facial swelling accompanied by pain with palpation.  Continue Augmentin for total of 7 days (thru 3/23), short course of Decadron. Patient using Flonase. Pt with worsening dependent BLE edema. Bilat leg dopplers negative for DVT. Pt refusing ACE wraps when OOB. Elevate BLE as tolerated. Vascular consulted. No vascular intervention at this time. Recommend outpatient follow up for venous studies.    Tolerating 2L NC during the day with AVAPS at night. Cardiologist recommending to repeat TTE to r/o new WMA (CArdio Dr. Hampton). Repeat TTE findings : Repeat limited TTE with Definity (preliminary) reveals no wall motion abnormalities. No further cardiac testing indicated per Cardio.     Case discussed with primary attending. AVAPS-AE approved. Pt is medically optimized for discharge. Continue medications as instructed. Follow-up with PCP, Pulm, Endocrinology, Vascular,  Patient to follow up with Pulmonologist and  Cardiologist.     for a full account of hospital course please refer to actual medical records for this is a brief summery

## 2025-03-15 NOTE — PROGRESS NOTE ADULT - PROBLEM SELECTOR PLAN 1
2/2 class 3/4 obesity with alveolar hypoventilation. BMI 51.5  PCO2 greater than 125. History of multiple intubations in past  s/p intubation . Self -extubated. Pt did not tolerate BIPAP. Hypercapnia did not improve on BIPAP.   Hypercapnia improved on AVAPS, PCO2 decreased to 50.   Patient would benefit from AVAPS-AE at home to control hypoventilation and hypercapnia. Thoracic expansion is limited secondary to severe central obesity and multilevel spinal stenosis and scoliosis. Patient requires that tidal volume that AVAPS-AE delivers for adequate gas exchange. Without AVAPS-AE patient is at a high risk of readmission and possible death from respiratory failure. BIPAP does not have capability to deliver tidal volume and patient cannot tolerate extremely high settings on a BIPAP.  Continue AVAPS nightly and as needed during the day.  Oxygen supplementation 3LPM when not using AVAPS.  Monitor oxygenation   Continue duonebs q6h RTC  Continue fluticasone proprionate/salmeterol 250-50 BID

## 2025-03-15 NOTE — PROGRESS NOTE ADULT - PROBLEM SELECTOR PLAN 9
h/o fibromyalgia, DJD,  Takes gabapentin at home  Continue tylenol 1 gm q8h x 4 days  OOBC , ambulate as tolerated

## 2025-03-15 NOTE — PROGRESS NOTE ADULT - PROBLEM SELECTOR PLAN 4
Severe abdominal obesity with known alveolar hypoventilation.  PCO2 greater than 125 upon admission which required patient to be intubated and started on mechanical ventilation.  Patient unable to tolerate very high setting on BIPAP 20/16  Started on AVAPS therapy in hospital. Tolerating better. PCO2 on AVAPS 50  Will need AVAPS-AE when discharged. Patient need Tidal volume that AVAPS-AE delivers to help control PCO2 and to provide adequate gas exchange gas exchange.  Order sent to [facility] surgical.

## 2025-03-15 NOTE — PROGRESS NOTE ADULT - SUBJECTIVE AND OBJECTIVE BOX
NOEMY CONTRERSA    SCU progress note    INTERVAL HPI/OVERNIGHT EVENTS: ***    DNR [ ]   DNI  [  ]       FULL CODE [   ]    Covid - 19 PCR:     The 4Ms    What Matters Most: see GOC  Age appropriate Medications/Screen for High Risk Medication: Yes  Mentation: see CAM below  Mobility: defer to physical exam    The Confusion Assessment Method (CAM) Diagnostic Algorithm     1: Acute Onset or Fluctuating Course  - Is there evidence of an acute change in mental status from the patient’s baseline? Did the (abnormal) behavior  fluctuate during the day, that is, tend to come and go, or increase and decrease in severity?  [ ] YES [ ] NO    [  ]  UNABLE TO ASSESS      2: Inattention  - Did the patient have difficulty focusing attention, being easily distractible, or having difficulty keeping track of what was being said?  [ ] YES [ ] NO      [  ]  UNABLE TO ASSESS      3: Disorganized thinking  -Was the patient’s thinking disorganized or incoherent, such as rambling or irrelevant conversation, unclear or illogical flow of ideas, or unpredictable switching from subject to subject?  [ ] YES [ ] NO      [  ]  UNABLE TO ASSESS     4: Altered Level of consciousness?  [ ] YES [ ] NO    The diagnosis of delirium by CAM requires the presence of features 1 and 2 and either 3 or 4.    PRESSORS: [ ] YES [ ] NO    Cardiovascular:  Heart Failure  Acute   Acute on Chronic  Chronic         Pulmonary:  albuterol    0.083% 2.5 milliGRAM(s) Nebulizer every 6 hours  albuterol    90 MICROgram(s) HFA Inhaler 1 Puff(s) Inhalation every 4 hours PRN  fluticasone propionate/ salmeterol 250-50 MICROgram(s) Diskus 1 Dose(s) Inhalation two times a day  guaiFENesin Oral Liquid (Sugar-Free) 200 milliGRAM(s) Oral every 6 hours PRN  montelukast 10 milliGRAM(s) Oral daily    Hematologic:  aspirin  chewable 81 milliGRAM(s) Oral daily  enoxaparin Injectable 40 milliGRAM(s) SubCutaneous every 12 hours    Other:  acetaminophen     Tablet .. 1000 milliGRAM(s) Oral every 8 hours  atorvastatin 40 milliGRAM(s) Oral at bedtime  chlorhexidine 2% Cloths 1 Application(s) Topical <User Schedule>  diphenhydrAMINE 25 milliGRAM(s) Oral every 6 hours PRN  fluticasone propionate 50 MICROgram(s)/spray Nasal Spray 1 Spray(s) Both Nostrils two times a day  hydrocortisone 1% Ointment 1 Application(s) Topical every 12 hours  insulin glargine Injectable (LANTUS) 50 Unit(s) SubCutaneous at bedtime  insulin lispro (ADMELOG) corrective regimen sliding scale   SubCutaneous three times a day before meals  insulin lispro (ADMELOG) corrective regimen sliding scale   SubCutaneous at bedtime  insulin lispro Injectable (ADMELOG) 12 Unit(s) SubCutaneous three times a day before meals  levothyroxine 112 MICROGram(s) Oral daily  magnesium oxide 400 milliGRAM(s) Oral three times a day with meals  OXcarbazepine 600 milliGRAM(s) Oral two times a day  pantoprazole    Tablet 40 milliGRAM(s) Oral before breakfast  polyethylene glycol 3350 17 Gram(s) Oral daily  senna 2 Tablet(s) Oral at bedtime  sodium chloride 0.65% Nasal 1 Spray(s) Both Nostrils three times a day PRN    acetaminophen     Tablet .. 1000 milliGRAM(s) Oral every 8 hours  albuterol    0.083% 2.5 milliGRAM(s) Nebulizer every 6 hours  albuterol    90 MICROgram(s) HFA Inhaler 1 Puff(s) Inhalation every 4 hours PRN  aspirin  chewable 81 milliGRAM(s) Oral daily  atorvastatin 40 milliGRAM(s) Oral at bedtime  chlorhexidine 2% Cloths 1 Application(s) Topical <User Schedule>  diphenhydrAMINE 25 milliGRAM(s) Oral every 6 hours PRN  enoxaparin Injectable 40 milliGRAM(s) SubCutaneous every 12 hours  fluticasone propionate 50 MICROgram(s)/spray Nasal Spray 1 Spray(s) Both Nostrils two times a day  fluticasone propionate/ salmeterol 250-50 MICROgram(s) Diskus 1 Dose(s) Inhalation two times a day  guaiFENesin Oral Liquid (Sugar-Free) 200 milliGRAM(s) Oral every 6 hours PRN  hydrocortisone 1% Ointment 1 Application(s) Topical every 12 hours  insulin glargine Injectable (LANTUS) 50 Unit(s) SubCutaneous at bedtime  insulin lispro (ADMELOG) corrective regimen sliding scale   SubCutaneous three times a day before meals  insulin lispro (ADMELOG) corrective regimen sliding scale   SubCutaneous at bedtime  insulin lispro Injectable (ADMELOG) 12 Unit(s) SubCutaneous three times a day before meals  levothyroxine 112 MICROGram(s) Oral daily  magnesium oxide 400 milliGRAM(s) Oral three times a day with meals  montelukast 10 milliGRAM(s) Oral daily  OXcarbazepine 600 milliGRAM(s) Oral two times a day  pantoprazole    Tablet 40 milliGRAM(s) Oral before breakfast  polyethylene glycol 3350 17 Gram(s) Oral daily  senna 2 Tablet(s) Oral at bedtime  sodium chloride 0.65% Nasal 1 Spray(s) Both Nostrils three times a day PRN    Drug Dosing Weight  Height (cm): 162.6 (12 Mar 2025 00:00)  Weight (kg): 136.1 (12 Mar 2025 00:00)  BMI (kg/m2): 51.5 (12 Mar 2025 00:00)  BSA (m2): 2.32 (12 Mar 2025 00:00)    CENTRAL LINE: [ ] YES [ ] NO  LOCATION:   DATE INSERTED:  REMOVE: [ ] YES [ ] NO  EXPLAIN:    WILDER: [ ] YES [ ] NO    DATE INSERTED:  REMOVE:  [ ] YES [ ] NO  EXPLAIN:    PAST MEDICAL & SURGICAL HISTORY:  Hypertension  vaginal cyst      Hyperlipidemia      Asthma  last inhaler use with in 10 days, on Pulm follow up [id number]      Hypothyroid      Obstructive sleep apnea  refuses Cpap      Obesity  morbid      Trigeminal neuralgia  R      Fibromyalgia      DM (diabetes mellitus)  2      DJD (degenerative joint disease)  Cervical/Thoracic/Lumbar      Cervical neuralgia  difficulty moving my neck      Back pain  thoracic & lumbar      H/O bursitis  right shoulder      Radicular pain  arms, legs      Vasculitis  Legs, forerhead, arms & hands, flare ups in R leg  Dr susie Ribeiro is my Rheumatologist      Renal cell carcinoma, left  on follow up Dr schulte, HOD renal [facility], waiting for suregry in 2020      Falls frequently      Morbid obesity with body mass index (BMI) of 50.0 to 59.9 in adult      Psoriasis      Diverticulosis      Fatty liver      S/P abdominal hysterectomy          S/P  section  1      Left adrenal mass  excision, benign 2006      Vaginal cyst  removed       Vasculitis on nerve biopsy  , had another surgery called microvascular decompression       S/P left oophorectomy      S/P colonoscopic polypectomy                  -14 @ 07:01  -  15 @ 07:00  --------------------------------------------------------  IN: 0 mL / OUT: 1 mL / NET: -1 mL            PHYSICAL EXAM:  GENERAL: NAD, morbidly obese  HEAD:  Atraumatic, Normocephalic  EYES: EOMI, PERRL, conjunctiva and sclera clear  ENMT: No tonsillar erythema, exudates, or enlargement; Moist mucous membranes, Good dentition, No lesions  NECK: Supple, No JVD, Normal thyroid  NERVOUS SYSTEM:  Alert & Oriented X3, Good concentration; Motor Strength 5/5 B/L upper and lower extremities;   CHEST/LUNG: Clear upper lung fields, decreased bilat bases on auscultation.  No rales, rhonchi, wheezing, or rubs  HEART: Regular rate and rhythm; No murmurs, rubs, or gallops  ABDOMEN: BS(+),obese, Soft, Nontender  EXTREMITIES: 1+ edema BLE . 2+ Peripheral Pulses, No clubbing, cyanosis,   LYMPH: No lymphadenopathy noted  SKIN: warm, dry, acyanotic. Chronic skin changes BLE. Chronic skin  lesions on arms        LABS:  CBC Full  -  ( 15 Mar 2025 06:17 )  WBC Count : 8.62 K/uL  RBC Count : 5.12 M/uL  Hemoglobin : 12.4 g/dL  Hematocrit : 40.9 %  Platelet Count - Automated : 322 K/uL  Mean Cell Volume : 79.9 fl  Mean Cell Hemoglobin : 24.2 pg  Mean Cell Hemoglobin Concentration : 30.3 g/dL  Auto Neutrophil # : x  Auto Lymphocyte # : x  Auto Monocyte # : x  Auto Eosinophil # : x  Auto Basophil # : x  Auto Neutrophil % : x  Auto Lymphocyte % : x  Auto Monocyte % : x  Auto Eosinophil % : x  Auto Basophil % : x    03-15    136  |  98  |  15  ----------------------------<  249[H]  4.1   |  31  |  1.19    Ca    9.8      15 Mar 2025 06:17  Phos  2.9     03-15  Mg     1.6     03-15    TPro  8.4[H]  /  Alb  3.1[L]  /  TBili  0.5  /  DBili  x   /  AST  25  /  ALT  35  /  AlkPhos  123[H]  03-15      Urinalysis Basic - ( 15 Mar 2025 06:17 )    Color: x / Appearance: x / SG: x / pH: x  Gluc: 249 mg/dL / Ketone: x  / Bili: x / Urobili: x   Blood: x / Protein: x / Nitrite: x   Leuk Esterase: x / RBC: x / WBC x   Sq Epi: x / Non Sq Epi: x / Bacteria: x            [  ]  DVT Prophylaxis  [  ]   Abnormal Nutritional Status -  Malnutrition   Cachexia      Morbid Obesity BMI >/=40  Diet, DASH/TLC:   Sodium & Cholesterol Restricted  Consistent Carbohydrate No Snacks (25 @ 10:52) [Active]          RADIOLOGY & ADDITIONAL STUDIES:  ***    Goals of Care Discussion with Family/Proxy/Other   - see note from     NOEMY CONTRERAS    SCU progress note    INTERVAL HPI/OVERNIGHT EVENTS: ***No acute events overnight. POCT Glucoses 250-330 past 24 hrs.        FULL CODE [ x  ]    Covid - 19 PCR:     The 4Ms    What Matters Most: see GOC  Age appropriate Medications/Screen for High Risk Medication: Yes  Mentation: see CAM below  Mobility: defer to physical exam    The Confusion Assessment Method (CAM) Diagnostic Algorithm     1: Acute Onset or Fluctuating Course  - Is there evidence of an acute change in mental status from the patient’s baseline? Did the (abnormal) behavior  fluctuate during the day, that is, tend to come and go, or increase and decrease in severity?  [ ] YES [x ] NO    [  ]  UNABLE TO ASSESS      2: Inattention  - Did the patient have difficulty focusing attention, being easily distractible, or having difficulty keeping track of what was being said?  [ ] YES [x ] NO      [  ]  UNABLE TO ASSESS      3: Disorganized thinking  -Was the patient’s thinking disorganized or incoherent, such as rambling or irrelevant conversation, unclear or illogical flow of ideas, or unpredictable switching from subject to subject?  [ ] YES [ x] NO      [  ]  UNABLE TO ASSESS     4: Altered Level of consciousness?  [ ] YES [ x] NO    The diagnosis of delirium by CAM requires the presence of features 1 and 2 and either 3 or 4.    PRESSORS: [ ] YES [ x] NO    Cardiovascular:  Heart Failure  Acute   Acute on Chronic  Chronic         Pulmonary:  albuterol    0.083% 2.5 milliGRAM(s) Nebulizer every 6 hours  albuterol    90 MICROgram(s) HFA Inhaler 1 Puff(s) Inhalation every 4 hours PRN  fluticasone propionate/ salmeterol 250-50 MICROgram(s) Diskus 1 Dose(s) Inhalation two times a day  guaiFENesin Oral Liquid (Sugar-Free) 200 milliGRAM(s) Oral every 6 hours PRN  montelukast 10 milliGRAM(s) Oral daily    Hematologic:  aspirin  chewable 81 milliGRAM(s) Oral daily  enoxaparin Injectable 40 milliGRAM(s) SubCutaneous every 12 hours    Other:  acetaminophen     Tablet .. 1000 milliGRAM(s) Oral every 8 hours  atorvastatin 40 milliGRAM(s) Oral at bedtime  chlorhexidine 2% Cloths 1 Application(s) Topical <User Schedule>  diphenhydrAMINE 25 milliGRAM(s) Oral every 6 hours PRN  fluticasone propionate 50 MICROgram(s)/spray Nasal Spray 1 Spray(s) Both Nostrils two times a day  hydrocortisone 1% Ointment 1 Application(s) Topical every 12 hours  insulin glargine Injectable (LANTUS) 50 Unit(s) SubCutaneous at bedtime  insulin lispro (ADMELOG) corrective regimen sliding scale   SubCutaneous three times a day before meals  insulin lispro (ADMELOG) corrective regimen sliding scale   SubCutaneous at bedtime  insulin lispro Injectable (ADMELOG) 12 Unit(s) SubCutaneous three times a day before meals  levothyroxine 112 MICROGram(s) Oral daily  magnesium oxide 400 milliGRAM(s) Oral three times a day with meals  OXcarbazepine 600 milliGRAM(s) Oral two times a day  pantoprazole    Tablet 40 milliGRAM(s) Oral before breakfast  polyethylene glycol 3350 17 Gram(s) Oral daily  senna 2 Tablet(s) Oral at bedtime  sodium chloride 0.65% Nasal 1 Spray(s) Both Nostrils three times a day PRN    acetaminophen     Tablet .. 1000 milliGRAM(s) Oral every 8 hours  albuterol    0.083% 2.5 milliGRAM(s) Nebulizer every 6 hours  albuterol    90 MICROgram(s) HFA Inhaler 1 Puff(s) Inhalation every 4 hours PRN  aspirin  chewable 81 milliGRAM(s) Oral daily  atorvastatin 40 milliGRAM(s) Oral at bedtime  chlorhexidine 2% Cloths 1 Application(s) Topical <User Schedule>  diphenhydrAMINE 25 milliGRAM(s) Oral every 6 hours PRN  enoxaparin Injectable 40 milliGRAM(s) SubCutaneous every 12 hours  fluticasone propionate 50 MICROgram(s)/spray Nasal Spray 1 Spray(s) Both Nostrils two times a day  fluticasone propionate/ salmeterol 250-50 MICROgram(s) Diskus 1 Dose(s) Inhalation two times a day  guaiFENesin Oral Liquid (Sugar-Free) 200 milliGRAM(s) Oral every 6 hours PRN  hydrocortisone 1% Ointment 1 Application(s) Topical every 12 hours  insulin glargine Injectable (LANTUS) 50 Unit(s) SubCutaneous at bedtime  insulin lispro (ADMELOG) corrective regimen sliding scale   SubCutaneous three times a day before meals  insulin lispro (ADMELOG) corrective regimen sliding scale   SubCutaneous at bedtime  insulin lispro Injectable (ADMELOG) 12 Unit(s) SubCutaneous three times a day before meals  levothyroxine 112 MICROGram(s) Oral daily  magnesium oxide 400 milliGRAM(s) Oral three times a day with meals  montelukast 10 milliGRAM(s) Oral daily  OXcarbazepine 600 milliGRAM(s) Oral two times a day  pantoprazole    Tablet 40 milliGRAM(s) Oral before breakfast  polyethylene glycol 3350 17 Gram(s) Oral daily  senna 2 Tablet(s) Oral at bedtime  sodium chloride 0.65% Nasal 1 Spray(s) Both Nostrils three times a day PRN    Drug Dosing Weight  Height (cm): 162.6 (12 Mar 2025 00:00)  Weight (kg): 136.1 (12 Mar 2025 00:00)  BMI (kg/m2): 51.5 (12 Mar 2025 00:00)  BSA (m2): 2.32 (12 Mar 2025 00:00)    CENTRAL LINE: [ ] YES [ x] NO  LOCATION:   DATE INSERTED:  REMOVE: [ ] YES [ ] NO  EXPLAIN:    WILDER: [ ] YES [x ] NO    DATE INSERTED:  REMOVE:  [ ] YES [ ] NO  EXPLAIN:    PAST MEDICAL & SURGICAL HISTORY:  Hypertension  vaginal cyst  Hyperlipidemia  Asthma, last inhaler use with in 10 days, on Pulm follow up [id number]  Hypothyroid  Obstructive sleep apnea, refuses Cpap  Obesity morbid  Trigeminal neuralgia, R  Fibromyalgia  DM (diabetes mellitus) 2  DJD (degenerative joint disease) Cervical/Thoracic/Lumbar  Cervical neuralgia, difficulty moving my neck  Back pain, thoracic & lumbar  H/O bursitis, right shoulder  Radicular pain,arms, legs  Vasculitis, Legs, forerhead, arms & hands, flare ups in R leg  Dr susie Ribeiro is my Rheumatologist    Renal cell carcinoma, left   on follow up Dr schulte, HOD renal [facility], waiting for suregry in 2020    Falls frequently    Morbid obesity with body mass index (BMI) of 50.0 to 59.9 in adult  Psoriasis  Diverticulosis  Fatty liver  S/P abdominal hysterectomy,   S/P  section,   Left adrenal mass,excision, benign   Vaginal cyst removed   Vasculitis on nerve biopsy , had another surgery called microvascular decompression   S/P left oophorectomy  S/P colonoscopic polypectomy       @ 07:01  -  03-15 @ 07:00  --------------------------------------------------------  IN: 0 mL / OUT: 1 mL / NET: -1 mL            PHYSICAL EXAM:  GENERAL: NAD, morbidly obese  HEAD:  Atraumatic, Normocephalic  EYES: EOMI, PERRL, conjunctiva and sclera clear  ENMT: No tonsillar erythema, exudates, or enlargement; Moist mucous membranes, Good dentition, No lesions  NECK: Supple, No JVD, Normal thyroid  NERVOUS SYSTEM:  Alert & Oriented X3, Good concentration; Motor Strength 5/5 B/L upper and lower extremities;   CHEST/LUNG: Clear upper lung fields, decreased bilat bases on auscultation.  No rales, rhonchi, wheezing, or rubs  HEART: Regular rate and rhythm; No murmurs, rubs, or gallops  ABDOMEN: BS(+),obese, Soft, Nontender  EXTREMITIES: 1+ edema BLE . 2+ Peripheral Pulses, No clubbing, cyanosis,   LYMPH: No lymphadenopathy noted  SKIN: warm, dry, acyanotic. Chronic skin changes BLE. Chronic skin  lesions  on arms        LABS:  CBC Full  -  ( 15 Mar 2025 06:17 )  WBC Count : 8.62 K/uL  RBC Count : 5.12 M/uL  Hemoglobin : 12.4 g/dL  Hematocrit : 40.9 %  Platelet Count - Automated : 322 K/uL  Mean Cell Volume : 79.9 fl  Mean Cell Hemoglobin : 24.2 pg  Mean Cell Hemoglobin Concentration : 30.3 g/dL  Auto Neutrophil # : x  Auto Lymphocyte # : x  Auto Monocyte # : x  Auto Eosinophil # : x  Auto Basophil # : x  Auto Neutrophil % : x  Auto Lymphocyte % : x  Auto Monocyte % : x  Auto Eosinophil % : x  Auto Basophil % : x    -15    136  |  98  |  15  ----------------------------<  249[H]  4.1   |  31  |  1.19    Ca    9.8      15 Mar 2025 06:17  Phos  2.9     03-15  Mg     1.6     -15    TPro  8.4[H]  /  Alb  3.1[L]  /  TBili  0.5  /  DBili  x   /  AST  25  /  ALT  35  /  AlkPhos  123[H]  03-15      Urinalysis Basic - ( 15 Mar 2025 06:17 )    Color: x / Appearance: x / SG: x / pH: x  Gluc: 249 mg/dL / Ketone: x  / Bili: x / Urobili: x   Blood: x / Protein: x / Nitrite: x   Leuk Esterase: x / RBC: x / WBC x   Sq Epi: x / Non Sq Epi: x / Bacteria: x      [  ]  DVT Prophylaxis  [  ]   Abnormal Nutritional Status -  Malnutrition    Morbid Obesity BMI >/=40  Diet, DASH/TLC:   Sodium & Cholesterol Restricted  Consistent Carbohydrate No Snacks (25 @ 10:52) [Active]      RADIOLOGY & ADDITIONAL STUDIES:  ***    < from: Xray Chest 1 View- PORTABLE-Urgent (Xray Chest 1 View- PORTABLE-Urgent .) (25 @ 10:22) >    Removal of endotracheal and nasogastric tubes. Improved aeration right   lung. Stable left basilar airspace disease.    IMPRESSION: Improved aeration.    < end of copied text >  < from: Xray Chest 1 View- PORTABLE-Urgent (Xray Chest 1 View- PORTABLE-Urgent .) (25 @ 00:44) >  IMPRESSION: Bibasilar airspace disease. Support tubes as above    < end of copied text >  < from: US Duplex Venous Lower Ext Complete, Bilateral (25 @ 11:26) >  IMPRESSION:  No evidence of deep venous thrombosis in either lower extremity.    < end of copied text >  < from: Xray Chest 1 View- PORTABLE-Urgent (03.10.25 @ 19:00) >  IMPRESSION:   No radiographic evidence of active chest disease..    < end of copied text >    Goals of Care Discussion with Family/Proxy/Other   - see note from     NOEMY CONTRERAS    SCU progress note    INTERVAL HPI/OVERNIGHT EVENTS: ***No acute events overnight. POCT Glucoses 250-330 past 24 hrs.    Pt seen and examined this morning. Pt endorses feeling a little better, breathing better, less chest discomfort. Tolerating po. Ate sandwich for lunch yesterday brought from outside. Pt endorses she did not eat dinner last night. Discussed with pt consistent carb intake in setting of insulin intake.     FULL CODE [ x  ]    Covid - 19 PCR:     The 4Ms    What Matters Most: see GOC  Age appropriate Medications/Screen for High Risk Medication: Yes  Mentation: see CAM below  Mobility: defer to physical exam    The Confusion Assessment Method (CAM) Diagnostic Algorithm     1: Acute Onset or Fluctuating Course  - Is there evidence of an acute change in mental status from the patient’s baseline? Did the (abnormal) behavior  fluctuate during the day, that is, tend to come and go, or increase and decrease in severity?  [ ] YES [x ] NO    [  ]  UNABLE TO ASSESS      2: Inattention  - Did the patient have difficulty focusing attention, being easily distractible, or having difficulty keeping track of what was being said?  [ ] YES [x ] NO      [  ]  UNABLE TO ASSESS      3: Disorganized thinking  -Was the patient’s thinking disorganized or incoherent, such as rambling or irrelevant conversation, unclear or illogical flow of ideas, or unpredictable switching from subject to subject?  [ ] YES [ x] NO      [  ]  UNABLE TO ASSESS     4: Altered Level of consciousness?  [ ] YES [ x] NO    The diagnosis of delirium by CAM requires the presence of features 1 and 2 and either 3 or 4.    PRESSORS: [ ] YES [ x] NO    Cardiovascular:  Heart Failure  Acute   Acute on Chronic  Chronic         Pulmonary:  albuterol    0.083% 2.5 milliGRAM(s) Nebulizer every 6 hours  albuterol    90 MICROgram(s) HFA Inhaler 1 Puff(s) Inhalation every 4 hours PRN  fluticasone propionate/ salmeterol 250-50 MICROgram(s) Diskus 1 Dose(s) Inhalation two times a day  guaiFENesin Oral Liquid (Sugar-Free) 200 milliGRAM(s) Oral every 6 hours PRN  montelukast 10 milliGRAM(s) Oral daily    Hematologic:  aspirin  chewable 81 milliGRAM(s) Oral daily  enoxaparin Injectable 40 milliGRAM(s) SubCutaneous every 12 hours    Other:  acetaminophen     Tablet .. 1000 milliGRAM(s) Oral every 8 hours  atorvastatin 40 milliGRAM(s) Oral at bedtime  chlorhexidine 2% Cloths 1 Application(s) Topical <User Schedule>  diphenhydrAMINE 25 milliGRAM(s) Oral every 6 hours PRN  fluticasone propionate 50 MICROgram(s)/spray Nasal Spray 1 Spray(s) Both Nostrils two times a day  hydrocortisone 1% Ointment 1 Application(s) Topical every 12 hours  insulin glargine Injectable (LANTUS) 50 Unit(s) SubCutaneous at bedtime  insulin lispro (ADMELOG) corrective regimen sliding scale   SubCutaneous three times a day before meals  insulin lispro (ADMELOG) corrective regimen sliding scale   SubCutaneous at bedtime  insulin lispro Injectable (ADMELOG) 12 Unit(s) SubCutaneous three times a day before meals  levothyroxine 112 MICROGram(s) Oral daily  magnesium oxide 400 milliGRAM(s) Oral three times a day with meals  OXcarbazepine 600 milliGRAM(s) Oral two times a day  pantoprazole    Tablet 40 milliGRAM(s) Oral before breakfast  polyethylene glycol 3350 17 Gram(s) Oral daily  senna 2 Tablet(s) Oral at bedtime  sodium chloride 0.65% Nasal 1 Spray(s) Both Nostrils three times a day PRN    acetaminophen     Tablet .. 1000 milliGRAM(s) Oral every 8 hours  albuterol    0.083% 2.5 milliGRAM(s) Nebulizer every 6 hours  albuterol    90 MICROgram(s) HFA Inhaler 1 Puff(s) Inhalation every 4 hours PRN  aspirin  chewable 81 milliGRAM(s) Oral daily  atorvastatin 40 milliGRAM(s) Oral at bedtime  chlorhexidine 2% Cloths 1 Application(s) Topical <User Schedule>  diphenhydrAMINE 25 milliGRAM(s) Oral every 6 hours PRN  enoxaparin Injectable 40 milliGRAM(s) SubCutaneous every 12 hours  fluticasone propionate 50 MICROgram(s)/spray Nasal Spray 1 Spray(s) Both Nostrils two times a day  fluticasone propionate/ salmeterol 250-50 MICROgram(s) Diskus 1 Dose(s) Inhalation two times a day  guaiFENesin Oral Liquid (Sugar-Free) 200 milliGRAM(s) Oral every 6 hours PRN  hydrocortisone 1% Ointment 1 Application(s) Topical every 12 hours  insulin glargine Injectable (LANTUS) 50 Unit(s) SubCutaneous at bedtime  insulin lispro (ADMELOG) corrective regimen sliding scale   SubCutaneous three times a day before meals  insulin lispro (ADMELOG) corrective regimen sliding scale   SubCutaneous at bedtime  insulin lispro Injectable (ADMELOG) 12 Unit(s) SubCutaneous three times a day before meals  levothyroxine 112 MICROGram(s) Oral daily  magnesium oxide 400 milliGRAM(s) Oral three times a day with meals  montelukast 10 milliGRAM(s) Oral daily  OXcarbazepine 600 milliGRAM(s) Oral two times a day  pantoprazole    Tablet 40 milliGRAM(s) Oral before breakfast  polyethylene glycol 3350 17 Gram(s) Oral daily  senna 2 Tablet(s) Oral at bedtime  sodium chloride 0.65% Nasal 1 Spray(s) Both Nostrils three times a day PRN    Drug Dosing Weight  Height (cm): 162.6 (12 Mar 2025 00:00)  Weight (kg): 136.1 (12 Mar 2025 00:00)  BMI (kg/m2): 51.5 (12 Mar 2025 00:00)  BSA (m2): 2.32 (12 Mar 2025 00:00)    CENTRAL LINE: [ ] YES [ x] NO  LOCATION:   DATE INSERTED:  REMOVE: [ ] YES [ ] NO  EXPLAIN:    WILDER: [ ] YES [x ] NO    DATE INSERTED:  REMOVE:  [ ] YES [ ] NO  EXPLAIN:    PAST MEDICAL & SURGICAL HISTORY:  Hypertension  vaginal cyst  Hyperlipidemia  Asthma, last inhaler use with in 10 days, on Pulm follow up [id number]  Hypothyroid  Obstructive sleep apnea, refuses Cpap  Obesity morbid  Trigeminal neuralgia, R  Fibromyalgia  DM (diabetes mellitus) 2  DJD (degenerative joint disease) Cervical/Thoracic/Lumbar  Cervical neuralgia, difficulty moving my neck  Back pain, thoracic & lumbar  H/O bursitis, right shoulder  Radicular pain,arms, legs  Vasculitis, Legs, forerhead, arms & hands, flare ups in R leg  Dr susie Ribeiro is my Rheumatologist    Renal cell carcinoma, left   on follow up Dr schulte, [facility] renal [facility], waiting for suregry in 2020    Falls frequently    Morbid obesity with body mass index (BMI) of 50.0 to 59.9 in adult  Psoriasis  Diverticulosis  Fatty liver  S/P abdominal hysterectomy,   S/P  section,   Left adrenal mass,excision, benign   Vaginal cyst removed   Vasculitis on nerve biopsy , had another surgery called microvascular decompression   S/P left oophorectomy  S/P colonoscopic polypectomy       @ 07:01  -  15 @ 07:00  --------------------------------------------------------  IN: 0 mL / OUT: 1 mL / NET: -1 mL            PHYSICAL EXAM:  GENERAL: NAD, morbidly obese  HEAD:  Atraumatic, Normocephalic  EYES: EOMI, PERRL, conjunctiva and sclera clear  ENMT: No tonsillar erythema, exudates, or enlargement; Moist mucous membranes, Good dentition, No lesions  NECK: Supple, No JVD, Normal thyroid  NERVOUS SYSTEM:  Alert & Oriented X3, Good concentration; Motor Strength 5/5 B/L upper and lower extremities;   CHEST/LUNG: Clear upper lung fields, decreased bilat bases on auscultation.  No rales, rhonchi, wheezing, or rubs  HEART: Regular rate and rhythm; No murmurs, rubs, or gallops  ABDOMEN: BS(+),obese, Soft, Nontender  EXTREMITIES: 1+ edema BLE . 2+ Peripheral Pulses, No clubbing, cyanosis,   LYMPH: No lymphadenopathy noted  SKIN: warm, dry, acyanotic. Chronic skin changes BLE. Chronic skin  lesions  on arms        LABS:  CBC Full  -  ( 15 Mar 2025 06:17 )  WBC Count : 8.62 K/uL  RBC Count : 5.12 M/uL  Hemoglobin : 12.4 g/dL  Hematocrit : 40.9 %  Platelet Count - Automated : 322 K/uL  Mean Cell Volume : 79.9 fl  Mean Cell Hemoglobin : 24.2 pg  Mean Cell Hemoglobin Concentration : 30.3 g/dL  Auto Neutrophil # : x  Auto Lymphocyte # : x  Auto Monocyte # : x  Auto Eosinophil # : x  Auto Basophil # : x  Auto Neutrophil % : x  Auto Lymphocyte % : x  Auto Monocyte % : x  Auto Eosinophil % : x  Auto Basophil % : x    03-15    136  |  98  |  15  ----------------------------<  249[H]  4.1   |  31  |  1.19    Ca    9.8      15 Mar 2025 06:17  Phos  2.9     03-15  Mg     1.6     03-15    TPro  8.4[H]  /  Alb  3.1[L]  /  TBili  0.5  /  DBili  x   /  AST  25  /  ALT  35  /  AlkPhos  123[H]  03-15      Urinalysis Basic - ( 15 Mar 2025 06:17 )    Color: x / Appearance: x / SG: x / pH: x  Gluc: 249 mg/dL / Ketone: x  / Bili: x / Urobili: x   Blood: x / Protein: x / Nitrite: x   Leuk Esterase: x / RBC: x / WBC x   Sq Epi: x / Non Sq Epi: x / Bacteria: x      [  ]  DVT Prophylaxis  [  ]   Abnormal Nutritional Status -  Malnutrition    Morbid Obesity BMI >/=40  Diet, DASH/TLC:   Sodium & Cholesterol Restricted  Consistent Carbohydrate No Snacks (25 @ 10:52) [Active]      RADIOLOGY & ADDITIONAL STUDIES:  ***    < from: Xray Chest 1 View- PORTABLE-Urgent (Xray Chest 1 View- PORTABLE-Urgent .) (25 @ 10:22) >    Removal of endotracheal and nasogastric tubes. Improved aeration right   lung. Stable left basilar airspace disease.    IMPRESSION: Improved aeration.    < end of copied text >  < from: Xray Chest 1 View- PORTABLE-Urgent (Xray Chest 1 View- PORTABLE-Urgent .) (25 @ 00:44) >  IMPRESSION: Bibasilar airspace disease. Support tubes as above    < end of copied text >  < from: US Duplex Venous Lower Ext Complete, Bilateral (25 @ 11:26) >  IMPRESSION:  No evidence of deep venous thrombosis in either lower extremity.    < end of copied text >  < from: Xray Chest 1 View- PORTABLE-Urgent (03.10.25 @ 19:00) >  IMPRESSION:   No radiographic evidence of active chest disease..    < end of copied text >    Goals of Care Discussion with Family/Proxy/Other   - see note from 3/12/2025

## 2025-03-15 NOTE — DISCHARGE NOTE PROVIDER - CARE PROVIDER_API CALL
Loc Elmwood  Pulmonary Disease  [address] [place] [address]  Phone: [phone]  Fax: [phone]  Established Patient  Follow Up Time: 1 week    Ken John  Internal Medicine  [address] [facility], [address]  [place] [address]  Phone: [phone]  Fax: [phone]  Established Patient  Follow Up Time: 1 week    Nehemiah Ambrose  Endocrinology/Metab/Diabetes  [address] [facility], [address]  [place] [address]  Phone: [phone]  Fax: [phone]  Established Patient  Follow Up Time: 1 week   Loc Guido  Pulmonary Disease  [address] [place] [address]  Phone: [phone]  Fax: [phone]  Established Patient  Follow Up Time: 1 week    Ken John  Internal Medicine  [address] [facility], [address]  [place] [address]  Phone: [phone]  Fax: [phone]  Established Patient  Follow Up Time: 1 week    Nehemiah Ambrose  Endocrinology/Metab/Diabetes  [address] [facility], [address]  [place] [address]  Phone: [phone]  Fax: [phone]  Established Patient  Follow Up Time: 1 week    Andrés Hampton  Cardiovascular Disease  [address] [place] [address]  Phone: [phone]  Fax: [phone]  Follow Up Time: 1 week   Loc Guido  Pulmonary Disease  [address] [place] [address]  Phone: [phone]  Fax: [phone]  Established Patient  Follow Up Time: 1 week    Ken John  Internal Medicine  [address] [facility], [address]  [place] [address]  Phone: [phone]  Fax: [phone]  Established Patient  Follow Up Time: 1 week    Nehemiah Ambrose  Endocrinology/Metab/Diabetes  [address] [facility], [address]  [place] [address]  Phone: [phone]  Fax: [phone]  Established Patient  Follow Up Time: 1-3 days    Andrés Hampton  Cardiovascular Disease  [address] [place] [address]  Phone: [phone]  Fax: [phone]  Follow Up Time: 1 week    Alcira Bowman  Vascular Surgery  [address] [facility], [address]  [place] [address]  Phone: [phone]  Fax: [phone]  Follow Up Time: 1 week

## 2025-03-15 NOTE — DISCHARGE NOTE PROVIDER - NSDCMRMEDTOKEN_GEN_ALL_CORE_FT
albuterol 2.5 mg/3 mL (0.083%) inhalation solution: 3 milliliter(s) by nebulizer every 6 hours as needed for  shortness of breath and/or wheezing  amLODIPine 10 mg oral tablet: 1 tab(s) orally once a day  atorvastatin 40 mg oral tablet: 1 tab(s) orally once a day (at bedtime)  gabapentin 400 mg oral capsule: 1 cap(s) orally 2 times a day  Levemir 100 units/mL subcutaneous solution: 45 unit(s) subcutaneous once a day (at bedtime)  levothyroxine 112 mcg (0.112 mg) oral tablet: 1 tab(s) orally once a day  losartan 25 mg oral tablet: 1 tab(s) orally once a day (in the morning)  metFORMIN 1000 mg oral tablet: 1 tab(s) orally every 12 hours  montelukast 10 mg oral tablet: 1 tab(s) orally once a day  NovoLOG 100 units/mL injectable solution: 10 unit(s) injectable 3 times a day  OXcarbazepine 600 mg oral tablet: 1 tab(s) orally 2 times a day  Ozempic 4 mg/3 mL (1 mg dose) subcutaneous solution: 1 milligram(s) subcutaneously 0.5mg Every sunday  Symbicort 160 mcg-4.5 mcg/inh inhalation aerosol: 2 puff(s) inhaled 2 times a day   albuterol 2.5 mg/3 mL (0.083%) inhalation solution: 3 milliliter(s) by nebulizer every 6 hours as needed for  shortness of breath and/or wheezing  amLODIPine 10 mg oral tablet: 1 tab(s) orally once a day  amoxicillin-clavulanate 875 mg-125 mg oral tablet: 1 tab(s) orally 2 times a day Thru March 23  aspirin 81 mg oral tablet, chewable: 1 tab(s) orally once a day  atorvastatin 40 mg oral tablet: 1 tab(s) orally once a day (at bedtime)  cetirizine 10 mg oral tablet: 1 tab(s) orally once a day  fluticasone 50 mcg/inh nasal spray: 1 spray(s) nasal 2 times a day  gabapentin 400 mg oral capsule: 1 cap(s) orally 2 times a day  hydrocortisone 1% topical ointment: Apply topically to affected area every 12 hours 1 Apply topically to affected area every 12 hours  Levemir 100 units/mL subcutaneous solution: 45 unit(s) subcutaneous once a day (at bedtime)  levothyroxine 112 mcg (0.112 mg) oral tablet: 1 tab(s) orally once a day  losartan 25 mg oral tablet: 1 tab(s) orally once a day (in the morning)  metFORMIN 1000 mg oral tablet: 1 tab(s) orally every 12 hours  montelukast 10 mg oral tablet: 1 tab(s) orally once a day  NovoLOG 100 units/mL injectable solution: 10 unit(s) injectable 3 times a day  OXcarbazepine 600 mg oral tablet: 1 tab(s) orally 2 times a day  Ozempic 4 mg/3 mL (1 mg dose) subcutaneous solution: 1 milligram(s) subcutaneously 0.5mg Every sunday  pantoprazole 40 mg oral delayed release tablet: 1 tab(s) orally once a day (before a meal)  polyethylene glycol 3350 oral powder for reconstitution: 17 gram(s) orally once a day as needed for  constipation  senna leaf extract oral tablet: 2 tab(s) orally once a day (at bedtime) as needed for  constipation  sodium chloride 0.65% nasal spray: 1 spray(s) nasal 4 times a day as needed for  dry nasal passages  Symbicort 160 mcg-4.5 mcg/inh inhalation aerosol: 2 puff(s) inhaled 2 times a day   albuterol 2.5 mg/3 mL (0.083%) inhalation solution: 3 milliliter(s) by nebulizer every 6 hours as needed for  shortness of breath and/or wheezing  amLODIPine 10 mg oral tablet: 1 tab(s) orally once a day  amoxicillin-clavulanate 875 mg-125 mg oral tablet: 1 tab(s) orally 2 times a day Thru March 23  aspirin 81 mg oral tablet, chewable: 1 tab(s) orally once a day  atorvastatin 40 mg oral tablet: 1 tab(s) orally once a day (at bedtime)  cetirizine 10 mg oral tablet: 1 tab(s) orally once a day  dexAMETHasone 1.5 mg oral tablet: 1 tab(s) orally 2 times a day Taper as follows: Take 1 tablet (1.5mg) orally twice daily on March 18. Then half tablet (0.75mg) once orally on March 19. Then stop.  fluticasone 50 mcg/inh nasal spray: 1 spray(s) nasal 2 times a day  gabapentin 400 mg oral capsule: 1 cap(s) orally 2 times a day DO NOT RESUME UNTIL APPROVED BY YOUR DOCTOR.  hydrocortisone 1% topical ointment: Apply topically to affected area every 12 hours 1 Apply topically to affected area every 12 hours  insulin glargine 100 units/mL subcutaneous solution: 65 unit(s) subcutaneous once a day (at bedtime)  levothyroxine 112 mcg (0.112 mg) oral tablet: 1 tab(s) orally once a day  losartan 25 mg oral tablet: 1 tab(s) orally once a day (in the morning)  metFORMIN 1000 mg oral tablet: 1 tab(s) orally every 12 hours  montelukast 10 mg oral tablet: 1 tab(s) orally once a day  OXcarbazepine 600 mg oral tablet: 1 tab(s) orally 2 times a day  Ozempic 4 mg/3 mL (1 mg dose) subcutaneous solution: 1 milligram(s) subcutaneously 0.5mg Every sunday  pantoprazole 40 mg oral delayed release tablet: 1 tab(s) orally once a day (before a meal)  polyethylene glycol 3350 oral powder for reconstitution: 17 gram(s) orally once a day as needed for  constipation  senna leaf extract oral tablet: 2 tab(s) orally once a day (at bedtime) as needed for  constipation  sodium chloride 0.65% nasal spray: 1 spray(s) nasal 4 times a day as needed for  dry nasal passages  Symbicort 160 mcg-4.5 mcg/inh inhalation aerosol: 2 puff(s) inhaled 2 times a day   Admelog SoloStar 100 units/mL injectable solution: 18 unit(s) injectable 3 times a day (before meals) Administer 5 to 15 minutes before meals. MDD: 54 units  albuterol 2.5 mg/3 mL (0.083%) inhalation solution: 3 milliliter(s) by nebulizer every 6 hours as needed for  shortness of breath and/or wheezing  amLODIPine 10 mg oral tablet: 1 tab(s) orally once a day  amoxicillin-clavulanate 875 mg-125 mg oral tablet: 1 tab(s) orally 2 times a day Thru March 23  aspirin 81 mg oral tablet, chewable: 1 tab(s) orally once a day  atorvastatin 40 mg oral tablet: 1 tab(s) orally once a day (at bedtime)  Basaglar KwikPen 100 units/mL subcutaneous solution: 65 unit(s) subcutaneous once a day (at bedtime) MDD: 65 units  cetirizine 10 mg oral tablet: 1 tab(s) orally once a day  dexAMETHasone 1.5 mg oral tablet: 1 tab(s) orally 2 times a day Taper as follows: Take 1 tablet (1.5mg) orally twice daily on March 18. Then half tablet (0.75mg) once orally on March 19. Then stop.  fluticasone 50 mcg/inh nasal spray: 1 spray(s) nasal 2 times a day  gabapentin 400 mg oral capsule: 1 cap(s) orally 2 times a day DO NOT RESUME UNTIL APPROVED BY YOUR DOCTOR.  hydrocortisone 1% topical ointment: Apply topically to affected area every 12 hours 1 Apply topically to affected area every 12 hours  levothyroxine 112 mcg (0.112 mg) oral tablet: 1 tab(s) orally once a day  losartan 25 mg oral tablet: 1 tab(s) orally once a day (in the morning)  metFORMIN 1000 mg oral tablet: 1 tab(s) orally every 12 hours  montelukast 10 mg oral tablet: 1 tab(s) orally once a day  OXcarbazepine 600 mg oral tablet: 1 tab(s) orally 2 times a day  Ozempic 8 mg/3 mL (2 mg dose) subcutaneous solution: 2 milligram(s) subcutaneously once a week Every Sunday  pantoprazole 40 mg oral delayed release tablet: 1 tab(s) orally once a day (before a meal)  polyethylene glycol 3350 oral powder for reconstitution: 17 gram(s) orally once a day as needed for  constipation  senna leaf extract oral tablet: 2 tab(s) orally once a day (at bedtime) as needed for  constipation  sodium chloride 0.65% nasal spray: 1 spray(s) nasal 4 times a day as needed for  dry nasal passages  Symbicort 160 mcg-4.5 mcg/inh inhalation aerosol: 2 puff(s) inhaled 2 times a day

## 2025-03-15 NOTE — PROGRESS NOTE ADULT - NS ATTEND AMEND GEN_ALL_CORE FT
Problem/Plan - 1:  ·  Problem: Acute on chronic respiratory failure with hypoxia and hypercapnia.   ·  Plan: 2/2 class 3/4 obesity with alveolar hypoventilation. BMI 51.5  PCO2 greater than 125. History of multiple intubations in past  s/p intubation . Self -extubated. Pt did not tolerate BIPAP. Hypercapnia did not improve on BIPAP.   Hypercapnia improved on AVAPS, PCO2 decreased to 50.   Patient would benefit from AVAPS-AE at home to control hypoventilation and hypercapnia. Thoracic expansion is limited secondary to severe central obesity and multilevel spinal stenosis and scoliosis. Patient requires that tidal volume that AVAPS-AE delivers for adequate gas exchange. Without AVAPS-AE patient is at a high risk of readmission and possible death from respiratory failure. BIPAP does not have capability to deliver tidal volume and patient cannot tolerate extremely high settings on a BIPAP.  Continue AVAPS nightly and as needed during the day.  Oxygen supplementation 3LPM when not using AVAPS.  Monitor oxygenation   Continue duonebs q6h RTC  Continue fluticasone proprionate/salmeterol 250-50 BID.     Problem/Plan - 2:  ·  Problem: Uncontrolled type 2 diabetes mellitus with hyperglycemia.   ·  Plan: A1C 13  POCT glucoses remain uncontrolled (250-330)  continue lantus, increase by 20% to 50units qHS.   continue premeal Admelog TID, increase by 20% to 12 units TID  Continue glucose monitoring ac/HS and cover with mod Admelog s/s  Endo Dr. Catracho saul.     Problem/Plan - 3:  ·  Problem: Class 3 obesity with alveolar hypoventilation, serious comorbidity, and body mass index (BMI) of 50.0 to 59.9 in adult.   ·  Plan: Central obesity, Mallampati 4  PCO2 greater than 125 requiring intubation.  Patient has been intubated several times in the past.  Unable to tolerate BIPAP with settings of 20/16  Tolerating AVAPS nightly.  Thoracic expansion is limited secondary to severe central obesity and multilevel spinal stenosis and scoliosis. Patient requires that tidal volume that AVAPS-AE delivers for adequate gas exchange. Without AVAPS-AE patient is at a high risk of readmission and possible death from respiratory failure. BIPAP does not have capability to deliver tidal volume and patient cannot tolerate extremely high settings on a BIPAP.     Problem/Plan - 4:  ·  Problem: Central obesity.   ·  Plan: Severe abdominal obesity with known alveolar hypoventilation.  PCO2 greater than 125 upon admission which required patient to be intubated and started on mechanical ventilation.  Patient unable to tolerate very high setting on BIPAP 20/16  Started on AVAPS therapy in hospital. Tolerating better. PCO2 on AVAPS 50  Will need AVAPS-AE when discharged. Patient need Tidal volume that AVAPS-AE delivers to help control PCO2 and to provide adequate gas exchange gas exchange.  [facility]     Problem/Plan - 5:  ·  Problem: Kyphosis.   ·  Plan: Noted Kyphosis of spine which is diminishing her thoracic expansion which is contributing to her hypercapnia.  Continue AVAPS nightly and as needed during the day.  [facility]APS-AE ordered from [facility].     Problem/Plan - 6:  ·  Problem: Asthma.   ·  Plan: Not in exacerbation.   Continue bronchodilator, and ICS  AVAPS nightly and as needed during the day.   Oxygen supplementation when off AVAPS  Pulm Dr. James saul inpt  Follows with Dr. Elizalde as an outpatient.     Problem/Plan - 7:  ·  Problem: ACS (acute coronary syndrome).   ·  Plan: Atypical chest pain , chronic SOB, multifactorial iso of COPD, morbid obesity, fibromyalgia  Elevated troponins, became positive after unresponsive event due to acute on chronic hypercapneic respiratory failure   - EKG showed sinus rhythm with non-specific T wave abnormality in I and aVL  - Troponins were negative x 2 on admission then increased in setting of acute hypercapneic event requiring intubation  - CCTA 12/2022 showed Ca score of 1069.2 with no significant obstructive CAD. Pharm nuclear stress 3/2024 showed normal myocardial perfusion  - Continue ASA 81 and atorvastatin 40mg daily for non-obstructive CAD   - RLE > LLE edema. LE duplex negative for DVT.  - TTE showed normal LV and RV function without significant valvular disease, however this was done PRIOR to her unresponsive/hypercapneic event. Repeat limited TTE with Definity to r/o new WMA. Will consider further testing pending repeat limited TTE  Card Dr. Hampton following.      spoke with daughter Kimmy at bedside and  Problem/Plan - 1:  ·  Problem: Acute on chronic respiratory failure with hypoxia and hypercapnia.   ·  Plan: 2/2 class 3/4 obesity with alveolar hypoventilation. BMI 51.5  PCO2 greater than 125. History of multiple intubations in past  s/p intubation . Self -extubated. Pt did not tolerate BIPAP. Hypercapnia did not improve on BIPAP.   Hypercapnia improved on AVAPS, PCO2 decreased to 50.   Patient would benefit from AVAPS-AE at home to control hypoventilation and hypercapnia. Thoracic expansion is limited secondary to severe central obesity and multilevel spinal stenosis and scoliosis. Patient requires that tidal volume that AVAPS-AE delivers for adequate gas exchange. Without AVAPS-AE patient is at a high risk of readmission and possible death from respiratory failure. BIPAP does not have capability to deliver tidal volume and patient cannot tolerate extremely high settings on a BIPAP.  Continue AVAPS nightly and as needed during the day.  Oxygen supplementation 3LPM when not using AVAPS.  Monitor oxygenation   Continue duonebs q6h RTC  Continue fluticasone proprionate/salmeterol 250-50 BID.     Problem/Plan - 2:  ·  Problem: Uncontrolled type 2 diabetes mellitus with hyperglycemia.   ·  Plan: A1C 13  POCT glucoses remain uncontrolled (250-330)  continue lantus, increase by 20% to 50units qHS.   continue premeal Admelog TID, increase by 20% to 12 units TID  Continue glucose monitoring ac/HS and cover with mod Admelog s/s  Endo Dr. Catracho saul.     Problem/Plan - 3:  ·  Problem: Class 3 obesity with alveolar hypoventilation, serious comorbidity, and body mass index (BMI) of 50.0 to 59.9 in adult.   ·  Plan: Central obesity, Mallampati 4  PCO2 greater than 125 requiring intubation.  Patient has been intubated several times in the past.  Unable to tolerate BIPAP with settings of 20/16  Tolerating AVAPS nightly.  Thoracic expansion is limited secondary to severe central obesity and multilevel spinal stenosis and scoliosis. Patient requires that tidal volume that AVAPS-AE delivers for adequate gas exchange. Without AVAPS-AE patient is at a high risk of readmission and possible death from respiratory failure. BIPAP does not have capability to deliver tidal volume and patient cannot tolerate extremely high settings on a BIPAP.     Problem/Plan - 4:  ·  Problem: Central obesity.   ·  Plan: Severe abdominal obesity with known alveolar hypoventilation.  PCO2 greater than 125 upon admission which required patient to be intubated and started on mechanical ventilation.  Patient unable to tolerate very high setting on BIPAP 20/16  Started on AVAPS therapy in hospital. Tolerating better. PCO2 on AVAPS 50  Will need AVAPS-AE when discharged. Patient need Tidal volume that AVAPS-AE delivers to help control PCO2 and to provide adequate gas exchange gas exchange.  [facility]     Problem/Plan - 5:  ·  Problem: Kyphosis.   ·  Plan: Noted Kyphosis of spine which is diminishing her thoracic expansion which is contributing to her hypercapnia.  Continue AVAPS nightly and as needed during the day.  [facility]AE ordered from [facility].     Problem/Plan - 6:  ·  Problem: Asthma.   ·  Plan: Not in exacerbation.   Continue bronchodilator, and ICS  AVAPS nightly and as needed during the day.   Oxygen supplementation when off AVAPS  Pulm Dr. Ramirez following inpt  Follows with Dr. Elizalde as an outpatient.     Problem/Plan - 7:  ·  Problem: ACS (acute coronary syndrome).   ·  Plan: Atypical chest pain , chronic SOB, multifactorial iso of COPD, morbid obesity, fibromyalgia  Elevated troponins, became positive after unresponsive event due to acute on chronic hypercapneic respiratory failure   - EKG showed sinus rhythm with non-specific T wave abnormality in I and aVL  - Troponins were negative x 2 on admission then increased in setting of acute hypercapneic event requiring intubation  - CCTA 12/2022 showed Ca score of 1069.2 with no significant obstructive CAD. Pharm nuclear stress 3/2024 showed normal myocardial perfusion  - Continue ASA 81 and atorvastatin 40mg daily for non-obstructive CAD   - RLE > LLE edema. LE duplex negative for DVT.  - TTE showed normal LV and RV function without significant valvular disease, however this was done PRIOR to her unresponsive/hypercapneic event. Repeat limited TTE with Definity to r/o new WMA. Will consider further testing pending repeat limited TTE  Card Dr. Hampton following.

## 2025-03-15 NOTE — DISCHARGE NOTE PROVIDER - DETAILS OF MALNUTRITION DIAGNOSIS/DIAGNOSES
This patient has been assessed with a concern for Malnutrition and was treated during this hospitalization for the following Nutrition diagnosis/diagnoses:     -  03/13/2025: Morbid obesity (BMI > 40)

## 2025-03-15 NOTE — PROGRESS NOTE ADULT - PROBLEM SELECTOR PLAN 2
A1C 13  POCT glucoses remain uncontrolled (250-330)  continue lantus, increase by 20% to 50units qHS.   continue premeal Admelog TID, increase by 20% to 12 units TID  Continue glucose monitoring ac/HS and cover with mod Admelog s/s  Endo Dr. Hayden following

## 2025-03-16 DIAGNOSIS — J32.9 CHRONIC SINUSITIS, UNSPECIFIED: ICD-10-CM

## 2025-03-16 LAB
ANION GAP SERPL CALC-SCNC: 5 MMOL/L — SIGNIFICANT CHANGE UP (ref 5–17)
BUN SERPL-MCNC: 15 MG/DL — SIGNIFICANT CHANGE UP (ref 7–18)
CALCIUM SERPL-MCNC: 10.1 MG/DL — SIGNIFICANT CHANGE UP (ref 8.4–10.5)
CHLORIDE SERPL-SCNC: 98 MMOL/L — SIGNIFICANT CHANGE UP (ref 96–108)
CO2 SERPL-SCNC: 31 MMOL/L — SIGNIFICANT CHANGE UP (ref 22–31)
CREAT SERPL-MCNC: 1.07 MG/DL — SIGNIFICANT CHANGE UP (ref 0.5–1.3)
EGFR: 60 ML/MIN/1.73M2 — SIGNIFICANT CHANGE UP
EGFR: 60 ML/MIN/1.73M2 — SIGNIFICANT CHANGE UP
FLUAV AG NPH QL: SIGNIFICANT CHANGE UP
FLUBV AG NPH QL: SIGNIFICANT CHANGE UP
GLUCOSE SERPL-MCNC: 254 MG/DL — HIGH (ref 70–99)
POTASSIUM SERPL-MCNC: 4 MMOL/L — SIGNIFICANT CHANGE UP (ref 3.5–5.3)
POTASSIUM SERPL-SCNC: 4 MMOL/L — SIGNIFICANT CHANGE UP (ref 3.5–5.3)
RSV RNA NPH QL NAA+NON-PROBE: SIGNIFICANT CHANGE UP
SARS-COV-2 RNA SPEC QL NAA+PROBE: SIGNIFICANT CHANGE UP
SODIUM SERPL-SCNC: 134 MMOL/L — LOW (ref 135–145)

## 2025-03-16 PROCEDURE — 99232 SBSQ HOSP IP/OBS MODERATE 35: CPT

## 2025-03-16 RX ORDER — LOSARTAN POTASSIUM 100 MG/1
25 TABLET, FILM COATED ORAL DAILY
Refills: 0 | Status: DISCONTINUED | OUTPATIENT
Start: 2025-03-16 | End: 2025-03-18

## 2025-03-16 RX ORDER — DEXAMETHASONE 0.5 MG/1
1.5 TABLET ORAL
Refills: 0 | Status: DISCONTINUED | OUTPATIENT
Start: 2025-03-18 | End: 2025-03-18

## 2025-03-16 RX ORDER — DEXAMETHASONE 0.5 MG/1
4 TABLET ORAL
Refills: 0 | Status: COMPLETED | OUTPATIENT
Start: 2025-03-16 | End: 2025-03-17

## 2025-03-16 RX ORDER — AMOXICILLIN AND CLAVULANATE POTASSIUM 500; 125 MG/1; MG/1
1 TABLET, FILM COATED ORAL
Refills: 0 | Status: DISCONTINUED | OUTPATIENT
Start: 2025-03-16 | End: 2025-03-18

## 2025-03-16 RX ORDER — AMLODIPINE BESYLATE 10 MG/1
5 TABLET ORAL DAILY
Refills: 0 | Status: DISCONTINUED | OUTPATIENT
Start: 2025-03-16 | End: 2025-03-18

## 2025-03-16 RX ORDER — DEXTROMETHORPHAN HBR, GUAIFENESIN 200 MG/10ML
200 LIQUID ORAL EVERY 6 HOURS
Refills: 0 | Status: DISCONTINUED | OUTPATIENT
Start: 2025-03-16 | End: 2025-03-18

## 2025-03-16 RX ORDER — ACETAMINOPHEN 500 MG/5ML
1000 LIQUID (ML) ORAL ONCE
Refills: 0 | Status: COMPLETED | OUTPATIENT
Start: 2025-03-16 | End: 2025-03-16

## 2025-03-16 RX ORDER — DEXAMETHASONE 0.5 MG/1
0.75 TABLET ORAL DAILY
Refills: 0 | Status: DISCONTINUED | OUTPATIENT
Start: 2025-03-19 | End: 2025-03-18

## 2025-03-16 RX ORDER — PHENYLEPHRINE HCL 1 %
2 SPRAY, NON-AEROSOL (ML) NASAL EVERY 4 HOURS
Refills: 0 | Status: DISCONTINUED | OUTPATIENT
Start: 2025-03-16 | End: 2025-03-16

## 2025-03-16 RX ORDER — DEXAMETHASONE 0.5 MG/1
3 TABLET ORAL
Refills: 0 | Status: COMPLETED | OUTPATIENT
Start: 2025-03-17 | End: 2025-03-17

## 2025-03-16 RX ADMIN — OXCARBAZEPINE 600 MILLIGRAM(S): 150 TABLET, FILM COATED ORAL at 17:02

## 2025-03-16 RX ADMIN — INSULIN GLARGINE-YFGN 50 UNIT(S): 100 INJECTION, SOLUTION SUBCUTANEOUS at 21:54

## 2025-03-16 RX ADMIN — FLUTICASONE PROPIONATE 1 SPRAY(S): 50 SPRAY, METERED NASAL at 17:03

## 2025-03-16 RX ADMIN — INSULIN LISPRO 12 UNIT(S): 100 INJECTION, SOLUTION INTRAVENOUS; SUBCUTANEOUS at 17:01

## 2025-03-16 RX ADMIN — INSULIN LISPRO 6: 100 INJECTION, SOLUTION INTRAVENOUS; SUBCUTANEOUS at 21:53

## 2025-03-16 RX ADMIN — Medication 1000 MILLIGRAM(S): at 12:20

## 2025-03-16 RX ADMIN — OXCARBAZEPINE 600 MILLIGRAM(S): 150 TABLET, FILM COATED ORAL at 06:19

## 2025-03-16 RX ADMIN — DEXAMETHASONE 4 MILLIGRAM(S): 0.5 TABLET ORAL at 17:02

## 2025-03-16 RX ADMIN — INSULIN LISPRO 12 UNIT(S): 100 INJECTION, SOLUTION INTRAVENOUS; SUBCUTANEOUS at 12:06

## 2025-03-16 RX ADMIN — AMLODIPINE BESYLATE 5 MILLIGRAM(S): 10 TABLET ORAL at 08:18

## 2025-03-16 RX ADMIN — INSULIN LISPRO 6: 100 INJECTION, SOLUTION INTRAVENOUS; SUBCUTANEOUS at 08:15

## 2025-03-16 RX ADMIN — Medication 81 MILLIGRAM(S): at 12:07

## 2025-03-16 RX ADMIN — Medication 112 MICROGRAM(S): at 04:58

## 2025-03-16 RX ADMIN — POLYETHYLENE GLYCOL 3350 17 GRAM(S): 17 POWDER, FOR SOLUTION ORAL at 12:08

## 2025-03-16 RX ADMIN — HYDROCORTISONE 1 APPLICATION(S): 10 CREAM TOPICAL at 17:03

## 2025-03-16 RX ADMIN — MONTELUKAST SODIUM 10 MILLIGRAM(S): 10 TABLET ORAL at 12:07

## 2025-03-16 RX ADMIN — Medication 10 MILLIGRAM(S): at 12:09

## 2025-03-16 RX ADMIN — Medication 2 TABLET(S): at 21:54

## 2025-03-16 RX ADMIN — ENOXAPARIN SODIUM 40 MILLIGRAM(S): 100 INJECTION SUBCUTANEOUS at 05:04

## 2025-03-16 RX ADMIN — LOSARTAN POTASSIUM 25 MILLIGRAM(S): 100 TABLET, FILM COATED ORAL at 08:18

## 2025-03-16 RX ADMIN — FLUTICASONE PROPIONATE 1 SPRAY(S): 50 SPRAY, METERED NASAL at 05:00

## 2025-03-16 RX ADMIN — Medication 1 APPLICATION(S): at 05:00

## 2025-03-16 RX ADMIN — INSULIN LISPRO 12 UNIT(S): 100 INJECTION, SOLUTION INTRAVENOUS; SUBCUTANEOUS at 08:16

## 2025-03-16 RX ADMIN — AMOXICILLIN AND CLAVULANATE POTASSIUM 1 TABLET(S): 500; 125 TABLET, FILM COATED ORAL at 12:52

## 2025-03-16 RX ADMIN — ENOXAPARIN SODIUM 40 MILLIGRAM(S): 100 INJECTION SUBCUTANEOUS at 17:02

## 2025-03-16 RX ADMIN — Medication 2.5 MILLIGRAM(S): at 03:15

## 2025-03-16 RX ADMIN — Medication 400 MILLIGRAM(S): at 12:05

## 2025-03-16 RX ADMIN — Medication 2.5 MILLIGRAM(S): at 14:52

## 2025-03-16 RX ADMIN — DEXTROMETHORPHAN HBR, GUAIFENESIN 200 MILLIGRAM(S): 200 LIQUID ORAL at 12:09

## 2025-03-16 RX ADMIN — INSULIN LISPRO 4: 100 INJECTION, SOLUTION INTRAVENOUS; SUBCUTANEOUS at 17:00

## 2025-03-16 RX ADMIN — Medication 1 DOSE(S): at 21:55

## 2025-03-16 RX ADMIN — INSULIN LISPRO 8: 100 INJECTION, SOLUTION INTRAVENOUS; SUBCUTANEOUS at 12:06

## 2025-03-16 RX ADMIN — DEXTROMETHORPHAN HBR, GUAIFENESIN 200 MILLIGRAM(S): 200 LIQUID ORAL at 17:04

## 2025-03-16 RX ADMIN — HYDROCORTISONE 1 APPLICATION(S): 10 CREAM TOPICAL at 05:01

## 2025-03-16 RX ADMIN — Medication 10 MILLIGRAM(S): at 01:15

## 2025-03-16 RX ADMIN — Medication 1 DOSE(S): at 12:06

## 2025-03-16 RX ADMIN — DEXAMETHASONE 4 MILLIGRAM(S): 0.5 TABLET ORAL at 13:16

## 2025-03-16 RX ADMIN — Medication 2.5 MILLIGRAM(S): at 09:09

## 2025-03-16 RX ADMIN — Medication 40 MILLIGRAM(S): at 06:19

## 2025-03-16 RX ADMIN — AMOXICILLIN AND CLAVULANATE POTASSIUM 1 TABLET(S): 500; 125 TABLET, FILM COATED ORAL at 17:02

## 2025-03-16 RX ADMIN — Medication 2.5 MILLIGRAM(S): at 20:27

## 2025-03-16 RX ADMIN — ATORVASTATIN CALCIUM 40 MILLIGRAM(S): 80 TABLET, FILM COATED ORAL at 21:54

## 2025-03-16 NOTE — PROGRESS NOTE ADULT - ASSESSMENT
59-year-old female, AAOx3, ambulates with a cane, no HHA,  history of DM, HLD, asthma, obesity hypoventilation syndrome on 4L NC, migraines, hypothyroidism, NAKUL, RCC status post left renal resection 11/23 presented to the hospital 3/11 with intermittent palpitations and chest pain that started a week prior to admission. An RRT was activated for unresponsiveness and AHHRF for which she was intubated and admitted to the ICU, She self-extubated on 3/12 and was treated prophylactically with racemic epi and dexamethasone, placed on BiPAP and subsequently transitioned to nasal cannula. While napping, she was noted to develop hypoxia to 80% which resolved upon awakening the patient. She repeatedly declines to use BiPAP and is able to verbalize her awareness of the risks to her health/life by not wearing it prn and at night. She has also stated that she does not want to be re-intubated. Nocturnal AVAPS has been recommended, encouraged and initiated. She toelrated AVAPS overnight 3/12-3/13 with improvement in her pCO2. In the AM 3/13, she was transitioned back to nasal cannula. She continues to receive Advair inhaler and duonebs q6.     Her hospital course has also been notable for TWI in anterior leads with associated troponemia with peaked at 2340 and downtrended. Cardiologist Memo was consulted and recommended continuation of ASA and atorvastatin. Repeat TTE 3/11/25 showed normal LV function, EF 54%. Anti-HTN are held at present and she has remained normotensive; will be restarted as appropriate. She has lower extremity edema which she reports being present for the past 7 months; possibly related to the amlodipine. Endocrinology has also been consulted for recommendations regarding uncontrolled hyperglycemia/diabetes with A1c 13%. She was initiated on Lantus 38 units qHS with mod ISS; regimen adjusted to Lantus 42 units qHS, 10 units pre-meal lispro and moderate ISS as her diet has been resumed. She is being transferred to  for ongoing management. Her Bustamante catheter was removed on 3/13.     Patient was downgraded from ICU to SCU on 3/15 for further management. Currently on 3L NC and AVAPS at night. Patient reports not using AVAPS because of nasal congestion and facial pain to left side (Hx of recurrent sinusitis per patient).   Started on Augmentin 875 mg BID for 7 days. Decadron short course. BP elevated. Home meds Norvasc and Losartan started. BG remains elevated. Endo following.

## 2025-03-16 NOTE — PROGRESS NOTE ADULT - PROBLEM SELECTOR PLAN 5
Severe abdominal obesity with known alveolar hypoventilation.  PCO2 greater than 125 upon admission which required patient to be intubated and started on mechanical ventilation.  Patient unable to tolerate very high setting on BIPAP 20/16  Started on AVAPS therapy in hospital. Tolerating better. PCO2 on AVAPS 50  Will need AVAPS-AE when discharged. Patient need Tidal volume that AVAPS-AE delivers to help control PCO2 and to provide adequate gas exchange gas exchange.  Order sent to [facility] surgical.

## 2025-03-16 NOTE — PROGRESS NOTE ADULT - PROBLEM SELECTOR PLAN 2
C/o nasal congestion and  left facial pain/mild swelling  (Hx of recurrent sinusitis per patient).   Started on Augmentin 875 mg BID for 7 days.   Decadron 4 mg with short taper course (Total of 4 days).   C/w Nasal spray saline  C/w Zyrtec  C/w Flonase   Started on Phenylephrine nasal spray PRN   Tylenol PRN for pain.   Monitor for fever/leukocytosis>>if pain/swelling  worsens may need CT to eval the paranasal sinus cavities.

## 2025-03-16 NOTE — PROGRESS NOTE ADULT - SUBJECTIVE AND OBJECTIVE BOX
NOEMY CONTRERSA    SCU progress note    INTERVAL HPI/OVERNIGHT EVENTS: ***    DNR [ ]   DNI  [  ]    Covid - 19 PCR:     The 4Ms    What Matters Most: see GOC  Age appropriate Medications/Screen for High Risk Medication: Yes  Mentation: see CAM below  Mobility: defer to physical exam    The Confusion Assessment Method (CAM) Diagnostic Algorithm     1: Acute Onset or Fluctuating Course  - Is there evidence of an acute change in mental status from the patient’s baseline? Did the (abnormal) behavior  fluctuate during the day, that is, tend to come and go, or increase and decrease in severity?  [ ] YES [ ] NO     2: Inattention  - Did the patient have difficulty focusing attention, being easily distractible, or having difficulty keeping track of what was being said?  [ ] YES [ ] NO     3: Disorganized thinking  -Was the patient’s thinking disorganized or incoherent, such as rambling or irrelevant conversation, unclear or illogical flow of ideas, or unpredictable switching from subject to subject?  [ ] YES [ ] NO    4: Altered Level of consciousness?  [ ] YES [ ] NO    The diagnosis of delirium by CAM requires the presence of features 1 and 2 and either 3 or 4.    PRESSORS: [ ] YES [ ] NO    Cardiovascular:  Heart Failure  Acute   Acute on Chronic  Chronic         Pulmonary:  albuterol    0.083% 2.5 milliGRAM(s) Nebulizer every 6 hours  albuterol    90 MICROgram(s) HFA Inhaler 1 Puff(s) Inhalation every 4 hours PRN  cetirizine 10 milliGRAM(s) Oral daily  fluticasone propionate/ salmeterol 250-50 MICROgram(s) Diskus 1 Dose(s) Inhalation two times a day  guaiFENesin Oral Liquid (Sugar-Free) 200 milliGRAM(s) Oral every 6 hours PRN  montelukast 10 milliGRAM(s) Oral daily    Hematalogic:  aspirin  chewable 81 milliGRAM(s) Oral daily  enoxaparin Injectable 40 milliGRAM(s) SubCutaneous every 12 hours    Other:  atorvastatin 40 milliGRAM(s) Oral at bedtime  chlorhexidine 2% Cloths 1 Application(s) Topical <User Schedule>  diphenhydrAMINE 25 milliGRAM(s) Oral every 6 hours PRN  fluticasone propionate 50 MICROgram(s)/spray Nasal Spray 1 Spray(s) Both Nostrils two times a day  hydrocortisone 1% Ointment 1 Application(s) Topical every 12 hours  insulin glargine Injectable (LANTUS) 50 Unit(s) SubCutaneous at bedtime  insulin lispro (ADMELOG) corrective regimen sliding scale   SubCutaneous three times a day before meals  insulin lispro (ADMELOG) corrective regimen sliding scale   SubCutaneous at bedtime  insulin lispro Injectable (ADMELOG) 12 Unit(s) SubCutaneous three times a day before meals  levothyroxine 112 MICROGram(s) Oral daily  OXcarbazepine 600 milliGRAM(s) Oral two times a day  pantoprazole    Tablet 40 milliGRAM(s) Oral before breakfast  polyethylene glycol 3350 17 Gram(s) Oral daily  senna 2 Tablet(s) Oral at bedtime  sodium chloride 0.65% Nasal 1 Spray(s) Both Nostrils three times a day PRN    albuterol    0.083% 2.5 milliGRAM(s) Nebulizer every 6 hours  albuterol    90 MICROgram(s) HFA Inhaler 1 Puff(s) Inhalation every 4 hours PRN  aspirin  chewable 81 milliGRAM(s) Oral daily  atorvastatin 40 milliGRAM(s) Oral at bedtime  cetirizine 10 milliGRAM(s) Oral daily  chlorhexidine 2% Cloths 1 Application(s) Topical <User Schedule>  diphenhydrAMINE 25 milliGRAM(s) Oral every 6 hours PRN  enoxaparin Injectable 40 milliGRAM(s) SubCutaneous every 12 hours  fluticasone propionate 50 MICROgram(s)/spray Nasal Spray 1 Spray(s) Both Nostrils two times a day  fluticasone propionate/ salmeterol 250-50 MICROgram(s) Diskus 1 Dose(s) Inhalation two times a day  guaiFENesin Oral Liquid (Sugar-Free) 200 milliGRAM(s) Oral every 6 hours PRN  hydrocortisone 1% Ointment 1 Application(s) Topical every 12 hours  insulin glargine Injectable (LANTUS) 50 Unit(s) SubCutaneous at bedtime  insulin lispro (ADMELOG) corrective regimen sliding scale   SubCutaneous three times a day before meals  insulin lispro (ADMELOG) corrective regimen sliding scale   SubCutaneous at bedtime  insulin lispro Injectable (ADMELOG) 12 Unit(s) SubCutaneous three times a day before meals  levothyroxine 112 MICROGram(s) Oral daily  montelukast 10 milliGRAM(s) Oral daily  OXcarbazepine 600 milliGRAM(s) Oral two times a day  pantoprazole    Tablet 40 milliGRAM(s) Oral before breakfast  polyethylene glycol 3350 17 Gram(s) Oral daily  senna 2 Tablet(s) Oral at bedtime  sodium chloride 0.65% Nasal 1 Spray(s) Both Nostrils three times a day PRN    Drug Dosing Weight  Height (cm): 162.6 (12 Mar 2025 00:00)  Weight (kg): 136.1 (12 Mar 2025 00:00)  BMI (kg/m2): 51.5 (12 Mar 2025 00:00)  BSA (m2): 2.32 (12 Mar 2025 00:00)    CENTRAL LINE: [ ] YES [ ] NO  LOCATION:   DATE INSERTED:  REMOVE: [ ] YES [ ] NO  EXPLAIN:    WILDER: [ ] YES [ ] NO    DATE INSERTED:  REMOVE:  [ ] YES [ ] NO  EXPLAIN:    PAST MEDICAL & SURGICAL HISTORY:  Hypertension  vaginal cyst      Hyperlipidemia      Asthma  last inhaler use with in 10 days, on Pulm follow up [id number]      Hypothyroid      Obstructive sleep apnea  refuses Cpap      Obesity  morbid      Trigeminal neuralgia  R      Fibromyalgia      DM (diabetes mellitus)  2      DJD (degenerative joint disease)  Cervical/Thoracic/Lumbar      Cervical neuralgia  difficulty moving my neck      Back pain  thoracic & lumbar      H/O bursitis  right shoulder      Radicular pain  arms, legs      Vasculitis  Legs, forerhead, arms & hands, flare ups in R leg  Dr susie Ribeiro is my Rheumatologist      Renal cell carcinoma, left  on follow up Dr schulte, [facility] renal [facility], waiting for suregry in 2020      Falls frequently      Morbid obesity with body mass index (BMI) of 50.0 to 59.9 in adult      Psoriasis      Diverticulosis      Fatty liver      S/P abdominal hysterectomy          S/P  section  1      Left adrenal mass  excision, benign 2006      Vaginal cyst  removed       Vasculitis on nerve biopsy  , had another surgery called microvascular decompression       S/P left oophorectomy      S/P colonoscopic polypectomy                        PHYSICAL EXAM:    GENERAL: NAD, well-groomed, well-developed  HEAD:  Atraumatic, Normocephalic  EYES: EOMI, PERRLA, conjunctiva and sclera clear  ENMT: No tonsillar erythema, exudates, or enlargement; Moist mucous membranes, Good dentition, No lesions  NECK: Supple, No JVD, Normal thyroid  NERVOUS SYSTEM:  Alert & Oriented X3, Good concentration; Motor Strength 5/5 B/L upper and lower extremities; DTRs 2+ intact and symmetric  CHEST/LUNG: Clear to percussion bilaterally; No rales, rhonchi, wheezing, or rubs  HEART: Regular rate and rhythm; No murmurs, rubs, or gallops  ABDOMEN: Soft, Nontender, Nondistended; Bowel sounds present  EXTREMITIES:  2+ Peripheral Pulses, No clubbing, cyanosis, or edema  LYMPH: No lymphadenopathy noted  SKIN: No rashes or lesions      LABS:  CBC Full  -  ( 15 Mar 2025 06:17 )  WBC Count : 8.62 K/uL  RBC Count : 5.12 M/uL  Hemoglobin : 12.4 g/dL  Hematocrit : 40.9 %  Platelet Count - Automated : 322 K/uL  Mean Cell Volume : 79.9 fl  Mean Cell Hemoglobin : 24.2 pg  Mean Cell Hemoglobin Concentration : 30.3 g/dL  Auto Neutrophil # : x  Auto Lymphocyte # : x  Auto Monocyte # : x  Auto Eosinophil # : x  Auto Basophil # : x  Auto Neutrophil % : x  Auto Lymphocyte % : x  Auto Monocyte % : x  Auto Eosinophil % : x  Auto Basophil % : x    03-16    134[L]  |  98  |  15  ----------------------------<  254[H]  4.0   |  31  |  1.07    Ca    10.1      16 Mar 2025 06:25  Phos  2.9     03-15  Mg     1.6     03-15    TPro  8.4[H]  /  Alb  3.1[L]  /  TBili  0.5  /  DBili  x   /  AST  25  /  ALT  35  /  AlkPhos  123[H]  03-15      Urinalysis Basic - ( 16 Mar 2025 06:25 )    Color: x / Appearance: x / SG: x / pH: x  Gluc: 254 mg/dL / Ketone: x  / Bili: x / Urobili: x   Blood: x / Protein: x / Nitrite: x   Leuk Esterase: x / RBC: x / WBC x   Sq Epi: x / Non Sq Epi: x / Bacteria: x            [  ]  DVT Prophylaxis  [  ]  Nutrition, Brand, Rate         Goal Rate        Abnormal Nutritional Status -  Malnutrition   Cachexia      Morbid Obesity BMI >/=40    RADIOLOGY & ADDITIONAL STUDIES:  ***    Goals of Care Discussion with Family/Proxy/Other   - see note from/family meeting set up for...     NOEMY CONTRERAS    SCU progress note    INTERVAL HPI/OVERNIGHT EVENTS: No acute events     FULL CODE    Covid - 19 PCR: Negative    The 4Ms    What Matters Most: see GOC  Age appropriate Medications/Screen for High Risk Medication: Yes  Mentation: see CAM below  Mobility: defer to physical exam    The Confusion Assessment Method (CAM) Diagnostic Algorithm     1: Acute Onset or Fluctuating Course  - Is there evidence of an acute change in mental status from the patient’s baseline? Did the (abnormal) behavior  fluctuate during the day, that is, tend to come and go, or increase and decrease in severity?  [ ] YES [ x] NO     2: Inattention  - Did the patient have difficulty focusing attention, being easily distractible, or having difficulty keeping track of what was being said?  [ ] YES [ x] NO     3: Disorganized thinking  -Was the patient’s thinking disorganized or incoherent, such as rambling or irrelevant conversation, unclear or illogical flow of ideas, or unpredictable switching from subject to subject?  [ ] YES [ x] NO    4: Altered Level of consciousness?  [ ] YES [ x] NO    The diagnosis of delirium by CAM requires the presence of features 1 and 2 and either 3 or 4.    PRESSORS: [ ] YES [ x] NO    Cardiovascular:  Heart Failure  Acute   Acute on Chronic  Chronic         Pulmonary:  albuterol    0.083% 2.5 milliGRAM(s) Nebulizer every 6 hours  albuterol    90 MICROgram(s) HFA Inhaler 1 Puff(s) Inhalation every 4 hours PRN  cetirizine 10 milliGRAM(s) Oral daily  fluticasone propionate/ salmeterol 250-50 MICROgram(s) Diskus 1 Dose(s) Inhalation two times a day  guaiFENesin Oral Liquid (Sugar-Free) 200 milliGRAM(s) Oral every 6 hours PRN  montelukast 10 milliGRAM(s) Oral daily    Hematalogic:  aspirin  chewable 81 milliGRAM(s) Oral daily  enoxaparin Injectable 40 milliGRAM(s) SubCutaneous every 12 hours    Other:  atorvastatin 40 milliGRAM(s) Oral at bedtime  chlorhexidine 2% Cloths 1 Application(s) Topical <User Schedule>  diphenhydrAMINE 25 milliGRAM(s) Oral every 6 hours PRN  fluticasone propionate 50 MICROgram(s)/spray Nasal Spray 1 Spray(s) Both Nostrils two times a day  hydrocortisone 1% Ointment 1 Application(s) Topical every 12 hours  insulin glargine Injectable (LANTUS) 50 Unit(s) SubCutaneous at bedtime  insulin lispro (ADMELOG) corrective regimen sliding scale   SubCutaneous three times a day before meals  insulin lispro (ADMELOG) corrective regimen sliding scale   SubCutaneous at bedtime  insulin lispro Injectable (ADMELOG) 12 Unit(s) SubCutaneous three times a day before meals  levothyroxine 112 MICROGram(s) Oral daily  OXcarbazepine 600 milliGRAM(s) Oral two times a day  pantoprazole    Tablet 40 milliGRAM(s) Oral before breakfast  polyethylene glycol 3350 17 Gram(s) Oral daily  senna 2 Tablet(s) Oral at bedtime  sodium chloride 0.65% Nasal 1 Spray(s) Both Nostrils three times a day PRN    albuterol    0.083% 2.5 milliGRAM(s) Nebulizer every 6 hours  albuterol    90 MICROgram(s) HFA Inhaler 1 Puff(s) Inhalation every 4 hours PRN  aspirin  chewable 81 milliGRAM(s) Oral daily  atorvastatin 40 milliGRAM(s) Oral at bedtime  cetirizine 10 milliGRAM(s) Oral daily  chlorhexidine 2% Cloths 1 Application(s) Topical <User Schedule>  diphenhydrAMINE 25 milliGRAM(s) Oral every 6 hours PRN  enoxaparin Injectable 40 milliGRAM(s) SubCutaneous every 12 hours  fluticasone propionate 50 MICROgram(s)/spray Nasal Spray 1 Spray(s) Both Nostrils two times a day  fluticasone propionate/ salmeterol 250-50 MICROgram(s) Diskus 1 Dose(s) Inhalation two times a day  guaiFENesin Oral Liquid (Sugar-Free) 200 milliGRAM(s) Oral every 6 hours PRN  hydrocortisone 1% Ointment 1 Application(s) Topical every 12 hours  insulin glargine Injectable (LANTUS) 50 Unit(s) SubCutaneous at bedtime  insulin lispro (ADMELOG) corrective regimen sliding scale   SubCutaneous three times a day before meals  insulin lispro (ADMELOG) corrective regimen sliding scale   SubCutaneous at bedtime  insulin lispro Injectable (ADMELOG) 12 Unit(s) SubCutaneous three times a day before meals  levothyroxine 112 MICROGram(s) Oral daily  montelukast 10 milliGRAM(s) Oral daily  OXcarbazepine 600 milliGRAM(s) Oral two times a day  pantoprazole    Tablet 40 milliGRAM(s) Oral before breakfast  polyethylene glycol 3350 17 Gram(s) Oral daily  senna 2 Tablet(s) Oral at bedtime  sodium chloride 0.65% Nasal 1 Spray(s) Both Nostrils three times a day PRN    Drug Dosing Weight  Height (cm): 162.6 (12 Mar 2025 00:00)  Weight (kg): 136.1 (12 Mar 2025 00:00)  BMI (kg/m2): 51.5 (12 Mar 2025 00:00)  BSA (m2): 2.32 (12 Mar 2025 00:00)    CENTRAL LINE: [ ] YES [x ] NO  LOCATION:   DATE INSERTED:  REMOVE: [ ] YES [ ] NO  EXPLAIN:    WILDER: [ ] YES [ x] NO    DATE INSERTED:  REMOVE:  [ ] YES [ ] NO  EXPLAIN:    PAST MEDICAL & SURGICAL HISTORY:  Hypertension  vaginal cyst      Hyperlipidemia      Asthma  last inhaler use with in 10 days, on Pulm follow up [id number]      Hypothyroid      Obstructive sleep apnea  refuses Cpap      Obesity  morbid      Trigeminal neuralgia  R      Fibromyalgia      DM (diabetes mellitus)  2      DJD (degenerative joint disease)  Cervical/Thoracic/Lumbar      Cervical neuralgia  difficulty moving my neck      Back pain  thoracic & lumbar      H/O bursitis  right shoulder      Radicular pain  arms, legs      Vasculitis  Legs, forerhead, arms & hands, flare ups in R leg  Dr susie Ribeiro is my Rheumatologist      Renal cell carcinoma, left  on follow up Dr schulte, [facility] renal [facility], waiting for suregry in 2020      Falls frequently      Morbid obesity with body mass index (BMI) of 50.0 to 59.9 in adult      Psoriasis      Diverticulosis      Fatty liver      S/P abdominal hysterectomy          S/P  section  1      Left adrenal mass  excision, benign 2006      Vaginal cyst  removed       Vasculitis on nerve biopsy  , had another surgery called microvascular decompression       S/P left oophorectomy      S/P colonoscopic polypectomy      ROS: + Sinus pressure and pain, SOB, nasal congestion, and coughing. Also endorses leg edema b/l.  Denies chills, fever, denies tinnitus, ear ache, sore throat,  chest pain, back pain, diarrhea, constipation, nausea.       PHYSICAL EXAM:  GENERAL: NAD. Morbidly obese.   HEAD:  Atraumatic, Normocephalic  EYES:  PERRLA, conjunctiva and sclera clear  ENMT: No tonsillar erythema, exudates, or enlargement; Moist mucous membranes. Pain to palpation to sinus region on left side.   NECK: Supple  NERVOUS SYSTEM:  Alert & Oriented X3, Good concentration; Generalized weakness. Uses cane. Moves all exts.   CHEST/LUNG: Diminished bilaterally; No rales, rhonchi, wheezing, or rubs. On 3L NC  HEART: Regular rate and rhythm; No murmurs, rubs, or gallops  ABDOMEN: Soft, Nontender, Nondistended; Bowel sounds present, obese  EXTREMITIES:  1+ pedal Pulses, 2+ radial pulses; No clubbing, cyanosis. +2 pitting edema of lower exts.   SKIN: Rashes to lower ext b/l. Erythema to lower estx.        LABS:  CBC Full  -  ( 15 Mar 2025 06:17 )  WBC Count : 8.62 K/uL  RBC Count : 5.12 M/uL  Hemoglobin : 12.4 g/dL  Hematocrit : 40.9 %  Platelet Count - Automated : 322 K/uL  Mean Cell Volume : 79.9 fl  Mean Cell Hemoglobin : 24.2 pg  Mean Cell Hemoglobin Concentration : 30.3 g/dL  Auto Neutrophil # : x  Auto Lymphocyte # : x  Auto Monocyte # : x  Auto Eosinophil # : x  Auto Basophil # : x  Auto Neutrophil % : x  Auto Lymphocyte % : x  Auto Monocyte % : x  Auto Eosinophil % : x  Auto Basophil % : x    03-16    134[L]  |  98  |  15  ----------------------------<  254[H]  4.0   |  31  |  1.07    Ca    10.1      16 Mar 2025 06:25  Phos  2.9     03-15  Mg     1.6     03-15    TPro  8.4[H]  /  Alb  3.1[L]  /  TBili  0.5  /  DBili  x   /  AST  25  /  ALT  35  /  AlkPhos  123[H]  03-15      Urinalysis Basic - ( 16 Mar 2025 06:25 )    Color: x / Appearance: x / SG: x / pH: x  Gluc: 254 mg/dL / Ketone: x  / Bili: x / Urobili: x   Blood: x / Protein: x / Nitrite: x   Leuk Esterase: x / RBC: x / WBC x   Sq Epi: x / Non Sq Epi: x / Bacteria: x      [x  ]  DVT Prophylaxis    RADIOLOGY & ADDITIONAL STUDIES:    < from: Xray Chest 1 View- PORTABLE-Urgent (Xray Chest 1 View- PORTABLE-Urgent .) (25 @ 10:22) >    ACC: [id number] EXAM:  XR CHEST PORTABLE URGENT 1V   ORDERED BY: HOWIE STORY     PROCEDURE DATE:  2025          INTERPRETATION:  Exam:XR CHEST URGENT    clinical history:intubated    Removal of endotracheal and nasogastric tubes. Improved aeration right   lung. Stable left basilar airspace disease.    IMPRESSION: Improved aeration.    --- End of Report ---          HOLLY TURNER MD; Attending Radiologist  This document has been electronically signed. Mar 12 2025 12:45PM    < end of copied text >      < from: US Duplex Venous Lower Ext Complete, Bilateral (25 @ 11:26) >  ACC: [id number] EXAM:  US DPLX LWR EXT VEINS COMPL BI   ORDERED BY: NAHUN WAN     PROCEDURE DATE:  2025          INTERPRETATION:  CLINICAL INFORMATION: Chest pain. Evaluate for DVT.    COMPARISON: None available.    TECHNIQUE: Duplex sonography of the BILATERAL LOWER extremity veins with   color and spectral Doppler, with and without compression.    FINDINGS:    RIGHT:  Normal compressibility of the RIGHT common femoral, femoral and popliteal   veins.  Doppler examination shows normal spontaneous and phasic flow.  No RIGHT calf vein thrombosis is detected.    LEFT:  Normal compressibility of the LEFT common femoral, femoral and popliteal   veins.  Doppler examination shows normal spontaneous and phasic flow.  No LEFT calf vein thrombosis is detected.    Superficial edema in both calves.    IMPRESSION:  No evidence of deep venous thrombosis in either lower extremity.        --- End of Report ---    < end of copied text >

## 2025-03-16 NOTE — PROGRESS NOTE ADULT - PROBLEM SELECTOR PLAN 8
Atypical chest pain , chronic SOB, multifactorial iso of COPD, morbid obesity, fibromyalgia  Elevated troponins, became positive after unresponsive event due to acute on chronic hypercapneic respiratory failure   EKG showed sinus rhythm with non-specific T wave abnormality in I and aVL  Troponins were negative x 2 on admission then increased in setting of acute hypercapneic event requiring intubation  CCTA 12/2022 showed Ca score of 1069.2 with no significant obstructive CAD. Pharm nuclear stress 3/2024 showed normal myocardial perfusion  Continue ASA 81 and atorvastatin 40mg daily for non-obstructive CAD   RLE > LLE edema. LE duplex negative for DVT.  TTE showed normal LV and RV function without significant valvular disease, however this was done PRIOR to her unresponsive/hypercapneic event. Repeat limited TTE with Definity to r/o new WMA.   Will consider further testing pending repeat limited TTE per Cardio reccs.   Card Dr. Hampton following

## 2025-03-16 NOTE — PROGRESS NOTE ADULT - PROBLEM SELECTOR PLAN 3
A1C 13  POCT glucoses remain uncontrolled (250-330)  continue Lantus  50units qHS.   continue premeal Admelog 12 units TID  Continue glucose monitoring ac/HS and cover with mod Admelog s/s  Endo Dr. Hayden following  Adjustments to be made as patient started on Steroids for sinusitis.

## 2025-03-16 NOTE — PROGRESS NOTE ADULT - PROBLEM SELECTOR PLAN 6
Noted Kyphosis of spine which is diminishing her thoracic expansion which is contributing to her hypercapnia.  Continue AVAPS nightly and as needed during the day.  AVAPS-AE ordered from [facility].

## 2025-03-16 NOTE — PROGRESS NOTE ADULT - PROBLEM SELECTOR PLAN 11
Takes losartan and Norvasc at home.   Restarted on Norvasc at 5mg daily and Losartan 25 mg daily.   Monitor per unit protocol

## 2025-03-16 NOTE — PROGRESS NOTE ADULT - PROBLEM SELECTOR PLAN 10
h/o fibromyalgia, DJD  Takes gabapentin at home  S/p  Tylenol 1 gm q8h x 4 days  OOBC , ambulate as tolerated

## 2025-03-16 NOTE — PROGRESS NOTE ADULT - NS ATTEND AMEND GEN_ALL_CORE FT
Problem/Plan - 1:  ·  Problem: Acute on chronic respiratory failure with hypoxia and hypercapnia.   ·  Plan: 2/2 class 3/4 obesity with alveolar hypoventilation. BMI 51.5  PCO2 greater than 125. History of multiple intubations in past  s/p intubation . Self -extubated. Pt did not tolerate BIPAP. Hypercapnia did not improve on BIPAP.   Hypercapnia improved on AVAPS, PCO2 decreased to 50.   Patient would benefit from AVAPS-AE at home to control hypoventilation and hypercapnia. Thoracic expansion is limited secondary to severe central obesity and multilevel spinal stenosis and scoliosis. Patient requires that tidal volume that AVAPS-AE delivers for adequate gas exchange. Without AVAPS-AE patient is at a high risk of readmission and possible death from respiratory failure. BIPAP does not have capability to deliver tidal volume and patient cannot tolerate extremely high settings on a BIPAP.  Continue AVAPS nightly and as needed during the day.  Oxygen supplementation 3LPM when not using AVAPS.  Monitor oxygenation   Continue duonebs q6h RTC  Continue fluticasone proprionate/salmeterol 250-50 BID.     Problem/Plan - 2:  ·  Problem: Other sinusitis.   ·  Plan: C/o nasal congestion and  left facial pain/mild swelling  (Hx of recurrent sinusitis per patient).   Started on Augmentin 875 mg BID for 7 days.   Decadron 4 mg with short taper course (Total of 4 days).   C/w Nasal spray saline  C/w Zyrtec  C/w Flonase   Started on Phenylephrine nasal spray PRN   Tylenol PRN for pain.   Monitor for fever/leukocytosis>>if pain/swelling  worsens may need CT to eval the paranasal sinus cavities.     Problem/Plan - 3:  ·  Problem: Uncontrolled type 2 diabetes mellitus with hyperglycemia.   ·  Plan: A1C 13  POCT glucoses remain uncontrolled (250-330)  continue Lantus  50units qHS.   continue premeal Admelog 12 units TID  Continue glucose monitoring ac/HS and cover with mod Admelog s/s  Endo Dr. Hayden following  Adjustments to be made as patient started on Steroids for sinusitis.     Problem/Plan - 4:  ·  Problem: Class 3 obesity with alveolar hypoventilation, serious comorbidity, and body mass index (BMI) of 50.0 to 59.9 in adult.   ·  Plan: Central obesity, Mallampati 4  PCO2 greater than 125 requiring intubation.  Patient has been intubated several times in the past.  Unable to tolerate BIPAP with settings of 20/16  Tolerating AVAPS nightly.  Thoracic expansion is limited secondary to severe central obesity and multilevel spinal stenosis and scoliosis. Patient requires that tidal volume that AVAPS-AE delivers for adequate gas exchange. Without AVAPS-AE patient is at a high risk of readmission and possible death from respiratory failure. BIPAP does not have capability to deliver tidal volume and patient cannot tolerate extremely high settings on a BIPAP.

## 2025-03-16 NOTE — PROGRESS NOTE ADULT - ASSESSMENT
Assessment and Recommendations:  59 year old F with PMHx of asthma, COPD (on 4L NC PRN), DM, fatty liver, fibromyalgia, R shoulder bursitis, HLD, hypothyroidism, NAKUL, psoriasis, RCC (s/p L renal resection 11/3/23, no hx of CT/RT) presenting palpitations, chest discomfort and lower abdominal pain, in setting of fall 1 week ago. Course c/b by acute on chronic hypercapneic respiratory failure s/p intubation, now with elevated troponins.    1) Atypical CP, chronic SOB, multifactorial in setting of COPD, morbid obesity, fibromyalgia  2) Elevated Ca score  3) Palpitations  4) DM  5) Morbid obesity  6) Fibromyalgia  7) Elevated troponins, became positive after unresponsive event due to acute on chronic hypercapneic respiratory failure   - EKG showed sinus rhythm with non-specific T wave abnormality in I and aVL  - Troponins were negative x 2 on admission then became abnormal in setting of acute hypercapneic event requiring intubation  - CCTA 12/2022 showed Ca score of 1069.2 with no significant obstructive CAD. Pharm nuclear stress 3/2024 showed normal myocardial perfusion  - Continue ASA 81 and atorvastatin 40mg daily for non-obstructive CAD   - RLE > LLE edema. LE duplex negative for DVT.  - TTE showed normal LV and RV function without significant valvular disease, however this was done PRIOR to her unresponsive/hypercapneic event. Repeat limited TTE with Definity to r/o new WMA. Will consider further testing pending repeat limited TTE     8) HTN  - Home meds on hold due to normotension.      Andrés Hampton MD ([employer] TEAMS message PREFERRED)  Cardiology Attending  [facility] [place] - Cardiology    All Cardiology service information can be found 24/7 on [email or web address], password: [email or web address]

## 2025-03-16 NOTE — PROGRESS NOTE ADULT - PROBLEM SELECTOR PLAN 12
Continue levothyroxine    DVT and GI ppx.     Discharge planning:   Will need repeat TTE to r/o new wall motion abnormalities per cards.   Needs better BG control>>Endo following   Started on Augmentin and Decadron taper for Sinusitis.   Needs AVAPS-AE to be delivered to home.   CM to follow.   Patient will need to follow up with Pulm/Cardio/Endocrinology outpatient

## 2025-03-16 NOTE — PROGRESS NOTE ADULT - SUBJECTIVE AND OBJECTIVE BOX
Overnight Events: NAEON.     Review Of Systems: No chest pain, shortness of breath, or palpitations  CONSTITUTIONAL: no fevers or chills  EYES/ENT: No visual changes;  No vertigo or throat pain   NECK: No pain or stiffness  RESPIRATORY: No cough, wheezing. No shortness of breath  CARDIOVASCULAR: No chest pain or palpitations  GASTROINTESTINAL: No abdominal or epigastric pain. No nausea, vomiting. No diarrhea. No melena.  GENITOURINARY: No dysuria, frequency or hematuria  NEUROLOGICAL: No numbness or weakness  SKIN: No itching, burning, rashes, or lesions   All other review of systems is negative unless indicated above.     Current Meds:  albuterol    0.083% 2.5 milliGRAM(s) Nebulizer every 6 hours  albuterol    90 MICROgram(s) HFA Inhaler 1 Puff(s) Inhalation every 4 hours PRN  amLODIPine   Tablet 5 milliGRAM(s) Oral daily  amoxicillin  875 milliGRAM(s)/clavulanate 1 Tablet(s) Oral two times a day  aspirin  chewable 81 milliGRAM(s) Oral daily  atorvastatin 40 milliGRAM(s) Oral at bedtime  cetirizine 10 milliGRAM(s) Oral daily  chlorhexidine 2% Cloths 1 Application(s) Topical <User Schedule>  dexAMETHasone     Tablet 4 milliGRAM(s) Oral two times a day  enoxaparin Injectable 40 milliGRAM(s) SubCutaneous every 12 hours  fluticasone propionate 50 MICROgram(s)/spray Nasal Spray 1 Spray(s) Both Nostrils two times a day  fluticasone propionate/ salmeterol 250-50 MICROgram(s) Diskus 1 Dose(s) Inhalation two times a day  guaiFENesin Oral Liquid (Sugar-Free) 200 milliGRAM(s) Oral every 6 hours  hydrocortisone 1% Ointment 1 Application(s) Topical every 12 hours  insulin glargine Injectable (LANTUS) 50 Unit(s) SubCutaneous at bedtime  insulin lispro (ADMELOG) corrective regimen sliding scale   SubCutaneous three times a day before meals  insulin lispro (ADMELOG) corrective regimen sliding scale   SubCutaneous at bedtime  insulin lispro Injectable (ADMELOG) 12 Unit(s) SubCutaneous three times a day before meals  levothyroxine 112 MICROGram(s) Oral daily  losartan 25 milliGRAM(s) Oral daily  montelukast 10 milliGRAM(s) Oral daily  OXcarbazepine 600 milliGRAM(s) Oral two times a day  pantoprazole    Tablet 40 milliGRAM(s) Oral before breakfast  polyethylene glycol 3350 17 Gram(s) Oral daily  senna 2 Tablet(s) Oral at bedtime  sodium chloride 0.65% Nasal 1 Spray(s) Both Nostrils three times a day PRN    Vitals:  T(F): 98.6 (03-16), Max: 98.6 (03-16)  HR: 98 (03-16) (92 - 105)  BP: 148/82 (03-16) (140/85 - 177/98)  RR: 19 (03-16)  SpO2: 95% (03-16)  I&O's Summary    Physical Exam:  Appearance: No acute distress; well appearing  Eyes: PERRL, EOMI, pink conjunctiva  HEENT: Normal oral mucosa  Cardiovascular: RRR, S1, S2, no murmurs, rubs, or gallops; no JVD  Respiratory: Clear to auscultation bilaterally  Gastrointestinal: soft, non-tender, non-distended with normal bowel sounds  Extremities: 2+ edema up below the knee on R leg and minimal on L leg  Musculoskeletal: No clubbing; no joint deformity   Neurologic: Non-focal  Lymphatic: No lymphadenopathy  Psychiatry: AAOx3, mood & affect appropriate  Skin: No rashes, ecchymoses, or cyanosis                          12.4   8.62  )-----------( 322      ( 15 Mar 2025 06:17 )             40.9     03-16    134[L]  |  98  |  15  ----------------------------<  254[H]  4.0   |  31  |  1.07    Ca    10.1      16 Mar 2025 06:25  Phos  2.9     03-15  Mg     1.6     03-15    TPro  8.4[H]  /  Alb  3.1[L]  /  TBili  0.5  /  DBili  x   /  AST  25  /  ALT  35  /  AlkPhos  123[H]  03-15      CARDIAC MARKERS ( 12 Mar 2025 00:50 )  x     / x     / x     / x     / x     / 3.0 ng/mL    Echo 3/11/25 -    CONCLUSIONS:      1. Technically difficult image quality.   2. Left ventricular cavity is normal in size. Left ventricular wall thickness is normal. Left ventricular systolic function is normal with an ejection fraction of 54 % by Valdez's method of disks. There are no regional wall motion abnormalities seen.   3. There is mild (grade 1) left ventricular diastolic dysfunction.   4. Left atrium is mildly dilated.   5. Normal right ventricular systolic function. Tricuspid annular plane systolic excursion (TAPSE) is 2.3 cm (normal >=1.7 cm).   6. No significant valvular disease.   7. Pulmonary artery systolic pressure could not be estimated.   8. No pericardial effusion seen.   9. Compared to the transthoracic echocardiogram performed on 3/20/2024, there have been no significant interval changes.    Imaging:    LE Duplex -   IMPRESSION:  No evidence of deep venous thrombosis in either lower extremity.    Imaging:

## 2025-03-17 ENCOUNTER — APPOINTMENT (OUTPATIENT)
Dept: PULMONOLOGY | Facility: CLINIC | Age: 60
End: 2025-03-17

## 2025-03-17 ENCOUNTER — TRANSCRIPTION ENCOUNTER (OUTPATIENT)
Age: 60
End: 2025-03-17

## 2025-03-17 ENCOUNTER — RESULT REVIEW (OUTPATIENT)
Age: 60
End: 2025-03-17

## 2025-03-17 DIAGNOSIS — J32.9 CHRONIC SINUSITIS, UNSPECIFIED: ICD-10-CM

## 2025-03-17 LAB
ALBUMIN SERPL ELPH-MCNC: 2.9 G/DL — LOW (ref 3.5–5)
ALP SERPL-CCNC: 122 U/L — HIGH (ref 40–120)
ALT FLD-CCNC: 44 U/L DA — SIGNIFICANT CHANGE UP (ref 10–60)
ANION GAP SERPL CALC-SCNC: 3 MMOL/L — LOW (ref 5–17)
AST SERPL-CCNC: 33 U/L — SIGNIFICANT CHANGE UP (ref 10–40)
BILIRUB SERPL-MCNC: 0.4 MG/DL — SIGNIFICANT CHANGE UP (ref 0.2–1.2)
BUN SERPL-MCNC: 21 MG/DL — HIGH (ref 7–18)
CALCIUM SERPL-MCNC: 9.5 MG/DL — SIGNIFICANT CHANGE UP (ref 8.4–10.5)
CHLORIDE SERPL-SCNC: 98 MMOL/L — SIGNIFICANT CHANGE UP (ref 96–108)
CO2 SERPL-SCNC: 31 MMOL/L — SIGNIFICANT CHANGE UP (ref 22–31)
CREAT SERPL-MCNC: 1.08 MG/DL — SIGNIFICANT CHANGE UP (ref 0.5–1.3)
EGFR: 59 ML/MIN/1.73M2 — LOW
EGFR: 59 ML/MIN/1.73M2 — LOW
GLUCOSE SERPL-MCNC: 279 MG/DL — HIGH (ref 70–99)
HCT VFR BLD CALC: 36.7 % — SIGNIFICANT CHANGE UP (ref 34.5–45)
MAGNESIUM SERPL-MCNC: 1.7 MG/DL — SIGNIFICANT CHANGE UP (ref 1.6–2.6)
MCHC RBC-ENTMCNC: 24.1 PG — LOW (ref 27–34)
MCHC RBC-ENTMCNC: 30.2 G/DL — LOW (ref 32–36)
MCV RBC AUTO: 79.6 FL — LOW (ref 80–100)
PHOSPHATE SERPL-MCNC: 2.6 MG/DL — SIGNIFICANT CHANGE UP (ref 2.5–4.5)
PLATELET # BLD AUTO: 280 K/UL — SIGNIFICANT CHANGE UP (ref 150–400)
POTASSIUM SERPL-MCNC: 4.4 MMOL/L — SIGNIFICANT CHANGE UP (ref 3.5–5.3)
POTASSIUM SERPL-SCNC: 4.4 MMOL/L — SIGNIFICANT CHANGE UP (ref 3.5–5.3)
PROT SERPL-MCNC: 8.1 G/DL — SIGNIFICANT CHANGE UP (ref 6–8.3)
RBC # BLD: 4.61 M/UL — SIGNIFICANT CHANGE UP (ref 3.8–5.2)
RBC # FLD: 16.4 % — HIGH (ref 10.3–14.5)
SODIUM SERPL-SCNC: 132 MMOL/L — LOW (ref 135–145)
WBC # BLD: 11.52 K/UL — HIGH (ref 3.8–10.5)
WBC # FLD AUTO: 11.52 K/UL — HIGH (ref 3.8–10.5)

## 2025-03-17 PROCEDURE — 99233 SBSQ HOSP IP/OBS HIGH 50: CPT

## 2025-03-17 PROCEDURE — 99232 SBSQ HOSP IP/OBS MODERATE 35: CPT

## 2025-03-17 PROCEDURE — 99223 1ST HOSP IP/OBS HIGH 75: CPT

## 2025-03-17 RX ORDER — SODIUM CHLORIDE 0.65 %
1 AEROSOL, SPRAY (ML) NASAL
Qty: 1 | Refills: 0
Start: 2025-03-17 | End: 2025-04-15

## 2025-03-17 RX ORDER — ASPIRIN 325 MG
1 TABLET ORAL
Qty: 30 | Refills: 0
Start: 2025-03-17 | End: 2025-04-15

## 2025-03-17 RX ORDER — HYDROCORTISONE 10 MG/G
1 CREAM TOPICAL
Qty: 1 | Refills: 0
Start: 2025-03-17 | End: 2025-03-30

## 2025-03-17 RX ORDER — FLUTICASONE PROPIONATE 50 UG/1
1 SPRAY, METERED NASAL
Qty: 1 | Refills: 0
Start: 2025-03-17 | End: 2025-04-15

## 2025-03-17 RX ORDER — SENNA 187 MG
2 TABLET ORAL
Qty: 0 | Refills: 0 | DISCHARGE
Start: 2025-03-17

## 2025-03-17 RX ORDER — INSULIN LISPRO 100 U/ML
3 INJECTION, SOLUTION INTRAVENOUS; SUBCUTANEOUS ONCE
Refills: 0 | Status: COMPLETED | OUTPATIENT
Start: 2025-03-17 | End: 2025-03-17

## 2025-03-17 RX ORDER — POLYETHYLENE GLYCOL 3350 17 G/17G
17 POWDER, FOR SOLUTION ORAL
Qty: 0 | Refills: 0 | DISCHARGE
Start: 2025-03-17

## 2025-03-17 RX ORDER — INSULIN LISPRO 100 U/ML
15 INJECTION, SOLUTION INTRAVENOUS; SUBCUTANEOUS
Refills: 0 | Status: DISCONTINUED | OUTPATIENT
Start: 2025-03-17 | End: 2025-03-18

## 2025-03-17 RX ORDER — DEXAMETHASONE 0.5 MG/1
1 TABLET ORAL
Qty: 4 | Refills: 0
Start: 2025-03-17 | End: 2025-03-18

## 2025-03-17 RX ORDER — AMOXICILLIN AND CLAVULANATE POTASSIUM 500; 125 MG/1; MG/1
1 TABLET, FILM COATED ORAL
Qty: 12 | Refills: 0
Start: 2025-03-17 | End: 2025-03-22

## 2025-03-17 RX ORDER — INSULIN GLARGINE-YFGN 100 [IU]/ML
60 INJECTION, SOLUTION SUBCUTANEOUS AT BEDTIME
Refills: 0 | Status: DISCONTINUED | OUTPATIENT
Start: 2025-03-17 | End: 2025-03-18

## 2025-03-17 RX ADMIN — DEXAMETHASONE 3 MILLIGRAM(S): 0.5 TABLET ORAL at 17:05

## 2025-03-17 RX ADMIN — AMLODIPINE BESYLATE 5 MILLIGRAM(S): 10 TABLET ORAL at 05:52

## 2025-03-17 RX ADMIN — Medication 40 MILLIGRAM(S): at 05:53

## 2025-03-17 RX ADMIN — Medication 1 SPRAY(S): at 17:05

## 2025-03-17 RX ADMIN — AMOXICILLIN AND CLAVULANATE POTASSIUM 1 TABLET(S): 500; 125 TABLET, FILM COATED ORAL at 05:50

## 2025-03-17 RX ADMIN — INSULIN LISPRO 12 UNIT(S): 100 INJECTION, SOLUTION INTRAVENOUS; SUBCUTANEOUS at 08:16

## 2025-03-17 RX ADMIN — OXCARBAZEPINE 600 MILLIGRAM(S): 150 TABLET, FILM COATED ORAL at 17:05

## 2025-03-17 RX ADMIN — INSULIN LISPRO 3 UNIT(S): 100 INJECTION, SOLUTION INTRAVENOUS; SUBCUTANEOUS at 12:01

## 2025-03-17 RX ADMIN — ENOXAPARIN SODIUM 40 MILLIGRAM(S): 100 INJECTION SUBCUTANEOUS at 17:06

## 2025-03-17 RX ADMIN — MONTELUKAST SODIUM 10 MILLIGRAM(S): 10 TABLET ORAL at 11:52

## 2025-03-17 RX ADMIN — Medication 2 TABLET(S): at 22:07

## 2025-03-17 RX ADMIN — FLUTICASONE PROPIONATE 1 SPRAY(S): 50 SPRAY, METERED NASAL at 05:51

## 2025-03-17 RX ADMIN — ATORVASTATIN CALCIUM 40 MILLIGRAM(S): 80 TABLET, FILM COATED ORAL at 22:07

## 2025-03-17 RX ADMIN — LOSARTAN POTASSIUM 25 MILLIGRAM(S): 100 TABLET, FILM COATED ORAL at 05:51

## 2025-03-17 RX ADMIN — INSULIN LISPRO 10: 100 INJECTION, SOLUTION INTRAVENOUS; SUBCUTANEOUS at 11:56

## 2025-03-17 RX ADMIN — FLUTICASONE PROPIONATE 1 SPRAY(S): 50 SPRAY, METERED NASAL at 17:06

## 2025-03-17 RX ADMIN — INSULIN GLARGINE-YFGN 60 UNIT(S): 100 INJECTION, SOLUTION SUBCUTANEOUS at 22:09

## 2025-03-17 RX ADMIN — INSULIN LISPRO 4: 100 INJECTION, SOLUTION INTRAVENOUS; SUBCUTANEOUS at 17:08

## 2025-03-17 RX ADMIN — DEXTROMETHORPHAN HBR, GUAIFENESIN 200 MILLIGRAM(S): 200 LIQUID ORAL at 17:06

## 2025-03-17 RX ADMIN — INSULIN LISPRO 6: 100 INJECTION, SOLUTION INTRAVENOUS; SUBCUTANEOUS at 08:15

## 2025-03-17 RX ADMIN — Medication 1 DOSE(S): at 11:53

## 2025-03-17 RX ADMIN — HYDROCORTISONE 1 APPLICATION(S): 10 CREAM TOPICAL at 05:52

## 2025-03-17 RX ADMIN — Medication 2.5 MILLIGRAM(S): at 04:20

## 2025-03-17 RX ADMIN — INSULIN LISPRO 2: 100 INJECTION, SOLUTION INTRAVENOUS; SUBCUTANEOUS at 22:08

## 2025-03-17 RX ADMIN — Medication 10 MILLIGRAM(S): at 11:52

## 2025-03-17 RX ADMIN — DEXTROMETHORPHAN HBR, GUAIFENESIN 200 MILLIGRAM(S): 200 LIQUID ORAL at 05:50

## 2025-03-17 RX ADMIN — Medication 1 APPLICATION(S): at 05:52

## 2025-03-17 RX ADMIN — Medication 81 MILLIGRAM(S): at 11:52

## 2025-03-17 RX ADMIN — Medication 2.5 MILLIGRAM(S): at 21:48

## 2025-03-17 RX ADMIN — OXCARBAZEPINE 600 MILLIGRAM(S): 150 TABLET, FILM COATED ORAL at 05:50

## 2025-03-17 RX ADMIN — Medication 112 MICROGRAM(S): at 05:51

## 2025-03-17 RX ADMIN — DEXAMETHASONE 4 MILLIGRAM(S): 0.5 TABLET ORAL at 05:50

## 2025-03-17 RX ADMIN — DEXAMETHASONE 3 MILLIGRAM(S): 0.5 TABLET ORAL at 05:50

## 2025-03-17 RX ADMIN — POLYETHYLENE GLYCOL 3350 17 GRAM(S): 17 POWDER, FOR SOLUTION ORAL at 11:55

## 2025-03-17 RX ADMIN — INSULIN LISPRO 15 UNIT(S): 100 INJECTION, SOLUTION INTRAVENOUS; SUBCUTANEOUS at 17:09

## 2025-03-17 RX ADMIN — DEXTROMETHORPHAN HBR, GUAIFENESIN 200 MILLIGRAM(S): 200 LIQUID ORAL at 11:53

## 2025-03-17 RX ADMIN — Medication 2.5 MILLIGRAM(S): at 08:53

## 2025-03-17 RX ADMIN — AMOXICILLIN AND CLAVULANATE POTASSIUM 1 TABLET(S): 500; 125 TABLET, FILM COATED ORAL at 17:05

## 2025-03-17 RX ADMIN — Medication 1 SPRAY(S): at 05:51

## 2025-03-17 RX ADMIN — INSULIN LISPRO 12 UNIT(S): 100 INJECTION, SOLUTION INTRAVENOUS; SUBCUTANEOUS at 11:56

## 2025-03-17 NOTE — PROGRESS NOTE ADULT - PROBLEM SELECTOR PLAN 11
Takes losartan and Norvasc at home.   Continue Norvasc at 5mg daily and Losartan 25 mg daily.   Controlled

## 2025-03-17 NOTE — CONSULT NOTE ADULT - CONSULT REASON
Type 2 diabetes glycemic management
b/l LE edema
generalized pain
Unresponsive AHHRF
h/o asthma, NAKUL/OHS with chronic hypercapnic respiratory failure
CP, SOB

## 2025-03-17 NOTE — PROGRESS NOTE ADULT - PROBLEM SELECTOR PLAN 2
h/o recurrent sinusitis per patient   Now with left facial pain /mild swelling  , nasal congestion   Continue Augmentin 875mg po BID for total of 14 doses.   Continue short course of Decadron (total of 4 days).   C/w Nasal spray   C/w Zyrtec  C/w Flonase  Tylenol PRN for pain.   Monitor for fever/leukocytosis>>if pain/swelling  worsens may need CT to eval the paranasal sinus cavities.

## 2025-03-17 NOTE — DISCHARGE NOTE NURSING/CASE MANAGEMENT/SOCIAL WORK - FINANCIAL ASSISTANCE
CAD (coronary artery disease) [facility] provides services at a reduced cost to those who are determined to be eligible through [facility]’s financial assistance program. Information regarding [facility]’s financial assistance program can be found by going to [email or web address] or by calling [phone].

## 2025-03-17 NOTE — DISCHARGE NOTE NURSING/CASE MANAGEMENT/SOCIAL WORK - NSDCDMENAME_GEN_ALL_CORE_FT
[No Acute Distress] : no acute distress [de-identified] : No visible respiratory distress [de-identified] : No rash ADAPT

## 2025-03-17 NOTE — DISCHARGE NOTE NURSING/CASE MANAGEMENT/SOCIAL WORK - PATIENT PORTAL LINK FT
You can access the FollowMyHealth Patient Portal offered by [facility] by registering at the following website: [email or web address]. By joining [employer]’s FollowMyHealth portal, you will also be able to view your health information using other applications (apps) compatible with our system.

## 2025-03-17 NOTE — CONSULT NOTE ADULT - CONSULT REQUESTED DATE/TIME
11-Mar-2025 12:00
11-Mar-2025 12:00
12-Mar-2025 23:00
11-Mar-2025 15:09
11-Mar-2025 22:50
17-Mar-2025 14:13

## 2025-03-17 NOTE — PROGRESS NOTE ADULT - PROBLEM SELECTOR PLAN 8
Neg DVT BLE  ACE wrap BLE when OOB-->>Pt refusing ACE wrap  Elevate BLE   Vascular consulted. Neg DVT BLE  ACE wrap BLE when OOB-->>Pt refusing ACE wrap  Elevate BLE   Vascular consulted-->>No vascular intervention at this time  -Recommend outpatient follow up for venous studies

## 2025-03-17 NOTE — PROGRESS NOTE ADULT - PROBLEM SELECTOR PLAN 6
Severe abdominal obesity with known alveolar hypoventilation.  PCO2 greater than 125 upon admission which required patient to be intubated and started on mechanical ventilation.  Patient unable to tolerate very high setting on BIPAP 20/16  Started on AVAPS therapy in hospital. Tolerating better. PCO2 on AVAPS 50  Will need AVAPS-AE when discharged. Patient need Tidal volume that AVAPS-AE delivers to help control PCO2 and to provide adequate gas exchange gas exchange.  AVAPS approved. Pending delivery. CM following

## 2025-03-17 NOTE — DISCHARGE NOTE NURSING/CASE MANAGEMENT/SOCIAL WORK - INFLUENZA IMMUNIZATION DATE (APPROXIMATE):
[address], [address]  [address] [facility]  [phone]               Thank you for choosing us for your health care visit with Litzy Douglas MD.  We are glad to serve you and happy to provide you with this summary 26-Sep-2024 Visits < Visit Summaries. MyChart questions? Call [phone] for help. [employer] is NOT to be used for urgent needs. For medical emergencies, dial 911.            Visit [facility] online at  [email or web address]

## 2025-03-17 NOTE — PROGRESS NOTE ADULT - PROBLEM SELECTOR PLAN 12
DVT ppx: Lovenox   GI ppx: : PPI    #hypothyroidism  -Continue hypothyroidism  #HLD  -continue statin     ------  Pt on  home   O2 4L NC   F/u repeat TTE to r/o new wallmotion abnormalities per Card  Needs improved BG control  F/u Vascular consult   Dispo: home (pt declines [facility]).   AVAPS-AE approved. Delivery coordinated by CM.   Patient will need to follow up with Pulm/Cardio/Endocrinology outpatient. DVT ppx: Lovenox   GI ppx: : PPI    #hypothyroidism  -Continue hypothyroidism  #HLD  -continue statin       ------  Pt on  home   O2 4L NC   F/u repeat TTE to r/o new wallmotion abnormalities per Card  Needs improved BG control  F/u Vascular consult   Dispo: home (pt declines [facility]).   AVAPS-AE approved. Delivery coordinated by CM.   Patient will need to follow up with Pulm/Cardio/Endocrinology outpatient.

## 2025-03-17 NOTE — PROGRESS NOTE ADULT - NS ATTEND AMEND GEN_ALL_CORE FT
Problem/Plan - 1:  ·  Problem: Acute on chronic respiratory failure with hypoxia and hypercapnia.   ·  Plan: : 2/2 class 3/4 obesity with alveolar hypoventilation. BMI 51.5  PCO2 greater than 125. History of multiple intubations in past  s/p intubation . Self -extubated. Pt did not tolerate BIPAP. Hypercapnia did not improve on BIPAP.   Hypercapnia improved on AVAPS, PCO2 decreased to 50.   Patient would benefit from AVAPS-AE at home to control hypoventilation and hypercapnia. Thoracic expansion is limited secondary to severe central obesity and multilevel spinal stenosis and scoliosis. Patient requires that tidal volume that AVAPS-AE delivers for adequate gas exchange. Without AVAPS-AE patient is at a high risk of readmission and possible death from respiratory failure. BIPAP does not have capability to deliver tidal volume and patient cannot tolerate extremely high settings on a BIPAP.  Continue AVAPS nightly and as needed during the day.  Oxygen supplementation 3LPM when not using AVAPS.  Monitor oxygenation   Continue duonebs q6h RTC  Continue fluticasone proprionate/salmeterol 250-50 BID.     Problem/Plan - 2:  ·  Problem: Other sinusitis.   ·  Plan: h/o recurrent sinusitis per patient   Now with left facial pain /mild swelling  , nasal congestion   Continue Augmentin 875mg po BID for total of 14 doses.   Continue short course of Decadron (total of 4 days).   C/w Nasal spray   C/w Zyrtec  C/w Flonase  Tylenol PRN for pain.   Monitor for fever/leukocytosis>>if pain/swelling  worsens may need CT to eval the paranasal sinus cavities.     Problem/Plan - 3:  ·  Problem: Uncontrolled type 2 diabetes mellitus with hyperglycemia.   ·  Plan: A1C 13  POCT glucose 250-385  Continue lantus, increased to 60 units qHS  Continue premeal Admelog, increase to 15 units TID with meals  Correctional Insulin: Continue moderate Lispro ( Admelog) correctional scale with meals and bedtime  Endo Dr. Hayden following.     Problem/Plan - 4:  ·  Problem: ACS (acute coronary syndrome).   ·  Plan: Atypical chest pain iso chronic SOB, COPD, morbid obesity , fibromyalgia.  Elevated trop positive after unresponsive event due to acute on chronic hypoxic/hypercapneic respiratory failure  EKG SR with non-specific T wave abnormality in I and aVL  RLE > LLE edema. LE duplex negative for DVT.  TTE showed normal LV and RV function without significant valvular disease, however this was done PRIOR to her unresponsive/hypercapneic event.  Continue ASA and statin   Repeat limited TTE with Definity to r/o new WMA.   Card Dr. Hampton following.     Problem/Plan - 5:  ·  Problem: Class 3 obesity with alveolar hypoventilation, serious comorbidity, and body mass index (BMI) of 50.0 to 59.9 in adult.   ·  Plan: Central obesity, Mallampati 4  PCO2 greater than 125 requiring intubation.  Patient has been intubated several times in the past.  Unable to tolerate BIPAP with settings of 20/16  Tolerating AVAPS nightly.  Thoracic expansion is limited secondary to severe central obesity and multilevel spinal stenosis and scoliosis. Patient requires that tidal volume that AVAPS-AE delivers for adequate gas exchange. Without AVAPS-AE patient is at a high risk of readmission and possible death from respiratory failure. BIPAP does not have capability to deliver tidal volume and patient cannot tolerate extremely high settings on a BIPAP.     Problem/Plan - 6:  ·  Problem: Central obesity.   ·  Plan: Severe abdominal obesity with known alveolar hypoventilation.  PCO2 greater than 125 upon admission which required patient to be intubated and started on mechanical ventilation.  Patient unable to tolerate very high setting on BIPAP 20/16  Started on AVAPS therapy in hospital. Tolerating better. PCO2 on AVAPS 50  Will need AVAPS-AE when discharged. Patient need Tidal volume that AVAPS-AE delivers to help control PCO2 and to provide adequate gas exchange gas exchange.  AVAPS approved. Pending delivery. CM following.

## 2025-03-17 NOTE — PROGRESS NOTE ADULT - PROBLEM SELECTOR PLAN 1
: 2/2 class 3/4 obesity with alveolar hypoventilation. BMI 51.5  PCO2 greater than 125. History of multiple intubations in past  s/p intubation . Self -extubated. Pt did not tolerate BIPAP. Hypercapnia did not improve on BIPAP.   Hypercapnia improved on AVAPS, PCO2 decreased to 50.   Patient would benefit from AVAPS-AE at home to control hypoventilation and hypercapnia. Thoracic expansion is limited secondary to severe central obesity and multilevel spinal stenosis and scoliosis. Patient requires that tidal volume that AVAPS-AE delivers for adequate gas exchange. Without AVAPS-AE patient is at a high risk of readmission and possible death from respiratory failure. BIPAP does not have capability to deliver tidal volume and patient cannot tolerate extremely high settings on a BIPAP.  Continue AVAPS nightly and as needed during the day.  Oxygen supplementation 3LPM when not using AVAPS.  Monitor oxygenation   Continue duonebs q6h RTC  Continue fluticasone proprionate/salmeterol 250-50 BID.

## 2025-03-17 NOTE — PROGRESS NOTE ADULT - PROBLEM SELECTOR PLAN 3
A1C 13  POCT glucose 250-385  Continue lantus, increased to 60 units qHS  Continue premeal Admelog, increase to 15 units TID with meals  Correctional Insulin: Continue moderate Lispro ( Admelog) correctional scale with meals and bedtime  Endo Dr. Hayden following

## 2025-03-17 NOTE — DISCHARGE NOTE NURSING/CASE MANAGEMENT/SOCIAL WORK - NSDCFUADDAPPT_GEN_ALL_CORE_FT
APPTS ARE READY TO BE MADE: [X] YES    Best Family or Patient Contact (if needed):    Additional Information about above appointments (if needed):    1: Pulmonologist Dr. Monterroso  2: Endocrinologist Dr. Ambrose  3: Cardiologist Dr. Hampton    Other comments or requests:       Start of service for RN and PT: March 19, 2025

## 2025-03-17 NOTE — CONSULT NOTE ADULT - NS ATTEND AMEND GEN_ALL_CORE FT
Patient with chronic BLE edema, non-compliant with compression therapy. Venous duplex negative for DVT. BLE edema.   -Recommend compression and elevation and outpatient follow up

## 2025-03-17 NOTE — PROGRESS NOTE ADULT - ASSESSMENT
59 year old F with PMHx of asthma, COPD (on 4L NC PRN), DM, fatty liver, fibromyalgia, R shoulder bursitis, HLD, hypothyroidism, NAKUL, psoriasis, RCC (s/p L renal resection 11/3/23, no hx of CT/RT) presenting palpitations, chest discomfort and lower abdominal pain, in setting of fall 1 week ago. Course c/b by acute on chronic hypercapneic respiratory failure s/p intubation, now with elevated troponins.    1) Atypical CP, chronic SOB, multifactorial in setting of COPD, morbid obesity, fibromyalgia  2) Elevated Ca score  3) Palpitations  4) DM  5) Morbid obesity  6) Fibromyalgia  7) Elevated troponins, became positive after unresponsive event due to acute on chronic hypercapneic respiratory failure   - EKG showed sinus rhythm with non-specific T wave abnormality in I and aVL  - Troponins were negative x 2 on admission then became abnormal in setting of acute hypercapneic event requiring intubation  - CCTA 12/2022 showed Ca score of 1069.2 with no significant obstructive CAD. Pharm nuclear stress 3/2024 showed normal myocardial perfusion  - Continue ASA 81 and atorvastatin 40mg daily for non-obstructive CAD   - RLE > LLE edema. LE duplex negative for DVT.  - TTE showed normal LV and RV function without significant valvular disease, however this was done PRIOR to her unresponsive/ hypercapnic event. Repeat limited TTE with Definity (preliminary) reveals no wall motion abnormalities.  -No further cardiac testing indicated    8) HTN  Continue home Amlodipine 10mg PO daily, Losartan 25mg PO Daily    Cardiology will sign off. May re-consult if you have any questions

## 2025-03-17 NOTE — PROGRESS NOTE ADULT - PROBLEM SELECTOR PLAN 10
h/o fibromyalgia, DJD  Takes gabapentin at home-->>resumed then discontinued in setting of pt developed AMS   S/p  Tylenol 1 gm q8h x 4 days  OOBC , ambulate as tolerated.  Avoid sedating agents iso hypercapnia h/o fibromyalgia, DJD,trigeminal neuralgia  Takes gabapentin at home-->>resumed then discontinued in setting of pt developed AMS   Continue Oxcarbazepine per home regimen  S/p  Tylenol 1 gm q8h x 4 days  OOBC , ambulate as tolerated.  Avoid sedating agents iso hypercapnia

## 2025-03-17 NOTE — DISCHARGE NOTE NURSING/CASE MANAGEMENT/SOCIAL WORK - NSDCVIVACCINE_GEN_ALL_CORE_FT
Influenza, seasonal, injectable; 2013/10/28 ; Claudia Sherwood (RN);   influenza, injectable, quadrivalent, preservative free; 09-Nov-2023 12:05; Issac Fernandez (RN); [employer] [id number] (Exp. Date: 30-Jun-2024); IntraMuscular; Deltoid Right.; 0.5 milliLiter(s); VIS (VIS Published: 06-Aug-2021, VIS Presented: 09-Nov-2023);

## 2025-03-17 NOTE — PROGRESS NOTE ADULT - PROBLEM SELECTOR PLAN 4
Atypical chest pain iso chronic SOB, COPD, morbid obesity , fibromyalgia.  Elevated trop positive after unresponsive event due to acute on chronic hypoxic/hypercapneic respiratory failure  EKG SR with non-specific T wave abnormality in I and aVL  RLE > LLE edema. LE duplex negative for DVT.  TTE showed normal LV and RV function without significant valvular disease, however this was done PRIOR to her unresponsive/hypercapneic event.  Continue ASA and statin   Repeat limited TTE with Definity to r/o new WMA.   Card Dr. Hampton following

## 2025-03-17 NOTE — PROGRESS NOTE ADULT - SUBJECTIVE AND OBJECTIVE BOX
Subjective:  Chart Notes, Work list Manager, and fingersticks reviewed. Patient reports eating well.     Review of Systems:  Constitutional: No fever  Cardiovascular: No chest pain, palpitations  Respiratory: No SOB, no cough  GI: No nausea, vomiting, abdominal pain  Endocrine: no polyuria, polydipsia    Medications  (Standing):  albuterol    0.083% 2.5 milliGRAM(s) Nebulizer every 6 hours  amLODIPine   Tablet 5 milliGRAM(s) Oral daily  amoxicillin  875 milliGRAM(s)/clavulanate 1 Tablet(s) Oral two times a day  aspirin  chewable 81 milliGRAM(s) Oral daily  atorvastatin 40 milliGRAM(s) Oral at bedtime  cetirizine 10 milliGRAM(s) Oral daily  chlorhexidine 2% Cloths 1 Application(s) Topical <User Schedule>  dexAMETHasone     Tablet 3 milliGRAM(s) Oral two times a day  enoxaparin Injectable 40 milliGRAM(s) SubCutaneous every 12 hours  fluticasone propionate 50 MICROgram(s)/spray Nasal Spray 1 Spray(s) Both Nostrils two times a day  fluticasone propionate/ salmeterol 250-50 MICROgram(s) Diskus 1 Dose(s) Inhalation two times a day  guaiFENesin Oral Liquid (Sugar-Free) 200 milliGRAM(s) Oral every 6 hours  hydrocortisone 1% Ointment 1 Application(s) Topical every 12 hours  insulin glargine Injectable (LANTUS) 60 Unit(s) SubCutaneous at bedtime  insulin lispro (ADMELOG) corrective regimen sliding scale   SubCutaneous three times a day before meals  insulin lispro (ADMELOG) corrective regimen sliding scale   SubCutaneous at bedtime  insulin lispro Injectable (ADMELOG) 12 Unit(s) SubCutaneous three times a day before meals  levothyroxine 112 MICROGram(s) Oral daily  losartan 25 milliGRAM(s) Oral daily  montelukast 10 milliGRAM(s) Oral daily  OXcarbazepine 600 milliGRAM(s) Oral two times a day  pantoprazole    Tablet 40 milliGRAM(s) Oral before breakfast  polyethylene glycol 3350 17 Gram(s) Oral daily  senna 2 Tablet(s) Oral at bedtime    Medications  (PRN):  albuterol    90 MICROgram(s) HFA Inhaler 1 Puff(s) Inhalation every 4 hours PRN Bronchospasm  sodium chloride 0.65% Nasal 1 Spray(s) Both Nostrils three times a day PRN Nasal Congestion    Physical examination:  VITALS: T(C): 36.6 (03-17-25 @ 04:57)  T(F): 97.8 (03-17-25 @ 04:57), Max: 98.8 (03-16-25 @ 20:38)  HR: 85 (03-17-25 @ 04:57) (85 - 98)  BP: 144/82 (03-17-25 @ 04:57) (137/70 - 148/82)  RR:  (17 - 19)  SpO2:  (92% - 95%)  Wt(kg): --  GENERAL: NAD,   HEENT:  Dry mucous membranes  THYROID: Normal size, no palpable nodules  GI: Soft, nontender, non distended, +ve abdominal obesity  EXTREMITIES: +ve peripheral pulses, -ve pedal edema  SKIN: Dry, intact, No rashes or lesions  PSYCH: Alert and oriented x 3    Labs:  Capillary Blood Glucose:  POCT Blood Glucose.: 385 mg/dL (17 Mar 2025 11:21)  POCT Blood Glucose.: 251 mg/dL (17 Mar 2025 08:01)  POCT Blood Glucose.: 356 mg/dL (16 Mar 2025 21:23)  POCT Blood Glucose.: 221 mg/dL (16 Mar 2025 16:40)    03-17  132[L]  |  98  |  21[H]  ----------------------------<  279[H]  4.4   |  31  |  1.08  eGFR: 59[L]  Ca    9.5      03-17  Mg     1.7     03-17  Phos  2.6     03-17    TPro  8.1  /  Alb  2.9[L]  /  TBili  0.4  /  DBili  x   /  AST  33  /  ALT  44  /  AlkPhos  122[H]  03-17  Thyroid Function Tests:  03-13 @ 03:43 TSH 1.41 FreeT4 -- T3 -- Anti TPO -- Anti Thyroglobulin Ab -- TSI --    A1C with Estimated Average Glucose Result: 13.0: % (03.11.25 @ 05:00)      Assessment and Plan:    59-year-old female with PMH of T2DM, HLD, asthma, COPD on 4L NC, migraines, hypothyroidism, NAKUL, RCC status post left renal resection 11/23 present for intermittent palpitations and chest pain that started a week ago.  Admitted for ACS rule out. Endocrinology is following for glycemic management    1) Poorly controlled Type 2 diabetes:  A1C is above goal likely due to dietary indiscretion  BS are still above goal.   Patient is eating well including concentrated sweets.     Inpatient Recommendations:  Basal Insulin:   Continue Glargine ( Lantus) 60 units once daily    Nutritional Insulin:  Increase Lispro (Admelog) 15 units with meals    Correctional Insulin:  Continue moderate Lispro ( Admelog) correctional scale with meals and bedtime    Oral Diabetes Medications:  None in the hospital

## 2025-03-17 NOTE — CONSULT NOTE ADULT - SUBJECTIVE AND OBJECTIVE BOX
HPI:  59-year-old female, AAOx3, ambulates with a cane, history of DM, HLD, asthma, COPD on 4L NC, migraines, hypothyroidism, NAKUL, RCC status post left renal resection  admitted on 3/10 for presenting with intermittent palpitations and chest pain for 1 week. EKG NSR. On 3/11, called RRT for unresponsiveness and respiratory failure. Pt was intubated and self-extubated on 3/12. Pt has ongoing b/l LE edema. Vascular consulted for recommendations. Pt seen and examined. Pt on nasal cannula. Endorses right LE edema going on for 7 months, not improving and unchanged. Pt states left LE edema started after she was admitted to the hospital. Duplex b/l done on 3/11 r/o DVT. Pt denies any calf tenderness at this time. Pt states she had a "vascular" surgeon who did surgery for her abdominal "mass" (dont remember which surgeon or the name of procedure or which year). She denies having any interventions done on her extremities. Pt was recommended to wear Ace wrappings by her previous doctor, but she states she couldn't tolerate it due to blisters forming after wearing them. She refuse to try it after explaining the benefits of wearing.     PAST MEDICAL & SURGICAL HISTORY:  Hypertension  vaginal cyst  Hyperlipidemia  Asthma  last inhaler use with in 10 days, on Pulm follow up [id number]  Hypothyroid  Obstructive sleep apnea  refuses Cpap  Obesity  morbid  Trigeminal neuralgia R  Fibromyalgia  DM (diabetes mellitus) 2  DJD (degenerative joint disease)  Cervical/Thoracic/Lumbar  Cervical neuralgia  difficulty moving my neck  Back pain  thoracic & lumbar  H/O bursitis  right shoulder  Radicular pain  arms, legs  Vasculitis  Legs, forerhead, arms & hands, flare ups in R leg  Dr susie Ribeiro is my Rheumatologist  Renal cell carcinoma, left  on follow up Dr schulte, HOD renal [facility], waiting for suregry in 2020  Falls frequently  Morbid obesity with body mass index (BMI) of 50.0 to 59.9 in adult  Psoriasis  Diverticulosis  Fatty liver  S/P abdominal hysterectomy    S/P  section 1  Left adrenal mass  excision, benign 2006  Vaginal cyst  removed   Vasculitis on nerve biopsy  , had another surgery called microvascular decompression   S/P left oophorectomy  S/P colonoscopic polypectomy      MEDICATIONS  (STANDING):  albuterol    0.083% 2.5 milliGRAM(s) Nebulizer every 6 hours  amLODIPine   Tablet 5 milliGRAM(s) Oral daily  amoxicillin  875 milliGRAM(s)/clavulanate 1 Tablet(s) Oral two times a day  aspirin  chewable 81 milliGRAM(s) Oral daily  atorvastatin 40 milliGRAM(s) Oral at bedtime  cetirizine 10 milliGRAM(s) Oral daily  chlorhexidine 2% Cloths 1 Application(s) Topical <User Schedule>  dexAMETHasone     Tablet 3 milliGRAM(s) Oral two times a day  enoxaparin Injectable 40 milliGRAM(s) SubCutaneous every 12 hours  fluticasone propionate 50 MICROgram(s)/spray Nasal Spray 1 Spray(s) Both Nostrils two times a day  fluticasone propionate/ salmeterol 250-50 MICROgram(s) Diskus 1 Dose(s) Inhalation two times a day  guaiFENesin Oral Liquid (Sugar-Free) 200 milliGRAM(s) Oral every 6 hours  hydrocortisone 1% Ointment 1 Application(s) Topical every 12 hours  insulin glargine Injectable (LANTUS) 60 Unit(s) SubCutaneous at bedtime  insulin lispro (ADMELOG) corrective regimen sliding scale   SubCutaneous three times a day before meals  insulin lispro (ADMELOG) corrective regimen sliding scale   SubCutaneous at bedtime  insulin lispro Injectable (ADMELOG) 15 Unit(s) SubCutaneous three times a day before meals  levothyroxine 112 MICROGram(s) Oral daily  losartan 25 milliGRAM(s) Oral daily  montelukast 10 milliGRAM(s) Oral daily  OXcarbazepine 600 milliGRAM(s) Oral two times a day  pantoprazole    Tablet 40 milliGRAM(s) Oral before breakfast  polyethylene glycol 3350 17 Gram(s) Oral daily  senna 2 Tablet(s) Oral at bedtime    MEDICATIONS  (PRN):  albuterol    90 MICROgram(s) HFA Inhaler 1 Puff(s) Inhalation every 4 hours PRN Bronchospasm  sodium chloride 0.65% Nasal 1 Spray(s) Both Nostrils three times a day PRN Nasal Congestion      Allergies    Fioricet (Rash)  venlafaxine (Hives)  mushroom (Unknown)  gadobutrol (Rash; Urticaria; Hives)  IV Contrast (Short breath)  honeydew (Rash)    Intolerances        ROS: Negative except per HPI.     SOCIAL HISTORY:  Smoking history   ETOH history    OBJECTIVE:    Vital Signs Last 24 Hrs  T(C): 36.9 (17 Mar 2025 13:57), Max: 37.1 (16 Mar 2025 20:38)  T(F): 98.5 (17 Mar 2025 13:57), Max: 98.8 (16 Mar 2025 20:38)  HR: 94 (17 Mar 2025 13:57) (85 - 94)  BP: 142/71 (17 Mar 2025 13:57) (137/70 - 144/82)  BP(mean): 103 (17 Mar 2025 04:57) (103 - 103)  RR: 18 (17 Mar 2025 13:57) (17 - 18)  SpO2: 94% (17 Mar 2025 13:57) (92% - 94%)    Parameters below as of 17 Mar 2025 13:57  Patient On (Oxygen Delivery Method): nasal cannula, 3        I&O's Summary      LABS:                        11.1   11.52 )-----------( 280      ( 17 Mar 2025 07:16 )             36.7     03-17    132[L]  |  98  |  21[H]  ----------------------------<  279[H]  4.4   |  31  |  1.08    Ca    9.5      17 Mar 2025 07:16  Phos  2.6     03-17  Mg     1.7     -17    TPro  8.1  /  Alb  2.9[L]  /  TBili  0.4  /  DBili  x   /  AST  33  /  ALT  44  /  AlkPhos  122[H]  03-17      Urinalysis Basic - ( 17 Mar 2025 07:16 )    Color: x / Appearance: x / SG: x / pH: x  Gluc: 279 mg/dL / Ketone: x  / Bili: x / Urobili: x   Blood: x / Protein: x / Nitrite: x   Leuk Esterase: x / RBC: x / WBC x   Sq Epi: x / Non Sq Epi: x / Bacteria: x      CAPILLARY BLOOD GLUCOSE      POCT Blood Glucose.: 385 mg/dL (17 Mar 2025 11:21)  POCT Blood Glucose.: 251 mg/dL (17 Mar 2025 08:01)  POCT Blood Glucose.: 356 mg/dL (16 Mar 2025 21:23)  POCT Blood Glucose.: 221 mg/dL (16 Mar 2025 16:40)    LIVER FUNCTIONS - ( 17 Mar 2025 07:16 )  Alb: 2.9 g/dL / Pro: 8.1 g/dL / ALK PHOS: 122 U/L / ALT: 44 U/L DA / AST: 33 U/L / GGT: x             PHYSICAL EXAM:   General: NAD.   Pulm: Normal chest rise and expansion. On nasal cannula  GI: Truncal obesity. Abdomen soft, nondistended, nontender.   Extremities: Warm, dry. B/l nonpitting edema. Well demarcated psoriasis rash on knee b/l. No calf tenderness b/l.  Vascular: b/l DP nonpalpable due to edema. b/l Femoral pulses palpable.        RADIOLOGY & ADDITIONAL STUDIES:    < from: US Duplex Venous Lower Ext Complete, Bilateral (25 @ 11:26) >  FINDINGS:    RIGHT:  Normal compressibility of the RIGHT common femoral, femoral and popliteal   veins.  Doppler examination shows normal spontaneous and phasic flow.  No RIGHT calf vein thrombosis is detected.    LEFT:  Normal compressibility of the LEFT common femoral, femoral and popliteal   veins.  Doppler examination shows normal spontaneous and phasic flow.  No LEFT calf vein thrombosis is detected.    Superficial edema in both calves.    IMPRESSION:  No evidence of deep venous thrombosis in either lower extremity.      < end of copied text >         HPI:  59-year-old female, AAOx3, ambulates with a cane, history of DM, HLD, asthma, COPD on 4L NC, migraines, hypothyroidism, NAKUL, RCC status post left renal resection  admitted on 3/10 for presenting with intermittent palpitations and chest pain for 1 week. EKG NSR. On 3/11, called RRT for unresponsiveness and respiratory failure. Pt was intubated and self-extubated on 3/12. Pt has ongoing b/l LE edema. Vascular consulted for recommendations. Pt seen and examined. Pt on nasal cannula. Endorses right LE edema going on for 7 months, not improving and unchanged. Pt states left LE edema started after she was admitted to the hospital. Duplex b/l done on 3/11 r/o DVT. Pt denies any calf tenderness at this time. Pt states she had a "vascular" surgeon who did surgery for her abdominal "mass" (doesn't remember which surgeon or the name of procedure or which year). She denies having any vascular interventions done on her extremities. Pt was recommended to wear ACE wraps by her previous doctor, but she states she couldn't tolerate it due to blisters forming after wearing them and getting too hot. She refused to try it again.    PAST MEDICAL & SURGICAL HISTORY:  Hypertension  vaginal cyst  Hyperlipidemia  Asthma  last inhaler use with in 10 days, on Pulm follow up [id number]  Hypothyroid  Obstructive sleep apnea  refuses Cpap  Obesity  morbid  Trigeminal neuralgia R  Fibromyalgia  DM (diabetes mellitus) 2  DJD (degenerative joint disease)  Cervical/Thoracic/Lumbar  Cervical neuralgia  difficulty moving my neck  Back pain  thoracic & lumbar  H/O bursitis  right shoulder  Radicular pain  arms, legs  Vasculitis  Legs, forerhead, arms & hands, flare ups in R leg  Dr susie Ribeiro is my Rheumatologist  Renal cell carcinoma, left  on follow up Dr schulte, HOD renal [facility], waiting for suregry in 2020  Falls frequently  Morbid obesity with body mass index (BMI) of 50.0 to 59.9 in adult  Psoriasis  Diverticulosis  Fatty liver  S/P abdominal hysterectomy    S/P  section 1  Left adrenal mass  excision, benign 2006  Vaginal cyst  removed   Vasculitis on nerve biopsy  , had another surgery called microvascular decompression   S/P left oophorectomy  S/P colonoscopic polypectomy      MEDICATIONS  (STANDING):  albuterol    0.083% 2.5 milliGRAM(s) Nebulizer every 6 hours  amLODIPine   Tablet 5 milliGRAM(s) Oral daily  amoxicillin  875 milliGRAM(s)/clavulanate 1 Tablet(s) Oral two times a day  aspirin  chewable 81 milliGRAM(s) Oral daily  atorvastatin 40 milliGRAM(s) Oral at bedtime  cetirizine 10 milliGRAM(s) Oral daily  chlorhexidine 2% Cloths 1 Application(s) Topical <User Schedule>  dexAMETHasone     Tablet 3 milliGRAM(s) Oral two times a day  enoxaparin Injectable 40 milliGRAM(s) SubCutaneous every 12 hours  fluticasone propionate 50 MICROgram(s)/spray Nasal Spray 1 Spray(s) Both Nostrils two times a day  fluticasone propionate/ salmeterol 250-50 MICROgram(s) Diskus 1 Dose(s) Inhalation two times a day  guaiFENesin Oral Liquid (Sugar-Free) 200 milliGRAM(s) Oral every 6 hours  hydrocortisone 1% Ointment 1 Application(s) Topical every 12 hours  insulin glargine Injectable (LANTUS) 60 Unit(s) SubCutaneous at bedtime  insulin lispro (ADMELOG) corrective regimen sliding scale   SubCutaneous three times a day before meals  insulin lispro (ADMELOG) corrective regimen sliding scale   SubCutaneous at bedtime  insulin lispro Injectable (ADMELOG) 15 Unit(s) SubCutaneous three times a day before meals  levothyroxine 112 MICROGram(s) Oral daily  losartan 25 milliGRAM(s) Oral daily  montelukast 10 milliGRAM(s) Oral daily  OXcarbazepine 600 milliGRAM(s) Oral two times a day  pantoprazole    Tablet 40 milliGRAM(s) Oral before breakfast  polyethylene glycol 3350 17 Gram(s) Oral daily  senna 2 Tablet(s) Oral at bedtime    MEDICATIONS  (PRN):  albuterol    90 MICROgram(s) HFA Inhaler 1 Puff(s) Inhalation every 4 hours PRN Bronchospasm  sodium chloride 0.65% Nasal 1 Spray(s) Both Nostrils three times a day PRN Nasal Congestion      Allergies    Fioricet (Rash)  venlafaxine (Hives)  mushroom (Unknown)  gadobutrol (Rash; Urticaria; Hives)  IV Contrast (Short breath)  honeydew (Rash)    Intolerances        ROS: Negative except per HPI.     SOCIAL HISTORY:  Smoking history   ETOH history    OBJECTIVE:    Vital Signs Last 24 Hrs  T(C): 36.9 (17 Mar 2025 13:57), Max: 37.1 (16 Mar 2025 20:38)  T(F): 98.5 (17 Mar 2025 13:57), Max: 98.8 (16 Mar 2025 20:38)  HR: 94 (17 Mar 2025 13:57) (85 - 94)  BP: 142/71 (17 Mar 2025 13:57) (137/70 - 144/82)  BP(mean): 103 (17 Mar 2025 04:57) (103 - 103)  RR: 18 (17 Mar 2025 13:57) (17 - 18)  SpO2: 94% (17 Mar 2025 13:57) (92% - 94%)    Parameters below as of 17 Mar 2025 13:57  Patient On (Oxygen Delivery Method): nasal cannula, 3        I&O's Summary      LABS:                        11.1   11.52 )-----------( 280      ( 17 Mar 2025 07:16 )             36.7     03-17    132[L]  |  98  |  21[H]  ----------------------------<  279[H]  4.4   |  31  |  1.08    Ca    9.5      17 Mar 2025 07:16  Phos  2.6     03-17  Mg     1.7     03-17    TPro  8.1  /  Alb  2.9[L]  /  TBili  0.4  /  DBili  x   /  AST  33  /  ALT  44  /  AlkPhos  122[H]  03-17      Urinalysis Basic - ( 17 Mar 2025 07:16 )    Color: x / Appearance: x / SG: x / pH: x  Gluc: 279 mg/dL / Ketone: x  / Bili: x / Urobili: x   Blood: x / Protein: x / Nitrite: x   Leuk Esterase: x / RBC: x / WBC x   Sq Epi: x / Non Sq Epi: x / Bacteria: x      CAPILLARY BLOOD GLUCOSE      POCT Blood Glucose.: 385 mg/dL (17 Mar 2025 11:21)  POCT Blood Glucose.: 251 mg/dL (17 Mar 2025 08:01)  POCT Blood Glucose.: 356 mg/dL (16 Mar 2025 21:23)  POCT Blood Glucose.: 221 mg/dL (16 Mar 2025 16:40)    LIVER FUNCTIONS - ( 17 Mar 2025 07:16 )  Alb: 2.9 g/dL / Pro: 8.1 g/dL / ALK PHOS: 122 U/L / ALT: 44 U/L DA / AST: 33 U/L / GGT: x             PHYSICAL EXAM:   General: NAD.   Pulm: Normal chest rise and expansion. On nasal cannula  GI: Truncal obesity. Abdomen soft, nondistended, nontender.   Extremities: Warm, dry, no ulcerations or wounds, b/l nonpitting edema. Well demarcated psoriasis rashes on anterior shins b/l. No calf tenderness b/l.  Vascular: b/l DP nonpalpable secondary to edema, b/l femoral pulses palpable.        RADIOLOGY & ADDITIONAL STUDIES:    < from: US Duplex Venous Lower Ext Complete, Bilateral (25 @ 11:26) >  FINDINGS:    RIGHT:  Normal compressibility of the RIGHT common femoral, femoral and popliteal   veins.  Doppler examination shows normal spontaneous and phasic flow.  No RIGHT calf vein thrombosis is detected.    LEFT:  Normal compressibility of the LEFT common femoral, femoral and popliteal   veins.  Doppler examination shows normal spontaneous and phasic flow.  No LEFT calf vein thrombosis is detected.  Superficial edema in both calves.    IMPRESSION:  No evidence of deep venous thrombosis in either lower extremity.  < end of copied text >

## 2025-03-17 NOTE — PROGRESS NOTE ADULT - PROBLEM SELECTOR PLAN 9
not in exacerbation   Continue bronchodilator , and ICS  Continue montelukast   AVAPS nightly and as needed during the day.   Oxygen supplementation when off AVAPS  Pulm Dr. Ramirez following inpt  Follows with Dr. Elizalde as an outpatient.

## 2025-03-17 NOTE — PROGRESS NOTE ADULT - PROBLEM SELECTOR PROBLEM 8
well developed, well nourished , in no acute distress , ambulating without difficulty , normal communication ability Leg swelling

## 2025-03-17 NOTE — PROGRESS NOTE ADULT - PROBLEM SELECTOR PLAN 7
Noted Kyphosis of spine which is diminishing her thoracic expansion which is contributing to her hypercapnia.  Continue AVAPS nightly and as needed during the day.  AVAPS approved. Pending delivery. CM following

## 2025-03-17 NOTE — CONSULT NOTE ADULT - ASSESSMENT
59 yro F with b/l LE edema  on nasal cannula  DVT neg 3/11   59 yro F with b/l LE edema  on nasal cannula  DVT neg 3/11    Pt refusing to use ace wrapping.    Plan:  -No vascular intervention at this time    incomplete- tbd with Dr. Bowman   59 yro F with b/l LE edema  DVT neg 3/11    -No vascular intervention at this time  -Recommend outpatient follow up for venous studies  -Discussed with Dr. Bowman

## 2025-03-17 NOTE — PROGRESS NOTE ADULT - SUBJECTIVE AND OBJECTIVE BOX
NOEMY CONTRERAS    SCU progress note    INTERVAL HPI/OVERNIGHT EVENTS: ***Started on Augmentin and low dose decadron for poss sinus infection.     DNI  [x  ]       CPR only    Covid - 19 PCR:     The 4Ms    What Matters Most: see GOC  Age appropriate Medications/Screen for High Risk Medication: Yes  Mentation: see CAM below  Mobility: defer to physical exam    The Confusion Assessment Method (CAM) Diagnostic Algorithm     1: Acute Onset or Fluctuating Course  - Is there evidence of an acute change in mental status from the patient’s baseline? Did the (abnormal) behavior  fluctuate during the day, that is, tend to come and go, or increase and decrease in severity?  [ ] YES [x ] NO    [  ]  UNABLE TO ASSESS      2: Inattention  - Did the patient have difficulty focusing attention, being easily distractible, or having difficulty keeping track of what was being said?  [ ] YES [ x] NO      [  ]  UNABLE TO ASSESS      3: Disorganized thinking  -Was the patient’s thinking disorganized or incoherent, such as rambling or irrelevant conversation, unclear or illogical flow of ideas, or unpredictable switching from subject to subject?  [ ] YES [ x] NO      [  ]  UNABLE TO ASSESS     4: Altered Level of consciousness?  [ ] YES [ x] NO    The diagnosis of delirium by CAM requires the presence of features 1 and 2 and either 3 or 4.    PRESSORS: [ ] YES [x ] NO  amoxicillin  875 milliGRAM(s)/clavulanate 1 Tablet(s) Oral two times a day    Cardiovascular:  Heart Failure  Acute   Acute on Chronic  Chronic       amLODIPine   Tablet 5 milliGRAM(s) Oral daily  losartan 25 milliGRAM(s) Oral daily    Pulmonary:  albuterol    0.083% 2.5 milliGRAM(s) Nebulizer every 6 hours  albuterol    90 MICROgram(s) HFA Inhaler 1 Puff(s) Inhalation every 4 hours PRN  cetirizine 10 milliGRAM(s) Oral daily  fluticasone propionate/ salmeterol 250-50 MICROgram(s) Diskus 1 Dose(s) Inhalation two times a day  guaiFENesin Oral Liquid (Sugar-Free) 200 milliGRAM(s) Oral every 6 hours  montelukast 10 milliGRAM(s) Oral daily    Hematologic:  aspirin  chewable 81 milliGRAM(s) Oral daily  enoxaparin Injectable 40 milliGRAM(s) SubCutaneous every 12 hours    Other:  atorvastatin 40 milliGRAM(s) Oral at bedtime  chlorhexidine 2% Cloths 1 Application(s) Topical <User Schedule>  dexAMETHasone     Tablet 3 milliGRAM(s) Oral two times a day  fluticasone propionate 50 MICROgram(s)/spray Nasal Spray 1 Spray(s) Both Nostrils two times a day  hydrocortisone 1% Ointment 1 Application(s) Topical every 12 hours  insulin glargine Injectable (LANTUS) 60 Unit(s) SubCutaneous at bedtime  insulin lispro (ADMELOG) corrective regimen sliding scale   SubCutaneous three times a day before meals  insulin lispro (ADMELOG) corrective regimen sliding scale   SubCutaneous at bedtime  insulin lispro Injectable (ADMELOG) 12 Unit(s) SubCutaneous three times a day before meals  levothyroxine 112 MICROGram(s) Oral daily  OXcarbazepine 600 milliGRAM(s) Oral two times a day  pantoprazole    Tablet 40 milliGRAM(s) Oral before breakfast  polyethylene glycol 3350 17 Gram(s) Oral daily  senna 2 Tablet(s) Oral at bedtime  sodium chloride 0.65% Nasal 1 Spray(s) Both Nostrils three times a day PRN    albuterol    0.083% 2.5 milliGRAM(s) Nebulizer every 6 hours  albuterol    90 MICROgram(s) HFA Inhaler 1 Puff(s) Inhalation every 4 hours PRN  amLODIPine   Tablet 5 milliGRAM(s) Oral daily  amoxicillin  875 milliGRAM(s)/clavulanate 1 Tablet(s) Oral two times a day  aspirin  chewable 81 milliGRAM(s) Oral daily  atorvastatin 40 milliGRAM(s) Oral at bedtime  cetirizine 10 milliGRAM(s) Oral daily  chlorhexidine 2% Cloths 1 Application(s) Topical <User Schedule>  dexAMETHasone     Tablet 3 milliGRAM(s) Oral two times a day  enoxaparin Injectable 40 milliGRAM(s) SubCutaneous every 12 hours  fluticasone propionate 50 MICROgram(s)/spray Nasal Spray 1 Spray(s) Both Nostrils two times a day  fluticasone propionate/ salmeterol 250-50 MICROgram(s) Diskus 1 Dose(s) Inhalation two times a day  guaiFENesin Oral Liquid (Sugar-Free) 200 milliGRAM(s) Oral every 6 hours  hydrocortisone 1% Ointment 1 Application(s) Topical every 12 hours  insulin glargine Injectable (LANTUS) 60 Unit(s) SubCutaneous at bedtime  insulin lispro (ADMELOG) corrective regimen sliding scale   SubCutaneous three times a day before meals  insulin lispro (ADMELOG) corrective regimen sliding scale   SubCutaneous at bedtime  insulin lispro Injectable (ADMELOG) 12 Unit(s) SubCutaneous three times a day before meals  levothyroxine 112 MICROGram(s) Oral daily  losartan 25 milliGRAM(s) Oral daily  montelukast 10 milliGRAM(s) Oral daily  OXcarbazepine 600 milliGRAM(s) Oral two times a day  pantoprazole    Tablet 40 milliGRAM(s) Oral before breakfast  polyethylene glycol 3350 17 Gram(s) Oral daily  senna 2 Tablet(s) Oral at bedtime  sodium chloride 0.65% Nasal 1 Spray(s) Both Nostrils three times a day PRN    Drug Dosing Weight  Height (cm): 162.6 (12 Mar 2025 00:00)  Weight (kg): 136.1 (12 Mar 2025 00:00)  BMI (kg/m2): 51.5 (12 Mar 2025 00:00)  BSA (m2): 2.32 (12 Mar 2025 00:00)    CENTRAL LINE: [ ] YES [x ] NO  LOCATION:   DATE INSERTED:  REMOVE: [ ] YES [ ] NO  EXPLAIN:    WILDER: [ ] YES [x ] NO    DATE INSERTED:  REMOVE:  [ ] YES [ ] NO  EXPLAIN:    PAST MEDICAL & SURGICAL HISTORY:  Hypertension  vaginal cyst  Hyperlipidemia  Asthma, last inhaler use with in 10 days, on Pulm follow up [id number]  Hypothyroid  Obstructive sleep apnea, refuses Cpap  Obesity  morbid  Trigeminal neuralgia, R  Fibromyalgia  DM (diabetes mellitus)2  DJD (degenerative joint disease)  Cervical/Thoracic/Lumbar  Cervical neuralgia difficulty moving my neck  Back pain thoracic & lumbar  H/O bursitis right shoulder  Radicular pain arms, legs  Vasculitis,Legs, forerhead, arms & hands, flare ups in R leg  Dr susie Ribeiro is my Rheumatologist      Renal cell carcinoma, left  on follow up Dr schulte, HOD renal [facility], waiting for suregry in 2020  Falls frequently  Morbid obesity with body mass index (BMI) of 50.0 to 59.9 in adult  Psoriasis  Diverticulosis  Fatty liver  S/P abdominal hysterectomy    S/P  section, 1  Left adrenal mass, excision, benign   Vaginal cyst, removed   Vasculitis on nerve biopsy  , had another surgery called microvascular decompression   S/P left oophorectomy  S/P colonoscopic polypectomy        PHYSICAL EXAM:  Gen: NAD. morbid obesity  HEENT: PERRL, anicteric, no oral mucositis  Resp:Decreased  breath sounds on auscultation. No rales, no wheezing  CVS: S1S2 present, regular  GI: BS(+), Soft, ND, NT  Extremities : BLE 1+-2_ edema. No calf tenderness. PPP  Neuro: Awake/alert.  no new neuro deficits  Skin: warm,dry,chronic skin changes         LABS:  CBC Full  -  ( 17 Mar 2025 07:16 )  WBC Count : 11.52 K/uL  RBC Count : 4.61 M/uL  Hemoglobin : 11.1 g/dL  Hematocrit : 36.7 %  Platelet Count - Automated : 280 K/uL  Mean Cell Volume : 79.6 fl  Mean Cell Hemoglobin : 24.1 pg  Mean Cell Hemoglobin Concentration : 30.2 g/dL  Auto Neutrophil # : x  Auto Lymphocyte # : x  Auto Monocyte # : x  Auto Eosinophil # : x  Auto Basophil # : x  Auto Neutrophil % : x  Auto Lymphocyte % : x  Auto Monocyte % : x  Auto Eosinophil % : x  Auto Basophil % : x    03-    132[L]  |  98  |  21[H]  ----------------------------<  279[H]  4.4   |  31  |  1.08    Ca    9.5      17 Mar 2025 07:16  Phos  2.6     -  Mg     1.7     -    TPro  8.1  /  Alb  2.9[L]  /  TBili  0.4  /  DBili  x   /  AST  33  /  ALT  44  /  AlkPhos  122[H]  -17      Urinalysis Basic - ( 17 Mar 2025 07:16 )    Color: x / Appearance: x / SG: x / pH: x  Gluc: 279 mg/dL / Ketone: x  / Bili: x / Urobili: x   Blood: x / Protein: x / Nitrite: x   Leuk Esterase: x / RBC: x / WBC x   Sq Epi: x / Non Sq Epi: x / Bacteria: x            [  ]  DVT Prophylaxis  [  ]   Abnormal Nutritional Status -  Malnutrition   Cachexia      Morbid Obesity BMI >/=40  Diet, DASH/TLC:   Sodium & Cholesterol Restricted  Consistent Carbohydrate No Snacks (25 @ 10:52) [Active]          RADIOLOGY & ADDITIONAL STUDIES:  ***    Goals of Care Discussion with Family/Proxy/Other   - see note from     NOEMY CONTRERAS    SCU progress note    INTERVAL HPI/OVERNIGHT EVENTS: ***Started on Augmentin and low dose decadron for poss sinus infection.     DNI  [x  ]       CPR only    Covid - 19 PCR:     The 4Ms    What Matters Most: see GOC  Age appropriate Medications/Screen for High Risk Medication: Yes  Mentation: see CAM below  Mobility: defer to physical exam    The Confusion Assessment Method (CAM) Diagnostic Algorithm     1: Acute Onset or Fluctuating Course  - Is there evidence of an acute change in mental status from the patient’s baseline? Did the (abnormal) behavior  fluctuate during the day, that is, tend to come and go, or increase and decrease in severity?  [ ] YES [x ] NO    [  ]  UNABLE TO ASSESS      2: Inattention  - Did the patient have difficulty focusing attention, being easily distractible, or having difficulty keeping track of what was being said?  [ ] YES [ x] NO      [  ]  UNABLE TO ASSESS      3: Disorganized thinking  -Was the patient’s thinking disorganized or incoherent, such as rambling or irrelevant conversation, unclear or illogical flow of ideas, or unpredictable switching from subject to subject?  [ ] YES [ x] NO      [  ]  UNABLE TO ASSESS     4: Altered Level of consciousness?  [ ] YES [ x] NO    The diagnosis of delirium by CAM requires the presence of features 1 and 2 and either 3 or 4.    PRESSORS: [ ] YES [x ] NO  amoxicillin  875 milliGRAM(s)/clavulanate 1 Tablet(s) Oral two times a day    Cardiovascular:  Heart Failure  Acute   Acute on Chronic  Chronic       amLODIPine   Tablet 5 milliGRAM(s) Oral daily  losartan 25 milliGRAM(s) Oral daily    Pulmonary:  albuterol    0.083% 2.5 milliGRAM(s) Nebulizer every 6 hours  albuterol    90 MICROgram(s) HFA Inhaler 1 Puff(s) Inhalation every 4 hours PRN  cetirizine 10 milliGRAM(s) Oral daily  fluticasone propionate/ salmeterol 250-50 MICROgram(s) Diskus 1 Dose(s) Inhalation two times a day  guaiFENesin Oral Liquid (Sugar-Free) 200 milliGRAM(s) Oral every 6 hours  montelukast 10 milliGRAM(s) Oral daily    Hematologic:  aspirin  chewable 81 milliGRAM(s) Oral daily  enoxaparin Injectable 40 milliGRAM(s) SubCutaneous every 12 hours    Other:  atorvastatin 40 milliGRAM(s) Oral at bedtime  chlorhexidine 2% Cloths 1 Application(s) Topical <User Schedule>  dexAMETHasone     Tablet 3 milliGRAM(s) Oral two times a day  fluticasone propionate 50 MICROgram(s)/spray Nasal Spray 1 Spray(s) Both Nostrils two times a day  hydrocortisone 1% Ointment 1 Application(s) Topical every 12 hours  insulin glargine Injectable (LANTUS) 60 Unit(s) SubCutaneous at bedtime  insulin lispro (ADMELOG) corrective regimen sliding scale   SubCutaneous three times a day before meals  insulin lispro (ADMELOG) corrective regimen sliding scale   SubCutaneous at bedtime  insulin lispro Injectable (ADMELOG) 12 Unit(s) SubCutaneous three times a day before meals  levothyroxine 112 MICROGram(s) Oral daily  OXcarbazepine 600 milliGRAM(s) Oral two times a day  pantoprazole    Tablet 40 milliGRAM(s) Oral before breakfast  polyethylene glycol 3350 17 Gram(s) Oral daily  senna 2 Tablet(s) Oral at bedtime  sodium chloride 0.65% Nasal 1 Spray(s) Both Nostrils three times a day PRN    albuterol    0.083% 2.5 milliGRAM(s) Nebulizer every 6 hours  albuterol    90 MICROgram(s) HFA Inhaler 1 Puff(s) Inhalation every 4 hours PRN  amLODIPine   Tablet 5 milliGRAM(s) Oral daily  amoxicillin  875 milliGRAM(s)/clavulanate 1 Tablet(s) Oral two times a day  aspirin  chewable 81 milliGRAM(s) Oral daily  atorvastatin 40 milliGRAM(s) Oral at bedtime  cetirizine 10 milliGRAM(s) Oral daily  chlorhexidine 2% Cloths 1 Application(s) Topical <User Schedule>  dexAMETHasone     Tablet 3 milliGRAM(s) Oral two times a day  enoxaparin Injectable 40 milliGRAM(s) SubCutaneous every 12 hours  fluticasone propionate 50 MICROgram(s)/spray Nasal Spray 1 Spray(s) Both Nostrils two times a day  fluticasone propionate/ salmeterol 250-50 MICROgram(s) Diskus 1 Dose(s) Inhalation two times a day  guaiFENesin Oral Liquid (Sugar-Free) 200 milliGRAM(s) Oral every 6 hours  hydrocortisone 1% Ointment 1 Application(s) Topical every 12 hours  insulin glargine Injectable (LANTUS) 60 Unit(s) SubCutaneous at bedtime  insulin lispro (ADMELOG) corrective regimen sliding scale   SubCutaneous three times a day before meals  insulin lispro (ADMELOG) corrective regimen sliding scale   SubCutaneous at bedtime  insulin lispro Injectable (ADMELOG) 12 Unit(s) SubCutaneous three times a day before meals  levothyroxine 112 MICROGram(s) Oral daily  losartan 25 milliGRAM(s) Oral daily  montelukast 10 milliGRAM(s) Oral daily  OXcarbazepine 600 milliGRAM(s) Oral two times a day  pantoprazole    Tablet 40 milliGRAM(s) Oral before breakfast  polyethylene glycol 3350 17 Gram(s) Oral daily  senna 2 Tablet(s) Oral at bedtime  sodium chloride 0.65% Nasal 1 Spray(s) Both Nostrils three times a day PRN    Drug Dosing Weight  Height (cm): 162.6 (12 Mar 2025 00:00)  Weight (kg): 136.1 (12 Mar 2025 00:00)  BMI (kg/m2): 51.5 (12 Mar 2025 00:00)  BSA (m2): 2.32 (12 Mar 2025 00:00)    CENTRAL LINE: [ ] YES [x ] NO  LOCATION:   DATE INSERTED:  REMOVE: [ ] YES [ ] NO  EXPLAIN:    WILDER: [ ] YES [x ] NO    DATE INSERTED:  REMOVE:  [ ] YES [ ] NO  EXPLAIN:    PAST MEDICAL & SURGICAL HISTORY:  Hypertension  vaginal cyst  Hyperlipidemia  Asthma, last inhaler use with in 10 days, on Pulm follow up [id number]  Hypothyroid  Obstructive sleep apnea, refuses Cpap  Obesity  morbid  Trigeminal neuralgia, R  Fibromyalgia  DM (diabetes mellitus)2  DJD (degenerative joint disease)  Cervical/Thoracic/Lumbar  Cervical neuralgia difficulty moving my neck  Back pain thoracic & lumbar  H/O bursitis right shoulder  Radicular pain arms, legs  Vasculitis,Legs, forerhead, arms & hands, flare ups in R leg  Dr susie Ribeiro is my Rheumatologist      Renal cell carcinoma, left  on follow up Dr schulte, HOD renal [facility], waiting for suregry in 2020  Falls frequently  Morbid obesity with body mass index (BMI) of 50.0 to 59.9 in adult  Psoriasis  Diverticulosis  Fatty liver  S/P abdominal hysterectomy    S/P  section, 1  Left adrenal mass, excision, benign   Vaginal cyst, removed   Vasculitis on nerve biopsy  , had another surgery called microvascular decompression   S/P left oophorectomy  S/P colonoscopic polypectomy        PHYSICAL EXAM:  Gen: NAD. morbid obesity  HEENT: PERRL, anicteric, no oral mucositis  Resp:Decreased  breath sounds on auscultation. No rales, no wheezing  CVS: S1S2 present, regular  GI: BS(+), Soft, ND, NT  Extremities : BLE 1+-2_ edema. No calf tenderness. PPP  Neuro: Awake/alert.  no new neuro deficits  Skin: warm,dry,chronic skin changes         LABS:  CBC Full  -  ( 17 Mar 2025 07:16 )  WBC Count : 11.52 K/uL  RBC Count : 4.61 M/uL  Hemoglobin : 11.1 g/dL  Hematocrit : 36.7 %  Platelet Count - Automated : 280 K/uL  Mean Cell Volume : 79.6 fl  Mean Cell Hemoglobin : 24.1 pg  Mean Cell Hemoglobin Concentration : 30.2 g/dL  Auto Neutrophil # : x  Auto Lymphocyte # : x  Auto Monocyte # : x  Auto Eosinophil # : x  Auto Basophil # : x  Auto Neutrophil % : x  Auto Lymphocyte % : x  Auto Monocyte % : x  Auto Eosinophil % : x  Auto Basophil % : x    03-    132[L]  |  98  |  21[H]  ----------------------------<  279[H]  4.4   |  31  |  1.08    Ca    9.5      17 Mar 2025 07:16  Phos  2.6     -  Mg     1.7     -    TPro  8.1  /  Alb  2.9[L]  /  TBili  0.4  /  DBili  x   /  AST  33  /  ALT  44  /  AlkPhos  122[H]  -17      Urinalysis Basic - ( 17 Mar 2025 07:16 )    Color: x / Appearance: x / SG: x / pH: x  Gluc: 279 mg/dL / Ketone: x  / Bili: x / Urobili: x   Blood: x / Protein: x / Nitrite: x   Leuk Esterase: x / RBC: x / WBC x   Sq Epi: x / Non Sq Epi: x / Bacteria: x        [  ]  DVT Prophylaxis  [  ]   Abnormal Nutritional Status -     Morbid Obesity BMI >/=40  Diet, DASH/TLC:   Sodium & Cholesterol Restricted  Consistent Carbohydrate No Snacks (25 @ 10:52) [Active]      RADIOLOGY & ADDITIONAL STUDIES:  ***    < from: TTE W or WO Ultrasound Enhancing Agent (25 @ 12:19) >  CONCLUSIONS:      1. Technically difficult image quality.   2. Left ventricular cavity is normal in size. Left ventricular wall thickness is normal. Left ventricular systolic function is normal with an ejection fraction of 54 % by Valdez's method of disks. There are no regional wall motion abnormalities seen.   3. There is mild (grade 1) left ventricular diastolic dysfunction.   4. Left atrium is mildly dilated.   5. Normal right ventricular systolic function. Tricuspid annular plane systolic excursion (TAPSE) is 2.3 cm (normal >=1.7 cm).   6. No significant valvular disease.   7. Pulmonary artery systolic pressure could not be estimated.   8. No pericardial effusion seen.   9. Compared to the transthoracic echocardiogram performed on 3/20/2024, there have been no significant interval changes.    < end of copied text >  < from: Xray Chest 1 View- PORTABLE-Urgent (Xray Chest 1 View- PORTABLE-Urgent .) (25 @ 10:22) >  INTERPRETATION:  Exam:XR CHEST URGENT    clinical history:intubated    Removal of endotracheal and nasogastric tubes. Improved aeration right   lung. Stable left basilar airspace disease.    IMPRESSION: Improved aeration.    < end of copied text >  < from: Xray Chest 1 View- PORTABLE-Urgent (Xray Chest 1 View- PORTABLE-Urgent .) (25 @ 00:44) >  INTERPRETATION:  Exam:XR CHEST URGENT    clinical history:RRT    Endotracheal tube above the mic. NG tube left upper quadrant.   Bibasilar airspace disease left greater than right.    IMPRESSION: Bibasilar airspace disease. Support tubes as above    < end of copied text >  < from: US Duplex Venous Lower Ext Complete, Bilateral (25 @ 11:26) >    IMPRESSION:  No evidence of deep venous thrombosis in either lower extremity.    < end of copied text >  < from: Xray Chest 1 View- PORTABLE-Urgent (03.10.25 @ 19:00) >  IMPRESSION:   No radiographic evidence of active chest disease..    < end of copied text >      Goals of Care Discussion with Family/Proxy/Other   - see note from  3/12/25

## 2025-03-18 VITALS
DIASTOLIC BLOOD PRESSURE: 77 MMHG | TEMPERATURE: 98 F | SYSTOLIC BLOOD PRESSURE: 152 MMHG | OXYGEN SATURATION: 95 % | RESPIRATION RATE: 18 BRPM | HEART RATE: 87 BPM

## 2025-03-18 LAB
ANION GAP SERPL CALC-SCNC: 4 MMOL/L — LOW (ref 5–17)
BUN SERPL-MCNC: 22 MG/DL — HIGH (ref 7–18)
CALCIUM SERPL-MCNC: 9.8 MG/DL — SIGNIFICANT CHANGE UP (ref 8.4–10.5)
CHLORIDE SERPL-SCNC: 96 MMOL/L — SIGNIFICANT CHANGE UP (ref 96–108)
CO2 SERPL-SCNC: 33 MMOL/L — HIGH (ref 22–31)
CREAT SERPL-MCNC: 1.09 MG/DL — SIGNIFICANT CHANGE UP (ref 0.5–1.3)
EGFR: 59 ML/MIN/1.73M2 — LOW
EGFR: 59 ML/MIN/1.73M2 — LOW
GLUCOSE SERPL-MCNC: 224 MG/DL — HIGH (ref 70–99)
MAGNESIUM SERPL-MCNC: 1.9 MG/DL — SIGNIFICANT CHANGE UP (ref 1.6–2.6)
POTASSIUM SERPL-MCNC: 4.1 MMOL/L — SIGNIFICANT CHANGE UP (ref 3.5–5.3)
POTASSIUM SERPL-SCNC: 4.1 MMOL/L — SIGNIFICANT CHANGE UP (ref 3.5–5.3)
SODIUM SERPL-SCNC: 133 MMOL/L — LOW (ref 135–145)

## 2025-03-18 PROCEDURE — 87641 MR-STAPH DNA AMP PROBE: CPT

## 2025-03-18 PROCEDURE — 84484 ASSAY OF TROPONIN QUANT: CPT

## 2025-03-18 PROCEDURE — 87640 STAPH A DNA AMP PROBE: CPT

## 2025-03-18 PROCEDURE — 85027 COMPLETE CBC AUTOMATED: CPT

## 2025-03-18 PROCEDURE — 84132 ASSAY OF SERUM POTASSIUM: CPT

## 2025-03-18 PROCEDURE — 80053 COMPREHEN METABOLIC PANEL: CPT

## 2025-03-18 PROCEDURE — 84295 ASSAY OF SERUM SODIUM: CPT

## 2025-03-18 PROCEDURE — 36415 COLL VENOUS BLD VENIPUNCTURE: CPT

## 2025-03-18 PROCEDURE — 83735 ASSAY OF MAGNESIUM: CPT

## 2025-03-18 PROCEDURE — 83036 HEMOGLOBIN GLYCOSYLATED A1C: CPT

## 2025-03-18 PROCEDURE — 97163 PT EVAL HIGH COMPLEX 45 MIN: CPT

## 2025-03-18 PROCEDURE — 82550 ASSAY OF CK (CPK): CPT

## 2025-03-18 PROCEDURE — 85730 THROMBOPLASTIN TIME PARTIAL: CPT

## 2025-03-18 PROCEDURE — 93306 TTE W/DOPPLER COMPLETE: CPT

## 2025-03-18 PROCEDURE — 94640 AIRWAY INHALATION TREATMENT: CPT

## 2025-03-18 PROCEDURE — 84443 ASSAY THYROID STIM HORMONE: CPT

## 2025-03-18 PROCEDURE — 82803 BLOOD GASES ANY COMBINATION: CPT

## 2025-03-18 PROCEDURE — 85025 COMPLETE CBC W/AUTO DIFF WBC: CPT

## 2025-03-18 PROCEDURE — 96374 THER/PROPH/DIAG INJ IV PUSH: CPT

## 2025-03-18 PROCEDURE — 85379 FIBRIN DEGRADATION QUANT: CPT

## 2025-03-18 PROCEDURE — C8924: CPT

## 2025-03-18 PROCEDURE — 80048 BASIC METABOLIC PNL TOTAL CA: CPT

## 2025-03-18 PROCEDURE — 84100 ASSAY OF PHOSPHORUS: CPT

## 2025-03-18 PROCEDURE — 99233 SBSQ HOSP IP/OBS HIGH 50: CPT

## 2025-03-18 PROCEDURE — 82330 ASSAY OF CALCIUM: CPT

## 2025-03-18 PROCEDURE — 99285 EMERGENCY DEPT VISIT HI MDM: CPT

## 2025-03-18 PROCEDURE — 96375 TX/PRO/DX INJ NEW DRUG ADDON: CPT

## 2025-03-18 PROCEDURE — 82947 ASSAY GLUCOSE BLOOD QUANT: CPT

## 2025-03-18 PROCEDURE — 94002 VENT MGMT INPAT INIT DAY: CPT

## 2025-03-18 PROCEDURE — 71045 X-RAY EXAM CHEST 1 VIEW: CPT

## 2025-03-18 PROCEDURE — 93005 ELECTROCARDIOGRAM TRACING: CPT

## 2025-03-18 PROCEDURE — 94660 CPAP INITIATION&MGMT: CPT

## 2025-03-18 PROCEDURE — 87637 SARSCOV2&INF A&B&RSV AMP PRB: CPT

## 2025-03-18 PROCEDURE — 82962 GLUCOSE BLOOD TEST: CPT

## 2025-03-18 PROCEDURE — 83880 ASSAY OF NATRIURETIC PEPTIDE: CPT

## 2025-03-18 PROCEDURE — 83690 ASSAY OF LIPASE: CPT

## 2025-03-18 PROCEDURE — 93970 EXTREMITY STUDY: CPT

## 2025-03-18 PROCEDURE — 80061 LIPID PANEL: CPT

## 2025-03-18 PROCEDURE — 85610 PROTHROMBIN TIME: CPT

## 2025-03-18 PROCEDURE — 83605 ASSAY OF LACTIC ACID: CPT

## 2025-03-18 PROCEDURE — 82553 CREATINE MB FRACTION: CPT

## 2025-03-18 RX ORDER — INSULIN GLARGINE-YFGN 100 [IU]/ML
65 INJECTION, SOLUTION SUBCUTANEOUS
Qty: 0 | Refills: 0 | DISCHARGE
Start: 2025-03-18

## 2025-03-18 RX ORDER — INSULIN ASPART 100 [IU]/ML
10 INJECTION, SOLUTION INTRAVENOUS; SUBCUTANEOUS
Refills: 0 | DISCHARGE

## 2025-03-18 RX ORDER — DEXTROSE 50 % IN WATER 50 %
12.5 SYRINGE (ML) INTRAVENOUS ONCE
Refills: 0 | Status: DISCONTINUED | OUTPATIENT
Start: 2025-03-18 | End: 2025-03-18

## 2025-03-18 RX ORDER — INSULIN GLARGINE-YFGN 100 [IU]/ML
65 INJECTION, SOLUTION SUBCUTANEOUS
Qty: 1 | Refills: 0
Start: 2025-03-18 | End: 2025-04-16

## 2025-03-18 RX ORDER — INSULIN GLARGINE-YFGN 100 [IU]/ML
65 INJECTION, SOLUTION SUBCUTANEOUS AT BEDTIME
Refills: 0 | Status: DISCONTINUED | OUTPATIENT
Start: 2025-03-18 | End: 2025-03-18

## 2025-03-18 RX ORDER — DEXTROSE 50 % IN WATER 50 %
25 SYRINGE (ML) INTRAVENOUS ONCE
Refills: 0 | Status: DISCONTINUED | OUTPATIENT
Start: 2025-03-18 | End: 2025-03-18

## 2025-03-18 RX ORDER — INSULIN LISPRO 100 U/ML
18 INJECTION, SOLUTION INTRAVENOUS; SUBCUTANEOUS
Refills: 0 | Status: DISCONTINUED | OUTPATIENT
Start: 2025-03-18 | End: 2025-03-18

## 2025-03-18 RX ORDER — DEXTROSE 50 % IN WATER 50 %
15 SYRINGE (ML) INTRAVENOUS ONCE
Refills: 0 | Status: DISCONTINUED | OUTPATIENT
Start: 2025-03-18 | End: 2025-03-18

## 2025-03-18 RX ORDER — GLUCAGON 3 MG/1
1 POWDER NASAL ONCE
Refills: 0 | Status: DISCONTINUED | OUTPATIENT
Start: 2025-03-18 | End: 2025-03-18

## 2025-03-18 RX ORDER — INSULIN LISPRO 100 U/ML
18 INJECTION, SOLUTION INTRAVENOUS; SUBCUTANEOUS
Qty: 1 | Refills: 0
Start: 2025-03-18 | End: 2025-04-16

## 2025-03-18 RX ORDER — ORAL SEMAGLUTIDE 14 MG/1
2 TABLET ORAL
Qty: 0 | Refills: 0 | DISCHARGE

## 2025-03-18 RX ORDER — DEXTROSE 50 % IN WATER 50 %
15 SYRINGE (ML) INTRAVENOUS
Qty: 15 | Refills: 0
Start: 2025-03-18

## 2025-03-18 RX ORDER — SODIUM CHLORIDE 9 G/1000ML
1000 INJECTION, SOLUTION INTRAVENOUS
Refills: 0 | Status: DISCONTINUED | OUTPATIENT
Start: 2025-03-18 | End: 2025-03-18

## 2025-03-18 RX ADMIN — Medication 10 MILLIGRAM(S): at 11:56

## 2025-03-18 RX ADMIN — DEXTROMETHORPHAN HBR, GUAIFENESIN 200 MILLIGRAM(S): 200 LIQUID ORAL at 11:56

## 2025-03-18 RX ADMIN — AMLODIPINE BESYLATE 5 MILLIGRAM(S): 10 TABLET ORAL at 05:39

## 2025-03-18 RX ADMIN — Medication 1 APPLICATION(S): at 05:41

## 2025-03-18 RX ADMIN — Medication 2.5 MILLIGRAM(S): at 03:47

## 2025-03-18 RX ADMIN — ENOXAPARIN SODIUM 40 MILLIGRAM(S): 100 INJECTION SUBCUTANEOUS at 05:40

## 2025-03-18 RX ADMIN — POLYETHYLENE GLYCOL 3350 17 GRAM(S): 17 POWDER, FOR SOLUTION ORAL at 11:56

## 2025-03-18 RX ADMIN — FLUTICASONE PROPIONATE 1 SPRAY(S): 50 SPRAY, METERED NASAL at 05:41

## 2025-03-18 RX ADMIN — Medication 81 MILLIGRAM(S): at 11:55

## 2025-03-18 RX ADMIN — LOSARTAN POTASSIUM 25 MILLIGRAM(S): 100 TABLET, FILM COATED ORAL at 05:39

## 2025-03-18 RX ADMIN — INSULIN LISPRO 8: 100 INJECTION, SOLUTION INTRAVENOUS; SUBCUTANEOUS at 11:58

## 2025-03-18 RX ADMIN — INSULIN LISPRO 18 UNIT(S): 100 INJECTION, SOLUTION INTRAVENOUS; SUBCUTANEOUS at 11:59

## 2025-03-18 RX ADMIN — MONTELUKAST SODIUM 10 MILLIGRAM(S): 10 TABLET ORAL at 11:55

## 2025-03-18 RX ADMIN — AMOXICILLIN AND CLAVULANATE POTASSIUM 1 TABLET(S): 500; 125 TABLET, FILM COATED ORAL at 05:40

## 2025-03-18 RX ADMIN — Medication 2.5 MILLIGRAM(S): at 08:06

## 2025-03-18 RX ADMIN — INSULIN LISPRO 15 UNIT(S): 100 INJECTION, SOLUTION INTRAVENOUS; SUBCUTANEOUS at 08:21

## 2025-03-18 RX ADMIN — HYDROCORTISONE 1 APPLICATION(S): 10 CREAM TOPICAL at 05:41

## 2025-03-18 RX ADMIN — OXCARBAZEPINE 600 MILLIGRAM(S): 150 TABLET, FILM COATED ORAL at 05:39

## 2025-03-18 RX ADMIN — DEXAMETHASONE 1.5 MILLIGRAM(S): 0.5 TABLET ORAL at 05:40

## 2025-03-18 RX ADMIN — INSULIN LISPRO 4: 100 INJECTION, SOLUTION INTRAVENOUS; SUBCUTANEOUS at 08:21

## 2025-03-18 RX ADMIN — DEXTROMETHORPHAN HBR, GUAIFENESIN 200 MILLIGRAM(S): 200 LIQUID ORAL at 05:40

## 2025-03-18 RX ADMIN — Medication 40 MILLIGRAM(S): at 05:41

## 2025-03-18 RX ADMIN — Medication 112 MICROGRAM(S): at 05:39

## 2025-03-18 RX ADMIN — Medication 1 DOSE(S): at 11:58

## 2025-03-18 NOTE — PROGRESS NOTE ADULT - REASON FOR ADMISSION
palpitation
palpitation x1 week, chest pain
palpitation

## 2025-03-18 NOTE — PROGRESS NOTE ADULT - NUTRITIONAL ASSESSMENT
This patient has been assessed with a concern for Malnutrition and has been determined to have a diagnosis/diagnoses of Morbid obesity (BMI > 40).    This patient is being managed with:   Diet DASH/TLC-  Sodium & Cholesterol Restricted  Consistent Carbohydrate {No Snacks}  Entered: Mar 13 2025 10:52AM  

## 2025-03-18 NOTE — PROGRESS NOTE ADULT - PROBLEM SELECTOR PLAN 3
A1C 13  POCT glucose 250-385  Continue lantus, increased to 60 units qHS  Continue premeal Admelog, increase to 15 units TID with meals  Correctional Insulin: Continue moderate Lispro ( Admelog) correctional scale with meals and bedtime  Endo Dr. Hayden following A1C 13  POCT glucose 250-385  Continue Lantus, increased to 60 units qHS  Continue premeal Admelog, increase to 15 units TID with meals  Correctional Insulin: Continue moderate Lispro ( Admelog) correctional scale with meals and bedtime  Endo Dr. Hayden following

## 2025-03-18 NOTE — PROGRESS NOTE ADULT - NS ATTEST RISK PROBLEM GEN_ALL_CORE FT
Patient is high risk with high level decision making due to uncontrolled diabetes which places patient at high risk for cardiovascular and cerebrovascular events. Patient with hyperglycemia requiring close monitoring and insulin adjustments.
Patient is high risk with high level decision making due to uncontrolled diabetes which places patient at high risk for cardiovascular and cerebrovascular events. Patient with severe hyperglycemia requiring close monitoring and insulin adjustments.

## 2025-03-18 NOTE — PROGRESS NOTE ADULT - PROBLEM SELECTOR PROBLEM 1
Acute on chronic respiratory failure with hypoxia and hypercapnia
ACS (acute coronary syndrome)
Acute on chronic respiratory failure with hypoxia and hypercapnia

## 2025-03-18 NOTE — PROGRESS NOTE ADULT - PROBLEM SELECTOR PLAN 1
: 2/2 class 3/4 obesity with alveolar hypoventilation. BMI 51.5  PCO2 greater than 125. History of multiple intubations in past  s/p intubation . Self -extubated. Pt did not tolerate BIPAP. Hypercapnia did not improve on BIPAP.   Hypercapnia improved on AVAPS, PCO2 decreased to 50.   Patient would benefit from AVAPS-AE at home to control hypoventilation and hypercapnia. Thoracic expansion is limited secondary to severe central obesity and multilevel spinal stenosis and scoliosis. Patient requires that tidal volume that AVAPS-AE delivers for adequate gas exchange. Without AVAPS-AE patient is at a high risk of readmission and possible death from respiratory failure. BIPAP does not have capability to deliver tidal volume and patient cannot tolerate extremely high settings on a BIPAP.  Continue AVAPS nightly and as needed during the day.  Oxygen supplementation 3LPM when not using AVAPS.  Monitor oxygenation   Continue duonebs q6h RTC  Continue fluticasone proprionate/salmeterol 250-50 BID. 2/2 class 3/4 obesity with alveolar hypoventilation. BMI 51.5  PCO2 greater than 125. History of multiple intubations in past  s/p intubation . Self -extubated. Pt did not tolerate BIPAP. Hypercapnia did not improve on BIPAP.   Hypercapnia improved on AVAPS, PCO2 decreased to 50.   Patient would benefit from AVAPS-AE at home to control hypoventilation and hypercapnia. Thoracic expansion is limited secondary to severe central obesity and multilevel spinal stenosis and scoliosis. Patient requires that tidal volume that AVAPS-AE delivers for adequate gas exchange. Without AVAPS-AE patient is at a high risk of readmission and possible death from respiratory failure. BIPAP does not have capability to deliver tidal volume and patient cannot tolerate extremely high settings on a BIPAP.  Continue AVAPS nightly and as needed during the day.  Oxygen supplementation 2-4LPM when not using AVAPS.  Monitor oxygenation   Continue duonebs q6h RTC  Continue fluticasone proprionate/salmeterol 250-50 BID.

## 2025-03-18 NOTE — PROGRESS NOTE ADULT - PROBLEM SELECTOR PLAN 4
Atypical chest pain iso chronic SOB, COPD, morbid obesity , fibromyalgia.  Elevated trop positive after unresponsive event due to acute on chronic hypoxic/hypercapneic respiratory failure  EKG SR with non-specific T wave abnormality in I and aVL  RLE > LLE edema. LE duplex negative for DVT.  TTE showed normal LV and RV function without significant valvular disease, however this was done PRIOR to her unresponsive/hypercapneic event.  Continue ASA and statin   Repeat limited TTE with Definity to r/o new WMA.   Card Dr. Hampton following Atypical chest pain iso chronic SOB, COPD, morbid obesity , fibromyalgia.  Elevated trop positive after unresponsive event due to acute on chronic hypoxic/ hypercapnic respiratory failure  EKG SR with non-specific T wave abnormality in I and aVL  RLE > LLE edema. LE duplex negative for DVT.  TTE showed normal LV and RV function without significant valvular disease, however this was done PRIOR to her unresponsive/ hypercapnic event.  Continue ASA and statin   Repeat limited TTE with Definity to r/o new WMA.   Card Dr. Hampton following

## 2025-03-18 NOTE — PROGRESS NOTE ADULT - SUBJECTIVE AND OBJECTIVE BOX
Subjective:  Chart Notes, Work list Manager, and fingersticks reviewed. Patient reports of eating well, denies any major complaints    Review of Systems:  Constitutional: No fever  Cardiovascular: No chest pain, palpitations  Respiratory: yes mild SOB, no cough  GI: No nausea, vomiting, abdominal pain  Endocrine: no polyuria, polydipsia    Medications  (Standing):  albuterol    0.083% 2.5 milliGRAM(s) Nebulizer every 6 hours  amLODIPine   Tablet 5 milliGRAM(s) Oral daily  amoxicillin  875 milliGRAM(s)/clavulanate 1 Tablet(s) Oral two times a day  aspirin  chewable 81 milliGRAM(s) Oral daily  atorvastatin 40 milliGRAM(s) Oral at bedtime  cetirizine 10 milliGRAM(s) Oral daily  chlorhexidine 2% Cloths 1 Application(s) Topical <User Schedule>  dexAMETHasone     Tablet 1.5 milliGRAM(s) Oral two times a day  dextrose 5%. 1000 milliLiter(s) (50 mL/Hr) IV Continuous <Continuous>  dextrose 5%. 1000 milliLiter(s) (100 mL/Hr) IV Continuous <Continuous>  dextrose 50% Injectable 25 Gram(s) IV Push once  dextrose 50% Injectable 12.5 Gram(s) IV Push once  dextrose 50% Injectable 25 Gram(s) IV Push once  enoxaparin Injectable 40 milliGRAM(s) SubCutaneous every 12 hours  fluticasone propionate 50 MICROgram(s)/spray Nasal Spray 1 Spray(s) Both Nostrils two times a day  fluticasone propionate/ salmeterol 250-50 MICROgram(s) Diskus 1 Dose(s) Inhalation two times a day  glucagon  Injectable 1 milliGRAM(s) IntraMuscular once  guaiFENesin Oral Liquid (Sugar-Free) 200 milliGRAM(s) Oral every 6 hours  hydrocortisone 1% Ointment 1 Application(s) Topical every 12 hours  insulin glargine Injectable (LANTUS) 65 Unit(s) SubCutaneous at bedtime  insulin lispro (ADMELOG) corrective regimen sliding scale   SubCutaneous three times a day before meals  insulin lispro (ADMELOG) corrective regimen sliding scale   SubCutaneous at bedtime  insulin lispro Injectable (ADMELOG) 18 Unit(s) SubCutaneous three times a day before meals  levothyroxine 112 MICROGram(s) Oral daily  losartan 25 milliGRAM(s) Oral daily  montelukast 10 milliGRAM(s) Oral daily  OXcarbazepine 600 milliGRAM(s) Oral two times a day  pantoprazole    Tablet 40 milliGRAM(s) Oral before breakfast  polyethylene glycol 3350 17 Gram(s) Oral daily  senna 2 Tablet(s) Oral at bedtime    Medications  (PRN):  albuterol    90 MICROgram(s) HFA Inhaler 1 Puff(s) Inhalation every 4 hours PRN Bronchospasm  dextrose Oral Gel 15 Gram(s) Oral once PRN Blood Glucose LESS THAN 70 milliGRAM(s)/deciliter  sodium chloride 0.65% Nasal 1 Spray(s) Both Nostrils three times a day PRN Nasal Congestion      Physical examination  VITALS: T(C): 36.1 (03-18-25 @ 05:00)  T(F): 97 (03-18-25 @ 05:00), Max: 98.5 (03-17-25 @ 13:57)  HR: 70 (03-18-25 @ 05:00) (70 - 94)  BP: 141/77 (03-18-25 @ 05:00) (141/77 - 149/86)  RR:  (18 - 19)  SpO2:  (94% - 95%)  Wt(kg): --  GENERAL: NAD,   HEENT:  Atraumatic, Normocephalic, drymucous membranes  THYROID: Normal size, no palpable nodules  RESPIRATORY: Clear to auscultation bilaterally; No rales, rhonchi, wheezing  CARDIOVASCULAR: Regular rate and rhythm; No murmurs; no peripheral edema  GI: Soft, nontender, non distended, +ve abdominal obesity  EXTREMITIES: +ve peripheral pulses, -ve pedal edema  SKIN: Dry, intact, No rashes or lesions  PSYCH: Alert and oriented x 3    CAPILLARY BLOOD GLUCOSE      POCT Blood Glucose.: 301 mg/dL (18 Mar 2025 11:46)  POCT Blood Glucose.: 217 mg/dL (18 Mar 2025 08:08)  POCT Blood Glucose.: 284 mg/dL (17 Mar 2025 21:22)  POCT Blood Glucose.: 247 mg/dL (17 Mar 2025 16:35)    03-18    133[L]  |  96  |  22[H]  ----------------------------<  224[H]  4.1   |  33[H]  |  1.09    eGFR: 59[L]    Ca    9.8      03-18  Mg     1.9     03-18  Phos  2.6     03-17    TPro  8.1  /  Alb  2.9[L]  /  TBili  0.4  /  DBili  x   /  AST  33  /  ALT  44  /  AlkPhos  122[H]  03-17    Thyroid Function Tests:  03-13 @ 03:43 TSH 1.41 FreeT4 -- T3 -- Anti TPO -- Anti Thyroglobulin Ab -- TSI --        Assessment and Plan:    1) Type 2 diabetes:      Recommendations:  - Basal Insulin:   Glargine  (Lantus) units once daily    - Nutritional Insulin:  Lispro (Admelog) units with breakfast, hold if NPO or eating <50% of meals  Lispro (Admelog) units with lunch, hold if NPO or eating <50% of meals  Lispro (Admelog) units with dinner, hold if NPO or eating <50% of meals    - Correctional (Sliding) Insulin:  Low Lispro (Admelog) sliding scale with meals and bedtime    - Oral Medications:  None in the hospital               Subjective:  Chart Notes, Work list Manager, and fingersticks reviewed. Patient reports of eating well, denies any major complaints    Review of Systems:  Constitutional: No fever  Cardiovascular: No chest pain, palpitations  Respiratory: yes mild SOB, no cough  GI: No nausea, vomiting, abdominal pain  Endocrine: no polyuria, polydipsia    Medications  (Standing):  albuterol    0.083% 2.5 milliGRAM(s) Nebulizer every 6 hours  amLODIPine   Tablet 5 milliGRAM(s) Oral daily  amoxicillin  875 milliGRAM(s)/clavulanate 1 Tablet(s) Oral two times a day  aspirin  chewable 81 milliGRAM(s) Oral daily  atorvastatin 40 milliGRAM(s) Oral at bedtime  cetirizine 10 milliGRAM(s) Oral daily  chlorhexidine 2% Cloths 1 Application(s) Topical <User Schedule>  dexAMETHasone     Tablet 1.5 milliGRAM(s) Oral two times a day  dextrose 5%. 1000 milliLiter(s) (50 mL/Hr) IV Continuous <Continuous>  dextrose 5%. 1000 milliLiter(s) (100 mL/Hr) IV Continuous <Continuous>  dextrose 50% Injectable 25 Gram(s) IV Push once  dextrose 50% Injectable 12.5 Gram(s) IV Push once  dextrose 50% Injectable 25 Gram(s) IV Push once  enoxaparin Injectable 40 milliGRAM(s) SubCutaneous every 12 hours  fluticasone propionate 50 MICROgram(s)/spray Nasal Spray 1 Spray(s) Both Nostrils two times a day  fluticasone propionate/ salmeterol 250-50 MICROgram(s) Diskus 1 Dose(s) Inhalation two times a day  glucagon  Injectable 1 milliGRAM(s) IntraMuscular once  guaiFENesin Oral Liquid (Sugar-Free) 200 milliGRAM(s) Oral every 6 hours  hydrocortisone 1% Ointment 1 Application(s) Topical every 12 hours  insulin glargine Injectable (LANTUS) 65 Unit(s) SubCutaneous at bedtime  insulin lispro (ADMELOG) corrective regimen sliding scale   SubCutaneous three times a day before meals  insulin lispro (ADMELOG) corrective regimen sliding scale   SubCutaneous at bedtime  insulin lispro Injectable (ADMELOG) 18 Unit(s) SubCutaneous three times a day before meals  levothyroxine 112 MICROGram(s) Oral daily  losartan 25 milliGRAM(s) Oral daily  montelukast 10 milliGRAM(s) Oral daily  OXcarbazepine 600 milliGRAM(s) Oral two times a day  pantoprazole    Tablet 40 milliGRAM(s) Oral before breakfast  polyethylene glycol 3350 17 Gram(s) Oral daily  senna 2 Tablet(s) Oral at bedtime    Medications  (PRN):  albuterol    90 MICROgram(s) HFA Inhaler 1 Puff(s) Inhalation every 4 hours PRN Bronchospasm  dextrose Oral Gel 15 Gram(s) Oral once PRN Blood Glucose LESS THAN 70 milliGRAM(s)/deciliter  sodium chloride 0.65% Nasal 1 Spray(s) Both Nostrils three times a day PRN Nasal Congestion      Physical examination  VITALS: T(C): 36.1 (03-18-25 @ 05:00)  T(F): 97 (03-18-25 @ 05:00), Max: 98.5 (03-17-25 @ 13:57)  HR: 70 (03-18-25 @ 05:00) (70 - 94)  BP: 141/77 (03-18-25 @ 05:00) (141/77 - 149/86)  RR:  (18 - 19)  SpO2:  (94% - 95%)    GENERAL: NAD, morbidly obese  HEENT:  Dry mucous membranes  THYROID: Normal size, no palpable nodules  GI: Soft, nontender, non distended, +ve abdominal obesity  EXTREMITIES: +ve peripheral pulses, +ve pedal edema, lymphedema  SKIN: Dry, intact, No rashes or lesions  PSYCH: Alert and oriented x 3    Labs:  Capillary Blood Glucose:  POCT Blood Glucose.: 301 mg/dL (18 Mar 2025 11:46)  POCT Blood Glucose.: 217 mg/dL (18 Mar 2025 08:08)  POCT Blood Glucose.: 284 mg/dL (17 Mar 2025 21:22)  POCT Blood Glucose.: 247 mg/dL (17 Mar 2025 16:35)    03-18  133[L]  |  96  |  22[H]  ----------------------------<  224[H]  4.1   |  33[H]  |  1.09  eGFR: 59[L]  Ca    9.8      03-18  Mg     1.9     03-18  Phos  2.6     03-17  A1C with Estimated Average Glucose Result: 13.0: % (03.11.25 @ 05:00)      Assessment and Plan:    59-year-old female with PMH of T2DM, HLD, asthma, COPD on 4L NC, migraines, hypothyroidism, NAKUL, RCC status post left renal resection 11/23 present for intermittent palpitations and chest pain that started a week ago.  Admitted for ACS rule out. Endocrinology is following for glycemic management    1) Poorly controlled Type 2 diabetes:  2) Steroid induced hyperglycemia    A1C is above goal likely due to dietary indiscretion  BS are still above goal.   Patient is eating well including concentrated sweets.   Recieved dexamethasone today which is contributing to severe hyperglycemia    Inpatient Recommendations:  Basal Insulin:   Increase Glargine ( Lantus) 65 units once daily    Nutritional Insulin:  Increase Lispro (Admelog) 18 units with meals    Correctional Insulin:  Continue moderate Lispro ( Admelog) correctional scale with meals and bedtime    Oral Diabetes Medications:  None in the hospital    Discharge Recommendations:  1) Lantus 65 units at bedtime  2) Novolog 18 units before meals  3) Ozempic 2 mg weekly  4) Metformin 1000 mg BID    Please ensure patient has all insulin supplies including glucometer, test strips, lancets, alcohol pads, insulin PEN, PEN needles on discharge      Patient will see her endocrinologist Dr. Arnol godinez at [phone] [facility] office.

## 2025-03-18 NOTE — PROGRESS NOTE ADULT - PROBLEM SELECTOR PLAN 8
Neg DVT BLE  ACE wrap BLE when OOB-->>Pt refusing ACE wrap  Elevate BLE   Vascular consulted-->>No vascular intervention at this time  -Recommend outpatient follow up for venous studies

## 2025-03-18 NOTE — PROGRESS NOTE ADULT - SUBJECTIVE AND OBJECTIVE BOX
NOEMY CONTRERAS    SCU progress note    INTERVAL HPI/OVERNIGHT EVENTS: ***    DNR [ ]   DNI  [  ]    Covid - 19 PCR:     The 4Ms    What Matters Most: see GOC  Age appropriate Medications/Screen for High Risk Medication: Yes  Mentation: see CAM below  Mobility: defer to physical exam    The Confusion Assessment Method (CAM) Diagnostic Algorithm     1: Acute Onset or Fluctuating Course  - Is there evidence of an acute change in mental status from the patient’s baseline? Did the (abnormal) behavior  fluctuate during the day, that is, tend to come and go, or increase and decrease in severity?  [ ] YES [ ] NO     2: Inattention  - Did the patient have difficulty focusing attention, being easily distractible, or having difficulty keeping track of what was being said?  [ ] YES [ ] NO     3: Disorganized thinking  -Was the patient’s thinking disorganized or incoherent, such as rambling or irrelevant conversation, unclear or illogical flow of ideas, or unpredictable switching from subject to subject?  [ ] YES [ ] NO    4: Altered Level of consciousness?  [ ] YES [ ] NO    The diagnosis of delirium by CAM requires the presence of features 1 and 2 and either 3 or 4.    PRESSORS: [ ] YES [ ] NO  amoxicillin  875 milliGRAM(s)/clavulanate 1 Tablet(s) Oral two times a day    Cardiovascular:  Heart Failure  Acute   Acute on Chronic  Chronic       amLODIPine   Tablet 5 milliGRAM(s) Oral daily  losartan 25 milliGRAM(s) Oral daily    Pulmonary:  albuterol    0.083% 2.5 milliGRAM(s) Nebulizer every 6 hours  albuterol    90 MICROgram(s) HFA Inhaler 1 Puff(s) Inhalation every 4 hours PRN  cetirizine 10 milliGRAM(s) Oral daily  fluticasone propionate/ salmeterol 250-50 MICROgram(s) Diskus 1 Dose(s) Inhalation two times a day  guaiFENesin Oral Liquid (Sugar-Free) 200 milliGRAM(s) Oral every 6 hours  montelukast 10 milliGRAM(s) Oral daily    Hematalogic:  aspirin  chewable 81 milliGRAM(s) Oral daily  enoxaparin Injectable 40 milliGRAM(s) SubCutaneous every 12 hours    Other:  atorvastatin 40 milliGRAM(s) Oral at bedtime  chlorhexidine 2% Cloths 1 Application(s) Topical <User Schedule>  dexAMETHasone     Tablet 1.5 milliGRAM(s) Oral two times a day  fluticasone propionate 50 MICROgram(s)/spray Nasal Spray 1 Spray(s) Both Nostrils two times a day  hydrocortisone 1% Ointment 1 Application(s) Topical every 12 hours  insulin glargine Injectable (LANTUS) 60 Unit(s) SubCutaneous at bedtime  insulin lispro (ADMELOG) corrective regimen sliding scale   SubCutaneous three times a day before meals  insulin lispro (ADMELOG) corrective regimen sliding scale   SubCutaneous at bedtime  insulin lispro Injectable (ADMELOG) 15 Unit(s) SubCutaneous three times a day before meals  levothyroxine 112 MICROGram(s) Oral daily  OXcarbazepine 600 milliGRAM(s) Oral two times a day  pantoprazole    Tablet 40 milliGRAM(s) Oral before breakfast  polyethylene glycol 3350 17 Gram(s) Oral daily  senna 2 Tablet(s) Oral at bedtime  sodium chloride 0.65% Nasal 1 Spray(s) Both Nostrils three times a day PRN    albuterol    0.083% 2.5 milliGRAM(s) Nebulizer every 6 hours  albuterol    90 MICROgram(s) HFA Inhaler 1 Puff(s) Inhalation every 4 hours PRN  amLODIPine   Tablet 5 milliGRAM(s) Oral daily  amoxicillin  875 milliGRAM(s)/clavulanate 1 Tablet(s) Oral two times a day  aspirin  chewable 81 milliGRAM(s) Oral daily  atorvastatin 40 milliGRAM(s) Oral at bedtime  cetirizine 10 milliGRAM(s) Oral daily  chlorhexidine 2% Cloths 1 Application(s) Topical <User Schedule>  dexAMETHasone     Tablet 1.5 milliGRAM(s) Oral two times a day  enoxaparin Injectable 40 milliGRAM(s) SubCutaneous every 12 hours  fluticasone propionate 50 MICROgram(s)/spray Nasal Spray 1 Spray(s) Both Nostrils two times a day  fluticasone propionate/ salmeterol 250-50 MICROgram(s) Diskus 1 Dose(s) Inhalation two times a day  guaiFENesin Oral Liquid (Sugar-Free) 200 milliGRAM(s) Oral every 6 hours  hydrocortisone 1% Ointment 1 Application(s) Topical every 12 hours  insulin glargine Injectable (LANTUS) 60 Unit(s) SubCutaneous at bedtime  insulin lispro (ADMELOG) corrective regimen sliding scale   SubCutaneous three times a day before meals  insulin lispro (ADMELOG) corrective regimen sliding scale   SubCutaneous at bedtime  insulin lispro Injectable (ADMELOG) 15 Unit(s) SubCutaneous three times a day before meals  levothyroxine 112 MICROGram(s) Oral daily  losartan 25 milliGRAM(s) Oral daily  montelukast 10 milliGRAM(s) Oral daily  OXcarbazepine 600 milliGRAM(s) Oral two times a day  pantoprazole    Tablet 40 milliGRAM(s) Oral before breakfast  polyethylene glycol 3350 17 Gram(s) Oral daily  senna 2 Tablet(s) Oral at bedtime  sodium chloride 0.65% Nasal 1 Spray(s) Both Nostrils three times a day PRN    Drug Dosing Weight  Height (cm): 162.6 (12 Mar 2025 00:00)  Weight (kg): 136.1 (12 Mar 2025 00:00)  BMI (kg/m2): 51.5 (12 Mar 2025 00:00)  BSA (m2): 2.32 (12 Mar 2025 00:00)    CENTRAL LINE: [ ] YES [ ] NO  LOCATION:   DATE INSERTED:  REMOVE: [ ] YES [ ] NO  EXPLAIN:    WILDER: [ ] YES [ ] NO    DATE INSERTED:  REMOVE:  [ ] YES [ ] NO  EXPLAIN:    PAST MEDICAL & SURGICAL HISTORY:  Hypertension  vaginal cyst      Hyperlipidemia      Asthma  last inhaler use with in 10 days, on Pulm follow up [id number]      Hypothyroid      Obstructive sleep apnea  refuses Cpap      Obesity  morbid      Trigeminal neuralgia  R      Fibromyalgia      DM (diabetes mellitus)  2      DJD (degenerative joint disease)  Cervical/Thoracic/Lumbar      Cervical neuralgia  difficulty moving my neck      Back pain  thoracic & lumbar      H/O bursitis  right shoulder      Radicular pain  arms, legs      Vasculitis  Legs, forerhead, arms & hands, flare ups in R leg  Dr susie Ribeiro is my Rheumatologist      Renal cell carcinoma, left  on follow up Dr schulte, [facility] renal [facility], waiting for suregry in 2020      Falls frequently      Morbid obesity with body mass index (BMI) of 50.0 to 59.9 in adult      Psoriasis      Diverticulosis      Fatty liver      S/P abdominal hysterectomy          S/P  section  1      Left adrenal mass  excision, benign 2006      Vaginal cyst  removed       Vasculitis on nerve biopsy  , had another surgery called microvascular decompression       S/P left oophorectomy      S/P colonoscopic polypectomy                        PHYSICAL EXAM:    GENERAL: NAD, well-groomed, well-developed  HEAD:  Atraumatic, Normocephalic  EYES: EOMI, PERRLA, conjunctiva and sclera clear  ENMT: No tonsillar erythema, exudates, or enlargement; Moist mucous membranes, Good dentition, No lesions  NECK: Supple, No JVD, Normal thyroid  NERVOUS SYSTEM:  Alert & Oriented X3, Good concentration; Motor Strength 5/5 B/L upper and lower extremities; DTRs 2+ intact and symmetric  CHEST/LUNG: Clear to percussion bilaterally; No rales, rhonchi, wheezing, or rubs  HEART: Regular rate and rhythm; No murmurs, rubs, or gallops  ABDOMEN: Soft, Nontender, Nondistended; Bowel sounds present  EXTREMITIES:  2+ Peripheral Pulses, No clubbing, cyanosis, or edema  LYMPH: No lymphadenopathy noted  SKIN: No rashes or lesions      LABS:  CBC Full  -  ( 17 Mar 2025 07:16 )  WBC Count : 11.52 K/uL  RBC Count : 4.61 M/uL  Hemoglobin : 11.1 g/dL  Hematocrit : 36.7 %  Platelet Count - Automated : 280 K/uL  Mean Cell Volume : 79.6 fl  Mean Cell Hemoglobin : 24.1 pg  Mean Cell Hemoglobin Concentration : 30.2 g/dL  Auto Neutrophil # : x  Auto Lymphocyte # : x  Auto Monocyte # : x  Auto Eosinophil # : x  Auto Basophil # : x  Auto Neutrophil % : x  Auto Lymphocyte % : x  Auto Monocyte % : x  Auto Eosinophil % : x  Auto Basophil % : x    03-17    132[L]  |  98  |  21[H]  ----------------------------<  279[H]  4.4   |  31  |  1.08    Ca    9.5      17 Mar 2025 07:16  Phos  2.6     03-17  Mg     1.7     -17    TPro  8.1  /  Alb  2.9[L]  /  TBili  0.4  /  DBili  x   /  AST  33  /  ALT  44  /  AlkPhos  122[H]  03-17      Urinalysis Basic - ( 17 Mar 2025 07:16 )    Color: x / Appearance: x / SG: x / pH: x  Gluc: 279 mg/dL / Ketone: x  / Bili: x / Urobili: x   Blood: x / Protein: x / Nitrite: x   Leuk Esterase: x / RBC: x / WBC x   Sq Epi: x / Non Sq Epi: x / Bacteria: x            [  ]  DVT Prophylaxis  [  ]  Nutrition, Brand, Rate         Goal Rate        Abnormal Nutritional Status -  Malnutrition   Cachexia      Morbid Obesity BMI >/=40    RADIOLOGY & ADDITIONAL STUDIES:  ***    Goals of Care Discussion with Family/Proxy/Other   - see note from/family meeting set up for...     NOEMY CONTRERAS    SCU progress note    INTERVAL HPI/OVERNIGHT EVENTS: No acute events.     FULL TIME.     Covid - 19 PCR: Negaitve    The 4Ms    What Matters Most: see GOC  Age appropriate Medications/Screen for High Risk Medication: Yes  Mentation: see CAM below  Mobility: defer to physical exam    The Confusion Assessment Method (CAM) Diagnostic Algorithm     1: Acute Onset or Fluctuating Course  - Is there evidence of an acute change in mental status from the patient’s baseline? Did the (abnormal) behavior  fluctuate during the day, that is, tend to come and go, or increase and decrease in severity?  [ ] YES [ x] NO     2: Inattention  - Did the patient have difficulty focusing attention, being easily distractible, or having difficulty keeping track of what was being said?  [ ] YES [ x] NO     3: Disorganized thinking  -Was the patient’s thinking disorganized or incoherent, such as rambling or irrelevant conversation, unclear or illogical flow of ideas, or unpredictable switching from subject to subject?  [ ] YES [x ] NO    4: Altered Level of consciousness?  [ ] YES [x ] NO    The diagnosis of delirium by CAM requires the presence of features 1 and 2 and either 3 or 4.    PRESSORS: [ ] YES [x ] NO  amoxicillin  875 milliGRAM(s)/clavulanate 1 Tablet(s) Oral two times a day    Cardiovascular:  Heart Failure  Acute   Acute on Chronic  Chronic       amLODIPine   Tablet 5 milliGRAM(s) Oral daily  losartan 25 milliGRAM(s) Oral daily    Pulmonary:  albuterol    0.083% 2.5 milliGRAM(s) Nebulizer every 6 hours  albuterol    90 MICROgram(s) HFA Inhaler 1 Puff(s) Inhalation every 4 hours PRN  cetirizine 10 milliGRAM(s) Oral daily  fluticasone propionate/ salmeterol 250-50 MICROgram(s) Diskus 1 Dose(s) Inhalation two times a day  guaiFENesin Oral Liquid (Sugar-Free) 200 milliGRAM(s) Oral every 6 hours  montelukast 10 milliGRAM(s) Oral daily    Hematalogic:  aspirin  chewable 81 milliGRAM(s) Oral daily  enoxaparin Injectable 40 milliGRAM(s) SubCutaneous every 12 hours    Other:  atorvastatin 40 milliGRAM(s) Oral at bedtime  chlorhexidine 2% Cloths 1 Application(s) Topical <User Schedule>  dexAMETHasone     Tablet 1.5 milliGRAM(s) Oral two times a day  fluticasone propionate 50 MICROgram(s)/spray Nasal Spray 1 Spray(s) Both Nostrils two times a day  hydrocortisone 1% Ointment 1 Application(s) Topical every 12 hours  insulin glargine Injectable (LANTUS) 60 Unit(s) SubCutaneous at bedtime  insulin lispro (ADMELOG) corrective regimen sliding scale   SubCutaneous three times a day before meals  insulin lispro (ADMELOG) corrective regimen sliding scale   SubCutaneous at bedtime  insulin lispro Injectable (ADMELOG) 15 Unit(s) SubCutaneous three times a day before meals  levothyroxine 112 MICROGram(s) Oral daily  OXcarbazepine 600 milliGRAM(s) Oral two times a day  pantoprazole    Tablet 40 milliGRAM(s) Oral before breakfast  polyethylene glycol 3350 17 Gram(s) Oral daily  senna 2 Tablet(s) Oral at bedtime  sodium chloride 0.65% Nasal 1 Spray(s) Both Nostrils three times a day PRN    albuterol    0.083% 2.5 milliGRAM(s) Nebulizer every 6 hours  albuterol    90 MICROgram(s) HFA Inhaler 1 Puff(s) Inhalation every 4 hours PRN  amLODIPine   Tablet 5 milliGRAM(s) Oral daily  amoxicillin  875 milliGRAM(s)/clavulanate 1 Tablet(s) Oral two times a day  aspirin  chewable 81 milliGRAM(s) Oral daily  atorvastatin 40 milliGRAM(s) Oral at bedtime  cetirizine 10 milliGRAM(s) Oral daily  chlorhexidine 2% Cloths 1 Application(s) Topical <User Schedule>  dexAMETHasone     Tablet 1.5 milliGRAM(s) Oral two times a day  enoxaparin Injectable 40 milliGRAM(s) SubCutaneous every 12 hours  fluticasone propionate 50 MICROgram(s)/spray Nasal Spray 1 Spray(s) Both Nostrils two times a day  fluticasone propionate/ salmeterol 250-50 MICROgram(s) Diskus 1 Dose(s) Inhalation two times a day  guaiFENesin Oral Liquid (Sugar-Free) 200 milliGRAM(s) Oral every 6 hours  hydrocortisone 1% Ointment 1 Application(s) Topical every 12 hours  insulin glargine Injectable (LANTUS) 60 Unit(s) SubCutaneous at bedtime  insulin lispro (ADMELOG) corrective regimen sliding scale   SubCutaneous three times a day before meals  insulin lispro (ADMELOG) corrective regimen sliding scale   SubCutaneous at bedtime  insulin lispro Injectable (ADMELOG) 15 Unit(s) SubCutaneous three times a day before meals  levothyroxine 112 MICROGram(s) Oral daily  losartan 25 milliGRAM(s) Oral daily  montelukast 10 milliGRAM(s) Oral daily  OXcarbazepine 600 milliGRAM(s) Oral two times a day  pantoprazole    Tablet 40 milliGRAM(s) Oral before breakfast  polyethylene glycol 3350 17 Gram(s) Oral daily  senna 2 Tablet(s) Oral at bedtime  sodium chloride 0.65% Nasal 1 Spray(s) Both Nostrils three times a day PRN    Drug Dosing Weight  Height (cm): 162.6 (12 Mar 2025 00:00)  Weight (kg): 136.1 (12 Mar 2025 00:00)  BMI (kg/m2): 51.5 (12 Mar 2025 00:00)  BSA (m2): 2.32 (12 Mar 2025 00:00)    CENTRAL LINE: [ ] YES [x ] NO  LOCATION:   DATE INSERTED:  REMOVE: [ ] YES [ ] NO  EXPLAIN:    WILDER: [ ] YES [ ] NO    DATE INSERTED:  REMOVE:  [ ] YES [ ] NO  EXPLAIN:    PAST MEDICAL & SURGICAL HISTORY:  Hypertension  vaginal cyst      Hyperlipidemia      Asthma  last inhaler use with in 10 days, on Pulm follow up [id number]      Hypothyroid      Obstructive sleep apnea  refuses Cpap      Obesity  morbid      Trigeminal neuralgia  R      Fibromyalgia      DM (diabetes mellitus)  2      DJD (degenerative joint disease)  Cervical/Thoracic/Lumbar      Cervical neuralgia  difficulty moving my neck      Back pain  thoracic & lumbar      H/O bursitis  right shoulder      Radicular pain  arms, legs      Vasculitis  Legs, forerhead, arms & hands, flare ups in R leg  Dr susie Ribeiro is my Rheumatologist      Renal cell carcinoma, left  on follow up Dr schulte, [facility] renal [facility], waiting for suregry in 2020      Falls frequently      Morbid obesity with body mass index (BMI) of 50.0 to 59.9 in adult      Psoriasis      Diverticulosis      Fatty liver      S/P abdominal hysterectomy          S/P  section  1      Left adrenal mass  excision, benign       Vaginal cyst  removed       Vasculitis on nerve biopsy  , had another surgery called microvascular decompression       S/P left oophorectomy      S/P colonoscopic polypectomy    ROS: Denies     PHYSICAL EXAM:    GENERAL: NAD, well-groomed, well-developed  HEAD:  Atraumatic, Normocephalic  EYES: EOMI, PERRLA, conjunctiva and sclera clear  ENMT: No tonsillar erythema, exudates, or enlargement; Moist mucous membranes, Good dentition, No lesions  NECK: Supple, No JVD, Normal thyroid  NERVOUS SYSTEM:  Alert & Oriented X3, Good concentration; Motor Strength 5/5 B/L upper and lower extremities; DTRs 2+ intact and symmetric  CHEST/LUNG: Clear to percussion bilaterally; No rales, rhonchi, wheezing, or rubs  HEART: Regular rate and rhythm; No murmurs, rubs, or gallops  ABDOMEN: Soft, Nontender, Nondistended; Bowel sounds present  EXTREMITIES:  2+ Peripheral Pulses, No clubbing, cyanosis, or edema  LYMPH: No lymphadenopathy noted  SKIN: No rashes or lesions      LABS:  CBC Full  -  ( 17 Mar 2025 07:16 )  WBC Count : 11.52 K/uL  RBC Count : 4.61 M/uL  Hemoglobin : 11.1 g/dL  Hematocrit : 36.7 %  Platelet Count - Automated : 280 K/uL  Mean Cell Volume : 79.6 fl  Mean Cell Hemoglobin : 24.1 pg  Mean Cell Hemoglobin Concentration : 30.2 g/dL  Auto Neutrophil # : x  Auto Lymphocyte # : x  Auto Monocyte # : x  Auto Eosinophil # : x  Auto Basophil # : x  Auto Neutrophil % : x  Auto Lymphocyte % : x  Auto Monocyte % : x  Auto Eosinophil % : x  Auto Basophil % : x    03-17    132[L]  |  98  |  21[H]  ----------------------------<  279[H]  4.4   |  31  |  1.08    Ca    9.5      17 Mar 2025 07:16  Phos  2.6     03-17  Mg     1.7     03-17    TPro  8.1  /  Alb  2.9[L]  /  TBili  0.4  /  DBili  x   /  AST  33  /  ALT  44  /  AlkPhos  122[H]  03-17      Urinalysis Basic - ( 17 Mar 2025 07:16 )    Color: x / Appearance: x / SG: x / pH: x  Gluc: 279 mg/dL / Ketone: x  / Bili: x / Urobili: x   Blood: x / Protein: x / Nitrite: x   Leuk Esterase: x / RBC: x / WBC x   Sq Epi: x / Non Sq Epi: x / Bacteria: x            [  ]  DVT Prophylaxis  [  ]  Nutrition, Brand, Rate         Goal Rate        Abnormal Nutritional Status -  Malnutrition   Cachexia      Morbid Obesity BMI >/=40    RADIOLOGY & ADDITIONAL STUDIES:  ***    Goals of Care Discussion with Family/Proxy/Other   - see note from/family meeting set up for...     NOEMY CONTRERAS    SCU progress note    INTERVAL HPI/OVERNIGHT EVENTS: No acute events.     FULL TIME.     Covid - 19 PCR: Negaitve    The 4Ms    What Matters Most: see GOC  Age appropriate Medications/Screen for High Risk Medication: Yes  Mentation: see CAM below  Mobility: defer to physical exam    The Confusion Assessment Method (CAM) Diagnostic Algorithm     1: Acute Onset or Fluctuating Course  - Is there evidence of an acute change in mental status from the patient’s baseline? Did the (abnormal) behavior  fluctuate during the day, that is, tend to come and go, or increase and decrease in severity?  [ ] YES [ x] NO     2: Inattention  - Did the patient have difficulty focusing attention, being easily distractible, or having difficulty keeping track of what was being said?  [ ] YES [ x] NO     3: Disorganized thinking  -Was the patient’s thinking disorganized or incoherent, such as rambling or irrelevant conversation, unclear or illogical flow of ideas, or unpredictable switching from subject to subject?  [ ] YES [x ] NO    4: Altered Level of consciousness?  [ ] YES [x ] NO    The diagnosis of delirium by CAM requires the presence of features 1 and 2 and either 3 or 4.    PRESSORS: [ ] YES [x ] NO  amoxicillin  875 milliGRAM(s)/clavulanate 1 Tablet(s) Oral two times a day    Cardiovascular:  Heart Failure  Acute   Acute on Chronic  Chronic       amLODIPine   Tablet 5 milliGRAM(s) Oral daily  losartan 25 milliGRAM(s) Oral daily    Pulmonary:  albuterol    0.083% 2.5 milliGRAM(s) Nebulizer every 6 hours  albuterol    90 MICROgram(s) HFA Inhaler 1 Puff(s) Inhalation every 4 hours PRN  cetirizine 10 milliGRAM(s) Oral daily  fluticasone propionate/ salmeterol 250-50 MICROgram(s) Diskus 1 Dose(s) Inhalation two times a day  guaiFENesin Oral Liquid (Sugar-Free) 200 milliGRAM(s) Oral every 6 hours  montelukast 10 milliGRAM(s) Oral daily    Hematalogic:  aspirin  chewable 81 milliGRAM(s) Oral daily  enoxaparin Injectable 40 milliGRAM(s) SubCutaneous every 12 hours    Other:  atorvastatin 40 milliGRAM(s) Oral at bedtime  chlorhexidine 2% Cloths 1 Application(s) Topical <User Schedule>  dexAMETHasone     Tablet 1.5 milliGRAM(s) Oral two times a day  fluticasone propionate 50 MICROgram(s)/spray Nasal Spray 1 Spray(s) Both Nostrils two times a day  hydrocortisone 1% Ointment 1 Application(s) Topical every 12 hours  insulin glargine Injectable (LANTUS) 60 Unit(s) SubCutaneous at bedtime  insulin lispro (ADMELOG) corrective regimen sliding scale   SubCutaneous three times a day before meals  insulin lispro (ADMELOG) corrective regimen sliding scale   SubCutaneous at bedtime  insulin lispro Injectable (ADMELOG) 15 Unit(s) SubCutaneous three times a day before meals  levothyroxine 112 MICROGram(s) Oral daily  OXcarbazepine 600 milliGRAM(s) Oral two times a day  pantoprazole    Tablet 40 milliGRAM(s) Oral before breakfast  polyethylene glycol 3350 17 Gram(s) Oral daily  senna 2 Tablet(s) Oral at bedtime  sodium chloride 0.65% Nasal 1 Spray(s) Both Nostrils three times a day PRN    albuterol    0.083% 2.5 milliGRAM(s) Nebulizer every 6 hours  albuterol    90 MICROgram(s) HFA Inhaler 1 Puff(s) Inhalation every 4 hours PRN  amLODIPine   Tablet 5 milliGRAM(s) Oral daily  amoxicillin  875 milliGRAM(s)/clavulanate 1 Tablet(s) Oral two times a day  aspirin  chewable 81 milliGRAM(s) Oral daily  atorvastatin 40 milliGRAM(s) Oral at bedtime  cetirizine 10 milliGRAM(s) Oral daily  chlorhexidine 2% Cloths 1 Application(s) Topical <User Schedule>  dexAMETHasone     Tablet 1.5 milliGRAM(s) Oral two times a day  enoxaparin Injectable 40 milliGRAM(s) SubCutaneous every 12 hours  fluticasone propionate 50 MICROgram(s)/spray Nasal Spray 1 Spray(s) Both Nostrils two times a day  fluticasone propionate/ salmeterol 250-50 MICROgram(s) Diskus 1 Dose(s) Inhalation two times a day  guaiFENesin Oral Liquid (Sugar-Free) 200 milliGRAM(s) Oral every 6 hours  hydrocortisone 1% Ointment 1 Application(s) Topical every 12 hours  insulin glargine Injectable (LANTUS) 60 Unit(s) SubCutaneous at bedtime  insulin lispro (ADMELOG) corrective regimen sliding scale   SubCutaneous three times a day before meals  insulin lispro (ADMELOG) corrective regimen sliding scale   SubCutaneous at bedtime  insulin lispro Injectable (ADMELOG) 15 Unit(s) SubCutaneous three times a day before meals  levothyroxine 112 MICROGram(s) Oral daily  losartan 25 milliGRAM(s) Oral daily  montelukast 10 milliGRAM(s) Oral daily  OXcarbazepine 600 milliGRAM(s) Oral two times a day  pantoprazole    Tablet 40 milliGRAM(s) Oral before breakfast  polyethylene glycol 3350 17 Gram(s) Oral daily  senna 2 Tablet(s) Oral at bedtime  sodium chloride 0.65% Nasal 1 Spray(s) Both Nostrils three times a day PRN    Drug Dosing Weight  Height (cm): 162.6 (12 Mar 2025 00:00)  Weight (kg): 136.1 (12 Mar 2025 00:00)  BMI (kg/m2): 51.5 (12 Mar 2025 00:00)  BSA (m2): 2.32 (12 Mar 2025 00:00)    CENTRAL LINE: [ ] YES [x ] NO  LOCATION:   DATE INSERTED:  REMOVE: [ ] YES [ ] NO  EXPLAIN:    WILDER: [ ] YES [ ] NO    DATE INSERTED:  REMOVE:  [ ] YES [ ] NO  EXPLAIN:    PAST MEDICAL & SURGICAL HISTORY:  Hypertension  vaginal cyst      Hyperlipidemia      Asthma  last inhaler use with in 10 days, on Pulm follow up [id number]      Hypothyroid      Obstructive sleep apnea  refuses Cpap      Obesity  morbid      Trigeminal neuralgia  R      Fibromyalgia      DM (diabetes mellitus)  2      DJD (degenerative joint disease)  Cervical/Thoracic/Lumbar      Cervical neuralgia  difficulty moving my neck      Back pain  thoracic & lumbar      H/O bursitis  right shoulder      Radicular pain  arms, legs      Vasculitis  Legs, forerhead, arms & hands, flare ups in R leg  Dr susie Ribeiro is my Rheumatologist      Renal cell carcinoma, left  on follow up Dr schulte, [facility] renal [facility], waiting for suregry in 2020      Falls frequently      Morbid obesity with body mass index (BMI) of 50.0 to 59.9 in adult      Psoriasis      Diverticulosis      Fatty liver      S/P abdominal hysterectomy          S/P  section  1      Left adrenal mass  excision, benign       Vaginal cyst  removed       Vasculitis on nerve biopsy  , had another surgery called microvascular decompression       S/P left oophorectomy      S/P colonoscopic polypectomy    ROS: Denies     PHYSICAL EXAM:    GENERAL: NAD  HEAD:  Atraumatic, Normocephalic  EYES: , PERRLA, conjunctiva and sclera clear  ENMT: Moist mucous membranes, Good dentition, No lesions  NECK: Supple, No JVD, Normal thyroid  NERVOUS SYSTEM:  Alert & Oriented X3, Good concentration; Moves all exts. Uses Cane   CHEST/LUNG: Diminihsed bilaterally; No rales, rhonchi, wheezing, or rubs. On 2L NC  HEART: Regular rate and rhythm; No murmurs, rubs, or gallops  ABDOMEN: Soft, Nontender, Nondistended; Bowel sounds present. Obese.   EXTREMITIES:  2+ Peripheral Pulses, No clubbing, cyanosis. Pitting edema lower exts.   SKIN: Erythematous rash to brigid shin.       LABS:  CBC Full  -  ( 17 Mar 2025 07:16 )  WBC Count : 11.52 K/uL  RBC Count : 4.61 M/uL  Hemoglobin : 11.1 g/dL  Hematocrit : 36.7 %  Platelet Count - Automated : 280 K/uL  Mean Cell Volume : 79.6 fl  Mean Cell Hemoglobin : 24.1 pg  Mean Cell Hemoglobin Concentration : 30.2 g/dL  Auto Neutrophil # : x  Auto Lymphocyte # : x  Auto Monocyte # : x  Auto Eosinophil # : x  Auto Basophil # : x  Auto Neutrophil % : x  Auto Lymphocyte % : x  Auto Monocyte % : x  Auto Eosinophil % : x  Auto Basophil % : x        132[L]  |  98  |  21[H]  ----------------------------<  279[H]  4.4   |  31  |  1.08    Ca    9.5      17 Mar 2025 07:16  Phos  2.6     -  Mg     1.7     -    TPro  8.1  /  Alb  2.9[L]  /  TBili  0.4  /  DBili  x   /  AST  33  /  ALT  44  /  AlkPhos  122[H]  -17      Urinalysis Basic - ( 17 Mar 2025 07:16 )    Color: x / Appearance: x / SG: x / pH: x  Gluc: 279 mg/dL / Ketone: x  / Bili: x / Urobili: x   Blood: x / Protein: x / Nitrite: x   Leuk Esterase: x / RBC: x / WBC x   Sq Epi: x / Non Sq Epi: x / Bacteria: x    [ x ]  DVT Prophylaxis  RADIOLOGY & ADDITIONAL STUDIES:    < from: Xray Chest 1 View- PORTABLE-Urgent (Xray Chest 1 View- PORTABLE-Urgent .) (25 @ 10:22) >  ACC: [id number] EXAM:  XR CHEST PORTABLE URGENT 1V   ORDERED BY: HOWIE STORY     PROCEDURE DATE:  2025          INTERPRETATION:  Exam:XR CHEST URGENT    clinical history:intubated    Removal of endotracheal and nasogastric tubes. Improved aeration right   lung. Stable left basilar airspace disease.    IMPRESSION: Improved aeration.    --- End of Report ---            HOLLY TURNER MD; Attending Radiologist  This document has been electronically signed. Mar 12 2025 12:45PM    < end of copied text >

## 2025-03-18 NOTE — CHART NOTE - NSCHARTNOTEFT_GEN_A_CORE
Assessment:   59yFemalePatient is a 59y old  Female who presents with a chief complaint of palpitation (18 Mar 2025 07:17). Pt Visited. Pt OOB to chair. On O2. Pt DG from ICU top 4 N on 3/13. Pt Reports eating well. Po tolerated.  Labs noted. A1c 13.6. Endo on case. Pt provided with diet education on " My plate" . Pt Receptive.       Factors impacting intake: [ ] none [ ] nausea  [ ] vomiting [ ] diarrhea [ ] constipation  [ ]chewing problems [ ] swallowing issues  [ ] other:     Diet Prescription: Diet, DASH/ TLC:   Sodium & Cholesterol Restricted  Consistent Carbohydrate {No Snacks} (03-13-25 @ 10:52)    Intake: > 50 % of meal.    Current Weight: Weight (kg): 136.1 (03-12 @ 00:00)  % Weight Change    Pertinent Medications: MEDICATIONS  (STANDING):  albuterol    0.083% 2.5 milliGRAM(s) Nebulizer every 6 hours  amLODIPine   Tablet 5 milliGRAM(s) Oral daily  amoxicillin  875 milliGRAM(s)/clavulanate 1 Tablet(s) Oral two times a day  aspirin  chewable 81 milliGRAM(s) Oral daily  atorvastatin 40 milliGRAM(s) Oral at bedtime  cetirizine 10 milliGRAM(s) Oral daily  chlorhexidine 2% Cloths 1 Application(s) Topical <User Schedule>  dexAMETHasone     Tablet 1.5 milliGRAM(s) Oral two times a day  dextrose 5%. 1000 milliLiter(s) (50 mL/Hr) IV Continuous <Continuous>  dextrose 5%. 1000 milliLiter(s) (100 mL/Hr) IV Continuous <Continuous>  dextrose 50% Injectable 25 Gram(s) IV Push once  dextrose 50% Injectable 12.5 Gram(s) IV Push once  dextrose 50% Injectable 25 Gram(s) IV Push once  enoxaparin Injectable 40 milliGRAM(s) SubCutaneous every 12 hours  fluticasone propionate 50 MICROgram(s)/spray Nasal Spray 1 Spray(s) Both Nostrils two times a day  fluticasone propionate/ salmeterol 250-50 MICROgram(s) Diskus 1 Dose(s) Inhalation two times a day  glucagon  Injectable 1 milliGRAM(s) IntraMuscular once  guaiFENesin Oral Liquid (Sugar-Free) 200 milliGRAM(s) Oral every 6 hours  hydrocortisone 1% Ointment 1 Application(s) Topical every 12 hours  insulin glargine Injectable (LANTUS) 65 Unit(s) SubCutaneous at bedtime  insulin lispro (ADMELOG) corrective regimen sliding scale   SubCutaneous three times a day before meals  insulin lispro (ADMELOG) corrective regimen sliding scale   SubCutaneous at bedtime  insulin lispro Injectable (ADMELOG) 18 Unit(s) SubCutaneous three times a day before meals  levothyroxine 112 MICROGram(s) Oral daily  losartan 25 milliGRAM(s) Oral daily  montelukast 10 milliGRAM(s) Oral daily  OXcarbazepine 600 milliGRAM(s) Oral two times a day  pantoprazole    Tablet 40 milliGRAM(s) Oral before breakfast  polyethylene glycol 3350 17 Gram(s) Oral daily  senna 2 Tablet(s) Oral at bedtime    MEDICATIONS  (PRN):  albuterol    90 MICROgram(s) HFA Inhaler 1 Puff(s) Inhalation every 4 hours PRN Bronchospasm  dextrose Oral Gel 15 Gram(s) Oral once PRN Blood Glucose LESS THAN 70 milliGRAM(s)/deciliter  sodium chloride 0.65% Nasal 1 Spray(s) Both Nostrils three times a day PRN Nasal Congestion    Pertinent Labs: 03-18 Na133 mmol/L[L] Glu 224 mg/dL[H] K+ 4.1 mmol/L Cr  1.09 mg/dL BUN 22 mg/dL[H] 03-17 Phos 2.6 mg/dL 03-17 Alb 2.9 g/dL[L] 03-11 Chol 192 mg/dL LDL --    HDL 43 mg/dL[L] Trig 163 mg/dL[H]     CAPILLARY BLOOD GLUCOSE      POCT Blood Glucose.: 301 mg/dL (18 Mar 2025 11:46)  POCT Blood Glucose.: 217 mg/dL (18 Mar 2025 08:08)  POCT Blood Glucose.: 284 mg/dL (17 Mar 2025 21:22)  POCT Blood Glucose.: 247 mg/dL (17 Mar 2025 16:35)        Estimated Needs:   [ ] no change since previous assessment  [ ] recalculated:     Previous Nutrition Diagnosis:   [ ] Inadequate Energy Intake [ ]Inadequate Oral Intake [ ] Excessive Energy Intake   [ ] Underweight [ ] Increased Nutrient Needs [ ] Overweight/Obesity (x) Altered Nutrition related Lab  [ ] Altered GI Function [ ] Unintended Weight Loss [ ] Food & Nutrition Related Knowledge Deficit [ ] Malnutrition     Nutrition Diagnosis is [ x] ongoing  [ ] resolved [ ] not applicable     New Nutrition Diagnosis: [ ] not applicable       Interventions:   Recommend  [ ] Change Diet To:  [ ] Nutrition Supplement  [ ] Nutrition Support  [ x] Other: Continue with current Diet Rx.     Monitoring and Evaluation:   [ ] PO intake [ x ] Tolerance to diet prescription [ x ] weights [ x ] labs[ x ] follow up per protocol  [ ] other:

## 2025-03-18 NOTE — PROGRESS NOTE ADULT - PROBLEM SELECTOR PLAN 11
Takes losartan and Norvasc at home.   Continue Norvasc at 5mg daily and Losartan 25 mg daily.   Controlled Takes losartan and Norvasc at home.   Continue Norvasc at 5mg daily and Losartan 25 mg daily.

## 2025-03-18 NOTE — PROGRESS NOTE ADULT - PROBLEM SELECTOR PLAN 12
DVT ppx: Lovenox   GI ppx: : PPI    #hypothyroidism  -Continue hypothyroidism  #HLD  -continue statin       ------  Pt on  home   O2 4L NC   F/u repeat TTE to r/o new wallmotion abnormalities per Card  Needs improved BG control  F/u Vascular consult   Dispo: home (pt declines [facility]).   AVAPS-AE approved. Delivery coordinated by CM.   Patient will need to follow up with Pulm/Cardio/Endocrinology outpatient. DVT ppx: Lovenox   GI ppx: : PPI    #hypothyroidism  -Continue hypothyroidism  #HLD  -continue statin       ------  Pt on  home O2 4L NC   Cardio Dr. Johnson reviewed preliminary repeat ECHO and no further w/u needed.   New insulin regimen ordered for discharge. Discussed with endo.   F/u Vascular outpatient.    Dispo: home (pt declines [facility]).   AVAPS-AE approved. Delivery coordinated by CM for today.   Patient will need to follow up with Pulm/Cardio/Endocrinology outpatient and vascular.   Discussed with patient discharge planning.   Medications sent to pharmacy and discussed with patient.

## 2025-03-18 NOTE — PROGRESS NOTE ADULT - NS ATTEND AMEND GEN_ALL_CORE FT
Problem/Plan - 1:  ·  Problem: Acute on chronic respiratory failure with hypoxia and hypercapnia.   ·  Plan: 2/2 class 3/4 obesity with alveolar hypoventilation. BMI 51.5  PCO2 greater than 125. History of multiple intubations in past  s/p intubation . Self -extubated. Pt did not tolerate BIPAP. Hypercapnia did not improve on BIPAP.   Hypercapnia improved on AVAPS, PCO2 decreased to 50.   Patient would benefit from AVAPS-AE at home to control hypoventilation and hypercapnia. Thoracic expansion is limited secondary to severe central obesity and multilevel spinal stenosis and scoliosis. Patient requires that tidal volume that AVAPS-AE delivers for adequate gas exchange. Without AVAPS-AE patient is at a high risk of readmission and possible death from respiratory failure. BIPAP does not have capability to deliver tidal volume and patient cannot tolerate extremely high settings on a BIPAP.  Continue AVAPS nightly and as needed during the day.  Oxygen supplementation 2-4LPM when not using AVAPS.  Monitor oxygenation   Continue duonebs q6h RTC  Continue fluticasone proprionate/salmeterol 250-50 BID.     Problem/Plan - 2:  ·  Problem: Other sinusitis.   ·  Plan: h/o recurrent sinusitis per patient   Now with left facial pain /mild swelling  , nasal congestion   Continue Augmentin 875mg po BID for total of 14 doses.   Continue short course of Decadron (total of 4 days).   C/w Nasal spray   C/w Zyrtec  C/w Flonase  Tylenol PRN for pain.   Monitor for fever/leukocytosis>>if pain/swelling  worsens may need CT to eval the paranasal sinus cavities.     Problem/Plan - 3:  ·  Problem: Uncontrolled type 2 diabetes mellitus with hyperglycemia.   ·  Plan: A1C 13  POCT glucose 250-385  Continue Lantus, increased to 60 units qHS  Continue premeal Admelog, increase to 15 units TID with meals  Correctional Insulin: Continue moderate Lispro ( Admelog) correctional scale with meals and bedtime  Endo Dr. Hayden following.     Problem/Plan - 4:  ·  Problem: ACS (acute coronary syndrome).   ·  Plan: Atypical chest pain iso chronic SOB, COPD, morbid obesity , fibromyalgia.  Elevated trop positive after unresponsive event due to acute on chronic hypoxic/ hypercapnic respiratory failure  EKG SR with non-specific T wave abnormality in I and aVL  RLE > LLE edema. LE duplex negative for DVT.  TTE showed normal LV and RV function without significant valvular disease, however this was done PRIOR to her unresponsive/ hypercapnic event.  Continue ASA and statin   Repeat limited TTE with Definity to r/o new WMA.   Card Dr. Hampton following.     Problem/Plan - 5:  ·  Problem: Class 3 obesity with alveolar hypoventilation, serious comorbidity, and body mass index (BMI) of 50.0 to 59.9 in adult.   ·  Plan: Central obesity, Mallampati 4  PCO2 greater than 125 requiring intubation.  Patient has been intubated several times in the past.  Unable to tolerate BIPAP with settings of 20/16  Tolerating AVAPS nightly.  Thoracic expansion is limited secondary to severe central obesity and multilevel spinal stenosis and scoliosis. Patient requires that tidal volume that AVAPS-AE delivers for adequate gas exchange. Without AVAPS-AE patient is at a high risk of readmission and possible death from respiratory failure. BIPAP does not have capability to deliver tidal volume and patient cannot tolerate extremely high settings on a BIPAP.     Problem/Plan - 6:  ·  Problem: Central obesity.   ·  Plan: Severe abdominal obesity with known alveolar hypoventilation.  PCO2 greater than 125 upon admission which required patient to be intubated and started on mechanical ventilation.  Patient unable to tolerate very high setting on BIPAP 20/16  Started on AVAPS therapy in hospital. Tolerating better. PCO2 on AVAPS 50  Will need AVAPS-AE when discharged. Patient need Tidal volume that AVAPS-AE delivers to help control PCO2 and to provide adequate gas exchange gas exchange.  AVAPS approved. Pending delivery. CM following.

## 2025-03-18 NOTE — PROGRESS NOTE ADULT - PROBLEM SELECTOR PLAN 10
h/o fibromyalgia, DJD,trigeminal neuralgia  Takes gabapentin at home-->>resumed then discontinued in setting of pt developed AMS   Continue Oxcarbazepine per home regimen  S/p  Tylenol 1 gm q8h x 4 days  OOBC , ambulate as tolerated.  Avoid sedating agents iso hypercapnia h/o fibromyalgia, DJD, trigeminal neuralgia  Takes gabapentin at home-->>resumed then discontinued in setting of pt developed AMS   Continue Oxcarbazepine per home regimen  S/p  Tylenol 1 gm q8h x 4 days.   OOBC , ambulate as tolerated.  Avoid sedating agents iso hypercapnia.

## 2025-03-18 NOTE — PROGRESS NOTE ADULT - ASSESSMENT
59-year-old female, AAOx3, ambulates with a cane, no HHA,  history of DM, HLD, asthma, obesity hypoventilation syndrome on 4L NC, migraines, hypothyroidism, NAKUL, RCC status post left renal resection 11/23 presented to the hospital 3/11 with intermittent palpitations and chest pain that started a week prior to admission. An RRT was activated for unresponsiveness and AHHRF for which she was intubated and admitted to the ICU, She self-extubated on 3/12 and was treated prophylactically with racemic epi and dexamethasone, placed on BiPAP and subsequently transitioned to nasal cannula. While napping, she was noted to develop hypoxia to 80% which resolved upon awakening the patient. She repeatedly declines to use BiPAP and is able to verbalize her awareness of the risks to her health/life by not wearing it prn and at night. She has also stated that she does not want to be re-intubated. Nocturnal AVAPS has been recommended, encouraged and initiated. She toelrated AVAPS overnight 3/12-3/13 with improvement in her pCO2. In the AM 3/13, she was transitioned back to nasal cannula. She continues to receive Advair inhaler and duonebs q6.     Her hospital course has also been notable for TWI in anterior leads with associated troponemia with peaked at 2340 and downtrended. Cardiologist Memo was consulted and recommended continuation of ASA and atorvastatin. Repeat TTE 3/11/25 showed normal LV function, EF 54%. Anti-HTN are held at present and she has remained normotensive; will be restarted as appropriate. She has lower extremity edema which she reports being present for the past 7 months; possibly related to the amlodipine. Endocrinology has also been consulted for recommendations regarding uncontrolled hyperglycemia/diabetes with A1c 13%. She was initiated on Lantus 38 units qHS with mod ISS; regimen adjusted to Lantus 42 units qHS, 10 units pre-meal lispro and moderate ISS as her diet has been resumed. She is being transferred to  for ongoing management. Her Bustamante catheter was removed on 3/13.     Patient was downgraded from ICU to SCU on 3/15 for further management. Currently on 3L NC and AVAPS at night. Patient reports not using AVAPS because of nasal congestion and facial pain to left side (Hx of recurrent sinusitis per patient).   Started on Augmentin 875 mg BID for 7 days. Decadron short course. BP elevated. Home meds Norvasc and Losartan started. BG remains elevated. Endo following.     03/18:   59-year-old female, AAOx3, ambulates with a cane, no HHA,  history of DM, HLD, asthma, obesity hypoventilation syndrome on 4L NC, migraines, hypothyroidism, NAKUL, RCC status post left renal resection 11/23 presented to the hospital 3/11 with intermittent palpitations and chest pain that started a week prior to admission. An RRT was activated for unresponsiveness and AHHRF for which she was intubated and admitted to the ICU, She self-extubated on 3/12 and was treated prophylactically with racemic epi and dexamethasone, placed on BiPAP and subsequently transitioned to nasal cannula. While napping, she was noted to develop hypoxia to 80% which resolved upon awakening the patient. She repeatedly declines to use BiPAP and is able to verbalize her awareness of the risks to her health/life by not wearing it prn and at night. She has also stated that she does not want to be re-intubated. Nocturnal AVAPS has been recommended, encouraged and initiated. She toelrated AVAPS overnight 3/12-3/13 with improvement in her pCO2. In the AM 3/13, she was transitioned back to nasal cannula. She continues to receive Advair inhaler and duonebs q6.     Her hospital course has also been notable for TWI in anterior leads with associated troponemia with peaked at 2340 and downtrended. Cardiologist Memo was consulted and recommended continuation of ASA and atorvastatin. Repeat TTE 3/11/25 showed normal LV function, EF 54%. Anti-HTN are held at present and she has remained normotensive; will be restarted as appropriate. She has lower extremity edema which she reports being present for the past 7 months; possibly related to the amlodipine. Endocrinology has also been consulted for recommendations regarding uncontrolled hyperglycemia/diabetes with A1c 13%. She was initiated on Lantus 38 units qHS with mod ISS; regimen adjusted to Lantus 42 units qHS, 10 units pre-meal lispro and moderate ISS as her diet has been resumed. She is being transferred to  for ongoing management. Her Bustamante catheter was removed on 3/13.     Patient was downgraded from ICU to SCU on 3/15 for further management. Currently on 3L NC and AVAPS at night. Patient reports not using AVAPS because of nasal congestion and facial pain to left side (Hx of recurrent sinusitis per patient).   Started on Augmentin 875 mg BID for 7 days. Decadron short course. BP elevated. Home meds Norvasc and Losartan started. BG remains elevated. Endo following.     03/18: Patient discharged to home with home PT on ABX for a total of 7 days. Sent prescriptions to pharmacy of patient's choice. Discharged patient on a new insulin regimen and educations provided to patient at bedside. Endo reccs appreciated. Discussed patient's discharge planning with attending. Patient to follow up with Cardio, Pulm, Endocrine, vascular and PCP.

## 2025-03-18 NOTE — PROGRESS NOTE ADULT - PROVIDER SPECIALTY LIST ADULT
Cardiology
Endocrinology
Cardiology
Cardiology
Critical Care
Endocrinology
Endocrinology
Pulmonology
Critical Care
Endocrinology
MICU
Pulmonology
Pulmonology
Cardiology
Critical Care
Internal Medicine
Critical Care

## 2025-03-18 NOTE — PROGRESS NOTE ADULT - NS ATTEND OPT1 GEN_ALL_CORE
I independently performed the documented:
I attest my time as attending is greater than 50% of the total combined time spent on qualifying patient care activities by the PA/NP and attending.

## 2025-03-20 ENCOUNTER — NON-APPOINTMENT (OUTPATIENT)
Age: 60
End: 2025-03-20

## 2025-03-28 NOTE — PHYSICAL EXAM
chest wall non-tender, breathing is unlabored without accessory muscle use, normal breath sounds [Alert] : alert [Obese] : obese [No Acute Distress] : no acute distress [No Neck Mass] : no neck mass was observed [Thyroid Not Enlarged] : the thyroid was not enlarged [No Respiratory Distress] : no respiratory distress [Clear to Auscultation] : lungs were clear to auscultation bilaterally [Normal PMI] : the apical impulse was normal [Normal Rate] : heart rate was normal

## 2025-03-29 DIAGNOSIS — Z87.09 PERSONAL HISTORY OF OTHER DISEASES OF THE RESPIRATORY SYSTEM: ICD-10-CM

## 2025-03-29 DIAGNOSIS — J20.9 ACUTE BRONCHITIS, UNSPECIFIED: ICD-10-CM

## 2025-03-29 RX ORDER — ALBUTEROL SULFATE 2.5 MG/3ML
(2.5 MG/3ML) SOLUTION RESPIRATORY (INHALATION) EVERY 6 HOURS
Qty: 120 | Refills: 5 | Status: ACTIVE | COMMUNITY
Start: 2025-03-29 | End: 1900-01-01

## 2025-03-29 RX ORDER — AMOXICILLIN AND CLAVULANATE POTASSIUM 875; 125 MG/1; MG/1
875-125 TABLET, COATED ORAL
Qty: 14 | Refills: 0 | Status: ACTIVE | COMMUNITY
Start: 2025-03-29 | End: 1900-01-01

## 2025-03-31 ENCOUNTER — APPOINTMENT (OUTPATIENT)
Dept: INTERNAL MEDICINE | Facility: CLINIC | Age: 60
End: 2025-03-31

## 2025-04-07 ENCOUNTER — APPOINTMENT (OUTPATIENT)
Dept: PULMONOLOGY | Facility: CLINIC | Age: 60
End: 2025-04-07

## 2025-04-08 ENCOUNTER — APPOINTMENT (OUTPATIENT)
Dept: VASCULAR SURGERY | Facility: CLINIC | Age: 60
End: 2025-04-08
Payer: MEDICARE

## 2025-04-08 ENCOUNTER — INPATIENT (INPATIENT)
Facility: HOSPITAL | Age: 60
LOS: 5 days | Discharge: HOME | DRG: 176 | End: 2025-04-14
Attending: STUDENT IN AN ORGANIZED HEALTH CARE EDUCATION/TRAINING PROGRAM | Admitting: STUDENT IN AN ORGANIZED HEALTH CARE EDUCATION/TRAINING PROGRAM
Payer: MEDICARE

## 2025-04-08 VITALS
TEMPERATURE: 98 F | OXYGEN SATURATION: 95 % | HEIGHT: 64 IN | SYSTOLIC BLOOD PRESSURE: 137 MMHG | RESPIRATION RATE: 20 BRPM | WEIGHT: 285.06 LBS | HEART RATE: 94 BPM | DIASTOLIC BLOOD PRESSURE: 78 MMHG

## 2025-04-08 DIAGNOSIS — R06.89 OTHER ABNORMALITIES OF BREATHING: ICD-10-CM

## 2025-04-08 DIAGNOSIS — Z29.9 ENCOUNTER FOR PROPHYLACTIC MEASURES, UNSPECIFIED: ICD-10-CM

## 2025-04-08 DIAGNOSIS — Z98.89 OTHER SPECIFIED POSTPROCEDURAL STATES: Chronic | ICD-10-CM

## 2025-04-08 DIAGNOSIS — E03.9 HYPOTHYROIDISM, UNSPECIFIED: ICD-10-CM

## 2025-04-08 DIAGNOSIS — G43.909 MIGRAINE, UNSPECIFIED, NOT INTRACTABLE, WITHOUT STATUS MIGRAINOSUS: ICD-10-CM

## 2025-04-08 DIAGNOSIS — E11.9 TYPE 2 DIABETES MELLITUS WITHOUT COMPLICATIONS: ICD-10-CM

## 2025-04-08 DIAGNOSIS — I26.99 OTHER PULMONARY EMBOLISM WITHOUT ACUTE COR PULMONALE: ICD-10-CM

## 2025-04-08 DIAGNOSIS — J45.909 UNSPECIFIED ASTHMA, UNCOMPLICATED: ICD-10-CM

## 2025-04-08 DIAGNOSIS — N89.8 OTHER SPECIFIED NONINFLAMMATORY DISORDERS OF VAGINA: Chronic | ICD-10-CM

## 2025-04-08 DIAGNOSIS — Z90.721 ACQUIRED ABSENCE OF OVARIES, UNILATERAL: Chronic | ICD-10-CM

## 2025-04-08 DIAGNOSIS — I77.6 ARTERITIS, UNSPECIFIED: Chronic | ICD-10-CM

## 2025-04-08 DIAGNOSIS — E27.8 OTHER SPECIFIED DISORDERS OF ADRENAL GLAND: Chronic | ICD-10-CM

## 2025-04-08 DIAGNOSIS — Z98.890 OTHER SPECIFIED POSTPROCEDURAL STATES: Chronic | ICD-10-CM

## 2025-04-08 DIAGNOSIS — Z90.710 ACQUIRED ABSENCE OF BOTH CERVIX AND UTERUS: Chronic | ICD-10-CM

## 2025-04-08 LAB
ALBUMIN SERPL ELPH-MCNC: 2.8 G/DL — LOW (ref 3.5–5)
ALP SERPL-CCNC: 114 U/L — SIGNIFICANT CHANGE UP (ref 40–120)
ALT FLD-CCNC: 36 U/L DA — SIGNIFICANT CHANGE UP (ref 10–60)
ANION GAP SERPL CALC-SCNC: 2 MMOL/L — LOW (ref 5–17)
APTT BLD: 25.1 SEC — SIGNIFICANT CHANGE UP (ref 24.5–35.6)
AST SERPL-CCNC: 28 U/L — SIGNIFICANT CHANGE UP (ref 10–40)
BASOPHILS # BLD AUTO: 0.05 K/UL — SIGNIFICANT CHANGE UP (ref 0–0.2)
BASOPHILS NFR BLD AUTO: 0.6 % — SIGNIFICANT CHANGE UP (ref 0–2)
BILIRUB SERPL-MCNC: 0.4 MG/DL — SIGNIFICANT CHANGE UP (ref 0.2–1.2)
BUN SERPL-MCNC: 11 MG/DL — SIGNIFICANT CHANGE UP (ref 7–18)
CALCIUM SERPL-MCNC: 8.9 MG/DL — SIGNIFICANT CHANGE UP (ref 8.4–10.5)
CHLORIDE SERPL-SCNC: 98 MMOL/L — SIGNIFICANT CHANGE UP (ref 96–108)
CO2 SERPL-SCNC: 36 MMOL/L — HIGH (ref 22–31)
CREAT SERPL-MCNC: 1.11 MG/DL — SIGNIFICANT CHANGE UP (ref 0.5–1.3)
D DIMER BLD IA.RAPID-MCNC: 386 NG/ML DDU — HIGH
EGFR: 57 ML/MIN/1.73M2 — LOW
EGFR: 57 ML/MIN/1.73M2 — LOW
EOSINOPHIL # BLD AUTO: 0.28 K/UL — SIGNIFICANT CHANGE UP (ref 0–0.5)
EOSINOPHIL NFR BLD AUTO: 3.3 % — SIGNIFICANT CHANGE UP (ref 0–6)
FLUAV AG NPH QL: SIGNIFICANT CHANGE UP
FLUBV AG NPH QL: SIGNIFICANT CHANGE UP
GLUCOSE SERPL-MCNC: 195 MG/DL — HIGH (ref 70–99)
HCG SERPL-ACNC: <1 MIU/ML — SIGNIFICANT CHANGE UP
HCT VFR BLD CALC: 39.5 % — SIGNIFICANT CHANGE UP (ref 34.5–45)
HGB BLD-MCNC: 11.9 G/DL — SIGNIFICANT CHANGE UP (ref 11.5–15.5)
IMM GRANULOCYTES NFR BLD AUTO: 1.4 % — HIGH (ref 0–0.9)
INR BLD: 1.29 RATIO — HIGH (ref 0.85–1.16)
LYMPHOCYTES # BLD AUTO: 1.85 K/UL — SIGNIFICANT CHANGE UP (ref 1–3.3)
LYMPHOCYTES # BLD AUTO: 21.6 % — SIGNIFICANT CHANGE UP (ref 13–44)
MCHC RBC-ENTMCNC: 24.3 PG — LOW (ref 27–34)
MCHC RBC-ENTMCNC: 30.1 G/DL — LOW (ref 32–36)
MCV RBC AUTO: 80.8 FL — SIGNIFICANT CHANGE UP (ref 80–100)
MONOCYTES # BLD AUTO: 0.69 K/UL — SIGNIFICANT CHANGE UP (ref 0–0.9)
MONOCYTES NFR BLD AUTO: 8.1 % — SIGNIFICANT CHANGE UP (ref 2–14)
NEUTROPHILS # BLD AUTO: 5.56 K/UL — SIGNIFICANT CHANGE UP (ref 1.8–7.4)
NEUTROPHILS NFR BLD AUTO: 65 % — SIGNIFICANT CHANGE UP (ref 43–77)
NRBC BLD AUTO-RTO: 0 /100 WBCS — SIGNIFICANT CHANGE UP (ref 0–0)
PLATELET # BLD AUTO: 303 K/UL — SIGNIFICANT CHANGE UP (ref 150–400)
POTASSIUM SERPL-MCNC: 3.9 MMOL/L — SIGNIFICANT CHANGE UP (ref 3.5–5.3)
POTASSIUM SERPL-SCNC: 3.9 MMOL/L — SIGNIFICANT CHANGE UP (ref 3.5–5.3)
PROT SERPL-MCNC: 8 G/DL — SIGNIFICANT CHANGE UP (ref 6–8.3)
PROTHROM AB SERPL-ACNC: 15.1 SEC — HIGH (ref 9.9–13.4)
RBC # BLD: 4.89 M/UL — SIGNIFICANT CHANGE UP (ref 3.8–5.2)
RBC # FLD: 16.4 % — HIGH (ref 10.3–14.5)
RSV RNA NPH QL NAA+NON-PROBE: SIGNIFICANT CHANGE UP
SARS-COV-2 RNA SPEC QL NAA+PROBE: SIGNIFICANT CHANGE UP
SODIUM SERPL-SCNC: 136 MMOL/L — SIGNIFICANT CHANGE UP (ref 135–145)
SOURCE RESPIRATORY: SIGNIFICANT CHANGE UP
WBC # BLD: 8.55 K/UL — SIGNIFICANT CHANGE UP (ref 3.8–10.5)
WBC # FLD AUTO: 8.55 K/UL — SIGNIFICANT CHANGE UP (ref 3.8–10.5)

## 2025-04-08 PROCEDURE — 99285 EMERGENCY DEPT VISIT HI MDM: CPT

## 2025-04-08 PROCEDURE — 99213 OFFICE O/P EST LOW 20 MIN: CPT

## 2025-04-08 PROCEDURE — 93971 EXTREMITY STUDY: CPT | Mod: 26,RT

## 2025-04-08 PROCEDURE — 99222 1ST HOSP IP/OBS MODERATE 55: CPT | Mod: GC

## 2025-04-08 PROCEDURE — 71045 X-RAY EXAM CHEST 1 VIEW: CPT | Mod: 26

## 2025-04-08 PROCEDURE — 71275 CT ANGIOGRAPHY CHEST: CPT | Mod: 26

## 2025-04-08 RX ORDER — MONTELUKAST SODIUM 10 MG/1
10 TABLET ORAL DAILY
Refills: 0 | Status: DISCONTINUED | OUTPATIENT
Start: 2025-04-08 | End: 2025-04-14

## 2025-04-08 RX ORDER — AMLODIPINE BESYLATE 10 MG/1
10 TABLET ORAL DAILY
Refills: 0 | Status: DISCONTINUED | OUTPATIENT
Start: 2025-04-08 | End: 2025-04-09

## 2025-04-08 RX ORDER — HEPARIN SODIUM 1000 [USP'U]/ML
10000 INJECTION INTRAVENOUS; SUBCUTANEOUS ONCE
Refills: 0 | Status: COMPLETED | OUTPATIENT
Start: 2025-04-08 | End: 2025-04-08

## 2025-04-08 RX ORDER — OXCARBAZEPINE 150 MG/1
600 TABLET, FILM COATED ORAL ONCE
Refills: 0 | Status: COMPLETED | OUTPATIENT
Start: 2025-04-08 | End: 2025-04-08

## 2025-04-08 RX ORDER — INSULIN GLARGINE-YFGN 100 [IU]/ML
35 INJECTION, SOLUTION SUBCUTANEOUS AT BEDTIME
Refills: 0 | Status: DISCONTINUED | OUTPATIENT
Start: 2025-04-08 | End: 2025-04-09

## 2025-04-08 RX ORDER — HEPARIN SODIUM 1000 [USP'U]/ML
10000 INJECTION INTRAVENOUS; SUBCUTANEOUS EVERY 6 HOURS
Refills: 0 | Status: DISCONTINUED | OUTPATIENT
Start: 2025-04-08 | End: 2025-04-09

## 2025-04-08 RX ORDER — HYDROCORTISONE 10 MG/G
1 CREAM TOPICAL EVERY 12 HOURS
Refills: 0 | Status: DISCONTINUED | OUTPATIENT
Start: 2025-04-08 | End: 2025-04-14

## 2025-04-08 RX ORDER — METHYLPREDNISOLONE ACETATE 80 MG/ML
125 INJECTION, SUSPENSION INTRA-ARTICULAR; INTRALESIONAL; INTRAMUSCULAR; SOFT TISSUE ONCE
Refills: 0 | Status: COMPLETED | OUTPATIENT
Start: 2025-04-08 | End: 2025-04-08

## 2025-04-08 RX ORDER — POLYETHYLENE GLYCOL 3350 17 G/17G
17 POWDER, FOR SOLUTION ORAL DAILY
Refills: 0 | Status: DISCONTINUED | OUTPATIENT
Start: 2025-04-08 | End: 2025-04-14

## 2025-04-08 RX ORDER — OXCARBAZEPINE 150 MG/1
600 TABLET, FILM COATED ORAL
Refills: 0 | Status: DISCONTINUED | OUTPATIENT
Start: 2025-04-09 | End: 2025-04-09

## 2025-04-08 RX ORDER — IPRATROPIUM BROMIDE AND ALBUTEROL SULFATE .5; 2.5 MG/3ML; MG/3ML
3 SOLUTION RESPIRATORY (INHALATION) EVERY 6 HOURS
Refills: 0 | Status: DISCONTINUED | OUTPATIENT
Start: 2025-04-08 | End: 2025-04-14

## 2025-04-08 RX ORDER — HEPARIN SODIUM 1000 [USP'U]/ML
5000 INJECTION INTRAVENOUS; SUBCUTANEOUS EVERY 6 HOURS
Refills: 0 | Status: DISCONTINUED | OUTPATIENT
Start: 2025-04-08 | End: 2025-04-09

## 2025-04-08 RX ORDER — ASPIRIN 325 MG
81 TABLET ORAL DAILY
Refills: 0 | Status: DISCONTINUED | OUTPATIENT
Start: 2025-04-08 | End: 2025-04-14

## 2025-04-08 RX ORDER — LOSARTAN POTASSIUM 100 MG/1
25 TABLET, FILM COATED ORAL DAILY
Refills: 0 | Status: DISCONTINUED | OUTPATIENT
Start: 2025-04-08 | End: 2025-04-11

## 2025-04-08 RX ORDER — LEVOTHYROXINE SODIUM 300 MCG
112 TABLET ORAL DAILY
Refills: 0 | Status: DISCONTINUED | OUTPATIENT
Start: 2025-04-08 | End: 2025-04-14

## 2025-04-08 RX ORDER — IPRATROPIUM BROMIDE AND ALBUTEROL SULFATE .5; 2.5 MG/3ML; MG/3ML
3 SOLUTION RESPIRATORY (INHALATION) ONCE
Refills: 0 | Status: COMPLETED | OUTPATIENT
Start: 2025-04-08 | End: 2025-04-08

## 2025-04-08 RX ORDER — MONTELUKAST SODIUM 10 MG/1
10 TABLET ORAL ONCE
Refills: 0 | Status: COMPLETED | OUTPATIENT
Start: 2025-04-08 | End: 2025-04-08

## 2025-04-08 RX ORDER — SENNA 187 MG
2 TABLET ORAL AT BEDTIME
Refills: 0 | Status: DISCONTINUED | OUTPATIENT
Start: 2025-04-08 | End: 2025-04-14

## 2025-04-08 RX ORDER — INSULIN LISPRO 100 U/ML
INJECTION, SOLUTION INTRAVENOUS; SUBCUTANEOUS
Refills: 0 | Status: DISCONTINUED | OUTPATIENT
Start: 2025-04-08 | End: 2025-04-14

## 2025-04-08 RX ORDER — ATORVASTATIN CALCIUM 80 MG/1
40 TABLET, FILM COATED ORAL AT BEDTIME
Refills: 0 | Status: DISCONTINUED | OUTPATIENT
Start: 2025-04-08 | End: 2025-04-14

## 2025-04-08 RX ORDER — ACETAMINOPHEN 500 MG/5ML
650 LIQUID (ML) ORAL EVERY 6 HOURS
Refills: 0 | Status: DISCONTINUED | OUTPATIENT
Start: 2025-04-08 | End: 2025-04-14

## 2025-04-08 RX ORDER — HEPARIN SODIUM 1000 [USP'U]/ML
INJECTION INTRAVENOUS; SUBCUTANEOUS
Qty: 25000 | Refills: 0 | Status: DISCONTINUED | OUTPATIENT
Start: 2025-04-08 | End: 2025-04-09

## 2025-04-08 RX ORDER — CEFAZOLIN SODIUM IN 0.9 % NACL 3 G/100 ML
1000 INTRAVENOUS SOLUTION, PIGGYBACK (ML) INTRAVENOUS EVERY 8 HOURS
Refills: 0 | Status: DISCONTINUED | OUTPATIENT
Start: 2025-04-08 | End: 2025-04-09

## 2025-04-08 RX ORDER — INSULIN LISPRO 100 U/ML
10 INJECTION, SOLUTION INTRAVENOUS; SUBCUTANEOUS
Refills: 0 | Status: DISCONTINUED | OUTPATIENT
Start: 2025-04-08 | End: 2025-04-09

## 2025-04-08 RX ORDER — FLUTICASONE PROPIONATE 50 UG/1
1 SPRAY, METERED NASAL
Refills: 0 | Status: DISCONTINUED | OUTPATIENT
Start: 2025-04-08 | End: 2025-04-14

## 2025-04-08 RX ADMIN — HEPARIN SODIUM 2300 UNIT(S)/HR: 1000 INJECTION INTRAVENOUS; SUBCUTANEOUS at 21:41

## 2025-04-08 RX ADMIN — HEPARIN SODIUM 10000 UNIT(S): 1000 INJECTION INTRAVENOUS; SUBCUTANEOUS at 21:40

## 2025-04-08 RX ADMIN — IPRATROPIUM BROMIDE AND ALBUTEROL SULFATE 3 MILLILITER(S): .5; 2.5 SOLUTION RESPIRATORY (INHALATION) at 13:39

## 2025-04-08 RX ADMIN — HEPARIN SODIUM 2300 UNIT(S)/HR: 1000 INJECTION INTRAVENOUS; SUBCUTANEOUS at 21:44

## 2025-04-08 RX ADMIN — OXCARBAZEPINE 600 MILLIGRAM(S): 150 TABLET, FILM COATED ORAL at 23:06

## 2025-04-08 RX ADMIN — METHYLPREDNISOLONE ACETATE 125 MILLIGRAM(S): 80 INJECTION, SUSPENSION INTRA-ARTICULAR; INTRALESIONAL; INTRAMUSCULAR; SOFT TISSUE at 15:48

## 2025-04-08 RX ADMIN — MONTELUKAST SODIUM 10 MILLIGRAM(S): 10 TABLET ORAL at 23:05

## 2025-04-08 NOTE — H&P ADULT - NSHPREVIEWOFSYSTEMS_GEN_ALL_CORE
- CONSTITUTIONAL: Denies fever and chills  - HEENT: Denies changes in vision and hearing.  - RESPIRATORY: Denies SOB and cough.  - CV: Denies chest pain and palpitations  - GI: Denies abdominal pain, nausea, vomiting and diarrhea.  - : Denies dysuria and urinary frequency.  - SKIN: swelling in the RLE   - NEUROLOGICAL: Denies headache and syncope.  - PSYCHIATRIC: Denies recent changes in mood. Denies anxiety and depression.

## 2025-04-08 NOTE — H&P ADULT - NSHPPHYSICALEXAM_GEN_ALL_CORE
T(C): 36.9 (04-08-25 @ 19:16), Max: 36.9 (04-08-25 @ 19:16)  HR: 85 (04-08-25 @ 19:40) (80 - 94)  BP: 147/79 (04-08-25 @ 19:16) (123/79 - 147/79)  RR: 20 (04-08-25 @ 19:16) (18 - 20)  SpO2: 96% (04-08-25 @ 19:16) (95% - 96%)    GENERAL: NAD  HEAD:  Atraumatic, Normocephalic  EYES:  conjunctiva and sclera clear  NECK: Supple, No JVD, Normal thyroid  CHEST/LUNG: Clear to auscultation. Clear to percussion bilaterally; No rales, rhonchi, wheezing, or rubs  HEART: Regular rate and rhythm; No murmurs, rubs, or gallops  ABDOMEN: Soft, Nontender, Nondistended; Bowel sounds present  NERVOUS SYSTEM:  Alert & Oriented X3,    EXTREMITIES:  2+ Peripheral Pulses, No clubbing, cyanosis, or edema  SKIN: noted edema b/l, with gray color scaly lesions in the knees and elbows. Not honey like color crusted lesions in the lateral side of the RLE, serous fluid coming out yellow color

## 2025-04-08 NOTE — ED ADULT NURSE NOTE - NSFALLRISKINTERV_ED_ALL_ED

## 2025-04-08 NOTE — CONSULT NOTE ADULT - ASSESSMENT
59 yro F with RLE edema and ulceration presents with hypoxia.     Plan:  -Med admission  -Consider echo and cardio consult  -Monitor resp status  -No vascular intervention at this time  -Recommend leg compression and elevation  -Discussed with Dr. Bowman

## 2025-04-08 NOTE — CONSULT NOTE ADULT - SUBJECTIVE AND OBJECTIVE BOX
Vascular Surgery HPI:  59-year-old female with past medical history of diabetes, hyperlipidemia, asthma, COPD on 4 L nasal cannula, NAKUL, elevated BMI, RCC status post left renal resection presents from outpatient office where she was found to be hypoxic with right lower extremity swelling. Vascular surgery seen and examined the pt. Pt states she has recurrent right lower extremity edema for months. She was admitted to  ICU 1 month ago with chest pain, palpitations and leg edema. She was discharged and was told to follow up with Dr. Bowman for vascular studies. She states she was at the clinic where she was found to be hypoxic with the RLE edema. Endorses SOB. Denies any vascular history or interventions done on lower extremities. Denies smoking hx.         PAST MEDICAL & SURGICAL HISTORY:  Hypertension  vaginal cyst  Hyperlipidemia  Asthma  last inhaler use with in 10 days, on Pulm follow up [id number]  Hypothyroid  Obstructive sleep apnea  refuses Cpap  Obesity  morbid  Trigeminal neuralgia R  Fibromyalgia  DM (diabetes mellitus) 2  DJD (degenerative joint disease)  Cervical/Thoracic/Lumbar  Cervical neuralgia  difficulty moving my neck  Back pain  thoracic & lumbar  H/O bursitis  right shoulder  Radicular pain  arms, legs  Vasculitis  Legs, forerhead, arms & hands, flare ups in R leg  Dr susie Ribeiro is my Rheumatologist  Renal cell carcinoma, left  on follow up Dr schulte, HOD renal [facility], waiting for suregry in 2020  Falls frequently  Morbid obesity with body mass index (BMI) of 50.0 to 59.9 in adult  Psoriasis  Diverticulosis  Fatty liver  S/P abdominal hysterectomy    S/P  section  1  Left adrenal mass  excision, benign 2006  Vaginal cyst  removed   Vasculitis on nerve biopsy  , had another surgery called microvascular decompression   S/P left oophorectomy  S/P colonoscopic polypectomy      Allergies    honeydew (Rash)  Fioricet (Rash)  mushroom (Unknown)  IV Contrast (Short breath)  venlafaxine (Hives)  gadobutrol (Rash; Urticaria; Hives)    Intolerances        ROS: Negative except per HPI.     SOCIAL HISTORY:  Smoking history   ETOH history    OBJECTIVE:    Vital Signs Last 24 Hrs  T(C): 36.6 (2025 10:49), Max: 36.6 (2025 10:49)  T(F): 97.8 (2025 10:49), Max: 97.8 (2025 10:49)  HR: 94 (2025 10:49) (94 - 94)  BP: 137/78 (2025 10:49) (137/78 - 137/78)  BP(mean): --  RR: 20 (2025 10:49) (20 - 20)  SpO2: 95% (2025 10:49) (95% - 95%)    Parameters below as of 2025 10:49  Patient On (Oxygen Delivery Method): mask, nonrebreather  O2 Flow (L/min): 10  O2 Concentration (%): 100    I&O's Summary      LABS:                        11.9   8.55  )-----------( 303      ( 2025 13:33 )             39.5     04    136  |  98  |  11  ----------------------------<  195[H]  3.9   |  36[H]  |  1.11    Ca    8.9      2025 13:33    TPro  8.0  /  Alb  2.8[L]  /  TBili  0.4  /  DBili  x   /  AST  28  /  ALT  36  /  AlkPhos  114  04-08      Urinalysis Basic - ( 2025 13:33 )    Color: x / Appearance: x / SG: x / pH: x  Gluc: 195 mg/dL / Ketone: x  / Bili: x / Urobili: x   Blood: x / Protein: x / Nitrite: x   Leuk Esterase: x / RBC: x / WBC x   Sq Epi: x / Non Sq Epi: x / Bacteria: x      CAPILLARY BLOOD GLUCOSE        LIVER FUNCTIONS - ( 2025 13:33 )  Alb: 2.8 g/dL / Pro: 8.0 g/dL / ALK PHOS: 114 U/L / ALT: 36 U/L DA / AST: 28 U/L / GGT: x             Cultures:      PHYSICAL EXAM:   General: AOx3, NAD.   Pulm: On nasal cannula. Normal chest rise and expansion.   Right lower Extremity: Warm, dry. Edema noted RLE with ulcerations appreciated at dorsal aspect of foot.  Back: No CVAT b/l.

## 2025-04-08 NOTE — ED PROVIDER NOTE - CLINICAL SUMMARY MEDICAL DECISION MAKING FREE TEXT BOX
59-year-old female with elevated BMI, COPD/asthma on 4 L nasal cannula oxygen at home who presented with shortness of breath/hypoxia from her doctor's office.  She also had a right lower extremity swelling of several months duration.  Her dimer is elevated.  CTA chest is pending upon  patient care signed out.  Patient is currently on her home oxygen settings satting in the mid 90s.  She received DuoNebs.  Her chest x-ray shows no focal consolidation  And low lung volumes.

## 2025-04-08 NOTE — CONSULT NOTE ADULT - ATTENDING COMMENTS
Hypoxic and pre-syncopal in office today. Sent to ED for treatment/evaluation.   -Cardiac, respiratory eval  -Rule out DVT/PE  -Local wound care to BLE   -Compression and elevation

## 2025-04-08 NOTE — H&P ADULT - PROBLEM SELECTOR PLAN 2
-patient presenting with increased RLE swelling for the past couple of days   -has a hx of lymphedema and was going to follow with vascular OP today but uanble to go to appt  -noted on PE with honey color crusted lesion in the dorsum of foot with serous fluid coming out   -will start cefazolin   -will give 1x dose of lasixs

## 2025-04-08 NOTE — H&P ADULT - HISTORY OF PRESENT ILLNESS
59-year-old female, AAOx3, ambulates with a cane, no HHA,  history of DM, HLD, asthma, obesity hypoventilation syndrome on 4L NC, migraines, hypothyroidism, NAKUL, RCC status post left renal resection 11/23 that comes in for lower extremity swelling.Patient endorses that for the past couple of day started noticing an increase in her swelling in the R leg from her baseline which promted her to come to the ED along with scabs that are now in the side of her leg. Patient  has chronic swelling in the LE due to poor lymphatic drainage and was suposed to see a vascular surgeon today but not able to make it to appointment. Pateint endorsing that redness in lower extremity is not new and has been chronic. Patient at home on 4L at baseline, report mild increase in SOB in the past couple of days due to being unable to walk much due to the swelling. Denies any fevers, chills, CP, N/V or any other associated symptoms.

## 2025-04-08 NOTE — H&P ADULT - ATTENDING COMMENTS
59 year old female patient with pmhx HLD, asthma, obesity hypoventilation syndrome, COPD on 4L NC as needed, NAKUL not on CPAP, RCC status post left renal resection 11/23, migraines, obesity, hypothyroidism, right shoulder bursitis, fibromyalgia, psoriasis, trigeminal neuralgia who presented to the ER due to hypoxia and right foot swelling and redness.  The patient states her right foot and lower leg started to develop blisters after being discharged at the end of March. The blisters popped with clear fluid and now have a yellowish cover. She has tender erythema on her lower leg and foot. She has had right lower extremity edema already for months. She saw outpatient vascular today and was found to be hypoxic. She was also anxious today and had shortness of breath and feelings of anxiety at the clinic. She states she has history of anxiety for years. She also states she has acute bronchitis for the past couple of weeks. In the ER CTA positive for PE. Patient states she needs to follow up for her regular colonoscopy/mammogram screenings as she is not up to date with those.  Review Of Systems included: + shortness of breath, + dyspnea on exertion, + hematuria, + right foot tenderness and redness, + lower extremity swelling, + cough, no diarrhea, no constipation, no dysuria, no chest pain, no palpitations, recent long car rides, no recent plane rides, no personal or family history of blood clots    General: Awake, Alert, Oriented  Cardiac: RRR  Pulmonary: CTA b/l  Abdominal: Soft, High BMI, NT  Extremities: 1+ edema in left lower extremity, +2 edema in right lower extremity, right lower extremity with tender erythema and swelling on dorsum of foot and anterior portion of lower leg with multiple popped yellow blisters    # New Onset Pulmonary Embolism  > ER discussed case with radiology who confirmed there is no right heart strain on CT.  - Consult Hematology  - Heparin drip  - ECHO  - Outpatient age-appropriate cancer screening follow up    # Right Lower Extremity Cellulitis  # Right Lower Extremity Blisters likely 2/2 Lower Extremity Edema  - Can consult ID  - Vascular consulted  - IV Unasyn  - Check ESR, CRP  - Can start Lasix 20mg PO for lower extremity edema and Elevate Lower Extremities    # Hx of DM  - Insulin Sliding Scale, Blood Glucose Monitoring  - Continue home insulin regimen at 70-80% of home dose, titrate as needed    # Hx of HLD, asthma, obesity hypoventilation syndrome, COPD on 4L NC as needed, NAKUL not on CPAP, RCC status post left renal resection 11/23, migraines, obesity, hypothyroidism, right shoulder bursitis, fibromyalgia, psoriasis, trigeminal neuralgia  - Continue home medications Albuterol prn, Amlodipine, Atorvastatin, HCTZ, Synthroid, Montelukast, Oxcarbazepine, Gabapentin  - Supplemental O2 prn. Patient refuses nocturnal CPAP or AVAPS; she does not want to use it    # DVT PPx: Heparin Drip    # FEN  - Diabetic DASH Diet  - Monitor and replete electrolytes as needed  FULL CODE (patient wants CPR and intubation if needed)    Previous Admissions Included  4/8/2025: Vasculdlar Clinic Visit: Patient reports that she has bronchitis and reports that she feels pre-syncopal and unwell. Pulse ox is 87%. Will send to [facility] ED for evaluation.  3/11/2025 - 3/18/2025: Chest Pain. An RRT was activated for unresponsiveness and AHHRF for which she was intubated and admitted to the ICU, She self-extubated on 3/12 and was treated prophylactically with racemic epi and dexamethasone, placed on BiPAP and subsequently transitioned to nasal cannula. While napping, she was noted to develop hypoxia to 80% which resolved upon awakening the patient. She repeatedly declines to use BiPAP and is able to verbalize her awareness of the risks to her health/life by not wearing it prn and at night. She has also stated that she does not want to be re-intubated. Nocturnal AVAPS has been recommended, encouraged and initiated. She tolerated AVAPS overnight 3/12-3/13 with improvement in her pCO2. In the AM 3/13, she was transitioned back to nasal cannula.  3/5/2025: Endocrinology Clinic Visit: The diabetes is poorly controlled. She has been on steroids on and off for bronchitis. Will raise the insulin dose. Will renew the Ozempic weekly. Will repeat the blood tests in 2 months. The CT scan 6/24 revealed a 1.2 cm right adrenal nodule unchanged. Will do a Salivary Cortisol  2/24/2025: Rheumatology Clinic Visit: Sacroiliitis, Psoriasis, Fibromyalgia, Psoriatic arthritis, Greater trochanteric bursitis of right hip, Low back pain, IgA vasculitis resolved. Patient to continue with Otezla ; psoriasis development may be multifactorial  2/3/2025: Pulmonology Clinic Visit: She had been on NIV BIPAP 20/16. She is not using any form of PAP therapy as she states remains uncomfortable. I have previously set her BiPAP in the office but she has not continued to use this after her kidney surgery. She has history of hypercapnic respiratory failure. She continues to use inhaled ICS/LABA. Taken several courses of Augmentin and recently took prednisone. Describes pressure in face, eyes  10/7/2024: Cardiology Clinic Visit: intermittent bouts of dizziness. She follows with a neurologist. Workup has been unrevealing. She also reports having atypical left sided chest discomfort that is reproducible as well as chronic stable dyspnea. From the cardiac perspective, patient had an unremarkable stress test in March 2024. Echocardiogram at that time also noted normal biventricular systolic function and mild diastolic dysfunction  7/22/2024: ER Visit for abdominal pain  3/19/2024 - 3/20/2024: Chest pain, s/p stress test  2/22/2024: ER Visit for nephrostomy disfunction; outpatient follow up for nephrostomy removal  1/22/2024 - 1/30/2024: post-op left nephrectomy collection s/p IR drainage of fluid collection 70cc on 1/24, now with pigtail in place.  11/3/2023 - 11/9/2023: s/p L laparoscopic radical nephrectomy  9/2/2023: ER Visit for chest pressure  2/4/2022 - 2/22/2022: Chest pain, COPD Exacerbation requiring intubation, Cellulitis, PNA, UTI, Left renal mass  Patient case and management was discussed with ER Attending. ER discussed case with radiology who confirmed there is no right heart strain on CT.  I did examine all labs (including CBC, PT/INR, APTT, D-Dimer, CMP, HCG, COVID/Flu/RSV Test), imaging, prior notes

## 2025-04-08 NOTE — ED PROVIDER NOTE - OBJECTIVE STATEMENT
59-year-old female with past medical history of diabetes, hyperlipidemia, asthma, COPD on 4 L nasal cannula, NAKUL, elevated BMI, RCC status post left renal resection  presents from PCP office where she was found to be hypoxic with right lower extremity swelling.  Patient states that her swelling of the right lower extremity has been there for months.

## 2025-04-08 NOTE — H&P ADULT - PROBLEM SELECTOR PLAN 3
hx of DM on insulin at home 60 U lantus and 10 U premeals   -will start 35 U lantus and 10 premeals   -ISS   -f/u fingersticks

## 2025-04-08 NOTE — ED PROVIDER NOTE - PHYSICAL EXAMINATION
General: Patient well appearing,  elevated BMI  HEENT: MMM, trachea midline  Respiratory: No respiratory distress, CTAB  GI: Soft, nontender  MSK: + Lower extremity edema in the right leg,  3+ with bullae on the dorsal aspect of the right foot  Neuro: Moves all extremities  Skin:  raised, well demarcated rashes on extremities

## 2025-04-08 NOTE — ED ADULT NURSE NOTE - OBJECTIVE STATEMENT
Patient sent from podiatrist c/o right leg pain, noted to be redness/swelling with blisters, no drainage at this time. Patient denies chest pain/SOB, numbness/tingling    on 4L NC at home.

## 2025-04-08 NOTE — H&P ADULT - PROBLEM SELECTOR PLAN 1
-patient presenting with increased RLE swelling for the past couple of days   -associated with decreased ambulation due to swelling   -currently in the ED on 4L which is home baseline   -on CTA showing Intraluminal filling defects in right upper lobar and right upper, middle, and lower segmental and subsegmental branches of pulmonary artery, suggestive of pulmonary embolism.  -no tachycardia or tachypnea noted   -started on heparin drip due to high BMI   -c/w heparin drip  -f/u echo for RV strain

## 2025-04-08 NOTE — H&P ADULT - ASSESSMENT
59-year-old female, AAOx3, ambulates with a cane, no HHA,  history of DM, HLD, asthma, obesity hypoventilation syndrome on 4L NC, migraines, hypothyroidism, NAKUL, RCC status post left renal resection 11/23 that comes in for lower extremity swelling. Pattient to be admitted for PE and cellulitis

## 2025-04-09 ENCOUNTER — TRANSCRIPTION ENCOUNTER (OUTPATIENT)
Age: 60
End: 2025-04-09

## 2025-04-09 DIAGNOSIS — G50.0 TRIGEMINAL NEURALGIA: ICD-10-CM

## 2025-04-09 LAB
ALBUMIN SERPL ELPH-MCNC: 2.7 G/DL — LOW (ref 3.5–5)
ALP SERPL-CCNC: 116 U/L — SIGNIFICANT CHANGE UP (ref 40–120)
ALT FLD-CCNC: 37 U/L DA — SIGNIFICANT CHANGE UP (ref 10–60)
ANION GAP SERPL CALC-SCNC: 9 MMOL/L — SIGNIFICANT CHANGE UP (ref 5–17)
APPEARANCE UR: CLEAR — SIGNIFICANT CHANGE UP
APTT BLD: 68.6 SEC — HIGH (ref 24.5–35.6)
AST SERPL-CCNC: 34 U/L — SIGNIFICANT CHANGE UP (ref 10–40)
BILIRUB SERPL-MCNC: 0.5 MG/DL — SIGNIFICANT CHANGE UP (ref 0.2–1.2)
BILIRUB UR-MCNC: NEGATIVE — SIGNIFICANT CHANGE UP
BUN SERPL-MCNC: 16 MG/DL — SIGNIFICANT CHANGE UP (ref 7–18)
CALCIUM SERPL-MCNC: 9 MG/DL — SIGNIFICANT CHANGE UP (ref 8.4–10.5)
CHLORIDE SERPL-SCNC: 96 MMOL/L — SIGNIFICANT CHANGE UP (ref 96–108)
CO2 SERPL-SCNC: 27 MMOL/L — SIGNIFICANT CHANGE UP (ref 22–31)
COLOR SPEC: YELLOW — SIGNIFICANT CHANGE UP
CREAT SERPL-MCNC: 1.24 MG/DL — SIGNIFICANT CHANGE UP (ref 0.5–1.3)
DIFF PNL FLD: NEGATIVE — SIGNIFICANT CHANGE UP
EGFR: 50 ML/MIN/1.73M2 — LOW
EGFR: 50 ML/MIN/1.73M2 — LOW
GLUCOSE BLDC GLUCOMTR-MCNC: 208 MG/DL — HIGH (ref 70–99)
GLUCOSE BLDC GLUCOMTR-MCNC: 252 MG/DL — HIGH (ref 70–99)
GLUCOSE BLDC GLUCOMTR-MCNC: 312 MG/DL — HIGH (ref 70–99)
GLUCOSE BLDC GLUCOMTR-MCNC: 396 MG/DL — HIGH (ref 70–99)
GLUCOSE BLDC GLUCOMTR-MCNC: 478 MG/DL — CRITICAL HIGH (ref 70–99)
GLUCOSE BLDC GLUCOMTR-MCNC: 489 MG/DL — CRITICAL HIGH (ref 70–99)
GLUCOSE SERPL-MCNC: 448 MG/DL — HIGH (ref 70–99)
GLUCOSE UR QL: >=1000 MG/DL
HCT VFR BLD CALC: 36.6 % — SIGNIFICANT CHANGE UP (ref 34.5–45)
HCT VFR BLD CALC: 40.2 % — SIGNIFICANT CHANGE UP (ref 34.5–45)
HGB BLD-MCNC: 11.3 G/DL — LOW (ref 11.5–15.5)
HGB BLD-MCNC: 12.1 G/DL — SIGNIFICANT CHANGE UP (ref 11.5–15.5)
KETONES UR-MCNC: ABNORMAL MG/DL
LEUKOCYTE ESTERASE UR-ACNC: NEGATIVE — SIGNIFICANT CHANGE UP
MAGNESIUM SERPL-MCNC: 1.4 MG/DL — LOW (ref 1.6–2.6)
MCHC RBC-ENTMCNC: 24.1 PG — LOW (ref 27–34)
MCHC RBC-ENTMCNC: 24.4 PG — LOW (ref 27–34)
MCHC RBC-ENTMCNC: 30.1 G/DL — LOW (ref 32–36)
MCHC RBC-ENTMCNC: 30.9 G/DL — LOW (ref 32–36)
MCV RBC AUTO: 78.9 FL — LOW (ref 80–100)
MCV RBC AUTO: 80.1 FL — SIGNIFICANT CHANGE UP (ref 80–100)
NITRITE UR-MCNC: NEGATIVE — SIGNIFICANT CHANGE UP
NRBC BLD AUTO-RTO: 0 /100 WBCS — SIGNIFICANT CHANGE UP (ref 0–0)
NRBC BLD AUTO-RTO: 0 /100 WBCS — SIGNIFICANT CHANGE UP (ref 0–0)
PH UR: 7.5 — SIGNIFICANT CHANGE UP (ref 5–8)
PHOSPHATE SERPL-MCNC: 2.7 MG/DL — SIGNIFICANT CHANGE UP (ref 2.5–4.5)
PLATELET # BLD AUTO: 299 K/UL — SIGNIFICANT CHANGE UP (ref 150–400)
PLATELET # BLD AUTO: 316 K/UL — SIGNIFICANT CHANGE UP (ref 150–400)
POTASSIUM SERPL-MCNC: 4.5 MMOL/L — SIGNIFICANT CHANGE UP (ref 3.5–5.3)
POTASSIUM SERPL-SCNC: 4.5 MMOL/L — SIGNIFICANT CHANGE UP (ref 3.5–5.3)
PROT SERPL-MCNC: 8.5 G/DL — HIGH (ref 6–8.3)
PROT UR-MCNC: NEGATIVE MG/DL — SIGNIFICANT CHANGE UP
RBC # BLD: 4.64 M/UL — SIGNIFICANT CHANGE UP (ref 3.8–5.2)
RBC # BLD: 5.02 M/UL — SIGNIFICANT CHANGE UP (ref 3.8–5.2)
RBC # FLD: 16.6 % — HIGH (ref 10.3–14.5)
RBC # FLD: 16.7 % — HIGH (ref 10.3–14.5)
SODIUM SERPL-SCNC: 132 MMOL/L — LOW (ref 135–145)
SP GR SPEC: 1.04 — HIGH (ref 1–1.03)
UROBILINOGEN FLD QL: 0.2 MG/DL — SIGNIFICANT CHANGE UP (ref 0.2–1)
WBC # BLD: 8.86 K/UL — SIGNIFICANT CHANGE UP (ref 3.8–10.5)
WBC # BLD: 9.38 K/UL — SIGNIFICANT CHANGE UP (ref 3.8–10.5)
WBC # FLD AUTO: 8.86 K/UL — SIGNIFICANT CHANGE UP (ref 3.8–10.5)
WBC # FLD AUTO: 9.38 K/UL — SIGNIFICANT CHANGE UP (ref 3.8–10.5)

## 2025-04-09 PROCEDURE — 76882 US LMTD JT/FCL EVL NVASC XTR: CPT | Mod: 26,RT

## 2025-04-09 PROCEDURE — 99233 SBSQ HOSP IP/OBS HIGH 50: CPT | Mod: GC

## 2025-04-09 RX ORDER — MAGNESIUM SULFATE 500 MG/ML
2 SYRINGE (ML) INJECTION ONCE
Refills: 0 | Status: COMPLETED | OUTPATIENT
Start: 2025-04-09 | End: 2025-04-09

## 2025-04-09 RX ORDER — INSULIN GLARGINE-YFGN 100 [IU]/ML
50 INJECTION, SOLUTION SUBCUTANEOUS AT BEDTIME
Refills: 0 | Status: DISCONTINUED | OUTPATIENT
Start: 2025-04-09 | End: 2025-04-09

## 2025-04-09 RX ORDER — CEFAZOLIN SODIUM IN 0.9 % NACL 3 G/100 ML
2000 INTRAVENOUS SOLUTION, PIGGYBACK (ML) INTRAVENOUS EVERY 8 HOURS
Refills: 0 | Status: DISCONTINUED | OUTPATIENT
Start: 2025-04-09 | End: 2025-04-14

## 2025-04-09 RX ORDER — INSULIN LISPRO 100 U/ML
10 INJECTION, SOLUTION INTRAVENOUS; SUBCUTANEOUS ONCE
Refills: 0 | Status: COMPLETED | OUTPATIENT
Start: 2025-04-09 | End: 2025-04-09

## 2025-04-09 RX ORDER — OXCARBAZEPINE 150 MG/1
600 TABLET, FILM COATED ORAL
Refills: 0 | Status: DISCONTINUED | OUTPATIENT
Start: 2025-04-09 | End: 2025-04-14

## 2025-04-09 RX ORDER — INSULIN GLARGINE-YFGN 100 [IU]/ML
60 INJECTION, SOLUTION SUBCUTANEOUS AT BEDTIME
Refills: 0 | Status: DISCONTINUED | OUTPATIENT
Start: 2025-04-09 | End: 2025-04-10

## 2025-04-09 RX ORDER — ENOXAPARIN SODIUM 100 MG/ML
120 INJECTION SUBCUTANEOUS EVERY 12 HOURS
Refills: 0 | Status: DISCONTINUED | OUTPATIENT
Start: 2025-04-09 | End: 2025-04-10

## 2025-04-09 RX ORDER — INSULIN LISPRO 100 U/ML
15 INJECTION, SOLUTION INTRAVENOUS; SUBCUTANEOUS
Refills: 0 | Status: DISCONTINUED | OUTPATIENT
Start: 2025-04-09 | End: 2025-04-10

## 2025-04-09 RX ADMIN — INSULIN LISPRO 10 UNIT(S): 100 INJECTION, SOLUTION INTRAVENOUS; SUBCUTANEOUS at 00:14

## 2025-04-09 RX ADMIN — INSULIN LISPRO 15 UNIT(S): 100 INJECTION, SOLUTION INTRAVENOUS; SUBCUTANEOUS at 12:25

## 2025-04-09 RX ADMIN — ENOXAPARIN SODIUM 120 MILLIGRAM(S): 100 INJECTION SUBCUTANEOUS at 17:44

## 2025-04-09 RX ADMIN — Medication 100 MILLIGRAM(S): at 21:24

## 2025-04-09 RX ADMIN — HEPARIN SODIUM 2300 UNIT(S)/HR: 1000 INJECTION INTRAVENOUS; SUBCUTANEOUS at 04:16

## 2025-04-09 RX ADMIN — MONTELUKAST SODIUM 10 MILLIGRAM(S): 10 TABLET ORAL at 12:22

## 2025-04-09 RX ADMIN — FLUTICASONE PROPIONATE 1 SPRAY(S): 50 SPRAY, METERED NASAL at 05:59

## 2025-04-09 RX ADMIN — HYDROCORTISONE 1 APPLICATION(S): 10 CREAM TOPICAL at 17:44

## 2025-04-09 RX ADMIN — INSULIN GLARGINE-YFGN 60 UNIT(S): 100 INJECTION, SOLUTION SUBCUTANEOUS at 21:25

## 2025-04-09 RX ADMIN — INSULIN LISPRO 10: 100 INJECTION, SOLUTION INTRAVENOUS; SUBCUTANEOUS at 08:48

## 2025-04-09 RX ADMIN — POLYETHYLENE GLYCOL 3350 17 GRAM(S): 17 POWDER, FOR SOLUTION ORAL at 12:22

## 2025-04-09 RX ADMIN — Medication 10 MILLIGRAM(S): at 12:22

## 2025-04-09 RX ADMIN — HEPARIN SODIUM 2300 UNIT(S)/HR: 1000 INJECTION INTRAVENOUS; SUBCUTANEOUS at 02:38

## 2025-04-09 RX ADMIN — Medication 650 MILLIGRAM(S): at 12:21

## 2025-04-09 RX ADMIN — Medication 2 TABLET(S): at 21:24

## 2025-04-09 RX ADMIN — Medication 1 DOSE(S): at 10:46

## 2025-04-09 RX ADMIN — OXCARBAZEPINE 600 MILLIGRAM(S): 150 TABLET, FILM COATED ORAL at 06:00

## 2025-04-09 RX ADMIN — ATORVASTATIN CALCIUM 40 MILLIGRAM(S): 80 TABLET, FILM COATED ORAL at 21:25

## 2025-04-09 RX ADMIN — Medication 81 MILLIGRAM(S): at 12:22

## 2025-04-09 RX ADMIN — Medication 100 MILLIGRAM(S): at 05:59

## 2025-04-09 RX ADMIN — LOSARTAN POTASSIUM 25 MILLIGRAM(S): 100 TABLET, FILM COATED ORAL at 05:59

## 2025-04-09 RX ADMIN — HEPARIN SODIUM 2300 UNIT(S)/HR: 1000 INJECTION INTRAVENOUS; SUBCUTANEOUS at 08:57

## 2025-04-09 RX ADMIN — INSULIN LISPRO 8: 100 INJECTION, SOLUTION INTRAVENOUS; SUBCUTANEOUS at 12:25

## 2025-04-09 RX ADMIN — HEPARIN SODIUM 2300 UNIT(S)/HR: 1000 INJECTION INTRAVENOUS; SUBCUTANEOUS at 07:12

## 2025-04-09 RX ADMIN — Medication 650 MILLIGRAM(S): at 13:00

## 2025-04-09 RX ADMIN — OXCARBAZEPINE 600 MILLIGRAM(S): 150 TABLET, FILM COATED ORAL at 21:24

## 2025-04-09 RX ADMIN — ENOXAPARIN SODIUM 120 MILLIGRAM(S): 100 INJECTION SUBCUTANEOUS at 10:43

## 2025-04-09 RX ADMIN — Medication 1 DOSE(S): at 21:28

## 2025-04-09 RX ADMIN — AMLODIPINE BESYLATE 10 MILLIGRAM(S): 10 TABLET ORAL at 05:59

## 2025-04-09 RX ADMIN — Medication 112 MICROGRAM(S): at 05:59

## 2025-04-09 RX ADMIN — Medication 25 GRAM(S): at 06:44

## 2025-04-09 RX ADMIN — FLUTICASONE PROPIONATE 1 SPRAY(S): 50 SPRAY, METERED NASAL at 17:43

## 2025-04-09 RX ADMIN — OXCARBAZEPINE 600 MILLIGRAM(S): 150 TABLET, FILM COATED ORAL at 14:53

## 2025-04-09 RX ADMIN — Medication 100 MILLIGRAM(S): at 17:44

## 2025-04-09 RX ADMIN — INSULIN LISPRO 10 UNIT(S): 100 INJECTION, SOLUTION INTRAVENOUS; SUBCUTANEOUS at 08:48

## 2025-04-09 NOTE — DISCHARGE NOTE PROVIDER - NSDCFUSCHEDAPPT_GEN_ALL_CORE_FT
Alex Ritter  [facility] Physician [facility]  VASCULAR [address] [facility]  Scheduled Appointment: 04/25/2025    Rubén Peck  [facility] Physician [facility]  UROLOGY [address] [facility]  Scheduled Appointment: 06/17/2025     Rubén Peck  [facility] Physician [facility]  UROLOGY [address] [facility]  Scheduled Appointment: 06/17/2025     Ken John  [facility] Physician [facility]  INTMED [address] [facility]  Scheduled Appointment: 05/13/2025    Rubén Peck  [facility]  UROLOGY [address] [facility]  Scheduled Appointment: 06/17/2025

## 2025-04-09 NOTE — DISCHARGE NOTE PROVIDER - NSDCFUADDAPPT_GEN_ALL_CORE_FT
APPTS ARE READY TO BE MADE: [X] YES    Best Family or Patient Contact (if needed):    Additional Information about above appointments (if needed):    1: Pulmonology  2:   3:     Other comments or requests:   APPTS ARE READY TO BE MADE: [X] YES    Best Family or Patient Contact (if needed):    Additional Information about above appointments (if needed):    1: Pulmonology  2: PCP  3:     Other comments or requests:   APPTS ARE READY TO BE MADE: [X] YES    Best Family or Patient Contact (if needed):    Additional Information about above appointments (if needed):    1: Pulmonology  2: PCP  3:     Other comments or requests:      Prior to outreaching the patient, it was visible that the patient has secured a follow up appointment which was not scheduled by our team. Dr. John on 5/13 at 5:20pm    Patient was outreached but did not answer. A voicemail was left for the patient to return our call. PULM   APPTS ARE READY TO BE MADE: [X] YES    Best Family or Patient Contact (if needed):    Additional Information about above appointments (if needed):    1: Pulmonology  2: PCP  3:     Other comments or requests:      INT MED - Prior to outreaching the patient, it was visible that the patient has secured a follow up appointment which was not scheduled by our team. Dr. John on 5/13 at 5:20pm    PULM - Prior to outreaching the patient, it was visible that the patient has secured a follow up appointment which was not scheduled by our team. Patient was scheduled on 4/30 at 10:30am with Dr Guido Monterroso at [address] [place] [address]

## 2025-04-09 NOTE — DISCHARGE NOTE PROVIDER - NSDCCPCAREPLAN_GEN_ALL_CORE_FT
PRINCIPAL DISCHARGE DIAGNOSIS  Diagnosis: Pulmonary embolism  Assessment and Plan of Treatment: You presented to the hospital with shortness of breath and you were found to have a blood clot in the lung. You were started on a blood thinner.  You will need to take Eliquis indefinitely until you follow up with Hematologist who will determine when to stop blood thinner.  Also follow up with PCP within 1-2 weeks        SECONDARY DISCHARGE DIAGNOSES  Diagnosis: Diabetes mellitus  Assessment and Plan of Treatment: You were note to have hemoglobin of 13  Your glucose is not well controlled   You were evaluated by the Endocrinologist  You are recommended to take      Diagnosis: Cellulitis  Assessment and Plan of Treatment: You were treated for a skin infection on your left lower extremity  You were seen by Podiatry and wound care was provided  You completed antibiotics on 4/14     PRINCIPAL DISCHARGE DIAGNOSIS  Diagnosis: Pulmonary embolism  Assessment and Plan of Treatment: You presented to the hospital with shortness of breath and you were found to have a blood clot in the lung. You were started on a blood thinner.  You will need to take Eliquis indefinitely until you follow up with   Hematologist who will determine when to stop blood thinner.  Also follow up with PCP within 1-2 weeks        SECONDARY DISCHARGE DIAGNOSES  Diagnosis: Diabetes mellitus  Assessment and Plan of Treatment: You were note to have hemoglobin of 13  Your glucose is not well controlled   You are recommended to continue to take home medication as prescribed  Follow up with Endocrinology outpatient for continued management  Continue to adhere to consistent carbohydrate diet       Diagnosis: Trigeminal neuralgia  Assessment and Plan of Treatment: you have history of trigeminal neuraliga  contiue to take your home medication as prescribed    Diagnosis: Hypoventilation syndrome  Assessment and Plan of Treatment: You have history of taking AVAPS at home  continue to use AVAPS at bedtime as prescribed   Follow up with outpatient Pulmonologist for continued management.    Diagnosis: Hypothyroidism  Assessment and Plan of Treatment: Continue to take home medications as prescribed    Diagnosis: Asthma  Assessment and Plan of Treatment: Continue to take home medications as precribed  You should follow up with your Pulmonologist outpatient in 1-2 weeks for continued management    Diagnosis: Cellulitis  Assessment and Plan of Treatment: You were treated for a skin infection on your left lower extremity  You were seen by Podiatry and wound care was provided  You completed antibiotics on 4/14

## 2025-04-09 NOTE — DISCHARGE NOTE PROVIDER - CARE PROVIDER_API CALL
[facility] [place]  Pulmonary Disease  [address] [place] [address]  Phone: [phone]  Fax: [phone]  Follow Up Time:     Ken John  Internal Medicine  [address] [facility], [address]  [place] [address]  Phone: [phone]  Fax: [phone]  Follow Up Time:

## 2025-04-09 NOTE — DISCHARGE NOTE PROVIDER - NSDCMRMEDTOKEN_GEN_ALL_CORE_FT
Admelog SoloStar 100 units/mL injectable solution: 18 unit(s) injectable 3 times a day (before meals) Administer 5 to 15 minutes before meals. MDD: 54 units  albuterol 2.5 mg/3 mL (0.083%) inhalation solution: 3 milliliter(s) by nebulizer every 6 hours as needed for  shortness of breath and/or wheezing  amLODIPine 10 mg oral tablet: 1 tab(s) orally once a day  amoxicillin-clavulanate 875 mg-125 mg oral tablet: 1 tab(s) orally 2 times a day Thru March 23  aspirin 81 mg oral tablet, chewable: 1 tab(s) orally once a day  atorvastatin 40 mg oral tablet: 1 tab(s) orally once a day (at bedtime)  Basaglar KwikPen 100 units/mL subcutaneous solution: 65 unit(s) subcutaneous once a day (at bedtime) MDD: 65 units  cetirizine 10 mg oral tablet: 1 tab(s) orally once a day  dexAMETHasone 1.5 mg oral tablet: 1 tab(s) orally 2 times a day Taper as follows: Take 1 tablet (1.5mg) orally twice daily on March 18. Then half tablet (0.75mg) once orally on March 19. Then stop.  fluticasone 50 mcg/inh nasal spray: 1 spray(s) nasal 2 times a day  gabapentin 400 mg oral capsule: 1 cap(s) orally 2 times a day DO NOT RESUME UNTIL APPROVED BY YOUR DOCTOR.  hydrocortisone 1% topical ointment: Apply topically to affected area every 12 hours 1 Apply topically to affected area every 12 hours  levothyroxine 112 mcg (0.112 mg) oral tablet: 1 tab(s) orally once a day  losartan 25 mg oral tablet: 1 tab(s) orally once a day (in the morning)  metFORMIN 1000 mg oral tablet: 1 tab(s) orally every 12 hours  montelukast 10 mg oral tablet: 1 tab(s) orally once a day  OXcarbazepine 600 mg oral tablet: 1 tab(s) orally 2 times a day  Ozempic 8 mg/3 mL (2 mg dose) subcutaneous solution: 2 milligram(s) subcutaneously once a week Every Sunday  pantoprazole 40 mg oral delayed release tablet: 1 tab(s) orally once a day (before a meal)  polyethylene glycol 3350 oral powder for reconstitution: 17 gram(s) orally once a day as needed for  constipation  senna leaf extract oral tablet: 2 tab(s) orally once a day (at bedtime) as needed for  constipation  sodium chloride 0.65% nasal spray: 1 spray(s) nasal 4 times a day as needed for  dry nasal passages  Symbicort 160 mcg-4.5 mcg/inh inhalation aerosol: 2 puff(s) inhaled 2 times a day   Admelog SoloStar 100 units/mL injectable solution: 18 unit(s) injectable 3 times a day (before meals) Administer 5 to 15 minutes before meals. MDD: 54 units  albuterol 2.5 mg/3 mL (0.083%) inhalation solution: 3 milliliter(s) by nebulizer every 6 hours as needed for  shortness of breath and/or wheezing  amLODIPine 10 mg oral tablet: 1 tab(s) orally once a day  amoxicillin-clavulanate 875 mg-125 mg oral tablet: 1 tab(s) orally 2 times a day Thru March 23  apixaban 5 mg oral tablet: 2 tab(s) orally every 12 hours  aspirin 81 mg oral tablet, chewable: 1 tab(s) orally once a day  atorvastatin 40 mg oral tablet: 1 tab(s) orally once a day (at bedtime)  Basaglar KwikPen 100 units/mL subcutaneous solution: 65 unit(s) subcutaneous once a day (at bedtime) MDD: 65 units  cetirizine 10 mg oral tablet: 1 tab(s) orally once a day  dexAMETHasone 1.5 mg oral tablet: 1 tab(s) orally 2 times a day Taper as follows: Take 1 tablet (1.5mg) orally twice daily on March 18. Then half tablet (0.75mg) once orally on March 19. Then stop.  fluticasone 50 mcg/inh nasal spray: 1 spray(s) nasal 2 times a day  gabapentin 400 mg oral capsule: 1 cap(s) orally 2 times a day DO NOT RESUME UNTIL APPROVED BY YOUR DOCTOR.  hydrocortisone 1% topical ointment: Apply topically to affected area every 12 hours 1 Apply topically to affected area every 12 hours  levothyroxine 112 mcg (0.112 mg) oral tablet: 1 tab(s) orally once a day  losartan 25 mg oral tablet: 1 tab(s) orally once a day (in the morning)  metFORMIN 1000 mg oral tablet: 1 tab(s) orally every 12 hours  montelukast 10 mg oral tablet: 1 tab(s) orally once a day  OXcarbazepine 600 mg oral tablet: 1 tab(s) orally 2 times a day  Ozempic 8 mg/3 mL (2 mg dose) subcutaneous solution: 2 milligram(s) subcutaneously once a week Every Sunday  pantoprazole 40 mg oral delayed release tablet: 1 tab(s) orally once a day (before a meal)  polyethylene glycol 3350 oral powder for reconstitution: 17 gram(s) orally once a day as needed for  constipation  senna leaf extract oral tablet: 2 tab(s) orally once a day (at bedtime) as needed for  constipation  sodium chloride 0.65% nasal spray: 1 spray(s) nasal 4 times a day as needed for  dry nasal passages  Symbicort 160 mcg-4.5 mcg/inh inhalation aerosol: 2 puff(s) inhaled 2 times a day   Admelog SoloStar 100 units/mL injectable solution: 18 unit(s) injectable 3 times a day (before meals) Administer 5 to 15 minutes before meals. MDD: 54 units  albuterol 2.5 mg/3 mL (0.083%) inhalation solution: 3 milliliter(s) by nebulizer every 6 hours as needed for  shortness of breath and/or wheezing  apixaban 5 mg oral tablet: 2 tab(s) orally every 12 hours  aspirin 81 mg oral tablet, chewable: 1 tab(s) orally once a day  atorvastatin 40 mg oral tablet: 1 tab(s) orally once a day (at bedtime)  Basaglar KwikPen 100 units/mL subcutaneous solution: 65 unit(s) subcutaneous once a day (at bedtime) MDD: 65 units  cetirizine 10 mg oral tablet: 1 tab(s) orally once a day  fluticasone 50 mcg/inh nasal spray: 1 spray(s) nasal 2 times a day  furosemide 20 mg oral tablet: 1 tab(s) orally once a day  gabapentin 400 mg oral capsule: 1 cap(s) orally 2 times a day DO NOT RESUME UNTIL APPROVED BY YOUR DOCTOR.  hydrocortisone 1% topical ointment: Apply topically to affected area every 12 hours 1 Apply topically to affected area every 12 hours  levothyroxine 112 mcg (0.112 mg) oral tablet: 1 tab(s) orally once a day  losartan 25 mg oral tablet: 1 tab(s) orally once a day (in the morning)  metFORMIN 1000 mg oral tablet: 1 tab(s) orally every 12 hours  montelukast 10 mg oral tablet: 1 tab(s) orally once a day  OXcarbazepine 600 mg oral tablet: 1 tab(s) orally 2 times a day  Ozempic 8 mg/3 mL (2 mg dose) subcutaneous solution: 2 milligram(s) subcutaneously once a week Every Sunday  pantoprazole 40 mg oral delayed release tablet: 1 tab(s) orally once a day (before a meal)  polyethylene glycol 3350 oral powder for reconstitution: 17 gram(s) orally once a day as needed for  constipation  senna leaf extract oral tablet: 2 tab(s) orally once a day (at bedtime) as needed for  constipation  sodium chloride 0.65% nasal spray: 1 spray(s) nasal 4 times a day as needed for  dry nasal passages  Symbicort 160 mcg-4.5 mcg/inh inhalation aerosol: 2 puff(s) inhaled 2 times a day   Admelog SoloStar 100 units/mL injectable solution: 18 unit(s) injectable 3 times a day (before meals) Administer 5 to 15 minutes before meals. MDD: 54 units  albuterol 2.5 mg/3 mL (0.083%) inhalation solution: 3 milliliter(s) by nebulizer every 6 hours as needed for  shortness of breath and/or wheezing  apixaban 5 mg oral tablet: 2 tab(s) orally every 12 hours Take 2 TABLETS every 12 hours until 4/16  then on 4/17 take ONE tablets every 12 hours  aspirin 81 mg oral tablet, chewable: 1 tab(s) orally once a day  atorvastatin 40 mg oral tablet: 1 tab(s) orally once a day (at bedtime)  Basaglar KwikPen 100 units/mL subcutaneous solution: 65 unit(s) subcutaneous once a day (at bedtime) MDD: 65 units  cetirizine 10 mg oral tablet: 1 tab(s) orally once a day  fluticasone 50 mcg/inh nasal spray: 1 spray(s) nasal 2 times a day  furosemide 20 mg oral tablet: 1 tab(s) orally once a day  gabapentin 400 mg oral capsule: 1 cap(s) orally 2 times a day DO NOT RESUME UNTIL APPROVED BY YOUR DOCTOR.  hydrocortisone 1% topical ointment: Apply topically to affected area every 12 hours 1 Apply topically to affected area every 12 hours  levothyroxine 112 mcg (0.112 mg) oral tablet: 1 tab(s) orally once a day  losartan 25 mg oral tablet: 1 tab(s) orally once a day (in the morning)  metFORMIN 1000 mg oral tablet: 1 tab(s) orally every 12 hours  montelukast 10 mg oral tablet: 1 tab(s) orally once a day  OXcarbazepine 600 mg oral tablet: 1 tab(s) orally 2 times a day  Ozempic 8 mg/3 mL (2 mg dose) subcutaneous solution: 2 milligram(s) subcutaneously once a week Every Sunday  pantoprazole 40 mg oral delayed release tablet: 1 tab(s) orally once a day (before a meal)  polyethylene glycol 3350 oral powder for reconstitution: 17 gram(s) orally once a day as needed for  constipation  senna leaf extract oral tablet: 2 tab(s) orally once a day (at bedtime) as needed for  constipation  sodium chloride 0.65% nasal spray: 1 spray(s) nasal 4 times a day as needed for  dry nasal passages  Symbicort 160 mcg-4.5 mcg/inh inhalation aerosol: 2 puff(s) inhaled 2 times a day   Admelog SoloStar 100 units/mL injectable solution: 10 unit(s) injectable 3 times a day (before meals) Administer 5 to 15 minutes before meals. MDD: 54 units  albuterol 2.5 mg/3 mL (0.083%) inhalation solution: 3 milliliter(s) by nebulizer every 6 hours as needed for  shortness of breath and/or wheezing  apixaban 5 mg oral tablet: 2 tab(s) orally every 12 hours Take 2 TABLETS every 12 hours until 4/16  then on 4/17 take ONE tablets every 12 hours  aspirin 81 mg oral tablet, chewable: 1 tab(s) orally once a day  atorvastatin 40 mg oral tablet: 1 tab(s) orally once a day (at bedtime)  Basaglar KwikPen 100 units/mL subcutaneous solution: 45 unit(s) subcutaneous once a day (at bedtime) MDD: 65 units  cetirizine 10 mg oral tablet: 1 tab(s) orally once a day  fluticasone 50 mcg/inh nasal spray: 1 spray(s) nasal 2 times a day  furosemide 20 mg oral tablet: 1 tab(s) orally once a day  gabapentin 400 mg oral capsule: 1 cap(s) orally 2 times a day DO NOT RESUME UNTIL APPROVED BY YOUR DOCTOR.  hydrocortisone 1% topical ointment: Apply topically to affected area every 12 hours 1 Apply topically to affected area every 12 hours  levothyroxine 112 mcg (0.112 mg) oral tablet: 1 tab(s) orally once a day  losartan 25 mg oral tablet: 1 tab(s) orally once a day (in the morning)  metFORMIN 1000 mg oral tablet: 1 tab(s) orally every 12 hours  montelukast 10 mg oral tablet: 1 tab(s) orally once a day  OXcarbazepine 600 mg oral tablet: 1 tab(s) orally 2 times a day  Ozempic 8 mg/3 mL (2 mg dose) subcutaneous solution: 2 milligram(s) subcutaneously once a week Every Sunday  pantoprazole 40 mg oral delayed release tablet: 1 tab(s) orally once a day (before a meal)  polyethylene glycol 3350 oral powder for reconstitution: 17 gram(s) orally once a day as needed for  constipation  senna leaf extract oral tablet: 2 tab(s) orally once a day (at bedtime) as needed for  constipation  sodium chloride 0.65% nasal spray: 1 spray(s) nasal 4 times a day as needed for  dry nasal passages  Symbicort 160 mcg-4.5 mcg/inh inhalation aerosol: 2 puff(s) inhaled 2 times a day

## 2025-04-09 NOTE — DISCHARGE NOTE PROVIDER - HOSPITAL COURSE
59-year-old female, AAOx3, ambulates with a cane, no HHA,  history of DM, HLD, asthma, obesity hypoventilation syndrome on 4L NC, migraines, hypothyroidism, NAKUL, RCC status post left renal resection 11/23 that comes in for lower extremity swelling.Patient endorses that for the past couple of day started noticing an increase in her swelling in the R leg from her baseline which promted her to come to the ED along with scabs that are now in the side of her leg. Patient  has chronic swelling in the LE due to poor lymphatic drainage and was suposed to see a vascular surgeon today but not able to make it to appointment. Pateint endorsing that redness in lower extremity is not new and has been chronic. Patient at home on 4L at baseline, report mild increase in SOB in the past couple of days due to being unable to walk much due to the swelling. Denies any fevers, chills, CP, N/V or any other associated symptoms. CTA Intraluminal filling defects in RUL and right upper, middle, and lower segmental and subsegmental branches. Started on hep gtt, transitioned to FD lovenox, now on DOAC.     Found to have cellulitis, started on PO lasix and 2g ancef q8. Insulin increased inpatient, a1c 13, endo consulted.    For patient's Trigeminal neuralgia, increased oxcarbazepine 600mg TID, gabapentin 400mg BID.   59-year-old female, AAOx3, ambulates with a cane, no HHA,  history of DM, HLD, asthma, obesity hypoventilation syndrome on 4L NC, migraines, hypothyroidism, NAKUL, RCC status post left renal resection 11/23 that comes in for lower extremity swelling.Patient endorses that for the past couple of day started noticing an increase in her swelling in the R leg from her baseline which promted her to come to the ED along with scabs that are now in the side of her leg. Patient  has chronic swelling in the LE due to poor lymphatic drainage and was suposed to see a vascular surgeon today but not able to make it to appointment. Pateint endorsing that redness in lower extremity is not new and has been chronic. Patient at home on 4L at baseline, report mild increase in SOB in the past couple of days due to being unable to walk much due to the swelling. Denies any fevers, chills, CP, N/V or any other associated symptoms. CTA Intraluminal filling defects in RUL and right upper, middle, and lower segmental and subsegmental branches. Started on hep gtt, transitioned to FD lovenox, now on DOAC.     Found to have cellulitis, started on PO lasix and 2g ancef q8, completed antibiotics on 4/14  Insulin increased inpatient, a1c 13, endo consulted.    For patient's Trigeminal neuralgia, increased oxcarbazepine 600mg TID, gabapentin 400mg BID.    Medically optimized for discharge  Discharge discussed with attending   59-year-old female, AAOx3, ambulates with a cane, no HHA,  history of DM, HLD, asthma, obesity hypoventilation syndrome on 4L NC, migraines, hypothyroidism, NAKUL, RCC status post left renal resection 11/23 that comes in for lower extremity swelling.Patient endorses that for the past couple of day started noticing an increase in her swelling in the R leg from her baseline which promted her to come to the ED along with scabs that are now in the side of her leg. Patient  has chronic swelling in the LE due to poor lymphatic drainage and was suposed to see a vascular surgeon today but not able to make it to appointment. Pateint endorsing that redness in lower extremity is not new and has been chronic. Patient at home on 4L at baseline, report mild increase in SOB in the past couple of days due to being unable to walk much due to the swelling. Denies any fevers, chills, CP, N/V or any other associated symptoms. CTA Intraluminal filling defects in RUL and right upper, middle, and lower segmental and subsegmental branches. Started on hep gtt, transitioned to FD lovenox, now on DOAC.     Found to have cellulitis, started on PO lasix and 2g ancef q8, completed antibiotics on 4/14  Insulin increased inpatient, a1c 13, endo consulted.    For patient's Trigeminal neuralgia, increased oxcarbazepine 600mg TID, gabapentin 400mg BID.    Medically optimized for discharge  Discharge discussed with attending.    Attending Attestation:    Agree with the above. Patient was admitted for right lower extremity cellulitis for which she completed a course of cefazolin in house prior to discharge. Given her chronic lower extremity edema (likely multifactorial, 2/2 venous stasis, obesity, pulmonary hypertension) and underlying severe psoriasis, she is predisposed to lower extremity soft tissue infection. In addition, she has poorly controlled diabetes mellitus (A1c 13%), was seen by Endocrinology prior to DC to leave recommendations. Patient was counseled extensively and encouraged to continue using her semaglutide. She has lost 15 lbs on the 1 mg dose and has just been increased to the 2 mg dose outpatient.     Patient has obesity hypoventilation syndrome with documented prior daytime hypercapnia. She has been started on AVAPS outpatient but has intermittent compliance, citing a tight, painful mask that causes her trigeminal neuralgia to flare. She was instructed to follow with Dr. Monterroso outpatient.     Patient underwent a CTA chest on arrival which demonstrated multiple pulmonary emboli. She was started on AC in house (Lovenox -> apixaban) and will DC on apixaban. She was counseled she will require 3-6 months AC at minimum, but possibly should continue indefinitely given her risk factors.    59-year-old female, AAOx3, ambulates with a cane, no HHA,  history of DM, HLD, asthma, obesity hypoventilation syndrome on 4L NC, migraines, hypothyroidism, NAKUL, RCC status post left renal resection 11/23 that comes in for lower extremity swelling.Patient endorses that for the past couple of day started noticing an increase in her swelling in the R leg from her baseline which promted her to come to the ED along with scabs that are now in the side of her leg. Patient  has chronic swelling in the LE due to poor lymphatic drainage and was suposed to see a vascular surgeon today but not able to make it to appointment. Pateint endorsing that redness in lower extremity is not new and has been chronic. Patient at home on 4L at baseline, report mild increase in SOB in the past couple of days due to being unable to walk much due to the swelling. Denies any fevers, chills, CP, N/V or any other associated symptoms. CTA Intraluminal filling defects in RUL and right upper, middle, and lower segmental and subsegmental branches. Started on hep gtt, transitioned to FD lovenox, now on DOAC.     Found to have cellulitis, started on PO lasix and 2g ancef q8, completed antibiotics on 4/14  Insulin increased inpatient, a1c 13, endo consulted.    For patient's Trigeminal neuralgia, increased oxcarbazepine 600mg TID, gabapentin 400mg BID.    Medically optimized for discharge  Discharge discussed with attending.    Attending Attestation:    Agree with the above. Patient was admitted for right lower extremity cellulitis for which she completed a course of cefazolin in house prior to discharge. Given her chronic lower extremity edema (likely multifactorial, 2/2 venous stasis, obesity, pulmonary hypertension) and underlying severe psoriasis, she is predisposed to lower extremity soft tissue infection. In addition, she has poorly controlled diabetes mellitus (A1c 13%), was seen by Endocrinology prior to DC to leave recommendations. Patient was counseled extensively and encouraged to continue using her semaglutide. She has lost 15 lbs on the 1 mg dose and has just been increased to the 2 mg dose outpatient. She has no ulcer/exposed bone, concern for OM clinically.     Patient has obesity hypoventilation syndrome with documented prior daytime hypercapnia. She has been started on AVAPS outpatient but has intermittent compliance, citing a tight, painful mask that causes her trigeminal neuralgia to flare. She was instructed to follow with Dr. Monterroso outpatient.     Patient underwent a CTA chest on arrival which demonstrated multiple pulmonary emboli. She was started on AC in house (Lovenox -> apixaban) and will DC on apixaban. She was counseled she will require 3-6 months AC at minimum, but possibly should continue indefinitely given her risk factors.

## 2025-04-09 NOTE — PATIENT PROFILE ADULT - FALL HARM RISK - HARM RISK INTERVENTIONS

## 2025-04-09 NOTE — DISCHARGE NOTE PROVIDER - ATTENDING DISCHARGE PHYSICAL EXAMINATION:
GENERAL: obese woman sitting up in bedside chair in no distress  HEAD:  Atraumatic, Normocephalic  EYES:  conjunctiva and sclera clear  NECK: Supple, No JVD, difficult to ascertain her JVP given body habitus   CHEST/LUNG: Clear to auscultation bilaterally; No wheeze  HEART: Regular rate and rhythm; No murmurs, rubs, or gallops  ABDOMEN: Soft, Nontender, Nondistended; Bowel sounds present  EXTREMITIES:  chronic lower extremity edema  PSYCH: AAOx3  NEUROLOGY: non-focal  SKIN: psoriatic lesions on the extensor surfaces of bilateral upper arms and bilateral knees

## 2025-04-09 NOTE — DISCHARGE NOTE PROVIDER - CARE PROVIDERS DIRECT ADDRESSES
[email or web address]

## 2025-04-10 LAB
ANION GAP SERPL CALC-SCNC: 9 MMOL/L — SIGNIFICANT CHANGE UP (ref 5–17)
BASOPHILS # BLD AUTO: 0.02 K/UL — SIGNIFICANT CHANGE UP (ref 0–0.2)
BASOPHILS NFR BLD AUTO: 0.2 % — SIGNIFICANT CHANGE UP (ref 0–2)
BUN SERPL-MCNC: 22 MG/DL — HIGH (ref 7–18)
CALCIUM SERPL-MCNC: 8.6 MG/DL — SIGNIFICANT CHANGE UP (ref 8.4–10.5)
CHLORIDE SERPL-SCNC: 95 MMOL/L — LOW (ref 96–108)
CO2 SERPL-SCNC: 29 MMOL/L — SIGNIFICANT CHANGE UP (ref 22–31)
CREAT SERPL-MCNC: 1.41 MG/DL — HIGH (ref 0.5–1.3)
EGFR: 43 ML/MIN/1.73M2 — LOW
EGFR: 43 ML/MIN/1.73M2 — LOW
EOSINOPHIL # BLD AUTO: 0.15 K/UL — SIGNIFICANT CHANGE UP (ref 0–0.5)
EOSINOPHIL NFR BLD AUTO: 1.5 % — SIGNIFICANT CHANGE UP (ref 0–6)
GLUCOSE BLDC GLUCOMTR-MCNC: 180 MG/DL — HIGH (ref 70–99)
GLUCOSE BLDC GLUCOMTR-MCNC: 204 MG/DL — HIGH (ref 70–99)
GLUCOSE BLDC GLUCOMTR-MCNC: 266 MG/DL — HIGH (ref 70–99)
GLUCOSE BLDC GLUCOMTR-MCNC: 273 MG/DL — HIGH (ref 70–99)
GLUCOSE SERPL-MCNC: 280 MG/DL — HIGH (ref 70–99)
HCT VFR BLD CALC: 39.7 % — SIGNIFICANT CHANGE UP (ref 34.5–45)
HGB BLD-MCNC: 11.7 G/DL — SIGNIFICANT CHANGE UP (ref 11.5–15.5)
IMM GRANULOCYTES NFR BLD AUTO: 1.2 % — HIGH (ref 0–0.9)
LYMPHOCYTES # BLD AUTO: 2.34 K/UL — SIGNIFICANT CHANGE UP (ref 1–3.3)
LYMPHOCYTES # BLD AUTO: 23.7 % — SIGNIFICANT CHANGE UP (ref 13–44)
MAGNESIUM SERPL-MCNC: 1.8 MG/DL — SIGNIFICANT CHANGE UP (ref 1.6–2.6)
MCHC RBC-ENTMCNC: 24 PG — LOW (ref 27–34)
MCHC RBC-ENTMCNC: 29.5 G/DL — LOW (ref 32–36)
MCV RBC AUTO: 81.4 FL — SIGNIFICANT CHANGE UP (ref 80–100)
MONOCYTES # BLD AUTO: 0.67 K/UL — SIGNIFICANT CHANGE UP (ref 0–0.9)
MONOCYTES NFR BLD AUTO: 6.8 % — SIGNIFICANT CHANGE UP (ref 2–14)
NEUTROPHILS # BLD AUTO: 6.57 K/UL — SIGNIFICANT CHANGE UP (ref 1.8–7.4)
NEUTROPHILS NFR BLD AUTO: 66.6 % — SIGNIFICANT CHANGE UP (ref 43–77)
NRBC BLD AUTO-RTO: 0 /100 WBCS — SIGNIFICANT CHANGE UP (ref 0–0)
PHOSPHATE SERPL-MCNC: 4 MG/DL — SIGNIFICANT CHANGE UP (ref 2.5–4.5)
PLATELET # BLD AUTO: 385 K/UL — SIGNIFICANT CHANGE UP (ref 150–400)
POTASSIUM SERPL-MCNC: 4 MMOL/L — SIGNIFICANT CHANGE UP (ref 3.5–5.3)
POTASSIUM SERPL-SCNC: 4 MMOL/L — SIGNIFICANT CHANGE UP (ref 3.5–5.3)
RBC # BLD: 4.88 M/UL — SIGNIFICANT CHANGE UP (ref 3.8–5.2)
RBC # FLD: 17.2 % — HIGH (ref 10.3–14.5)
SODIUM SERPL-SCNC: 133 MMOL/L — LOW (ref 135–145)
WBC # BLD: 9.87 K/UL — SIGNIFICANT CHANGE UP (ref 3.8–10.5)
WBC # FLD AUTO: 9.87 K/UL — SIGNIFICANT CHANGE UP (ref 3.8–10.5)

## 2025-04-10 PROCEDURE — 99232 SBSQ HOSP IP/OBS MODERATE 35: CPT | Mod: GC

## 2025-04-10 RX ORDER — FUROSEMIDE 10 MG/ML
20 INJECTION INTRAMUSCULAR; INTRAVENOUS DAILY
Refills: 0 | Status: DISCONTINUED | OUTPATIENT
Start: 2025-04-10 | End: 2025-04-14

## 2025-04-10 RX ORDER — APIXABAN 2.5 MG/1
2 TABLET, FILM COATED ORAL
Qty: 56 | Refills: 0
Start: 2025-04-10 | End: 2025-04-23

## 2025-04-10 RX ORDER — INSULIN LISPRO 100 U/ML
18 INJECTION, SOLUTION INTRAVENOUS; SUBCUTANEOUS
Refills: 0 | Status: DISCONTINUED | OUTPATIENT
Start: 2025-04-10 | End: 2025-04-11

## 2025-04-10 RX ORDER — GABAPENTIN 400 MG/1
300 CAPSULE ORAL
Refills: 0 | Status: DISCONTINUED | OUTPATIENT
Start: 2025-04-10 | End: 2025-04-11

## 2025-04-10 RX ORDER — APIXABAN 2.5 MG/1
2 TABLET, FILM COATED ORAL
Qty: 28 | Refills: 0
Start: 2025-04-10 | End: 2025-04-16

## 2025-04-10 RX ORDER — APIXABAN 2.5 MG/1
1 TABLET, FILM COATED ORAL
Qty: 28 | Refills: 0
Start: 2025-04-10 | End: 2025-04-23

## 2025-04-10 RX ORDER — INSULIN GLARGINE-YFGN 100 [IU]/ML
65 INJECTION, SOLUTION SUBCUTANEOUS AT BEDTIME
Refills: 0 | Status: DISCONTINUED | OUTPATIENT
Start: 2025-04-10 | End: 2025-04-11

## 2025-04-10 RX ORDER — APIXABAN 2.5 MG/1
10 TABLET, FILM COATED ORAL EVERY 12 HOURS
Refills: 0 | Status: DISCONTINUED | OUTPATIENT
Start: 2025-04-10 | End: 2025-04-14

## 2025-04-10 RX ADMIN — INSULIN LISPRO 6: 100 INJECTION, SOLUTION INTRAVENOUS; SUBCUTANEOUS at 08:02

## 2025-04-10 RX ADMIN — Medication 100 MILLIGRAM(S): at 21:49

## 2025-04-10 RX ADMIN — INSULIN LISPRO 15 UNIT(S): 100 INJECTION, SOLUTION INTRAVENOUS; SUBCUTANEOUS at 08:02

## 2025-04-10 RX ADMIN — OXCARBAZEPINE 600 MILLIGRAM(S): 150 TABLET, FILM COATED ORAL at 21:49

## 2025-04-10 RX ADMIN — Medication 1 DOSE(S): at 23:07

## 2025-04-10 RX ADMIN — POLYETHYLENE GLYCOL 3350 17 GRAM(S): 17 POWDER, FOR SOLUTION ORAL at 11:25

## 2025-04-10 RX ADMIN — INSULIN LISPRO 18 UNIT(S): 100 INJECTION, SOLUTION INTRAVENOUS; SUBCUTANEOUS at 12:21

## 2025-04-10 RX ADMIN — ENOXAPARIN SODIUM 120 MILLIGRAM(S): 100 INJECTION SUBCUTANEOUS at 06:15

## 2025-04-10 RX ADMIN — Medication 650 MILLIGRAM(S): at 14:09

## 2025-04-10 RX ADMIN — APIXABAN 10 MILLIGRAM(S): 2.5 TABLET, FILM COATED ORAL at 17:30

## 2025-04-10 RX ADMIN — ATORVASTATIN CALCIUM 40 MILLIGRAM(S): 80 TABLET, FILM COATED ORAL at 21:49

## 2025-04-10 RX ADMIN — GABAPENTIN 300 MILLIGRAM(S): 400 CAPSULE ORAL at 11:25

## 2025-04-10 RX ADMIN — HYDROCORTISONE 1 APPLICATION(S): 10 CREAM TOPICAL at 17:52

## 2025-04-10 RX ADMIN — Medication 2 TABLET(S): at 21:49

## 2025-04-10 RX ADMIN — Medication 1 DOSE(S): at 11:24

## 2025-04-10 RX ADMIN — INSULIN GLARGINE-YFGN 65 UNIT(S): 100 INJECTION, SOLUTION SUBCUTANEOUS at 21:50

## 2025-04-10 RX ADMIN — GABAPENTIN 300 MILLIGRAM(S): 400 CAPSULE ORAL at 23:02

## 2025-04-10 RX ADMIN — Medication 100 MILLIGRAM(S): at 06:18

## 2025-04-10 RX ADMIN — Medication 112 MICROGRAM(S): at 06:15

## 2025-04-10 RX ADMIN — INSULIN LISPRO 4: 100 INJECTION, SOLUTION INTRAVENOUS; SUBCUTANEOUS at 17:30

## 2025-04-10 RX ADMIN — Medication 10 MILLIGRAM(S): at 11:25

## 2025-04-10 RX ADMIN — Medication 650 MILLIGRAM(S): at 15:00

## 2025-04-10 RX ADMIN — Medication 81 MILLIGRAM(S): at 12:21

## 2025-04-10 RX ADMIN — OXCARBAZEPINE 600 MILLIGRAM(S): 150 TABLET, FILM COATED ORAL at 06:15

## 2025-04-10 RX ADMIN — INSULIN LISPRO 18 UNIT(S): 100 INJECTION, SOLUTION INTRAVENOUS; SUBCUTANEOUS at 17:30

## 2025-04-10 RX ADMIN — LOSARTAN POTASSIUM 25 MILLIGRAM(S): 100 TABLET, FILM COATED ORAL at 06:15

## 2025-04-10 RX ADMIN — INSULIN LISPRO 6: 100 INJECTION, SOLUTION INTRAVENOUS; SUBCUTANEOUS at 12:20

## 2025-04-10 RX ADMIN — MONTELUKAST SODIUM 10 MILLIGRAM(S): 10 TABLET ORAL at 11:25

## 2025-04-10 RX ADMIN — FLUTICASONE PROPIONATE 1 SPRAY(S): 50 SPRAY, METERED NASAL at 17:31

## 2025-04-10 RX ADMIN — Medication 100 MILLIGRAM(S): at 14:01

## 2025-04-10 RX ADMIN — HYDROCORTISONE 1 APPLICATION(S): 10 CREAM TOPICAL at 07:39

## 2025-04-10 RX ADMIN — OXCARBAZEPINE 600 MILLIGRAM(S): 150 TABLET, FILM COATED ORAL at 14:00

## 2025-04-10 RX ADMIN — FLUTICASONE PROPIONATE 1 SPRAY(S): 50 SPRAY, METERED NASAL at 06:12

## 2025-04-11 LAB
ANION GAP SERPL CALC-SCNC: 7 MMOL/L — SIGNIFICANT CHANGE UP (ref 5–17)
BASOPHILS # BLD AUTO: 0.03 K/UL — SIGNIFICANT CHANGE UP (ref 0–0.2)
BASOPHILS NFR BLD AUTO: 0.4 % — SIGNIFICANT CHANGE UP (ref 0–2)
BUN SERPL-MCNC: 16 MG/DL — SIGNIFICANT CHANGE UP (ref 7–18)
CALCIUM SERPL-MCNC: 8.8 MG/DL — SIGNIFICANT CHANGE UP (ref 8.4–10.5)
CHLORIDE SERPL-SCNC: 99 MMOL/L — SIGNIFICANT CHANGE UP (ref 96–108)
CO2 SERPL-SCNC: 28 MMOL/L — SIGNIFICANT CHANGE UP (ref 22–31)
CREAT SERPL-MCNC: 1.13 MG/DL — SIGNIFICANT CHANGE UP (ref 0.5–1.3)
CRP SERPL-MCNC: 13.8 MG/L — HIGH (ref 0–5)
EGFR: 56 ML/MIN/1.73M2 — LOW
EGFR: 56 ML/MIN/1.73M2 — LOW
EOSINOPHIL # BLD AUTO: 0.26 K/UL — SIGNIFICANT CHANGE UP (ref 0–0.5)
EOSINOPHIL NFR BLD AUTO: 3.9 % — SIGNIFICANT CHANGE UP (ref 0–6)
GLUCOSE BLDC GLUCOMTR-MCNC: 150 MG/DL — HIGH (ref 70–99)
GLUCOSE BLDC GLUCOMTR-MCNC: 179 MG/DL — HIGH (ref 70–99)
GLUCOSE BLDC GLUCOMTR-MCNC: 198 MG/DL — HIGH (ref 70–99)
GLUCOSE BLDC GLUCOMTR-MCNC: 227 MG/DL — HIGH (ref 70–99)
GLUCOSE BLDC GLUCOMTR-MCNC: 233 MG/DL — HIGH (ref 70–99)
GLUCOSE SERPL-MCNC: 225 MG/DL — HIGH (ref 70–99)
HCT VFR BLD CALC: 39.4 % — SIGNIFICANT CHANGE UP (ref 34.5–45)
HGB BLD-MCNC: 11.5 G/DL — SIGNIFICANT CHANGE UP (ref 11.5–15.5)
IMM GRANULOCYTES NFR BLD AUTO: 1.9 % — HIGH (ref 0–0.9)
LYMPHOCYTES # BLD AUTO: 1.47 K/UL — SIGNIFICANT CHANGE UP (ref 1–3.3)
LYMPHOCYTES # BLD AUTO: 21.9 % — SIGNIFICANT CHANGE UP (ref 13–44)
MAGNESIUM SERPL-MCNC: 1.9 MG/DL — SIGNIFICANT CHANGE UP (ref 1.6–2.6)
MCHC RBC-ENTMCNC: 24 PG — LOW (ref 27–34)
MCHC RBC-ENTMCNC: 29.2 G/DL — LOW (ref 32–36)
MCV RBC AUTO: 82.3 FL — SIGNIFICANT CHANGE UP (ref 80–100)
MONOCYTES # BLD AUTO: 0.59 K/UL — SIGNIFICANT CHANGE UP (ref 0–0.9)
MONOCYTES NFR BLD AUTO: 8.8 % — SIGNIFICANT CHANGE UP (ref 2–14)
NEUTROPHILS # BLD AUTO: 4.24 K/UL — SIGNIFICANT CHANGE UP (ref 1.8–7.4)
NEUTROPHILS NFR BLD AUTO: 63.1 % — SIGNIFICANT CHANGE UP (ref 43–77)
NRBC BLD AUTO-RTO: 0 /100 WBCS — SIGNIFICANT CHANGE UP (ref 0–0)
PHOSPHATE SERPL-MCNC: 3.9 MG/DL — SIGNIFICANT CHANGE UP (ref 2.5–4.5)
PLATELET # BLD AUTO: 275 K/UL — SIGNIFICANT CHANGE UP (ref 150–400)
POTASSIUM SERPL-MCNC: 5.3 MMOL/L — SIGNIFICANT CHANGE UP (ref 3.5–5.3)
POTASSIUM SERPL-SCNC: 5.3 MMOL/L — SIGNIFICANT CHANGE UP (ref 3.5–5.3)
RBC # BLD: 4.79 M/UL — SIGNIFICANT CHANGE UP (ref 3.8–5.2)
RBC # FLD: 17.2 % — HIGH (ref 10.3–14.5)
SODIUM SERPL-SCNC: 134 MMOL/L — LOW (ref 135–145)
WBC # BLD: 6.72 K/UL — SIGNIFICANT CHANGE UP (ref 3.8–10.5)
WBC # FLD AUTO: 6.72 K/UL — SIGNIFICANT CHANGE UP (ref 3.8–10.5)

## 2025-04-11 PROCEDURE — 99232 SBSQ HOSP IP/OBS MODERATE 35: CPT | Mod: GC

## 2025-04-11 RX ORDER — ACETAMINOPHEN 500 MG/5ML
1000 LIQUID (ML) ORAL ONCE
Refills: 0 | Status: COMPLETED | OUTPATIENT
Start: 2025-04-11 | End: 2025-04-11

## 2025-04-11 RX ORDER — METFORMIN HYDROCHLORIDE 850 MG/1
1000 TABLET ORAL EVERY 12 HOURS
Refills: 0 | Status: DISCONTINUED | OUTPATIENT
Start: 2025-04-11 | End: 2025-04-14

## 2025-04-11 RX ORDER — INSULIN GLARGINE-YFGN 100 [IU]/ML
65 INJECTION, SOLUTION SUBCUTANEOUS AT BEDTIME
Refills: 0 | Status: DISCONTINUED | OUTPATIENT
Start: 2025-04-11 | End: 2025-04-13

## 2025-04-11 RX ORDER — GABAPENTIN 400 MG/1
100 CAPSULE ORAL ONCE
Refills: 0 | Status: COMPLETED | OUTPATIENT
Start: 2025-04-11 | End: 2025-04-11

## 2025-04-11 RX ORDER — GABAPENTIN 400 MG/1
400 CAPSULE ORAL
Refills: 0 | Status: DISCONTINUED | OUTPATIENT
Start: 2025-04-11 | End: 2025-04-12

## 2025-04-11 RX ORDER — INSULIN GLARGINE-YFGN 100 [IU]/ML
66 INJECTION, SOLUTION SUBCUTANEOUS AT BEDTIME
Refills: 0 | Status: DISCONTINUED | OUTPATIENT
Start: 2025-04-11 | End: 2025-04-11

## 2025-04-11 RX ORDER — INSULIN LISPRO 100 U/ML
20 INJECTION, SOLUTION INTRAVENOUS; SUBCUTANEOUS
Refills: 0 | Status: DISCONTINUED | OUTPATIENT
Start: 2025-04-11 | End: 2025-04-13

## 2025-04-11 RX ORDER — INSULIN GLARGINE-YFGN 100 [IU]/ML
70 INJECTION, SOLUTION SUBCUTANEOUS AT BEDTIME
Refills: 0 | Status: DISCONTINUED | OUTPATIENT
Start: 2025-04-11 | End: 2025-04-11

## 2025-04-11 RX ADMIN — MONTELUKAST SODIUM 10 MILLIGRAM(S): 10 TABLET ORAL at 12:05

## 2025-04-11 RX ADMIN — Medication 112 MICROGRAM(S): at 06:05

## 2025-04-11 RX ADMIN — Medication 400 MILLIGRAM(S): at 06:24

## 2025-04-11 RX ADMIN — Medication 650 MILLIGRAM(S): at 22:45

## 2025-04-11 RX ADMIN — Medication 2 TABLET(S): at 22:15

## 2025-04-11 RX ADMIN — Medication 650 MILLIGRAM(S): at 22:15

## 2025-04-11 RX ADMIN — OXCARBAZEPINE 600 MILLIGRAM(S): 150 TABLET, FILM COATED ORAL at 22:15

## 2025-04-11 RX ADMIN — POLYETHYLENE GLYCOL 3350 17 GRAM(S): 17 POWDER, FOR SOLUTION ORAL at 12:06

## 2025-04-11 RX ADMIN — Medication 100 MILLIGRAM(S): at 06:05

## 2025-04-11 RX ADMIN — Medication 10 MILLIGRAM(S): at 13:56

## 2025-04-11 RX ADMIN — APIXABAN 10 MILLIGRAM(S): 2.5 TABLET, FILM COATED ORAL at 06:05

## 2025-04-11 RX ADMIN — METFORMIN HYDROCHLORIDE 1000 MILLIGRAM(S): 850 TABLET ORAL at 13:56

## 2025-04-11 RX ADMIN — LOSARTAN POTASSIUM 25 MILLIGRAM(S): 100 TABLET, FILM COATED ORAL at 06:05

## 2025-04-11 RX ADMIN — ATORVASTATIN CALCIUM 40 MILLIGRAM(S): 80 TABLET, FILM COATED ORAL at 22:16

## 2025-04-11 RX ADMIN — Medication 81 MILLIGRAM(S): at 12:05

## 2025-04-11 RX ADMIN — APIXABAN 10 MILLIGRAM(S): 2.5 TABLET, FILM COATED ORAL at 17:22

## 2025-04-11 RX ADMIN — FLUTICASONE PROPIONATE 1 SPRAY(S): 50 SPRAY, METERED NASAL at 17:23

## 2025-04-11 RX ADMIN — Medication 1 DOSE(S): at 22:32

## 2025-04-11 RX ADMIN — INSULIN GLARGINE-YFGN 65 UNIT(S): 100 INJECTION, SOLUTION SUBCUTANEOUS at 22:15

## 2025-04-11 RX ADMIN — INSULIN LISPRO 20 UNIT(S): 100 INJECTION, SOLUTION INTRAVENOUS; SUBCUTANEOUS at 17:18

## 2025-04-11 RX ADMIN — Medication 100 MILLIGRAM(S): at 16:56

## 2025-04-11 RX ADMIN — INSULIN LISPRO 20 UNIT(S): 100 INJECTION, SOLUTION INTRAVENOUS; SUBCUTANEOUS at 12:04

## 2025-04-11 RX ADMIN — INSULIN LISPRO 2: 100 INJECTION, SOLUTION INTRAVENOUS; SUBCUTANEOUS at 12:03

## 2025-04-11 RX ADMIN — INSULIN LISPRO 4: 100 INJECTION, SOLUTION INTRAVENOUS; SUBCUTANEOUS at 08:30

## 2025-04-11 RX ADMIN — FLUTICASONE PROPIONATE 1 SPRAY(S): 50 SPRAY, METERED NASAL at 06:06

## 2025-04-11 RX ADMIN — GABAPENTIN 100 MILLIGRAM(S): 400 CAPSULE ORAL at 12:05

## 2025-04-11 RX ADMIN — GABAPENTIN 400 MILLIGRAM(S): 400 CAPSULE ORAL at 17:19

## 2025-04-11 RX ADMIN — GABAPENTIN 300 MILLIGRAM(S): 400 CAPSULE ORAL at 06:06

## 2025-04-11 RX ADMIN — OXCARBAZEPINE 600 MILLIGRAM(S): 150 TABLET, FILM COATED ORAL at 13:57

## 2025-04-11 RX ADMIN — Medication 100 MILLIGRAM(S): at 22:16

## 2025-04-11 RX ADMIN — OXCARBAZEPINE 600 MILLIGRAM(S): 150 TABLET, FILM COATED ORAL at 06:05

## 2025-04-11 RX ADMIN — Medication 1 DOSE(S): at 12:07

## 2025-04-11 RX ADMIN — FUROSEMIDE 20 MILLIGRAM(S): 10 INJECTION INTRAMUSCULAR; INTRAVENOUS at 06:05

## 2025-04-11 NOTE — PROGRESS NOTE ADULT - PROBLEM SELECTOR PLAN 2
p/w increased RLE swelling x5 mos with now honeycrusted lesions, and worsening erythema and pain x1 week  h/o lymphedema, recently seen by vascular PA who advised ED visit  Vascular recommending no acute intervention, RLE US no abscess  - c/w ancef 2g q8  - demarcate the border   - start PO Lasix 20mg qd
p/w increased RLE swelling x5 mos with now honeycrusted lesions, and worsening erythema and pain x1 week  h/o lymphedema, recently seen by vascular PA who advised ED visit  Vascular recommending no acute intervention  - increased ancef to 2g   - demarcate the border   - f/u US RLE to r/o abscess given fluctuance on exam
p/w increased RLE swelling x5 mos with now honeycrusted lesions, and worsening erythema and pain x1 week  h/o lymphedema, recently seen by vascular PA who advised ED visit  Vascular recommending no acute intervention, RLE US no abscess  - c/w ancef 2g q8  - demarcate the border   - c/w PO Lasix 20mg qd

## 2025-04-11 NOTE — PROGRESS NOTE ADULT - PROBLEM SELECTOR PLAN 5
prescribed avaps at home, but has not started yet  - pt refusing AVAPS iso trigeminal neuralgia  - agreeable if pain controlled
prescribed avaps at home, but has not started yet  - pt refusing AVAPS iso trigeminal neuralgia  - agreeable if pain controlled
prescribed avaps at home, but has not started yet  - pt refusing AVAPS iso trigeminal neuralgia  - agreeable if trigeminal pain controlled

## 2025-04-11 NOTE — PROGRESS NOTE ADULT - PROBLEM SELECTOR PLAN 1
p/w increased RLE swelling x5 mos with now honeycrusted lesions, and worsening erythema and pain x1 week  decreased ambulation 2/2 swelling, recent back sx  baseline O2 4L NC at home  CTA Intraluminal filling defects in RUL and right upper, middle, and lower segmental and subsegmental branches  s/p hep gtt, transitioned to FD Lovenox  Cardiology advising against TTE in light of former TTE noted in last hospital stay and no evidence of hemodynamic instability on new presentation  - f/u BEDSIDE POCUS  - c/w eliquis (copay 10.99)
p/w increased RLE swelling x5 mos with now honeycrusted lesions, and worsening erythema and pain x1 week  decreased ambulation 2/2 swelling, recent back sx  baseline O2 4L NC at home  CTA Intraluminal filling defects in RUL and right upper, middle, and lower segmental and subsegmental branches  s/p hep gtt, transitioned to FD Lovenox  Cardiology advising against TTE in light of former TTE noted in last hospital stay and no evidence of hemodynamic instability on new presentation  - f/u BEDSIDE POCUS
p/w increased RLE swelling x5 mos with now honeycrusted lesions, and worsening erythema and pain x1 week  decreased ambulation 2/2 swelling, recent back sx  baseline O2 4L NC at home  CTA Intraluminal filling defects in RUL and right upper, middle, and lower segmental and subsegmental branches  s/p hep gtt, transitioned to FD Lovenox  Cardiology advising against TTE in light of former TTE noted in last hospital stay and no evidence of hemodynamic instability on new presentation  - f/u BEDSIDE POCUS  - start eliquis 4/10 PM

## 2025-04-11 NOTE — PROGRESS NOTE ADULT - PROBLEM SELECTOR PLAN 3
h/o DM on insulin at home 60 U lantus and 10 U premeals   - increased to 60 lantus and 15 TID iso uncontrolled POCTs 300-400s  - c/w ISS and POCTs ACHS
h/o DM on insulin at home 60 U lantus and 10 U premeals   - increased to 70 lantus and 20 TID iso uncontrolled POCTs 300-400s  - c/w ISS and POCTs ACHS  - start metformin 1000mg BID  - a1c 13, Dr. Hayden consulted
h/o DM on insulin at home 60 U lantus and 10 U premeals   - increased to 65 lantus and 18 TID iso uncontrolled POCTs 300-400s  - c/w ISS and POCTs ACHS

## 2025-04-11 NOTE — PROGRESS NOTE ADULT - ATTENDING COMMENTS
I have seen and evaluated the patient at the bedside. She is still mostly concerned with her facial pain, had just taken her gabapentin when I evaluated her and she said it was too early to notice an effect, but is optimistic. She feels that her right leg is less painful.     On exam, she is laying in bed in no distress. She has obesity. Afebrile, vitals stable with AM /70. Saturating well on 4L NC, her home oxygen requirement. Lungs clear. Right LE with erythema from the mid-shin downward with honey crusting component. She also has textbook psoriatic lesions on her bilateral LE.     I have reviewed her CBC, BMP, glucose trend. WBC is normal. Glucose 200s. Cr slightly up to 1.41 from 1.24. Na 133, mid 130s when corrected for degree of hyperglycemia.     Assessment and Plan:    59 year old female patient with pmhx HLD, asthma, obesity hypoventilation syndrome, COPD on 4L NC as needed, NAKUL not on CPAP, RCC status post left renal resection 11/23, migraines, obesity, hypothyroidism, right shoulder bursitis, fibromyalgia, psoriasis, trigeminal neuralgia who presented to the ER due to hypoxia and right foot swelling and redness.  The patient states her right foot and lower leg started to develop blisters after being discharged at the end of March. The blisters popped with clear fluid and now have a yellowish cover. She has tender erythema on her lower leg and foot. She has had right lower extremity edema already for months. She saw outpatient vascular today and was found to be hypoxic. She was also anxious today and had shortness of breath and feelings of anxiety at the clinic. She states she has history of anxiety for years. She also states she has acute bronchitis for the past couple of weeks. In the ER CTA positive for PE. Patient states she needs to follow up for her regular colonoscopy/mammogram screenings as she is not up to date with those.      # New Onset Pulmonary Embolism  > ER discussed case with radiology who confirmed there is no right heart strain on CT.  - Heparin drip -> transitioned to therapeutic Lovenox 4/9 in AM; will plan to start Eliquis load tonight 4/10  - ECHO ordered, not approved by Cardiology  - no right heart strain on CTA  - Outpatient age-appropriate cancer screening follow up  - will ensure she has follow up with her Pulmonary Provider Dr. Haralambou     # Right Lower Extremity Cellulitis  # Right Lower Extremity Blisters likely 2/2 Lower Extremity Edema  - Vascular consulted: no surgical intervention   - IV Unasyn -> cefazolin 2 grams every 8 hours  - Check ESR, CRP  - Can start Lasix 20mg PO for lower extremity edema and Elevate Lower Extremities    # Hx of DM c/b severe hyperglycemia   -increase Lantus to 60 units  -increase mealtime insulin to 15 units TID + ongoing sliding scale    # Hx of HLD, asthma, obesity hypoventilation syndrome, COPD on 4L NC as needed, NAKUL not on CPAP, RCC status post left renal resection 11/23, migraines, obesity, hypothyroidism, right shoulder bursitis, fibromyalgia, psoriasis, trigeminal neuralgia  - Continue home medications Albuterol prn, Amlodipine, Atorvastatin, HCTZ, Synthroid, Montelukast, Oxcarbazepine, Gabapentin  - Supplemental O2 prn. Patient refuses nocturnal CPAP or AVAPS; she does not want to use it    # DVT PPx: Theapeutic Lovenox     # FEN  - Diabetic DASH Diet  - Monitor and replete electrolytes as needed  FULL CODE (patient wants CPR and intubation if needed) .
I have seen and evaluated the patient at the bedside. She says she is having a headache and needs to have her cup of coffee, worried she is having a caffeine withdrawal. She has no CP or dyspnea. Her right leg is hurting her really badly, and she was not able to sleep last night as a result.     On exam, she is laying in bed in no distress. She has obesity. Afebrile, vitals stable with AM /74. Saturating well on room air. Lungs clear. Right LE with erythema from the mid-shin downward with honey crusting component. She also has textbook psoriatic lesions on her bilateral LE.     I have reviewed her CBC, BMP, glucose data. WBC 9. Na 132, normal when corrected for glucose in 300s-400s. Cr 1.24.     I have personally reviewed CTA Chest, lung parenchyma is clear but there are multiple filling defects consistent with PE.     I have discussed case with Dr. Alvarez - no need for echocardiogram as there is no CT evidence of right heart strain.     Assessment and Plan:    59 year old female patient with pmhx HLD, asthma, obesity hypoventilation syndrome, COPD on 4L NC as needed, NAKUL not on CPAP, RCC status post left renal resection 11/23, migraines, obesity, hypothyroidism, right shoulder bursitis, fibromyalgia, psoriasis, trigeminal neuralgia who presented to the ER due to hypoxia and right foot swelling and redness.  The patient states her right foot and lower leg started to develop blisters after being discharged at the end of March. The blisters popped with clear fluid and now have a yellowish cover. She has tender erythema on her lower leg and foot. She has had right lower extremity edema already for months. She saw outpatient vascular today and was found to be hypoxic. She was also anxious today and had shortness of breath and feelings of anxiety at the clinic. She states she has history of anxiety for years. She also states she has acute bronchitis for the past couple of weeks. In the ER CTA positive for PE. Patient states she needs to follow up for her regular colonoscopy/mammogram screenings as she is not up to date with those.      # New Onset Pulmonary Embolism  > ER discussed case with radiology who confirmed there is no right heart strain on CT.  - Heparin drip -> transitioned to therapeutic Lovenox 4/9 in AM  - ECHO ordered, not approved by Cardiology  - no right heart strain on CTA  - Outpatient age-appropriate cancer screening follow up    # Right Lower Extremity Cellulitis  # Right Lower Extremity Blisters likely 2/2 Lower Extremity Edema  - Vascular consulted  - IV Unasyn -> cefazolin 2 grams every 8 hours  - Check ESR, CRP  - Can start Lasix 20mg PO for lower extremity edema and Elevate Lower Extremities    # Hx of DM  - Insulin Sliding Scale, Blood Glucose Monitoring  - Continue home insulin regimen at 70-80% of home dose, titrate as needed    # Hx of HLD, asthma, obesity hypoventilation syndrome, COPD on 4L NC as needed, NAKUL not on CPAP, RCC status post left renal resection 11/23, migraines, obesity, hypothyroidism, right shoulder bursitis, fibromyalgia, psoriasis, trigeminal neuralgia  - Continue home medications Albuterol prn, Amlodipine, Atorvastatin, HCTZ, Synthroid, Montelukast, Oxcarbazepine, Gabapentin  - Supplemental O2 prn. Patient refuses nocturnal CPAP or AVAPS; she does not want to use it    # DVT PPx: Theapeutic Lovenox     # FEN  - Diabetic DASH Diet  - Monitor and replete electrolytes as needed  FULL CODE (patient wants CPR and intubation if needed)
I have seen and evaluated the patient at the bedside. She says that her right leg pain is better, but today she has developed "the trembles." She was able to perform finger-nose-finger testing fairly well without tremors.     On exam, she is laying in bed in no distress. She has obesity. Afebrile, vitals stable with AM /58. Saturating well on 4L NC, her home oxygen requirement. Lungs clear. Right LE with erythema from the mid-shin downward with honey crusting component, clinically improving from prior. She also has textbook psoriatic lesions on her bilateral LE.     I have reviewed her CBC, BMP, glucose trend. WBC is normal. Glucose 200s. Cr improved to 1.1 from 1.41. Na 134. A1c 13%    Assessment and Plan:    59 year old female patient with pmhx HLD, asthma, obesity hypoventilation syndrome, COPD on 4L NC as needed, NAKUL not on CPAP, RCC status post left renal resection 11/23, migraines, obesity, hypothyroidism, right shoulder bursitis, fibromyalgia, psoriasis, trigeminal neuralgia who presented to the ER due to hypoxia and right foot swelling and redness.  The patient states her right foot and lower leg started to develop blisters after being discharged at the end of March. The blisters popped with clear fluid and now have a yellowish cover. She has tender erythema on her lower leg and foot. She has had right lower extremity edema already for months. She saw outpatient vascular today and was found to be hypoxic. She was also anxious today and had shortness of breath and feelings of anxiety at the clinic. She states she has history of anxiety for years. She also states she has acute bronchitis for the past couple of weeks. In the ER CTA positive for PE. Patient states she needs to follow up for her regular colonoscopy/mammogram screenings as she is not up to date with those.      # New Onset Pulmonary Embolism  > ER discussed case with radiology who confirmed there is no right heart strain on CT.  - Heparin drip -> transitioned to therapeutic Lovenox 4/9 in AM; now on loading dose of apixaban  - ECHO ordered, not approved by Cardiology  - no right heart strain on CTA  - Outpatient age-appropriate cancer screening follow up  - will ensure she has follow up with her Pulmonary Provider Dr. Monterroso     # Right Lower Extremity Cellulitis  # Right Lower Extremity Blisters likely 2/2 Lower Extremity Edema  - Vascular consulted: no surgical intervention   - IV Unasyn -> cefazolin 2 grams every 8 hours  - Check ESR, CRP  - Can start Lasix 20mg PO for lower extremity edema and Elevate Lower Extremities    # Hx of DM c/b severe hyperglycemia   -increase Lantus to 60 units  -increase mealtime insulin to 15 units TID + ongoing sliding scale  - will consult endocrinology on 4/11 given A1c of 13%    # Hx of HLD, asthma, obesity hypoventilation syndrome, COPD on 4L NC as needed, NAKUL not on CPAP, RCC status post left renal resection 11/23, migraines, obesity, hypothyroidism, right shoulder bursitis, fibromyalgia, psoriasis, trigeminal neuralgia  - Continue home medications Albuterol prn, Amlodipine, Atorvastatin, HCTZ, Synthroid, Montelukast, Oxcarbazepine, Gabapentin  - Supplemental O2 prn. Patient refuses nocturnal CPAP or AVAPS; she does not want to use it    # DVT PPx: apixaban  # FEN  - Diabetic DASH Diet  - Monitor and replete electrolytes as needed  FULL CODE (patient wants CPR and intubation if needed) .

## 2025-04-11 NOTE — PROGRESS NOTE ADULT - PROBLEM SELECTOR PLAN 4
h/o asthma on home inhalers   - c/w home inhalers symbicort, montelukast

## 2025-04-11 NOTE — PHARMACOTHERAPY INTERVENTION NOTE - COMMENTS
Add back home metformin 1000 mg every 12 hours.
Increase cefazolin to 2 grams IVPB every 8 hours.
Suggest to increase insulin glargine to 60 units at bedtime and increase insulin lispro to 15 units three times daily with meals.
On full AC for treatment of pulmonary embolism.    Suggest to dose with enoxaparin 120 mg every 12 hours starting after stopping heparin drip.

## 2025-04-11 NOTE — PROGRESS NOTE ADULT - PROBLEM SELECTOR PLAN 7
h/o trigeminal neuralgia on oxcarbazepine 600mg BID  gabapentin held iso AMS in the last admission  - oxcarbazepine 600mg TID, gabapentin 400mg BID  - now c/o worsening tremors, continue to monitor
h/o trigeminal neuralgia on oxcarbazepine 600mg BID  gabapentin held iso AMS in the last admission  - oxcarbazepine 600mg TID, gabapentin 300mg BID
h/o trigeminal neuralgia on oxcarbazepine 600mg BID  gabapentin held iso AMS in the last admission  - increased dosing to 600mg TID

## 2025-04-12 LAB
ANION GAP SERPL CALC-SCNC: 6 MMOL/L — SIGNIFICANT CHANGE UP (ref 5–17)
BUN SERPL-MCNC: 18 MG/DL — SIGNIFICANT CHANGE UP (ref 7–18)
CALCIUM SERPL-MCNC: 8.6 MG/DL — SIGNIFICANT CHANGE UP (ref 8.4–10.5)
CHLORIDE SERPL-SCNC: 97 MMOL/L — SIGNIFICANT CHANGE UP (ref 96–108)
CO2 SERPL-SCNC: 32 MMOL/L — HIGH (ref 22–31)
CREAT SERPL-MCNC: 1.15 MG/DL — SIGNIFICANT CHANGE UP (ref 0.5–1.3)
EGFR: 55 ML/MIN/1.73M2 — LOW
EGFR: 55 ML/MIN/1.73M2 — LOW
GLUCOSE BLDC GLUCOMTR-MCNC: 112 MG/DL — HIGH (ref 70–99)
GLUCOSE BLDC GLUCOMTR-MCNC: 207 MG/DL — HIGH (ref 70–99)
GLUCOSE BLDC GLUCOMTR-MCNC: 208 MG/DL — HIGH (ref 70–99)
GLUCOSE BLDC GLUCOMTR-MCNC: 313 MG/DL — HIGH (ref 70–99)
GLUCOSE SERPL-MCNC: 195 MG/DL — HIGH (ref 70–99)
HCT VFR BLD CALC: 39.6 % — SIGNIFICANT CHANGE UP (ref 34.5–45)
HGB BLD-MCNC: 11.5 G/DL — SIGNIFICANT CHANGE UP (ref 11.5–15.5)
MCHC RBC-ENTMCNC: 24.3 PG — LOW (ref 27–34)
MCHC RBC-ENTMCNC: 29 G/DL — LOW (ref 32–36)
MCV RBC AUTO: 83.5 FL — SIGNIFICANT CHANGE UP (ref 80–100)
NRBC BLD AUTO-RTO: 0 /100 WBCS — SIGNIFICANT CHANGE UP (ref 0–0)
PLATELET # BLD AUTO: 312 K/UL — SIGNIFICANT CHANGE UP (ref 150–400)
POTASSIUM SERPL-MCNC: 4.4 MMOL/L — SIGNIFICANT CHANGE UP (ref 3.5–5.3)
POTASSIUM SERPL-SCNC: 4.4 MMOL/L — SIGNIFICANT CHANGE UP (ref 3.5–5.3)
RBC # BLD: 4.74 M/UL — SIGNIFICANT CHANGE UP (ref 3.8–5.2)
RBC # FLD: 16.9 % — HIGH (ref 10.3–14.5)
SODIUM SERPL-SCNC: 135 MMOL/L — SIGNIFICANT CHANGE UP (ref 135–145)
WBC # BLD: 7.38 K/UL — SIGNIFICANT CHANGE UP (ref 3.8–10.5)
WBC # FLD AUTO: 7.38 K/UL — SIGNIFICANT CHANGE UP (ref 3.8–10.5)

## 2025-04-12 PROCEDURE — 99232 SBSQ HOSP IP/OBS MODERATE 35: CPT

## 2025-04-12 RX ORDER — GABAPENTIN 400 MG/1
400 CAPSULE ORAL
Refills: 0 | Status: DISCONTINUED | OUTPATIENT
Start: 2025-04-12 | End: 2025-04-14

## 2025-04-12 RX ADMIN — APIXABAN 10 MILLIGRAM(S): 2.5 TABLET, FILM COATED ORAL at 05:41

## 2025-04-12 RX ADMIN — OXCARBAZEPINE 600 MILLIGRAM(S): 150 TABLET, FILM COATED ORAL at 05:41

## 2025-04-12 RX ADMIN — HYDROCORTISONE 1 APPLICATION(S): 10 CREAM TOPICAL at 05:58

## 2025-04-12 RX ADMIN — HYDROCORTISONE 1 APPLICATION(S): 10 CREAM TOPICAL at 17:16

## 2025-04-12 RX ADMIN — ATORVASTATIN CALCIUM 40 MILLIGRAM(S): 80 TABLET, FILM COATED ORAL at 22:29

## 2025-04-12 RX ADMIN — APIXABAN 10 MILLIGRAM(S): 2.5 TABLET, FILM COATED ORAL at 17:18

## 2025-04-12 RX ADMIN — FLUTICASONE PROPIONATE 1 SPRAY(S): 50 SPRAY, METERED NASAL at 17:16

## 2025-04-12 RX ADMIN — MONTELUKAST SODIUM 10 MILLIGRAM(S): 10 TABLET ORAL at 12:06

## 2025-04-12 RX ADMIN — GABAPENTIN 400 MILLIGRAM(S): 400 CAPSULE ORAL at 05:41

## 2025-04-12 RX ADMIN — Medication 1 DOSE(S): at 10:37

## 2025-04-12 RX ADMIN — POLYETHYLENE GLYCOL 3350 17 GRAM(S): 17 POWDER, FOR SOLUTION ORAL at 14:15

## 2025-04-12 RX ADMIN — Medication 112 MICROGRAM(S): at 05:43

## 2025-04-12 RX ADMIN — FLUTICASONE PROPIONATE 1 SPRAY(S): 50 SPRAY, METERED NASAL at 05:43

## 2025-04-12 RX ADMIN — Medication 10 MILLIGRAM(S): at 12:43

## 2025-04-12 RX ADMIN — FUROSEMIDE 20 MILLIGRAM(S): 10 INJECTION INTRAMUSCULAR; INTRAVENOUS at 05:40

## 2025-04-12 RX ADMIN — METFORMIN HYDROCHLORIDE 1000 MILLIGRAM(S): 850 TABLET ORAL at 05:44

## 2025-04-12 RX ADMIN — Medication 100 MILLIGRAM(S): at 22:25

## 2025-04-12 RX ADMIN — Medication 2 TABLET(S): at 22:28

## 2025-04-12 RX ADMIN — Medication 100 MILLIGRAM(S): at 05:44

## 2025-04-12 RX ADMIN — INSULIN LISPRO 8: 100 INJECTION, SOLUTION INTRAVENOUS; SUBCUTANEOUS at 11:37

## 2025-04-12 RX ADMIN — INSULIN LISPRO 20 UNIT(S): 100 INJECTION, SOLUTION INTRAVENOUS; SUBCUTANEOUS at 08:06

## 2025-04-12 RX ADMIN — OXCARBAZEPINE 600 MILLIGRAM(S): 150 TABLET, FILM COATED ORAL at 22:28

## 2025-04-12 RX ADMIN — INSULIN GLARGINE-YFGN 65 UNIT(S): 100 INJECTION, SOLUTION SUBCUTANEOUS at 22:23

## 2025-04-12 RX ADMIN — METFORMIN HYDROCHLORIDE 1000 MILLIGRAM(S): 850 TABLET ORAL at 17:17

## 2025-04-12 RX ADMIN — Medication 1 DOSE(S): at 22:26

## 2025-04-12 RX ADMIN — OXCARBAZEPINE 600 MILLIGRAM(S): 150 TABLET, FILM COATED ORAL at 14:18

## 2025-04-12 RX ADMIN — INSULIN LISPRO 20 UNIT(S): 100 INJECTION, SOLUTION INTRAVENOUS; SUBCUTANEOUS at 11:38

## 2025-04-12 RX ADMIN — Medication 100 MILLIGRAM(S): at 14:18

## 2025-04-12 RX ADMIN — Medication 81 MILLIGRAM(S): at 12:06

## 2025-04-12 RX ADMIN — INSULIN LISPRO 4: 100 INJECTION, SOLUTION INTRAVENOUS; SUBCUTANEOUS at 08:05

## 2025-04-12 NOTE — PHYSICAL THERAPY INITIAL EVALUATION ADULT - LEVEL OF INDEPENDENCE: GAIT, REHAB EVAL
due to generalized weakness and poor functional status, dyskinetic movement of both UE/LE-pt is high risk for fall and will require 2 person assist for safety/unable to perform

## 2025-04-12 NOTE — PHYSICAL THERAPY INITIAL EVALUATION ADULT - CRITERIA FOR SKILLED THERAPEUTIC INTERVENTIONS
CHIRAG/impairments found/functional limitations in following categories/anticipated discharge recommendation

## 2025-04-12 NOTE — PHYSICAL THERAPY INITIAL EVALUATION ADULT - GENERAL OBSERVATIONS, REHAB EVAL
Pt seen sitting in chair with NC @ 4L, dyskinetic movement of both UE/LE, psoriasis, right foot swelling and redness. Pt cooperative during assessment

## 2025-04-12 NOTE — PHYSICAL THERAPY INITIAL EVALUATION ADULT - LEVEL OF INDEPENDENCE: SIT/STAND, REHAB EVAL
[FreeTextEntry1] : IMANI WU is a 45 year old woman who presents with + YAMIL 1:160 (H) checked in response to chronic rashes on cheeks, periorally with lip swelling, and more recently on ears/ neck x 1 year. Raised, painful, lasts up to 1-2 weeks, worsened with sun exposure, no other noticeable triggers, did start meds for weight loss but this was in 2020 and rash did not occur til 2021. When rash occurs, develops arthralgias in L knee and b/l hands without faustina synovitis, no other affected joints. Has to ice them, improves with Aleve. Rashes occur once monthly, joints are occurring sometimes in isolation. No symptoms today.   + markedly hair thinning diffusely but no focal loss  + some sun sensitivity  + chronic fatigue but not worsening   SLE ROS negative for alopecia, sicca, salivary gland swelling, oral ulcers, SOB, chest pain, serositis, abd pain, dysuria, hematuria, hematologic abnormalities, Raynauds. APLS ROS - 2 uncomplicated pregnancies, no miscarriage, no thrombotic events.   Labs - as above  Negative ESR/CRP, RF/CCP, ACE, thyroid Abs, lupus specifics, C3/4, CU index  + FH of father with ? lupus, Graves -- no longer in contact with him   ---------- 8/30/22 -- No further rashes or worsening arthralgias since last visit, had 1 episode of knee pain, resolved after 4 hours s/p Aleve. Fatigue stable. No other CTD sx in interim. Feeling well today.   4/17/23 -- Few minor episodes of rash but markedly less severe than prior, no current joint inflammatory sx. Not needing Aleve. Fatigue and mild GI sx chronic and stable. Some episodes of feeling like throat is closing up but not with food, moreso when speaking, improved with drinking water. Remainder of CTD ROS negative.   8/14/23 -- Since last visit, no rashes, prior knee pain resolved, chronic MSK mediated L sided trap spasm/arthralgia but ROM intact, no inflammatory arthritis sx, remainder of CTD ROS negative. 
dependent (less than 25% patients effort)

## 2025-04-13 LAB
ANION GAP SERPL CALC-SCNC: 4 MMOL/L — LOW (ref 5–17)
BUN SERPL-MCNC: 18 MG/DL — SIGNIFICANT CHANGE UP (ref 7–18)
CALCIUM SERPL-MCNC: 8.9 MG/DL — SIGNIFICANT CHANGE UP (ref 8.4–10.5)
CHLORIDE SERPL-SCNC: 98 MMOL/L — SIGNIFICANT CHANGE UP (ref 96–108)
CO2 SERPL-SCNC: 35 MMOL/L — HIGH (ref 22–31)
CREAT SERPL-MCNC: 0.96 MG/DL — SIGNIFICANT CHANGE UP (ref 0.5–1.3)
EGFR: 68 ML/MIN/1.73M2 — SIGNIFICANT CHANGE UP
EGFR: 68 ML/MIN/1.73M2 — SIGNIFICANT CHANGE UP
GLUCOSE BLDC GLUCOMTR-MCNC: 106 MG/DL — HIGH (ref 70–99)
GLUCOSE BLDC GLUCOMTR-MCNC: 154 MG/DL — HIGH (ref 70–99)
GLUCOSE BLDC GLUCOMTR-MCNC: 157 MG/DL — HIGH (ref 70–99)
GLUCOSE BLDC GLUCOMTR-MCNC: 62 MG/DL — LOW (ref 70–99)
GLUCOSE SERPL-MCNC: 66 MG/DL — LOW (ref 70–99)
HCT VFR BLD CALC: 35.4 % — SIGNIFICANT CHANGE UP (ref 34.5–45)
HGB BLD-MCNC: 10.4 G/DL — LOW (ref 11.5–15.5)
MCHC RBC-ENTMCNC: 24.3 PG — LOW (ref 27–34)
MCHC RBC-ENTMCNC: 29.4 G/DL — LOW (ref 32–36)
MCV RBC AUTO: 82.7 FL — SIGNIFICANT CHANGE UP (ref 80–100)
NRBC BLD AUTO-RTO: 0 /100 WBCS — SIGNIFICANT CHANGE UP (ref 0–0)
PLATELET # BLD AUTO: 268 K/UL — SIGNIFICANT CHANGE UP (ref 150–400)
POTASSIUM SERPL-MCNC: 4.1 MMOL/L — SIGNIFICANT CHANGE UP (ref 3.5–5.3)
POTASSIUM SERPL-SCNC: 4.1 MMOL/L — SIGNIFICANT CHANGE UP (ref 3.5–5.3)
RBC # BLD: 4.28 M/UL — SIGNIFICANT CHANGE UP (ref 3.8–5.2)
RBC # FLD: 16.7 % — HIGH (ref 10.3–14.5)
SODIUM SERPL-SCNC: 137 MMOL/L — SIGNIFICANT CHANGE UP (ref 135–145)
WBC # BLD: 7.69 K/UL — SIGNIFICANT CHANGE UP (ref 3.8–10.5)
WBC # FLD AUTO: 7.69 K/UL — SIGNIFICANT CHANGE UP (ref 3.8–10.5)

## 2025-04-13 PROCEDURE — 99232 SBSQ HOSP IP/OBS MODERATE 35: CPT

## 2025-04-13 RX ORDER — MAGNESIUM, ALUMINUM HYDROXIDE 200-200 MG
30 TABLET,CHEWABLE ORAL EVERY 4 HOURS
Refills: 0 | Status: DISCONTINUED | OUTPATIENT
Start: 2025-04-13 | End: 2025-04-14

## 2025-04-13 RX ORDER — INSULIN GLARGINE-YFGN 100 [IU]/ML
25 INJECTION, SOLUTION SUBCUTANEOUS AT BEDTIME
Refills: 0 | Status: DISCONTINUED | OUTPATIENT
Start: 2025-04-13 | End: 2025-04-14

## 2025-04-13 RX ORDER — INSULIN LISPRO 100 U/ML
8 INJECTION, SOLUTION INTRAVENOUS; SUBCUTANEOUS
Refills: 0 | Status: DISCONTINUED | OUTPATIENT
Start: 2025-04-13 | End: 2025-04-14

## 2025-04-13 RX ORDER — INSULIN GLARGINE-YFGN 100 [IU]/ML
50 INJECTION, SOLUTION SUBCUTANEOUS AT BEDTIME
Refills: 0 | Status: DISCONTINUED | OUTPATIENT
Start: 2025-04-13 | End: 2025-04-13

## 2025-04-13 RX ADMIN — ATORVASTATIN CALCIUM 40 MILLIGRAM(S): 80 TABLET, FILM COATED ORAL at 22:01

## 2025-04-13 RX ADMIN — Medication 10 MILLIGRAM(S): at 11:48

## 2025-04-13 RX ADMIN — FLUTICASONE PROPIONATE 1 SPRAY(S): 50 SPRAY, METERED NASAL at 17:41

## 2025-04-13 RX ADMIN — INSULIN LISPRO 2: 100 INJECTION, SOLUTION INTRAVENOUS; SUBCUTANEOUS at 11:34

## 2025-04-13 RX ADMIN — HYDROCORTISONE 1 APPLICATION(S): 10 CREAM TOPICAL at 06:42

## 2025-04-13 RX ADMIN — INSULIN GLARGINE-YFGN 65 UNIT(S): 100 INJECTION, SOLUTION SUBCUTANEOUS at 22:03

## 2025-04-13 RX ADMIN — Medication 2 TABLET(S): at 22:01

## 2025-04-13 RX ADMIN — Medication 30 MILLILITER(S): at 18:09

## 2025-04-13 RX ADMIN — Medication 1 DOSE(S): at 22:00

## 2025-04-13 RX ADMIN — MONTELUKAST SODIUM 10 MILLIGRAM(S): 10 TABLET ORAL at 11:46

## 2025-04-13 RX ADMIN — Medication 100 MILLIGRAM(S): at 14:15

## 2025-04-13 RX ADMIN — APIXABAN 10 MILLIGRAM(S): 2.5 TABLET, FILM COATED ORAL at 17:43

## 2025-04-13 RX ADMIN — FUROSEMIDE 20 MILLIGRAM(S): 10 INJECTION INTRAMUSCULAR; INTRAVENOUS at 06:39

## 2025-04-13 RX ADMIN — FLUTICASONE PROPIONATE 1 SPRAY(S): 50 SPRAY, METERED NASAL at 06:41

## 2025-04-13 RX ADMIN — INSULIN LISPRO 20 UNIT(S): 100 INJECTION, SOLUTION INTRAVENOUS; SUBCUTANEOUS at 11:46

## 2025-04-13 RX ADMIN — Medication 100 MILLIGRAM(S): at 06:40

## 2025-04-13 RX ADMIN — OXCARBAZEPINE 600 MILLIGRAM(S): 150 TABLET, FILM COATED ORAL at 14:16

## 2025-04-13 RX ADMIN — OXCARBAZEPINE 600 MILLIGRAM(S): 150 TABLET, FILM COATED ORAL at 22:03

## 2025-04-13 RX ADMIN — APIXABAN 10 MILLIGRAM(S): 2.5 TABLET, FILM COATED ORAL at 06:46

## 2025-04-13 RX ADMIN — HYDROCORTISONE 1 APPLICATION(S): 10 CREAM TOPICAL at 17:41

## 2025-04-13 RX ADMIN — Medication 112 MICROGRAM(S): at 05:10

## 2025-04-13 RX ADMIN — POLYETHYLENE GLYCOL 3350 17 GRAM(S): 17 POWDER, FOR SOLUTION ORAL at 11:49

## 2025-04-13 RX ADMIN — METFORMIN HYDROCHLORIDE 1000 MILLIGRAM(S): 850 TABLET ORAL at 17:40

## 2025-04-13 RX ADMIN — OXCARBAZEPINE 600 MILLIGRAM(S): 150 TABLET, FILM COATED ORAL at 06:39

## 2025-04-13 RX ADMIN — Medication 1 DOSE(S): at 11:47

## 2025-04-13 RX ADMIN — METFORMIN HYDROCHLORIDE 1000 MILLIGRAM(S): 850 TABLET ORAL at 08:28

## 2025-04-13 RX ADMIN — Medication 100 MILLIGRAM(S): at 22:00

## 2025-04-13 RX ADMIN — Medication 81 MILLIGRAM(S): at 11:46

## 2025-04-13 NOTE — PROGRESS NOTE ADULT - SUBJECTIVE AND OBJECTIVE BOX
S: patient sleeping comfortably in her bedside chair  She continues to say she is struggling with body "trembles" and wonders if the medication she has been started on is responsible (e.g apixaban)  Counseled patient I do not know of association with apixaban leading to tremors but would review the literature    O:  Vital Signs Last 24 Hrs  T(C): 36.7 (12 Apr 2025 04:40), Max: 37.1 (11 Apr 2025 13:01)  T(F): 98.1 (12 Apr 2025 04:40), Max: 98.7 (11 Apr 2025 13:01)  HR: 95 (12 Apr 2025 04:40) (80 - 97)  BP: 155/81 (12 Apr 2025 04:40) (123/65 - 155/81)  BP(mean): 100 (12 Apr 2025 04:40) (100 - 100)  RR: 20 (12 Apr 2025 04:40) (20 - 20)  SpO2: 93% (12 Apr 2025 04:40) (93% - 100%)    Parameters below as of 12 Apr 2025 04:40  Patient On (Oxygen Delivery Method): nasal cannula  O2 Flow (L/min): 4      GENERAL: obese woman sitting upright in bedside chair, drowsy but in no distress  HEAD:  Atraumatic, Normocephalic  EYES: conjunctiva and sclera clear  NECK:  No JVD  CHEST/LUNG: saturating well on 4 L home oxygen requirement  HEART: Regular rate and rhythm;   ABDOMEN: Soft, Nontender, Nondistended; Bowel sounds present  PSYCH: AAOx3  NEUROLOGY: non-focal  SKIN: extensive psoriatic lesions on lower extremities; right lower extremity cellulitis improving    acetaminophen     Tablet .. 650 milliGRAM(s) Oral every 6 hours PRN  albuterol/ipratropium for Nebulization 3 milliLiter(s) Nebulizer every 6 hours PRN  apixaban 10 milliGRAM(s) Oral every 12 hours  aspirin  chewable 81 milliGRAM(s) Oral daily  atorvastatin 40 milliGRAM(s) Oral at bedtime  ceFAZolin   IVPB 2000 milliGRAM(s) IV Intermittent every 8 hours  cetirizine 10 milliGRAM(s) Oral daily  fluticasone propionate 50 MICROgram(s)/spray Nasal Spray 1 Spray(s) Both Nostrils two times a day  fluticasone propionate/ salmeterol 250-50 MICROgram(s) Diskus 1 Dose(s) Inhalation two times a day  furosemide    Tablet 20 milliGRAM(s) Oral daily  gabapentin 400 milliGRAM(s) Oral two times a day  hydrocortisone 1% Cream 1 Application(s) Topical every 12 hours  insulin glargine Injectable (LANTUS) 65 Unit(s) SubCutaneous at bedtime  insulin lispro (ADMELOG) corrective regimen sliding scale   SubCutaneous three times a day before meals  insulin lispro Injectable (ADMELOG) 20 Unit(s) SubCutaneous three times a day before meals  levothyroxine 112 MICROGram(s) Oral daily  metFORMIN 1000 milliGRAM(s) Oral every 12 hours  montelukast 10 milliGRAM(s) Oral daily  OXcarbazepine 600 milliGRAM(s) Oral <User Schedule>  polyethylene glycol 3350 17 Gram(s) Oral daily  senna 2 Tablet(s) Oral at bedtime                            11.5   7.38  )-----------( 312      ( 12 Apr 2025 07:22 )             39.6       04-12    135  |  97  |  18  ----------------------------<  195[H]  4.4   |  32[H]  |  1.15    Ca    8.6      12 Apr 2025 07:22  Phos  3.9     04-11  Mg     1.9     04-11    
59-year-old female with past medical history of diabetes, hyperlipidemia, asthma, COPD on 4 L nasal cannula, NAKUL, elevated BMI, RCC status post left renal resection presents from outpatient office where she was found to be hypoxic with right lower extremity swelling. Leg swelling unchanged less erythma  pt s- new complaints.        PAST MEDICAL & SURGICAL HISTORY:  Hypertension  vaginal cyst  Hyperlipidemia  Asthma  last inhaler use with in 10 days, on Pulm follow up [id number]  Hypothyroid  Obstructive sleep apnea  refuses Cpap  Obesity  morbid  Trigeminal neuralgia R  Fibromyalgia  DM (diabetes mellitus) 2  DJD (degenerative joint disease)  Cervical/Thoracic/Lumbar  Cervical neuralgia  difficulty moving my neck  Back pain  thoracic & lumbar  H/O bursitis  right shoulder  Radicular pain  arms, legs  Vasculitis  Legs, forerhead, arms & hands, flare ups in R leg  Dr susie Ribeiro is my Rheumatologist  Renal cell carcinoma, left  on follow up Dr schulte, HOD renal [facility], waiting for suregry in 2020  Falls frequently  Morbid obesity with body mass index (BMI) of 50.0 to 59.9 in adult  Psoriasis  Diverticulosis  Fatty liver  S/P abdominal hysterectomy    S/P  section  1  Left adrenal mass  excision, benign 2006  Vaginal cyst  removed   Vasculitis on nerve biopsy  , had another surgery called microvascular decompression   S/P left oophorectomy  S/P colonoscopic polypectomy      Allergies    honeydew (Rash)  Fioricet (Rash)  mushroom (Unknown)  IV Contrast (Short breath)  venlafaxine (Hives)  gadobutrol (Rash; Urticaria; Hives)    Intolerances        ROS: Negative except per HPI.     SOCIAL HISTORY:  Smoking history   ETOH history    OBJECTIVE:ICU Vital Signs Last 24 Hrs  T(C): 37.1 (2025 13:44), Max: 37.1 (2025 13:44)  T(F): 98.7 (2025 13:44), Max: 98.7 (2025 13:44)  HR: 89 (2025 13:44) (89 - 97)  BP: 129/91 (2025 13:44) (127/83 - 155/81)  BP(mean): 100 (2025 04:40) (100 - 100)  ABP: --  ABP(mean): --  RR: 19 (2025 13:44) (19 - 20)  SpO2: 100% (2025 13:44) (93% - 100%)    O2 Parameters below as of 2025 13:44  Patient On (Oxygen Delivery Method): nasal cannula  O2 Flow (L/min): 4          LABS:                        11.5   7.38  )-----------( 312      ( 2025 07:22 )             39.6   04-12    135  |  97  |  18  ----------------------------<  195[H]  4.4   |  32[H]  |  1.15    Ca    8.6      2025 07:22  Phos  3.9     04-11  Mg     1.9     04-11            PHYSICAL EXAM:   General: AOx3, NAD.   Pulm: On nasal cannula. Normal chest rise and expansion.   Right lower Extremity: Warm, dry. Edema noted RLE with ulcerations appreciated at dorsal aspect of foot. unchanged, less erythema                         Attestation Statements:    Attestation Statements:  I have personally seen and examined the patient.  I fully participated in the care of this patient.  I have made amendments to the documentation where necessary, and agree with the history, physical exam, and plan as documented by the Resident.     Hypoxic and pre-syncopal in office today. Sent to ED for treatment/evaluation.   -Cardiac, respiratory eval  -Rule out DVT/PE  -Local wound care to BLE   -Compression and elevation .
PGY-1 Progress Note discussed with attending    INTERVAL HPI/OVERNIGHT EVENTS: No acute events.    MEDICATIONS  (STANDING):  apixaban 10 milliGRAM(s) Oral every 12 hours  aspirin  chewable 81 milliGRAM(s) Oral daily  atorvastatin 40 milliGRAM(s) Oral at bedtime  ceFAZolin   IVPB 2000 milliGRAM(s) IV Intermittent every 8 hours  cetirizine 10 milliGRAM(s) Oral daily  fluticasone propionate 50 MICROgram(s)/spray Nasal Spray 1 Spray(s) Both Nostrils two times a day  fluticasone propionate/ salmeterol 250-50 MICROgram(s) Diskus 1 Dose(s) Inhalation two times a day  furosemide    Tablet 20 milliGRAM(s) Oral daily  gabapentin 100 milliGRAM(s) Oral once  gabapentin 400 milliGRAM(s) Oral two times a day  hydrocortisone 1% Cream 1 Application(s) Topical every 12 hours  insulin glargine Injectable (LANTUS) 70 Unit(s) SubCutaneous at bedtime  insulin lispro (ADMELOG) corrective regimen sliding scale   SubCutaneous three times a day before meals  insulin lispro Injectable (ADMELOG) 20 Unit(s) SubCutaneous three times a day before meals  levothyroxine 112 MICROGram(s) Oral daily  losartan 25 milliGRAM(s) Oral daily  metFORMIN 1000 milliGRAM(s) Oral every 12 hours  montelukast 10 milliGRAM(s) Oral daily  OXcarbazepine 600 milliGRAM(s) Oral <User Schedule>  polyethylene glycol 3350 17 Gram(s) Oral daily  senna 2 Tablet(s) Oral at bedtime    MEDICATIONS  (PRN):  acetaminophen     Tablet .. 650 milliGRAM(s) Oral every 6 hours PRN Temp greater or equal to 38C (100.4F), Mild Pain (1 - 3)  albuterol/ipratropium for Nebulization 3 milliLiter(s) Nebulizer every 6 hours PRN Bronchospasm      REVIEW OF SYSTEMS:  CONSTITUTIONAL: No fever, weight loss, or fatigue  RESPIRATORY: No cough, wheezing, chills or hemoptysis; No shortness of breath  CARDIOVASCULAR: No chest pain, palpitations, dizziness,+leg swelling  GASTROINTESTINAL: No abdominal pain. No nausea, vomiting, or hematemesis; No diarrhea or constipation. No melena or hematochezia.  GENITOURINARY: No dysuria or hematuria, urinary frequency  NEUROLOGICAL:+headaches, memory loss, loss of strength, numbness, or tremors  SKIN: No itching, burning, rashes, or lesions     Vital Signs Last 24 Hrs  T(C): 36.7 (11 Apr 2025 08:30), Max: 36.7 (10 Apr 2025 15:52)  T(F): 98.1 (11 Apr 2025 08:30), Max: 98.1 (10 Apr 2025 15:52)  HR: 66 (11 Apr 2025 08:30) (66 - 88)  BP: 114/58 (11 Apr 2025 08:30) (102/66 - 143/74)  BP(mean): 87 (10 Apr 2025 11:18) (87 - 87)  RR: 20 (11 Apr 2025 08:30) (19 - 20)  SpO2: 100% (11 Apr 2025 08:30) (85% - 100%)    Parameters below as of 11 Apr 2025 08:30  Patient On (Oxygen Delivery Method): nasal cannula  O2 Flow (L/min): 4      GENERAL: NAD  HEAD:  Atraumatic, Normocephalic  EYES:  conjunctiva and sclera clear  NECK: Supple, No JVD,  CHEST/LUNG: Clear to auscultation. No rales, rhonchi, wheezing, or rubs  HEART: Regular rate and rhythm; No murmurs, rubs, or gallops  ABDOMEN: Soft, Nontender, Nondistended; Bowel sounds present  NERVOUS SYSTEM:  Alert & Oriented X3,    EXTREMITIES:  2+ Peripheral Pulses, B/L LE edema with psoriatic lesions, worse edema on the RLE   SKIN: noted edema b/l, with silver scaly lesions in the knees and elbows. honey like color crusted lesions in the lateral side of the RLE, serous fluid coming out yellow color                            11.5   6.72  )-----------( 275      ( 11 Apr 2025 05:59 )             39.4     04-11    134[L]  |  99  |  16  ----------------------------<  225[H]  5.3   |  28  |  1.13    Ca    8.8      11 Apr 2025 05:59  Phos  3.9     04-11  Mg     1.9     04-11                CAPILLARY BLOOD GLUCOSE      RADIOLOGY & ADDITIONAL TESTS:                  
S: patient says she feels much better, was happy she got an extended sleep yesterday  She is awake and speaking today without stimulation  Leg pain is at an 8/10, but she thinks she feels ready to go home tomorrow  not interested in rehab    O:  Vital Signs Last 24 Hrs  T(C): 37.1 (13 Apr 2025 12:34), Max: 37.1 (12 Apr 2025 13:44)  T(F): 98.8 (13 Apr 2025 12:34), Max: 98.8 (13 Apr 2025 12:34)  HR: 72 (13 Apr 2025 12:34) (72 - 89)  BP: 150/89 (13 Apr 2025 12:34) (129/91 - 172/89)  BP(mean): --  RR: 18 (13 Apr 2025 12:34) (18 - 20)  SpO2: 96% (13 Apr 2025 12:34) (96% - 100%)    Parameters below as of 13 Apr 2025 12:34  Patient On (Oxygen Delivery Method): nasal cannula  O2 Flow (L/min): 3      GENERAL: obese woman sitting upright in bedside chair, awake, alert, no distress  HEAD:  Atraumatic, Normocephalic  EYES: conjunctiva and sclera clear  NECK:  No JVD  CHEST/LUNG: saturating well on 4 L home oxygen requirement  HEART: Regular rate and rhythm;   ABDOMEN: Soft, Nontender, Nondistended; Bowel sounds present  PSYCH: AAOx3  NEUROLOGY: non-focal  SKIN: extensive psoriatic lesions on lower extremities; right lower extremity cellulitis improving    acetaminophen     Tablet .. 650 milliGRAM(s) Oral every 6 hours PRN  albuterol/ipratropium for Nebulization 3 milliLiter(s) Nebulizer every 6 hours PRN  apixaban 10 milliGRAM(s) Oral every 12 hours  aspirin  chewable 81 milliGRAM(s) Oral daily  atorvastatin 40 milliGRAM(s) Oral at bedtime  ceFAZolin   IVPB 2000 milliGRAM(s) IV Intermittent every 8 hours  cetirizine 10 milliGRAM(s) Oral daily  fluticasone propionate 50 MICROgram(s)/spray Nasal Spray 1 Spray(s) Both Nostrils two times a day  fluticasone propionate/ salmeterol 250-50 MICROgram(s) Diskus 1 Dose(s) Inhalation two times a day  furosemide    Tablet 20 milliGRAM(s) Oral daily  gabapentin 400 milliGRAM(s) Oral two times a day PRN  hydrocortisone 1% Cream 1 Application(s) Topical every 12 hours  insulin glargine Injectable (LANTUS) 65 Unit(s) SubCutaneous at bedtime  insulin lispro (ADMELOG) corrective regimen sliding scale   SubCutaneous three times a day before meals  insulin lispro Injectable (ADMELOG) 20 Unit(s) SubCutaneous three times a day before meals  levothyroxine 112 MICROGram(s) Oral daily  metFORMIN 1000 milliGRAM(s) Oral every 12 hours  montelukast 10 milliGRAM(s) Oral daily  OXcarbazepine 600 milliGRAM(s) Oral <User Schedule>  polyethylene glycol 3350 17 Gram(s) Oral daily  senna 2 Tablet(s) Oral at bedtime                            10.4   7.69  )-----------( 268      ( 13 Apr 2025 05:56 )             35.4       04-13    137  |  98  |  18  ----------------------------<  66[L]  4.1   |  35[H]  |  0.96    Ca    8.9      13 Apr 2025 05:56    
PGY-1 Progress Note discussed with attending    INTERVAL HPI/OVERNIGHT EVENTS: Patient seen at bedside, still c/o migraine and trigeminal pain. Refusing AVAPs.     MEDICATIONS  (STANDING):  apixaban 10 milliGRAM(s) Oral every 12 hours  aspirin  chewable 81 milliGRAM(s) Oral daily  atorvastatin 40 milliGRAM(s) Oral at bedtime  ceFAZolin   IVPB 2000 milliGRAM(s) IV Intermittent every 8 hours  cetirizine 10 milliGRAM(s) Oral daily  fluticasone propionate 50 MICROgram(s)/spray Nasal Spray 1 Spray(s) Both Nostrils two times a day  fluticasone propionate/ salmeterol 250-50 MICROgram(s) Diskus 1 Dose(s) Inhalation two times a day  gabapentin 300 milliGRAM(s) Oral two times a day  hydrocortisone 1% Cream 1 Application(s) Topical every 12 hours  insulin glargine Injectable (LANTUS) 60 Unit(s) SubCutaneous at bedtime  insulin lispro (ADMELOG) corrective regimen sliding scale   SubCutaneous three times a day before meals  insulin lispro Injectable (ADMELOG) 18 Unit(s) SubCutaneous three times a day before meals  levothyroxine 112 MICROGram(s) Oral daily  losartan 25 milliGRAM(s) Oral daily  montelukast 10 milliGRAM(s) Oral daily  OXcarbazepine 600 milliGRAM(s) Oral <User Schedule>  polyethylene glycol 3350 17 Gram(s) Oral daily  senna 2 Tablet(s) Oral at bedtime    MEDICATIONS  (PRN):  acetaminophen     Tablet .. 650 milliGRAM(s) Oral every 6 hours PRN Temp greater or equal to 38C (100.4F), Mild Pain (1 - 3)  albuterol/ipratropium for Nebulization 3 milliLiter(s) Nebulizer every 6 hours PRN Bronchospasm      REVIEW OF SYSTEMS:  CONSTITUTIONAL: No fever, weight loss, or fatigue  RESPIRATORY: No cough, wheezing, chills or hemoptysis; No shortness of breath  CARDIOVASCULAR: No chest pain, palpitations, dizziness, or leg swelling  GASTROINTESTINAL: No abdominal pain. No nausea, vomiting, or hematemesis; No diarrhea or constipation. No melena or hematochezia.  GENITOURINARY: No dysuria or hematuria, urinary frequency  NEUROLOGICAL: +headaches, memory loss, loss of strength, numbness, or tremors  SKIN: No itching, burning, rashes, or lesions     Vital Signs Last 24 Hrs  T(C): 36.6 (10 Apr 2025 11:18), Max: 37.2 (10 Apr 2025 07:36)  T(F): 97.9 (10 Apr 2025 11:18), Max: 99 (10 Apr 2025 07:36)  HR: 82 (10 Apr 2025 11:18) (74 - 84)  BP: 115/75 (10 Apr 2025 11:18) (106/51 - 120/70)  BP(mean): 87 (10 Apr 2025 11:18) (83 - 87)  RR: 20 (10 Apr 2025 11:18) (19 - 20)  SpO2: 98% (10 Apr 2025 11:18) (93% - 100%)    Parameters below as of 10 Apr 2025 11:18  Patient On (Oxygen Delivery Method): nasal cannula  O2 Flow (L/min): 4      GENERAL: NAD  HEAD:  Atraumatic, Normocephalic  EYES:  conjunctiva and sclera clear  NECK: Supple, No JVD,  CHEST/LUNG: Clear to auscultation. No rales, rhonchi, wheezing, or rubs  HEART: Regular rate and rhythm; No murmurs, rubs, or gallops  ABDOMEN: Soft, Nontender, Nondistended; Bowel sounds present  NERVOUS SYSTEM:  Alert & Oriented X3,    EXTREMITIES:  2+ Peripheral Pulses, B/L LE edema with psoriatic lesions, worse edema on the RLE   SKIN: noted edema b/l, with silver scaly lesions in the knees and elbows. honey like color crusted lesions in the lateral side of the RLE, serous fluid coming out yellow color                            11.7   9.87  )-----------( 385      ( 10 Apr 2025 06:04 )             39.7     04-10    133[L]  |  95[L]  |  22[H]  ----------------------------<  280[H]  4.0   |  29  |  1.41[H]    Ca    8.6      10 Apr 2025 06:04  Phos  4.0     04-10  Mg     1.8     04-10    TPro  8.5[H]  /  Alb  2.7[L]  /  TBili  0.5  /  DBili  x   /  AST  34  /  ALT  37  /  AlkPhos  116  04-09    LIVER FUNCTIONS - ( 09 Apr 2025 03:52 )  Alb: 2.7 g/dL / Pro: 8.5 g/dL / ALK PHOS: 116 U/L / ALT: 37 U/L DA / AST: 34 U/L / GGT: x               PT/INR - ( 08 Apr 2025 19:12 )   PT: 15.1 sec;   INR: 1.29 ratio         PTT - ( 09 Apr 2025 03:38 )  PTT:68.6 sec    CAPILLARY BLOOD GLUCOSE      RADIOLOGY & ADDITIONAL TESTS:                  
PGY-1 Progress Note discussed with attending    INTERVAL HPI/OVERNIGHT EVENTS: Patient seen at bedside, endorsing horrible H/A and facial pain along with pain in the RLE.    MEDICATIONS  (STANDING):  amLODIPine   Tablet 10 milliGRAM(s) Oral daily  aspirin  chewable 81 milliGRAM(s) Oral daily  atorvastatin 40 milliGRAM(s) Oral at bedtime  ceFAZolin   IVPB 2000 milliGRAM(s) IV Intermittent every 8 hours  cetirizine 10 milliGRAM(s) Oral daily  enoxaparin Injectable 120 milliGRAM(s) SubCutaneous every 12 hours  fluticasone propionate 50 MICROgram(s)/spray Nasal Spray 1 Spray(s) Both Nostrils two times a day  fluticasone propionate/ salmeterol 250-50 MICROgram(s) Diskus 1 Dose(s) Inhalation two times a day  hydrocortisone 1% Cream 1 Application(s) Topical every 12 hours  insulin glargine Injectable (LANTUS) 60 Unit(s) SubCutaneous at bedtime  insulin lispro (ADMELOG) corrective regimen sliding scale   SubCutaneous three times a day before meals  insulin lispro Injectable (ADMELOG) 15 Unit(s) SubCutaneous three times a day before meals  levothyroxine 112 MICROGram(s) Oral daily  losartan 25 milliGRAM(s) Oral daily  montelukast 10 milliGRAM(s) Oral daily  OXcarbazepine 600 milliGRAM(s) Oral <User Schedule>  polyethylene glycol 3350 17 Gram(s) Oral daily  senna 2 Tablet(s) Oral at bedtime    MEDICATIONS  (PRN):  acetaminophen     Tablet .. 650 milliGRAM(s) Oral every 6 hours PRN Temp greater or equal to 38C (100.4F), Mild Pain (1 - 3)  albuterol/ipratropium for Nebulization 3 milliLiter(s) Nebulizer every 6 hours PRN Bronchospasm      REVIEW OF SYSTEMS:  CONSTITUTIONAL: No fever, weight loss, or fatigue  RESPIRATORY: No cough, wheezing, chills or hemoptysis; No shortness of breath  CARDIOVASCULAR: No chest pain, palpitations, dizziness, +leg swelling  GASTROINTESTINAL: No abdominal pain. No nausea, vomiting, or hematemesis; No diarrhea or constipation. No melena or hematochezia.  GENITOURINARY: No dysuria or hematuria, urinary frequency  NEUROLOGICAL: +headaches, memory loss, loss of strength, numbness, or tremors  SKIN: No itching, burning, rashes, or lesions     Vital Signs Last 24 Hrs  T(C): 36.4 (09 Apr 2025 10:54), Max: 36.9 (08 Apr 2025 19:16)  T(F): 97.5 (09 Apr 2025 10:54), Max: 98.4 (08 Apr 2025 19:16)  HR: 81 (09 Apr 2025 10:54) (77 - 85)  BP: 120/74 (09 Apr 2025 10:54) (120/74 - 147/79)  BP(mean): 102 (08 Apr 2025 23:37) (102 - 102)  RR: 18 (09 Apr 2025 10:54) (18 - 20)  SpO2: 96% (09 Apr 2025 10:54) (94% - 96%)    Parameters below as of 09 Apr 2025 10:54  Patient On (Oxygen Delivery Method): nasal cannula  O2 Flow (L/min): 4    GENERAL: NAD  HEAD:  Atraumatic, Normocephalic  EYES:  conjunctiva and sclera clear  NECK: Supple, No JVD,  CHEST/LUNG: Clear to auscultation. No rales, rhonchi, wheezing, or rubs  HEART: Regular rate and rhythm; No murmurs, rubs, or gallops  ABDOMEN: Soft, Nontender, Nondistended; Bowel sounds present  NERVOUS SYSTEM:  Alert & Oriented X3,    EXTREMITIES:  2+ Peripheral Pulses, B/L LE edema with psoriatic lesions, worse edema on the RLE   SKIN: noted edema b/l, with silver scaly lesions in the knees and elbows. honey like color crusted lesions in the lateral side of the RLE, serous fluid coming out yellow color                          11.3   9.38  )-----------( 299      ( 09 Apr 2025 06:17 )             36.6     04-09    132[L]  |  96  |  16  ----------------------------<  448[H]  4.5   |  27  |  1.24    Ca    9.0      09 Apr 2025 03:52  Phos  2.7     04-09  Mg     1.4     04-09    TPro  8.5[H]  /  Alb  2.7[L]  /  TBili  0.5  /  DBili  x   /  AST  34  /  ALT  37  /  AlkPhos  116  04-09    LIVER FUNCTIONS - ( 09 Apr 2025 03:52 )  Alb: 2.7 g/dL / Pro: 8.5 g/dL / ALK PHOS: 116 U/L / ALT: 37 U/L DA / AST: 34 U/L / GGT: x               PT/INR - ( 08 Apr 2025 19:12 )   PT: 15.1 sec;   INR: 1.29 ratio         PTT - ( 09 Apr 2025 03:38 )  PTT:68.6 sec    CAPILLARY BLOOD GLUCOSE      RADIOLOGY & ADDITIONAL TESTS:

## 2025-04-13 NOTE — PROGRESS NOTE ADULT - ASSESSMENT
59 yro F with RLE edema and ulcerations     Plan:  -cardio folloling  -No vascular intervention recommended at this time  -Recommend leg compression and elevation  
  I have reviewed her CBC, BMP. I do note that her serum bicarb is rising, now up to 35, likely because she has not been using her AVAPS at night given her trigeminal neuralgia.     Assessment and Plan:    59 year old female patient with pmhx HLD, asthma, obesity hypoventilation syndrome, COPD on 4L NC as needed, NAKUL not on CPAP, RCC status post left renal resection 11/23, migraines, obesity, hypothyroidism, right shoulder bursitis, fibromyalgia, psoriasis, trigeminal neuralgia who presented to the ER due to hypoxia and right foot swelling and redness.  The patient states her right foot and lower leg started to develop blisters after being discharged at the end of March. The blisters popped with clear fluid and now have a yellowish cover. She has tender erythema on her lower leg and foot. She has had right lower extremity edema already for months. She saw outpatient vascular today and was found to be hypoxic. She was also anxious today and had shortness of breath and feelings of anxiety at the clinic. She states she has history of anxiety for years. She also states she has acute bronchitis for the past couple of weeks. In the ER CTA positive for PE. Patient states she needs to follow up for her regular colonoscopy/mammogram screenings as she is not up to date with those.  She will complete IV antibiotics for cellultitis on 4/14, will be paramount for her to frequently elevate and use compression to bilateral lower extremities.     # New Onset Pulmonary Embolism  > ER discussed case with radiology who confirmed there is no right heart strain on CT.  - Heparin drip -> transitioned to therapeutic Lovenox 4/9 in AM; now on loading dose of apixaban, will drop down to 5 mg BID after 7 days of AC (including Lovenox/apixaban)  - ECHO ordered, not approved by Cardiolog  - no right heart strain on CTA -> telemetry discontinued   - Outpatient age-appropriate cancer screening follow up  - will ensure she has follow up with her Pulmonary Provider Dr. Monterroso     # Right Lower Extremity Cellulitis, improving  # Right Lower Extremity Blisters likely 2/2 Lower Extremity Edema  - Vascular consulted: no surgical intervention   - IV Unasyn -> cefazolin 2 grams every 8 hours; will plan for 7 days, can transition to oral if DC by then  - Check ESR, CRP (13)  - continue Lasix 20 mg daily  - appreciate vascular surgery, will order ACE wrap compression of bilateral LE    #Tremors  -will hold on neurology consult given improvement   -continue home oxcarbazepine and consider discontinuing medications that patient does not find to be vital, de-prescribing focus as she laments her polypharmacy     # Hx of DM c/b severe hyperglycemia   -continue Lantus to 65 units  -continue mealtime insulin to 20 units TID + ongoing sliding scale  - Endocrinology following given A1c of 13%, recommendations pending  - back on metformin home dose    # Hx of HLD, asthma, obesity hypoventilation syndrome, COPD on 4L NC as needed, NAKUL not on CPAP, RCC status post left renal resection 11/23, migraines, obesity, hypothyroidism, right shoulder bursitis, fibromyalgia, psoriasis, trigeminal neuralgia  - Continue home medications Albuterol prn, Amlodipine, Atorvastatin, HCTZ, Synthroid, Montelukast, Oxcarbazepine, Gabapentin  - Supplemental O2 prn. Patient refuses nocturnal CPAP or AVAPS; she does not want to use it    # DVT PPx: apixaban  # FEN  - Diabetic DASH Diet  - Monitor and replete electrolytes as needed  FULL CODE (patient wants CPR and intubation if needed) .   
  I have reviewed her CBC, BMP. WBC 7. CRP only mildly elevated 13. Cr 1.1. Will stop labs    Assessment and Plan:    59 year old female patient with pmhx HLD, asthma, obesity hypoventilation syndrome, COPD on 4L NC as needed, NAKUL not on CPAP, RCC status post left renal resection 11/23, migraines, obesity, hypothyroidism, right shoulder bursitis, fibromyalgia, psoriasis, trigeminal neuralgia who presented to the ER due to hypoxia and right foot swelling and redness.  The patient states her right foot and lower leg started to develop blisters after being discharged at the end of March. The blisters popped with clear fluid and now have a yellowish cover. She has tender erythema on her lower leg and foot. She has had right lower extremity edema already for months. She saw outpatient vascular today and was found to be hypoxic. She was also anxious today and had shortness of breath and feelings of anxiety at the clinic. She states she has history of anxiety for years. She also states she has acute bronchitis for the past couple of weeks. In the ER CTA positive for PE. Patient states she needs to follow up for her regular colonoscopy/mammogram screenings as she is not up to date with those.    # New Onset Pulmonary Embolism  > ER discussed case with radiology who confirmed there is no right heart strain on CT.  - Heparin drip -> transitioned to therapeutic Lovenox 4/9 in AM; now on loading dose of apixaban, will drop down to 5 mg BID after 7 days of AC (including Lovenox/apixaban)  - ECHO ordered, not approved by Cardiolog  - no right heart strain on CTA -> telemetry discontinued   - Outpatient age-appropriate cancer screening follow up  - will ensure she has follow up with her Pulmonary Provider Dr. Monterroso     # Right Lower Extremity Cellulitis, improving  # Right Lower Extremity Blisters likely 2/2 Lower Extremity Edema  - Vascular consulted: no surgical intervention   - IV Unasyn -> cefazolin 2 grams every 8 hours; will plan for 7 days, can transition to oral if DC by then  - Check ESR, CRP (13)  - continue Lasix 20 mg daily    #Tremors  -consult Neurology  -continue home oxcarbazepine and consider discontinuing medications that patient does not find to be vital, de-prescribing focus as she laments her polypharmacy     # Hx of DM c/b severe hyperglycemia   -increase Lantus to 65 units  -increase mealtime insulin to 20 units TID + ongoing sliding scale  - will consult endocrinology on 4/11 given A1c of 13%, recommendations pending  - back on metformin home dose    # Hx of HLD, asthma, obesity hypoventilation syndrome, COPD on 4L NC as needed, NAKUL not on CPAP, RCC status post left renal resection 11/23, migraines, obesity, hypothyroidism, right shoulder bursitis, fibromyalgia, psoriasis, trigeminal neuralgia  - Continue home medications Albuterol prn, Amlodipine, Atorvastatin, HCTZ, Synthroid, Montelukast, Oxcarbazepine, Gabapentin  - Supplemental O2 prn. Patient refuses nocturnal CPAP or AVAPS; she does not want to use it    # DVT PPx: apixaban  # FEN  - Diabetic DASH Diet  - Monitor and replete electrolytes as needed  FULL CODE (patient wants CPR and intubation if needed) .   
59-year-old female, AAOx3, ambulates with a cane, no HHA,  history of DM, HLD, asthma, obesity hypoventilation syndrome on 4L NC, migraines, hypothyroidism, NAKUL, RCC status post left renal resection 11/23 that comes in for lower extremity swelling. Pattient to be admitted for PE and cellulitis

## 2025-04-13 NOTE — PROGRESS NOTE ADULT - PROVIDER SPECIALTY LIST ADULT
Internal Medicine
Internal Medicine
Vascular Surgery
Internal Medicine

## 2025-04-14 ENCOUNTER — TRANSCRIPTION ENCOUNTER (OUTPATIENT)
Age: 60
End: 2025-04-14

## 2025-04-14 VITALS
RESPIRATION RATE: 18 BRPM | TEMPERATURE: 98 F | OXYGEN SATURATION: 97 % | DIASTOLIC BLOOD PRESSURE: 81 MMHG | SYSTOLIC BLOOD PRESSURE: 139 MMHG | HEART RATE: 90 BPM

## 2025-04-14 LAB
GLUCOSE BLDC GLUCOMTR-MCNC: 146 MG/DL — HIGH (ref 70–99)
GLUCOSE BLDC GLUCOMTR-MCNC: 149 MG/DL — HIGH (ref 70–99)
GLUCOSE BLDC GLUCOMTR-MCNC: 201 MG/DL — HIGH (ref 70–99)

## 2025-04-14 PROCEDURE — 99239 HOSP IP/OBS DSCHRG MGMT >30: CPT

## 2025-04-14 PROCEDURE — 87637 SARSCOV2&INF A&B&RSV AMP PRB: CPT

## 2025-04-14 PROCEDURE — 85025 COMPLETE CBC W/AUTO DIFF WBC: CPT

## 2025-04-14 PROCEDURE — 93005 ELECTROCARDIOGRAM TRACING: CPT

## 2025-04-14 PROCEDURE — 81003 URINALYSIS AUTO W/O SCOPE: CPT

## 2025-04-14 PROCEDURE — 85027 COMPLETE CBC AUTOMATED: CPT

## 2025-04-14 PROCEDURE — 97162 PT EVAL MOD COMPLEX 30 MIN: CPT

## 2025-04-14 PROCEDURE — 96374 THER/PROPH/DIAG INJ IV PUSH: CPT

## 2025-04-14 PROCEDURE — 94660 CPAP INITIATION&MGMT: CPT

## 2025-04-14 PROCEDURE — 71045 X-RAY EXAM CHEST 1 VIEW: CPT

## 2025-04-14 PROCEDURE — 93971 EXTREMITY STUDY: CPT

## 2025-04-14 PROCEDURE — 85379 FIBRIN DEGRADATION QUANT: CPT

## 2025-04-14 PROCEDURE — 85610 PROTHROMBIN TIME: CPT

## 2025-04-14 PROCEDURE — 99222 1ST HOSP IP/OBS MODERATE 55: CPT

## 2025-04-14 PROCEDURE — 99285 EMERGENCY DEPT VISIT HI MDM: CPT

## 2025-04-14 PROCEDURE — 96375 TX/PRO/DX INJ NEW DRUG ADDON: CPT

## 2025-04-14 PROCEDURE — 94640 AIRWAY INHALATION TREATMENT: CPT

## 2025-04-14 PROCEDURE — 84100 ASSAY OF PHOSPHORUS: CPT

## 2025-04-14 PROCEDURE — 80053 COMPREHEN METABOLIC PANEL: CPT

## 2025-04-14 PROCEDURE — 84702 CHORIONIC GONADOTROPIN TEST: CPT

## 2025-04-14 PROCEDURE — 82962 GLUCOSE BLOOD TEST: CPT

## 2025-04-14 PROCEDURE — 36415 COLL VENOUS BLD VENIPUNCTURE: CPT

## 2025-04-14 PROCEDURE — 76882 US LMTD JT/FCL EVL NVASC XTR: CPT

## 2025-04-14 PROCEDURE — 83735 ASSAY OF MAGNESIUM: CPT

## 2025-04-14 PROCEDURE — 80048 BASIC METABOLIC PNL TOTAL CA: CPT

## 2025-04-14 PROCEDURE — 85730 THROMBOPLASTIN TIME PARTIAL: CPT

## 2025-04-14 PROCEDURE — 71275 CT ANGIOGRAPHY CHEST: CPT | Mod: MC

## 2025-04-14 PROCEDURE — 86140 C-REACTIVE PROTEIN: CPT

## 2025-04-14 RX ORDER — FUROSEMIDE 10 MG/ML
1 INJECTION INTRAMUSCULAR; INTRAVENOUS
Qty: 0 | Refills: 0 | DISCHARGE
Start: 2025-04-14

## 2025-04-14 RX ORDER — INSULIN LISPRO 100 U/ML
10 INJECTION, SOLUTION INTRAVENOUS; SUBCUTANEOUS
Qty: 1 | Refills: 0
Start: 2025-04-14 | End: 2025-05-13

## 2025-04-14 RX ORDER — SEMAGLUTIDE 1 MG/.5ML
2 INJECTION, SOLUTION SUBCUTANEOUS
Qty: 1 | Refills: 0
Start: 2025-04-14 | End: 2025-05-13

## 2025-04-14 RX ORDER — FUROSEMIDE 10 MG/ML
1 INJECTION INTRAMUSCULAR; INTRAVENOUS
Qty: 30 | Refills: 0
Start: 2025-04-14 | End: 2025-05-13

## 2025-04-14 RX ORDER — INSULIN GLARGINE-YFGN 100 [IU]/ML
45 INJECTION, SOLUTION SUBCUTANEOUS
Qty: 1 | Refills: 0
Start: 2025-04-14 | End: 2025-05-13

## 2025-04-14 RX ORDER — APIXABAN 2.5 MG/1
2 TABLET, FILM COATED ORAL
Qty: 28 | Refills: 0
Start: 2025-04-14 | End: 2025-04-20

## 2025-04-14 RX ADMIN — METFORMIN HYDROCHLORIDE 1000 MILLIGRAM(S): 850 TABLET ORAL at 08:15

## 2025-04-14 RX ADMIN — MONTELUKAST SODIUM 10 MILLIGRAM(S): 10 TABLET ORAL at 12:07

## 2025-04-14 RX ADMIN — Medication 81 MILLIGRAM(S): at 12:06

## 2025-04-14 RX ADMIN — APIXABAN 10 MILLIGRAM(S): 2.5 TABLET, FILM COATED ORAL at 06:26

## 2025-04-14 RX ADMIN — FUROSEMIDE 20 MILLIGRAM(S): 10 INJECTION INTRAMUSCULAR; INTRAVENOUS at 06:27

## 2025-04-14 RX ADMIN — Medication 10 MILLIGRAM(S): at 12:06

## 2025-04-14 RX ADMIN — FLUTICASONE PROPIONATE 1 SPRAY(S): 50 SPRAY, METERED NASAL at 05:04

## 2025-04-14 RX ADMIN — OXCARBAZEPINE 600 MILLIGRAM(S): 150 TABLET, FILM COATED ORAL at 08:14

## 2025-04-14 RX ADMIN — INSULIN LISPRO 8 UNIT(S): 100 INJECTION, SOLUTION INTRAVENOUS; SUBCUTANEOUS at 08:15

## 2025-04-14 RX ADMIN — Medication 1 DOSE(S): at 12:11

## 2025-04-14 RX ADMIN — POLYETHYLENE GLYCOL 3350 17 GRAM(S): 17 POWDER, FOR SOLUTION ORAL at 13:36

## 2025-04-14 RX ADMIN — Medication 100 MILLIGRAM(S): at 05:02

## 2025-04-14 RX ADMIN — Medication 112 MICROGRAM(S): at 05:03

## 2025-04-14 RX ADMIN — INSULIN LISPRO 8 UNIT(S): 100 INJECTION, SOLUTION INTRAVENOUS; SUBCUTANEOUS at 12:06

## 2025-04-14 RX ADMIN — HYDROCORTISONE 1 APPLICATION(S): 10 CREAM TOPICAL at 05:04

## 2025-04-14 NOTE — DISCHARGE NOTE NURSING/CASE MANAGEMENT/SOCIAL WORK - PATIENT PORTAL LINK FT
You can access the FollowMyHealth Patient Portal offered by [facility] by registering at the following website: [email or web address]. By joining [employer]’s FollowMyHealth portal, you will also be able to view your health information using other applications (apps) compatible with our system.

## 2025-04-14 NOTE — CONSULT NOTE ADULT - SUBJECTIVE AND OBJECTIVE BOX
ENDOCRINE INITIAL CONSULT NOTE:    Patient is a 59y old  Female who presents with a chief complaint of PE, cellulitis (2025 13:10)      HPI:  59-year-old female, AAOx3, ambulates with a cane, no HHA,  history of DM, HLD, asthma, obesity hypoventilation syndrome on 4L NC, migraines, hypothyroidism, NAKUL, RCC status post left renal resection  that comes in for lower extremity swelling.Patient endorses that for the past couple of day started noticing an increase in her swelling in the R leg from her baseline which promted her to come to the ED along with scabs that are now in the side of her leg. Patient  has chronic swelling in the LE due to poor lymphatic drainage and was suposed to see a vascular surgeon today but not able to make it to appointment. Pateint endorsing that redness in lower extremity is not new and has been chronic. Patient at home on 4L at baseline, report mild increase in SOB in the past couple of days due to being unable to walk much due to the swelling. Denies any fevers, chills, CP, N/V or any other associated symptoms.  (2025 22:31)    59y Female    Diabetes History:  Diagnosis:  Home regimen:  Home fingersticks/ CGM:  Hypoglycemia events:  Retinopathy/most recent opthalmology:  Peripheral Neuropathy:  Nephropathy:  Cardiovascular disease:  Peripheral vascular disease:  Diet:  Recent A1C   PCP  Endocrinologist:    Review of systems:  Constitutional:  Constitutional: No fever, weight loss, fatigue, low energy, generalized weakness, poor appetite  Eyes: No redness, no dryness, no pain, no tearing, no gritty or cathy feeling, no bulging  Cardiovascular/ Respiratory: No palpitations,, no chest pain, no shortness of breath, no exercise intolerance, no cough, no leg/ ankle swelling  Gastrointestinal: No trouble swallowing, no heart burn, no abdominal pain, no bloating, no nausea, no vomiting, no constipation, no diarrhea, no frequent bowel movements  Skin: No excessive hair growth, no hair loss, no acne, no excessive sweating, no rash, no easy bruising  Neurological: No headaches, no change in vision, no dizziness/ lightheadedness, no tremors, no numbness/ tingling in feet, no pain/ burning in feet, no trouble with balance, no muscular weakness.   Endocrine: Frequent urination, excessive urination, excessive thirst, symptoms     PAST MEDICAL & SURGICAL HISTORY:  Hypertension  vaginal cyst      Hyperlipidemia      Asthma  last inhaler use with in 10 days, on Pulm follow up [id number]      Hypothyroid      Obstructive sleep apnea  refuses Cpap      Obesity  morbid      Trigeminal neuralgia  R      Fibromyalgia      DM (diabetes mellitus)  2      DJD (degenerative joint disease)  Cervical/Thoracic/Lumbar      Cervical neuralgia  difficulty moving my neck      Back pain  thoracic & lumbar      H/O bursitis  right shoulder      Radicular pain  arms, legs      Vasculitis  Legs, forerhead, arms & hands, flare ups in R leg  Dr susie Ribeiro is my Rheumatologist      Renal cell carcinoma, left  on follow up Dr schulte, HOD renal [facility], waiting for suregry in 2020      Falls frequently      Morbid obesity with body mass index (BMI) of 50.0 to 59.9 in adult      Psoriasis      Diverticulosis      Fatty liver      S/P abdominal hysterectomy          S/P  section  1      Left adrenal mass  excision, benign       Vaginal cyst  removed       Vasculitis on nerve biopsy  , had another surgery called microvascular decompression       S/P left oophorectomy      S/P colonoscopic polypectomy          FAMILY HISTORY:  Family history of diabetes mellitus    Family history of hypertension    Family history of obesity        Social History:  Occupation:  Marital status/Lives with  Exercise:  Tobacco:  Alcohol:  illicit drug abuse:  Health insurance status:    MEDICATIONS  (STANDING):  apixaban 10 milliGRAM(s) Oral every 12 hours  aspirin  chewable 81 milliGRAM(s) Oral daily  atorvastatin 40 milliGRAM(s) Oral at bedtime  ceFAZolin   IVPB 2000 milliGRAM(s) IV Intermittent every 8 hours  cetirizine 10 milliGRAM(s) Oral daily  fluticasone propionate 50 MICROgram(s)/spray Nasal Spray 1 Spray(s) Both Nostrils two times a day  fluticasone propionate/ salmeterol 250-50 MICROgram(s) Diskus 1 Dose(s) Inhalation two times a day  furosemide    Tablet 20 milliGRAM(s) Oral daily  hydrocortisone 1% Cream 1 Application(s) Topical every 12 hours  insulin glargine Injectable (LANTUS) 25 Unit(s) SubCutaneous at bedtime  insulin lispro (ADMELOG) corrective regimen sliding scale   SubCutaneous three times a day before meals  insulin lispro Injectable (ADMELOG) 8 Unit(s) SubCutaneous three times a day before meals  levothyroxine 112 MICROGram(s) Oral daily  metFORMIN 1000 milliGRAM(s) Oral every 12 hours  montelukast 10 milliGRAM(s) Oral daily  OXcarbazepine 600 milliGRAM(s) Oral <User Schedule>  polyethylene glycol 3350 17 Gram(s) Oral daily  senna 2 Tablet(s) Oral at bedtime    MEDICATIONS  (PRN):  acetaminophen     Tablet .. 650 milliGRAM(s) Oral every 6 hours PRN Temp greater or equal to 38C (100.4F), Mild Pain (1 - 3)  albuterol/ipratropium for Nebulization 3 milliLiter(s) Nebulizer every 6 hours PRN Bronchospasm  aluminum hydroxide/magnesium hydroxide/simethicone Suspension 30 milliLiter(s) Oral every 4 hours PRN Dyspepsia  gabapentin 400 milliGRAM(s) Oral two times a day PRN Pain/Neuropathy      Physical Examination  Vital Signs Last 24 Hrs  T(C): 36.8 (2025 05:51), Max: 37.1 (2025 12:34)  T(F): 98.2 (2025 05:51), Max: 98.8 (2025 12:34)  HR: 87 (2025 05:51) (72 - 90)  BP: 146/83 (2025 05:51) (140/70 - 150/89)  BP(mean): --  RR: 20 (2025 05:51) (18 - 20)  SpO2: 97% (2025 05:51) (96% - 98%)    Parameters below as of 2025 05:51  Patient On (Oxygen Delivery Method): nasal cannula  O2 Flow (L/min): 3    Constitutional: No acute distress, ill- appearing, no anxious appearing, hyperkinetic, no diaphoretic  HEENT: Moist mucous membranes  Neck:  No JVD, bruits or thyromegaly, No thyroid nodules palpable, no LAD  Respiratory:  Respiratory effort normal, lungs clear to ausculation, without rales or rhonchi  Cardiovascular:  Regular heart rate, normal S1 and S2 sounds, without murmur, rub or gallop.  Gastrointestinal: Soft, non tender without hepatosplenomegaly and masses, no abdominal obesity  Extremities: Sensation intact to monofilament in feet, no cyanosis, clubbing or edema, positive pedal pulses  Neurological:  Oriented to person, place and time, No gross sensory or motor defects, visual fields intact to confrontation, normal deep tendon reflexes    Labs:                        10.4   7.69  )-----------( 268      ( 2025 05:56 )             35.4     04-    137  |  98  |  18  ----------------------------<  66[L]  4.1   |  35[H]  |  0.96    Ca    8.9      2025 05:56            Urinalysis Basic - ( 2025 05:56 )    Color: x / Appearance: x / SG: x / pH: x  Gluc: 66 mg/dL / Ketone: x  / Bili: x / Urobili: x   Blood: x / Protein: x / Nitrite: x   Leuk Esterase: x / RBC: x / WBC x   Sq Epi: x / Non Sq Epi: x / Bacteria: x      CAPILLARY BLOOD GLUCOSE      POCT Blood Glucose.: 146 mg/dL (2025 11:39)  POCT Blood Glucose.: 149 mg/dL (2025 08:07)  POCT Blood Glucose.: 201 mg/dL (2025 02:03)  POCT Blood Glucose.: 154 mg/dL (2025 21:25)  POCT Blood Glucose.: 106 mg/dL (2025 17:06)      Radiology and diagnostic studies:      Assessment and Plan:  59y Female   Endocrinology is consulted fro    1) Type 2 diabetes:      Recommendations:  Basal Insulin:   Glargine ( Lantus) units once daily    Nutritional Insulin:   Lispro (Admelog) units with breakfast, Hold if NPO or eating <50% of meals  Lispro ( Admelog) units with lunch, Hold if NPO or eating < 50% of meals  Lispro (Admelog) units with dinner, Hold if NPO or eating < 50% of meals    Correctional Insulin:  Normal Lispro ( Admelog) correctional scale with meals and bedtime    Oral Diabetes Medications:  Non in the hospital     Endocrine initial consult note:  59 year old  Female who presents with a chief complaint of PE, cellulitis (13 Apr 2025 13:10)    HPI:  59-year-old female with history of DM, HLD, asthma, obesity hypoventilation syndrome on 4L NC, migraines, hypothyroidism, NAKUL, RCC status post left renal resection 11/23 that comes in for lower extremity swelling. Patient endorses that for the past couple of day started noticing an increase in her swelling in the R leg from her baseline which prompted her to come to the ED along with scabs that are now in the side of her leg. Patient  has chronic swelling in the LE due to poor lymphatic drainage and was suposed to see a vascular surgeon today but not able to make it to appointment. Pateint endorsing that redness in lower extremity is not new and has been chronic. Patient at home on 4L at baseline, report mild increase in SOB in the past couple of days due to being unable to walk much due to the swelling. Denies any fevers, chills, CP, N/V or any other associated symptoms.  (08 Apr 2025 22:31). Endocrinology is consulted for glycemic management    Patient seen at the bedside, reports feeling hungry. Eating well. Providing diabetes hx     Diabetes History: Diagnosed with type 2 diabetes around the age of 40  Home regimen: Ozempic 2 mg weekly, Metformin 1000 mg BID, Levemir 45 units at bedtime and Novolog 10 units before meals   Home fingersticks: Reports BS are sometimes high 200s.  Hypoglycemia events: Denies  Retinopathy/most recent opthalmology: Has not seen ophthalmologist 2 years ago. Denies retinopathy   Peripheral Neuropathy:  Yes sometimes  Nephropathy: Unknown  Cardiovascular disease: Denies  Diet: Reports eating high carb diet  Recent A1C: 9.7%  Endocrinologist: Dr. Arnol Cerna ( just saw recently)    Review of systems:  Constitutional: No fever, no weight loss, fatigue, low energy, generalized weakness, no poor appetite  Cardiovascular/ Respiratory: No palpitations, no chest pain, no shortness of breath, no cough, yes leg/ ankle swelling  Gastrointestinal: No heart burn, yes generalized abdominal pain, no bloating, no nausea, no vomiting  Neurological: No headaches, no change in vision, no dizziness/ lightheadedness, no tremors, yes numbness/ tingling in feet  Endocrine: Yes Frequent urination, excessive urination, excessive thirst, symptoms     Past Medical and Surgical Hx:  Hypertension  vaginal cyst  Hyperlipidemia  Asthma  Hypothyroid  Obstructive sleep apnea  refuses Cpap  Obesity  morbid  Trigeminal neuralgia  R  Fibromyalgia  DM (diabetes mellitus)  2  DJD (degenerative joint disease)  Cervical/Thoracic/Lumbar  Cervical neuralgia  difficulty moving my neck  Back pain  thoracic & lumbar  H/O bursitis  right shoulder  Radicular pain  arms, legs  Vasculitis  Legs, forehead, arms & hands, flare ups in R leg  Renal cell carcinoma, left  on follow up Dr schulte, HOD renal [facility], waiting for suregry in 09/2020  Morbid obesity with body mass index (BMI) of 50.0 to 59.9 in adult  Psoriasis  Diverticulosis  Fatty liver  S/P abdominal hysterectomy  2003  Left adrenal mass  excision, benign 2006  Vaginal cyst  removed 2010  Vasculitis on nerve biopsy  2013, had another surgery called microvascular decompression 2013  S/P left oophorectomy  S/P colonoscopic polypectomy    Family hx:  Family history of diabetes mellitus  Family history of hypertension  Family history of obesity    Social History:  Tobacco: Denies  Alcohol: Denies  illicit drug abuse: Denies      Medications  (Standing):  apixaban 10 milliGRAM(s) Oral every 12 hours  aspirin  chewable 81 milliGRAM(s) Oral daily  atorvastatin 40 milliGRAM(s) Oral at bedtime  ceFAZolin   IVPB 2000 milliGRAM(s) IV Intermittent every 8 hours  cetirizine 10 milliGRAM(s) Oral daily  fluticasone propionate 50 MICROgram(s)/spray Nasal Spray 1 Spray(s) Both Nostrils two times a day  fluticasone propionate/ salmeterol 250-50 MICROgram(s) Diskus 1 Dose(s) Inhalation two times a day  furosemide    Tablet 20 milliGRAM(s) Oral daily  hydrocortisone 1% Cream 1 Application(s) Topical every 12 hours  insulin glargine Injectable (LANTUS) 25 Unit(s) SubCutaneous at bedtime  insulin lispro (ADMELOG) corrective regimen sliding scale   SubCutaneous three times a day before meals  insulin lispro Injectable (ADMELOG) 8 Unit(s) SubCutaneous three times a day before meals  levothyroxine 112 MICROGram(s) Oral daily  metFORMIN 1000 milliGRAM(s) Oral every 12 hours  montelukast 10 milliGRAM(s) Oral daily  OXcarbazepine 600 milliGRAM(s) Oral <User Schedule>  polyethylene glycol 3350 17 Gram(s) Oral daily  senna 2 Tablet(s) Oral at bedtime    Medications (PRN):  acetaminophen     Tablet .. 650 milliGRAM(s) Oral every 6 hours PRN Temp greater or equal to 38C (100.4F), Mild Pain (1 - 3)  albuterol/ipratropium for Nebulization 3 milliLiter(s) Nebulizer every 6 hours PRN Bronchospasm  aluminum hydroxide/magnesium hydroxide/simethicone Suspension 30 milliLiter(s) Oral every 4 hours PRN Dyspepsia  gabapentin 400 milliGRAM(s) Oral two times a day PRN Pain/Neuropathy      Physical Examination  Vital Signs Last 24 Hrs  T(C): 36.8 (14 Apr 2025 05:51), Max: 37.1 (13 Apr 2025 12:34)  T(F): 98.2 (14 Apr 2025 05:51), Max: 98.8 (13 Apr 2025 12:34)  HR: 87 (14 Apr 2025 05:51) (72 - 90)  BP: 146/83 (14 Apr 2025 05:51) (140/70 - 150/89)  BP(mean): --  RR: 20 (14 Apr 2025 05:51) (18 - 20)  SpO2: 97% (14 Apr 2025 05:51) (96% - 98%)    Parameters below as of 14 Apr 2025 05:51  Patient On (Oxygen Delivery Method): nasal cannula  O2 Flow (L/min): 3    Constitutional: No acute distress, ill- appearing, no anxious appearing, hyperkinetic, no diaphoretic  HEENT: Moist mucous membranes  Neck:  No JVD, bruits or thyromegaly, No thyroid nodules palpable, no LAD  Respiratory:  Respiratory effort normal, lungs clear to ausculation, without rales or rhonchi  Cardiovascular:  Regular heart rate, normal S1 and S2 sounds, without murmur, rub or gallop.  Gastrointestinal: Soft, non tender without hepatosplenomegaly and masses, no abdominal obesity  Extremities: Sensation intact to monofilament in feet, no cyanosis, clubbing or edema, positive pedal pulses  Neurological:  Oriented to person, place and time, No gross sensory or motor defects, visual fields intact to confrontation, normal deep tendon reflexes    Labs:                        10.4   7.69  )-----------( 268      ( 13 Apr 2025 05:56 )             35.4     04-13    137  |  98  |  18  ----------------------------<  66[L]  4.1   |  35[H]  |  0.96    Ca    8.9      13 Apr 2025 05:56    Capillary blood Glucose:  POCT Blood Glucose.: 146 mg/dL (14 Apr 2025 11:39)  POCT Blood Glucose.: 149 mg/dL (14 Apr 2025 08:07)  POCT Blood Glucose.: 201 mg/dL (14 Apr 2025 02:03)  POCT Blood Glucose.: 154 mg/dL (13 Apr 2025 21:25)  POCT Blood Glucose.: 106 mg/dL (13 Apr 2025 17:06)    A1C with Estimated Average Glucose Result: 13.0% (03.11.25 @ 05:00)    Assessment and Plan:  59-year-old female with history of DM, HLD, asthma, obesity hypoventilation syndrome on 4L NC, migraines, hypothyroidism, NAKUL, RCC status post left renal resection 11/23 that comes in for lower extremity swelling Endocrinology is consulted for glycemic management    1) Poorly controlled Type 2 diabetes:  A1C is above goal likely due to dietary indiscretion  Patient reports compliance with medications at home  Developed hypoglycemia on April 13th due to high dose of lantus  Patient is ready to be dc today    Discharge  Recommendations:  Lantus 45 units at bedtime  Novolog 10 units before meals  Ozempic 2 mg weekly  Metformin 1000mg BID    Patient will see her endocrinologist Dr. Arnol Cerna in next few weeks.    Discussed endocrine plan of care with patient and primary team

## 2025-04-14 NOTE — CHART NOTE - NSCHARTNOTEFT_GEN_A_CORE
"The number you have dialed has not been recognized" was the reply for calling the number listed for PCP in the upper right hand of the chart
EVENT: Nurse reporting pt refusing Oxcarbazepine 600 geno GRAM and want to take only 1 tablet (300 mg)    BRIEF HPI: 59 year old female patient with PMH HDL, asthma, obesity hypoventilation syndrome, COPD on 4L NC as needed, NAKUL not on CPAP, RCC status post left renal resection 11/23, migraines, obesity, hypothyroidism, right shoulder bursitis, fibromyalgia, psoriasis, trigeminal neuralgia who presented to the ER due to hypoxia and right foot swelling and redness. She will complete IV antibiotics for cellulitis on 4/14, will continue to frequently elevate and use compression to bilateral lower extremities.     OBJECTIVE:  Vital Signs Last 24 Hrs  T(C): 36.9 (13 Apr 2025 20:24), Max: 37.1 (13 Apr 2025 12:34)  T(F): 98.5 (13 Apr 2025 20:24), Max: 98.8 (13 Apr 2025 12:34)  HR: 90 (13 Apr 2025 20:24) (72 - 90)  BP: 140/70 (13 Apr 2025 20:24) (139/78 - 150/89)  BP(mean): --  RR: 18 (13 Apr 2025 20:24) (18 - 20)  SpO2: 98% (13 Apr 2025 20:24) (96% - 100%)    Parameters below as of 13 Apr 2025 20:24  Patient On (Oxygen Delivery Method): nasal cannula  O2 Flow (L/min): 3    FOCUSED PHYSICAL EXAM:  GEN: Obese adult female in bed  RESP: Even, unlabored  CV: S1 S2 regular    LABS:                        10.4   7.69  )-----------( 268      ( 13 Apr 2025 05:56 )             35.4     04-13    137  |  98  |  18  ----------------------------<  66[L]  4.1   |  35[H]  |  0.96    Ca    8.9      13 Apr 2025 05:56      PLAN:   Cont Oxcarbazepine 600 geno GRAM(s) Oral  as dictated by pt  Cont gabapentin 400 geno GRAM(s) Oral two times a day PRN Pain/Neuropathy    FOLLOW UP / RESULT: effect of medication refusal

## 2025-04-14 NOTE — DISCHARGE NOTE NURSING/CASE MANAGEMENT/SOCIAL WORK - NSDCFUADDAPPT_GEN_ALL_CORE_FT
APPTS ARE READY TO BE MADE: [X] YES    Best Family or Patient Contact (if needed):    Additional Information about above appointments (if needed):    1: Pulmonology  2: PCP  3:     Other comments or requests:   APPTS ARE READY TO BE MADE: [X] YES    Best Family or Patient Contact (if needed):    Additional Information about above appointments (if needed):    1: Pulmonology  2: PCP  3:     Start of service for RN and PT April 15,2025    Other comments or requests:

## 2025-04-14 NOTE — DISCHARGE NOTE NURSING/CASE MANAGEMENT/SOCIAL WORK - NSDCVIVACCINE_GEN_ALL_CORE_FT
Influenza, seasonal, injectable; 2013/10/28 ; Claudia Sherwood (RN);   influenza, injectable, quadrivalent, preservative free; 09-Nov-2023 12:05; Issac Fernandez (RN); Transporeon; [id number] (Exp. Date: 30-Jun-2024); IntraMuscular; Deltoid Right.; 0.5 milliLiter(s); VIS (VIS Published: 06-Aug-2021, VIS Presented: 09-Nov-2023);

## 2025-04-14 NOTE — DISCHARGE NOTE NURSING/CASE MANAGEMENT/SOCIAL WORK - FINANCIAL ASSISTANCE
[facility] provides services at a reduced cost to those who are determined to be eligible through [facility]’s financial assistance program. Information regarding [facility]’s financial assistance program can be found by going to [email or web address] or by calling [phone].

## 2025-04-25 ENCOUNTER — APPOINTMENT (OUTPATIENT)
Dept: VASCULAR SURGERY | Facility: CLINIC | Age: 60
End: 2025-04-25

## 2025-04-30 ENCOUNTER — APPOINTMENT (OUTPATIENT)
Dept: PULMONOLOGY | Facility: CLINIC | Age: 60
End: 2025-04-30

## 2025-05-06 ENCOUNTER — APPOINTMENT (OUTPATIENT)
Dept: RHEUMATOLOGY | Facility: CLINIC | Age: 60
End: 2025-05-06

## 2025-05-12 DIAGNOSIS — I26.99 OTHER PULMONARY EMBOLISM W/OUT ACUTE COR PULMONALE: ICD-10-CM

## 2025-05-12 RX ORDER — FUROSEMIDE 20 MG/1
20 TABLET ORAL DAILY
Qty: 90 | Refills: 1 | Status: ACTIVE | COMMUNITY
Start: 2025-05-12 | End: 1900-01-01

## 2025-05-12 RX ORDER — APIXABAN 5 MG/1
5 TABLET, FILM COATED ORAL
Qty: 180 | Refills: 1 | Status: ACTIVE | COMMUNITY
Start: 2025-05-12 | End: 1900-01-01

## 2025-06-05 ENCOUNTER — APPOINTMENT (OUTPATIENT)
Dept: INTERNAL MEDICINE | Facility: CLINIC | Age: 60
End: 2025-06-05
Payer: MEDICARE

## 2025-06-05 VITALS
WEIGHT: 287 LBS | TEMPERATURE: 97.8 F | OXYGEN SATURATION: 93 % | HEART RATE: 111 BPM | SYSTOLIC BLOOD PRESSURE: 139 MMHG | RESPIRATION RATE: 16 BRPM | BODY MASS INDEX: 52.81 KG/M2 | DIASTOLIC BLOOD PRESSURE: 89 MMHG | HEIGHT: 62 IN

## 2025-06-05 DIAGNOSIS — I26.99 OTHER PULMONARY EMBOLISM W/OUT ACUTE COR PULMONALE: ICD-10-CM

## 2025-06-05 DIAGNOSIS — Z86.711 PERSONAL HISTORY OF PULMONARY EMBOLISM: ICD-10-CM

## 2025-06-05 DIAGNOSIS — E78.5 HYPERLIPIDEMIA, UNSPECIFIED: ICD-10-CM

## 2025-06-05 DIAGNOSIS — E03.9 HYPOTHYROIDISM, UNSPECIFIED: ICD-10-CM

## 2025-06-05 DIAGNOSIS — I10 ESSENTIAL (PRIMARY) HYPERTENSION: ICD-10-CM

## 2025-06-05 DIAGNOSIS — I89.0 LYMPHEDEMA, NOT ELSEWHERE CLASSIFIED: ICD-10-CM

## 2025-06-05 DIAGNOSIS — E11.9 TYPE 2 DIABETES MELLITUS W/OUT COMPLICATIONS: ICD-10-CM

## 2025-06-05 PROCEDURE — 99213 OFFICE O/P EST LOW 20 MIN: CPT

## 2025-06-06 ENCOUNTER — RX RENEWAL (OUTPATIENT)
Age: 60
End: 2025-06-06

## 2025-06-13 PROBLEM — H91.93 HEARING REDUCED, BILATERAL: Status: ACTIVE | Noted: 2025-06-13

## 2025-06-25 ENCOUNTER — APPOINTMENT (OUTPATIENT)
Dept: ENDOCRINOLOGY | Facility: CLINIC | Age: 60
End: 2025-06-25

## 2025-07-03 ENCOUNTER — APPOINTMENT (OUTPATIENT)
Dept: RHEUMATOLOGY | Facility: CLINIC | Age: 60
End: 2025-07-03

## 2025-07-07 ENCOUNTER — APPOINTMENT (OUTPATIENT)
Dept: CARDIOLOGY | Facility: CLINIC | Age: 60
End: 2025-07-07

## 2025-07-08 ENCOUNTER — APPOINTMENT (OUTPATIENT)
Dept: PULMONOLOGY | Facility: CLINIC | Age: 60
End: 2025-07-08

## 2025-07-23 ENCOUNTER — RESULT REVIEW (OUTPATIENT)
Age: 60
End: 2025-07-23

## 2025-07-23 ENCOUNTER — APPOINTMENT (OUTPATIENT)
Dept: MAMMOGRAPHY | Facility: CLINIC | Age: 60
End: 2025-07-23
Payer: MEDICARE

## 2025-07-23 ENCOUNTER — APPOINTMENT (OUTPATIENT)
Dept: CT IMAGING | Facility: CLINIC | Age: 60
End: 2025-07-23

## 2025-07-23 PROCEDURE — 77066 DX MAMMO INCL CAD BI: CPT

## 2025-07-23 PROCEDURE — G0279: CPT

## 2025-07-23 PROCEDURE — 71260 CT THORAX DX C+: CPT

## 2025-07-23 PROCEDURE — 74178 CT ABD&PLV WO CNTR FLWD CNTR: CPT

## 2025-07-31 ENCOUNTER — APPOINTMENT (OUTPATIENT)
Age: 60
End: 2025-07-31

## 2025-08-04 ENCOUNTER — APPOINTMENT (OUTPATIENT)
Age: 60
End: 2025-08-04
Payer: MEDICARE

## 2025-08-04 VITALS
BODY MASS INDEX: 52.08 KG/M2 | SYSTOLIC BLOOD PRESSURE: 152 MMHG | HEART RATE: 110 BPM | OXYGEN SATURATION: 92 % | HEIGHT: 62 IN | DIASTOLIC BLOOD PRESSURE: 87 MMHG | RESPIRATION RATE: 16 BRPM | WEIGHT: 283 LBS

## 2025-08-04 DIAGNOSIS — M17.12 UNILATERAL PRIMARY OSTEOARTHRITIS, LEFT KNEE: ICD-10-CM

## 2025-08-04 PROCEDURE — 99204 OFFICE O/P NEW MOD 45 MIN: CPT

## 2025-08-04 RX ORDER — HYALURONATE SODIUM, STABILIZED 88 MG/4 ML
88 SYRINGE (ML) INTRAARTICULAR
Qty: 1 | Refills: 0 | Status: ACTIVE | COMMUNITY
Start: 2025-08-04

## 2025-08-19 ENCOUNTER — APPOINTMENT (OUTPATIENT)
Dept: UROLOGY | Facility: CLINIC | Age: 60
End: 2025-08-19

## 2025-08-19 VITALS
WEIGHT: 283 LBS | HEART RATE: 93 BPM | RESPIRATION RATE: 16 BRPM | OXYGEN SATURATION: 95 % | BODY MASS INDEX: 52.08 KG/M2 | SYSTOLIC BLOOD PRESSURE: 125 MMHG | HEIGHT: 62 IN | DIASTOLIC BLOOD PRESSURE: 84 MMHG

## 2025-08-19 PROCEDURE — G2211 COMPLEX E/M VISIT ADD ON: CPT

## 2025-08-19 PROCEDURE — 99212 OFFICE O/P EST SF 10 MIN: CPT

## 2025-08-26 ENCOUNTER — APPOINTMENT (OUTPATIENT)
Age: 60
End: 2025-08-26
Payer: MEDICARE

## 2025-08-26 VITALS
DIASTOLIC BLOOD PRESSURE: 90 MMHG | OXYGEN SATURATION: 95 % | HEART RATE: 71 BPM | WEIGHT: 283 LBS | HEIGHT: 62 IN | SYSTOLIC BLOOD PRESSURE: 158 MMHG | BODY MASS INDEX: 52.08 KG/M2

## 2025-08-26 DIAGNOSIS — M17.12 UNILATERAL PRIMARY OSTEOARTHRITIS, LEFT KNEE: ICD-10-CM

## 2025-08-26 PROCEDURE — 20611 DRAIN/INJ JOINT/BURSA W/US: CPT | Mod: LT

## 2025-09-12 ENCOUNTER — APPOINTMENT (OUTPATIENT)
Dept: DERMATOLOGY | Facility: CLINIC | Age: 60
End: 2025-09-12

## 2025-09-15 ENCOUNTER — APPOINTMENT (OUTPATIENT)
Dept: INTERNAL MEDICINE | Facility: CLINIC | Age: 60
End: 2025-09-15

## (undated) DEVICE — PACK DOUBLE BASIN CUSTOM

## (undated) DEVICE — TUBING AIRSEAL TRI-LUMEN FILTERED

## (undated) DEVICE — LIGASURE ATLAS 10MM 37CM

## (undated) DEVICE — SHEARS COVIDIEN ENDO SHEAR 5MM X 31CM W UNIPOLAR CAUTERY

## (undated) DEVICE — DRSG BENZOIN 0.6CC

## (undated) DEVICE — SUT ETHILON 2-0 18" FS

## (undated) DEVICE — Device

## (undated) DEVICE — SUT CAPROSYN 4-0 P-12 UNDYED

## (undated) DEVICE — LIJ/LIA-HOLDER SCOPE: Type: DURABLE MEDICAL EQUIPMENT

## (undated) DEVICE — CATH INSERTION TRAY W 10CC SYRINGE

## (undated) DEVICE — GOWN XL

## (undated) DEVICE — ENDOCATCH II 15MM

## (undated) DEVICE — BAG SPECIMEN RETRIEVAL ENDOBAG 3X6"

## (undated) DEVICE — SUT VICRYL 0 27" UR-6

## (undated) DEVICE — SCOPE WARMER SEAL DISP

## (undated) DEVICE — LAP PAD 4 X 18"

## (undated) DEVICE — STAPLER COVIDIEN ENDO GIA STANDARD HANDLE

## (undated) DEVICE — BASIN SET SINGLE

## (undated) DEVICE — SOL IRR BAG NS 0.9% 3000ML

## (undated) DEVICE — TUBING STRYKEFLOW II SUCTION / IRRIGATOR

## (undated) DEVICE — DRAPE FLUID WARMER 44 X 44"

## (undated) DEVICE — FOLEY CATH 2-WAY 16FR 5CC SILICONE

## (undated) DEVICE — GLV 7.5 PROTEXIS (CREAM) MICRO

## (undated) DEVICE — SOL IRR POUR H2O 1500ML

## (undated) DEVICE — TROCAR COVIDIEN VERSAONE BLUNT TIP HASSAN 12MM

## (undated) DEVICE — INSUFFLATION NDL COVIDIEN SURGINEEDLE VERESS 120MM

## (undated) DEVICE — RETRACTOR COVIDIEN ENDOPADDLE 12MM DISP

## (undated) DEVICE — PROTECTOR HEEL / ELBOW FLUFFY

## (undated) DEVICE — GLV 8 PROTEXIS (CREAM) MICRO

## (undated) DEVICE — SUT PDS II 1 48" TP-1

## (undated) DEVICE — WARMING BLANKET UPPER ADULT

## (undated) DEVICE — TROCAR SURGIQUEST AIRSEAL 12MMX100MM

## (undated) DEVICE — BAG URINE W METER 2L

## (undated) DEVICE — DRAPE SURGICAL #1010

## (undated) DEVICE — CANISTER DISPOSABLE THIN WALL 3000CC

## (undated) DEVICE — TAPE SILK 3"

## (undated) DEVICE — BLADE SURGICAL #15 CARBON

## (undated) DEVICE — D HELP - CLEARVIEW CLEARIFY SYSTEM

## (undated) DEVICE — PACK GENERAL LAPAROSCOPY

## (undated) DEVICE — SUT VICRYL 1 36" CT-1 UNDYED

## (undated) DEVICE — POSITIONER FOAM EGGCRATE PADS 20 X 72 X 2"

## (undated) DEVICE — POSITIONER STRAP ARMBOARD VELCRO TS-30

## (undated) DEVICE — TUBING OLYMPUS INSUFFLATION

## (undated) DEVICE — VENODYNE/SCD SLEEVE CALF MEDIUM

## (undated) DEVICE — ELCTR GROUNDING PAD ADULT COVIDIEN

## (undated) DEVICE — POSITIONER FOAM EGG CRATE ULNAR 2PCS (PINK)